# Patient Record
Sex: FEMALE | Race: OTHER | NOT HISPANIC OR LATINO
[De-identification: names, ages, dates, MRNs, and addresses within clinical notes are randomized per-mention and may not be internally consistent; named-entity substitution may affect disease eponyms.]

---

## 2017-01-31 ENCOUNTER — APPOINTMENT (OUTPATIENT)
Dept: THORACIC SURGERY | Facility: CLINIC | Age: 62
End: 2017-01-31

## 2017-01-31 VITALS
RESPIRATION RATE: 17 BRPM | OXYGEN SATURATION: 95 % | TEMPERATURE: 97.7 F | HEART RATE: 91 BPM | DIASTOLIC BLOOD PRESSURE: 77 MMHG | BODY MASS INDEX: 47.26 KG/M2 | WEIGHT: 234 LBS | SYSTOLIC BLOOD PRESSURE: 122 MMHG

## 2017-03-19 ENCOUNTER — EMERGENCY (EMERGENCY)
Facility: HOSPITAL | Age: 62
LOS: 1 days | Discharge: HOME | End: 2017-03-19
Attending: EMERGENCY MEDICINE | Admitting: EMERGENCY MEDICINE
Payer: MEDICAID

## 2017-03-19 VITALS
HEART RATE: 94 BPM | RESPIRATION RATE: 20 BRPM | OXYGEN SATURATION: 94 % | DIASTOLIC BLOOD PRESSURE: 90 MMHG | SYSTOLIC BLOOD PRESSURE: 154 MMHG | TEMPERATURE: 97 F

## 2017-03-19 VITALS
SYSTOLIC BLOOD PRESSURE: 155 MMHG | RESPIRATION RATE: 18 BRPM | DIASTOLIC BLOOD PRESSURE: 59 MMHG | TEMPERATURE: 98 F | OXYGEN SATURATION: 96 % | HEART RATE: 97 BPM

## 2017-03-19 DIAGNOSIS — Z90.89 ACQUIRED ABSENCE OF OTHER ORGANS: Chronic | ICD-10-CM

## 2017-03-19 LAB
ALBUMIN SERPL ELPH-MCNC: 3.9 G/DL — SIGNIFICANT CHANGE UP (ref 3.3–5)
ALP SERPL-CCNC: 131 U/L — HIGH (ref 40–120)
ALT FLD-CCNC: 67 U/L — HIGH (ref 4–33)
AST SERPL-CCNC: 58 U/L — HIGH (ref 4–32)
B PERT DNA SPEC QL NAA+PROBE: SIGNIFICANT CHANGE UP
BASE EXCESS BLDV CALC-SCNC: -1.1 MMOL/L — SIGNIFICANT CHANGE UP
BASOPHILS # BLD AUTO: 0.05 K/UL — SIGNIFICANT CHANGE UP (ref 0–0.2)
BASOPHILS NFR BLD AUTO: 1 % — SIGNIFICANT CHANGE UP (ref 0–2)
BILIRUB SERPL-MCNC: 0.7 MG/DL — SIGNIFICANT CHANGE UP (ref 0.2–1.2)
BLOOD GAS VENOUS - CREATININE: SIGNIFICANT CHANGE UP MG/DL (ref 0.5–1.3)
BUN SERPL-MCNC: 8 MG/DL — SIGNIFICANT CHANGE UP (ref 7–23)
C PNEUM DNA SPEC QL NAA+PROBE: NOT DETECTED — SIGNIFICANT CHANGE UP
CALCIUM SERPL-MCNC: 9.3 MG/DL — SIGNIFICANT CHANGE UP (ref 8.4–10.5)
CHLORIDE BLDV-SCNC: 100 MMOL/L — SIGNIFICANT CHANGE UP (ref 96–108)
CHLORIDE SERPL-SCNC: 95 MMOL/L — LOW (ref 98–107)
CO2 SERPL-SCNC: 22 MMOL/L — SIGNIFICANT CHANGE UP (ref 22–31)
CREAT SERPL-MCNC: 0.75 MG/DL — SIGNIFICANT CHANGE UP (ref 0.5–1.3)
EOSINOPHIL # BLD AUTO: 0.13 K/UL — SIGNIFICANT CHANGE UP (ref 0–0.5)
EOSINOPHIL NFR BLD AUTO: 2.5 % — SIGNIFICANT CHANGE UP (ref 0–6)
FLUAV H1 2009 PAND RNA SPEC QL NAA+PROBE: NOT DETECTED — SIGNIFICANT CHANGE UP
FLUAV H1 RNA SPEC QL NAA+PROBE: NOT DETECTED — SIGNIFICANT CHANGE UP
FLUAV H3 RNA SPEC QL NAA+PROBE: NOT DETECTED — SIGNIFICANT CHANGE UP
FLUAV SUBTYP SPEC NAA+PROBE: SIGNIFICANT CHANGE UP
FLUBV RNA SPEC QL NAA+PROBE: NOT DETECTED — SIGNIFICANT CHANGE UP
GAS PNL BLDV: 127 MMOL/L — LOW (ref 136–146)
GLUCOSE BLDV-MCNC: 354 — HIGH (ref 70–99)
GLUCOSE SERPL-MCNC: 353 MG/DL — HIGH (ref 70–99)
HADV DNA SPEC QL NAA+PROBE: NOT DETECTED — SIGNIFICANT CHANGE UP
HCO3 BLDV-SCNC: 24 MMOL/L — SIGNIFICANT CHANGE UP (ref 20–27)
HCOV 229E RNA SPEC QL NAA+PROBE: NOT DETECTED — SIGNIFICANT CHANGE UP
HCOV HKU1 RNA SPEC QL NAA+PROBE: NOT DETECTED — SIGNIFICANT CHANGE UP
HCOV NL63 RNA SPEC QL NAA+PROBE: NOT DETECTED — SIGNIFICANT CHANGE UP
HCOV OC43 RNA SPEC QL NAA+PROBE: POSITIVE — HIGH
HCT VFR BLD CALC: 41.6 % — SIGNIFICANT CHANGE UP (ref 34.5–45)
HCT VFR BLDV CALC: 44.8 % — SIGNIFICANT CHANGE UP (ref 34.5–45)
HGB BLD-MCNC: 14.1 G/DL — SIGNIFICANT CHANGE UP (ref 11.5–15.5)
HGB BLDV-MCNC: 14.6 G/DL — SIGNIFICANT CHANGE UP (ref 11.5–15.5)
HMPV RNA SPEC QL NAA+PROBE: NOT DETECTED — SIGNIFICANT CHANGE UP
HPIV1 RNA SPEC QL NAA+PROBE: NOT DETECTED — SIGNIFICANT CHANGE UP
HPIV2 RNA SPEC QL NAA+PROBE: NOT DETECTED — SIGNIFICANT CHANGE UP
HPIV3 RNA SPEC QL NAA+PROBE: NOT DETECTED — SIGNIFICANT CHANGE UP
HPIV4 RNA SPEC QL NAA+PROBE: NOT DETECTED — SIGNIFICANT CHANGE UP
IMM GRANULOCYTES NFR BLD AUTO: 0.4 % — SIGNIFICANT CHANGE UP (ref 0–1.5)
LACTATE BLDV-MCNC: 2.1 MMOL/L — HIGH (ref 0.5–2)
LYMPHOCYTES # BLD AUTO: 1.11 K/UL — SIGNIFICANT CHANGE UP (ref 1–3.3)
LYMPHOCYTES # BLD AUTO: 21.2 % — SIGNIFICANT CHANGE UP (ref 13–44)
M PNEUMO DNA SPEC QL NAA+PROBE: NOT DETECTED — SIGNIFICANT CHANGE UP
MCHC RBC-ENTMCNC: 28.3 PG — SIGNIFICANT CHANGE UP (ref 27–34)
MCHC RBC-ENTMCNC: 33.9 % — SIGNIFICANT CHANGE UP (ref 32–36)
MCV RBC AUTO: 83.4 FL — SIGNIFICANT CHANGE UP (ref 80–100)
MONOCYTES # BLD AUTO: 0.33 K/UL — SIGNIFICANT CHANGE UP (ref 0–0.9)
MONOCYTES NFR BLD AUTO: 6.3 % — SIGNIFICANT CHANGE UP (ref 2–14)
NEUTROPHILS # BLD AUTO: 3.59 K/UL — SIGNIFICANT CHANGE UP (ref 1.8–7.4)
NEUTROPHILS NFR BLD AUTO: 68.6 % — SIGNIFICANT CHANGE UP (ref 43–77)
PCO2 BLDV: 34 MMHG — LOW (ref 41–51)
PH BLDV: 7.44 PH — HIGH (ref 7.32–7.43)
PLATELET # BLD AUTO: 173 K/UL — SIGNIFICANT CHANGE UP (ref 150–400)
PMV BLD: 12.1 FL — SIGNIFICANT CHANGE UP (ref 7–13)
PO2 BLDV: 49 MMHG — HIGH (ref 35–40)
POTASSIUM BLDV-SCNC: 3.4 MMOL/L — SIGNIFICANT CHANGE UP (ref 3.4–4.5)
POTASSIUM SERPL-MCNC: 3.9 MMOL/L — SIGNIFICANT CHANGE UP (ref 3.5–5.3)
POTASSIUM SERPL-SCNC: 3.9 MMOL/L — SIGNIFICANT CHANGE UP (ref 3.5–5.3)
PROT SERPL-MCNC: 7 G/DL — SIGNIFICANT CHANGE UP (ref 6–8.3)
RBC # BLD: 4.99 M/UL — SIGNIFICANT CHANGE UP (ref 3.8–5.2)
RBC # FLD: 14 % — SIGNIFICANT CHANGE UP (ref 10.3–14.5)
RSV RNA SPEC QL NAA+PROBE: NOT DETECTED — SIGNIFICANT CHANGE UP
RV+EV RNA SPEC QL NAA+PROBE: NOT DETECTED — SIGNIFICANT CHANGE UP
SAO2 % BLDV: 88.3 % — HIGH (ref 60–85)
SODIUM SERPL-SCNC: 137 MMOL/L — SIGNIFICANT CHANGE UP (ref 135–145)
WBC # BLD: 5.23 K/UL — SIGNIFICANT CHANGE UP (ref 3.8–10.5)
WBC # FLD AUTO: 5.23 K/UL — SIGNIFICANT CHANGE UP (ref 3.8–10.5)

## 2017-03-19 PROCEDURE — 71020: CPT | Mod: 26

## 2017-03-19 PROCEDURE — 99284 EMERGENCY DEPT VISIT MOD MDM: CPT

## 2017-03-19 RX ORDER — IPRATROPIUM/ALBUTEROL SULFATE 18-103MCG
3 AEROSOL WITH ADAPTER (GRAM) INHALATION
Qty: 0 | Refills: 0 | Status: COMPLETED | OUTPATIENT
Start: 2017-03-19 | End: 2017-03-19

## 2017-03-19 RX ORDER — SODIUM CHLORIDE 9 MG/ML
500 INJECTION INTRAMUSCULAR; INTRAVENOUS; SUBCUTANEOUS ONCE
Qty: 0 | Refills: 0 | Status: COMPLETED | OUTPATIENT
Start: 2017-03-19 | End: 2017-03-19

## 2017-03-19 RX ORDER — ALBUTEROL 90 UG/1
3 AEROSOL, METERED ORAL
Qty: 30 | Refills: 0
Start: 2017-03-19

## 2017-03-19 RX ADMIN — Medication 3 MILLILITER(S): at 13:52

## 2017-03-19 RX ADMIN — SODIUM CHLORIDE 500 MILLILITER(S): 9 INJECTION INTRAMUSCULAR; INTRAVENOUS; SUBCUTANEOUS at 14:20

## 2017-03-19 RX ADMIN — Medication 3 MILLILITER(S): at 14:57

## 2017-03-19 RX ADMIN — Medication 3 MILLILITER(S): at 13:18

## 2017-03-19 RX ADMIN — Medication 60 MILLIGRAM(S): at 13:25

## 2017-03-19 NOTE — ED PROVIDER NOTE - FAMILY HISTORY
Mother  Still living? Unknown  Diabetes mellitus, Age at diagnosis: Age Unknown     Father  Still living? Unknown  Family history of lung cancer, Age at diagnosis: Age Unknown  Family history of stroke, Age at diagnosis: Age Unknown

## 2017-03-19 NOTE — ED PROVIDER NOTE - ATTENDING CONTRIBUTION TO CARE
I performed a face to face evaluation of this patient and performed a full history and physical examination on the patient. I agree and have discussed with the resident their history, physical examination, general assessment, and proposed medical plan. I have personally interviewed and examined the patient.

## 2017-03-19 NOTE — ED PROVIDER NOTE - PMH
Deviated septum    Diabetes mellitus    Emphysema/COPD    GERD (gastroesophageal reflux disease)    ITP (idiopathic thrombocytopenic purpura)    Morbid obesity    Prolapsed uterus

## 2017-03-19 NOTE — ED PROVIDER NOTE - PHYSICAL EXAMINATION
Attending Note: 60 y/o female referred from [facility] for evaluation of worsening cough x 2 months and reported hypoxia (SPO2 90). PMH COPD (80pk/yr smoking hx), stage 1 lung ca (s/p RLL wedge resection 10/2016 (Dr. Kurtz)). Mild SOB greater than normal, went to urgent care and noted to have sat 90% on RA, cxr reportedly without acute findings. Denies fever, C/P, recent travel, sick contacts,hemoptysis.

## 2017-03-19 NOTE — ED PROVIDER NOTE - OBJECTIVE STATEMENT
62yo F w/ COPD (80pk/yr smoking hx), stage 1 lung ca s/p RLL wedge resection 10/2016 (Dr. Kurtz), p/w worsening productive cough x2mo w/ yellow sputum, mild SOB, went to urgent care and noted to have sat 90% on RA, cxr reportedly ok, sent to ED.  Denies fever, CP, recent travel, sick contacts.

## 2017-03-19 NOTE — ED ADULT TRIAGE NOTE - CHIEF COMPLAINT QUOTE
Pt c/o increasing sob, intermittent cough  with yellow phlegm  x 2 mths ,  weakness , dizziness and intermittent chills x few days.  Denies N/V/D.  Pt took 1 alb./at home with no relief

## 2017-03-19 NOTE — ED ADULT NURSE NOTE - OBJECTIVE STATEMENT
Alert and oriented x 4. Pt received to spot 12 complaining of shortness of breath x 2 months with yellow sputum. Pt denies chest pain, dizziness, nausea or vomiting. Smoker x 40 years.  Pt ambulates. IV 20g to right hand. Labs drawn. VSS. EKG done. Will continue to monitor. Family at bedside.

## 2017-10-03 ENCOUNTER — APPOINTMENT (OUTPATIENT)
Dept: THORACIC SURGERY | Facility: CLINIC | Age: 62
End: 2017-10-03
Payer: MEDICAID

## 2017-10-03 VITALS
HEART RATE: 85 BPM | WEIGHT: 222 LBS | BODY MASS INDEX: 44.84 KG/M2 | TEMPERATURE: 97.7 F | OXYGEN SATURATION: 96 % | SYSTOLIC BLOOD PRESSURE: 154 MMHG | RESPIRATION RATE: 17 BRPM | DIASTOLIC BLOOD PRESSURE: 83 MMHG

## 2017-10-03 PROCEDURE — 99214 OFFICE O/P EST MOD 30 MIN: CPT

## 2017-11-01 ENCOUNTER — OUTPATIENT (OUTPATIENT)
Dept: OUTPATIENT SERVICES | Facility: HOSPITAL | Age: 62
LOS: 1 days | End: 2017-11-01
Payer: MEDICAID

## 2017-11-01 DIAGNOSIS — Z90.89 ACQUIRED ABSENCE OF OTHER ORGANS: Chronic | ICD-10-CM

## 2017-11-01 PROCEDURE — G9001: CPT

## 2017-11-08 DIAGNOSIS — R69 ILLNESS, UNSPECIFIED: ICD-10-CM

## 2017-12-28 ENCOUNTER — EMERGENCY (EMERGENCY)
Facility: HOSPITAL | Age: 62
LOS: 1 days | Discharge: HOME | End: 2017-12-28
Attending: EMERGENCY MEDICINE
Payer: MEDICAID

## 2017-12-28 VITALS
RESPIRATION RATE: 24 BRPM | HEART RATE: 95 BPM | SYSTOLIC BLOOD PRESSURE: 171 MMHG | OXYGEN SATURATION: 95 % | WEIGHT: 199.96 LBS | DIASTOLIC BLOOD PRESSURE: 89 MMHG | HEIGHT: 61 IN

## 2017-12-28 VITALS
RESPIRATION RATE: 22 BRPM | SYSTOLIC BLOOD PRESSURE: 142 MMHG | HEART RATE: 86 BPM | TEMPERATURE: 98 F | DIASTOLIC BLOOD PRESSURE: 74 MMHG | OXYGEN SATURATION: 95 %

## 2017-12-28 DIAGNOSIS — Z90.89 ACQUIRED ABSENCE OF OTHER ORGANS: Chronic | ICD-10-CM

## 2017-12-28 LAB
ALBUMIN SERPL ELPH-MCNC: 3.5 G/DL — SIGNIFICANT CHANGE UP (ref 3.5–5)
ALP SERPL-CCNC: 140 U/L — HIGH (ref 40–120)
ALT FLD-CCNC: 51 U/L DA — SIGNIFICANT CHANGE UP (ref 10–60)
ANION GAP SERPL CALC-SCNC: 11 MMOL/L — SIGNIFICANT CHANGE UP (ref 5–17)
APTT BLD: 35 SEC — SIGNIFICANT CHANGE UP (ref 27.5–37.4)
AST SERPL-CCNC: 31 U/L — SIGNIFICANT CHANGE UP (ref 10–40)
BASOPHILS # BLD AUTO: 0.1 K/UL — SIGNIFICANT CHANGE UP (ref 0–0.2)
BASOPHILS NFR BLD AUTO: 1.3 % — SIGNIFICANT CHANGE UP (ref 0–2)
BILIRUB SERPL-MCNC: 0.3 MG/DL — SIGNIFICANT CHANGE UP (ref 0.2–1.2)
BUN SERPL-MCNC: 9 MG/DL — SIGNIFICANT CHANGE UP (ref 7–18)
CALCIUM SERPL-MCNC: 8.6 MG/DL — SIGNIFICANT CHANGE UP (ref 8.4–10.5)
CHLORIDE SERPL-SCNC: 106 MMOL/L — SIGNIFICANT CHANGE UP (ref 96–108)
CK SERPL-CCNC: 92 U/L — SIGNIFICANT CHANGE UP (ref 21–215)
CO2 SERPL-SCNC: 23 MMOL/L — SIGNIFICANT CHANGE UP (ref 22–31)
CREAT SERPL-MCNC: 0.78 MG/DL — SIGNIFICANT CHANGE UP (ref 0.5–1.3)
EOSINOPHIL # BLD AUTO: 0.2 K/UL — SIGNIFICANT CHANGE UP (ref 0–0.5)
EOSINOPHIL NFR BLD AUTO: 2.9 % — SIGNIFICANT CHANGE UP (ref 0–6)
GLUCOSE SERPL-MCNC: 135 MG/DL — HIGH (ref 70–99)
HCT VFR BLD CALC: 42.6 % — SIGNIFICANT CHANGE UP (ref 34.5–45)
HGB BLD-MCNC: 13.9 G/DL — SIGNIFICANT CHANGE UP (ref 11.5–15.5)
INR BLD: 1 RATIO — SIGNIFICANT CHANGE UP (ref 0.88–1.16)
LYMPHOCYTES # BLD AUTO: 1.9 K/UL — SIGNIFICANT CHANGE UP (ref 1–3.3)
LYMPHOCYTES # BLD AUTO: 3 % — LOW (ref 13–44)
MCHC RBC-ENTMCNC: 28.9 PG — SIGNIFICANT CHANGE UP (ref 27–34)
MCHC RBC-ENTMCNC: 32.7 GM/DL — SIGNIFICANT CHANGE UP (ref 32–36)
MCV RBC AUTO: 88.4 FL — SIGNIFICANT CHANGE UP (ref 80–100)
MONOCYTES # BLD AUTO: 0.4 K/UL — SIGNIFICANT CHANGE UP (ref 0–0.9)
MONOCYTES NFR BLD AUTO: 4.8 % — SIGNIFICANT CHANGE UP (ref 2–14)
NEUTROPHILS # BLD AUTO: 5.2 K/UL — SIGNIFICANT CHANGE UP (ref 1.8–7.4)
NEUTROPHILS NFR BLD AUTO: 75 % — SIGNIFICANT CHANGE UP (ref 43–77)
NT-PROBNP SERPL-SCNC: 312 PG/ML — HIGH (ref 0–125)
PLATELET # BLD AUTO: 175 K/UL — SIGNIFICANT CHANGE UP (ref 150–400)
POTASSIUM SERPL-MCNC: 3.5 MMOL/L — SIGNIFICANT CHANGE UP (ref 3.5–5.3)
POTASSIUM SERPL-SCNC: 3.5 MMOL/L — SIGNIFICANT CHANGE UP (ref 3.5–5.3)
PROT SERPL-MCNC: 7.5 G/DL — SIGNIFICANT CHANGE UP (ref 6–8.3)
PROTHROM AB SERPL-ACNC: 12 SEC — SIGNIFICANT CHANGE UP (ref 9.8–12.7)
RBC # BLD: 5 M/UL — SIGNIFICANT CHANGE UP (ref 3.8–5.2)
RBC # FLD: 12.6 % — SIGNIFICANT CHANGE UP (ref 10.3–14.5)
SODIUM SERPL-SCNC: 140 MMOL/L — SIGNIFICANT CHANGE UP (ref 135–145)
TROPONIN I SERPL-MCNC: <0.015 NG/ML — SIGNIFICANT CHANGE UP (ref 0–0.04)
WBC # BLD: 8 K/UL — SIGNIFICANT CHANGE UP (ref 3.8–10.5)
WBC # FLD AUTO: 8 K/UL — SIGNIFICANT CHANGE UP (ref 3.8–10.5)

## 2017-12-28 PROCEDURE — 94640 AIRWAY INHALATION TREATMENT: CPT

## 2017-12-28 PROCEDURE — 84484 ASSAY OF TROPONIN QUANT: CPT

## 2017-12-28 PROCEDURE — 99285 EMERGENCY DEPT VISIT HI MDM: CPT | Mod: 25

## 2017-12-28 PROCEDURE — 71275 CT ANGIOGRAPHY CHEST: CPT | Mod: 26

## 2017-12-28 PROCEDURE — 82962 GLUCOSE BLOOD TEST: CPT

## 2017-12-28 PROCEDURE — 80053 COMPREHEN METABOLIC PANEL: CPT

## 2017-12-28 PROCEDURE — 71010: CPT | Mod: 26

## 2017-12-28 PROCEDURE — 82550 ASSAY OF CK (CPK): CPT

## 2017-12-28 PROCEDURE — 85027 COMPLETE CBC AUTOMATED: CPT

## 2017-12-28 PROCEDURE — 71275 CT ANGIOGRAPHY CHEST: CPT

## 2017-12-28 PROCEDURE — 71045 X-RAY EXAM CHEST 1 VIEW: CPT

## 2017-12-28 PROCEDURE — 85730 THROMBOPLASTIN TIME PARTIAL: CPT

## 2017-12-28 PROCEDURE — 85610 PROTHROMBIN TIME: CPT

## 2017-12-28 PROCEDURE — 96374 THER/PROPH/DIAG INJ IV PUSH: CPT

## 2017-12-28 PROCEDURE — 93005 ELECTROCARDIOGRAM TRACING: CPT

## 2017-12-28 PROCEDURE — 99284 EMERGENCY DEPT VISIT MOD MDM: CPT | Mod: 25

## 2017-12-28 PROCEDURE — 83880 ASSAY OF NATRIURETIC PEPTIDE: CPT

## 2017-12-28 PROCEDURE — 96375 TX/PRO/DX INJ NEW DRUG ADDON: CPT

## 2017-12-28 RX ORDER — IPRATROPIUM/ALBUTEROL SULFATE 18-103MCG
3 AEROSOL WITH ADAPTER (GRAM) INHALATION ONCE
Qty: 0 | Refills: 0 | Status: COMPLETED | OUTPATIENT
Start: 2017-12-28 | End: 2017-12-28

## 2017-12-28 RX ORDER — MORPHINE SULFATE 50 MG/1
4 CAPSULE, EXTENDED RELEASE ORAL ONCE
Qty: 0 | Refills: 0 | Status: DISCONTINUED | OUTPATIENT
Start: 2017-12-28 | End: 2017-12-28

## 2017-12-28 RX ORDER — ACETAMINOPHEN 500 MG
1000 TABLET ORAL ONCE
Qty: 0 | Refills: 0 | Status: COMPLETED | OUTPATIENT
Start: 2017-12-28 | End: 2017-12-28

## 2017-12-28 RX ORDER — OXYCODONE AND ACETAMINOPHEN 5; 325 MG/1; MG/1
1 TABLET ORAL ONCE
Qty: 0 | Refills: 0 | Status: DISCONTINUED | OUTPATIENT
Start: 2017-12-28 | End: 2017-12-28

## 2017-12-28 RX ADMIN — Medication 1000 MILLIGRAM(S): at 02:57

## 2017-12-28 RX ADMIN — Medication 3 MILLILITER(S): at 01:59

## 2017-12-28 RX ADMIN — Medication 3 MILLILITER(S): at 01:36

## 2017-12-28 RX ADMIN — MORPHINE SULFATE 4 MILLIGRAM(S): 50 CAPSULE, EXTENDED RELEASE ORAL at 04:46

## 2017-12-28 RX ADMIN — MORPHINE SULFATE 4 MILLIGRAM(S): 50 CAPSULE, EXTENDED RELEASE ORAL at 04:45

## 2017-12-28 RX ADMIN — Medication 400 MILLIGRAM(S): at 01:59

## 2017-12-28 RX ADMIN — OXYCODONE AND ACETAMINOPHEN 1 TABLET(S): 5; 325 TABLET ORAL at 08:17

## 2017-12-28 NOTE — ED ADULT NURSE REASSESSMENT NOTE - NS ED NURSE REASSESS COMMENT FT1
patient states pain w/ relief to medication, RA saturation 98%, slept while waiting for CT results. to be evaluated by ED attending

## 2017-12-28 NOTE — ED PROVIDER NOTE - PMH
Deviated septum    Diabetes mellitus    Emphysema/COPD    GERD (gastroesophageal reflux disease)    ITP (idiopathic thrombocytopenic purpura)    Lung cancer    Morbid obesity    Prolapsed uterus

## 2017-12-28 NOTE — ED PROVIDER NOTE - PROGRESS NOTE DETAILS
Janelle ZAPATA: Ptient has been reassesed and reports improving symptoms. Denies worse CP, palitation or lightheadedness.

## 2017-12-28 NOTE — ED PROVIDER NOTE - NS_ ATTENDINGSCRIBEDETAILS _ED_A_ED_FT
The scribe's documentation has been prepared under my direction and personally reviewed by me in its entirety. I confirm that the note above accurately reflects all work, treatment, procedures, and medical decision making performed by me (Dr. Chapo Borgse).

## 2017-12-28 NOTE — ED PROVIDER NOTE - OBJECTIVE STATEMENT
63 y/o female with significant PMHx of COPD, DM, ITP, Stage 1 Lung CA, presents to the ED c/o worsening SOB x 2 weeks. Pt reports associated productive cough (yellow) x 2 weeks and R sided CP: described as sharp and non-radiating in nature. Pt reports she came in today for worsening SOB, and did not come in earlier as she assumed SOB would resolve on own. Pt also reports chronic b/l LE swelling and diaphoresis. Pt denies recent sick contacts, recent travel, long periods of immobilization or Hx of DVT/PE/CHF. Pt had sonogram of abd recently: was told that she has a mass in liver. Pt does not use O2 at home. Pt denies fever, chills, nausea, vomiting, hemoptysis, rash, abd pain, palpitations, or any other complaints. Former smoker. PMD: Dr. Mendiola, Dr. Schaffer, Dr. Kurtz (thoracic surgeon).

## 2018-04-10 ENCOUNTER — APPOINTMENT (OUTPATIENT)
Dept: THORACIC SURGERY | Facility: CLINIC | Age: 63
End: 2018-04-10
Payer: MEDICAID

## 2018-04-24 ENCOUNTER — APPOINTMENT (OUTPATIENT)
Dept: THORACIC SURGERY | Facility: CLINIC | Age: 63
End: 2018-04-24
Payer: MEDICAID

## 2018-05-15 ENCOUNTER — APPOINTMENT (OUTPATIENT)
Dept: THORACIC SURGERY | Facility: CLINIC | Age: 63
End: 2018-05-15
Payer: MEDICAID

## 2018-05-29 ENCOUNTER — APPOINTMENT (OUTPATIENT)
Dept: THORACIC SURGERY | Facility: CLINIC | Age: 63
End: 2018-05-29
Payer: MEDICAID

## 2018-05-29 VITALS
RESPIRATION RATE: 18 BRPM | SYSTOLIC BLOOD PRESSURE: 138 MMHG | WEIGHT: 190 LBS | OXYGEN SATURATION: 94 % | HEART RATE: 97 BPM | DIASTOLIC BLOOD PRESSURE: 82 MMHG | BODY MASS INDEX: 38.3 KG/M2 | HEIGHT: 59 IN

## 2018-05-29 PROCEDURE — 99213 OFFICE O/P EST LOW 20 MIN: CPT

## 2018-05-29 RX ORDER — METFORMIN HYDROCHLORIDE 500 MG/1
500 TABLET, COATED ORAL
Refills: 0 | Status: ACTIVE | COMMUNITY

## 2018-05-29 RX ORDER — PREDNISONE 20 MG/1
20 TABLET ORAL
Refills: 0 | Status: ACTIVE | COMMUNITY

## 2018-11-27 ENCOUNTER — APPOINTMENT (OUTPATIENT)
Dept: THORACIC SURGERY | Facility: CLINIC | Age: 63
End: 2018-11-27

## 2018-11-30 ENCOUNTER — OTHER (OUTPATIENT)
Age: 63
End: 2018-11-30

## 2018-12-06 PROBLEM — C34.90 MALIGNANT NEOPLASM OF UNSPECIFIED PART OF UNSPECIFIED BRONCHUS OR LUNG: Chronic | Status: ACTIVE | Noted: 2018-01-02

## 2018-12-11 ENCOUNTER — APPOINTMENT (OUTPATIENT)
Dept: THORACIC SURGERY | Facility: CLINIC | Age: 63
End: 2018-12-11

## 2018-12-18 ENCOUNTER — APPOINTMENT (OUTPATIENT)
Dept: THORACIC SURGERY | Facility: CLINIC | Age: 63
End: 2018-12-18
Payer: MEDICAID

## 2018-12-18 VITALS
TEMPERATURE: 97.5 F | DIASTOLIC BLOOD PRESSURE: 81 MMHG | WEIGHT: 230 LBS | RESPIRATION RATE: 18 BRPM | OXYGEN SATURATION: 92 % | BODY MASS INDEX: 46.45 KG/M2 | HEART RATE: 102 BPM | SYSTOLIC BLOOD PRESSURE: 167 MMHG

## 2018-12-18 DIAGNOSIS — R06.02 SHORTNESS OF BREATH: ICD-10-CM

## 2018-12-18 PROCEDURE — 99213 OFFICE O/P EST LOW 20 MIN: CPT

## 2018-12-20 PROBLEM — R06.02 SOB (SHORTNESS OF BREATH): Status: ACTIVE | Noted: 2018-12-20

## 2018-12-20 RX ORDER — PREDNISONE 20 MG/1
20 TABLET ORAL DAILY
Refills: 0 | Status: ACTIVE | COMMUNITY

## 2019-04-09 ENCOUNTER — OUTPATIENT (OUTPATIENT)
Dept: OUTPATIENT SERVICES | Facility: HOSPITAL | Age: 64
LOS: 1 days | End: 2019-04-09
Payer: MEDICAID

## 2019-04-09 DIAGNOSIS — R07.9 CHEST PAIN, UNSPECIFIED: ICD-10-CM

## 2019-04-09 DIAGNOSIS — Z90.89 ACQUIRED ABSENCE OF OTHER ORGANS: Chronic | ICD-10-CM

## 2019-04-09 PROCEDURE — 93016 CV STRESS TEST SUPVJ ONLY: CPT

## 2019-04-09 PROCEDURE — 93018 CV STRESS TEST I&R ONLY: CPT

## 2019-04-09 PROCEDURE — 78452 HT MUSCLE IMAGE SPECT MULT: CPT

## 2019-04-09 PROCEDURE — A9502: CPT

## 2019-04-09 PROCEDURE — 93017 CV STRESS TEST TRACING ONLY: CPT

## 2019-04-09 PROCEDURE — 78452 HT MUSCLE IMAGE SPECT MULT: CPT | Mod: 26

## 2019-06-21 ENCOUNTER — OTHER (OUTPATIENT)
Age: 64
End: 2019-06-21

## 2019-06-25 ENCOUNTER — APPOINTMENT (OUTPATIENT)
Dept: THORACIC SURGERY | Facility: CLINIC | Age: 64
End: 2019-06-25

## 2019-12-01 ENCOUNTER — OUTPATIENT (OUTPATIENT)
Dept: OUTPATIENT SERVICES | Facility: HOSPITAL | Age: 64
LOS: 1 days | End: 2019-12-01
Payer: MEDICAID

## 2019-12-01 DIAGNOSIS — Z90.89 ACQUIRED ABSENCE OF OTHER ORGANS: Chronic | ICD-10-CM

## 2019-12-01 PROCEDURE — G9001: CPT

## 2019-12-22 ENCOUNTER — INPATIENT (INPATIENT)
Facility: HOSPITAL | Age: 64
LOS: 1 days | Discharge: HOME | DRG: 191 | End: 2019-12-24
Attending: STUDENT IN AN ORGANIZED HEALTH CARE EDUCATION/TRAINING PROGRAM | Admitting: STUDENT IN AN ORGANIZED HEALTH CARE EDUCATION/TRAINING PROGRAM
Payer: MEDICAID

## 2019-12-22 VITALS
RESPIRATION RATE: 18 BRPM | HEART RATE: 97 BPM | DIASTOLIC BLOOD PRESSURE: 81 MMHG | TEMPERATURE: 97 F | SYSTOLIC BLOOD PRESSURE: 142 MMHG | WEIGHT: 175.05 LBS | OXYGEN SATURATION: 99 %

## 2019-12-22 DIAGNOSIS — Z90.89 ACQUIRED ABSENCE OF OTHER ORGANS: Chronic | ICD-10-CM

## 2019-12-22 LAB
ALBUMIN SERPL ELPH-MCNC: 3.5 G/DL — SIGNIFICANT CHANGE UP (ref 3.5–5)
ALP SERPL-CCNC: 131 U/L — HIGH (ref 40–120)
ALT FLD-CCNC: 27 U/L DA — SIGNIFICANT CHANGE UP (ref 10–60)
ANION GAP SERPL CALC-SCNC: 8 MMOL/L — SIGNIFICANT CHANGE UP (ref 5–17)
AST SERPL-CCNC: 12 U/L — SIGNIFICANT CHANGE UP (ref 10–40)
BASE EXCESS BLDV CALC-SCNC: 4 MMOL/L — HIGH (ref -2–2)
BASOPHILS # BLD AUTO: 0.05 K/UL — SIGNIFICANT CHANGE UP (ref 0–0.2)
BASOPHILS NFR BLD AUTO: 0.3 % — SIGNIFICANT CHANGE UP (ref 0–2)
BILIRUB SERPL-MCNC: 0.4 MG/DL — SIGNIFICANT CHANGE UP (ref 0.2–1.2)
BLOOD GAS COMMENTS, VENOUS: SIGNIFICANT CHANGE UP
BUN SERPL-MCNC: 21 MG/DL — HIGH (ref 7–18)
CALCIUM SERPL-MCNC: 8.9 MG/DL — SIGNIFICANT CHANGE UP (ref 8.4–10.5)
CHLORIDE SERPL-SCNC: 101 MMOL/L — SIGNIFICANT CHANGE UP (ref 96–108)
CO2 SERPL-SCNC: 30 MMOL/L — SIGNIFICANT CHANGE UP (ref 22–31)
CREAT SERPL-MCNC: 0.88 MG/DL — SIGNIFICANT CHANGE UP (ref 0.5–1.3)
EOSINOPHIL # BLD AUTO: 0.08 K/UL — SIGNIFICANT CHANGE UP (ref 0–0.5)
EOSINOPHIL NFR BLD AUTO: 0.5 % — SIGNIFICANT CHANGE UP (ref 0–6)
GLUCOSE SERPL-MCNC: 329 MG/DL — HIGH (ref 70–99)
HCO3 BLDV-SCNC: 29 MMOL/L — SIGNIFICANT CHANGE UP (ref 21–29)
HCT VFR BLD CALC: 46.7 % — HIGH (ref 34.5–45)
HGB BLD-MCNC: 15.1 G/DL — SIGNIFICANT CHANGE UP (ref 11.5–15.5)
HOROWITZ INDEX BLDV+IHG-RTO: 21 — SIGNIFICANT CHANGE UP
IMM GRANULOCYTES NFR BLD AUTO: 0.6 % — SIGNIFICANT CHANGE UP (ref 0–1.5)
LYMPHOCYTES # BLD AUTO: 16.4 % — SIGNIFICANT CHANGE UP (ref 13–44)
LYMPHOCYTES # BLD AUTO: 2.6 K/UL — SIGNIFICANT CHANGE UP (ref 1–3.3)
MCHC RBC-ENTMCNC: 28.8 PG — SIGNIFICANT CHANGE UP (ref 27–34)
MCHC RBC-ENTMCNC: 32.3 GM/DL — SIGNIFICANT CHANGE UP (ref 32–36)
MCV RBC AUTO: 89 FL — SIGNIFICANT CHANGE UP (ref 80–100)
MONOCYTES # BLD AUTO: 1.3 K/UL — HIGH (ref 0–0.9)
MONOCYTES NFR BLD AUTO: 8.2 % — SIGNIFICANT CHANGE UP (ref 2–14)
NEUTROPHILS # BLD AUTO: 11.75 K/UL — HIGH (ref 1.8–7.4)
NEUTROPHILS NFR BLD AUTO: 74 % — SIGNIFICANT CHANGE UP (ref 43–77)
NRBC # BLD: 0 /100 WBCS — SIGNIFICANT CHANGE UP (ref 0–0)
PCO2 BLDV: 47 MMHG — SIGNIFICANT CHANGE UP (ref 35–50)
PH BLDV: 7.41 — SIGNIFICANT CHANGE UP (ref 7.35–7.45)
PLATELET # BLD AUTO: 292 K/UL — SIGNIFICANT CHANGE UP (ref 150–400)
PO2 BLDV: 56 MMHG — HIGH (ref 25–45)
POTASSIUM SERPL-MCNC: 3.4 MMOL/L — LOW (ref 3.5–5.3)
POTASSIUM SERPL-SCNC: 3.4 MMOL/L — LOW (ref 3.5–5.3)
PROT SERPL-MCNC: 7.4 G/DL — SIGNIFICANT CHANGE UP (ref 6–8.3)
RBC # BLD: 5.25 M/UL — HIGH (ref 3.8–5.2)
RBC # FLD: 13.5 % — SIGNIFICANT CHANGE UP (ref 10.3–14.5)
SAO2 % BLDV: 89 % — HIGH (ref 67–88)
SODIUM SERPL-SCNC: 139 MMOL/L — SIGNIFICANT CHANGE UP (ref 135–145)
TROPONIN I SERPL-MCNC: <0.015 NG/ML — SIGNIFICANT CHANGE UP (ref 0–0.04)
WBC # BLD: 15.88 K/UL — HIGH (ref 3.8–10.5)
WBC # FLD AUTO: 15.88 K/UL — HIGH (ref 3.8–10.5)

## 2019-12-22 PROCEDURE — 71046 X-RAY EXAM CHEST 2 VIEWS: CPT | Mod: 26

## 2019-12-22 RX ORDER — KETOROLAC TROMETHAMINE 30 MG/ML
15 SYRINGE (ML) INJECTION ONCE
Refills: 0 | Status: DISCONTINUED | OUTPATIENT
Start: 2019-12-22 | End: 2019-12-22

## 2019-12-22 RX ORDER — IPRATROPIUM/ALBUTEROL SULFATE 18-103MCG
3 AEROSOL WITH ADAPTER (GRAM) INHALATION ONCE
Refills: 0 | Status: COMPLETED | OUTPATIENT
Start: 2019-12-22 | End: 2019-12-22

## 2019-12-22 RX ADMIN — Medication 15 MILLIGRAM(S): at 23:37

## 2019-12-22 RX ADMIN — Medication 15 MILLIGRAM(S): at 22:41

## 2019-12-22 RX ADMIN — Medication 40 MILLIGRAM(S): at 22:41

## 2019-12-22 RX ADMIN — Medication 3 MILLILITER(S): at 22:41

## 2019-12-22 NOTE — ED PROVIDER NOTE - OBJECTIVE STATEMENT
Pt is a 64y F with significant PMHx of CHF, COPD and significant PSHx of Rt lung wedge resection presenting to the ED with shortness of breath on exertion and bilateral lung pain x3days. Pt describes pain as lungs being squeezed. Pt has chronic leg swelling and denies any chest pain, fever, nausea, vomiting, or any trauma. Pt has a Penicillin allergy and currently denies any additional complaints at this time.

## 2019-12-22 NOTE — ED ADULT NURSE NOTE - NSIMPLEMENTINTERV_GEN_ALL_ED
Implemented All Universal Safety Interventions:  [place] to call system. Call bell, personal items and telephone within reach. Instruct patient to call for assistance. Room bathroom lighting operational. Non-slip footwear when patient is off stretcher. Physically safe environment: no spills, clutter or unnecessary equipment. Stretcher in lowest position, wheels locked, appropriate side rails in place.

## 2019-12-22 NOTE — ED ADULT NURSE NOTE - OBJECTIVE STATEMENT
came to ED due to back pain upper side x 3 days pain when coughing. Pt on oxygen as needed at home .

## 2019-12-23 ENCOUNTER — TRANSCRIPTION ENCOUNTER (OUTPATIENT)
Age: 64
End: 2019-12-23

## 2019-12-23 DIAGNOSIS — Z90.2 ACQUIRED ABSENCE OF LUNG [PART OF]: Chronic | ICD-10-CM

## 2019-12-23 DIAGNOSIS — Z86.2 PERSONAL HISTORY OF DISEASES OF THE BLOOD AND BLOOD-FORMING ORGANS AND CERTAIN DISORDERS INVOLVING THE IMMUNE MECHANISM: ICD-10-CM

## 2019-12-23 DIAGNOSIS — J44.1 CHRONIC OBSTRUCTIVE PULMONARY DISEASE WITH (ACUTE) EXACERBATION: ICD-10-CM

## 2019-12-23 DIAGNOSIS — Z90.49 ACQUIRED ABSENCE OF OTHER SPECIFIED PARTS OF DIGESTIVE TRACT: Chronic | ICD-10-CM

## 2019-12-23 DIAGNOSIS — E11.9 TYPE 2 DIABETES MELLITUS WITHOUT COMPLICATIONS: ICD-10-CM

## 2019-12-23 DIAGNOSIS — J18.9 PNEUMONIA, UNSPECIFIED ORGANISM: ICD-10-CM

## 2019-12-23 DIAGNOSIS — R07.81 PLEURODYNIA: ICD-10-CM

## 2019-12-23 DIAGNOSIS — Z29.9 ENCOUNTER FOR PROPHYLACTIC MEASURES, UNSPECIFIED: ICD-10-CM

## 2019-12-23 DIAGNOSIS — I10 ESSENTIAL (PRIMARY) HYPERTENSION: ICD-10-CM

## 2019-12-23 LAB
24R-OH-CALCIDIOL SERPL-MCNC: 9.9 NG/ML — LOW (ref 30–80)
ANION GAP SERPL CALC-SCNC: 8 MMOL/L — SIGNIFICANT CHANGE UP (ref 5–17)
BASOPHILS # BLD AUTO: 0.02 K/UL — SIGNIFICANT CHANGE UP (ref 0–0.2)
BASOPHILS NFR BLD AUTO: 0.2 % — SIGNIFICANT CHANGE UP (ref 0–2)
BUN SERPL-MCNC: 21 MG/DL — HIGH (ref 7–18)
CALCIUM SERPL-MCNC: 8.9 MG/DL — SIGNIFICANT CHANGE UP (ref 8.4–10.5)
CHLORIDE SERPL-SCNC: 98 MMOL/L — SIGNIFICANT CHANGE UP (ref 96–108)
CHOLEST SERPL-MCNC: 145 MG/DL — SIGNIFICANT CHANGE UP (ref 10–199)
CO2 SERPL-SCNC: 29 MMOL/L — SIGNIFICANT CHANGE UP (ref 22–31)
CREAT SERPL-MCNC: 0.86 MG/DL — SIGNIFICANT CHANGE UP (ref 0.5–1.3)
EOSINOPHIL # BLD AUTO: 0 K/UL — SIGNIFICANT CHANGE UP (ref 0–0.5)
EOSINOPHIL NFR BLD AUTO: 0 % — SIGNIFICANT CHANGE UP (ref 0–6)
FLU A RESULT: SIGNIFICANT CHANGE UP
FLU A RESULT: SIGNIFICANT CHANGE UP
FLUAV AG NPH QL: SIGNIFICANT CHANGE UP
FLUBV AG NPH QL: SIGNIFICANT CHANGE UP
GLUCOSE BLDC GLUCOMTR-MCNC: 183 MG/DL — HIGH (ref 70–99)
GLUCOSE BLDC GLUCOMTR-MCNC: 243 MG/DL — HIGH (ref 70–99)
GLUCOSE BLDC GLUCOMTR-MCNC: 340 MG/DL — HIGH (ref 70–99)
GLUCOSE BLDC GLUCOMTR-MCNC: 353 MG/DL — HIGH (ref 70–99)
GLUCOSE SERPL-MCNC: 448 MG/DL — HIGH (ref 70–99)
HBA1C BLD-MCNC: 11.2 % — HIGH (ref 4–5.6)
HCT VFR BLD CALC: 42.8 % — SIGNIFICANT CHANGE UP (ref 34.5–45)
HDLC SERPL-MCNC: 76 MG/DL — SIGNIFICANT CHANGE UP
HGB BLD-MCNC: 13.9 G/DL — SIGNIFICANT CHANGE UP (ref 11.5–15.5)
IMM GRANULOCYTES NFR BLD AUTO: 0.7 % — SIGNIFICANT CHANGE UP (ref 0–1.5)
LIPID PNL WITH DIRECT LDL SERPL: 53 MG/DL — SIGNIFICANT CHANGE UP
LYMPHOCYTES # BLD AUTO: 0.78 K/UL — LOW (ref 1–3.3)
LYMPHOCYTES # BLD AUTO: 7.9 % — LOW (ref 13–44)
MAGNESIUM SERPL-MCNC: 2 MG/DL — SIGNIFICANT CHANGE UP (ref 1.6–2.6)
MCHC RBC-ENTMCNC: 28.5 PG — SIGNIFICANT CHANGE UP (ref 27–34)
MCHC RBC-ENTMCNC: 32.5 GM/DL — SIGNIFICANT CHANGE UP (ref 32–36)
MCV RBC AUTO: 87.7 FL — SIGNIFICANT CHANGE UP (ref 80–100)
MONOCYTES # BLD AUTO: 0.24 K/UL — SIGNIFICANT CHANGE UP (ref 0–0.9)
MONOCYTES NFR BLD AUTO: 2.4 % — SIGNIFICANT CHANGE UP (ref 2–14)
NEUTROPHILS # BLD AUTO: 8.82 K/UL — HIGH (ref 1.8–7.4)
NEUTROPHILS NFR BLD AUTO: 88.8 % — HIGH (ref 43–77)
NRBC # BLD: 0 /100 WBCS — SIGNIFICANT CHANGE UP (ref 0–0)
PHOSPHATE SERPL-MCNC: 3.9 MG/DL — SIGNIFICANT CHANGE UP (ref 2.5–4.5)
PLATELET # BLD AUTO: 235 K/UL — SIGNIFICANT CHANGE UP (ref 150–400)
POTASSIUM SERPL-MCNC: 4.2 MMOL/L — SIGNIFICANT CHANGE UP (ref 3.5–5.3)
POTASSIUM SERPL-SCNC: 4.2 MMOL/L — SIGNIFICANT CHANGE UP (ref 3.5–5.3)
PROCALCITONIN SERPL-MCNC: 0.04 NG/ML — SIGNIFICANT CHANGE UP (ref 0.02–0.1)
RBC # BLD: 4.88 M/UL — SIGNIFICANT CHANGE UP (ref 3.8–5.2)
RBC # FLD: 13.5 % — SIGNIFICANT CHANGE UP (ref 10.3–14.5)
RSV RESULT: SIGNIFICANT CHANGE UP
RSV RNA RESP QL NAA+PROBE: SIGNIFICANT CHANGE UP
SODIUM SERPL-SCNC: 135 MMOL/L — SIGNIFICANT CHANGE UP (ref 135–145)
TOTAL CHOLESTEROL/HDL RATIO MEASUREMENT: 1.9 RATIO — LOW (ref 3.3–7.1)
TRIGL SERPL-MCNC: 80 MG/DL — SIGNIFICANT CHANGE UP (ref 10–149)
TROPONIN I SERPL-MCNC: <0.015 NG/ML — SIGNIFICANT CHANGE UP (ref 0–0.04)
TSH SERPL-MCNC: 0.44 UU/ML — SIGNIFICANT CHANGE UP (ref 0.34–4.82)
VIT B12 SERPL-MCNC: 451 PG/ML — SIGNIFICANT CHANGE UP (ref 232–1245)
WBC # BLD: 9.93 K/UL — SIGNIFICANT CHANGE UP (ref 3.8–10.5)
WBC # FLD AUTO: 9.93 K/UL — SIGNIFICANT CHANGE UP (ref 3.8–10.5)

## 2019-12-23 PROCEDURE — 99223 1ST HOSP IP/OBS HIGH 75: CPT

## 2019-12-23 PROCEDURE — 99285 EMERGENCY DEPT VISIT HI MDM: CPT

## 2019-12-23 PROCEDURE — 71275 CT ANGIOGRAPHY CHEST: CPT | Mod: 26

## 2019-12-23 RX ORDER — AZITHROMYCIN 500 MG/1
500 TABLET, FILM COATED ORAL EVERY 24 HOURS
Refills: 0 | Status: DISCONTINUED | OUTPATIENT
Start: 2019-12-23 | End: 2019-12-23

## 2019-12-23 RX ORDER — ACETAMINOPHEN 500 MG
650 TABLET ORAL EVERY 6 HOURS
Refills: 0 | Status: DISCONTINUED | OUTPATIENT
Start: 2019-12-23 | End: 2019-12-24

## 2019-12-23 RX ORDER — INSULIN GLARGINE 100 [IU]/ML
10 INJECTION, SOLUTION SUBCUTANEOUS AT BEDTIME
Refills: 0 | Status: DISCONTINUED | OUTPATIENT
Start: 2019-12-23 | End: 2019-12-24

## 2019-12-23 RX ORDER — CYCLOBENZAPRINE HYDROCHLORIDE 10 MG/1
5 TABLET, FILM COATED ORAL THREE TIMES A DAY
Refills: 0 | Status: DISCONTINUED | OUTPATIENT
Start: 2019-12-23 | End: 2019-12-24

## 2019-12-23 RX ORDER — BUDESONIDE AND FORMOTEROL FUMARATE DIHYDRATE 160; 4.5 UG/1; UG/1
2 AEROSOL RESPIRATORY (INHALATION)
Refills: 0 | Status: DISCONTINUED | OUTPATIENT
Start: 2019-12-23 | End: 2019-12-24

## 2019-12-23 RX ORDER — CARVEDILOL PHOSPHATE 80 MG/1
6.25 CAPSULE, EXTENDED RELEASE ORAL EVERY 12 HOURS
Refills: 0 | Status: DISCONTINUED | OUTPATIENT
Start: 2019-12-23 | End: 2019-12-24

## 2019-12-23 RX ORDER — ALPRAZOLAM 0.25 MG
1 TABLET ORAL
Refills: 0 | Status: DISCONTINUED | OUTPATIENT
Start: 2019-12-23 | End: 2019-12-24

## 2019-12-23 RX ORDER — POTASSIUM CHLORIDE 20 MEQ
40 PACKET (EA) ORAL ONCE
Refills: 0 | Status: COMPLETED | OUTPATIENT
Start: 2019-12-23 | End: 2019-12-23

## 2019-12-23 RX ORDER — FAMOTIDINE 10 MG/ML
40 INJECTION INTRAVENOUS DAILY
Refills: 0 | Status: DISCONTINUED | OUTPATIENT
Start: 2019-12-23 | End: 2019-12-24

## 2019-12-23 RX ORDER — ATORVASTATIN CALCIUM 80 MG/1
40 TABLET, FILM COATED ORAL AT BEDTIME
Refills: 0 | Status: DISCONTINUED | OUTPATIENT
Start: 2019-12-23 | End: 2019-12-24

## 2019-12-23 RX ORDER — IPRATROPIUM/ALBUTEROL SULFATE 18-103MCG
3 AEROSOL WITH ADAPTER (GRAM) INHALATION EVERY 6 HOURS
Refills: 0 | Status: DISCONTINUED | OUTPATIENT
Start: 2019-12-23 | End: 2019-12-24

## 2019-12-23 RX ORDER — INSULIN GLARGINE 100 [IU]/ML
10 INJECTION, SOLUTION SUBCUTANEOUS ONCE
Refills: 0 | Status: COMPLETED | OUTPATIENT
Start: 2019-12-23 | End: 2019-12-23

## 2019-12-23 RX ORDER — ERGOCALCIFEROL 1.25 MG/1
50000 CAPSULE ORAL
Refills: 0 | Status: DISCONTINUED | OUTPATIENT
Start: 2019-12-23 | End: 2019-12-24

## 2019-12-23 RX ORDER — DEXTROSE 50 % IN WATER 50 %
25 SYRINGE (ML) INTRAVENOUS ONCE
Refills: 0 | Status: DISCONTINUED | OUTPATIENT
Start: 2019-12-23 | End: 2019-12-24

## 2019-12-23 RX ORDER — ENOXAPARIN SODIUM 100 MG/ML
40 INJECTION SUBCUTANEOUS DAILY
Refills: 0 | Status: DISCONTINUED | OUTPATIENT
Start: 2019-12-23 | End: 2019-12-24

## 2019-12-23 RX ORDER — INSULIN LISPRO 100/ML
VIAL (ML) SUBCUTANEOUS
Refills: 0 | Status: DISCONTINUED | OUTPATIENT
Start: 2019-12-23 | End: 2019-12-23

## 2019-12-23 RX ORDER — TRAMADOL HYDROCHLORIDE 50 MG/1
50 TABLET ORAL EVERY 6 HOURS
Refills: 0 | Status: DISCONTINUED | OUTPATIENT
Start: 2019-12-23 | End: 2019-12-24

## 2019-12-23 RX ORDER — INSULIN LISPRO 100/ML
VIAL (ML) SUBCUTANEOUS
Refills: 0 | Status: DISCONTINUED | OUTPATIENT
Start: 2019-12-23 | End: 2019-12-24

## 2019-12-23 RX ORDER — IPRATROPIUM/ALBUTEROL SULFATE 18-103MCG
3 AEROSOL WITH ADAPTER (GRAM) INHALATION EVERY 6 HOURS
Refills: 0 | Status: DISCONTINUED | OUTPATIENT
Start: 2019-12-23 | End: 2019-12-23

## 2019-12-23 RX ADMIN — CYCLOBENZAPRINE HYDROCHLORIDE 5 MILLIGRAM(S): 10 TABLET, FILM COATED ORAL at 20:29

## 2019-12-23 RX ADMIN — Medication 40 MILLIGRAM(S): at 05:15

## 2019-12-23 RX ADMIN — TRAMADOL HYDROCHLORIDE 50 MILLIGRAM(S): 50 TABLET ORAL at 05:30

## 2019-12-23 RX ADMIN — TRAMADOL HYDROCHLORIDE 50 MILLIGRAM(S): 50 TABLET ORAL at 04:47

## 2019-12-23 RX ADMIN — ERGOCALCIFEROL 50000 UNIT(S): 1.25 CAPSULE ORAL at 17:15

## 2019-12-23 RX ADMIN — ATORVASTATIN CALCIUM 40 MILLIGRAM(S): 80 TABLET, FILM COATED ORAL at 21:16

## 2019-12-23 RX ADMIN — Medication 4: at 17:14

## 2019-12-23 RX ADMIN — INSULIN GLARGINE 10 UNIT(S): 100 INJECTION, SOLUTION SUBCUTANEOUS at 12:02

## 2019-12-23 RX ADMIN — Medication 3 MILLILITER(S): at 20:06

## 2019-12-23 RX ADMIN — CARVEDILOL PHOSPHATE 6.25 MILLIGRAM(S): 80 CAPSULE, EXTENDED RELEASE ORAL at 05:15

## 2019-12-23 RX ADMIN — FAMOTIDINE 40 MILLIGRAM(S): 10 INJECTION INTRAVENOUS at 11:59

## 2019-12-23 RX ADMIN — CARVEDILOL PHOSPHATE 6.25 MILLIGRAM(S): 80 CAPSULE, EXTENDED RELEASE ORAL at 17:15

## 2019-12-23 RX ADMIN — Medication 100 MILLIGRAM(S): at 21:16

## 2019-12-23 RX ADMIN — ENOXAPARIN SODIUM 40 MILLIGRAM(S): 100 INJECTION SUBCUTANEOUS at 11:59

## 2019-12-23 RX ADMIN — CYCLOBENZAPRINE HYDROCHLORIDE 5 MILLIGRAM(S): 10 TABLET, FILM COATED ORAL at 17:15

## 2019-12-23 RX ADMIN — Medication 10: at 12:02

## 2019-12-23 RX ADMIN — Medication 40 MILLIEQUIVALENT(S): at 02:34

## 2019-12-23 RX ADMIN — INSULIN GLARGINE 10 UNIT(S): 100 INJECTION, SOLUTION SUBCUTANEOUS at 22:58

## 2019-12-23 RX ADMIN — Medication 4: at 08:06

## 2019-12-23 NOTE — MEDICAL STUDENT PROGRESS NOTE(EDUCATION) - SUBJECTIVE AND OBJECTIVE BOX
OMS3 Note discussed with supervising resident and primary attending.    Patient is a 64y old female with 80 pack year smoking hx and lung cancer sp R lower lobe wedge resection who presents with a chief complaint of COPD exacerbation n PNA (23 Dec 2019 09:25)    Pt reported SOB, wheezing, productive cough with green phlegm, CP described as squeezing sensation in the past week.   Currently wheezing, and cough has resolved. She complains of bilateral midthoracic pain that radiates down the spine, the pain is intermittent, sharp, and triggered by movement. Pain was not relieved by Tramadol. Pt believes the coughing may have caused the rib pain. Pt sees pulm  and was prescribed Prednisone 40mg, she has been taking it for the past 2 months and recently been taking 60mg for dyspnea relief. Pt is concerned about side effects of hyperglycemia and wt gain. Pt also complains of bilateral lower extremity pain described as needle poking sensation. Denies fever, chills, wt loss, palpitations, urinary sxs. She does check her blood sugar at home. She states she needs a walker and BP cuff for home monitoring.      INTERVAL HPI/OVERNIGHT EVENTS:    MEDICATIONS  (STANDING):  atorvastatin 40 milliGRAM(s) Oral at bedtime  budesonide 160 MICROgram(s)/formoterol 4.5 MICROgram(s) Inhaler 2 Puff(s) Inhalation two times a day  carvedilol 6.25 milliGRAM(s) Oral every 12 hours  dextrose 50% Injectable 25 Gram(s) IV Push once  enoxaparin Injectable 40 milliGRAM(s) SubCutaneous daily  famotidine    Tablet 40 milliGRAM(s) Oral daily  insulin glargine Injectable (LANTUS) 10 Unit(s) SubCutaneous at bedtime  insulin glargine Injectable (LANTUS) 10 Unit(s) SubCutaneous once  insulin lispro (HumaLOG) corrective regimen sliding scale   SubCutaneous three times a day before meals  levoFLOXacin IVPB 750 milliGRAM(s) IV Intermittent every 24 hours  methylPREDNISolone sodium succinate Injectable 40 milliGRAM(s) IV Push two times a day    MEDICATIONS  (PRN):  acetaminophen   Tablet .. 650 milliGRAM(s) Oral every 6 hours PRN Moderate Pain (4 - 6)  albuterol/ipratropium for Nebulization. 3 milliLiter(s) Nebulizer every 6 hours PRN Shortness of Breath and/or Wheezing  ALPRAZolam 1 milliGRAM(s) Oral two times a day PRN anxiety  traMADol 50 milliGRAM(s) Oral every 6 hours PRN Severe Pain (7 - 10)      Allergies    penicillin (Rash)    Intolerances   fish?      REVIEW OF SYSTEMS:  CONSTITUTIONAL: No fever, chills, weight loss  RESPIRATORY: SOB, No cough, wheezing, or hemoptysis;  CARDIOVASCULAR: chest pain; no palpitations, dizziness, or leg swelling  GASTROINTESTINAL: No abdominal or epigastric pain. No nausea, vomiting, or hematemesis; No diarrhea or constipation. No melena or hematochezia.  NEUROLOGICAL: No headaches, memory loss, loss of strength, numbness, or tremors  SKIN: No itching, burning, rashes, or lesions     Vital Signs Last 24 Hrs  T(C): 36.9 (23 Dec 2019 05:06), Max: 36.9 (23 Dec 2019 05:06)  T(F): 98.4 (23 Dec 2019 05:06), Max: 98.4 (23 Dec 2019 05:06)  HR: 93 (23 Dec 2019 05:06) (93 - 97)  BP: 134/81 (23 Dec 2019 05:06) (134/81 - 142/81)  BP(mean): --  RR: 17 (23 Dec 2019 05:06) (17 - 20)  SpO2: 94% (23 Dec 2019 05:06) (94% - 99%)    PHYSICAL EXAM:  GENERAL: NAD, well-groomed, well-developed  HEAD:  Atraumatic, Normocephalic  EYES: EOMI, PERRLA, conjunctiva and sclera clear  NECK: Supple, No JVD, Normal thyroid  CHEST/LUNG: Clear to percussion bilaterally; No rales, rhonchi, wheezing, or rubs  HEART: Regular rate and rhythm; No murmurs, rubs, or gallops  ABDOMEN: Soft, Nontender, Nondistended; Bowel sounds present  NERVOUS SYSTEM:  Alert & Oriented X3, Good concentration; Motor Strength 5/5 B/L   EXTREMITIES:  2+ Peripheral Pulses, No clubbing, cyanosis, or edema  SKIN;    LABS:                        13.9   9.93  )-----------( 235      ( 23 Dec 2019 06:24 )             42.8     12-23    135  |  98  |  21<H>  ----------------------------<  448<H>  4.2   |  29  |  0.86    Ca    8.9      23 Dec 2019 06:24  Phos  3.9     12-23  Mg     2.0     12-23    TPro  7.4  /  Alb  3.5  /  TBili  0.4  /  DBili  x   /  AST  12  /  ALT  27  /  AlkPhos  131<H>  12-22        CAPILLARY BLOOD GLUCOSE      POCT Blood Glucose.: 340 mg/dL (23 Dec 2019 07:57)      RADIOLOGY & ADDITIONAL TESTS:    Imaging Personally Reviewed:  [ ] YES  [ ] NO    Consultant(s) Notes Reviewed:  [ ] YES  [ ] NO OMS3 Note discussed with supervising resident and primary attending.    Patient is a 64y old female with 80 pack year smoking hx and lung cancer sp R lower lobe wedge resection who presents with a chief complaint of COPD exacerbation n PNA (23 Dec 2019 09:25)    Pt reported SOB, wheezing, productive cough with green phlegm, CP described as squeezing sensation in the past week.   Currently not wheezing, SOB and cough much improved, no longer productive cough. She complains of bilateral lower ribs and midthoracic pain that radiates down the spine to low back the pain is intermittent, sharp, and triggered by coughing and movement. Pain was not relieved by Tramadol. Pt would like non-sedative pain medication.   Pt sees pulm  and was prescribed Prednisone 40mg, she has been taking it for the past 2 months and recently been taking 60mg to relieve increasing dyspnea. Pt is concerned about side effects of hyperglycemia and wt gain. Pt also complains of bilateral lower extremity pain described as needle poking sensation. Denies fever, chills, wt loss, palpitations, urinary sxs. She does check her blood sugar at home.     Pt walks by leaning on shopping cart, declines walker. She lives at home where she has to climb 3 flights of stairs to her apartment.  She would like BP cuff for home monitoring.     INTERVAL HPI/OVERNIGHT EVENTS:    MEDICATIONS  (STANDING):  atorvastatin 40 milliGRAM(s) Oral at bedtime  budesonide 160 MICROgram(s)/formoterol 4.5 MICROgram(s) Inhaler 2 Puff(s) Inhalation two times a day  carvedilol 6.25 milliGRAM(s) Oral every 12 hours  dextrose 50% Injectable 25 Gram(s) IV Push once  enoxaparin Injectable 40 milliGRAM(s) SubCutaneous daily  famotidine    Tablet 40 milliGRAM(s) Oral daily  insulin glargine Injectable (LANTUS) 10 Unit(s) SubCutaneous at bedtime  insulin glargine Injectable (LANTUS) 10 Unit(s) SubCutaneous once  insulin lispro (HumaLOG) corrective regimen sliding scale   SubCutaneous three times a day before meals  levoFLOXacin IVPB 750 milliGRAM(s) IV Intermittent every 24 hours  methylPREDNISolone sodium succinate Injectable 40 milliGRAM(s) IV Push two times a day    MEDICATIONS  (PRN):  acetaminophen   Tablet .. 650 milliGRAM(s) Oral every 6 hours PRN Moderate Pain (4 - 6)  albuterol/ipratropium for Nebulization. 3 milliLiter(s) Nebulizer every 6 hours PRN Shortness of Breath and/or Wheezing  ALPRAZolam 1 milliGRAM(s) Oral two times a day PRN anxiety  traMADol 50 milliGRAM(s) Oral every 6 hours PRN Severe Pain (7 - 10)      Allergies    penicillin (Rash)    Intolerances   fish?      REVIEW OF SYSTEMS:  CONSTITUTIONAL: No fever, chills, weight loss  RESPIRATORY: SOB, No cough, wheezing, or hemoptysis;  CARDIOVASCULAR: chest pain; no palpitations, dizziness, or leg swelling  GASTROINTESTINAL: No abdominal or epigastric pain. No nausea, vomiting, or hematemesis; No diarrhea or constipation. No melena or hematochezia.  NEUROLOGICAL: No headaches, memory loss, loss of strength, numbness, or tremors  SKIN: No itching, burning, rashes, or lesions     Vital Signs Last 24 Hrs  T(C): 36.9 (23 Dec 2019 05:06), Max: 36.9 (23 Dec 2019 05:06)  T(F): 98.4 (23 Dec 2019 05:06), Max: 98.4 (23 Dec 2019 05:06)  HR: 93 (23 Dec 2019 05:06) (93 - 97)  BP: 134/81 (23 Dec 2019 05:06) (134/81 - 142/81)  BP(mean): --  RR: 17 (23 Dec 2019 05:06) (17 - 20)  SpO2: 94% (23 Dec 2019 05:06) (94% - 99%)    PHYSICAL EXAM:  GENERAL: NAD, well-groomed, well-developed  HEAD:  Atraumatic, Normocephalic  EYES: EOMI, PERRLA, conjunctiva and sclera clear  NECK: Supple, No JVD, Normal thyroid  CHEST/LUNG: Clear to percussion bilaterally; No rales, rhonchi, wheezing, or rubs  HEART: Regular rate and rhythm; No murmurs, rubs, or gallops  ABDOMEN: Soft, Nontender, Nondistended; Bowel sounds present  NERVOUS SYSTEM:  Alert & Oriented X3, Good concentration; Motor Strength 5/5 B/L   EXTREMITIES:  2+ Peripheral Pulses, No clubbing, cyanosis, or edema  SKIN;    LABS:                        13.9   9.93  )-----------( 235      ( 23 Dec 2019 06:24 )             42.8     12-23    135  |  98  |  21<H>  ----------------------------<  448<H>  4.2   |  29  |  0.86    Ca    8.9      23 Dec 2019 06:24  Phos  3.9     12-23  Mg     2.0     12-23    TPro  7.4  /  Alb  3.5  /  TBili  0.4  /  DBili  x   /  AST  12  /  ALT  27  /  AlkPhos  131<H>  12-22        CAPILLARY BLOOD GLUCOSE      POCT Blood Glucose.: 340 mg/dL (23 Dec 2019 07:57)      RADIOLOGY & ADDITIONAL TESTS:    Imaging Personally Reviewed:  [ ] YES  [ ] NO    Consultant(s) Notes Reviewed:  [ ] YES  [ ] NO OMS3 Note discussed with supervising resident and primary attending.    Patient is a 64y old female with 80 pack year smoking hx and lung cancer sp R lower lobe wedge resection who presents with a chief complaint of COPD exacerbation n PNA (23 Dec 2019 09:25)    Pt reported SOB, wheezing, productive cough with green phlegm, CP described as squeezing sensation in the past week. Currently not wheezing, SOB and cough has much improved, cough is longer productive. Pt complains of bilateral lower ribs and midthoracic pain that radiates down the spine to low back the pain is intermittent, sharp, and triggered by coughing and movement. Pain was not relieved by Tramadol. Pt would like non-sedative pain medication.     Pt sees pulmonologist Dr. Nugent, she uses the Breo and Spriva inhalers each one puff a day. Has a nebulizer but ran out of med.   Pt notes she has been on and off Prednisone for years, she has been taking Prednisone 40mg for the past 2 months and recently been taking 60mg to relieve increasing dyspnea. Pt is concerned about side effects of hyperglycemia and wt gain. She is taking Metformin and Glyburide 5-50mg TIB at home. Pt also complains of pain in the balls of her feet described as needle poking sensation. Denies fever, chills, wt loss, palpitations, leg edema, urinary sxs. She checks her blood sugar at home, states before taking Predisone her blood sugar was in 120's, but since taking Predisone blood sugar has been out of control. She does not check BP because she does not have a BP cuff.     Pt also c/o dizziness especially when going downstairs. Pt walks by leaning on shopping cart, declines walker. She lives in an apartment without elevator where she has to climb 3 flights of stairs to get home.     INTERVAL HPI/OVERNIGHT EVENTS: no overnight events.     MEDICATIONS  (STANDING):  atorvastatin 40 milliGRAM(s) Oral at bedtime  budesonide 160 MICROgram(s)/formoterol 4.5 MICROgram(s) Inhaler 2 Puff(s) Inhalation two times a day  carvedilol 6.25 milliGRAM(s) Oral every 12 hours  dextrose 50% Injectable 25 Gram(s) IV Push once  enoxaparin Injectable 40 milliGRAM(s) SubCutaneous daily  famotidine    Tablet 40 milliGRAM(s) Oral daily  insulin glargine Injectable (LANTUS) 10 Unit(s) SubCutaneous at bedtime  insulin glargine Injectable (LANTUS) 10 Unit(s) SubCutaneous once  insulin lispro (HumaLOG) corrective regimen sliding scale   SubCutaneous three times a day before meals  levoFLOXacin IVPB 750 milliGRAM(s) IV Intermittent every 24 hours  methylPREDNISolone sodium succinate Injectable 40 milliGRAM(s) IV Push two times a day    MEDICATIONS  (PRN):  acetaminophen   Tablet .. 650 milliGRAM(s) Oral every 6 hours PRN Moderate Pain (4 - 6)  albuterol/ipratropium for Nebulization. 3 milliLiter(s) Nebulizer every 6 hours PRN Shortness of Breath and/or Wheezing  ALPRAZolam 1 milliGRAM(s) Oral two times a day PRN anxiety  traMADol 50 milliGRAM(s) Oral every 6 hours PRN Severe Pain (7 - 10)      Allergies    penicillin (Rash)    Intolerances      REVIEW OF SYSTEMS:  CONSTITUTIONAL: No fever, chills, weight loss  RESPIRATORY: SOB, No cough, wheezing, or hemoptysis;  CARDIOVASCULAR: chest pain; no palpitations, dizziness, or leg swelling  GASTROINTESTINAL: No abdominal or epigastric pain. No nausea, vomiting, or hematemesis; No diarrhea or constipation. No melena or hematochezia.  NEUROLOGICAL: No headaches, memory loss, loss of strength, numbness, or tremors  SKIN: No itching, burning, rashes, or lesions     Vital Signs Last 24 Hrs  T(C): 36.9 (23 Dec 2019 05:06), Max: 36.9 (23 Dec 2019 05:06)  T(F): 98.4 (23 Dec 2019 05:06), Max: 98.4 (23 Dec 2019 05:06)  HR: 93 (23 Dec 2019 05:06) (93 - 97)  BP: 134/81 (23 Dec 2019 05:06) (134/81 - 142/81)  BP(mean): --  RR: 17 (23 Dec 2019 05:06) (17 - 20)  SpO2: 94% (23 Dec 2019 05:06) (94% - 99%)    PHYSICAL EXAM:  GENERAL: NAD, well-groomed, well-developed  HEAD:  Atraumatic, Normocephalic  EYES: EOMI, PERRLA, conjunctiva and sclera clear  NECK: Supple, No JVD, Normal thyroid  CHEST/LUNG: Clear to percussion bilaterally; No rales, rhonchi, wheezing, or rubs  HEART: Regular rate and rhythm; No murmurs, rubs, or gallops  ABDOMEN: Soft, Nontender, Nondistended; Bowel sounds present  NERVOUS SYSTEM:  Alert & Oriented X3, Good concentration; Motor Strength 5/5 B/L   EXTREMITIES:  2+ Peripheral Pulses, No clubbing, cyanosis, or edema  SKIN;    LABS:                        13.9   9.93  )-----------( 235      ( 23 Dec 2019 06:24 )             42.8     12-23    135  |  98  |  21<H>  ----------------------------<  448<H>  4.2   |  29  |  0.86    Ca    8.9      23 Dec 2019 06:24  Phos  3.9     12-23  Mg     2.0     12-23    TPro  7.4  /  Alb  3.5  /  TBili  0.4  /  DBili  x   /  AST  12  /  ALT  27  /  AlkPhos  131<H>  12-22        CAPILLARY BLOOD GLUCOSE      POCT Blood Glucose.: 340 mg/dL (23 Dec 2019 07:57)      RADIOLOGY & ADDITIONAL TESTS:    Imaging Personally Reviewed:  [ ] YES  [ ] NO    Consultant(s) Notes Reviewed:  [ ] YES  [ ] NO OMS3 Note discussed with supervising resident and primary attending.    Patient is a 64y old female with 80 pack year smoking hx and lung cancer sp R lower lobe wedge resection who presents with a chief complaint of COPD exacerbation n PNA (23 Dec 2019 09:25)    Pt reported SOB, wheezing, productive cough with green phlegm, CP described as squeezing sensation in the past week. Currently not wheezing, SOB and cough has much improved, cough is longer productive. Pt complains of bilateral lower ribs and midthoracic pain that radiates down the spine to low back the pain is intermittent, sharp, and triggered by coughing and movement. Pain was not relieved by Tramadol. Pt would like non-sedative pain medication.     Pt sees pulmonologist Dr. Nugent, she uses the Breo and Spriva inhalers each one puff a day, also has Albuterol rescue inhaler. Pt has a nebulizer but ran out of med. She also used portable concentrated O2 at 4L, but the battery lasts only 1.5 hr, she would like a rx for new battery for this machine.    Pt notes she has been on and off Prednisone for years, she has been taking Prednisone 40mg for the past 2 months and recently been taking 60mg to relieve increasing dyspnea. Pt is concerned about side effects of hyperglycemia and wt gain. She checks her blood sugar at home, states before taking Predisone her blood sugar was in 120's, but since taking Predisone her blood sugar has been out of control. She is taking Metformin and Glyburide 5-50mg TIB at home. She would prefer prepared insulin pens.   Pt also complains of pain in the balls of her feet described as needle poking sensation. Denies fever, chills, wt loss, palpitations, leg edema, urinary sxs.   She does not check BP because she does not have a BP cuff.     Pt also c/o dizziness especially when going downstairs. Pt walks by leaning on shopping cart, declines walker. She lives in an apartment without elevator where she has to climb 3 flights of stairs to get home.     CXR and CTAngiogram showed no PE, no pleural effusions, no pneumothorax. Showed calcified granulomas, bronchiectasis, emphysema, mediastinal adenopathy, compression fx of T4, vertebral hemangiomata.    INTERVAL HPI/OVERNIGHT EVENTS: no overnight events.     MEDICATIONS  (STANDING):  atorvastatin 40 milliGRAM(s) Oral at bedtime  budesonide 160 MICROgram(s)/formoterol 4.5 MICROgram(s) Inhaler 2 Puff(s) Inhalation two times a day  carvedilol 6.25 milliGRAM(s) Oral every 12 hours  dextrose 50% Injectable 25 Gram(s) IV Push once  enoxaparin Injectable 40 milliGRAM(s) SubCutaneous daily  famotidine    Tablet 40 milliGRAM(s) Oral daily  insulin glargine Injectable (LANTUS) 10 Unit(s) SubCutaneous at bedtime  insulin glargine Injectable (LANTUS) 10 Unit(s) SubCutaneous once  insulin lispro (HumaLOG) corrective regimen sliding scale   SubCutaneous three times a day before meals  levoFLOXacin IVPB 750 milliGRAM(s) IV Intermittent every 24 hours  methylPREDNISolone sodium succinate Injectable 40 milliGRAM(s) IV Push two times a day    MEDICATIONS  (PRN):  acetaminophen   Tablet .. 650 milliGRAM(s) Oral every 6 hours PRN Moderate Pain (4 - 6)  albuterol/ipratropium for Nebulization. 3 milliLiter(s) Nebulizer every 6 hours PRN Shortness of Breath and/or Wheezing  ALPRAZolam 1 milliGRAM(s) Oral two times a day PRN anxiety  traMADol 50 milliGRAM(s) Oral every 6 hours PRN Severe Pain (7 - 10)      Allergies  penicillin (Rash)      REVIEW OF SYSTEMS:  CONSTITUTIONAL: Denies fever, chills, weight loss  RESPIRATORY: + SOB, dry cough; denies wheezing, hemoptysis;  CARDIOVASCULAR: + chest/flank/rib pain, back pain; denies palpitations, dizziness, or leg swelling  GASTROINTESTINAL:  No nausea, vomiting, or hematemesis; denies diarrhea or constipation.   NEUROLOGICAL: Some headaches, memory loss, needle poking pain in balls of feet, mild numbness between toes; denies tremors  SKIN: Surgical scar on back; No itching, burning, rashes, or lesions     Vital Signs Last 24 Hrs  T(C): 36.9 (23 Dec 2019 05:06), Max: 36.9 (23 Dec 2019 05:06)  T(F): 98.4 (23 Dec 2019 05:06), Max: 98.4 (23 Dec 2019 05:06)  HR: 93 (23 Dec 2019 05:06) (93 - 97)  BP: 134/81 (23 Dec 2019 05:06) (134/81 - 142/81)  BP(mean): --  RR: 17 (23 Dec 2019 05:06) (17 - 20)  SpO2: 94% (23 Dec 2019 05:06) (94% - 99%)    PHYSICAL EXAM:  GENERAL: NAD, well-groomed, well-developed  HEAD:  Atraumatic, Normocephalic  EYES: EOMI, PERRLA, conjunctiva and sclera clear  NECK: Supple, No JVD, Normal thyroid  CHEST/LUNG: Clear to percussion bilaterally; No rales, rhonchi, wheezing, or rubs  HEART: Regular rate and rhythm; No murmurs, rubs, or gallops  ABDOMEN: Soft, Nontender, Nondistended; Bowel sounds present  NERVOUS SYSTEM:  Alert & Oriented X3, Good concentration; Motor Strength 5/5 B/L   EXTREMITIES:  2+ Peripheral Pulses, No clubbing, cyanosis, or edema  SKIN;    LABS:                        13.9   9.93  )-----------( 235      ( 23 Dec 2019 06:24 )             42.8     12-23    135  |  98  |  21<H>  ----------------------------<  448<H>  4.2   |  29  |  0.86    Ca    8.9      23 Dec 2019 06:24  Phos  3.9     12-23  Mg     2.0     12-23    TPro  7.4  /  Alb  3.5  /  TBili  0.4  /  DBili  x   /  AST  12  /  ALT  27  /  AlkPhos  131<H>  12-22        CAPILLARY BLOOD GLUCOSE      POCT Blood Glucose.: 340 mg/dL (23 Dec 2019 07:57)      RADIOLOGY & ADDITIONAL TESTS:    Imaging Personally Reviewed:  [ ] YES  [ ] NO    Consultant(s) Notes Reviewed:  [ ] YES  [ ] NO

## 2019-12-23 NOTE — H&P ADULT - PROBLEM SELECTOR PLAN 4
Pt takes coreg 6.25 mg bid  at home   - will continue with home medications with parameters   - Monitor BP and titrate meds accordingly

## 2019-12-23 NOTE — H&P ADULT - PROBLEM SELECTOR PLAN 2
Pt p/w SOB, productive cough   - clinical suspicion of PNA   - WBC count 15 k  ,   - CXR : no visible infiltrate   - will c/w azithro IV x 5days and rocephin IV  1g q24 hrs   -  duoneb and O2 prn   - tylenol for fever and pain   -f/u Strep ag , U legionella , procalcitonin Pt p/w SOB, productive cough   - clinical suspicion of PNA   - WBC count 15 k  ,   - CXR : no visible infiltrate   - will start on Levaquin 750 mg IV   -  duoneb and O2 prn   - tylenol for fever and pain   -f/u Strep ag , U legionella , procalcitonin

## 2019-12-23 NOTE — H&P ADULT - HISTORY OF PRESENT ILLNESS
64 y F from home,walks independently with PMHx of COPD( on 2-3L O2 at home), ITP( not on Tx), DM, HLD and HTN and PSHx of R lung wedge resection presented to ED with 1 wk of cough and 3 days of SOB and B/l chest pain. As per pt she started having productive cough with greyish sputum 1 wk ago which later become non productive( as she was not able to expectorate). But since last 3 days she is having SOB, using 4L of O2 associated with B/l chest pain and wheezing . She describes chest pain as squeezing,located on the sides and radiating to back ,worse with coughing. Denies any Fever, Abd pain , N/V/D or any urinary symptoms. Pt is on prednisone 20 mg BID for 2 months now for the SOB but its not helping.    ED course : Afebrile . HR 97 /81 RR 18 O2 sat 99 % . Labs : wbc 15K , K 3.4 . Flu -ve . CXR : looks clear ,awaiting official read . ECG : NSR with T1 -ve .     SH : smoked 2 PPD x40yrs ,quitted 8 yrs ago. Denies any alcohol or drug use

## 2019-12-23 NOTE — DISCHARGE NOTE PROVIDER - NSDCPNSUBOBJ_GEN_ALL_CORE
Patient is a 64y old  Female who presents was admitted for COPD exacerbation         Today    Patient was seen and examined at bedside today    Reports cough (improved), no SOB, still complains of pleuritic chest pain     Minimal wheezing on exam         REVIEW OF SYSTEMS: denies fever, chills, palpitations, abdominal pain        PHYSICAL EXAM:    GENERAL: NAD, obese     NERVOUS SYSTEM:  Alert & Oriented X3, Good concentration    CHEST/LUNG: minimal wheezing, no crepitations     HEART: Regular rate and rhythm; No murmurs, rubs, or gallops    ABDOMEN: Soft, Nontender, Nondistended; Bowel sounds present    EXTREMITIES: mild tenderness on palpation of paraspinal muscles,  2+ Peripheral Pulses, No clubbing, cyanosis, or edema        LABS:                            14.0     9.90  )-----------( 226                   43.9             139  |  103  |  22<H>    ----------------------------<  190<H>    3.6   |  31  |  0.74        Assessment and plan:     1. COPD exacerbation     Will dc on Prednisone tapering dose    Cont Breo and Spiriva with Nebs     Robitussin cough syrup     Inhaler teaching done    Has follow up appointment with Dr Nugent on Monday        2. Muscle sprain     Cont Tylenol and Flexeril        3. DM    Doubled the dose of Glyburide- Metformin    Discussed about Hypoglycemia symp    Endocrine consult appreciated         Patient is stable to be discharged home.

## 2019-12-23 NOTE — H&P ADULT - ATTENDING COMMENTS
Patient seen and examined ; case was discussed with the admitting resident    ROS: as in the HPI; all other ROS negative    SH and family history as above    Vital Signs Last 24 Hrs  T(C): 36.7 (23 Dec 2019 00:01), Max: 36.7 (23 Dec 2019 00:01)  T(F): 98 (23 Dec 2019 00:01), Max: 98 (23 Dec 2019 00:01)  HR: 93 (23 Dec 2019 00:01) (93 - 97)  BP: 137/83 (23 Dec 2019 00:01) (137/83 - 142/81)  BP(mean): --  RR: 20 (23 Dec 2019 00:01) (18 - 20)  SpO2: 98% (23 Dec 2019 00:01) (98% - 99%)    GEN: NAD  HEENT- normocephalic; mouth moist  CVS- S1S2+  LUNGS- clear to auscultation; +wheezing  ABD: Soft , nontender, nondistended, Bowel sounds are present  EXTREMITY: no calf tenderness, no cyanosis, +non pitting edema  NEURO: AAOx3; non focal neurologic exam; cranial nerves grossly intact  PSYCH: normal affect and behavior  BACK: no swelling or mass;   VASCULAR: ++ distal peripheral pulses  SKIN: warm and dry      Labs Reviewed:                         15.1   15.88 )-----------( 292      ( 22 Dec 2019 23:29 )             46.7     12-22    139  |  101  |  21<H>  ----------------------------<  329<H>  3.4<L>   |  30  |  0.88    Ca    8.9      22 Dec 2019 23:28    TPro  7.4  /  Alb  3.5  /  TBili  0.4  /  DBili  x   /  AST  12  /  ALT  27  /  AlkPhos  131<H>  12-22    CARDIAC MARKERS ( 22 Dec 2019 23:28 )  <0.015 ng/mL / x     / x     / x     / x              BNP:   MEDICATIONS  (STANDING):  atorvastatin 40 milliGRAM(s) Oral at bedtime  azithromycin  IVPB 500 milliGRAM(s) IV Intermittent every 24 hours  budesonide 160 MICROgram(s)/formoterol 4.5 MICROgram(s) Inhaler 2 Puff(s) Inhalation two times a day  carvedilol 6.25 milliGRAM(s) Oral every 12 hours  dextrose 50% Injectable 25 Gram(s) IV Push once  famotidine    Tablet 40 milliGRAM(s) Oral daily  insulin lispro (HumaLOG) corrective regimen sliding scale   SubCutaneous Before meals and at bedtime  levoFLOXacin IVPB 750 milliGRAM(s) IV Intermittent every 24 hours  methylPREDNISolone sodium succinate Injectable 40 milliGRAM(s) IV Push two times a day    MEDICATIONS  (PRN):  acetaminophen   Tablet .. 650 milliGRAM(s) Oral every 6 hours PRN Moderate Pain (4 - 6)  albuterol/ipratropium for Nebulization. 3 milliLiter(s) Nebulizer every 6 hours PRN Shortness of Breath and/or Wheezing  traMADol 50 milliGRAM(s) Oral every 6 hours PRN Severe Pain (7 - 10)    CXR reviewed  EKG Reviewed    65 y/o F with h/o lung adenocarcinoma stage I s/p VATS/wedge resection 2016, COPD, DMII, HTN. ITP, admitted with pleuritic chest pain and exertional dyspnea. Patient has limited IV access, asked nurse supervisor to assist in order to obtain CT angio to r/o PE. Patient has alternate profile under MRN [id number].    1. Acute hypoxic respiratory failure due to CAP, COPD exacerbation with PE workup in process- VBG with normal/ increased O2 and its unclear what support she was on when vbg drawn. But patient has been requiring 4L to walk 15 feet at home and normally uses 2L sporadically. Appreciate pulmonology consultation. If unable to obtain CT angio chest, obtain d dimer. LE doppler, and f/u w VQ if positive. Might need empiric AC if unable to obtain imaging tonight. Recent nst 4/2019 negative for reversible ischemia.   2. COPD exacerbation- abx, steroids, duobnebs   3. CAP  4. ITP- platelet counts stable, denies bleeding   5. HTN- on nonselective bb for some time, can consider cardioselective agent in setting of copd   6. DMII- steroid induced hyperglycemia   7. HLD  8. morbid obesity   9. Pain control- patient specifically not wanting oxycodone, states that it is too sedating, although this appears to be something that she is on chronically given recent fills for alprazolam and oxycodone Istop#: [id number]. She specifically is only asking for tramadol.   10. Anxiety     Plan of care discussed with patient ;  all questions and concerns were addressed.

## 2019-12-23 NOTE — H&P ADULT - NSHPPHYSICALEXAM_GEN_ALL_CORE
GENERAL: NAD , in mild resp distress   EYES: EOMI, PERRLA,   NECK: Supple, No JVD  CHEST/LUNG: dec BS b/l . Diffuse b/l mild wheezing , no rhonchi/rales   HEART: Regular rate and rhythm; No murmurs, +ve S1 S2   ABDOMEN: Soft, Nontender, Nondistended; Bowel sounds present  NERVOUS SYSTEM:  Alert & Oriented X3, normal sensations and normal strength     EXTREMITIES:   No clubbing, cyanosis . trace pedal edema   LYMPH NODES : non palpable   PSYCH: normal mood and affect

## 2019-12-23 NOTE — H&P ADULT - PROBLEM SELECTOR PLAN 6
IMPROVE VTE Individual Risk Assessment  RISK                                                                Points  [  ] Previous VTE                                                  3  [  ] Thrombophilia                                               2  [  ] Lower limb paralysis                                      2  [  ] Current Cancer                                              2         (within 6 months)  [  ] Immobilization > 24 hrs                                1  [  ] ICU/CCU stay > 24 hours                              1  [  ] Age > 60                                                      1  IMPROVE VTE Score ___2______  DVT ppx :  GI ppx : Pepcid IMPROVE VTE Individual Risk Assessment  RISK                                                                Points  [  ] Previous VTE                                                  3  [  ] Thrombophilia                                               2  [  ] Lower limb paralysis                                      2  [  ] Current Cancer                                              2         (within 6 months)  [  ] Immobilization > 24 hrs                                1  [  ] ICU/CCU stay > 24 hours                              1  [  ] Age > 60                                                      1  IMPROVE VTE Score ___2______  DVT ppx :lovenox  GI ppx : Pepcid

## 2019-12-23 NOTE — DISCHARGE NOTE PROVIDER - CARE PROVIDER_API CALL
Radha Lindquist)  EndocrinologyMetabDiabetes  [address] [place] [address]  Phone: [phone]  Fax: [phone]  Follow Up Time:

## 2019-12-23 NOTE — CONSULT NOTE ADULT - SUBJECTIVE AND OBJECTIVE BOX
Patient is a 64y old  Female who presents with a chief complaint of COPD exacerbation n PNA (23 Dec 2019 08:55)      HPI:  64 y F from home,walks independently with PMHx of COPD( on 2-3L O2 at home), ITP( not on Tx), DM, HLD and HTN and PSHx of R lung wedge resection for lung ca,  presented to ED with 1 wk of cough and 3 days of SOB and B/l chest pain. As per pt she started having productive cough with greyish sputum 1 wk ago which later become non productive( as she was not able to expectorate). But since last 3 days she is having SOB, using 4L of O2 associated with B/l chest pain and wheezing . She describes chest pain as squeezing,located on the sides and radiating to back ,worse with coughing. Denies any Fever, Abd pain , N/V/D or any urinary symptoms. Pt is on prednisone 20 mg BID for 2 months now for the SOB but its not helping.  SOB is not bad but the pain at back is more bothersome.    ED course : Afebrile . HR 97 /81 RR 18 O2 sat 99 % . Labs : wbc 15K , K 3.4 . Flu -ve . CXR : looks clear ,awaiting official read . ECG : NSR with T1 -ve .     SH : smoked 2 PPD x40yrs ,quitted 8 yrs ago. Denies any alcohol or drug use (23 Dec 2019 01:46)       ROS:  Negative except for:    PAST MEDICAL & SURGICAL HISTORY:  HTN (hypertension)  DM (diabetes mellitus)  History of ITP  COPD (chronic obstructive pulmonary disease)  History of cholecystectomy  S/P lobectomy of lung      SOCIAL HISTORY:    FAMILY HISTORY:  No pertinent family history in first degree relatives      MEDICATIONS  (STANDING):  atorvastatin 40 milliGRAM(s) Oral at bedtime  budesonide 160 MICROgram(s)/formoterol 4.5 MICROgram(s) Inhaler 2 Puff(s) Inhalation two times a day  carvedilol 6.25 milliGRAM(s) Oral every 12 hours  dextrose 50% Injectable 25 Gram(s) IV Push once  enoxaparin Injectable 40 milliGRAM(s) SubCutaneous daily  famotidine    Tablet 40 milliGRAM(s) Oral daily  insulin glargine Injectable (LANTUS) 10 Unit(s) SubCutaneous at bedtime  insulin glargine Injectable (LANTUS) 10 Unit(s) SubCutaneous once  insulin lispro (HumaLOG) corrective regimen sliding scale   SubCutaneous three times a day before meals  levoFLOXacin IVPB 750 milliGRAM(s) IV Intermittent every 24 hours  methylPREDNISolone sodium succinate Injectable 40 milliGRAM(s) IV Push two times a day    MEDICATIONS  (PRN):  acetaminophen   Tablet .. 650 milliGRAM(s) Oral every 6 hours PRN Moderate Pain (4 - 6)  albuterol/ipratropium for Nebulization. 3 milliLiter(s) Nebulizer every 6 hours PRN Shortness of Breath and/or Wheezing  ALPRAZolam 1 milliGRAM(s) Oral two times a day PRN anxiety  traMADol 50 milliGRAM(s) Oral every 6 hours PRN Severe Pain (7 - 10)      Allergies    penicillin (Rash)    Intolerances        Vital Signs Last 24 Hrs  T(C): 36.9 (23 Dec 2019 05:06), Max: 36.9 (23 Dec 2019 05:06)  T(F): 98.4 (23 Dec 2019 05:06), Max: 98.4 (23 Dec 2019 05:06)  HR: 93 (23 Dec 2019 05:06) (93 - 97)  BP: 134/81 (23 Dec 2019 05:06) (134/81 - 142/81)  BP(mean): --  RR: 17 (23 Dec 2019 05:06) (17 - 20)  SpO2: 94% (23 Dec 2019 05:06) (94% - 99%)    PHYSICAL EXAM  General: adult in NAD  HEENT: clear oropharynx, anicteric sclera, pink conjunctiva  Neck: supple  CV: normal S1/S2 with no murmur rubs or gallops  Lungs: positive air movement b/l ant lungs,clear to auscultation, no wheezes, no rales  Abdomen: soft non-tender non-distended, no hepatosplenomegaly  Ext: no clubbing cyanosis or edema  Skin: no rashes and no petechiae  Neuro: alert and oriented X 4, no focal deficits      LABS:                          13.9   9.93  )-----------( 235      ( 23 Dec 2019 06:24 )             42.8         Mean Cell Volume : 87.7 fl  Mean Cell Hemoglobin : 28.5 pg  Mean Cell Hemoglobin Concentration : 32.5 gm/dL  Auto Neutrophil # : 8.82 K/uL  Auto Lymphocyte # : 0.78 K/uL  Auto Monocyte # : 0.24 K/uL  Auto Eosinophil # : 0.00 K/uL  Auto Basophil # : 0.02 K/uL  Auto Neutrophil % : 88.8 %  Auto Lymphocyte % : 7.9 %  Auto Monocyte % : 2.4 %  Auto Eosinophil % : 0.0 %  Auto Basophil % : 0.2 %      Serial CBC's  12-23 @ 06:24  Hct-42.8 / Hgb-13.9 / Plat-235 / RBC-4.88 / WBC-9.93  Serial CBC's  12-22 @ 23:29  Hct-46.7 / Hgb-15.1 / Plat-292 / RBC-5.25 / WBC-15.88      12-23    135  |  98  |  21<H>  ----------------------------<  448<H>  4.2   |  29  |  0.86    Ca    8.9      23 Dec 2019 06:24  Phos  3.9     12-23  Mg     2.0     12-23    TPro  7.4  /  Alb  3.5  /  TBili  0.4  /  DBili  x   /  AST  12  /  ALT  27  /  AlkPhos  131<H>  12-22                      BLOOD SMEAR INTERPRETATION:       RADIOLOGY & ADDITIONAL STUDIES:  < from: CT Angio Chest w/ IV Cont (12.23.19 @ 03:31) >  PROCEDURE INFORMATION:   Exam: CT Angiography Chest With Contrast   Exam date and time: 12/23/2019 3:10 AM   Age: 64 years old   Clinical indication: Other: Chest pain, dyspnea, h/o lung CA eval for pe     TECHNIQUE:   Imaging protocol: Computed tomographic angiography of the chest with   intravenous contrast.   3D rendering: MIP and/or 3D reconstructed images were created by the   technologist.   Contrast material: [id number]; Contrast volume: 45 ml;Contrast route: IV;      COMPARISON:   CR XR CHEST PA AND LATERAL 12/22/2019 10:15 PM     FINDINGS:   Pulmonary arteries: Normal. No pulmonary emboli.   Aorta: Unremarkable. No aortic aneurysm. No aortic dissection.   Lungs: Scattered bilateral calcified granulomata. Scarring with traction   bronchiectasis in the lingula. Centrilobular and paraseptal emphysema. Patient   is status post wedge resection in the right lower lobe.   Pleural space: Unremarkable. No pneumothorax. No pleural effusion.   Heart: Atherosclerotic disease of the coronary arteries.   Mediastinum: Surgical clip in the subcarinal space.   Lymph nodes: Multiple borderline enlarged right upper and bilateral lower   paratracheal, subcarinal, and right hilar lymph nodes. The largest lymph node   is present in the subcarinal space measuring 1.4 cm in short axis.   Bones/joints: Age-indeterminate compression fracture of T4. Several vertebral   hemangiomata. Dextroscoliosis of the thoracic spine.     IMPRESSION:   No acute pulmonary embolic disease.    Mediastinal adenopathy.    < end of copied text >

## 2019-12-23 NOTE — DISCHARGE NOTE PROVIDER - NSDCCPCAREPLAN_GEN_ALL_CORE_FT
PRINCIPAL DISCHARGE DIAGNOSIS  Diagnosis: COPD exacerbation  Assessment and Plan of Treatment: Managed during the course of the admission with respiratory therapy and IV steroids. Significant improvement was seen during the course of the admission and steroids were slowly tappered down. Continue with your nebulizers as intrusted by your primary care physician. Avoid triggers. If you're allergic to dust mites, pollens or molds, they can make your copd symptoms get worse. Cold air, exercise, fumes from chemicals or perfume, tobacco or wood smoke, and weather changes can also make asthma symptoms worse. So can common colds and sinus infections. Vaccinate with influenza vaccine yearly. Follow up with primary care physician one week after discharge. PRINCIPAL DISCHARGE DIAGNOSIS  Diagnosis: COPD exacerbation  Assessment and Plan of Treatment: Managed during the course of the admission with respiratory therapy and IV steroids. Significant improvement was seen during the course of the admission and steroids were slowly tappered down. Continue with your nebulizers as intrusted by your primary care physician. Avoid triggers. If you're allergic to dust mites, pollens or molds, they can make your copd symptoms get worse. Cold air, exercise, fumes from chemicals or perfume, tobacco or wood smoke, and weather changes can also make asthma symptoms worse. So can common colds and sinus infections. Vaccinate with influenza vaccine yearly. Follow up with primary care physician one week after discharge.        SECONDARY DISCHARGE DIAGNOSES  Diagnosis: DM (diabetes mellitus)  Assessment and Plan of Treatment: Continue with your blood sugar medication. HbAIC was found to be 11.1on admission.  You must maintain a healthy diet that consist of low sugar, low fat, low sodium diet. Exercise frequently if possible. Consider repeating your Hemoglobin A1c within 3 months after discharge to monitor your average blood glucose control. Follow up with primary care physician in one week after discharge.      Diagnosis: HTN (hypertension)  Assessment and Plan of Treatment: Continue with blood pressure medication. Maintain a healthy diet that consist of low sugar, low fat, low sodium diet. Exercise frequently if possible.  Follow up with primary care physician in one week after discharge. PRINCIPAL DISCHARGE DIAGNOSIS  Diagnosis: COPD exacerbation  Assessment and Plan of Treatment: Managed during the course of the admission with respiratory therapy and IV steroids. Significant improvement was seen during the course of the admission. Please taper down Prednison  steroids and continue with your nebulizers as intrusted by your primary care physician. Avoid triggers. If you're allergic to dust mites, pollens or molds, they can make your copd symptoms get worse. Cold air, exercise, fumes from chemicals or perfume, tobacco or wood smoke, and weather changes can also make asthma symptoms worse. So can common colds and sinus infections. Vaccinate with influenza vaccine yearly. Follow up with primary care physician one week after discharge.        SECONDARY DISCHARGE DIAGNOSES  Diagnosis: DM (diabetes mellitus)  Assessment and Plan of Treatment: Continue with your blood sugar medication. HbAIC was found to be 11.1on admission.  Please increase dosage of Glyburide -metformin to twice 2 times a day and  follow up with endocrynologist  within 1 week.  You must maintain a healthy diet that consist of low sugar, low fat, low sodium diet. Exercise frequently if possible. Consider repeating your Hemoglobin A1c within 3 months after discharge to monitor your average blood glucose control. Follow up with primary care physician in one week after discharge.      Diagnosis: HTN (hypertension)  Assessment and Plan of Treatment: Continue with blood pressure medication. Maintain a healthy diet that consist of low sugar, low fat, low sodium diet. Exercise frequently if possible.  Follow up with primary care physician in one week after discharge. PRINCIPAL DISCHARGE DIAGNOSIS  Diagnosis: COPD exacerbation  Assessment and Plan of Treatment: Managed during the course of the admission with nebulizer therapy and IV steroids. Significant improvement was seen during the course of the admission. Please taper down Prednison  steroids and continue with your nebulizers as intructed by your primary care physician. Cont to use Breo and Spiriva. Avoid triggers. If you're allergic to dust mites, pollens or molds, they can make your copd symptoms get worse. Cold air, exercise, fumes from chemicals or perfume, tobacco or wood smoke, and weather changes can also make asthma symptoms worse. So can common colds and sinus infections. Vaccinate with influenza vaccine yearly. Follow up with primary care physician one week after discharge.  You have a follow up appointment with Dr Nugent on Monday.        SECONDARY DISCHARGE DIAGNOSES  Diagnosis: DM (diabetes mellitus)  Assessment and Plan of Treatment: Continue with your blood sugar medication. HbAIC was found to be 11.1on admission.  Please increase dosage of Glyburide -metformin to 2 tab 2 times a day and  follow up with endocrynologist  within 1 week.  Please make sure you check your blood sugar before each dose. You were explained about hypoglycemia symptoms. You must maintain a healthy diet that consist of low sugar, low fat, low sodium diet. Exercise frequently if possible. Consider repeating your Hemoglobin A1c within 3 months after discharge to monitor your average blood glucose control. Follow up with Dr Lindquist primary care physician in one week after discharge.      Diagnosis: HTN (hypertension)  Assessment and Plan of Treatment: Continue with blood pressure medication. Maintain a healthy diet that consist of low sugar, low fat, low sodium diet. Exercise frequently if possible.  Follow up with primary care physician in one week after discharge.

## 2019-12-23 NOTE — H&P ADULT - PROBLEM SELECTOR PLAN 3
Pt takes glyburide-metformin  at home.  - will hold off the oral medications while pt is in the hospital   -   - c/w HSS   - f/u HbA1c

## 2019-12-23 NOTE — DISCHARGE NOTE PROVIDER - HOSPITAL COURSE
64 y F from home,walks independently with PMHx of COPD( on 2-3L O2 at home), ITP( not on Tx), DM, HLD and HTN and PSHx of R lung wedge resection presented to ED with 1 wk of cough and 3 days of SOB and B/l chest pain. As per pt she started having productive cough with greyish sputum 1 wk ago which later become non productive( as she was not able to expectorate). But since last 3 days she is having SOB, using 4L of O2 associated with B/l chest pain and wheezing . She describes chest pain as squeezing,located on the sides and radiating to back ,worse with coughing. Denies any Fever, Abd pain , N/V/D or any urinary symptoms.  now for the SOB but its not helping.        COPD with exacerbation:    Pt has been on Prednisone 20 mg BID for 2 months due to increased frequency of COPD exacerbation. Pt uses portable O2 concentrator 4L at home.  Flu, RSV panel was negative. CXR and CTA ruled out PE, PTX, pleural effusions. CT showed calcified granulomata, bronchiectasis, mediastinal adenopathy. Given Oxygen 2L NC, Solumedrol 40mg IV BID, Duoneb q6hrs, Azithromycin 500mg IV, Levofloxacin 750mg IV. SOB, wheezing and rales resolved. Cough also improved, no longer productive. WBC was 15k on admission but normalized next day.     Pt's pulm Dr. Nugent consulted. Continue and wean off PO prednisone 40-30-20-10 mg q 3 days. Pt was instructed to continue Breo and Spiriva each one puff/day. Pt was prescribed high flow nebulizer QID.         Muscle Spasm/Backache:    Pt complained of bilateral chest and flank pain with cough and movement. Pain was assessed as muscle spasm due to frequent cough. Pain improved with decreased cough.     CT showed compression fx of T4. Pt denied point tenderness of spine. No CVAT, abdominal pain.    For pain control rxd Tramadol, but it didn't help. Rxd Flexeril. Advil/Tylenol prn.         Uncontrolled IDDM    HA1c 11.2  Fingerstick in 350's. Serum gluc 448.      Pt was put on Humalog sliding scale and Lantus 10 units. Levofloxacin was DCed due to possible interaction with Metformin pt takes at home.     Endo cosulted.         HTN:    Continued home BP med, Carvedilol. EKG normal. CT showed CAD.         Lung cancer:     sp VATS RLL Lobectomy in 2016, no hx of chemo or radiation. No new lung mass on CXR and CT. Smoking hx of 80 pack years was assessed, pt quit 8 yrs ago. For hypercoagulable state given Lovenox 40mg QD during hospitalization. Patient's oncologist Dr. Mendiola consulted.        Obesity:    Pt attributes wt gain to long term Prednisone use. Possibly Cushing syndrome. Prednisone to be weaned down.        ITP Hx:    CBC unremarkable. Pt not on tx.         Vit D deficiency: 64 y F from home,walks independently with PMHx of COPD( on 2-3L O2 at home), ITP( not on Tx), DM, HLD and HTN and PSHx of R lung wedge resection presented to ED with 1 wk of cough and 3 days of SOB and B/l chest pain. As per pt she started having productive cough with greyish sputum 1 wk ago which later become non productive( as she was not able to expectorate). But since last 3 days she is having SOB, using 4L of O2 associated with B/l chest pain and wheezing . She describes chest pain as squeezing,located on the sides and radiating to back ,worse with coughing. Denies any Fever, Abd pain , N/V/D or any urinary symptoms.  now for the SOB but its not helping.        COPD with exacerbation:    Pt has been on Prednisone 20 mg BID for 2 months due to increased frequency of COPD exacerbation. Pt uses portable O2 concentrator 4L at home.  Flu, RSV panel was negative. CXR and CTA ruled out PE, PTX, pleural effusions. CT showed calcified granulomata, bronchiectasis, mediastinal adenopathy. VBG showed __ Given Oxygen 2L NC, Solumedrol 40mg IV BID, Duoneb q6hrs, Azithromycin 500mg IV, Levofloxacin 750mg IV. SOB, wheezing and rales resolved. Cough also improved, no longer productive. WBC was 15k on admission but normalized next day. Ox sat also improved to 98%.    Pt's pulm Dr. Nugetn consulted. Continue and wean off PO prednisone 40-30-20-10 mg q 3 days. Pt was instructed to continue Breo and Spiriva each one puff/day. Pt was prescribed high flow nebulizer QID.         Muscle Spasm/Backache:    Pt complained of bilateral chest and flank pain with cough and movement. Pain was assessed as muscle spasm due to frequent cough. Pain improved with decreased cough.     CT showed compression fx of T4. Pt denied point tenderness of spine. No CVAT, abdominal pain.    For pain control rxd Tramadol, but it didn't help. Rxd Flexeril. Advil/Tylenol prn.         Uncontrolled IDDM    HA1c 11.2  Fingerstick in 350's. Serum gluc 448.      Pt was put on Humalog sliding scale and Lantus 10 units. Levofloxacin was DCed due to possible interaction with Metformin pt takes at home.     Endo consulted.         HTN:    Continued home BP med, Carvedilol. EKG normal. CT showed CAD. Trop WNL.        Lung cancer:     sp VATS RLL Lobectomy in 2016, no hx of chemo or radiation. No new lung mass on CXR and CT. Smoking hx of 80 pack years was assessed, pt quit 8 yrs ago. For hypercoagulable state given Lovenox 40mg QD during hospitalization. Patient's oncologist Dr. Mendiola consulted.        Obesity:    Lipid panel WNL. Pt attributes wt gain to long term Prednisone use. Possibly Cushing syndrome. Prednisone to be weaned down.        ITP Hx:    CBC unremarkable. Pt not on tx.         Vit D deficiency:     vit D result: 9 __ 64 y F from home,walks independently with PMHx of COPD( on 2-3L O2 at home), ITP( not on Tx), DM, HLD and HTN and PSHx of R lung wedge resection presented to ED with 1 wk of cough and 3 days of SOB and B/l chest pain. As per pt she started having productive cough with greyish sputum 1 wk ago which later become non productive( as she was not able to expectorate). But since last 3 days she is having SOB, using 4L of O2 associated with B/l chest pain and wheezing . She describes chest pain as squeezing,located on the sides and radiating to back ,worse with coughing. Denies any Fever, Abd pain , N/V/D or any urinary symptoms.  now for the SOB but its not helping.        COPD with exacerbation:    Pt has been on Prednisone 20 mg BID for 2 months due to increased frequency of COPD exacerbation. Pt uses portable O2 concentrator 4L at home.  Flu, RSV panel was negative. CXR and CTA ruled out PE, PTX, pleural effusions. CT showed calcified granulomata, bronchiectasis, mediastinal adenopathy. VBG showed base excess and elevated pO2 of 56. Given Oxygen 2L NC, Solumedrol 40mg IV BID, Duoneb q6hrs, Azithromycin 500mg IV, Levofloxacin 750mg IV. SOB, wheezing and rales resolved. Cough also improved, no longer productive. WBC was 15k on admission but normalized next day. O2sat also improved to 98%.    Pt's pulm Dr. Nugent consulted. Continue and wean off PO prednisone 40-30-20-10 mg q 3 days. Pt was instructed to continue Breo and Spiriva each one puff/day. Pt was prescribed high flow nebulizer QID.         Muscle Spasm/Backache:    Pt complained of bilateral chest and flank pain with cough and movement. Pain was assessed as muscle spasm due to frequent cough. Pain improved with decreased cough.     CT showed compression fx of T4. Pt denied point tenderness of spine. No CVAT, abdominal pain.    For pain control rxd Tramadol, but it didn't help. Rxd Flexeril. Advil/Tylenol prn.         Uncontrolled IDDM    HA1c 11.2  Fingerstick in 350's. Serum gluc 448.      Pt was put on Humalog sliding scale and Lantus 10 units. Levofloxacin was DCed due to possible interaction with Metformin pt takes at home.     Endo consulted.         HTN:    Continued home BP med, Carvedilol. EKG normal. CT showed CAD. Trop WNL.        Lung cancer:     sp VATS RLL Lobectomy in 2016, no hx of chemo or radiation. No new lung mass on CXR and CT. Smoking hx of 80 pack years was assessed, pt quit 8 yrs ago. For hypercoagulable state given Lovenox 40mg QD during hospitalization. Patient's oncologist Dr. Mendiola consulted.        Obesity:    Lipid panel WNL. Pt attributes wt gain to long term Prednisone use. Possibly Cushing syndrome. Prednisone to be weaned down.        ITP Hx:    CBC unremarkable. Pt not on tx.         Vit D deficiency:     vit D result: 9 __ 64 y F from home,walks independently with PMHx of COPD( on 2-3L O2 at home), ITP( not on Tx), DM, HLD and HTN and PSHx of R lung wedge resection presented to ED with 1 wk of cough and 3 days of SOB and B/l chest pain. As per pt she started having productive cough with greyish sputum 1 wk ago which later become non productive( as she was not able to expectorate). But since last 3 days she is having SOB, using 4L of O2 associated with B/l chest pain and wheezing . She describes chest pain as squeezing,located on the sides and radiating to back ,worse with coughing. Denies any Fever, Abd pain , N/V/D or any urinary symptoms.  now for the SOB but its not helping.        COPD with exacerbation:    Pt has been on Prednisone 20 mg BID for 2 months due to increased frequency of COPD exacerbation. Pt uses portable O2 concentrator 4L at home.  Flu, RSV panel was negative. CXR and CTA ruled out PE, PTX, pleural effusions. CT showed calcified granulomata, bronchiectasis, mediastinal adenopathy. VBG showed normal pH. Given Oxygen 2L NC, Solumedrol 40mg IV BID, Duoneb q6hrs, Azithromycin 500mg IV, Levofloxacin 750mg IV. SOB, wheezing and rales resolved. Cough also improved, no longer productive. WBC was 15k on admission but normalized next day. O2sat also improved to 98%.    Pt's pulm Dr. Nugent consulted. Continue and wean off PO prednisone 40-30-20-10 mg q 3 days. Pt was instructed to continue Breo and Spiriva each one puff/day. Pt was prescribed high flow nebulizer QID.         Muscle Spasm/Backache:    Pt complained of bilateral chest and flank pain with cough and movement. Pain was assessed as muscle spasm due to frequent cough. Pain improved with decreased cough.     CT showed compression fx of T4. Pt denied point tenderness of spine. No CVAT, abdominal pain.    For pain control rxd Tramadol, but it didn't help. Rxd Flexeril. Advil/Tylenol prn.         Uncontrolled IDDM    HA1c 11.2  Fingerstick in 350's. Serum gluc 448.      Pt was put on Humalog sliding scale and Lantus 10 units. Levofloxacin was DCed due to possible interaction with Metformin pt takes at home.     Endo consulted.         HTN:    Continued home BP med, Carvedilol. EKG normal. CT showed CAD. Trop WNL.        Lung cancer:     sp VATS RLL Lobectomy in 2016, no hx of chemo or radiation. No new lung mass on CXR and CT. Smoking hx of 80 pack years was assessed, pt quit 8 yrs ago. For hypercoagulable state given Lovenox 40mg QD during hospitalization. Patient's oncologist Dr. Mendiola consulted.        Obesity:    Lipid panel WNL. Pt attributes wt gain to long term Prednisone use. Possibly Cushing syndrome. Prednisone to be weaned down.        ITP Hx:    CBC unremarkable. Pt not on tx.         Vit D deficiency:     Rxd ergocalciferol supplement. 64 y F from home,walks independently with PMHx of COPD( on 2-3L O2 at home), ITP( not on Tx), DM, HLD and HTN and PSHx of R lung wedge resection presented to ED with 1 wk of cough and 3 days of SOB and B/l chest pain. As per pt she started having productive cough with greyish sputum 1 wk ago which later become non productive( as she was not able to expectorate). But since last 3 days she is having SOB, using 4L of O2 associated with B/l chest pain and wheezing . She describes chest pain as squeezing,located on the sides and radiating to back ,worse with coughing. Denies any Fever, Abd pain , N/V/D or any urinary symptoms.  now for the SOB but its not helping.        COPD with exacerbation:    Pt has been on Prednisone 20 mg BID for 2 months due to increased frequency of COPD exacerbation. Pt uses portable O2 concentrator 4L at home.  Flu, RSV panel was negative. CXR and CTA ruled out PE, PTX, pleural effusions. CT showed calcified granulomata, bronchiectasis, mediastinal adenopathy. VBG showed normal pH. Given Oxygen 2L NC, Solumedrol 40mg IV BID, Duoneb q6hrs, Azithromycin 500mg IV, Levofloxacin 750mg IV. SOB, wheezing and rales resolved. Cough also improved, no longer productive. WBC was 15k on admission but normalized next day. O2sat also improved to 98%.    Pt's pulm Dr. Nugent consulted. Continue and wean off PO prednisone 40-30-20-10 mg q 3 days. Pt was instructed to continue Breo and Spiriva each one puff/day. Pt was prescribed high flow nebulizer QID.         Muscle Spasm/Backache:    Pt complained of bilateral chest and flank pain with cough and movement. Pain was assessed as muscle spasm due to frequent cough. Pain improved with decreased cough.     CT showed compression fx of T4. Pt denied point tenderness of spine. No CVAT, abdominal pain.    For pain control rxd Tramadol, but it didn't help. Rxd Flexeril. Advil/Tylenol prn.         Uncontrolled IDDM    HA1c 11.2  Fingerstick in 350's. Serum gluc 448.      Pt was put on Humalog sliding scale and Lantus 10 units. Levofloxacin was DCed due to possible interaction with Metformin pt takes at home.     Endo consulted.         HTN:    Continued home BP med, Carvedilol. EKG normal. CT showed CAD. Trop WNL.        Lung cancer:     sp VATS RLL Lobectomy in 2016, no hx of chemo or radiation. No new lung mass on CXR and CT. Smoking hx of 80 pack years was assessed, pt quit 8 yrs ago. For hypercoagulable state given Lovenox 40mg QD during hospitalization. Patient's oncologist Dr. Mendiola consulted.        Obesity:    Lipid panel WNL. Pt attributes wt gain to long term Prednisone use. Possibly Cushing syndrome. Prednisone to be weaned down.        ITP Hx:    CBC unremarkable. Pt not on tx.         Vit D deficiency:     Rxd ergocalciferol supplement.        Given patient's improved clinical status and current hemodynamic stability, decision was made to discharge. Plan discussed with attendings. 64 y F from home,walks independently with PMHx of COPD( on 2-3L O2 at home), ITP( not on Tx), DM, HLD and HTN and PSHx of R lung wedge resection presented to ED with 1 wk of cough and 3 days of SOB and B/l chest pain. As per pt she started having productive cough with greyish sputum 1 wk ago which later become non productive( as she was not able to expectorate). But since last 3 days she is having SOB, using 4L of O2 associated with B/l chest pain and wheezing . She describes chest pain as squeezing,located on the sides and radiating to back ,worse with coughing. Denies any Fever, Abd pain , N/V/D or any urinary symptoms.  now for the SOB but its not helping.        COPD with exacerbation:    Pt has been on Prednisone 20 mg BID for 2 months due to increased frequency of COPD exacerbation. Pt uses portable O2 concentrator 4L at home.  Flu, RSV panel was negative. CXR and CTA ruled out PE, PTX, pleural effusions. CT showed calcified granulomata, bronchiectasis, mediastinal adenopathy. VBG showed normal pH. Given Oxygen 2L NC, Solumedrol 40mg IV BID, Duoneb q6hrs, Azithromycin 500mg IV, Levofloxacin 750mg IV. SOB, wheezing and rales resolved. Cough also improved, no longer productive. WBC was 15k on admission but normalized next day. O2sat also improved to 98%.    Pt's pulm Dr. Nugent consulted. Continue and wean off PO prednisone 40-30-20-10 mg q 3 days. Pt was instructed to continue Breo and Spiriva each one puff/day. Pt was prescribed high flow nebulizer QID.         Muscle Spasm/Backache:    Pt complained of bilateral chest and flank pain with cough and movement. Pain was assessed as muscle spasm due to frequent cough. Pain improved with decreased cough.     CT showed compression fx of T4. Pt denied point tenderness of spine. No CVAT, abdominal pain.    For pain control rxd Tramadol, but it didn't help. Rxd Flexeril. Advil/Tylenol prn.         Uncontrolled IDDM    HA1c 11.2  Fingerstick in 350's. Serum gluc 448.      Pt was put on Humalog sliding scale and Lantus 10 units. Levofloxacin was DCed due to possible interaction with Metformin pt takes at home.     Endo consulted. recommended insulin , patient refused .recommended Jardiance and Januvia to be added which needed prior authorization. recommended to increase glyburide metformin to  twice BID in the  meantime.         HTN:    Continued home BP med, Carvedilol. EKG normal. CT showed CAD. Trop WNL.        Lung cancer:     sp VATS RLL Lobectomy in 2016, no hx of chemo or radiation. No new lung mass on CXR and CT. Smoking hx of 80 pack years was assessed, pt quit 8 yrs ago. For hypercoagulable state given Lovenox 40mg QD during hospitalization. Patient's oncologist Dr. Mendiola consulted.        Obesity:    Lipid panel WNL. Pt attributes wt gain to long term Prednisone use. Possibly Cushing syndrome. Prednisone to be weaned down.        ITP Hx:    CBC unremarkable. Pt not on tx.         Vit D deficiency:     Rxd ergocalciferol supplement.        Given patient's improved clinical status and current hemodynamic stability, decision was made to discharge. Plan discussed with attendings. 64 y F from home,walks independently with PMHx of COPD( on 2-3L O2 at home), ITP( not on Tx), DM, HLD and HTN and PSHx of R lung wedge resection presented to ED with 1 wk of cough and 3 days of SOB and B/l chest pain. As per pt she started having productive cough with greyish sputum 1 wk ago which later become non productive( as she was not able to expectorate). But since last 3 days she is having SOB, using 4L of O2 associated with B/l chest pain and wheezing . She describes chest pain as squeezing,located on the sides and radiating to back ,worse with coughing. Denies any Fever, Abd pain , N/V/D or any urinary symptoms.  now for the SOB but its not helping.        COPD with exacerbation:    Pt has been on Prednisone 20 mg BID for 2 months due to increased frequency of COPD exacerbation. Pt uses portable O2 concentrator 4L at home.  Flu, RSV panel was negative. CXR and CTA ruled out PE, PTX, pleural effusions. CT showed calcified granulomata, bronchiectasis, mediastinal adenopathy. VBG showed normal pH. Given Oxygen 2L NC, Solumedrol 40mg IV BID, Duoneb q6hrs, Azithromycin 500mg IV, Levofloxacin 750mg IV. SOB, wheezing and rales resolved. Cough also improved, no longer productive. WBC was 15k on admission but normalized next day. O2sat also improved to 98%.    Pt's pulm Dr. Nugent consulted. Continue and wean off PO prednisone 40-30-20-10 mg q 3 days. Pt was instructed to continue Breo and Spiriva each one puff/day. Pt was also advised to continue nebulizer QID.         Muscle Spasm/Backache:    Pt complained of bilateral chest and flank pain with cough and movement. Pain was assessed as muscle spasm due to frequent cough. Pain improved with decreased cough.     CT showed chronic compression fx of T4. Pt denied point tenderness of spine. No CVAT, abdominal pain.    For pain control Flexeril. Advil/Tylenol prn.         Uncontrolled IDDM    HA1c 11.2  Fingerstick in 350's. Serum gluc 448.      Pt was put on Humalog sliding scale and Lantus 10 units.      Endo consulted. recommended insulin , patient refused .recommended Jardiance and Januvia to be added which needed prior authorization. recommended to increase glyburide metformin to double the dose BID in the  meantime. Patient was counselled about hypoglycemia symptoms, she recognizes her hypoglycemia symp and knows when steroid is titrated down with cut down her DM medications too. She will follow up with Dr Lindquist as outpatient.          HTN:    Continued home BP med, Carvedilol. EKG normal. CT showed CAD. Trop WNL.        Lung cancer:     sp VATS RLL Lobectomy in 2016, no hx of chemo or radiation. No new lung mass on CXR and CT. Smoking hx of 80 pack years was assessed, pt quit 8 yrs ago. For hypercoagulable state given Lovenox 40mg QD during hospitalization. Patient's oncologist Dr. Mendiola consulted.        Obesity:    Lipid panel WNL. Pt attributes wt gain to long term Prednisone use. Possibly Cushing syndrome. Prednisone to be weaned down.        ITP Hx:    CBC unremarkable. Pt not on tx.         Vit D deficiency:     Rxd ergocalciferol supplement.        Given patient's improved clinical status and current hemodynamic stability, decision was made to discharge. Plan discussed with attendings.

## 2019-12-23 NOTE — CONSULT NOTE ADULT - SUBJECTIVE AND OBJECTIVE BOX
PULMONARY CONSULT NOTE      BARB COLÓN  MRN-[id number]    Patient is a 64y old  Female who presents with a chief complaint of COPD exacerbation  fot 1 week  Pt called me and did not want to come to office, Prednisone increased to 40 mg  C/O pain back, breathing better  Hx chart lab and xrays reviewed. Pt examined by me      History of Present Illness: 	  64 y F from home,walks independently with PMHx of COPD( on 2-3L O2 at home), ITP( not on Tx), DM, HLD and HTN and PSHx of R lung wedge resection presented to ED with 1 wk of cough and 3 days of SOB and B/l chest pain. As per pt she started having productive cough with greyish sputum 1 wk ago which later become non productive( as she was not able to expectorate). But since last 3 days she is having SOB, using 4L of O2 associated with B/l chest pain and wheezing . She describes chest pain as squeezing,located on the sides and radiating to back ,worse with coughing. Denies any Fever, Abd pain , N/V/D or any urinary symptoms. Pt is on prednisone 20 mg BID for 2 months now for the SOB but its not helping.        Home Medications:  atorvastatin 40 mg oral tablet: 1 tab(s) orally once a day (23 Dec 2019 01:54)  Breo Ellipta 100 mcg-25 mcg/inh inhalation powder: 1 puff(s) inhaled once a day (23 Dec 2019 01:54)  Coreg 6.25 mg oral tablet: 1 tab(s) orally 2 times a day (23 Dec 2019 01:54)  famotidine 40 mg oral tablet: 1 tab(s) orally once a day (at bedtime) (23 Dec 2019 01:54)  glyburide-metformin 5 mg-500 mg oral tablet: 1 tab(s) orally 3 times a day (23 Dec 2019 01:54)  predniSONE 20 mg oral tablet: 1 tab(s) orally 2 times a day (23 Dec 2019 01:54)  Spiriva 18 mcg inhalation capsule: 1 cap(s) inhaled once a day (23 Dec 2019 01:54)      MEDICATIONS  (STANDING):  atorvastatin 40 milliGRAM(s) Oral at bedtime  budesonide 160 MICROgram(s)/formoterol 4.5 MICROgram(s) Inhaler 2 Puff(s) Inhalation two times a day  carvedilol 6.25 milliGRAM(s) Oral every 12 hours  dextrose 50% Injectable 25 Gram(s) IV Push once  enoxaparin Injectable 40 milliGRAM(s) SubCutaneous daily  famotidine    Tablet 40 milliGRAM(s) Oral daily  insulin lispro (HumaLOG) corrective regimen sliding scale   SubCutaneous Before meals and at bedtime  levoFLOXacin IVPB 750 milliGRAM(s) IV Intermittent every 24 hours  methylPREDNISolone sodium succinate Injectable 40 milliGRAM(s) IV Push two times a day      MEDICATIONS  (PRN):  acetaminophen   Tablet .. 650 milliGRAM(s) Oral every 6 hours PRN Moderate Pain (4 - 6)  albuterol/ipratropium for Nebulization. 3 milliLiter(s) Nebulizer every 6 hours PRN Shortness of Breath and/or Wheezing  ALPRAZolam 1 milliGRAM(s) Oral two times a day PRN anxiety  traMADol 50 milliGRAM(s) Oral every 6 hours PRN Severe Pain (7 - 10)      Allergies    penicillin (Rash)            PAST MEDICAL & SURGICAL HISTORY:  HTN (hypertension)  DM (diabetes mellitus)  History of ITP  COPD (chronic obstructive pulmonary disease)  History of cholecystectomy  S/P lobectomy of lung for cancer lung   steroid dependency, Intubated in past      FAMILY HISTORY:  No pertinent family history in first degree relatives  HTN --   DM--   IHD--   Asthma--  COPD --    SOCIAL HISTORY SMOKING ex smoker x 8 yrs, Earlier 80 pack years   ETOH--     DRUGS--    REVIEW OF SYSTEMS:  CONSTITUTIONAL: No fever, weight loss, or fatigue   EYES: No eye pain, visual disturbances, or discharge  ENT:  No difficulty hearing, tinnitus, vertigo; No sinus or throat pain  NECK: No pain or stiffness   RESPIRATORY:  cough ++  wheezing+   chills --  hemoptysis--    Shortness of Breath+  CARDIOVASCULAR: No chest pain, palpitations, passing out, dizziness, or leg swelling  GASTROINTESTINAL: No abdominal or epigastric pain. No nausea, vomiting, or hematemesis; No diarrhea or constipation. No melena or hematochezia.  GENITOURINARY: No dysuria, frequency, hematuria, or incontinence  NEUROLOGICAL: No headaches, memory loss, loss of strength, numbness, or tremors  SKIN: No itching, burning, rashes, or lesions   LYMPH Nodes: No enlarged glands  HEME/LYMPH: No easy bruising, or bleeding gums  ALLERGY AND IMMUNOLOGIC: No hives or eczema      Vital Signs Last 24 Hrs  T(C): 36.9 (23 Dec 2019 05:06), Max: 36.9 (23 Dec 2019 05:06)  T(F): 98.4 (23 Dec 2019 05:06), Max: 98.4 (23 Dec 2019 05:06)  HR: 93 (23 Dec 2019 05:06) (93 - 97)  BP: 134/81 (23 Dec 2019 05:06) (134/81 - 142/81)  BP(mean): --  RR: 17 (23 Dec 2019 05:06) (17 - 20)  SpO2: 94% (23 Dec 2019 05:06) (94% - 99%)  I&O's Detail      PHYSICAL EXAMINATION:    GENERAL: The patient is a well-developed, well-nourished in no apparent distress. obese w/f  SKIN: No rashes ecchymoses or cyanosis  HEENT: Head is normocephalic and atraumatic. Extraocular muscles are intact. Mucous membranes are moist.   Neck supple LN not felt, JVP not increased  Thyroid not enlarged  Lymphatic: No lymphadenopathy  Cardiovascular:  S1 S2  heard ,RSR , JVP not increased , syst murmur at apex, No  gallop or rub  Respiratory:  Symmetrical chest wall movements Breathing vesicular , Percussion note normal no dulness   with  no  rales, minimal   wheeze  ABDOMEN:  Soft, Non-tender, obese,   No  hepatosplenomegaly ,BS positive		  Extremities: Normal range of motion, No clubbing, cyanosis or edema , No calf tenderness  Vascular: Peripheral pulses palpable 2+ bilaterally  CNS: Alert and oriented x3,  Mood and affect appropriate  Cranial nerves intact  sensory intact  motor Power5/5, DTR 2+   Babinski neg    LABS:                        13.9   9.93  )-----------( 235      ( 23 Dec 2019 06:24 )             42.8     12-23    135  |  98  |  21<H>  ----------------------------<  448<H>  4.2   |  29  |  0.86    Ca    8.9      23 Dec 2019 06:24  Phos  3.9     12-23  Mg     2.0     12-23    TPro  7.4  /  Alb  3.5  /  TBili  0.4  /  DBili  x   /  AST  12  /  ALT  27  /  AlkPhos  131<H>  12-22          CARDIAC MARKERS ( 23 Dec 2019 06:24 )  <0.015 ng/mL / x     / x     / x     / x      CARDIAC MARKERS ( 22 Dec 2019 23:28 )  <0.015 ng/mL / x     / x     / x     / x            RADIOLOGY & ADDITIONAL STUDIES:    CXR: No active infiltrate    Ct scan chest: < from: CT Angio Chest w/ IV Cont (12.23.19 @ 03:31) >  FINDINGS:   Pulmonary arteries: Normal. No pulmonary emboli.   Aorta: Unremarkable. No aortic aneurysm. No aortic dissection.   Lungs: Scattered bilateral calcified granulomata. Scarring with traction   bronchiectasis in the lingula. Centrilobular and paraseptal emphysema. Patient   is status post wedge resection in the right lower lobe.   Pleural space: Unremarkable. No pneumothorax. No pleural effusion.   Heart: Atherosclerotic disease of the coronary arteries.   Mediastinum: Surgical clip in the subcarinal space.   Lymph nodes: Multiple borderline enlarged right upper and bilateral lower   paratracheal, subcarinal, and right hilar lymph nodes. The largest lymph node   is present in the subcarinal space measuring 1.4 cm in short axis.   Bones/joints: Age-indeterminate compression fracture of T4. Several vertebral   hemangiomata. Dextroscoliosis of the thoracic spine.     	    ekg; NSR, RAD

## 2019-12-23 NOTE — H&P ADULT - ASSESSMENT
64 y F from home,walks independently with PMHx of COPD( on 2-3L O2 at home), ITP( not on Tx), DM, HLD and HTN and PSHx of R lung wedge resection presented to ED with 1 wk of cough and 3 days of SOB and B/l chest pain    Pt will be admitted to med for COPD exacerbation and CAP

## 2019-12-23 NOTE — DISCHARGE NOTE PROVIDER - NSDCMRMEDTOKEN_GEN_ALL_CORE_FT
atorvastatin 40 mg oral tablet: 1 tab(s) orally once a day  Breo Ellipta 100 mcg-25 mcg/inh inhalation powder: 1 puff(s) inhaled once a day  Coreg 6.25 mg oral tablet: 1 tab(s) orally 2 times a day  famotidine 40 mg oral tablet: 1 tab(s) orally once a day (at bedtime)  glyburide-metformin 5 mg-500 mg oral tablet: 1 tab(s) orally 3 times a day  predniSONE 20 mg oral tablet: 1 tab(s) orally 2 times a day  Spiriva 18 mcg inhalation capsule: 1 cap(s) inhaled once a day atorvastatin 40 mg oral tablet: 1 tab(s) orally once a day  Breo Ellipta 100 mcg-25 mcg/inh inhalation powder: 1 puff(s) inhaled once a day  Coreg 6.25 mg oral tablet: 1 tab(s) orally 2 times a day  cyclobenzaprine 5 mg oral tablet: 1 tab(s) orally 3 times a day, As needed, Muscle Spasm  ergocalciferol 50,000 intl units (1.25 mg) oral capsule: 1 cap(s) orally once a week   famotidine 40 mg oral tablet: 1 tab(s) orally once a day (at bedtime)  glyburide-metformin 5 mg-500 mg oral tablet: 1 tab(s) orally 3 times a day  predniSONE 10 mg oral tablet: 4 tab(s) oral  once a day x 3 days  3 tab(s) oral  once a day x 3 days  2 tab(s) oral  once a day x 3 days  1 tab(s) oral once a day x 3 days  predniSONE 20 mg oral tablet: 1 tab(s) orally 2 times a day  Spiriva 18 mcg inhalation capsule: 1 cap(s) inhaled once a day albuterol 0.63 mg/3 mL (0.021%) inhalation solution: 1 milliliter(s) by nebulizer 2 times a day   atorvastatin 40 mg oral tablet: 1 tab(s) orally once a day  Breo Ellipta 100 mcg-25 mcg/inh inhalation powder: 1 puff(s) inhaled once a day  Coreg 6.25 mg oral tablet: 1 tab(s) orally 2 times a day  cyclobenzaprine 5 mg oral tablet: 1 tab(s) orally 3 times a day, As needed, Muscle Spasm  ergocalciferol 50,000 intl units (1.25 mg) oral capsule: 1 cap(s) orally once a week   glyburide-metformin 5 mg-500 mg oral tablet: 2 tab(s) orally 2 times a day   predniSONE 10 mg oral tablet: 4 tab(s)  once a day x 3 days  3 tab(s)  once a day x 3 days  2 tab(s)  once a day x 3 days  1 tab(s)  once a day x 3 days  Spiriva 18 mcg inhalation capsule: 1 cap(s) inhaled once a day

## 2019-12-23 NOTE — H&P ADULT - NSICDXPASTMEDICALHX_GEN_ALL_CORE_FT
PAST MEDICAL HISTORY:  COPD (chronic obstructive pulmonary disease)     DM (diabetes mellitus)     History of ITP     HTN (hypertension)

## 2019-12-23 NOTE — H&P ADULT - PROBLEM SELECTOR PLAN 1
Pt p/w SOB , cough , wheezing on exam  - VBG : PH 7.41   - CT chest:  - s/p prednisone 40 mg OD x1 in ED and1 dose of duoneb  - Will start on Solumedrol 40 mg IV q12 for now  - will start on rocephin and Zithromax IV   - c/w Dounebs q6 , Symbicort and suppple O2 as needed Pt p/w SOB , cough , wheezing on exam  - VBG : PH 7.41   - s/p prednisone 40 mg OD x1 in ED and1 dose of duoneb  - Will start on Solumedrol 40 mg IV q12 for now  - will start on Levaquin IV ( pt allergic to penecillin)   - c/w Dounebs q6 , Symbicort and suppple O2 as needed  - f/u CTA chest to r/o PE Pt p/w SOB , cough , wheezing on exam  - VBG : PH 7.41   - s/p prednisone 40 mg OD x1 in ED and1 dose of duoneb  - Will start on Solumedrol 40 mg IV q12 for now  - will start on Levaquin IV ( pt allergic to penecillin)   - c/w Dounebs q6 , Symbicort and suppple O2 as needed  - CTA chest : -ve for PE   Pulm consult Dr Nugent

## 2019-12-24 ENCOUNTER — TRANSCRIPTION ENCOUNTER (OUTPATIENT)
Age: 64
End: 2019-12-24

## 2019-12-24 VITALS
OXYGEN SATURATION: 99 % | RESPIRATION RATE: 18 BRPM | HEART RATE: 82 BPM | DIASTOLIC BLOOD PRESSURE: 90 MMHG | TEMPERATURE: 97 F | SYSTOLIC BLOOD PRESSURE: 148 MMHG

## 2019-12-24 LAB
ANION GAP SERPL CALC-SCNC: 5 MMOL/L — SIGNIFICANT CHANGE UP (ref 5–17)
BUN SERPL-MCNC: 22 MG/DL — HIGH (ref 7–18)
CALCIUM SERPL-MCNC: 8.8 MG/DL — SIGNIFICANT CHANGE UP (ref 8.4–10.5)
CHLORIDE SERPL-SCNC: 103 MMOL/L — SIGNIFICANT CHANGE UP (ref 96–108)
CO2 SERPL-SCNC: 31 MMOL/L — SIGNIFICANT CHANGE UP (ref 22–31)
CREAT SERPL-MCNC: 0.74 MG/DL — SIGNIFICANT CHANGE UP (ref 0.5–1.3)
GLUCOSE BLDC GLUCOMTR-MCNC: 286 MG/DL — HIGH (ref 70–99)
GLUCOSE BLDC GLUCOMTR-MCNC: 329 MG/DL — HIGH (ref 70–99)
GLUCOSE SERPL-MCNC: 190 MG/DL — HIGH (ref 70–99)
HBA1C BLD-MCNC: 11.1 % — HIGH (ref 4–5.6)
HCT VFR BLD CALC: 43.9 % — SIGNIFICANT CHANGE UP (ref 34.5–45)
HCV AB S/CO SERPL IA: 0.11 S/CO — SIGNIFICANT CHANGE UP (ref 0–0.99)
HCV AB SERPL-IMP: SIGNIFICANT CHANGE UP
HGB BLD-MCNC: 14 G/DL — SIGNIFICANT CHANGE UP (ref 11.5–15.5)
MCHC RBC-ENTMCNC: 28.3 PG — SIGNIFICANT CHANGE UP (ref 27–34)
MCHC RBC-ENTMCNC: 31.9 GM/DL — LOW (ref 32–36)
MCV RBC AUTO: 88.7 FL — SIGNIFICANT CHANGE UP (ref 80–100)
NRBC # BLD: 0 /100 WBCS — SIGNIFICANT CHANGE UP (ref 0–0)
PLATELET # BLD AUTO: 226 K/UL — SIGNIFICANT CHANGE UP (ref 150–400)
POTASSIUM SERPL-MCNC: 3.6 MMOL/L — SIGNIFICANT CHANGE UP (ref 3.5–5.3)
POTASSIUM SERPL-SCNC: 3.6 MMOL/L — SIGNIFICANT CHANGE UP (ref 3.5–5.3)
RBC # BLD: 4.95 M/UL — SIGNIFICANT CHANGE UP (ref 3.8–5.2)
RBC # FLD: 13.6 % — SIGNIFICANT CHANGE UP (ref 10.3–14.5)
S PNEUM AG SER QL: SIGNIFICANT CHANGE UP
SODIUM SERPL-SCNC: 139 MMOL/L — SIGNIFICANT CHANGE UP (ref 135–145)
WBC # BLD: 9.9 K/UL — SIGNIFICANT CHANGE UP (ref 3.8–10.5)
WBC # FLD AUTO: 9.9 K/UL — SIGNIFICANT CHANGE UP (ref 3.8–10.5)

## 2019-12-24 PROCEDURE — 99233 SBSQ HOSP IP/OBS HIGH 50: CPT | Mod: GC

## 2019-12-24 RX ORDER — CYCLOBENZAPRINE HYDROCHLORIDE 10 MG/1
1 TABLET, FILM COATED ORAL
Qty: 90 | Refills: 0
Start: 2019-12-24 | End: 2020-01-22

## 2019-12-24 RX ORDER — ALBUTEROL 90 UG/1
1 AEROSOL, METERED ORAL
Qty: 30 | Refills: 0
Start: 2019-12-24 | End: 2020-01-22

## 2019-12-24 RX ORDER — ERGOCALCIFEROL 1.25 MG/1
1 CAPSULE ORAL
Qty: 4 | Refills: 0
Start: 2019-12-24 | End: 2020-01-20

## 2019-12-24 RX ORDER — GLYBURIDE-METFORMIN HYDROCHLORIDE 1.25; 25 MG/1; MG/1
2 TABLET ORAL
Qty: 120 | Refills: 0
Start: 2019-12-24 | End: 2020-01-22

## 2019-12-24 RX ADMIN — Medication 100 MILLIGRAM(S): at 02:42

## 2019-12-24 RX ADMIN — Medication 40 MILLIGRAM(S): at 05:02

## 2019-12-24 RX ADMIN — Medication 8: at 11:57

## 2019-12-24 RX ADMIN — ENOXAPARIN SODIUM 40 MILLIGRAM(S): 100 INJECTION SUBCUTANEOUS at 11:22

## 2019-12-24 RX ADMIN — Medication 650 MILLIGRAM(S): at 11:55

## 2019-12-24 RX ADMIN — CARVEDILOL PHOSPHATE 6.25 MILLIGRAM(S): 80 CAPSULE, EXTENDED RELEASE ORAL at 05:02

## 2019-12-24 RX ADMIN — FAMOTIDINE 40 MILLIGRAM(S): 10 INJECTION INTRAVENOUS at 11:22

## 2019-12-24 RX ADMIN — Medication 3 MILLILITER(S): at 09:45

## 2019-12-24 RX ADMIN — CYCLOBENZAPRINE HYDROCHLORIDE 5 MILLIGRAM(S): 10 TABLET, FILM COATED ORAL at 07:52

## 2019-12-24 RX ADMIN — Medication 6: at 08:24

## 2019-12-24 RX ADMIN — Medication 100 MILLIGRAM(S): at 08:27

## 2019-12-24 RX ADMIN — Medication 650 MILLIGRAM(S): at 11:27

## 2019-12-24 NOTE — DISCHARGE NOTE NURSING/CASE MANAGEMENT/SOCIAL WORK - PATIENT PORTAL LINK FT
You can access the FollowMyHealth Patient Portal offered by [facility] by registering at the following website: [email or web address]. By joining [facility]’s FollowMyHealth portal, you will also be able to view your health information using other applications (apps) compatible with our system.

## 2019-12-24 NOTE — CONSULT NOTE ADULT - PROBLEM SELECTOR RECOMMENDATION 9
on steroid and bronchodilator, she is breathing better now.
un cont with hyperglycemia due to steroids  refusing insulin tx  change glyburide /metformin to 2 tabs bid  risk of hypoglycemia d/w and to lower the dose to one tab bid when off of steroids  pt will benefit with sglt2 inhibitors/ insulin as out pt  d/w pt and hs out pt fine tuning of dm  fsg ac and hs

## 2019-12-24 NOTE — PROGRESS NOTE ADULT - SUBJECTIVE AND OBJECTIVE BOX
still pain at left lower rib cage  worsen with cough  cough more now but no increasing sob  no fever or chills.    MEDICATIONS  (STANDING):  atorvastatin 40 milliGRAM(s) Oral at bedtime  budesonide 160 MICROgram(s)/formoterol 4.5 MICROgram(s) Inhaler 2 Puff(s) Inhalation two times a day  carvedilol 6.25 milliGRAM(s) Oral every 12 hours  dextrose 50% Injectable 25 Gram(s) IV Push once  enoxaparin Injectable 40 milliGRAM(s) SubCutaneous daily  ergocalciferol 93178 Unit(s) Oral <User Schedule>  famotidine    Tablet 40 milliGRAM(s) Oral daily  insulin glargine Injectable (LANTUS) 10 Unit(s) SubCutaneous at bedtime  insulin lispro (HumaLOG) corrective regimen sliding scale   SubCutaneous three times a day before meals  levoFLOXacin IVPB 750 milliGRAM(s) IV Intermittent every 24 hours  methylPREDNISolone sodium succinate Injectable 40 milliGRAM(s) IV Push daily    MEDICATIONS  (PRN):  acetaminophen   Tablet .. 650 milliGRAM(s) Oral every 6 hours PRN Moderate Pain (4 - 6)  albuterol/ipratropium for Nebulization. 3 milliLiter(s) Nebulizer every 6 hours PRN Shortness of Breath and/or Wheezing  ALPRAZolam 1 milliGRAM(s) Oral two times a day PRN anxiety  cyclobenzaprine 5 milliGRAM(s) Oral three times a day PRN Muscle Spasm  guaiFENesin   Syrup  (Sugar-Free) 100 milliGRAM(s) Oral every 6 hours PRN Cough  traMADol 50 milliGRAM(s) Oral every 6 hours PRN Severe Pain (7 - 10)      Allergies    penicillin (Rash)    Intolerances        Vital Signs Last 24 Hrs  T(C): 36.2 (24 Dec 2019 05:17), Max: 36.6 (23 Dec 2019 12:30)  T(F): 97.2 (24 Dec 2019 05:17), Max: 97.9 (23 Dec 2019 12:30)  HR: 82 (24 Dec 2019 05:17) (82 - 98)  BP: 148/90 (24 Dec 2019 05:17) (137/80 - 150/88)  BP(mean): --  RR: 18 (24 Dec 2019 05:17) (18 - 18)  SpO2: 99% (24 Dec 2019 05:17) (98% - 99%)    PHYSICAL EXAM  General: adult in NAD  HEENT: clear oropharynx, anicteric sclera, pink conjunctiva  Neck: supple  CV: normal S1/S2 with no murmur rubs or gallops  Lungs: positive air movement b/l ant lungs,clear to auscultation, no wheezes, no rales  Abdomen: soft non-tender non-distended, no hepatosplenomegaly  Ext: no clubbing cyanosis or edema  Skin: no rashes and no petechiae  Neuro: alert and oriented X 4, no focal deficits  LABS:                          14.0   9.90  )-----------( 226      ( 24 Dec 2019 06:12 )             43.9         Mean Cell Volume : 88.7 fl  Mean Cell Hemoglobin : 28.3 pg  Mean Cell Hemoglobin Concentration : 31.9 gm/dL  Auto Neutrophil # : x  Auto Lymphocyte # : x  Auto Monocyte # : x  Auto Eosinophil # : x  Auto Basophil # : x  Auto Neutrophil % : x  Auto Lymphocyte % : x  Auto Monocyte % : x  Auto Eosinophil % : x  Auto Basophil % : x    Serial CBC  Hematocrit 43.9  Hemoglobin 14.0  Plat 226  RBC 4.95  WBC 9.90  Serial CBC  Hematocrit 42.8  Hemoglobin 13.9  Plat 235  RBC 4.88  WBC 9.93  Serial CBC  Hematocrit 46.7  Hemoglobin 15.1  Plat 292  RBC 5.25  WBC 15.88    12-24    139  |  103  |  22<H>  ----------------------------<  190<H>  3.6   |  31  |  0.74    Ca    8.8      24 Dec 2019 06:12  Phos  3.9     12-23  Mg     2.0     12-23    TPro  7.4  /  Alb  3.5  /  TBili  0.4  /  DBili  x   /  AST  12  /  ALT  27  /  AlkPhos  131<H>  12-22          Vitamin B12, Serum: 451 pg/mL (12-23 @ 10:10)            BLOOD SMEAR INTERPRETATION:       RADIOLOGY & ADDITIONAL STUDIES:

## 2019-12-24 NOTE — PROGRESS NOTE ADULT - ASSESSMENT
Assessment and Recommendation:   · Assessment		  64 year old lady with h/ ITP, lung ca, s/p resection , COPD, presented with pleuritic pain at left back..  CTA is neg for PE or pneumonia.    Problem/Recommendation - 1:  Problem: COPD exacerbation. Recommendation: on steroid and bronchodilator, she is breathing better now.    Problem/Recommendation - 2:  ·  Problem: Pleuritic chest pain.  Recommendation: CTA is neg for PE and pneumonia  ?viral disease causing pleuisy or muscular strain  the picture is more of pleurisy in view of increasing coughing and local tenderness at left mid back  will monitor.

## 2019-12-24 NOTE — PROGRESS NOTE ADULT - ASSESSMENT
COPD with exacerbation/ Backache sec to muscle spasm  Uncontrolled IDDM   Htn   Rll Lobectomy for cancer lung  Obesity   ITP Hx      PLAN- Po prednisone 40-30-20-10 mg q 3 days    Breo and spiriva at home   HFN rx qid   s/c lovenox  OOB/BRp  tylenol prn   D/C home  F/U in office monday 1.00 pm

## 2019-12-24 NOTE — CONSULT NOTE ADULT - SUBJECTIVE AND OBJECTIVE BOX
Patient is a 64y old  Female who presents with a chief complaint of COPD exacerbation n PNA (24 Dec 2019 09:28)      HPI:  64 y F from home,walks independently with PMHx of COPD( on 2-3L O2 at home), ITP( not on Tx), DM, HLD and HTN and PSHx of R lung wedge resection presented to ED with 1 wk of cough and 3 days of SOB and B/l chest pain. As per pt she started having productive cough with greyish sputum 1 wk ago which later become non productive( as she was not able to expectorate). But since last 3 days she is having SOB, using 4L of O2 associated with B/l chest pain and wheezing . She describes chest pain as squeezing,located on the sides and radiating to back ,worse with coughing. Denies any Fever, Abd pain , N/V/D or any urinary symptoms. Pt is on prednisone 20 mg BID for 2 months now for the SOB but its not helping.    ED course : Afebrile . HR 97 /81 RR 18 O2 sat 99 % . Labs : wbc 15K , K 3.4 . Flu -ve . CXR : looks clear ,awaiting official read . ECG : NSR with T1 -ve .     SH : smoked 2 PPD x40yrs ,quitted 8 yrs ago. Denies any alcohol or drug use (23 Dec 2019 01:46)      PAST MEDICAL & SURGICAL HISTORY:  HTN (hypertension)  DM (diabetes mellitus)  History of ITP  COPD (chronic obstructive pulmonary disease)  History of cholecystectomy  S/P lobectomy of lung         MEDICATIONS  (STANDING):  atorvastatin 40 milliGRAM(s) Oral at bedtime  budesonide 160 MICROgram(s)/formoterol 4.5 MICROgram(s) Inhaler 2 Puff(s) Inhalation two times a day  carvedilol 6.25 milliGRAM(s) Oral every 12 hours  dextrose 50% Injectable 25 Gram(s) IV Push once  enoxaparin Injectable 40 milliGRAM(s) SubCutaneous daily  ergocalciferol 51507 Unit(s) Oral <User Schedule>  famotidine    Tablet 40 milliGRAM(s) Oral daily  insulin glargine Injectable (LANTUS) 10 Unit(s) SubCutaneous at bedtime  insulin lispro (HumaLOG) corrective regimen sliding scale   SubCutaneous three times a day before meals  levoFLOXacin IVPB 750 milliGRAM(s) IV Intermittent every 24 hours  methylPREDNISolone sodium succinate Injectable 40 milliGRAM(s) IV Push daily  naproxen 500 milliGRAM(s) Oral two times a day    MEDICATIONS  (PRN):  acetaminophen   Tablet .. 650 milliGRAM(s) Oral every 6 hours PRN Moderate Pain (4 - 6)  albuterol/ipratropium for Nebulization. 3 milliLiter(s) Nebulizer every 6 hours PRN Shortness of Breath and/or Wheezing  ALPRAZolam 1 milliGRAM(s) Oral two times a day PRN anxiety  cyclobenzaprine 5 milliGRAM(s) Oral three times a day PRN Muscle Spasm  guaiFENesin   Syrup  (Sugar-Free) 100 milliGRAM(s) Oral every 6 hours PRN Cough  traMADol 50 milliGRAM(s) Oral every 6 hours PRN Severe Pain (7 - 10)      FAMILY HISTORY:  No pertinent family history in first degree relatives      SOCIAL HISTORY:      REVIEW OF SYSTEMS:  CONSTITUTIONAL: No fever, weight loss, or fatigue  EYES: No eye pain, visual disturbances, or discharge  ENT:  No difficulty hearing, tinnitus, vertigo; No sinus or throat pain  NECK: No pain or stiffness  RESPIRATORY: No cough, wheezing, chills or hemoptysis; No Shortness of Breath  CARDIOVASCULAR: No chest pain, palpitations, passing out, dizziness, or leg swelling  GASTROINTESTINAL: No abdominal or epigastric pain. No nausea, vomiting, or hematemesis; No diarrhea or constipation. No melena or hematochezia.  GENITOURINARY: No dysuria, frequency, hematuria, or incontinence  NEUROLOGICAL: No headaches, memory loss, loss of strength, numbness, or tremors  SKIN: No itching, burning, rashes, or lesions   LYMPH Nodes: No enlarged glands  ENDOCRINE: No heat or cold intolerance; No hair loss  MUSCULOSKELETAL: No joint pain or swelling; No muscle, back, or extremity pain  PSYCHIATRIC: No depression, anxiety, mood swings, or difficulty sleeping  HEME/LYMPH: No easy bruising, or bleeding gums  ALLERGY AND IMMUNOLOGIC: No hives or eczema	        Vital Signs Last 24 Hrs  T(C): 36.2 (24 Dec 2019 05:17), Max: 36.6 (23 Dec 2019 12:30)  T(F): 97.2 (24 Dec 2019 05:17), Max: 97.9 (23 Dec 2019 12:30)  HR: 82 (24 Dec 2019 05:17) (82 - 98)  BP: 148/90 (24 Dec 2019 05:17) (137/80 - 150/88)  BP(mean): --  RR: 18 (24 Dec 2019 05:17) (18 - 18)  SpO2: 99% (24 Dec 2019 05:17) (98% - 99%)      Constitutional:    NC/AT:    HEENT:    Neck:  No JVD, bruits or thyromegaly    Respiratory:  Clear without rales or rhonchi    Cardiovascular:  RR without murmur, rub or gallop.    Gastrointestinal: Soft without hepatosplenomegaly.    Extremities: without cyanosis, clubbing or edema.    Neurological:  Oriented   x      . No gross sensory or motor defects.        LABS:                        14.0   9.90  )-----------( 226      ( 24 Dec 2019 06:12 )             43.9     12-24    139  |  103  |  22<H>  ----------------------------<  190<H>  3.6   |  31  |  0.74    Ca    8.8      24 Dec 2019 06:12  Phos  3.9     12-23  Mg     2.0     12-23    TPro  7.4  /  Alb  3.5  /  TBili  0.4  /  DBili  x   /  AST  12  /  ALT  27  /  AlkPhos  131<H>  12-22    CARDIAC MARKERS ( 23 Dec 2019 06:24 )  <0.015 ng/mL / x     / x     / x     / x      CARDIAC MARKERS ( 22 Dec 2019 23:28 )  <0.015 ng/mL / x     / x     / x     / x              CAPILLARY BLOOD GLUCOSE      POCT Blood Glucose.: 286 mg/dL (24 Dec 2019 08:09)  POCT Blood Glucose.: 183 mg/dL (23 Dec 2019 22:19)  POCT Blood Glucose.: 243 mg/dL (23 Dec 2019 16:43)  POCT Blood Glucose.: 353 mg/dL (23 Dec 2019 11:46)      RADIOLOGY & ADDITIONAL STUDIES: Patient is a 64y old  Female who presents with a chief complaint of COPD exacerbation n PNA (24 Dec 2019 09:28)      HPI:  64 y F from home,walks independently with PMHx of COPD( on 2-3L O2 at home), ITP( not on Tx), DM, HLD and HTN and PSHx of R lung wedge resection presented to ED with 1 wk of cough and 3 days of SOB and B/l chest pain. As per pt she started having productive cough with greyish sputum 1 wk ago which later become non productive( as she was not able to expectorate). But since last 3 days she is having SOB, using 4L of O2 associated with B/l chest pain and wheezing . She describes chest pain as squeezing,located on the sides and radiating to back ,worse with coughing. Denies any Fever, Abd pain , N/V/D or any urinary symptoms. Pt is on prednisone 20 mg BID for 2 months now for the SOB but its not helping. Found to have un cont DM. Pt is on steroids most of the last 3 months. Admits to having high fsg. Denies hypoglycemia on glyburide.     ED course : Afebrile . HR 97 /81 RR 18 O2 sat 99 % . Labs : wbc 15K , K 3.4 . Flu -ve . CXR : looks clear ,awaiting official read . ECG : NSR with T1 -ve .     SH : smoked 2 PPD x40yrs ,quitted 8 yrs ago. Denies any alcohol or drug use (23 Dec 2019 01:46)      PAST MEDICAL & SURGICAL HISTORY:  HTN (hypertension)  DM (diabetes mellitus)  History of ITP  COPD (chronic obstructive pulmonary disease)  History of cholecystectomy  S/P lobectomy of lung         MEDICATIONS  (STANDING):  atorvastatin 40 milliGRAM(s) Oral at bedtime  budesonide 160 MICROgram(s)/formoterol 4.5 MICROgram(s) Inhaler 2 Puff(s) Inhalation two times a day  carvedilol 6.25 milliGRAM(s) Oral every 12 hours  dextrose 50% Injectable 25 Gram(s) IV Push once  enoxaparin Injectable 40 milliGRAM(s) SubCutaneous daily  ergocalciferol 07099 Unit(s) Oral <User Schedule>  famotidine    Tablet 40 milliGRAM(s) Oral daily  insulin glargine Injectable (LANTUS) 10 Unit(s) SubCutaneous at bedtime  insulin lispro (HumaLOG) corrective regimen sliding scale   SubCutaneous three times a day before meals  levoFLOXacin IVPB 750 milliGRAM(s) IV Intermittent every 24 hours  methylPREDNISolone sodium succinate Injectable 40 milliGRAM(s) IV Push daily  naproxen 500 milliGRAM(s) Oral two times a day    MEDICATIONS  (PRN):  acetaminophen   Tablet .. 650 milliGRAM(s) Oral every 6 hours PRN Moderate Pain (4 - 6)  albuterol/ipratropium for Nebulization. 3 milliLiter(s) Nebulizer every 6 hours PRN Shortness of Breath and/or Wheezing  ALPRAZolam 1 milliGRAM(s) Oral two times a day PRN anxiety  cyclobenzaprine 5 milliGRAM(s) Oral three times a day PRN Muscle Spasm  guaiFENesin   Syrup  (Sugar-Free) 100 milliGRAM(s) Oral every 6 hours PRN Cough  traMADol 50 milliGRAM(s) Oral every 6 hours PRN Severe Pain (7 - 10)      FAMILY HISTORY:  No pertinent family history in first degree relatives      SOCIAL HISTORY:      REVIEW OF SYSTEMS:  CONSTITUTIONAL: No fever, weight loss, or fatigue  EYES: No eye pain, visual disturbances, or discharge  ENT:  No difficulty hearing, tinnitus, vertigo; No sinus or throat pain  NECK: No pain or stiffness  RESPIRATORY: No cough, wheezing, chills or hemoptysis; No Shortness of Breath  CARDIOVASCULAR: No chest pain, palpitations, passing out, dizziness, or leg swelling  GASTROINTESTINAL: No abdominal or epigastric pain. No nausea, vomiting, or hematemesis; No diarrhea or constipation. No melena or hematochezia.  GENITOURINARY: No dysuria, frequency, hematuria, or incontinence  NEUROLOGICAL: No headaches, memory loss, loss of strength, numbness, or tremors  SKIN: No itching, burning, rashes, or lesions   LYMPH Nodes: No enlarged glands  ENDOCRINE: No heat or cold intolerance; No hair loss  MUSCULOSKELETAL: No joint pain or swelling; No muscle, back, or extremity pain  PSYCHIATRIC: No depression, anxiety, mood swings, or difficulty sleeping  HEME/LYMPH: No easy bruising, or bleeding gums  ALLERGY AND IMMUNOLOGIC: No hives or eczema	        Vital Signs Last 24 Hrs  T(C): 36.2 (24 Dec 2019 05:17), Max: 36.6 (23 Dec 2019 12:30)  T(F): 97.2 (24 Dec 2019 05:17), Max: 97.9 (23 Dec 2019 12:30)  HR: 82 (24 Dec 2019 05:17) (82 - 98)  BP: 148/90 (24 Dec 2019 05:17) (137/80 - 150/88)  BP(mean): --  RR: 18 (24 Dec 2019 05:17) (18 - 18)  SpO2: 99% (24 Dec 2019 05:17) (98% - 99%)      Constitutional:    HEENT: nad    Neck:  No JVD, bruits or thyromegaly    Respiratory:  Clear without rales or rhonchi    Cardiovascular:  RR without murmur, rub or gallop.    Gastrointestinal: Soft without hepatosplenomegaly.    Extremities: without cyanosis, clubbing or edema.    Neurological:  Oriented   x 3     . No gross sensory or motor defects.        LABS:                        14.0   9.90  )-----------( 226      ( 24 Dec 2019 06:12 )             43.9     12-24    139  |  103  |  22<H>  ----------------------------<  190<H>  3.6   |  31  |  0.74    Ca    8.8      24 Dec 2019 06:12  Phos  3.9     12-23  Mg     2.0     12-23    TPro  7.4  /  Alb  3.5  /  TBili  0.4  /  DBili  x   /  AST  12  /  ALT  27  /  AlkPhos  131<H>  12-22    CARDIAC MARKERS ( 23 Dec 2019 06:24 )  <0.015 ng/mL / x     / x     / x     / x      CARDIAC MARKERS ( 22 Dec 2019 23:28 )  <0.015 ng/mL / x     / x     / x     / x          Hemoglobin A1C, Whole Blood (12.24.19 @ 09:22)    Hemoglobin A1C, Whole Blood: 11.1: Method: Immunoassay           CAPILLARY BLOOD GLUCOSE      POCT Blood Glucose.: 286 mg/dL (24 Dec 2019 08:09)  POCT Blood Glucose.: 183 mg/dL (23 Dec 2019 22:19)  POCT Blood Glucose.: 243 mg/dL (23 Dec 2019 16:43)  POCT Blood Glucose.: 353 mg/dL (23 Dec 2019 11:46)      RADIOLOGY & ADDITIONAL STUDIES:

## 2019-12-24 NOTE — PROGRESS NOTE ADULT - SUBJECTIVE AND OBJECTIVE BOX
PULMONARY  progress note    BARB COLÓN  MRN-[id number]    Patient is a 64y old  Female who presents with a chief complaint of COPD exacerbation n PNA (23 Dec 2019 13:51)  pt feels better , muscle spasm Anxious to go home back+ but less,       MEDICATIONS  (STANDING):  atorvastatin 40 milliGRAM(s) Oral at bedtime  budesonide 160 MICROgram(s)/formoterol 4.5 MICROgram(s) Inhaler 2 Puff(s) Inhalation two times a day  carvedilol 6.25 milliGRAM(s) Oral every 12 hours  dextrose 50% Injectable 25 Gram(s) IV Push once  enoxaparin Injectable 40 milliGRAM(s) SubCutaneous daily  ergocalciferol 88804 Unit(s) Oral <User Schedule>  famotidine    Tablet 40 milliGRAM(s) Oral daily  insulin glargine Injectable (LANTUS) 10 Unit(s) SubCutaneous at bedtime  insulin lispro (HumaLOG) corrective regimen sliding scale   SubCutaneous three times a day before meals  levoFLOXacin IVPB 750 milliGRAM(s) IV Intermittent every 24 hours  methylPREDNISolone sodium succinate Injectable 40 milliGRAM(s) IV Push daily      Allergies    penicillin (Rash)          PAST MEDICAL & SURGICAL HISTORY:  HTN (hypertension)  DM (diabetes mellitus)  History of ITP  COPD (chronic obstructive pulmonary disease)  History of cholecystectomy  S/P lobectomy of lung           REVIEW OF SYSTEMS:  CONSTITUTIONAL: No fever, weight loss, or fatigue   EYES: No eye pain, visual disturbances, or discharge  ENT:  No difficulty hearing, tinnitus, vertigo; No sinus or throat pain  NECK: No pain or stiffness or nodes  RESPIRATORY:  cough+ wheezing --  chills--   hemoptysis -- Shortness of Breath+  CARDIOVASCULAR: No chest pain, palpitations, passing out, dizziness, or leg swelling  GASTROINTESTINAL: No abdominal or epigastric pain. No nausea, vomiting, or hematemesis;   GENITOURINARY: No dysuria, frequency, hematuria, or incontinence  NEUROLOGICAL: No headaches, memory loss, loss of strength, numbness, or tremors  SKIN: No itching, burning, rashes, or lesions   LYMPH Nodes: No enlarged glands  ENDOCRINE: No heat or cold intolerance; No hair loss  MUSCULOSKELETAL: No joint pain or swelling; No muscle or extremity pain, back pain++  PSYCHIATRIC: No depression, anxiety, mood swings, or difficulty sleeping  HEME/LYMPH: No easy bruising, or bleeding gums  ALLERGY AND IMMUNOLOGIC: No hives or eczema    Vital Signs Last 24 Hrs  T(C): 36.2 (24 Dec 2019 05:17), Max: 36.6 (23 Dec 2019 12:30)  T(F): 97.2 (24 Dec 2019 05:17), Max: 97.9 (23 Dec 2019 12:30)  HR: 82 (24 Dec 2019 05:17) (82 - 98)  BP: 148/90 (24 Dec 2019 05:17) (137/80 - 150/88)  BP(mean): --  RR: 18 (24 Dec 2019 05:17) (18 - 18)  SpO2: 99% (24 Dec 2019 05:17) (98% - 99%)  I&O's Detail      PHYSICAL EXAMINATION:    GENERAL: The patient is a well-developed, well-nourished  obese female in no apparent distress.   SKIN no rash ecchymoses or bruises  HEENT: Head is normocephalic and atraumatic  SHAMA , Extraocular muscles are intact. Mucous membranes  moist.   Neck supple ,No LN felt JVP not increased  Thyroid not enlarged  Cardiovascular:  S1 S2 heard, RSR, No JVD , systolic  murmur at apex, No gallop or rub  Respiratory: Chest wall symmetrical with good air entry ,Percussion note normal,    Lungs vesicular breathing with  no  rales  or  wheeze, decreased BS at basis	  ABDOMEN:  Soft, Non-tender, obese, no hepatomegaly or splenomegaly BS positive	  Extremities: Normal range of motion, No clubbing, cyanosis or edema, chronic stasis changes  Vascular: Peripheral pulses palpable 2+ bilaterally  CNS:  Alert and oriented x3   Cranial nerves intact  sensory intact  motor power5/5  dtr 2+   Babinski neg    LABS:                        14.0   9.90  )-----------( 226      ( 24 Dec 2019 06:12 )             43.9     12-24    139  |  103  |  22<H>  ----------------------------<  190<H>  3.6   |  31  |  0.74    Ca    8.8      24 Dec 2019 06:12  Phos  3.9     12-23  Mg     2.0     12-23    TPro  7.4  /  Alb  3.5  /  TBili  0.4  /  DBili  x   /  AST  12  /  ALT  27  /  AlkPhos  131<H>  12-22          CARDIAC MARKERS ( 23 Dec 2019 06:24 )  <0.015 ng/mL / x     / x     / x     / x      CARDIAC MARKERS ( 22 Dec 2019 23:28 )  <0.015 ng/mL / x     / x     / x     / x                Procalcitonin, Serum: 0.04 ng/mL (12-23-19 @ 10:09)      Vitamin B12, Serum: 451 pg/mL (12-23-19 @ 10:10)

## 2019-12-24 NOTE — CONSULT NOTE ADULT - ASSESSMENT
64 year old lady with h/ ITP, lung ca, s/p resection , COPD, presented with pleuritic pain at left back..  CTA is neg for PE or pneumonia.
COPD with exacerbation/ Backache sec to muscle spasm  Uncontrolled IDDM   Htn   Rll Lobectomy for cancer lung  Obesity   ITP Hx      PLAN- Po prednisone 40-30-20-10 mg q 3 days    Breo and spiriva on d/harriet am   HFN rx qid   s/c lovenox  OOB/BRp  advil/tylenol prn   Thanks for consult   Discussed with PMJACEY and Dr Mendiola
64 y F from home,walks independently with PMHx of COPD( on 2-3L O2 at home), ITP( not on Tx), DM, HLD and HTN and PSHx of R lung wedge resection presented to ED with 1 wk of cough and 3 days of SOB and B/l chest pain. Found to have un cont DM. Pt is on steroids most of the last 3 months. Admits to having high fsg. Denies hypoglycemia on glyburide.

## 2019-12-24 NOTE — CONSULT NOTE ADULT - PROBLEM SELECTOR RECOMMENDATION 2
CTA is neg for PE and pneumonia  ?viral disease or muscular   will monitor.
on steroids improving  taper per pulm

## 2019-12-27 DIAGNOSIS — Z71.89 OTHER SPECIFIED COUNSELING: ICD-10-CM

## 2020-01-07 RX ORDER — FAMOTIDINE 10 MG/ML
1 INJECTION INTRAVENOUS
Qty: 0 | Refills: 0 | DISCHARGE

## 2020-01-07 RX ORDER — GLYBURIDE-METFORMIN HYDROCHLORIDE 1.25; 25 MG/1; MG/1
1 TABLET ORAL
Qty: 0 | Refills: 0 | DISCHARGE

## 2020-01-21 PROBLEM — I10 ESSENTIAL (PRIMARY) HYPERTENSION: Chronic | Status: ACTIVE | Noted: 2019-12-23

## 2020-01-21 PROBLEM — J44.9 CHRONIC OBSTRUCTIVE PULMONARY DISEASE, UNSPECIFIED: Chronic | Status: ACTIVE | Noted: 2019-12-22

## 2020-01-21 PROBLEM — E11.9 TYPE 2 DIABETES MELLITUS WITHOUT COMPLICATIONS: Chronic | Status: ACTIVE | Noted: 2019-12-23

## 2020-01-30 ENCOUNTER — APPOINTMENT (OUTPATIENT)
Dept: THORACIC SURGERY | Facility: CLINIC | Age: 65
End: 2020-01-30
Payer: MEDICAID

## 2020-01-30 VITALS
SYSTOLIC BLOOD PRESSURE: 124 MMHG | TEMPERATURE: 97.9 F | BODY MASS INDEX: 34.93 KG/M2 | HEART RATE: 92 BPM | HEIGHT: 61 IN | RESPIRATION RATE: 16 BRPM | OXYGEN SATURATION: 90 % | WEIGHT: 185 LBS | DIASTOLIC BLOOD PRESSURE: 80 MMHG

## 2020-01-30 PROCEDURE — 99214 OFFICE O/P EST MOD 30 MIN: CPT

## 2020-01-31 RX ORDER — ASPIRIN 81 MG
81 TABLET, DELAYED RELEASE (ENTERIC COATED) ORAL
Refills: 0 | Status: ACTIVE | COMMUNITY

## 2020-01-31 NOTE — PHYSICAL EXAM
[Auscultation Breath Sounds / Voice Sounds] : lungs were clear to auscultation bilaterally [Heart Rate And Rhythm] : heart rate was normal and rhythm regular [Heart Sounds] : normal S1 and S2 [Heart Sounds Gallop] : no gallops [Murmurs] : no murmurs [Heart Sounds Pericardial Friction Rub] : no pericardial rub [Examination Of The Chest] : the chest was normal in appearance [Chest Visual Inspection Thoracic Asymmetry] : no chest asymmetry [Diminished Respiratory Excursion] : normal chest expansion [No CVA Tenderness] : no ~M costovertebral angle tenderness [No Spinal Tenderness] : no spinal tenderness [Skin Color & Pigmentation] : normal skin color and pigmentation [Skin Turgor] : normal skin turgor [] : no rash [Deep Tendon Reflexes (DTR)] : deep tendon reflexes were 2+ and symmetric [Sensation] : the sensory exam was normal to light touch and pinprick [No Focal Deficits] : no focal deficits [Oriented To Time, Place, And Person] : oriented to person, place, and time [Impaired Insight] : insight and judgment were intact [Affect] : the affect was normal

## 2020-02-01 ENCOUNTER — EMERGENCY (EMERGENCY)
Facility: HOSPITAL | Age: 65
LOS: 1 days | Discharge: HOME | End: 2020-02-01
Attending: EMERGENCY MEDICINE
Payer: MEDICAID

## 2020-02-01 VITALS
DIASTOLIC BLOOD PRESSURE: 69 MMHG | RESPIRATION RATE: 22 BRPM | OXYGEN SATURATION: 94 % | HEIGHT: 61 IN | TEMPERATURE: 99 F | SYSTOLIC BLOOD PRESSURE: 121 MMHG | HEART RATE: 95 BPM | WEIGHT: 184.97 LBS

## 2020-02-01 DIAGNOSIS — Z90.89 ACQUIRED ABSENCE OF OTHER ORGANS: Chronic | ICD-10-CM

## 2020-02-01 DIAGNOSIS — Z90.49 ACQUIRED ABSENCE OF OTHER SPECIFIED PARTS OF DIGESTIVE TRACT: Chronic | ICD-10-CM

## 2020-02-01 DIAGNOSIS — Z90.2 ACQUIRED ABSENCE OF LUNG [PART OF]: Chronic | ICD-10-CM

## 2020-02-01 LAB
ACETONE SERPL-MCNC: NEGATIVE — SIGNIFICANT CHANGE UP
ALBUMIN SERPL ELPH-MCNC: 3.3 G/DL — LOW (ref 3.5–5)
ALP SERPL-CCNC: 134 U/L — HIGH (ref 40–120)
ALT FLD-CCNC: 58 U/L DA — SIGNIFICANT CHANGE UP (ref 10–60)
ANION GAP SERPL CALC-SCNC: 7 MMOL/L — SIGNIFICANT CHANGE UP (ref 5–17)
APPEARANCE UR: CLEAR — SIGNIFICANT CHANGE UP
APTT BLD: 28.1 SEC — SIGNIFICANT CHANGE UP (ref 27.5–36.3)
AST SERPL-CCNC: 32 U/L — SIGNIFICANT CHANGE UP (ref 10–40)
BACTERIA # UR AUTO: ABNORMAL /HPF
BASOPHILS # BLD AUTO: 0.01 K/UL — SIGNIFICANT CHANGE UP (ref 0–0.2)
BASOPHILS NFR BLD AUTO: 0.1 % — SIGNIFICANT CHANGE UP (ref 0–2)
BILIRUB SERPL-MCNC: 0.7 MG/DL — SIGNIFICANT CHANGE UP (ref 0.2–1.2)
BILIRUB UR-MCNC: NEGATIVE — SIGNIFICANT CHANGE UP
BUN SERPL-MCNC: 16 MG/DL — SIGNIFICANT CHANGE UP (ref 7–18)
CALCIUM SERPL-MCNC: 8 MG/DL — LOW (ref 8.4–10.5)
CHLORIDE SERPL-SCNC: 92 MMOL/L — LOW (ref 96–108)
CK SERPL-CCNC: 44 U/L — SIGNIFICANT CHANGE UP (ref 21–215)
CO2 SERPL-SCNC: 33 MMOL/L — HIGH (ref 22–31)
COLOR SPEC: YELLOW — SIGNIFICANT CHANGE UP
CREAT SERPL-MCNC: 0.92 MG/DL — SIGNIFICANT CHANGE UP (ref 0.5–1.3)
DIFF PNL FLD: ABNORMAL
EOSINOPHIL # BLD AUTO: 0.01 K/UL — SIGNIFICANT CHANGE UP (ref 0–0.5)
EOSINOPHIL NFR BLD AUTO: 0.1 % — SIGNIFICANT CHANGE UP (ref 0–6)
EPI CELLS # UR: SIGNIFICANT CHANGE UP /HPF
GLUCOSE SERPL-MCNC: 395 MG/DL — HIGH (ref 70–99)
GLUCOSE UR QL: 1000 MG/DL
HCT VFR BLD CALC: 39.2 % — SIGNIFICANT CHANGE UP (ref 34.5–45)
HGB BLD-MCNC: 13.1 G/DL — SIGNIFICANT CHANGE UP (ref 11.5–15.5)
IMM GRANULOCYTES NFR BLD AUTO: 1.8 % — HIGH (ref 0–1.5)
INR BLD: 1.06 RATIO — SIGNIFICANT CHANGE UP (ref 0.88–1.16)
KETONES UR-MCNC: NEGATIVE — SIGNIFICANT CHANGE UP
LEUKOCYTE ESTERASE UR-ACNC: ABNORMAL
LYMPHOCYTES # BLD AUTO: 0.76 K/UL — LOW (ref 1–3.3)
LYMPHOCYTES # BLD AUTO: 8 % — LOW (ref 13–44)
MAGNESIUM SERPL-MCNC: 1.5 MG/DL — LOW (ref 1.6–2.6)
MCHC RBC-ENTMCNC: 28.4 PG — SIGNIFICANT CHANGE UP (ref 27–34)
MCHC RBC-ENTMCNC: 33.4 GM/DL — SIGNIFICANT CHANGE UP (ref 32–36)
MCV RBC AUTO: 84.8 FL — SIGNIFICANT CHANGE UP (ref 80–100)
MONOCYTES # BLD AUTO: 0.34 K/UL — SIGNIFICANT CHANGE UP (ref 0–0.9)
MONOCYTES NFR BLD AUTO: 3.6 % — SIGNIFICANT CHANGE UP (ref 2–14)
NEUTROPHILS # BLD AUTO: 8.25 K/UL — HIGH (ref 1.8–7.4)
NEUTROPHILS NFR BLD AUTO: 86.4 % — HIGH (ref 43–77)
NITRITE UR-MCNC: NEGATIVE — SIGNIFICANT CHANGE UP
NRBC # BLD: 0 /100 WBCS — SIGNIFICANT CHANGE UP (ref 0–0)
NT-PROBNP SERPL-SCNC: 238 PG/ML — HIGH (ref 0–125)
PH UR: 5 — SIGNIFICANT CHANGE UP (ref 5–8)
PLATELET # BLD AUTO: 214 K/UL — SIGNIFICANT CHANGE UP (ref 150–400)
POTASSIUM SERPL-MCNC: 2.9 MMOL/L — CRITICAL LOW (ref 3.5–5.3)
POTASSIUM SERPL-SCNC: 2.9 MMOL/L — CRITICAL LOW (ref 3.5–5.3)
PROT SERPL-MCNC: 6.4 G/DL — SIGNIFICANT CHANGE UP (ref 6–8.3)
PROT UR-MCNC: NEGATIVE — SIGNIFICANT CHANGE UP
PROTHROM AB SERPL-ACNC: 11.8 SEC — SIGNIFICANT CHANGE UP (ref 10–12.9)
RBC # BLD: 4.62 M/UL — SIGNIFICANT CHANGE UP (ref 3.8–5.2)
RBC # FLD: 12.7 % — SIGNIFICANT CHANGE UP (ref 10.3–14.5)
RBC CASTS # UR COMP ASSIST: ABNORMAL /HPF (ref 0–2)
SODIUM SERPL-SCNC: 132 MMOL/L — LOW (ref 135–145)
SP GR SPEC: 1.01 — SIGNIFICANT CHANGE UP (ref 1.01–1.02)
TROPONIN I SERPL-MCNC: <0.015 NG/ML — SIGNIFICANT CHANGE UP (ref 0–0.04)
UROBILINOGEN FLD QL: NEGATIVE — SIGNIFICANT CHANGE UP
WBC # BLD: 9.54 K/UL — SIGNIFICANT CHANGE UP (ref 3.8–10.5)
WBC # FLD AUTO: 9.54 K/UL — SIGNIFICANT CHANGE UP (ref 3.8–10.5)
WBC UR QL: ABNORMAL /HPF (ref 0–5)

## 2020-02-01 PROCEDURE — 99284 EMERGENCY DEPT VISIT MOD MDM: CPT

## 2020-02-01 PROCEDURE — 71045 X-RAY EXAM CHEST 1 VIEW: CPT | Mod: 26

## 2020-02-01 RX ORDER — POTASSIUM CHLORIDE 20 MEQ
40 PACKET (EA) ORAL
Refills: 0 | Status: COMPLETED | OUTPATIENT
Start: 2020-02-01 | End: 2020-02-02

## 2020-02-01 RX ORDER — SODIUM CHLORIDE 9 MG/ML
1000 INJECTION INTRAMUSCULAR; INTRAVENOUS; SUBCUTANEOUS ONCE
Refills: 0 | Status: COMPLETED | OUTPATIENT
Start: 2020-02-01 | End: 2020-02-01

## 2020-02-01 RX ORDER — INSULIN HUMAN 100 [IU]/ML
12 INJECTION, SOLUTION SUBCUTANEOUS ONCE
Refills: 0 | Status: COMPLETED | OUTPATIENT
Start: 2020-02-01 | End: 2020-02-01

## 2020-02-01 RX ORDER — IPRATROPIUM/ALBUTEROL SULFATE 18-103MCG
3 AEROSOL WITH ADAPTER (GRAM) INHALATION ONCE
Refills: 0 | Status: COMPLETED | OUTPATIENT
Start: 2020-02-01 | End: 2020-02-01

## 2020-02-01 RX ORDER — MAGNESIUM SULFATE 500 MG/ML
1 VIAL (ML) INJECTION ONCE
Refills: 0 | Status: COMPLETED | OUTPATIENT
Start: 2020-02-01 | End: 2020-02-01

## 2020-02-01 RX ORDER — SODIUM CHLORIDE 9 MG/ML
1000 INJECTION INTRAMUSCULAR; INTRAVENOUS; SUBCUTANEOUS
Refills: 0 | Status: DISCONTINUED | OUTPATIENT
Start: 2020-02-01 | End: 2020-02-05

## 2020-02-01 RX ADMIN — Medication 80 MILLIGRAM(S): at 20:49

## 2020-02-01 RX ADMIN — Medication 3 MILLILITER(S): at 20:49

## 2020-02-01 RX ADMIN — Medication 40 MILLIEQUIVALENT(S): at 23:33

## 2020-02-01 RX ADMIN — Medication 3 MILLILITER(S): at 23:32

## 2020-02-01 RX ADMIN — Medication 40 MILLIEQUIVALENT(S): at 21:24

## 2020-02-01 RX ADMIN — INSULIN HUMAN 12 UNIT(S): 100 INJECTION, SOLUTION SUBCUTANEOUS at 21:23

## 2020-02-01 RX ADMIN — Medication 100 GRAM(S): at 20:49

## 2020-02-01 RX ADMIN — SODIUM CHLORIDE 1000 MILLILITER(S): 9 INJECTION INTRAMUSCULAR; INTRAVENOUS; SUBCUTANEOUS at 17:55

## 2020-02-01 NOTE — ED PROVIDER NOTE - PROGRESS NOTE DETAILS
spoke with Dr. Nugent, claims pt was just d/c from the hospital.  Pt with chronic dizziness & SOB.  Advised to give Solumedrol & d/c home, will see pt on Mon

## 2020-02-01 NOTE — ED ADULT NURSE NOTE - OBJECTIVE STATEMENT
Patient with PMHx DM, COPD, O2 dependent 4-5L@home, BIB EMS with c/o dizziness poor balance upon exertion for the past month, also  c/o OLIVER, denies chest pain fever/chills

## 2020-02-01 NOTE — ED PROVIDER NOTE - CARE PLAN
Principal Discharge DX:	COPD exacerbation  Secondary Diagnosis:	Hypomagnesemia  Secondary Diagnosis:	Hypokalemia Principal Discharge DX:	COPD exacerbation  Secondary Diagnosis:	Hypomagnesemia  Secondary Diagnosis:	Hypokalemia  Secondary Diagnosis:	Hyperglycemia

## 2020-02-01 NOTE — ED PROVIDER NOTE - MUSCULOSKELETAL, MLM
Spine appears normal, range of motion is not limited, no muscle or joint tenderness, no calves tenderness, no CVAT

## 2020-02-01 NOTE — ED ADULT TRIAGE NOTE - HEIGHT IN INCHES
Pt admitted to T8-2 at 1820. Pt oriented to room, unit, and plan of care. Pt attached to monitor. All questions answered at this time. Call light within reach   1

## 2020-02-01 NOTE — ED ADULT TRIAGE NOTE - NS ED NURSE AMBULANCES
"Orthopaedic Surgery Progress Note     Subjective: No acute events overnight. Pain somewhat controlled overnight; feeling better and more comfortable this AM. Tolerating diet. Voiding spontaneously. +Flatus, no BM. Denies fever or chills, CP, SOB, numbness or tingling, motor dysfunction or weakness.     Objective: /83  Pulse 110  Temp 98.5  F (36.9  C) (Oral)  Resp 16  Ht 1.905 m (6' 3\")  Wt 86.8 kg (191 lb 4.8 oz)  SpO2 100%  BMI 23.91 kg/m2    Drain(s): 60 / 30 last 2 shifts    General: NAD, alert and oriented, cooperative with exam.   Cardio: RRR, extremities wwp.   Respiratory: Non-labored breathing.  MSK: Dressing c/d/i. HV in place, serosanguinous output. Radial, DP and PT pulses 2+. SILT C5-C8 and L3-S1 bilaterally.   C3-5: Easy diaphragmatic respirations without utilization of accessory muscles    C6: Biceps R and L 5/5 strength    C7: Triceps R and L 5/5 strength    C8:  R and L 5/5 strength    T1: Dorsal interossei R and L 5/5 strength          --------------------------------   L2-3: Hip flexion L and R 5/5 strength    L4:  Knee extension L and R 5/5 strength   L5:  Foot / EHL dorsiflexion L and R 5/5 strength   S1:  Plantarflexion  L and R 5/5 strength    Labs:     Recent Labs  Lab 10/16/18  0625 10/15/18  1213 10/15/18  0853   WBC 8.5  --   --    HGB 13.8 13.2* 14.0    279  --        Recent Labs  Lab 10/16/18  0625 10/15/18  1213 10/15/18  0853    136 136   POTASSIUM 4.3 4.0 3.5   CHLORIDE 105  --   --    CO2 31  --   --    BUN 6*  --   --    CR 1.10  --   --    * 108* 113*       Recent Labs  Lab 10/15/18  1213   INR 1.15*   PTT 29       Imaging: pending    Assessment and Plan: Jonathon Conde is a 29 year old male with PMH including thoracic levoconvex scoliosis now s/p T2-T10 PISF and SPO on 10/15/18 with Dr. Xavier.      Ortho Spine Primary  Activity: Up with assist until independent. No excessive bending or twisting. No lifting >10 lbs x 6 weeks. No Scout lift for " transfers.   Weight bearing status: WBAT.  Pain management: Transition from IV to PO as tolerated. Changed on 10/16/18 to Oxycontin PO 10-20 mg BID PRN and Oxycodone PO 5 mg Q6H PRN with IV Dilaudid.    Antibiotics: Ancef x 24 hours.  Diet: Begin with clear fluids and progress diet as tolerated.   DVT prophylaxis: SCDs only. No chemical DVT ppx needed at discharge.  Imaging: XR Upright Full Spine PTDC - ordered.  Labs: Hgb POD#1 and 2.  Bracing/Splinting: Soft cervical collar for comfort.  Dressings: Keep Aquacel c/d/i x 7 days.  Drains: Document output per shift, will be discontinued at Orthopedic Surgery discretion.  Portillo catheter: Remove POD#1.   Physical Therapy/Occupational Therapy: Eval and treat.  Consults: Hospitalist.  Follow-up: Clinic with Dr. Xavier in 6 weeks. Full spine standing x-rays needed.   Disposition: Pending progress with therapies, pain control on orals, and medical stability, anticipate discharge to home on POD #3-4.  Future Appointments  Date Time Provider Department Center   10/16/2018 9:15 AM Katharina Jaime, PT [facility] [place]   10/16/2018 3:00 PM Katharina Jaime, SHAUNA [facility] [place]   10/17/2018 7:30 AM Chiquis Lockett, OLIVER [facility] [place]   11/29/2018 2:45 PM Patrick Xavier MD [facility]   1/9/2019 2:30 PM Patrick Xavier MD [facility]  Orthopaedic PGY4  Pager: [phone]    For questions about this patient, please attempt to contact me at my pager prior to contacting the Orthopaedic Surgery resident on call. Thank you!        [facility]

## 2020-02-01 NOTE — ED PROVIDER NOTE - CLINICAL SUMMARY MEDICAL DECISION MAKING FREE TEXT BOX
OLIVER, dizziness, elevated BS, will get labs, give IVF, pt claims had cardiac w/u during last admission-neg.

## 2020-02-01 NOTE — ED PROVIDER NOTE - PSH
History of cholecystectomy    History of tonsillectomy    S/P Cholecystectomy    S/P lobectomy of lung

## 2020-02-01 NOTE — ED PROVIDER NOTE - PATIENT PORTAL LINK FT
You can access the FollowMyHealth Patient Portal offered by [facility] by registering at the following website: [email or web address]. By joining [employer]’s FollowMyHealth portal, you will also be able to view your health information using other applications (apps) compatible with our system.

## 2020-02-01 NOTE — ED ADULT NURSE NOTE - NSIMPLEMENTINTERV_GEN_ALL_ED
Implemented All Fall Risk Interventions:  [place] to call system. Call bell, personal items and telephone within reach. Instruct patient to call for assistance. Room bathroom lighting operational. Non-slip footwear when patient is off stretcher. Physically safe environment: no spills, clutter or unnecessary equipment. Stretcher in lowest position, wheels locked, appropriate side rails in place. Provide visual cue, wrist band, yellow gown, etc. Monitor gait and stability. Monitor for mental status changes and reorient to person, place, and time. Review medications for side effects contributing to fall risk. Reinforce activity limits and safety measures with patient and family.

## 2020-02-01 NOTE — ED PROVIDER NOTE - PMH
COPD (chronic obstructive pulmonary disease)    Deviated septum    Diabetes mellitus    DM (diabetes mellitus)    Emphysema/COPD    GERD (gastroesophageal reflux disease)    History of ITP    HTN (hypertension)    ITP (idiopathic thrombocytopenic purpura)    Lung cancer    Morbid obesity    Prolapsed uterus

## 2020-02-01 NOTE — ED PROVIDER NOTE - OBJECTIVE STATEMENT
64 y.o. female with h/o NIDDM last dose this am, COPD O2 dependent 4-5L, pt had CT chest recently, told nodule in her lung has grown, saw CT surgeon, recommended PET scan.  Pt has been on prednisone for past 5 days, last day today.  BIBA pt c/o feeling dizzy, "off balance" upon exertion for >1mo., pt also c/o SOB upon standing up.  MSCP on & off for past few mos., @ times with wheezing, No fever, coughing, sick contact, recent travelling. 64 y.o. female with h/o NIDDM last dose this am, COPD O2 dependent 4-5L, pt had CT chest recently, told nodule in her lung has grown, saw CT surgeon, recommended PET scan.  Pt has been on prednisone for past 5 days, last day today.  BIBA pt c/o feeling dizzy, "off balance" upon exertion for >1mo., pt also c/o SOB upon standing up.  MSCP on & off for past few mos., @ times with wheezing, No fever, coughing, sick contact, recent travelling.  pt states had cardiac w/u recently-neg

## 2020-02-02 VITALS
SYSTOLIC BLOOD PRESSURE: 144 MMHG | TEMPERATURE: 98 F | RESPIRATION RATE: 22 BRPM | HEART RATE: 100 BPM | DIASTOLIC BLOOD PRESSURE: 72 MMHG | OXYGEN SATURATION: 100 %

## 2020-02-02 PROCEDURE — 85027 COMPLETE CBC AUTOMATED: CPT

## 2020-02-02 PROCEDURE — 82962 GLUCOSE BLOOD TEST: CPT

## 2020-02-02 PROCEDURE — 85730 THROMBOPLASTIN TIME PARTIAL: CPT

## 2020-02-02 PROCEDURE — 83735 ASSAY OF MAGNESIUM: CPT

## 2020-02-02 PROCEDURE — 84484 ASSAY OF TROPONIN QUANT: CPT

## 2020-02-02 PROCEDURE — 36415 COLL VENOUS BLD VENIPUNCTURE: CPT

## 2020-02-02 PROCEDURE — 87186 SC STD MICRODIL/AGAR DIL: CPT

## 2020-02-02 PROCEDURE — 99284 EMERGENCY DEPT VISIT MOD MDM: CPT | Mod: 25

## 2020-02-02 PROCEDURE — 87086 URINE CULTURE/COLONY COUNT: CPT

## 2020-02-02 PROCEDURE — 81001 URINALYSIS AUTO W/SCOPE: CPT

## 2020-02-02 PROCEDURE — 96375 TX/PRO/DX INJ NEW DRUG ADDON: CPT

## 2020-02-02 PROCEDURE — 82009 KETONE BODYS QUAL: CPT

## 2020-02-02 PROCEDURE — 85610 PROTHROMBIN TIME: CPT

## 2020-02-02 PROCEDURE — 80053 COMPREHEN METABOLIC PANEL: CPT

## 2020-02-02 PROCEDURE — 71045 X-RAY EXAM CHEST 1 VIEW: CPT

## 2020-02-02 PROCEDURE — 82550 ASSAY OF CK (CPK): CPT

## 2020-02-02 PROCEDURE — 94640 AIRWAY INHALATION TREATMENT: CPT

## 2020-02-02 PROCEDURE — 83880 ASSAY OF NATRIURETIC PEPTIDE: CPT

## 2020-02-02 PROCEDURE — 96374 THER/PROPH/DIAG INJ IV PUSH: CPT

## 2020-02-02 RX ADMIN — Medication 40 MILLIEQUIVALENT(S): at 01:48

## 2020-02-03 NOTE — CONSULT LETTER
[Dear  ___] : Dear  [unfilled], [Consult Letter:] : I had the pleasure of evaluating your patient, [unfilled]. [( Thank you for referring [unfilled] for consultation for _____ )] : Thank you for referring [unfilled] for consultation for [unfilled] [Please see my note below.] : Please see my note below. [Consult Closing:] : Thank you very much for allowing me to participate in the care of this patient.  If you have any questions, please do not hesitate to contact me. [Sincerely,] : Sincerely, [DrKevin  ___] : Dr. CLOUD [FreeTextEntry3] : Leif Kurtz MD, MPH \par System Director of Thoracic Surgery \par Director of Comprehensive Lung and Foregut [place] \par Professor Cardiovascular & Thoracic Surgery \par [facility] [employer] at [facility]\par \par  [FreeTextEntry2] : Joni Nugent MD (PCP)\par Britney Kaur MD (General Surgery)

## 2020-02-03 NOTE — ASSESSMENT
[FreeTextEntry1] : 63 y/o F, w/ hx of DM, COPD/Emphysema on home oxygen, GERD, ITP, HTN, HLD, and Lung CA.\par \par Now 3 yr 3 mo  s/p FB, Rt VATS wedge rxn of the RLL nodule, MLND on 10/27/16. Path revealed AdenoCA, acinar predominant, 1.5cm, Visceral pleura, margins and LNs are all negative, pT1aN0 Stg IA.\par \par CT Chest on 9/29/17:\par - ALFIE\par \par CT Chest on 5/18/18:\par - emphysema w/ calcified granulomas\par - ALFIE\par \par Patient presented to ED at [facility] on 11/27/18 for increased of SOB and cough, was admitted for 6 days. CT chest on 11/27/18:\par -persistent asymmetric diminished opacities in the RLL with indistinct filling defects suggested. Suspect for PE, possible chronic etiology, difficult to separate from flow in motion artifact. \par -increased wedge shaped masslike consolidation in the lingular segment left lobe, suggested inflammatory vs. neoplastic. \par \par Last seen patient on 12/18/2018, as per patient, she could not f/u because of multiple hospitalization due to SOB. \par \par Patient went to [facility] on 12/23/2019 for COPD exacerbation and PNA, D/c'd on 12/24/2019. \par \par CXR on 12/22/2019:\par - post-op changes\par \par CT Chest with contrast on 12/23/2019:\par - enlarged right upper paratracheal LN measuring up to 1.3 cm\par - enlarged precarinal LN measuring up to 1.2 cm\par - other subcentimeter mediastinal LNs also noted\par - emphysema\par - no evidence of PE\par \par As per patient, patient presented to ED at [facility] again due to SOB, was hospitalized for 4 days. D/c'd home on 01/30/2020. \par \par I have reviewed the patient's medical records and diagnostic images at time of this office consultation and have made the following recommendation:\par 1. CT Chest reviewed and explained to patient and her family member, I recommended a PET/CT to evaluate enlarged LNs. I will contact patient after PET/CT. \par \par \par I personally performed the services described in the documentation, reviewed the documentation recorded by the scribe in my presence and it accurately and completely records my words and actions.\par \par I, Kiana Alvarado NP, am scribing for and the presence of KUSHAL Segundo, the following sections HISTORY OF PRESENT ILLNESS, PAST MEDICAL/FAMILY/SOCIAL HISTORY; REVIEW OF SYSTEMS; VITAL SIGNS; PHYSICAL EXAM; DISPOSITION.

## 2020-02-03 NOTE — HISTORY OF PRESENT ILLNESS
[FreeTextEntry1] : 65 y/o F, w/ hx of DM, COPD/Emphysema on home oxygen, GERD, ITP, HTN, HLD, and Lung CA.\par \par Now 3 yr 3 mo  s/p FB, Rt VATS wedge rxn of the RLL nodule, MLND on 10/27/16. Path revealed AdenoCA, acinar predominant, 1.5cm, Visceral pleura, margins and LNs are all negative, pT1aN0 Stg IA.\par \par CT Chest on 9/29/17:\par - ALFIE\par \par CT Chest on 5/18/18:\par - emphysema w/ calcified granulomas\par - ALFIE\par \par Patient presented to ED at [facility] on 11/27/18 for increased of SOB and cough, was admitted for 6 days. CT chest on 11/27/18:\par -persistent asymmetric diminished opacities in the RLL with indistinct filling defects suggested. Suspect for PE, possible chronic etiology, difficult to separate from flow in motion artifact. \par -increased wedge shaped masslike consolidation in the lingular segment left lobe, suggested inflammatory vs. neoplastic. \par \par Last seen patient on 12/18/2018, as per patient, she could not f/u because of multiple hospitalization due to SOB. \par \par Patient went to [facility] on 12/23/2019 for COPD exacerbation and PNA, D/c'd on 12/24/2019. \par \par CXR on 12/22/2019:\par - post-op changes\par \par CT Chest with contrast on 12/23/2019:\par - enlarged right upper paratracheal LN measuring up to 1.3 cm\par - enlarged precarinal LN measuring up to 1.2 cm\par - other subcentimeter mediastinal LNs also noted\par - emphysema\par - no evidence of PE\par \par As per patient, patient presented to ED at [facility] again due to SOB, was hospitalized for 4 days. D/c'd home on 01/30/2020. \par \par Patient is here today for a follow up. Patient c/o chronic SOB, which is worse since last month. She is using 4-5 L O2 at home. Patient c/o b/l chest pain. Patient reported she tripped at home yesterday, fell on her knee, and she did not loss consciousness. Patient denies cough, fever, chills, loss of appetite, weight loss, or hemoptysis. \par

## 2020-02-03 NOTE — DATA REVIEWED
[FreeTextEntry1] : CXR on 12/22/2019:\par - post-op changes\par \par CT Chest with contrast on 12/23/2019:\par - enlarged right upper paratracheal LN measuring up to 1.3 cm\par - enlarged precarinal LN measuring up to 1.2 cm\par - other subcentimeter mediastinal LNs also noted\par - emphysema\par - no evidence of PE

## 2020-02-27 ENCOUNTER — APPOINTMENT (OUTPATIENT)
Dept: THORACIC SURGERY | Facility: CLINIC | Age: 65
End: 2020-02-27
Payer: MEDICAID

## 2020-02-27 VITALS
OXYGEN SATURATION: 91 % | HEART RATE: 94 BPM | RESPIRATION RATE: 16 BRPM | DIASTOLIC BLOOD PRESSURE: 81 MMHG | SYSTOLIC BLOOD PRESSURE: 129 MMHG

## 2020-02-27 PROCEDURE — 99214 OFFICE O/P EST MOD 30 MIN: CPT

## 2020-02-28 NOTE — CONSULT LETTER
[FreeTextEntry2] : Joni Nugent MD (PCP)\par Britney Kaur MD (General Surgery)  [FreeTextEntry3] : Leif Kurtz MD, MPH \par System Director of Thoracic Surgery \par Director of Comprehensive Lung and Foregut [place] \par Professor Cardiovascular & Thoracic Surgery  \par [facility] [employer] at [facility]\par

## 2020-02-28 NOTE — HISTORY OF PRESENT ILLNESS
[FreeTextEntry1] : 63 y/o F, w/ hx of DM, COPD/Emphysema on home oxygen, GERD, ITP, HTN, HLD, and Lung CA.\par \par Now 3 yr 4 mo s/p FB, Rt VATS wedge rxn of the RLL nodule, MLND on 10/27/16. Path revealed AdenoCA, acinar predominant, 1.5cm, Visceral pleura, margins and LNs are all negative, pT1aN0 Stg IA.\par \par Patient presented to ED at [facility] on 11/27/18 for increased of SOB and cough, was admitted for 6 days. \par CT chest on 11/27/18:\par -persistent asymmetric diminished opacities in the RLL with indistinct filling defects suggested. Suspect for PE, possible chronic etiology, difficult to separate from flow in motion artifact. \par -increased wedge shaped masslike consolidation in the lingular segment left lobe, suggested inflammatory vs. neoplastic. \par \par Last seen patient on 12/18/2018, as per patient, she could not f/u because of multiple hospitalization due to SOB. \par \par Patient went to [facility] on 12/23/2019 for COPD exacerbation and PNA, D/c'd on 12/24/2019. \par CT Chest with contrast on 12/23/2019:\par - enlarged right upper paratracheal LN measuring up to 1.3 cm\par - enlarged precarinal LN measuring up to 1.2 cm\par - other subcentimeter mediastinal LNs also noted\par - emphysema\par - no evidence of PE\par \par PET/CT on 2/24/2020:\par - increasing size 2.7 x 2cm SUV=6.2 subcarinal LN (previously 8mm)\par - a 1.7 x 1.2cm SUV= 6.8 LN interposed between the SVC and brachiocephalic artery (image 98, previously 5 x 3mm)\par - a cluster of LNs measuring up to 6mm w/ SUVmax 3.3 (images 84-90; individually measuring up to 2-3mm)\par - soft tissue density w/ SUVmax 6.2 w/in the bone marrow at the level of the surgical neck Rt humerus and focal uptake at the level of the Lt superior acetabulum in the pelvis, nonspecific, marrow infiltration vs early mets disease (images 67-73)\par - a bony callus formation likely related to a nondisplaced rib fx w/ SUVmax 3.2 (image 142)\par - Lt superior acetabulum/iliac bone w/ SUV=6.0 (image 222)\par \par Patient is here today for a follow up. Admits to on/off dry cough, congestion, and SOB. SpO2 88% on room air.

## 2020-02-28 NOTE — ASSESSMENT
[FreeTextEntry1] : 65 y/o F, w/ hx of DM, COPD/Emphysema on home oxygen, GERD, ITP, HTN, HLD, and Lung CA.\par \par Now 3 yr 4 mo s/p FB, Rt VATS wedge rxn of the RLL nodule, MLND on 10/27/16. Path revealed AdenoCA, acinar predominant, 1.5cm, Visceral pleura, margins and LNs are all negative, pT1aN0 Stg IA.\par \par Patient went to [facility] on 12/23/2019 for COPD exacerbation and PNA, D/c'd on 12/24/2019. \par CT Chest with contrast on 12/23/2019:\par - enlarged right upper paratracheal LN measuring up to 1.3 cm\par - enlarged precarinal LN measuring up to 1.2 cm\par - other subcentimeter mediastinal LNs also noted\par - emphysema\par - no evidence of PE\par \par PET/CT on 2/24/2020:\par - increasing size 2.7 x 2cm SUV=6.2 subcarinal LN (previously 8mm)\par - a 1.7 x 1.2cm SUV= 6.8 LN interposed between the SVC and brachiocephalic artery (image 98, previously 5 x 3mm)\par - a cluster of LNs measuring up to 6mm w/ SUVmax 3.3 (images 84-90; individually measuring up to 2-3mm)\par - soft tissue density w/ SUVmax 6.2 w/in the bone marrow at the level of the surgical neck Rt humerus and focal uptake at the level of the Lt superior acetabulum in the pelvis, nonspecific, marrow infiltration vs early mets disease (images 67-73)\par - a bony callus formation likely related to a nondisplaced rib fx w/ SUVmax 3.2 (image 142)\par - Lt superior acetabulum/iliac bone w/ SUV=6.0 (image 222)\par \par I have reviewed the patient's medical records and diagnostic images at time of this office consultation and have made the following recommendation:\par 1. PET scan reviewed and explained to patient and her daughters in details, I recommended a Flex Bronch EBUS bx on 3/5/2020. Risks and benefits and alternatives explained to patient, all questions answered, patient agreed to proceed with procedure.\par 2. Brain MRI w/w/o contrast\par 3. Medical clearance and PST\par \par \par I personally performed the services described in the documentation, reviewed the documentation recorded by the scribe in my presence and it accurately and completely records my words and actions.\par \par I, Sedrick Clark NP, am scribing for and the presence of KUSHAL Segundo, the following sections HISTORY OF PRESENT ILLNESS, PAST MEDICAL/FAMILY/SOCIAL HISTORY; REVIEW OF SYSTEMS; VITAL SIGNS; PHYSICAL EXAM; DISPOSITION.

## 2020-02-28 NOTE — PHYSICAL EXAM
[Auscultation Breath Sounds / Voice Sounds] : lungs were clear to auscultation bilaterally [Heart Sounds] : normal S1 and S2 [Heart Rate And Rhythm] : heart rate was normal and rhythm regular [Heart Sounds Gallop] : no gallops [Examination Of The Chest] : the chest was normal in appearance [Heart Sounds Pericardial Friction Rub] : no pericardial rub [Murmurs] : no murmurs [Chest Visual Inspection Thoracic Asymmetry] : no chest asymmetry [Diminished Respiratory Excursion] : normal chest expansion [Bowel Sounds] : normal bowel sounds [Abdomen Soft] : soft [Abdomen Tenderness] : non-tender [Abdomen Mass (___ Cm)] : no abdominal mass palpated [] : no rash [Skin Color & Pigmentation] : normal skin color and pigmentation [Skin Turgor] : normal skin turgor [Deep Tendon Reflexes (DTR)] : deep tendon reflexes were 2+ and symmetric [Sensation] : the sensory exam was normal to light touch and pinprick [No Focal Deficits] : no focal deficits [Oriented To Time, Place, And Person] : oriented to person, place, and time [Impaired Insight] : insight and judgment were intact [Affect] : the affect was normal

## 2020-03-02 ENCOUNTER — OUTPATIENT (OUTPATIENT)
Dept: OUTPATIENT SERVICES | Facility: HOSPITAL | Age: 65
LOS: 1 days | End: 2020-03-02

## 2020-03-02 VITALS
TEMPERATURE: 99 F | RESPIRATION RATE: 16 BRPM | OXYGEN SATURATION: 98 % | WEIGHT: 216.05 LBS | DIASTOLIC BLOOD PRESSURE: 70 MMHG | HEART RATE: 80 BPM | HEIGHT: 56.5 IN | SYSTOLIC BLOOD PRESSURE: 130 MMHG

## 2020-03-02 DIAGNOSIS — Z90.89 ACQUIRED ABSENCE OF OTHER ORGANS: Chronic | ICD-10-CM

## 2020-03-02 DIAGNOSIS — Z90.49 ACQUIRED ABSENCE OF OTHER SPECIFIED PARTS OF DIGESTIVE TRACT: Chronic | ICD-10-CM

## 2020-03-02 DIAGNOSIS — Z90.2 ACQUIRED ABSENCE OF LUNG [PART OF]: Chronic | ICD-10-CM

## 2020-03-02 DIAGNOSIS — C34.91 MALIGNANT NEOPLASM OF UNSPECIFIED PART OF RIGHT BRONCHUS OR LUNG: ICD-10-CM

## 2020-03-02 LAB
ALBUMIN SERPL ELPH-MCNC: 3.6 G/DL — SIGNIFICANT CHANGE UP (ref 3.3–5)
ALP SERPL-CCNC: 116 U/L — SIGNIFICANT CHANGE UP (ref 40–120)
ALT FLD-CCNC: 17 U/L — SIGNIFICANT CHANGE UP (ref 4–33)
ANION GAP SERPL CALC-SCNC: 14 MMO/L — SIGNIFICANT CHANGE UP (ref 7–14)
AST SERPL-CCNC: 11 U/L — SIGNIFICANT CHANGE UP (ref 4–32)
BILIRUB SERPL-MCNC: 0.5 MG/DL — SIGNIFICANT CHANGE UP (ref 0.2–1.2)
BUN SERPL-MCNC: 13 MG/DL — SIGNIFICANT CHANGE UP (ref 7–23)
CALCIUM SERPL-MCNC: 8.8 MG/DL — SIGNIFICANT CHANGE UP (ref 8.4–10.5)
CHLORIDE SERPL-SCNC: 97 MMOL/L — LOW (ref 98–107)
CO2 SERPL-SCNC: 27 MMOL/L — SIGNIFICANT CHANGE UP (ref 22–31)
CREAT SERPL-MCNC: 0.48 MG/DL — LOW (ref 0.5–1.3)
GLUCOSE SERPL-MCNC: 254 MG/DL — HIGH (ref 70–99)
HBA1C BLD-MCNC: 9.3 % — HIGH (ref 4–5.6)
HCT VFR BLD CALC: 38.4 % — SIGNIFICANT CHANGE UP (ref 34.5–45)
HGB BLD-MCNC: 12.6 G/DL — SIGNIFICANT CHANGE UP (ref 11.5–15.5)
MCHC RBC-ENTMCNC: 28.7 PG — SIGNIFICANT CHANGE UP (ref 27–34)
MCHC RBC-ENTMCNC: 32.8 % — SIGNIFICANT CHANGE UP (ref 32–36)
MCV RBC AUTO: 87.5 FL — SIGNIFICANT CHANGE UP (ref 80–100)
NRBC # FLD: 0 K/UL — SIGNIFICANT CHANGE UP (ref 0–0)
PLATELET # BLD AUTO: 286 K/UL — SIGNIFICANT CHANGE UP (ref 150–400)
PMV BLD: 11.5 FL — SIGNIFICANT CHANGE UP (ref 7–13)
POTASSIUM SERPL-MCNC: 3.7 MMOL/L — SIGNIFICANT CHANGE UP (ref 3.5–5.3)
POTASSIUM SERPL-SCNC: 3.7 MMOL/L — SIGNIFICANT CHANGE UP (ref 3.5–5.3)
PROT SERPL-MCNC: 6.4 G/DL — SIGNIFICANT CHANGE UP (ref 6–8.3)
RBC # BLD: 4.39 M/UL — SIGNIFICANT CHANGE UP (ref 3.8–5.2)
RBC # FLD: 13.2 % — SIGNIFICANT CHANGE UP (ref 10.3–14.5)
SODIUM SERPL-SCNC: 138 MMOL/L — SIGNIFICANT CHANGE UP (ref 135–145)
WBC # BLD: 9.9 K/UL — SIGNIFICANT CHANGE UP (ref 3.8–10.5)
WBC # FLD AUTO: 9.9 K/UL — SIGNIFICANT CHANGE UP (ref 3.8–10.5)

## 2020-03-02 RX ORDER — CARVEDILOL PHOSPHATE 80 MG/1
1 CAPSULE, EXTENDED RELEASE ORAL
Qty: 0 | Refills: 0 | DISCHARGE

## 2020-03-02 RX ORDER — FLUTICASONE FUROATE AND VILANTEROL TRIFENATATE 100; 25 UG/1; UG/1
1 POWDER RESPIRATORY (INHALATION)
Qty: 0 | Refills: 0 | DISCHARGE

## 2020-03-02 RX ORDER — SODIUM CHLORIDE 9 MG/ML
1000 INJECTION, SOLUTION INTRAVENOUS
Refills: 0 | Status: DISCONTINUED | OUTPATIENT
Start: 2020-03-05 | End: 2020-03-20

## 2020-03-02 RX ORDER — GLYBURIDE-METFORMIN HYDROCHLORIDE 1.25; 25 MG/1; MG/1
2 TABLET ORAL
Qty: 0 | Refills: 0 | DISCHARGE

## 2020-03-02 RX ORDER — SODIUM CHLORIDE 9 MG/ML
3 INJECTION INTRAMUSCULAR; INTRAVENOUS; SUBCUTANEOUS EVERY 8 HOURS
Refills: 0 | Status: DISCONTINUED | OUTPATIENT
Start: 2020-03-05 | End: 2020-03-20

## 2020-03-02 RX ORDER — ATORVASTATIN CALCIUM 80 MG/1
1 TABLET, FILM COATED ORAL
Qty: 0 | Refills: 0 | DISCHARGE

## 2020-03-02 RX ORDER — FUROSEMIDE 40 MG
1 TABLET ORAL
Qty: 0 | Refills: 0 | DISCHARGE

## 2020-03-02 RX ORDER — TIOTROPIUM BROMIDE 18 UG/1
1 CAPSULE ORAL; RESPIRATORY (INHALATION)
Qty: 0 | Refills: 0 | DISCHARGE

## 2020-03-02 NOTE — H&P PST ADULT - RESPIRATORY AND THORAX COMMENTS
Patient complain of new SOB started in 11/2019, workup noted new nodules. Patient on continuous oxygen via [place] Patient complain of new SOB started in 11/2019, workup noted new lung nodules. Patient on continuous oxygen via [place]

## 2020-03-02 NOTE — H&P PST ADULT - NSICDXPASTMEDICALHX_GEN_ALL_CORE_FT
PAST MEDICAL HISTORY:  COPD (chronic obstructive pulmonary disease)     Deviated septum     DM (diabetes mellitus)     Emphysema/COPD     GERD (gastroesophageal reflux disease)     HTN (hypertension)     ITP (idiopathic thrombocytopenic purpura)     Lung cancer     Morbid obesity     Prolapsed uterus PAST MEDICAL HISTORY:  COPD (chronic obstructive pulmonary disease)     Deviated septum     DM (diabetes mellitus)     Emphysema/COPD     GERD (gastroesophageal reflux disease)     HTN (hypertension)     ITP (idiopathic thrombocytopenic purpura)     Lung cancer     Malignant neoplasm of unspecified part of right bronchus or lung     Morbid obesity     Prolapsed uterus

## 2020-03-02 NOTE — H&P PST ADULT - NSICDXFAMILYHX_GEN_ALL_CORE_FT
FAMILY HISTORY:  Father  Still living? Unknown  Family history of lung cancer, Age at diagnosis: Age Unknown  Family history of stroke, Age at diagnosis: Age Unknown    Mother  Still living? Unknown  Diabetes mellitus, Age at diagnosis: Age Unknown

## 2020-03-02 NOTE — H&P PST ADULT - RS GEN PE MLT RESP DETAILS PC
breath sounds equal/good air movement/clear to auscultation bilaterally/no rales/no rhonchi/no wheezes rhonchi/no wheezes/good air movement/no rales

## 2020-03-02 NOTE — H&P PST ADULT - SYMPTOMS
claudication/chest pain/dyspnea dyspnea/climbs 36 steps to apartment with O2 via nasal cannula/claudication

## 2020-03-02 NOTE — H&P PST ADULT - ASSESSMENT
Problem: malignant neoplasm if unspecified part of right bronchus or lung   Assessment and Plan: Patient scheduled for flexible bronchoscopy, endobronchial US biopsy with cytology on 3/5/2020.     Patient provided with verbal and written presurgical instructions; verbalized understanding  with teach back.    Patient provided with famotidine for GI prophylaxis; verbalized understanding.    Patient provided with Chlorhexidine wash, verbal and written instructions reviewed. Patient demonstrated understanding with teach back.     STOP BANG score met, OR booking notified  OR booking notified of oxygen, DM, ITP    Recent EKG, Echo and Stress test requested    Medical evaluation requested by surgeon and PST for low METs, will obtain   Case discussed with Dr Gomez     Problem: Hypertension  Assessment and Plan: Patient instructed to take coreg  on day of procedure, verbalized understanding.  Patient instructed to hold lasix on DOS, verbalized understanding , instructed to stop aspirin today     Problem: Diabetes  Assessment and Plan: Patient instructed on medications adjustments: reduce baslagar to 32 units night before   OR booking notified of DM  POCT glucose testing upon admission Problem: malignant neoplasm if unspecified part of right bronchus or lung   Assessment and Plan: Patient scheduled for flexible bronchoscopy, endobronchial US biopsy with cytology on 3/5/2020.     Patient provided with verbal and written presurgical instructions; verbalized understanding  with teach back.    Patient provided with famotidine for GI prophylaxis; verbalized understanding.      STOP BANG score met, OR booking notified  OR booking notified of oxygen, DM, ITP    Recent EKG, Echo and Stress test requested    Medical evaluation requested by surgeon and PST for low METs, will obtain   Case discussed with Dr Gomez     Problem: Hypertension  Assessment and Plan: Patient instructed to take coreg  on day of procedure, verbalized understanding.  Patient instructed to hold lasix on DOS, verbalized understanding , instructed to stop aspirin today     Problem: Diabetes  Assessment and Plan: Patient instructed on medications adjustments: reduce baslagar to 32 units night before   OR booking notified of DM  POCT glucose testing upon admission Problem: malignant neoplasm if unspecified part of right bronchus or lung   Assessment and Plan: Patient scheduled for flexible bronchoscopy, endobronchial US biopsy with cytology on 3/5/2020.     Patient provided with verbal and written presurgical instructions; verbalized understanding  with teach back.    Patient provided with famotidine for GI prophylaxis; verbalized understanding.      STOP BANG score met, OR booking notified  OR booking notified of oxygen, DM, ITP    Recent EKG, Echo test requested    Medical evaluation requested by surgeon and PST for low METs, will obtain   Case discussed with Dr Gomez, no further workup required at this time     Problem: Hypertension  Assessment and Plan: Patient instructed to take coreg  on day of procedure, verbalized understanding.  Patient instructed to hold lasix on DOS, verbalized understanding , instructed to stop aspirin today     Problem: Diabetes  Assessment and Plan: Patient instructed on medications adjustments: reduce baslagar to 32 units night before   OR booking notified of DM  POCT glucose testing upon admission Problem: malignant neoplasm if unspecified part of right bronchus or lung   Assessment and Plan: Patient scheduled for flexible bronchoscopy, endobronchial US biopsy with cytology on 3/5/2020.     Patient provided with verbal and written presurgical instructions; verbalized understanding  with teach back.    Patient provided with famotidine for GI prophylaxis; verbalized understanding.      STOP BANG score met, OR booking notified  OR booking notified of oxygen, DM, ITP    Recent EKG, Echo test requested    Medical evaluation requested by surgeon and PST for low METs, will obtain   Case discussed with Dr Gomez, no further workup required at this time     Problem: Hypertension  Assessment and Plan: Patient instructed to take coreg and amlodipine on day of procedure, verbalized understanding.  Patient instructed to hold lasix on DOS, verbalized understanding , instructed to stop aspirin today     Problem: Diabetes  Assessment and Plan: Patient instructed on medications adjustments: reduce baslagar to 32 units night before , hold lispro on DOS   OR booking notified of DM  POCT glucose testing upon admission

## 2020-03-02 NOTE — H&P PST ADULT - NSICDXPASTSURGICALHX_GEN_ALL_CORE_FT
PAST SURGICAL HISTORY:  History of cholecystectomy     History of tonsillectomy     S/P lobectomy of lung right 2017

## 2020-03-02 NOTE — H&P PST ADULT - VENOUS THROMBOEMBOLISM CURRENT STATUS
(2) malignancy (present or previous)/(1) abnormal pulmonary function (COPD)/(1) serious lung disease, including pneumonia (within 1 month)/(1) swollen legs (current)/(1) other risk factor (includes escalating BMI, pack-years of smoking, diabetes requiring insulin, chemotherapy, female gender and length of surgery)

## 2020-03-02 NOTE — H&P PST ADULT - NEGATIVE GENERAL SYMPTOMS
no fever/no fatigue/no polyuria/no malaise/no chills/no sweating/no weight gain/no polyphagia/no polydipsia

## 2020-03-02 NOTE — H&P PST ADULT - GASTROINTESTINAL DETAILS
no distention/soft/no masses palpable/bowel sounds normal/nontender bowel sounds normal/soft/nontender/no distention

## 2020-03-02 NOTE — H&P PST ADULT - NEGATIVE NEUROLOGICAL SYMPTOMS
no focal seizures/no headache/no paresthesias/no generalized seizures/no syncope Banner Transposition Flap Text: The defect edges were debeveled with a #15 scalpel blade.  Given the location of the defect and the proximity to free margins a Banner transposition flap was deemed most appropriate.  Using a sterile surgical marker, an appropriate flap drawn around the defect. The area thus outlined was incised deep to adipose tissue with a #15 scalpel blade.  The skin margins were undermined to an appropriate distance in all directions utilizing iris scissors.

## 2020-03-02 NOTE — H&P PST ADULT - HISTORY OF PRESENT ILLNESS
61 year old female with hx of HTN, HLD, GERD, ITP, DM II, COPD, right lung CA s/p right VATs and right LL wedge resection in 10/2016. Patient presents to PST with preop dx of malignant neoplasm if unspecified part of right bronchus or lung scheduled for flexible bronchoscopy, endobronchial US biopsy with cytology on 3/5/2020. Patient reports new nodules noted on CT scan and Pet scan in 2/2020. Patient reports new cough began late November 2019- and gradually developed shortness of breath, and dyspnea.   Patient presents to PST in wheelchair on 3-5 L/ nasal cannula 61 year old female with hx of HTN, HLD, GERD, ITP, DM II, COPD, right lung CA s/p right VATs and right LL wedge resection in 10/2016. Patient presents to PST with preop dx of malignant neoplasm of unspecified part of right bronchus or lung scheduled for flexible bronchoscopy, endobronchial US biopsy with cytology on 3/5/2020. Patient reports new nodules noted on CT scan and Pet scan in 2/2020. Patient reports new cough began late November 2019- and gradually developed shortness of breath, and dyspnea, .   Patient presents to PST in wheelchair on 3-5 L/ nasal cannula , able to walk short distances

## 2020-03-02 NOTE — H&P PST ADULT - NEGATIVE ENMT SYMPTOMS
no ear pain/no vertigo/no dry mouth/no hearing difficulty/no throat pain/no dysphagia/no sinus symptoms/no nasal congestion/no tinnitus

## 2020-03-02 NOTE — H&P PST ADULT - RS GEN HX ROS MEA POS PC
cough/PMH COPD, lung cancer s/p R VATS, RLL wedge resection,/wheezing/sputum production/dyspnea PMH COPD, lung cancer s/p R VATS, RLL wedge resection,/dyspnea/cough

## 2020-03-04 ENCOUNTER — FORM ENCOUNTER (OUTPATIENT)
Age: 65
End: 2020-03-04

## 2020-03-05 ENCOUNTER — RESULT REVIEW (OUTPATIENT)
Age: 65
End: 2020-03-05

## 2020-03-05 ENCOUNTER — OUTPATIENT (OUTPATIENT)
Dept: OUTPATIENT SERVICES | Facility: HOSPITAL | Age: 65
LOS: 1 days | Discharge: HOME | End: 2020-03-05
Payer: MEDICAID

## 2020-03-05 ENCOUNTER — APPOINTMENT (OUTPATIENT)
Dept: THORACIC SURGERY | Facility: HOSPITAL | Age: 65
End: 2020-03-05

## 2020-03-05 VITALS
RESPIRATION RATE: 20 BRPM | HEIGHT: 56.5 IN | TEMPERATURE: 98 F | OXYGEN SATURATION: 98 % | HEART RATE: 80 BPM | SYSTOLIC BLOOD PRESSURE: 128 MMHG | WEIGHT: 216.05 LBS | DIASTOLIC BLOOD PRESSURE: 68 MMHG

## 2020-03-05 VITALS
RESPIRATION RATE: 19 BRPM | HEART RATE: 82 BPM | SYSTOLIC BLOOD PRESSURE: 120 MMHG | OXYGEN SATURATION: 97 % | DIASTOLIC BLOOD PRESSURE: 83 MMHG

## 2020-03-05 DIAGNOSIS — Z90.2 ACQUIRED ABSENCE OF LUNG [PART OF]: Chronic | ICD-10-CM

## 2020-03-05 DIAGNOSIS — Z90.49 ACQUIRED ABSENCE OF OTHER SPECIFIED PARTS OF DIGESTIVE TRACT: Chronic | ICD-10-CM

## 2020-03-05 DIAGNOSIS — C34.91 MALIGNANT NEOPLASM OF UNSPECIFIED PART OF RIGHT BRONCHUS OR LUNG: ICD-10-CM

## 2020-03-05 DIAGNOSIS — Z90.89 ACQUIRED ABSENCE OF OTHER ORGANS: Chronic | ICD-10-CM

## 2020-03-05 PROCEDURE — 31652 BRONCH EBUS SAMPLNG 1/2 NODE: CPT

## 2020-03-05 PROCEDURE — 88112 CYTOPATH CELL ENHANCE TECH: CPT | Mod: 26

## 2020-03-05 PROCEDURE — 31624 DX BRONCHOSCOPE/LAVAGE: CPT

## 2020-03-05 PROCEDURE — 88305 TISSUE EXAM BY PATHOLOGIST: CPT | Mod: 26

## 2020-03-05 PROCEDURE — 71045 X-RAY EXAM CHEST 1 VIEW: CPT | Mod: 26

## 2020-03-05 PROCEDURE — 31629 BRONCHOSCOPY/NEEDLE BX EACH: CPT

## 2020-03-05 PROCEDURE — 88305 TISSUE EXAM BY PATHOLOGIST: CPT | Mod: 26,59

## 2020-03-05 PROCEDURE — 31645 BRNCHSC W/THER ASPIR 1ST: CPT

## 2020-03-05 PROCEDURE — 88173 CYTOPATH EVAL FNA REPORT: CPT | Mod: 26

## 2020-03-05 RX ORDER — ALBUTEROL 90 UG/1
2.5 AEROSOL, METERED ORAL ONCE
Refills: 0 | Status: COMPLETED | OUTPATIENT
Start: 2020-03-05 | End: 2020-03-05

## 2020-03-05 RX ADMIN — SODIUM CHLORIDE 30 MILLILITER(S): 9 INJECTION, SOLUTION INTRAVENOUS at 10:28

## 2020-03-05 RX ADMIN — ALBUTEROL 2.5 MILLIGRAM(S): 90 AEROSOL, METERED ORAL at 10:27

## 2020-03-05 NOTE — ASU PATIENT PROFILE, ADULT - PMH
COPD (chronic obstructive pulmonary disease)    Deviated septum    DM (diabetes mellitus)    Emphysema/COPD    GERD (gastroesophageal reflux disease)    HTN (hypertension)    ITP (idiopathic thrombocytopenic purpura)    Lung cancer    Malignant neoplasm of unspecified part of right bronchus or lung    Morbid obesity    Prolapsed uterus

## 2020-03-05 NOTE — ASU DISCHARGE PLAN (ADULT/PEDIATRIC) - CARE PROVIDER_API CALL
Leif Kurtz (MD)  Surgery; Thoracic Surgery  [id number] [place] [address]  Phone: [phone]  Fax: [phone]  Follow Up Time:

## 2020-03-05 NOTE — ASU PREOP CHECKLIST - BP NONINVASIVE DIASTOLIC (MM HG)
Problem: Respiratory Impairment - Respiratory Therapy 253  Intervention: Respiratory support devices  Note: Intervention Status  Done  Intervention: Inhaled medication delivery  Note: Intervention Status  Done      68

## 2020-03-05 NOTE — ASU DISCHARGE PLAN (ADULT/PEDIATRIC) - CALL YOUR DOCTOR IF YOU HAVE ANY OF THE FOLLOWING:
Bleeding that does not stop/Fever greater than (need to indicate Fahrenheit or Celsius)/worsening shortness of breath, it is normal to cough up issa amounts of blood tinged sputum/Swelling that gets worse Nausea and vomiting that does not stop/Bleeding that does not stop/Fever greater than (need to indicate Fahrenheit or Celsius)/worsening shortness of breath, it is normal to cough up issa amounts of blood tinged sputum/Swelling that gets worse/Unable to urinate

## 2020-03-05 NOTE — BRIEF OPERATIVE NOTE - NSICDXBRIEFPROCEDURE_GEN_ALL_CORE_FT
PROCEDURES:  Bronchoscopy, with BAL 05-Mar-2020 10:14:52  Celi Willson  Bronchoscopy, with BAL 05-Mar-2020 10:14:48  Celi Willson  EBUS during diagnostic bronchoscopy 05-Mar-2020 10:14:28 TBNA of level 7, Celi Willson

## 2020-03-11 PROBLEM — R59.0 MEDIASTINAL LYMPHADENOPATHY: Status: ACTIVE | Noted: 2020-02-25

## 2020-03-11 PROBLEM — M89.9 BONE LESION: Status: ACTIVE | Noted: 2020-02-25

## 2020-03-12 ENCOUNTER — APPOINTMENT (OUTPATIENT)
Dept: THORACIC SURGERY | Facility: CLINIC | Age: 65
End: 2020-03-12
Payer: MEDICAID

## 2020-03-12 VITALS
OXYGEN SATURATION: 91 % | HEART RATE: 89 BPM | DIASTOLIC BLOOD PRESSURE: 64 MMHG | TEMPERATURE: 97.9 F | WEIGHT: 215 LBS | SYSTOLIC BLOOD PRESSURE: 117 MMHG | RESPIRATION RATE: 16 BRPM | BODY MASS INDEX: 40.62 KG/M2

## 2020-03-12 DIAGNOSIS — R59.0 LOCALIZED ENLARGED LYMPH NODES: ICD-10-CM

## 2020-03-12 DIAGNOSIS — M89.9 DISORDER OF BONE, UNSPECIFIED: ICD-10-CM

## 2020-03-12 DIAGNOSIS — C34.91 MALIGNANT NEOPLASM OF UNSPECIFIED PART OF RIGHT BRONCHUS OR LUNG: ICD-10-CM

## 2020-03-12 PROCEDURE — 99214 OFFICE O/P EST MOD 30 MIN: CPT

## 2020-03-18 NOTE — DATA REVIEWED
[FreeTextEntry1] : Now s/p Flex Bronch w/ BAL, EBUS bx of Level 7 subcarinal LNs on 3/5/2020. Path of Level 7 subcarinal LN revealed +Mets AdenoCA, morphologically c/w known pulmonary origin. Path of RML BAL revealed +NSCLC, favoring AdenoCA. Path of LENARD BAL revealed Optivite for malignant cells. NSCLC, favor adenocarcinoma.

## 2020-03-18 NOTE — ASSESSMENT
[FreeTextEntry1] : 65 y/o F, w/ hx of DM, COPD/Emphysema on home oxygen, GERD, ITP, HTN, HLD, and Lung CA.\par \par Now 3.5 yr s/p FB, Rt VATS wedge rxn of the RLL nodule, MLND on 10/27/16. Path revealed AdenoCA, acinar predominant, 1.5cm, Visceral pleura, margins and LNs are all negative, pT1aN0 Stg IA.\par \par Patient presented to ED at [facility] [place] on 11/27/18 for increased of SOB and cough, was admitted for 6 days. \par CT chest on 11/27/18:\par -persistent asymmetric diminished opacities in the RLL with indistinct filling defects suggested. Suspect for PE, possible chronic etiology, difficult to separate from flow in motion artifact. \par -increased wedge shaped masslike consolidation in the lingular segment left lobe, suggested inflammatory vs. neoplastic. \par \par Last seen patient on 12/18/2018, as per patient, she could not f/u because of multiple hospitalization due to SOB. \par \par Patient went to [facility] on 12/23/2019 for COPD exacerbation and PNA, D/c'd on 12/24/2019. \par CT Chest with contrast on 12/23/2019:\par - enlarged right upper paratracheal LN measuring up to 1.3 cm\par - enlarged precarinal LN measuring up to 1.2 cm\par - other subcentimeter mediastinal LNs also noted\par - emphysema\par - no evidence of PE\par \par PET/CT on 2/24/2020:\par - increasing size 2.7 x 2cm SUV=6.2 subcarinal LN (previously 8mm)\par - a 1.7 x 1.2cm SUV= 6.8 LN interposed between the SVC and brachiocephalic artery (image 98, previously 5 x 3mm)\par - a cluster of LNs measuring up to 6mm w/ SUVmax 3.3 (images 84-90; individually measuring up to 2-3mm)\par - soft tissue density w/ SUVmax 6.2 w/in the bone marrow at the level of the surgical neck Rt humerus and focal uptake at the level of the Lt superior acetabulum in the pelvis, nonspecific, marrow infiltration vs early mets disease (images 67-73)\par - a bony callus formation likely related to a nondisplaced rib fx w/ SUVmax 3.2 (image 142)\par - Lt superior acetabulum/iliac bone w/ SUV=6.0 (image 222)\par \par Now s/p Flex Bronch w/ BAL, EBUS bx of Level 7 subcarinal LNs on 3/5/2020. Path of Level 7 subcarinal LN revealed +Mets AdenoCA, morphologically c/w known pulmonary origin. Path of RML BAL revealed +NSCLC, favoring AdenoCA. Path of LENARD BAL revealed Optivite for malignant cells. NSCLC, favor adenocarcinoma. \par \par I have reviewed the patient's medical records and diagnostic images at time of this office consultation and have made the following recommendation:\par 1. Path reviewed and explained to patient and her daughter, stage VI lung CA, I recommended patient to f/u with Dr. Ludy Mendiola (Hem/Onc), [facility] results is pending, will sent the result to Dr. Mendiola once it is available. \par 2. RTC as needed. \par \par I personally performed the services described in the documentation, reviewed the documentation recorded by the scribe in my presence and it accurately and completely records my words and actions.\par \par I, Kiana Alvarado NP, am scribing for and the presence of KUSHAL Segundo, the following sections HISTORY OF PRESENT ILLNESS, PAST MEDICAL/FAMILY/SOCIAL HISTORY; REVIEW OF SYSTEMS; VITAL SIGNS; PHYSICAL EXAM; DISPOSITION.

## 2020-03-18 NOTE — CONSULT LETTER
[Dear  ___] : Dear  [unfilled], [Consult Letter:] : I had the pleasure of evaluating your patient, [unfilled]. [( Thank you for referring [unfilled] for consultation for _____ )] : Thank you for referring [unfilled] for consultation for [unfilled] [Please see my note below.] : Please see my note below. [Consult Closing:] : Thank you very much for allowing me to participate in the care of this patient.  If you have any questions, please do not hesitate to contact me. [Sincerely,] : Sincerely, [FreeTextEntry2] : Joni Nugent MD (PCP)\par Britney Mendiola MD (Hem/Onc)  [FreeTextEntry3] : Leif Kurtz MD, MPH \par System Director of Thoracic Surgery \par Director of Comprehensive Lung [facility] [place] \par Professor Cardiovascular & Thoracic Surgery  \par [employer] at [facility]\par

## 2020-03-18 NOTE — HISTORY OF PRESENT ILLNESS
[FreeTextEntry1] : 63 y/o F, w/ hx of DM, COPD/Emphysema on home oxygen, GERD, ITP, HTN, HLD, and Lung CA.\par \par Now 3.5 yr s/p FB, Rt VATS wedge rxn of the RLL nodule, MLND on 10/27/16. Path revealed AdenoCA, acinar predominant, 1.5cm, Visceral pleura, margins and LNs are all negative, pT1aN0 Stg IA.\par \par Patient presented to ED at [facility] on 11/27/18 for increased of SOB and cough, was admitted for 6 days. \par CT chest on 11/27/18:\par -persistent asymmetric diminished opacities in the RLL with indistinct filling defects suggested. Suspect for PE, possible chronic etiology, difficult to separate from flow in motion artifact. \par -increased wedge shaped masslike consolidation in the lingular segment left lobe, suggested inflammatory vs. neoplastic. \par \par Last seen patient on 12/18/2018, as per patient, she could not f/u because of multiple hospitalization due to SOB. \par \par Patient went to [facility] on 12/23/2019 for COPD exacerbation and PNA, D/c'd on 12/24/2019. \par CT Chest with contrast on 12/23/2019:\par - enlarged right upper paratracheal LN measuring up to 1.3 cm\par - enlarged precarinal LN measuring up to 1.2 cm\par - other subcentimeter mediastinal LNs also noted\par - emphysema\par - no evidence of PE\par \par PET/CT on 2/24/2020:\par - increasing size 2.7 x 2cm SUV=6.2 subcarinal LN (previously 8mm)\par - a 1.7 x 1.2cm SUV= 6.8 LN interposed between the SVC and brachiocephalic artery (image 98, previously 5 x 3mm)\par - a cluster of LNs measuring up to 6mm w/ SUVmax 3.3 (images 84-90; individually measuring up to 2-3mm)\par - soft tissue density w/ SUVmax 6.2 w/in the bone marrow at the level of the surgical neck Rt humerus and focal uptake at the level of the Lt superior acetabulum in the pelvis, nonspecific, marrow infiltration vs early mets disease (images 67-73)\par - a bony callus formation likely related to a nondisplaced rib fx w/ SUVmax 3.2 (image 142)\par - Lt superior acetabulum/iliac bone w/ SUV=6.0 (image 222)\par \par Now s/p Flex Bronch w/ BAL, EBUS bx of Level 7 subcarinal LNs on 3/5/2020. Path of Level 7 subcarinal LN revealed +Mets AdenoCA, morphologically c/w known pulmonary origin. Path of RML BAL revealed +NSCLC, favoring AdenoCA. Path of LENARD BAL revealed Optivite for malignant cells. NSCLC, favor adenocarcinoma. \par \par Patient is here today for a follow up. Patient c/o SOB on exertion, on 5L NC, c/o dry cough, denies chest pain, fever, chills.

## 2020-04-06 ENCOUNTER — APPOINTMENT (OUTPATIENT)
Dept: INTERVENTIONAL RADIOLOGY/VASCULAR | Facility: HOSPITAL | Age: 65
End: 2020-04-06
Payer: MEDICAID

## 2020-04-06 ENCOUNTER — OUTPATIENT (OUTPATIENT)
Dept: OUTPATIENT SERVICES | Facility: HOSPITAL | Age: 65
LOS: 1 days | End: 2020-04-06
Payer: MEDICAID

## 2020-04-06 DIAGNOSIS — Z90.49 ACQUIRED ABSENCE OF OTHER SPECIFIED PARTS OF DIGESTIVE TRACT: Chronic | ICD-10-CM

## 2020-04-06 DIAGNOSIS — C34.82 MALIGNANT NEOPLASM OF OVERLAPPING SITES OF LEFT BRONCHUS AND LUNG: ICD-10-CM

## 2020-04-06 DIAGNOSIS — Z90.2 ACQUIRED ABSENCE OF LUNG [PART OF]: Chronic | ICD-10-CM

## 2020-04-06 DIAGNOSIS — Z90.89 ACQUIRED ABSENCE OF OTHER ORGANS: Chronic | ICD-10-CM

## 2020-04-06 PROCEDURE — 36573 INSJ PICC RS&I 5 YR+: CPT

## 2020-04-06 PROCEDURE — C1751: CPT

## 2020-04-23 ENCOUNTER — EMERGENCY (EMERGENCY)
Facility: HOSPITAL | Age: 65
LOS: 1 days | Discharge: HOME | End: 2020-04-23
Attending: EMERGENCY MEDICINE
Payer: MEDICAID

## 2020-04-23 VITALS
HEIGHT: 61 IN | RESPIRATION RATE: 18 BRPM | DIASTOLIC BLOOD PRESSURE: 73 MMHG | TEMPERATURE: 99 F | WEIGHT: 209 LBS | OXYGEN SATURATION: 100 % | SYSTOLIC BLOOD PRESSURE: 149 MMHG | HEART RATE: 78 BPM

## 2020-04-23 DIAGNOSIS — Z90.2 ACQUIRED ABSENCE OF LUNG [PART OF]: Chronic | ICD-10-CM

## 2020-04-23 DIAGNOSIS — Z90.89 ACQUIRED ABSENCE OF OTHER ORGANS: Chronic | ICD-10-CM

## 2020-04-23 DIAGNOSIS — Z90.49 ACQUIRED ABSENCE OF OTHER SPECIFIED PARTS OF DIGESTIVE TRACT: Chronic | ICD-10-CM

## 2020-04-23 LAB
ALBUMIN SERPL ELPH-MCNC: 3.1 G/DL — LOW (ref 3.5–5)
ALP SERPL-CCNC: 140 U/L — HIGH (ref 40–120)
ALT FLD-CCNC: 18 U/L DA — SIGNIFICANT CHANGE UP (ref 10–60)
ANION GAP SERPL CALC-SCNC: 5 MMOL/L — SIGNIFICANT CHANGE UP (ref 5–17)
APTT BLD: 46.3 SEC — HIGH (ref 27.5–36.3)
AST SERPL-CCNC: 16 U/L — SIGNIFICANT CHANGE UP (ref 10–40)
BILIRUB SERPL-MCNC: 0.7 MG/DL — SIGNIFICANT CHANGE UP (ref 0.2–1.2)
BUN SERPL-MCNC: 5 MG/DL — LOW (ref 7–18)
CALCIUM SERPL-MCNC: 8.8 MG/DL — SIGNIFICANT CHANGE UP (ref 8.4–10.5)
CHLORIDE SERPL-SCNC: 104 MMOL/L — SIGNIFICANT CHANGE UP (ref 96–108)
CO2 SERPL-SCNC: 31 MMOL/L — SIGNIFICANT CHANGE UP (ref 22–31)
CREAT SERPL-MCNC: 0.58 MG/DL — SIGNIFICANT CHANGE UP (ref 0.5–1.3)
GLUCOSE SERPL-MCNC: 165 MG/DL — HIGH (ref 70–99)
HCT VFR BLD CALC: 39.6 % — SIGNIFICANT CHANGE UP (ref 34.5–45)
HGB BLD-MCNC: 12.8 G/DL — SIGNIFICANT CHANGE UP (ref 11.5–15.5)
INR BLD: 1.17 RATIO — HIGH (ref 0.88–1.16)
LACTATE SERPL-SCNC: 0.9 MMOL/L — SIGNIFICANT CHANGE UP (ref 0.7–2)
MAGNESIUM SERPL-MCNC: 1.6 MG/DL — SIGNIFICANT CHANGE UP (ref 1.6–2.6)
MCHC RBC-ENTMCNC: 28 PG — SIGNIFICANT CHANGE UP (ref 27–34)
MCHC RBC-ENTMCNC: 32.3 GM/DL — SIGNIFICANT CHANGE UP (ref 32–36)
MCV RBC AUTO: 86.7 FL — SIGNIFICANT CHANGE UP (ref 80–100)
NRBC # BLD: 0 /100 WBCS — SIGNIFICANT CHANGE UP (ref 0–0)
PHOSPHATE SERPL-MCNC: 3 MG/DL — SIGNIFICANT CHANGE UP (ref 2.5–4.5)
PLATELET # BLD AUTO: 143 K/UL — LOW (ref 150–400)
POTASSIUM SERPL-MCNC: 3.2 MMOL/L — LOW (ref 3.5–5.3)
POTASSIUM SERPL-SCNC: 3.2 MMOL/L — LOW (ref 3.5–5.3)
PROT SERPL-MCNC: 6.8 G/DL — SIGNIFICANT CHANGE UP (ref 6–8.3)
PROTHROM AB SERPL-ACNC: 13.3 SEC — HIGH (ref 10–12.9)
RBC # BLD: 4.57 M/UL — SIGNIFICANT CHANGE UP (ref 3.8–5.2)
RBC # FLD: 12.8 % — SIGNIFICANT CHANGE UP (ref 10.3–14.5)
SODIUM SERPL-SCNC: 140 MMOL/L — SIGNIFICANT CHANGE UP (ref 135–145)
WBC # BLD: 5.54 K/UL — SIGNIFICANT CHANGE UP (ref 3.8–10.5)
WBC # FLD AUTO: 5.54 K/UL — SIGNIFICANT CHANGE UP (ref 3.8–10.5)

## 2020-04-23 PROCEDURE — 99284 EMERGENCY DEPT VISIT MOD MDM: CPT

## 2020-04-23 PROCEDURE — 84100 ASSAY OF PHOSPHORUS: CPT

## 2020-04-23 PROCEDURE — 96374 THER/PROPH/DIAG INJ IV PUSH: CPT

## 2020-04-23 PROCEDURE — 83605 ASSAY OF LACTIC ACID: CPT

## 2020-04-23 PROCEDURE — 73564 X-RAY EXAM KNEE 4 OR MORE: CPT

## 2020-04-23 PROCEDURE — 85610 PROTHROMBIN TIME: CPT

## 2020-04-23 PROCEDURE — 86850 RBC ANTIBODY SCREEN: CPT

## 2020-04-23 PROCEDURE — 96375 TX/PRO/DX INJ NEW DRUG ADDON: CPT

## 2020-04-23 PROCEDURE — 86901 BLOOD TYPING SEROLOGIC RH(D): CPT

## 2020-04-23 PROCEDURE — 73564 X-RAY EXAM KNEE 4 OR MORE: CPT | Mod: 26,LT

## 2020-04-23 PROCEDURE — 86900 BLOOD TYPING SEROLOGIC ABO: CPT

## 2020-04-23 PROCEDURE — 71045 X-RAY EXAM CHEST 1 VIEW: CPT

## 2020-04-23 PROCEDURE — 36415 COLL VENOUS BLD VENIPUNCTURE: CPT

## 2020-04-23 PROCEDURE — 99284 EMERGENCY DEPT VISIT MOD MDM: CPT | Mod: 25

## 2020-04-23 PROCEDURE — 85027 COMPLETE CBC AUTOMATED: CPT

## 2020-04-23 PROCEDURE — 93010 ELECTROCARDIOGRAM REPORT: CPT

## 2020-04-23 PROCEDURE — 93005 ELECTROCARDIOGRAM TRACING: CPT

## 2020-04-23 PROCEDURE — 80053 COMPREHEN METABOLIC PANEL: CPT

## 2020-04-23 PROCEDURE — 85730 THROMBOPLASTIN TIME PARTIAL: CPT

## 2020-04-23 PROCEDURE — 87635 SARS-COV-2 COVID-19 AMP PRB: CPT

## 2020-04-23 PROCEDURE — 71045 X-RAY EXAM CHEST 1 VIEW: CPT | Mod: 26

## 2020-04-23 PROCEDURE — 83735 ASSAY OF MAGNESIUM: CPT

## 2020-04-23 RX ORDER — MORPHINE SULFATE 50 MG/1
4 CAPSULE, EXTENDED RELEASE ORAL ONCE
Refills: 0 | Status: DISCONTINUED | OUTPATIENT
Start: 2020-04-23 | End: 2020-04-23

## 2020-04-23 RX ORDER — METOCLOPRAMIDE HCL 10 MG
1 TABLET ORAL
Qty: 16 | Refills: 0
Start: 2020-04-23 | End: 2020-04-26

## 2020-04-23 RX ORDER — ONDANSETRON 8 MG/1
4 TABLET, FILM COATED ORAL ONCE
Refills: 0 | Status: COMPLETED | OUTPATIENT
Start: 2020-04-23 | End: 2020-04-23

## 2020-04-23 RX ORDER — ONDANSETRON 8 MG/1
1 TABLET, FILM COATED ORAL
Qty: 15 | Refills: 0
Start: 2020-04-23

## 2020-04-23 RX ADMIN — MORPHINE SULFATE 4 MILLIGRAM(S): 50 CAPSULE, EXTENDED RELEASE ORAL at 19:10

## 2020-04-23 RX ADMIN — ONDANSETRON 4 MILLIGRAM(S): 8 TABLET, FILM COATED ORAL at 19:10

## 2020-04-23 NOTE — ED ADULT TRIAGE NOTE - CHIEF COMPLAINT QUOTE
difficulty breathing, productive cough with throat pain for 1 month, nausea for 2 days and left leg pain.

## 2020-04-23 NOTE — ED PROVIDER NOTE - PATIENT PORTAL LINK FT
You can access the FollowMyHealth Patient Portal offered by [facility] by registering at the following website: [email or web address]. By joining [employer]’s FollowMyHealth portal, you will also be able to view your health information using other applications (apps) compatible with our system.

## 2020-04-23 NOTE — ED PROVIDER NOTE - OBJECTIVE STATEMENT
66 yo F pmh of DM, HTN, COPD on 4-5 L NC home oxygen, lung cancer in 2016 with recent recurrence (stared chem 1 week ago to LUE PICC line) presents with L knee pain x 2-3 days, worse with weight bearing, non radiating. Also c/o 1 month of productive cough. States that she did not want to come to ED b/c of COVID pandemic. 66 yo F pmh of DM, HTN, COPD on 4-5 L NC home oxygen, lung cancer in 2016 with recent recurrence (stared chem 1 week ago to LUE PICC line) presents with L knee pain x 2-3 days, worse with weight bearing, non radiating. Also c/o 1 month of productive cough. States that she did not want to come to ED b/c of COVID pandemic. Denies fever, weakness, CP, syncope, other acute complaints.

## 2020-04-23 NOTE — ED ADULT NURSE NOTE - OBJECTIVE STATEMENT
x2-3 days of increased SOB, nausea and generalized weakness.  Speaking in full clear sentences, breathing unlabored.

## 2020-04-23 NOTE — ED PROVIDER NOTE - PHYSICAL EXAMINATION
GENERAL: well appearing, no acute distress, NRB in place   HEAD: atraumatic   EYES: EOMI, pink conjunctiva   ENT: moist oral mucosa   CARDIAC: RRR, no edema, distal pulses present   RESPIRATORY: lungs CTAB, mild increased work of breathing   GASTROINTESTINAL: no abdominal tenderness, no rebound or guarding, bowel sounds presents  GENITOURINARY: no CVA tenderness   MUSCULOSKELETAL: no deformity; L knee with painful ROM; no swelling   NEUROLOGICAL: AAOx3, spontaneous movement of extremities   SKIN: intact   PSYCHIATRIC: cooperative  HEME LYMPH: no lymphadenopathy

## 2020-04-23 NOTE — ED PROVIDER NOTE - NSFOLLOWUPINSTRUCTIONS_ED_ALL_ED_FT
Nausea, Adult  Nausea is feeling sick to your stomach or feeling that you are about to throw up (vomit). Feeling sick to your stomach is usually not serious, but it may be an early sign of a more serious medical problem.  As you feel sicker to your stomach, you may throw up. If you throw up, or if you are not able to drink enough fluids, there is a risk that you may lose too much water in your body (get dehydrated). If you lose too much water in your body, you may:  Feel tired.Feel thirsty.Have a dry mouth.Have cracked lips.Go pee (urinate) less often.Older adults and people who have other diseases or a weak body defense system (immune system) have a higher risk of losing too much water in the body.  The main goals of treating this condition are:  To relieve your nausea.To ensure your nausea occurs less often.To prevent throwing up and losing too much fluid.Follow these instructions at home:  Watch your symptoms for any changes. Tell your doctor about them. Follow these instructions as told by your doctor.  Eating and drinking         Take an ORS (oral rehydration solution). This is a drink that is sold at pharmacies and stores.Drink clear fluids in small amounts as you are able. These include:  Water.Ice chips.Fruit juice that has water added (diluted fruit juice).Low-calorie sports drinks.Eat bland, easy-to-digest foods in small amounts as you are able, such as:  Bananas.Applesauce.Rice.Low-fat (lean) meats.Toast.Crackers.Avoid drinking fluids that have a lot of sugar or caffeine in them. This includes energy drinks, sports drinks, and soda.Avoid alcohol.Avoid spicy or fatty foods.General instructions     Take over-the-counter and prescription medicines only as told by your doctor.Rest at home while you get better.Drink enough fluid to keep your pee (urine) pale yellow.Take slow and deep breaths when you feel sick to your stomach.Avoid food or things that have strong smells.Wash your hands often with soap and water. If you cannot use soap and water, use hand .Make sure that all people in your home wash their hands well and often.Keep all follow-up visits as told by your doctor. This is important.Contact a doctor if:  You feel sicker to your stomach.You feel sick to your stomach for more than 2 days.You throw up.You are not able to drink fluids without throwing up.You have new symptoms.You have a fever.You have a headache.You have muscle cramps.You have a rash.You have pain while peeing.You feel light-headed or dizzy.Get help right away if:  You have pain in your chest, neck, arm, or jaw.You feel very weak or you pass out (faint).You have throw up that is bright red or looks like coffee grounds.You have bloody or black poop (stools) or poop that looks like tar.You have a very bad headache, a stiff neck, or both.You have very bad pain, cramping, or bloating in your belly (abdomen).You have trouble breathing or you are breathing very quickly.Your heart is beating very quickly.Your skin feels cold and clammy.You feel confused.You have signs of losing too much water in your body, such as:  Dark pee, very little pee, or no pee.Cracked lips.Dry mouth.Sunken eyes.Sleepiness.Weakness.These symptoms may be an emergency. Do not wait to see if the symptoms will go away. Get medical help right away. Call your local emergency services (911 in the [place] Do not drive yourself to the hospital.   Summary  Nausea is feeling sick to your stomach or feeling that you are about to throw up (vomit).If you throw up, or if you are not able to drink enough fluids, there is a risk that you may lose too much water in your body (get dehydrated).Eat and drink what your doctor tells you. Take over-the-counter and prescription medicines only as told by your doctor.Contact a doctor right away if your symptoms get worse or you have new symptoms.Keep all follow-up visits as told by your doctor. This is important.This information is not intended to replace advice given to you by your health care provider. Make sure you discuss any questions you have with your health care provider.    Document Released: 12/06/2012 Document Revised: 05/28/2019 Document Reviewed: 05/28/2019  [employer] Interactive Patient Education © 2020 [employer]

## 2020-04-23 NOTE — ED PROVIDER NOTE - CLINICAL SUMMARY MEDICAL DECISION MAKING FREE TEXT BOX
64 yo F with multiple complaints, possible COPD exacerbations vs COVID vs other. Knee pain more likely MSK; no swelling to suspect DVT. Nausea/vomiting non specific, although possibly related to recent chemo.

## 2020-04-23 NOTE — ED PROVIDER NOTE - NS ED ROS FT
CONSTITUTIONAL: no fever, no chills   EYES: no visual changes, no eye pain   ENMT: no nasal congestion, +throat pain  CARDIOVASCULAR: no chest pain, no edema, no palpitations   RESPIRATORY: +shortness of breath, +cough   GASTROINTESTINAL: no abdominal pain, +nausea, +vomiting, no diarrhea, no constipation   GENITOURINARY: no dysuria, no frequency  MUSCULOSKELETAL: +joint pain, no myalgias, no back pain   SKIN: no rashes  NEUROLOGICAL: no weakness, no headache, no dizziness, no slurred speech, no syncope   PSYCHIATRIC: no known mental health illness   HEME/LYMPH: no lymphadenopathy      All other ROS negative except as per HPI

## 2020-04-24 ENCOUNTER — INPATIENT (INPATIENT)
Facility: HOSPITAL | Age: 65
LOS: 3 days | Discharge: HOME | DRG: 190 | End: 2020-04-28
Attending: INTERNAL MEDICINE | Admitting: INTERNAL MEDICINE
Payer: MEDICAID

## 2020-04-24 VITALS
DIASTOLIC BLOOD PRESSURE: 82 MMHG | HEART RATE: 102 BPM | OXYGEN SATURATION: 98 % | WEIGHT: 209 LBS | HEIGHT: 61 IN | SYSTOLIC BLOOD PRESSURE: 124 MMHG | TEMPERATURE: 98 F | RESPIRATION RATE: 20 BRPM

## 2020-04-24 DIAGNOSIS — F32.9 MAJOR DEPRESSIVE DISORDER, SINGLE EPISODE, UNSPECIFIED: ICD-10-CM

## 2020-04-24 DIAGNOSIS — R06.01 ORTHOPNEA: ICD-10-CM

## 2020-04-24 DIAGNOSIS — M25.562 PAIN IN LEFT KNEE: ICD-10-CM

## 2020-04-24 DIAGNOSIS — J44.1 CHRONIC OBSTRUCTIVE PULMONARY DISEASE WITH (ACUTE) EXACERBATION: ICD-10-CM

## 2020-04-24 DIAGNOSIS — Z90.89 ACQUIRED ABSENCE OF OTHER ORGANS: Chronic | ICD-10-CM

## 2020-04-24 DIAGNOSIS — Z90.49 ACQUIRED ABSENCE OF OTHER SPECIFIED PARTS OF DIGESTIVE TRACT: Chronic | ICD-10-CM

## 2020-04-24 DIAGNOSIS — C34.91 MALIGNANT NEOPLASM OF UNSPECIFIED PART OF RIGHT BRONCHUS OR LUNG: ICD-10-CM

## 2020-04-24 DIAGNOSIS — Z90.2 ACQUIRED ABSENCE OF LUNG [PART OF]: Chronic | ICD-10-CM

## 2020-04-24 DIAGNOSIS — D69.6 THROMBOCYTOPENIA, UNSPECIFIED: ICD-10-CM

## 2020-04-24 DIAGNOSIS — E11.9 TYPE 2 DIABETES MELLITUS WITHOUT COMPLICATIONS: ICD-10-CM

## 2020-04-24 DIAGNOSIS — J96.21 ACUTE AND CHRONIC RESPIRATORY FAILURE WITH HYPOXIA: ICD-10-CM

## 2020-04-24 DIAGNOSIS — R06.02 SHORTNESS OF BREATH: ICD-10-CM

## 2020-04-24 DIAGNOSIS — I10 ESSENTIAL (PRIMARY) HYPERTENSION: ICD-10-CM

## 2020-04-24 DIAGNOSIS — Z29.9 ENCOUNTER FOR PROPHYLACTIC MEASURES, UNSPECIFIED: ICD-10-CM

## 2020-04-24 DIAGNOSIS — J44.9 CHRONIC OBSTRUCTIVE PULMONARY DISEASE, UNSPECIFIED: ICD-10-CM

## 2020-04-24 LAB
A1C WITH ESTIMATED AVERAGE GLUCOSE RESULT: 8.5 % — HIGH (ref 4–5.6)
ALBUMIN SERPL ELPH-MCNC: 3.2 G/DL — LOW (ref 3.5–5)
ALP SERPL-CCNC: 147 U/L — HIGH (ref 40–120)
ALT FLD-CCNC: 20 U/L DA — SIGNIFICANT CHANGE UP (ref 10–60)
ANION GAP SERPL CALC-SCNC: 11 MMOL/L — SIGNIFICANT CHANGE UP (ref 5–17)
AST SERPL-CCNC: 12 U/L — SIGNIFICANT CHANGE UP (ref 10–40)
BASOPHILS # BLD AUTO: 0.03 K/UL — SIGNIFICANT CHANGE UP (ref 0–0.2)
BASOPHILS NFR BLD AUTO: 0.6 % — SIGNIFICANT CHANGE UP (ref 0–2)
BILIRUB SERPL-MCNC: 0.8 MG/DL — SIGNIFICANT CHANGE UP (ref 0.2–1.2)
BUN SERPL-MCNC: 6 MG/DL — LOW (ref 7–18)
CALCIUM SERPL-MCNC: 8.8 MG/DL — SIGNIFICANT CHANGE UP (ref 8.4–10.5)
CHLORIDE SERPL-SCNC: 97 MMOL/L — SIGNIFICANT CHANGE UP (ref 96–108)
CHOLEST SERPL-MCNC: 145 MG/DL — SIGNIFICANT CHANGE UP (ref 10–199)
CO2 SERPL-SCNC: 28 MMOL/L — SIGNIFICANT CHANGE UP (ref 22–31)
CREAT SERPL-MCNC: 0.8 MG/DL — SIGNIFICANT CHANGE UP (ref 0.5–1.3)
EOSINOPHIL # BLD AUTO: 0.03 K/UL — SIGNIFICANT CHANGE UP (ref 0–0.5)
EOSINOPHIL NFR BLD AUTO: 0.6 % — SIGNIFICANT CHANGE UP (ref 0–6)
ESTIMATED AVERAGE GLUCOSE: 197 MG/DL — HIGH (ref 68–114)
FERRITIN SERPL-MCNC: 378 NG/ML — HIGH (ref 15–150)
FOLATE SERPL-MCNC: 17.2 NG/ML — SIGNIFICANT CHANGE UP
GLUCOSE BLDC GLUCOMTR-MCNC: 280 MG/DL — HIGH (ref 70–99)
GLUCOSE BLDC GLUCOMTR-MCNC: 302 MG/DL — HIGH (ref 70–99)
GLUCOSE BLDC GLUCOMTR-MCNC: 462 MG/DL — CRITICAL HIGH (ref 70–99)
GLUCOSE SERPL-MCNC: 408 MG/DL — HIGH (ref 70–99)
HCT VFR BLD CALC: 39.7 % — SIGNIFICANT CHANGE UP (ref 34.5–45)
HDLC SERPL-MCNC: 68 MG/DL — SIGNIFICANT CHANGE UP
HGB BLD-MCNC: 12.6 G/DL — SIGNIFICANT CHANGE UP (ref 11.5–15.5)
IMM GRANULOCYTES NFR BLD AUTO: 0.4 % — SIGNIFICANT CHANGE UP (ref 0–1.5)
LDH SERPL L TO P-CCNC: 274 U/L — HIGH (ref 120–225)
LIPID PNL WITH DIRECT LDL SERPL: 58 MG/DL — SIGNIFICANT CHANGE UP
LYMPHOCYTES # BLD AUTO: 0.36 K/UL — LOW (ref 1–3.3)
LYMPHOCYTES # BLD AUTO: 7.5 % — LOW (ref 13–44)
MAGNESIUM SERPL-MCNC: 2.1 MG/DL — SIGNIFICANT CHANGE UP (ref 1.6–2.6)
MCHC RBC-ENTMCNC: 27.9 PG — SIGNIFICANT CHANGE UP (ref 27–34)
MCHC RBC-ENTMCNC: 31.7 GM/DL — LOW (ref 32–36)
MCV RBC AUTO: 88 FL — SIGNIFICANT CHANGE UP (ref 80–100)
MONOCYTES # BLD AUTO: 0.06 K/UL — SIGNIFICANT CHANGE UP (ref 0–0.9)
MONOCYTES NFR BLD AUTO: 1.2 % — LOW (ref 2–14)
NEUTROPHILS # BLD AUTO: 4.31 K/UL — SIGNIFICANT CHANGE UP (ref 1.8–7.4)
NEUTROPHILS NFR BLD AUTO: 89.7 % — HIGH (ref 43–77)
NRBC # BLD: 0 /100 WBCS — SIGNIFICANT CHANGE UP (ref 0–0)
PHOSPHATE SERPL-MCNC: 3.7 MG/DL — SIGNIFICANT CHANGE UP (ref 2.5–4.5)
PLATELET # BLD AUTO: 108 K/UL — LOW (ref 150–400)
POTASSIUM SERPL-MCNC: 3.5 MMOL/L — SIGNIFICANT CHANGE UP (ref 3.5–5.3)
POTASSIUM SERPL-SCNC: 3.5 MMOL/L — SIGNIFICANT CHANGE UP (ref 3.5–5.3)
PROT SERPL-MCNC: 7 G/DL — SIGNIFICANT CHANGE UP (ref 6–8.3)
RBC # BLD: 4.51 M/UL — SIGNIFICANT CHANGE UP (ref 3.8–5.2)
RBC # FLD: 12.8 % — SIGNIFICANT CHANGE UP (ref 10.3–14.5)
SARS-COV-2 RNA SPEC QL NAA+PROBE: SIGNIFICANT CHANGE UP
SODIUM SERPL-SCNC: 136 MMOL/L — SIGNIFICANT CHANGE UP (ref 135–145)
TOTAL CHOLESTEROL/HDL RATIO MEASUREMENT: 2.1 RATIO — LOW (ref 3.3–7.1)
TRIGL SERPL-MCNC: 93 MG/DL — SIGNIFICANT CHANGE UP (ref 10–149)
TSH SERPL-MCNC: 1.11 UU/ML — SIGNIFICANT CHANGE UP (ref 0.34–4.82)
VIT B12 SERPL-MCNC: 891 PG/ML — SIGNIFICANT CHANGE UP (ref 232–1245)
WBC # BLD: 4.81 K/UL — SIGNIFICANT CHANGE UP (ref 3.8–10.5)
WBC # FLD AUTO: 4.81 K/UL — SIGNIFICANT CHANGE UP (ref 3.8–10.5)

## 2020-04-24 PROCEDURE — 99285 EMERGENCY DEPT VISIT HI MDM: CPT

## 2020-04-24 PROCEDURE — 93010 ELECTROCARDIOGRAM REPORT: CPT

## 2020-04-24 PROCEDURE — 93306 TTE W/DOPPLER COMPLETE: CPT | Mod: 26

## 2020-04-24 PROCEDURE — 99223 1ST HOSP IP/OBS HIGH 75: CPT

## 2020-04-24 RX ORDER — LANOLIN ALCOHOL/MO/W.PET/CERES
5 CREAM (GRAM) TOPICAL AT BEDTIME
Refills: 0 | Status: COMPLETED | OUTPATIENT
Start: 2020-04-24 | End: 2020-04-24

## 2020-04-24 RX ORDER — IPRATROPIUM/ALBUTEROL SULFATE 18-103MCG
3 AEROSOL WITH ADAPTER (GRAM) INHALATION DAILY
Refills: 0 | Status: DISCONTINUED | OUTPATIENT
Start: 2020-04-24 | End: 2020-04-24

## 2020-04-24 RX ORDER — AMLODIPINE BESYLATE 2.5 MG/1
5 TABLET ORAL DAILY
Refills: 0 | Status: DISCONTINUED | OUTPATIENT
Start: 2020-04-24 | End: 2020-04-28

## 2020-04-24 RX ORDER — ALBUTEROL 90 UG/1
2 AEROSOL, METERED ORAL EVERY 6 HOURS
Refills: 0 | Status: DISCONTINUED | OUTPATIENT
Start: 2020-04-24 | End: 2020-04-28

## 2020-04-24 RX ORDER — INSULIN LISPRO 100/ML
VIAL (ML) SUBCUTANEOUS
Refills: 0 | Status: DISCONTINUED | OUTPATIENT
Start: 2020-04-24 | End: 2020-04-28

## 2020-04-24 RX ORDER — INSULIN LISPRO 100/ML
15 VIAL (ML) SUBCUTANEOUS
Refills: 0 | Status: DISCONTINUED | OUTPATIENT
Start: 2020-04-24 | End: 2020-04-28

## 2020-04-24 RX ORDER — INSULIN GLARGINE 100 [IU]/ML
35 INJECTION, SOLUTION SUBCUTANEOUS AT BEDTIME
Refills: 0 | Status: DISCONTINUED | OUTPATIENT
Start: 2020-04-24 | End: 2020-04-28

## 2020-04-24 RX ORDER — SERTRALINE 25 MG/1
25 TABLET, FILM COATED ORAL AT BEDTIME
Refills: 0 | Status: DISCONTINUED | OUTPATIENT
Start: 2020-04-24 | End: 2020-04-28

## 2020-04-24 RX ORDER — ALPRAZOLAM 0.25 MG
2 TABLET ORAL EVERY 8 HOURS
Refills: 0 | Status: DISCONTINUED | OUTPATIENT
Start: 2020-04-24 | End: 2020-04-28

## 2020-04-24 RX ORDER — MAGNESIUM SULFATE 500 MG/ML
1 VIAL (ML) INJECTION ONCE
Refills: 0 | Status: COMPLETED | OUTPATIENT
Start: 2020-04-24 | End: 2020-04-24

## 2020-04-24 RX ORDER — CARVEDILOL PHOSPHATE 80 MG/1
12.5 CAPSULE, EXTENDED RELEASE ORAL EVERY 12 HOURS
Refills: 0 | Status: DISCONTINUED | OUTPATIENT
Start: 2020-04-24 | End: 2020-04-28

## 2020-04-24 RX ORDER — ACETAMINOPHEN 500 MG
650 TABLET ORAL ONCE
Refills: 0 | Status: COMPLETED | OUTPATIENT
Start: 2020-04-24 | End: 2020-04-24

## 2020-04-24 RX ORDER — ENOXAPARIN SODIUM 100 MG/ML
40 INJECTION SUBCUTANEOUS DAILY
Refills: 0 | Status: DISCONTINUED | OUTPATIENT
Start: 2020-04-24 | End: 2020-04-28

## 2020-04-24 RX ORDER — LIDOCAINE 4 G/100G
1 CREAM TOPICAL DAILY
Refills: 0 | Status: DISCONTINUED | OUTPATIENT
Start: 2020-04-24 | End: 2020-04-28

## 2020-04-24 RX ORDER — ONDANSETRON 8 MG/1
4 TABLET, FILM COATED ORAL ONCE
Refills: 0 | Status: COMPLETED | OUTPATIENT
Start: 2020-04-24 | End: 2020-04-24

## 2020-04-24 RX ADMIN — ALBUTEROL 2 PUFF(S): 90 AEROSOL, METERED ORAL at 04:23

## 2020-04-24 RX ADMIN — Medication 40 MILLIGRAM(S): at 05:24

## 2020-04-24 RX ADMIN — Medication 15 UNIT(S): at 12:02

## 2020-04-24 RX ADMIN — Medication 6: at 12:02

## 2020-04-24 RX ADMIN — LIDOCAINE 1 PATCH: 4 CREAM TOPICAL at 20:46

## 2020-04-24 RX ADMIN — AMLODIPINE BESYLATE 5 MILLIGRAM(S): 2.5 TABLET ORAL at 11:56

## 2020-04-24 RX ADMIN — Medication 40 MILLIGRAM(S): at 21:54

## 2020-04-24 RX ADMIN — Medication 100 GRAM(S): at 05:24

## 2020-04-24 RX ADMIN — ALBUTEROL 2 PUFF(S): 90 AEROSOL, METERED ORAL at 21:29

## 2020-04-24 RX ADMIN — CARVEDILOL PHOSPHATE 12.5 MILLIGRAM(S): 80 CAPSULE, EXTENDED RELEASE ORAL at 17:00

## 2020-04-24 RX ADMIN — ALBUTEROL 2 PUFF(S): 90 AEROSOL, METERED ORAL at 17:44

## 2020-04-24 RX ADMIN — SERTRALINE 25 MILLIGRAM(S): 25 TABLET, FILM COATED ORAL at 21:28

## 2020-04-24 RX ADMIN — ONDANSETRON 4 MILLIGRAM(S): 8 TABLET, FILM COATED ORAL at 04:22

## 2020-04-24 RX ADMIN — Medication 650 MILLIGRAM(S): at 06:43

## 2020-04-24 RX ADMIN — ENOXAPARIN SODIUM 40 MILLIGRAM(S): 100 INJECTION SUBCUTANEOUS at 11:56

## 2020-04-24 RX ADMIN — Medication 15 UNIT(S): at 16:41

## 2020-04-24 RX ADMIN — LIDOCAINE 1 PATCH: 4 CREAM TOPICAL at 11:59

## 2020-04-24 RX ADMIN — Medication 5: at 16:41

## 2020-04-24 RX ADMIN — Medication 0.5 MILLIGRAM(S): at 04:23

## 2020-04-24 RX ADMIN — INSULIN GLARGINE 35 UNIT(S): 100 INJECTION, SOLUTION SUBCUTANEOUS at 21:28

## 2020-04-24 NOTE — CONSULT NOTE ADULT - PROBLEM SELECTOR RECOMMENDATION 3
she had a very refractory ITP years ago.  it resolved  the drop of platelet is of a concern  will watch  it can be from immunotherapy   but she has a picc line, needs to r/o infection.

## 2020-04-24 NOTE — ED ADULT NURSE NOTE - OBJECTIVE STATEMENT
The patient presents with worsening sob, sore throat, nausea and headache.  Lungs are with diminished breath sounds.  The patient has a left upper arm PICC line for Immuno therapy for lung CA.

## 2020-04-24 NOTE — ED PROVIDER NOTE - OBJECTIVE STATEMENT
65 year old female with hx of HTN, HLD, GERD, ITP, DM II, COPD, right lung CA s/p right VATs p/w SOB this evening. Pt was seen earlier for nausea, SOB and knee pain. Was d/c home. EMS found pt SOB in middle of stairwell. Pt sat 100% on 8LNC. Pt usually uses 4-5LNC. Denies any fever, chills, chest pain, dizziness of vomiting. 65 year old female with hx of HTN, HLD, GERD, ITP, DM II, COPD, right lung CA s/p right VATs p/w SOB this evening. Pt was seen earlier for nausea, SOB and knee pain. Was d/c home. EMS found pt SOB in middle of stairwell. Pt was placed on 8LNC. Pt usually uses 4-5LNC. Denies any fever, chills, chest pain, dizziness of vomiting.

## 2020-04-24 NOTE — H&P ADULT - PROBLEM SELECTOR PLAN 4
c/w coreg  monitor vitals c/w coreg  c/w norvasc  monitor vitals not in exacerbation  albuterol prn  Consider steroids if symptoms worsens/recurs

## 2020-04-24 NOTE — CONSULT NOTE ADULT - SUBJECTIVE AND OBJECTIVE BOX
Patient is a 65y old  Female who presents with a chief complaint of dyspnea (24 Apr 2020 09:44)      HPI:  66 y/o female from home home with PMHx of HTN, HLD, GERD, ITP, DM, COPD (on 4-5L NC at home) and right lung ca (s/p VATS; now on immunotherapy,  keytruda) presents to the ED with chief complaint of shortness of breath. She also endorses nausea and left knee pain. Patient was discharged from the ED last night for similar symptoms, but reportedly felt suddenly short of breath in her building stairwell, prompting her to return to the ED. Patient reports that she has an occasional cough, productive of phlegm mixed with bright red blood. She reports one episode of fever of 101F last week. She denies any sick contacts. When asked how long she has been short of breath, she says "a long time". Patient is in no acute distress, respiratory or otherwise.    GOC: Full code. (24 Apr 2020 05:44)       ROS:  Negative except for:    PAST MEDICAL & SURGICAL HISTORY:  Malignant neoplasm of unspecified part of right bronchus or lung  HTN (hypertension)  DM (diabetes mellitus)  COPD (chronic obstructive pulmonary disease)  Lung cancer  Morbid obesity  GERD (gastroesophageal reflux disease)  Deviated septum  Prolapsed uterus  ITP (idiopathic thrombocytopenic purpura)  Emphysema/COPD  History of cholecystectomy  S/P lobectomy of lung: right 2017  History of tonsillectomy      SOCIAL HISTORY:    FAMILY HISTORY:  Family history of stroke  Family history of lung cancer  Diabetes mellitus      MEDICATIONS  (STANDING):  ALBUTerol    90 MICROgram(s) HFA Inhaler 2 Puff(s) Inhalation every 6 hours  amLODIPine   Tablet 5 milliGRAM(s) Oral daily  carvedilol 12.5 milliGRAM(s) Oral every 12 hours  enoxaparin Injectable 40 milliGRAM(s) SubCutaneous daily  insulin glargine Injectable (LANTUS) 35 Unit(s) SubCutaneous at bedtime  insulin lispro (HumaLOG) corrective regimen sliding scale   SubCutaneous three times a day before meals  insulin lispro Injectable (HumaLOG) 15 Unit(s) SubCutaneous three times a day before meals  lidocaine   Patch 1 Patch Transdermal daily    MEDICATIONS  (PRN):  ALPRAZolam 2 milliGRAM(s) Oral every 8 hours PRN anxiety      Allergies    fish (Angioedema)  penicillins (Rash)    Intolerances        Vital Signs Last 24 Hrs  T(C): 36.6 (24 Apr 2020 14:35), Max: 37.3 (24 Apr 2020 07:12)  T(F): 97.8 (24 Apr 2020 14:35), Max: 99.2 (24 Apr 2020 07:12)  HR: 123 (24 Apr 2020 14:35) (78 - 123)  BP: 143/90 (24 Apr 2020 14:35) (123/77 - 156/94)  BP(mean): --  RR: 20 (24 Apr 2020 14:35) (18 - 20)  SpO2: 99% (24 Apr 2020 14:35) (97% - 100%)    PHYSICAL EXAM  General: adult in NAD  HEENT: clear oropharynx, anicteric sclera, pink conjunctiva  Neck: supple  CV: normal S1/S2 with no murmur rubs or gallops  Lungs: positive air movement b/l ant lungs,clear to auscultation, no wheezes, no rales  Abdomen: soft non-tender non-distended, no hepatosplenomegaly  Ext: no clubbing cyanosis or edema  Skin: no rashes and no petechiae  Neuro: alert and oriented X 4, no focal deficits      LABS:                          12.6   4.81  )-----------( 108      ( 24 Apr 2020 10:54 )             39.7         Mean Cell Volume : 88.0 fl  Mean Cell Hemoglobin : 27.9 pg  Mean Cell Hemoglobin Concentration : 31.7 gm/dL  Auto Neutrophil # : 4.31 K/uL  Auto Lymphocyte # : 0.36 K/uL  Auto Monocyte # : 0.06 K/uL  Auto Eosinophil # : 0.03 K/uL  Auto Basophil # : 0.03 K/uL  Auto Neutrophil % : 89.7 %  Auto Lymphocyte % : 7.5 %  Auto Monocyte % : 1.2 %  Auto Eosinophil % : 0.6 %  Auto Basophil % : 0.6 %      Serial CBC's  04-24 @ 10:54  Hct-39.7 / Hgb-12.6 / Plat-108 / RBC-4.51 / WBC-4.81  Serial CBC's  04-23 @ 18:58  Hct-39.6 / Hgb-12.8 / Plat-143 / RBC-4.57 / WBC-5.54      04-24    136  |  97  |  6<L>  ----------------------------<  408<H>  3.5   |  28  |  0.80    Ca    8.8      24 Apr 2020 10:54  Phos  3.7     04-24  Mg     2.1     04-24    TPro  7.0  /  Alb  3.2<L>  /  TBili  0.8  /  DBili  x   /  AST  12  /  ALT  20  /  AlkPhos  147<H>  04-24      PT/INR - ( 23 Apr 2020 18:58 )   PT: 13.3 sec;   INR: 1.17 ratio         PTT - ( 23 Apr 2020 18:58 )  PTT:46.3 sec    Ferritin, Serum: 378 ng/mL (04-24 @ 14:09)  Folate, Serum: 17.2 ng/mL (04-24 @ 14:09)  Vitamin B12, Serum: 891 pg/mL (04-24 @ 14:09)              BLOOD SMEAR INTERPRETATION:       RADIOLOGY & ADDITIONAL STUDIES:  < from: Xray Chest 1 View- PORTABLE-Urgent (04.23.20 @ 20:36) >  INTERPRETATION:  XR CHEST URGENT    Single AP view    HISTORY:  cough; hx lung cancer    Comparison: Chest x-ray 3/5/2020      Left arm PICC line tipin the SVC.    The cardiac silhouette is within normal limits. The lungs are clear. No pleural abnormality.    IMPRESSION: Clear lungs.    < end of copied text >

## 2020-04-24 NOTE — CONSULT NOTE ADULT - ASSESSMENT
65 year old lady with DM, copd, itp, lung ca with mets to spine presented with pain at left knee that she can not walk plus sob.

## 2020-04-24 NOTE — CONSULT NOTE ADULT - PROBLEM SELECTOR RECOMMENDATION 2
will give steroid although it will reduce the efficacy of immunotherapy for lung cancer  use it as short duration as possible

## 2020-04-24 NOTE — CONSULT NOTE ADULT - SUBJECTIVE AND OBJECTIVE BOX
PULMONARY CONSULT NOTE      BARB COLÓN  MRN-[id number]    Patient is a 65y old  Female who presents with a chief complaint of dyspnea (24 Apr 2020 05:44) going for 2-3 weeks , Pt called me twice and increased her steroids, [place] bad , talked to Dr Mendiola and sent to ER , Hx chart lab and xrays reviewed, using 4-5 lpm for low o2 sat  Pt getting C/T with mets spine      HISTORY OF PRESENT ILLNESS:64 y/o female from home home with PMHx of HTN, HLD, GERD, ITP, DM, COPD (on 4-5L NC at home) and right lung ca (s/p VATS; now on immunotherapy) presents to the ED with chief complaint of shortness of breath. She also endorses nausea and left knee pain. Patient was discharged from the ED last night for similar symptoms, but reportedly felt suddenly short of breath in her building stairwell, prompting her to return to the ED. Patient reports that she has an occasional cough, productive of phlegm mixed with bright red blood. She reports one episode of fever of 101F last week. She denies any sick contacts. When asked how long she has been short of breath, she says "a long time".      Home Medications:  Admelog SoloStar 100 units/mL injectable solution: 22 unit(s) injectable 3 times a day (with meals) (05 Mar 2020 07:31)  ALPRAZolam 2 mg oral tablet: 1 tab(s) orally 3 times a day, As Needed (05 Mar 2020 07:31)  amLODIPine 5 mg oral tablet: 1 tab(s) orally once a day (05 Mar 2020 07:31)  aspirin 81 mg oral tablet: 1 tab(s) orally once a day (05 Mar 2020 07:31)  atorvastatin 40 mg oral tablet: 1 tab(s) orally once a day (05 Mar 2020 07:31)  Basaglar KwikPen 100 units/mL subcutaneous solution: 40 unit(s) subcutaneous once a day (at bedtime) (05 Mar 2020 07:31)  Breo Ellipta 100 mcg-25 mcg/inh inhalation powder: 1 puff(s) inhaled 2 times a day (05 Mar 2020 07:31)  carvedilol 6.25 mg oral tablet: 2 tab(s) orally 2 times a day (05 Mar 2020 07:31)  famotidine 40 mg oral tablet: 1 tab(s) orally once a day (at bedtime) (05 Mar 2020 07:31)  gabapentin 100 mg oral capsule: 2 cap(s) orally 3 times a day (05 Mar 2020 07:31)  Mapap 325 mg oral tablet: 2 tab(s) orally every 4 hours, As Needed (05 Mar 2020 07:31)  Melatonin 3 mg oral tablet: 2 tab(s) orally once a day (at bedtime) (05 Mar 2020 07:31)  Percocet 10/325 oral tablet: 2 tab(s) orally every 6 hours, As Needed (05 Mar 2020 07:31)  Spiriva 18 mcg inhalation capsule: 1 cap(s) inhaled once a day (05 Mar 2020 07:31)  Ventolin HFA 90 mcg/inh inhalation aerosol: 2 puff(s) inhaled every 6 hours, As Needed (05 Mar 2020 07:31)      MEDICATIONS  (STANDING):  ALBUTerol    90 MICROgram(s) HFA Inhaler 2 Puff(s) Inhalation every 6 hours  amLODIPine   Tablet 5 milliGRAM(s) Oral daily  carvedilol 12.5 milliGRAM(s) Oral every 12 hours  enoxaparin Injectable 40 milliGRAM(s) SubCutaneous daily  insulin glargine Injectable (LANTUS) 35 Unit(s) SubCutaneous at bedtime  insulin lispro (HumaLOG) corrective regimen sliding scale   SubCutaneous three times a day before meals  insulin lispro Injectable (HumaLOG) 15 Unit(s) SubCutaneous three times a day before meals  lidocaine   Patch 1 Patch Transdermal daily      MEDICATIONS  (PRN):  ALPRAZolam 2 milliGRAM(s) Oral every 8 hours PRN anxiety      Allergies    fish (Angioedema)  penicillins (Rash)        PAST MEDICAL & SURGICAL HISTORY:  Malignant neoplasm of unspecified part of right bronchus or lung  HTN (hypertension)  DM (diabetes mellitus)  COPD (chronic obstructive pulmonary disease)  Lung cancer  Morbid obesity  GERD (gastroesophageal reflux disease)  Deviated septum  Prolapsed uterus  ITP (idiopathic thrombocytopenic purpura)  Emphysema/COPD  History of cholecystectomy  S/P lobectomy of lung: right 2017  History of tonsillectomy      FAMILY HISTORY:  Family history of stroke  Family history of lung cancer  Diabetes mellitus  HTN --   DM ++  IHD--   Asthma -- COPD --    SOCIAL HISTORY SMOKING  - quit 4 yrs ago earlier heavy smoker   ETOH  --   DRUGS--    REVIEW OF SYSTEMS:  CONSTITUTIONAL: No fever, weight loss, or fatigue   EYES: No eye pain, visual disturbances, or discharge  ENT:  No difficulty hearing, tinnitus, vertigo; No sinus or throat pain  NECK: No pain or stiffness   RESPIRATORY:  cough +  wheezing +  chills--   hemoptysis --   Shortness of Breath++  CARDIOVASCULAR: No chest pain, palpitations, passing out, dizziness, or leg swelling  GASTROINTESTINAL: No abdominal or epigastric pain. No nausea, vomiting, or hematemesis; No diarrhea or constipation. No melena or hematochezia.  GENITOURINARY: No dysuria, frequency, hematuria, or incontinence  NEUROLOGICAL: No headaches, memory loss, loss of strength, numbness, or tremors  SKIN: No itching, burning, rashes, or lesions   LYMPH Nodes: No enlarged glands  ENDOCRINE: No heat or cold intolerance; No hair loss  MUSCULOSKELETAL:  lt knee pain or swelling; No muscle, back, or extremity pain  PSYCHIATRIC: No depression, anxiety, mood swings, or difficulty sleeping  HEME/LYMPH: No easy bruising, or bleeding gums  ALLERGY AND IMMUNOLOGIC: No hives or eczema      Vital Signs Last 24 Hrs  T(C): 37.3 (24 Apr 2020 07:12), Max: 37.3 (24 Apr 2020 07:12)  T(F): 99.2 (24 Apr 2020 07:12), Max: 99.2 (24 Apr 2020 07:12)  HR: 102 (24 Apr 2020 07:12) (78 - 102)  BP: 123/77 (24 Apr 2020 07:12) (123/77 - 156/94)  BP(mean): --  RR: 20 (24 Apr 2020 07:12) (18 - 20)  SpO2: 97% (24 Apr 2020 07:12) (97% - 100%)  I&O's Detail      PHYSICAL EXAMINATION:    GENERAL: The patient is a well-developed, well-nourished  sitting in bed.   SKIN: No rashes ecchymoses or cyanosis  HEENT: Head is normocephalic and atraumatic. Extraocular muscles are intact. Mucous membranes are moist.   Neck supple LN not felt, JVP not increased  Thyroid not enlarged  Lymphatic: No lymphadenopathy  Cardiovascular:  S1 S2  heard ,RSR , JVP not increased , syst murmur at apex, No  gallop or rub  Respiratory:  Symmetrical chest wall movements Breathing vesicular , Percussion note normal no dulness   with fine rt basilar  rales , minimal   wheeze  ABDOMEN:  Soft, Non-tender, obese,  No  hepatosplenomegaly ,BS positive		  Extremities: Normal range of motion, No clubbing, cyanosis or edema , No calf tenderness  Vascular: Peripheral pulses palpable 2+ bilaterally  CNS: Alert and oriented x3,  Mood and affect appropriate  Cranial nerves intact  sensory intact  motor Power5/5, DTR 2+   Babinski neg    LABS:                        12.8   5.54  )-----------( 143      ( 23 Apr 2020 18:58 )             39.6     04-23    140  |  104  |  5<L>  ----------------------------<  165<H>  3.2<L>   |  31  |  0.58    Ca    8.8      23 Apr 2020 18:58  Phos  3.0     04-23  Mg     1.6     04-23    TPro  6.8  /  Alb  3.1<L>  /  TBili  0.7  /  DBili  x   /  AST  16  /  ALT  18  /  AlkPhos  140<H>  04-23    PT/INR - ( 23 Apr 2020 18:58 )   PT: 13.3 sec;   INR: 1.17 ratio         PTT - ( 23 Apr 2020 18:58 )  PTT:46.3 sec    Lactate, Blood: 0.9 mmol/L (04-23-20 @ 18:58)        RADIOLOGY & ADDITIONAL STUDIES:    CXR:  from: Xray Chest 1 View- PORTABLE-Urgent (04.23.20 @ 20:36) >    Left arm PICC line tip in the SVC.    The cardiac silhouette is within normal limits. The lungs are clear. No pleural abnormality.          Ct scan chest:    ekg; NSR, RAD PULMONARY CONSULT NOTE      BARB COLÓN  [id number]    Patient is a 65y old  Female who presents with a chief complaint of dyspnea (24 Apr 2020 05:44) going for 2-3 weeks , Pt called me twice and increased her steroids, [place] bad , talked to Dr Mendiola and sent to ER , Hx chart lab and xrays reviewed, using 4-5 lpm for low o2 sat, c/o lt knee pain, blood tinged sputum for a few weeks  Pt getting C/T with mets spine      HISTORY OF PRESENT ILLNESS:64 y/o female from home home with PMHx of HTN, HLD, GERD, ITP, DM, COPD (on 4-5L NC at home) and right lung ca (s/p VATS; now on immunotherapy) presents to the ED with chief complaint of shortness of breath. She also endorses nausea and left knee pain. Patient was discharged from the ED last night for similar symptoms, but reportedly felt suddenly short of breath in her building stairwell, prompting her to return to the ED. Patient reports that she has an occasional cough, productive of phlegm mixed with bright red blood. She reports one episode of fever of 101F last week. She denies any sick contacts. When asked how long she has been short of breath, she says "a long time".      Home Medications:  Admelog SoloStar 100 units/mL injectable solution: 22 unit(s) injectable 3 times a day (with meals) (05 Mar 2020 07:31)  ALPRAZolam 2 mg oral tablet: 1 tab(s) orally 3 times a day, As Needed (05 Mar 2020 07:31)  amLODIPine 5 mg oral tablet: 1 tab(s) orally once a day (05 Mar 2020 07:31)  aspirin 81 mg oral tablet: 1 tab(s) orally once a day (05 Mar 2020 07:31)  atorvastatin 40 mg oral tablet: 1 tab(s) orally once a day (05 Mar 2020 07:31)  Basaglar KwikPen 100 units/mL subcutaneous solution: 40 unit(s) subcutaneous once a day (at bedtime) (05 Mar 2020 07:31)  Breo Ellipta 100 mcg-25 mcg/inh inhalation powder: 1 puff(s) inhaled 2 times a day (05 Mar 2020 07:31)  carvedilol 6.25 mg oral tablet: 2 tab(s) orally 2 times a day (05 Mar 2020 07:31)  famotidine 40 mg oral tablet: 1 tab(s) orally once a day (at bedtime) (05 Mar 2020 07:31)  gabapentin 100 mg oral capsule: 2 cap(s) orally 3 times a day (05 Mar 2020 07:31)  Mapap 325 mg oral tablet: 2 tab(s) orally every 4 hours, As Needed (05 Mar 2020 07:31)  Melatonin 3 mg oral tablet: 2 tab(s) orally once a day (at bedtime) (05 Mar 2020 07:31)  Percocet 10/325 oral tablet: 2 tab(s) orally every 6 hours, As Needed (05 Mar 2020 07:31)  Spiriva 18 mcg inhalation capsule: 1 cap(s) inhaled once a day (05 Mar 2020 07:31)  Ventolin HFA 90 mcg/inh inhalation aerosol: 2 puff(s) inhaled every 6 hours, As Needed (05 Mar 2020 07:31)      MEDICATIONS  (STANDING):  ALBUTerol    90 MICROgram(s) HFA Inhaler 2 Puff(s) Inhalation every 6 hours  amLODIPine   Tablet 5 milliGRAM(s) Oral daily  carvedilol 12.5 milliGRAM(s) Oral every 12 hours  enoxaparin Injectable 40 milliGRAM(s) SubCutaneous daily  insulin glargine Injectable (LANTUS) 35 Unit(s) SubCutaneous at bedtime  insulin lispro (HumaLOG) corrective regimen sliding scale   SubCutaneous three times a day before meals  insulin lispro Injectable (HumaLOG) 15 Unit(s) SubCutaneous three times a day before meals  lidocaine   Patch 1 Patch Transdermal daily      MEDICATIONS  (PRN):  ALPRAZolam 2 milliGRAM(s) Oral every 8 hours PRN anxiety      Allergies    fish (Angioedema)  penicillins (Rash)        PAST MEDICAL & SURGICAL HISTORY:  Malignant neoplasm of unspecified part of right bronchus or lung  HTN (hypertension)  DM (diabetes mellitus)  COPD (chronic obstructive pulmonary disease)  Lung cancer  Morbid obesity  GERD (gastroesophageal reflux disease)  Deviated septum  Prolapsed uterus  ITP (idiopathic thrombocytopenic purpura)  Emphysema/COPD  History of cholecystectomy  S/P lobectomy of lung: right 2017  History of tonsillectomy      FAMILY HISTORY:  Family history of stroke  Family history of lung cancer  Diabetes mellitus  HTN --   DM ++  IHD--   Asthma -- COPD --    SOCIAL HISTORY SMOKING  - quit 4 yrs ago,  earlier heavy smoker ,  ETOH  --   DRUGS--    REVIEW OF SYSTEMS:  CONSTITUTIONAL: No fever, weight loss, or fatigue   EYES: No eye pain, visual disturbances, or discharge  ENT:  No difficulty hearing, tinnitus, vertigo; No sinus or throat pain  NECK: No pain or stiffness   RESPIRATORY:  cough +  wheezing +  chills--   hemoptysis+   Shortness of Breath++  CARDIOVASCULAR: No chest pain, palpitations, passing out, dizziness, or leg swelling  GASTROINTESTINAL: No abdominal or epigastric pain. No nausea, vomiting, or hematemesis; No diarrhea or constipation. No melena or hematochezia.  GENITOURINARY: No dysuria, frequency, hematuria, or incontinence  NEUROLOGICAL: No headaches, memory loss, loss of strength, numbness, or tremors  SKIN: No itching, burning, rashes, or lesions   LYMPH Nodes: No enlarged glands  ENDOCRINE: No heat or cold intolerance; No hair loss  MUSCULOSKELETAL:  lt knee pain or swelling; No muscle, back, or extremity pain  PSYCHIATRIC: No depression, anxiety, mood swings, or difficulty sleeping  HEME/LYMPH: No easy bruising, or bleeding gums  ALLERGY AND IMMUNOLOGIC: No hives or eczema      Vital Signs Last 24 Hrs  T(C): 37.3 (24 Apr 2020 07:12), Max: 37.3 (24 Apr 2020 07:12)  T(F): 99.2 (24 Apr 2020 07:12), Max: 99.2 (24 Apr 2020 07:12)  HR: 102 (24 Apr 2020 07:12) (78 - 102)  BP: 123/77 (24 Apr 2020 07:12) (123/77 - 156/94)  BP(mean): --  RR: 20 (24 Apr 2020 07:12) (18 - 20)  SpO2: 97% (24 Apr 2020 07:12) (97% - 100%)  I&O's Detail      PHYSICAL EXAMINATION:    GENERAL: The patient is a well-developed, well-nourished  sitting in bed.   SKIN: No rashes ecchymoses or cyanosis  HEENT: Head is normocephalic and atraumatic. Extraocular muscles are intact. Mucous membranes are moist.   Neck supple LN not felt, JVP not increased  Thyroid not enlarged  Lymphatic: No lymphadenopathy  Cardiovascular:  S1 S2  heard ,RSR , JVP not increased , systolic  murmur at apex, No  gallop or rub  Respiratory:  Symmetrical chest wall movements Breathing vesicular , Percussion note normal no dulness   with fine rt basilar  rales , expiratory phase prolonged wih  wheeze  ABDOMEN:  Soft, Non-tender, obese,  No  hepatosplenomegaly ,BS positive		  Extremities: Normal range of motion, No clubbing, cyanosis or edema , No calf tenderness, lt knee cripitus  Vascular: Peripheral pulses palpable 2+ bilaterally  CNS: Alert and oriented x3,  Mood and affect appropriate  Cranial nerves intact  sensory intact  motor Power5/5, DTR 2+   Babinski neg    LABS:                        12.8   5.54  )-----------( 143      ( 23 Apr 2020 18:58 )             39.6     04-23    140  |  104  |  5<L>  ----------------------------<  165<H>  3.2<L>   |  31  |  0.58    Ca    8.8      23 Apr 2020 18:58  Phos  3.0     04-23  Mg     1.6     04-23    TPro  6.8  /  Alb  3.1<L>  /  TBili  0.7  /  DBili  x   /  AST  16  /  ALT  18  /  AlkPhos  140<H>  04-23    PT/INR - ( 23 Apr 2020 18:58 )   PT: 13.3 sec;   INR: 1.17 ratio         PTT - ( 23 Apr 2020 18:58 )  PTT:46.3 sec    Lactate, Blood: 0.9 mmol/L (04-23-20 @ 18:58)        RADIOLOGY & ADDITIONAL STUDIES:    CXR:  from: Xray Chest 1 View- PORTABLE-Urgent (04.23.20 @ 20:36) >    Left arm PICC line tip in the SVC.    The cardiac silhouette is within normal limits. The lungs are clear. No pleural abnormality.      Xray Knee Lt - < from: Xray Knee 4 Views, Left (04.23.20 @ 22:03) >  No acute fracture, dislocation or significant suprapatellar joint effusion.    < end of copied text >      Ct scan chest:    ekg; NSR, RAD

## 2020-04-24 NOTE — H&P ADULT - PROBLEM SELECTOR PLAN 3
starting hss  monitor fsg  check hba1c resume basal-bolus insulin regimen  starting hss  monitor fsg  check hba1c Would evaluate for CHF

## 2020-04-24 NOTE — ED ADULT NURSE REASSESSMENT NOTE - NS ED NURSE REASSESS COMMENT FT1
pt received from previous RN in stable condition , pt in strecher responsive ,denies pain, NAD noted, awaiting bed

## 2020-04-24 NOTE — ED ADULT NURSE NOTE - NSFALLRSKINDICATORS_ED_ALL_ED
This patient is being seen in consultation from Self.    CHIEF COMPLAINT(S):   Chief Complaint   Patient presents with   • Right Elbow - Pain       HISTORY OF PRESENT ILLNESS:  Eze Stringer is a 53 year old right-handed female here today for right elbow pain. She fell on the ice a couple of months ago. She was bruised and has swelling but that has gone away. After she let the elbow rest she tried to go back to lifting weights and it bothered her. She had pain on the lateral aspect of the elbow. She fractured her elbow when she was about 11-12 and had surgery. She thought she still had pins in but the x-rays did not show any. She denies any numbness/tingling.  Accompanied by no one  Location:right elbow  Date of Onset: a few months  Context: see above  Did this injury occur at work: No  Severity:6/10   Timing: intermittent  Quality: sharp  Associated signs and symptoms: none  Aggravating factors: lifting weights  Alleviating factors: Rest    Scribed By: Lulú Hobbs RN    Review of Systems   Constitutional: Negative for activity change, fatigue and fever.   HENT: Negative for hearing loss, sore throat, tinnitus and trouble swallowing.    Eyes: Negative for photophobia, redness and visual disturbance.   Respiratory: Negative for chest tightness, shortness of breath and wheezing.    Cardiovascular: Negative for chest pain and palpitations.   Gastrointestinal: Negative for abdominal pain, constipation, diarrhea and nausea.   Genitourinary: Negative for difficulty urinating, frequency and hematuria.   Musculoskeletal: Positive for arthralgias. Negative for gait problem.   Neurological: Negative for dizziness, seizures, syncope, numbness and headaches.   Psychiatric/Behavioral: Negative for sleep disturbance. The patient is not nervous/anxious.        @[facility]@    Past Medical History Updated: Yes  PAST MEDICAL HISTORY:  No past medical history on file.    Past Surgical History Updated: Yes  PAST  SURGICAL HISTORY:  No past surgical history on file.    Past Family History Updated: Yes  FAMILY HISTORY:  No family history on file.  Reviewed and non-contributory to patient's illness unless otherwise stated above    Past Social History Updated: Yes  SOCIAL HISTORY:  Social History     Socioeconomic History   • Marital status: Single     Spouse name: Not on file   • Number of children: Not on file   • Years of education: Not on file   • Highest education level: Not on file   Occupational History   • Not on file   Social Needs   • Financial resource strain: Not on file   • Food insecurity:     Worry: Not on file     Inability: Not on file   • Transportation needs:     Medical: Not on file     Non-medical: Not on file   Tobacco Use   • Smoking status: Never Smoker   • Smokeless tobacco: Never Used   Substance and Sexual Activity   • Alcohol use: Yes   • Drug use: Not on file   • Sexual activity: Not on file   Lifestyle   • Physical activity:     Days per week: Not on file     Minutes per session: Not on file   • Stress: Not on file   Relationships   • Social connections:     Talks on phone: Not on file     Gets together: Not on file     Attends Church service: Not on file     Active member of club or organization: Not on file     Attends meetings of clubs or organizations: Not on file     Relationship status: Not on file   • Intimate partner violence:     Fear of current or ex partner: Not on file     Emotionally abused: Not on file     Physically abused: Not on file     Forced sexual activity: Not on file   Other Topics Concern   • Not on file   Social History Narrative   • Not on file        MEDICATIONS:  Current Outpatient Medications   Medication Sig Dispense Refill   • citalopram (CELEXA) 40 MG tablet Take 1 tablet by mouth daily. 90 tablet 0   • gabapentin (NEURONTIN) 100 MG capsule Take 1-2 Caps by mouth nightly     • Turmeric Curcumin 500 MG Cap Take 2 Tabs by mouth daily.     • Ashwagandha 500 MG Cap  Take  by mouth daily     • Resveratrol 250 MG Cap Take  by mouth     • OMEGA-3 FATTY ACIDS PO Take  by mouth     • Pediatric Multivitamins-Fl (MULTI VIT/FL PO)      • Ascorbic Acid (VITAMIN C) 1000 MG tablet Take 1,000 mg by mouth.     • Multiple Vitamins-Minerals (MULTIVITAMIN ADULT PO) None Entered     • Cholecalciferol (VITAMIN D3) 5000 units Tab      • Collagen 500 MG Cap      • MAGNESIUM PO        No current facility-administered medications for this visit.      ALLERGIES:   ALLERGIES:  No Known Allergies    VITAL SIGNS:  Visit Vitals  /78   Pulse 60   Ht 5' 9\" (1.753 m)   Wt 79.4 kg (175 lb)   BMI 25.84 kg/m²     Body mass index is 25.84 kg/m².    EXAM:  This is a 53 year old female, awake, alert, and cooperative. She is well nourished, well developed, and in no apparent distress.  Pulmonary - No increased work of breathing.  Lymphatics - There is no evidence of generalized adenopathy.  Respiratory rate within normal limits.    General Neurologic: Oriented x 3 with normal mood and affect.     Skin:    Head, neck, and extremity skin is intact without rashes or lesions.    Right Elbow ROM  Elbow motion is Limited and painful.    Right Wrist ROM  Wrist motion is Full  and painfree.    Right Palpation Exam  Distinct tenderness palpation over lateral epicondyle and common extensor tendon.  Also with mild diffuse tenderness palpation over lateral head of triceps.  Otherwise no tenderness or swelling appreciated.    Right Elbow  Surgical scars are absent.  No effusion noted.  Cubital tunnel bend test is negative.  No UCL ligamentous instability appreciated.  No LUCL ligamentous instability noted.  Cubital Tinel's (Percussion) test is negative.  Tennis Elbow test is positive.  Golfer's Elbow test is negative.  Radial capitellar grind test is negative.  Valgus extenstion overload testing is negative.  Moderate resisted long finger extension pain on exam.    Neurologic Exam  Right Strength  Elbow flexion strength  is  5/5.  Elbow extension strength is 5/5.  Wrist flexion strength is 5/5.  Wrist extension strength is 5/5.   strength is 5/5.  Intrinsics strength is 5/5.  EPL strength is 5/5.  Pinch mechanism is normal .    Right Elbow Sensation  Medial ABC demonstates normal sensation.  Lateral ABC demonstates normal sensation.  Posterior ABC (radial) demonstrates normal sensation.  Medial brachial cutaneous demonstrates normal sensation.    Right Wrist Sensation  Median nerve demonstates normal sensation.  Ulnar nerve demonstates normal sensation.  Radial nerve demonstrates normal sensation.    Right Reflex Exam  Biceps reflex is 2.  Brachioradialis reflex is 2.  Triceps reflex is 2.    Right Vascular Exam  Radial pulse is 2+.  Edema is absent.  Capillary fill is normal.  Travis's Test is normal.      IMAGING &TEST:  Review of radiographs right elbow 3 view: No acute fracture my alignment.  Joint space appears well-maintained.  No evidence of enthesopathy.    ASSESSMENT & PLAN:  Eze Stringer is a 53 year old female who presents for evaluation of right lateral elbow pain without inciting event or trauma. Examination history most consistent with lateral epicondylitis.  Had a lengthy discussion with the patient regarding his diagnosis possible treatment options.  Plan as follows:  1.  Patient was advised to start formal physical therapy.  She would like to trial a home exercise program first  2.  Patient is to complete a daily home exercise program.  3.  Obtain a tennis elbow off  brace that is to be worn with all repetitive type activities  4.  He is to ice the area for 20 minutes, 2-3 times per day.  One of those times should be ice massage    Restrictions: Activity as tolerated    The patient will follow up with us in 6 weeks. We will consider cortisone injection vs formal therapy at that time    Electronically Signed by:    Leopoldo Woodson MD, 04/24/19    This note has been routed to the referring  provider.    This note was dictated by speech recognition software. Minor errors in transcription may be present.   no

## 2020-04-24 NOTE — H&P ADULT - NSICDXPASTMEDICALHX_GEN_ALL_CORE_FT
PAST MEDICAL HISTORY:  COPD (chronic obstructive pulmonary disease)     Deviated septum     DM (diabetes mellitus)     Emphysema/COPD     GERD (gastroesophageal reflux disease)     HTN (hypertension)     ITP (idiopathic thrombocytopenic purpura)     Lung cancer     Malignant neoplasm of unspecified part of right bronchus or lung     Morbid obesity     Prolapsed uterus

## 2020-04-24 NOTE — ED PROVIDER NOTE - CLINICAL SUMMARY MEDICAL DECISION MAKING FREE TEXT BOX
65 year old female with hx of HTN, HLD, GERD, ITP, DM II, COPD, right lung CA s/p right VATs on immunotherapy here for SOB. Clinically with diminished BS b/l, mild sob. Recent w/u including CXR negative. Will admit for COPD exacerbation.

## 2020-04-24 NOTE — H&P ADULT - PROBLEM SELECTOR PLAN 5
not in exacerbation  albuterol prn no evidence of fracture or joint swelling on imaging  lidocaine patch daily  PT eval

## 2020-04-24 NOTE — ED ADULT TRIAGE NOTE - CHIEF COMPLAINT QUOTE
biba c/o shortness of breath and nausea  reports was just seen and dc from ED last night for same . on home O2

## 2020-04-24 NOTE — H&P ADULT - PROBLEM SELECTOR PLAN 2
no evidence of fracture or joint swelling on imaging  lidocaine patch daily  PT eval reports feeling short of breath after being discharged home  O2 sat normal on less O2 supplementation than patient uses at home  CXR shows no infiltrates  hemoptysis patient is reporting is not acute and has been monitored by outpt provider  COVID-19 PCR testing  s/p solumedrol 40mg x1 in ED; will hold off on further steroids  supportive care  resume home anxiolytic  Would need outpatient CRIS evaluation with sleep study

## 2020-04-24 NOTE — H&P ADULT - HISTORY OF PRESENT ILLNESS
64 y/o female from home home with PMHx of HTN, HLD, GERD, ITP, DM, COPD (on 4-5L NC at home) and right lung ca (s/p VATS; now on immunotherapy) presents to the ED with chief complaint of shortness of breath. She also endorses nausea and left knee pain. Patient was discharged from the ED last night for similar symptoms, but reportedly felt suddenly short of breath in her building stairwell, prompting her to return to the ED. Patient reports that she has an occasional cough, productive of phlegm mixed with bright red blood. She reports one episode of fever of 101F last week. She denies any sick contacts. When asked how long she has been short of breath, she says "a long time". Patient is in no acute distress, respiratory or otherwise.    GOC: Full code.

## 2020-04-24 NOTE — H&P ADULT - ASSESSMENT
66 y/o female admitted to medicine for subjective shortness of breath and left knee pain. Patient reports feeling suddenly short of breath after being discharged home from the ED. Of note, she is using less oxygen supplementation in the ED at present (3L) than she reports using at home. She says her left knee pain is preventing her from being able to walk. She denies any trauma, but thinks it may be from straining to climb the stairs in her apartment building.    Primary team to confirm home medications with pharmacy [facility].

## 2020-04-24 NOTE — CONSULT NOTE ADULT - ASSESSMENT
COPD with exacerbation   Carc lung s/p RLL [id number]   S/p ITP  DM  Htn   Obesity      PLAN- HFN Rx with duoneb qid   FS coverage   Steroids prednisone x 10 days 40-30-20-10 mg x 3 days and d/c   [id number]/4.5  2 puffs bid on d/c   S/C lovenox   ABG, HbA1C, tsh  Dietary for ADA diet   Dr Mendiola for oncology- Discussed with Dr Mendiola and Dr Sorto   will f/u as needed COPD with exacerbation   Carc lung s/p RLL [id number] with recurrence with mets bones   S/p ITP  DM  Htn   Obesity      PLAN- HFN Rx with duoneb qid  CT Chest   FS coverage   IV solumedrol 60 mg q 6h x 24-48 hrs then    prednisone x 10 days 40-30-20-10 mg x 3 days and d/c   [id number]/4.5  2 puffs bid on d/c   S/C lovenox   ABG, HbA1C, tsh  Dietary for ADA diet   Dr Mendiola for oncology- Discussed with Dr Mendiola and Dr Sorto   will f/u as needed

## 2020-04-24 NOTE — H&P ADULT - ATTENDING COMMENTS
Pt seen and examined. Case discussed with MAR.  Agree with HPI/ROS/PMH as above  Pt re presenting to the ED within hours of discharge on account of worsening respiratory symptoms.- worsening SOB. Pt notes she cannot lie flat because of difficulty breathing and has been sleeping in a chair for a while now. She also has early morning fatigue and headaches that is concerning for CRIS.    Vital Signs Last 24 Hrs  T(C): 37.3 (24 Apr 2020 07:12), Max: 37.3 (24 Apr 2020 07:12)  T(F): 99.2 (24 Apr 2020 07:12), Max: 99.2 (24 Apr 2020 07:12)  HR: 102 (24 Apr 2020 07:12) (78 - 102)  BP: 123/77 (24 Apr 2020 07:12) (123/77 - 156/94)  RR: 20 (24 Apr 2020 07:12) (18 - 20)  SpO2: 97% (24 Apr 2020 07:12) (97% - 100%)    Middle aged obese woman, NAD but worried and sad about her health conditions.  SaO2 is 95% on 3LPM of O2 via NC.  CTA B/L RRR S1S2 only  Soft NT ND BS +  Trace to no pedal edema  No focal deficits apparent.                          12.8   5.54  )-----------( 143      ( 23 Apr 2020 18:58 )             39.6     04-23    140  |  104  |  5<L>  ----------------------------<  165<H>  3.2<L>   |  31  |  0.58    Ca    8.8      23 Apr 2020 18:58  Phos  3.0     04-23  Mg     1.6     04-23    TPro  6.8  /  Alb  3.1<L>  /  TBili  0.7  /  DBili  x   /  AST  16  /  ALT  18  /  AlkPhos  140<H>  04-23    CXR - clear    COVID 19 not detected    Impression  65 year old woman with PMH of COPD with chronic resp failure on home o2, ITP, right lung cancer  s/p VATS on immunotherapy started 3 weeks ago. She is here on account of worsening SOB with initial concern for a COPD exacerbation with acute on chronic respiratory failure. Presently her O2 requirement are better at rest, stable V/signs, exam and clear lung on CXR but continues to have discomfort and SOB.  With orthopnea described above and hx of intermittent b/l LE edema, would r/o CHF undiagnosed.  Will also r/o anxiety component which has been discussed before in the clinics for which she was placed on xanax prn ( she rarely uses this).  She also describes multiple crying spells indicative of depressive episodes or clinical depression. Would require psychiatric evaluation when possible  Finally she would need a sleep study to evaluate for CRIS as stated above.    A/P  - Acute on chronic respiratory failure  - Acute SOB with consideration      COPD exacerbation      R/O CHF       +/- Anxiety disorder  - Right lung cancer on active immunotherapy treatment  - Suspicion for CRIS   - Psych evaluation to r/o clinical depression /anxiety disorder and appropriate management.    Plan  - Admit to Medicine  - Continue RTC bronchodilators nebulizer vs MDI( NB pt is -ve)  - OK to use steroids per Dr Mendiola; Steroid taper -> oral prednisone  - Supplemental O2 as above  - Ambulatory home O2 evaluation before d/c ( pt states the 5LPM( via NC) no longer helping).  - ECHO  - PRN xanax for now  - Pulmonology consult - Dr Nugent ( or associates)

## 2020-04-24 NOTE — H&P ADULT - PROBLEM SELECTOR PLAN 1
reports feeling short of breath after being discharged home  O2 sat normal on less O2 supplementation than patient uses at home  CXR shows no infiltrates  hemoptysis patient is reporting is not acute and has been monitored by outpt provider  COVID-19 PCR testing  s/p solumedrol 40mg x1 in ED; will hold off on further steroids  supportive care  resume home anxiolytic

## 2020-04-24 NOTE — H&P ADULT - NSHPPHYSICALEXAM_GEN_ALL_CORE
Vital Signs Last 24 Hrs  T(C): 36.8 (24 Apr 2020 05:33), Max: 37 (23 Apr 2020 18:10)  T(F): 98.2 (24 Apr 2020 05:33), Max: 98.6 (23 Apr 2020 18:10)  HR: 99 (24 Apr 2020 05:33) (78 - 102)  BP: 156/94 (24 Apr 2020 05:33) (124/82 - 156/94)  BP(mean): --  RR: 20 (24 Apr 2020 05:33) (18 - 20)  SpO2: 99% (24 Apr 2020 05:33) (98% - 100%)

## 2020-04-24 NOTE — H&P ADULT - PROBLEM SELECTOR PLAN 6
IMPROVE VTE Individual Risk Assessment          RISK                                                          Points  [  ] Previous VTE                                                3  [  ] Thrombophilia                                             2  [  ] Lower limb paralysis                                   2        (unable to hold up >15 seconds)    [  ] Current Cancer                                             2         (within 6 months)  [ X ] Immobilization > 24 hrs                              1  [  ] ICU/CCU stay > 24 hours                             1  [ X ] Age > 60                                                         1    IMPROVE VTE Score: 2    lovenox subq for dvt ppx resume basal-bolus insulin regimen  starting hss  monitor fsg  check hba1c

## 2020-04-25 LAB
GLUCOSE BLDC GLUCOMTR-MCNC: 157 MG/DL — HIGH (ref 70–99)
GLUCOSE BLDC GLUCOMTR-MCNC: 235 MG/DL — HIGH (ref 70–99)
GLUCOSE BLDC GLUCOMTR-MCNC: 270 MG/DL — HIGH (ref 70–99)
GLUCOSE BLDC GLUCOMTR-MCNC: 321 MG/DL — HIGH (ref 70–99)
GLUCOSE BLDC GLUCOMTR-MCNC: 352 MG/DL — HIGH (ref 70–99)

## 2020-04-25 PROCEDURE — 99232 SBSQ HOSP IP/OBS MODERATE 35: CPT

## 2020-04-25 PROCEDURE — 93971 EXTREMITY STUDY: CPT | Mod: 26,LT

## 2020-04-25 RX ORDER — ACETAMINOPHEN 500 MG
650 TABLET ORAL ONCE
Refills: 0 | Status: COMPLETED | OUTPATIENT
Start: 2020-04-25 | End: 2020-04-25

## 2020-04-25 RX ADMIN — CARVEDILOL PHOSPHATE 12.5 MILLIGRAM(S): 80 CAPSULE, EXTENDED RELEASE ORAL at 17:45

## 2020-04-25 RX ADMIN — Medication 4: at 12:29

## 2020-04-25 RX ADMIN — Medication 100 MILLIGRAM(S): at 08:57

## 2020-04-25 RX ADMIN — INSULIN GLARGINE 35 UNIT(S): 100 INJECTION, SOLUTION SUBCUTANEOUS at 21:21

## 2020-04-25 RX ADMIN — LIDOCAINE 1 PATCH: 4 CREAM TOPICAL at 20:49

## 2020-04-25 RX ADMIN — Medication 40 MILLIGRAM(S): at 05:15

## 2020-04-25 RX ADMIN — LIDOCAINE 1 PATCH: 4 CREAM TOPICAL at 00:00

## 2020-04-25 RX ADMIN — Medication 5 MILLIGRAM(S): at 01:22

## 2020-04-25 RX ADMIN — Medication 15 UNIT(S): at 12:29

## 2020-04-25 RX ADMIN — Medication 650 MILLIGRAM(S): at 02:28

## 2020-04-25 RX ADMIN — SERTRALINE 25 MILLIGRAM(S): 25 TABLET, FILM COATED ORAL at 21:21

## 2020-04-25 RX ADMIN — ALBUTEROL 2 PUFF(S): 90 AEROSOL, METERED ORAL at 21:20

## 2020-04-25 RX ADMIN — Medication 2: at 17:08

## 2020-04-25 RX ADMIN — Medication 15 UNIT(S): at 08:37

## 2020-04-25 RX ADMIN — ALBUTEROL 2 PUFF(S): 90 AEROSOL, METERED ORAL at 17:07

## 2020-04-25 RX ADMIN — ALBUTEROL 2 PUFF(S): 90 AEROSOL, METERED ORAL at 08:38

## 2020-04-25 RX ADMIN — Medication 100 MILLIGRAM(S): at 02:39

## 2020-04-25 RX ADMIN — Medication 40 MILLIGRAM(S): at 17:48

## 2020-04-25 RX ADMIN — LIDOCAINE 1 PATCH: 4 CREAM TOPICAL at 23:50

## 2020-04-25 RX ADMIN — Medication 2 MILLIGRAM(S): at 17:45

## 2020-04-25 RX ADMIN — LIDOCAINE 1 PATCH: 4 CREAM TOPICAL at 12:29

## 2020-04-25 RX ADMIN — ALBUTEROL 2 PUFF(S): 90 AEROSOL, METERED ORAL at 02:28

## 2020-04-25 RX ADMIN — Medication 2 MILLIGRAM(S): at 08:58

## 2020-04-25 RX ADMIN — ENOXAPARIN SODIUM 40 MILLIGRAM(S): 100 INJECTION SUBCUTANEOUS at 12:29

## 2020-04-25 RX ADMIN — Medication 15 UNIT(S): at 17:08

## 2020-04-25 RX ADMIN — Medication 3: at 08:36

## 2020-04-25 NOTE — CONSULT NOTE ADULT - ATTENDING COMMENTS
I have examined pt personally Hx chart lab and xrays reviewed and pt discussed with residents
I reviewed history and exam with PA and reviewed x-rays of left knee which shows mild degenerative changes. I agree with plan for pain control and mobilization and rehab with PT for knee.

## 2020-04-25 NOTE — PHYSICAL THERAPY INITIAL EVALUATION ADULT - CRITERIA FOR SKILLED THERAPEUTIC INTERVENTIONS
risk reduction/prevention/anticipated discharge recommendation/home with home PT and assist/functional limitations in following categories/impairments found

## 2020-04-25 NOTE — PHYSICAL THERAPY INITIAL EVALUATION ADULT - ADDITIONAL COMMENTS
Pt claims she lives with her boyfriend and modified independent Pt claims she lives with her boyfriend and modified independent in basic ADLs. patient is homebound, negotiates stairs with assist, has to stop every 6 steps due to SOB. Pt has a RW however is not using the device due to narrow hallways. she requires wheelchair for outdoor mobility

## 2020-04-25 NOTE — CONSULT NOTE ADULT - SUBJECTIVE AND OBJECTIVE BOX
BARB COLÓN  [id number]    Orthopedic Consult: Left knee pain    BARB [id number]  HPI:  64 y/o female from home home with PMHx of HTN, HLD, GERD, ITP, DM, COPD (on 4-5L NC at home) and right lung ca (s/p VATS; now on immunotherapy) presents to the ED with chief complaint of shortness of breath. She also endorses nausea and left knee pain. Patient was discharged from the ED last night for similar symptoms, but reportedly felt suddenly short of breath in her building stairwell, prompting her to return to the ED. Patient reports that she has an occasional cough, productive of phlegm mixed with bright red blood. She reports one episode of fever of 101F last week. She denies any sick contacts. When asked how long she has been short of breath, she says "a long time". Patient is in no acute distress, respiratory or otherwise.    64 y/o F from home, with a PMHx of HTN, HLD, GERD, ITP, DM, COPD (on 4-5L NC at home), Right lung cancer (s/p VATS: on immunotherapy) presents c/o left knee pain x 2 weeks. Patient states at home she ambulates independently however needs to use the wall for support. She states that she has a walker that she would like to use, however her living space is to narrow. Left knee pain has been persistent for the past 2 weeks, with no improvement, described as a "pulling" sensation behind her knee, and rated a "6/10" in severity. She reports the knee pain is exacerbated with ambulation and partially alleviated at rest. She reports a similar episode of this pain in the past that only lasted for 1 day and was alleviated with the use of Tylenol She denies any recent trauma or falls. Pt denies Chest pain, paresthesias, N/V/D, abdominal pain, syncope, or pain anywhere else.       PAST MEDICAL & SURGICAL HISTORY:  Malignant neoplasm of unspecified part of right bronchus or lung  HTN (hypertension)  DM (diabetes mellitus)  COPD (chronic obstructive pulmonary disease)  Lung cancer  Morbid obesity  GERD (gastroesophageal reflux disease)  Deviated septum  Prolapsed uterus  ITP (idiopathic thrombocytopenic purpura)  Emphysema/COPD  History of cholecystectomy  S/P lobectomy of lung: right 2017  History of tonsillectomy      FAMILY HISTORY:  Family history of stroke  Family history of lung cancer  Diabetes mellitus      Social History:  Former smoker; more than 1 ppd for "several years", quit "long time ago". (24 Apr 2020 05:44)      Allergies    fish (Angioedema)  penicillins (Rash)    Intolerances        Home Medications:  Admelog SoloStar 100 units/mL injectable solution: 22 unit(s) injectable 3 times a day (with meals) (05 Mar 2020 07:31)  ALPRAZolam 2 mg oral tablet: 1 tab(s) orally 3 times a day, As Needed (05 Mar 2020 07:31)  amLODIPine 5 mg oral tablet: 1 tab(s) orally once a day (05 Mar 2020 07:31)  aspirin 81 mg oral tablet: 1 tab(s) orally once a day (05 Mar 2020 07:31)  atorvastatin 40 mg oral tablet: 1 tab(s) orally once a day (05 Mar 2020 07:31)  Basaglar KwikPen 100 units/mL subcutaneous solution: 40 unit(s) subcutaneous once a day (at bedtime) (05 Mar 2020 07:31)  Breo Ellipta 100 mcg-25 mcg/inh inhalation powder: 1 puff(s) inhaled 2 times a day (05 Mar 2020 07:31)  carvedilol 6.25 mg oral tablet: 2 tab(s) orally 2 times a day (05 Mar 2020 07:31)  famotidine 40 mg oral tablet: 1 tab(s) orally once a day (at bedtime) (05 Mar 2020 07:31)  gabapentin 100 mg oral capsule: 2 cap(s) orally 3 times a day (05 Mar 2020 07:31)  Mapap 325 mg oral tablet: 2 tab(s) orally every 4 hours, As Needed (05 Mar 2020 07:31)  Melatonin 3 mg oral tablet: 2 tab(s) orally once a day (at bedtime) (05 Mar 2020 07:31)  Percocet 10/325 oral tablet: 2 tab(s) orally every 6 hours, As Needed (05 Mar 2020 07:31)  Spiriva 18 mcg inhalation capsule: 1 cap(s) inhaled once a day (05 Mar 2020 07:31)  Ventolin HFA 90 mcg/inh inhalation aerosol: 2 puff(s) inhaled every 6 hours, As Needed (05 Mar 2020 07:31)      Vital Signs Last 24 Hrs  T(C): 36.3 (25 Apr 2020 05:10), Max: 36.7 (24 Apr 2020 20:19)  T(F): 97.4 (25 Apr 2020 05:10), Max: 98 (24 Apr 2020 20:19)  HR: 94 (25 Apr 2020 05:10) (94 - 123)  BP: 105/69 (25 Apr 2020 05:10) (105/69 - 143/90)  BP(mean): --  RR: 20 (25 Apr 2020 05:10) (19 - 20)  SpO2: 97% (25 Apr 2020 05:10) (97% - 100%)  I&O's Summary      Physical Exam:  General: Alert and oriented, NAD, resting comfortably  Musculoskeletal:  Left knee: No erythema. No wounds or abrasions. No effusion noted. Mild TTP noted over medial and lateral joint line. AROM 0-110 degrees flexion with mild pain.   Lower extremities: Calves soft b/l. Left Calf TTP. SILT right > left. NVI.        Left: EHL/FHL/ADF/APF 4+/5 strength. Hip flexion/adduction/abduction 4+/5 strength.       Right: EHL/FHL/ADF/APF 5/5 strength. Hip flexion/adduction/abduction 5/5 strength.     Labs:                        12.6   4.81  )-----------( 108      ( 24 Apr 2020 10:54 )             39.7     04-24    136  |  97  |  6<L>  ----------------------------<  408<H>  3.5   |  28  |  0.80    Ca    8.8      24 Apr 2020 10:54  Phos  3.7     04-24  Mg     2.1     04-24    TPro  7.0  /  Alb  3.2<L>  /  TBili  0.8  /  DBili  x   /  AST  12  /  ALT  20  /  AlkPhos  147<H>  04-24    PT/INR - ( 23 Apr 2020 18:58 )   PT: 13.3 sec;   INR: 1.17 ratio         PTT - ( 23 Apr 2020 18:58 )  PTT:46.3 sec    Radiology:  EXAM:  KNEE COMPLETE LEFT (4 VIEWS)                        PROCEDURE DATE:  04/23/2020      INTERPRETATION:    RADIOGRAPHS OF THE LEFT LEFT KNEE    CLINICAL INFORMATION: Pain    TECHNIQUE: 3 views of the left knee.    COMPARISON: None..    FINDINGS: There is no acute fracture deformity or acute dislocation. There is no significant suprapatellar joint effusion. There is mild degenerative change. No suspicious bony lesion.    IMPRESSION: No acute fracture, dislocation or significant suprapatellar joint effusion.    Impression: Patient is a 65yFemale with Left knee mild DJD   Plan:  - Recommendation: [ X ] Conservative management   - Pain management  - DVT prophylaxis  - Daily PT - WBAT to lower extremities with appropriate assistive device   - Patient with calf tenderness - Recommend venous doppler of LLE r/o DVT   - Patient is orthopedically stable for discharge  - Patient to follow up with orthopedic surgeon, Dr. Key in office at [phone]

## 2020-04-25 NOTE — PHYSICAL THERAPY INITIAL EVALUATION ADULT - PERTINENT HX OF CURRENT PROBLEM, REHAB EVAL
Pt admitted for evaluation of SOB. Pt has lung CA and in keytruda-immunotherapy. Pt with home O2 @ 5L/min

## 2020-04-25 NOTE — PHYSICAL THERAPY INITIAL EVALUATION ADULT - AMBULATION SKILLS, REHAB EVAL
independent/modified independent w/ household ambulation-has to hold on to walls due to narrow walk ways

## 2020-04-25 NOTE — PHYSICAL THERAPY INITIAL EVALUATION ADULT - GENERAL OBSERVATIONS, REHAB EVAL
Pt seen sitting @ EOB w/no lines attached, (+) supplementary O2 via NC @ 6L/min, noted w/OLIVER. Pt was cooperative during assessment

## 2020-04-25 NOTE — PHYSICAL THERAPY INITIAL EVALUATION ADULT - PLANNED THERAPY INTERVENTIONS, PT EVAL
neuromuscular re-education/bed mobility training/gait training/strengthening/balance training/transfer training

## 2020-04-25 NOTE — PROGRESS NOTE ADULT - ASSESSMENT
64 y/o female admitted to medicine for subjective shortness of breath and left knee pain. Patient reports feeling suddenly short of breath after being discharged home from the ED. Of note, she is using less oxygen supplementation in the ED at present (3L) than she reports using at home. She says her left knee pain is preventing her from being able to walk. She denies any trauma, but thinks it may be from straining to climb the stairs in her apartment building.    Primary team to confirm home medications with pharmacy [facility].

## 2020-04-25 NOTE — PROGRESS NOTE ADULT - SUBJECTIVE AND OBJECTIVE BOX
MEDICAL ATTENDING NOTE  Patient is a 65y old  Female who presents with a chief complaint of dyspnea (25 Apr 2020 09:57)      HPI:  64 y/o female from home home with PMHx of HTN, HLD, GERD, ITP, DM, COPD (on 4-5L NC at home) and right lung ca (s/p VATS; now on immunotherapy) presents to the ED with chief complaint of shortness of breath. She also endorses nausea and left knee pain. Patient was discharged from the ED last night for similar symptoms, but reportedly felt suddenly short of breath in her building stairwell, prompting her to return to the ED. Patient reports that she has an occasional cough, productive of phlegm mixed with bright red blood. She reports one episode of fever of 101F last week. She denies any sick contacts. When asked how long she has been short of breath, she says "a long time". Patient is in no acute distress, respiratory or otherwise.    GOC: Full code. (24 Apr 2020 05:44)      INTERVAL HPI/OVERNIGHT EVENTS: no new complaints    MEDICATIONS  (STANDING):  ALBUTerol    90 MICROgram(s) HFA Inhaler 2 Puff(s) Inhalation every 6 hours  amLODIPine   Tablet 5 milliGRAM(s) Oral daily  carvedilol 12.5 milliGRAM(s) Oral every 12 hours  enoxaparin Injectable 40 milliGRAM(s) SubCutaneous daily  insulin glargine Injectable (LANTUS) 35 Unit(s) SubCutaneous at bedtime  insulin lispro (HumaLOG) corrective regimen sliding scale   SubCutaneous three times a day before meals  insulin lispro Injectable (HumaLOG) 15 Unit(s) SubCutaneous three times a day before meals  lidocaine   Patch 1 Patch Transdermal daily  sertraline 25 milliGRAM(s) Oral at bedtime    MEDICATIONS  (PRN):  ALPRAZolam 2 milliGRAM(s) Oral every 8 hours PRN anxiety  guaiFENesin   Syrup  (Sugar-Free) 100 milliGRAM(s) Oral every 6 hours PRN Cough      __________________________________________________  REVIEW OF SYSTEMS:    CONSTITUTIONAL: No fever,   EYES: no acute visual disturbances  NECK: No pain or stiffness  RESPIRATORY: No cough; No shortness of breath  CARDIOVASCULAR: No chest pain, no palpitations  GASTROINTESTINAL: No pain. No nausea or vomiting; No diarrhea   NEUROLOGICAL: No headache or numbness, no tremors  MUSCULOSKELETAL: No joint pain, no muscle pain  GENITOURINARY: no dysuria, no frequency, no hesitancy  PSYCHIATRY: no depression , no anxiety  ALL OTHER  ROS negative        Vital Signs Last 24 Hrs  T(C): 36.4 (25 Apr 2020 14:35), Max: 36.4 (25 Apr 2020 14:35)  T(F): 97.6 (25 Apr 2020 14:35), Max: 97.6 (25 Apr 2020 14:35)  HR: 86 (25 Apr 2020 14:35) (86 - 103)  BP: 111/66 (25 Apr 2020 14:35) (105/69 - 119/68)  BP(mean): --  RR: 8 (25 Apr 2020 14:35) (8 - 20)  SpO2: 97% (25 Apr 2020 14:35) (97% - 100%)    ________________________________________________  PHYSICAL EXAM:  GENERAL: NAD, chronically ill  HEENT: Normocephalic;  conjunctivae and sclerae clear; moist mucous membranes;   NECK : supple  CHEST/LUNG: Clear to auscultation bilaterally with good air entry   HEART: S1 S2  regular; no murmurs, gallops or rubs  ABDOMEN: Soft, Nontender, Nondistended; Bowel sounds present  EXTREMITIES: no cyanosis; no edema; no calf tenderness  SKIN: warm and dry; no rash  NERVOUS SYSTEM:  Awake and alert; Oriented  to place, person and time ; no new deficits    _________________________________________________  LABS:                        12.6   4.81  )-----------( 108      ( 24 Apr 2020 10:54 )             39.7     04-24    136  |  97  |  6<L>  ----------------------------<  408<H>  3.5   |  28  |  0.80    Ca    8.8      24 Apr 2020 10:54  Phos  3.7     04-24  Mg     2.1     04-24    TPro  7.0  /  Alb  3.2<L>  /  TBili  0.8  /  DBili  x   /  AST  12  /  ALT  20  /  AlkPhos  147<H>  04-24      POCT Blood Glucose.: 235 mg/dL (25 Apr 2020 16:32)  POCT Blood Glucose.: 321 mg/dL (25 Apr 2020 12:17)  POCT Blood Glucose.: 270 mg/dL (25 Apr 2020 07:54)  POCT Blood Glucose.: 302 mg/dL (24 Apr 2020 20:52)

## 2020-04-26 ENCOUNTER — TRANSCRIPTION ENCOUNTER (OUTPATIENT)
Age: 65
End: 2020-04-26

## 2020-04-26 LAB
GLUCOSE BLDC GLUCOMTR-MCNC: 184 MG/DL — HIGH (ref 70–99)
GLUCOSE BLDC GLUCOMTR-MCNC: 188 MG/DL — HIGH (ref 70–99)
GLUCOSE BLDC GLUCOMTR-MCNC: 265 MG/DL — HIGH (ref 70–99)
GLUCOSE BLDC GLUCOMTR-MCNC: 287 MG/DL — HIGH (ref 70–99)
SARS-COV-2 RNA SPEC QL NAA+PROBE: SIGNIFICANT CHANGE UP

## 2020-04-26 PROCEDURE — 99233 SBSQ HOSP IP/OBS HIGH 50: CPT

## 2020-04-26 RX ORDER — LANOLIN ALCOHOL/MO/W.PET/CERES
5 CREAM (GRAM) TOPICAL AT BEDTIME
Refills: 0 | Status: COMPLETED | OUTPATIENT
Start: 2020-04-26 | End: 2020-04-26

## 2020-04-26 RX ADMIN — ENOXAPARIN SODIUM 40 MILLIGRAM(S): 100 INJECTION SUBCUTANEOUS at 12:03

## 2020-04-26 RX ADMIN — Medication 20 MILLIGRAM(S): at 17:31

## 2020-04-26 RX ADMIN — Medication 3: at 07:53

## 2020-04-26 RX ADMIN — ALBUTEROL 2 PUFF(S): 90 AEROSOL, METERED ORAL at 03:29

## 2020-04-26 RX ADMIN — Medication 15 UNIT(S): at 16:51

## 2020-04-26 RX ADMIN — Medication 15 UNIT(S): at 12:03

## 2020-04-26 RX ADMIN — Medication 15 UNIT(S): at 07:54

## 2020-04-26 RX ADMIN — ALBUTEROL 2 PUFF(S): 90 AEROSOL, METERED ORAL at 21:32

## 2020-04-26 RX ADMIN — SERTRALINE 25 MILLIGRAM(S): 25 TABLET, FILM COATED ORAL at 21:33

## 2020-04-26 RX ADMIN — Medication 1: at 16:51

## 2020-04-26 RX ADMIN — Medication 3: at 12:03

## 2020-04-26 RX ADMIN — ALBUTEROL 2 PUFF(S): 90 AEROSOL, METERED ORAL at 15:11

## 2020-04-26 RX ADMIN — INSULIN GLARGINE 35 UNIT(S): 100 INJECTION, SOLUTION SUBCUTANEOUS at 21:33

## 2020-04-26 RX ADMIN — ALBUTEROL 2 PUFF(S): 90 AEROSOL, METERED ORAL at 10:38

## 2020-04-26 NOTE — PROGRESS NOTE ADULT - SUBJECTIVE AND OBJECTIVE BOX
MEDICAL ATTENDING NOTE  Patient is a 65y old  Female who presents with a chief complaint of dyspnea (25 Apr 2020 09:57)      HPI:  64 y/o female from home home with PMHx of HTN, HLD, GERD, ITP, DM, COPD (on 4-5L NC at home) and right lung ca (s/p VATS; now on immunotherapy) presents to the ED with chief complaint of shortness of breath. She also endorses nausea and left knee pain. Patient was discharged from the ED last night for similar symptoms, but reportedly felt suddenly short of breath in her building stairwell, prompting her to return to the ED. Patient reports that she has an occasional cough, productive of phlegm mixed with bright red blood. She reports one episode of fever of 101F last week. She denies any sick contacts. When asked how long she has been short of breath, she says "a long time". Patient is in no acute distress, respiratory or otherwise.    GOC: Full code. (24 Apr 2020 05:44)      INTERVAL HPI/OVERNIGHT EVENTS: Patient c/o increased SOB, requests nebulizer rather than inhaler, called nursing office.   she is also requesting increased dose of steroids because of SOB.     MEDICATIONS  (STANDING):  ALBUTerol    90 MICROgram(s) HFA Inhaler 2 Puff(s) Inhalation every 6 hours  amLODIPine   Tablet 5 milliGRAM(s) Oral daily  carvedilol 12.5 milliGRAM(s) Oral every 12 hours  enoxaparin Injectable 40 milliGRAM(s) SubCutaneous daily  insulin glargine Injectable (LANTUS) 35 Unit(s) SubCutaneous at bedtime  insulin lispro (HumaLOG) corrective regimen sliding scale   SubCutaneous three times a day before meals  insulin lispro Injectable (HumaLOG) 15 Unit(s) SubCutaneous three times a day before meals  lidocaine   Patch 1 Patch Transdermal daily  sertraline 25 milliGRAM(s) Oral at bedtime    MEDICATIONS  (PRN):  ALPRAZolam 2 milliGRAM(s) Oral every 8 hours PRN anxiety  guaiFENesin   Syrup  (Sugar-Free) 100 milliGRAM(s) Oral every 6 hours PRN Cough      __________________________________________________  REVIEW OF SYSTEMS:    CONSTITUTIONAL: No fever,   EYES: no acute visual disturbances  NECK: No pain or stiffness  RESPIRATORY: No cough; No shortness of breath  CARDIOVASCULAR: No chest pain, no palpitations  GASTROINTESTINAL: No pain. No nausea or vomiting; No diarrhea   NEUROLOGICAL: No headache or numbness, no tremors  MUSCULOSKELETAL: No joint pain, no muscle pain  GENITOURINARY: no dysuria, no frequency, no hesitancy  PSYCHIATRY: no depression , no anxiety  ALL OTHER  ROS negative        Vital Signs Last 24 Hrs  T(C): 36.4 (26 Apr 2020 13:45), Max: 36.7 (25 Apr 2020 21:14)  T(F): 97.5 (26 Apr 2020 13:45), Max: 98.1 (25 Apr 2020 21:14)  HR: 83 (26 Apr 2020 13:45) (77 - 96)  BP: 105/50 (26 Apr 2020 13:45) (104/65 - 124/71)  BP(mean): --  RR: 19 (26 Apr 2020 13:45) (18 - 19)  SpO2: 100% (26 Apr 2020 13:45) (95% - 100%)    ________________________________________________  PHYSICAL EXAM:  GENERAL: NAD, chronically ill, ambulatory with nasal oxygen  HEENT: Normocephalic;  conjunctivae and sclerae clear; moist mucous membranes;   NECK : supple  CHEST/LUNG: bilateral air entry  HEART: S1 S2  regular; no murmurs, gallops or rubs  ABDOMEN: Soft, Nontender, Nondistended; Bowel sounds present  EXTREMITIES: no cyanosis; no edema; no calf tenderness  SKIN: warm and dry; no rash  NERVOUS SYSTEM:  Awake and alert; Oriented  to place, person and time ; irritable    _________________________________________________  LABS:

## 2020-04-26 NOTE — DISCHARGE NOTE PROVIDER - NSDCCPCAREPLAN_GEN_ALL_CORE_FT
PRINCIPAL DISCHARGE DIAGNOSIS  Diagnosis: COPD exacerbation  Assessment and Plan of Treatment: You were admitted for COPD excerbation. You were found to be negative for COVID. Follow up with your pulmonary doctor. PRINCIPAL DISCHARGE DIAGNOSIS  Diagnosis: COPD exacerbation  Assessment and Plan of Treatment: You were admitted for COPD excerbation and treated with steroids. Please continue steroid taper. Please continue home oxygen. Follow up with pulmonary doctor.      SECONDARY DISCHARGE DIAGNOSES  Diagnosis: Lung cancer  Assessment and Plan of Treatment: Follow up with oncologist. PRINCIPAL DISCHARGE DIAGNOSIS  Diagnosis: COPD exacerbation  Assessment and Plan of Treatment: You were admitted for COPD excerbation and treated with steroids. Please complete steroid taper. Please continue home oxygen. Follow up with pulmonary doctor.      SECONDARY DISCHARGE DIAGNOSES  Diagnosis: Knee pain, left  Assessment and Plan of Treatment: You were seen by orthopedics. You have mild degenerative joint disease. They recommended conservative management. You may be weight bearing to lower extremities with assist device. Follow up with Dr. Key in office at [phone]    Diagnosis: Lung cancer  Assessment and Plan of Treatment: Follow up with oncologist.

## 2020-04-26 NOTE — DISCHARGE NOTE PROVIDER - HOSPITAL COURSE
66 y/o female admitted to medicine for subjective shortness of breath and left knee pain. Patient reports feeling suddenly short of breath after being discharged home from the ED. Of note, she is using less oxygen supplementation in the ED at present (3L) than she reports using at home. She says her left knee pain is preventing her from being able to walk. She denies any trauma, but thinks it may be from straining to climb the stairs in her apartment building. Admitted for Acute on chronic respiratory failure with hypoxia secondary to COPD exacerbation. CXR shows no infiltrates. COVID-19 PCR negative.         NOT COMPLETE 64 y/o female admitted to medicine for subjective shortness of breath and left knee pain. Patient reports feeling suddenly short of breath after being discharged home from the ED. Of note, she is using less oxygen supplementation in the ED at present (3L) than she reports using at home. She says her left knee pain is preventing her from being able to walk. She denies any trauma, but thinks it may be from straining to climb the stairs in her apartment building. Admitted for Acute on chronic respiratory failure with hypoxia secondary to COPD exacerbation. CXR shows no infiltrates. COVID-19 PCR negative. Orthopedics consulted for left knee mild DJD, recommending conservative management. WBAT to lower extremities. Patient to follow up with orthopedic surgeon, Dr. Key in office at [phone]. Heme/onc consulted for lung cancer with mets to the spine.         NOT COMPLETE 66 y/o female admitted to medicine for subjective shortness of breath and left knee pain. Patient reports feeling suddenly short of breath after being discharged home from the ED. Of note, she is using less oxygen supplementation in the ED at present (3L) than she reports using at home. She says her left knee pain is preventing her from being able to walk. She denies any trauma, but thinks it may be from straining to climb the stairs in her apartment building.         Admitted for Acute on chronic respiratory failure with hypoxia secondary to COPD exacerbation. CXR shows no infiltrates. COVID-19 PCR negative. Treated with steroids, will be discharged home with home 02 and steroid taper. Orthopedics consulted for left knee mild DJD, recommending conservative management. WBAT to lower extremities. Patient to follow up with orthopedic surgeon, Dr. Key in office at [phone]. Heme/onc consulted for lung cancer with mets to the spine. Pt to follow up with  heme/onc as an outpatient.         Pt is stable for discharge home with 02.

## 2020-04-26 NOTE — DISCHARGE NOTE PROVIDER - NSDCMRMEDTOKEN_GEN_ALL_CORE_FT
Admelog SoloStar 100 units/mL injectable solution: 22 unit(s) injectable 3 times a day (with meals)  ALPRAZolam 2 mg oral tablet: 1 tab(s) orally 3 times a day, As Needed  amLODIPine 5 mg oral tablet: 1 tab(s) orally once a day  aspirin 81 mg oral tablet: 1 tab(s) orally once a day  atorvastatin 40 mg oral tablet: 1 tab(s) orally once a day  Basaglar KwikPen 100 units/mL subcutaneous solution: 40 unit(s) subcutaneous once a day (at bedtime)  Breo Ellipta 100 mcg-25 mcg/inh inhalation powder: 1 puff(s) inhaled 2 times a day  carvedilol 6.25 mg oral tablet: 2 tab(s) orally 2 times a day  famotidine 40 mg oral tablet: 1 tab(s) orally once a day (at bedtime)  gabapentin 100 mg oral capsule: 2 cap(s) orally 3 times a day  Mapap 325 mg oral tablet: 2 tab(s) orally every 4 hours, As Needed  Melatonin 3 mg oral tablet: 2 tab(s) orally once a day (at bedtime)  metoclopramide 10 mg oral tablet, disintegratin tab(s) orally 4 times a day (before meals and at bedtime), As Needed for nausea/vomiting   ondansetron 4 mg oral tablet, disintegratin tab(s) orally 3 times a day, As Needed for nausea/vomiting  oxycodone-acetaminophen 5 mg-325 mg oral tablet: 1 tab(s) orally every 6 hours, As Needed for severe pain MDD:4  Percocet 10/325 oral tablet: 2 tab(s) orally every 6 hours, As Needed  Spiriva 18 mcg inhalation capsule: 1 cap(s) inhaled once a day  Ventolin HFA 90 mcg/inh inhalation aerosol: 2 puff(s) inhaled every 6 hours, As Needed albuterol 90 mcg/inh inhalation aerosol with adapter: 1 puff(s) inhaled every 6 hours, As Needed wheezing or shortness of breath   ALPRAZolam 2 mg oral tablet: 1 tab(s) orally 3 times a day, As Needed  amLODIPine 5 mg oral tablet: 1 tab(s) orally once a day  atorvastatin 40 mg oral tablet: 1 tab(s) orally once a day  Basaglar KwikPen 100 units/mL subcutaneous solution: 40 unit(s) subcutaneous once a day (at bedtime)  Breo Ellipta 100 mcg-25 mcg/inh inhalation powder: 1 puff(s) inhaled 2 times a day  Coreg 6.25 mg oral tablet: 1 tab(s) orally 2 times a day  famotidine 40 mg oral tablet: 1 tab(s) orally once a day (at bedtime)  gabapentin 100 mg oral capsule: 1 cap(s) orally 3 times a day  glyburide-metformin 5 mg-500 mg oral tablet: 1 tab(s) orally 2 times a day  metoclopramide: 2 milligram(s) orally 4 times a day, As Needed for nausea   Percocet 10/325 oral tablet: 2 tab(s) orally every 6 hours, As Needed for pain  Spiriva 18 mcg inhalation capsule: 1 cap(s) inhaled once a day

## 2020-04-26 NOTE — PROGRESS NOTE ADULT - ASSESSMENT
64 y/o female admitted to medicine for subjective shortness of breath and left knee pain. Patient reports feeling suddenly short of breath after being discharged home from the ED. Of note, she is using less oxygen supplementation in the ED at present (3L) than she reports using at home. She says her left knee pain is preventing her from being able to walk. She denies any trauma, but thinks it may be from straining to climb the stairs in her apartment building.    Primary team to confirm home medications with pharmacy [facility].

## 2020-04-26 NOTE — DISCHARGE NOTE PROVIDER - CARE PROVIDER_API CALL
Noemi Perry)  Internal Medicine  [address] [place] [address]  Phone: [phone]  Fax: [phone]  Follow Up Time: Noemi Perry)  Internal Medicine  [address] [place] [address]  Phone: [phone]  Fax: [phone]  Follow Up Time:     Juan Jose Key)  Orthopaedic Surgery  [address] [place] [address]  Phone: [phone]  Fax: [phone]  Follow Up Time:     Jerome Mendiola)  Internal Medicine; Medical Oncology  [address] [place] [address]  Phone: [phone]  Fax: [phone]  Follow Up Time:

## 2020-04-26 NOTE — DISCHARGE NOTE PROVIDER - PROVIDER TOKENS
PROVIDER:[TOKEN:[[id number]]] PROVIDER:[TOKEN:[[id number]]],PROVIDER:[TOKEN:[[id number]]],PROVIDER:[TOKEN:[[id number]]]

## 2020-04-26 NOTE — PROGRESS NOTE ADULT - SUBJECTIVE AND OBJECTIVE BOX
DATE OF SERVICE:Patient was seen and examined :04/26/2020    PRESENTING CC:Dyspnea    SUBJ: 64 y/o female from home home with PMHx of HTN, HLD, GERD, ITP, DM, COPD (on 4-5L NC at home) and right lung ca (s/p VATS; now on immunotherapy) presents to the ED with chief complaint of shortness of breath.      PMH -reviewed admission note, no change since admission  Heart failure: acute [ ] chronic [ ] acute or chronic [ ] diastolic [ ] systolic [ ] combined systolic and diastolic[ ]  MARYAM: ATN[ ] renal medullary necrosis [ ] CKD I [ ]CKDII [ ]CKD III [ ]CKD IV [ ]CKD V [ ]Other pathological lesions [ ]    MEDICATIONS  (STANDING):  ALBUTerol    90 MICROgram(s) HFA Inhaler 2 Puff(s) Inhalation every 6 hours  amLODIPine   Tablet 5 milliGRAM(s) Oral daily  carvedilol 12.5 milliGRAM(s) Oral every 12 hours  enoxaparin Injectable 40 milliGRAM(s) SubCutaneous daily  insulin glargine Injectable (LANTUS) 35 Unit(s) SubCutaneous at bedtime  insulin lispro (HumaLOG) corrective regimen sliding scale   SubCutaneous three times a day before meals  insulin lispro Injectable (HumaLOG) 15 Unit(s) SubCutaneous three times a day before meals  lidocaine   Patch 1 Patch Transdermal daily  predniSONE   Tablet 60 milliGRAM(s) Oral daily  sertraline 25 milliGRAM(s) Oral at bedtime    MEDICATIONS  (PRN):  ALPRAZolam 2 milliGRAM(s) Oral every 8 hours PRN anxiety  guaiFENesin   Syrup  (Sugar-Free) 100 milliGRAM(s) Oral every 6 hours PRN Cough  guaiFENesin   Syrup  (Sugar-Free) 100 milliGRAM(s) Oral every 6 hours PRN Cough              REVIEW OF SYSTEMS:  Constitutional: [ ] fever, [ ]weight loss,  [x ]fatigue  Eyes: [ ] visual changes  Respiratory: [x ]shortness of breath;  [x ] cough, [x ]wheezing, [ ]chills, [ ]hemoptysis  Cardiovascular: [ ] chest pain, [ ]palpitations, [ ]dizziness,  [ ]leg swelling[ ]orthopnea[ ]PND  Gastrointestinal: [ ] abdominal pain, [ ]nausea, [ ]vomiting,  [ ]diarrhea   Genitourinary: [ ] dysuria, [ ] hematuria  Neurologic: [ ] headaches [ ] tremors[ ]weakness  Skin: [ ] itching, [ ]burning, [ ] rashes  Endocrine: [ ] heat or cold intolerance  Musculoskeletal: [ ] joint pain or swelling; [ ] muscle, back, or extremity pain  Psychiatric: [ ] depression, [ ]anxiety, [ ]mood swings, or [ ]difficulty sleeping  Hematologic: [ ] easy bruising, [ ] bleeding gums    [x] All remaining systems negative except as per above.   [ ]Unable to obtain.    Vital Signs Last 24 Hrs  T(C): 36.4 (26 Apr 2020 13:45), Max: 36.7 (25 Apr 2020 21:14)  T(F): 97.5 (26 Apr 2020 13:45), Max: 98.1 (25 Apr 2020 21:14)  HR: 83 (26 Apr 2020 13:45) (77 - 96)  BP: 105/50 (26 Apr 2020 13:45) (104/65 - 124/71)  RR: 19 (26 Apr 2020 13:45) (18 - 19)  SpO2: 100% (26 Apr 2020 13:45) (95% - 100%)    PHYSICAL EXAM:  General: No acute distress BMI-33  HEENT: EOMI, PERRL  Neck: Supple, [ ] JVD  Lungs: Equal air entry bilaterally; [ ] rales [x ] wheezing [ ] rhonchi  Heart: Regular rate and rhythm; [x ] murmur  2 /6 [x ] systolic [ ] diastolic [ ] radiation[ ] rubs [ ]  gallops  Abdomen: Nontender, bowel sounds present  Extremities: No clubbing, cyanosis, [x ] edema  Nervous system:  Alert & Oriented X3, no focal deficits  Psychiatric: Normal affect  Skin: No rashes or lesions        RADIOLOGY:    ECHO:Study Date: 4/24/2020  Grossly normal left ventricular systolic function  Normal left atrium.  Normal right ventricular size and systolic function     STREESS:  STUDY DATE: 04/09/2019  There are small, fixed, mild intensity defects in the mid anteroseptal and mid inferolateral walls that thicken, consistent with attenuation artifact.  Negative study for reversible ischemia   (LVEF = 66 %;LVEDV = 96 ml.)    IMPRESSION AND PLAN:    Problem/Plan - 1:  ·  Problem: Acute on chronic respiratory failure with hypoxia.  Plan:O2 sat normal on less O2 supplementation than patient uses at home  CXR shows no infiltrates  -COPD        Problem/Plan - 2:  ·  Problem: Orthopnea.  Plan: Normal LV Systolic function  Not fluid overload      Problem/Plan - 3:  ·  Problem: COPD (chronic obstructive pulmonary disease).  Plan: not in exacerbation  albuterol prn  Consider steroids if symptoms worsens/recurs.     Problem/Plan - 4:  ·  Problem: Knee pain, left.  Plan: no evidence of fracture or joint swelling on imaging  lidocaine patch daily  PT eval.     Problem/Plan - 5:  Problem: DM (diabetes mellitus) with hyperglycemia. Plan: resume basal-bolus insulin regimen  starting hss  POCT  Blood Glucose (04.26.20 @ 11:19)    POCT Blood Glucose.: 287  <----265 mg/dL  A1C with Estimated Average Glucose (04.24.20 @ 14:06)    A1C with Estimated Average Glucose Result: 8.5 %          Problem/Plan - 6:  ·  Problem: HTN (hypertension).  Plan: c/w coreg  c/w norvasc

## 2020-04-27 DIAGNOSIS — Z02.9 ENCOUNTER FOR ADMINISTRATIVE EXAMINATIONS, UNSPECIFIED: ICD-10-CM

## 2020-04-27 DIAGNOSIS — C34.90 MALIGNANT NEOPLASM OF UNSPECIFIED PART OF UNSPECIFIED BRONCHUS OR LUNG: ICD-10-CM

## 2020-04-27 LAB
ANION GAP SERPL CALC-SCNC: 4 MMOL/L — LOW (ref 5–17)
BUN SERPL-MCNC: 15 MG/DL — SIGNIFICANT CHANGE UP (ref 7–18)
CALCIUM SERPL-MCNC: 8.6 MG/DL — SIGNIFICANT CHANGE UP (ref 8.4–10.5)
CHLORIDE SERPL-SCNC: 105 MMOL/L — SIGNIFICANT CHANGE UP (ref 96–108)
CO2 SERPL-SCNC: 32 MMOL/L — HIGH (ref 22–31)
CREAT SERPL-MCNC: 0.64 MG/DL — SIGNIFICANT CHANGE UP (ref 0.5–1.3)
GLUCOSE BLDC GLUCOMTR-MCNC: 192 MG/DL — HIGH (ref 70–99)
GLUCOSE BLDC GLUCOMTR-MCNC: 193 MG/DL — HIGH (ref 70–99)
GLUCOSE BLDC GLUCOMTR-MCNC: 217 MG/DL — HIGH (ref 70–99)
GLUCOSE BLDC GLUCOMTR-MCNC: 225 MG/DL — HIGH (ref 70–99)
GLUCOSE SERPL-MCNC: 228 MG/DL — HIGH (ref 70–99)
HCT VFR BLD CALC: 40 % — SIGNIFICANT CHANGE UP (ref 34.5–45)
HGB BLD-MCNC: 12.6 G/DL — SIGNIFICANT CHANGE UP (ref 11.5–15.5)
MCHC RBC-ENTMCNC: 27.8 PG — SIGNIFICANT CHANGE UP (ref 27–34)
MCHC RBC-ENTMCNC: 31.5 GM/DL — LOW (ref 32–36)
MCV RBC AUTO: 88.1 FL — SIGNIFICANT CHANGE UP (ref 80–100)
NRBC # BLD: 0 /100 WBCS — SIGNIFICANT CHANGE UP (ref 0–0)
PLATELET # BLD AUTO: 162 K/UL — SIGNIFICANT CHANGE UP (ref 150–400)
POTASSIUM SERPL-MCNC: 4.1 MMOL/L — SIGNIFICANT CHANGE UP (ref 3.5–5.3)
POTASSIUM SERPL-SCNC: 4.1 MMOL/L — SIGNIFICANT CHANGE UP (ref 3.5–5.3)
RBC # BLD: 4.54 M/UL — SIGNIFICANT CHANGE UP (ref 3.8–5.2)
RBC # FLD: 13.1 % — SIGNIFICANT CHANGE UP (ref 10.3–14.5)
SODIUM SERPL-SCNC: 141 MMOL/L — SIGNIFICANT CHANGE UP (ref 135–145)
WBC # BLD: 6.88 K/UL — SIGNIFICANT CHANGE UP (ref 3.8–10.5)
WBC # FLD AUTO: 6.88 K/UL — SIGNIFICANT CHANGE UP (ref 3.8–10.5)

## 2020-04-27 PROCEDURE — 70460 CT HEAD/BRAIN W/DYE: CPT | Mod: 26

## 2020-04-27 PROCEDURE — 99233 SBSQ HOSP IP/OBS HIGH 50: CPT | Mod: GC

## 2020-04-27 RX ORDER — ALBUTEROL 90 UG/1
1 AEROSOL, METERED ORAL ONCE
Refills: 0 | Status: DISCONTINUED | OUTPATIENT
Start: 2020-04-27 | End: 2020-04-27

## 2020-04-27 RX ADMIN — Medication 1: at 08:16

## 2020-04-27 RX ADMIN — ALBUTEROL 2 PUFF(S): 90 AEROSOL, METERED ORAL at 04:40

## 2020-04-27 RX ADMIN — Medication 15 UNIT(S): at 08:17

## 2020-04-27 RX ADMIN — SERTRALINE 25 MILLIGRAM(S): 25 TABLET, FILM COATED ORAL at 22:09

## 2020-04-27 RX ADMIN — LIDOCAINE 1 PATCH: 4 CREAM TOPICAL at 20:24

## 2020-04-27 RX ADMIN — Medication 15 UNIT(S): at 11:49

## 2020-04-27 RX ADMIN — LIDOCAINE 1 PATCH: 4 CREAM TOPICAL at 11:50

## 2020-04-27 RX ADMIN — Medication 2 MILLIGRAM(S): at 22:21

## 2020-04-27 RX ADMIN — Medication 15 UNIT(S): at 17:20

## 2020-04-27 RX ADMIN — INSULIN GLARGINE 35 UNIT(S): 100 INJECTION, SOLUTION SUBCUTANEOUS at 22:08

## 2020-04-27 RX ADMIN — ALBUTEROL 2 PUFF(S): 90 AEROSOL, METERED ORAL at 22:09

## 2020-04-27 RX ADMIN — ALBUTEROL 2 PUFF(S): 90 AEROSOL, METERED ORAL at 08:20

## 2020-04-27 RX ADMIN — ENOXAPARIN SODIUM 40 MILLIGRAM(S): 100 INJECTION SUBCUTANEOUS at 11:53

## 2020-04-27 RX ADMIN — CARVEDILOL PHOSPHATE 12.5 MILLIGRAM(S): 80 CAPSULE, EXTENDED RELEASE ORAL at 17:24

## 2020-04-27 RX ADMIN — Medication 2: at 11:48

## 2020-04-27 RX ADMIN — CARVEDILOL PHOSPHATE 12.5 MILLIGRAM(S): 80 CAPSULE, EXTENDED RELEASE ORAL at 05:30

## 2020-04-27 RX ADMIN — Medication 60 MILLIGRAM(S): at 05:30

## 2020-04-27 RX ADMIN — ALBUTEROL 2 PUFF(S): 90 AEROSOL, METERED ORAL at 15:25

## 2020-04-27 RX ADMIN — AMLODIPINE BESYLATE 5 MILLIGRAM(S): 2.5 TABLET ORAL at 05:30

## 2020-04-27 RX ADMIN — Medication 2: at 17:19

## 2020-04-27 NOTE — PROGRESS NOTE ADULT - ASSESSMENT
COPD with exacerbation   Carc lung s/p RLL [id number] with recurrence with mets bones   S/p ITP  DM  Htn   Obesity      PLAN- HFN Rx with duoneb qid  CT Chest   FS coverage    prednisone 40-30-20-10 mg x 3 days and d/c   [id number]  2 puffs bid on d/c   S/C lovenox   ABG, HbA1C,   Dietary for ADA diet   OOB / BRP   P/t for ambulation   D/C plans

## 2020-04-27 NOTE — PROGRESS NOTE ADULT - SUBJECTIVE AND OBJECTIVE BOX
PULMONARY  progress note    BARB COLÓN  MRN-[id number]    Patient is a 65y old  Female who presents with a chief complaint of dyspnea (26 Apr 2020 18:52)  Pt upset for not getting HFN Rx and prednisone  c/o pain lt knee No fever or sob      MEDICATIONS  (STANDING):  ALBUTerol    90 MICROgram(s) HFA Inhaler 2 Puff(s) Inhalation every 6 hours  amLODIPine   Tablet 5 milliGRAM(s) Oral daily  carvedilol 12.5 milliGRAM(s) Oral every 12 hours  enoxaparin Injectable 40 milliGRAM(s) SubCutaneous daily  insulin glargine Injectable (LANTUS) 35 Unit(s) SubCutaneous at bedtime  insulin lispro (HumaLOG) corrective regimen sliding scale   SubCutaneous three times a day before meals  insulin lispro Injectable (HumaLOG) 15 Unit(s) SubCutaneous three times a day before meals  lidocaine   Patch 1 Patch Transdermal daily  predniSONE   Tablet 60 milliGRAM(s) Oral daily  sertraline 25 milliGRAM(s) Oral at bedtime      MEDICATIONS  (PRN):  ALPRAZolam 2 milliGRAM(s) Oral every 8 hours PRN anxiety  guaiFENesin   Syrup  (Sugar-Free) 100 milliGRAM(s) Oral every 6 hours PRN Cough  guaiFENesin   Syrup  (Sugar-Free) 100 milliGRAM(s) Oral every 6 hours PRN Cough      Allergies    fish (Angioedema)  penicillins (Rash)            PAST MEDICAL & SURGICAL HISTORY:  Malignant neoplasm of unspecified part of right bronchus or lung  HTN (hypertension)  DM (diabetes mellitus)  COPD (chronic obstructive pulmonary disease)  Lung cancer  Morbid obesity  GERD (gastroesophageal reflux disease)  Deviated septum  Prolapsed uterus  ITP (idiopathic thrombocytopenic purpura)  Emphysema/COPD  History of cholecystectomy  S/P lobectomy of lung: right 2017  History of tonsillectomy           REVIEW OF SYSTEMS:  CONSTITUTIONAL: No fever, weight loss, or fatigue   EYES: No eye pain, visual disturbances, or discharge  ENT:  No difficulty hearing, tinnitus, vertigo; No sinus or throat pain  NECK: No pain or stiffness or nodes  RESPIRATORY:  cough +  wheezing +  chills--   hemoptysis -- Shortness of Breath+  CARDIOVASCULAR: No chest pain, palpitations, passing out, dizziness, or leg swelling  GASTROINTESTINAL: No abdominal or epigastric pain. No nausea, vomiting, or hematemesis; No diarrhea or constipation. No melena or hematochezia.  GENITOURINARY: No dysuria, frequency, hematuria, or incontinence  NEUROLOGICAL: No headaches, memory loss, loss of strength, numbness, or tremors  SKIN: No itching, burning, rashes, or lesions   LYMPH Nodes: No enlarged glands  ENDOCRINE: No heat or cold intolerance; No hair loss  MUSCULOSKELETAL:  R Knee pain with swelling; No muscle, back, or extremity pain  PSYCHIATRIC: No depression, anxiety, mood swings, or difficulty sleeping  HEME/LYMPH: No easy bruising, or bleeding gums  ALLERGY AND IMMUNOLOGIC: No hives or eczema    Vital Signs Last 24 Hrs  T(C): 36.6 (27 Apr 2020 05:25), Max: 36.6 (27 Apr 2020 05:25)  T(F): 97.8 (27 Apr 2020 05:25), Max: 97.8 (27 Apr 2020 05:25)  HR: 82 (27 Apr 2020 05:25) (82 - 90)  BP: 121/63 (27 Apr 2020 05:25) (105/50 - 126/66)  BP(mean): --  RR: 20 (27 Apr 2020 05:25) (19 - 20)  SpO2: 99% (27 Apr 2020 05:25) (99% - 100%)  I&O's Detail      PHYSICAL EXAMINATION:    GENERAL: The patient is a well-developed, well-nourished in no apparent distress.   SKIN no rash ecchymoses or bruises  HEENT: Head is normocephalic and atraumatic  SHAMA , Extraocular muscles are intact. Mucous membranes  moist.   Neck supple ,No LN felt JVP not increased  Thyroid not enlarged  Cardiovascular:  S1 S2 heard, RSR, No JVD , systolic  murmur at apex, No gallop or rub  Respiratory: Chest wall symmetrical with good air entry ,Percussion note normal,    Lungs vesicular breathing with fine basilar inspiratory   rales , minimal   wheeze	  ABDOMEN:  Soft, Non-tender, obse,  no hepatomegaly or splenomegaly BS positive	  Extremities: Normal range of motion, No clubbing, cyanosis or edema, crepitus rt knee  Vascular: Peripheral pulses palpable 2+ bilaterally  CNS:  Alert and oriented x3   Cranial nerves intact  sensory intact  motor power5/5  dtr 2+   Babinski neg    LABS:                        12.6   6.88  )-----------( 162      ( 27 Apr 2020 07:00 )             40.0     04-27    141  |  105  |  15  ----------------------------<  228<H>  4.1   |  32<H>  |  0.64    Ca    8.6      27 Apr 2020 07:00      Ferritin, Serum: 378 ng/mL (04-24-20 @ 14:09)  Folate, Serum: 17.2 ng/mL (04-24-20 @ 14:09)  Vitamin B12, Serum: 891 pg/mL (04-24-20 @ 14:09)            RADIOLOGY & ADDITIONAL STUDIES:    CXR:< from: Xray Chest 1 View- PORTABLE-Urgent (04.23.20 @ 20:36) >    Left arm PICC line tipin the SVC.    The cardiac silhouette is within normal limits. The lungs are clear. No pleural abnormality.    Venous Doppler legs-- negative    ECHO:< from: Transthoracic Echocardiogram (04.24.20 @ 10:35) >  1. Mitral valve not well visualized, probably normal.  2. Aortic valve not well visualized. No aortic stenosis. No  aortic valve regurgitation seen.  3. Normal aortic root.  4. Normal left atrium.  5. Normal left ventricular internal dimensions and wall  thicknesses.  6. Endocardium not well visualized; grossly normal left  ventricular systolic function.  7. Normal right atrium.  8. Normal right ventricular size and systolic function  (TAPSE 1.9 cm).  9. No pericardial effusion.

## 2020-04-27 NOTE — PROGRESS NOTE ADULT - ASSESSMENT
· Assessment		  65 year old lady with DM, copd, itp, lung ca with mets to spine presented with pain at left knee that she can not walk plus sob.    Problem/Recommendation - 1:  Problem: Malignant neoplasm of unspecified part of right bronchus or lung. Recommendation: with mets to spine that caused pain at rib  s/p first keytruda.    Problem/Recommendation - 2:  ·  Problem: COPD exacerbation.  Recommendation: will give steroid although it will reduce the efficacy of immunotherapy for lung cancer  use it as short duration as possible. suggests 40 mg solumedrol 2-3 times a day    Problem/Recommendation - 3:  ·  Problem: Thrombocytopenia.  Recommendation: she had a very refractory ITP years ago.  it resolved  the drop of platelet is of a concern  will watch  it can be from immunotherapy   but she has a picc line, needs to r/o infection.   platelet returned to 162.  4, headache and dizziness, will do CT head.  she can not do MRI because she can not lay flat long enough.

## 2020-04-27 NOTE — PROGRESS NOTE ADULT - SUBJECTIVE AND OBJECTIVE BOX
HPI:  64 y/o female from home home with PMHx of HTN, HLD, GERD, ITP, DM, COPD (on 4-5L NC at home) and right lung ca (s/p VATS; now on immunotherapy) presents to the ED with chief complaint of shortness of breath. She also endorses nausea and left knee pain. Patient was discharged from the ED last night for similar symptoms, but reportedly felt suddenly short of breath in her building stairwell, prompting her to return to the ED. Patient reports that she has an occasional cough, productive of phlegm mixed with bright red blood. She reports one episode of fever of 101F last week. She denies any sick contacts. When asked how long she has been short of breath, she says "a long time". Patient is in no acute distress, respiratory or otherwise.COVID-.  But she still has headache, dizzy and sob.    GOC: Full code. (24 Apr 2020 05:44)     Pt is seen and examined  pt is awake and lying in bed/out of bed to chair  pt seems comfortable and denies any complaints at this time    ROS:  Negative except for:    MEDICATIONS  (STANDING):  ALBUTerol    90 MICROgram(s) HFA Inhaler 2 Puff(s) Inhalation every 6 hours  amLODIPine   Tablet 5 milliGRAM(s) Oral daily  carvedilol 12.5 milliGRAM(s) Oral every 12 hours  enoxaparin Injectable 40 milliGRAM(s) SubCutaneous daily  insulin glargine Injectable (LANTUS) 35 Unit(s) SubCutaneous at bedtime  insulin lispro (HumaLOG) corrective regimen sliding scale   SubCutaneous three times a day before meals  insulin lispro Injectable (HumaLOG) 15 Unit(s) SubCutaneous three times a day before meals  lidocaine   Patch 1 Patch Transdermal daily  predniSONE   Tablet 60 milliGRAM(s) Oral daily  sertraline 25 milliGRAM(s) Oral at bedtime    MEDICATIONS  (PRN):  ALPRAZolam 2 milliGRAM(s) Oral every 8 hours PRN anxiety  guaiFENesin   Syrup  (Sugar-Free) 100 milliGRAM(s) Oral every 6 hours PRN Cough  guaiFENesin   Syrup  (Sugar-Free) 100 milliGRAM(s) Oral every 6 hours PRN Cough      Allergies    fish (Angioedema)  penicillins (Rash)    Intolerances        Vital Signs Last 24 Hrs  T(C): 36.6 (27 Apr 2020 05:25), Max: 36.6 (27 Apr 2020 05:25)  T(F): 97.8 (27 Apr 2020 05:25), Max: 97.8 (27 Apr 2020 05:25)  HR: 82 (27 Apr 2020 05:25) (82 - 90)  BP: 121/63 (27 Apr 2020 05:25) (105/50 - 126/66)  BP(mean): --  RR: 20 (27 Apr 2020 05:25) (19 - 20)  SpO2: 99% (27 Apr 2020 05:25) (99% - 100%)    PHYSICAL EXAM  General: adult in NAD  HEENT: clear oropharynx, anicteric sclera, pink conjunctiva  Neck: supple  CV: normal S1/S2 with no murmur rubs or gallops  Lungs: positive air movement b/l ant lungs,clear to auscultation, no wheezes, no rales  Abdomen: soft non-tender non-distended, no hepatosplenomegaly  Ext: no clubbing cyanosis or edema  Skin: no rashes and no petechiae  Neuro: alert and oriented X 4, no focal deficits  LABS:                          12.6   6.88  )-----------( 162      ( 27 Apr 2020 07:00 )             40.0         Mean Cell Volume : 88.1 fl  Mean Cell Hemoglobin : 27.8 pg  Mean Cell Hemoglobin Concentration : 31.5 gm/dL  Auto Neutrophil # : x  Auto Lymphocyte # : x  Auto Monocyte # : x  Auto Eosinophil # : x  Auto Basophil # : x  Auto Neutrophil % : x  Auto Lymphocyte % : x  Auto Monocyte % : x  Auto Eosinophil % : x  Auto Basophil % : x    Serial CBC  Hematocrit 40.0  Hemoglobin 12.6  Plat 162  RBC 4.54  WBC 6.88  Serial CBC  Hematocrit 39.7  Hemoglobin 12.6  Plat 108  RBC 4.51  WBC 4.81  Serial CBC  Hematocrit 39.6  Hemoglobin 12.8  Plat 143  RBC 4.57  WBC 5.54    04-27    141  |  105  |  15  ----------------------------<  228<H>  4.1   |  32<H>  |  0.64    Ca    8.6      27 Apr 2020 07:00            Ferritin, Serum: 378 ng/mL (04-24 @ 14:09)  Folate, Serum: 17.2 ng/mL (04-24 @ 14:09)  Vitamin B12, Serum: 891 pg/mL (04-24 @ 14:09)            BLOOD SMEAR INTERPRETATION:       RADIOLOGY & ADDITIONAL STUDIES:

## 2020-04-27 NOTE — PROGRESS NOTE ADULT - SUBJECTIVE AND OBJECTIVE BOX
NP Note discussed with  Primary Attending    64 y/o female admitted to medicine for subjective shortness of breath and left knee pain. Patient reports feeling suddenly short of breath after being discharged home from the ED. Of note, she is using less oxygen supplementation in the ED at present (3L) than she reports using at home. She says her left knee pain is preventing her from being able to walk. She denies any trauma, but thinks it may be from straining to climb the stairs in her apartment building. Admitted for Acute on chronic respiratory failure with hypoxia secondary to COPD exacerbation. CXR shows no infiltrates. COVID-19 PCR negative x2. Orthopedics consulted for left knee mild DJD, recommending conservative management. WBAT to lower extremities. Patient to follow up with orthopedic surgeon, Dr. Key in office at [phone]. Heme/onc consulted for lung cancer with mets to the spine, recommended CT scan which was negative.     INTERVAL HPI/OVERNIGHT EVENTS: no new complaints    MEDICATIONS  (STANDING):  ALBUTerol    90 MICROgram(s) HFA Inhaler 2 Puff(s) Inhalation every 6 hours  amLODIPine   Tablet 5 milliGRAM(s) Oral daily  carvedilol 12.5 milliGRAM(s) Oral every 12 hours  enoxaparin Injectable 40 milliGRAM(s) SubCutaneous daily  insulin glargine Injectable (LANTUS) 35 Unit(s) SubCutaneous at bedtime  insulin lispro (HumaLOG) corrective regimen sliding scale   SubCutaneous three times a day before meals  insulin lispro Injectable (HumaLOG) 15 Unit(s) SubCutaneous three times a day before meals  lidocaine   Patch 1 Patch Transdermal daily  predniSONE   Tablet 60 milliGRAM(s) Oral daily  sertraline 25 milliGRAM(s) Oral at bedtime    MEDICATIONS  (PRN):  ALPRAZolam 2 milliGRAM(s) Oral every 8 hours PRN anxiety  guaiFENesin   Syrup  (Sugar-Free) 100 milliGRAM(s) Oral every 6 hours PRN Cough  guaiFENesin   Syrup  (Sugar-Free) 100 milliGRAM(s) Oral every 6 hours PRN Cough      __________________________________________________  REVIEW OF SYSTEMS:    CONSTITUTIONAL: No fever,   EYES: no acute visual disturbances  NECK: No pain or stiffness  RESPIRATORY: No cough; No shortness of breath  CARDIOVASCULAR: No chest pain, no palpitations  GASTROINTESTINAL: No pain. No nausea or vomiting; No diarrhea   NEUROLOGICAL: No headache or numbness, no tremors  MUSCULOSKELETAL: No joint pain, no muscle pain  GENITOURINARY: no dysuria, no frequency, no hesitancy  PSYCHIATRY: no depression , no anxiety  ALL OTHER  ROS negative        Vital Signs Last 24 Hrs  T(C): 36.4 (27 Apr 2020 15:34), Max: 36.6 (27 Apr 2020 05:25)  T(F): 97.5 (27 Apr 2020 15:34), Max: 97.8 (27 Apr 2020 05:25)  HR: 88 (27 Apr 2020 15:34) (82 - 90)  BP: 117/64 (27 Apr 2020 15:34) (117/64 - 126/66)  BP(mean): --  RR: 18 (27 Apr 2020 15:34) (18 - 20)  SpO2: 98% (27 Apr 2020 15:34) (98% - 100%)    ________________________________________________  PHYSICAL EXAM:  GENERAL: NAD  HEENT: Normocephalic;  conjunctivae and sclerae clear; moist mucous membranes;   NECK : supple  CHEST/LUNG: Clear to auscultation bilaterally with good air entry   HEART: S1 S2  regular; no murmurs, gallops or rubs  ABDOMEN: Soft, Nontender, Nondistended; Bowel sounds present  EXTREMITIES: no cyanosis; no edema; no calf tenderness  SKIN: warm and dry; no rash  NERVOUS SYSTEM:  Awake and alert; Oriented  to place, person and time ; no new deficits    _________________________________________________  LABS:                        12.6   6.88  )-----------( 162      ( 27 Apr 2020 07:00 )             40.0     04-27    141  |  105  |  15  ----------------------------<  228<H>  4.1   |  32<H>  |  0.64    Ca    8.6      27 Apr 2020 07:00      CAPILLARY BLOOD GLUCOSE      POCT Blood Glucose.: 225 mg/dL (27 Apr 2020 16:26)  POCT Blood Glucose.: 217 mg/dL (27 Apr 2020 11:44)  POCT Blood Glucose.: 193 mg/dL (27 Apr 2020 08:09)  POCT Blood Glucose.: 188 mg/dL (26 Apr 2020 21:07)        RADIOLOGY & ADDITIONAL TESTS:    < from: CT Head w/ IV Cont (04.27.20 @ 13:02) >  IMPRESSION:    1)  no enhancing brain lesion identified. No CT evidence of metastatic disease.  2)  underlying involutional changes with volume loss.    < end of copied text >    < from: CT Angio Chest w/ IV Cont (12.23.19 @ 03:31) >  IMPRESSION:     No evidence of pulmonary embolism.    Emphysema.    Mediastinal adenopathy, indeterminate for malignancy.    Age-indeterminate T4 compression fracture.     Small bilateral thyroid nodules.    Other findings as above.    < end of copied text >      Imaging  Reviewed:  YES    Consultant(s) Notes Reviewed:   YES      Plan of care was discussed with patient and /or primary care giver; all questions and concerns were addressed

## 2020-04-28 ENCOUNTER — TRANSCRIPTION ENCOUNTER (OUTPATIENT)
Age: 65
End: 2020-04-28

## 2020-04-28 VITALS — DIASTOLIC BLOOD PRESSURE: 73 MMHG | HEART RATE: 88 BPM | RESPIRATION RATE: 18 BRPM | SYSTOLIC BLOOD PRESSURE: 144 MMHG

## 2020-04-28 LAB
ANION GAP SERPL CALC-SCNC: 8 MMOL/L — SIGNIFICANT CHANGE UP (ref 5–17)
BUN SERPL-MCNC: 17 MG/DL — SIGNIFICANT CHANGE UP (ref 7–18)
CALCIUM SERPL-MCNC: 8.9 MG/DL — SIGNIFICANT CHANGE UP (ref 8.4–10.5)
CHLORIDE SERPL-SCNC: 102 MMOL/L — SIGNIFICANT CHANGE UP (ref 96–108)
CO2 SERPL-SCNC: 32 MMOL/L — HIGH (ref 22–31)
CREAT SERPL-MCNC: 0.59 MG/DL — SIGNIFICANT CHANGE UP (ref 0.5–1.3)
GLUCOSE BLDC GLUCOMTR-MCNC: 107 MG/DL — HIGH (ref 70–99)
GLUCOSE BLDC GLUCOMTR-MCNC: 177 MG/DL — HIGH (ref 70–99)
GLUCOSE BLDC GLUCOMTR-MCNC: 259 MG/DL — HIGH (ref 70–99)
GLUCOSE SERPL-MCNC: 201 MG/DL — HIGH (ref 70–99)
HCT VFR BLD CALC: 40.5 % — SIGNIFICANT CHANGE UP (ref 34.5–45)
HGB BLD-MCNC: 12.8 G/DL — SIGNIFICANT CHANGE UP (ref 11.5–15.5)
MCHC RBC-ENTMCNC: 28.1 PG — SIGNIFICANT CHANGE UP (ref 27–34)
MCHC RBC-ENTMCNC: 31.6 GM/DL — LOW (ref 32–36)
MCV RBC AUTO: 88.8 FL — SIGNIFICANT CHANGE UP (ref 80–100)
NRBC # BLD: 0 /100 WBCS — SIGNIFICANT CHANGE UP (ref 0–0)
PLATELET # BLD AUTO: 148 K/UL — LOW (ref 150–400)
POTASSIUM SERPL-MCNC: 3.6 MMOL/L — SIGNIFICANT CHANGE UP (ref 3.5–5.3)
POTASSIUM SERPL-SCNC: 3.6 MMOL/L — SIGNIFICANT CHANGE UP (ref 3.5–5.3)
RBC # BLD: 4.56 M/UL — SIGNIFICANT CHANGE UP (ref 3.8–5.2)
RBC # FLD: 12.9 % — SIGNIFICANT CHANGE UP (ref 10.3–14.5)
SODIUM SERPL-SCNC: 142 MMOL/L — SIGNIFICANT CHANGE UP (ref 135–145)
WBC # BLD: 7.34 K/UL — SIGNIFICANT CHANGE UP (ref 3.8–10.5)
WBC # FLD AUTO: 7.34 K/UL — SIGNIFICANT CHANGE UP (ref 3.8–10.5)

## 2020-04-28 PROCEDURE — 99239 HOSP IP/OBS DSCHRG MGMT >30: CPT | Mod: GC

## 2020-04-28 PROCEDURE — 93971 EXTREMITY STUDY: CPT

## 2020-04-28 PROCEDURE — 97530 THERAPEUTIC ACTIVITIES: CPT

## 2020-04-28 PROCEDURE — 80061 LIPID PANEL: CPT

## 2020-04-28 PROCEDURE — 83735 ASSAY OF MAGNESIUM: CPT

## 2020-04-28 PROCEDURE — 82728 ASSAY OF FERRITIN: CPT

## 2020-04-28 PROCEDURE — 84100 ASSAY OF PHOSPHORUS: CPT

## 2020-04-28 PROCEDURE — 82746 ASSAY OF FOLIC ACID SERUM: CPT

## 2020-04-28 PROCEDURE — 70460 CT HEAD/BRAIN W/DYE: CPT

## 2020-04-28 PROCEDURE — 85027 COMPLETE CBC AUTOMATED: CPT

## 2020-04-28 PROCEDURE — 82607 VITAMIN B-12: CPT

## 2020-04-28 PROCEDURE — 82962 GLUCOSE BLOOD TEST: CPT

## 2020-04-28 PROCEDURE — 93005 ELECTROCARDIOGRAM TRACING: CPT

## 2020-04-28 PROCEDURE — 99285 EMERGENCY DEPT VISIT HI MDM: CPT

## 2020-04-28 PROCEDURE — 93306 TTE W/DOPPLER COMPLETE: CPT

## 2020-04-28 PROCEDURE — 36415 COLL VENOUS BLD VENIPUNCTURE: CPT

## 2020-04-28 PROCEDURE — 96375 TX/PRO/DX INJ NEW DRUG ADDON: CPT

## 2020-04-28 PROCEDURE — 97162 PT EVAL MOD COMPLEX 30 MIN: CPT

## 2020-04-28 PROCEDURE — 96374 THER/PROPH/DIAG INJ IV PUSH: CPT

## 2020-04-28 PROCEDURE — 83036 HEMOGLOBIN GLYCOSYLATED A1C: CPT

## 2020-04-28 PROCEDURE — 97110 THERAPEUTIC EXERCISES: CPT

## 2020-04-28 PROCEDURE — 97116 GAIT TRAINING THERAPY: CPT

## 2020-04-28 PROCEDURE — 80048 BASIC METABOLIC PNL TOTAL CA: CPT

## 2020-04-28 PROCEDURE — 84443 ASSAY THYROID STIM HORMONE: CPT

## 2020-04-28 PROCEDURE — 94640 AIRWAY INHALATION TREATMENT: CPT

## 2020-04-28 PROCEDURE — 83615 LACTATE (LD) (LDH) ENZYME: CPT

## 2020-04-28 PROCEDURE — 87635 SARS-COV-2 COVID-19 AMP PRB: CPT

## 2020-04-28 PROCEDURE — 80053 COMPREHEN METABOLIC PANEL: CPT

## 2020-04-28 RX ORDER — ALBUTEROL 90 UG/1
2 AEROSOL, METERED ORAL
Qty: 0 | Refills: 0 | DISCHARGE

## 2020-04-28 RX ORDER — ASPIRIN/CALCIUM CARB/MAGNESIUM 324 MG
1 TABLET ORAL
Qty: 0 | Refills: 0 | DISCHARGE

## 2020-04-28 RX ORDER — TIOTROPIUM BROMIDE 18 UG/1
1 CAPSULE ORAL; RESPIRATORY (INHALATION)
Qty: 0 | Refills: 0 | DISCHARGE

## 2020-04-28 RX ORDER — FAMOTIDINE 10 MG/ML
1 INJECTION INTRAVENOUS
Qty: 0 | Refills: 0 | DISCHARGE

## 2020-04-28 RX ORDER — INSULIN GLARGINE 100 [IU]/ML
40 INJECTION, SOLUTION SUBCUTANEOUS
Qty: 0 | Refills: 0 | DISCHARGE

## 2020-04-28 RX ORDER — LANOLIN ALCOHOL/MO/W.PET/CERES
2 CREAM (GRAM) TOPICAL
Qty: 0 | Refills: 0 | DISCHARGE

## 2020-04-28 RX ORDER — ATORVASTATIN CALCIUM 80 MG/1
1 TABLET, FILM COATED ORAL
Qty: 0 | Refills: 0 | DISCHARGE

## 2020-04-28 RX ORDER — FUROSEMIDE 40 MG
1 TABLET ORAL
Qty: 0 | Refills: 0 | DISCHARGE

## 2020-04-28 RX ORDER — INSULIN LISPRO 100/ML
22 VIAL (ML) SUBCUTANEOUS
Qty: 0 | Refills: 0 | DISCHARGE

## 2020-04-28 RX ORDER — AMLODIPINE BESYLATE 2.5 MG/1
1 TABLET ORAL
Qty: 0 | Refills: 0 | DISCHARGE

## 2020-04-28 RX ORDER — ALPRAZOLAM 0.25 MG
1 TABLET ORAL
Qty: 0 | Refills: 0 | DISCHARGE

## 2020-04-28 RX ORDER — ACETAMINOPHEN 500 MG
2 TABLET ORAL
Qty: 0 | Refills: 0 | DISCHARGE

## 2020-04-28 RX ORDER — CARVEDILOL PHOSPHATE 80 MG/1
2 CAPSULE, EXTENDED RELEASE ORAL
Qty: 0 | Refills: 0 | DISCHARGE

## 2020-04-28 RX ORDER — GABAPENTIN 400 MG/1
2 CAPSULE ORAL
Qty: 0 | Refills: 0 | DISCHARGE

## 2020-04-28 RX ORDER — FLUTICASONE FUROATE AND VILANTEROL TRIFENATATE 100; 25 UG/1; UG/1
1 POWDER RESPIRATORY (INHALATION)
Qty: 0 | Refills: 0 | DISCHARGE

## 2020-04-28 RX ADMIN — ENOXAPARIN SODIUM 40 MILLIGRAM(S): 100 INJECTION SUBCUTANEOUS at 11:29

## 2020-04-28 RX ADMIN — LIDOCAINE 1 PATCH: 4 CREAM TOPICAL at 11:28

## 2020-04-28 RX ADMIN — Medication 15 UNIT(S): at 11:44

## 2020-04-28 RX ADMIN — ALBUTEROL 2 PUFF(S): 90 AEROSOL, METERED ORAL at 09:00

## 2020-04-28 RX ADMIN — Medication 3: at 08:18

## 2020-04-28 RX ADMIN — ALBUTEROL 2 PUFF(S): 90 AEROSOL, METERED ORAL at 16:17

## 2020-04-28 RX ADMIN — Medication 60 MILLIGRAM(S): at 05:40

## 2020-04-28 RX ADMIN — Medication 15 UNIT(S): at 08:20

## 2020-04-28 RX ADMIN — Medication 1: at 11:44

## 2020-04-28 RX ADMIN — AMLODIPINE BESYLATE 5 MILLIGRAM(S): 2.5 TABLET ORAL at 05:40

## 2020-04-28 RX ADMIN — CARVEDILOL PHOSPHATE 12.5 MILLIGRAM(S): 80 CAPSULE, EXTENDED RELEASE ORAL at 05:40

## 2020-04-28 RX ADMIN — CARVEDILOL PHOSPHATE 12.5 MILLIGRAM(S): 80 CAPSULE, EXTENDED RELEASE ORAL at 17:06

## 2020-04-28 RX ADMIN — Medication 0.5 MILLIGRAM(S): at 06:43

## 2020-04-28 NOTE — PROGRESS NOTE ADULT - ATTENDING COMMENTS
Patient was seen and examined by me on 04/26/2020,interim events noted,labs and radiology studies reviewed.  Bo Sorto MD,FACC.  [address] [place] [address].  [id number].  [phone]
I have examined pt personally Hx chart lab and xrays reviewed and pt discussed with residents
I have examined pt personally Hx chart lab and xrays reviewed and pt discussed with residents
Patient was examined at the bedside.   Patient is seated in a chair, feels slightly better, concerned about managing at home.   Steroids, indicated for COPD exacerbation are relatively contraindicated with her immunomodulatory therapy.   Will taper.  Discharge home today.  F/u PMD, Dr. Nugent, Dr. Mendiola.
Patient was examined at the bedside.   I agree with the assessment of NP Brett, above.   Steroids, indicated for COPD exacerbation are relatively contraindicated with her immunomodulatory therapy.   Possible discharge home tomorrow.
Patient c/o increased SOB, requests nebulizer rather than inhaler, called nursing office.   she is also requesting increased dose of steroids because of SOB.     With RT at the bedside, we explained that hospital policy does not allow the use of nebulizers during this emergency.     Pt re presenting to the ED within hours of discharge on account of worsening respiratory symptoms.- worsening SOB. Pt notes she cannot lie flat because of difficulty breathing and has been sleeping in a chair for a while now. She also has early morning fatigue and headaches that is concerning for CRIS.      Middle aged obese woman, NAD   SaO2 is 95% on 3LPM of O2 via NC.  CTA B/L RRR S1S2 only  Soft NT ND BS +  Trace to no pedal edema  No focal deficits apparent.    CXR - clear    COVID 19 not detected    Impression  COPD exacerbation, stable  Lung cancer, on immune modulator, relative contraindication to steroids.   Possible CRIS  DM, stable  HTN, stable    A/P  - Acute on chronic respiratory failure  - Acute SOB with consideration      COPD exacerbation      R/O CHF       +/- Anxiety disorder, improved  - Right lung cancer on active immunotherapy treatment  - Suspicion for CRIS    Anxiety    Plan    - Continue  inhaler with spacer, as nebulizer is not allowed in hospital  - Resume prednisone 60 mg, at patient request     Repeat COVID PCR, at patient request  - Supplemental O2 as above  - Ambulatory home O2 evaluation before d/c ( pt states the 5LPM( via NC) no longer helping).  - ECHO  - trial of SSRI  - Pulmonology consult - Dr Nugent, appreciated.
Pt seen and examined.     Pt re presenting to the ED within hours of discharge on account of worsening respiratory symptoms.- worsening SOB. Pt notes she cannot lie flat because of difficulty breathing and has been sleeping in a chair for a while now. She also has early morning fatigue and headaches that is concerning for CRIS.      Middle aged obese woman, NAD but worried and sad about her health conditions.  SaO2 is 95% on 3LPM of O2 via NC.  CTA B/L RRR S1S2 only  Soft NT ND BS +  Trace to no pedal edema  No focal deficits apparent.    CXR - clear    COVID 19 not detected    Impression  65 year old woman with PMH of COPD with chronic resp failure on home o2, ITP, right lung cancer  s/p VATS on immunotherapy started 3 weeks ago. She is here on account of worsening SOB with initial concern for a COPD exacerbation with acute on chronic respiratory failure. Presently her O2 requirement are better at rest, stable V/signs, exam and clear lung on CXR but continues to have discomfort and SOB.  With orthopnea described above and hx of intermittent b/l LE edema, would r/o CHF undiagnosed.  Will also r/o anxiety component which has been discussed before in the clinics for which she was placed on xanax prn ( she rarely uses this).  She also describes multiple crying spells indicative of depressive episodes or clinical depression. Would require psychiatric evaluation when possible  Finally she would need a sleep study to evaluate for CRIS as stated above.    A/P  - Acute on chronic respiratory failure  - Acute SOB with consideration      COPD exacerbation      R/O CHF       +/- Anxiety disorder, improved  - Right lung cancer on active immunotherapy treatment  - Suspicion for CRIS   - Psych evaluation to r/o clinical depression /anxiety disorder and appropriate management.    Plan  - Admit to Medicine  - Continue RTC bronchodilators nebulizer vs MDI( NB pt is -ve)  - OK to use steroids per Dr Mendiola; Steroid taper -> oral prednisone  - Supplemental O2 as above  - Ambulatory home O2 evaluation before d/c ( pt states the 5LPM( via NC) no longer helping).  - ECHO  - trial of SSRI  - Pulmonology consult - Dr Nugent, appreciated.

## 2020-04-28 NOTE — PROGRESS NOTE ADULT - PROBLEM SELECTOR PLAN 3
Mild DJD  Seen by ortho, conservative management   Daily PT - WBAT to lower extremities with appropriate assistive device   Patient is orthopedically stable for discharge  Patient to follow up with orthopedic surgeon, Dr. Key in office at [phone]
Mild DJD  Seen by ortho, conservative management   Daily PT - WBAT to lower extremities with appropriate assistive device   Patient is orthopedically stable for discharge  Patient to follow up with orthopedic surgeon, Dr. Key in office at [phone]
Would evaluate for CHF
Would evaluate for CHF

## 2020-04-28 NOTE — PROGRESS NOTE ADULT - NSHPATTENDINGPLANDISCUSS_GEN_ALL_CORE
Floor staff and PMD
Floor staff and PMD
Patient, NP, RN
Patient, NP, RN, PT
Patient
Patient, RN, RN manager, RN supervisor, NP, RT

## 2020-04-28 NOTE — PROGRESS NOTE ADULT - PROBLEM/PLAN-1
DISPLAY PLAN FREE TEXT
DISPLAY PLAN FREE TEXT
no tingling/no stiffness
DISPLAY PLAN FREE TEXT
DISPLAY PLAN FREE TEXT

## 2020-04-28 NOTE — PROGRESS NOTE ADULT - SUBJECTIVE AND OBJECTIVE BOX
PULMONARY  progress note    BARB COLÓN  MRN-[id number]    Patient is a 65y old  Female who presents with a chief complaint of dyspnea (27 Apr 2020 17:14)  OLIVER with BRP, pain lt knee  CT Head normal      MEDICATIONS  (STANDING):  ALBUTerol    90 MICROgram(s) HFA Inhaler 2 Puff(s) Inhalation every 6 hours  amLODIPine   Tablet 5 milliGRAM(s) Oral daily  carvedilol 12.5 milliGRAM(s) Oral every 12 hours  enoxaparin Injectable 40 milliGRAM(s) SubCutaneous daily  insulin glargine Injectable (LANTUS) 35 Unit(s) SubCutaneous at bedtime  insulin lispro (HumaLOG) corrective regimen sliding scale   SubCutaneous three times a day before meals  insulin lispro Injectable (HumaLOG) 15 Unit(s) SubCutaneous three times a day before meals  lidocaine   Patch 1 Patch Transdermal daily  predniSONE   Tablet 60 milliGRAM(s) Oral daily  sertraline 25 milliGRAM(s) Oral at bedtime      MEDICATIONS  (PRN):  ALPRAZolam 2 milliGRAM(s) Oral every 8 hours PRN anxiety  guaiFENesin   Syrup  (Sugar-Free) 100 milliGRAM(s) Oral every 6 hours PRN Cough  guaiFENesin   Syrup  (Sugar-Free) 100 milliGRAM(s) Oral every 6 hours PRN Cough      Allergies    fish (Angioedema)  penicillins (Rash)        PAST MEDICAL & SURGICAL HISTORY:  Malignant neoplasm of unspecified part of right bronchus or lung  HTN (hypertension)  DM (diabetes mellitus)  COPD (chronic obstructive pulmonary disease)  Lung cancer  Morbid obesity  GERD (gastroesophageal reflux disease)  Deviated septum  Prolapsed uterus  ITP (idiopathic thrombocytopenic purpura)  Emphysema/COPD  History of cholecystectomy  S/P lobectomy of lung: right 2017  History of tonsillectomy           REVIEW OF SYSTEMS:  CONSTITUTIONAL: No fever, weight loss, or fatigue   EYES: No eye pain, visual disturbances, or discharge  ENT:  No difficulty hearing, tinnitus, vertigo; No sinus or throat pain  NECK: No pain or stiffness or nodes  RESPIRATORY:  cough--   wheezing--   chills--   hemoptysis--  Shortness of Breath+  CARDIOVASCULAR: No chest pain, palpitations, passing out, dizziness, or leg swelling  GASTROINTESTINAL: No abdominal or epigastric pain. No nausea, vomiting, or hematemesis; No diarrhea or constipation. No melena or hematochezia.  GENITOURINARY: No dysuria, frequency, hematuria, or incontinence  NEUROLOGICAL: No headaches, memory loss, loss of strength, numbness, or tremors  SKIN: No itching, burning, rashes, or lesions   LYMPH Nodes: No enlarged glands  ENDOCRINE: No heat or cold intolerance; No hair loss  MUSCULOSKELETAL: Pain lt knee,  No muscle, back, or extremity pain  PSYCHIATRIC: No depression, anxiety, mood swings, or difficulty sleeping  HEME/LYMPH: No easy bruising, or bleeding gums  ALLERGY AND IMMUNOLOGIC: No hives or eczema    Vital Signs Last 24 Hrs  T(C): 36.5 (28 Apr 2020 05:00), Max: 36.6 (27 Apr 2020 20:20)  T(F): 97.7 (28 Apr 2020 05:00), Max: 97.8 (27 Apr 2020 20:20)  HR: 71 (28 Apr 2020 05:00) (71 - 96)  BP: 115/56 (28 Apr 2020 05:00) (115/56 - 139/67)  BP(mean): --  RR: 18 (28 Apr 2020 05:00) (18 - 18)  SpO2: 100% (28 Apr 2020 05:00) (98% - 100%)  I&O's Detail      PHYSICAL EXAMINATION:    GENERAL: The patient is a well-developed, well-nourished in no apparent distress.   SKIN no rash ecchymoses or bruises  HEENT: Head is normocephalic and atraumatic  SHAMA , Extraocular muscles are intact. Mucous membranes  moist.   Neck supple ,No LN felt JVP not increased  Thyroid not enlarged  Cardiovascular:  S1 S2 heard, RSR, No JVD , systolic  murmur at apex, No gallop or rub  Respiratory: Chest wall symmetrical with good air entry ,Percussion note normal,    Lungs vesicular breathing with  fine inspiratory basilar  rales , no   wheeze	  ABDOMEN:  Soft, Non-tender, obese, no hepatomegaly or splenomegaly BS positive	  Extremities: Normal range of motion, No clubbing, cyanosis or edema  Vascular: Peripheral pulses palpable 2+ bilaterally  CNS:  Alert and oriented x3   Cranial nerves intact  sensory intact  motor power5/5  dtr 2+   Babinski neg    LABS:                        12.8   7.34  )-----------( 148      ( 28 Apr 2020 06:39 )             40.5     04-28    142  |  102  |  17  ----------------------------<  201<H>  3.6   |  32<H>  |  0.59    Ca    8.9      28 Apr 2020 06:39      Ferritin, Serum: 378 ng/mL (04-24-20 @ 14:09)  Folate, Serum: 17.2 ng/mL (04-24-20 @ 14:09)  Vitamin B12, Serum: 891 pg/mL (04-24-20 @ 14:09)        RADIOLOGY & ADDITIONAL STUDIES:      CT HEAD; < from: CT Head w/ IV Cont (04.27.20 @ 13:02) >  1)  no enhancing brain lesion identified. No CT evidence of metastatic disease.  2)  underlying involutional changes with volume loss.

## 2020-04-28 NOTE — PROGRESS NOTE ADULT - PROBLEM SELECTOR PLAN 2
Mets to the spine   S/P first keytruda   CT head negative for mets  Heme/onc Dr. Mendiola
Mets to the spine   S/P first keytruda   CT head negative for mets  Heme/onc Dr. Mendiola
reports feeling short of breath after being discharged home  O2 sat normal on less O2 supplementation than patient uses at home  CXR shows no infiltrates  hemoptysis patient is reporting is not acute and has been monitored by outpt provider  COVID-19 PCR testing  s/p solumedrol 40mg x1 in ED; will hold off on further steroids  supportive care  resume home anxiolytic  Would need outpatient CRIS evaluation with sleep study
reports feeling short of breath after being discharged home  O2 sat normal on less O2 supplementation than patient uses at home  CXR shows no infiltrates  hemoptysis patient is reporting is not acute and has been monitored by outpt provider  COVID-19 PCR testing  s/p solumedrol 40mg x1 in ED; will hold off on further steroids  supportive care  resume home anxiolytic  Would need outpatient CRIS evaluation with sleep study

## 2020-04-28 NOTE — PROGRESS NOTE ADULT - PROBLEM SELECTOR PLAN 5
DVT ppx - lovenox
DVT ppx - lovenox
no evidence of fracture or joint swelling on imaging  lidocaine patch daily  PT eval
no evidence of fracture or joint swelling on imaging  lidocaine patch daily  PT eval

## 2020-04-28 NOTE — PROGRESS NOTE ADULT - PROBLEM SELECTOR PROBLEM 1
COPD exacerbation
COPD exacerbation
Acute on chronic respiratory failure with hypoxia
Acute on chronic respiratory failure with hypoxia

## 2020-04-28 NOTE — PROGRESS NOTE ADULT - PROBLEM SELECTOR PLAN 6
PT recommending home PT  Needs cane per attending   Needs teaching on respiratory medications   Plan for DC in AM
PT recommending home PT  Needs cane per attending   Needs teaching on respiratory medications   Plan for DC in AM
resume basal-bolus insulin regimen  starting hss  monitor fsg  check hba1c
resume basal-bolus insulin regimen  starting hss  monitor fsg  check hba1c

## 2020-04-28 NOTE — PROGRESS NOTE ADULT - PROBLEM SELECTOR PROBLEM 2
Lung cancer metastatic to bone
Lung cancer metastatic to bone
Shortness of breath
Shortness of breath

## 2020-04-28 NOTE — PROGRESS NOTE ADULT - PROBLEM SELECTOR PROBLEM 4
DM (diabetes mellitus)
DM (diabetes mellitus)
COPD (chronic obstructive pulmonary disease)
COPD (chronic obstructive pulmonary disease)

## 2020-04-28 NOTE — PROGRESS NOTE ADULT - SUBJECTIVE AND OBJECTIVE BOX
Medical attending note:     64 y/o female admitted to medicine for subjective shortness of breath and left knee pain. Patient reports feeling suddenly short of breath after being discharged home from the ED. Of note, she is using less oxygen supplementation in the ED at present (3L) than she reports using at home. She says her left knee pain is preventing her from being able to walk. She denies any trauma, but thinks it may be from straining to climb the stairs in her apartment building. Admitted for Acute on chronic respiratory failure with hypoxia secondary to COPD exacerbation. CXR shows no infiltrates. COVID-19 PCR negative x2. Orthopedics consulted for left knee mild DJD, recommending conservative management. WBAT to lower extremities. Patient to follow up with orthopedic surgeon, Dr. Key in office at [phone]. Heme/onc consulted for lung cancer with mets to the spine, recommended CT scan which was negative.     INTERVAL HPI/OVERNIGHT EVENTS: no new complaints    MEDICATIONS  (STANDING):  ALBUTerol    90 MICROgram(s) HFA Inhaler 2 Puff(s) Inhalation every 6 hours  amLODIPine   Tablet 5 milliGRAM(s) Oral daily  carvedilol 12.5 milliGRAM(s) Oral every 12 hours  enoxaparin Injectable 40 milliGRAM(s) SubCutaneous daily  insulin glargine Injectable (LANTUS) 35 Unit(s) SubCutaneous at bedtime  insulin lispro (HumaLOG) corrective regimen sliding scale   SubCutaneous three times a day before meals  insulin lispro Injectable (HumaLOG) 15 Unit(s) SubCutaneous three times a day before meals  lidocaine   Patch 1 Patch Transdermal daily  predniSONE   Tablet 60 milliGRAM(s) Oral daily  sertraline 25 milliGRAM(s) Oral at bedtime    MEDICATIONS  (PRN):  ALPRAZolam 2 milliGRAM(s) Oral every 8 hours PRN anxiety  guaiFENesin   Syrup  (Sugar-Free) 100 milliGRAM(s) Oral every 6 hours PRN Cough  guaiFENesin   Syrup  (Sugar-Free) 100 milliGRAM(s) Oral every 6 hours PRN Cough      __________________________________________________  REVIEW OF SYSTEMS:    CONSTITUTIONAL: No fever,   EYES: no acute visual disturbances  NECK: No pain or stiffness  RESPIRATORY: still c/o SOB  CARDIOVASCULAR: No chest pain, no palpitations  GASTROINTESTINAL: No pain. No nausea or vomiting; No diarrhea   NEUROLOGICAL: No headache or numbness, no tremors  MUSCULOSKELETAL: improved pain, left knee  GENITOURINARY: no dysuria, no frequency, no hesitancy  PSYCHIATRY: no depression , no anxiety  ALL OTHER  ROS negative      Vital Signs Last 24 Hrs  T(C): 36.8 (28 Apr 2020 16:06), Max: 36.9 (28 Apr 2020 14:36)  T(F): 98.3 (28 Apr 2020 16:06), Max: 98.5 (28 Apr 2020 14:36)  HR: 88 (28 Apr 2020 17:09) (71 - 88)  BP: 144/73 (28 Apr 2020 17:09) (115/56 - 144/73)  BP(mean): --  RR: 18 (28 Apr 2020 17:09) (18 - 18)  SpO2: 98% (28 Apr 2020 16:06) (94% - 100%)    ________________________________________________  PHYSICAL EXAM:  GENERAL: NAD  HEENT: Normocephalic;  conjunctivae and sclerae clear; moist mucous membranes;   NECK : supple  CHEST/LUNG: Clear to auscultation bilaterally with good air entry   HEART: S1 S2  regular; no murmurs, gallops or rubs  ABDOMEN: Soft, Nontender, Nondistended; Bowel sounds present  EXTREMITIES: no cyanosis; no edema; no calf tenderness  SKIN: warm and dry; no rash  NERVOUS SYSTEM:  Awake and alert; Oriented  to place, person and time ; no new deficits    _________________________________________________                          12.8   7.34  )-----------( 148      ( 28 Apr 2020 06:39 )             40.5   04-28    142  |  102  |  17  ----------------------------<  201<H>  3.6   |  32<H>  |  0.59    Ca    8.9      28 Apr 2020 06:39          RADIOLOGY & ADDITIONAL TESTS:    < from: CT Head w/ IV Cont (04.27.20 @ 13:02) >  IMPRESSION:    1)  no enhancing brain lesion identified. No CT evidence of metastatic disease.  2)  underlying involutional changes with volume loss.    < end of copied text >    < from: CT Angio Chest w/ IV Cont (12.23.19 @ 03:31) >  IMPRESSION:     No evidence of pulmonary embolism.    Emphysema.    Mediastinal adenopathy, indeterminate for malignancy.    Age-indeterminate T4 compression fracture.     Small bilateral thyroid nodules.    Other findings as above.    < end of copied text >      Imaging  Reviewed:  YES    Consultant(s) Notes Reviewed:   YES      Plan of care was discussed with patient and /or primary care giver; all questions and concerns were addressed

## 2020-04-28 NOTE — DISCHARGE NOTE NURSING/CASE MANAGEMENT/SOCIAL WORK - PATIENT PORTAL LINK FT
You can access the FollowMyHealth Patient Portal offered by [facility] by registering at the following website: [email or web address]. By joining [facility]’s FollowMyHealth portal, you will also be able to view your health information using other applications (apps) compatible with our system.

## 2020-04-28 NOTE — PROGRESS NOTE ADULT - PROBLEM SELECTOR PLAN 1
Pt on 4-5L 02 at home   Continue steroids   Plan for prednisone taper upon discharge   Continue symbicort, albuterol HFA   Pulmonary Dr. Nugent
Pt on 4-5L 02 at home   Continue steroids   Plan for prednisone taper upon discharge   Continue symbicort, albuterol HFA   Pulmonary Dr. Nugent
reports feeling short of breath after being discharged home  O2 sat normal on less O2 supplementation than patient uses at home  CXR shows no infiltrates  hemoptysis patient is reporting is not acute and has been monitored by outpt provider  COVID-19 PCR testing  s/p solumedrol 40mg x1 in ED; will hold off on further steroids  supportive care  resume home anxiolytic
reports feeling short of breath after being discharged home  O2 sat normal on less O2 supplementation than patient uses at home  CXR shows no infiltrates  hemoptysis patient is reporting is not acute and has been monitored by outpt provider  COVID-19 PCR testing  s/p solumedrol 40mg x1 in ED; will hold off on further steroids  supportive care  resume home anxiolytic

## 2020-04-28 NOTE — DIETITIAN INITIAL EVALUATION ADULT. - PERTINENT LABORATORY DATA
04-28 Na142 mmol/L Glu 201 mg/dL<H> K+ 3.6 mmol/L Cr  0.59 mg/dL BUN 17 mg/dL 04-24 Phos 3.7 mg/dL 04-24 Alb 3.2 g/dL<L> 04-24 Chol 145 mg/dL LDL 58 mg/dL HDL 68 mg/dL Trig 93 mg/dL

## 2020-04-28 NOTE — PROGRESS NOTE ADULT - ASSESSMENT
COPD with exacerbation   Carc lung s/p RLL [id number] with recurrence with mets bones   S/p ITP  DM  Htn   Obesity      PLAN- HFN Rx with duoneb qid  CT Chest   FS coverage    prednisone 40-30-20-10 mg x 3 days and d/c   symbicort 160/4.5  2 puffs bid on d/c   S/C lovenox   ABG, HbA1C,    OOB / BRP   P/t for ambulation  Sleep studies as out pt but abg first to r/o OHS   D/C plans

## 2020-04-28 NOTE — DIETITIAN INITIAL EVALUATION ADULT. - PROBLEM SELECTOR PLAN 2
reports feeling short of breath after being discharged home  O2 sat normal on less O2 supplementation than patient uses at home  CXR shows no infiltrates  hemoptysis patient is reporting is not acute and has been monitored by outpt provider  COVID-19 PCR testing  s/p solumedrol 40mg x1 in ED; will hold off on further steroids  supportive care  resume home anxiolytic  Would need outpatient CRIS evaluation with sleep study

## 2020-04-28 NOTE — PROGRESS NOTE ADULT - PROBLEM SELECTOR PLAN 4
A1C 8.5   Continue accuchecks and sliding scale
A1C 8.5   Continue accuchecks and sliding scale
not in exacerbation  albuterol prn  Consider steroids if symptoms worsens/recurs
not in exacerbation  albuterol prn  Consider steroids if symptoms worsens/recurs

## 2020-04-28 NOTE — DIETITIAN INITIAL EVALUATION ADULT. - OTHER INFO
unable to interview patient face to face as rule out covid-19 . attempted phone extension in room but No response. no information on wt loss available .

## 2020-06-25 ENCOUNTER — INPATIENT (INPATIENT)
Facility: HOSPITAL | Age: 65
LOS: 9 days | Discharge: HOME HEALTH | End: 2020-07-05
Attending: HOSPITALIST | Admitting: HOSPITALIST
Payer: MEDICAID

## 2020-06-25 VITALS
OXYGEN SATURATION: 100 % | TEMPERATURE: 98 F | SYSTOLIC BLOOD PRESSURE: 138 MMHG | HEART RATE: 92 BPM | RESPIRATION RATE: 18 BRPM | DIASTOLIC BLOOD PRESSURE: 98 MMHG

## 2020-06-25 DIAGNOSIS — M54.2 CERVICALGIA: ICD-10-CM

## 2020-06-25 DIAGNOSIS — Z90.89 ACQUIRED ABSENCE OF OTHER ORGANS: Chronic | ICD-10-CM

## 2020-06-25 DIAGNOSIS — C34.90 MALIGNANT NEOPLASM OF UNSPECIFIED PART OF UNSPECIFIED BRONCHUS OR LUNG: ICD-10-CM

## 2020-06-25 DIAGNOSIS — R09.02 HYPOXEMIA: ICD-10-CM

## 2020-06-25 DIAGNOSIS — Z02.9 ENCOUNTER FOR ADMINISTRATIVE EXAMINATIONS, UNSPECIFIED: ICD-10-CM

## 2020-06-25 DIAGNOSIS — J44.1 CHRONIC OBSTRUCTIVE PULMONARY DISEASE WITH (ACUTE) EXACERBATION: ICD-10-CM

## 2020-06-25 DIAGNOSIS — J96.01 ACUTE RESPIRATORY FAILURE WITH HYPOXIA: ICD-10-CM

## 2020-06-25 DIAGNOSIS — R52 PAIN, UNSPECIFIED: ICD-10-CM

## 2020-06-25 DIAGNOSIS — Z90.2 ACQUIRED ABSENCE OF LUNG [PART OF]: Chronic | ICD-10-CM

## 2020-06-25 DIAGNOSIS — Z29.9 ENCOUNTER FOR PROPHYLACTIC MEASURES, UNSPECIFIED: ICD-10-CM

## 2020-06-25 DIAGNOSIS — Z90.49 ACQUIRED ABSENCE OF OTHER SPECIFIED PARTS OF DIGESTIVE TRACT: Chronic | ICD-10-CM

## 2020-06-25 DIAGNOSIS — E11.9 TYPE 2 DIABETES MELLITUS WITHOUT COMPLICATIONS: ICD-10-CM

## 2020-06-25 DIAGNOSIS — T14.8XXA OTHER INJURY OF UNSPECIFIED BODY REGION, INITIAL ENCOUNTER: ICD-10-CM

## 2020-06-25 LAB
ALBUMIN SERPL ELPH-MCNC: 4 G/DL — SIGNIFICANT CHANGE UP (ref 3.3–5)
ALP SERPL-CCNC: 177 U/L — HIGH (ref 40–120)
ALT FLD-CCNC: 10 U/L — SIGNIFICANT CHANGE UP (ref 4–33)
ANION GAP SERPL CALC-SCNC: 13 MMO/L — SIGNIFICANT CHANGE UP (ref 7–14)
APTT BLD: 32.6 SEC — SIGNIFICANT CHANGE UP (ref 27.5–36.3)
AST SERPL-CCNC: 13 U/L — SIGNIFICANT CHANGE UP (ref 4–32)
BASE EXCESS BLDV CALC-SCNC: 7.9 MMOL/L — SIGNIFICANT CHANGE UP
BASOPHILS # BLD AUTO: 0.04 K/UL — SIGNIFICANT CHANGE UP (ref 0–0.2)
BASOPHILS NFR BLD AUTO: 0.6 % — SIGNIFICANT CHANGE UP (ref 0–2)
BILIRUB SERPL-MCNC: 1.2 MG/DL — SIGNIFICANT CHANGE UP (ref 0.2–1.2)
BLOOD GAS VENOUS - CREATININE: 0.69 MG/DL — SIGNIFICANT CHANGE UP (ref 0.5–1.3)
BUN SERPL-MCNC: 9 MG/DL — SIGNIFICANT CHANGE UP (ref 7–23)
CALCIUM SERPL-MCNC: 9.4 MG/DL — SIGNIFICANT CHANGE UP (ref 8.4–10.5)
CHLORIDE BLDV-SCNC: 97 MMOL/L — SIGNIFICANT CHANGE UP (ref 96–108)
CHLORIDE SERPL-SCNC: 94 MMOL/L — LOW (ref 98–107)
CO2 SERPL-SCNC: 31 MMOL/L — SIGNIFICANT CHANGE UP (ref 22–31)
CREAT SERPL-MCNC: 0.7 MG/DL — SIGNIFICANT CHANGE UP (ref 0.5–1.3)
EOSINOPHIL # BLD AUTO: 0.24 K/UL — SIGNIFICANT CHANGE UP (ref 0–0.5)
EOSINOPHIL NFR BLD AUTO: 3.4 % — SIGNIFICANT CHANGE UP (ref 0–6)
GAS PNL BLDV: 135 MMOL/L — LOW (ref 136–146)
GLUCOSE BLDV-MCNC: 216 MG/DL — HIGH (ref 70–99)
GLUCOSE SERPL-MCNC: 230 MG/DL — HIGH (ref 70–99)
HCO3 BLDV-SCNC: 29 MMOL/L — HIGH (ref 20–27)
HCT VFR BLD CALC: 39.4 % — SIGNIFICANT CHANGE UP (ref 34.5–45)
HCT VFR BLDV CALC: 40.6 % — SIGNIFICANT CHANGE UP (ref 34.5–45)
HGB BLD-MCNC: 12.8 G/DL — SIGNIFICANT CHANGE UP (ref 11.5–15.5)
HGB BLDV-MCNC: 13.2 G/DL — SIGNIFICANT CHANGE UP (ref 11.5–15.5)
IMM GRANULOCYTES NFR BLD AUTO: 1.3 % — SIGNIFICANT CHANGE UP (ref 0–1.5)
INR BLD: 1.17 — SIGNIFICANT CHANGE UP (ref 0.88–1.17)
LACTATE BLDV-MCNC: 1.6 MMOL/L — SIGNIFICANT CHANGE UP (ref 0.5–2)
LYMPHOCYTES # BLD AUTO: 1.06 K/UL — SIGNIFICANT CHANGE UP (ref 1–3.3)
LYMPHOCYTES # BLD AUTO: 14.9 % — SIGNIFICANT CHANGE UP (ref 13–44)
MCHC RBC-ENTMCNC: 26.8 PG — LOW (ref 27–34)
MCHC RBC-ENTMCNC: 32.5 % — SIGNIFICANT CHANGE UP (ref 32–36)
MCV RBC AUTO: 82.6 FL — SIGNIFICANT CHANGE UP (ref 80–100)
MONOCYTES # BLD AUTO: 0.78 K/UL — SIGNIFICANT CHANGE UP (ref 0–0.9)
MONOCYTES NFR BLD AUTO: 10.9 % — SIGNIFICANT CHANGE UP (ref 2–14)
NEUTROPHILS # BLD AUTO: 4.92 K/UL — SIGNIFICANT CHANGE UP (ref 1.8–7.4)
NEUTROPHILS NFR BLD AUTO: 68.9 % — SIGNIFICANT CHANGE UP (ref 43–77)
NRBC # FLD: 0 K/UL — SIGNIFICANT CHANGE UP (ref 0–0)
NT-PROBNP SERPL-SCNC: 52.85 PG/ML — SIGNIFICANT CHANGE UP
PCO2 BLDV: 60 MMHG — HIGH (ref 41–51)
PH BLDV: 7.36 PH — SIGNIFICANT CHANGE UP (ref 7.32–7.43)
PLATELET # BLD AUTO: 112 K/UL — LOW (ref 150–400)
PMV BLD: 12 FL — SIGNIFICANT CHANGE UP (ref 7–13)
PO2 BLDV: < 24 MMHG — LOW (ref 35–40)
POTASSIUM BLDV-SCNC: 3.1 MMOL/L — LOW (ref 3.4–4.5)
POTASSIUM SERPL-MCNC: 3.2 MMOL/L — LOW (ref 3.5–5.3)
POTASSIUM SERPL-SCNC: 3.2 MMOL/L — LOW (ref 3.5–5.3)
PROT SERPL-MCNC: 6.5 G/DL — SIGNIFICANT CHANGE UP (ref 6–8.3)
PROTHROM AB SERPL-ACNC: 13.4 SEC — HIGH (ref 9.8–13.1)
RBC # BLD: 4.77 M/UL — SIGNIFICANT CHANGE UP (ref 3.8–5.2)
RBC # FLD: 12.9 % — SIGNIFICANT CHANGE UP (ref 10.3–14.5)
SAO2 % BLDV: 32.4 % — LOW (ref 60–85)
SARS-COV-2 RNA SPEC QL NAA+PROBE: SIGNIFICANT CHANGE UP
SODIUM SERPL-SCNC: 138 MMOL/L — SIGNIFICANT CHANGE UP (ref 135–145)
TROPONIN T, HIGH SENSITIVITY: 10 NG/L — SIGNIFICANT CHANGE UP (ref ?–14)
TROPONIN T, HIGH SENSITIVITY: 6 NG/L — SIGNIFICANT CHANGE UP (ref ?–14)
WBC # BLD: 7.13 K/UL — SIGNIFICANT CHANGE UP (ref 3.8–10.5)
WBC # FLD AUTO: 7.13 K/UL — SIGNIFICANT CHANGE UP (ref 3.8–10.5)

## 2020-06-25 PROCEDURE — 71045 X-RAY EXAM CHEST 1 VIEW: CPT | Mod: 26

## 2020-06-25 PROCEDURE — 70491 CT SOFT TISSUE NECK W/DYE: CPT | Mod: 26

## 2020-06-25 PROCEDURE — 71275 CT ANGIOGRAPHY CHEST: CPT | Mod: 26

## 2020-06-25 PROCEDURE — 99223 1ST HOSP IP/OBS HIGH 75: CPT | Mod: GC

## 2020-06-25 RX ORDER — INSULIN GLARGINE 100 [IU]/ML
40 INJECTION, SOLUTION SUBCUTANEOUS
Qty: 0 | Refills: 0 | DISCHARGE

## 2020-06-25 RX ORDER — AZITHROMYCIN 500 MG/1
500 TABLET, FILM COATED ORAL ONCE
Refills: 0 | Status: DISCONTINUED | OUTPATIENT
Start: 2020-06-25 | End: 2020-06-25

## 2020-06-25 RX ORDER — GABAPENTIN 400 MG/1
1 CAPSULE ORAL
Qty: 0 | Refills: 0 | DISCHARGE

## 2020-06-25 RX ORDER — OXYCODONE AND ACETAMINOPHEN 5; 325 MG/1; MG/1
1 TABLET ORAL ONCE
Refills: 0 | Status: DISCONTINUED | OUTPATIENT
Start: 2020-06-25 | End: 2020-06-25

## 2020-06-25 RX ORDER — ATORVASTATIN CALCIUM 80 MG/1
40 TABLET, FILM COATED ORAL AT BEDTIME
Refills: 0 | Status: DISCONTINUED | OUTPATIENT
Start: 2020-06-25 | End: 2020-07-05

## 2020-06-25 RX ORDER — GLYBURIDE-METFORMIN HYDROCHLORIDE 1.25; 25 MG/1; MG/1
1 TABLET ORAL
Qty: 0 | Refills: 0 | DISCHARGE

## 2020-06-25 RX ORDER — ALBUTEROL 90 UG/1
1 AEROSOL, METERED ORAL ONCE
Refills: 0 | Status: COMPLETED | OUTPATIENT
Start: 2020-06-25 | End: 2020-06-25

## 2020-06-25 RX ORDER — OXYCODONE AND ACETAMINOPHEN 5; 325 MG/1; MG/1
1 TABLET ORAL EVERY 6 HOURS
Refills: 0 | Status: DISCONTINUED | OUTPATIENT
Start: 2020-06-25 | End: 2020-07-02

## 2020-06-25 RX ORDER — LIDOCAINE 4 G/100G
1 CREAM TOPICAL DAILY
Refills: 0 | Status: DISCONTINUED | OUTPATIENT
Start: 2020-06-25 | End: 2020-07-05

## 2020-06-25 RX ORDER — DEXTROSE 50 % IN WATER 50 %
25 SYRINGE (ML) INTRAVENOUS ONCE
Refills: 0 | Status: DISCONTINUED | OUTPATIENT
Start: 2020-06-25 | End: 2020-07-05

## 2020-06-25 RX ORDER — POTASSIUM CHLORIDE 20 MEQ
20 PACKET (EA) ORAL ONCE
Refills: 0 | Status: COMPLETED | OUTPATIENT
Start: 2020-06-25 | End: 2020-06-25

## 2020-06-25 RX ORDER — INSULIN LISPRO 100/ML
VIAL (ML) SUBCUTANEOUS AT BEDTIME
Refills: 0 | Status: DISCONTINUED | OUTPATIENT
Start: 2020-06-25 | End: 2020-06-26

## 2020-06-25 RX ORDER — ENOXAPARIN SODIUM 100 MG/ML
40 INJECTION SUBCUTANEOUS DAILY
Refills: 0 | Status: DISCONTINUED | OUTPATIENT
Start: 2020-06-25 | End: 2020-07-05

## 2020-06-25 RX ORDER — GLUCAGON INJECTION, SOLUTION 0.5 MG/.1ML
1 INJECTION, SOLUTION SUBCUTANEOUS ONCE
Refills: 0 | Status: DISCONTINUED | OUTPATIENT
Start: 2020-06-25 | End: 2020-07-05

## 2020-06-25 RX ORDER — CARVEDILOL PHOSPHATE 80 MG/1
6.25 CAPSULE, EXTENDED RELEASE ORAL EVERY 12 HOURS
Refills: 0 | Status: DISCONTINUED | OUTPATIENT
Start: 2020-06-25 | End: 2020-07-05

## 2020-06-25 RX ORDER — AZITHROMYCIN 500 MG/1
TABLET, FILM COATED ORAL
Refills: 0 | Status: DISCONTINUED | OUTPATIENT
Start: 2020-06-25 | End: 2020-06-25

## 2020-06-25 RX ORDER — SODIUM CHLORIDE 9 MG/ML
1000 INJECTION, SOLUTION INTRAVENOUS
Refills: 0 | Status: DISCONTINUED | OUTPATIENT
Start: 2020-06-25 | End: 2020-07-05

## 2020-06-25 RX ORDER — DEXTROSE 50 % IN WATER 50 %
12.5 SYRINGE (ML) INTRAVENOUS ONCE
Refills: 0 | Status: DISCONTINUED | OUTPATIENT
Start: 2020-06-25 | End: 2020-07-05

## 2020-06-25 RX ORDER — IPRATROPIUM/ALBUTEROL SULFATE 18-103MCG
3 AEROSOL WITH ADAPTER (GRAM) INHALATION ONCE
Refills: 0 | Status: DISCONTINUED | OUTPATIENT
Start: 2020-06-25 | End: 2020-06-25

## 2020-06-25 RX ORDER — ALBUTEROL 90 UG/1
1 AEROSOL, METERED ORAL
Qty: 0 | Refills: 0 | DISCHARGE

## 2020-06-25 RX ORDER — DEXTROSE 50 % IN WATER 50 %
15 SYRINGE (ML) INTRAVENOUS ONCE
Refills: 0 | Status: DISCONTINUED | OUTPATIENT
Start: 2020-06-25 | End: 2020-07-05

## 2020-06-25 RX ORDER — FAMOTIDINE 10 MG/ML
20 INJECTION INTRAVENOUS DAILY
Refills: 0 | Status: DISCONTINUED | OUTPATIENT
Start: 2020-06-25 | End: 2020-07-05

## 2020-06-25 RX ORDER — IPRATROPIUM/ALBUTEROL SULFATE 18-103MCG
3 AEROSOL WITH ADAPTER (GRAM) INHALATION EVERY 6 HOURS
Refills: 0 | Status: DISCONTINUED | OUTPATIENT
Start: 2020-06-25 | End: 2020-07-03

## 2020-06-25 RX ORDER — AMLODIPINE BESYLATE 2.5 MG/1
5 TABLET ORAL DAILY
Refills: 0 | Status: DISCONTINUED | OUTPATIENT
Start: 2020-06-25 | End: 2020-07-05

## 2020-06-25 RX ORDER — GABAPENTIN 400 MG/1
200 CAPSULE ORAL THREE TIMES A DAY
Refills: 0 | Status: DISCONTINUED | OUTPATIENT
Start: 2020-06-25 | End: 2020-07-05

## 2020-06-25 RX ORDER — INSULIN LISPRO 100/ML
VIAL (ML) SUBCUTANEOUS
Refills: 0 | Status: DISCONTINUED | OUTPATIENT
Start: 2020-06-25 | End: 2020-06-26

## 2020-06-25 RX ADMIN — OXYCODONE AND ACETAMINOPHEN 1 TABLET(S): 5; 325 TABLET ORAL at 11:25

## 2020-06-25 RX ADMIN — Medication 20 MILLIEQUIVALENT(S): at 22:35

## 2020-06-25 RX ADMIN — ATORVASTATIN CALCIUM 40 MILLIGRAM(S): 80 TABLET, FILM COATED ORAL at 22:35

## 2020-06-25 RX ADMIN — Medication 20 MILLIEQUIVALENT(S): at 14:17

## 2020-06-25 RX ADMIN — ALBUTEROL 1 PUFF(S): 90 AEROSOL, METERED ORAL at 14:17

## 2020-06-25 RX ADMIN — GABAPENTIN 200 MILLIGRAM(S): 400 CAPSULE ORAL at 22:35

## 2020-06-25 RX ADMIN — Medication 125 MILLIGRAM(S): at 11:25

## 2020-06-25 RX ADMIN — Medication 3: at 22:35

## 2020-06-25 NOTE — H&P ADULT - NSHPLABSRESULTS_GEN_ALL_CORE
LABS:                        12.8   7.13  )-----------( 112      ( 25 Jun 2020 11:00 )             39.4     25 Jun 2020 11:25    138    |  94     |  9      ----------------------------<  230    3.2     |  31     |  0.70     Ca    9.4        25 Jun 2020 11:25    TPro  6.5    /  Alb  4.0    /  TBili  1.2    /  DBili  x      /  AST  13     /  ALT  10     /  AlkPhos  177    25 Jun 2020 11:25    PT/INR - ( 25 Jun 2020 11:25 )   PT: 13.4 SEC;   INR: 1.17          PTT - ( 25 Jun 2020 11:25 )  PTT:32.6 SEC  CAPILLARY BLOOD GLUCOSE        BLOOD CULTURE    RADIOLOGY & ADDITIONAL TESTS:    Imaging Personally Reviewed:  [ ] YES

## 2020-06-25 NOTE — H&P ADULT - PROBLEM SELECTOR PLAN 2
She has bilateral wheezing and dry cough and feels SOB. CXR wnl.   - duoneb q6 hrs  - solumedrol 60 mg q 6 hrs  - azithromycin She has bilateral wheezing and dry cough and feels SOB. CXR wnl.   - duoneb q6 hrs  - azithromycin  - continue 4L oxygen via NC and monitor oxygen sat  - aim SaO2 >88%  - solumedrol 60 mg q 6 hrs  - COVID screen pending

## 2020-06-25 NOTE — H&P ADULT - PROBLEM SELECTOR PLAN 3
CT angio 6/25: No pulmonary embolism. Complete atelectasis of the right middle lobe secondary to mucous plugging. Increasing mediastinal lymphadenopathy, with a left paraesophageal node measuring up to 4.4 cm in greatest dimension, concerning for metastatic disease. Acute left lateral 7th rib fracture. Subacute right posterolateral sixth rib fracture. No pneumothorax.    Pathologic fracture of T7 which may correspond to focus of FDG avidity on outside PET scan. Acute left lateral 7th rib fracture. Subacute right posterolateral sixth rib fracture. No pneumothorax. Pathologic fracture of T7 which may correspond to focus of FDG avidity on outside PET scan. Acute left lateral 7th rib fracture. Subacute right posterolateral sixth rib fracture. No pneumothorax. Pathologic fracture of T7 which may correspond to focus of FDG avidity on outside PET scan.  - pain meds as below;

## 2020-06-25 NOTE — ED PROVIDER NOTE - PHYSICAL EXAMINATION
Gen: AAOx3, non-toxic, on 5L NC  Head: NCAT  HEENT: EOMI, oral mucosa moist, normal conjunctiva, right sided anterior cervical TTP  Lung: blw expiratory wheezing, , no respiratory distress, no rhonchi/rales B/L, speaking in full sentences  CV: RRR, no murmurs, rubs or gallops, mild pitting edema blt  Abd: soft, NTND, no guarding, no CVA tenderness  MSK: no visible deformities  Neuro: No focal sensory or motor deficits, normal CN exam   Skin: Warm, well perfused, no rash  Psych: normal affect.

## 2020-06-25 NOTE — H&P ADULT - NSICDXFAMILYHX_GEN_ALL_CORE_FT
FAMILY HISTORY:  Diabetes mellitus  Family history of lung cancer  Family history of stroke FAMILY HISTORY:  Diabetes mellitus  Family history of lung cancer  Family history of stroke  FH: lung cancer

## 2020-06-25 NOTE — H&P ADULT - ATTENDING COMMENTS
Pt seen and examined by me on 6/25/20 around 7pm.  Case d/w Dr. Wagner and agree with findings and plan as detailed above with few additions.    Pt p/w progressively worsening dyspnea and cough with hypoxia and diffuse wheezing on exam.  Admitting for Acute on chronic hypoxic respiratory failure 2/2 acute COPD exacerbation.  Titrate supplemental O2 to keep SpO2 88-92%.  Duonebs, prednisone, and levaquin.  Team to call pulmonologist and oncologist in AM.

## 2020-06-25 NOTE — H&P ADULT - PROBLEM SELECTOR PLAN 7
- A1C pending   - low sliding scale insulin - takes insulin as needed at home  - A1C pending   - low sliding scale insulin

## 2020-06-25 NOTE — H&P ADULT - HISTORY OF PRESENT ILLNESS
Ms. Olson is a 65 year old woman with PMH 80 pack year smoking hx, R lung cancer s/p RLL resection 2017 on chemo presenting for acute shortness of breath and pain from L sided wrist and thigh fractures. She is on 5L oxygen at home for around 3 years but feels SOB and has difficulty walking around the house. She is unable to quantify duration of present SOB .She uses a wheelchair due to SOB from walking and from pain from her fractures. She states she was admitted for COPD exacerbation several times in the past, but never needed intubation. She got her last chemo dose a week ago. She states she was on a lot of steroids previously. She also states she has rib pain on L side, worsened by coughing. She is on high doses of pain meds for fractures and rib pain She has had a dry cough for couple days. She also has a painful area within her neck, below throat. She denies fevers, congestion. She denies abdominal pain, changes in BM, changes in urination, blood in BM or blood in urine. No rashes or swelling noted. She has some dizziness but no chest pain, palpitations. She sees outpatient cardiologist. Ms. Olson is a 65 year old woman with PMH 80 pack year smoking hx, R lung cancer s/p RLL resection 2017 on chemo presenting for acute shortness of breath and pain from L sided rib fractures. She is on 5L oxygen at home for around 3 years but feels SOB and has difficulty walking around the house. She is unable to quantify duration of present SOB .She uses a wheelchair due to SOB from walking and from pain from her fractures. She states she was admitted for COPD exacerbation several times in the past, but never needed intubation. She got her last chemo dose a week ago. She states she was on a lot of steroids previously. Rib pain is worsened by coughing. She is on high doses of pain meds for fractures and rib pain She has had a dry cough for couple days. She also has a painful area within her neck, below throat. She denies fevers, congestion. She denies abdominal pain, changes in BM, changes in urination, blood in BM or blood in urine. No rashes or swelling noted. She has some dizziness but no chest pain, palpitations. She sees outpatient cardiologist.

## 2020-06-25 NOTE — ED ADULT TRIAGE NOTE - CHIEF COMPLAINT QUOTE
Patient brought to ER by EMS for low grade temperature, shortness of breath, chills ,left rib pain (hx: compact fractures), throat pain and other complaints as per patient. Pt states she has COPD and Cancer and wants to be seen at [facility].

## 2020-06-25 NOTE — H&P ADULT - NSHPPHYSICALEXAM_GEN_ALL_CORE
PHYSICAL EXAM:  GENERAL: sitting comfortably, speaks without distress  HEAD:  Atraumatic, Normocephalic  EYES: EOMI, PERRLA, conjunctiva and sclera clear  ENT: Moist mucous membranes  NECK: Supple, No JVD  CHEST/LUNG: loud wheezing inspiratory/expiratory bilaterally; no increased work of breathing  HEART: Regular rate and rhythm; No murmurs, rubs, or gallops  ABDOMEN: Bowel sounds present; Soft, Nontender, Nondistended. No hepatomegally  EXTREMITIES:  2+ Peripheral Pulses, brisk capillary refill. No clubbing, cyanosis, or edema  NERVOUS SYSTEM:  Alert & Oriented X3, speech clear. No deficits   MSK: pain on palpation in L wrist and ankle  SKIN: No rashes or lesions PHYSICAL EXAM:  GENERAL: sitting comfortably, speaks without distress  HEAD:  Atraumatic, Normocephalic  EYES: EOMI, PERRLA, conjunctiva and sclera clear  ENT: Moist mucous membranes  NECK: Supple, No JVD  CHEST/LUNG: loud wheezing inspiratory/expiratory bilaterally; no increased work of breathing  HEART: Regular rate and rhythm; No murmurs, rubs, or gallops  ABDOMEN: Bowel sounds present; Soft, Nontender, Nondistended. No hepatomegally  EXTREMITIES:  2+ Peripheral Pulses, brisk capillary refill. No clubbing, cyanosis, or edema  NERVOUS SYSTEM:  Alert & Oriented X3, speech clear. No deficits   SKIN: No rashes or lesions

## 2020-06-25 NOTE — H&P ADULT - PROBLEM SELECTOR PLAN 4
CT neck soft tissue 6/25 showed: No cervical mass or adenopathy identified. Enlarged mediastinal nodes. Correlate with contemporaneously performed chest CT. Unclear etiology - possible infection causing sore throat; CT neck soft tissue 6/25 showed: No cervical mass or adenopathy identified. Enlarged mediastinal nodes. Correlate with contemporaneously performed chest CT.'  - pain meds as below Less likely fracture from imaging results. CT neck soft tissue 6/25 showed: No cervical mass or adenopathy identified. Enlarged mediastinal nodes.   - pain meds as below

## 2020-06-25 NOTE — ED PROVIDER NOTE - NS ED ROS FT
GENERAL: fever at home  EYES: no change in vision  HEENT: no trouble swallowing or speaking, but reports right sided sore throat.   CARDIAC: no chest pain or palpitations   PULMONARY: see hpi  GI: no abdominal pain, nausea, vomiting, diarrhea, or constipation   : No changes in urination  SKIN: no rashes  NEURO: no headache, numbness, or weakness.  MSK: No joint pain

## 2020-06-25 NOTE — H&P ADULT - PROBLEM SELECTOR PLAN 5
She is on high dose pain meds at home.   - continue pain meds during admission She is on high dose pain meds at home.   - oxycodone during admission

## 2020-06-25 NOTE — CHART NOTE - NSCHARTNOTEFT_GEN_A_CORE
Confidential Drug Utilization Report  Search Terms: Kim Olson, 1955Search Date: 06/25/2020 19:38:22 PM  Searching on behalf of: 05 - [facility]  The Drug Utilization Report below displays all of the controlled substance prescriptions, if any, that your patient has filled in the last twelve months. The information displayed on this report is compiled from pharmacy submissions to the Department, and accurately reflects the information as submitted by the pharmacies.    This report was requested by: David Zayas | Reference #: [id number]    Others' Prescriptions  Patient Name: Kim Maria Date: 1955  Address: [address] [place] [id number]: Female  Rx Written	Rx Dispensed	Drug	Quantity	Days Supply	Prescriber Name	Payment Method	Dispenser  06/19/2020	06/19/2020	endocet  mg tablet	240	30	Mendiola, Paul ZAPATA	[facility]  06/19/2020	06/19/2020	alprazolam 2 mg tablet	90	30	Mendiola, Paul ZAPATA	[facility]  05/18/2020	05/18/2020	endocet  mg tablet	240	30	Mendiola, Paul ZAPATA	[facility]  05/18/2020	05/18/2020	alprazolam 2 mg tablet	90	30	Mendiola, Paul ZAPATA	[facility]  04/17/2020	04/17/2020	oxycodone-acetaminophen  mg tab	180	30	Mendiola, Paul ZAPATA	[facility]  04/17/2020	04/17/2020	alprazolam 2 mg tablet	90	30	Mendiola, Paul ZAPATA	[facility]  03/12/2020	03/16/2020	alprazolam 2 mg tablet	90	30	Mendiola, Paul ZAPATA	[facility]  03/12/2020	03/16/2020	oxycodone-acetaminophen  mg tab	180	30	Mendiola, Paul ZAPATA	[facility]  02/14/2020	02/17/2020	oxycodone-acetaminophen  mg tab	180	30	Mendiola, Paul ZAPATA	[facility]  02/14/2020	02/17/2020	alprazolam 2 mg tablet	90	30	Mendiola, Paul ZAPATA	[facility]  01/17/2020	01/17/2020	alprazolam 2 mg tablet	90	30	Mendiola, Paul ZAPATA	[facility]  01/17/2020	01/17/2020	oxycodone-acetaminophen  mg tab	240	30	MendiolaPaul MD	[facility]  12/16/2019	12/16/2019	oxycodone-acetaminophen  mg tab	240	30	Mendiola, Paul ZAPATA	[facility]  12/16/2019	12/16/2019	alprazolam 2 mg tablet	90	30	MendiolaPaul MD	[facility]  11/15/2019	11/18/2019	oxycodone-acetaminophen  mg tab	240	30	[facility]olaPaul MD	[facility]  11/15/2019	11/18/2019	alprazolam 2 mg tablet	90	30	MendiolaPaul MD	[facility]  10/18/2019	10/19/2019	alprazolam 2 mg tablet	90	30	Mendiola, Paul ZAPATA	[facility]  10/18/2019	10/19/2019	oxycodone-acetaminophen  mg tab	240	30	MendiolaPaul MD	[facility]  09/20/2019	09/20/2019	oxycodone-acetaminophen  mg tab	240	30	MendiolaPaul MD	[facility]  09/20/2019	09/20/2019	alprazolam 2 mg tablet	90	30	Mendiola, Paul ZAPATA	[facility]  08/16/2019	08/19/2019	oxycodone-acetaminophen  mg tab	240	30	MendiolaPaul MD	[facility]  08/16/2019	08/19/2019	alprazolam 2 mg tablet	90	30	Mendiola, Paul ZAPATA	[facility]  07/19/2019	07/20/2019	alprazolam 2 mg tablet	90	30	Mendiola, Paul ZAPATA	[facility]  07/19/2019	07/20/2019	oxycodone-acetaminophen  mg tab	240	30	Mendiola, Paul ZAPATA	[facility]  * - Drugs marked with an asterisk are compound drugs. If the compound drug is made up of more than one controlled substance, then each controlled substance will be a separate row in the table.

## 2020-06-25 NOTE — H&P ADULT - PROBLEM SELECTOR PLAN 1
Likely 2/2 COPD exacerbation in setting of RLL resection due to R sided lung cancer. She uses 5L oxygen at home and still feels SOB and requests more oxygen for home.   - continue 4L oxygen via NC and monitor oxygen sat  - aim SaO2 >88% Likely 2/2 COPD exacerbation in setting of RLL resection due to R sided lung cancer. She uses 5L oxygen at home and still feels SOB and requests more oxygen for home. CXR wnl. CT angio 6/25: No pulmonary embolism. Complete atelectasis of the right middle lobe secondary to mucous plugging. Increasing mediastinal lymphadenopathy, with a left paraesophageal node measuring up to 4.4 cm in greatest dimension, concerning for metastatic disease.   - continue 4L NC O2 and monitor oxygen  - follow up with pulm outpatient for imaging findings

## 2020-06-25 NOTE — ED ADULT NURSE NOTE - CHIEF COMPLAINT QUOTE
Patient brought to ER by EMS for low grade temperature, shortness of breath, chills ,left rib pain (hx: compact fractures), throat pain and other complaints as per patient. Pt states she has COPD and Cancer and wants to be seen at [facility].

## 2020-06-25 NOTE — ED PROVIDER NOTE - OBJECTIVE STATEMENT
65 year old female, former smoker, with hx of HTN, HLD, DM II, COPD on home ox at 5L, right lung CA s/p RLL resection 2017 and 1 cycle of chemo last week presents with SOB, fever, sore throat, and left sided rib pain for 1 week. Reports worsening exertional, non-positional SOB for 1 week with associated dry cough, fever at home and sore throat. Denies chest pain but reports left sided rib pain; reports chronic pain from prior rib fractures. Denies fx of DVTs, currently on heparin for PICC line. Denies leg swelling or redness. Tested for COVIDx2 neg  in April. No sick contacts. Denies rashes on her chest. 65 year old female, former smoker, with hx of HTN, HLD, DM II, COPD on home ox at 5L, right lung CA s/p RLL resection 2017 and 1 cycle of chemo last week presents with SOB, fever, sore throat, and left sided rib pain for 1 week. Reports worsening exertional, non-positional SOB for 1 week with associated dry cough, fever at home and sore throat. Denies chest pain but reports left sided rib pain; reports chronic pain from prior rib fractures. Denies fx of DVTs, currently on heparin for PICC line. Denies leg swelling or redness. Tested for COVIDx2 neg  in April. No sick contacts. Denies rashes on her chest. No ha, neck pain, abd pain, vomiting, diarrhea, dysuria.

## 2020-06-25 NOTE — ED ADULT NURSE NOTE - OBJECTIVE STATEMENT
Pt received in room 6--pt has multiple complaints--Pt c/o COPD, SOB, pain in ribs ,a swollen lymph node in her neck Pt received in room 6--pt has multiple complaints--Pt c/o COPD, SOB, pain in ribs ,a swollen lymph node in her neck--pt reports worsening exertional, non- positional SOB x1 week accompanied by dry cough, low grade fever to 99 and sore throat. pt denies chest pain but reports rib pain from prior rib fracture--pt has lt arm PIC line for immunotherapy for lung ca.Pt in no acute distress--talking with no respirtory distress--pt noted to get SOB when she walks. Pts color pink,skin warm and dry. Pt on 4 l nasal canula with O2 sat 99. Pt had sonogram iv placed in lt antecubital--pic line NOT accessed. Pt to CAT scan

## 2020-06-25 NOTE — ED PROVIDER NOTE - CLINICAL SUMMARY MEDICAL DECISION MAKING FREE TEXT BOX
65 year old female, former smoker, with hx of HTN, HLD, DM II, COPD on home ox at 5L, right lung CA s/p RLL resection 2017 and 1 cycle of chemo last week presents with exertional SOB, fever, sore throat, and left sided rib pain for 1 week. Ddx likely COPD exacerbation vs bacterial pneumonia vs COVID-19 given SOB, fever, and cough vs PE given SOB in pt with active cancer vs ACS. Will check basic labs, cardiac enzymes, ekg, CXR, COVID-19 PCR, UA,UC, VBG, procalcitonin + bronchodilators, steroids and likely admit given increasing oxygen requirements. 65 year old female, former smoker, with hx of HTN, HLD, DM II, COPD on home ox at 5L, right lung CA s/p RLL resection 2017 and 1 cycle of chemo last week presents with exertional SOB, fever, sore throat, and left sided rib pain for 1 week. Ddx likely COPD exacerbation vs bacterial pneumonia vs COVID-19 given SOB, fever, and cough vs PE given SOB in pt with active cancer vs ACS though uses heparin so less likely v. worsening of her chronic disease. Will check basic labs, cardiac enzymes, ekg, CXR, COVID-19 PCR, UA,UC, VBG, procalcitonin + bronchodilators, steroids and likely admit given increasing oxygen requirements.

## 2020-06-25 NOTE — H&P ADULT - NSHPREVIEWOFSYSTEMS_GEN_ALL_CORE
CONSTITUTIONAL: No fever, weight loss, or fatigue  EYES: No eye pain, visual disturbances, or discharge  ENMT:  No difficulty hearing, tinnitus, vertigo; No sinus or throat pain  RESPIRATORY: per HPI  CARDIOVASCULAR: No chest pain, palpitations  GASTROINTESTINAL: No abdominal or epigastric pain. No nausea, vomiting, or hematemesis; No diarrhea or constipation. No melena or hematochezia.  GENITOURINARY: No dysuria, frequency, hematuria, or incontinence  NEUROLOGICAL: No headaches, loss of strength, numbness, or tremors  SKIN: mild itching  LYMPH NODES: No enlarged glands  ENDOCRINE: No heat or cold intolerance; No polydipsia or polyuria  MUSCULOSKELETAL: No joint pain or swelling;   PSYCHIATRIC: Denies depression, anxiety  ALLERGY AND IMMUNOLOGIC: No hives or eczema

## 2020-06-25 NOTE — ED ADULT NURSE REASSESSMENT NOTE - NS ED NURSE REASSESS COMMENT FT1
Pt resting comfortably in bed, denies any pain/ symptoms at this time, VSS, pt stable, in NAD, report given to ESS[facility] 1 ROSA Goldstein, will continue to monitor.

## 2020-06-25 NOTE — ED PROVIDER NOTE - ATTENDING CONTRIBUTION TO CARE
agree with above hpi  on my exam  GEN - Nontoxic ; A+O x3   HEAD - NC/AT   EYES- PERRL, EOMI  ENT: Airway patent, mmm, Oral cavity and pharynx normal. No inflammation, swelling, exudate, or lesions. no thrush.    NECK: Neck supple, no tenderness in r. neck where patient points to region of pain, no obvious mass or swelling, no skin changes  PULMONARY - wheezing b/l, o2 sat stable on 5LNC, unlabored at rest, becomes labored with minimal exertion (ie going to bathroom)  CARDIAC -s1s2, RRR, no M,G,R  ABDOMEN - +BS, ND, NT, soft, no guarding, no rebound, no masses   BACK - no CVA tenderness, Normal  spine   EXTREMITIES - FROM, symmetric pulses, capillary refill < 2 seconds, tracel b/l le edema   SKIN - no rash  NEUROLOGIC - alert, speech clear, no focal deficits  PSYCH -calm, cooperative  Patient with history as above with worsening butler, dry cough, subjective fevers, throat pain-in ED stable at rest on 5L 02, wheezing on exam, afebrile-given hx-plan for labs, cta, nebs/steroid, eval for elec disturbance, organ dysfunction, worsening cancer, copd exacerbation, pna, effusion, pe, monitor, admit. Patient on arrival had initially requested to go to [facility] as her mds are there-d/w her md dr. fabian at [facility]-they deem that patient should stay at [facility] for further management and they will f/u with her on dc. patient informed and agreeable to plan. agree with above hpi  on my exam  GEN - Nontoxic ; A+O x3   HEAD - NC/AT   EYES- PERRL, EOMI  ENT: Airway patent, mmm, Oral cavity and pharynx normal. No inflammation, swelling, exudate, or lesions. no thrush.    NECK: Neck supple, no tenderness in r. neck where patient points to region of pain, no obvious mass or swelling, no skin changes  PULMONARY - wheezing b/l, o2 sat stable on 5LNC, unlabored at rest, becomes labored with minimal exertion (ie going to bathroom)  CARDIAC -s1s2, RRR, no M,G,R  ABDOMEN - +BS, ND, NT, soft, no guarding, no rebound, no masses   BACK - no CVA tenderness, Normal  spine   EXTREMITIES - FROM, symmetric pulses, capillary refill < 2 seconds, tracel b/l le edema   SKIN - no rash  NEUROLOGIC - alert, speech clear, no focal deficits  PSYCH -calm, cooperative  Patient with history as above with worsening butler, dry cough, subjective fevers, throat pain-in ED stable at rest on 5L 02, wheezing on exam, afebrile-given hx-plan for labs, cta, nebs/steroid, eval for elec disturbance, organ dysfunction, worsening cancer, copd exacerbation, pna, mass, effusion, pe, monitor, admit. [facility] deem [facility] patient should stay at [facility] for further management and they will f/u with her on dc. patient informed and agreeable to plan.

## 2020-06-25 NOTE — ED PROVIDER NOTE - PROGRESS NOTE DETAILS
Patient requesting to be transferred to [facility] as her doctors work there. Spoke to her pulmonologists, Dr. Nugent, who explains this is not feasible and not clinically indicated. Patient has been informed of this. CT with no signs of PE, COVID neg. Fractures seen on CT. Will admit for COPD exacerbation. CT with findings as noted in results-no signs of PE, results d/w patient, COVID neg. Fractures seen on CT. Will admit for COPD exacerbation/further w/u and management.

## 2020-06-25 NOTE — H&P ADULT - ASSESSMENT
Ms. Olson is a 66 y/o with PMH R lung cancer s/p resection 2017 on chemo, COPD with multiple hospital admissions, 80 pack year smoking hx, fractures in L wrist and ankle presenting for SOB (on 5L O2 at home) and pain from fractures. SOB likely 2/2 COPD exacerbation. Ms. Olson is a 64 y/o with PMH R lung cancer s/p resection 2017 on chemo, COPD with multiple hospital admissions, 80 pack year smoking hx, fractures in L ribs presenting for SOB (on 5L O2 at home) and pain from fractures. SOB likely 2/2 COPD exacerbation.

## 2020-06-25 NOTE — H&P ADULT - PROBLEM SELECTOR PLAN 6
- s/p RLL resection 2017; 1 cycle chemo last week  - follow up with outpatient pulm/ onc (Dr. Quintero) - s/p RLL resection 2017; 1 cycle chemo last week  - follow up with outpatient pulm/ onc (Dr. Quintero) for imaging findings as above

## 2020-06-25 NOTE — ED ADULT NURSE NOTE - NSIMPLEMENTINTERV_GEN_ALL_ED
Implemented All Fall with Harm Risk Interventions:  [place] to call system. Call bell, personal items and telephone within reach. Instruct patient to call for assistance. Room bathroom lighting operational. Non-slip footwear when patient is off stretcher. Physically safe environment: no spills, clutter or unnecessary equipment. Stretcher in lowest position, wheels locked, appropriate side rails in place. Provide visual cue, wrist band, yellow gown, etc. Monitor gait and stability. Monitor for mental status changes and reorient to person, place, and time. Review medications for side effects contributing to fall risk. Reinforce activity limits and safety measures with patient and family. Provide visual clues: red socks.

## 2020-06-26 ENCOUNTER — TRANSCRIPTION ENCOUNTER (OUTPATIENT)
Age: 65
End: 2020-06-26

## 2020-06-26 LAB
ALBUMIN SERPL ELPH-MCNC: 3.7 G/DL — SIGNIFICANT CHANGE UP (ref 3.3–5)
ALP SERPL-CCNC: 161 U/L — HIGH (ref 40–120)
ALT FLD-CCNC: 12 U/L — SIGNIFICANT CHANGE UP (ref 4–33)
ANION GAP SERPL CALC-SCNC: 16 MMO/L — HIGH (ref 7–14)
AST SERPL-CCNC: 9 U/L — SIGNIFICANT CHANGE UP (ref 4–32)
BASOPHILS # BLD AUTO: 0.02 K/UL — SIGNIFICANT CHANGE UP (ref 0–0.2)
BASOPHILS NFR BLD AUTO: 0.4 % — SIGNIFICANT CHANGE UP (ref 0–2)
BILIRUB SERPL-MCNC: 0.7 MG/DL — SIGNIFICANT CHANGE UP (ref 0.2–1.2)
BUN SERPL-MCNC: 12 MG/DL — SIGNIFICANT CHANGE UP (ref 7–23)
CALCIUM SERPL-MCNC: 9.2 MG/DL — SIGNIFICANT CHANGE UP (ref 8.4–10.5)
CHLORIDE SERPL-SCNC: 94 MMOL/L — LOW (ref 98–107)
CO2 SERPL-SCNC: 26 MMOL/L — SIGNIFICANT CHANGE UP (ref 22–31)
CREAT SERPL-MCNC: 0.55 MG/DL — SIGNIFICANT CHANGE UP (ref 0.5–1.3)
EOSINOPHIL # BLD AUTO: 0.01 K/UL — SIGNIFICANT CHANGE UP (ref 0–0.5)
EOSINOPHIL NFR BLD AUTO: 0.2 % — SIGNIFICANT CHANGE UP (ref 0–6)
GLUCOSE BLDC GLUCOMTR-MCNC: 240 MG/DL — HIGH (ref 70–99)
GLUCOSE BLDC GLUCOMTR-MCNC: 279 MG/DL — HIGH (ref 70–99)
GLUCOSE BLDC GLUCOMTR-MCNC: 351 MG/DL — HIGH (ref 70–99)
GLUCOSE SERPL-MCNC: 315 MG/DL — HIGH (ref 70–99)
GRAM STN FLD: SIGNIFICANT CHANGE UP
HBA1C BLD-MCNC: 9.6 % — HIGH (ref 4–5.6)
HCT VFR BLD CALC: 35.8 % — SIGNIFICANT CHANGE UP (ref 34.5–45)
HGB BLD-MCNC: 12.1 G/DL — SIGNIFICANT CHANGE UP (ref 11.5–15.5)
IMM GRANULOCYTES NFR BLD AUTO: 1.3 % — SIGNIFICANT CHANGE UP (ref 0–1.5)
LYMPHOCYTES # BLD AUTO: 0.53 K/UL — LOW (ref 1–3.3)
LYMPHOCYTES # BLD AUTO: 9.5 % — LOW (ref 13–44)
MAGNESIUM SERPL-MCNC: 1.8 MG/DL — SIGNIFICANT CHANGE UP (ref 1.6–2.6)
MCHC RBC-ENTMCNC: 27.4 PG — SIGNIFICANT CHANGE UP (ref 27–34)
MCHC RBC-ENTMCNC: 33.8 % — SIGNIFICANT CHANGE UP (ref 32–36)
MCV RBC AUTO: 81 FL — SIGNIFICANT CHANGE UP (ref 80–100)
MONOCYTES # BLD AUTO: 0.45 K/UL — SIGNIFICANT CHANGE UP (ref 0–0.9)
MONOCYTES NFR BLD AUTO: 8.1 % — SIGNIFICANT CHANGE UP (ref 2–14)
NEUTROPHILS # BLD AUTO: 4.48 K/UL — SIGNIFICANT CHANGE UP (ref 1.8–7.4)
NEUTROPHILS NFR BLD AUTO: 80.5 % — HIGH (ref 43–77)
NRBC # FLD: 0 K/UL — SIGNIFICANT CHANGE UP (ref 0–0)
PHOSPHATE SERPL-MCNC: 3.2 MG/DL — SIGNIFICANT CHANGE UP (ref 2.5–4.5)
PLATELET # BLD AUTO: 103 K/UL — LOW (ref 150–400)
PMV BLD: 12.6 FL — SIGNIFICANT CHANGE UP (ref 7–13)
POTASSIUM SERPL-MCNC: 3.6 MMOL/L — SIGNIFICANT CHANGE UP (ref 3.5–5.3)
POTASSIUM SERPL-SCNC: 3.6 MMOL/L — SIGNIFICANT CHANGE UP (ref 3.5–5.3)
PROT SERPL-MCNC: 6.6 G/DL — SIGNIFICANT CHANGE UP (ref 6–8.3)
RBC # BLD: 4.42 M/UL — SIGNIFICANT CHANGE UP (ref 3.8–5.2)
RBC # FLD: 12.6 % — SIGNIFICANT CHANGE UP (ref 10.3–14.5)
SODIUM SERPL-SCNC: 136 MMOL/L — SIGNIFICANT CHANGE UP (ref 135–145)
SPECIMEN SOURCE: SIGNIFICANT CHANGE UP
WBC # BLD: 5.56 K/UL — SIGNIFICANT CHANGE UP (ref 3.8–10.5)
WBC # FLD AUTO: 5.56 K/UL — SIGNIFICANT CHANGE UP (ref 3.8–10.5)

## 2020-06-26 PROCEDURE — 99223 1ST HOSP IP/OBS HIGH 75: CPT | Mod: GC

## 2020-06-26 PROCEDURE — 99233 SBSQ HOSP IP/OBS HIGH 50: CPT | Mod: GC

## 2020-06-26 RX ORDER — SODIUM CHLORIDE 9 MG/ML
3 INJECTION INTRAMUSCULAR; INTRAVENOUS; SUBCUTANEOUS EVERY 6 HOURS
Refills: 0 | Status: DISCONTINUED | OUTPATIENT
Start: 2020-06-26 | End: 2020-07-03

## 2020-06-26 RX ORDER — INSULIN LISPRO 100/ML
VIAL (ML) SUBCUTANEOUS
Refills: 0 | Status: DISCONTINUED | OUTPATIENT
Start: 2020-06-26 | End: 2020-07-05

## 2020-06-26 RX ORDER — INSULIN GLARGINE 100 [IU]/ML
35 INJECTION, SOLUTION SUBCUTANEOUS AT BEDTIME
Refills: 0 | Status: DISCONTINUED | OUTPATIENT
Start: 2020-06-26 | End: 2020-06-29

## 2020-06-26 RX ORDER — INSULIN LISPRO 100/ML
15 VIAL (ML) SUBCUTANEOUS
Refills: 0 | Status: DISCONTINUED | OUTPATIENT
Start: 2020-06-26 | End: 2020-06-27

## 2020-06-26 RX ORDER — INSULIN LISPRO 100/ML
VIAL (ML) SUBCUTANEOUS AT BEDTIME
Refills: 0 | Status: DISCONTINUED | OUTPATIENT
Start: 2020-06-26 | End: 2020-07-05

## 2020-06-26 RX ORDER — BUDESONIDE AND FORMOTEROL FUMARATE DIHYDRATE 160; 4.5 UG/1; UG/1
2 AEROSOL RESPIRATORY (INHALATION)
Refills: 0 | Status: DISCONTINUED | OUTPATIENT
Start: 2020-06-26 | End: 2020-07-05

## 2020-06-26 RX ADMIN — CARVEDILOL PHOSPHATE 6.25 MILLIGRAM(S): 80 CAPSULE, EXTENDED RELEASE ORAL at 06:08

## 2020-06-26 RX ADMIN — ENOXAPARIN SODIUM 40 MILLIGRAM(S): 100 INJECTION SUBCUTANEOUS at 13:45

## 2020-06-26 RX ADMIN — AMLODIPINE BESYLATE 5 MILLIGRAM(S): 2.5 TABLET ORAL at 06:08

## 2020-06-26 RX ADMIN — Medication 3 MILLILITER(S): at 10:31

## 2020-06-26 RX ADMIN — Medication 10: at 13:53

## 2020-06-26 RX ADMIN — CARVEDILOL PHOSPHATE 6.25 MILLIGRAM(S): 80 CAPSULE, EXTENDED RELEASE ORAL at 18:33

## 2020-06-26 RX ADMIN — INSULIN GLARGINE 35 UNIT(S): 100 INJECTION, SOLUTION SUBCUTANEOUS at 21:35

## 2020-06-26 RX ADMIN — GABAPENTIN 200 MILLIGRAM(S): 400 CAPSULE ORAL at 13:45

## 2020-06-26 RX ADMIN — Medication 15 UNIT(S): at 09:06

## 2020-06-26 RX ADMIN — Medication 6: at 18:24

## 2020-06-26 RX ADMIN — Medication 3 MILLILITER(S): at 04:15

## 2020-06-26 RX ADMIN — SODIUM CHLORIDE 3 MILLILITER(S): 9 INJECTION INTRAMUSCULAR; INTRAVENOUS; SUBCUTANEOUS at 21:39

## 2020-06-26 RX ADMIN — GABAPENTIN 200 MILLIGRAM(S): 400 CAPSULE ORAL at 21:34

## 2020-06-26 RX ADMIN — Medication 15 UNIT(S): at 18:24

## 2020-06-26 RX ADMIN — BUDESONIDE AND FORMOTEROL FUMARATE DIHYDRATE 2 PUFF(S): 160; 4.5 AEROSOL RESPIRATORY (INHALATION) at 21:36

## 2020-06-26 RX ADMIN — ATORVASTATIN CALCIUM 40 MILLIGRAM(S): 80 TABLET, FILM COATED ORAL at 21:34

## 2020-06-26 RX ADMIN — Medication 15 UNIT(S): at 13:52

## 2020-06-26 RX ADMIN — Medication 4: at 09:05

## 2020-06-26 RX ADMIN — Medication 3 MILLILITER(S): at 21:39

## 2020-06-26 RX ADMIN — Medication 3 MILLILITER(S): at 15:55

## 2020-06-26 RX ADMIN — Medication 60 MILLIGRAM(S): at 06:08

## 2020-06-26 RX ADMIN — Medication 100 MILLIGRAM(S): at 13:44

## 2020-06-26 RX ADMIN — FAMOTIDINE 20 MILLIGRAM(S): 10 INJECTION INTRAVENOUS at 13:44

## 2020-06-26 RX ADMIN — GABAPENTIN 200 MILLIGRAM(S): 400 CAPSULE ORAL at 06:08

## 2020-06-26 NOTE — DISCHARGE NOTE PROVIDER - EXTENDED VTE YES NO FOR MLM ENOXAPARIN
"Subjective:       Patient ID: Salvador Pierce is a 45 y.o. male.    Chief Complaint: Annual Exam (US/lab results)    Patient is a 45-year-old black male with hypertension, systolic murmur, mild anemia noted on wellness exam June 2018, an an abnormal TSH/subclinical hypothyroidism noted on wellness exam in June 2018 that is here today for annual physical exam with fasting lab results.     Patient has hypertension and currently taking amlodipine 10 mg daily and lisinopril HCT 10/12.5 mg daily.  Blood pressure is controlled on present medications.  /82   Pulse 67   Temp 98.2 °F (36.8 °C) (Oral)   Resp 20   Ht 5' 11" (1.803 m)   Wt 81.4 kg (179 lb 7.3 oz)   SpO2 99%   BMI 25.03 kg/m²      Patient has a very mild grade 1/6 systolic murmur noted as a child and had evaluated by cardiologist in the past without any acute abnormalities.     Patient has a mild anemia noted on wellness exam in June 2018 that resolved with blood work in October 2018.  The mild normocytic anemia is again present on current labs.  Reports no blood in stool and no dark stools.  Will recheck in 6 months.     Patient had an abnormal TSH on wellness labs in June 2018.  His TSH was mildly low at 0.145 but his free T4 was within normal limits and patient denied any hyperthyroid symptoms at the time.  His thyroid exam was unremarkable during physical exam at wellness. Patient then had thyroid labs done in October 2018 and TSH remained low but  improved from labs in June 2018 so plan was to continue to monitor.  Patient had labs scheduled for 3/29/19 but was a NO SHOW for appointment. Patient is now here today and had full thyroid panel and thyroid ultrasound for evaluation.  TSH remains low at 0.116 but rest of T3 and T4 labs are okay.  His thyroid antibodies are also negative and his thyroid ultrasound showed no significant abnormality so no further workup is needed unless he becomes symptomatic.  Will monitor thyroid level yearly " now.    Wellness labs:  -  CBC shows a normocytic anemia  -  CMP WNL  -  Cholesterol great  -  TSH discussed above.    Health maintenance:  -  Up to date.    Component      Latest Ref Rng & Units 6/17/2019 10/11/2018 6/20/2018   WBC      3.90 - 12.70 K/uL 10.50 9.63 7.03   RBC      4.60 - 6.20 M/uL 4.22 (L) 4.61 4.49 (L)   Hemoglobin      14.0 - 18.0 g/dL 13.1 (L) 14.4 13.9 (L)   Hematocrit      40.0 - 54.0 % 38.1 (L) 42.6 41.3   MCV      82 - 98 fL 90 92 92   MCH      27.0 - 31.0 pg 31.0 31.2 (H) 31.0   MCHC      32.0 - 36.0 g/dL 34.4 33.8 33.7   RDW      11.5 - 14.5 % 13.8 14.0 14.4   Platelets      150 - 350 K/uL 178 199 169   MPV      9.2 - 12.9 fL 11.9 12.4 11.6   Gran # (ANC)      1.8 - 7.7 K/uL 6.0 5.5 3.4   Lymph #      1.0 - 4.8 K/uL 3.3 2.9 2.8   Mono #      0.3 - 1.0 K/uL 1.1 (H) 1.0 0.6   Eos #      0.0 - 0.5 K/uL 0.1 0.2 0.1   Baso #      0.00 - 0.20 K/uL 0.03 0.05 0.04   Gran%      38.0 - 73.0 % 57.0 57.2 48.5   Lymph%      18.0 - 48.0 % 31.0 29.9 40.0   Mono%      4.0 - 15.0 % 10.8 10.8 9.1   Eosinophil%      0.0 - 8.0 % 0.9 1.6 1.8   Basophil%      0.0 - 1.9 % 0.3 0.5 0.6   Differential Method       Automated Automated Automated   Sodium      136 - 145 mmol/L 141  144   Potassium      3.5 - 5.1 mmol/L 4.1  4.1   Chloride      95 - 110 mmol/L 108  106   CO2      23 - 29 mmol/L 22 (L)  29   Glucose      70 - 110 mg/dL 90  101   BUN, Bld      2 - 20 mg/dL 18  17   Creatinine      0.50 - 1.40 mg/dL 1.04  1.12   Calcium      8.7 - 10.5 mg/dL 9.3  9.3   PROTEIN TOTAL      6.0 - 8.4 g/dL 7.9  7.9   Albumin      3.5 - 5.2 g/dL 4.5  4.4   BILIRUBIN TOTAL      0.1 - 1.0 mg/dL 0.3  0.2   Alkaline Phosphatase      38 - 126 U/L 65  60   AST      15 - 46 U/L 24  29   ALT      10 - 44 U/L 21  23   Anion Gap      8 - 16 mmol/L 11  9   eGFR if African American      >60 mL/min/1.73 m:2 >60.0  >60.0   eGFR if non African American      >60 mL/min/1.73 m:2 >60.0  >60.0   Cholesterol      120 - 199 mg/dL 139  167    Triglycerides      30 - 150 mg/dL 76  89   HDL      40 - 75 mg/dL 47  44   LDL Cholesterol External      63.0 - 159.0 mg/dL 76.8  105.2   Hdl/Cholesterol Ratio      20.0 - 50.0 % 33.8  26.3   Total Cholesterol/HDL Ratio      2.0 - 5.0 3.0  3.8   Non-HDL Cholesterol      mg/dL 92  123   TSH      0.400 - 4.000 uIU/mL 0.116 (L) 0.213 (L) 0.145 (L)   Free T4      0.71 - 1.51 ng/dL 1.07 1.14 1.03   T3, Free      2.3 - 4.2 pg/mL 2.2 (L)     T3, Total      60 - 180 ng/dL 79     T4 Total      4.5 - 11.5 ug/dL 7.2     Thyrotropin Receptor Ab      0.00 - 1.75 IU/L <1.00     Thyroperoxidase Antibodies      <6.0 IU/mL <6.0       Procedure:  Exam Date: Reason for Exam:   US Soft Tissue Head Neck Thyroid 06/17/2019 None Specified             RESULTS:     EXAMINATION:  US SOFT TISSUE HEAD NECK THYROID     CLINICAL HISTORY:  Thyrotoxicosis, unspecified without thyrotoxic crisis or storm     TECHNIQUE:  Ultrasound of the thyroid and cervical lymph nodes was performed.     FINDINGS:  The right and left lobes of the thyroid measures 6.1 x 1.5 x 2 cm and 5.1   x 1.4 x 2 cm respectively.  The right and left lobes have a homogeneous   echotexture.  There are no enlarged cervical lymph nodes.     Impression:     No significant abnormality.        Electronically signed by:     Santos Nagy MD  Date:                                    06/17/2019  Time:                                   10:56           Current Outpatient Medications   Medication Sig Dispense Refill    amLODIPine (NORVASC) 10 MG tablet Take 1 tablet (10 mg total) by mouth once daily. 90 tablet 0    lisinopril-hydrochlorothiazide (PRINZIDE,ZESTORETIC) 10-12.5 mg per tablet Take 1 tablet by mouth once daily. 90 tablet 0    multivitamin capsule Take 1 capsule by mouth once daily.       No current facility-administered medications for this visit.        Past Medical History:   Diagnosis Date    Abnormal TSH 6/20/2018    Patient had a mildly low TSH in June 2018 but normal  free T4 and asymtomatic.  Will continue to monitor levels    Hypertension     Mild anemia 2018    noted on wellness - started on multivitamin    Systolic murmur     reports noted as a child - had evaluated by cardiologist in the past       Past Surgical History:   Procedure Laterality Date    HERNIA REPAIR      right inguinal hernia at age 13       Family History   Problem Relation Age of Onset    Hypertension Mother     Multiple sclerosis Mother     Diabetes Father     Hypertension Father     No Known Problems Brother        Social History     Socioeconomic History    Marital status:      Spouse name: Not on file    Number of children: Not on file    Years of education: Not on file    Highest education level: Not on file   Occupational History    Occupation: supervisory position   Social Needs    Financial resource strain: Somewhat hard    Food insecurity:     Worry: Never true     Inability: Never true    Transportation needs:     Medical: No     Non-medical: No   Tobacco Use    Smoking status: Former Smoker     Packs/day: 1.00     Years: 21.00     Pack years: 21.00     Last attempt to quit: 2018     Years since quittin.4    Smokeless tobacco: Never Used   Substance and Sexual Activity    Alcohol use: Yes     Frequency: 2-4 times a month     Drinks per session: 3 or 4     Binge frequency: Less than monthly     Comment: every other weekend - 3 mixed drinks per occasion    Drug use: No    Sexual activity: Yes     Partners: Female   Lifestyle    Physical activity:     Days per week: 1 day     Minutes per session: 30 min    Stress: Not at all   Relationships    Social connections:     Talks on phone: More than three times a week     Gets together: Twice a week     Attends Voodoo service: Not on file     Active member of club or organization: Yes     Attends meetings of clubs or organizations: More than 4 times per year     Relationship status:    Other Topics  "Concern    Not on file   Social History Narrative    Not on file       Review of Systems   Constitutional: Negative for activity change and unexpected weight change.   HENT: Negative for hearing loss, rhinorrhea and trouble swallowing.    Eyes: Negative for discharge and visual disturbance.   Respiratory: Negative for chest tightness and wheezing.    Cardiovascular: Negative for chest pain and palpitations.   Gastrointestinal: Negative for blood in stool, constipation, diarrhea and vomiting.   Endocrine: Negative for polydipsia and polyuria.   Genitourinary: Negative for difficulty urinating, hematuria and urgency.   Musculoskeletal: Negative for arthralgias, joint swelling and neck pain.   Neurological: Negative for weakness and headaches.   Psychiatric/Behavioral: Negative for confusion and dysphoric mood.         Objective:     Vitals:    07/31/19 0704 07/31/19 0722   BP: 118/76 124/82   Pulse: 67    Resp: 20    Temp: 98.2 °F (36.8 °C)    TempSrc: Oral    SpO2: 99%    Weight: 81.4 kg (179 lb 7.3 oz)    Height: 5' 11" (1.803 m)           Physical Exam   Constitutional: He is oriented to person, place, and time. He appears well-developed and well-nourished.   Body mass index is 25.03 kg/m².     HENT:   Head: Normocephalic.   Right Ear: External ear normal.   Left Ear: External ear normal.   Nose: Nose normal.   Mouth/Throat: Oropharynx is clear and moist. No oropharyngeal exudate.   Eyes: Pupils are equal, round, and reactive to light. EOM are normal. Right eye exhibits no discharge. Left eye exhibits no discharge. No scleral icterus.   Neck: Normal range of motion. Neck supple. No JVD present. No tracheal deviation present. No thyroid mass and no thyromegaly present.   Cardiovascular: Normal rate and regular rhythm.   Murmur heard.   Systolic murmur is present with a grade of 1/6.  Grade 1/6 very mild Murmur to right sternal border   Pulmonary/Chest: Effort normal and breath sounds normal. No respiratory " distress.   Abdominal: Soft. He exhibits no distension.   Musculoskeletal: Normal range of motion. He exhibits no edema.   Lymphadenopathy:     He has no cervical adenopathy.   Neurological: He is alert and oriented to person, place, and time. Coordination normal.   Skin: Skin is warm and dry. No rash noted.   Psychiatric: He has a normal mood and affect. His behavior is normal.         Assessment:         ICD-10-CM ICD-9-CM   1. Annual physical exam Z00.00 V70.0   2. Essential hypertension I10 401.9   3. Systolic murmur R01.1 785.2   4. Subclinical hyperthyroidism E05.90 242.90   5. Normochromic normocytic anemia D64.9 285.9       Plan:       Annual physical exam  Health Maintenance Summary     Influenza Vaccine Next Due 8/1/2019     Done 10/3/2018 Imm Admin: Influenza - Quadrivalent - PF   Lipid Panel Next Due 6/17/2024     Done 6/17/2019 LIPID PANEL    Done 6/20/2018 LIPID PANEL   TETANUS VACCINE Next Due 10/3/2028     Done 10/3/2018 Imm Admin: Tdap         Essential hypertension  -  Controlled on present medications.  Recheck in 6 months.  Enrolled in the digital HTN program.  -     lisinopril-hydrochlorothiazide (PRINZIDE,ZESTORETIC) 10-12.5 mg per tablet; Take 1 tablet by mouth once daily.  Dispense: 90 tablet; Refill: 1  -     amLODIPine (NORVASC) 10 MG tablet; Take 1 tablet (10 mg total) by mouth once daily.  Dispense: 90 tablet; Refill: 1    Systolic murmur  -  Very mild and requires no intervention    Subclinical hyperthyroidism  -  Asymptomatic - will monitor level yearly now.    Normochromic normocytic anemia  -  Take MVI and iron supplement and will recheck in 6 months.  -     CBC auto differential; Future; Expected date: 01/20/2020  -     Iron and TIBC; Future; Expected date: 01/20/2020  -     Ferritin; Future; Expected date: 01/20/2020  -     Vitamin B12; Future; Expected date: 01/20/2020  -     Folate; Future; Expected date: 01/20/2020      Follow up in about 6 months (around 1/31/2020) for labs and  follow up/med check.     Patient's Medications   New Prescriptions    No medications on file   Previous Medications    MULTIVITAMIN CAPSULE    Take 1 capsule by mouth once daily.   Modified Medications    Modified Medication Previous Medication    AMLODIPINE (NORVASC) 10 MG TABLET amLODIPine (NORVASC) 10 MG tablet       Take 1 tablet (10 mg total) by mouth once daily.    Take 1 tablet (10 mg total) by mouth once daily.    LISINOPRIL-HYDROCHLOROTHIAZIDE (PRINZIDE,ZESTORETIC) 10-12.5 MG PER TABLET lisinopril-hydrochlorothiazide (PRINZIDE,ZESTORETIC) 10-12.5 mg per tablet       Take 1 tablet by mouth once daily.    Take 1 tablet by mouth once daily.   Discontinued Medications    No medications on file      ,

## 2020-06-26 NOTE — PROGRESS NOTE ADULT - ASSESSMENT
Ms. Olson is a 66 y/o with PMH R lung cancer s/p resection 2017 on chemo, COPD with multiple hospital admissions, 80 pack year smoking hx, fractures in L ribs presenting for SOB (on 5L O2 at home) and pain from fractures. SOB likely 2/2 COPD exacerbation.

## 2020-06-26 NOTE — PROGRESS NOTE ADULT - PROBLEM SELECTOR PLAN 6
- s/p RLL resection 2017; 1 cycle chemo last week  - follow up with outpatient pulm/ onc (Dr. Quintero) for imaging findings as above; unable to get response thus far (6/26)

## 2020-06-26 NOTE — PROGRESS NOTE ADULT - PROBLEM SELECTOR PLAN 7
- takes insulin as needed at home  - A1C 9.6  - moderate sliding scale insulin as glucose levels higher after steroid dose

## 2020-06-26 NOTE — DISCHARGE NOTE PROVIDER - NSDCMRMEDTOKEN_GEN_ALL_CORE_FT
ALPRAZolam 2 mg oral tablet: 1 tab(s) orally 3 times a day, As Needed  amLODIPine 5 mg oral tablet: 1 tab(s) orally once a day  atorvastatin 40 mg oral tablet: 1 tab(s) orally once a day  Breo Ellipta 100 mcg-25 mcg/inh inhalation powder: 1 puff(s) inhaled 2 times a day  Coreg 6.25 mg oral tablet: 1 tab(s) orally 2 times a day  famotidine 40 mg oral tablet: 1 tab(s) orally once a day (at bedtime)  gabapentin 100 mg oral capsule: 2 cap(s) orally 3 times a day  metoclopramide: 2 milligram(s) orally 4 times a day, As Needed for nausea   Percocet 10/325 oral tablet: 2 tab(s) orally every 6 hours, As Needed for pain  Spiriva 18 mcg inhalation capsule: 1 cap(s) inhaled once a day amLODIPine 5 mg oral tablet: 1 tab(s) orally once a day  atorvastatin 40 mg oral tablet: 1 tab(s) orally once a day  Breo Ellipta 100 mcg-25 mcg/inh inhalation powder: 1 puff(s) inhaled 2 times a day  Coreg 6.25 mg oral tablet: 1 tab(s) orally 2 times a day  famotidine 40 mg oral tablet: 1 tab(s) orally once a day (at bedtime)  gabapentin 100 mg oral capsule: 2 cap(s) orally 3 times a day  metoclopramide: 2 milligram(s) orally 4 times a day, As Needed for nausea   Spiriva 18 mcg inhalation capsule: 1 cap(s) inhaled once a day albuterol 0.63 mg/3 mL (0.021%) inhalation solution: 3 milliliter(s) by nebulizer every 6 hours, As Needed   ALPRAZolam 1 mg oral tablet: 1 tab(s) orally 3 times a day, As needed, Anxiety MDD:3 tabs a day  amLODIPine 5 mg oral tablet: 1 tab(s) orally once a day  atorvastatin 40 mg oral tablet: 1 tab(s) orally once a day  budesonide-formoterol 160 mcg-4.5 mcg/inh inhalation aerosol: 2 puff(s) inhaled 2 times a day  Coreg 6.25 mg oral tablet: 1 tab(s) orally 2 times a day  famotidine 40 mg oral tablet: 1 tab(s) orally once a day (at bedtime)  gabapentin 100 mg oral capsule: 2 cap(s) orally 3 times a day  metoclopramide: 2 milligram(s) orally 4 times a day, As Needed for nausea   oxyCODONE 5 mg oral tablet: 1 tab(s) orally every 4 hours, As needed, Dyspnea or severe pain MDD:4 tabs a day  polyethylene glycol 3350 oral powder for reconstitution: 17 gram(s) orally once a day, As Needed   predniSONE 10 mg oral tablet: Starting 7/6, take 3 tabs a day  Starting 7/7, take 2 tabs a day  Starting 7/10, take 1 tab a day  End of treatment 7/12  Sodium Chloride, Inhalation 3% inhalation solution: 3 milliliter(s) inhaled every 6 hours, As Needed -for congestion   Spiriva 18 mcg inhalation capsule: 1 cap(s) inhaled once a day

## 2020-06-26 NOTE — DISCHARGE NOTE PROVIDER - NSDCFUADDAPPT_GEN_ALL_CORE_FT
You can set up care with a behavioral health specialist at [facility] [phone] or the [facility] [phone]. -Please follow up with Dr. Nugent or Dr. Jean-Baptiste for pulmonology/COPD care within 1 week of discharge.  -Please follow up with Dr. Kurtz, your thoracic surgeon, within 1-2 weeks of discharge to discuss your bronchoscopic biopsy results.  -Please follow up with your oncologist, Dr. Mendiola, within 1-2 weeks of discharge for further treatment of your lung cancer.  -You can set up care with a behavioral health specialist at [facility] [phone] or the [facility] [phone]. -Please follow up with Dr. Nugent or Dr. Jean-Baptiste for pulmonology/COPD care within 1 week of discharge.  -Please follow up with Dr. Kurtz, your thoracic surgeon, within 1-2 weeks of discharge to discuss your bronchoscopic biopsy results.  -Please follow up with your oncologist, Dr. Mendiola, within 1-2 weeks of discharge for further treatment of your lung cancer.  -Please set up care with an endocrinologist at the [facility] at [place] to optimize your diabetes management and insulin regimen.  -Please set up care with a behavioral health specialist at [facility] [phone] or the [facility] [phone].

## 2020-06-26 NOTE — PROGRESS NOTE ADULT - PROBLEM SELECTOR PLAN 2
She has bilateral wheezing and dry cough and feels SOB. CXR wnl.   - duoneb q6 hrs  - levaquin day 2 for COPD exacerbation   - can decrease oxygen to 2L as sating 100% on 4L  - aim SaO2 >88%  - prednisone 60 mg daily   - COVID negative   - pulm consulted for COPD exacerbation: steroid and abx management. appreciate recs.

## 2020-06-26 NOTE — DISCHARGE NOTE PROVIDER - CARE PROVIDER_API CALL
Joni Nugent)  Internal Medicine; Pulmonary Disease  [address] [place] [address]  Phone: [phone]  Fax: [phone]  Follow Up Time: Joni Nugent)  Internal Medicine; Pulmonary Disease  [address] [place] [address]  Phone: [phone]  Fax: [phone]  Follow Up Time:     Jerome Mendiola  INTERNAL MEDICINE  [address] [place] [address]  Phone: [phone]  Fax: [phone]  Follow Up Time: Joni Nugent)  Internal Medicine; Pulmonary Disease  [address] [place] [address]  Phone: [phone]  Fax: [phone]  Follow Up Time:     Jerome Mendiola  INTERNAL MEDICINE  [address] [place] [address]  Phone: [phone]  Fax: [phone]  Follow Up Time:     Leif Kurtz  SURGERY  [address] [place] [address]  Phone: [phone]  Fax: [phone]  Follow Up Time:     RENATO SHARMA  Internal Medicine  [address]  [place] [address]  Phone: [phone]  Fax: [phone]  Follow Up Time: Joni Nugent)  Internal Medicine; Pulmonary Disease  [address] [place] [address]  Phone: [phone]  Fax: [phone]  Follow Up Time:     Jerome Mendiola  INTERNAL MEDICINE  [address] [place] [address]  Phone: [phone]  Fax: [phone]  Follow Up Time:     Leif Kurtz  SURGERY  [address] [place] [address]  Phone: [phone]  Fax: [phone]  Follow Up Time:     RENATO SHARMA  Internal Medicine  [address] [facility] [address]  [place] [address]  Phone: [phone]  Fax: [phone]  Follow Up Time:     N/A,   Endocrinology at [place]  [address] [facility] [facility]  [place] [address]  Phone: [phone]  Phone: (   )    -  Fax: (   )    -  Follow Up Time:

## 2020-06-26 NOTE — DISCHARGE NOTE PROVIDER - HOSPITAL COURSE
HPI:     Ms. Olson is a 65 year old woman with PMH 80 pack year smoking hx, R lung cancer s/p RLL resection 2017 on chemo presenting for acute shortness of breath and pain from L sided rib fractures. She is on 5L oxygen at home for around 3 years but feels SOB and has difficulty walking around the house. She is unable to quantify duration of present SOB. She uses a wheelchair due to SOB from walking and from pain from her fractures. She states she was admitted for COPD exacerbation several times in the past, but never needed intubation; did receive tapered steroid courses.     Rib pain is worsened by coughing. She is on high doses of pain meds at home. She has had a dry cough for couple days. She also has a painful area within her neck, below throat. She denies fevers, congestion. She denies abdominal pain, changes in BM, changes in urination, blood in BM or blood in urine. No rashes or swelling noted. She has some dizziness but no chest pain, palpitations. She sees outpatient cardiologist, pulm, onc. She got her last chemo dose a week ago.             Hospital Course:         For her COPD exacerbation, she received duoneb, oxygen (home level 4-5L O2 continued), methylprednisolone IV once and then started on 60 mg prednisone daily, one dose levaquin 6/25. Pulm was consulted for recurrent COPD management 6/26.     For her rib fracture pain, she received 5 mg oxy q 6 hrs prn and pain was well controlled per patient. She was encouraged to do incentive spirometry. Her glucose was elevated to ~315 on 6/26 likely 2/2 steroid. We increased sliding scale insulin to moderate to better control glucose level. Her A1C is 9.6; she needs to follow up outpatient PCP/endo to manage her uncontrolled diabetes. HPI:     Ms. Olson is a 65 year old woman with PMH 80 pack year smoking hx, R lung cancer s/p RLL resection 2017 on chemo presenting for acute shortness of breath and pain from L sided rib fractures. She is on 5L oxygen at home for around 3 years but feels SOB and has difficulty walking around the house. She is unable to quantify duration of present SOB. She uses a wheelchair due to SOB from walking and from pain from her fractures. She states she was admitted for COPD exacerbation several times in the past, but never needed intubation; did receive tapered steroid courses.     Rib pain is worsened by coughing. She is on high doses of pain meds at home. She has had a dry cough for couple days. She also has a painful area within her neck, below throat. She denies fevers, congestion. She denies abdominal pain, changes in BM, changes in urination, blood in BM or blood in urine. No rashes or swelling noted. She has some dizziness but no chest pain, palpitations. She sees outpatient cardiologist, pulm, onc. She got her last chemo dose a week ago.             Hospital Course:     On admission, a CTA Chest showed complete atelectasis of the RML 2/2 mucus plugging, increasing mediastinal LAD, with a left paraesophageal node measuring up to 4.4 cm in greatest dimension, concerning for metastatic disease. CTA chest also showed acute left lateral 7th rib fracture, subacute right posterolateral sixth rib fracture. No pneumothorax. Pathologic fracture of T7 may correspond to focus of FDG activity on outside PET scan.    For her COPD exacerbation, she received duoneb, oxygen (home level 5L O2 continued), methylprednisolone IV once and then started on 60 mg prednisone daily, one dose levaquin 6/25. Pulm was consulted for recurrent COPD management 6/26. She was also started on spiriva, a hypertonic saline solution, tessalon janna, and robitussin.    For her rib fracture pain, she received 5 mg oxy q 6 hrs prn and toradol and pain was well controlled per patient. A bowel regimen was started as well. She was encouraged to do incentive spirometry and use the acapella. Her glucose was elevated to ~315 on 6/26 likely 2/2 steroid. We increased sliding scale insulin to moderate to better control glucose level. Her A1C is 9.6; she needs to follow up outpatient PCP/endo to manage her uncontrolled diabetes.     On 6/30, cardiothoracic surgery performed a bronchoscopy and biopsied a right middle lobe mass and suctioned b/l mucus. On 7/1, we began tapering her prednisone to 40 mg x3 d. (decrease by 10 mg every 3 days thereafter) per pulmonology's recommendations. The BAL showed normal respiratory sundar, no organisms on gram stain. Pt needs to follow up with the CT surgeon for results of the RML biopsy.     During her admission, pt experienced anxiety and panic attacks, which behavioral health was consulted for and made overlapping recommendations with palliative care for air hunger/anxiety and sleep, including low dose benzo, opioid, and melatonin. At one point, pt developed back stiffness and was ordered flexeril. HPI:     Ms. Olson is a 65 year old woman with PMH 80 pack year smoking hx, R lung cancer s/p RLL resection 2017 on chemo presenting for acute shortness of breath and pain from L sided rib fractures. She is on 5L oxygen at home for around 3 years but feels SOB and has difficulty walking around the house. She is unable to quantify duration of present SOB. She uses a wheelchair due to SOB from walking and from pain from her fractures. She states she was admitted for COPD exacerbation several times in the past, but never needed intubation; did receive tapered steroid courses.     Rib pain is worsened by coughing. She is on high doses of pain meds at home. She has had a dry cough for couple days. She also has a painful area within her neck, below throat. She denies fevers, congestion. She denies abdominal pain, changes in BM, changes in urination, blood in BM or blood in urine. No rashes or swelling noted. She has some dizziness but no chest pain, palpitations. She sees outpatient cardiologist, pulm, onc. She got her last chemo dose a week ago.             Hospital Course:     On admission, a CTA Chest showed complete atelectasis of the RML 2/2 mucus plugging, increasing mediastinal LAD, with a left paraesophageal node measuring up to 4.4 cm in greatest dimension, concerning for metastatic disease. CTA chest also showed acute left lateral 7th rib fracture, subacute right posterolateral sixth rib fracture. No pneumothorax. Pathologic fracture of T7 may correspond to focus of FDG activity on outside PET scan.    For her COPD exacerbation, she received duoneb, oxygen (home level 5L O2 continued), methylprednisolone IV once and then started on 60 mg prednisone daily, one dose levaquin 6/25. Pulm was consulted for recurrent COPD management 6/26. She was also started on spiriva, a hypertonic saline solution, tessalon janna, and robitussin.    For her rib fracture pain, she received 5 mg oxy q 6 hrs prn and toradol and pain was well controlled per patient. A bowel regimen was started as well. She was encouraged to do incentive spirometry and use the acapella. Her glucose was elevated to ~315 on 6/26 likely 2/2 steroid. We increased sliding scale insulin to moderate to better control glucose level. Her A1C is 9.6; she needs to follow up outpatient PCP/endo to manage her uncontrolled diabetes.     On 6/30, cardiothoracic surgery performed a bronchoscopy and biopsied a right middle lobe mass and suctioned b/l mucus. On 7/1, we began tapering her prednisone to 40 mg x3 d. (decrease by 10 mg every 3 days thereafter) per pulmonology's recommendations. The BAL showed normal respiratory sundar, no organisms on gram stain. Pt needs to follow up with the CT surgeon for results of the RML biopsy.     During her admission, pt experienced anxiety and panic attacks, which behavioral health was consulted for and made overlapping recommendations with palliative care for air hunger/anxiety and sleep, including low dose benzo, opioid, and melatonin. At one point, pt developed back stiffness and was ordered flexeril. Patient is currently hemodynamically stable and readyfor discharge.

## 2020-06-26 NOTE — PROGRESS NOTE ADULT - SUBJECTIVE AND OBJECTIVE BOX
Adilene Joshiathy PGY1    Patient is a 65y old  Female who presents with a chief complaint of SOB, fractures (25 Jun 2020 19:34)      SUBJECTIVE / OVERNIGHT EVENTS: She still feels SOB though sating SaO2 ~99 on 4L O2. States she needs more than 60 mg prednisone (received 1x yesterday). Patient states she did not receive levofloxacin or potassium. She says pain is well controlled but coughing/movement exacerbates to 7/10 pain level. She received one dose of 5 mg oxy yesterday, requesting another dose this am. She is requesting cough suppressant as it worsens rib pain. She denies chest pain/palpitations (HR has been tachy to ~100 overnight and am).     MEDICATIONS  (STANDING):  albuterol/ipratropium for Nebulization 3 milliLiter(s) Nebulizer every 6 hours  amLODIPine   Tablet 5 milliGRAM(s) Oral daily  atorvastatin 40 milliGRAM(s) Oral at bedtime  benzonatate 100 milliGRAM(s) Oral once  carvedilol 6.25 milliGRAM(s) Oral every 12 hours  dextrose 5%. 1000 milliLiter(s) (50 mL/Hr) IV Continuous <Continuous>  dextrose 50% Injectable 12.5 Gram(s) IV Push once  dextrose 50% Injectable 25 Gram(s) IV Push once  dextrose 50% Injectable 25 Gram(s) IV Push once  enoxaparin Injectable 40 milliGRAM(s) SubCutaneous daily  famotidine    Tablet 20 milliGRAM(s) Oral daily  gabapentin 200 milliGRAM(s) Oral three times a day  insulin lispro (HumaLOG) corrective regimen sliding scale   SubCutaneous three times a day before meals  insulin lispro (HumaLOG) corrective regimen sliding scale   SubCutaneous at bedtime  levoFLOXacin  Tablet 500 milliGRAM(s) Oral every 24 hours  lidocaine   Patch 1 Patch Transdermal daily  predniSONE   Tablet 60 milliGRAM(s) Oral daily    MEDICATIONS  (PRN):  dextrose 40% Gel 15 Gram(s) Oral once PRN Blood Glucose LESS THAN 70 milliGRAM(s)/deciliter  glucagon  Injectable 1 milliGRAM(s) IntraMuscular once PRN Glucose LESS THAN 70 milligrams/deciliter  oxycodone    5 mG/acetaminophen 325 mG 1 Tablet(s) Oral every 6 hours PRN Severe Pain (7 - 10)      CAPILLARY BLOOD GLUCOSE      POCT Blood Glucose.: 395 mg/dL (25 Jun 2020 22:09)    I&O's Summary      Vital Signs Last 24 Hrs  T(C): 36.7 (26 Jun 2020 06:06), Max: 36.9 (25 Jun 2020 17:08)  T(F): 98.1 (26 Jun 2020 06:06), Max: 98.5 (25 Jun 2020 17:08)  HR: 98 (26 Jun 2020 06:06) (72 - 102)  BP: 124/83 (26 Jun 2020 06:06) (124/78 - 156/63)  BP(mean): --  RR: 19 (26 Jun 2020 06:06) (16 - 97)  SpO2: 100% (26 Jun 2020 06:06) (97% - 100%)    PHYSICAL EXAM:  GENERAL: NAD, well-developed, well-nourished  HEAD: Atraumatic, Normocephalic  EYES: EOMI, PERRLA, conjunctiva and sclera clear  NECK: Supple, No JVD  CHEST/LUNG: Clear to auscultation bilaterally; No wheezes or crackles  HEART: Normal S1/S2; Regular rate and rhythm; No murmurs, rubs, or gallops  ABDOMEN: Soft, Nontender, Nondistended; Bowel sounds present  EXTREMITIES: 2+ Peripheral Pulses; No clubbing, cyanosis, or edema  PSYCH: A&Ox3  NEUROLOGY: no focal neurologic deficit  SKIN: No rashes or lesions    LABS:                        12.8   7.13  )-----------( 112      ( 25 Jun 2020 11:00 )             39.4      06-25    138  |  94<L>  |  9   ----------------------------<  230<H>  3.2<L>   |  31  |  0.70    Ca    9.4      25 Jun 2020 11:25    TPro  6.5  /  Alb  4.0  /  TBili  1.2  /  DBili  x   /  AST  13  /  ALT  10  /  AlkPhos  177<H>  06-25    PT/INR - ( 25 Jun 2020 11:25 )   PT: 13.4 SEC;   INR: 1.17          PTT - ( 25 Jun 2020 11:25 )  PTT:32.6 SEC          RADIOLOGY & ADDITIONAL TESTS:    Imaging Personally Reviewed:    Consultant(s) Notes Reviewed:      Care Discussed with Consultants/Other Providers:

## 2020-06-26 NOTE — DISCHARGE NOTE PROVIDER - CARE PROVIDERS DIRECT ADDRESSES
,DirectAddress_Unknown ,DirectAddress_Unknown,DirectAddress_Unknown ,DirectAddress_Unknown,DirectAddress_Unknown,[email or web address],DirectAddress_Unknown ,DirectAddress_Unknown,DirectAddress_Unknown,[email or web address],DirectAddress_Unknown,DirectAddress_Unknown

## 2020-06-26 NOTE — PROGRESS NOTE ADULT - PROBLEM SELECTOR PLAN 1
Likely 2/2 COPD exacerbation in setting of RLL resection due to R sided lung cancer. She uses 5L oxygen at home and still feels SOB and requests more oxygen for home. CXR wnl. CT angio 6/25: No pulmonary embolism. Complete atelectasis of the right middle lobe secondary to mucous plugging. Increasing mediastinal lymphadenopathy, with a left paraesophageal node measuring up to 4.4 cm in greatest dimension, concerning for metastatic disease.   - decrease to 2L oxygen as she is sating 100% on 4L oxygen  - follow up with pulm outpatient for imaging findings Likely 2/2 COPD exacerbation in setting of RLL resection due to R sided lung cancer. She uses 5L oxygen at home and still feels SOB and requests more oxygen for home. CXR wnl. CT angio 6/25: No pulmonary embolism. Complete atelectasis of the right middle lobe secondary to mucous plugging. Increasing mediastinal lymphadenopathy, with a left paraesophageal node measuring up to 4.4 cm in greatest dimension, concerning for metastatic disease.   - decrease to 2L oxygen as she is sating 100% on 4L oxygen  - follow up with pulm outpatient for imaging findings  - inpatient pulm consulted for COPD sx

## 2020-06-26 NOTE — PROGRESS NOTE ADULT - PROBLEM SELECTOR PLAN 3
Acute left lateral 7th rib fracture. Subacute right posterolateral sixth rib fracture. No pneumothorax. Pathologic fracture of T7 which may correspond to focus of FDG avidity on outside PET scan.  - pain meds as below Acute left lateral 7th rib fracture. Subacute right posterolateral sixth rib fracture. No pneumothorax. Pathologic fracture of T7 which may correspond to focus of FDG avidity on outside PET scan.  - pain meds as below  - incentive spirometry

## 2020-06-26 NOTE — PROGRESS NOTE ADULT - ATTENDING COMMENTS
65F COPD (on home O2) DM2 lung adeno Ca s/p resection 2016 with recureence mets to LN s/p first cycle chemo 1 week PTA p/w COPD exacerbation  -prednisone, nebs, received levaquin.  Consult pt's pulmonology group

## 2020-06-26 NOTE — CONSULT NOTE ADULT - ASSESSMENT
65F PMH HTN, DMT2, ITP, GERD, MO, COPD (5L NC at home), 80 pack year smoking hx, R lung cancer (s/p RLL resection 2017; s/p immunotherapy now on chemo-->first dose 1 week ago) presenting for acute shortness of breath and pain from L sided rib fractures which is a sequelae of long term prednisone use along with T7 vertebral fracture.     # COPD  - Unclear that this truly represents COPD exacerbation as patient has no fever, sputum production, or leukocytosis. She is symptomatic with SOB which could be from other causes  - Ok to continue with steroids as patient has wheezing.  - Recommend d/c abx  - Start LAMA/LABA/ICS therapy (symbicort/spiriva)  - duonebs and hypersal standing q6h with acapella/aerobika for mucociliary mobilization    # Mucous plugghing vs endobronchial lesion on RML bronchus  - Will discuss imaging with radiology  - If atelectasis still present after weekend of above therapy, will plan for diagnostic and therapeutic bronchoscopy        Cristopher Jean-Baptiste MD  PGY-6  Pulmonary and Critical Care Fellow  Pager: [phone]

## 2020-06-26 NOTE — PROGRESS NOTE ADULT - PROBLEM SELECTOR PLAN 4
Less likely fracture from imaging results. CT neck soft tissue 6/25 showed: No cervical mass or adenopathy identified. Enlarged mediastinal nodes.   - pain meds as below

## 2020-06-26 NOTE — DISCHARGE NOTE PROVIDER - NSDCCPTREATMENT_GEN_ALL_CORE_FT
PRINCIPAL PROCEDURE  Procedure: Bronchoscopy, with bronchial biopsy  Findings and Treatment: Right middle lobe  A right middle lobe biopsy was taken during your bronchoscopy. Please follow up with you thoracic surgeon for results. PRINCIPAL PROCEDURE  Procedure: Bronchoscopy, with bronchial biopsy  Findings and Treatment: Right middle lobe  A right middle lobe biopsy was taken during your bronchoscopy. Please follow up with you thoracic surgeon, Dr. Kurtz, for results.

## 2020-06-26 NOTE — DISCHARGE NOTE PROVIDER - NSDCCPCAREPLAN_GEN_ALL_CORE_FT
PRINCIPAL DISCHARGE DIAGNOSIS  Diagnosis: Chronic obstructive pulmonary disease with acute exacerbation  Assessment and Plan of Treatment: You were admitted for worsening of your COPD which was causing shortness of breath. We treated you with nebulizer (breathing treatment), antibiotic (levaquin), oxygen, steroids. PRINCIPAL DISCHARGE DIAGNOSIS  Diagnosis: Chronic obstructive pulmonary disease with acute exacerbation  Assessment and Plan of Treatment: You were admitted for worsening of your COPD which was causing shortness of breath. We treated you with nebulizer (breathing treatment), antibiotic (levaquin), oxygen, steroids, and an opioid and a benzodiazipine to help with air hunger/anxiety associated with the shortness of breath. You were also encouraged to use the acapella four times a day and the incentive spirometry. We began to taper your steroids and will continue to taper you upon discharge. You also underwent a procedure called a bronchoscopy, which helped remove some mucus from your airway and biopsy a right middle lobe lesion, for which you should follow up with your thoracic surgeon. If you begin to experience worsening shortness of breath, please contact your pulmonologist or return to your nearest ER. Please follow up with your outpatient pulmonolgist upon discharge to continue managing your COPD.      SECONDARY DISCHARGE DIAGNOSES  Diagnosis: Malignant neoplasm of lung, unspecified laterality, unspecified part of lung  Assessment and Plan of Treatment: During your admission, a CAT scan of the chest showed complete closure of the middle lobe of the right lung due to mucus plugging, increasing lymph node size, with a left node measuring up to 4.4 cm in greatest dimension, concerning for metastatic disease. A bronchoscopy was performed to remove the mucus plugging and biopsy concerning lesion(s). Please follow up with your thoracic surgeon and oncologist for the results. The CAT scan also showed a left 7th rib fracture, a right sixth rib fracture, and a pathologic fracture of your seventh thoracic vertebra in your spine. You were treated with opioid pain medication for the pain caused by the fractures.  Please follow up with your oncologist upon discharge for further treatment of your lung cancer. PRINCIPAL DISCHARGE DIAGNOSIS  Diagnosis: Chronic obstructive pulmonary disease with acute exacerbation  Assessment and Plan of Treatment: You were admitted for worsening of your COPD which was causing shortness of breath. We treated you with nebulizer (breathing treatment), antibiotic (levaquin), oxygen, steroids, and an opioid and a benzodiazipine to help with air hunger/anxiety associated with the shortness of breath. You were also encouraged to use the acapella four times a day and the incentive spirometry. We began to taper your steroids and will continue to taper you upon discharge. You also underwent a procedure called a bronchoscopy, which helped remove some mucus from your airway and biopsy a right middle lobe lesion, for which you should follow up with your thoracic surgeon. If you begin to experience worsening shortness of breath, please contact your pulmonologist, Dr. Jean-Baptiste, or return to your nearest ER. Please follow up with your outpatient pulmonolgist, Dr. Jean-Baptiste, upon discharge to continue managing your COPD.      SECONDARY DISCHARGE DIAGNOSES  Diagnosis: Malignant neoplasm of lung, unspecified laterality, unspecified part of lung  Assessment and Plan of Treatment: During your admission, a CAT scan of the chest showed complete closure of the middle lobe of the right lung due to mucus plugging, increasing lymph node size, with a left node measuring up to 4.4 cm in greatest dimension, concerning for metastatic disease. A bronchoscopy was performed to remove the mucus plugging and biopsy concerning lesion(s). Please follow up with your thoracic surgeon, Dr. Kurtz, and oncologist, Dr. Mendiola, for the results. The CAT scan also showed a left 7th rib fracture, a right sixth rib fracture, and a pathologic fracture of your seventh thoracic vertebra in your spine. You were treated with opioid pain medication for the pain caused by the fractures.  Please follow up with your oncologist, Dr. Mendiola, upon discharge for further treatment of your lung cancer. PRINCIPAL DISCHARGE DIAGNOSIS  Diagnosis: Chronic obstructive pulmonary disease with acute exacerbation  Assessment and Plan of Treatment: You were admitted for worsening of your COPD which was causing shortness of breath. We treated you with nebulizer (breathing treatment), antibiotic (levaquin), oxygen, steroids, and an opioid and a benzodiazipine to help with air hunger/anxiety associated with the shortness of breath. You were also encouraged to use the acapella four times a day and the incentive spirometry. We began to taper your steroids and will continue to taper you upon discharge. You also underwent a procedure called a bronchoscopy, which helped remove some mucus from your airway and biopsy a right middle lobe lesion, for which you should follow up with your thoracic surgeon. If you begin to experience worsening shortness of breath, please contact your pulmonologist, Dr. Jean-Baptiste, or return to your nearest ER. Please follow up with your outpatient pulmonolgist, Dr. Jean-Baptiste, upon discharge to continue managing your COPD.      SECONDARY DISCHARGE DIAGNOSES  Diagnosis: Malignant neoplasm of lung, unspecified laterality, unspecified part of lung  Assessment and Plan of Treatment: During your admission, a CAT scan of the chest showed complete closure of the middle lobe of the right lung due to mucus plugging, increasing lymph node size, with a left node measuring up to 4.4 cm in greatest dimension, concerning for metastatic disease. A bronchoscopy was performed to remove the mucus plugging and biopsy concerning lesion(s). Please follow up with your thoracic surgeon, Dr. Kurtz, and oncologist, Dr. Mendiola, for the results. The CAT scan also showed a left 7th rib fracture, a right sixth rib fracture, and a pathologic fracture of your seventh thoracic vertebra in your spine. You were treated with opioid pain medication for the pain caused by the fractures.  Please follow up with your oncologist, Dr. Mendiola, upon discharge for further treatment of your lung cancer.    Diagnosis: Type 2 diabetes mellitus, uncontrolled  Assessment and Plan of Treatment: During your hospitalization, your type II diabetes was moderately controlled with Lantus 35 units at bedtime, Lispro (humalog) 15 units three times a day with meals, and lispro corrective regimen sliding scale three times a day before meals and at bedtime in the setting of prednisone use.  Please follow up with    Diagnosis: Acute adjustment disorder with mixed anxiety and depressed mood  Assessment and Plan of Treatment: During your hospitalization, you were experienced anxiety and panic attacks, for which you were evaluated by behavioral health.  If these symptoms persist, please set up care at the [facility] outpatient clinic or geriatric clinic. PRINCIPAL DISCHARGE DIAGNOSIS  Diagnosis: Chronic obstructive pulmonary disease with acute exacerbation  Assessment and Plan of Treatment: You were admitted for worsening of your COPD which was causing shortness of breath. We treated you with nebulizer (breathing treatment), antibiotic (levaquin), oxygen, steroids, and an opioid and a benzodiazipine to help with air hunger/anxiety associated with the shortness of breath. You were also encouraged to use the acapella four times a day and the incentive spirometry. We began to taper your steroids and will continue to taper you upon discharge. You also underwent a procedure called a bronchoscopy, which helped remove some mucus from your airway and biopsy a right middle lobe lesion, for which you should follow up with your thoracic surgeon. If you begin to experience worsening shortness of breath, please contact your pulmonologist, Dr. Jean-Baptiste, or return to your nearest ER. Please follow up with your outpatient pulmonolgist, Dr. Jean-Baptiste, upon discharge to continue managing your COPD.      SECONDARY DISCHARGE DIAGNOSES  Diagnosis: Malignant neoplasm of lung, unspecified laterality, unspecified part of lung  Assessment and Plan of Treatment: During your admission, a CAT scan of the chest showed complete closure of the middle lobe of the right lung due to mucus plugging, increasing lymph node size, with a left node measuring up to 4.4 cm in greatest dimension, concerning for metastatic disease. A bronchoscopy was performed to remove the mucus plugging and biopsy concerning lesion(s). Please follow up with your thoracic surgeon, Dr. Kurtz, and oncologist, Dr. Mendiola, for the results. The CAT scan also showed a left 7th rib fracture, a right sixth rib fracture, and a pathologic fracture of your seventh thoracic vertebra in your spine. You were treated with opioid pain medication for the pain caused by the fractures.  Please follow up with your oncologist, Dr. Mendiola, upon discharge for further treatment of your lung cancer.    Diagnosis: Type 2 diabetes mellitus, uncontrolled  Assessment and Plan of Treatment: During your hospitalization, your type II diabetes was moderately controlled with Lantus 35 units at bedtime, Lispro (humalog) 15 units three times a day with meals, and lispro corrective regimen sliding scale three times a day before meals and at bedtime in the setting of prednisone use.  Please set up care with an endocrinologist at the [facility] at [place] to optimize your diabetes regimen within 1 week of discharge.    Diagnosis: Acute adjustment disorder with mixed anxiety and depressed mood  Assessment and Plan of Treatment: During your hospitalization, you were experienced anxiety and panic attacks, for which you were evaluated by behavioral health.  If these symptoms persist, please set up care at the [facility] outpatient clinic or geriatric clinic.

## 2020-06-27 DIAGNOSIS — E11.65 TYPE 2 DIABETES MELLITUS WITH HYPERGLYCEMIA: ICD-10-CM

## 2020-06-27 DIAGNOSIS — J44.9 CHRONIC OBSTRUCTIVE PULMONARY DISEASE, UNSPECIFIED: ICD-10-CM

## 2020-06-27 DIAGNOSIS — S22.39XA FRACTURE OF ONE RIB, UNSPECIFIED SIDE, INITIAL ENCOUNTER FOR CLOSED FRACTURE: ICD-10-CM

## 2020-06-27 LAB
CULTURE RESULTS: SIGNIFICANT CHANGE UP
GLUCOSE BLDC GLUCOMTR-MCNC: 189 MG/DL — HIGH (ref 70–99)
GLUCOSE BLDC GLUCOMTR-MCNC: 213 MG/DL — HIGH (ref 70–99)
GLUCOSE BLDC GLUCOMTR-MCNC: 215 MG/DL — HIGH (ref 70–99)
GLUCOSE BLDC GLUCOMTR-MCNC: 219 MG/DL — HIGH (ref 70–99)
SPECIMEN SOURCE: SIGNIFICANT CHANGE UP

## 2020-06-27 PROCEDURE — 99233 SBSQ HOSP IP/OBS HIGH 50: CPT | Mod: GC

## 2020-06-27 RX ORDER — INSULIN LISPRO 100/ML
15 VIAL (ML) SUBCUTANEOUS
Refills: 0 | Status: DISCONTINUED | OUTPATIENT
Start: 2020-06-27 | End: 2020-07-05

## 2020-06-27 RX ADMIN — ATORVASTATIN CALCIUM 40 MILLIGRAM(S): 80 TABLET, FILM COATED ORAL at 21:53

## 2020-06-27 RX ADMIN — Medication 4: at 18:14

## 2020-06-27 RX ADMIN — Medication 60 MILLIGRAM(S): at 07:04

## 2020-06-27 RX ADMIN — BUDESONIDE AND FORMOTEROL FUMARATE DIHYDRATE 2 PUFF(S): 160; 4.5 AEROSOL RESPIRATORY (INHALATION) at 21:53

## 2020-06-27 RX ADMIN — Medication 100 MILLIGRAM(S): at 04:18

## 2020-06-27 RX ADMIN — Medication 200 MILLIGRAM(S): at 21:54

## 2020-06-27 RX ADMIN — Medication 3 MILLILITER(S): at 20:37

## 2020-06-27 RX ADMIN — Medication 2: at 09:06

## 2020-06-27 RX ADMIN — GABAPENTIN 200 MILLIGRAM(S): 400 CAPSULE ORAL at 21:54

## 2020-06-27 RX ADMIN — SODIUM CHLORIDE 3 MILLILITER(S): 9 INJECTION INTRAMUSCULAR; INTRAVENOUS; SUBCUTANEOUS at 20:37

## 2020-06-27 RX ADMIN — CARVEDILOL PHOSPHATE 6.25 MILLIGRAM(S): 80 CAPSULE, EXTENDED RELEASE ORAL at 07:04

## 2020-06-27 RX ADMIN — Medication 4: at 13:15

## 2020-06-27 RX ADMIN — SODIUM CHLORIDE 3 MILLILITER(S): 9 INJECTION INTRAMUSCULAR; INTRAVENOUS; SUBCUTANEOUS at 03:36

## 2020-06-27 RX ADMIN — CARVEDILOL PHOSPHATE 6.25 MILLIGRAM(S): 80 CAPSULE, EXTENDED RELEASE ORAL at 18:13

## 2020-06-27 RX ADMIN — Medication 3 MILLILITER(S): at 03:35

## 2020-06-27 RX ADMIN — Medication 15 UNIT(S): at 18:14

## 2020-06-27 RX ADMIN — Medication 3 MILLILITER(S): at 16:12

## 2020-06-27 RX ADMIN — Medication 15 UNIT(S): at 13:15

## 2020-06-27 RX ADMIN — ENOXAPARIN SODIUM 40 MILLIGRAM(S): 100 INJECTION SUBCUTANEOUS at 13:15

## 2020-06-27 RX ADMIN — Medication 3 MILLILITER(S): at 09:53

## 2020-06-27 RX ADMIN — SODIUM CHLORIDE 3 MILLILITER(S): 9 INJECTION INTRAMUSCULAR; INTRAVENOUS; SUBCUTANEOUS at 16:15

## 2020-06-27 RX ADMIN — AMLODIPINE BESYLATE 5 MILLIGRAM(S): 2.5 TABLET ORAL at 07:04

## 2020-06-27 RX ADMIN — SODIUM CHLORIDE 3 MILLILITER(S): 9 INJECTION INTRAMUSCULAR; INTRAVENOUS; SUBCUTANEOUS at 10:01

## 2020-06-27 RX ADMIN — Medication 15 UNIT(S): at 09:06

## 2020-06-27 RX ADMIN — GABAPENTIN 200 MILLIGRAM(S): 400 CAPSULE ORAL at 13:14

## 2020-06-27 RX ADMIN — FAMOTIDINE 20 MILLIGRAM(S): 10 INJECTION INTRAVENOUS at 13:14

## 2020-06-27 RX ADMIN — OXYCODONE AND ACETAMINOPHEN 1 TABLET(S): 5; 325 TABLET ORAL at 21:54

## 2020-06-27 RX ADMIN — INSULIN GLARGINE 35 UNIT(S): 100 INJECTION, SOLUTION SUBCUTANEOUS at 22:21

## 2020-06-27 RX ADMIN — GABAPENTIN 200 MILLIGRAM(S): 400 CAPSULE ORAL at 07:04

## 2020-06-27 NOTE — PROGRESS NOTE ADULT - PROBLEM SELECTOR PLAN 7
- diet - DASH/TLC  - DVT: lovenox  - Full Code - Diet: Regular (per patient's wish; accepts risk)  - DVT: lovenox  - Full Code

## 2020-06-27 NOTE — PROGRESS NOTE ADULT - ASSESSMENT
Ms. Olson is a 64 y/o with PMH R lung cancer s/p resection 2017 on chemo, COPD with multiple hospital admissions, 80 pack year smoking hx, fractures in L ribs presenting for SOB (on 5L O2 at home) and pain from fractures. SOB likely 2/2 COPD exacerbation.

## 2020-06-27 NOTE — PROGRESS NOTE ADULT - SUBJECTIVE AND OBJECTIVE BOX
*************************************  David Zayas M.D., Ph.D. | PGY-3  Department of Internal Medicine  [phone] ([facility]) | [id number] ([facility])  *************************************      Patient is a 65y old  Female who presents with a chief complaint of SOB, fractures (26 Jun 2020 17:30)      SUBJECTIVE / OVERNIGHT EVENTS:  Coughs overnight. Provided Tessalon Pearle by night team, however did not provide much relief. Patient was seen and examined at bedside. Complains of wet coughs without expectoration. Stated patient has difficulty clearing phlegm. Denies fever, chills, nausea, vomiting, headache, or dizziness.    MEDICATIONS  (STANDING):  albuterol/ipratropium for Nebulization 3 milliLiter(s) Nebulizer every 6 hours  amLODIPine   Tablet 5 milliGRAM(s) Oral daily  atorvastatin 40 milliGRAM(s) Oral at bedtime  budesonide 160 MICROgram(s)/formoterol 4.5 MICROgram(s) Inhaler 2 Puff(s) Inhalation two times a day  carvedilol 6.25 milliGRAM(s) Oral every 12 hours  dextrose 5%. 1000 milliLiter(s) (50 mL/Hr) IV Continuous <Continuous>  dextrose 50% Injectable 12.5 Gram(s) IV Push once  dextrose 50% Injectable 25 Gram(s) IV Push once  dextrose 50% Injectable 25 Gram(s) IV Push once  enoxaparin Injectable 40 milliGRAM(s) SubCutaneous daily  famotidine    Tablet 20 milliGRAM(s) Oral daily  gabapentin 200 milliGRAM(s) Oral three times a day  insulin glargine Injectable (LANTUS) 35 Unit(s) SubCutaneous at bedtime  insulin lispro (HumaLOG) corrective regimen sliding scale   SubCutaneous three times a day before meals  insulin lispro (HumaLOG) corrective regimen sliding scale   SubCutaneous at bedtime  insulin lispro Injectable (HumaLOG) 15 Unit(s) SubCutaneous three times a day with meals  lidocaine   Patch 1 Patch Transdermal daily  predniSONE   Tablet 60 milliGRAM(s) Oral daily  sodium chloride 3%  Inhalation 3 milliLiter(s) Inhalation every 6 hours    MEDICATIONS  (PRN):  dextrose 40% Gel 15 Gram(s) Oral once PRN Blood Glucose LESS THAN 70 milliGRAM(s)/deciliter  glucagon  Injectable 1 milliGRAM(s) IntraMuscular once PRN Glucose LESS THAN 70 milligrams/deciliter  guaiFENesin   Syrup  (Sugar-Free) 200 milliGRAM(s) Oral every 6 hours PRN Cough  oxycodone    5 mG/acetaminophen 325 mG 1 Tablet(s) Oral every 6 hours PRN Severe Pain (7 - 10)      CAPILLARY BLOOD GLUCOSE      POCT Blood Glucose.: 240 mg/dL (26 Jun 2020 21:31)  POCT Blood Glucose.: 279 mg/dL (26 Jun 2020 18:04)  POCT Blood Glucose.: 351 mg/dL (26 Jun 2020 13:12)  POCT Blood Glucose.: 302 mg/dL (26 Jun 2020 08:38)    I&O's Summary      Vital Signs Last 24 Hrs  T(C): 36.6 (27 Jun 2020 07:01), Max: 36.7 (26 Jun 2020 13:56)  T(F): 97.8 (27 Jun 2020 07:01), Max: 98.1 (26 Jun 2020 21:25)  HR: 92 (27 Jun 2020 07:01) (88 - 102)  BP: 130/65 (27 Jun 2020 07:01) (126/67 - 145/82)  BP(mean): --  RR: 19 (27 Jun 2020 07:01) (18 - 21)  SpO2: 100% (27 Jun 2020 07:01) (97% - 100%)    PHYSICAL EXAM:  GENERAL: NAD  HEAD: Atraumatic, Normocephalic  EYES: EOMI, PERRL, conjunctiva and sclera clear  NECK: Supple, No JVD  CHEST/LUNG: R lung area poor air entry; L lung field with wheezing.  HEART: Normal S1/S2; Regular rate and rhythm; No murmurs, rubs, or gallops  ABDOMEN: Soft, Nontender, Nondistended; Bowel sounds present  EXTREMITIES: 2+ Peripheral Pulses; No clubbing, cyanosis, or edema  PSYCH: A&Ox3  NEUROLOGY: no focal neurologic deficit  SKIN: No rashes or lesions    LABS:                        12.1   5.56  )-----------( 103      ( 26 Jun 2020 06:08 )             35.8      06-26    136  |  94<L>  |  12  ----------------------------<  315<H>  3.6   |  26  |  0.55    Ca    9.2      26 Jun 2020 06:08  Phos  3.2     06-26  Mg     1.8     06-26    TPro  6.6  /  Alb  3.7  /  TBili  0.7  /  DBili  x   /  AST  9   /  ALT  12  /  AlkPhos  161<H>  06-26    PT/INR - ( 25 Jun 2020 11:25 )   PT: 13.4 SEC;   INR: 1.17          PTT - ( 25 Jun 2020 11:25 )  PTT:32.6 SEC          RADIOLOGY & ADDITIONAL TESTS:    Imaging Personally Reviewed:    Consultant(s) Notes Reviewed:      Care Discussed with Consultants/Other Providers: *************************************  David Zayas M.D., Ph.D. | PGY-3  Department of Internal Medicine  [phone] ([facility]) | [id number] ([facility])  *************************************      Patient is a 65y old  Female who presents with a chief complaint of SOB, fractures (26 Jun 2020 17:30)      SUBJECTIVE / OVERNIGHT EVENTS:  Coughs overnight. Provided Tessalon Pearle by night team, however did not provide much relief. Patient was seen and examined at bedside. Complains of wet coughs without expectoration. Stated patient has difficulty clearing phlegm. Significantly frustrated with CC diet, threw dish to the floor and refused to take PO. Denies fever, chills, nausea, vomiting, headache, or dizziness.    MEDICATIONS  (STANDING):  albuterol/ipratropium for Nebulization 3 milliLiter(s) Nebulizer every 6 hours  amLODIPine   Tablet 5 milliGRAM(s) Oral daily  atorvastatin 40 milliGRAM(s) Oral at bedtime  budesonide 160 MICROgram(s)/formoterol 4.5 MICROgram(s) Inhaler 2 Puff(s) Inhalation two times a day  carvedilol 6.25 milliGRAM(s) Oral every 12 hours  dextrose 5%. 1000 milliLiter(s) (50 mL/Hr) IV Continuous <Continuous>  dextrose 50% Injectable 12.5 Gram(s) IV Push once  dextrose 50% Injectable 25 Gram(s) IV Push once  dextrose 50% Injectable 25 Gram(s) IV Push once  enoxaparin Injectable 40 milliGRAM(s) SubCutaneous daily  famotidine    Tablet 20 milliGRAM(s) Oral daily  gabapentin 200 milliGRAM(s) Oral three times a day  insulin glargine Injectable (LANTUS) 35 Unit(s) SubCutaneous at bedtime  insulin lispro (HumaLOG) corrective regimen sliding scale   SubCutaneous three times a day before meals  insulin lispro (HumaLOG) corrective regimen sliding scale   SubCutaneous at bedtime  insulin lispro Injectable (HumaLOG) 15 Unit(s) SubCutaneous three times a day with meals  lidocaine   Patch 1 Patch Transdermal daily  predniSONE   Tablet 60 milliGRAM(s) Oral daily  sodium chloride 3%  Inhalation 3 milliLiter(s) Inhalation every 6 hours    MEDICATIONS  (PRN):  dextrose 40% Gel 15 Gram(s) Oral once PRN Blood Glucose LESS THAN 70 milliGRAM(s)/deciliter  glucagon  Injectable 1 milliGRAM(s) IntraMuscular once PRN Glucose LESS THAN 70 milligrams/deciliter  guaiFENesin   Syrup  (Sugar-Free) 200 milliGRAM(s) Oral every 6 hours PRN Cough  oxycodone    5 mG/acetaminophen 325 mG 1 Tablet(s) Oral every 6 hours PRN Severe Pain (7 - 10)      CAPILLARY BLOOD GLUCOSE      POCT Blood Glucose.: 240 mg/dL (26 Jun 2020 21:31)  POCT Blood Glucose.: 279 mg/dL (26 Jun 2020 18:04)  POCT Blood Glucose.: 351 mg/dL (26 Jun 2020 13:12)  POCT Blood Glucose.: 302 mg/dL (26 Jun 2020 08:38)    I&O's Summary      Vital Signs Last 24 Hrs  T(C): 36.6 (27 Jun 2020 07:01), Max: 36.7 (26 Jun 2020 13:56)  T(F): 97.8 (27 Jun 2020 07:01), Max: 98.1 (26 Jun 2020 21:25)  HR: 92 (27 Jun 2020 07:01) (88 - 102)  BP: 130/65 (27 Jun 2020 07:01) (126/67 - 145/82)  BP(mean): --  RR: 19 (27 Jun 2020 07:01) (18 - 21)  SpO2: 100% (27 Jun 2020 07:01) (97% - 100%)    PHYSICAL EXAM:  GENERAL: NAD  HEAD: Atraumatic, Normocephalic  EYES: EOMI, PERRL, conjunctiva and sclera clear  NECK: Supple, No JVD  CHEST/LUNG: R lung area poor air entry; L lung field with wheezing.  HEART: Normal S1/S2; Regular rate and rhythm; No murmurs, rubs, or gallops  ABDOMEN: Soft, Nontender, Nondistended; Bowel sounds present  EXTREMITIES: 2+ Peripheral Pulses; No clubbing, cyanosis, or edema  PSYCH: A&Ox3  NEUROLOGY: no focal neurologic deficit  SKIN: No rashes or lesions    LABS:                        12.1   5.56  )-----------( 103      ( 26 Jun 2020 06:08 )             35.8      06-26    136  |  94<L>  |  12  ----------------------------<  315<H>  3.6   |  26  |  0.55    Ca    9.2      26 Jun 2020 06:08  Phos  3.2     06-26  Mg     1.8     06-26    TPro  6.6  /  Alb  3.7  /  TBili  0.7  /  DBili  x   /  AST  9   /  ALT  12  /  AlkPhos  161<H>  06-26    PT/INR - ( 25 Jun 2020 11:25 )   PT: 13.4 SEC;   INR: 1.17          PTT - ( 25 Jun 2020 11:25 )  PTT:32.6 SEC          RADIOLOGY & ADDITIONAL TESTS:    Imaging Personally Reviewed:    Consultant(s) Notes Reviewed:      Care Discussed with Consultants/Other Providers:

## 2020-06-27 NOTE — CHART NOTE - NSCHARTNOTEFT_GEN_A_CORE
0910: Called by nursing for patient's concern regarding diet. Food and dish splattered on the floor. Patient was extremely frustrated about the diet served in the hospital, demanding to be put on regular diet. Patient is aware that her A1C is high and for this reason she is served CC diet. Explained that her FSG level was high to the level where concerns of developing DKA/HHS arise. Explained that developing such conditions will likely bring about severely adverse outcomes, including death, given her comorbidities. Patient understands and accepts. Patient is A&O x4, and displayed good logical progression during conversation. Will place on regular diet despite A1C 9.4. Lantus 35u QHS started yesterday. On Humalog 15u QAC and mISS. Plan to uptitrate.    David Zayas, PGY-2  Team 7 Resident 0910: Called by nursing for patient's concern regarding diet. Food and dish splattered on the floor. Patient was extremely frustrated about the diet served in the hospital, demanding to be put on regular diet. Patient is aware that her A1C is high and for this reason she is served CC diet. Explained that her FSG level was high to the level where concerns of developing DKA/HHS arise. Explained that developing such conditions will likely bring about severely adverse outcomes, including death, given her comorbidities. Patient understands and accepts. Patient is A&O x4, and displayed good logical progression during conversation. Will place on regular diet despite A1C 9.4. Lantus 35u QHS started yesterday. On Humalog 15u QAC and mISS. Plan to uptitrate insulin regimen accordingly pending FSG.    David Zayas, PGY-3  Team 7 Resident

## 2020-06-27 NOTE — PROGRESS NOTE ADULT - PROBLEM SELECTOR PLAN 6
- takes insulin as needed at home. FSG significantly elevated during hospitalization, and also patient is on prednisone. Will initiate basal-bolus insulin. Started on Lantus 35u QHS and Humalog 15u QAC with mISS. Plan to uptitrate PRN.  - A1C 9.6 - takes insulin as needed at home. FSG significantly elevated during hospitalization, and also patient is on prednisone. Will initiate basal-bolus insulin. Started on Lantus 35u QHS and Humalog 15u QAC with mISS. Plan to uptitrate PRN.  - A1C 9.6  - 6/27: Patient refused take PO if patient is served DASH/TLC/CC diet. Significantly agitated and verbally/physically abusive. Explained the concern re: high blood sugar at high 300s yesterday, and possible development of DKA/HHS if uncontrolled. Informed that given her comorbidities, development of DKA/HHS may result in death. Patient accepts and still wishes to be on regular diet. Patient is A&O x4. No acute sign of psychosis. Will provide regular diet per patient's wish.  - Will need to uptitrate bolus coverage. Will track FSG and adjust accordingly.

## 2020-06-27 NOTE — PROGRESS NOTE ADULT - PROBLEM SELECTOR PLAN 4
- s/p RLL resection 2017; 1 cycle chemo the week prior to presentation.  - Now with findings of 4.4-cm perihilar LN on CTA chest.  - F/u onc and pulm for possible bx

## 2020-06-27 NOTE — PROGRESS NOTE ADULT - PROBLEM SELECTOR PLAN 1
Likely 2/2 COPD exacerbation in setting of RLL resection due to R sided lung cancer. She uses 5L oxygen at home and still feels SOB and requests more oxygen for home. CXR wnl. CT angio 6/25: No pulmonary embolism. Complete atelectasis of the right middle lobe secondary to mucous plugging. Increasing mediastinal lymphadenopathy, with a left paraesophageal node measuring up to 4.4 cm in greatest dimension, concerning for metastatic disease.   - Significant discomfort on 4L O2 with coughs. Will uptitrate to 5L at this time (home level)  - Pulm following, recs appreciated.

## 2020-06-27 NOTE — PROGRESS NOTE ADULT - ATTENDING COMMENTS
65F COPD (on home O2) DM2 lung adeno Ca s/p resection 2016 with recureence mets to LN s/p first cycle chemo 1 week PTA p/w COPD exacerbation  -prednisone, nebs, appreciate pulm recs  -plan for bronch next week

## 2020-06-27 NOTE — PROGRESS NOTE ADULT - PROBLEM SELECTOR PLAN 3
Acute left lateral 7th rib fracture. Subacute right posterolateral sixth rib fracture. No pneumothorax. Pathologic fracture of T7 which may correspond to focus of FDG avidity on outside PET scan.  - pain meds as below  - incentive spirometry

## 2020-06-27 NOTE — PROGRESS NOTE ADULT - PROBLEM SELECTOR PLAN 5
- Significant for R neck pain, less likely fracture from imaging results. CT neck soft tissue 6/25 showed: No cervical mass or adenopathy identified. Enlarged mediastinal nodes.  - Also with acute L lateral 7th rib fracture and subacute R posterolateral 6th rib fracture.  - ISTOP performed (#[id number]). Confirmed that patient is on Endocet (10 oxy/325 acetaminophen)  - On Percocet (5 oxy/325 acetaminophen) Q6H PRN for severe pain.  - Robitussin and tessalon pearls for cough as it exacerbates rib pain

## 2020-06-27 NOTE — PROGRESS NOTE ADULT - PROBLEM SELECTOR PLAN 2
- Significant wheezing noted, requiring home O2 at 5L.  - Pulm consulted, recs appreciated.  - Duoneb Q6H. Symbicort BID.  - Acapella and incentive spirometry  - Levaquin given initially c/f COPD exacerbation, however clinical presentation without an identifiable of aggravating incident, unclear if COPD exacerbation. Discontinued Levaquin per Pulm recs.  - Aim SaO2 88% - 95%.  - prednisone 60 mg daily   - COVID negative

## 2020-06-28 LAB
CULTURE RESULTS: SIGNIFICANT CHANGE UP
GLUCOSE BLDC GLUCOMTR-MCNC: 162 MG/DL — HIGH (ref 70–99)
GLUCOSE BLDC GLUCOMTR-MCNC: 243 MG/DL — HIGH (ref 70–99)
GLUCOSE BLDC GLUCOMTR-MCNC: 280 MG/DL — HIGH (ref 70–99)
GLUCOSE BLDC GLUCOMTR-MCNC: 326 MG/DL — HIGH (ref 70–99)
SPECIMEN SOURCE: SIGNIFICANT CHANGE UP

## 2020-06-28 PROCEDURE — 71046 X-RAY EXAM CHEST 2 VIEWS: CPT | Mod: 26

## 2020-06-28 PROCEDURE — 99233 SBSQ HOSP IP/OBS HIGH 50: CPT | Mod: GC

## 2020-06-28 RX ORDER — LANOLIN ALCOHOL/MO/W.PET/CERES
3 CREAM (GRAM) TOPICAL AT BEDTIME
Refills: 0 | Status: DISCONTINUED | OUTPATIENT
Start: 2020-06-28 | End: 2020-07-03

## 2020-06-28 RX ADMIN — BUDESONIDE AND FORMOTEROL FUMARATE DIHYDRATE 2 PUFF(S): 160; 4.5 AEROSOL RESPIRATORY (INHALATION) at 22:16

## 2020-06-28 RX ADMIN — SODIUM CHLORIDE 3 MILLILITER(S): 9 INJECTION INTRAMUSCULAR; INTRAVENOUS; SUBCUTANEOUS at 03:27

## 2020-06-28 RX ADMIN — FAMOTIDINE 20 MILLIGRAM(S): 10 INJECTION INTRAVENOUS at 13:01

## 2020-06-28 RX ADMIN — Medication 15 UNIT(S): at 13:01

## 2020-06-28 RX ADMIN — SODIUM CHLORIDE 3 MILLILITER(S): 9 INJECTION INTRAMUSCULAR; INTRAVENOUS; SUBCUTANEOUS at 15:59

## 2020-06-28 RX ADMIN — Medication 3 MILLILITER(S): at 15:52

## 2020-06-28 RX ADMIN — Medication 3 MILLILITER(S): at 22:05

## 2020-06-28 RX ADMIN — Medication 15 UNIT(S): at 18:01

## 2020-06-28 RX ADMIN — GABAPENTIN 200 MILLIGRAM(S): 400 CAPSULE ORAL at 13:03

## 2020-06-28 RX ADMIN — OXYCODONE AND ACETAMINOPHEN 1 TABLET(S): 5; 325 TABLET ORAL at 21:48

## 2020-06-28 RX ADMIN — INSULIN GLARGINE 35 UNIT(S): 100 INJECTION, SOLUTION SUBCUTANEOUS at 21:48

## 2020-06-28 RX ADMIN — Medication 15 UNIT(S): at 08:54

## 2020-06-28 RX ADMIN — Medication 3 MILLILITER(S): at 03:27

## 2020-06-28 RX ADMIN — SODIUM CHLORIDE 3 MILLILITER(S): 9 INJECTION INTRAMUSCULAR; INTRAVENOUS; SUBCUTANEOUS at 10:05

## 2020-06-28 RX ADMIN — SODIUM CHLORIDE 3 MILLILITER(S): 9 INJECTION INTRAMUSCULAR; INTRAVENOUS; SUBCUTANEOUS at 22:07

## 2020-06-28 RX ADMIN — Medication 2: at 08:54

## 2020-06-28 RX ADMIN — ATORVASTATIN CALCIUM 40 MILLIGRAM(S): 80 TABLET, FILM COATED ORAL at 21:48

## 2020-06-28 RX ADMIN — Medication 3 MILLIGRAM(S): at 21:48

## 2020-06-28 RX ADMIN — AMLODIPINE BESYLATE 5 MILLIGRAM(S): 2.5 TABLET ORAL at 06:06

## 2020-06-28 RX ADMIN — Medication 200 MILLIGRAM(S): at 13:08

## 2020-06-28 RX ADMIN — Medication 8: at 18:01

## 2020-06-28 RX ADMIN — Medication 60 MILLIGRAM(S): at 06:06

## 2020-06-28 RX ADMIN — BUDESONIDE AND FORMOTEROL FUMARATE DIHYDRATE 2 PUFF(S): 160; 4.5 AEROSOL RESPIRATORY (INHALATION) at 08:54

## 2020-06-28 RX ADMIN — GABAPENTIN 200 MILLIGRAM(S): 400 CAPSULE ORAL at 21:48

## 2020-06-28 RX ADMIN — Medication 3 MILLILITER(S): at 10:00

## 2020-06-28 RX ADMIN — Medication 4: at 13:02

## 2020-06-28 RX ADMIN — ENOXAPARIN SODIUM 40 MILLIGRAM(S): 100 INJECTION SUBCUTANEOUS at 13:01

## 2020-06-28 RX ADMIN — Medication 2: at 21:48

## 2020-06-28 RX ADMIN — GABAPENTIN 200 MILLIGRAM(S): 400 CAPSULE ORAL at 06:06

## 2020-06-28 RX ADMIN — CARVEDILOL PHOSPHATE 6.25 MILLIGRAM(S): 80 CAPSULE, EXTENDED RELEASE ORAL at 18:03

## 2020-06-28 RX ADMIN — CARVEDILOL PHOSPHATE 6.25 MILLIGRAM(S): 80 CAPSULE, EXTENDED RELEASE ORAL at 06:06

## 2020-06-28 NOTE — PROGRESS NOTE ADULT - PROBLEM SELECTOR PLAN 2
- Significant wheezing noted, requiring home O2 at 5L.  - Pulm consulted, recs appreciated.  - Duoneb Q6H. Symbicort BID.  - Acapella and incentive spirometry  - Levaquin given initially c/f COPD exacerbation, however clinical presentation without an identifiable of aggravating incident, unclear if COPD exacerbation. Discontinued Levaquin per Pulm recs.  - Aim SaO2 88% - 95%.  - prednisone 60 mg daily   - COVID negative - Significant wheezing noted, requiring home O2 at 5L.  - Pulm consulted, note unclear if SOB is truly from COPD as no sputum production, fever, leukocytosis. Rec treatment per above.     - Levaquin given initially c/f COPD exacerbation, however clinical presentation without an identifiable of aggravating incident, unclear if COPD exacerbation.   - Aim SaO2 88% - 95%.  - continue prednisone 60 mg daily   - COVID negative; retest necessary prior to possible bronch? - wheezing improved today to faint bilateral, decreased air movement. Currently on home O2 at 5L.  - Pulm consulted, note unclear if SOB is truly from COPD as no sputum production, fever, leukocytosis. Rec treatment per above.     - Levaquin given initially c/f COPD exacerbation, however clinical presentation without an identifiable of aggravating incident, unclear if COPD exacerbation; levaquin stopped.   - Aim SaO2 88% - 95%.  - continue prednisone 60 mg daily   - COVID negative; retest today prior to possible bronch tomorrow

## 2020-06-28 NOTE — PROGRESS NOTE ADULT - SUBJECTIVE AND OBJECTIVE BOX
Adilene Joshiathy PGY1    Patient is a 65y old  Female who presents with a chief complaint of SOB, fractures (28 Jun 2020 07:07)      SUBJECTIVE / OVERNIGHT EVENTS: No acute events overnight. She still feels shortness of breath and painful coughs. The tessalon janna solution is helping with cough. Her cough continues to be dry without sputum She denies chest pain and palpitations     MEDICATIONS  (STANDING):  albuterol/ipratropium for Nebulization 3 milliLiter(s) Nebulizer every 6 hours  amLODIPine   Tablet 5 milliGRAM(s) Oral daily  atorvastatin 40 milliGRAM(s) Oral at bedtime  budesonide 160 MICROgram(s)/formoterol 4.5 MICROgram(s) Inhaler 2 Puff(s) Inhalation two times a day  carvedilol 6.25 milliGRAM(s) Oral every 12 hours  dextrose 5%. 1000 milliLiter(s) (50 mL/Hr) IV Continuous <Continuous>  dextrose 50% Injectable 12.5 Gram(s) IV Push once  dextrose 50% Injectable 25 Gram(s) IV Push once  dextrose 50% Injectable 25 Gram(s) IV Push once  enoxaparin Injectable 40 milliGRAM(s) SubCutaneous daily  famotidine    Tablet 20 milliGRAM(s) Oral daily  gabapentin 200 milliGRAM(s) Oral three times a day  insulin glargine Injectable (LANTUS) 35 Unit(s) SubCutaneous at bedtime  insulin lispro (HumaLOG) corrective regimen sliding scale   SubCutaneous three times a day before meals  insulin lispro (HumaLOG) corrective regimen sliding scale   SubCutaneous at bedtime  insulin lispro Injectable (HumaLOG) 15 Unit(s) SubCutaneous three times a day with meals  lidocaine   Patch 1 Patch Transdermal daily  predniSONE   Tablet 60 milliGRAM(s) Oral daily  sodium chloride 3%  Inhalation 3 milliLiter(s) Inhalation every 6 hours    MEDICATIONS  (PRN):  dextrose 40% Gel 15 Gram(s) Oral once PRN Blood Glucose LESS THAN 70 milliGRAM(s)/deciliter  glucagon  Injectable 1 milliGRAM(s) IntraMuscular once PRN Glucose LESS THAN 70 milligrams/deciliter  guaiFENesin   Syrup  (Sugar-Free) 200 milliGRAM(s) Oral every 6 hours PRN Cough  oxycodone    5 mG/acetaminophen 325 mG 1 Tablet(s) Oral every 6 hours PRN Severe Pain (7 - 10)      CAPILLARY BLOOD GLUCOSE      POCT Blood Glucose.: 219 mg/dL (27 Jun 2020 22:14)  POCT Blood Glucose.: 213 mg/dL (27 Jun 2020 17:42)  POCT Blood Glucose.: 215 mg/dL (27 Jun 2020 12:22)  POCT Blood Glucose.: 189 mg/dL (27 Jun 2020 08:56)    I&O's Summary      Vital Signs Last 24 Hrs  T(C): 36.3 (28 Jun 2020 06:04), Max: 36.8 (27 Jun 2020 15:11)  T(F): 97.4 (28 Jun 2020 06:04), Max: 98.2 (27 Jun 2020 15:11)  HR: 85 (28 Jun 2020 06:04) (85 - 103)  BP: 134/77 (28 Jun 2020 06:04) (126/71 - 141/92)  BP(mean): --  RR: 18 (28 Jun 2020 06:04) (18 - 20)  SpO2: 100% (28 Jun 2020 06:04) (94% - 100%)    PHYSICAL EXAM:  GENERAL: NAD, well-developed, well-nourished  HEAD: Atraumatic, Normocephalic  EYES: EOMI, PERRLA, conjunctiva and sclera clear  NECK: Supple, No JVD  CHEST/LUNG: Clear to auscultation bilaterally; No wheezes or crackles  HEART: Normal S1/S2; Regular rate and rhythm; No murmurs, rubs, or gallops  ABDOMEN: Soft, Nontender, Nondistended; Bowel sounds present  EXTREMITIES: 2+ Peripheral Pulses; No clubbing, cyanosis, or edema  PSYCH: A&Ox3  NEUROLOGY: no focal neurologic deficit  SKIN: No rashes or lesions    LABS:                     RADIOLOGY & ADDITIONAL TESTS:    Imaging Personally Reviewed:    Consultant(s) Notes Reviewed:      Care Discussed with Consultants/Other Providers: Adilene Kristy PGY1    Patient is a 65y old  Female who presents with a chief complaint of SOB, fractures (28 Jun 2020 07:07)      SUBJECTIVE / OVERNIGHT EVENTS: No acute events overnight. She still feels short of breath (on her home lvl 5L O2 NC). Cough improved with tessalon janna solution but still painful w rib fractures. Received pain meds last night, states does not need this am. Denies chest pain. She is happier with regular diet despite further discussion today regarding high glucose levels and uncontrolled diabetes. She does not see anyone outpatient; she wants referral for outpatient endocrinologist for diabetes management. Continues to be anxious regarding lung mass seen on CT.     MEDICATIONS  (STANDING):  albuterol/ipratropium for Nebulization 3 milliLiter(s) Nebulizer every 6 hours  amLODIPine   Tablet 5 milliGRAM(s) Oral daily  atorvastatin 40 milliGRAM(s) Oral at bedtime  budesonide 160 MICROgram(s)/formoterol 4.5 MICROgram(s) Inhaler 2 Puff(s) Inhalation two times a day  carvedilol 6.25 milliGRAM(s) Oral every 12 hours  dextrose 5%. 1000 milliLiter(s) (50 mL/Hr) IV Continuous <Continuous>  dextrose 50% Injectable 12.5 Gram(s) IV Push once  dextrose 50% Injectable 25 Gram(s) IV Push once  dextrose 50% Injectable 25 Gram(s) IV Push once  enoxaparin Injectable 40 milliGRAM(s) SubCutaneous daily  famotidine    Tablet 20 milliGRAM(s) Oral daily  gabapentin 200 milliGRAM(s) Oral three times a day  insulin glargine Injectable (LANTUS) 35 Unit(s) SubCutaneous at bedtime  insulin lispro (HumaLOG) corrective regimen sliding scale   SubCutaneous three times a day before meals  insulin lispro (HumaLOG) corrective regimen sliding scale   SubCutaneous at bedtime  insulin lispro Injectable (HumaLOG) 15 Unit(s) SubCutaneous three times a day with meals  lidocaine   Patch 1 Patch Transdermal daily  predniSONE   Tablet 60 milliGRAM(s) Oral daily  sodium chloride 3%  Inhalation 3 milliLiter(s) Inhalation every 6 hours    MEDICATIONS  (PRN):  dextrose 40% Gel 15 Gram(s) Oral once PRN Blood Glucose LESS THAN 70 milliGRAM(s)/deciliter  glucagon  Injectable 1 milliGRAM(s) IntraMuscular once PRN Glucose LESS THAN 70 milligrams/deciliter  guaiFENesin   Syrup  (Sugar-Free) 200 milliGRAM(s) Oral every 6 hours PRN Cough  oxycodone    5 mG/acetaminophen 325 mG 1 Tablet(s) Oral every 6 hours PRN Severe Pain (7 - 10)      CAPILLARY BLOOD GLUCOSE      POCT Blood Glucose.: 162 mg/dL (28 Jun 2020 08:50)  POCT Blood Glucose.: 219 mg/dL (27 Jun 2020 22:14)  POCT Blood Glucose.: 213 mg/dL (27 Jun 2020 17:42)  POCT Blood Glucose.: 215 mg/dL (27 Jun 2020 12:22)    I&O's Summary      Vital Signs Last 24 Hrs  T(C): 36.3 (28 Jun 2020 06:04), Max: 36.8 (27 Jun 2020 15:11)  T(F): 97.4 (28 Jun 2020 06:04), Max: 98.2 (27 Jun 2020 15:11)  HR: 86 (28 Jun 2020 10:06) (85 - 103)  BP: 134/77 (28 Jun 2020 06:04) (126/71 - 141/92)  BP(mean): --  RR: 18 (28 Jun 2020 06:04) (18 - 20)  SpO2: 96% (28 Jun 2020 10:06) (94% - 100%)    PHYSICAL EXAM:  GENERAL: NAD, well-developed, well-nourished  HEAD: Atraumatic, Normocephalic  EYES: EOMI, PERRLA, conjunctiva and sclera clear  NECK: Supple, No JVD  CHEST/LUNG: Clear to auscultation bilaterally; No wheezes or crackles  HEART: Normal S1/S2; Regular rate and rhythm; No murmurs, rubs, or gallops  ABDOMEN: Soft, Nontender, Nondistended; Bowel sounds present  EXTREMITIES: 2+ Peripheral Pulses; No clubbing, cyanosis, or edema  PSYCH: A&Ox3  NEUROLOGY: no focal neurologic deficit  SKIN: No rashes or lesions    LABS:                     RADIOLOGY & ADDITIONAL TESTS:    Imaging Personally Reviewed:    Consultant(s) Notes Reviewed:      Care Discussed with Consultants/Other Providers:

## 2020-06-28 NOTE — PROGRESS NOTE ADULT - PROBLEM SELECTOR PLAN 1
Likely 2/2 COPD exacerbation in setting of RLL resection due to R sided lung cancer. She uses 5L oxygen at home and still feels SOB and requests more oxygen for home. CXR wnl. CT angio 6/25: No pulmonary embolism. Complete atelectasis of the right middle lobe secondary to mucous plugging. Increasing mediastinal lymphadenopathy, with a left paraesophageal node measuring up to 4.4 cm in greatest dimension, concerning for metastatic disease.   - Significant discomfort on 4L O2 with coughs. Will uptitrate to 5L at this time (home level)  - Pulm following, recs appreciated. Likely 2/2 atelectasis, possible contribution from paraesophageal mass seen on CTA in setting of R sided lung cancer s/p RLL resection 2017. COPD less likely as does not have sputum production, fever, leukocytosis. She uses 5L oxygen at home and still feels SOB and requests more oxygen for home. CXR wnl. CT angio 6/25: No pulmonary embolism. Complete atelectasis of the right middle lobe secondary to mucous plugging. Increasing mediastinal lymphadenopathy, with a left paraesophageal node measuring up to 4.4 cm in greatest dimension, concerning for metastatic disease.   - Significant discomfort on 4L O2 with coughs. Will uptitrate to 5L at this time (home level)  - Pulm following, rec: Ok to continue with steroids as patient has wheezing, d/c abx, start LAMA/LABA/ICS therapy (symbicort/spiriva), duonebs and hypersal standing q6h with acapella/aerobika for mucociliary mobilization. For mucous plugghing vs endobronchial lesion on RML bronchus, pulm will discuss imaging with radiology. If atelectasis still present after weekend of above therapy, will plan for diagnostic and therapeutic bronchoscopy. Likely 2/2 atelectasis, possible contribution from paraesophageal mass seen on CTA in setting of R sided lung cancer s/p RLL resection 2017. COPD less likely as does not have sputum production, fever, leukocytosis. She uses 5L oxygen at home and still feels SOB and requests more oxygen for home. CXR wnl. CT angio 6/25: No pulmonary embolism. Complete atelectasis of the right middle lobe secondary to mucous plugging. Increasing mediastinal lymphadenopathy, with a left paraesophageal node measuring up to 4.4 cm in greatest dimension, concerning for metastatic disease.   - Significant discomfort on 4L O2 with coughs. Will uptitrate to 5L at this time (home level)  - Pulm following, rec: Ok to continue with steroids as patient has wheezing, d/c abx, start LAMA/LABA/ICS therapy (symbicort/spiriva), duonebs and hypersal standing q6h with acapella/aerobika for mucociliary mobilization. For mucous plugghing vs endobronchial lesion on RML bronchus, pulm will discuss imaging with radiology. If atelectasis still present after weekend of above therapy, will plan for diagnostic and therapeutic bronchoscopy.  - CXR and repeat COVID swab today for possible bronch; pulm will review images and update on NPO status for tonight and whether to do bronch tomorrow.

## 2020-06-28 NOTE — PROGRESS NOTE ADULT - PROBLEM SELECTOR PLAN 6
- takes insulin as needed at home. FSG significantly elevated during hospitalization, and also patient is on prednisone. Will initiate basal-bolus insulin. Started on Lantus 35u QHS and Humalog 15u QAC with mISS. Plan to uptitrate PRN.  - A1C 9.6  - 6/27: Patient refused take PO if patient is served DASH/TLC/CC diet. Significantly agitated and verbally/physically abusive. Explained the concern re: high blood sugar at high 300s yesterday, and possible development of DKA/HHS if uncontrolled. Informed that given her comorbidities, development of DKA/HHS may result in death. Patient accepts and still wishes to be on regular diet. Patient is A&O x4. No acute sign of psychosis. Will provide regular diet per patient's wish.  - Will need to uptitrate bolus coverage. Will track FSG and adjust accordingly. - takes insulin as needed at home. FSG significantly elevated during hospitalization, and also patient is on prednisone. Will initiate basal-bolus insulin. Started on Lantus 35u QHS and Humalog 15u QAC with mISS. Plan to uptitrate PRN.  - A1C 9.6  - 6/27: Patient refused take PO if patient is served DASH/TLC/CC diet. Significantly agitated and verbally/physically abusive. Explained the concern re: high blood sugar at high 300s yesterday, and possible development of DKA/HHS if uncontrolled. Informed that given her comorbidities, development of DKA/HHS may result in death. Patient accepts and still wishes to be on regular diet. Patient is A&O x4. No acute sign of psychosis. Will provide regular diet per patient's wish.  - Will need to uptitrate bolus coverage. Will track FSG and adjust accordingly.  - glc today 162 on 15U humalog and 35U lantus. Continue to monitor.

## 2020-06-28 NOTE — PROGRESS NOTE ADULT - ATTENDING COMMENTS
65F COPD (on home O2) DM2 lung adeno Ca s/p resection 2016 with recurrence mets to LN s/p first cycle chemo 1 week PTA p/w COPD exacerbation  -prednisone, nebs, appreciate pulm recs  -plan for diagnostic and therapeutic bronch 6/29

## 2020-06-28 NOTE — PROGRESS NOTE ADULT - PROBLEM SELECTOR PLAN 5
- Significant for R neck pain, less likely fracture from imaging results. CT neck soft tissue 6/25 showed: No cervical mass or adenopathy identified. Enlarged mediastinal nodes.  - Also with acute L lateral 7th rib fracture and subacute R posterolateral 6th rib fracture.  - ISTOP performed (#[id number]). Confirmed that patient is on Endocet (10 oxy/325 acetaminophen)  - On Percocet (5 oxy/325 acetaminophen) Q6H PRN for severe pain.  - Robitussin and tessalon pearls for cough as it exacerbates rib pain

## 2020-06-28 NOTE — PROGRESS NOTE ADULT - PROBLEM SELECTOR PLAN 3
Acute left lateral 7th rib fracture. Subacute right posterolateral sixth rib fracture. No pneumothorax. Pathologic fracture of T7 which may correspond to focus of FDG avidity on outside PET scan.  - pain meds as below  - incentive spirometry Acute left lateral 7th rib fracture. Subacute right posterolateral sixth rib fracture. No pneumothorax. Pathologic fracture of T7 which may correspond to focus of FDG avidity on outside PET scan.  - pain meds as below  - pt given incentive spirometry, encouraged to use every hour

## 2020-06-28 NOTE — PROGRESS NOTE ADULT - ASSESSMENT
Ms. Olosn is a 66 y/o with PMH R lung cancer s/p resection 2017 on chemo, COPD with multiple hospital admissions, 80 pack year smoking hx, fractures in L ribs presenting for SOB (on 5L O2 at home) and pain from fractures. SOB likely 2/2 COPD exacerbation.

## 2020-06-29 ENCOUNTER — TRANSCRIPTION ENCOUNTER (OUTPATIENT)
Age: 65
End: 2020-06-29

## 2020-06-29 LAB
ANION GAP SERPL CALC-SCNC: 10 MMO/L — SIGNIFICANT CHANGE UP (ref 7–14)
APTT BLD: 27.6 SEC — SIGNIFICANT CHANGE UP (ref 27.5–36.3)
BUN SERPL-MCNC: 11 MG/DL — SIGNIFICANT CHANGE UP (ref 7–23)
CALCIUM SERPL-MCNC: 8.9 MG/DL — SIGNIFICANT CHANGE UP (ref 8.4–10.5)
CHLORIDE SERPL-SCNC: 100 MMOL/L — SIGNIFICANT CHANGE UP (ref 98–107)
CO2 SERPL-SCNC: 31 MMOL/L — SIGNIFICANT CHANGE UP (ref 22–31)
CREAT SERPL-MCNC: 0.49 MG/DL — LOW (ref 0.5–1.3)
GLUCOSE BLDC GLUCOMTR-MCNC: 150 MG/DL — HIGH (ref 70–99)
GLUCOSE BLDC GLUCOMTR-MCNC: 272 MG/DL — HIGH (ref 70–99)
GLUCOSE BLDC GLUCOMTR-MCNC: 325 MG/DL — HIGH (ref 70–99)
GLUCOSE BLDC GLUCOMTR-MCNC: 350 MG/DL — HIGH (ref 70–99)
GLUCOSE SERPL-MCNC: 180 MG/DL — HIGH (ref 70–99)
HCT VFR BLD CALC: 35 % — SIGNIFICANT CHANGE UP (ref 34.5–45)
HGB BLD-MCNC: 11.6 G/DL — SIGNIFICANT CHANGE UP (ref 11.5–15.5)
INR BLD: 1.06 — SIGNIFICANT CHANGE UP (ref 0.88–1.17)
MCHC RBC-ENTMCNC: 27.1 PG — SIGNIFICANT CHANGE UP (ref 27–34)
MCHC RBC-ENTMCNC: 33.1 % — SIGNIFICANT CHANGE UP (ref 32–36)
MCV RBC AUTO: 81.8 FL — SIGNIFICANT CHANGE UP (ref 80–100)
NRBC # FLD: 0 K/UL — SIGNIFICANT CHANGE UP (ref 0–0)
PLATELET # BLD AUTO: 120 K/UL — LOW (ref 150–400)
PMV BLD: 11.5 FL — SIGNIFICANT CHANGE UP (ref 7–13)
POTASSIUM SERPL-MCNC: 4.2 MMOL/L — SIGNIFICANT CHANGE UP (ref 3.5–5.3)
POTASSIUM SERPL-SCNC: 4.2 MMOL/L — SIGNIFICANT CHANGE UP (ref 3.5–5.3)
PROTHROM AB SERPL-ACNC: 12.2 SEC — SIGNIFICANT CHANGE UP (ref 9.8–13.1)
RBC # BLD: 4.28 M/UL — SIGNIFICANT CHANGE UP (ref 3.8–5.2)
RBC # FLD: 12.8 % — SIGNIFICANT CHANGE UP (ref 10.3–14.5)
SARS-COV-2 RNA SPEC QL NAA+PROBE: SIGNIFICANT CHANGE UP
SODIUM SERPL-SCNC: 141 MMOL/L — SIGNIFICANT CHANGE UP (ref 135–145)
WBC # BLD: 5.95 K/UL — SIGNIFICANT CHANGE UP (ref 3.8–10.5)
WBC # FLD AUTO: 5.95 K/UL — SIGNIFICANT CHANGE UP (ref 3.8–10.5)

## 2020-06-29 PROCEDURE — 99233 SBSQ HOSP IP/OBS HIGH 50: CPT | Mod: GC

## 2020-06-29 RX ORDER — INSULIN GLARGINE 100 [IU]/ML
17 INJECTION, SOLUTION SUBCUTANEOUS ONCE
Refills: 0 | Status: COMPLETED | OUTPATIENT
Start: 2020-06-29 | End: 2020-06-29

## 2020-06-29 RX ORDER — INSULIN GLARGINE 100 [IU]/ML
15 INJECTION, SOLUTION SUBCUTANEOUS AT BEDTIME
Refills: 0 | Status: DISCONTINUED | OUTPATIENT
Start: 2020-06-29 | End: 2020-06-30

## 2020-06-29 RX ORDER — POLYETHYLENE GLYCOL 3350 17 G/17G
17 POWDER, FOR SOLUTION ORAL DAILY
Refills: 0 | Status: DISCONTINUED | OUTPATIENT
Start: 2020-06-29 | End: 2020-07-01

## 2020-06-29 RX ORDER — KETOROLAC TROMETHAMINE 30 MG/ML
15 SYRINGE (ML) INJECTION EVERY 8 HOURS
Refills: 0 | Status: DISCONTINUED | OUTPATIENT
Start: 2020-06-29 | End: 2020-07-04

## 2020-06-29 RX ORDER — PANTOPRAZOLE SODIUM 20 MG/1
40 TABLET, DELAYED RELEASE ORAL
Refills: 0 | Status: DISCONTINUED | OUTPATIENT
Start: 2020-06-29 | End: 2020-07-05

## 2020-06-29 RX ORDER — PREGABALIN 225 MG/1
1000 CAPSULE ORAL DAILY
Refills: 0 | Status: DISCONTINUED | OUTPATIENT
Start: 2020-06-29 | End: 2020-07-05

## 2020-06-29 RX ADMIN — Medication 2: at 22:12

## 2020-06-29 RX ADMIN — Medication 3 MILLILITER(S): at 15:38

## 2020-06-29 RX ADMIN — SODIUM CHLORIDE 3 MILLILITER(S): 9 INJECTION INTRAMUSCULAR; INTRAVENOUS; SUBCUTANEOUS at 03:45

## 2020-06-29 RX ADMIN — Medication 15 UNIT(S): at 12:54

## 2020-06-29 RX ADMIN — SODIUM CHLORIDE 3 MILLILITER(S): 9 INJECTION INTRAMUSCULAR; INTRAVENOUS; SUBCUTANEOUS at 15:40

## 2020-06-29 RX ADMIN — INSULIN GLARGINE 17 UNIT(S): 100 INJECTION, SOLUTION SUBCUTANEOUS at 21:51

## 2020-06-29 RX ADMIN — Medication 8: at 18:10

## 2020-06-29 RX ADMIN — Medication 15 MILLIGRAM(S): at 22:12

## 2020-06-29 RX ADMIN — Medication 3 MILLIGRAM(S): at 21:45

## 2020-06-29 RX ADMIN — Medication 8: at 12:55

## 2020-06-29 RX ADMIN — Medication 3 MILLILITER(S): at 10:03

## 2020-06-29 RX ADMIN — ENOXAPARIN SODIUM 40 MILLIGRAM(S): 100 INJECTION SUBCUTANEOUS at 12:54

## 2020-06-29 RX ADMIN — AMLODIPINE BESYLATE 5 MILLIGRAM(S): 2.5 TABLET ORAL at 05:44

## 2020-06-29 RX ADMIN — Medication 15 MILLIGRAM(S): at 12:54

## 2020-06-29 RX ADMIN — GABAPENTIN 200 MILLIGRAM(S): 400 CAPSULE ORAL at 14:59

## 2020-06-29 RX ADMIN — BUDESONIDE AND FORMOTEROL FUMARATE DIHYDRATE 2 PUFF(S): 160; 4.5 AEROSOL RESPIRATORY (INHALATION) at 21:44

## 2020-06-29 RX ADMIN — PANTOPRAZOLE SODIUM 40 MILLIGRAM(S): 20 TABLET, DELAYED RELEASE ORAL at 12:54

## 2020-06-29 RX ADMIN — SODIUM CHLORIDE 3 MILLILITER(S): 9 INJECTION INTRAMUSCULAR; INTRAVENOUS; SUBCUTANEOUS at 10:03

## 2020-06-29 RX ADMIN — Medication 15 UNIT(S): at 18:10

## 2020-06-29 RX ADMIN — CARVEDILOL PHOSPHATE 6.25 MILLIGRAM(S): 80 CAPSULE, EXTENDED RELEASE ORAL at 05:44

## 2020-06-29 RX ADMIN — POLYETHYLENE GLYCOL 3350 17 GRAM(S): 17 POWDER, FOR SOLUTION ORAL at 12:56

## 2020-06-29 RX ADMIN — ATORVASTATIN CALCIUM 40 MILLIGRAM(S): 80 TABLET, FILM COATED ORAL at 21:45

## 2020-06-29 RX ADMIN — SODIUM CHLORIDE 3 MILLILITER(S): 9 INJECTION INTRAMUSCULAR; INTRAVENOUS; SUBCUTANEOUS at 22:22

## 2020-06-29 RX ADMIN — PREGABALIN 1000 MICROGRAM(S): 225 CAPSULE ORAL at 18:09

## 2020-06-29 RX ADMIN — GABAPENTIN 200 MILLIGRAM(S): 400 CAPSULE ORAL at 21:44

## 2020-06-29 RX ADMIN — FAMOTIDINE 20 MILLIGRAM(S): 10 INJECTION INTRAVENOUS at 12:54

## 2020-06-29 RX ADMIN — Medication 60 MILLIGRAM(S): at 05:44

## 2020-06-29 RX ADMIN — CARVEDILOL PHOSPHATE 6.25 MILLIGRAM(S): 80 CAPSULE, EXTENDED RELEASE ORAL at 18:09

## 2020-06-29 RX ADMIN — GABAPENTIN 200 MILLIGRAM(S): 400 CAPSULE ORAL at 05:44

## 2020-06-29 RX ADMIN — Medication 3 MILLILITER(S): at 22:19

## 2020-06-29 RX ADMIN — OXYCODONE AND ACETAMINOPHEN 1 TABLET(S): 5; 325 TABLET ORAL at 21:46

## 2020-06-29 RX ADMIN — Medication 3 MILLILITER(S): at 03:45

## 2020-06-29 NOTE — PROGRESS NOTE ADULT - PROBLEM SELECTOR PLAN 3
Acute left lateral 7th rib fracture. Subacute right posterolateral sixth rib fracture. No pneumothorax. Pathologic fracture of T7 which may correspond to focus of FDG avidity on outside PET scan.  - pain meds as below  - pt given incentive spirometry, encouraged to use every hour

## 2020-06-29 NOTE — PROGRESS NOTE ADULT - SUBJECTIVE AND OBJECTIVE BOX
Micheline Thompson MD PGY-1  Pager: [phone]; [facility] #[id number] Team 7 Spectra [phone]  Please page [phone] after 7 pm or after 12 pm on weekends    Patient is a 65y old  Female who presents with a chief complaint of SOB, fractures     SUBJECTIVE / OVERNIGHT EVENTS: No acute events overnight. She still feels short of breath (on her home lvl 5L O2 NC). Received pain meds last night, states does not need this am. Denies chest pain. She is happier with regular diet despite further discussion today regarding high glucose levels and uncontrolled diabetes. Continues to be anxious regarding lung mass and mucus plugging on CT. Would like outpt oncologist to be notified. Pt endorsing no BMs for several days now.     MEDICATIONS  (STANDING):  albuterol/ipratropium for Nebulization 3 milliLiter(s) Nebulizer every 6 hours  amLODIPine   Tablet 5 milliGRAM(s) Oral daily  atorvastatin 40 milliGRAM(s) Oral at bedtime  budesonide 160 MICROgram(s)/formoterol 4.5 MICROgram(s) Inhaler 2 Puff(s) Inhalation two times a day  carvedilol 6.25 milliGRAM(s) Oral every 12 hours  dextrose 5%. 1000 milliLiter(s) (50 mL/Hr) IV Continuous <Continuous>  dextrose 50% Injectable 12.5 Gram(s) IV Push once  dextrose 50% Injectable 25 Gram(s) IV Push once  dextrose 50% Injectable 25 Gram(s) IV Push once  enoxaparin Injectable 40 milliGRAM(s) SubCutaneous daily  famotidine    Tablet 20 milliGRAM(s) Oral daily  gabapentin 200 milliGRAM(s) Oral three times a day  insulin glargine Injectable (LANTUS) 35 Unit(s) SubCutaneous at bedtime  insulin lispro (HumaLOG) corrective regimen sliding scale   SubCutaneous three times a day before meals  insulin lispro (HumaLOG) corrective regimen sliding scale   SubCutaneous at bedtime  insulin lispro Injectable (HumaLOG) 15 Unit(s) SubCutaneous three times a day with meals  ketorolac   Injectable 15 milliGRAM(s) IV Push every 8 hours  lidocaine   Patch 1 Patch Transdermal daily  melatonin 3 milliGRAM(s) Oral at bedtime  pantoprazole    Tablet 40 milliGRAM(s) Oral before breakfast  polyethylene glycol 3350 17 Gram(s) Oral daily  sodium chloride 3%  Inhalation 3 milliLiter(s) Inhalation every 6 hours    MEDICATIONS  (PRN):  dextrose 40% Gel 15 Gram(s) Oral once PRN Blood Glucose LESS THAN 70 milliGRAM(s)/deciliter  glucagon  Injectable 1 milliGRAM(s) IntraMuscular once PRN Glucose LESS THAN 70 milligrams/deciliter  guaiFENesin   Syrup  (Sugar-Free) 200 milliGRAM(s) Oral every 6 hours PRN Cough  oxycodone    5 mG/acetaminophen 325 mG 1 Tablet(s) Oral every 6 hours PRN Severe Pain (7 - 10)      CAPILLARY BLOOD GLUCOSE      POCT Blood Glucose.: 325 mg/dL (29 Jun 2020 12:14)  POCT Blood Glucose.: 150 mg/dL (29 Jun 2020 08:34)  POCT Blood Glucose.: 280 mg/dL (28 Jun 2020 21:46)  POCT Blood Glucose.: 326 mg/dL (28 Jun 2020 17:55)      I&O's Summary      Vital Signs Last 24 Hrs  T(C): 36.6 (29 Jun 2020 05:40), Max: 36.8 (28 Jun 2020 21:27)  T(F): 97.8 (29 Jun 2020 05:40), Max: 98.2 (28 Jun 2020 21:27)  HR: 85 (29 Jun 2020 10:04) (77 - 89)  BP: 123/73 (29 Jun 2020 05:40) (106/64 - 141/75)  BP(mean): --  RR: 18 (29 Jun 2020 05:40) (18 - 19)  SpO2: 98% (29 Jun 2020 10:04) (95% - 100%)    PHYSICAL EXAM:  GENERAL: NAD, well-developed, well-nourished  HEAD: Atraumatic, Normocephalic  EYES: EOMI, conjunctiva and sclera clear  NECK: Supple, No JVD  CHEST/LUNG: No wheezes; coarse breath sounds in lower lobes b/l  HEART: Normal S1/S2; Regular rate and rhythm; No murmurs, rubs, or gallops  ABDOMEN: Soft, Nontender, Nondistended; Bowel sounds present  EXTREMITIES: 2+ Peripheral Pulses; No clubbing, cyanosis, or edema  PSYCH: A&Ox3  NEUROLOGY: no focal neurologic deficit  SKIN: No rashes or lesions    LABS:                          11.6   5.95  )-----------( 120      ( 29 Jun 2020 09:10 )             35.0       06-29    141  |  100  |  11  ----------------------------<  180<H>  4.2   |  31  |  0.49<L>    Ca    8.9      29 Jun 2020 09:10          RADIOLOGY & ADDITIONAL TESTS:    < from: Xray Chest 2 Views PA/Lat (06.28.20 @ 21:19) >  EXAM:  XR CHEST PA LAT 2V        *** ADDENDUM 06/29/2020  ***    The technique should be: PA and lateral chest x-ray.      *** END OF ADDENDUM 06/29/2020  ***      PROCEDURE DATE:  Jun 28 2020         INTERPRETATION:  TIME OF EXAM: June 28, 2020 at10:14 PM.    CLINICAL INFORMATION: Status post right lower lobe resection for lung cancer in 2017. Shortness of breath.    COMPARISON:  AP chest x-ray and CT scan of the chest from June 25, 2020.    TECHNIQUE:   AP Portable chest x-ray.    INTERPRETATION:     The heart is not enlarged. There is a calcified tortuous thoracic aorta.  A left upper extremity PICC line with tip at the SVC right atrial junction is again seen.  There is right lung postsurgical change with chain sutures and surgical clips seen. There is linear scar versus atelectasis in the right mid and lower lung.  There is linear atelectasis in the left lower lung.  No pleural effusion or pneumothorax is seen.  There is scoliosis of the spine. There are bilateral rib deformities. Please see report of CT scan of the chest for further detail.              IMPRESSION:  Right lung postsurgical change.    Linear scar versus atelectasis in the right mid and lower lung. Left lower lung linear atelectasis.    Please see report of the CT scan of the chest for further detail.        ***Please see the addendum at the top of this report. It may contain additional important information or changes.****          SILVIA VEE M.D., ATTENDING RADIOLOGIST  This document has been electronically signed. Jun 29 2020  8:51AM  Addend:  SILVIA VEE M.D., ATTENDING RADIOLOGIST  This addendum was electronically signed on: Jun 29 2020  9:06AM.        < end of copied text >      Imaging Personally Reviewed:    Consultant(s) Notes Reviewed:      Care Discussed with Consultants/Other Providers:  Spoke to Thompson from Pulm; will not bronch pt; will consult CT surgery for possible bronch as they have followed pt in past. Micheline Thompson MD PGY-1  Pager: [phone]; [facility] #[id number] Team 7 Spectra [phone]  Please page [phone] after 7 pm or after 12 pm on weekends    Patient is a 65y old  Female who presents with a chief complaint of SOB, fractures     SUBJECTIVE / OVERNIGHT EVENTS: No acute events overnight. She still feels short of breath (on her home lvl 5L O2 NC). Received pain meds last night, states does not need this am. Denies chest pain. She is happier with regular diet despite further discussion today regarding high glucose levels and uncontrolled diabetes. Continues to be anxious regarding lung mass and mucus plugging on CT. Would like outpt oncologist to be notified. Pt endorsing no BMs for several days now.     MEDICATIONS  (STANDING):  albuterol/ipratropium for Nebulization 3 milliLiter(s) Nebulizer every 6 hours  amLODIPine   Tablet 5 milliGRAM(s) Oral daily  atorvastatin 40 milliGRAM(s) Oral at bedtime  budesonide 160 MICROgram(s)/formoterol 4.5 MICROgram(s) Inhaler 2 Puff(s) Inhalation two times a day  carvedilol 6.25 milliGRAM(s) Oral every 12 hours  dextrose 5%. 1000 milliLiter(s) (50 mL/Hr) IV Continuous <Continuous>  dextrose 50% Injectable 12.5 Gram(s) IV Push once  dextrose 50% Injectable 25 Gram(s) IV Push once  dextrose 50% Injectable 25 Gram(s) IV Push once  enoxaparin Injectable 40 milliGRAM(s) SubCutaneous daily  famotidine    Tablet 20 milliGRAM(s) Oral daily  gabapentin 200 milliGRAM(s) Oral three times a day  insulin glargine Injectable (LANTUS) 35 Unit(s) SubCutaneous at bedtime  insulin lispro (HumaLOG) corrective regimen sliding scale   SubCutaneous three times a day before meals  insulin lispro (HumaLOG) corrective regimen sliding scale   SubCutaneous at bedtime  insulin lispro Injectable (HumaLOG) 15 Unit(s) SubCutaneous three times a day with meals  ketorolac   Injectable 15 milliGRAM(s) IV Push every 8 hours  lidocaine   Patch 1 Patch Transdermal daily  melatonin 3 milliGRAM(s) Oral at bedtime  pantoprazole    Tablet 40 milliGRAM(s) Oral before breakfast  polyethylene glycol 3350 17 Gram(s) Oral daily  sodium chloride 3%  Inhalation 3 milliLiter(s) Inhalation every 6 hours    MEDICATIONS  (PRN):  dextrose 40% Gel 15 Gram(s) Oral once PRN Blood Glucose LESS THAN 70 milliGRAM(s)/deciliter  glucagon  Injectable 1 milliGRAM(s) IntraMuscular once PRN Glucose LESS THAN 70 milligrams/deciliter  guaiFENesin   Syrup  (Sugar-Free) 200 milliGRAM(s) Oral every 6 hours PRN Cough  oxycodone    5 mG/acetaminophen 325 mG 1 Tablet(s) Oral every 6 hours PRN Severe Pain (7 - 10)      CAPILLARY BLOOD GLUCOSE      POCT Blood Glucose.: 325 mg/dL (29 Jun 2020 12:14)  POCT Blood Glucose.: 150 mg/dL (29 Jun 2020 08:34)  POCT Blood Glucose.: 280 mg/dL (28 Jun 2020 21:46)  POCT Blood Glucose.: 326 mg/dL (28 Jun 2020 17:55)      I&O's Summary      Vital Signs Last 24 Hrs  T(C): 36.7 (29 Jun 2020 15:38), Max: 36.8 (28 Jun 2020 21:27)  T(F): 98.1 (29 Jun 2020 15:38), Max: 98.2 (28 Jun 2020 21:27)  HR: 82 (29 Jun 2020 15:38) (77 - 89)  BP: 110/70 (29 Jun 2020 15:38) (110/70 - 141/75)  BP(mean): --  RR: 18 (29 Jun 2020 15:38) (18 - 19)  SpO2: 100% (29 Jun 2020 15:38) (95% - 100%)    PHYSICAL EXAM:  GENERAL: NAD, well-developed, well-nourished  HEAD: Atraumatic, Normocephalic  EYES: EOMI, conjunctiva and sclera clear  NECK: Supple, No JVD  CHEST/LUNG: No wheezes; coarse breath sounds in lower lobes b/l  HEART: Normal S1/S2; Regular rate and rhythm; No murmurs, rubs, or gallops  ABDOMEN: Soft, Nontender, Nondistended; Bowel sounds present  EXTREMITIES: 2+ Peripheral Pulses; No clubbing or cyanosis, some slight pitting edema  PSYCH: A&Ox3  NEUROLOGY: no focal neurologic deficit  SKIN: No rashes or lesions    LABS:                          11.6   5.95  )-----------( 120      ( 29 Jun 2020 09:10 )             35.0       06-29    141  |  100  |  11  ----------------------------<  180<H>  4.2   |  31  |  0.49<L>    Ca    8.9      29 Jun 2020 09:10          RADIOLOGY & ADDITIONAL TESTS:    < from: Xray Chest 2 Views PA/Lat (06.28.20 @ 21:19) >  EXAM:  XR CHEST PA LAT 2V        *** ADDENDUM 06/29/2020  ***    The technique should be: PA and lateral chest x-ray.      *** END OF ADDENDUM 06/29/2020  ***      PROCEDURE DATE:  Jun 28 2020         INTERPRETATION:  TIME OF EXAM: June 28, 2020 at10:14 PM.    CLINICAL INFORMATION: Status post right lower lobe resection for lung cancer in 2017. Shortness of breath.    COMPARISON:  AP chest x-ray and CT scan of the chest from June 25, 2020.    TECHNIQUE:   AP Portable chest x-ray.    INTERPRETATION:     The heart is not enlarged. There is a calcified tortuous thoracic aorta.  A left upper extremity PICC line with tip at the SVC right atrial junction is again seen.  There is right lung postsurgical change with chain sutures and surgical clips seen. There is linear scar versus atelectasis in the right mid and lower lung.  There is linear atelectasis in the left lower lung.  No pleural effusion or pneumothorax is seen.  There is scoliosis of the spine. There are bilateral rib deformities. Please see report of CT scan of the chest for further detail.              IMPRESSION:  Right lung postsurgical change.    Linear scar versus atelectasis in the right mid and lower lung. Left lower lung linear atelectasis.    Please see report of the CT scan of the chest for further detail.        ***Please see the addendum at the top of this report. It may contain additional important information or changes.****          SILVIA VEE M.D., ATTENDING RADIOLOGIST  This document has been electronically signed. Jun 29 2020  8:51AM  Addend:  SILVIA VEE M.D., ATTENDING RADIOLOGIST  This addendum was electronically signed on: Jun 29 2020  9:06AM.        < end of copied text >      Imaging Personally Reviewed:    Consultant(s) Notes Reviewed:      Care Discussed with Consultants/Other Providers:  Spoke to Thompson from Pulm; will not bronch pt; will consult CT surgery for possible bronch as they have followed pt in past.

## 2020-06-29 NOTE — PROGRESS NOTE ADULT - PROBLEM SELECTOR PLAN 4
- s/p RLL resection 2017; 1 cycle chemo the week prior to presentation.  - Now with findings of 4.4-cm perihilar LN on CTA chest.  - F/u onc and pulm for possible bx - s/p RLL resection 2017; 1 cycle chemo the week prior to presentation.  - Now with findings of 4.4-cm perihilar LN on CTA chest. Need to contact oupt oncologist Dr. Mendiola [phone]  - F/u CT sx for possible bronch  -B12 started 6/29

## 2020-06-29 NOTE — CONSULT NOTE ADULT - ASSESSMENT
65 year old woman with PMH 80 pack year smoking hx, R lung cancer s/p RLL resection 2016, EBUS on 3/2/20, on chemo (1st dose 10 days ago) presenting for acute shortness of breath and pain from L sided ribs 6-7 fractures. Admitted for COPD exacerbation, CT showing atelecatasis RML. Thoracic surgery consulted for possible bronchoscopic intervention    Plan:  Will discuss imaging with CT surgeon   Will continue to follow   Continue care per primary team 65 year old woman with PMH 80 pack year smoking hx, R lung cancer s/p RLL resection 2016, EBUS on 3/2/20, on chemo (1st dose 10 days ago) presenting for acute shortness of breath and pain from L sided ribs 6-7 fractures. Admitted for COPD exacerbation, CT showing atelecatasis RML. Thoracic surgery consulted for possible bronchoscopic intervention    Plan:  NPO after midnight  Add on for OR 6/30- Flex Bronch, possible biopsy  Continue care per primary team

## 2020-06-29 NOTE — PROGRESS NOTE ADULT - ATTENDING COMMENTS
65 y.o. Female w/ Hx Chronic respiratory failure/ COPD on 5LNC at home, Lung Ca s/p wedge resection 2016 w/ recurrent mets to mediastinal LN on chemo a/w COPD exacerbation found to have RML mucus plugging and subacute/acute atraumatic rib fractures. Patient was to have Bronchoscopy today but cancelled by Pulmonary; c/w chest PT/ incentive spirometry. taper steroids as may be causing rib fractures. control pain with Lidoderm patch/Toradol IV q8hr. F/U Pulmonary recs.

## 2020-06-29 NOTE — PROGRESS NOTE ADULT - SUBJECTIVE AND OBJECTIVE BOX
Half the usual Lantus dose was ordered instead of the full usual dose tonight as pt will be NPO for the OR tomorrow.  A blood type confirmation was ordered for the AM as well.

## 2020-06-29 NOTE — PROGRESS NOTE ADULT - PROBLEM SELECTOR PLAN 6
- takes insulin as needed at home. FSG significantly elevated during hospitalization, and also patient is on prednisone. Will initiate basal-bolus insulin. Started on Lantus 35u QHS and Humalog 15u QAC with mISS. Plan to uptitrate PRN.  - A1C 9.6  - 6/27: Patient refused take PO if patient is served DASH/TLC/CC diet. Significantly agitated and verbally/physically abusive. Explained the concern re: high blood sugar at high 300s yesterday, and possible development of DKA/HHS if uncontrolled. Informed that given her comorbidities, development of DKA/HHS may result in death. Patient accepts and still wishes to be on regular diet. Patient is A&O x4. No acute sign of psychosis. Will provide regular diet per patient's wish.  - Will need to uptitrate bolus coverage. Will track FSG and adjust accordingly.  - glc today 162 on 15U humalog and 35U lantus. Continue to monitor.

## 2020-06-29 NOTE — PROGRESS NOTE ADULT - PROBLEM SELECTOR PLAN 1
Likely 2/2 atelectasis, possible contribution from paraesophageal mass seen on CTA in setting of R sided lung cancer s/p RLL resection 2017. COPD less likely as does not have sputum production, fever, leukocytosis. She uses 5L oxygen at home and still feels SOB and requests more oxygen for home. CT angio 6/25: No pulmonary embolism. Complete atelectasis of the right middle lobe secondary to mucous plugging. Increasing mediastinal lymphadenopathy, with a left paraesophageal node measuring up to 4.4 cm in greatest dimension, concerning for metastatic disease. CXR (6/28) with linear scar versus atelectasis in the right mid and lower lung and left lower lung linear atelectasis.  - Moderate discomfort on 5L O2.  - Pulm following, rec: Ok to continue with steroids as patient has wheezing, d/c abx, start LAMA/LABA/ICS therapy (symbicort/spiriva), duonebs and hypersal standing q6h with acapella/aerobika for mucociliary mobilization. For mucous plugghing vs endobronchial lesion on RML bronchus, pulm will discuss imaging with radiology. If atelectasis still present after weekend of above therapy, will plan for diagnostic and therapeutic bronchoscopy. Likely 2/2 atelectasis, possible contribution from paraesophageal mass seen on CTA in setting of R sided lung cancer s/p RLL resection 2017. COPD less likely as does not have sputum production, fever, leukocytosis. She uses 5L oxygen at home and still feels SOB and requests more oxygen for home. CT angio 6/25: No pulmonary embolism. Complete atelectasis of the right middle lobe secondary to mucous plugging. Increasing mediastinal lymphadenopathy, with a left paraesophageal node measuring up to 4.4 cm in greatest dimension, concerning for metastatic disease. CXR (6/28) with linear scar versus atelectasis in the right mid and lower lung and left lower lung linear atelectasis.  -Spoke to Pulm; will not bronch pt 6/29; will consult CT surgery for possible bronch as they have followed pt in past.  - Moderate discomfort on 5L O2.  - Pulm following, rec: Ok to continue with prednisone 60 mg x5 days (until 6/30), d/c abx, start LAMA/LABA/ICS therapy (symbicort/spiriva), duonebs and hypersal standing q6h with acapella/aerobika for mucociliary mobilization. For mucous plugging vs endobronchial lesion on RML bronchus.  -Pulm did not bronch pt 6/29; consulted CT surgery for possible bronch as they have followed pt in past.

## 2020-06-29 NOTE — PROGRESS NOTE ADULT - PROBLEM SELECTOR PLAN 2
- wheezing improved today to faint bilateral, decreased air movement. Currently on home O2 at 5L.  - Pulm consulted, note unclear if SOB is truly from COPD as no sputum production, fever, leukocytosis. Rec treatment per above.     - Levaquin given initially c/f COPD exacerbation, however clinical presentation without an identifiable of aggravating incident, unclear if COPD exacerbation; levaquin stopped.   - Aim SaO2 88% - 95%.  - continue prednisone 60 mg daily   - COVID negative; retest today prior to possible bronch tomorrow - No wheezing; Currently on home O2 at 5L.  - Pulm consulted, note unclear if SOB is truly from COPD as no sputum production, fever, leukocytosis. Rec treatment per above.   - Levaquin given initially c/f COPD exacerbation, however clinical presentation without an identifiable or aggravating incident, unclear if COPD exacerbation; levaquin stopped.   - Aim SaO2 88% - 95%.  - continue prednisone 60 mg daily until 6/30 (5 d)

## 2020-06-29 NOTE — PROGRESS NOTE ADULT - PROBLEM SELECTOR PLAN 5
- Significant for R neck pain, less likely fracture from imaging results. CT neck soft tissue 6/25 showed: No cervical mass or adenopathy identified. Enlarged mediastinal nodes.  - Also with acute L lateral 7th rib fracture and subacute R posterolateral 6th rib fracture.  - ISTOP performed (#[id number]). Confirmed that patient is on Endocet (10 oxy/325 acetaminophen)  - On Percocet (5 oxy/325 acetaminophen) Q6H PRN for severe pain.  - Robitussin and tessalon pearls for cough as it exacerbates rib pain - Significant for R neck pain, less likely fracture from imaging results. CT neck soft tissue 6/25 showed: No cervical mass or adenopathy identified. Enlarged mediastinal nodes.  - Also with acute L lateral 7th rib fracture and subacute R posterolateral 6th rib fracture.  - ISTOP performed (#[id number]). Confirmed that patient is on Endocet (10 oxy/325 acetaminophen)  - On Percocet (5 oxy/325 acetaminophen) Q6H PRN for severe pain. Miralax for BM regimen started 6/29  - Robitussin and tessalon pearls for cough as it exacerbates rib pain  -Toradol for rib fx pain started on 6/29.

## 2020-06-29 NOTE — PROGRESS NOTE ADULT - ASSESSMENT
66 yo woman with hypertension, diabetes mellitus, ITP, COPD on 5 L of home oxygen and lung cancer s/p RLL resection in 2017, immunotherapy and recently started on chemotherapy 1 week ago, presenting with shortness of breath and pain from left sided rib fractures. Admitted for COPD exacerbation. 64 yo woman with hypertension, diabetes mellitus, ITP, COPD on 5 L of home oxygen and lung cancer s/p RLL resection in 2017, immunotherapy and recently started on chemotherapy 1 week ago, presenting with shortness of breath and pain from left sided rib fractures. Admitted for COPD exacerbation. 64 yo woman with hypertension, diabetes mellitus, ITP, COPD on 5 L of home oxygen and lung cancer s/p RLL resection in 2017, immunotherapy and recently started on chemotherapy 1 week ago, presenting with shortness of breath and pain from left sided rib fractures. CT chest revealed atelectasis of RML as well as mediastinal adenopathy. No evidence of pneumonia or pulmonary embolism. RML collapse likely secondary to endobronchial lesion vs extrinsic compression from malignant nodes vs mucus plugging. Unsure if a bronchoscopy will add any symptomatic improvement as she has advanced COPD on 5 L of home oxygen as well as progression of lung cancer. Given that patient is being followed by CT surgery, would defer decision for bronchoscopy or not to them.     Recommendations:  - Continue Symbicort BID, and add Spiriva upon discharge  - Continue prednisone 60 mg daily for 5 days and then taper every 2 days pending clinical improvement  - Continue duonebs and hypertonic saline nebulizations Q6h as well as acapella/aerobika and chest PT to facilitate mobilization of secretions  - Given patient is being followed by CT surgery, would consult them to assess role for bronchoscopy     --------------------------------------------------------  Thompson Bob, PGY-4  Pulmonary/Critical Care Fellow  Pager: [phone] (ROSENDO), [phone] (NS)  Pulmonary spectra: [phone]

## 2020-06-29 NOTE — PROGRESS NOTE ADULT - PROBLEM SELECTOR PLAN 8
Transitions of Care Status:  1.  Name of PCP:  2.  PCP Contacted on Admission: [ ] Y    [ ] N    3.  PCP contacted at Discharge: [ ] Y    [ ] N    [ ] N/A  4.  Post-Discharge Appointment Date and Location:  5.  Summary of Handoff given to PCP: Transitions of Care Status:  1.  Name of PCP:  2.  PCP Contacted on Admission: [ ] Y    [ ] N    3.  PCP contacted at Discharge: [ ] Y    [ ] N    [ ] N/A  4.  Post-Discharge Appointment Date and Location:  5.  Summary of Handoff given to PCP:    -Pt requesting outpt endocrine referral  -PT consulted 6/29; will appreciate recs, pt lives in four story walk up (remains there b/c rent controlled)

## 2020-06-29 NOTE — PROGRESS NOTE ADULT - SUBJECTIVE AND OBJECTIVE BOX
INTERVAL EVENTS  No acute events overnight  Reports dyspnea with exertion      REVIEW OF SYSTEMS  Constitutional: no fever or chills  Respiratory: as above  Cardiovascular: no chest pain  Gastrointestinal: no nausea, vomiting or abdominal pain  Genitourinary: no dysuria  Skin: no rash	    OBJECTIVE    Vital Signs Last 24 Hrs  T(C): 36.6 (29 Jun 2020 05:40), Max: 36.8 (28 Jun 2020 21:27)  T(F): 97.8 (29 Jun 2020 05:40), Max: 98.2 (28 Jun 2020 21:27)  HR: 85 (29 Jun 2020 10:04) (77 - 89)  BP: 123/73 (29 Jun 2020 05:40) (106/64 - 141/75)  BP(mean): --  RR: 18 (29 Jun 2020 05:40) (18 - 19)  SpO2: 98% (29 Jun 2020 10:04) (95% - 100%)    PHYSICAL EXAM:  General: no acute distress  HEENT: normocephalic  Eyes: extraocular movements intact, pupils equal and reactive to light  Neck: supple  Respiratory: non labored breathing, wheezing bilateral  Cardiovascular: normal rate, regular rhythm  Gastrointestinal: abdomen soft, non tender, non distended  Extremities: no lower extremity edema  Neurological: alert, oriented, no focal deficits  Psychiatric: cooperative    MEDICATIONS  (STANDING):  albuterol/ipratropium for Nebulization 3 milliLiter(s) Nebulizer every 6 hours  amLODIPine   Tablet 5 milliGRAM(s) Oral daily  atorvastatin 40 milliGRAM(s) Oral at bedtime  budesonide 160 MICROgram(s)/formoterol 4.5 MICROgram(s) Inhaler 2 Puff(s) Inhalation two times a day  carvedilol 6.25 milliGRAM(s) Oral every 12 hours  dextrose 5%. 1000 milliLiter(s) (50 mL/Hr) IV Continuous <Continuous>  dextrose 50% Injectable 12.5 Gram(s) IV Push once  dextrose 50% Injectable 25 Gram(s) IV Push once  dextrose 50% Injectable 25 Gram(s) IV Push once  enoxaparin Injectable 40 milliGRAM(s) SubCutaneous daily  famotidine    Tablet 20 milliGRAM(s) Oral daily  gabapentin 200 milliGRAM(s) Oral three times a day  insulin glargine Injectable (LANTUS) 35 Unit(s) SubCutaneous at bedtime  insulin lispro (HumaLOG) corrective regimen sliding scale   SubCutaneous three times a day before meals  insulin lispro (HumaLOG) corrective regimen sliding scale   SubCutaneous at bedtime  insulin lispro Injectable (HumaLOG) 15 Unit(s) SubCutaneous three times a day with meals  lidocaine   Patch 1 Patch Transdermal daily  melatonin 3 milliGRAM(s) Oral at bedtime  predniSONE   Tablet 60 milliGRAM(s) Oral daily  sodium chloride 3%  Inhalation 3 milliLiter(s) Inhalation every 6 hours    MEDICATIONS  (PRN):  dextrose 40% Gel 15 Gram(s) Oral once PRN Blood Glucose LESS THAN 70 milliGRAM(s)/deciliter  glucagon  Injectable 1 milliGRAM(s) IntraMuscular once PRN Glucose LESS THAN 70 milligrams/deciliter  guaiFENesin   Syrup  (Sugar-Free) 200 milliGRAM(s) Oral every 6 hours PRN Cough  oxycodone    5 mG/acetaminophen 325 mG 1 Tablet(s) Oral every 6 hours PRN Severe Pain (7 - 10)    LABORATORY                          11.6   5.95  )-----------( 120      ( 29 Jun 2020 09:10 )             35.0     06-29    141  |  100  |  11  ----------------------------<  180<H>  4.2   |  31  |  0.49<L>    Ca    8.9      29 Jun 2020 09:10    PT/INR - ( 29 Jun 2020 09:10 )   PT: 12.2 SEC;   INR: 1.06     PTT - ( 29 Jun 2020 09:10 )  PTT:27.6 SEC    RADIOLOGY    CT Angio Chest w/ IV Cont (06.25.20 @ 14:07) >  No pulmonary embolism.  Complete atelectasis of the right middle lobe secondary to mucous plugging.  Increasing mediastinal lymphadenopathy, with a left paraesophageal node measuring up to 4.4 cm in greatest dimension, concerning for metastatic disease.  Acute left lateral 7th rib fracture. Subacute right posterolateral sixth rib fracture. No pneumothorax.  Pathologic fracture of T7 which may correspond to focus of FDG avidity on outside PET scan.    < end of copied text >

## 2020-06-30 ENCOUNTER — RESULT REVIEW (OUTPATIENT)
Age: 65
End: 2020-06-30

## 2020-06-30 LAB
ANION GAP SERPL CALC-SCNC: 14 MMO/L — SIGNIFICANT CHANGE UP (ref 7–14)
BLD GP AB SCN SERPL QL: NEGATIVE — SIGNIFICANT CHANGE UP
BUN SERPL-MCNC: 22 MG/DL — SIGNIFICANT CHANGE UP (ref 7–23)
CALCIUM SERPL-MCNC: 8.7 MG/DL — SIGNIFICANT CHANGE UP (ref 8.4–10.5)
CHLORIDE SERPL-SCNC: 96 MMOL/L — LOW (ref 98–107)
CO2 SERPL-SCNC: 27 MMOL/L — SIGNIFICANT CHANGE UP (ref 22–31)
CREAT SERPL-MCNC: 0.66 MG/DL — SIGNIFICANT CHANGE UP (ref 0.5–1.3)
GLUCOSE BLDC GLUCOMTR-MCNC: 209 MG/DL — HIGH (ref 70–99)
GLUCOSE BLDC GLUCOMTR-MCNC: 217 MG/DL — HIGH (ref 70–99)
GLUCOSE BLDC GLUCOMTR-MCNC: 254 MG/DL — HIGH (ref 70–99)
GLUCOSE BLDC GLUCOMTR-MCNC: 435 MG/DL — HIGH (ref 70–99)
GLUCOSE BLDC GLUCOMTR-MCNC: 507 MG/DL — CRITICAL HIGH (ref 70–99)
GLUCOSE SERPL-MCNC: 174 MG/DL — HIGH (ref 70–99)
GRAM STN FLD: SIGNIFICANT CHANGE UP
MAGNESIUM SERPL-MCNC: 1.9 MG/DL — SIGNIFICANT CHANGE UP (ref 1.6–2.6)
NIGHT BLUE STAIN TISS: SIGNIFICANT CHANGE UP
PHOSPHATE SERPL-MCNC: 4 MG/DL — SIGNIFICANT CHANGE UP (ref 2.5–4.5)
POTASSIUM SERPL-MCNC: 4.7 MMOL/L — SIGNIFICANT CHANGE UP (ref 3.5–5.3)
POTASSIUM SERPL-SCNC: 4.7 MMOL/L — SIGNIFICANT CHANGE UP (ref 3.5–5.3)
RH IG SCN BLD-IMP: POSITIVE — SIGNIFICANT CHANGE UP
SODIUM SERPL-SCNC: 137 MMOL/L — SIGNIFICANT CHANGE UP (ref 135–145)
SPECIMEN SOURCE: SIGNIFICANT CHANGE UP
SPECIMEN SOURCE: SIGNIFICANT CHANGE UP

## 2020-06-30 PROCEDURE — 99233 SBSQ HOSP IP/OBS HIGH 50: CPT | Mod: GC

## 2020-06-30 PROCEDURE — 31645 BRNCHSC W/THER ASPIR 1ST: CPT

## 2020-06-30 PROCEDURE — 31625 BRONCHOSCOPY W/BIOPSY(S): CPT | Mod: 59

## 2020-06-30 PROCEDURE — 88305 TISSUE EXAM BY PATHOLOGIST: CPT | Mod: 26

## 2020-06-30 PROCEDURE — 31641 BRONCHOSCOPY TREAT BLOCKAGE: CPT

## 2020-06-30 PROCEDURE — 88341 IMHCHEM/IMCYTCHM EA ADD ANTB: CPT | Mod: 26

## 2020-06-30 PROCEDURE — 71045 X-RAY EXAM CHEST 1 VIEW: CPT | Mod: 26

## 2020-06-30 PROCEDURE — 88342 IMHCHEM/IMCYTCHM 1ST ANTB: CPT | Mod: 26

## 2020-06-30 PROCEDURE — 31624 DX BRONCHOSCOPE/LAVAGE: CPT

## 2020-06-30 RX ORDER — IPRATROPIUM/ALBUTEROL SULFATE 18-103MCG
3 AEROSOL WITH ADAPTER (GRAM) INHALATION ONCE
Refills: 0 | Status: COMPLETED | OUTPATIENT
Start: 2020-06-30 | End: 2020-06-30

## 2020-06-30 RX ORDER — CHLORHEXIDINE GLUCONATE 213 G/1000ML
1 SOLUTION TOPICAL
Refills: 0 | Status: DISCONTINUED | OUTPATIENT
Start: 2020-06-30 | End: 2020-07-05

## 2020-06-30 RX ORDER — BENZOCAINE AND MENTHOL 5; 1 G/100ML; G/100ML
1 LIQUID ORAL ONCE
Refills: 0 | Status: COMPLETED | OUTPATIENT
Start: 2020-06-30 | End: 2020-06-30

## 2020-06-30 RX ORDER — INSULIN GLARGINE 100 [IU]/ML
35 INJECTION, SOLUTION SUBCUTANEOUS AT BEDTIME
Refills: 0 | Status: DISCONTINUED | OUTPATIENT
Start: 2020-06-30 | End: 2020-07-05

## 2020-06-30 RX ADMIN — ATORVASTATIN CALCIUM 40 MILLIGRAM(S): 80 TABLET, FILM COATED ORAL at 22:03

## 2020-06-30 RX ADMIN — POLYETHYLENE GLYCOL 3350 17 GRAM(S): 17 POWDER, FOR SOLUTION ORAL at 18:32

## 2020-06-30 RX ADMIN — Medication 2: at 22:24

## 2020-06-30 RX ADMIN — AMLODIPINE BESYLATE 5 MILLIGRAM(S): 2.5 TABLET ORAL at 05:39

## 2020-06-30 RX ADMIN — Medication 15 MILLIGRAM(S): at 22:02

## 2020-06-30 RX ADMIN — Medication 3 MILLILITER(S): at 04:19

## 2020-06-30 RX ADMIN — CARVEDILOL PHOSPHATE 6.25 MILLIGRAM(S): 80 CAPSULE, EXTENDED RELEASE ORAL at 18:32

## 2020-06-30 RX ADMIN — ENOXAPARIN SODIUM 40 MILLIGRAM(S): 100 INJECTION SUBCUTANEOUS at 12:24

## 2020-06-30 RX ADMIN — Medication 60 MILLIGRAM(S): at 05:36

## 2020-06-30 RX ADMIN — BUDESONIDE AND FORMOTEROL FUMARATE DIHYDRATE 2 PUFF(S): 160; 4.5 AEROSOL RESPIRATORY (INHALATION) at 08:55

## 2020-06-30 RX ADMIN — INSULIN GLARGINE 35 UNIT(S): 100 INJECTION, SOLUTION SUBCUTANEOUS at 22:26

## 2020-06-30 RX ADMIN — PREGABALIN 1000 MICROGRAM(S): 225 CAPSULE ORAL at 12:25

## 2020-06-30 RX ADMIN — Medication 4: at 12:06

## 2020-06-30 RX ADMIN — OXYCODONE AND ACETAMINOPHEN 1 TABLET(S): 5; 325 TABLET ORAL at 22:03

## 2020-06-30 RX ADMIN — Medication 15 UNIT(S): at 18:31

## 2020-06-30 RX ADMIN — Medication 3 MILLILITER(S): at 11:37

## 2020-06-30 RX ADMIN — BENZOCAINE AND MENTHOL 1 LOZENGE: 5; 1 LIQUID ORAL at 23:29

## 2020-06-30 RX ADMIN — Medication 15 MILLIGRAM(S): at 05:36

## 2020-06-30 RX ADMIN — Medication 3 MILLILITER(S): at 16:15

## 2020-06-30 RX ADMIN — SODIUM CHLORIDE 3 MILLILITER(S): 9 INJECTION INTRAMUSCULAR; INTRAVENOUS; SUBCUTANEOUS at 16:15

## 2020-06-30 RX ADMIN — SODIUM CHLORIDE 3 MILLILITER(S): 9 INJECTION INTRAMUSCULAR; INTRAVENOUS; SUBCUTANEOUS at 04:24

## 2020-06-30 RX ADMIN — Medication 3 MILLIGRAM(S): at 22:03

## 2020-06-30 RX ADMIN — SODIUM CHLORIDE 3 MILLILITER(S): 9 INJECTION INTRAMUSCULAR; INTRAVENOUS; SUBCUTANEOUS at 21:19

## 2020-06-30 RX ADMIN — Medication 12: at 18:31

## 2020-06-30 RX ADMIN — BENZOCAINE AND MENTHOL 1 LOZENGE: 5; 1 LIQUID ORAL at 18:38

## 2020-06-30 RX ADMIN — PANTOPRAZOLE SODIUM 40 MILLIGRAM(S): 20 TABLET, DELAYED RELEASE ORAL at 05:39

## 2020-06-30 RX ADMIN — Medication 3 MILLILITER(S): at 21:17

## 2020-06-30 RX ADMIN — GABAPENTIN 200 MILLIGRAM(S): 400 CAPSULE ORAL at 05:36

## 2020-06-30 RX ADMIN — GABAPENTIN 200 MILLIGRAM(S): 400 CAPSULE ORAL at 14:42

## 2020-06-30 RX ADMIN — GABAPENTIN 200 MILLIGRAM(S): 400 CAPSULE ORAL at 22:03

## 2020-06-30 RX ADMIN — CARVEDILOL PHOSPHATE 6.25 MILLIGRAM(S): 80 CAPSULE, EXTENDED RELEASE ORAL at 05:39

## 2020-06-30 RX ADMIN — Medication 4: at 08:41

## 2020-06-30 RX ADMIN — FAMOTIDINE 20 MILLIGRAM(S): 10 INJECTION INTRAVENOUS at 12:24

## 2020-06-30 RX ADMIN — BENZOCAINE AND MENTHOL 1 LOZENGE: 5; 1 LIQUID ORAL at 12:01

## 2020-06-30 NOTE — PROGRESS NOTE ADULT - PROBLEM SELECTOR PLAN 8
Transitions of Care Status:  1.  Name of PCP:  2.  PCP Contacted on Admission: [ ] Y    [ ] N    3.  PCP contacted at Discharge: [ ] Y    [ ] N    [ ] N/A  4.  Post-Discharge Appointment Date and Location:  5.  Summary of Handoff given to PCP:    -Pt requesting outpt endocrine referral  -PT consulted 6/29; will appreciate recs, pt lives in four story walk up (remains there b/c rent controlled)

## 2020-06-30 NOTE — PROGRESS NOTE ADULT - PROBLEM SELECTOR PLAN 5
- Significant for R neck pain, less likely fracture from imaging results. CT neck soft tissue 6/25 showed: No cervical mass or adenopathy identified. Enlarged mediastinal nodes.  - Also with acute L lateral 7th rib fracture and subacute R posterolateral 6th rib fracture.  - ISTOP performed (#[id number]). Confirmed that patient is on Endocet (10 oxy/325 acetaminophen)  - On Percocet (5 oxy/325 acetaminophen) Q6H PRN for severe pain. Miralax for BM regimen started 6/29  - Robitussin and tessalon pearls for cough as it exacerbates rib pain  -Toradol for rib fx pain started on 6/29.

## 2020-06-30 NOTE — PROGRESS NOTE ADULT - PROBLEM SELECTOR PLAN 1
Likely 2/2 atelectasis, possible contribution from paraesophageal mass seen on CTA in setting of R sided lung cancer s/p RLL resection 2017. COPD less likely as does not have sputum production, fever, leukocytosis. She uses 5L oxygen at home and still feels SOB and requests more oxygen for home. CT angio 6/25: No pulmonary embolism. Complete atelectasis of the right middle lobe secondary to mucous plugging. Increasing mediastinal lymphadenopathy, with a left paraesophageal node measuring up to 4.4 cm in greatest dimension, concerning for metastatic disease. CXR (6/28) with linear scar versus atelectasis in the right mid and lower lung and left lower lung linear atelectasis.  -s/p bronch 6/30  - Moderate discomfort on 5L O2.  - Pulm following, rec: Ok to continue with prednisone 60 mg x5 days (until 6/30), d/c abx, start LAMA/LABA/ICS therapy (symbicort/spiriva), duonebs and hypersal standing q6h with acapella/aerobika for mucociliary mobilization. For mucous plugging vs endobronchial lesion on RML bronchus.

## 2020-06-30 NOTE — BRIEF OPERATIVE NOTE - NSICDXBRIEFPROCEDURE_GEN_ALL_CORE_FT
PROCEDURES:  Bronchoscopy, with bronchial biopsy 30-Jun-2020 11:08:52 Right middle lobe Sergio Welch

## 2020-06-30 NOTE — PROGRESS NOTE ADULT - PROBLEM SELECTOR PLAN 2
- No wheezing; Currently on home O2 at 5L.  - Pulm consulted, note unclear if SOB is truly from COPD as no sputum production, fever, leukocytosis. Rec treatment per above.   - Levaquin given initially c/f COPD exacerbation, however clinical presentation without an identifiable or aggravating incident, unclear if COPD exacerbation; levaquin stopped.   - Aim SaO2 88% - 95%.  - continue prednisone 60 mg daily until 6/30 (5 d)

## 2020-06-30 NOTE — PROGRESS NOTE ADULT - PROBLEM SELECTOR PLAN 4
- s/p RLL resection 2017; 1 cycle chemo the week prior to presentation.  - Now with findings of 4.4-cm perihilar LN on CTA chest. Need to contact oupt oncologist Dr. Mendiola [phone]  - F/u bronch results  -B12 started 6/29

## 2020-06-30 NOTE — PROGRESS NOTE ADULT - SUBJECTIVE AND OBJECTIVE BOX
Micheline Thompson MD PGY-1  Pager: [phone]; [facility] #[id number] Team 7 Spectra [phone]  Please page [phone] after 7 pm or after 12 pm on weekends    Patient is a 65y old  Female who presents with a chief complaint of SOB, fractures     SUBJECTIVE / OVERNIGHT EVENTS: No acute events overnight. Pt reports no improvement in breathing after bronch today. Pt currently on RA as NC irritating right nostril, causing bleeding.     MEDICATIONS  (STANDING):  albuterol/ipratropium for Nebulization 3 milliLiter(s) Nebulizer every 6 hours  amLODIPine   Tablet 5 milliGRAM(s) Oral daily  atorvastatin 40 milliGRAM(s) Oral at bedtime  budesonide 160 MICROgram(s)/formoterol 4.5 MICROgram(s) Inhaler 2 Puff(s) Inhalation two times a day  carvedilol 6.25 milliGRAM(s) Oral every 12 hours  cyanocobalamin 1000 MICROGram(s) Oral daily  dextrose 5%. 1000 milliLiter(s) (50 mL/Hr) IV Continuous <Continuous>  dextrose 50% Injectable 12.5 Gram(s) IV Push once  dextrose 50% Injectable 25 Gram(s) IV Push once  dextrose 50% Injectable 25 Gram(s) IV Push once  enoxaparin Injectable 40 milliGRAM(s) SubCutaneous daily  famotidine    Tablet 20 milliGRAM(s) Oral daily  gabapentin 200 milliGRAM(s) Oral three times a day  insulin glargine Injectable (LANTUS) 15 Unit(s) SubCutaneous at bedtime  insulin lispro (HumaLOG) corrective regimen sliding scale   SubCutaneous three times a day before meals  insulin lispro (HumaLOG) corrective regimen sliding scale   SubCutaneous at bedtime  insulin lispro Injectable (HumaLOG) 15 Unit(s) SubCutaneous three times a day with meals  ketorolac   Injectable 15 milliGRAM(s) IV Push every 8 hours  lidocaine   Patch 1 Patch Transdermal daily  melatonin 3 milliGRAM(s) Oral at bedtime  pantoprazole    Tablet 40 milliGRAM(s) Oral before breakfast  polyethylene glycol 3350 17 Gram(s) Oral daily  sodium chloride 3%  Inhalation 3 milliLiter(s) Inhalation every 6 hours    MEDICATIONS  (PRN):  dextrose 40% Gel 15 Gram(s) Oral once PRN Blood Glucose LESS THAN 70 milliGRAM(s)/deciliter  glucagon  Injectable 1 milliGRAM(s) IntraMuscular once PRN Glucose LESS THAN 70 milligrams/deciliter  guaiFENesin   Syrup  (Sugar-Free) 200 milliGRAM(s) Oral every 6 hours PRN Cough  oxycodone    5 mG/acetaminophen 325 mG 1 Tablet(s) Oral every 6 hours PRN Severe Pain (7 - 10)    CAPILLARY BLOOD GLUCOSE      POCT Blood Glucose.: 217 mg/dL (30 Jun 2020 11:35)  POCT Blood Glucose.: 209 mg/dL (30 Jun 2020 08:39)  POCT Blood Glucose.: 272 mg/dL (29 Jun 2020 21:50)  POCT Blood Glucose.: 350 mg/dL (29 Jun 2020 18:09)    I&O's Summary      Vital Signs Last 24 Hrs  T(C): 36.9 (30 Jun 2020 13:30), Max: 36.9 (30 Jun 2020 09:18)  T(F): 98.4 (30 Jun 2020 13:30), Max: 98.4 (30 Jun 2020 09:18)  HR: 88 (30 Jun 2020 13:30) (75 - 95)  BP: 132/73 (30 Jun 2020 13:30) (90/39 - 142/65)  BP(mean): 72 (30 Jun 2020 11:50) (50 - 120)  RR: 16 (30 Jun 2020 13:30) (15 - 24)  SpO2: 100% (30 Jun 2020 13:30) (92% - 100%)    PHYSICAL EXAM:  GENERAL: NAD, well-developed, well-nourished  HEAD: Atraumatic, Normocephalic  EYES: EOMI, conjunctiva and sclera clear  NECK: Supple, No JVD  CHEST/LUNG: No wheezes; coarse breath sounds in lower lobes b/l  HEART: Normal S1/S2; Regular rate and rhythm; No murmurs, rubs, or gallops  ABDOMEN: obese, soft, nontender; Bowel sounds present  EXTREMITIES: 2+ Peripheral Pulses; No clubbing or cyanosis, or edema  PSYCH: A&Ox3  NEUROLOGY: no focal neurologic deficit  SKIN: No rashes or lesions    LABS:                          11.6   5.95  )-----------( 120      ( 29 Jun 2020 09:10 )             35.0   06-30    137  |  96<L>  |  22  ----------------------------<  174<H>  4.7   |  27  |  0.66    Ca    8.7      30 Jun 2020 05:00  Phos  4.0     06-30  Mg     1.9     06-30      RADIOLOGY & ADDITIONAL TESTS:    < from: Xray Chest 2 Views PA/Lat (06.28.20 @ 21:19) >  EXAM:  XR CHEST PA LAT 2V        *** ADDENDUM 06/29/2020  ***    The technique should be: PA and lateral chest x-ray.      *** END OF ADDENDUM 06/29/2020  ***      PROCEDURE DATE:  Jun 28 2020         INTERPRETATION:  TIME OF EXAM: June 28, 2020 at10:14 PM.    CLINICAL INFORMATION: Status post right lower lobe resection for lung cancer in 2017. Shortness of breath.    COMPARISON:  AP chest x-ray and CT scan of the chest from June 25, 2020.    TECHNIQUE:   AP Portable chest x-ray.    INTERPRETATION:     The heart is not enlarged. There is a calcified tortuous thoracic aorta.  A left upper extremity PICC line with tip at the SVC right atrial junction is again seen.  There is right lung postsurgical change with chain sutures and surgical clips seen. There is linear scar versus atelectasis in the right mid and lower lung.  There is linear atelectasis in the left lower lung.  No pleural effusion or pneumothorax is seen.  There is scoliosis of the spine. There are bilateral rib deformities. Please see report of CT scan of the chest for further detail.              IMPRESSION:  Right lung postsurgical change.    Linear scar versus atelectasis in the right mid and lower lung. Left lower lung linear atelectasis.    Please see report of the CT scan of the chest for further detail.

## 2020-06-30 NOTE — PROGRESS NOTE ADULT - ATTENDING COMMENTS
65 y.o. Female w/ Hx Chronic respiratory failure/ COPD on 5LNC at home, Lung Ca s/p wedge resection 2016 w/ recurrent mets to mediastinal LN on chemo a/w COPD exacerbation found to have RML mucus plugging and subacute/acute atraumatic rib fractures. Patient s/p Bronchoscopy 6/29 by CT surgery; c/w chest PT/ incentive spirometry. taper steroids as may be causing rib fractures. control pain with Lidoderm patch/Toradol IV q8hr. F/U Pulmonary recs.

## 2020-06-30 NOTE — PROGRESS NOTE ADULT - PROBLEM SELECTOR PLAN 6
- takes insulin as needed at home. FSG significantly elevated during hospitalization, and also patient is on prednisone. Will initiate basal-bolus insulin. Started on Lantus 35u QHS and Humalog 15u QAC with mISS. Plan to uptitrate PRN.  - A1C 9.6  - 6/27: Patient refused take PO if patient is served DASH/TLC/CC diet. Significantly agitated and verbally/physically abusive. Explained the concern re: high blood sugar at high 300s yesterday, and possible development of DKA/HHS if uncontrolled. Informed that given her comorbidities, development of DKA/HHS may result in death. Patient accepts and still wishes to be on regular diet. Patient is A&O x4. No acute sign of psychosis. Will provide regular diet per patient's wish.  - Will need to uptitrate bolus coverage. Will track FSG and adjust accordingly.

## 2020-06-30 NOTE — CHART NOTE - NSCHARTNOTEFT_GEN_A_CORE
Patient s/p flex bronch, BAL and right middle lobe airway biopsy.  Patient resting comfortably.  Tolerating po, voiding  Vital Signs Last 24 Hrs  T(C): 36.4 (30 Jun 2020 21:53), Max: 36.9 (30 Jun 2020 09:18)  T(F): 97.5 (30 Jun 2020 21:53), Max: 98.4 (30 Jun 2020 09:18)  HR: 82 (30 Jun 2020 21:53) (75 - 92)  BP: 108/87 (30 Jun 2020 21:53) (90/39 - 142/65)  BP(mean): 72 (30 Jun 2020 11:50) (50 - 120)  RR: 18 (30 Jun 2020 21:53) (15 - 24)  SpO2: 100% (30 Jun 2020 21:53) (92% - 100%)    I&O's Summary    MEDICATIONS  (STANDING):  albuterol/ipratropium for Nebulization 3 milliLiter(s) Nebulizer every 6 hours  amLODIPine   Tablet 5 milliGRAM(s) Oral daily  atorvastatin 40 milliGRAM(s) Oral at bedtime  budesonide 160 MICROgram(s)/formoterol 4.5 MICROgram(s) Inhaler 2 Puff(s) Inhalation two times a day  carvedilol 6.25 milliGRAM(s) Oral every 12 hours  chlorhexidine 4% Liquid 1 Application(s) Topical two times a day  cyanocobalamin 1000 MICROGram(s) Oral daily  dextrose 5%. 1000 milliLiter(s) (50 mL/Hr) IV Continuous <Continuous>  dextrose 50% Injectable 12.5 Gram(s) IV Push once  dextrose 50% Injectable 25 Gram(s) IV Push once  dextrose 50% Injectable 25 Gram(s) IV Push once  enoxaparin Injectable 40 milliGRAM(s) SubCutaneous daily  famotidine    Tablet 20 milliGRAM(s) Oral daily  gabapentin 200 milliGRAM(s) Oral three times a day  insulin glargine Injectable (LANTUS) 35 Unit(s) SubCutaneous at bedtime  insulin lispro (HumaLOG) corrective regimen sliding scale   SubCutaneous three times a day before meals  insulin lispro (HumaLOG) corrective regimen sliding scale   SubCutaneous at bedtime  insulin lispro Injectable (HumaLOG) 15 Unit(s) SubCutaneous three times a day with meals  ketorolac   Injectable 15 milliGRAM(s) IV Push every 8 hours  lidocaine   Patch 1 Patch Transdermal daily  melatonin 3 milliGRAM(s) Oral at bedtime  pantoprazole    Tablet 40 milliGRAM(s) Oral before breakfast  polyethylene glycol 3350 17 Gram(s) Oral daily  sodium chloride 3%  Inhalation 3 milliLiter(s) Inhalation every 6 hours    A/P: S/P Flex Bronch, BAL, RML airway biopsy  Follow up CXR in am   Pain management   Continue care as per primary team

## 2020-06-30 NOTE — PROGRESS NOTE ADULT - ASSESSMENT
Ms. Olson is a 66 y/o with PMH R lung cancer s/p resection 2017 on chemo, COPD with multiple hospital admissions, 80 pack year smoking hx, fractures in L ribs presenting for SOB (on 5L O2 at home) and pain from fractures. SOB likely 2/2 COPD exacerbation, s/p bronch 6/30.

## 2020-07-01 LAB
ANION GAP SERPL CALC-SCNC: 11 MMO/L — SIGNIFICANT CHANGE UP (ref 7–14)
BUN SERPL-MCNC: 18 MG/DL — SIGNIFICANT CHANGE UP (ref 7–23)
CALCIUM SERPL-MCNC: 8.9 MG/DL — SIGNIFICANT CHANGE UP (ref 8.4–10.5)
CHLORIDE SERPL-SCNC: 98 MMOL/L — SIGNIFICANT CHANGE UP (ref 98–107)
CO2 SERPL-SCNC: 30 MMOL/L — SIGNIFICANT CHANGE UP (ref 22–31)
CREAT SERPL-MCNC: 0.57 MG/DL — SIGNIFICANT CHANGE UP (ref 0.5–1.3)
GLUCOSE BLDC GLUCOMTR-MCNC: 179 MG/DL — HIGH (ref 70–99)
GLUCOSE BLDC GLUCOMTR-MCNC: 196 MG/DL — HIGH (ref 70–99)
GLUCOSE BLDC GLUCOMTR-MCNC: 229 MG/DL — HIGH (ref 70–99)
GLUCOSE BLDC GLUCOMTR-MCNC: 254 MG/DL — HIGH (ref 70–99)
GLUCOSE SERPL-MCNC: 227 MG/DL — HIGH (ref 70–99)
HCT VFR BLD CALC: 36.2 % — SIGNIFICANT CHANGE UP (ref 34.5–45)
HGB BLD-MCNC: 11.8 G/DL — SIGNIFICANT CHANGE UP (ref 11.5–15.5)
MAGNESIUM SERPL-MCNC: 2.1 MG/DL — SIGNIFICANT CHANGE UP (ref 1.6–2.6)
MCHC RBC-ENTMCNC: 27.4 PG — SIGNIFICANT CHANGE UP (ref 27–34)
MCHC RBC-ENTMCNC: 32.6 % — SIGNIFICANT CHANGE UP (ref 32–36)
MCV RBC AUTO: 84.2 FL — SIGNIFICANT CHANGE UP (ref 80–100)
NRBC # FLD: 0 K/UL — SIGNIFICANT CHANGE UP (ref 0–0)
PHOSPHATE SERPL-MCNC: 3.2 MG/DL — SIGNIFICANT CHANGE UP (ref 2.5–4.5)
PLATELET # BLD AUTO: 135 K/UL — LOW (ref 150–400)
PMV BLD: 12.1 FL — SIGNIFICANT CHANGE UP (ref 7–13)
POTASSIUM SERPL-MCNC: 4.4 MMOL/L — SIGNIFICANT CHANGE UP (ref 3.5–5.3)
POTASSIUM SERPL-SCNC: 4.4 MMOL/L — SIGNIFICANT CHANGE UP (ref 3.5–5.3)
RBC # BLD: 4.3 M/UL — SIGNIFICANT CHANGE UP (ref 3.8–5.2)
RBC # FLD: 13.7 % — SIGNIFICANT CHANGE UP (ref 10.3–14.5)
SODIUM SERPL-SCNC: 139 MMOL/L — SIGNIFICANT CHANGE UP (ref 135–145)
WBC # BLD: 6.35 K/UL — SIGNIFICANT CHANGE UP (ref 3.8–10.5)
WBC # FLD AUTO: 6.35 K/UL — SIGNIFICANT CHANGE UP (ref 3.8–10.5)

## 2020-07-01 PROCEDURE — 99233 SBSQ HOSP IP/OBS HIGH 50: CPT | Mod: GC

## 2020-07-01 PROCEDURE — 99232 SBSQ HOSP IP/OBS MODERATE 35: CPT | Mod: 57

## 2020-07-01 RX ORDER — POLYETHYLENE GLYCOL 3350 17 G/17G
17 POWDER, FOR SOLUTION ORAL
Refills: 0 | Status: DISCONTINUED | OUTPATIENT
Start: 2020-07-01 | End: 2020-07-05

## 2020-07-01 RX ADMIN — Medication 15 UNIT(S): at 18:27

## 2020-07-01 RX ADMIN — PREGABALIN 1000 MICROGRAM(S): 225 CAPSULE ORAL at 12:23

## 2020-07-01 RX ADMIN — Medication 3 MILLILITER(S): at 10:08

## 2020-07-01 RX ADMIN — ATORVASTATIN CALCIUM 40 MILLIGRAM(S): 80 TABLET, FILM COATED ORAL at 21:45

## 2020-07-01 RX ADMIN — SODIUM CHLORIDE 3 MILLILITER(S): 9 INJECTION INTRAMUSCULAR; INTRAVENOUS; SUBCUTANEOUS at 10:08

## 2020-07-01 RX ADMIN — POLYETHYLENE GLYCOL 3350 17 GRAM(S): 17 POWDER, FOR SOLUTION ORAL at 18:30

## 2020-07-01 RX ADMIN — SODIUM CHLORIDE 3 MILLILITER(S): 9 INJECTION INTRAMUSCULAR; INTRAVENOUS; SUBCUTANEOUS at 20:50

## 2020-07-01 RX ADMIN — Medication 3 MILLIGRAM(S): at 21:46

## 2020-07-01 RX ADMIN — PANTOPRAZOLE SODIUM 40 MILLIGRAM(S): 20 TABLET, DELAYED RELEASE ORAL at 06:14

## 2020-07-01 RX ADMIN — INSULIN GLARGINE 35 UNIT(S): 100 INJECTION, SOLUTION SUBCUTANEOUS at 21:46

## 2020-07-01 RX ADMIN — FAMOTIDINE 20 MILLIGRAM(S): 10 INJECTION INTRAVENOUS at 12:23

## 2020-07-01 RX ADMIN — CHLORHEXIDINE GLUCONATE 1 APPLICATION(S): 213 SOLUTION TOPICAL at 06:13

## 2020-07-01 RX ADMIN — ENOXAPARIN SODIUM 40 MILLIGRAM(S): 100 INJECTION SUBCUTANEOUS at 12:24

## 2020-07-01 RX ADMIN — Medication 2: at 09:27

## 2020-07-01 RX ADMIN — Medication 3 MILLILITER(S): at 03:44

## 2020-07-01 RX ADMIN — GABAPENTIN 200 MILLIGRAM(S): 400 CAPSULE ORAL at 06:13

## 2020-07-01 RX ADMIN — GABAPENTIN 200 MILLIGRAM(S): 400 CAPSULE ORAL at 12:24

## 2020-07-01 RX ADMIN — SODIUM CHLORIDE 3 MILLILITER(S): 9 INJECTION INTRAMUSCULAR; INTRAVENOUS; SUBCUTANEOUS at 03:46

## 2020-07-01 RX ADMIN — Medication 15 MILLIGRAM(S): at 06:13

## 2020-07-01 RX ADMIN — GABAPENTIN 200 MILLIGRAM(S): 400 CAPSULE ORAL at 21:45

## 2020-07-01 RX ADMIN — BUDESONIDE AND FORMOTEROL FUMARATE DIHYDRATE 2 PUFF(S): 160; 4.5 AEROSOL RESPIRATORY (INHALATION) at 09:28

## 2020-07-01 RX ADMIN — CARVEDILOL PHOSPHATE 6.25 MILLIGRAM(S): 80 CAPSULE, EXTENDED RELEASE ORAL at 18:30

## 2020-07-01 RX ADMIN — CHLORHEXIDINE GLUCONATE 1 APPLICATION(S): 213 SOLUTION TOPICAL at 18:30

## 2020-07-01 RX ADMIN — Medication 15 MILLIGRAM(S): at 21:46

## 2020-07-01 RX ADMIN — Medication 15 UNIT(S): at 09:27

## 2020-07-01 RX ADMIN — AMLODIPINE BESYLATE 5 MILLIGRAM(S): 2.5 TABLET ORAL at 06:13

## 2020-07-01 RX ADMIN — Medication 3 MILLILITER(S): at 20:46

## 2020-07-01 RX ADMIN — Medication 15 UNIT(S): at 12:26

## 2020-07-01 RX ADMIN — CARVEDILOL PHOSPHATE 6.25 MILLIGRAM(S): 80 CAPSULE, EXTENDED RELEASE ORAL at 06:13

## 2020-07-01 RX ADMIN — Medication 40 MILLIGRAM(S): at 06:14

## 2020-07-01 RX ADMIN — Medication 4: at 18:27

## 2020-07-01 RX ADMIN — Medication 6: at 12:27

## 2020-07-01 NOTE — PROGRESS NOTE ADULT - PROBLEM SELECTOR PLAN 1
Likely 2/2 atelectasis, possible contribution from paraesophageal mass seen on CTA in setting of R sided lung cancer s/p RLL resection 2017. COPD less likely as does not have sputum production, fever, leukocytosis. She uses 5L oxygen at home and still feels SOB and requests more oxygen for home. CT angio 6/25: No pulmonary embolism. Complete atelectasis of the right middle lobe secondary to mucous plugging. Increasing mediastinal lymphadenopathy, with a left paraesophageal node measuring up to 4.4 cm in greatest dimension, concerning for metastatic disease. CXR (6/28) with linear scar versus atelectasis in the right mid and lower lung and left lower lung linear atelectasis.  -s/p bronch 6/30  - Moderate discomfort on 5L O2.  - Pulm following, rec: Ok to continue with prednisone 60 mg x5 days (until 6/30), d/c abx, start LAMA/LABA/ICS therapy (symbicort/spiriva), duonebs and hypersal standing q6h with acapella/aerobika for mucociliary mobilization. For mucous plugging vs endobronchial lesion on RML bronchus. Likely 2/2 atelectasis, possible contribution from paraesophageal mass seen on CTA in setting of R sided lung cancer s/p RLL resection 2017. COPD less likely as does not have sputum production, fever, leukocytosis. She uses 5L oxygen at home and still feels SOB and requests more oxygen for home. CT angio 6/25: No pulmonary embolism. Complete atelectasis of the right middle lobe secondary to mucous plugging. Increasing mediastinal lymphadenopathy, with a left paraesophageal node measuring up to 4.4 cm in greatest dimension, concerning for metastatic disease. CXR (6/28) with linear scar versus atelectasis in the right mid and lower lung and left lower lung linear atelectasis.  -s/p bronch 6/30, f/u BAL and RML bx  - Moderate discomfort on 5L O2.  - Pulm following, rec: taper prednisone to 40 today, cont LAMA/LABA/ICS therapy (symbicort/spiriva), duonebs and hypersal standing q6h with acapella/aerobika for mucociliary mobilization. For mucous plugging vs endobronchial lesion on RML bronchus. Likely 2/2 atelectasis, possible contribution from paraesophageal mass seen on CTA in setting of R sided lung cancer s/p RLL resection 2017. COPD less likely as does not have sputum production, fever, leukocytosis. She uses 5L oxygen at home and still feels SOB and requests more oxygen for home. CT angio 6/25: No pulmonary embolism. Complete atelectasis of the right middle lobe secondary to mucous plugging. Increasing mediastinal lymphadenopathy, with a left paraesophageal node measuring up to 4.4 cm in greatest dimension, concerning for metastatic disease. CXR (6/28) with linear scar versus atelectasis in the right mid and lower lung and left lower lung linear atelectasis.  -s/p bronch 6/30, f/u BAL and RML bx, f/u CXR  - Moderate discomfort on 5L O2.  - Pulm following, rec: Continue prednisone 40 mg daily (day 1), suggest slow taper (decrease by 10mg every 3 days), Continue duonebs and hypertonic saline nebulizations Q6h as well as acapella/aerobika and chest PT to facilitate mobilization of secretions, Rec low dose benzo/opioid for air hunger/anxiety and palliative consult  -f/u pall consult

## 2020-07-01 NOTE — PHYSICAL THERAPY INITIAL EVALUATION ADULT - PERTINENT HX OF CURRENT PROBLEM, REHAB EVAL
Patient is a 65 year old female admitted to [facility] on 6/25 with acute shortness of breath and pain from Left sided rib fractures. She is on 5L oxygen at home for around 3 years but feels SOB and has difficulty walking around the house. PMH 80 pack year smoking history, Right lung cancer s/p Right Lower Lobe resection 2017 on chemo.

## 2020-07-01 NOTE — PROGRESS NOTE ADULT - PROBLEM SELECTOR PLAN 5
- Significant for R neck pain, less likely fracture from imaging results. CT neck soft tissue 6/25 showed: No cervical mass or adenopathy identified. Enlarged mediastinal nodes.  - Also with acute L lateral 7th rib fracture and subacute R posterolateral 6th rib fracture.  - ISTOP performed (#[id number]). Confirmed that patient is on Endocet (10 oxy/325 acetaminophen)  - On Percocet (5 oxy/325 acetaminophen) Q6H PRN for severe pain. Miralax for BM regimen started 6/29  - Robitussin and tessalon pearls for cough as it exacerbates rib pain  -Toradol for rib fx pain started on 6/29. - Significant for R neck pain, less likely fracture from imaging results. CT neck soft tissue 6/25 showed: No cervical mass or adenopathy identified. Enlarged mediastinal nodes.  - Also with acute L lateral 7th rib fracture and subacute R posterolateral 6th rib fracture.  - ISTOP performed (#[id number]). Confirmed that patient is on Endocet (10 oxy/325 acetaminophen)  - On Percocet (5 oxy/325 acetaminophen) Q6H PRN for severe pain. Miralax for BM regimen started 6/29, tap water enema 7/1  - Robitussin and tessalon pearls for cough as it exacerbates rib pain  -Toradol for rib fx pain started on 6/29.

## 2020-07-01 NOTE — PROGRESS NOTE ADULT - SUBJECTIVE AND OBJECTIVE BOX
ANESTHESIA POSTOP CHECK    65y Female POSTOP DAY 1     Vital Signs Last 24 Hrs  T(C): 36.6 (01 Jul 2020 05:58), Max: 36.9 (30 Jun 2020 13:30)  T(F): 97.8 (01 Jul 2020 05:58), Max: 98.4 (30 Jun 2020 13:30)  HR: 70 (01 Jul 2020 10:09) (70 - 89)  BP: 136/82 (01 Jul 2020 05:58) (108/87 - 136/82)  BP(mean): --  RR: 18 (01 Jul 2020 05:58) (16 - 18)  SpO2: 96% (01 Jul 2020 10:09) (96% - 100%)  I&O's Summary      [X ] NO APPARENT ANESTHESIA COMPLICATIONS      Comments:

## 2020-07-01 NOTE — PROGRESS NOTE ADULT - SUBJECTIVE AND OBJECTIVE BOX
Internal Medicine Progress Note    Micheline Thompson MD PGY-1  Pager: [phone]; [facility] # [phone]; Team 7 Spectra [phone]  Please page [phone] after 7 pm or after 12 pm on weekends    Patient is a 65y old  Female who presents with a chief complaint of SOB, fractures (01 Jul 2020 11:16)      SUBJECTIVE / OVERNIGHT EVENTS: Pt seen in AM. No acute events o/n. SOB not improved after bronch yest. Pt endorses several days of no BM. Denies CP and lightheadedness.    MEDICATIONS  (STANDING):  albuterol/ipratropium for Nebulization 3 milliLiter(s) Nebulizer every 6 hours  amLODIPine   Tablet 5 milliGRAM(s) Oral daily  atorvastatin 40 milliGRAM(s) Oral at bedtime  budesonide 160 MICROgram(s)/formoterol 4.5 MICROgram(s) Inhaler 2 Puff(s) Inhalation two times a day  carvedilol 6.25 milliGRAM(s) Oral every 12 hours  chlorhexidine 4% Liquid 1 Application(s) Topical two times a day  cyanocobalamin 1000 MICROGram(s) Oral daily  dextrose 5%. 1000 milliLiter(s) (50 mL/Hr) IV Continuous <Continuous>  dextrose 50% Injectable 12.5 Gram(s) IV Push once  dextrose 50% Injectable 25 Gram(s) IV Push once  dextrose 50% Injectable 25 Gram(s) IV Push once  enoxaparin Injectable 40 milliGRAM(s) SubCutaneous daily  famotidine    Tablet 20 milliGRAM(s) Oral daily  gabapentin 200 milliGRAM(s) Oral three times a day  insulin glargine Injectable (LANTUS) 35 Unit(s) SubCutaneous at bedtime  insulin lispro (HumaLOG) corrective regimen sliding scale   SubCutaneous three times a day before meals  insulin lispro (HumaLOG) corrective regimen sliding scale   SubCutaneous at bedtime  insulin lispro Injectable (HumaLOG) 15 Unit(s) SubCutaneous three times a day with meals  ketorolac   Injectable 15 milliGRAM(s) IV Push every 8 hours  lidocaine   Patch 1 Patch Transdermal daily  melatonin 3 milliGRAM(s) Oral at bedtime  pantoprazole    Tablet 40 milliGRAM(s) Oral before breakfast  polyethylene glycol 3350 17 Gram(s) Oral two times a day  predniSONE   Tablet 40 milliGRAM(s) Oral daily  sodium chloride 3%  Inhalation 3 milliLiter(s) Inhalation every 6 hours    MEDICATIONS  (PRN):  dextrose 40% Gel 15 Gram(s) Oral once PRN Blood Glucose LESS THAN 70 milliGRAM(s)/deciliter  glucagon  Injectable 1 milliGRAM(s) IntraMuscular once PRN Glucose LESS THAN 70 milligrams/deciliter  guaiFENesin   Syrup  (Sugar-Free) 200 milliGRAM(s) Oral every 6 hours PRN Cough  oxycodone    5 mG/acetaminophen 325 mG 1 Tablet(s) Oral every 6 hours PRN Severe Pain (7 - 10)    Vital Signs Last 24 Hrs  T(C): 36.7 (01 Jul 2020 14:44), Max: 36.8 (30 Jun 2020 18:30)  T(F): 98 (01 Jul 2020 14:44), Max: 98.3 (30 Jun 2020 18:30)  HR: 90 (01 Jul 2020 14:44) (70 - 90)  BP: 128/69 (01 Jul 2020 14:44) (108/87 - 136/82)  BP(mean): --  RR: 20 (01 Jul 2020 14:44) (18 - 20)  SpO2: 98% (01 Jul 2020 14:44) (96% - 100%)    CAPILLARY BLOOD GLUCOSE      POCT Blood Glucose.: 229 mg/dL (01 Jul 2020 17:39)  POCT Blood Glucose.: 254 mg/dL (01 Jul 2020 12:18)  POCT Blood Glucose.: 179 mg/dL (01 Jul 2020 08:28)  POCT Blood Glucose.: 254 mg/dL (30 Jun 2020 22:13)  POCT Blood Glucose.: 435 mg/dL (30 Jun 2020 18:03)  POCT Blood Glucose.: 507 mg/dL (30 Jun 2020 17:59)    I&O's Summary      PHYSICAL EXAM  GENERAL: NAD  HEAD:  Atraumatic  EYES: EOMI, conjunctiva and sclera clear  NECK: Supple, No JVD  CHEST/LUNG: wheezing on exp b/l  HEART: Regular rate and rhythm; No murmurs, rubs, or gallops  ABDOMEN: Soft, Nontender, Nondistended; Bowel sounds present  EXTREMITIES:  2+ Peripheral Pulses, No clubbing, cyanosis, or edema  NEURO/PSYCH: AAOx3, nonfocal  SKIN: No rashes or lesions      LABS:                        11.8   6.35  )-----------( 135      ( 01 Jul 2020 06:15 )             36.2     Hemoglobin: 11.8 g/dL (07-01 @ 06:15)  Hemoglobin: 11.6 g/dL (06-29 @ 09:10)    CBC Full  -  ( 01 Jul 2020 06:15 )  WBC Count : 6.35 K/uL  RBC Count : 4.30 M/uL  Hemoglobin : 11.8 g/dL  Hematocrit : 36.2 %  Platelet Count - Automated : 135 K/uL  Mean Cell Volume : 84.2 fL  Mean Cell Hemoglobin : 27.4 pg  Mean Cell Hemoglobin Concentration : 32.6 %  Auto Neutrophil # : x  Auto Lymphocyte # : x  Auto Monocyte # : x  Auto Eosinophil # : x  Auto Basophil # : x  Auto Neutrophil % : x  Auto Lymphocyte % : x  Auto Monocyte % : x  Auto Eosinophil % : x  Auto Basophil % : x    07-01    139  |  98  |  18  ----------------------------<  227<H>  4.4   |  30  |  0.57    Ca    8.9      01 Jul 2020 05:20  Phos  3.2     07-01  Mg     2.1     07-01      Creatinine Trend: 0.57<--, 0.66<--, 0.49<--, 0.55<--, 0.70<--          EKG:   MICROBIOLOGY:    Culture - Acid Fast - Bronchial w/Smear (collected 30 Jun 2020 18:33)  Source: .Bronchial RIGHT SIDE BRONCHIAL LAVAGE    Culture - Fungal, Bronchial (collected 30 Jun 2020 18:33)  Source: .Bronchial RIGHT SIDE BRONCHIAL LAVAGE  Preliminary Report (01 Jul 2020 07:44):    Testing in progress    Culture - Bronchial (collected 30 Jun 2020 18:33)  Source: .Bronchial RIGHT SIDE BRONCHIAL LAVAGE  Gram Stain (30 Jun 2020 23:04):    Rare polymorphonuclear leukocytes seen per low power field    Rare Squamous epithelial cells seen per low power field    No organisms seen  Preliminary Report (01 Jul 2020 16:21):    Normal Respiratory Shannan present      IMAGING:      Labs, imaging, EKG personally reviewed

## 2020-07-01 NOTE — PROGRESS NOTE ADULT - PROBLEM SELECTOR PLAN 2
- No wheezing; Currently on home O2 at 5L.  - Pulm consulted, note unclear if SOB is truly from COPD as no sputum production, fever, leukocytosis. Rec treatment per above.   - Levaquin given initially c/f COPD exacerbation, however clinical presentation without an identifiable or aggravating incident, unclear if COPD exacerbation; levaquin stopped.   - Aim SaO2 88% - 95%.  - continue prednisone 60 mg daily until 6/30 (5 d) - wheezing on O2 at 6L.  - Pulm recs appreciated   - Aim SaO2 88% - 95%.  - continue prednisone 60 mg daily until 6/30 (5 d). Now on 40 mg

## 2020-07-01 NOTE — PROGRESS NOTE ADULT - ASSESSMENT
64 yo woman with hypertension, diabetes mellitus, ITP, COPD on 5 L of home oxygen and lung cancer s/p RLL resection in 2017, immunotherapy and recently started on chemotherapy 1 week ago, presenting with shortness of breath and pain from left sided rib fractures. CT chest revealed atelectasis of RML as well as mediastinal adenopathy. Path from 3/5/20 bronch/EBUS consistent with metastatic adenoCa of lung origin (station 7 LN, BAL). Now s/p bronchoscopy with BAL and RML bx with thoracic. Current symptoms most likely represent combination of her chronic COPD but also her metastatic lung ca.     Recommendations:  - Continue Symbicort BID, and add Spiriva upon discharge  - Continue prednisone 40 mg daily, suggest slow taper (decrease by 10mg every 3 days)   - Continue duonebs and hypertonic saline nebulizations Q6h as well as acapella/aerobika and chest PT to facilitate mobilization of secretions  - s/p bronch w/ BAL and RML Bx--follow up cultures and path  - Currently SpO2 100% on 5L though remains dyspneic   - Patient may benefit from low dose benzo/opiod to help with air hunger/anxiety  - Pulmonary will sign off; please call back with questions. On discharge, patient should follow up with her pulmonologist, thoracic Sx, and oncologist. Consider nonurgent palliative evaluation for symptom management in the setting of metastatic cancer     Griffin Armenta MD, PGY6  Pulmonary and Critical Care Fellow  [phone]

## 2020-07-01 NOTE — PHYSICAL THERAPY INITIAL EVALUATION ADULT - PATIENT PROFILE REVIEW, REHAB EVAL
PT orders received: ambulate as tolerated. Consult with ROSA GAO, pt may participate in PT evaluation./yes

## 2020-07-01 NOTE — PROGRESS NOTE ADULT - SUBJECTIVE AND OBJECTIVE BOX
CHIEF COMPLAINT: SOB    Interval Events:  -s/p bronch w/ BAL, RML Bx  -Reports remains SOB with minimal exertion  -Feels as if she requires more O2  -Currently 5L %  -Ambulated in the room with me    REVIEW OF SYSTEMS:  [x] All other systems negative  [ ] Unable to assess ROS because ________    OBJECTIVE:  ICU Vital Signs Last 24 Hrs  T(C): 36.6 (01 Jul 2020 05:58), Max: 36.9 (30 Jun 2020 13:30)  T(F): 97.8 (01 Jul 2020 05:58), Max: 98.4 (30 Jun 2020 13:30)  HR: 80 (01 Jul 2020 05:58) (78 - 92)  BP: 136/82 (01 Jul 2020 05:58) (90/39 - 136/82)  BP(mean): 72 (30 Jun 2020 11:50) (50 - 120)  ABP: --  ABP(mean): --  RR: 18 (01 Jul 2020 05:58) (15 - 24)  SpO2: 100% (01 Jul 2020 05:58) (92% - 100%)        CAPILLARY BLOOD GLUCOSE      POCT Blood Glucose.: 179 mg/dL (01 Jul 2020 08:28)      PHYSICAL EXAM:  General: no acute distress, speaking in full sentences  Neck: supple  Respiratory: non labored breathing, coarse breath sounds  Cardiovascular: normal rate, regular rhythm  Gastrointestinal: abdomen soft, non tender, non distended  Extremities: no lower extremity edema  Neurological: alert, oriented, no focal deficits  Psych: cooperative, appears anxious    HOSPITAL MEDICATIONS:  MEDICATIONS  (STANDING):  albuterol/ipratropium for Nebulization 3 milliLiter(s) Nebulizer every 6 hours  amLODIPine   Tablet 5 milliGRAM(s) Oral daily  atorvastatin 40 milliGRAM(s) Oral at bedtime  budesonide 160 MICROgram(s)/formoterol 4.5 MICROgram(s) Inhaler 2 Puff(s) Inhalation two times a day  carvedilol 6.25 milliGRAM(s) Oral every 12 hours  chlorhexidine 4% Liquid 1 Application(s) Topical two times a day  cyanocobalamin 1000 MICROGram(s) Oral daily  dextrose 5%. 1000 milliLiter(s) (50 mL/Hr) IV Continuous <Continuous>  dextrose 50% Injectable 12.5 Gram(s) IV Push once  dextrose 50% Injectable 25 Gram(s) IV Push once  dextrose 50% Injectable 25 Gram(s) IV Push once  enoxaparin Injectable 40 milliGRAM(s) SubCutaneous daily  famotidine    Tablet 20 milliGRAM(s) Oral daily  gabapentin 200 milliGRAM(s) Oral three times a day  insulin glargine Injectable (LANTUS) 35 Unit(s) SubCutaneous at bedtime  insulin lispro (HumaLOG) corrective regimen sliding scale   SubCutaneous three times a day before meals  insulin lispro (HumaLOG) corrective regimen sliding scale   SubCutaneous at bedtime  insulin lispro Injectable (HumaLOG) 15 Unit(s) SubCutaneous three times a day with meals  ketorolac   Injectable 15 milliGRAM(s) IV Push every 8 hours  lidocaine   Patch 1 Patch Transdermal daily  melatonin 3 milliGRAM(s) Oral at bedtime  pantoprazole    Tablet 40 milliGRAM(s) Oral before breakfast  polyethylene glycol 3350 17 Gram(s) Oral daily  predniSONE   Tablet 40 milliGRAM(s) Oral daily  sodium chloride 3%  Inhalation 3 milliLiter(s) Inhalation every 6 hours    MEDICATIONS  (PRN):  dextrose 40% Gel 15 Gram(s) Oral once PRN Blood Glucose LESS THAN 70 milliGRAM(s)/deciliter  glucagon  Injectable 1 milliGRAM(s) IntraMuscular once PRN Glucose LESS THAN 70 milligrams/deciliter  guaiFENesin   Syrup  (Sugar-Free) 200 milliGRAM(s) Oral every 6 hours PRN Cough  oxycodone    5 mG/acetaminophen 325 mG 1 Tablet(s) Oral every 6 hours PRN Severe Pain (7 - 10)      LABS:                        11.8   6.35  )-----------( 135      ( 01 Jul 2020 06:15 )             36.2     07-01    139  |  98  |  18  ----------------------------<  227<H>  4.4   |  30  |  0.57    Ca    8.9      01 Jul 2020 05:20  Phos  3.2     07-01  Mg     2.1     07-01                MICROBIOLOGY:     Culture - Acid Fast - Bronchial w/Smear (collected 30 Jun 2020 18:33)  Source: .Bronchial RIGHT SIDE BRONCHIAL LAVAGE    Culture - Fungal, Bronchial (collected 30 Jun 2020 18:33)  Source: .Bronchial RIGHT SIDE BRONCHIAL LAVAGE  Preliminary Report (01 Jul 2020 07:44):    Testing in progress    Culture - Bronchial (collected 30 Jun 2020 18:33)  Source: .Bronchial RIGHT SIDE BRONCHIAL LAVAGE  Gram Stain (30 Jun 2020 23:04):    Rare polymorphonuclear leukocytes seen per low power field    Rare Squamous epithelial cells seen per low power field    No organisms seen      =============================================================================================  RADIOLOGY:  [ x] Reviewed and interpreted by me  Post-procedure CXR with volume loss, known prior post-surgical change. Radiology read pending

## 2020-07-01 NOTE — PHYSICAL THERAPY INITIAL EVALUATION ADULT - ADDITIONAL COMMENTS
Patient reports she lives with her boyfriend in an apartment, 4 flights to negotiate. Pt reports she is able to negotiate the stairs however it "takes many breaks". Patient reports she was previously independent in all ADLs and did not use an assistive device for ambulation, however has a wheelchair, rollator and cane.    Patient was left semi-supine in bed as found, all lines/tubes intact and call hernandez within reach, ROSA philip

## 2020-07-01 NOTE — PROGRESS NOTE ADULT - PROBLEM SELECTOR PLAN 3
Acute left lateral 7th rib fracture. Subacute right posterolateral sixth rib fracture. No pneumothorax. Pathologic fracture of T7 which may correspond to focus of FDG avidity on outside PET scan.  - pain meds as below  - pt given incentive spirometry, encouraged to use every hour Acute left lateral 7th rib fracture. Subacute right posterolateral sixth rib fracture. No pneumothorax. Pathologic fracture of T7 which may correspond to focus of FDG avidity on outside PET scan.  - pain meds as below  - pt given incentive spirometry, encouraged to use every hour  -tap water enema 7/1

## 2020-07-01 NOTE — PROGRESS NOTE ADULT - ATTENDING COMMENTS
65 y.o. Female w/ Hx Chronic respiratory failure/ COPD on 5LNC at home, Lung Ca s/p wedge resection 2016 w/ recurrent mets to mediastinal LN on chemo a/w COPD exacerbation found to have RML mucus plugging and subacute/acute atraumatic rib fractures. Patient s/p Bronchoscopy 6/29 by CT surgery with removal of mucus plugging; c/w incentive spirometry. taper steroids as may be causing rib fractures. Pain controlled with Lidoderm patch/Toradol IV q8hr. F/U Pulmonary recs since still wheezing. repeat CXR today.  Patient requesting a concentrator at home that goes higher then 5L; Check with SW.

## 2020-07-01 NOTE — PROGRESS NOTE ADULT - PROBLEM SELECTOR PLAN 4
- s/p RLL resection 2017; 1 cycle chemo the week prior to presentation.  - Now with findings of 4.4-cm perihilar LN on CTA chest. Need to contact oupt oncologist Dr. Mendiola [phone]  - F/u bronch results  -B12 started 6/29

## 2020-07-01 NOTE — PROGRESS NOTE ADULT - SUBJECTIVE AND OBJECTIVE BOX
Pt seen with Dr. Kurtz and Dr. Gallegos  Pt c/o some SOB still, pain. Having cough with blood   tinged sputum. Expressing concerns about recovery and prognosis    Vital Signs Last 24 Hrs  T(C): 36.6 (01 Jul 2020 05:58), Max: 36.9 (30 Jun 2020 13:30)  T(F): 97.8 (01 Jul 2020 05:58), Max: 98.4 (30 Jun 2020 13:30)  HR: 70 (01 Jul 2020 10:09) (70 - 92)  BP: 136/82 (01 Jul 2020 05:58) (90/39 - 136/82)  BP(mean): 72 (30 Jun 2020 11:50) (50 - 120)  RR: 18 (01 Jul 2020 05:58) (15 - 24)  SpO2: 96% (01 Jul 2020 10:09) (92% - 100%)    A+O x 3  Prod cough  CXR w improved aeration post Bronch  Path pending    A/p: 66yo F s/p BAL, RML airway biopsy for endobronchial lesion.     -FU pathology.  -Recc Pain Management consult  -Pulm toilet  -AMbulation  -No other Thoracic surgical intervention. Care per primary team  No objection to discharge   Follow up as outpt.

## 2020-07-02 DIAGNOSIS — Z51.5 ENCOUNTER FOR PALLIATIVE CARE: ICD-10-CM

## 2020-07-02 DIAGNOSIS — F43.23 ADJUSTMENT DISORDER WITH MIXED ANXIETY AND DEPRESSED MOOD: ICD-10-CM

## 2020-07-02 DIAGNOSIS — C34.90 MALIGNANT NEOPLASM OF UNSPECIFIED PART OF UNSPECIFIED BRONCHUS OR LUNG: ICD-10-CM

## 2020-07-02 DIAGNOSIS — R06.02 SHORTNESS OF BREATH: ICD-10-CM

## 2020-07-02 DIAGNOSIS — J44.1 CHRONIC OBSTRUCTIVE PULMONARY DISEASE WITH (ACUTE) EXACERBATION: ICD-10-CM

## 2020-07-02 DIAGNOSIS — F41.9 ANXIETY DISORDER, UNSPECIFIED: ICD-10-CM

## 2020-07-02 LAB
ANION GAP SERPL CALC-SCNC: 11 MMO/L — SIGNIFICANT CHANGE UP (ref 7–14)
BUN SERPL-MCNC: 28 MG/DL — HIGH (ref 7–23)
CALCIUM SERPL-MCNC: 9 MG/DL — SIGNIFICANT CHANGE UP (ref 8.4–10.5)
CHLORIDE SERPL-SCNC: 96 MMOL/L — LOW (ref 98–107)
CO2 SERPL-SCNC: 31 MMOL/L — SIGNIFICANT CHANGE UP (ref 22–31)
CREAT SERPL-MCNC: 0.7 MG/DL — SIGNIFICANT CHANGE UP (ref 0.5–1.3)
CULTURE RESULTS: SIGNIFICANT CHANGE UP
GLUCOSE BLDC GLUCOMTR-MCNC: 199 MG/DL — HIGH (ref 70–99)
GLUCOSE BLDC GLUCOMTR-MCNC: 203 MG/DL — HIGH (ref 70–99)
GLUCOSE BLDC GLUCOMTR-MCNC: 214 MG/DL — HIGH (ref 70–99)
GLUCOSE BLDC GLUCOMTR-MCNC: 250 MG/DL — HIGH (ref 70–99)
GLUCOSE SERPL-MCNC: 199 MG/DL — HIGH (ref 70–99)
MAGNESIUM SERPL-MCNC: 2.1 MG/DL — SIGNIFICANT CHANGE UP (ref 1.6–2.6)
PHOSPHATE SERPL-MCNC: 4 MG/DL — SIGNIFICANT CHANGE UP (ref 2.5–4.5)
POTASSIUM SERPL-MCNC: 4 MMOL/L — SIGNIFICANT CHANGE UP (ref 3.5–5.3)
POTASSIUM SERPL-SCNC: 4 MMOL/L — SIGNIFICANT CHANGE UP (ref 3.5–5.3)
SODIUM SERPL-SCNC: 138 MMOL/L — SIGNIFICANT CHANGE UP (ref 135–145)
SPECIMEN SOURCE: SIGNIFICANT CHANGE UP

## 2020-07-02 PROCEDURE — 71045 X-RAY EXAM CHEST 1 VIEW: CPT | Mod: 26

## 2020-07-02 PROCEDURE — 90792 PSYCH DIAG EVAL W/MED SRVCS: CPT

## 2020-07-02 PROCEDURE — 99233 SBSQ HOSP IP/OBS HIGH 50: CPT | Mod: GC

## 2020-07-02 PROCEDURE — 99223 1ST HOSP IP/OBS HIGH 75: CPT | Mod: GC

## 2020-07-02 RX ORDER — MULTIVIT WITH MIN/MFOLATE/K2 340-15/3 G
1 POWDER (GRAM) ORAL ONCE
Refills: 0 | Status: DISCONTINUED | OUTPATIENT
Start: 2020-07-02 | End: 2020-07-02

## 2020-07-02 RX ORDER — ALPRAZOLAM 0.25 MG
1 TABLET ORAL THREE TIMES A DAY
Refills: 0 | Status: DISCONTINUED | OUTPATIENT
Start: 2020-07-02 | End: 2020-07-05

## 2020-07-02 RX ORDER — MULTIVIT WITH MIN/MFOLATE/K2 340-15/3 G
1 POWDER (GRAM) ORAL ONCE
Refills: 0 | Status: COMPLETED | OUTPATIENT
Start: 2020-07-02 | End: 2020-07-02

## 2020-07-02 RX ORDER — CYCLOBENZAPRINE HYDROCHLORIDE 10 MG/1
5 TABLET, FILM COATED ORAL
Refills: 0 | Status: DISCONTINUED | OUTPATIENT
Start: 2020-07-02 | End: 2020-07-05

## 2020-07-02 RX ORDER — IPRATROPIUM/ALBUTEROL SULFATE 18-103MCG
3 AEROSOL WITH ADAPTER (GRAM) INHALATION ONCE
Refills: 0 | Status: COMPLETED | OUTPATIENT
Start: 2020-07-02 | End: 2020-07-02

## 2020-07-02 RX ORDER — OXYCODONE HYDROCHLORIDE 5 MG/1
5 TABLET ORAL EVERY 4 HOURS
Refills: 0 | Status: DISCONTINUED | OUTPATIENT
Start: 2020-07-02 | End: 2020-07-05

## 2020-07-02 RX ADMIN — Medication 3 MILLILITER(S): at 03:30

## 2020-07-02 RX ADMIN — OXYCODONE AND ACETAMINOPHEN 1 TABLET(S): 5; 325 TABLET ORAL at 05:55

## 2020-07-02 RX ADMIN — BUDESONIDE AND FORMOTEROL FUMARATE DIHYDRATE 2 PUFF(S): 160; 4.5 AEROSOL RESPIRATORY (INHALATION) at 20:39

## 2020-07-02 RX ADMIN — Medication 4: at 18:08

## 2020-07-02 RX ADMIN — Medication 15 UNIT(S): at 18:08

## 2020-07-02 RX ADMIN — CYCLOBENZAPRINE HYDROCHLORIDE 5 MILLIGRAM(S): 10 TABLET, FILM COATED ORAL at 18:14

## 2020-07-02 RX ADMIN — PANTOPRAZOLE SODIUM 40 MILLIGRAM(S): 20 TABLET, DELAYED RELEASE ORAL at 06:01

## 2020-07-02 RX ADMIN — SODIUM CHLORIDE 3 MILLILITER(S): 9 INJECTION INTRAMUSCULAR; INTRAVENOUS; SUBCUTANEOUS at 15:58

## 2020-07-02 RX ADMIN — Medication 3 MILLIGRAM(S): at 22:47

## 2020-07-02 RX ADMIN — INSULIN GLARGINE 35 UNIT(S): 100 INJECTION, SOLUTION SUBCUTANEOUS at 22:48

## 2020-07-02 RX ADMIN — CHLORHEXIDINE GLUCONATE 1 APPLICATION(S): 213 SOLUTION TOPICAL at 18:11

## 2020-07-02 RX ADMIN — Medication 15 MILLIGRAM(S): at 05:55

## 2020-07-02 RX ADMIN — AMLODIPINE BESYLATE 5 MILLIGRAM(S): 2.5 TABLET ORAL at 05:55

## 2020-07-02 RX ADMIN — GABAPENTIN 200 MILLIGRAM(S): 400 CAPSULE ORAL at 05:55

## 2020-07-02 RX ADMIN — Medication 15 UNIT(S): at 09:06

## 2020-07-02 RX ADMIN — SODIUM CHLORIDE 3 MILLILITER(S): 9 INJECTION INTRAMUSCULAR; INTRAVENOUS; SUBCUTANEOUS at 03:30

## 2020-07-02 RX ADMIN — POLYETHYLENE GLYCOL 3350 17 GRAM(S): 17 POWDER, FOR SOLUTION ORAL at 05:55

## 2020-07-02 RX ADMIN — CARVEDILOL PHOSPHATE 6.25 MILLIGRAM(S): 80 CAPSULE, EXTENDED RELEASE ORAL at 05:55

## 2020-07-02 RX ADMIN — Medication 2: at 12:43

## 2020-07-02 RX ADMIN — ENOXAPARIN SODIUM 40 MILLIGRAM(S): 100 INJECTION SUBCUTANEOUS at 12:38

## 2020-07-02 RX ADMIN — ATORVASTATIN CALCIUM 40 MILLIGRAM(S): 80 TABLET, FILM COATED ORAL at 22:47

## 2020-07-02 RX ADMIN — Medication 1 MILLIGRAM(S): at 18:38

## 2020-07-02 RX ADMIN — Medication 40 MILLIGRAM(S): at 05:55

## 2020-07-02 RX ADMIN — Medication 3 MILLILITER(S): at 09:52

## 2020-07-02 RX ADMIN — Medication 1 BOTTLE: at 13:02

## 2020-07-02 RX ADMIN — FAMOTIDINE 20 MILLIGRAM(S): 10 INJECTION INTRAVENOUS at 12:37

## 2020-07-02 RX ADMIN — Medication 15 UNIT(S): at 12:43

## 2020-07-02 RX ADMIN — Medication 3 MILLILITER(S): at 15:58

## 2020-07-02 RX ADMIN — BUDESONIDE AND FORMOTEROL FUMARATE DIHYDRATE 2 PUFF(S): 160; 4.5 AEROSOL RESPIRATORY (INHALATION) at 09:06

## 2020-07-02 RX ADMIN — Medication 15 MILLIGRAM(S): at 22:48

## 2020-07-02 RX ADMIN — POLYETHYLENE GLYCOL 3350 17 GRAM(S): 17 POWDER, FOR SOLUTION ORAL at 18:11

## 2020-07-02 RX ADMIN — GABAPENTIN 200 MILLIGRAM(S): 400 CAPSULE ORAL at 22:47

## 2020-07-02 RX ADMIN — PREGABALIN 1000 MICROGRAM(S): 225 CAPSULE ORAL at 12:37

## 2020-07-02 RX ADMIN — CARVEDILOL PHOSPHATE 6.25 MILLIGRAM(S): 80 CAPSULE, EXTENDED RELEASE ORAL at 18:11

## 2020-07-02 RX ADMIN — SODIUM CHLORIDE 3 MILLILITER(S): 9 INJECTION INTRAMUSCULAR; INTRAVENOUS; SUBCUTANEOUS at 09:54

## 2020-07-02 RX ADMIN — CHLORHEXIDINE GLUCONATE 1 APPLICATION(S): 213 SOLUTION TOPICAL at 06:01

## 2020-07-02 RX ADMIN — Medication 4: at 09:06

## 2020-07-02 NOTE — DIETITIAN INITIAL EVALUATION ADULT. - OTHER INFO
RD unable to have face to face encounter with patient to perform nutrition interview and/or nutrition-focused physical exam due to contact/isolation precautions related to COVID-19 - patient noted to be on droplet precautions related to COVID-19 exposure.    66 y/o female with PMH R lung cancer s/p resection 2017 on chemo, COPD with multiple hospital admissions, 80 pack year smoking hx, fractures in L ribs presenting for SOB (on 5L O2 at home) and pain from fractures. Continuous SOB no longer likely 2/2 COPD, but met lung CA, s/p bronch 6/30.    Collateral obtained from chart review. Patient noted to refuse consistent carbohydrate / DASH/TLC (cholesterol and sodium restricted) diet restriction; noted to accept risk of consuming regular diet vs. accepting recommended diet recommendation.  Per physician note - will need to uptitrate bolus coverage/track FSG and adjust accordingly. Last BM 6/30/20 per nursing flowsheets.   No chewing or swallowing difficulties at this time per chart, noted to be tolerating diet.    RD attempted to contact patient directly, however, unable to reach.

## 2020-07-02 NOTE — PROGRESS NOTE ADULT - PROBLEM SELECTOR PLAN 7
- Diet: Regular (per patient's wish; accepts risk)  - DVT: lovenox  - Full Code -psych consult 7/2, f/u recs

## 2020-07-02 NOTE — CONSULT NOTE ADULT - SUBJECTIVE AND OBJECTIVE BOX
65 year old woman with PMH 80 pack year smoking hx, R lung cancer s/p RLL resection 2016, EBUS on 3/2/20, on chemo (1st dose 10 days ago) presenting for acute shortness of breath and pain from L sided rib fractures. She is on 5L oxygen at home for around 3 years but feels SOB and has difficulty walking around the house. She is unable to quantify duration of present SOB .She uses a wheelchair due to SOB from walking and from pain from her fractures. She states she was admitted for COPD exacerbation several times in the past, but never needed intubation. She got her last chemo dose a week ago. She states she was on a lot of steroids previously. Rib pain is worsened by coughing and depends on position. She is on high doses of pain meds for fractures and rib pain She has had a dry cough for couple days. She also has a painful area within her neck, below throat. She denies fevers, congestion. She denies abdominal pain, changes in BM, changes in urination, blood in BM or blood in urine. No rashes or swelling noted. She has some dizziness but no chest pain, palpitations. She sees outpatient cardiologist. (25 Jun 2020 19:34)      PAST MEDICAL & SURGICAL HISTORY:  Malignant neoplasm of unspecified part of right bronchus or lung  HTN (hypertension)  DM (diabetes mellitus)  COPD (chronic obstructive pulmonary disease)  Lung cancer  Morbid obesity  GERD (gastroesophageal reflux disease)  Deviated septum  Prolapsed uterus  ITP (idiopathic thrombocytopenic purpura)  Emphysema/COPD  History of cholecystectomy  S/P lobectomy of lung: right 2017  History of tonsillectomy      REVIEW OF SYSTEMS      General:No Weight change/ Fatigue/ HA/Dizzy	    Skin/Breast: No Rashes/ Lesions/ Masses  	  Ophthalmologic: No Blurry vision/ Glaucoma/ Blindness  	  ENMT: No Hearing loss/ Drainage/ Lesions	    Respiratory and Thorax: +Cough/ -Wheezing/ +SOB/ -Hemoptysis/ -Sputum production  	  Cardiovascular:  No Chest pain/ Palpitations/ Diaphoresis	    Gastrointestinal: No Nausea/ Vomiting/ Constipation/ Appetite Change	    Genitourinary: No Heamturia/ Dysuria/ Frequency change/ Impotence	    Musculoskeletal: Rib pain/ -Weakness/ -Claudication	    Neurological: No Seizures/ TIA/CVA/ Parastesias	    Psychiatric: No Dementia/ Depression/ SI/HI	    Hematology/Lymphatics: No hx of bleeding/ Edema	    Endocrine:	No Hyperglycemia/ Hypoglycemia    Allergic/Immunologic:	 No Anaphylaxis/ Intolerance/ Recent illnesses    MEDICATIONS  (STANDING):  albuterol/ipratropium for Nebulization 3 milliLiter(s) Nebulizer every 6 hours  amLODIPine   Tablet 5 milliGRAM(s) Oral daily  atorvastatin 40 milliGRAM(s) Oral at bedtime  budesonide 160 MICROgram(s)/formoterol 4.5 MICROgram(s) Inhaler 2 Puff(s) Inhalation two times a day  carvedilol 6.25 milliGRAM(s) Oral every 12 hours  dextrose 5%. 1000 milliLiter(s) (50 mL/Hr) IV Continuous <Continuous>  dextrose 50% Injectable 12.5 Gram(s) IV Push once  dextrose 50% Injectable 25 Gram(s) IV Push once  dextrose 50% Injectable 25 Gram(s) IV Push once  enoxaparin Injectable 40 milliGRAM(s) SubCutaneous daily  famotidine    Tablet 20 milliGRAM(s) Oral daily  gabapentin 200 milliGRAM(s) Oral three times a day  insulin glargine Injectable (LANTUS) 35 Unit(s) SubCutaneous at bedtime  insulin lispro (HumaLOG) corrective regimen sliding scale   SubCutaneous three times a day before meals  insulin lispro (HumaLOG) corrective regimen sliding scale   SubCutaneous at bedtime  insulin lispro Injectable (HumaLOG) 15 Unit(s) SubCutaneous three times a day with meals  ketorolac   Injectable 15 milliGRAM(s) IV Push every 8 hours  lidocaine   Patch 1 Patch Transdermal daily  melatonin 3 milliGRAM(s) Oral at bedtime  pantoprazole    Tablet 40 milliGRAM(s) Oral before breakfast  polyethylene glycol 3350 17 Gram(s) Oral daily  sodium chloride 3%  Inhalation 3 milliLiter(s) Inhalation every 6 hours    MEDICATIONS  (PRN):  dextrose 40% Gel 15 Gram(s) Oral once PRN Blood Glucose LESS THAN 70 milliGRAM(s)/deciliter  glucagon  Injectable 1 milliGRAM(s) IntraMuscular once PRN Glucose LESS THAN 70 milligrams/deciliter  guaiFENesin   Syrup  (Sugar-Free) 200 milliGRAM(s) Oral every 6 hours PRN Cough  oxycodone    5 mG/acetaminophen 325 mG 1 Tablet(s) Oral every 6 hours PRN Severe Pain (7 - 10)      Allergies    fish (Angioedema)  penicillins (Rash)    Intolerances        SOCIAL HISTORY:  Occupation: disability  Smoking Hx: former smoker, 80 pack year  Etoh Hx: denies  IVDA Hx: denies    FAMILY HISTORY:  FH: lung cancer  Family history of stroke  Family history of lung cancer  Diabetes mellitus    unless noted, no significant family hx with Mother, Father, Siblings    Vital Signs Last 24 Hrs  T(C): 36.6 (29 Jun 2020 05:40), Max: 36.8 (28 Jun 2020 21:27)  T(F): 97.8 (29 Jun 2020 05:40), Max: 98.2 (28 Jun 2020 21:27)  HR: 85 (29 Jun 2020 10:04) (77 - 89)  BP: 123/73 (29 Jun 2020 05:40) (123/73 - 141/75)  BP(mean): --  RR: 18 (29 Jun 2020 05:40) (18 - 19)  SpO2: 98% (29 Jun 2020 10:04) (95% - 100%)    General: WN/WD NAD  Neurology: Awake, nonfocal, FREY x 4  ENT: no stridor  Neck: Neck supple, trachea midline, No JVD,   Respiratory: B/L expiratory wheezing  CV: RRR, S1S2, no murmurs, rubs or gallops  Abdominal: Soft, NT, ND +BS,   Extremities: +1 nonpitting edema, + peripheral pulses  Skin: No Rashes, Hematoma, Ecchymosis  Lymphatic: No Neck, axilla, groin LAD  Psych: Oriented x 3, normal affect    LABS:                        11.6   5.95  )-----------( 120      ( 29 Jun 2020 09:10 )             35.0     06-29    141  |  100  |  11  ----------------------------<  180<H>  4.2   |  31  |  0.49<L>    Ca    8.9      29 Jun 2020 09:10      PT/INR - ( 29 Jun 2020 09:10 )   PT: 12.2 SEC;   INR: 1.06          PTT - ( 29 Jun 2020 09:10 )  PTT:27.6 SEC      RADIOLOGY & ADDITIONAL STUDIES:  < from: CT Angio Chest w/ IV Cont (06.25.20 @ 14:07) >  MPRESSION:     No pulmonary embolism.    Complete atelectasis of the right middle lobe secondary to mucous plugging.    Increasing mediastinal lymphadenopathy, with a left paraesophageal node measuring up to 4.4 cm in greatest dimension, concerning for metastatic disease.    Acute left lateral 7th rib fracture. Subacute right posterolateral sixth rib fracture. No pneumothorax.    Pathologic fracture of T7 which may correspond to focus of FDG avidity on outside PET scan.
HPI:  65F PMH HTN, DMT2, ITP, GERD, MO, COPD (5L NC at home), 80 pack year smoking hx, R lung cancer (s/p RLL resection 2017; s/p immunotherapy now on chemo-->first dose 1 week ago) presenting for acute shortness of breath and pain from L sided rib fractures which is a sequelae of long term prednisone use along with T7 vertebral fracture. She is on 5L oxygen at home for around 3 years but feels SOB and has difficulty walking around the house. She is unable to quantify duration of present SOB .She uses a wheelchair due to SOB from walking and from pain from her fractures. She states she was admitted for COPD exacerbation several times in the past, but never needed intubation. She got her last chemo dose a week ago. She states she was on a lot of steroids previously. Rib pain is worsened by coughing. She is on high doses of pain meds for fractures and rib pain She has had a dry cough for couple days. She also has a painful area within her neck, below throat. She denies fevers, congestion. She denies abdominal pain, changes in BM, changes in urination, blood in BM or blood in urine. No rashes or swelling noted. She has some dizziness but no chest pain, palpitations. She sees outpatient cardiologist.     Chest CT demonstrates RML atelectassis associated with blockage of the RML bronchus radiographically labeled a mucous plug. Patient was started on prednisone 60mg, levaquin, and duonebs. Pulmonary was consulted for further recommendations.     PAST MEDICAL & SURGICAL HISTORY:  Malignant neoplasm of unspecified part of right bronchus or lung  HTN (hypertension)  DM (diabetes mellitus)  COPD (chronic obstructive pulmonary disease)  Lung cancer  Morbid obesity  GERD (gastroesophageal reflux disease)  Deviated septum  Prolapsed uterus  ITP (idiopathic thrombocytopenic purpura)  Emphysema/COPD  History of cholecystectomy  S/P lobectomy of lung: right 2017  History of tonsillectomy      FAMILY HISTORY:  FH: lung cancer  Family history of stroke  Family history of lung cancer  Diabetes mellitus      SOCIAL HISTORY:  Smokin pack years  EtOH Use: none  Marital Status: none  Occupation: none  Exposures: none  Recent Travel: none    Allergies    fish (Angioedema)  penicillins (Rash)    Intolerances        HOME MEDICATIONS:    REVIEW OF SYSTEMS:  CONSTITUTIONAL: +weakness. No fevers or chills  EYES/ENT: No visual changes;  No vertigo or throat pain   NECK: No pain or stiffness  RESPIRATORY: No cough. +wheezing. No hemoptysis; +shortness of breath  CARDIOVASCULAR: No chest pain or palpitations  GASTROINTESTINAL: No abdominal or epigastric pain. No nausea, vomiting, or hematemesis; No diarrhea or constipation. No melena or hematochezia.  GENITOURINARY: No dysuria, frequency or hematuria  NEUROLOGICAL: No numbness or weakness  SKIN: No itching, burning, rashes, or lesions   All other review of systems is negative unless indicated above.    OBJECTIVE:  ICU Vital Signs Last 24 Hrs  T(C): 36.7 (2020 13:56), Max: 36.8 (2020 21:32)  T(F): 98 (2020 13:56), Max: 98.2 (2020 21:32)  HR: 88 (2020 15:55) (88 - 102)  BP: 126/67 (2020 13:56) (124/83 - 148/88)  BP(mean): --  ABP: --  ABP(mean): --  RR: 18 (2020 13:56) (18 - 97)  SpO2: 97% (2020 13:56) (97% - 100%)        CAPILLARY BLOOD GLUCOSE      POCT Blood Glucose.: 351 mg/dL (2020 13:12)      PHYSICAL EXAM:  General: WN/WD NAD  Neurology: A&Ox3, nonfocal, FREY x 4  Eyes: PERRLA/ EOMI, Gross vision intact  ENT/Neck: Neck supple, trachea midline, No JVD, Gross hearing intact  Respiratory: +wheezing and rhonchi. No rales  CV: RRR, +S1/S2, -S3/S4, no murmurs, rubs or gallops  Abdominal: Soft, NT, ND +BS, No HSM  MSK: 4+/5 strength UE/LE bilaterally  Extremities: No edema, 2+ peripheral pulses  Skin: No Rashes, Hematoma, Ecchymosis      HOSPITAL MEDICATIONS:  MEDICATIONS  (STANDING):  albuterol/ipratropium for Nebulization 3 milliLiter(s) Nebulizer every 6 hours  amLODIPine   Tablet 5 milliGRAM(s) Oral daily  atorvastatin 40 milliGRAM(s) Oral at bedtime  carvedilol 6.25 milliGRAM(s) Oral every 12 hours  dextrose 5%. 1000 milliLiter(s) (50 mL/Hr) IV Continuous <Continuous>  dextrose 50% Injectable 12.5 Gram(s) IV Push once  dextrose 50% Injectable 25 Gram(s) IV Push once  dextrose 50% Injectable 25 Gram(s) IV Push once  enoxaparin Injectable 40 milliGRAM(s) SubCutaneous daily  famotidine    Tablet 20 milliGRAM(s) Oral daily  gabapentin 200 milliGRAM(s) Oral three times a day  insulin glargine Injectable (LANTUS) 35 Unit(s) SubCutaneous at bedtime  insulin lispro (HumaLOG) corrective regimen sliding scale   SubCutaneous three times a day before meals  insulin lispro (HumaLOG) corrective regimen sliding scale   SubCutaneous at bedtime  insulin lispro Injectable (HumaLOG) 15 Unit(s) SubCutaneous three times a day with meals  levoFLOXacin  Tablet 500 milliGRAM(s) Oral every 24 hours  lidocaine   Patch 1 Patch Transdermal daily  predniSONE   Tablet 60 milliGRAM(s) Oral daily    MEDICATIONS  (PRN):  dextrose 40% Gel 15 Gram(s) Oral once PRN Blood Glucose LESS THAN 70 milliGRAM(s)/deciliter  glucagon  Injectable 1 milliGRAM(s) IntraMuscular once PRN Glucose LESS THAN 70 milligrams/deciliter  oxycodone    5 mG/acetaminophen 325 mG 1 Tablet(s) Oral every 6 hours PRN Severe Pain (7 - 10)      LABS:                        12.1   5.56  )-----------( 103      ( 2020 06:08 )             35.8     06-    136  |  94<L>  |  12  ----------------------------<  315<H>  3.6   |  26  |  0.55    Ca    9.2      2020 06:08  Phos  3.2     06-26  Mg     1.8         TPro  6.6  /  Alb  3.7  /  TBili  0.7  /  DBili  x   /  AST  9   /  ALT  12  /  AlkPhos  161<H>      PT/INR - ( 2020 11:25 )   PT: 13.4 SEC;   INR: 1.17          PTT - ( 2020 11:25 )  PTT:32.6 SEC      Venous Blood Gas:   @ 11:25  7.36/60/< 24/29/32.4  VBG Lactate: 1.6      MICROBIOLOGY:     RADIOLOGY:  [x] Reviewed by me    CT chest:   IMPRESSION:     No pulmonary embolism.    Complete atelectasis of the right middle lobe secondary to mucous plugging.    Increasing mediastinal lymphadenopathy, with a left paraesophageal node measuring up to 4.4 cm in greatest dimension, concerning for metastatic disease.    Acute left lateral 7th rib fracture. Subacute right posterolateral sixth rib fracture. No pneumothorax.    Pathologic fracture of T7 which may correspond to focus of FDG avidity on outside PET scan.
HPI:  Ms. Olson is a 65 year old woman with PMH 80 pack year smoking hx, R lung cancer s/p RLL resection 2017 on chemo presenting for acute shortness of breath and pain from L sided rib fractures. She is on 5L oxygen at home for around 3 years but feels SOB and has difficulty walking around the house. She is unable to quantify duration of present SOB .She uses a wheelchair due to SOB from walking and from pain from her fractures. She states she was admitted for COPD exacerbation several times in the past, but never needed intubation. She got her last chemo dose a week ago. She states she was on a lot of steroids previously. Rib pain is worsened by coughing. She is on high doses of pain meds for fractures and rib pain She has had a dry cough for couple days. She also has a painful area within her neck, below throat. She denies fevers, congestion. She denies abdominal pain, changes in BM, changes in urination, blood in BM or blood in urine. No rashes or swelling noted. She has some dizziness but no chest pain, palpitations. She sees outpatient cardiologist. (25 Jun 2020 19:34)    Pt awake, alert.  Complains of back pain from time to time and then becomes sob with anxiety.  Oxycodone prn helps pain and sob/ anxiety.     PERTINENT PM/SXH:   Malignant neoplasm of unspecified part of right bronchus or lung  HTN (hypertension)  DM (diabetes mellitus)  History of ITP  History of ITP  CHF (congestive heart failure)  COPD (chronic obstructive pulmonary disease)  Lung cancer  Morbid obesity  GERD (gastroesophageal reflux disease)  Deviated septum  Prolapsed uterus  Obesity  Diabetes mellitus  ITP (idiopathic thrombocytopenic purpura)  Emphysema/COPD  Acute Exacerbation of Chronic Obstructive Pulmonary Disease (COPD)  ITP (Idiopathic Thrombocytopenic Purpura)    History of cholecystectomy  S/P lobectomy of lung  History of tonsillectomy  No significant past surgical history  S/P Cholecystectomy    FAMILY HISTORY:  FH: lung cancer  Family history of stroke  Family history of lung cancer  Diabetes mellitus    ITEMS NOT CHECKED ARE NOT PRESENT    SOCIAL HISTORY:   Significant other/partner:  [ ]  Children:  [x ]  Mosque/Spirituality:  Substance hx:  [ ]   Tobacco hx:  [ ]   Alcohol hx: [ ]   Home Opioid hx:  [ ] I-Stop Reference No:  Living Situation: [x ]Home  [ ]Long term care  [ ]Rehab [ ]Other    ADVANCE DIRECTIVES:    DNR  MOLST  [ ]  Living Will  [ ]   DECISION MAKER(s):  [x ] Health Care Proxy(s)  [ ] Surrogate(s)  [ ] Guardian           Name(s): Phone Number(s): Deb     BASELINE (I)ADL(s) (prior to admission):  Lares: [x ]Total  [ ] Moderate [ ]Dependent    Allergies    fish (Angioedema)  penicillins (Rash)    Intolerances    MEDICATIONS  (STANDING):  albuterol/ipratropium for Nebulization 3 milliLiter(s) Nebulizer every 6 hours  amLODIPine   Tablet 5 milliGRAM(s) Oral daily  atorvastatin 40 milliGRAM(s) Oral at bedtime  budesonide 160 MICROgram(s)/formoterol 4.5 MICROgram(s) Inhaler 2 Puff(s) Inhalation two times a day  carvedilol 6.25 milliGRAM(s) Oral every 12 hours  chlorhexidine 4% Liquid 1 Application(s) Topical two times a day  cyanocobalamin 1000 MICROGram(s) Oral daily  dextrose 5%. 1000 milliLiter(s) (50 mL/Hr) IV Continuous <Continuous>  dextrose 50% Injectable 12.5 Gram(s) IV Push once  dextrose 50% Injectable 25 Gram(s) IV Push once  dextrose 50% Injectable 25 Gram(s) IV Push once  enoxaparin Injectable 40 milliGRAM(s) SubCutaneous daily  famotidine    Tablet 20 milliGRAM(s) Oral daily  gabapentin 200 milliGRAM(s) Oral three times a day  insulin glargine Injectable (LANTUS) 35 Unit(s) SubCutaneous at bedtime  insulin lispro (HumaLOG) corrective regimen sliding scale   SubCutaneous three times a day before meals  insulin lispro (HumaLOG) corrective regimen sliding scale   SubCutaneous at bedtime  insulin lispro Injectable (HumaLOG) 15 Unit(s) SubCutaneous three times a day with meals  ketorolac   Injectable 15 milliGRAM(s) IV Push every 8 hours  lidocaine   Patch 1 Patch Transdermal daily  melatonin 3 milliGRAM(s) Oral at bedtime  pantoprazole    Tablet 40 milliGRAM(s) Oral before breakfast  polyethylene glycol 3350 17 Gram(s) Oral two times a day  predniSONE   Tablet 40 milliGRAM(s) Oral daily  sodium chloride 3%  Inhalation 3 milliLiter(s) Inhalation every 6 hours    MEDICATIONS  (PRN):  dextrose 40% Gel 15 Gram(s) Oral once PRN Blood Glucose LESS THAN 70 milliGRAM(s)/deciliter  glucagon  Injectable 1 milliGRAM(s) IntraMuscular once PRN Glucose LESS THAN 70 milligrams/deciliter  guaiFENesin   Syrup  (Sugar-Free) 200 milliGRAM(s) Oral every 6 hours PRN Cough  oxycodone    5 mG/acetaminophen 325 mG 1 Tablet(s) Oral every 6 hours PRN Severe Pain (7 - 10)    PRESENT SYMPTOMS: [ ]Unable to obtain due to poor mentation   Source if other than patient:  [ ]Family   [ ]Team     Pain (Impact on QOL):  yes  Location -       back  Minimal acceptable level (0-10 scale): 7/10                   Aggrevating factors -unknown  Quality - sharp  Radiation - to rib  Severity (0-10 scale) -  7/10  Timing - comes and goes    PAIN AD Score:     [email or web address] (press ctrl +  left click to view)    Dyspnea:                           [ ]Mild [ ]Moderate [ x]Severe  Anxiety:                             [ ]Mild [ ]Moderate [x ]Severe  Fatigue:                             [ ]Mild [ ]Moderate [x ]Severe  Nausea:                             [ ]Mild [ ]Moderate [ ]Severe  Loss of appetite:              [ ]Mild [ ]Moderate [ ]Severe  Constipation:                    [ ]Mild [ ]Moderate [ x]Severe    Other Symptoms:  [ x]All other review of systems negative     Karnofsky Performance Score/Palliative Performance Status Version 2:  80       %  PHYSICAL EXAM:  Vital Signs Last 24 Hrs  T(C): 36.5 (02 Jul 2020 13:58), Max: 36.7 (02 Jul 2020 13:26)  T(F): 97.7 (02 Jul 2020 13:58), Max: 98.1 (02 Jul 2020 13:26)  HR: 85 (02 Jul 2020 13:58) (73 - 90)  BP: 145/84 (02 Jul 2020 13:58) (119/64 - 145/84)  BP(mean): --  RR: 20 (02 Jul 2020 13:58) (19 - 20)  SpO2: 97% (02 Jul 2020 13:58) (94% - 99%) I&O's Summary    01 Jul 2020 07:01  -  02 Jul 2020 07:00  --------------------------------------------------------  IN: 1297 mL / OUT: 600 mL / NET: 697 mL    GENERAL:  [ x]Alert  [x ]Oriented x 3  [ ]Lethargic  [ ]Cachexia  [ ]Unarousable  [ ]Verbal  [ ]Non-Verbal  Behavioral:   [ ] Anxiety  [ ] Delirium [ ] Agitation [ ] Other  HEENT:  [ x]Normal   [ ]Dry mouth   [ ]ET Tube/Trach  [ ]Oral lesions  PULMONARY:   [x ]Clear [ ]Tachypnea  [ ]Audible excessive secretions   [ ]Rhonchi        [ ]Right [ ]Left [ ]Bilateral  [ ]Crackles        [ ]Right [ ]Left [ ]Bilateral  [ ]Wheezing     [ ]Right [ ]Left [ ]Bilateral  CARDIOVASCULAR:    [x ]Regular [ ]Irregular [ ]Tachy  [ ]Deuce [ ]Murmur [ ]Other  GASTROINTESTINAL:  [x ]Soft  [ ]Distended   [x]+BS  [ ]Non tender [ ]Tender  [ ]PEG [ ]OGT/ NGT  Last BM:   GENITOURINARY:  [ x]Normal [ ] Incontinent   [ ]Oliguria/Anuria   [ ]Juan  MUSCULOSKELETAL:   [ ]Normal   [ x]Weakness  [ ]Bed/Wheelchair bound [ ]Edema  NEUROLOGIC:   [x ]No focal deficits  [ ] Cognitive impairment  [ ] Dysphagia [ ]Dysarthria [ ] Paresis [ ]Other   SKIN:   [ x]Normal   [ ]Pressure ulcer(s)  [ ]Rash    CRITICAL CARE:  [ ] Shock Present  [ ]Septic [ ]Cardiogenic [ ]Neurologic [ ]Hypovolemic  [ ]  Vasopressors [ ]  Inotropes   [ ] Respiratory failure present  [ ] Acute  [ ] Chronic [ ] Hypoxic  [ ] Hypercarbic [ ] Other  [ ] Other organ failure     LABS:                        11.8   6.35  )-----------( 135      ( 01 Jul 2020 06:15 )             36.2   07-02    138  |  96<L>  |  28<H>  ----------------------------<  199<H>  4.0   |  31  |  0.70    Ca    9.0      02 Jul 2020 05:28  Phos  4.0     07-02  Mg     2.1     07-02          RADIOLOGY & ADDITIONAL STUDIES:    PROTEIN CALORIE MALNUTRITION:   [ ] PPSV2 < or = to 30% [ ] significant weight loss  [ ] poor nutritional intake [ ] catabolic state [ ] anasarca     Albumin, Serum: 3.7 g/dL (06-26-20 @ 06:08)  Artificial Nutrition [ ]     REFERRALS:   [ ]Chaplaincy  [ ] Hospice  [ ]Child Life  [ ]Social Work  [ ]Case management [ ]Holistic Therapy   Goals of Care Discussion Document:

## 2020-07-02 NOTE — BEHAVIORAL HEALTH ASSESSMENT NOTE - CASE SUMMARY
Patient seen and evaluated with Heather Liz NP, I agree with above assessment and plan, pt. AAOX3, cooperative, thought process is linear and coherent, reported feeling anxious and depressed in the context of current ongoing acute medical issues. Denies acute psychotic sxs, adamantly denies SI and HI. Stated that " I want to live for my grandchildren". Recommend Ativan PRN as written above for anxiety, Holistic Nurse Consult. will follow

## 2020-07-02 NOTE — DIETITIAN INITIAL EVALUATION ADULT. - PERTINENT MEDS FT
MEDICATIONS  (STANDING):  albuterol/ipratropium for Nebulization 3 milliLiter(s) Nebulizer every 6 hours  amLODIPine   Tablet 5 milliGRAM(s) Oral daily  atorvastatin 40 milliGRAM(s) Oral at bedtime  budesonide 160 MICROgram(s)/formoterol 4.5 MICROgram(s) Inhaler 2 Puff(s) Inhalation two times a day  carvedilol 6.25 milliGRAM(s) Oral every 12 hours  chlorhexidine 4% Liquid 1 Application(s) Topical two times a day  cyanocobalamin 1000 MICROGram(s) Oral daily  dextrose 5%. 1000 milliLiter(s) (50 mL/Hr) IV Continuous <Continuous>  dextrose 50% Injectable 12.5 Gram(s) IV Push once  dextrose 50% Injectable 25 Gram(s) IV Push once  dextrose 50% Injectable 25 Gram(s) IV Push once  enoxaparin Injectable 40 milliGRAM(s) SubCutaneous daily  famotidine    Tablet 20 milliGRAM(s) Oral daily  gabapentin 200 milliGRAM(s) Oral three times a day  insulin glargine Injectable (LANTUS) 35 Unit(s) SubCutaneous at bedtime  insulin lispro (HumaLOG) corrective regimen sliding scale   SubCutaneous three times a day before meals  insulin lispro (HumaLOG) corrective regimen sliding scale   SubCutaneous at bedtime  insulin lispro Injectable (HumaLOG) 15 Unit(s) SubCutaneous three times a day with meals  ketorolac   Injectable 15 milliGRAM(s) IV Push every 8 hours  lidocaine   Patch 1 Patch Transdermal daily  melatonin 3 milliGRAM(s) Oral at bedtime  pantoprazole    Tablet 40 milliGRAM(s) Oral before breakfast  polyethylene glycol 3350 17 Gram(s) Oral two times a day  predniSONE   Tablet 40 milliGRAM(s) Oral daily  sodium chloride 3%  Inhalation 3 milliLiter(s) Inhalation every 6 hours    MEDICATIONS  (PRN):  dextrose 40% Gel 15 Gram(s) Oral once PRN Blood Glucose LESS THAN 70 milliGRAM(s)/deciliter  glucagon  Injectable 1 milliGRAM(s) IntraMuscular once PRN Glucose LESS THAN 70 milligrams/deciliter  guaiFENesin   Syrup  (Sugar-Free) 200 milliGRAM(s) Oral every 6 hours PRN Cough  oxycodone    5 mG/acetaminophen 325 mG 1 Tablet(s) Oral every 6 hours PRN Severe Pain (7 - 10)

## 2020-07-02 NOTE — DIETITIAN INITIAL EVALUATION ADULT. - ENERGY NEEDS
No pressure injuries, 1+ edema to rt and lt foot per flowsheets  Nutrition needs assessed to reflect increased nutritional demands related to COPD.

## 2020-07-02 NOTE — BEHAVIORAL HEALTH ASSESSMENT NOTE - HPI (INCLUDE ILLNESS QUALITY, SEVERITY, DURATION, TIMING, CONTEXT, MODIFYING FACTORS, ASSOCIATED SIGNS AND SYMPTOMS)
66 yo female with PMHx HTN, HLD, DM II, COPD, lung CA, PSHx cholecystectomy, tonsillectomy presents to [facility] for c/o SOB, fever, sore throat, left-sided rib pain.    Patient seen and evaluated at bedside, a&oX3, cooperative, tearful at times during interview, reports mood as "depressed," r/t chronic medical condition, reports one episode of "panic attack," in past, denies si/sa/hi, denies a/v/h/d, reports during last hospitalization at [facility] had psychiatric consult for anxiety-no outpatient f/u, denies any past outpatient/inpatient psychiatric treatment, reports enjoys grandchildren and doing crossword puzzles. 66 yo female with PMHx HTN, HLD, DM II, COPD, lung CA, PSHx cholecystectomy, tonsillectomy presents to [facility] for c/o SOB, fever, sore throat, left-sided rib pain. Consulted fro anxiety    Patient seen and evaluated at bedside, a&oX3, cooperative, tearful at times during interview, reports mood as "depressed," r/t chronic medical condition, reports one episode of "panic attack," in past, denies si/sa/hi, denies a/v/h/d, reports during last hospitalization at [facility] had psychiatric consult for anxiety-no outpatient f/u, denies any past outpatient/inpatient psychiatric treatment, reports enjoys grandchildren and doing crossword puzzles.    Patient reported using Xanax PRN at home.

## 2020-07-02 NOTE — PROGRESS NOTE ADULT - ASSESSMENT
Ms. Olson is a 66 y/o with PMH R lung cancer s/p resection 2017 on chemo, COPD with multiple hospital admissions, 80 pack year smoking hx, fractures in L ribs presenting for SOB (on 5L O2 at home) and pain from fractures. SOB likely 2/2 COPD exacerbation, s/p bronch 6/30. Ms. Olson is a 66 y/o with PMH R lung cancer s/p resection 2017 on chemo, COPD with multiple hospital admissions, 80 pack year smoking hx, fractures in L ribs presenting for SOB (on 5L O2 at home) and pain from fractures. Continuous SOB no longer likely 2/2 COPD, but met lung CA, s/p bronch 6/30.

## 2020-07-02 NOTE — PROGRESS NOTE ADULT - PROBLEM SELECTOR PLAN 3
Acute left lateral 7th rib fracture. Subacute right posterolateral sixth rib fracture. No pneumothorax. Pathologic fracture of T7 which may correspond to focus of FDG avidity on outside PET scan.  - pain meds as below  - pt given incentive spirometry, encouraged to use every hour  -tap water enema 7/1 Acute left lateral 7th rib fracture. Subacute right posterolateral sixth rib fracture. No pneumothorax. Pathologic fracture of T7 which may correspond to focus of FDG avidity on outside PET scan.  - pain meds as below; added mg citrate to bowel regimen 7/2  - pt given incentive spirometry, encouraged to use every hour

## 2020-07-02 NOTE — BEHAVIORAL HEALTH ASSESSMENT NOTE - SUMMARY
66 yo female with PMHx HTN, HLD, DM II, COPD, lung CA, PSHx cholecystectomy, tonsillectomy presents to [facility] for c/o SOB, fever, sore throat, left-sided rib pain.    Patient seen, a&oX3, tearful at times during interview, reports mood as "depressed," r/t chronic medical condition, reports one episode of "panic attack," in past, denies si/sa/hi, denies a/v/h/d, reports during last hospitalization at [facility] had psychiatric consult for anxiety-no outpatient f/u, denies any past outpatient/inpatient psychiatric treatment, reports enjoys grandchildren and doing crossword puzzles.    Discussed with primary team following recommendations:    PLAN:  -Holistic Nurse Consultation  -Ativan 0.25mg PO Q8H PRN, anxiety-if severe anxiety may give IV/IM, monitor for sedation, hold if RR <12  -Melatonin 6mg PO HS 64 yo female with PMHx HTN, HLD, DM II, COPD, lung CA, PSHx cholecystectomy, tonsillectomy presents to [facility] for c/o SOB, fever, sore throat, left-sided rib pain.    Patient seen, a&oX3, tearful at times during interview, reports mood as "depressed," r/t chronic medical condition, reports one episode of "panic attack," in past, denies si/sa/hi, denies a/v/h/d, reports during last hospitalization at [facility] had psychiatric consult for anxiety-no outpatient f/u, denies any past outpatient/inpatient psychiatric treatment, reports enjoys grandchildren and doing crossword puzzles.    Discussed with primary team following recommendations:    Given acute respiratory issues, please use Benzo. judiciously    PLAN:  -Holistic Nurse Consultation  -Ativan 0.25mg PO Q8H PRN, anxiety-if severe anxiety may give IV/IM, monitor for sedation, hold if RR <12  -Melatonin 6mg PO HS

## 2020-07-02 NOTE — DIETITIAN INITIAL EVALUATION ADULT. - PROBLEM SELECTOR PLAN 3
Acute left lateral 7th rib fracture. Subacute right posterolateral sixth rib fracture. No pneumothorax. Pathologic fracture of T7 which may correspond to focus of FDG avidity on outside PET scan.  - pain meds as below;

## 2020-07-02 NOTE — BEHAVIORAL HEALTH ASSESSMENT NOTE - NSBHCONSULTRECOMMENDOTHER_PSY_A_CORE FT
[facility] [phone]  [facility] [phone]

## 2020-07-02 NOTE — PROGRESS NOTE ADULT - SUBJECTIVE AND OBJECTIVE BOX
Internal Medicine Progress Note    Micheline Thompson MD PGY-1  Pager: [phone]; [facility] # [phone]; Team 7 Spectra [phone]  Please page [phone] after 7 pm or after 12 pm on weekends    Patient is a 65y old  Female who presents with a chief complaint of SOB, fractures (02 Jul 2020 06:04)      SUBJECTIVE / OVERNIGHT EVENTS: No acute events o/n. Pt complaining of lower back stiffness, which was not alleviated by warm compresses or pain meds o/n. Pt states her SOB is still bothersome. Pt endorsing having second panic attack yesterday since her lung CA dx. Reports not wanting to take enema; no BMs for 4 days. Pt tolerating PO well.     MEDICATIONS  (STANDING):  albuterol/ipratropium for Nebulization 3 milliLiter(s) Nebulizer every 6 hours  amLODIPine   Tablet 5 milliGRAM(s) Oral daily  atorvastatin 40 milliGRAM(s) Oral at bedtime  budesonide 160 MICROgram(s)/formoterol 4.5 MICROgram(s) Inhaler 2 Puff(s) Inhalation two times a day  carvedilol 6.25 milliGRAM(s) Oral every 12 hours  chlorhexidine 4% Liquid 1 Application(s) Topical two times a day  cyanocobalamin 1000 MICROGram(s) Oral daily  dextrose 5%. 1000 milliLiter(s) (50 mL/Hr) IV Continuous <Continuous>  dextrose 50% Injectable 12.5 Gram(s) IV Push once  dextrose 50% Injectable 25 Gram(s) IV Push once  dextrose 50% Injectable 25 Gram(s) IV Push once  enoxaparin Injectable 40 milliGRAM(s) SubCutaneous daily  famotidine    Tablet 20 milliGRAM(s) Oral daily  gabapentin 200 milliGRAM(s) Oral three times a day  insulin glargine Injectable (LANTUS) 35 Unit(s) SubCutaneous at bedtime  insulin lispro (HumaLOG) corrective regimen sliding scale   SubCutaneous three times a day before meals  insulin lispro (HumaLOG) corrective regimen sliding scale   SubCutaneous at bedtime  insulin lispro Injectable (HumaLOG) 15 Unit(s) SubCutaneous three times a day with meals  ketorolac   Injectable 15 milliGRAM(s) IV Push every 8 hours  lidocaine   Patch 1 Patch Transdermal daily  magnesium citrate Oral Solution 1 Bottle Oral once  melatonin 3 milliGRAM(s) Oral at bedtime  pantoprazole    Tablet 40 milliGRAM(s) Oral before breakfast  polyethylene glycol 3350 17 Gram(s) Oral two times a day  predniSONE   Tablet 40 milliGRAM(s) Oral daily  sodium chloride 3%  Inhalation 3 milliLiter(s) Inhalation every 6 hours    MEDICATIONS  (PRN):  dextrose 40% Gel 15 Gram(s) Oral once PRN Blood Glucose LESS THAN 70 milliGRAM(s)/deciliter  glucagon  Injectable 1 milliGRAM(s) IntraMuscular once PRN Glucose LESS THAN 70 milligrams/deciliter  guaiFENesin   Syrup  (Sugar-Free) 200 milliGRAM(s) Oral every 6 hours PRN Cough  oxycodone    5 mG/acetaminophen 325 mG 1 Tablet(s) Oral every 6 hours PRN Severe Pain (7 - 10)    Vital Signs Last 24 Hrs  T(C): 36.4 (02 Jul 2020 05:52), Max: 36.7 (01 Jul 2020 14:44)  T(F): 97.5 (02 Jul 2020 05:52), Max: 98 (01 Jul 2020 14:44)  HR: 73 (02 Jul 2020 09:52) (73 - 90)  BP: 125/76 (02 Jul 2020 05:52) (119/64 - 128/69)  BP(mean): --  RR: 20 (02 Jul 2020 05:52) (19 - 20)  SpO2: 96% (02 Jul 2020 09:52) (96% - 99%)    CAPILLARY BLOOD GLUCOSE      POCT Blood Glucose.: 199 mg/dL (02 Jul 2020 12:22)  POCT Blood Glucose.: 203 mg/dL (02 Jul 2020 08:38)  POCT Blood Glucose.: 196 mg/dL (01 Jul 2020 21:44)  POCT Blood Glucose.: 229 mg/dL (01 Jul 2020 17:39)    I&O's Summary    01 Jul 2020 07:01  -  02 Jul 2020 07:00  --------------------------------------------------------  IN: 1297 mL / OUT: 600 mL / NET: 697 mL        PHYSICAL EXAM  GENERAL: NAD  HEAD:  Atraumatic  EYES: EOMI, conjunctiva and sclera clear  NECK: Supple, No JVD  CHEST/LUNG: wheezing heard anteriorly; absence of breath sounds posteriorly  HEART: difficulty appreciating heart sounds d/t wheezing  ABDOMEN: Soft, Nontender, Nondistended; Bowel sounds present  EXTREMITIES:  2+ Peripheral Pulses, No clubbing, cyanosis, or edema  NEURO/PSYCH: AAOx3, nonfocal  SKIN: No rashes or lesions      LABS:                        11.8   6.35  )-----------( 135      ( 01 Jul 2020 06:15 )             36.2     Hemoglobin: 11.8 g/dL (07-01 @ 06:15)  Hemoglobin: 11.6 g/dL (06-29 @ 09:10)    CBC Full  -  ( 01 Jul 2020 06:15 )  WBC Count : 6.35 K/uL  RBC Count : 4.30 M/uL  Hemoglobin : 11.8 g/dL  Hematocrit : 36.2 %  Platelet Count - Automated : 135 K/uL  Mean Cell Volume : 84.2 fL  Mean Cell Hemoglobin : 27.4 pg  Mean Cell Hemoglobin Concentration : 32.6 %  Auto Neutrophil # : x  Auto Lymphocyte # : x  Auto Monocyte # : x  Auto Eosinophil # : x  Auto Basophil # : x  Auto Neutrophil % : x  Auto Lymphocyte % : x  Auto Monocyte % : x  Auto Eosinophil % : x  Auto Basophil % : x    07-02    138  |  96<L>  |  28<H>  ----------------------------<  199<H>  4.0   |  31  |  0.70    Ca    9.0      02 Jul 2020 05:28  Phos  4.0     07-02  Mg     2.1     07-02      Creatinine Trend: 0.70<--, 0.57<--, 0.66<--, 0.49<--, 0.55<--, 0.70<--      MICROBIOLOGY:    Culture - Acid Fast - Bronchial w/Smear (collected 30 Jun 2020 18:33)  Source: .Bronchial RIGHT SIDE BRONCHIAL LAVAGE    Culture - Fungal, Bronchial (collected 30 Jun 2020 18:33)  Source: .Bronchial RIGHT SIDE BRONCHIAL LAVAGE  Preliminary Report (01 Jul 2020 07:44):    Testing in progress    Culture - Bronchial (collected 30 Jun 2020 18:33)  Source: .Bronchial RIGHT SIDE BRONCHIAL LAVAGE  Gram Stain (30 Jun 2020 23:04):    Rare polymorphonuclear leukocytes seen per low power field    Rare Squamous epithelial cells seen per low power field    No organisms seen  Preliminary Report (01 Jul 2020 16:21):    Normal Respiratory Shannan present

## 2020-07-02 NOTE — BEHAVIORAL HEALTH ASSESSMENT NOTE - NSBHCHARTREVIEWLAB_PSY_A_CORE FT
CBC Full  -  ( 01 Jul 2020 06:15 )  WBC Count : 6.35 K/uL  RBC Count : 4.30 M/uL  Hemoglobin : 11.8 g/dL  Hematocrit : 36.2 %  Platelet Count - Automated : 135 K/uL  Mean Cell Volume : 84.2 fL  Mean Cell Hemoglobin : 27.4 pg  Mean Cell Hemoglobin Concentration : 32.6 %  Auto Neutrophil # : x  Auto Lymphocyte # : x  Auto Monocyte # : x  Auto Eosinophil # : x  Auto Basophil # : x  Auto Neutrophil % : x  Auto Lymphocyte % : x  Auto Monocyte % : x  Auto Eosinophil % : x  Auto Basophil % : x  07-02    138  |  96<L>  |  28<H>  ----------------------------<  199<H>  4.0   |  31  |  0.70    Ca    9.0      02 Jul 2020 05:28  Phos  4.0     07-02  Mg     2.1     07-02

## 2020-07-02 NOTE — DIETITIAN INITIAL EVALUATION ADULT. - PROBLEM SELECTOR PLAN 1
Likely 2/2 COPD exacerbation in setting of RLL resection due to R sided lung cancer. She uses 5L oxygen at home and still feels SOB and requests more oxygen for home. CXR wnl. CT angio 6/25: No pulmonary embolism. Complete atelectasis of the right middle lobe secondary to mucous plugging. Increasing mediastinal lymphadenopathy, with a left paraesophageal node measuring up to 4.4 cm in greatest dimension, concerning for metastatic disease.   - continue 4L NC O2 and monitor oxygen  - follow up with pulm outpatient for imaging findings

## 2020-07-02 NOTE — PROGRESS NOTE ADULT - PROBLEM SELECTOR PLAN 8
Transitions of Care Status:  1.  Name of PCP:  2.  PCP Contacted on Admission: [ ] Y    [ ] N    3.  PCP contacted at Discharge: [ ] Y    [ ] N    [ ] N/A  4.  Post-Discharge Appointment Date and Location:  5.  Summary of Handoff given to PCP:    -Pt requesting outpt endocrine referral  -PT consulted 6/29; will appreciate recs, pt lives in four story walk up (remains there b/c rent controlled) - Diet: Regular (per patient's wish; accepts risk)  - DVT: lovenox  - Full Code

## 2020-07-02 NOTE — BEHAVIORAL HEALTH ASSESSMENT NOTE - NSBHMEDSOTHERFT_PSY_A_CORE
Home Medications:  ALPRAZolam 2 mg oral tablet: 1 tab(s) orally 3 times a day, As Needed (25 Jun 2020 20:19)  amLODIPine 5 mg oral tablet: 1 tab(s) orally once a day (25 Jun 2020 20:19)  atorvastatin 40 mg oral tablet: 1 tab(s) orally once a day (25 Jun 2020 20:19)  Breo Ellipta 100 mcg-25 mcg/inh inhalation powder: 1 puff(s) inhaled 2 times a day (25 Jun 2020 20:19)  Coreg 6.25 mg oral tablet: 1 tab(s) orally 2 times a day (25 Jun 2020 20:19)  famotidine 40 mg oral tablet: 1 tab(s) orally once a day (at bedtime) (25 Jun 2020 20:19)  gabapentin 100 mg oral capsule: 2 cap(s) orally 3 times a day (25 Jun 2020 20:19)  metoclopramide: 2 milligram(s) orally 4 times a day, As Needed for nausea  (25 Jun 2020 20:19)  Percocet 10/325 oral tablet: 2 tab(s) orally every 6 hours, As Needed for pain (25 Jun 2020 20:19)  Spiriva 18 mcg inhalation capsule: 1 cap(s) inhaled once a day (25 Jun 2020 20:19)

## 2020-07-02 NOTE — DIETITIAN INITIAL EVALUATION ADULT. - PROBLEM SELECTOR PLAN 2
She has bilateral wheezing and dry cough and feels SOB. CXR wnl.   - duoneb q6 hrs  - azithromycin  - continue 4L oxygen via NC and monitor oxygen sat  - aim SaO2 >88%  - solumedrol 60 mg q 6 hrs  - COVID screen pending

## 2020-07-02 NOTE — DIETITIAN INITIAL EVALUATION ADULT. - PERTINENT LABORATORY DATA
07-02 Na138 mmol/L Glu 199 mg/dL<H> K+ 4.0 mmol/L Cr  0.70 mg/dL BUN 28 mg/dL<H> 07-02 Phos 4.0 mg/dL 06-26 Alb 3.7 g/dL    CAPILLARY BLOOD GLUCOSE  POCT Blood Glucose.: 199 mg/dL (02 Jul 2020 12:22)  POCT Blood Glucose.: 203 mg/dL (02 Jul 2020 08:38)  POCT Blood Glucose.: 196 mg/dL (01 Jul 2020 21:44)  POCT Blood Glucose.: 229 mg/dL (01 Jul 2020 17:39)    A1C with Estimated Average Glucose: 9.6: High Risk (prediabetic) 5.7 - 6.4 %

## 2020-07-02 NOTE — CONSULT NOTE ADULT - PROBLEM SELECTOR RECOMMENDATION 5
overall prognosis is poor  spoke about advanced directives  pt wants to be full code at this time but understands that her prognosis is poor  her hcp is her daughter Deb- she filled out form and is in alpha tab from previous surgical interventions  emotional support provided

## 2020-07-02 NOTE — PROGRESS NOTE ADULT - PROBLEM SELECTOR PLAN 1
Likely 2/2 atelectasis, possible contribution from paraesophageal mass seen on CTA in setting of R sided lung cancer s/p RLL resection 2017. COPD less likely as does not have sputum production, fever, leukocytosis. She uses 5L oxygen at home and still feels SOB and requests more oxygen for home. CT angio 6/25: No pulmonary embolism. Complete atelectasis of the right middle lobe secondary to mucous plugging. Increasing mediastinal lymphadenopathy, with a left paraesophageal node measuring up to 4.4 cm in greatest dimension, concerning for metastatic disease. CXR (6/28) with linear scar versus atelectasis in the right mid and lower lung and left lower lung linear atelectasis.  -s/p bronch 6/30, f/u BAL and RML bx, f/u CXR  - Moderate discomfort on 5L O2.  - Pulm following, rec: Continue prednisone 40 mg daily (day 1), suggest slow taper (decrease by 10mg every 3 days), Continue duonebs and hypertonic saline nebulizations Q6h as well as acapella/aerobika and chest PT to facilitate mobilization of secretions, Rec low dose benzo/opioid for air hunger/anxiety and palliative consult  -f/u pall consult Likely 2/2 atelectasis, possible contribution from paraesophageal mass seen on CTA in setting of R sided lung cancer s/p RLL resection 2017. COPD less likely as does not have sputum production, fever, leukocytosis. She uses 5L oxygen at home and still feels SOB and requests more oxygen for home.   -CT angio 6/25: No pulmonary embolism. Complete atelectasis of the right middle lobe secondary to mucous plugging. Increasing mediastinal lymphadenopathy, with a left paraesophageal node measuring up to 4.4 cm in greatest dimension, concerning for metastatic disease.   -CXR (6/28) with linear scar versus atelectasis in the right mid and lower lung and left lower lung linear atelectasis.  -s/p bronch 6/30, f/u RML bx, BAL no org on gram stain and cx showing normal resp sundar; f/u CXR report s/p bronch  - Moderate discomfort on 5L O2.  - Appreciate pulm recs: Continue prednisone 40 mg daily (day 2), suggest slow taper (decrease by 10mg every 3 days), Continue duonebs and hypertonic saline nebulizations Q6h as well as acapella/aerobika and chest PT to facilitate mobilization of secretions, rec low dose benzo/opioid for air hunger/anxiety and palliative consult. SOB no longer believed to be d/t COPD, but met lung CA.  -f/u pall consult Likely 2/2 atelectasis, possible contribution from paraesophageal mass seen on CTA in setting of R sided lung cancer s/p RLL resection 2017. COPD less likely as does not have sputum production, fever, leukocytosis. She uses 5L oxygen at home and still feels SOB and requests more oxygen for home.   -CT angio 6/25: No pulmonary embolism. Complete atelectasis of the right middle lobe secondary to mucous plugging. Increasing mediastinal lymphadenopathy, with a left paraesophageal node measuring up to 4.4 cm in greatest dimension, concerning for metastatic disease.   -CXR (6/28) with linear scar versus atelectasis in the right mid and lower lung and left lower lung linear atelectasis.  -s/p bronch 6/30, f/u RML bx, BAL no org on gram stain and cx showing normal resp sundar; f/u CXR report s/p bronch  - Moderate discomfort on 5L O2.  - Appreciate pulm recs: Continue prednisone 40 mg daily (day 2), suggest slow taper (decrease by 10mg every 3 days), Continue duonebs and hypertonic saline nebulizations Q6h as well as acapella/aerobika and chest PT to facilitate mobilization of secretions, rec low dose benzo/opioid for air hunger/anxiety and palliative consult. SOB no longer believed to be d/t COPD, but met lung CA.  -appreciate palliative recs

## 2020-07-02 NOTE — PROGRESS NOTE ADULT - ATTENDING COMMENTS
65 y.o. Female w/ Hx Chronic respiratory failure/ COPD on 5LNC at home, Lung Ca s/p wedge resection 2016 w/ recurrent mets to mediastinal LN on chemo a/w COPD exacerbation found to have RML mucus plugging and subacute/acute atraumatic rib fractures. Patient s/p Bronchoscopy 6/29 by CT surgery with removal of mucus plugging; F/U RML biopsy results. c/w incentive spirometry. tapering steroids as may be causing rib fractures. Pain controlled with Lidoderm patch/Toradol IV q8hr. CXR 7/1 shows no new findings. Pulmonary signed off. Patient with panic attacks about her condition and doesn't want to take addicting meds like xanax and percocet. Will consider SSRI for anxiety. Consult psychiatry. Will start Flexeril 5mg bid for back spasms. Patient requesting a concentrator at home that goes higher then 5L; Will check with SW. Consult palliative care for endstage Lung disease.

## 2020-07-02 NOTE — BEHAVIORAL HEALTH ASSESSMENT NOTE - SUICIDE PROTECTIVE FACTORS
Responsibility to family and others/Supportive social network of family or friends/Identifies reasons for living Responsibility to family and others/Identifies reasons for living/Has future plans/Supportive social network of family or friends

## 2020-07-02 NOTE — BEHAVIORAL HEALTH ASSESSMENT NOTE - NSBHCONSULTFOLLOWAFTERCARE_PSY_A_CORE FT
[facility] [phone]  [facility] [phone]

## 2020-07-02 NOTE — BEHAVIORAL HEALTH ASSESSMENT NOTE - NSBHCHARTREVIEWIMAGING_PSY_A_CORE FT
EXAM:  XR CHEST PORTABLE URGENT 1V        PROCEDURE DATE:  Jul 2 2020         INTERPRETATION:  CLINICAL INFORMATION: Shortness of breath.    EXAM: AP chest radiograph.    COMPARISON: Chest radiograph from 6/30/2020.    FINDINGS:  Left-sided PICC with tip in the SVC.  The heart size is difficult to assess this projection.  Low lung volumes. Postsurgical changes of the right lung. No focal consolidations. No pleural effusion or pneumothorax.  The visualized osseous structures demonstrate no acute pathology.    IMPRESSION:  Postsurgical changes in the right lung. No focal consolidations.

## 2020-07-02 NOTE — CONSULT NOTE ADULT - ASSESSMENT
66 yo female with h/o Advanced COPD, lung cancer admitted to [facility] with sob.  Asked to see for goc

## 2020-07-02 NOTE — PROGRESS NOTE ADULT - PROBLEM SELECTOR PLAN 2
- wheezing on O2 at 6L.  - Pulm recs appreciated   - Aim SaO2 88% - 95%.  - continue prednisone 60 mg daily until 6/30 (5 d). Now on 40 mg - wheezing on O2 at 6L  - Pulm recs appreciated   - Aim SaO2 88% - 95%.  - continue prednisone taper

## 2020-07-02 NOTE — PROGRESS NOTE ADULT - PROBLEM SELECTOR PLAN 4
- s/p RLL resection 2017; 1 cycle chemo the week prior to presentation.  - Now with findings of 4.4-cm perihilar LN on CTA chest. Need to contact oupt oncologist Dr. Mendiola [phone]  - F/u bronch results  -B12 started 6/29 - s/p RLL resection 2017; 1 cycle chemo the week prior to presentation.  - Now with findings of 4.4-cm perihilar LN on CTA chest. Need to contact oupt oncologist Dr. Mendiola [phone]  - F/u bronch biopsy results  -B12 started 6/29

## 2020-07-02 NOTE — BEHAVIORAL HEALTH ASSESSMENT NOTE - NSBHCHARTREVIEWVS_PSY_A_CORE FT
Vital Signs Last 24 Hrs  T(C): 36.5 (02 Jul 2020 13:58), Max: 36.7 (02 Jul 2020 13:26)  T(F): 97.7 (02 Jul 2020 13:58), Max: 98.1 (02 Jul 2020 13:26)  HR: 76 (02 Jul 2020 15:58) (73 - 90)  BP: 145/84 (02 Jul 2020 13:58) (119/64 - 145/84)  BP(mean): --  RR: 20 (02 Jul 2020 13:58) (19 - 20)  SpO2: 98% (02 Jul 2020 15:58) (94% - 99%)

## 2020-07-02 NOTE — DIETITIAN INITIAL EVALUATION ADULT. - PROBLEM SELECTOR PLAN 6
- s/p RLL resection 2017; 1 cycle chemo last week  - follow up with outpatient pulm/ onc (Dr. Quintero) for imaging findings as above

## 2020-07-02 NOTE — BEHAVIORAL HEALTH ASSESSMENT NOTE - RISK ASSESSMENT
Low Acute Suicide Risk Risk Factors: chronic medical condition, lung cancer, c/o anxiety  Protective Factors: residential stability, supportive family, denies si/sa, no s/s psychosis, enjoys spending time with grandchildren, doing crossword puzzles, help seeking Risk Factors: chronic medical condition, lung cancer, c/o anxiety  Protective Factors: residential stability, supportive family, denies si/sa, no s/s psychosis, enjoys spending time with grandchildren, doing crossword puzzles, help seeking    Not at acute risk of harm to self or others at this time

## 2020-07-02 NOTE — PROGRESS NOTE ADULT - PROBLEM SELECTOR PLAN 5
- Significant for R neck pain, less likely fracture from imaging results. CT neck soft tissue 6/25 showed: No cervical mass or adenopathy identified. Enlarged mediastinal nodes.  - Also with acute L lateral 7th rib fracture and subacute R posterolateral 6th rib fracture.  - ISTOP performed (#[id number]). Confirmed that patient is on Endocet (10 oxy/325 acetaminophen)  - On Percocet (5 oxy/325 acetaminophen) Q6H PRN for severe pain. Miralax for BM regimen started 6/29, tap water enema 7/1  - Robitussin and tessalon pearls for cough as it exacerbates rib pain  -Toradol for rib fx pain started on 6/29. - Significant for R neck pain, less likely fracture from imaging results. CT neck soft tissue 6/25 showed: No cervical mass or adenopathy identified. Enlarged mediastinal nodes.  - Also with acute L lateral 7th rib fracture and subacute R posterolateral 6th rib fracture.  - ISTOP performed (#[id number]). Confirmed that patient is on Endocet (10 oxy/325 acetaminophen)  - On Percocet (5 oxy/325 acetaminophen) Q6H PRN for severe pain. Miralax for BM regimen started 6/29, mg citrate 7/2  - Robitussin and tessalon pearls for cough as it exacerbates rib pain  -Toradol for rib fx pain started on 6/29.

## 2020-07-03 LAB
ANION GAP SERPL CALC-SCNC: 13 MMO/L — SIGNIFICANT CHANGE UP (ref 7–14)
BASE EXCESS BLDV CALC-SCNC: 10.8 MMOL/L — SIGNIFICANT CHANGE UP
BUN SERPL-MCNC: 32 MG/DL — HIGH (ref 7–23)
CALCIUM SERPL-MCNC: 8.8 MG/DL — SIGNIFICANT CHANGE UP (ref 8.4–10.5)
CHLORIDE SERPL-SCNC: 99 MMOL/L — SIGNIFICANT CHANGE UP (ref 98–107)
CO2 SERPL-SCNC: 29 MMOL/L — SIGNIFICANT CHANGE UP (ref 22–31)
CREAT SERPL-MCNC: 0.7 MG/DL — SIGNIFICANT CHANGE UP (ref 0.5–1.3)
GAS PNL BLDV: 138 MMOL/L — SIGNIFICANT CHANGE UP (ref 136–146)
GLUCOSE BLDC GLUCOMTR-MCNC: 129 MG/DL — HIGH (ref 70–99)
GLUCOSE BLDC GLUCOMTR-MCNC: 179 MG/DL — HIGH (ref 70–99)
GLUCOSE BLDC GLUCOMTR-MCNC: 288 MG/DL — HIGH (ref 70–99)
GLUCOSE BLDC GLUCOMTR-MCNC: 361 MG/DL — HIGH (ref 70–99)
GLUCOSE BLDV-MCNC: 176 MG/DL — HIGH (ref 70–99)
GLUCOSE SERPL-MCNC: 186 MG/DL — HIGH (ref 70–99)
HCO3 BLDV-SCNC: 32 MMOL/L — HIGH (ref 20–27)
HCT VFR BLD CALC: 33.5 % — LOW (ref 34.5–45)
HCT VFR BLD CALC: 36 % — SIGNIFICANT CHANGE UP (ref 34.5–45)
HCT VFR BLDV CALC: 35.2 % — SIGNIFICANT CHANGE UP (ref 34.5–45)
HGB BLD-MCNC: 11.1 G/DL — LOW (ref 11.5–15.5)
HGB BLD-MCNC: 11.4 G/DL — LOW (ref 11.5–15.5)
HGB BLDV-MCNC: 11.4 G/DL — LOW (ref 11.5–15.5)
MAGNESIUM SERPL-MCNC: 2.4 MG/DL — SIGNIFICANT CHANGE UP (ref 1.6–2.6)
MCHC RBC-ENTMCNC: 26.9 PG — LOW (ref 27–34)
MCHC RBC-ENTMCNC: 27.1 PG — SIGNIFICANT CHANGE UP (ref 27–34)
MCHC RBC-ENTMCNC: 31.7 % — LOW (ref 32–36)
MCHC RBC-ENTMCNC: 33.1 % — SIGNIFICANT CHANGE UP (ref 32–36)
MCV RBC AUTO: 81.9 FL — SIGNIFICANT CHANGE UP (ref 80–100)
MCV RBC AUTO: 84.9 FL — SIGNIFICANT CHANGE UP (ref 80–100)
NRBC # FLD: 0 K/UL — SIGNIFICANT CHANGE UP (ref 0–0)
NRBC # FLD: 0 K/UL — SIGNIFICANT CHANGE UP (ref 0–0)
PCO2 BLDV: 65 MMHG — HIGH (ref 41–51)
PH BLDV: 7.37 PH — SIGNIFICANT CHANGE UP (ref 7.32–7.43)
PHOSPHATE SERPL-MCNC: 3.5 MG/DL — SIGNIFICANT CHANGE UP (ref 2.5–4.5)
PLATELET # BLD AUTO: 149 K/UL — LOW (ref 150–400)
PLATELET # BLD AUTO: 164 K/UL — SIGNIFICANT CHANGE UP (ref 150–400)
PMV BLD: 11.3 FL — SIGNIFICANT CHANGE UP (ref 7–13)
PMV BLD: 11.3 FL — SIGNIFICANT CHANGE UP (ref 7–13)
PO2 BLDV: 34 MMHG — LOW (ref 35–40)
POTASSIUM BLDV-SCNC: 4 MMOL/L — SIGNIFICANT CHANGE UP (ref 3.4–4.5)
POTASSIUM SERPL-MCNC: 4.1 MMOL/L — SIGNIFICANT CHANGE UP (ref 3.5–5.3)
POTASSIUM SERPL-SCNC: 4.1 MMOL/L — SIGNIFICANT CHANGE UP (ref 3.5–5.3)
RBC # BLD: 4.09 M/UL — SIGNIFICANT CHANGE UP (ref 3.8–5.2)
RBC # BLD: 4.24 M/UL — SIGNIFICANT CHANGE UP (ref 3.8–5.2)
RBC # FLD: 14.3 % — SIGNIFICANT CHANGE UP (ref 10.3–14.5)
RBC # FLD: 14.5 % — SIGNIFICANT CHANGE UP (ref 10.3–14.5)
SAO2 % BLDV: 60.4 % — SIGNIFICANT CHANGE UP (ref 60–85)
SARS-COV-2 RNA SPEC QL NAA+PROBE: SIGNIFICANT CHANGE UP
SODIUM SERPL-SCNC: 141 MMOL/L — SIGNIFICANT CHANGE UP (ref 135–145)
WBC # BLD: 7.73 K/UL — SIGNIFICANT CHANGE UP (ref 3.8–10.5)
WBC # BLD: 9.99 K/UL — SIGNIFICANT CHANGE UP (ref 3.8–10.5)
WBC # FLD AUTO: 7.73 K/UL — SIGNIFICANT CHANGE UP (ref 3.8–10.5)
WBC # FLD AUTO: 9.99 K/UL — SIGNIFICANT CHANGE UP (ref 3.8–10.5)

## 2020-07-03 PROCEDURE — 99233 SBSQ HOSP IP/OBS HIGH 50: CPT | Mod: GC

## 2020-07-03 RX ORDER — ALBUTEROL 90 UG/1
1 AEROSOL, METERED ORAL EVERY 6 HOURS
Refills: 0 | Status: DISCONTINUED | OUTPATIENT
Start: 2020-07-03 | End: 2020-07-03

## 2020-07-03 RX ORDER — LANOLIN ALCOHOL/MO/W.PET/CERES
6 CREAM (GRAM) TOPICAL AT BEDTIME
Refills: 0 | Status: DISCONTINUED | OUTPATIENT
Start: 2020-07-03 | End: 2020-07-05

## 2020-07-03 RX ORDER — IPRATROPIUM BROMIDE 0.2 MG/ML
1 SOLUTION, NON-ORAL INHALATION EVERY 6 HOURS
Refills: 0 | Status: DISCONTINUED | OUTPATIENT
Start: 2020-07-03 | End: 2020-07-04

## 2020-07-03 RX ORDER — ALBUTEROL 90 UG/1
2 AEROSOL, METERED ORAL EVERY 6 HOURS
Refills: 0 | Status: DISCONTINUED | OUTPATIENT
Start: 2020-07-03 | End: 2020-07-04

## 2020-07-03 RX ADMIN — PREGABALIN 1000 MICROGRAM(S): 225 CAPSULE ORAL at 12:55

## 2020-07-03 RX ADMIN — BUDESONIDE AND FORMOTEROL FUMARATE DIHYDRATE 2 PUFF(S): 160; 4.5 AEROSOL RESPIRATORY (INHALATION) at 22:46

## 2020-07-03 RX ADMIN — GABAPENTIN 200 MILLIGRAM(S): 400 CAPSULE ORAL at 22:44

## 2020-07-03 RX ADMIN — CHLORHEXIDINE GLUCONATE 1 APPLICATION(S): 213 SOLUTION TOPICAL at 18:27

## 2020-07-03 RX ADMIN — OXYCODONE HYDROCHLORIDE 5 MILLIGRAM(S): 5 TABLET ORAL at 22:45

## 2020-07-03 RX ADMIN — POLYETHYLENE GLYCOL 3350 17 GRAM(S): 17 POWDER, FOR SOLUTION ORAL at 06:32

## 2020-07-03 RX ADMIN — ENOXAPARIN SODIUM 40 MILLIGRAM(S): 100 INJECTION SUBCUTANEOUS at 12:55

## 2020-07-03 RX ADMIN — Medication 15 UNIT(S): at 12:56

## 2020-07-03 RX ADMIN — GABAPENTIN 200 MILLIGRAM(S): 400 CAPSULE ORAL at 06:32

## 2020-07-03 RX ADMIN — Medication 1 PUFF(S): at 11:04

## 2020-07-03 RX ADMIN — Medication 1 PUFF(S): at 18:29

## 2020-07-03 RX ADMIN — Medication 40 MILLIGRAM(S): at 06:32

## 2020-07-03 RX ADMIN — Medication 1 MILLIGRAM(S): at 22:43

## 2020-07-03 RX ADMIN — BUDESONIDE AND FORMOTEROL FUMARATE DIHYDRATE 2 PUFF(S): 160; 4.5 AEROSOL RESPIRATORY (INHALATION) at 09:20

## 2020-07-03 RX ADMIN — Medication 2: at 22:43

## 2020-07-03 RX ADMIN — FAMOTIDINE 20 MILLIGRAM(S): 10 INJECTION INTRAVENOUS at 12:55

## 2020-07-03 RX ADMIN — Medication 15 MILLIGRAM(S): at 06:32

## 2020-07-03 RX ADMIN — ALBUTEROL 2 PUFF(S): 90 AEROSOL, METERED ORAL at 18:26

## 2020-07-03 RX ADMIN — Medication 15 UNIT(S): at 18:25

## 2020-07-03 RX ADMIN — Medication 2: at 09:19

## 2020-07-03 RX ADMIN — Medication 15 MILLIGRAM(S): at 22:45

## 2020-07-03 RX ADMIN — Medication 10: at 18:25

## 2020-07-03 RX ADMIN — PANTOPRAZOLE SODIUM 40 MILLIGRAM(S): 20 TABLET, DELAYED RELEASE ORAL at 06:32

## 2020-07-03 RX ADMIN — Medication 15 UNIT(S): at 09:19

## 2020-07-03 RX ADMIN — ATORVASTATIN CALCIUM 40 MILLIGRAM(S): 80 TABLET, FILM COATED ORAL at 22:44

## 2020-07-03 RX ADMIN — POLYETHYLENE GLYCOL 3350 17 GRAM(S): 17 POWDER, FOR SOLUTION ORAL at 18:28

## 2020-07-03 RX ADMIN — CYCLOBENZAPRINE HYDROCHLORIDE 5 MILLIGRAM(S): 10 TABLET, FILM COATED ORAL at 06:32

## 2020-07-03 RX ADMIN — CHLORHEXIDINE GLUCONATE 1 APPLICATION(S): 213 SOLUTION TOPICAL at 06:33

## 2020-07-03 RX ADMIN — INSULIN GLARGINE 35 UNIT(S): 100 INJECTION, SOLUTION SUBCUTANEOUS at 22:45

## 2020-07-03 RX ADMIN — Medication 15 MILLIGRAM(S): at 14:06

## 2020-07-03 RX ADMIN — CYCLOBENZAPRINE HYDROCHLORIDE 5 MILLIGRAM(S): 10 TABLET, FILM COATED ORAL at 18:28

## 2020-07-03 RX ADMIN — CARVEDILOL PHOSPHATE 6.25 MILLIGRAM(S): 80 CAPSULE, EXTENDED RELEASE ORAL at 06:32

## 2020-07-03 RX ADMIN — CARVEDILOL PHOSPHATE 6.25 MILLIGRAM(S): 80 CAPSULE, EXTENDED RELEASE ORAL at 18:28

## 2020-07-03 RX ADMIN — ALBUTEROL 2 PUFF(S): 90 AEROSOL, METERED ORAL at 12:57

## 2020-07-03 RX ADMIN — AMLODIPINE BESYLATE 5 MILLIGRAM(S): 2.5 TABLET ORAL at 06:32

## 2020-07-03 RX ADMIN — Medication 6 MILLIGRAM(S): at 22:44

## 2020-07-03 RX ADMIN — GABAPENTIN 200 MILLIGRAM(S): 400 CAPSULE ORAL at 14:06

## 2020-07-03 NOTE — PROGRESS NOTE ADULT - PROBLEM SELECTOR PLAN 4
- s/p RLL resection 2017; 1 cycle chemo the week prior to presentation.  - Now with findings of 4.4-cm perihilar LN on CTA chest. Need to contact oupt oncologist Dr. Mendiola [phone]  - F/u bronch biopsy results  -B12 started 6/29

## 2020-07-03 NOTE — PROGRESS NOTE ADULT - PROBLEM SELECTOR PLAN 2
- wheezing on O2 at 6L  - Pulm recs appreciated   - Aim SaO2 88% - 95%.  - continue prednisone taper

## 2020-07-03 NOTE — PROGRESS NOTE ADULT - ASSESSMENT
Ms. Olson is a 66 y/o with PMH R lung cancer s/p resection 2017 on chemo, COPD with multiple hospital admissions, 80 pack year smoking hx, fractures in L ribs presenting for SOB (on 5L O2 at home) and pain from fractures. Continuous SOB no longer likely 2/2 COPD, but met lung CA, s/p bronch 6/30.

## 2020-07-03 NOTE — PROGRESS NOTE ADULT - PROBLEM SELECTOR PLAN 5
- Significant for R neck pain, less likely fracture from imaging results. CT neck soft tissue 6/25 showed: No cervical mass or adenopathy identified. Enlarged mediastinal nodes.  - Also with acute L lateral 7th rib fracture and subacute R posterolateral 6th rib fracture.  - ISTOP performed (#[id number]). Confirmed that patient is on Endocet (10 oxy/325 acetaminophen)  - On Percocet (5 oxy/325 acetaminophen) Q6H PRN for severe pain. Miralax for BM regimen started 6/29, mg citrate 7/2  - Robitussin and tessalon pearls for cough as it exacerbates rib pain  -Toradol for rib fx pain started on 6/29.

## 2020-07-03 NOTE — PROGRESS NOTE ADULT - SUBJECTIVE AND OBJECTIVE BOX
Internal Medicine Progress Note    Micheline Thompson MD PGY-1  Pager: [phone]; [facility] # [id number]; Team 7 Spectra [phone]  Please page [phone] after 7 pm or after 12 pm on weekends    Patient is a 65y old  Female who presents with a chief complaint of SOB, fractures (03 Jul 2020 06:39)      SUBJECTIVE / OVERNIGHT EVENTS: Per NF, notified pt is not permitted to be on nebulizer tx since pt was exposed to COVID + room-mate/pt (per RT). Pt reports "small BM" yesterday. Pt's SOB still bothersome, but "same as past couple of days." Pt reports worse back stiffness after flexeril yesterday. Pt's sleep was better, but still disrupted by ongoing staff activity. Pt does not report any outstanding anxiety since yesterday.    MEDICATIONS  (STANDING):  ALBUTerol    90 MICROgram(s) HFA Inhaler 2 Puff(s) Inhalation every 6 hours  amLODIPine   Tablet 5 milliGRAM(s) Oral daily  atorvastatin 40 milliGRAM(s) Oral at bedtime  budesonide 160 MICROgram(s)/formoterol 4.5 MICROgram(s) Inhaler 2 Puff(s) Inhalation two times a day  carvedilol 6.25 milliGRAM(s) Oral every 12 hours  chlorhexidine 4% Liquid 1 Application(s) Topical two times a day  cyanocobalamin 1000 MICROGram(s) Oral daily  cyclobenzaprine 5 milliGRAM(s) Oral two times a day  dextrose 5%. 1000 milliLiter(s) (50 mL/Hr) IV Continuous <Continuous>  dextrose 50% Injectable 12.5 Gram(s) IV Push once  dextrose 50% Injectable 25 Gram(s) IV Push once  dextrose 50% Injectable 25 Gram(s) IV Push once  enoxaparin Injectable 40 milliGRAM(s) SubCutaneous daily  famotidine    Tablet 20 milliGRAM(s) Oral daily  gabapentin 200 milliGRAM(s) Oral three times a day  insulin glargine Injectable (LANTUS) 35 Unit(s) SubCutaneous at bedtime  insulin lispro (HumaLOG) corrective regimen sliding scale   SubCutaneous three times a day before meals  insulin lispro (HumaLOG) corrective regimen sliding scale   SubCutaneous at bedtime  insulin lispro Injectable (HumaLOG) 15 Unit(s) SubCutaneous three times a day with meals  ipratropium 17 MICROgram(s) HFA Inhaler 1 Puff(s) Inhalation every 6 hours  ketorolac   Injectable 15 milliGRAM(s) IV Push every 8 hours  lidocaine   Patch 1 Patch Transdermal daily  melatonin 6 milliGRAM(s) Oral at bedtime  pantoprazole    Tablet 40 milliGRAM(s) Oral before breakfast  polyethylene glycol 3350 17 Gram(s) Oral two times a day  predniSONE   Tablet 40 milliGRAM(s) Oral daily    MEDICATIONS  (PRN):  ALPRAZolam 1 milliGRAM(s) Oral three times a day PRN Anxiety  bisacodyl Suppository 10 milliGRAM(s) Rectal daily PRN Constipation  dextrose 40% Gel 15 Gram(s) Oral once PRN Blood Glucose LESS THAN 70 milliGRAM(s)/deciliter  glucagon  Injectable 1 milliGRAM(s) IntraMuscular once PRN Glucose LESS THAN 70 milligrams/deciliter  guaiFENesin   Syrup  (Sugar-Free) 200 milliGRAM(s) Oral every 6 hours PRN Cough  oxyCODONE    IR 5 milliGRAM(s) Oral every 4 hours PRN Dyspnea or severe pain    Vital Signs Last 24 Hrs  T(C): 36.4 (03 Jul 2020 06:27), Max: 36.7 (02 Jul 2020 13:26)  T(F): 97.6 (03 Jul 2020 06:27), Max: 98.1 (02 Jul 2020 13:26)  HR: 71 (03 Jul 2020 06:27) (71 - 96)  BP: 123/63 (03 Jul 2020 06:27) (123/61 - 145/84)  BP(mean): --  RR: 16 (03 Jul 2020 06:27) (16 - 20)  SpO2: 98% (03 Jul 2020 06:27) (94% - 98%)    CAPILLARY BLOOD GLUCOSE      POCT Blood Glucose.: 179 mg/dL (03 Jul 2020 08:59)  POCT Blood Glucose.: 214 mg/dL (02 Jul 2020 22:17)  POCT Blood Glucose.: 250 mg/dL (02 Jul 2020 17:57)  POCT Blood Glucose.: 199 mg/dL (02 Jul 2020 12:22)    I&O's Summary      PHYSICAL EXAM  GENERAL: NAD  HEAD:  Atraumatic  EYES: EOMI, conjunctiva and sclera clear  NECK: Supple, No JVD  CHEST/LUNG: wheezing anteriorly and posteriorly b/l  HEART: Regular rate and rhythm; No murmurs, rubs, or gallops  ABDOMEN: Soft, Nontender, Nondistended; Bowel sounds present  EXTREMITIES:  2+ Peripheral Pulses, No clubbing, cyanosis, or edema  NEURO/PSYCH: AAOx3, nonfocal  SKIN: No rashes or lesions      LABS:                        11.1   7.73  )-----------( 149      ( 03 Jul 2020 08:00 )             33.5     Hemoglobin: 11.1 g/dL (07-03 @ 08:00)  Hemoglobin: 11.8 g/dL (07-01 @ 06:15)  Hemoglobin: 11.6 g/dL (06-29 @ 09:10)    CBC Full  -  ( 03 Jul 2020 08:00 )  WBC Count : 7.73 K/uL  RBC Count : 4.09 M/uL  Hemoglobin : 11.1 g/dL  Hematocrit : 33.5 %  Platelet Count - Automated : 149 K/uL  Mean Cell Volume : 81.9 fL  Mean Cell Hemoglobin : 27.1 pg  Mean Cell Hemoglobin Concentration : 33.1 %  Auto Neutrophil # : x  Auto Lymphocyte # : x  Auto Monocyte # : x  Auto Eosinophil # : x  Auto Basophil # : x  Auto Neutrophil % : x  Auto Lymphocyte % : x  Auto Monocyte % : x  Auto Eosinophil % : x  Auto Basophil % : x    07-02    138  |  96<L>  |  28<H>  ----------------------------<  199<H>  4.0   |  31  |  0.70    Ca    9.0      02 Jul 2020 05:28  Phos  4.0     07-02  Mg     2.1     07-02      Creatinine Trend: 0.70<--, 0.57<--, 0.66<--, 0.49<--, 0.55<--, 0.70<--          08:25 - VBG - pH: 7.37  | pCO2: 65    | pO2: 34    | Lactate:            MICROBIOLOGY:    Culture - Acid Fast - Bronchial w/Smear (collected 30 Jun 2020 18:33)  Source: .Bronchial RIGHT SIDE BRONCHIAL LAVAGE    Culture - Fungal, Bronchial (collected 30 Jun 2020 18:33)  Source: .Bronchial RIGHT SIDE BRONCHIAL LAVAGE  Preliminary Report (01 Jul 2020 07:44):    Testing in progress    Culture - Bronchial (collected 30 Jun 2020 10:30)  Source: .Bronchial RIGHT SIDE BRONCHIAL LAVAGE  Gram Stain (30 Jun 2020 23:04):    Rare polymorphonuclear leukocytes seen per low power field    Rare Squamous epithelial cells seen per low power field    No organisms seen  Final Report (02 Jul 2020 17:32):    Normal Respiratory Shannan present

## 2020-07-03 NOTE — PROGRESS NOTE ADULT - ATTENDING COMMENTS
65 F with chronic resp failure d/t COPD on 5 L O2 at home, Lung Ca s/p wedge resection 2016 w/ recurrent mets to mediastinal LN on chemo a/w COPD exacerbation, found to have RML mucus plug with atelectasis, and atraumatic rib fractures, s/p bronchoscopy 6/29 with removal of mucus plug and RML biopsy. Pt is still c/o dyspnea, no chest pain. Lung exam mainly clear, scattered wheezing, decreased air entry.  Assessment/plan:  # dyspnea d/t end stage lung disease from advanced COPD, lung CA, wedge resection and atelectasis  -repeat covid swab (last covid pcr neg on 6/28)  -weaned O2 to 5 L  (home requirement), slow taper prednisone 10 mg q3 days per pulm, pt is requesting concentrator at home, f/u SW  - c/w incentive spirometry, chest PT  -Pain control for rib fx, on lidoderm patch and toradol prn  -xanax prn for anxiety, palliative care consult appreciated, suggest ativan  -D/c planning tomorrow 65 F with chronic resp failure d/t COPD on 5 L O2 at home, Lung Ca s/p wedge resection 2016 w/ recurrent mets to mediastinal LN on chemo a/w COPD exacerbation, found to have RML mucus plug with atelectasis, and atraumatic rib fractures, s/p bronchoscopy 6/29 with removal of mucus plug and RML biopsy. Pt is still c/o dyspnea, no chest pain. Lung exam mainly clear, scattered wheezing, decreased air entry.  Assessment/plan:  # dyspnea d/t end stage lung disease from advanced COPD, lung CA, wedge resection and atelectasis  -repeat covid swab (last covid pcr neg on 6/28)  -weaned O2 to 5 L  (home requirement), slow taper prednisone 10 mg q3 days per pulm, pt is requesting concentrator at home, f/u SW  - c/w incentive spirometry, chest PT  -Pain control for rib fx, on lidoderm patch and toradol prn  -xanax prn for anxiety, palliative care consult appreciated, suggest ativan  -outpt f/u oncology Dr. Mendiola, was treated with immunotherapy in past  -D/c planning tomorrow Patient seen and examined, case d/w house staff.  65 F with chronic resp failure d/t COPD on 5 L O2 at home, Lung Ca s/p wedge resection 2016 w/ recurrent mets to mediastinal LN on chemo a/w COPD exacerbation, found to have RML mucus plug with atelectasis, and atraumatic rib fractures, s/p bronchoscopy 6/29 with removal of mucus plug and RML biopsy. Pt is still c/o dyspnea, no chest pain. Lung exam mainly clear, scattered wheezing, decreased air entry.  Assessment/plan:  # dyspnea d/t end stage lung disease from advanced COPD, lung CA, wedge resection and atelectasis  -repeat covid swab (last covid pcr neg on 6/28)  -weaned O2 to 5 L  (home requirement), slow taper prednisone 10 mg q3 days per pulm, pt is requesting concentrator at home, f/u SW  - c/w incentive spirometry, chest PT  -Pain control for rib fx, on lidoderm patch and toradol prn  -xanax prn for anxiety, palliative care consult appreciated, suggest ativan  -outpt f/u oncology Dr. Mendiola, was treated with immunotherapy in past  -D/c planning tomorrow

## 2020-07-03 NOTE — PROGRESS NOTE ADULT - PROBLEM SELECTOR PLAN 1
Likely 2/2 atelectasis, possible contribution from paraesophageal mass seen on CTA in setting of R sided lung cancer s/p RLL resection 2017. COPD less likely as does not have sputum production, fever, leukocytosis. She uses 5L oxygen at home and still feels SOB and requests more oxygen for home.   -CT angio 6/25: No pulmonary embolism. Complete atelectasis of the right middle lobe secondary to mucous plugging. Increasing mediastinal lymphadenopathy, with a left paraesophageal node measuring up to 4.4 cm in greatest dimension, concerning for metastatic disease.   -CXR (6/28) with linear scar versus atelectasis in the right mid and lower lung and left lower lung linear atelectasis.  -s/p bronch 6/30, f/u RML bx, BAL no org on gram stain and cx showing normal resp sundar; f/u CXR report s/p bronch  - Moderate discomfort on 5L O2.  - Appreciate pulm recs: Continue prednisone 40 mg daily (day 2), suggest slow taper (decrease by 10mg every 3 days), Continue duonebs and hypertonic saline nebulizations Q6h as well as acapella/aerobika and chest PT to facilitate mobilization of secretions, rec low dose benzo/opioid for air hunger/anxiety and palliative consult. SOB no longer believed to be d/t COPD, but met lung CA.  -appreciate palliative recs Likely 2/2 atelectasis, possible contribution from paraesophageal mass seen on CTA in setting of R sided lung cancer s/p RLL resection 2017. COPD less likely as does not have sputum production, fever, leukocytosis. She uses 5L oxygen at home and still feels SOB and requests more oxygen for home.   -CT angio 6/25: No pulmonary embolism. Complete atelectasis of the right middle lobe secondary to mucous plugging. Increasing mediastinal lymphadenopathy, with a left paraesophageal node measuring up to 4.4 cm in greatest dimension, concerning for metastatic disease.   -CXR (6/28) with linear scar versus atelectasis in the right mid and lower lung and left lower lung linear atelectasis.  -s/p bronch 6/30, f/u RML bx, BAL no org on gram stain and cx showing normal resp sundar; f/u CXR report s/p bronch  - Moderate discomfort on 5L O2.  - Appreciate pulm recs: Continue prednisone 40 mg daily (day 3), suggest slow taper (decrease by 10mg every 3 days), Continue duonebs and hypertonic saline nebulizations Q6h as well as acapella/aerobika and chest PT to facilitate mobilization of secretions, rec low dose benzo/opioid for air hunger/anxiety and palliative consult. SOB no longer believed to be d/t COPD, but met lung CA.  -appreciate palliative recs  -discontinued neb tx; albuterol and ipratropium inhaler given  -appreciate pall recs: oxy 5 mg po q4 PRN for air hunger and xanax

## 2020-07-03 NOTE — PROGRESS NOTE ADULT - PROBLEM SELECTOR PLAN 3
Acute left lateral 7th rib fracture. Subacute right posterolateral sixth rib fracture. No pneumothorax. Pathologic fracture of T7 which may correspond to focus of FDG avidity on outside PET scan.  - pain meds as below; added mg citrate to bowel regimen 7/2  - pt given incentive spirometry, encouraged to use every hour

## 2020-07-04 LAB
GLUCOSE BLDC GLUCOMTR-MCNC: 200 MG/DL — HIGH (ref 70–99)
GLUCOSE BLDC GLUCOMTR-MCNC: 233 MG/DL — HIGH (ref 70–99)
GLUCOSE BLDC GLUCOMTR-MCNC: 266 MG/DL — HIGH (ref 70–99)
GLUCOSE BLDC GLUCOMTR-MCNC: 289 MG/DL — HIGH (ref 70–99)

## 2020-07-04 PROCEDURE — 99233 SBSQ HOSP IP/OBS HIGH 50: CPT | Mod: GC

## 2020-07-04 RX ORDER — ALBUTEROL 90 UG/1
3 AEROSOL, METERED ORAL
Qty: 3 | Refills: 0
Start: 2020-07-04

## 2020-07-04 RX ORDER — BUDESONIDE AND FORMOTEROL FUMARATE DIHYDRATE 160; 4.5 UG/1; UG/1
2 AEROSOL RESPIRATORY (INHALATION)
Qty: 1 | Refills: 0
Start: 2020-07-04

## 2020-07-04 RX ORDER — ALPRAZOLAM 0.25 MG
1 TABLET ORAL
Qty: 5 | Refills: 0
Start: 2020-07-04

## 2020-07-04 RX ORDER — ALPRAZOLAM 0.25 MG
1 TABLET ORAL
Qty: 0 | Refills: 0 | DISCHARGE

## 2020-07-04 RX ORDER — POLYETHYLENE GLYCOL 3350 17 G/17G
17 POWDER, FOR SOLUTION ORAL
Qty: 119 | Refills: 0
Start: 2020-07-04

## 2020-07-04 RX ORDER — IPRATROPIUM/ALBUTEROL SULFATE 18-103MCG
3 AEROSOL WITH ADAPTER (GRAM) INHALATION EVERY 6 HOURS
Refills: 0 | Status: DISCONTINUED | OUTPATIENT
Start: 2020-07-04 | End: 2020-07-05

## 2020-07-04 RX ORDER — SODIUM CHLORIDE 9 MG/ML
3 INJECTION INTRAMUSCULAR; INTRAVENOUS; SUBCUTANEOUS EVERY 6 HOURS
Refills: 0 | Status: DISCONTINUED | OUTPATIENT
Start: 2020-07-04 | End: 2020-07-05

## 2020-07-04 RX ORDER — ALBUTEROL 90 UG/1
2 AEROSOL, METERED ORAL EVERY 4 HOURS
Refills: 0 | Status: DISCONTINUED | OUTPATIENT
Start: 2020-07-04 | End: 2020-07-05

## 2020-07-04 RX ORDER — OXYCODONE HYDROCHLORIDE 5 MG/1
1 TABLET ORAL
Qty: 10 | Refills: 0
Start: 2020-07-04

## 2020-07-04 RX ADMIN — AMLODIPINE BESYLATE 5 MILLIGRAM(S): 2.5 TABLET ORAL at 06:39

## 2020-07-04 RX ADMIN — CYCLOBENZAPRINE HYDROCHLORIDE 5 MILLIGRAM(S): 10 TABLET, FILM COATED ORAL at 06:39

## 2020-07-04 RX ADMIN — POLYETHYLENE GLYCOL 3350 17 GRAM(S): 17 POWDER, FOR SOLUTION ORAL at 06:40

## 2020-07-04 RX ADMIN — Medication 15 UNIT(S): at 18:07

## 2020-07-04 RX ADMIN — Medication 1 PUFF(S): at 11:16

## 2020-07-04 RX ADMIN — CHLORHEXIDINE GLUCONATE 1 APPLICATION(S): 213 SOLUTION TOPICAL at 18:09

## 2020-07-04 RX ADMIN — CHLORHEXIDINE GLUCONATE 1 APPLICATION(S): 213 SOLUTION TOPICAL at 06:43

## 2020-07-04 RX ADMIN — Medication 40 MILLIGRAM(S): at 06:39

## 2020-07-04 RX ADMIN — Medication 15 MILLIGRAM(S): at 06:44

## 2020-07-04 RX ADMIN — CYCLOBENZAPRINE HYDROCHLORIDE 5 MILLIGRAM(S): 10 TABLET, FILM COATED ORAL at 18:07

## 2020-07-04 RX ADMIN — ENOXAPARIN SODIUM 40 MILLIGRAM(S): 100 INJECTION SUBCUTANEOUS at 11:16

## 2020-07-04 RX ADMIN — BUDESONIDE AND FORMOTEROL FUMARATE DIHYDRATE 2 PUFF(S): 160; 4.5 AEROSOL RESPIRATORY (INHALATION) at 21:51

## 2020-07-04 RX ADMIN — Medication 2: at 22:34

## 2020-07-04 RX ADMIN — OXYCODONE HYDROCHLORIDE 5 MILLIGRAM(S): 5 TABLET ORAL at 06:38

## 2020-07-04 RX ADMIN — PREGABALIN 1000 MICROGRAM(S): 225 CAPSULE ORAL at 11:16

## 2020-07-04 RX ADMIN — SODIUM CHLORIDE 3 MILLILITER(S): 9 INJECTION INTRAMUSCULAR; INTRAVENOUS; SUBCUTANEOUS at 23:10

## 2020-07-04 RX ADMIN — ALBUTEROL 2 PUFF(S): 90 AEROSOL, METERED ORAL at 06:40

## 2020-07-04 RX ADMIN — Medication 4: at 09:08

## 2020-07-04 RX ADMIN — INSULIN GLARGINE 35 UNIT(S): 100 INJECTION, SOLUTION SUBCUTANEOUS at 22:34

## 2020-07-04 RX ADMIN — BUDESONIDE AND FORMOTEROL FUMARATE DIHYDRATE 2 PUFF(S): 160; 4.5 AEROSOL RESPIRATORY (INHALATION) at 09:08

## 2020-07-04 RX ADMIN — Medication 6 MILLIGRAM(S): at 22:33

## 2020-07-04 RX ADMIN — SODIUM CHLORIDE 3 MILLILITER(S): 9 INJECTION INTRAMUSCULAR; INTRAVENOUS; SUBCUTANEOUS at 15:13

## 2020-07-04 RX ADMIN — Medication 2: at 13:18

## 2020-07-04 RX ADMIN — Medication 200 MILLIGRAM(S): at 06:40

## 2020-07-04 RX ADMIN — GABAPENTIN 200 MILLIGRAM(S): 400 CAPSULE ORAL at 22:33

## 2020-07-04 RX ADMIN — CARVEDILOL PHOSPHATE 6.25 MILLIGRAM(S): 80 CAPSULE, EXTENDED RELEASE ORAL at 18:07

## 2020-07-04 RX ADMIN — OXYCODONE HYDROCHLORIDE 5 MILLIGRAM(S): 5 TABLET ORAL at 11:14

## 2020-07-04 RX ADMIN — Medication 15 UNIT(S): at 13:18

## 2020-07-04 RX ADMIN — Medication 15 UNIT(S): at 09:08

## 2020-07-04 RX ADMIN — PANTOPRAZOLE SODIUM 40 MILLIGRAM(S): 20 TABLET, DELAYED RELEASE ORAL at 06:39

## 2020-07-04 RX ADMIN — ATORVASTATIN CALCIUM 40 MILLIGRAM(S): 80 TABLET, FILM COATED ORAL at 22:33

## 2020-07-04 RX ADMIN — Medication 6: at 18:07

## 2020-07-04 RX ADMIN — GABAPENTIN 200 MILLIGRAM(S): 400 CAPSULE ORAL at 06:39

## 2020-07-04 RX ADMIN — CARVEDILOL PHOSPHATE 6.25 MILLIGRAM(S): 80 CAPSULE, EXTENDED RELEASE ORAL at 06:39

## 2020-07-04 RX ADMIN — ALBUTEROL 2 PUFF(S): 90 AEROSOL, METERED ORAL at 09:08

## 2020-07-04 RX ADMIN — GABAPENTIN 200 MILLIGRAM(S): 400 CAPSULE ORAL at 13:19

## 2020-07-04 RX ADMIN — ALBUTEROL 2 PUFF(S): 90 AEROSOL, METERED ORAL at 01:15

## 2020-07-04 RX ADMIN — Medication 3 MILLILITER(S): at 23:10

## 2020-07-04 RX ADMIN — FAMOTIDINE 20 MILLIGRAM(S): 10 INJECTION INTRAVENOUS at 11:14

## 2020-07-04 RX ADMIN — Medication 3 MILLILITER(S): at 15:07

## 2020-07-04 RX ADMIN — Medication 1 PUFF(S): at 06:39

## 2020-07-04 RX ADMIN — Medication 1 PUFF(S): at 01:21

## 2020-07-04 NOTE — PROGRESS NOTE ADULT - ATTENDING COMMENTS
Patient seen and examined, case d/w house staff.  65 F with chronic resp failure d/t COPD on 5 L O2 at home, Lung Ca s/p wedge resection 2016 w/ recurrent mets to mediastinal LN on chemo a/w COPD exacerbation, found to have RML mucus plug with atelectasis, and atraumatic rib fractures, s/p bronchoscopy 6/29 with removal of mucus plug and RML biopsy. Pt is still c/o dyspnea, no chest pain. Lung exam mainly clear, scattered wheezing, decreased air entry.  Assessment/plan:  # dyspnea d/t end stage lung disease from advanced COPD, lung CA, wedge resection and atelectasis  -repeat covid swab neg on 7/3, switch to duoneb if able  -weaned O2 to 5 L  (home requirement), slow taper prednisone 10 mg q3 days per pulm, pt is requesting concentrator at home, f/u SW  - c/w incentive spirometry, chest PT  -oxycodone for air hunger, xanax prn anxiety  -Pain control for rib fx, on lidoderm patch   -f/u palliative care  -outpt f/u oncology Dr. Mendiola, was treated with immunotherapy in past  -D/c planning Patient seen and examined, case d/w house staff.  65 F with chronic resp failure d/t COPD on 5 L O2 at home, Lung Ca s/p wedge resection 2016 w/ recurrent mets to mediastinal LN on chemo a/w COPD exacerbation, found to have RML mucus plug with atelectasis, and atraumatic rib fractures, s/p bronchoscopy 6/29 with removal of mucus plug and RML biopsy. Pt is still c/o dyspnea, no chest pain. Lung exam  expiratory wheezing, decreased air entry    Assessment/plan:  # dyspnea d/t end stage lung disease from advanced COPD, lung CA, wedge resection and atelectasis  -repeat covid swab neg on 7/3  -cleared by infection control to restart duoneb q6h and saline 3% 3 ml q6h, pt should be d/c'd on nebs as she has nebulizer machine at home   -weaned O2 to 5 L  (home requirement), slow taper prednisone 10 mg q3 days per pulm, pt is requesting concentrator at home, f/u SW  - c/w incentive spirometry, chest PT  -oxycodone for air hunger, xanax prn anxiety  -Pain control for rib fx, on lidoderm patch   -f/u palliative care  -outpt f/u oncology Dr. Mendiola, was treated with immunotherapy in past  -D/c plan tomorrow if clinically improves

## 2020-07-04 NOTE — PROGRESS NOTE ADULT - PROBLEM SELECTOR PLAN 4
- s/p RLL resection 2017; 1 cycle chemo the week prior to presentation.  - Now with findings of 4.4-cm perihilar LN on CTA chest. Need to contact oupt oncologist Dr. Mendiola [phone]  - F/u bronch biopsy results  -B12 started 6/29

## 2020-07-04 NOTE — PROGRESS NOTE ADULT - PROBLEM SELECTOR PLAN 5
- Significant for R neck pain, less likely fracture from imaging results. CT neck soft tissue 6/25 showed: No cervical mass or adenopathy identified. Enlarged mediastinal nodes.  - Also with acute L lateral 7th rib fracture and subacute R posterolateral 6th rib fracture.  - ISTOP performed (#[id number]). Confirmed that patient is on Endocet (10 oxy/325 acetaminophen)  - On Percocet (5 oxy/325 acetaminophen) Q6H PRN for severe pain. Miralax for BM regimen started 6/29, mg citrate 7/2  - Robitussin and tessalon pearls for cough as it exacerbates rib pain  -Toradol for rib fx pain started on 6/29. - Significant for R neck pain, less likely fracture from imaging results. CT neck soft tissue 6/25 showed: No cervical mass or adenopathy identified. Enlarged mediastinal nodes.  - Also with acute L lateral 7th rib fracture and subacute R posterolateral 6th rib fracture.  - ISTOP performed (#[id number]). Confirmed that patient is on Endocet (10 oxy/325 acetaminophen)  - On Percocet (5 oxy/325 acetaminophen) Q6H PRN for severe pain. Miralax for BM regimen started 6/29, mg citrate 7/2  - Robitussin and tessalon pearls for cough as it exacerbates rib pain

## 2020-07-04 NOTE — PROGRESS NOTE ADULT - PROBLEM SELECTOR PLAN 1
Likely 2/2 atelectasis, possible contribution from paraesophageal mass seen on CTA in setting of R sided lung cancer s/p RLL resection 2017. COPD less likely as does not have sputum production, fever, leukocytosis. She uses 5L oxygen at home and still feels SOB and requests more oxygen for home.   -CT angio 6/25: No pulmonary embolism. Complete atelectasis of the right middle lobe secondary to mucous plugging. Increasing mediastinal lymphadenopathy, with a left paraesophageal node measuring up to 4.4 cm in greatest dimension, concerning for metastatic disease.   -CXR (6/28) with linear scar versus atelectasis in the right mid and lower lung and left lower lung linear atelectasis.  -s/p bronch 6/30, f/u RML bx, BAL no org on gram stain and cx showing normal resp sundar; f/u CXR report s/p bronch  - Moderate discomfort on 5L O2.  - Appreciate pulm recs: Continue prednisone 40 mg daily (day 3), suggest slow taper (decrease by 10mg every 3 days), Continue duonebs and hypertonic saline nebulizations Q6h as well as acapella/aerobika and chest PT to facilitate mobilization of secretions, rec low dose benzo/opioid for air hunger/anxiety and palliative consult. SOB no longer believed to be d/t COPD, but met lung CA.  -appreciate palliative recs  -discontinued neb tx; albuterol and ipratropium inhaler given  -appreciate pall recs: oxy 5 mg po q4 PRN for air hunger and xanax Likely 2/2 atelectasis, possible contribution from paraesophageal mass seen on CTA in setting of R sided lung cancer s/p RLL resection 2017. COPD less likely as does not have sputum production, fever, leukocytosis. She uses 5L oxygen at home and still feels SOB and requests more oxygen for home.   -CT angio 6/25: No pulmonary embolism. Complete atelectasis of the right middle lobe secondary to mucous plugging. Increasing mediastinal lymphadenopathy, with a left paraesophageal node measuring up to 4.4 cm in greatest dimension, concerning for metastatic disease.   -CXR (6/28) with linear scar versus atelectasis in the right mid and lower lung and left lower lung linear atelectasis.  -s/p bronch 6/30, f/u RML bx, BAL no org on gram stain and cx showing normal resp sundar  - Moderate discomfort on 5L O2.  - Appreciate pulm recs: Continue prednisone 30 mg daily (day 3), suggest slow taper (decrease by 10mg every 3 days), Continue duonebs and hypertonic saline nebulizations Q6h as well as acapella/aerobika and chest PT to facilitate mobilization of secretions, rec low dose benzo/opioid for air hunger/anxiety and palliative consult. SOB no longer believed to be d/t COPD, but met lung CA.  -appreciate palliative recs  -discontinued neb tx; albuterol and ipratropium inhaler given  -appreciate pall recs: oxy 5 mg po q4 PRN for air hunger and xanax

## 2020-07-04 NOTE — PROGRESS NOTE ADULT - SUBJECTIVE AND OBJECTIVE BOX
Internal Medicine Progress Note    Micheline Thompson MD PGY-1  Pager: [phone]; [facility] # [id number]; Team 7 Spectra [phone]  Please page [phone] after 7 pm or after 12 pm on weekends    Patient is a 65y old  Female who presents with a chief complaint of SOB, fractures (04 Jul 2020 06:23)      SUBJECTIVE / OVERNIGHT EVENTS: No acute events o/n. Pt's COVID swab neg from o/n. At bedside, pt is tearful because she continues to feel SOB despite all pulm recs and interventions. Pt states her SOB has been consistent and constant for days now. Endorsing "small BM" yesterday.     MEDICATIONS  (STANDING):  albuterol/ipratropium for Nebulization 3 milliLiter(s) Nebulizer every 6 hours  amLODIPine   Tablet 5 milliGRAM(s) Oral daily  atorvastatin 40 milliGRAM(s) Oral at bedtime  budesonide 160 MICROgram(s)/formoterol 4.5 MICROgram(s) Inhaler 2 Puff(s) Inhalation two times a day  carvedilol 6.25 milliGRAM(s) Oral every 12 hours  chlorhexidine 4% Liquid 1 Application(s) Topical two times a day  cyanocobalamin 1000 MICROGram(s) Oral daily  cyclobenzaprine 5 milliGRAM(s) Oral two times a day  dextrose 5%. 1000 milliLiter(s) (50 mL/Hr) IV Continuous <Continuous>  dextrose 50% Injectable 12.5 Gram(s) IV Push once  dextrose 50% Injectable 25 Gram(s) IV Push once  dextrose 50% Injectable 25 Gram(s) IV Push once  enoxaparin Injectable 40 milliGRAM(s) SubCutaneous daily  famotidine    Tablet 20 milliGRAM(s) Oral daily  gabapentin 200 milliGRAM(s) Oral three times a day  insulin glargine Injectable (LANTUS) 35 Unit(s) SubCutaneous at bedtime  insulin lispro (HumaLOG) corrective regimen sliding scale   SubCutaneous three times a day before meals  insulin lispro (HumaLOG) corrective regimen sliding scale   SubCutaneous at bedtime  insulin lispro Injectable (HumaLOG) 15 Unit(s) SubCutaneous three times a day with meals  lidocaine   Patch 1 Patch Transdermal daily  melatonin 6 milliGRAM(s) Oral at bedtime  pantoprazole    Tablet 40 milliGRAM(s) Oral before breakfast  polyethylene glycol 3350 17 Gram(s) Oral two times a day  sodium chloride 3%  Inhalation 3 milliLiter(s) Inhalation every 6 hours    MEDICATIONS  (PRN):  ALBUTerol    90 MICROgram(s) HFA Inhaler 2 Puff(s) Inhalation every 4 hours PRN Shortness of Breath and/or Wheezing  ALPRAZolam 1 milliGRAM(s) Oral three times a day PRN Anxiety  bisacodyl Suppository 10 milliGRAM(s) Rectal daily PRN Constipation  dextrose 40% Gel 15 Gram(s) Oral once PRN Blood Glucose LESS THAN 70 milliGRAM(s)/deciliter  glucagon  Injectable 1 milliGRAM(s) IntraMuscular once PRN Glucose LESS THAN 70 milligrams/deciliter  guaiFENesin   Syrup  (Sugar-Free) 200 milliGRAM(s) Oral every 6 hours PRN Cough  oxyCODONE    IR 5 milliGRAM(s) Oral every 4 hours PRN Dyspnea or severe pain    Vital Signs Last 24 Hrs  T(C): 36.8 (04 Jul 2020 13:31), Max: 36.8 (04 Jul 2020 13:31)  T(F): 98.2 (04 Jul 2020 13:31), Max: 98.2 (04 Jul 2020 13:31)  HR: 78 (04 Jul 2020 15:21) (78 - 94)  BP: 120/75 (04 Jul 2020 13:31) (115/76 - 151/96)  BP(mean): --  RR: 16 (04 Jul 2020 13:31) (16 - 19)  SpO2: 96% (04 Jul 2020 15:21) (96% - 100%)    CAPILLARY BLOOD GLUCOSE      POCT Blood Glucose.: 266 mg/dL (04 Jul 2020 17:44)  POCT Blood Glucose.: 200 mg/dL (04 Jul 2020 12:39)  POCT Blood Glucose.: 233 mg/dL (04 Jul 2020 08:39)  POCT Blood Glucose.: 288 mg/dL (03 Jul 2020 22:20)    I&O's Summary      PHYSICAL EXAM  GENERAL: NAD  HEAD:  Atraumatic  EYES: EOMI, conjunctiva and sclera clear  NECK: Supple, No JVD  CHEST/LUNG: coarse breath sounds b/l  HEART: Regular rate and rhythm; No murmurs, rubs, or gallops  ABDOMEN: Soft, Nontender, Nondistended; Bowel sounds present  EXTREMITIES:  2+ Peripheral Pulses, No clubbing, cyanosis, or edema  NEURO/PSYCH: AAOx3, nonfocal  SKIN: No rashes or lesions      LABS:                        11.4   9.99  )-----------( 164      ( 03 Jul 2020 11:38 )             36.0     Hemoglobin: 11.4 g/dL (07-03 @ 11:38)  Hemoglobin: 11.1 g/dL (07-03 @ 08:00)  Hemoglobin: 11.8 g/dL (07-01 @ 06:15)    CBC Full  -  ( 03 Jul 2020 11:38 )  WBC Count : 9.99 K/uL  RBC Count : 4.24 M/uL  Hemoglobin : 11.4 g/dL  Hematocrit : 36.0 %  Platelet Count - Automated : 164 K/uL  Mean Cell Volume : 84.9 fL  Mean Cell Hemoglobin : 26.9 pg  Mean Cell Hemoglobin Concentration : 31.7 %  Auto Neutrophil # : x  Auto Lymphocyte # : x  Auto Monocyte # : x  Auto Eosinophil # : x  Auto Basophil # : x  Auto Neutrophil % : x  Auto Lymphocyte % : x  Auto Monocyte % : x  Auto Eosinophil % : x  Auto Basophil % : x    07-03    141  |  99  |  32<H>  ----------------------------<  186<H>  4.1   |  29  |  0.70    Ca    8.8      03 Jul 2020 11:38  Phos  3.5     07-03  Mg     2.4     07-03      Creatinine Trend: 0.70<--, 0.70<--, 0.57<--, 0.66<--, 0.49<--, 0.55<--

## 2020-07-05 ENCOUNTER — TRANSCRIPTION ENCOUNTER (OUTPATIENT)
Age: 65
End: 2020-07-05

## 2020-07-05 VITALS — OXYGEN SATURATION: 98 %

## 2020-07-05 LAB
GLUCOSE BLDC GLUCOMTR-MCNC: 144 MG/DL — HIGH (ref 70–99)
GLUCOSE BLDC GLUCOMTR-MCNC: 181 MG/DL — HIGH (ref 70–99)
GLUCOSE BLDC GLUCOMTR-MCNC: 283 MG/DL — HIGH (ref 70–99)

## 2020-07-05 PROCEDURE — 99239 HOSP IP/OBS DSCHRG MGMT >30: CPT | Mod: GC

## 2020-07-05 RX ORDER — BUDESONIDE AND FORMOTEROL FUMARATE DIHYDRATE 160; 4.5 UG/1; UG/1
2 AEROSOL RESPIRATORY (INHALATION)
Qty: 1 | Refills: 0
Start: 2020-07-05

## 2020-07-05 RX ORDER — SODIUM CHLORIDE 9 MG/ML
3 INJECTION INTRAMUSCULAR; INTRAVENOUS; SUBCUTANEOUS
Qty: 120 | Refills: 0
Start: 2020-07-05

## 2020-07-05 RX ORDER — ALBUTEROL 90 UG/1
3 AEROSOL, METERED ORAL
Qty: 3 | Refills: 0
Start: 2020-07-05

## 2020-07-05 RX ORDER — ALPRAZOLAM 0.25 MG
1 TABLET ORAL
Qty: 5 | Refills: 0
Start: 2020-07-05

## 2020-07-05 RX ORDER — POLYETHYLENE GLYCOL 3350 17 G/17G
17 POWDER, FOR SOLUTION ORAL
Qty: 119 | Refills: 0
Start: 2020-07-05

## 2020-07-05 RX ORDER — FLUTICASONE FUROATE AND VILANTEROL TRIFENATATE 100; 25 UG/1; UG/1
1 POWDER RESPIRATORY (INHALATION)
Qty: 0 | Refills: 0 | DISCHARGE

## 2020-07-05 RX ORDER — OXYCODONE HYDROCHLORIDE 5 MG/1
1 TABLET ORAL
Qty: 10 | Refills: 0
Start: 2020-07-05

## 2020-07-05 RX ADMIN — SODIUM CHLORIDE 3 MILLILITER(S): 9 INJECTION INTRAMUSCULAR; INTRAVENOUS; SUBCUTANEOUS at 09:22

## 2020-07-05 RX ADMIN — Medication 3 MILLILITER(S): at 15:18

## 2020-07-05 RX ADMIN — PREGABALIN 1000 MICROGRAM(S): 225 CAPSULE ORAL at 13:19

## 2020-07-05 RX ADMIN — POLYETHYLENE GLYCOL 3350 17 GRAM(S): 17 POWDER, FOR SOLUTION ORAL at 06:58

## 2020-07-05 RX ADMIN — CYCLOBENZAPRINE HYDROCHLORIDE 5 MILLIGRAM(S): 10 TABLET, FILM COATED ORAL at 18:07

## 2020-07-05 RX ADMIN — CARVEDILOL PHOSPHATE 6.25 MILLIGRAM(S): 80 CAPSULE, EXTENDED RELEASE ORAL at 06:59

## 2020-07-05 RX ADMIN — PANTOPRAZOLE SODIUM 40 MILLIGRAM(S): 20 TABLET, DELAYED RELEASE ORAL at 06:59

## 2020-07-05 RX ADMIN — Medication 3 MILLILITER(S): at 05:17

## 2020-07-05 RX ADMIN — SODIUM CHLORIDE 3 MILLILITER(S): 9 INJECTION INTRAMUSCULAR; INTRAVENOUS; SUBCUTANEOUS at 15:18

## 2020-07-05 RX ADMIN — AMLODIPINE BESYLATE 5 MILLIGRAM(S): 2.5 TABLET ORAL at 06:59

## 2020-07-05 RX ADMIN — FAMOTIDINE 20 MILLIGRAM(S): 10 INJECTION INTRAVENOUS at 13:19

## 2020-07-05 RX ADMIN — CARVEDILOL PHOSPHATE 6.25 MILLIGRAM(S): 80 CAPSULE, EXTENDED RELEASE ORAL at 17:55

## 2020-07-05 RX ADMIN — Medication 15 UNIT(S): at 09:10

## 2020-07-05 RX ADMIN — Medication 15 UNIT(S): at 13:19

## 2020-07-05 RX ADMIN — Medication 6: at 13:00

## 2020-07-05 RX ADMIN — SODIUM CHLORIDE 3 MILLILITER(S): 9 INJECTION INTRAMUSCULAR; INTRAVENOUS; SUBCUTANEOUS at 05:17

## 2020-07-05 RX ADMIN — GABAPENTIN 200 MILLIGRAM(S): 400 CAPSULE ORAL at 13:19

## 2020-07-05 RX ADMIN — Medication 3 MILLILITER(S): at 09:18

## 2020-07-05 RX ADMIN — CHLORHEXIDINE GLUCONATE 1 APPLICATION(S): 213 SOLUTION TOPICAL at 18:07

## 2020-07-05 RX ADMIN — CHLORHEXIDINE GLUCONATE 1 APPLICATION(S): 213 SOLUTION TOPICAL at 06:59

## 2020-07-05 RX ADMIN — OXYCODONE HYDROCHLORIDE 5 MILLIGRAM(S): 5 TABLET ORAL at 18:06

## 2020-07-05 RX ADMIN — CYCLOBENZAPRINE HYDROCHLORIDE 5 MILLIGRAM(S): 10 TABLET, FILM COATED ORAL at 06:59

## 2020-07-05 RX ADMIN — Medication 30 MILLIGRAM(S): at 06:59

## 2020-07-05 RX ADMIN — GABAPENTIN 200 MILLIGRAM(S): 400 CAPSULE ORAL at 06:59

## 2020-07-05 RX ADMIN — BUDESONIDE AND FORMOTEROL FUMARATE DIHYDRATE 2 PUFF(S): 160; 4.5 AEROSOL RESPIRATORY (INHALATION) at 09:11

## 2020-07-05 RX ADMIN — OXYCODONE HYDROCHLORIDE 5 MILLIGRAM(S): 5 TABLET ORAL at 09:10

## 2020-07-05 NOTE — PROGRESS NOTE ADULT - PROVIDER SPECIALTY LIST ADULT
Anesthesia
CT Surgery
Internal Medicine
Pulmonology
Pulmonology
Thoracic Surgery
Internal Medicine

## 2020-07-05 NOTE — PROGRESS NOTE ADULT - PROBLEM SELECTOR PLAN 5
- Significant for R neck pain, less likely fracture from imaging results. CT neck soft tissue 6/25 showed: No cervical mass or adenopathy identified. Enlarged mediastinal nodes.  - Also with acute L lateral 7th rib fracture and subacute R posterolateral 6th rib fracture.  - ISTOP performed (#[id number]). Confirmed that patient is on Endocet (10 oxy/325 acetaminophen)  - On Percocet (5 oxy/325 acetaminophen) Q6H PRN for severe pain. Miralax for BM regimen started 6/29, mg citrate 7/2  - Robitussin and tessalon pearls for cough as it exacerbates rib pain

## 2020-07-05 NOTE — PROGRESS NOTE ADULT - PROBLEM SELECTOR PLAN 9
Transitions of Care Status:  1.  Name of PCP:  2.  PCP Contacted on Admission: [ ] Y    [ ] N    3.  PCP contacted at Discharge: [ ] Y    [ ] N    [ ] N/A  4.  Post-Discharge Appointment Date and Location:  5.  Summary of Handoff given to PCP:    -Pt requesting outpt endocrine referral  -PT consulted 6/29; will appreciate recs, pt lives in four story walk up (remains there b/c rent controlled)
Transitions of Care Status:  1.  Name of PCP:  2.  PCP Contacted on Admission: [ ] Y    [ ] N    3.  PCP contacted at Discharge: [ ] Y    [ ] N    [ ] N/A  4.  Post-Discharge Appointment Date and Location:  5.  Summary of Handoff given to PCP:
Transitions of Care Status:  1.  Name of PCP:  2.  PCP Contacted on Admission: [ ] Y    [ ] N    3.  PCP contacted at Discharge: [ ] Y    [ ] N    [ ] N/A  4.  Post-Discharge Appointment Date and Location:  5.  Summary of Handoff given to PCP:    -Pt requesting outpt endocrine referral  -PT consulted 6/29; will appreciate recs, pt lives in four story walk up (remains there b/c rent controlled)

## 2020-07-05 NOTE — PROGRESS NOTE ADULT - ASSESSMENT
Ms. Olson is a 64 y/o with PMH R lung cancer s/p resection 2017 on chemo, COPD with multiple hospital admissions, 80 pack year smoking hx, fractures in L ribs presenting for SOB (on 5L O2 at home) and pain from fractures. Continuous SOB no longer likely 2/2 COPD, but met lung CA, s/p bronch 6/30.

## 2020-07-05 NOTE — DISCHARGE NOTE NURSING/CASE MANAGEMENT/SOCIAL WORK - NSSCNAMETXT_GEN_ALL_CORE
[facility] . Initial visit will be day after discharge home. A nurse will call prior to home visit

## 2020-07-05 NOTE — PROGRESS NOTE ADULT - SUBJECTIVE AND OBJECTIVE BOX
Chloé Andrea  Internal Medicine PGY3  Pager [phone]    Patient is a 65y old  Female who presents with a chief complaint of SOB, fractures (04 Jul 2020 06:23)      SUBJECTIVE / OVERNIGHT EVENTS: Patient seen and examined at bedside.     ADDITIONAL REVIEW OF SYSTEMS:    MEDICATIONS  (STANDING):  albuterol/ipratropium for Nebulization 3 milliLiter(s) Nebulizer every 6 hours  amLODIPine   Tablet 5 milliGRAM(s) Oral daily  atorvastatin 40 milliGRAM(s) Oral at bedtime  budesonide 160 MICROgram(s)/formoterol 4.5 MICROgram(s) Inhaler 2 Puff(s) Inhalation two times a day  carvedilol 6.25 milliGRAM(s) Oral every 12 hours  chlorhexidine 4% Liquid 1 Application(s) Topical two times a day  cyanocobalamin 1000 MICROGram(s) Oral daily  cyclobenzaprine 5 milliGRAM(s) Oral two times a day  dextrose 5%. 1000 milliLiter(s) (50 mL/Hr) IV Continuous <Continuous>  dextrose 50% Injectable 12.5 Gram(s) IV Push once  dextrose 50% Injectable 25 Gram(s) IV Push once  dextrose 50% Injectable 25 Gram(s) IV Push once  enoxaparin Injectable 40 milliGRAM(s) SubCutaneous daily  famotidine    Tablet 20 milliGRAM(s) Oral daily  gabapentin 200 milliGRAM(s) Oral three times a day  insulin glargine Injectable (LANTUS) 35 Unit(s) SubCutaneous at bedtime  insulin lispro (HumaLOG) corrective regimen sliding scale   SubCutaneous three times a day before meals  insulin lispro (HumaLOG) corrective regimen sliding scale   SubCutaneous at bedtime  insulin lispro Injectable (HumaLOG) 15 Unit(s) SubCutaneous three times a day with meals  lidocaine   Patch 1 Patch Transdermal daily  melatonin 6 milliGRAM(s) Oral at bedtime  pantoprazole    Tablet 40 milliGRAM(s) Oral before breakfast  polyethylene glycol 3350 17 Gram(s) Oral two times a day  predniSONE   Tablet 30 milliGRAM(s) Oral daily  sodium chloride 3%  Inhalation 3 milliLiter(s) Inhalation every 6 hours    MEDICATIONS  (PRN):  ALBUTerol    90 MICROgram(s) HFA Inhaler 2 Puff(s) Inhalation every 4 hours PRN Shortness of Breath and/or Wheezing  ALPRAZolam 1 milliGRAM(s) Oral three times a day PRN Anxiety  bisacodyl Suppository 10 milliGRAM(s) Rectal daily PRN Constipation  dextrose 40% Gel 15 Gram(s) Oral once PRN Blood Glucose LESS THAN 70 milliGRAM(s)/deciliter  glucagon  Injectable 1 milliGRAM(s) IntraMuscular once PRN Glucose LESS THAN 70 milligrams/deciliter  guaiFENesin   Syrup  (Sugar-Free) 200 milliGRAM(s) Oral every 6 hours PRN Cough  oxyCODONE    IR 5 milliGRAM(s) Oral every 4 hours PRN Dyspnea or severe pain      CAPILLARY BLOOD GLUCOSE      POCT Blood Glucose.: 289 mg/dL (04 Jul 2020 22:00)  POCT Blood Glucose.: 266 mg/dL (04 Jul 2020 17:44)  POCT Blood Glucose.: 200 mg/dL (04 Jul 2020 12:39)  POCT Blood Glucose.: 233 mg/dL (04 Jul 2020 08:39)    I&O's Summary      PHYSICAL EXAM:    Vital Signs Last 24 Hrs  T(C): 36.4 (05 Jul 2020 06:55), Max: 36.8 (04 Jul 2020 13:31)  T(F): 97.6 (05 Jul 2020 06:55), Max: 98.2 (04 Jul 2020 13:31)  HR: 81 (05 Jul 2020 06:55) (78 - 88)  BP: 132/63 (05 Jul 2020 06:55) (120/75 - 132/63)  BP(mean): --  RR: 18 (05 Jul 2020 06:55) (16 - 18)  SpO2: 98% (05 Jul 2020 06:55) (96% - 100%)    CONSTITUTIONAL: NAD, well-developed, well-groomed  EYES: Conjunctiva and sclera clear  ENMT: Moist oral mucosa, no pharyngeal injection or exudates; normal dentition  NECK: Supple, no palpable masses; no thyromegaly  RESPIRATORY: Normal respiratory effort; lungs are clear to auscultation bilaterally  CARDIOVASCULAR: Regular rate and rhythm, normal S1 and S2, no murmur/rub/gallop; No lower extremity edema; Peripheral pulses are 2+ bilaterally  ABDOMEN: Nontender to palpation, normoactive bowel sounds, no rebound/guarding  MUSCULOSKELETAL: No clubbing or cyanosis of digits; no joint swelling or tenderness to palpation  PSYCH: A+O to person, place, and time; affect appropriate  NEUROLOGY: CN 2-12 are intact and symmetric; no gross sensory deficits   SKIN: No rashes; no palpable lesions    LABS:                        11.4   9.99  )-----------( 164      ( 03 Jul 2020 11:38 )             36.0     07-03    141  |  99  |  32<H>  ----------------------------<  186<H>  4.1   |  29  |  0.70    Ca    8.8      03 Jul 2020 11:38  Phos  3.5     07-03  Mg     2.4     07-03                  RADIOLOGY & ADDITIONAL TESTS: No new imaging  Results Reviewed: Yes  Imaging Personally Reviewed:  Electrocardiogram Personally Reviewed:    COORDINATION OF CARE:  Care Discussed with Consultants/Other Providers [Y/N]:  Prior or Outpatient Records Reviewed [Y/N]:

## 2020-07-05 NOTE — PROGRESS NOTE ADULT - PROBLEM SELECTOR PROBLEM 1
No answer upon arrival. Will contact to reschedule patient.  
Acute respiratory failure with hypoxia

## 2020-07-05 NOTE — PROGRESS NOTE ADULT - PROBLEM SELECTOR PROBLEM 6
Type 2 diabetes mellitus, uncontrolled
Lung cancer

## 2020-07-05 NOTE — DISCHARGE NOTE NURSING/CASE MANAGEMENT/SOCIAL WORK - NSDCPNINST_GEN_ALL_CORE
patient is alert and orientedx4, able to make needs known. patient is being dc home. patient admitted for COPD exacerbation, patient is stable at the time of dc. no acute distress noted.

## 2020-07-05 NOTE — PROGRESS NOTE ADULT - PROBLEM SELECTOR PROBLEM 3
Rib fractures
Fracture
Rib fractures

## 2020-07-05 NOTE — PROGRESS NOTE ADULT - PROBLEM SELECTOR PLAN 1
Likely 2/2 atelectasis, possible contribution from paraesophageal mass seen on CTA in setting of R sided lung cancer s/p RLL resection 2017. COPD less likely as does not have sputum production, fever, leukocytosis. She uses 5L oxygen at home and still feels SOB and requests more oxygen for home.   -CT angio 6/25: No pulmonary embolism. Complete atelectasis of the right middle lobe secondary to mucous plugging. Increasing mediastinal lymphadenopathy, with a left paraesophageal node measuring up to 4.4 cm in greatest dimension, concerning for metastatic disease.   -CXR (6/28) with linear scar versus atelectasis in the right mid and lower lung and left lower lung linear atelectasis.  -s/p bronch 6/30, f/u RML bx, BAL no org on gram stain and cx showing normal resp sundar  - Moderate discomfort on 5L O2.  - Appreciate pulm recs: Continue prednisone 30 mg daily (day 3), suggest slow taper (decrease by 10mg every 3 days), Continue duonebs and hypertonic saline nebulizations Q6h as well as acapella/aerobika and chest PT to facilitate mobilization of secretions, rec low dose benzo/opioid for air hunger/anxiety and palliative consult. SOB no longer believed to be d/t COPD, but met lung CA.  -appreciate palliative recs  -discontinued neb tx; albuterol and ipratropium inhaler given  -appreciate pall recs: oxy 5 mg po q4 PRN for air hunger and xanax

## 2020-07-05 NOTE — DISCHARGE NOTE NURSING/CASE MANAGEMENT/SOCIAL WORK - NSDCFUADDAPPT_GEN_ALL_CORE_FT
-Please follow up with Dr. Nugent or Dr. Jean-Baptiste for pulmonology/COPD care within 1 week of discharge.  -Please follow up with Dr. Kurtz, your thoracic surgeon, within 1-2 weeks of discharge to discuss your bronchoscopic biopsy results.  -Please follow up with your oncologist, Dr. Menidola, within 1-2 weeks of discharge for further treatment of your lung cancer.  -Please set up care with an endocrinologist at the [facility] at [place] to optimize your diabetes management and insulin regimen.  -Please set up care with a behavioral health specialist at [facility] [phone] or the [facility] [phone].

## 2020-07-05 NOTE — DISCHARGE NOTE NURSING/CASE MANAGEMENT/SOCIAL WORK - PATIENT PORTAL LINK FT
You can access the FollowMyHealth Patient Portal offered by [facility] by registering at the following website: [email or web address]. By joining [employer]’s FollowMyHealth portal, you will also be able to view your health information using other applications (apps) compatible with our system.

## 2020-07-05 NOTE — PROGRESS NOTE ADULT - ATTENDING COMMENTS
Patient seen and examined, case d/w house staff.  65 F with chronic resp failure d/t COPD on 5 L O2 at home, Lung Ca s/p wedge resection 2016 w/ recurrent mets to mediastinal LN on chemo a/w COPD exacerbation, found to have RML mucus plug with atelectasis, and atraumatic rib fractures, s/p bronchoscopy 6/29 with removal of mucus plug and RML biopsy. Pt reports breathing better, but still trouble coughing up mucus.    Assessment/plan:  # dyspnea d/t end stage lung disease from advanced COPD, lung CA, wedge resection and atelectasis  -repeat covid swab neg on 7/3  -cleared by infection control to restart duoneb q6h and saline 3% 3 ml q6h, pt should be d/c'd on nebs  as she has nebulizer machine at home   -weaned O2 to 5 L  (home requirement), slow taper prednisone 10 mg q3 days per pulm  -pt will need transportation home with oxygen, f/u SW  - c/w incentive spirometry, chest PT  -oxycodone for air hunger, xanax prn anxiety  -Pain control for rib fx, on lidoderm patch   -f/u palliative care  -outpt f/u oncology Dr. Mendiola, was treated with immunotherapy in past  -D/c plan today

## 2020-07-05 NOTE — PROGRESS NOTE ADULT - PROBLEM SELECTOR PLAN 4
- s/p RLL resection 2017; 1 cycle chemo the week prior to presentation.  - Now with findings of 4.4-cm perihilar LN on CTA chest. Need to contact oupt oncologist Dr. Mendiola [phone]  - F/u bronch biopsy results  -B12 started 6/29

## 2020-07-05 NOTE — PROGRESS NOTE ADULT - REASON FOR ADMISSION
SOB, fractures

## 2020-07-05 NOTE — PROGRESS NOTE ADULT - PROBLEM SELECTOR PROBLEM 2
COPD (chronic obstructive pulmonary disease)
COPD exacerbation
COPD (chronic obstructive pulmonary disease)
COPD (chronic obstructive pulmonary disease)

## 2020-07-05 NOTE — PROGRESS NOTE ADULT - PROBLEM SELECTOR PROBLEM 4
Lung cancer
Neck pain
Lung cancer

## 2020-07-29 LAB
CULTURE RESULTS: SIGNIFICANT CHANGE UP
SPECIMEN SOURCE: SIGNIFICANT CHANGE UP

## 2020-07-31 NOTE — ED ADULT TRIAGE NOTE - LOCATION:
Left arm;
stable, continue Metoprolol Succinate 50mg PO daily, Enalapril 10mg PO daily and HCTZ 25mg PO daily.

## 2020-08-02 ENCOUNTER — INPATIENT (INPATIENT)
Facility: HOSPITAL | Age: 65
LOS: 3 days | Discharge: HOME | DRG: 190 | End: 2020-08-06
Attending: STUDENT IN AN ORGANIZED HEALTH CARE EDUCATION/TRAINING PROGRAM | Admitting: STUDENT IN AN ORGANIZED HEALTH CARE EDUCATION/TRAINING PROGRAM
Payer: MEDICAID

## 2020-08-02 VITALS
HEART RATE: 83 BPM | WEIGHT: 209.66 LBS | TEMPERATURE: 98 F | RESPIRATION RATE: 16 BRPM | OXYGEN SATURATION: 97 % | DIASTOLIC BLOOD PRESSURE: 73 MMHG | SYSTOLIC BLOOD PRESSURE: 125 MMHG

## 2020-08-02 DIAGNOSIS — C34.90 MALIGNANT NEOPLASM OF UNSPECIFIED PART OF UNSPECIFIED BRONCHUS OR LUNG: ICD-10-CM

## 2020-08-02 DIAGNOSIS — N39.0 URINARY TRACT INFECTION, SITE NOT SPECIFIED: ICD-10-CM

## 2020-08-02 DIAGNOSIS — Z29.9 ENCOUNTER FOR PROPHYLACTIC MEASURES, UNSPECIFIED: ICD-10-CM

## 2020-08-02 DIAGNOSIS — R06.02 SHORTNESS OF BREATH: ICD-10-CM

## 2020-08-02 DIAGNOSIS — G89.29 OTHER CHRONIC PAIN: ICD-10-CM

## 2020-08-02 DIAGNOSIS — K59.00 CONSTIPATION, UNSPECIFIED: ICD-10-CM

## 2020-08-02 DIAGNOSIS — Z71.89 OTHER SPECIFIED COUNSELING: ICD-10-CM

## 2020-08-02 DIAGNOSIS — J44.1 CHRONIC OBSTRUCTIVE PULMONARY DISEASE WITH (ACUTE) EXACERBATION: ICD-10-CM

## 2020-08-02 DIAGNOSIS — Z90.2 ACQUIRED ABSENCE OF LUNG [PART OF]: Chronic | ICD-10-CM

## 2020-08-02 DIAGNOSIS — Z90.89 ACQUIRED ABSENCE OF OTHER ORGANS: Chronic | ICD-10-CM

## 2020-08-02 DIAGNOSIS — I10 ESSENTIAL (PRIMARY) HYPERTENSION: ICD-10-CM

## 2020-08-02 DIAGNOSIS — Z90.49 ACQUIRED ABSENCE OF OTHER SPECIFIED PARTS OF DIGESTIVE TRACT: Chronic | ICD-10-CM

## 2020-08-02 DIAGNOSIS — E11.9 TYPE 2 DIABETES MELLITUS WITHOUT COMPLICATIONS: ICD-10-CM

## 2020-08-02 DIAGNOSIS — J96.01 ACUTE RESPIRATORY FAILURE WITH HYPOXIA: ICD-10-CM

## 2020-08-02 LAB
ALBUMIN SERPL ELPH-MCNC: 2.8 G/DL — LOW (ref 3.5–5)
ALP SERPL-CCNC: 157 U/L — HIGH (ref 40–120)
ALT FLD-CCNC: 26 U/L DA — SIGNIFICANT CHANGE UP (ref 10–60)
ANION GAP SERPL CALC-SCNC: 7 MMOL/L — SIGNIFICANT CHANGE UP (ref 5–17)
ANION GAP SERPL CALC-SCNC: 8 MMOL/L — SIGNIFICANT CHANGE UP (ref 5–17)
APPEARANCE UR: CLEAR — SIGNIFICANT CHANGE UP
APTT BLD: 25.4 SEC — LOW (ref 27.5–35.5)
AST SERPL-CCNC: 13 U/L — SIGNIFICANT CHANGE UP (ref 10–40)
BACTERIA # UR AUTO: ABNORMAL /HPF
BASE EXCESS BLDV CALC-SCNC: 8.1 MMOL/L — HIGH (ref -2–2)
BASOPHILS # BLD AUTO: 0 K/UL — SIGNIFICANT CHANGE UP (ref 0–0.2)
BASOPHILS NFR BLD AUTO: 0 % — SIGNIFICANT CHANGE UP (ref 0–2)
BILIRUB SERPL-MCNC: 1 MG/DL — SIGNIFICANT CHANGE UP (ref 0.2–1.2)
BILIRUB UR-MCNC: NEGATIVE — SIGNIFICANT CHANGE UP
BLOOD GAS COMMENTS, VENOUS: SIGNIFICANT CHANGE UP
BUN SERPL-MCNC: 15 MG/DL — SIGNIFICANT CHANGE UP (ref 7–18)
BUN SERPL-MCNC: 18 MG/DL — SIGNIFICANT CHANGE UP (ref 7–18)
CALCIUM SERPL-MCNC: 8.3 MG/DL — LOW (ref 8.4–10.5)
CALCIUM SERPL-MCNC: 8.4 MG/DL — SIGNIFICANT CHANGE UP (ref 8.4–10.5)
CHLORIDE SERPL-SCNC: 100 MMOL/L — SIGNIFICANT CHANGE UP (ref 96–108)
CHLORIDE SERPL-SCNC: 103 MMOL/L — SIGNIFICANT CHANGE UP (ref 96–108)
CHOLEST SERPL-MCNC: 148 MG/DL — SIGNIFICANT CHANGE UP (ref 10–199)
CK SERPL-CCNC: 34 U/L — SIGNIFICANT CHANGE UP (ref 21–215)
CO2 SERPL-SCNC: 29 MMOL/L — SIGNIFICANT CHANGE UP (ref 22–31)
CO2 SERPL-SCNC: 32 MMOL/L — HIGH (ref 22–31)
COLOR SPEC: YELLOW — SIGNIFICANT CHANGE UP
CREAT SERPL-MCNC: 0.77 MG/DL — SIGNIFICANT CHANGE UP (ref 0.5–1.3)
CREAT SERPL-MCNC: 0.8 MG/DL — SIGNIFICANT CHANGE UP (ref 0.5–1.3)
DIFF PNL FLD: NEGATIVE — SIGNIFICANT CHANGE UP
EOSINOPHIL # BLD AUTO: 0 K/UL — SIGNIFICANT CHANGE UP (ref 0–0.5)
EOSINOPHIL NFR BLD AUTO: 0 % — SIGNIFICANT CHANGE UP (ref 0–6)
EPI CELLS # UR: SIGNIFICANT CHANGE UP /HPF
GLUCOSE BLDC GLUCOMTR-MCNC: 254 MG/DL — HIGH (ref 70–99)
GLUCOSE BLDC GLUCOMTR-MCNC: 351 MG/DL — HIGH (ref 70–99)
GLUCOSE SERPL-MCNC: 213 MG/DL — HIGH (ref 70–99)
GLUCOSE SERPL-MCNC: 268 MG/DL — HIGH (ref 70–99)
GLUCOSE UR QL: 1000 MG/DL
HCO3 BLDV-SCNC: 34 MMOL/L — HIGH (ref 21–29)
HCT VFR BLD CALC: 40.2 % — SIGNIFICANT CHANGE UP (ref 34.5–45)
HDLC SERPL-MCNC: 84 MG/DL — SIGNIFICANT CHANGE UP
HGB BLD-MCNC: 12.9 G/DL — SIGNIFICANT CHANGE UP (ref 11.5–15.5)
HIV 1 & 2 AB SERPL IA.RAPID: SIGNIFICANT CHANGE UP
HOROWITZ INDEX BLDV+IHG-RTO: 21 — SIGNIFICANT CHANGE UP
INR BLD: 1.13 RATIO — SIGNIFICANT CHANGE UP (ref 0.88–1.16)
KETONES UR-MCNC: NEGATIVE — SIGNIFICANT CHANGE UP
LACTATE SERPL-SCNC: 1.9 MMOL/L — SIGNIFICANT CHANGE UP (ref 0.7–2)
LACTATE SERPL-SCNC: 2.2 MMOL/L — HIGH (ref 0.7–2)
LEUKOCYTE ESTERASE UR-ACNC: ABNORMAL
LIPID PNL WITH DIRECT LDL SERPL: 45 MG/DL — SIGNIFICANT CHANGE UP
LYMPHOCYTES # BLD AUTO: 0.76 K/UL — LOW (ref 1–3.3)
LYMPHOCYTES # BLD AUTO: 2 % — LOW (ref 13–44)
MCHC RBC-ENTMCNC: 27.9 PG — SIGNIFICANT CHANGE UP (ref 27–34)
MCHC RBC-ENTMCNC: 32.1 GM/DL — SIGNIFICANT CHANGE UP (ref 32–36)
MCV RBC AUTO: 86.8 FL — SIGNIFICANT CHANGE UP (ref 80–100)
MONOCYTES # BLD AUTO: 1.52 K/UL — HIGH (ref 0–0.9)
MONOCYTES NFR BLD AUTO: 4 % — SIGNIFICANT CHANGE UP (ref 2–14)
NEUTROPHILS # BLD AUTO: 35.44 K/UL — HIGH (ref 1.8–7.4)
NEUTROPHILS NFR BLD AUTO: 93 % — HIGH (ref 43–77)
NITRITE UR-MCNC: NEGATIVE — SIGNIFICANT CHANGE UP
PCO2 BLDV: 52 MMHG — HIGH (ref 35–50)
PH BLDV: 7.42 — SIGNIFICANT CHANGE UP (ref 7.35–7.45)
PH UR: 5 — SIGNIFICANT CHANGE UP (ref 5–8)
PLATELET # BLD AUTO: 138 K/UL — LOW (ref 150–400)
PO2 BLDV: 30 MMHG — SIGNIFICANT CHANGE UP (ref 25–45)
POTASSIUM SERPL-MCNC: 3.2 MMOL/L — LOW (ref 3.5–5.3)
POTASSIUM SERPL-MCNC: 4 MMOL/L — SIGNIFICANT CHANGE UP (ref 3.5–5.3)
POTASSIUM SERPL-SCNC: 3.2 MMOL/L — LOW (ref 3.5–5.3)
POTASSIUM SERPL-SCNC: 4 MMOL/L — SIGNIFICANT CHANGE UP (ref 3.5–5.3)
PROT SERPL-MCNC: 6.3 G/DL — SIGNIFICANT CHANGE UP (ref 6–8.3)
PROT UR-MCNC: 30 MG/DL
PROTHROM AB SERPL-ACNC: 13.1 SEC — SIGNIFICANT CHANGE UP (ref 10.6–13.6)
RBC # BLD: 4.63 M/UL — SIGNIFICANT CHANGE UP (ref 3.8–5.2)
RBC # FLD: 17.9 % — HIGH (ref 10.3–14.5)
RBC CASTS # UR COMP ASSIST: ABNORMAL /HPF (ref 0–2)
SAO2 % BLDV: 50 % — LOW (ref 67–88)
SARS-COV-2 IGG SERPL QL IA: NEGATIVE — SIGNIFICANT CHANGE UP
SARS-COV-2 IGM SERPL IA-ACNC: 0.08 INDEX — SIGNIFICANT CHANGE UP
SARS-COV-2 RNA SPEC QL NAA+PROBE: SIGNIFICANT CHANGE UP
SODIUM SERPL-SCNC: 139 MMOL/L — SIGNIFICANT CHANGE UP (ref 135–145)
SODIUM SERPL-SCNC: 140 MMOL/L — SIGNIFICANT CHANGE UP (ref 135–145)
SP GR SPEC: 1.02 — SIGNIFICANT CHANGE UP (ref 1.01–1.02)
TOTAL CHOLESTEROL/HDL RATIO MEASUREMENT: 1.8 RATIO — LOW (ref 3.3–7.1)
TRIGL SERPL-MCNC: 97 MG/DL — SIGNIFICANT CHANGE UP (ref 10–149)
TROPONIN I SERPL-MCNC: <0.015 NG/ML — SIGNIFICANT CHANGE UP (ref 0–0.04)
TSH SERPL-MCNC: 0.5 UU/ML — SIGNIFICANT CHANGE UP (ref 0.34–4.82)
UROBILINOGEN FLD QL: 1
WBC # BLD: 38.11 K/UL — HIGH (ref 3.8–10.5)
WBC # FLD AUTO: 38.11 K/UL — HIGH (ref 3.8–10.5)
WBC UR QL: ABNORMAL /HPF (ref 0–5)

## 2020-08-02 PROCEDURE — 99285 EMERGENCY DEPT VISIT HI MDM: CPT

## 2020-08-02 PROCEDURE — 99223 1ST HOSP IP/OBS HIGH 75: CPT | Mod: GC

## 2020-08-02 PROCEDURE — 71045 X-RAY EXAM CHEST 1 VIEW: CPT | Mod: 26

## 2020-08-02 PROCEDURE — 71260 CT THORAX DX C+: CPT | Mod: 26

## 2020-08-02 RX ORDER — CEFTRIAXONE 500 MG/1
1000 INJECTION, POWDER, FOR SOLUTION INTRAMUSCULAR; INTRAVENOUS ONCE
Refills: 0 | Status: COMPLETED | OUTPATIENT
Start: 2020-08-02 | End: 2020-08-02

## 2020-08-02 RX ORDER — INSULIN LISPRO 100/ML
VIAL (ML) SUBCUTANEOUS
Refills: 0 | Status: DISCONTINUED | OUTPATIENT
Start: 2020-08-02 | End: 2020-08-03

## 2020-08-02 RX ORDER — INSULIN LISPRO 100/ML
5 VIAL (ML) SUBCUTANEOUS
Refills: 0 | Status: DISCONTINUED | OUTPATIENT
Start: 2020-08-02 | End: 2020-08-06

## 2020-08-02 RX ORDER — POTASSIUM CHLORIDE 20 MEQ
40 PACKET (EA) ORAL ONCE
Refills: 0 | Status: COMPLETED | OUTPATIENT
Start: 2020-08-02 | End: 2020-08-02

## 2020-08-02 RX ORDER — MORPHINE SULFATE 50 MG/1
4 CAPSULE, EXTENDED RELEASE ORAL ONCE
Refills: 0 | Status: DISCONTINUED | OUTPATIENT
Start: 2020-08-02 | End: 2020-08-02

## 2020-08-02 RX ORDER — BUDESONIDE AND FORMOTEROL FUMARATE DIHYDRATE 160; 4.5 UG/1; UG/1
2 AEROSOL RESPIRATORY (INHALATION)
Refills: 0 | Status: DISCONTINUED | OUTPATIENT
Start: 2020-08-02 | End: 2020-08-06

## 2020-08-02 RX ORDER — FOLIC ACID 0.8 MG
1 TABLET ORAL DAILY
Refills: 0 | Status: DISCONTINUED | OUTPATIENT
Start: 2020-08-02 | End: 2020-08-06

## 2020-08-02 RX ORDER — CEFTRIAXONE 500 MG/1
INJECTION, POWDER, FOR SOLUTION INTRAMUSCULAR; INTRAVENOUS
Refills: 0 | Status: DISCONTINUED | OUTPATIENT
Start: 2020-08-02 | End: 2020-08-02

## 2020-08-02 RX ORDER — POLYETHYLENE GLYCOL 3350 17 G/17G
17 POWDER, FOR SOLUTION ORAL DAILY
Refills: 0 | Status: DISCONTINUED | OUTPATIENT
Start: 2020-08-02 | End: 2020-08-06

## 2020-08-02 RX ORDER — CARVEDILOL PHOSPHATE 80 MG/1
6.25 CAPSULE, EXTENDED RELEASE ORAL EVERY 12 HOURS
Refills: 0 | Status: DISCONTINUED | OUTPATIENT
Start: 2020-08-02 | End: 2020-08-06

## 2020-08-02 RX ORDER — ENOXAPARIN SODIUM 100 MG/ML
40 INJECTION SUBCUTANEOUS DAILY
Refills: 0 | Status: DISCONTINUED | OUTPATIENT
Start: 2020-08-02 | End: 2020-08-06

## 2020-08-02 RX ORDER — SODIUM CHLORIDE 9 MG/ML
1000 INJECTION INTRAMUSCULAR; INTRAVENOUS; SUBCUTANEOUS
Refills: 0 | Status: DISCONTINUED | OUTPATIENT
Start: 2020-08-02 | End: 2020-08-06

## 2020-08-02 RX ORDER — OXYCODONE HYDROCHLORIDE 5 MG/1
5 TABLET ORAL EVERY 4 HOURS
Refills: 0 | Status: DISCONTINUED | OUTPATIENT
Start: 2020-08-02 | End: 2020-08-03

## 2020-08-02 RX ORDER — MULTIVIT WITH MIN/MFOLATE/K2 340-15/3 G
1 POWDER (GRAM) ORAL ONCE
Refills: 0 | Status: COMPLETED | OUTPATIENT
Start: 2020-08-02 | End: 2020-08-02

## 2020-08-02 RX ORDER — TIOTROPIUM BROMIDE 18 UG/1
1 CAPSULE ORAL; RESPIRATORY (INHALATION) DAILY
Refills: 0 | Status: DISCONTINUED | OUTPATIENT
Start: 2020-08-02 | End: 2020-08-06

## 2020-08-02 RX ORDER — ALBUTEROL 90 UG/1
2 AEROSOL, METERED ORAL EVERY 6 HOURS
Refills: 0 | Status: DISCONTINUED | OUTPATIENT
Start: 2020-08-02 | End: 2020-08-06

## 2020-08-02 RX ORDER — PANTOPRAZOLE SODIUM 20 MG/1
40 TABLET, DELAYED RELEASE ORAL
Refills: 0 | Status: DISCONTINUED | OUTPATIENT
Start: 2020-08-02 | End: 2020-08-06

## 2020-08-02 RX ORDER — GLUCAGON INJECTION, SOLUTION 0.5 MG/.1ML
1 INJECTION, SOLUTION SUBCUTANEOUS ONCE
Refills: 0 | Status: DISCONTINUED | OUTPATIENT
Start: 2020-08-02 | End: 2020-08-06

## 2020-08-02 RX ORDER — PREGABALIN 225 MG/1
1000 CAPSULE ORAL DAILY
Refills: 0 | Status: DISCONTINUED | OUTPATIENT
Start: 2020-08-02 | End: 2020-08-06

## 2020-08-02 RX ORDER — AMLODIPINE BESYLATE 2.5 MG/1
1 TABLET ORAL
Qty: 0 | Refills: 0 | DISCHARGE

## 2020-08-02 RX ORDER — MINERAL OIL
133 OIL (ML) MISCELLANEOUS ONCE
Refills: 0 | Status: COMPLETED | OUTPATIENT
Start: 2020-08-02 | End: 2020-08-02

## 2020-08-02 RX ORDER — ALPRAZOLAM 0.25 MG
1 TABLET ORAL THREE TIMES A DAY
Refills: 0 | Status: DISCONTINUED | OUTPATIENT
Start: 2020-08-02 | End: 2020-08-03

## 2020-08-02 RX ORDER — SODIUM CHLORIDE 9 MG/ML
1000 INJECTION, SOLUTION INTRAVENOUS
Refills: 0 | Status: DISCONTINUED | OUTPATIENT
Start: 2020-08-02 | End: 2020-08-06

## 2020-08-02 RX ORDER — ATORVASTATIN CALCIUM 80 MG/1
40 TABLET, FILM COATED ORAL AT BEDTIME
Refills: 0 | Status: DISCONTINUED | OUTPATIENT
Start: 2020-08-02 | End: 2020-08-06

## 2020-08-02 RX ORDER — GABAPENTIN 400 MG/1
2 CAPSULE ORAL
Qty: 0 | Refills: 0 | DISCHARGE

## 2020-08-02 RX ADMIN — PANTOPRAZOLE SODIUM 40 MILLIGRAM(S): 20 TABLET, DELAYED RELEASE ORAL at 14:41

## 2020-08-02 RX ADMIN — PREGABALIN 1000 MICROGRAM(S): 225 CAPSULE ORAL at 22:06

## 2020-08-02 RX ADMIN — Medication 3: at 22:09

## 2020-08-02 RX ADMIN — CEFTRIAXONE 100 MILLIGRAM(S): 500 INJECTION, POWDER, FOR SOLUTION INTRAMUSCULAR; INTRAVENOUS at 14:40

## 2020-08-02 RX ADMIN — OXYCODONE HYDROCHLORIDE 5 MILLIGRAM(S): 5 TABLET ORAL at 22:06

## 2020-08-02 RX ADMIN — Medication 40 MILLIGRAM(S): at 22:07

## 2020-08-02 RX ADMIN — SODIUM CHLORIDE 70 MILLILITER(S): 9 INJECTION INTRAMUSCULAR; INTRAVENOUS; SUBCUTANEOUS at 13:40

## 2020-08-02 RX ADMIN — ENOXAPARIN SODIUM 40 MILLIGRAM(S): 100 INJECTION SUBCUTANEOUS at 14:42

## 2020-08-02 RX ADMIN — Medication 1 BOTTLE: at 13:43

## 2020-08-02 RX ADMIN — BUDESONIDE AND FORMOTEROL FUMARATE DIHYDRATE 2 PUFF(S): 160; 4.5 AEROSOL RESPIRATORY (INHALATION) at 22:09

## 2020-08-02 RX ADMIN — SODIUM CHLORIDE 70 MILLILITER(S): 9 INJECTION INTRAMUSCULAR; INTRAVENOUS; SUBCUTANEOUS at 22:18

## 2020-08-02 RX ADMIN — ATORVASTATIN CALCIUM 40 MILLIGRAM(S): 80 TABLET, FILM COATED ORAL at 22:07

## 2020-08-02 RX ADMIN — Medication 5: at 18:09

## 2020-08-02 RX ADMIN — TIOTROPIUM BROMIDE 1 CAPSULE(S): 18 CAPSULE ORAL; RESPIRATORY (INHALATION) at 14:57

## 2020-08-02 RX ADMIN — Medication 40 MILLIEQUIVALENT(S): at 13:04

## 2020-08-02 RX ADMIN — Medication 1 MILLIGRAM(S): at 14:41

## 2020-08-02 RX ADMIN — MORPHINE SULFATE 4 MILLIGRAM(S): 50 CAPSULE, EXTENDED RELEASE ORAL at 11:33

## 2020-08-02 RX ADMIN — Medication 125 MILLIGRAM(S): at 06:35

## 2020-08-02 RX ADMIN — POLYETHYLENE GLYCOL 3350 17 GRAM(S): 17 POWDER, FOR SOLUTION ORAL at 14:42

## 2020-08-02 RX ADMIN — OXYCODONE HYDROCHLORIDE 5 MILLIGRAM(S): 5 TABLET ORAL at 22:55

## 2020-08-02 RX ADMIN — Medication 1 ENEMA: at 13:43

## 2020-08-02 RX ADMIN — MORPHINE SULFATE 4 MILLIGRAM(S): 50 CAPSULE, EXTENDED RELEASE ORAL at 13:17

## 2020-08-02 RX ADMIN — CARVEDILOL PHOSPHATE 6.25 MILLIGRAM(S): 80 CAPSULE, EXTENDED RELEASE ORAL at 17:37

## 2020-08-02 NOTE — H&P ADULT - NSICDXFAMILYHX_GEN_ALL_CORE_FT
FAMILY HISTORY:  Diabetes mellitus  Family history of lung cancer  Family history of stroke  FH: lung cancer

## 2020-08-02 NOTE — H&P ADULT - NSHPPHYSICALEXAM_GEN_ALL_CORE
Vital Signs Last 24 Hrs  T(C): 36.9 (02 Aug 2020 11:17), Max: 36.9 (02 Aug 2020 11:17)  T(F): 98.5 (02 Aug 2020 11:17), Max: 98.5 (02 Aug 2020 11:17)  HR: 83 (02 Aug 2020 11:17) (77 - 83)  BP: 146/79 (02 Aug 2020 11:17) (125/73 - 151/72)  BP(mean): --  RR: 18 (02 Aug 2020 11:17) (16 - 18)  SpO2: 97% (02 Aug 2020 11:17) (97% - 100%)

## 2020-08-02 NOTE — H&P ADULT - PROBLEM SELECTOR PLAN 7
Pt has hx of DM   HBA1C 8.5 IN APRIL  F/U HBA1C  STARTING ON sliding scale   -pt on no medications at home , controls with diet

## 2020-08-02 NOTE — H&P ADULT - PROBLEM SELECTOR PLAN 9
RISK                                                          Points  [] Previous VTE                                           3  [] Thrombophilia                                        2  [] Lower limb paralysis                              2   [] Current Cancer                                       2   [x] Immobilization > 24 hrs                        1  [] ICU/CCU stay > 24 hours                       1  [x] Age > 60                                                   1    score 2 s  sc lovenox

## 2020-08-02 NOTE — H&P ADULT - PROBLEM SELECTOR PLAN 6
Pt has constipation from all pain meds she takes   -bowel sounds present , pt passing gas  will give enema,   stool softners  -bowel regimen needed on regular basis on discharge

## 2020-08-02 NOTE — H&P ADULT - PROBLEM SELECTOR PLAN 2
Pt p/w SOB, DIFFICULTY breathing, long smoking hx, home o2 5l  -on NC 3L  -EKG shows normal sinus , @82 ,qtc 521  -chest xray shows  Left PICC tip at the SVC. Right lung postoperative changes. The heart is unremarkable. The lungs are clear. Bones are unremarkable for age.  -ct scan shows No acute airspace disease or consolidation. Improving mediastinal lymphadenopathy. Multiple new sclerotic lesions in the thoracic spine concerning for metastatic disease.  -ED COURSE solumedrol 125mg iv , morphine 4mg  -will start on iv solumedrol 40mg q 8 , evin as wheezing improves  -will continue spirivia, symbicort, duoneb inhalers  - levofloxacine 750 mg  iv qd for 5 days , coverage for any infection with copd  -monitor vitals, oxygen status   -f/u LE doppler to r/o dvt  -pt will need oxygen saturation on ambulation as pt need for oxygen for home  -pt outpt Pulmonologist Dr. Nugent consult as needed

## 2020-08-02 NOTE — ED PROVIDER NOTE - OBJECTIVE STATEMENT
Patient here with days of shortness of breath. Patient on 5 liters oxygen at home 24/7 here with 1 week of shortness of breath. Started prednisone 60mg yesterday. States O2 sat did not go up past 91. No fever no cough. Also notes constipation. Patient is undergoing chemo for recurrence of  lung cancer. last session last friday.

## 2020-08-02 NOTE — H&P ADULT - HISTORY OF PRESENT ILLNESS
Patient is Olson  65 year old woman with PMH 80 pack year smoking hx, R lung cancer s/p RLL resection 2017 on chemo presenting for acute shortness of breath and pain from L sided rib fractures. She is on 5L oxygen at home for around 3 years but feels SOB and has difficulty walking around the house. She is unable to quantify duration of present SOB .She uses a wheelchair due to SOB from walking and from pain from her fractures. She states she was admitted for COPD exacerbation several times in the past, but never needed intubation. She got her last chemo dose a week ago. She states she was on a lot of steroids previously Patient is Olson 65 year old woman from home lives with boyfriend who help her woth chores and grocery, with PMH 80 pack year smoking hx quit since 8.5 years , R lung cancer s/p RLL resection 2017 on chemo presenting for acute shortness of breath and pain in back and ribs. She is on 5L oxygen at home for around 3 years but feels SOB and has difficulty walking around the house and feel like she need more oxygen at home. She uses a wheelchair due to SOB from walking and from pain from her fractures. She states she was admitted for COPD exacerbation several times in the past, but never needed intubation. She got her last chemo dose a week ago. She states she was on a lot of steroids previously Patient is Olson 65 year old woman from home lives with boyfriend who help her woth chores and grocery, with PMH 80 pack year smoking hx quit since 8.5 years , R lung cancer s/p RLL resection 2017 on chemo presenting for acute shortness of breath and pain in back and ribs. She is on 5L oxygen at home for around 3 years but feels SOB and has difficulty walking around the house and feel like she need more oxygen at home. She uses a wheelchair due to SOB from walking and from pain from her fractures. She states she was admitted for COPD exacerbation several times in the past, but never needed intubation. She got her last chemo dose 7/31/2020 . She states she was on a lot of steroids previously and knows how to evin them off as she was not feeling good for last 3 days she started steroids 60mg oral yesterday. Patient is Olson 65 year old woman from home lives with boyfriend who help her woth chores and grocery, with PMH 80 pack year smoking hx quit since 8.5 years , R lung cancer s/p RLL resection 2017 on chemo presenting for acute shortness of breath and pain in back and ribs. She is on 5L oxygen at home for around 3 years but feels SOB and has difficulty walking around the house and feel like she need more oxygen at home. She uses a wheelchair due to SOB from walking and from pain from her fractures. She states she was admitted for COPD exacerbation several times in the past, but never needed intubation. She got her last chemo dose 7/31/2020 . She states she was on a lot of steroids previously and knows how to evin them off as she was not feeling good for last 3 days so she started steroids 60mg oral yesterday by herself. Pt reports increased urinary frequency, urgency, burning. Pt went to Dr. Mendiola  due to her urinary symptoms and was started on levofloxacin. Pt also has constipation from chronic use of pain medications.

## 2020-08-02 NOTE — H&P ADULT - PROBLEM SELECTOR PLAN 8
Pt has hx of htn  on carvidilol 6,25mg  - will resume home dose with parameters  -can add amlodipine or losartan if needed

## 2020-08-02 NOTE — H&P ADULT - PROBLEM SELECTOR PLAN 5
pT has a chronic pain from pathalogical fractures in vertebrae and ribs  ct scan shows No acute airspace disease or consolidation. Improving mediastinal lymphadenopathy. Multiple new sclerotic lesions in the thoracic spine concerning for metastatic disease.  -pt on percocet 5mg q4 PRN  -will continue home dose  -consult pain management as needed

## 2020-08-02 NOTE — ED ADULT NURSE NOTE - INTERVENTIONS DEFINITIONS
[place] to call system/Stretcher in lowest position, wheels locked, appropriate side rails in place

## 2020-08-02 NOTE — H&P ADULT - PROBLEM SELECTOR PLAN 4
Pt has ha hx of LUNG CA with lobectomy on chemo  last chemo this friday  ct scan shows No acute airspace disease or consolidation. Improving mediastinal lymphadenopathy. Multiple new sclerotic lesions in the thoracic spine concerning for metastatic disease.  f/u LE doppler to r/o dvt  Dr. Medniola oncologist ,CONSULT AS NEEDED

## 2020-08-02 NOTE — ED ADULT NURSE NOTE - OBJECTIVE STATEMENT
Pt BIBA c/o of worsening SOB unrelieved with 5L of home O2 at home for the last 3-4 days. Pt not in acute distress and able to speak in full sentences. SaO2 96% on RA. Pt denies fever, NV, and diarrhea. C/o of constipation and last BM 8 days ago. Pt has a single lumen PICC line on left upper arm for chemo therapy for her lung CA. Pt c/o of back pain 9/10 d/t to a compound fx in the spine.

## 2020-08-02 NOTE — H&P ADULT - ASSESSMENT
Patient is Olson 65 year old woman from home lives with boyfriend who help her woth chores and grocery, with PMH 80 pack year smoking hx quit since 8.5 years , R lung cancer s/p RLL resection 2017 on chemo presenting for acute shortness of breath and pain in back and ribs. She is on 5L oxygen at home for around 3 years but feels SOB and has difficulty walking around the house and feel like she need more oxygen at home. She uses a wheelchair due to SOB from walking and from pain from her fractures. She states she was admitted for COPD exacerbation several times in the past, but never needed intubation. She got her last chemo dose 7/31/2020 . She states she was on a lot of steroids previously and knows how to evin them off as she was not feeling good for last 3 days so she started steroids 60mg oral yesterday by herself. Pt reports increased urinary frequency, urgency, burning. Pt went to Dr. Mendiola  due to her urinary symptoms and was started on levofloxacin. Pt also has constipation from chronic use of pain medications.   ADMITTED FOR SOB, CHRONIC PAIN AND CONSTIPATION.

## 2020-08-02 NOTE — ED PROVIDER NOTE - PHYSICAL EXAMINATION
sat 99% on 1 liter oxygen. Patient states she still feels subjectively shortness of breath. upper airway "wheezing", clear lungs

## 2020-08-02 NOTE — ED ADULT NURSE NOTE - DRUG PRE-SCREENING (DAST -1)
I concur with the Admission Order and I certify that services are provided in accordance with Section 42 CFR § 412.3 Statement Selected

## 2020-08-02 NOTE — H&P ADULT - PROBLEM SELECTOR PLAN 3
pt p/w increased frequency, burning, urgency  -U/A postive  -wbc 30s from prednisone , no fever , no tachy  -f/u ucx, bcx  -will start levofloxacin 750mg iv qd for 5 days will also cover for uti  -if pt spikes fever can escalate later.   - cbc daily

## 2020-08-03 ENCOUNTER — TRANSCRIPTION ENCOUNTER (OUTPATIENT)
Age: 65
End: 2020-08-03

## 2020-08-03 DIAGNOSIS — I10 ESSENTIAL (PRIMARY) HYPERTENSION: ICD-10-CM

## 2020-08-03 DIAGNOSIS — M79.604 PAIN IN RIGHT LEG: ICD-10-CM

## 2020-08-03 DIAGNOSIS — E55.9 VITAMIN D DEFICIENCY, UNSPECIFIED: ICD-10-CM

## 2020-08-03 LAB
24R-OH-CALCIDIOL SERPL-MCNC: 10.1 NG/ML — LOW (ref 30–80)
A1C WITH ESTIMATED AVERAGE GLUCOSE RESULT: 8.6 % — HIGH (ref 4–5.6)
ALBUMIN SERPL ELPH-MCNC: 2.8 G/DL — LOW (ref 3.5–5)
ALP SERPL-CCNC: 143 U/L — HIGH (ref 40–120)
ALT FLD-CCNC: 21 U/L DA — SIGNIFICANT CHANGE UP (ref 10–60)
ANION GAP SERPL CALC-SCNC: 3 MMOL/L — LOW (ref 5–17)
ANISOCYTOSIS BLD QL: SLIGHT — SIGNIFICANT CHANGE UP
AST SERPL-CCNC: 8 U/L — LOW (ref 10–40)
BASOPHILS # BLD AUTO: 0 K/UL — SIGNIFICANT CHANGE UP (ref 0–0.2)
BASOPHILS NFR BLD AUTO: 0 % — SIGNIFICANT CHANGE UP (ref 0–2)
BILIRUB SERPL-MCNC: 1 MG/DL — SIGNIFICANT CHANGE UP (ref 0.2–1.2)
BUN SERPL-MCNC: 16 MG/DL — SIGNIFICANT CHANGE UP (ref 7–18)
CALCIUM SERPL-MCNC: 8.2 MG/DL — LOW (ref 8.4–10.5)
CHLORIDE SERPL-SCNC: 101 MMOL/L — SIGNIFICANT CHANGE UP (ref 96–108)
CO2 SERPL-SCNC: 33 MMOL/L — HIGH (ref 22–31)
CREAT SERPL-MCNC: 0.58 MG/DL — SIGNIFICANT CHANGE UP (ref 0.5–1.3)
EOSINOPHIL # BLD AUTO: 0 K/UL — SIGNIFICANT CHANGE UP (ref 0–0.5)
EOSINOPHIL NFR BLD AUTO: 0 % — SIGNIFICANT CHANGE UP (ref 0–6)
ESTIMATED AVERAGE GLUCOSE: 200 MG/DL — HIGH (ref 68–114)
FOLATE SERPL-MCNC: 17 NG/ML — SIGNIFICANT CHANGE UP
GLUCOSE BLDC GLUCOMTR-MCNC: 291 MG/DL — HIGH (ref 70–99)
GLUCOSE BLDC GLUCOMTR-MCNC: 294 MG/DL — HIGH (ref 70–99)
GLUCOSE BLDC GLUCOMTR-MCNC: 331 MG/DL — HIGH (ref 70–99)
GLUCOSE BLDC GLUCOMTR-MCNC: 416 MG/DL — HIGH (ref 70–99)
GLUCOSE SERPL-MCNC: 289 MG/DL — HIGH (ref 70–99)
HCT VFR BLD CALC: 37.7 % — SIGNIFICANT CHANGE UP (ref 34.5–45)
HGB BLD-MCNC: 12.4 G/DL — SIGNIFICANT CHANGE UP (ref 11.5–15.5)
LYMPHOCYTES # BLD AUTO: 0.32 K/UL — LOW (ref 1–3.3)
LYMPHOCYTES # BLD AUTO: 1 % — LOW (ref 13–44)
MAGNESIUM SERPL-MCNC: 2.5 MG/DL — SIGNIFICANT CHANGE UP (ref 1.6–2.6)
MANUAL SMEAR VERIFICATION: SIGNIFICANT CHANGE UP
MCHC RBC-ENTMCNC: 28.6 PG — SIGNIFICANT CHANGE UP (ref 27–34)
MCHC RBC-ENTMCNC: 32.9 GM/DL — SIGNIFICANT CHANGE UP (ref 32–36)
MCV RBC AUTO: 86.9 FL — SIGNIFICANT CHANGE UP (ref 80–100)
METAMYELOCYTES # FLD: 3 % — HIGH (ref 0–0)
MICROCYTES BLD QL: SLIGHT — SIGNIFICANT CHANGE UP
MONOCYTES # BLD AUTO: 1.28 K/UL — HIGH (ref 0–0.9)
MONOCYTES NFR BLD AUTO: 4 % — SIGNIFICANT CHANGE UP (ref 2–14)
MYELOCYTES NFR BLD: 1 % — HIGH (ref 0–0)
NEUTROPHILS # BLD AUTO: 29.06 K/UL — HIGH (ref 1.8–7.4)
NEUTROPHILS NFR BLD AUTO: 85 % — HIGH (ref 43–77)
NEUTS BAND # BLD: 6 % — SIGNIFICANT CHANGE UP (ref 0–8)
NRBC # BLD: 0 /100 — SIGNIFICANT CHANGE UP (ref 0–0)
OVALOCYTES BLD QL SMEAR: SLIGHT — SIGNIFICANT CHANGE UP
PHOSPHATE SERPL-MCNC: 2.6 MG/DL — SIGNIFICANT CHANGE UP (ref 2.5–4.5)
PLAT MORPH BLD: NORMAL — SIGNIFICANT CHANGE UP
PLATELET # BLD AUTO: 110 K/UL — LOW (ref 150–400)
POIKILOCYTOSIS BLD QL AUTO: SLIGHT — SIGNIFICANT CHANGE UP
POTASSIUM SERPL-MCNC: 4.2 MMOL/L — SIGNIFICANT CHANGE UP (ref 3.5–5.3)
POTASSIUM SERPL-SCNC: 4.2 MMOL/L — SIGNIFICANT CHANGE UP (ref 3.5–5.3)
PROT SERPL-MCNC: 5.7 G/DL — LOW (ref 6–8.3)
RBC # BLD: 4.34 M/UL — SIGNIFICANT CHANGE UP (ref 3.8–5.2)
RBC # FLD: 17.6 % — HIGH (ref 10.3–14.5)
RBC BLD AUTO: ABNORMAL
SODIUM SERPL-SCNC: 137 MMOL/L — SIGNIFICANT CHANGE UP (ref 135–145)
TOXIC GRANULES BLD QL SMEAR: PRESENT — SIGNIFICANT CHANGE UP
VIT B12 SERPL-MCNC: >2000 PG/ML — HIGH (ref 232–1245)
WBC # BLD: 31.93 K/UL — HIGH (ref 3.8–10.5)
WBC # FLD AUTO: 31.93 K/UL — HIGH (ref 3.8–10.5)

## 2020-08-03 PROCEDURE — 99233 SBSQ HOSP IP/OBS HIGH 50: CPT | Mod: GC

## 2020-08-03 RX ORDER — OXYCODONE HYDROCHLORIDE 5 MG/1
20 TABLET ORAL
Refills: 0 | Status: DISCONTINUED | OUTPATIENT
Start: 2020-08-03 | End: 2020-08-06

## 2020-08-03 RX ORDER — ONDANSETRON 8 MG/1
4 TABLET, FILM COATED ORAL EVERY 6 HOURS
Refills: 0 | Status: DISCONTINUED | OUTPATIENT
Start: 2020-08-03 | End: 2020-08-04

## 2020-08-03 RX ORDER — INSULIN LISPRO 100/ML
VIAL (ML) SUBCUTANEOUS AT BEDTIME
Refills: 0 | Status: DISCONTINUED | OUTPATIENT
Start: 2020-08-03 | End: 2020-08-06

## 2020-08-03 RX ORDER — OXYCODONE AND ACETAMINOPHEN 5; 325 MG/1; MG/1
2 TABLET ORAL EVERY 6 HOURS
Refills: 0 | Status: DISCONTINUED | OUTPATIENT
Start: 2020-08-03 | End: 2020-08-03

## 2020-08-03 RX ORDER — OXYCODONE HYDROCHLORIDE 5 MG/1
5 TABLET ORAL ONCE
Refills: 0 | Status: DISCONTINUED | OUTPATIENT
Start: 2020-08-03 | End: 2020-08-03

## 2020-08-03 RX ORDER — GABAPENTIN 400 MG/1
200 CAPSULE ORAL THREE TIMES A DAY
Refills: 0 | Status: DISCONTINUED | OUTPATIENT
Start: 2020-08-03 | End: 2020-08-05

## 2020-08-03 RX ORDER — INSULIN LISPRO 100/ML
VIAL (ML) SUBCUTANEOUS
Refills: 0 | Status: DISCONTINUED | OUTPATIENT
Start: 2020-08-03 | End: 2020-08-06

## 2020-08-03 RX ORDER — LORATADINE 10 MG/1
5 TABLET ORAL DAILY
Refills: 0 | Status: DISCONTINUED | OUTPATIENT
Start: 2020-08-03 | End: 2020-08-06

## 2020-08-03 RX ORDER — ALPRAZOLAM 0.25 MG
0.25 TABLET ORAL THREE TIMES A DAY
Refills: 0 | Status: DISCONTINUED | OUTPATIENT
Start: 2020-08-03 | End: 2020-08-04

## 2020-08-03 RX ORDER — ERGOCALCIFEROL 1.25 MG/1
50000 CAPSULE ORAL
Refills: 0 | Status: DISCONTINUED | OUTPATIENT
Start: 2020-08-03 | End: 2020-08-06

## 2020-08-03 RX ORDER — OXYCODONE AND ACETAMINOPHEN 5; 325 MG/1; MG/1
4 TABLET ORAL EVERY 6 HOURS
Refills: 0 | Status: DISCONTINUED | OUTPATIENT
Start: 2020-08-03 | End: 2020-08-03

## 2020-08-03 RX ORDER — ACETAMINOPHEN 500 MG
650 TABLET ORAL EVERY 6 HOURS
Refills: 0 | Status: DISCONTINUED | OUTPATIENT
Start: 2020-08-03 | End: 2020-08-06

## 2020-08-03 RX ADMIN — Medication 6: at 11:41

## 2020-08-03 RX ADMIN — POLYETHYLENE GLYCOL 3350 17 GRAM(S): 17 POWDER, FOR SOLUTION ORAL at 11:07

## 2020-08-03 RX ADMIN — Medication 5 UNIT(S): at 21:48

## 2020-08-03 RX ADMIN — ONDANSETRON 4 MILLIGRAM(S): 8 TABLET, FILM COATED ORAL at 16:29

## 2020-08-03 RX ADMIN — Medication 8: at 17:19

## 2020-08-03 RX ADMIN — Medication 40 MILLIGRAM(S): at 05:24

## 2020-08-03 RX ADMIN — Medication 5 UNIT(S): at 17:19

## 2020-08-03 RX ADMIN — Medication 5 UNIT(S): at 11:42

## 2020-08-03 RX ADMIN — GABAPENTIN 200 MILLIGRAM(S): 400 CAPSULE ORAL at 15:26

## 2020-08-03 RX ADMIN — Medication 1 MILLIGRAM(S): at 11:07

## 2020-08-03 RX ADMIN — Medication 10 MILLIGRAM(S): at 16:29

## 2020-08-03 RX ADMIN — BUDESONIDE AND FORMOTEROL FUMARATE DIHYDRATE 2 PUFF(S): 160; 4.5 AEROSOL RESPIRATORY (INHALATION) at 22:11

## 2020-08-03 RX ADMIN — CARVEDILOL PHOSPHATE 6.25 MILLIGRAM(S): 80 CAPSULE, EXTENDED RELEASE ORAL at 17:19

## 2020-08-03 RX ADMIN — LORATADINE 5 MILLIGRAM(S): 10 TABLET ORAL at 15:26

## 2020-08-03 RX ADMIN — PREGABALIN 1000 MICROGRAM(S): 225 CAPSULE ORAL at 11:07

## 2020-08-03 RX ADMIN — TIOTROPIUM BROMIDE 1 CAPSULE(S): 18 CAPSULE ORAL; RESPIRATORY (INHALATION) at 11:41

## 2020-08-03 RX ADMIN — GABAPENTIN 200 MILLIGRAM(S): 400 CAPSULE ORAL at 22:11

## 2020-08-03 RX ADMIN — CARVEDILOL PHOSPHATE 6.25 MILLIGRAM(S): 80 CAPSULE, EXTENDED RELEASE ORAL at 05:24

## 2020-08-03 RX ADMIN — Medication 1: at 21:47

## 2020-08-03 RX ADMIN — ENOXAPARIN SODIUM 40 MILLIGRAM(S): 100 INJECTION SUBCUTANEOUS at 11:09

## 2020-08-03 RX ADMIN — ATORVASTATIN CALCIUM 40 MILLIGRAM(S): 80 TABLET, FILM COATED ORAL at 22:11

## 2020-08-03 RX ADMIN — BUDESONIDE AND FORMOTEROL FUMARATE DIHYDRATE 2 PUFF(S): 160; 4.5 AEROSOL RESPIRATORY (INHALATION) at 11:08

## 2020-08-03 RX ADMIN — Medication 60 MILLIGRAM(S): at 15:27

## 2020-08-03 RX ADMIN — Medication 650 MILLIGRAM(S): at 17:48

## 2020-08-03 RX ADMIN — Medication 650 MILLIGRAM(S): at 17:18

## 2020-08-03 RX ADMIN — Medication 3: at 08:18

## 2020-08-03 RX ADMIN — Medication 60 MILLIGRAM(S): at 22:11

## 2020-08-03 RX ADMIN — Medication 5 UNIT(S): at 08:18

## 2020-08-03 RX ADMIN — OXYCODONE AND ACETAMINOPHEN 2 TABLET(S): 5; 325 TABLET ORAL at 11:08

## 2020-08-03 RX ADMIN — OXYCODONE AND ACETAMINOPHEN 2 TABLET(S): 5; 325 TABLET ORAL at 13:25

## 2020-08-03 RX ADMIN — Medication 650 MILLIGRAM(S): at 23:55

## 2020-08-03 RX ADMIN — OXYCODONE HYDROCHLORIDE 20 MILLIGRAM(S): 5 TABLET ORAL at 23:55

## 2020-08-03 RX ADMIN — PANTOPRAZOLE SODIUM 40 MILLIGRAM(S): 20 TABLET, DELAYED RELEASE ORAL at 05:24

## 2020-08-03 RX ADMIN — ERGOCALCIFEROL 50000 UNIT(S): 1.25 CAPSULE ORAL at 11:08

## 2020-08-03 RX ADMIN — OXYCODONE HYDROCHLORIDE 20 MILLIGRAM(S): 5 TABLET ORAL at 17:48

## 2020-08-03 RX ADMIN — OXYCODONE HYDROCHLORIDE 20 MILLIGRAM(S): 5 TABLET ORAL at 17:18

## 2020-08-03 NOTE — CONSULT NOTE ADULT - SUBJECTIVE AND OBJECTIVE BOX
PULMONARY CONSULT NOTE      BARB COLÓN  MRN-[id number]    Patient is a 65y old  Female who presents with a chief complaint of Shortness of breath and back pain x 4 days not relieved with HFN and prednisone  Hx chart lab and xrays reviewed  Pt Examined      HISTORY OF PRESENT ILLNESS:  Patient is Wesley 65 year old woman from home lives with boyfriend who help her woth chores and grocery, with PMH 80 pack year smoking hx quit since 8.5 years , R lung cancer s/p RLL resection 2017 on chemo presenting for acute shortness of breath and pain in back and ribs. She is on 5L oxygen at home for around 3 years but feels SOB and has difficulty walking around the house and feel like she need more oxygen at home. She uses a wheelchair due to SOB from walking and from pain from her fractures. She states she was admitted for COPD exacerbation several times in the past, but never needed intubation. She got her last chemo dose 2020 . She states she was on a lot of steroids previously and knows how to evin them off as she was not feeling good for last 3 days so she started steroids 60mg oral yesterday by herself. Pt reports increased urinary frequency, urgency, burning. Pt went to Dr. Mendiola  due to her urinary symptoms and was started on levofloxacin. Pt also has constipation from chronic use of pain medications.       Home Medications:  amLODIPine 5 mg oral tablet: 1 tab(s) orally once a day (03 Aug 2020 10:53)  atorvastatin 40 mg oral tablet: 1 tab(s) orally once a day (03 Aug 2020 10:53)  Coreg 6.25 mg oral tablet: 1 tab(s) orally 2 times a day (03 Aug 2020 10:53)  Endocet 10/325 oral tablet: 2 tab(s) orally every 6 hours (03 Aug 2020 10:53)  famotidine 40 mg oral tablet: 1 tab(s) orally once a day (at bedtime) (03 Aug 2020 10:53)  folic acid 1 mg oral tablet: 1 tab(s) orally once a day (03 Aug 2020 10:53)  gabapentin 100 mg oral tablet: 2 tab(s) orally 3 times a day (03 Aug 2020 10:53)  levoFLOXacin 500 mg oral tablet: 1 tab(s) orally every 24 hours (03 Aug 2020 10:53)  loratadine 5 mg oral tablet, chewable: 1 tab(s) orally once a day (03 Aug 2020 10:53)  metoclopramide: 2 milligram(s) orally 4 times a day, As Needed for nausea  (03 Aug 2020 10:53)  omeprazole 40 mg oral delayed release capsule: 1 cap(s) orally once a day (03 Aug 2020 10:53)  Spiriva 18 mcg inhalation capsule: 1 cap(s) inhaled once a day (03 Aug 2020 10:53)  Vitamin B12 1000 mcg oral tablet: 1 tab(s) orally once a day (03 Aug 2020 10:53)  Zofran 8 mg oral tablet: 8 milligram(s) orally every 8 hours, As Needed (03 Aug 2020 10:53)      MEDICATIONS  (STANDING):  atorvastatin 40 milliGRAM(s) Oral at bedtime  bisacodyl Suppository 10 milliGRAM(s) Rectal every 24 hours  budesonide 160 MICROgram(s)/formoterol 4.5 MICROgram(s) Inhaler 2 Puff(s) Inhalation two times a day  carvedilol 6.25 milliGRAM(s) Oral every 12 hours  cyanocobalamin 1000 MICROGram(s) Oral daily  dextrose 5%. 1000 milliLiter(s) (50 mL/Hr) IV Continuous <Continuous>  enoxaparin Injectable 40 milliGRAM(s) SubCutaneous daily  ergocalciferol 59412 Unit(s) Oral <User Schedule>  folic acid 1 milliGRAM(s) Oral daily  gabapentin 200 milliGRAM(s) Oral three times a day  insulin lispro (HumaLOG) corrective regimen sliding scale   SubCutaneous Before meals and at bedtime  insulin lispro Injectable (HumaLOG) 5 Unit(s) SubCutaneous Before meals and at bedtime  levoFLOXacin IVPB      levoFLOXacin IVPB 750 milliGRAM(s) IV Intermittent every 24 hours  methylPREDNISolone sodium succinate Injectable 40 milliGRAM(s) IV Push every 8 hours  pantoprazole    Tablet 40 milliGRAM(s) Oral before breakfast  polyethylene glycol 3350 17 Gram(s) Oral daily  sodium chloride 0.9%. 1000 milliLiter(s) (70 mL/Hr) IV Continuous <Continuous>  tiotropium 18 MICROgram(s) Capsule 1 Capsule(s) Inhalation daily      MEDICATIONS  (PRN):  ALBUTerol    90 MICROgram(s) HFA Inhaler 2 Puff(s) Inhalation every 6 hours PRN Shortness of Breath  glucagon  Injectable 1 milliGRAM(s) IntraMuscular once PRN Glucose LESS THAN 70 milligrams/deciliter  oxycodone    5 mG/acetaminophen 325 mG 2 Tablet(s) Oral every 6 hours PRN Severe Pain (7 - 10)  oxyCODONE    IR 5 milliGRAM(s) Oral every 4 hours PRN Dyspnea or severe pain      Allergies    fish (Angioedema)  penicillins (Rash)        PAST MEDICAL & SURGICAL HISTORY:  Malignant neoplasm of unspecified part of right bronchus or lung  HTN (hypertension)  DM (diabetes mellitus)  COPD (chronic obstructive pulmonary disease)  Lung cancer  Morbid obesity  GERD (gastroesophageal reflux disease)  Deviated septum  Prolapsed uterus  ITP (idiopathic thrombocytopenic purpura)  Emphysema/COPD  History of cholecystectomy  S/P lobectomy of lung: right 2017  History of tonsillectomy      FAMILY HISTORY:  FH: lung cancer  Family history of stroke  Family history of lung cancer  Diabetes mellitus  HTN+    DM +  IHD--   Asthma--  COPD +    SOCIAL HISTORY SMOKING Ex smoker x 8-9 years, Earlier 80 pack years   ETOH--     DRUGS--    REVIEW OF SYSTEMS:  CONSTITUTIONAL: No fever, weight loss ++  EYES: No eye pain, visual disturbances, or discharge  ENT:  No difficulty hearing, tinnitus, vertigo; No sinus or throat pain  NECK: No pain or stiffness   RESPIRATORY:  cough++  wheezing ++  chills--  hemoptysis --   Shortness of Breath++  CARDIOVASCULAR: No chest pain, palpitations, passing out, dizziness, or leg swelling  GASTROINTESTINAL: No abdominal or epigastric pain. No nausea, vomiting, or hematemesis; No diarrhea but constipation+ . No melena or hematochezia.  NEUROLOGICAL: No headaches, memory loss, loss of strength, numbness, or tremors  SKIN: No itching, burning, rashes, or lesions   LYMPH Nodes: No enlarged glands  ENDOCRINE: No heat or cold intolerance; No hair loss  MUSCULOSKELETAL: No joint pain or swelling; No muscle, back, or extremity pain  PSYCHIATRIC: No depression, anxiety, mood swings, or difficulty sleeping  HEME/LYMPH: No easy bruising, or bleeding gums  ALLERGY AND IMMUNOLOGIC: No hives or eczema      Vital Signs Last 24 Hrs  T(C): 36.3 (03 Aug 2020 05:25), Max: 37 (02 Aug 2020 17:34)  T(F): 97.4 (03 Aug 2020 05:25), Max: 98.6 (02 Aug 2020 17:34)  HR: 85 (03 Aug 2020 05:25) (77 - 86)  BP: 136/71 (03 Aug 2020 05:25) (123/- - 146/79)  BP(mean): --  RR: 18 (03 Aug 2020 05:25) (18 - 18)  SpO2: 99% (03 Aug 2020 05:25) (96% - 99%)  I&O's Detail      PHYSICAL EXAMINATION:    GENERAL: The patient is a well-developed, well-nourished  obese w/f in no apparent distress.   SKIN: No rashes ecchymoses or cyanosis  HEENT: Head is normocephalic and atraumatic. Extraocular muscles are intact. Mucous membranes are moist.   Neck supple LN not felt, JVP not increased  Thyroid not enlarged  Lymphatic: No lymphadenopathy  Cardiovascular:  S1 S2  heard ,RSR , JVP not increased , syst murmur at apex, No  gallop or rub  Respiratory:  Symmetrical chest wall movements Breathing vesicular , Percussion note normal no dulness   with    prolonged Expiratory Phase with  wheeze, no rales  ABDOMEN:  Soft, Non-tender, obese, No  hepatosplenomegaly ,BS positive		  Extremities: Normal range of motion, No clubbing, cyanosis or edema , No calf tenderness  Vascular: Peripheral pulses palpable 2+ bilaterally  CNS: Alert and oriented x3,  Mood and affect appropriate  Cranial nerves intact  sensory intact  motor Power5/5, DTR 2+   Babinski neg    LABS:                        12.4   31.93 )-----------( 110      ( 03 Aug 2020 07:17 )             37.7     08-03    137  |  101  |  16  ----------------------------<  289<H>  4.2   |  33<H>  |  0.58    Ca    8.2<L>      03 Aug 2020 07:17  Phos  2.6     08-03  Mg     2.5     08-03    TPro  5.7<L>  /  Alb  2.8<L>  /  TBili  1.0  /  DBili  x   /  AST  8<L>  /  ALT  21  /  AlkPhos  143<H>  08-03    PT/INR - ( 02 Aug 2020 06:42 )   PT: 13.1 sec;   INR: 1.13 ratio         PTT - ( 02 Aug 2020 06:42 )  PTT:25.4 sec  Urinalysis Basic - ( 02 Aug 2020 11:39 )    Color: Yellow / Appearance: Clear / S.020 / pH: x  Gluc: x / Ketone: Negative  / Bili: Negative / Urobili: 1   Blood: x / Protein: 30 mg/dL / Nitrite: Negative   Leuk Esterase: Small / RBC: 2-5 /HPF / WBC 6-10 /HPF   Sq Epi: x / Non Sq Epi: Few /HPF / Bacteria: Few /HPF        CARDIAC MARKERS ( 02 Aug 2020 06:29 )  <0.015 ng/mL / x     / 34 U/L / x     / x              Lactate, Blood: 1.9 mmol/L (20 @ 15:04)      [id number]  Vit D 10.1  RADIOLOGY & ADDITIONAL STUDIES:    CXR-   < from: Xray Chest 1 View-PORTABLE IMMEDIATE (20 @ 07:37) >  Left PICC tip at the SVC. Right lung postoperative changes. The heart is unremarkable. The lungs are clear. Bones are unremarkable for age.        Ct scan chest:< from: CT Chest w/ IV Cont (20 @ 09:31) >    No acute airspace disease or consolidation.    Improving mediastinal lymphadenopathy.    Multiple new sclerotic lesions in the thoracic spine concerning for metastatic disease.    < end of copied text >      ekg;< from: 12 Lead ECG (20 @ 07:49) >  Sinus tachycardia        echo:< from: Transthoracic Echocardiogram (20 @ 10:35) >  1. Mitral valve not well visualized, probably normal.  2. Aortic valve not well visualized. No aortic stenosis. No  aortic valve regurgitation seen.  3. Normalaortic root.  4. Normal left atrium.  5. Normal left ventricular internal dimensions and wall  thicknesses.  6. Endocardium not well visualized; grossly normal left  ventricular systolic function.  7. Normal right atrium.  8. Normal right ventricularsize and systolic function  (TAPSE 1.9 cm).  9. No pericardial effusion.

## 2020-08-03 NOTE — PROGRESS NOTE ADULT - ASSESSMENT
65F with PMH COPD (5L NC at home, 80 pack year hx, quit smoking 8 ya) and r lung cancer s/p RLL resection 2017 on chemo (last dose 7/31) admitted for COPD exacerbation and severe neck/back pain 2/2 known pathologic spine and rib fx. Continuing tx for UTI dx outpatient. 65F with PMH COPD (type B with acute asthmatic [id number] NC at home, 80 pack year hx, quit smoking 8 ya) and r lung cancer s/p RLL resection 2017 on chemo (last dose 7/31) admitted for COPD exacerbation and severe neck/back pain 2/2 known pathologic spine and rib fx. Continuing tx for UTI dx outpatient.

## 2020-08-03 NOTE — DISCHARGE NOTE PROVIDER - NSDCMRMEDTOKEN_GEN_ALL_CORE_FT
albuterol 0.63 mg/3 mL (0.021%) inhalation solution: 3 milliliter(s) by nebulizer every 6 hours, As Needed   ALPRAZolam 1 mg oral tablet: 1 tab(s) orally 3 times a day, As needed, Anxiety MDD:3 tabs a day  atorvastatin 40 mg oral tablet: 1 tab(s) orally once a day  budesonide-formoterol 160 mcg-4.5 mcg/inh inhalation aerosol: 2 puff(s) inhaled 2 times a day  Coreg 6.25 mg oral tablet: 1 tab(s) orally 2 times a day  famotidine 40 mg oral tablet: 1 tab(s) orally once a day (at bedtime)  folic acid 1 mg oral tablet: 1 tab(s) orally once a day  levoFLOXacin 500 mg oral tablet: 1 tab(s) orally every 24 hours  loratadine 5 mg oral tablet, chewable: 1 tab(s) orally once a day  metoclopramide: 2 milligram(s) orally 4 times a day, As Needed for nausea   omeprazole 40 mg oral delayed release capsule: 1 cap(s) orally once a day  oxyCODONE 5 mg oral tablet: 1 tab(s) orally every 4 hours, As needed, Dyspnea or severe pain MDD:4 tabs a day  polyethylene glycol 3350 oral powder for reconstitution: 17 gram(s) orally once a day, As Needed   Spiriva 18 mcg inhalation capsule: 1 cap(s) inhaled once a day  Vitamin B12 1000 mcg oral tablet: 1 tab(s) orally once a day albuterol 0.63 mg/3 mL (0.021%) inhalation solution: 3 milliliter(s) by nebulizer every 6 hours, As Needed   ALPRAZolam 1 mg oral tablet: 1 tab(s) orally 3 times a day, As needed, Anxiety MDD:3 tabs a day  amLODIPine 5 mg oral tablet: 1 tab(s) orally once a day  atorvastatin 40 mg oral tablet: 1 tab(s) orally once a day  budesonide-formoterol 160 mcg-4.5 mcg/inh inhalation aerosol: 2 puff(s) inhaled 2 times a day  Coreg 6.25 mg oral tablet: 1 tab(s) orally 2 times a day  Endocet 10/325 oral tablet: 2 tab(s) orally every 6 hours  famotidine 40 mg oral tablet: 1 tab(s) orally once a day (at bedtime)  folic acid 1 mg oral tablet: 1 tab(s) orally once a day  gabapentin 100 mg oral tablet: 2 tab(s) orally 3 times a day  levoFLOXacin 500 mg oral tablet: 1 tab(s) orally every 24 hours  loratadine 5 mg oral tablet, chewable: 1 tab(s) orally once a day  metoclopramide: 2 milligram(s) orally 4 times a day, As Needed for nausea   omeprazole 40 mg oral delayed release capsule: 1 cap(s) orally once a day  oxyCODONE 5 mg oral tablet: 1 tab(s) orally every 4 hours, As needed, Dyspnea or severe pain MDD:4 tabs a day  polyethylene glycol 3350 oral powder for reconstitution: 17 gram(s) orally once a day, As Needed   Spiriva 18 mcg inhalation capsule: 1 cap(s) inhaled once a day  Vitamin B12 1000 mcg oral tablet: 1 tab(s) orally once a day  Zofran 8 mg oral tablet: 8 milligram(s) orally every 8 hours, As Needed albuterol 0.63 mg/3 mL (0.021%) inhalation solution: 3 milliliter(s) by nebulizer every 6 hours, As Needed   ALPRAZolam 1 mg oral tablet: 1 tab(s) orally 3 times a day, As needed, Anxiety MDD:3 tabs a day  amLODIPine 5 mg oral tablet: 1 tab(s) orally once a day  atorvastatin 40 mg oral tablet: 1 tab(s) orally once a day  budesonide-formoterol 160 mcg-4.5 mcg/inh inhalation aerosol: 2 puff(s) inhaled 2 times a day  Coreg 6.25 mg oral tablet: 1 tab(s) orally 2 times a day  Endocet 10/325 oral tablet: 2 tab(s) orally every 6 hours  famotidine 40 mg oral tablet: 1 tab(s) orally once a day (at bedtime)  folic acid 1 mg oral tablet: 1 tab(s) orally once a day  gabapentin 100 mg oral tablet: 2 tab(s) orally 3 times a day  loratadine 5 mg oral tablet, chewable: 1 tab(s) orally once a day  metoclopramide: 2 milligram(s) orally 4 times a day, As Needed for nausea   omeprazole 40 mg oral delayed release capsule: 1 cap(s) orally once a day  oxyCODONE 5 mg oral tablet: 1 tab(s) orally every 4 hours, As needed, Dyspnea or severe pain MDD:4 tabs a day  polyethylene glycol 3350 oral powder for reconstitution: 17 gram(s) orally once a day, As Needed   Spiriva 18 mcg inhalation capsule: 1 cap(s) inhaled once a day  Vitamin B12 1000 mcg oral tablet: 1 tab(s) orally once a day  Zofran 8 mg oral tablet: 8 milligram(s) orally every 8 hours, As Needed albuterol 0.63 mg/3 mL (0.021%) inhalation solution: 3 milliliter(s) by nebulizer every 6 hours, As Needed   ALPRAZolam 1 mg oral tablet: 1 tab(s) orally 3 times a day, As needed, Anxiety MDD:3 tabs a day  amLODIPine 5 mg oral tablet: 1 tab(s) orally once a day  atorvastatin 40 mg oral tablet: 1 tab(s) orally once a day  budesonide-formoterol 160 mcg-4.5 mcg/inh inhalation aerosol: 2 puff(s) inhaled 2 times a day  Coreg 6.25 mg oral tablet: 1 tab(s) orally 2 times a day  cyclobenzaprine 5 mg oral tablet: 1 tab(s) orally 2 times a day, As Needed -Muscle Spasm   Drisdol 50,000 intl units (1.25 mg) oral capsule: 1 cap(s) orally once a week  Endocet 10/325 oral tablet: 2 tab(s) orally every 6 hours  famotidine 40 mg oral tablet: 1 tab(s) orally once a day (at bedtime)  folic acid 1 mg oral tablet: 1 tab(s) orally once a day  gabapentin 400 mg oral capsule: 1 cap(s) orally every 8 hours   loratadine 5 mg oral tablet, chewable: 1 tab(s) orally once a day  metoclopramide: 2 milligram(s) orally 4 times a day, As Needed for nausea   omeprazole 40 mg oral delayed release capsule: 1 cap(s) orally once a day  polyethylene glycol 3350 oral powder for reconstitution: 17 gram(s) orally once a day, As Needed   senna oral tablet: 2 tab(s) orally once a day (at bedtime)  Spiriva 18 mcg inhalation capsule: 1 cap(s) inhaled once a day  Vitamin B12 1000 mcg oral tablet: 1 tab(s) orally once a day  Zofran 8 mg oral tablet: 8 milligram(s) orally every 8 hours, As Needed albuterol 0.63 mg/3 mL (0.021%) inhalation solution: 3 milliliter(s) by nebulizer every 6 hours, As Needed   ALPRAZolam 1 mg oral tablet: 1 tab(s) orally 3 times a day, As needed, Anxiety MDD:3 tabs a day  amLODIPine 5 mg oral tablet: 1 tab(s) orally once a day  atorvastatin 40 mg oral tablet: 1 tab(s) orally once a day  budesonide-formoterol 160 mcg-4.5 mcg/inh inhalation aerosol: 2 puff(s) inhaled 2 times a day  Coreg 6.25 mg oral tablet: 1 tab(s) orally 2 times a day  cyclobenzaprine 5 mg oral tablet: 1 tab(s) orally 2 times a day, As Needed -Muscle Spasm   Drisdol 50,000 intl units (1.25 mg) oral capsule: 1 cap(s) orally once a week  Endocet 10/325 oral tablet: 2 tab(s) orally every 6 hours  famotidine 40 mg oral tablet: 1 tab(s) orally once a day (at bedtime)  folic acid 1 mg oral tablet: 1 tab(s) orally once a day  gabapentin 400 mg oral capsule: 1 cap(s) orally every 8 hours   loratadine 5 mg oral tablet, chewable: 1 tab(s) orally once a day  metoclopramide: 2 milligram(s) orally 4 times a day, As Needed for nausea   omeprazole 40 mg oral delayed release capsule: 1 cap(s) orally once a day  polyethylene glycol 3350 oral powder for reconstitution: 17 gram(s) orally once a day, As Needed   predniSONE 20 mg oral tablet: 2 tab(s) orally once a day for three days from 8/7/2020-8/9/2020  predniSONE 5 mg oral tablet: take 35mg starting 8/10/2020 and go down by 5mg each day till course is completed  senna oral tablet: 2 tab(s) orally once a day (at bedtime)  Spiriva 18 mcg inhalation capsule: 1 cap(s) inhaled once a day  Vitamin B12 1000 mcg oral tablet: 1 tab(s) orally once a day  Zofran 8 mg oral tablet: 8 milligram(s) orally every 8 hours, As Needed albuterol 0.63 mg/3 mL (0.021%) inhalation solution: 3 milliliter(s) by nebulizer every 6 hours, As Needed   ALPRAZolam 1 mg oral tablet: 1 tab(s) orally 3 times a day, As needed, Anxiety MDD:3 tabs a day  amLODIPine 5 mg oral tablet: 1 tab(s) orally once a day  atorvastatin 40 mg oral tablet: 1 tab(s) orally once a day  budesonide-formoterol 160 mcg-4.5 mcg/inh inhalation aerosol: 2 puff(s) inhaled 2 times a day  Coreg 6.25 mg oral tablet: 1 tab(s) orally 2 times a day  cyclobenzaprine 5 mg oral tablet: 1 tab(s) orally 2 times a day, As Needed -Muscle Spasm   Drisdol 50,000 intl units (1.25 mg) oral capsule: 1 cap(s) orally once a week  Endocet 10/325 oral tablet: 2 tab(s) orally every 6 hours  famotidine 40 mg oral tablet: 1 tab(s) orally once a day (at bedtime)  folic acid 1 mg oral tablet: 1 tab(s) orally once a day  gabapentin 400 mg oral capsule: 1 cap(s) orally every 8 hours   loratadine 5 mg oral tablet, chewable: 1 tab(s) orally once a day  metoclopramide: 2 milligram(s) orally 4 times a day, As Needed for nausea   omeprazole 40 mg oral delayed release capsule: 1 cap(s) orally once a day  polyethylene glycol 3350 oral powder for reconstitution: 17 gram(s) orally once a day, As Needed   predniSONE 20 mg oral tablet: 2 tab(s) orally once a day for three days from 8/7/2020-8/9/2020  predniSONE 5 mg oral tablet: take 35mg starting 8/10/2020 and go down by 5mg each day till course is completed  senna oral tablet: 2 tab(s) orally once a day (at bedtime)  tiotropium 18 mcg inhalation capsule: 1 cap(s) inhaled once a day  Vitamin B12 1000 mcg oral tablet: 1 tab(s) orally once a day  Zofran 8 mg oral tablet: 8 milligram(s) orally every 8 hours, As Needed albuterol 0.63 mg/3 mL (0.021%) inhalation solution: 3 milliliter(s) by nebulizer every 6 hours, As Needed   ALPRAZolam 1 mg oral tablet: 1 tab(s) orally 3 times a day, As needed, Anxiety MDD:3 tabs a day  amLODIPine 5 mg oral tablet: 1 tab(s) orally once a day  atorvastatin 40 mg oral tablet: 1 tab(s) orally once a day  Basaglar KwikPen 100 units/mL subcutaneous solution: 40 unit(s) subcutaneous once a day (at bedtime)  budesonide-formoterol 160 mcg-4.5 mcg/inh inhalation aerosol: 2 puff(s) inhaled 2 times a day  Coreg 6.25 mg oral tablet: 1 tab(s) orally 2 times a day  cyclobenzaprine 5 mg oral tablet: 1 tab(s) orally 2 times a day, As Needed -Muscle Spasm   Drisdol 50,000 intl units (1.25 mg) oral capsule: 1 cap(s) orally once a week  Endocet 10/325 oral tablet: 2 tab(s) orally every 6 hours  famotidine 40 mg oral tablet: 1 tab(s) orally once a day (at bedtime)  folic acid 1 mg oral tablet: 1 tab(s) orally once a day  gabapentin 400 mg oral capsule: 1 cap(s) orally every 8 hours   HumaLOG 100 units/mL subcutaneous solution: 2 unit(s) subcutaneous 3 times a day (before meals)  loratadine 5 mg oral tablet, chewable: 1 tab(s) orally once a day  metoclopramide: 2 milligram(s) orally 4 times a day, As Needed for nausea   omeprazole 40 mg oral delayed release capsule: 1 cap(s) orally once a day  polyethylene glycol 3350 oral powder for reconstitution: 17 gram(s) orally once a day, As Needed   predniSONE 20 mg oral tablet: 2 tab(s) orally once a day for three days from 8/7/2020-8/9/2020  predniSONE 5 mg oral tablet: take 35mg starting 8/10/2020 and go down by 5mg each day till course is completed  senna oral tablet: 2 tab(s) orally once a day (at bedtime)  tiotropium 18 mcg inhalation capsule: 1 cap(s) inhaled once a day  Vitamin B12 1000 mcg oral tablet: 1 tab(s) orally once a day  Zofran 8 mg oral tablet: 8 milligram(s) orally every 8 hours, As Needed

## 2020-08-03 NOTE — PROGRESS NOTE ADULT - PROBLEM SELECTOR PLAN 4
-last chemo dose 7/31  -started 7 days 5mg Loratadine post-chemo per patient Patient reports ongoing pain and tenderness to palpation of bilateral shins, swelling noted, unable to assess further 2/2 patient discomfort  -LE Doppler today

## 2020-08-03 NOTE — CONSULT NOTE ADULT - ASSESSMENT
COPD type B with acute asthmatic Bronchitis   CARC RLL lobectomy with Bone and LN mets on C/T   Obesity with OHS with Chr respiratory failure   IDDM   HCVD      PLAN-- IV steroids x 48-72 hrs   Symbicort and spiriva   HFN rx q 4-6 h prn with duoneb   ABG r/a   O2 n/c 2-3 lpm to keep o2 sat 92   Vit D supplement   FS coverage   S/C Lovenox   Discussed with Residents and DR Mendiola

## 2020-08-03 NOTE — CONSULT NOTE ADULT - ASSESSMENT
65 year old lad=y nwith recurrent lung cancer in mediastinum and bones has been on chemo developed sob and increasing pain at back

## 2020-08-03 NOTE — PROGRESS NOTE ADULT - SUBJECTIVE AND OBJECTIVE BOX
PGY-1 Progress Note discussed with attending    PAGER #: [[phone]] TILL 5:00 PM  PLEASE CONTACT ON CALL TEAM:  - On Call Team (Please refer to Rachel) FROM 5:00 PM - 8:30PM  - Nightfloat Team FROM 8:30 -7:30 AM    CHIEF COMPLAINT & BRIEF HOSPITAL COURSE: 65F with PMH COPD (5L NC at home, 80 pack year hx, quit smoking 8 ya) and r lung cancer s/p RLL resection 2017 on chemo (last dose 7/31) admitted for COPD exacerbation and severe neck/back pain 2/2 known pathologic spine and rib fx. Patient takes Percocet chronically for this pain and has chronic constipation. Of note, patient has history of multiple COPD exacerbations, per patient without needing intubation, and started herself on PO steroids 60mg day prior to admission. Patient was also found to have UTI outpatient by oncologist Dr. Mendiola and was started on levofloxacin, continuing inpatient.     INTERVAL HPI/OVERNIGHT EVENTS: NAEON. VS wnl this am. WBC and BG remains elevated 2/2 steroids. Patient reports anxiety and difficulty breathing this am on 5L NC. Requesting 6L NC, seen by Dr. Nugent, will increased Solumedrol to 60 q8 and remain on 5L for now. Has been refusing Xanax 2/2 feeling "out of it" on the 1mg dose. Willing to take 0.25mg dose, changed order. Reports poor pain control with 5mg oxycodone, added home Percocet and Gabapentin. LE Doppler today. Reports last BM yesterday, denies diarrhea.     MEDICATIONS  (STANDING):  atorvastatin 40 milliGRAM(s) Oral at bedtime  bisacodyl Suppository 10 milliGRAM(s) Rectal every 24 hours  budesonide 160 MICROgram(s)/formoterol 4.5 MICROgram(s) Inhaler 2 Puff(s) Inhalation two times a day  carvedilol 6.25 milliGRAM(s) Oral every 12 hours  cyanocobalamin 1000 MICROGram(s) Oral daily  dextrose 5%. 1000 milliLiter(s) (50 mL/Hr) IV Continuous <Continuous>  enoxaparin Injectable 40 milliGRAM(s) SubCutaneous daily  ergocalciferol 39041 Unit(s) Oral <User Schedule>  folic acid 1 milliGRAM(s) Oral daily  gabapentin 200 milliGRAM(s) Oral three times a day  insulin lispro (HumaLOG) corrective regimen sliding scale   SubCutaneous Before meals and at bedtime  insulin lispro Injectable (HumaLOG) 5 Unit(s) SubCutaneous Before meals and at bedtime  levoFLOXacin IVPB      levoFLOXacin IVPB 750 milliGRAM(s) IV Intermittent every 24 hours  methylPREDNISolone sodium succinate Injectable 40 milliGRAM(s) IV Push every 8 hours  pantoprazole    Tablet 40 milliGRAM(s) Oral before breakfast  polyethylene glycol 3350 17 Gram(s) Oral daily  sodium chloride 0.9%. 1000 milliLiter(s) (70 mL/Hr) IV Continuous <Continuous>  tiotropium 18 MICROgram(s) Capsule 1 Capsule(s) Inhalation daily    MEDICATIONS  (PRN):  ALBUTerol    90 MICROgram(s) HFA Inhaler 2 Puff(s) Inhalation every 6 hours PRN Shortness of Breath  ALPRAZolam 0.25 milliGRAM(s) Oral three times a day PRN anxiety  glucagon  Injectable 1 milliGRAM(s) IntraMuscular once PRN Glucose LESS THAN 70 milligrams/deciliter  oxycodone    5 mG/acetaminophen 325 mG 2 Tablet(s) Oral every 6 hours PRN Severe Pain (7 - 10)  oxyCODONE    IR 5 milliGRAM(s) Oral every 4 hours PRN Dyspnea or severe pain    REVIEW OF SYSTEMS:  CONSTITUTIONAL: no fatigue, generally anxious and feeling poorly   RESPIRATORY: wheezing, shortness of breath  CARDIOVASCULAR: No chest pain. painful b/l leg swelling  GASTROINTESTINAL: No abdominal pain. No nausea, vomiting, No diarrhea or constipation.  NEUROLOGICAL: mild headaches    Vital Signs Last 24 Hrs  T(C): 36.3 (03 Aug 2020 05:25), Max: 37 (02 Aug 2020 17:34)  T(F): 97.4 (03 Aug 2020 05:25), Max: 98.6 (02 Aug 2020 17:34)  HR: 85 (03 Aug 2020 05:25) (77 - 86)  BP: 136/71 (03 Aug 2020 05:25) (123/- - 146/79)  BP(mean): --  RR: 18 (03 Aug 2020 05:25) (18 - 18)  SpO2: 99% (03 Aug 2020 05:25) (96% - 99%)    PHYSICAL EXAMINATION:  GENERAL: obese, anxious-appearing   HEAD:  Atraumatic, Normocephalic  EYES:  conjunctiva and sclera clear, eomi  NECK: Supple  CHEST/LUNG: Clear to auscultation. No rales, rhonchi, wheezing, or rubs. unable to assess back 2/2 patient condition   HEART: Regular rate and rhythm; No murmurs, rubs, or gallops.  ABDOMEN: Soft, Nontender, obese; Bowel sounds present  NERVOUS SYSTEM: alert and oriented x3  EXTREMITIES:  2+ Peripheral Pulses, b/l edema, unable to assess pitting 2/2 tenderness to palpation  SKIN: warm dry                          12.4   31.93 )-----------( 110      ( 03 Aug 2020 07:17 )             37.7     08-03    137  |  101  |  16  ----------------------------<  289<H>  4.2   |  33<H>  |  0.58    Ca    8.2<L>      03 Aug 2020 07:17  Phos  2.6     08-03  Mg     2.5     08-03    TPro  5.7<L>  /  Alb  2.8<L>  /  TBili  1.0  /  DBili  x   /  AST  8<L>  /  ALT  21  /  AlkPhos  143<H>  08-03    LIVER FUNCTIONS - ( 03 Aug 2020 07:17 )  Alb: 2.8 g/dL / Pro: 5.7 g/dL / ALK PHOS: 143 U/L / ALT: 21 U/L DA / AST: 8 U/L / GGT: x           CARDIAC MARKERS ( 02 Aug 2020 06:29 )  <0.015 ng/mL / x     / 34 U/L / x     / x          PT/INR - ( 02 Aug 2020 06:42 )   PT: 13.1 sec;   INR: 1.13 ratio         PTT - ( 02 Aug 2020 06:42 )  PTT:25.4 sec    CAPILLARY BLOOD GLUCOSE      RADIOLOGY & ADDITIONAL TESTS: PGY-1 Progress Note discussed with attending    PAGER #: [[phone]] TILL 5:00 PM  PLEASE CONTACT ON CALL TEAM:  - On Call Team (Please refer to Rachel) FROM 5:00 PM - 8:30PM  - Nightfloat Team FROM 8:30 -7:30 AM    CHIEF COMPLAINT & BRIEF HOSPITAL COURSE: 65F with PMH COPD (type B with acute asthmatic bronchitis, 5L NC at home, 80 pack year hx, quit smoking 8 ya) and r lung cancer s/p RLL resection 2017 on chemo (last dose 7/31) admitted for COPD exacerbation and severe neck/back pain 2/2 known pathologic spine and rib fx. Patient takes Percocet chronically for this pain and has chronic constipation. Of note, patient has history of multiple COPD exacerbations, per patient without needing intubation, and started herself on PO steroids 60mg day prior to admission. Patient was also found to have UTI outpatient by oncologist Dr. Mendiola and was started on levofloxacin, continuing inpatient.     INTERVAL HPI/OVERNIGHT EVENTS: NAEON. VS wnl this am. WBC and BG remains elevated 2/2 steroids. Patient reports anxiety and difficulty breathing this am on 5L NC. Requesting 6L NC, seen by Dr. Nugent, will increased Solumedrol to 60 q8 and remain on 5L for now. Has been refusing Xanax 2/2 feeling "out of it" on the 1mg dose. Willing to take 0.25mg dose, changed order. Reports poor pain control with 5mg oxycodone, added home Percocet and Gabapentin. LE Doppler today. Reports last BM yesterday, denies diarrhea.     MEDICATIONS  (STANDING):  atorvastatin 40 milliGRAM(s) Oral at bedtime  bisacodyl Suppository 10 milliGRAM(s) Rectal every 24 hours  budesonide 160 MICROgram(s)/formoterol 4.5 MICROgram(s) Inhaler 2 Puff(s) Inhalation two times a day  carvedilol 6.25 milliGRAM(s) Oral every 12 hours  cyanocobalamin 1000 MICROGram(s) Oral daily  dextrose 5%. 1000 milliLiter(s) (50 mL/Hr) IV Continuous <Continuous>  enoxaparin Injectable 40 milliGRAM(s) SubCutaneous daily  ergocalciferol 29573 Unit(s) Oral <User Schedule>  folic acid 1 milliGRAM(s) Oral daily  gabapentin 200 milliGRAM(s) Oral three times a day  insulin lispro (HumaLOG) corrective regimen sliding scale   SubCutaneous Before meals and at bedtime  insulin lispro Injectable (HumaLOG) 5 Unit(s) SubCutaneous Before meals and at bedtime  levoFLOXacin IVPB      levoFLOXacin IVPB 750 milliGRAM(s) IV Intermittent every 24 hours  methylPREDNISolone sodium succinate Injectable 40 milliGRAM(s) IV Push every 8 hours  pantoprazole    Tablet 40 milliGRAM(s) Oral before breakfast  polyethylene glycol 3350 17 Gram(s) Oral daily  sodium chloride 0.9%. 1000 milliLiter(s) (70 mL/Hr) IV Continuous <Continuous>  tiotropium 18 MICROgram(s) Capsule 1 Capsule(s) Inhalation daily    MEDICATIONS  (PRN):  ALBUTerol    90 MICROgram(s) HFA Inhaler 2 Puff(s) Inhalation every 6 hours PRN Shortness of Breath  ALPRAZolam 0.25 milliGRAM(s) Oral three times a day PRN anxiety  glucagon  Injectable 1 milliGRAM(s) IntraMuscular once PRN Glucose LESS THAN 70 milligrams/deciliter  oxycodone    5 mG/acetaminophen 325 mG 2 Tablet(s) Oral every 6 hours PRN Severe Pain (7 - 10)  oxyCODONE    IR 5 milliGRAM(s) Oral every 4 hours PRN Dyspnea or severe pain    REVIEW OF SYSTEMS:  CONSTITUTIONAL: no fatigue, generally anxious and feeling poorly   RESPIRATORY: wheezing, shortness of breath  CARDIOVASCULAR: No chest pain. painful b/l leg swelling  GASTROINTESTINAL: No abdominal pain. No nausea, vomiting, No diarrhea or constipation.  NEUROLOGICAL: mild headaches    Vital Signs Last 24 Hrs  T(C): 36.3 (03 Aug 2020 05:25), Max: 37 (02 Aug 2020 17:34)  T(F): 97.4 (03 Aug 2020 05:25), Max: 98.6 (02 Aug 2020 17:34)  HR: 85 (03 Aug 2020 05:25) (77 - 86)  BP: 136/71 (03 Aug 2020 05:25) (123/- - 146/79)  BP(mean): --  RR: 18 (03 Aug 2020 05:25) (18 - 18)  SpO2: 99% (03 Aug 2020 05:25) (96% - 99%)    PHYSICAL EXAMINATION:  GENERAL: obese, anxious-appearing   HEAD:  Atraumatic, Normocephalic  EYES:  conjunctiva and sclera clear, eomi  NECK: Supple  CHEST/LUNG: Clear to auscultation. No rales, rhonchi, wheezing, or rubs. unable to assess back 2/2 patient condition   HEART: Regular rate and rhythm; No murmurs, rubs, or gallops.  ABDOMEN: Soft, Nontender, obese; Bowel sounds present  NERVOUS SYSTEM: alert and oriented x3  EXTREMITIES:  2+ Peripheral Pulses, b/l edema, unable to assess pitting 2/2 tenderness to palpation  SKIN: warm dry                          12.4   31.93 )-----------( 110      ( 03 Aug 2020 07:17 )             37.7     08-03    137  |  101  |  16  ----------------------------<  289<H>  4.2   |  33<H>  |  0.58    Ca    8.2<L>      03 Aug 2020 07:17  Phos  2.6     08-03  Mg     2.5     08-03    TPro  5.7<L>  /  Alb  2.8<L>  /  TBili  1.0  /  DBili  x   /  AST  8<L>  /  ALT  21  /  AlkPhos  143<H>  08-03    LIVER FUNCTIONS - ( 03 Aug 2020 07:17 )  Alb: 2.8 g/dL / Pro: 5.7 g/dL / ALK PHOS: 143 U/L / ALT: 21 U/L DA / AST: 8 U/L / GGT: x           CARDIAC MARKERS ( 02 Aug 2020 06:29 )  <0.015 ng/mL / x     / 34 U/L / x     / x          PT/INR - ( 02 Aug 2020 06:42 )   PT: 13.1 sec;   INR: 1.13 ratio         PTT - ( 02 Aug 2020 06:42 )  PTT:25.4 sec    CAPILLARY BLOOD GLUCOSE      RADIOLOGY & ADDITIONAL TESTS:

## 2020-08-03 NOTE — PROGRESS NOTE ADULT - PROBLEM SELECTOR PLAN 1
patient reports several days of increased SOB, took PO 60mg steroids at home yesterday (self-rx) before presenting to hospital, of note has hx of multiple COPD exacerbations without intubation   -VBG shows compensation   -started on 40mg Solumedrol q8, increase to 60mg q8 today  -cw bronchodilators (spiriva, symbicort, albuterol)  -cw GI PPx protonix 40gm type B with acute asthmatic bronchitis  patient reports several days of increased SOB, took PO 60mg steroids at home yesterday (self-rx) before presenting to hospital, of note has hx of multiple COPD exacerbations without intubation   CXR unremarkable for acute pathology, CT chest negative for acute airspace disease or consolidation with improving mediastinal LAD  -VBG shows compensation   -on 5L NC, titrate to 92-93% SpO2  -started on 40mg Solumedrol q8, increase to 60mg q8 today  -cw bronchodilators (spiriva, symbicort, albuterol)  -cw levofloxacin 750mg for 5 days (resp and UTI coverage)  -cw GI PPx protonix 40gm  -WBC elevated 2/2 steroids  -BCx NGTD  -monitor vitals and O2, CM to look into increasing patient's home O2 allowance  -pulmonology Dr. Nugent following  -pulm rehab on dc

## 2020-08-03 NOTE — PROGRESS NOTE ADULT - ATTENDING COMMENTS
Patient is a 65y old  Female who presents with a chief complaint of Shortness of breath and back pain    INTERVAL HPI/OVERNIGHT EVENTS: reports some shortness of breath, better then yesterday     MEDICATIONS  (STANDING):  acetaminophen   Tablet .. 650 milliGRAM(s) Oral every 6 hours  atorvastatin 40 milliGRAM(s) Oral at bedtime  bisacodyl Suppository 10 milliGRAM(s) Rectal every 24 hours  budesonide 160 MICROgram(s)/formoterol 4.5 MICROgram(s) Inhaler 2 Puff(s) Inhalation two times a day  carvedilol 6.25 milliGRAM(s) Oral every 12 hours  cyanocobalamin 1000 MICROGram(s) Oral daily  dextrose 5%. 1000 milliLiter(s) (50 mL/Hr) IV Continuous <Continuous>  enoxaparin Injectable 40 milliGRAM(s) SubCutaneous daily  ergocalciferol 72576 Unit(s) Oral <User Schedule>  folic acid 1 milliGRAM(s) Oral daily  gabapentin 200 milliGRAM(s) Oral three times a day  insulin lispro (HumaLOG) corrective regimen sliding scale   SubCutaneous Before meals and at bedtime  insulin lispro Injectable (HumaLOG) 5 Unit(s) SubCutaneous Before meals and at bedtime  levoFLOXacin IVPB      levoFLOXacin IVPB 750 milliGRAM(s) IV Intermittent every 24 hours  loratadine 5 milliGRAM(s) Oral daily  methylPREDNISolone sodium succinate Injectable 60 milliGRAM(s) IV Push every 8 hours  oxyCODONE    IR 20 milliGRAM(s) Oral four times a day  pantoprazole    Tablet 40 milliGRAM(s) Oral before breakfast  polyethylene glycol 3350 17 Gram(s) Oral daily  sodium chloride 0.9%. 1000 milliLiter(s) (70 mL/Hr) IV Continuous <Continuous>  tiotropium 18 MICROgram(s) Capsule 1 Capsule(s) Inhalation daily    MEDICATIONS  (PRN):  ALBUTerol    90 MICROgram(s) HFA Inhaler 2 Puff(s) Inhalation every 6 hours PRN Shortness of Breath  ALPRAZolam 0.25 milliGRAM(s) Oral three times a day PRN anxiety  glucagon  Injectable 1 milliGRAM(s) IntraMuscular once PRN Glucose LESS THAN 70 milligrams/deciliter    REVIEW OF SYSTEMS: negative  Vital Signs Last 24 Hrs  T(C): 36.9 (03 Aug 2020 14:16), Max: 37 (02 Aug 2020 17:34)  T(F): 98.5 (03 Aug 2020 14:16), Max: 98.6 (02 Aug 2020 17:34)  HR: 76 (03 Aug 2020 14:16) (76 - 86)  BP: 129/56 (03 Aug 2020 14:16) (123/- - 142/56)  BP(mean): --  RR: 18 (03 Aug 2020 14:16) (18 - 18)  SpO2: 98% (03 Aug 2020 14:16) (96% - 99%)    PHYSICAL EXAM:   NAD; Normocephalic;   LUNGS - scattered wheezing   HEART: S1 S2+   ABDOMEN: Soft, Nontender, non distended  EXTREMITIES: no cyanosis; no edema  NERVOUS SYSTEM:  Awake and alert; no focal neuro deficits appreciated    LABS:                        12.4   31.93 )-----------( 110                   37.7     137  |  101  |  16  ----------------------------<  289<H>  4.2   |  33<H>  |  0.58    Ca    8.2<L>      Phos  2.6      Mg     2.5         TPro  5.7<L>  /  Alb  2.8<L>  /  TBili  1.0  /  DBili  x   /  AST  8<L>  /  ALT  21  /  AlkPhos  143<H>  08-03    PT/INR - ( 02 Aug 2020 06:42 )   PT: 13.1 sec;   INR: 1.13 ratio         PTT - ( 02 Aug 2020 06:42 )  PTT:25.4 se    A/P:   Acute hypoxic respiratory failure 2/2 COPD exacerbation  - with history of heavy smoking and lung cancer s/p resection/chemo   - wheezing +   - failed trial of oral steroids at home   - increased iv steroid dose as per pulm recs, bronchodilators   - supplemental O2 to maintain SO2 above 90-92  - levoflox for exacerbation   - Pulmonary Dr. Nugent    Pathological fracture of spine and ribs:  - sclerotic lesion on CT chest   - continue pain control with bowel regimen     Metastatic lung cancer s/p resection and current chemo:  - last chemo last week     Constipation: resolved     DM - HbA1c level 8.6,  5 premeal added and moderate sliding scale   HTN- continue home meds with parameters   DVT and GI prophylaxis .

## 2020-08-03 NOTE — DISCHARGE NOTE PROVIDER - NSDCCPCAREPLAN_GEN_ALL_CORE_FT
PRINCIPAL DISCHARGE DIAGNOSIS  Diagnosis: COPD exacerbation  Assessment and Plan of Treatment: COPD exacerbation      SECONDARY DISCHARGE DIAGNOSES  Diagnosis: HTN (hypertension)  Assessment and Plan of Treatment: HTN (hypertension)    Diagnosis: UTI (urinary tract infection)  Assessment and Plan of Treatment: UTI (urinary tract infection)    Diagnosis: Diabetes  Assessment and Plan of Treatment: Diabetes    Diagnosis: Lung cancer  Assessment and Plan of Treatment: Lung cancer    Diagnosis: Vitamin D deficiency  Assessment and Plan of Treatment: Vitamin D deficiency PRINCIPAL DISCHARGE DIAGNOSIS  Diagnosis: COPD exacerbation  Assessment and Plan of Treatment: Patient presented for acute shortness of breath.    On admission, chest x-ray showed no acute processes. Pulmonology, Dr. Nugent consulted. Started on solumedrol IV steroids. Solumedrol changed to prednisone taper.   Complete prednisone taper course. Follow up with your PCP for further evaluation and management. Follow up with pulmonology, Dr. Nugent as outpatient for further management. Continue to use oxygen supplementation as needed for shortness of breath. Try not to use more than 2-3L of oxygen via nasal cannula.      SECONDARY DISCHARGE DIAGNOSES  Diagnosis: HTN (hypertension)  Assessment and Plan of Treatment: HTN (hypertension)    Diagnosis: UTI (urinary tract infection)  Assessment and Plan of Treatment: Urinalysis was noted to be positive. Treated with IV antibiotics.    Diagnosis: Diabetes  Assessment and Plan of Treatment: Diabetes    Diagnosis: Lung cancer  Assessment and Plan of Treatment: CT thoracic spine was done and showed multiple sclerotic and lytic lesions in the thoracic spine and ribs suggesting metastatic disease, overall progressed since 6/25/2020. Epidural soft tissue at the T7 level suggesting tumor. Multilevel thoracic compression fractures. New acute compression fracture of the L1 vertebral body with bony retropulsion. Follow up with heme/onc Dr. Mendiola as outpatient for further management and evaluation.    Diagnosis: Vitamin D deficiency  Assessment and Plan of Treatment: Noted to have low Vitamin D level of 10 on admission. Normal levels are usually above 30. Started on weekly ergocalciferol supplementation of 50,000 units weekly. You are recommended to continue taking Vitamin D supplementation and follow up with your PCP in 8 weeks to recheck your Vitamin D level. PRINCIPAL DISCHARGE DIAGNOSIS  Diagnosis: COPD exacerbation  Assessment and Plan of Treatment: Patient presented for acute shortness of breath.    On admission, chest x-ray showed no acute processes. Pulmonology, Dr. Nugent consulted. Started on solumedrol IV steroids. Solumedrol changed to prednisone taper.   Complete prednisone taper course. Follow up with your PCP for further evaluation and management. Follow up with pulmonology, Dr. Nugent as outpatient for further management. Continue to use oxygen supplementation as needed for shortness of breath. Continue to use 2-3L of oxygen via nasal cannula.      SECONDARY DISCHARGE DIAGNOSES  Diagnosis: HTN (hypertension)  Assessment and Plan of Treatment: Recommended to have a low salt/DASH diet. Continue with blood pressure medications of coreg. Follow up with PCP for management of hypertension. Monitor blood pressure daily.    Diagnosis: UTI (urinary tract infection)  Assessment and Plan of Treatment: Urinalysis was noted to be positive. Treated with IV antibiotics with ceftriaxone and completed course. Follow up with your PCP within 1 week for further evaluation and management.    Diagnosis: Lung cancer  Assessment and Plan of Treatment: CT thoracic spine was done and showed multiple sclerotic and lytic lesions in the thoracic spine and ribs suggesting metastatic disease, overall progressed since 6/25/2020. Epidural soft tissue at the T7 level suggesting tumor. Multilevel thoracic compression fractures. New acute compression fracture of the L1 vertebral body with bony retropulsion. Follow up with heme/onc Dr. Mendiola as outpatient for further management and evaluation.    Diagnosis: Constipation  Assessment and Plan of Treatment: Continue to take bowel regimen to prevent constipation. Follow up with PCP for further management.    Diagnosis: Vitamin D deficiency  Assessment and Plan of Treatment: Noted to have low Vitamin D level of 10 on admission. Normal levels are usually above 30. Started on weekly ergocalciferol supplementation of 50,000 units weekly. You are recommended to continue taking Vitamin D supplementation and follow up with your PCP in 8 weeks to recheck your Vitamin D level. PRINCIPAL DISCHARGE DIAGNOSIS  Diagnosis: COPD exacerbation  Assessment and Plan of Treatment: Patient presented for acute shortness of breath.    On admission, chest x-ray showed no acute processes. Pulmonology, Dr. Nugent consulted. Started on solumedrol IV steroids. Solumedrol changed to prednisone taper.   Complete prednisone taper course. Follow up with your PCP for further evaluation and management. Follow up with pulmonology, Dr. Nugent as outpatient for further management. Continue to use oxygen supplementation as needed for shortness of breath. Continue to use 2-3L of oxygen via nasal cannula.      SECONDARY DISCHARGE DIAGNOSES  Diagnosis: HTN (hypertension)  Assessment and Plan of Treatment: Recommended to have a low salt/DASH diet. Continue with blood pressure medications. Follow up with PCP for management of hypertension. Monitor blood pressure daily.    Diagnosis: UTI (urinary tract infection)  Assessment and Plan of Treatment: Urinalysis was noted to be positive. Treated with IV antibiotics with ceftriaxone and completed course. Follow up with your PCP within 1 week for further evaluation and management.    Diagnosis: Lung cancer  Assessment and Plan of Treatment: CT thoracic spine was done and showed multiple sclerotic and lytic lesions in the thoracic spine and ribs suggesting metastatic disease, overall progressed since 6/25/2020. Epidural soft tissue at the T7 level suggesting tumor. Multilevel thoracic compression fractures. New acute compression fracture of the L1 vertebral body with bony retropulsion. Follow up with heme/onc Dr. Mendiola as outpatient for further management and evaluation.    Diagnosis: Constipation  Assessment and Plan of Treatment: Continue to take bowel regimen to prevent constipation. Follow up with PCP for further management.    Diagnosis: Vitamin D deficiency  Assessment and Plan of Treatment: Noted to have low Vitamin D level of 10 on admission. Normal levels are usually above 30. Started on weekly ergocalciferol supplementation of 50,000 units weekly. You are recommended to continue taking Vitamin D supplementation and take next dose on 8/10/2020 and continue for 7 weeks and follow up with your PCP in 8 weeks to recheck your Vitamin D level. PRINCIPAL DISCHARGE DIAGNOSIS  Diagnosis: COPD exacerbation  Assessment and Plan of Treatment: Patient presented for acute shortness of breath.    On admission, chest x-ray showed no acute processes. Pulmonology, Dr. Nugent consulted. Started on solumedrol IV steroids. Solumedrol changed to prednisone taper.   Complete prednisone taper course as follows: 40mg daily for three more days starting 8/7/2020 to 8/9/2020. Then take 35mg on 8/10, 30mg on 8/11, 25mg on 8/12 and so on by decreasing by 5mg each day till course is completed. Follow up with your PCP for further evaluation and management. Follow up with pulmonology, Dr. Nugent as outpatient for further management. Continue to use oxygen supplementation as needed for shortness of breath. Continue to use 2-3L of oxygen via nasal cannula. Continue using spiriva and symbicort.      SECONDARY DISCHARGE DIAGNOSES  Diagnosis: HTN (hypertension)  Assessment and Plan of Treatment: Recommended to have a low salt/DASH diet. Continue with blood pressure medications. Follow up with PCP for management of hypertension. Monitor blood pressure daily.    Diagnosis: UTI (urinary tract infection)  Assessment and Plan of Treatment: Urinalysis was noted to be positive. Treated with IV antibiotics with ceftriaxone and completed course. Follow up with your PCP within 1 week for further evaluation and management.    Diagnosis: Lung cancer  Assessment and Plan of Treatment: CT thoracic spine was done and showed multiple sclerotic and lytic lesions in the thoracic spine and ribs suggesting metastatic disease, overall progressed since 6/25/2020. Epidural soft tissue at the T7 level suggesting tumor. Multilevel thoracic compression fractures. New acute compression fracture of the L1 vertebral body with bony retropulsion. Follow up with heme/onc Dr. Mendiola as outpatient for further management and evaluation.    Diagnosis: Constipation  Assessment and Plan of Treatment: Continue to take bowel regimen to prevent constipation. Follow up with PCP for further management.    Diagnosis: Vitamin D deficiency  Assessment and Plan of Treatment: Noted to have low Vitamin D level of 10 on admission. Normal levels are usually above 30. Started on weekly ergocalciferol supplementation of 50,000 units weekly. You are recommended to continue taking Vitamin D supplementation and take next dose on 8/10/2020 and continue for 7 weeks and follow up with your PCP in 8 weeks to recheck your Vitamin D level. PRINCIPAL DISCHARGE DIAGNOSIS  Diagnosis: COPD exacerbation  Assessment and Plan of Treatment: Patient presented for acute shortness of breath.    On admission, chest x-ray showed no acute processes. Pulmonology, Dr. Nugent consulted. Started on solumedrol IV steroids. Solumedrol changed to prednisone taper.   Complete prednisone taper course as follows: 40mg daily for three more days starting 8/7/2020 to 8/9/2020. Then take 35mg on 8/10, 30mg on 8/11, 25mg on 8/12 and so on by decreasing by 5mg each day till course is completed. Follow up with your PCP for further evaluation and management. Follow up with pulmonology, Dr. Nugent as outpatient for further management. Continue to use oxygen supplementation as needed for shortness of breath. Continue to use 2-3L of oxygen via nasal cannula. Continue using spiriva and symbicort.      SECONDARY DISCHARGE DIAGNOSES  Diagnosis: Diabetes  Assessment and Plan of Treatment: Blood sugars may be elevated while on oral steroids. Blood sugar levels should downtrend as you taper off the steroids. Recommended to have a low carbohydrate diet. Monitor blood sugar. Follow up with PCP for management of diabetes.    Diagnosis: HTN (hypertension)  Assessment and Plan of Treatment: Recommended to have a low salt/DASH diet. Continue with blood pressure medications. Follow up with PCP for management of hypertension. Monitor blood pressure daily.    Diagnosis: UTI (urinary tract infection)  Assessment and Plan of Treatment: Urinalysis was noted to be positive. Treated with IV antibiotics with ceftriaxone and completed course. Follow up with your PCP within 1 week for further evaluation and management.    Diagnosis: Lung cancer  Assessment and Plan of Treatment: CT thoracic spine was done and showed multiple sclerotic and lytic lesions in the thoracic spine and ribs suggesting metastatic disease, overall progressed since 6/25/2020. Epidural soft tissue at the T7 level suggesting tumor. Multilevel thoracic compression fractures. New acute compression fracture of the L1 vertebral body with bony retropulsion. Follow up with heme/onc Dr. Mendiola as outpatient for further management and evaluation.    Diagnosis: Constipation  Assessment and Plan of Treatment: Continue to take bowel regimen to prevent constipation. Follow up with PCP for further management.    Diagnosis: Vitamin D deficiency  Assessment and Plan of Treatment: Noted to have low Vitamin D level of 10 on admission. Normal levels are usually above 30. Started on weekly ergocalciferol supplementation of 50,000 units weekly. You are recommended to continue taking Vitamin D supplementation and take next dose on 8/10/2020 and continue for 7 weeks and follow up with your PCP in 8 weeks to recheck your Vitamin D level. PRINCIPAL DISCHARGE DIAGNOSIS  Diagnosis: COPD exacerbation  Assessment and Plan of Treatment: Patient presented for acute shortness of breath.    On admission, chest x-ray showed no acute processes. Pulmonology, Dr. Nugent consulted. Started on solumedrol IV. Solumedrol changed to prednisone taper on discharge with improvement of symptoms.  Complete prednisone taper course as follows: 40mg daily for three more days starting 8/7/2020 to 8/9/2020. Then take 35mg on 8/10, 30mg on 8/11, 25mg on 8/12 and so on by decreasing by 5mg each day till course is completed. Follow up with your PCP for further evaluation and management. Follow up with pulmonology, Dr. Nugent as outpatient for further management. Continue to use oxygen supplementation as needed for shortness of breath. Continue to use 2-3L of oxygen via nasal cannula. Continue using spiriva and symbicort. Please follow instructions as shown during your inhaler teaching.      SECONDARY DISCHARGE DIAGNOSES  Diagnosis: Diabetes  Assessment and Plan of Treatment: Blood sugars may be elevated while on oral steroids. Blood sugar levels should downtrend as you taper off the steroids. Recommended to have a low carbohydrate diet. Monitor blood sugar and titrate your insulin dose as instructed in discharge provider note. Stop taking any insulin if your Blood sugar is <200. Follow up with PCP for management of diabetes.    Diagnosis: Constipation  Assessment and Plan of Treatment: Continue to take bowel regimen to prevent constipation. Follow up with PCP for further management.    Diagnosis: UTI (urinary tract infection)  Assessment and Plan of Treatment: Urinalysis was noted to be positive. Treated with IV antibiotics with ceftriaxone and completed course. Follow up with your PCP within 1 week for further evaluation and management.    Diagnosis: Lung cancer  Assessment and Plan of Treatment: CT thoracic spine was done and showed multiple sclerotic and lytic lesions in the thoracic spine and ribs suggesting metastatic disease, overall progressed since 6/25/2020. Epidural soft tissue at the T7 level suggesting tumor. Multilevel thoracic compression fractures. New acute compression fracture of the L1 vertebral body with bony retropulsion. Follow up with heme/onc Dr. Mendiola as outpatient for further management and evaluation.    Diagnosis: HTN (hypertension)  Assessment and Plan of Treatment: Recommended to have a low salt/DASH diet. Continue with blood pressure medications. Follow up with PCP for management of hypertension. Monitor blood pressure daily.    Diagnosis: Vitamin D deficiency  Assessment and Plan of Treatment: Noted to have low Vitamin D level of 10 on admission. Normal levels are usually above 30. Started on weekly ergocalciferol supplementation of 50,000 units weekly. You are recommended to continue taking Vitamin D supplementation and take next dose on 8/10/2020 and continue for 7 weeks and follow up with your PCP in 8 weeks to recheck your Vitamin D level. PRINCIPAL DISCHARGE DIAGNOSIS  Diagnosis: COPD exacerbation  Assessment and Plan of Treatment: Patient presented for acute shortness of breath.    On admission, chest x-ray showed no acute processes. Pulmonology, Dr. Nugent consulted. Started on solumedrol IV. Solumedrol changed to prednisone taper on discharge with improvement of symptoms.  Complete prednisone taper course as follows: 40mg daily for three more days starting 8/7/2020 to 8/9/2020. Then take 35mg on 8/10, 30mg on 8/11, 25mg on 8/12 and so on by decreasing by 5mg each day till course is completed. Follow up with your PCP for further evaluation and management. Follow up with pulmonology, Dr. Nugent as outpatient for further management. Continue to use oxygen supplementation as needed for shortness of breath. Continue to use 2-3L of oxygen via nasal cannula. Continue using spiriva and symbicort. Please follow instructions as shown during your inhaler teaching.      SECONDARY DISCHARGE DIAGNOSES  Diagnosis: Diabetes  Assessment and Plan of Treatment: Blood sugars may be elevated while on oral steroids. Blood sugar levels should downtrend as you taper off the steroids. Recommended to have a low carbohydrate diet. Monitor blood sugar and titrate your insulin dose as instructed in discharge provider note. Stop taking any insulin if your Blood sugar is <200. Follow up with PCP for management of diabetes.    Diagnosis: HTN (hypertension)  Assessment and Plan of Treatment: Recommended to have a low salt/DASH diet. Continue with blood pressure medications. Follow up with PCP for management of hypertension. Monitor blood pressure daily.    Diagnosis: UTI (urinary tract infection)  Assessment and Plan of Treatment: Urinalysis was noted to be positive. Treated with IV antibiotics with ceftriaxone and completed course. Follow up with your PCP within 1 week for further evaluation and management.    Diagnosis: Lung cancer  Assessment and Plan of Treatment: CT thoracic spine was done and showed multiple sclerotic and lytic lesions in the thoracic spine and ribs suggesting metastatic disease, overall progressed since 6/25/2020. Epidural soft tissue at the T7 level suggesting tumor. Multilevel thoracic compression fractures. New acute compression fracture of the L1 vertebral body with bony retropulsion. Follow up with heme/onc Dr. Mendiola as outpatient for further management and evaluation.    Diagnosis: Constipation  Assessment and Plan of Treatment: Continue to take bowel regimen to prevent constipation. Follow up with PCP for further management.    Diagnosis: Vitamin D deficiency  Assessment and Plan of Treatment: Noted to have low Vitamin D level of 10 on admission. Normal levels are usually above 30. Started on weekly ergocalciferol supplementation of 50,000 units weekly. You are recommended to continue taking Vitamin D supplementation and take next dose on 8/10/2020 and continue for 7 weeks and follow up with your PCP in 8 weeks to recheck your Vitamin D level.

## 2020-08-03 NOTE — PROGRESS NOTE ADULT - PROBLEM SELECTOR PLAN 3
Dx by Dr. Mendiola outpatient 2/2 increased urinary frequency, dysuria, started on levofloxacin  -cw levofloxacin as has good pulm/UTI coverage, 5 day course 750mg  -UA with mild WBCs, fu UCx

## 2020-08-03 NOTE — MEDICAL STUDENT PROGRESS NOTE(EDUCATION) - SUBJECTIVE AND OBJECTIVE BOX
PAGER #: [[phone]] TILL 5:00 PM  PLEASE CONTACT ON CALL TEAM:  - On Call Team (Please refer to Rachel) FROM 5:00 PM - 8:30PM  - Nightfloat Team FROM 8:30 -7:30 AM    CHIEF COMPLAINT & BRIEF HOSPITAL COURSE: 65 y.o. F with PMH of COPD (5L NC at home, 80 pack year hx, quit smoking 8 years ago), lung cancer s/p RLL resection in 2017 on chemotherapy, DM, GERD, HTN, ITP, obesity, constipation and uterine prolapse presented to ED on 8/2 for SOB and severe back pain for 3 days prior. Endorsed increasing oxygen requirement and difficulty breathing worse than baseline. Of note, patient has history of multiple COPD exacerbations, per patient without needing intubation. Took 60mg oral steroids on 7/31 at home. Also endorsed increased urinary frequency, urgency, and dysuria on admission. WBC increased to 38.11 on admission. CXR showed left picc tip at SVC and R lung post-op changes with no acute consolidation. Chest CT showed no acute airspace/consolidation, improving mediastinal lymphadenopathy, and multiple new sclerotic lesions in the thoracic spine concerning for metastasis. Urinalysis on admission positive for WBCs. Urine and blood cultures pending. Patient was also found to have UTI outpatient by oncologist Dr. Mendiola 7/31 and was started on levofloxacin, continuing inpatient.        INTERVAL HPI/OVERNIGHT EVENTS: No acute events overnight, vital signs wnl. Patient anxious this morning, endorses not breathing well on 5L NC. Reports continuing SOB and back pain/stiffness radiating to the sides and buttocks. Urinating better this am, dysuria resolved. Requesting nebulizer, higher O2 via NC, and higher dose of oxycodone due to pain. WBC 31.9 this am, likely due to steroids. Seen by Dr. Nugent, will increase Solumedrol to 60 q8 and remain on 5L O2 for now. Added home Percocet dose and Gabapentin. LE Doppler today. Seen by Heme-onc (Dr. Mendiola), recommends MRI of thoracic spine. Last bowel movement yesterday. Urine and blood cultures pending.       MEDICATIONS  (STANDING):  acetaminophen   Tablet .. 650 milliGRAM(s) Oral every 6 hours  atorvastatin 40 milliGRAM(s) Oral at bedtime  bisacodyl Suppository 10 milliGRAM(s) Rectal every 24 hours  budesonide 160 MICROgram(s)/formoterol 4.5 MICROgram(s) Inhaler 2 Puff(s) Inhalation two times a day  carvedilol 6.25 milliGRAM(s) Oral every 12 hours  cyanocobalamin 1000 MICROGram(s) Oral daily  dextrose 5%. 1000 milliLiter(s) (50 mL/Hr) IV Continuous <Continuous>  enoxaparin Injectable 40 milliGRAM(s) SubCutaneous daily  ergocalciferol 07577 Unit(s) Oral <User Schedule>  folic acid 1 milliGRAM(s) Oral daily  gabapentin 200 milliGRAM(s) Oral three times a day  insulin lispro (HumaLOG) corrective regimen sliding scale   SubCutaneous Before meals and at bedtime  insulin lispro Injectable (HumaLOG) 5 Unit(s) SubCutaneous Before meals and at bedtime  levoFLOXacin IVPB      levoFLOXacin IVPB 750 milliGRAM(s) IV Intermittent every 24 hours  loratadine 5 milliGRAM(s) Oral daily  methylPREDNISolone sodium succinate Injectable 60 milliGRAM(s) IV Push every 8 hours  oxyCODONE    IR 20 milliGRAM(s) Oral four times a day  pantoprazole    Tablet 40 milliGRAM(s) Oral before breakfast  polyethylene glycol 3350 17 Gram(s) Oral daily  sodium chloride 0.9%. 1000 milliLiter(s) (70 mL/Hr) IV Continuous <Continuous>  tiotropium 18 MICROgram(s) Capsule 1 Capsule(s) Inhalation daily    MEDICATIONS  (PRN):  ALBUTerol    90 MICROgram(s) HFA Inhaler 2 Puff(s) Inhalation every 6 hours PRN Shortness of Breath  ALPRAZolam 0.25 milliGRAM(s) Oral three times a day PRN anxiety  glucagon  Injectable 1 milliGRAM(s) IntraMuscular once PRN Glucose LESS THAN 70 milligrams/deciliter        REVIEW OF SYSTEMS:  CONSTITUTIONAL: No fever or weight loss, endorses fatigue  RESPIRATORY: + SOB and wheezing; No cough, chills or hemoptysis  CARDIOVASCULAR: No chest pain, palpitations, dizziness  GASTROINTESTINAL: + constipation and bloating; No abdominal pain. No nausea, vomiting, or hematemesis; No melena or hematochezia.  GENITOURINARY: No dysuria or hematuria, urinary frequency  NEUROLOGICAL: No headaches, numbness, or tremors  SKIN: No itching, burning, rashes, or lesions   MSK: + thoracic and lumbar back pain and stiffness radiating to the buttocks and sides b/l     Vital Signs Last 24 Hrs  T(C): 36.3 (03 Aug 2020 05:25), Max: 37 (02 Aug 2020 17:34)  T(F): 97.4 (03 Aug 2020 05:25), Max: 98.6 (02 Aug 2020 17:34)  HR: 85 (03 Aug 2020 05:25) (77 - 86)  BP: 136/71 (03 Aug 2020 05:25) (123/- - 142/56)  BP(mean): --  RR: 18 (03 Aug 2020 05:25) (18 - 18)  SpO2: 99% (03 Aug 2020 05:25) (96% - 99%)    PHYSICAL EXAMINATION:  GENERAL: anxious overweight woman, appears stated age  HEAD:  Atraumatic, Normocephalic  EYES:  conjunctiva and sclera clear  NECK: Supple  CHEST/LUNG: Decreased breath sounds on right; wheezing heard in upper lung fields however difficult to auscultate lower lungs due to inability of patient to sit up. No rales, rhonchi, or rubs. Difficult to assess back secondary to patient discomfort.  HEART: Regular rate and rhythm; No murmurs, rubs, or gallops  ABDOMEN: Soft, Nontender, Obese, Bowel sounds present  NERVOUS SYSTEM: alert and oriented x3  EXTREMITIES:  2+ Peripheral Pulses, b/l LE 2+ pitting edema and LE tenderness to palpation   SKIN: warm dry                          12.4   31.93 )-----------( 110      ( 03 Aug 2020 07:17 )             37.7     08-03    137  |  101  |  16  ----------------------------<  289<H>  4.2   |  33<H>  |  0.58    Ca    8.2<L>      03 Aug 2020 07:17  Phos  2.6     08-03  Mg     2.5     08-03    TPro  5.7<L>  /  Alb  2.8<L>  /  TBili  1.0  /  DBili  x   /  AST  8<L>  /  ALT  21  /  AlkPhos  143<H>  08-03    LIVER FUNCTIONS - ( 03 Aug 2020 07:17 )  Alb: 2.8 g/dL / Pro: 5.7 g/dL / ALK PHOS: 143 U/L / ALT: 21 U/L DA / AST: 8 U/L / GGT: x           CARDIAC MARKERS ( 02 Aug 2020 06:29 )  <0.015 ng/mL / x     / 34 U/L / x     / x          PT/INR - ( 02 Aug 2020 06:42 )   PT: 13.1 sec;   INR: 1.13 ratio         PTT - ( 02 Aug 2020 06:42 )  PTT:25.4 sec    CAPILLARY BLOOD GLUCOSE      RADIOLOGY & ADDITIONAL TESTS:

## 2020-08-03 NOTE — CONSULT NOTE ADULT - PROBLEM SELECTOR RECOMMENDATION 9
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  needs MRII of T spine  but she can not lay falt

## 2020-08-03 NOTE — DISCHARGE NOTE PROVIDER - NSDCPNSUBOBJ_GEN_ALL_CORE
65y old  Female who presents with a chief complaint of Shortness of breath and back pain (06 Aug 2020 13:38)        Patient was seen and examined at bedside     Reports SOB and back pain has improved     Had a long discussion about medication compliance and follow up         INTERVAL HPI/OVERNIGHT EVENTS:    T(C): 36.7 (08-06-20 @ 14:03), Max: 36.7 (08-05-20 @ 20:23)    HR: 88 (08-06-20 @ 14:03) (79 - 88)    BP: 139/86 (08-06-20 @ 14:03) (131/80 - 161/92)    RR: 18 (08-06-20 @ 14:03) (18 - 18)    SpO2: 95% (08-06-20 @ 14:03) (95% - 98%)    Wt(kg): --    I&O's Summary            REVIEW OF SYSTEMS: denies fever, chills, chest pain, abdominal pain, nausea, vomitting, diarrhea, constipation, dizziness        PHYSICAL EXAM:    GENERAL: NAD, morbidly obese     NERVOUS SYSTEM:  Alert & Oriented X3, Good concentration; Motor Strength not assessed due to pain     CHEST/LUNG: mild wheezing BL but improved from before     HEART: Regular rate and rhythm; No murmurs, rubs, or gallops    ABDOMEN: Soft, Nontender, Nondistended; Bowel sounds present    EXTREMITIES:  2+ Peripheral Pulses, No clubbing, cyanosis, or edema    SKIN: No rashes or lesions        LABS:                            11.5     6.46  )-----------( 70       ( 06 Aug 2020 07:55 )               35.4         138  |  98  |  19<H>    ----------------------------<  269<H>    4.1   |  33<H>  |  0.50        Assessment and plan:    1. Acute on chronic hypoxic and hypercarbic respiratory failure due to COPD exacerbation    2. Lung adenocarcinoma with bone mets     3. Lumbar vertebral compression fracture     4. Chronic pain     5. HTN     6. Vitamin D deficiency    7. DM     8. UTI        Plan:    Will discharge on PO prednisone, ecplained in detail about inhalers and compliance     NC O2 as needed     Cont Symbicort and Spiriva     S/p RLL lobectomy, On Immunotherapy and chemotherapy    Dr PHILLIPS on board- follow up as outpatient    Appreciate pain management consult     Cont home dose insulin for DM     Patient can not lay flat due to pain and SOB to undergo MRI     Completed Ceftriaxone for UTI    Bowel regimen     Rest of the management as above     Full code

## 2020-08-03 NOTE — DISCHARGE NOTE PROVIDER - HOSPITAL COURSE
Patient is a 65 year old woman, with PMH of 80 pack year smoking hx quit since 8.5 years, R lung cancer s/p RLL resection 2017 on chemo presenting for acute shortness of breath and pain in back and ribs. She is on 5L oxygen at home for around 3 years but feels SOB and has difficulty walking around the house and feel like she need more oxygen at home. She uses a wheelchair due to SOB from walking and from pain from her fractures. She states she was admitted for COPD exacerbation several times in the past, but never needed intubation. She got her last chemo dose 7/31/2020 . She states she was on a lot of steroids previously and knows how to evin them off as she was not feeling good for last 3 days so she started steroids 60mg oral yesterday by herself. Pt reports increased urinary frequency, urgency, burning. Pt went to Dr. Mendiola  due to her urinary symptoms and was started on levofloxacin. Pt also has constipation from chronic use of pain medications. Patient is a 65 year old woman, with PMH of 80 pack year smoking hx quit since 8.5 years, R lung cancer s/p RLL resection 2017 on chemo presenting for acute shortness of breath and pain in back and ribs. She is on 5L oxygen at home for around 3 years but feels SOB and has difficulty walking around the house and feel like she need more oxygen at home.         Patient also reported increased urinary frequency, urgency, and burning. Patient went to Dr. Mendiola due to her urinary symptoms and was started on levofloxacin.     On admission, CXR showed no acute processes. UA was noted to be positive. Continued on levaquin antibiotics. Pulmonology, Dr. Nugent and Heme/onc, Dr. Mendiola was consulted. Started on solumedrol.     Blood cultures resulted negative. CT thoracic spine showed multiple sclerotic and lytic lesions in the thoracic spine and ribs suggesting metastatic disease, overall progressed since 6/25/2020. Epidural soft tissue at the T7 level suggesting tumor. Multilevel thoracic compression fractures. New acute compression fracture of the L1 vertebral body with bony retropulsion.        Urine culture resulted.....        NOTE NOT COMPLETE Patient is a 65 year old woman, with PMH of 80 pack year smoking hx quit since 8.5 years, R lung cancer s/p RLL resection 2017 on chemo presenting for acute shortness of breath and pain in back and ribs. She is on 5L oxygen at home for around 3 years but feels SOB and has difficulty walking around the house and feel like she need more oxygen at home.         Patient also reported increased urinary frequency, urgency, and burning. Patient went to Dr. Mendiola due to her urinary symptoms and was started on levofloxacin.     On admission, CXR showed no acute processes. UA was noted to be positive. Continued on levaquin antibiotics. Pulmonology, Dr. Nugent and Heme/onc, Dr. Mendiola was consulted. Started on solumedrol.     Blood cultures resulted negative. CT thoracic spine showed multiple sclerotic and lytic lesions in the thoracic spine and ribs suggesting metastatic disease, overall progressed since 6/25/2020. Epidural soft tissue at the T7 level suggesting tumor. Multilevel thoracic compression fractures. New acute compression fracture of the L1 vertebral body with bony retropulsion. Due to prolonged Qtc on EKG, levaquin    was changed to ceftriaxone. Solumedrol changed to prednisone taper.         NOTE NOT COMPLETE Patient is a 65 year old woman, with PMH of 80 pack year smoking hx quit since 8.5 years, R lung cancer s/p RLL resection 2017 on chemo presenting for acute shortness of breath and pain in back and ribs. She is on 2-3L oxygen at home for around 3 years but feels SOB and has difficulty walking around the house and feel like she need more oxygen at home.         Patient also reported increased urinary frequency, urgency, and burning. Patient went to Dr. Mendiola due to her urinary symptoms and was started on levofloxacin.     On admission, CXR showed no acute processes. UA was noted to be positive. Continued on levaquin antibiotics. Pulmonology, Dr. Nugent and Heme/onc, Dr. Mendiola was consulted. Started on solumedrol.     Blood cultures resulted negative. CT thoracic spine showed multiple sclerotic and lytic lesions in the thoracic spine and ribs suggesting metastatic disease, overall progressed since 6/25/2020. Epidural soft tissue at the T7 level suggesting tumor. Multilevel thoracic compression fractures. New acute compression fracture of the L1 vertebral body with bony retropulsion. Due to prolonged Qtc on EKG, levaquin    was changed to ceftriaxone and patient completed course. Solumedrol changed to prednisone taper.        Patient is medically stable for discharge. Case was discussed with attending. Patient is a 65 year old woman, with PMH of 80 pack year smoking hx quit since 8.5 years, R lung cancer s/p RLL resection 2017 on chemo presenting for acute shortness of breath and pain in back and ribs. She is on 2-3L oxygen at home for around 3 years but feels SOB and has difficulty walking around the house and feel like she need more oxygen at home.         Patient also reported increased urinary frequency, urgency, and burning. Patient went to Dr. Mendiola due to her urinary symptoms and was started on levofloxacin.     On admission, CXR showed no acute processes. UA was noted to be positive. Continued on levaquin antibiotics. Pulmonology, Dr. Nugent and Heme/onc, Dr. Mendiola was consulted. Started on solumedrol.     Blood cultures resulted negative. CT thoracic spine showed multiple sclerotic and lytic lesions in the thoracic spine and ribs suggesting metastatic disease, overall progressed since 6/25/2020. Epidural soft tissue at the T7 level suggesting tumor. Multilevel thoracic compression fractures. New acute compression fracture of the L1 vertebral body with bony retropulsion. Due to prolonged Qtc on EKG, levaquin    was changed to ceftriaxone and patient completed course. Solumedrol changed to prednisone taper as symptoms improved.         Patient is medically stable for discharge. Case was discussed with attending.

## 2020-08-03 NOTE — MEDICAL STUDENT PROGRESS NOTE(EDUCATION) - NS MD HP STUD ASPLAN PLAN FT
Problem 1: COPD exacerbation   -patient presented to ED 8/2 with SOB and increasing O2 requirements for 3 days prior; took 60mg steroids at home on 7/31. Has history of multiple COPD exacerbations requiring hospitalization but never intubation   -VBG shows compensation   - CT chest and CXR 8/2 show no acute process/consolidation   -started on 40mg Solumedrol q8, increase to 60mg q8 today  -continue with bronchodilators (spiriva, symbicort, albuterol)  - Dr Nugent saw patient, recommends ABG on RA and O2 NC set to keep O2 saturation at 92%  -continue with levofloxacin 750mg x5days (coverage for both resp and UTI)  -WBCs elevated secondary to steroids  -blood cultures no growth to date  -continue to monitor vitals and O2   -pulmonology Dr. Nugent following  -pulm rehab on discharge        Problem 2: Chronic pain  patient has history of chronic back pain secondary to known pathologic fractures in spine and ribs  -takes 10/325 Endocet and Gabapentin at home, restarted today  -reports pain poorly controlled on PO oxycodone 5mg, per heme onc can switch to oxycodone 20mg + tylenol   -CT chest showed multiple new sclerotic lesions in thoracic spine concerning for metastasis; per heme onc can be a repair process or progression of disease while on immunotherapy.  recommends MRI of thoracic spine  -bisacodyl, miralax for constipation  -reports last bowel movement yesterday, no diarrhea  -Dr. Mendiola from heme onc following    Problem 3: UTI  Patient diagnosed with UTI outpatient 7/31 by oncologist Dr. Mendiola, started levofloxacin at that time  -UA on admission positive for WBCs  -continue with levofloxacin 750mg x5days (both pulm and UTI coverage)  -f/u urine cultures   -patient reports urinary symptoms better today     Problem 4: Lung cancer  R lung s/p RLL resection 2017, now metastatic with known bony lesions  -recently started chemo, last dose 7/31  -started 7 days 5mg Loratadine post-chemo per patient  -of note, patient has PICC line in left brachial vein placed in April 2020 by IR. IR confirmed that PICC line is ok to leave in place as long as there is no concern for infection, which there is not at this time.-oncologist Dr. Mendiola following    Problem 5: Vitamin D deficiency   Patient found to have level of 10   -started ergocalciferol 23809Z q week.    Problem 6: Hypertension   Patient has history of hypertension   Continue with home carvedilol 6.25mg q12h.     Problem 7: Diabetes   Patient has history of Diabetes mellitus  -HgbA1c 8.6 this admission, unchanged from previously reported value 4/2020  -elevated blood glucose readings likely exacerbated by steroids; fingerstick glucose 291 this am   -diet-controlled at home  -on Humalog and sliding scale, will continue to monitor blood glucose   -DASH diet, low sodium and low cholesterol      Problem: Prophylactic measure.  Plan: RISK                                                                Points  [  ] Previous VTE                                                  3  [  ] Thrombophilia                                               2  [  ] Lower limb paralysis                                      2        (unable to hold up >15 seconds)    [ 2 ] Current Cancer                                             2         (within 6 months)  [  ] Immobilization > 24 hrs                                1  [  ] ICU/CCU stay > 24 hours                              1  [ 1 ] Age > 60                                                      1  IMPROVE VTE Score ___3_____    Lovenox 40mg for DVT prophylaxis

## 2020-08-03 NOTE — CONSULT NOTE ADULT - PROBLEM SELECTOR RECOMMENDATION 3
at back  with new lesion in spine  give her oxycodone 20 plus tylanol  needs MRI  ?RT if she can lay flat.

## 2020-08-03 NOTE — CONSULT NOTE ADULT - SUBJECTIVE AND OBJECTIVE BOX
Patient is a 65y old  Female who presents with a chief complaint of Shortness of breath and back pain (03 Aug 2020 10:56)      HPI:  Patient is Olson 65 year old woman from home lives with boyfriend who help her woth chores and grocery, with PMH 80 pack year smoking hx quit since 8.5 years , R lung cancer s/p RLL resection 2017 on chemo recently for recurrence in the lung and bones.  presenting for acute shortness of breath and pain in back and ribs. She is on 5L oxygen at home for around 3 years but feels SOB and has difficulty walking around the house and feel like she need more oxygen at home. She uses a wheelchair due to SOB from walking and from pain from her fractures. She states she was admitted for COPD exacerbation several times in the past, but never needed intubation. She got her last chemo dose 7/31/2020 . She states she was on a lot of steroids previously and knows how to evin them off as she was not feeling good for last 3 days so she started steroids 60mg oral yesterday by herself. Pt reports increased urinary frequency, urgency, burning. Pt went to Dr. Mendiola  due to her urinary symptoms and was started on levofloxacin. Pt also has constipation from chronic use of pain medications. (02 Aug 2020 14:10)       ROS:  Negative except for:    PAST MEDICAL & SURGICAL HISTORY:  Malignant neoplasm of unspecified part of right bronchus or lung  HTN (hypertension)  DM (diabetes mellitus)  COPD (chronic obstructive pulmonary disease)  Lung cancer  Morbid obesity  GERD (gastroesophageal reflux disease)  Deviated septum  Prolapsed uterus  ITP (idiopathic thrombocytopenic purpura)  Emphysema/COPD  History of cholecystectomy  S/P lobectomy of lung: right 2017  History of tonsillectomy      SOCIAL HISTORY:    FAMILY HISTORY:  FH: lung cancer  Family history of stroke  Family history of lung cancer  Diabetes mellitus      MEDICATIONS  (STANDING):  acetaminophen   Tablet .. 650 milliGRAM(s) Oral every 6 hours  atorvastatin 40 milliGRAM(s) Oral at bedtime  bisacodyl Suppository 10 milliGRAM(s) Rectal every 24 hours  budesonide 160 MICROgram(s)/formoterol 4.5 MICROgram(s) Inhaler 2 Puff(s) Inhalation two times a day  carvedilol 6.25 milliGRAM(s) Oral every 12 hours  cyanocobalamin 1000 MICROGram(s) Oral daily  dextrose 5%. 1000 milliLiter(s) (50 mL/Hr) IV Continuous <Continuous>  enoxaparin Injectable 40 milliGRAM(s) SubCutaneous daily  ergocalciferol 69497 Unit(s) Oral <User Schedule>  folic acid 1 milliGRAM(s) Oral daily  gabapentin 200 milliGRAM(s) Oral three times a day  insulin lispro (HumaLOG) corrective regimen sliding scale   SubCutaneous Before meals and at bedtime  insulin lispro Injectable (HumaLOG) 5 Unit(s) SubCutaneous Before meals and at bedtime  levoFLOXacin IVPB      levoFLOXacin IVPB 750 milliGRAM(s) IV Intermittent every 24 hours  loratadine 5 milliGRAM(s) Oral daily  methylPREDNISolone sodium succinate Injectable 60 milliGRAM(s) IV Push every 8 hours  oxyCODONE    IR 20 milliGRAM(s) Oral four times a day  pantoprazole    Tablet 40 milliGRAM(s) Oral before breakfast  polyethylene glycol 3350 17 Gram(s) Oral daily  sodium chloride 0.9%. 1000 milliLiter(s) (70 mL/Hr) IV Continuous <Continuous>  tiotropium 18 MICROgram(s) Capsule 1 Capsule(s) Inhalation daily    MEDICATIONS  (PRN):  ALBUTerol    90 MICROgram(s) HFA Inhaler 2 Puff(s) Inhalation every 6 hours PRN Shortness of Breath  ALPRAZolam 0.25 milliGRAM(s) Oral three times a day PRN anxiety  glucagon  Injectable 1 milliGRAM(s) IntraMuscular once PRN Glucose LESS THAN 70 milligrams/deciliter      Allergies    fish (Angioedema)  penicillins (Rash)    Intolerances        Vital Signs Last 24 Hrs  T(C): 36.3 (03 Aug 2020 05:25), Max: 37 (02 Aug 2020 17:34)  T(F): 97.4 (03 Aug 2020 05:25), Max: 98.6 (02 Aug 2020 17:34)  HR: 85 (03 Aug 2020 05:25) (77 - 86)  BP: 136/71 (03 Aug 2020 05:25) (123/- - 142/56)  BP(mean): --  RR: 18 (03 Aug 2020 05:25) (18 - 18)  SpO2: 99% (03 Aug 2020 05:25) (96% - 99%)    PHYSICAL EXAM  General: adult in NAD  HEENT: clear oropharynx, anicteric sclera, pink conjunctiva  Neck: supple  CV: normal S1/S2 with no murmur rubs or gallops  Lungs: positive air movement b/l ant lungs,clear to auscultation, no wheezes, no rales  Abdomen: soft non-tender non-distended, no hepatosplenomegaly  Ext: no clubbing cyanosis or edema  Skin: no rashes and no petechiae  Neuro: alert and oriented X 4, no focal deficits      LABS:                          12.4   31.93 )-----------( 110      ( 03 Aug 2020 07:17 )             37.7         Mean Cell Volume : 86.9 fl  Mean Cell Hemoglobin : 28.6 pg  Mean Cell Hemoglobin Concentration : 32.9 gm/dL  Auto Neutrophil # : 29.06 K/uL  Auto Lymphocyte # : 0.32 K/uL  Auto Monocyte # : 1.28 K/uL  Auto Eosinophil # : 0.00 K/uL  Auto Basophil # : 0.00 K/uL  Auto Neutrophil % : 85.0 %  Auto Lymphocyte % : 1.0 %  Auto Monocyte % : 4.0 %  Auto Eosinophil % : 0.0 %  Auto Basophil % : 0.0 %      Serial CBC's  08-03 @ 07:17  Hct-37.7 / Hgb-12.4 / Plat-110 / RBC-4.34 / WBC-31.93  Serial CBC's  08-02 @ 06:29  Hct-40.2 / Hgb-12.9 / Plat-138 / RBC-4.63 / WBC-38.11      08-03    137  |  101  |  16  ----------------------------<  289<H>  4.2   |  33<H>  |  0.58    Ca    8.2<L>      03 Aug 2020 07:17  Phos  2.6     08-03  Mg     2.5     08-03    TPro  5.7<L>  /  Alb  2.8<L>  /  TBili  1.0  /  DBili  x   /  AST  8<L>  /  ALT  21  /  AlkPhos  143<H>  08-03      PT/INR - ( 02 Aug 2020 06:42 )   PT: 13.1 sec;   INR: 1.13 ratio         PTT - ( 02 Aug 2020 06:42 )  PTT:25.4 sec    Folate, Serum: 17.0 ng/mL (08-02 @ 18:11)  Vitamin B12, Serum: >2000 pg/mL (08-02 @ 18:11)              BLOOD SMEAR INTERPRETATION:       RADIOLOGY & ADDITIONAL STUDIES:  < from: CT Chest w/ IV Cont (08.02.20 @ 09:31) >    INTERPRETATION:  CT CHEST WITH IV CONTRAST    CLINICAL INFORMATION:  Shortness of breath    TECHNIQUE: Contrast enhanced CT of the chest was performed with 55 cc Omnipaque 350 IV contrast. Coronal and sagittal images were reconstructed. This study was performed using automatic exposure control (radiation dose reduction software) to obtain a diagnostic image quality scan with patient dose as low as reasonably achievable.    COMPARISON: Same day chest x-ray, chest CT 6/25/2020    FINDINGS:    LUNGS, AIRWAYS: The central airways are patent. Chronic lingular atelectasis. Right lower lobe wedge resection with associated scarring. Background centrilobular emphysema. No acute airspace disease, consolidation, or suspicious lesion.    PLEURA: No pleural abnormality.    VESSELS: Normal caliber aorta. Main pulmonary arteries are patent.    HEART: Normal heart size. No pericardial effusion. Coronary artery calcifications are present.    MEDIASTINUM AND GUILLE: Improving mediastinal lymphadenopathy with subcarinal lymph node measuring 1.2 cm short axis, previously 1.9 cm and left paraesophageal lymph node measuring 1.4 cm short axis, previously 1.8 cm.    UPPER ABDOMEN: Limited visualization is unremarkable.    BONES AND SOFT TISSUES: Multiple new sclerotic thoracic spine concerning for metastatic disease.    IMPRESSION:    No acute airspace disease or consolidation.    Improving mediastinal lymphadenopathy.    Multiple new sclerotic lesions in the thoracic spine concerning for metastatic disease.      < end of copied text >

## 2020-08-03 NOTE — PROGRESS NOTE ADULT - PROBLEM SELECTOR PLAN 5
R lung s/p RLL resection 2017, now metastatic with known bony lesions  -last chemo dose 7/31  -started 7 days 5mg Loratadine post-chemo per patient R lung s/p RLL resection 2017, now metastatic with known bony lesions  -last chemo dose 7/31  -started 7 days 5mg Loratadine post-chemo per patient  -of note, patient has PICC line in left brachial vein placed in April 2020 by IR. Spoke to IR today, confirmed that PICC line is ok to leave in place as long as there is no concern for infx, which there is not at this time

## 2020-08-04 ENCOUNTER — TRANSCRIPTION ENCOUNTER (OUTPATIENT)
Age: 65
End: 2020-08-04

## 2020-08-04 LAB
ALBUMIN SERPL ELPH-MCNC: 3 G/DL — LOW (ref 3.5–5)
ALP SERPL-CCNC: 171 U/L — HIGH (ref 40–120)
ALT FLD-CCNC: 23 U/L DA — SIGNIFICANT CHANGE UP (ref 10–60)
ANION GAP SERPL CALC-SCNC: 8 MMOL/L — SIGNIFICANT CHANGE UP (ref 5–17)
AST SERPL-CCNC: 11 U/L — SIGNIFICANT CHANGE UP (ref 10–40)
BILIRUB SERPL-MCNC: 0.8 MG/DL — SIGNIFICANT CHANGE UP (ref 0.2–1.2)
BUN SERPL-MCNC: 20 MG/DL — HIGH (ref 7–18)
CALCIUM SERPL-MCNC: 8.4 MG/DL — SIGNIFICANT CHANGE UP (ref 8.4–10.5)
CHLORIDE SERPL-SCNC: 102 MMOL/L — SIGNIFICANT CHANGE UP (ref 96–108)
CO2 SERPL-SCNC: 28 MMOL/L — SIGNIFICANT CHANGE UP (ref 22–31)
CREAT SERPL-MCNC: 0.7 MG/DL — SIGNIFICANT CHANGE UP (ref 0.5–1.3)
CULTURE RESULTS: SIGNIFICANT CHANGE UP
GLUCOSE BLDC GLUCOMTR-MCNC: 219 MG/DL — HIGH (ref 70–99)
GLUCOSE BLDC GLUCOMTR-MCNC: 258 MG/DL — HIGH (ref 70–99)
GLUCOSE BLDC GLUCOMTR-MCNC: 308 MG/DL — HIGH (ref 70–99)
GLUCOSE BLDC GLUCOMTR-MCNC: 322 MG/DL — HIGH (ref 70–99)
GLUCOSE SERPL-MCNC: 270 MG/DL — HIGH (ref 70–99)
HCT VFR BLD CALC: 39.4 % — SIGNIFICANT CHANGE UP (ref 34.5–45)
HGB BLD-MCNC: 12.8 G/DL — SIGNIFICANT CHANGE UP (ref 11.5–15.5)
MAGNESIUM SERPL-MCNC: 2.2 MG/DL — SIGNIFICANT CHANGE UP (ref 1.6–2.6)
MCHC RBC-ENTMCNC: 28.4 PG — SIGNIFICANT CHANGE UP (ref 27–34)
MCHC RBC-ENTMCNC: 32.5 GM/DL — SIGNIFICANT CHANGE UP (ref 32–36)
MCV RBC AUTO: 87.4 FL — SIGNIFICANT CHANGE UP (ref 80–100)
NRBC # BLD: 0 /100 WBCS — SIGNIFICANT CHANGE UP (ref 0–0)
PHOSPHATE SERPL-MCNC: 3.6 MG/DL — SIGNIFICANT CHANGE UP (ref 2.5–4.5)
PLATELET # BLD AUTO: 105 K/UL — LOW (ref 150–400)
POTASSIUM SERPL-MCNC: 3.9 MMOL/L — SIGNIFICANT CHANGE UP (ref 3.5–5.3)
POTASSIUM SERPL-SCNC: 3.9 MMOL/L — SIGNIFICANT CHANGE UP (ref 3.5–5.3)
PROT SERPL-MCNC: 6.2 G/DL — SIGNIFICANT CHANGE UP (ref 6–8.3)
RBC # BLD: 4.51 M/UL — SIGNIFICANT CHANGE UP (ref 3.8–5.2)
RBC # FLD: 17.3 % — HIGH (ref 10.3–14.5)
SODIUM SERPL-SCNC: 138 MMOL/L — SIGNIFICANT CHANGE UP (ref 135–145)
SPECIMEN SOURCE: SIGNIFICANT CHANGE UP
WBC # BLD: 25.01 K/UL — HIGH (ref 3.8–10.5)
WBC # FLD AUTO: 25.01 K/UL — HIGH (ref 3.8–10.5)

## 2020-08-04 PROCEDURE — 99233 SBSQ HOSP IP/OBS HIGH 50: CPT | Mod: GC

## 2020-08-04 PROCEDURE — 72128 CT CHEST SPINE W/O DYE: CPT | Mod: 26

## 2020-08-04 RX ORDER — LANOLIN ALCOHOL/MO/W.PET/CERES
3 CREAM (GRAM) TOPICAL AT BEDTIME
Refills: 0 | Status: DISCONTINUED | OUTPATIENT
Start: 2020-08-04 | End: 2020-08-06

## 2020-08-04 RX ORDER — LANOLIN ALCOHOL/MO/W.PET/CERES
3 CREAM (GRAM) TOPICAL ONCE
Refills: 0 | Status: COMPLETED | OUTPATIENT
Start: 2020-08-04 | End: 2020-08-04

## 2020-08-04 RX ORDER — CEFTRIAXONE 500 MG/1
1000 INJECTION, POWDER, FOR SOLUTION INTRAMUSCULAR; INTRAVENOUS EVERY 24 HOURS
Refills: 0 | Status: DISCONTINUED | OUTPATIENT
Start: 2020-08-04 | End: 2020-08-06

## 2020-08-04 RX ORDER — ALPRAZOLAM 0.25 MG
0.25 TABLET ORAL
Refills: 0 | Status: DISCONTINUED | OUTPATIENT
Start: 2020-08-04 | End: 2020-08-06

## 2020-08-04 RX ORDER — ONDANSETRON 8 MG/1
4 TABLET, FILM COATED ORAL EVERY 4 HOURS
Refills: 0 | Status: DISCONTINUED | OUTPATIENT
Start: 2020-08-04 | End: 2020-08-06

## 2020-08-04 RX ADMIN — Medication 2: at 21:33

## 2020-08-04 RX ADMIN — Medication 650 MILLIGRAM(S): at 18:03

## 2020-08-04 RX ADMIN — LORATADINE 5 MILLIGRAM(S): 10 TABLET ORAL at 11:39

## 2020-08-04 RX ADMIN — OXYCODONE HYDROCHLORIDE 20 MILLIGRAM(S): 5 TABLET ORAL at 11:39

## 2020-08-04 RX ADMIN — OXYCODONE HYDROCHLORIDE 20 MILLIGRAM(S): 5 TABLET ORAL at 06:09

## 2020-08-04 RX ADMIN — ONDANSETRON 4 MILLIGRAM(S): 8 TABLET, FILM COATED ORAL at 01:02

## 2020-08-04 RX ADMIN — BUDESONIDE AND FORMOTEROL FUMARATE DIHYDRATE 2 PUFF(S): 160; 4.5 AEROSOL RESPIRATORY (INHALATION) at 22:16

## 2020-08-04 RX ADMIN — CARVEDILOL PHOSPHATE 6.25 MILLIGRAM(S): 80 CAPSULE, EXTENDED RELEASE ORAL at 06:55

## 2020-08-04 RX ADMIN — OXYCODONE HYDROCHLORIDE 20 MILLIGRAM(S): 5 TABLET ORAL at 06:39

## 2020-08-04 RX ADMIN — Medication 650 MILLIGRAM(S): at 11:39

## 2020-08-04 RX ADMIN — ATORVASTATIN CALCIUM 40 MILLIGRAM(S): 80 TABLET, FILM COATED ORAL at 21:33

## 2020-08-04 RX ADMIN — Medication 650 MILLIGRAM(S): at 00:25

## 2020-08-04 RX ADMIN — POLYETHYLENE GLYCOL 3350 17 GRAM(S): 17 POWDER, FOR SOLUTION ORAL at 11:41

## 2020-08-04 RX ADMIN — Medication 5 UNIT(S): at 08:04

## 2020-08-04 RX ADMIN — OXYCODONE HYDROCHLORIDE 20 MILLIGRAM(S): 5 TABLET ORAL at 17:33

## 2020-08-04 RX ADMIN — Medication 5 UNIT(S): at 21:33

## 2020-08-04 RX ADMIN — Medication 8: at 11:40

## 2020-08-04 RX ADMIN — Medication 60 MILLIGRAM(S): at 13:20

## 2020-08-04 RX ADMIN — CEFTRIAXONE 100 MILLIGRAM(S): 500 INJECTION, POWDER, FOR SOLUTION INTRAMUSCULAR; INTRAVENOUS at 22:15

## 2020-08-04 RX ADMIN — TIOTROPIUM BROMIDE 1 CAPSULE(S): 18 CAPSULE ORAL; RESPIRATORY (INHALATION) at 13:21

## 2020-08-04 RX ADMIN — Medication 5 UNIT(S): at 16:55

## 2020-08-04 RX ADMIN — Medication 0.25 MILLIGRAM(S): at 17:33

## 2020-08-04 RX ADMIN — Medication 650 MILLIGRAM(S): at 12:09

## 2020-08-04 RX ADMIN — OXYCODONE HYDROCHLORIDE 20 MILLIGRAM(S): 5 TABLET ORAL at 18:03

## 2020-08-04 RX ADMIN — Medication 4: at 16:55

## 2020-08-04 RX ADMIN — OXYCODONE HYDROCHLORIDE 20 MILLIGRAM(S): 5 TABLET ORAL at 12:09

## 2020-08-04 RX ADMIN — Medication 5 UNIT(S): at 11:40

## 2020-08-04 RX ADMIN — Medication 60 MILLIGRAM(S): at 06:09

## 2020-08-04 RX ADMIN — Medication 650 MILLIGRAM(S): at 06:09

## 2020-08-04 RX ADMIN — Medication 650 MILLIGRAM(S): at 06:39

## 2020-08-04 RX ADMIN — Medication 6: at 08:04

## 2020-08-04 RX ADMIN — GABAPENTIN 200 MILLIGRAM(S): 400 CAPSULE ORAL at 13:20

## 2020-08-04 RX ADMIN — ONDANSETRON 4 MILLIGRAM(S): 8 TABLET, FILM COATED ORAL at 06:33

## 2020-08-04 RX ADMIN — PANTOPRAZOLE SODIUM 40 MILLIGRAM(S): 20 TABLET, DELAYED RELEASE ORAL at 06:09

## 2020-08-04 RX ADMIN — Medication 1 MILLIGRAM(S): at 11:39

## 2020-08-04 RX ADMIN — Medication 3 MILLIGRAM(S): at 01:57

## 2020-08-04 RX ADMIN — Medication 60 MILLIGRAM(S): at 22:16

## 2020-08-04 RX ADMIN — GABAPENTIN 200 MILLIGRAM(S): 400 CAPSULE ORAL at 22:16

## 2020-08-04 RX ADMIN — CARVEDILOL PHOSPHATE 6.25 MILLIGRAM(S): 80 CAPSULE, EXTENDED RELEASE ORAL at 17:33

## 2020-08-04 RX ADMIN — ENOXAPARIN SODIUM 40 MILLIGRAM(S): 100 INJECTION SUBCUTANEOUS at 11:40

## 2020-08-04 RX ADMIN — Medication 3 MILLIGRAM(S): at 22:15

## 2020-08-04 RX ADMIN — PREGABALIN 1000 MICROGRAM(S): 225 CAPSULE ORAL at 11:39

## 2020-08-04 RX ADMIN — BUDESONIDE AND FORMOTEROL FUMARATE DIHYDRATE 2 PUFF(S): 160; 4.5 AEROSOL RESPIRATORY (INHALATION) at 11:03

## 2020-08-04 RX ADMIN — OXYCODONE HYDROCHLORIDE 20 MILLIGRAM(S): 5 TABLET ORAL at 00:25

## 2020-08-04 RX ADMIN — Medication 650 MILLIGRAM(S): at 17:33

## 2020-08-04 NOTE — PROGRESS NOTE ADULT - ASSESSMENT
· Assessment		  65 year old lad=y nwith recurrent lung cancer in mediastinum and bones has been on chemo developed sob and increasing pain at back    Problem/Recommendation - 1:  Problem: Lung cancer. Recommendation: recurrent adenoca  in mediastinum and bones  she takes steroid everyday that made immunotherapy not effective  she just began chemo, CEA and mediastinal nodes are better  the new bone lesion can be a repair process or progression of disease while on immunotherapy.  needs MRII of T spine  but she can not lay flat  will do CT of T spine    Problem/Recommendation - 2:  ·  Problem: COPD exacerbation.  Recommendation: steroid dependent  Dr Nugent is pulmonary.     Problem/Recommendation - 3:  ·  Problem: Chronic pain.  Recommendation: at back  with new lesion in spine  give her oxycodone 20 plus tylanol  will do CT  ?RT if she can lay flat.   4. leukocytosis  from staroid and neulasta

## 2020-08-04 NOTE — PROGRESS NOTE ADULT - PROBLEM SELECTOR PLAN 3
Dx by Dr. Mendiola outpatient 2/2 increased urinary frequency, dysuria, started on levofloxacin  -cw levofloxacin as has good pulm/UTI coverage, 5 day course 750mg  -UA with mild WBCs, fu UCx  -WBC elevated to 25, but significance c/b steroids and neulasta

## 2020-08-04 NOTE — PROGRESS NOTE ADULT - SUBJECTIVE AND OBJECTIVE BOX
still feel sob  no fever  still has pain at back    MEDICATIONS  (STANDING):  acetaminophen   Tablet .. 650 milliGRAM(s) Oral every 6 hours  ALPRAZolam 0.25 milliGRAM(s) Oral two times a day  atorvastatin 40 milliGRAM(s) Oral at bedtime  bisacodyl Suppository 10 milliGRAM(s) Rectal every 24 hours  budesonide 160 MICROgram(s)/formoterol 4.5 MICROgram(s) Inhaler 2 Puff(s) Inhalation two times a day  carvedilol 6.25 milliGRAM(s) Oral every 12 hours  cyanocobalamin 1000 MICROGram(s) Oral daily  dextrose 5%. 1000 milliLiter(s) (50 mL/Hr) IV Continuous <Continuous>  enoxaparin Injectable 40 milliGRAM(s) SubCutaneous daily  ergocalciferol 25400 Unit(s) Oral <User Schedule>  folic acid 1 milliGRAM(s) Oral daily  gabapentin 200 milliGRAM(s) Oral three times a day  insulin lispro (HumaLOG) corrective regimen sliding scale   SubCutaneous three times a day before meals  insulin lispro (HumaLOG) corrective regimen sliding scale   SubCutaneous at bedtime  insulin lispro Injectable (HumaLOG) 5 Unit(s) SubCutaneous Before meals and at bedtime  levoFLOXacin IVPB      levoFLOXacin IVPB 750 milliGRAM(s) IV Intermittent every 24 hours  loratadine 5 milliGRAM(s) Oral daily  methylPREDNISolone sodium succinate Injectable 60 milliGRAM(s) IV Push every 8 hours  oxyCODONE    IR 20 milliGRAM(s) Oral four times a day  pantoprazole    Tablet 40 milliGRAM(s) Oral before breakfast  polyethylene glycol 3350 17 Gram(s) Oral daily  sodium chloride 0.9%. 1000 milliLiter(s) (70 mL/Hr) IV Continuous <Continuous>  tiotropium 18 MICROgram(s) Capsule 1 Capsule(s) Inhalation daily    MEDICATIONS  (PRN):  ALBUTerol    90 MICROgram(s) HFA Inhaler 2 Puff(s) Inhalation every 6 hours PRN Shortness of Breath  glucagon  Injectable 1 milliGRAM(s) IntraMuscular once PRN Glucose LESS THAN 70 milligrams/deciliter  ondansetron Injectable 4 milliGRAM(s) IV Push every 4 hours PRN Nausea and/or Vomiting      Allergies    fish (Angioedema)  penicillins (Rash)    Intolerances        Vital Signs Last 24 Hrs  T(C): 36.4 (04 Aug 2020 04:55), Max: 37.1 (03 Aug 2020 15:45)  T(F): 97.5 (04 Aug 2020 04:55), Max: 98.7 (03 Aug 2020 15:45)  HR: 83 (04 Aug 2020 04:55) (76 - 92)  BP: 127/73 (04 Aug 2020 04:55) (127/73 - 140/57)  BP(mean): --  RR: 18 (04 Aug 2020 04:55) (18 - 18)  SpO2: 99% (04 Aug 2020 04:55) (97% - 99%)    PHYSICAL EXAM  General: adult in NAD  HEENT: clear oropharynx, anicteric sclera, pink conjunctiva  Neck: supple  CV: normal S1/S2 with no murmur rubs or gallops  Lungs: positive air movement b/l ant lungs,clear to auscultation, no wheezes, no rales  Abdomen: soft non-tender non-distended, no hepatosplenomegaly  Ext: no clubbing cyanosis or edema  Skin: no rashes and no petechiae  Neuro: alert and oriented X 4, no focal deficits  LABS:                          12.8   25.01 )-----------( 105      ( 04 Aug 2020 07:59 )             39.4         Mean Cell Volume : 87.4 fl  Mean Cell Hemoglobin : 28.4 pg  Mean Cell Hemoglobin Concentration : 32.5 gm/dL  Auto Neutrophil # : x  Auto Lymphocyte # : x  Auto Monocyte # : x  Auto Eosinophil # : x  Auto Basophil # : x  Auto Neutrophil % : x  Auto Lymphocyte % : x  Auto Monocyte % : x  Auto Eosinophil % : x  Auto Basophil % : x    Serial CBC  Hematocrit 39.4  Hemoglobin 12.8  Plat 105  RBC 4.51  WBC 25.01  Serial CBC  Hematocrit 37.7  Hemoglobin 12.4  Plat 110  RBC 4.34  WBC 31.93  Serial CBC  Hematocrit 40.2  Hemoglobin 12.9  Plat 138  RBC 4.63  WBC 38.11    08-04    138  |  102  |  20<H>  ----------------------------<  270<H>  3.9   |  28  |  0.70    Ca    8.4      04 Aug 2020 07:59  Phos  3.6     08-04  Mg     2.2     08-04    TPro  6.2  /  Alb  3.0<L>  /  TBili  0.8  /  DBili  x   /  AST  11  /  ALT  23  /  AlkPhos  171<H>  08-04          Folate, Serum: 17.0 ng/mL (08-02 @ 18:11)  Vitamin B12, Serum: >2000 pg/mL (08-02 @ 18:11)            BLOOD SMEAR INTERPRETATION:       RADIOLOGY & ADDITIONAL STUDIES:

## 2020-08-04 NOTE — PROGRESS NOTE ADULT - PROBLEM SELECTOR PLAN 5
R lung s/p RLL resection 2017, now metastatic with known bony lesions  -last chemo dose 7/31, on Neulasta, Ketruda, alimta, carboplatin  -started 7 days 5mg Loratadine post-chemo per patient  -of note, patient has PICC line in left brachial vein placed in April 2020 by IR. Spoke to IR yesterday, confirmed that PICC line is ok to leave in place as long as there is no concern for infx, which there is not at this time

## 2020-08-04 NOTE — PROGRESS NOTE ADULT - SUBJECTIVE AND OBJECTIVE BOX
PULMONARY  progress note    BARB COLÓN  MRN-[id number]    Patient is a 65y old  Female who presents with a chief complaint of Shortness of breath and back pain (03 Aug 2020 12:56)  Still sob with BRP, less cough      MEDICATIONS  (STANDING):  acetaminophen   Tablet .. 650 milliGRAM(s) Oral every 6 hours  ALPRAZolam 0.25 milliGRAM(s) Oral two times a day  atorvastatin 40 milliGRAM(s) Oral at bedtime  bisacodyl Suppository 10 milliGRAM(s) Rectal every 24 hours  budesonide 160 MICROgram(s)/formoterol 4.5 MICROgram(s) Inhaler 2 Puff(s) Inhalation two times a day  carvedilol 6.25 milliGRAM(s) Oral every 12 hours  cyanocobalamin 1000 MICROGram(s) Oral daily  dextrose 5%. 1000 milliLiter(s) (50 mL/Hr) IV Continuous <Continuous>  enoxaparin Injectable 40 milliGRAM(s) SubCutaneous daily  ergocalciferol 49257 Unit(s) Oral <User Schedule>  folic acid 1 milliGRAM(s) Oral daily  gabapentin 200 milliGRAM(s) Oral three times a day  insulin lispro (HumaLOG) corrective regimen sliding scale   SubCutaneous three times a day before meals  insulin lispro (HumaLOG) corrective regimen sliding scale   SubCutaneous at bedtime  insulin lispro Injectable (HumaLOG) 5 Unit(s) SubCutaneous Before meals and at bedtime  levoFLOXacin IVPB      levoFLOXacin IVPB 750 milliGRAM(s) IV Intermittent every 24 hours  loratadine 5 milliGRAM(s) Oral daily  methylPREDNISolone sodium succinate Injectable 60 milliGRAM(s) IV Push every 8 hours  oxyCODONE    IR 20 milliGRAM(s) Oral four times a day  pantoprazole    Tablet 40 milliGRAM(s) Oral before breakfast  polyethylene glycol 3350 17 Gram(s) Oral daily  sodium chloride 0.9%. 1000 milliLiter(s) (70 mL/Hr) IV Continuous <Continuous>  tiotropium 18 MICROgram(s) Capsule 1 Capsule(s) Inhalation daily      MEDICATIONS  (PRN):  ALBUTerol    90 MICROgram(s) HFA Inhaler 2 Puff(s) Inhalation every 6 hours PRN Shortness of Breath  glucagon  Injectable 1 milliGRAM(s) IntraMuscular once PRN Glucose LESS THAN 70 milligrams/deciliter  ondansetron    Tablet 4 milliGRAM(s) Oral every 6 hours PRN Nausea and/or Vomiting  ondansetron Injectable 4 milliGRAM(s) IV Push every 4 hours PRN Nausea and/or Vomiting      Allergies    fish (Angioedema)  penicillins (Rash)          PAST MEDICAL & SURGICAL HISTORY:  Malignant neoplasm of unspecified part of right bronchus or lung  HTN (hypertension)  DM (diabetes mellitus)  COPD (chronic obstructive pulmonary disease)  Lung cancer  Morbid obesity  GERD (gastroesophageal reflux disease)  Deviated septum  Prolapsed uterus  ITP (idiopathic thrombocytopenic purpura)  Emphysema/COPD  History of cholecystectomy  S/P lobectomy of lung: right 2017  History of tonsillectomy           REVIEW OF SYSTEMS:  CONSTITUTIONAL: No fever, weight loss, or fatigue   EYES: No eye pain, visual disturbances, or discharge  ENT:  No difficulty hearing, tinnitus, vertigo; No sinus or throat pain  NECK: No pain or stiffness or nodes  RESPIRATORY:  cough+   wheezing++   chills--   hemoptysis -- Shortness of Breath++  CARDIOVASCULAR: No chest pain, palpitations, passing out, dizziness, or leg swelling  GASTROINTESTINAL: No abdominal or epigastric pain. No nausea, vomiting, or hematemesis; No diarrhea or constipation. No melena or hematochezia.  GENITOURINARY: No dysuria, frequency, hematuria, or incontinence  NEUROLOGICAL: No headaches, memory loss, loss of strength, numbness, or tremors  HEME/LYMPH: No easy bruising, or bleeding gums  ALLERGY AND IMMUNOLOGIC: No hives or eczema    Vital Signs Last 24 Hrs  T(C): 36.4 (04 Aug 2020 04:55), Max: 37.1 (03 Aug 2020 15:45)  T(F): 97.5 (04 Aug 2020 04:55), Max: 98.7 (03 Aug 2020 15:45)  HR: 83 (04 Aug 2020 04:55) (76 - 92)  BP: 127/73 (04 Aug 2020 04:55) (127/73 - 140/57)  BP(mean): --  RR: 18 (04 Aug 2020 04:55) (18 - 18)  SpO2: 99% (04 Aug 2020 04:55) (97% - 99%)  I&O's Detail      PHYSICAL EXAMINATION:    GENERAL: The patient is a well-developed, obese w/f  in no apparent distress.   SKIN no rash ecchymoses or bruises  HEENT: Head is normocephalic and atraumatic  SHAMA , Extraocular muscles are intact. Mucous membranes  moist.   Neck supple ,No LN felt JVP not increased  Thyroid not enlarged  Cardiovascular:  S1 S2 heard, RSR, No JVD , systolic  murmur at apex, No gallop or rub  Respiratory: Chest wall symmetrical with good air entry ,Percussion note normal,    Lungs vesicular breathing with decreased BS at basis, no   rales , end expiratory   wheeze	  ABDOMEN:  Soft, Non-tender, obese, no hepatomegaly or splenomegaly BS positive	  Extremities: Normal range of motion, No clubbing, cyanosis or edema  Vascular: Peripheral pulses palpable 2+ bilaterally  CNS:  Alert and oriented x3   Cranial nerves intact  sensory intact  motor power 5/5  dtr 2+   Babinski neg    LABS:                        12.8   25.01 )-----------( 105      ( 04 Aug 2020 07:59 )             39.4     08-    138  |  102  |  20<H>  ----------------------------<  270<H>  3.9   |  28  |  0.70    Ca    8.4      04 Aug 2020 07:59  Phos  3.6     08-  Mg     2.2     -    TPro  6.2  /  Alb  3.0<L>  /  TBili  0.8  /  DBili  x   /  AST  11  /  ALT  23  /  AlkPhos  171<H>  08-04      Urinalysis Basic - ( 02 Aug 2020 11:39 )    Color: Yellow / Appearance: Clear / S.020 / pH: x  Gluc: x / Ketone: Negative  / Bili: Negative / Urobili: 1   Blood: x / Protein: 30 mg/dL / Nitrite: Negative   Leuk Esterase: Small / RBC: 2-5 /HPF / WBC 6-10 /HPF   Sq Epi: x / Non Sq Epi: Few /HPF / Bacteria: Few /HPF    Folate, Serum: 17.0 ng/mL (20 @ 18:11)  Vitamin B12, Serum: >2000 pg/mL (20 @ 18:11)      MICROBIOLOGY:    Culture - Blood (collected 20 @ 11:14)  Source: .Blood   Preliminary Report (20 @ 12:02):    No growth to date.    Culture - Blood (collected 20 @ 11:14)  Source: .Blood   Preliminary Report (20 @ 12:02):    No growth to date.

## 2020-08-04 NOTE — PROGRESS NOTE ADULT - PROBLEM SELECTOR PLAN 4
Patient reports ongoing pain and tenderness to palpation of bilateral shins, swelling noted, unable to assess further 2/2 patient discomfort  -LE Doppler ordered

## 2020-08-04 NOTE — MEDICAL STUDENT PROGRESS NOTE(EDUCATION) - NS MD HP STUD ASPLAN PLAN FT
Problem 1: COPD exacerbation   -patient presented to ED 8/2 with SOB and increasing O2 requirements for 3 days prior; took 60mg steroids at home on 7/31. Has history of multiple COPD exacerbations requiring hospitalization but never intubation   -VBG shows compensation   - CT chest and CXR 8/2 show no acute process/consolidation   -continue with 60mg Solumedrol q8 and 5.5L O2 on NC   -continue with bronchodilators (spiriva, symbicort, albuterol)  - Dr Nugent saw patient, recommends ABG on RA and O2 NC set to keep O2 saturation at 92%  -continue with levofloxacin 750mg x5days (coverage for both resp and UTI)  -WBCs elevated, 25.01 this am   -blood cultures no growth to date  -continue to monitor vitals and O2   -pulmonology Dr. Nugent following  -pulm rehab on discharge        Problem 2: Chronic pain  patient has history of chronic back pain secondary to known pathologic fractures in spine and ribs  -continue with home 10/325 Endocet and Gabapentin   -reports pain better controlled on PO oxycodone 20mg + tylenol 650mg  -CT chest 8/2 showed multiple new sclerotic lesions in thoracic spine concerning for metastasis; per heme onc can be a repair process or progression of disease while on immunotherapy.  -CT thoracic spine today showed multiple sclerotic and lytic lesions in thoracic spine and ribs suggesting metastasis; also multilevel compression fractures and a new acute compression fracture of L1.  -bisacodyl, miralax for constipation  -reports last bowel movement this am, no diarrhea  -Dr. Mendiola from heme onc following    Problem 3: UTI  Patient diagnosed with UTI outpatient 7/31 by oncologist Dr. Mendiola, started levofloxacin at that time  -UA on admission positive for WBCs  -continue with levofloxacin 750mg x5days (both pulm and UTI coverage)  -f/u urine cultures   -patient reports urinary symptoms better today    Problem 4: Lung cancer  R lung s/p RLL resection 2017, now metastatic with known bony lesions  -recently started chemo, last dose 7/31  -started 7 days 5mg Loratadine post-chemo per patient  -CT thoracic spine today showed multiple sclerotic and lytic lesions in thoracic spine and ribs suggesting metastasis  -of note, patient has PICC line in left brachial vein placed in April 2020 by IR. IR confirmed that PICC line is ok to leave in place as long as there is no concern for infection, which there is not at this time.  -oncologist Dr. Mendiola following    Problem 5: Vitamin D deficiency   Patient found to have level of 10   -started ergocalciferol 25970G q week.    Problem 6: Hypertension   Patient has history of hypertension   Continue with home carvedilol 6.25mg q12h.  -/73 this am      Problem 7: Diabetes   Patient has history of Diabetes mellitus  -HgbA1c 8.6 this admission, unchanged from previously reported value 4/2020  -elevated blood glucose readings likely exacerbated by steroids; fingerstick glucose 291 this am   -diet-controlled at home  -on Humalog and sliding scale, will continue to monitor blood glucose   -DASH diet, low sodium and low cholesterol     Problem: Prophylactic measure.  Plan: RISK                                                                Points  [  ] Previous VTE                                                  3  [  ] Thrombophilia                                               2  [  ] Lower limb paralysis                                      2        (unable to hold up >15 seconds)    [ 2 ] Current Cancer                                             2         (within 6 months)  [  ] Immobilization > 24 hrs                                1  [  ] ICU/CCU stay > 24 hours                              1  [ 1 ] Age > 60                                                      1  IMPROVE VTE Score ___3_____    Lovenox 40mg for DVT prophylaxis Problem 1: COPD exacerbation   -patient presented to ED 8/2 with SOB and increasing O2 requirements for 3 days prior; took 60mg steroids at home on 7/31. Has history of multiple COPD exacerbations requiring hospitalization but never intubation   -VBG shows compensation   - CT chest and CXR 8/2 show no acute process/consolidation   -continue with 60mg Solumedrol q8 and 5.5L O2 on NC   -continue with bronchodilators (spiriva, symbicort, albuterol)  - Dr Nugent saw patient, recommends ABG on RA and O2 NC set to keep O2 saturation at 92%  -continue with levofloxacin 750mg x5days (coverage for both resp and UTI)  -WBCs elevated, 25.01 this am   -blood cultures no growth to date  -continue to monitor vitals and O2   -pulmonology Dr. Nugent following  -pulm rehab on discharge        Problem 2: Chronic pain  patient has history of chronic back pain secondary to known pathologic fractures in spine and ribs  -continue with oxycodone, Tylenol, and Gabapentin   -reports pain better controlled on PO oxycodone 20mg + tylenol 650mg  -CT chest 8/2 showed multiple new sclerotic lesions in thoracic spine concerning for metastasis; per heme onc can be a repair process or progression of disease while on immunotherapy.  -CT thoracic spine today showed multiple sclerotic and lytic lesions in thoracic spine and ribs suggesting metastasis; also multilevel compression fractures and a new acute compression fracture of L1.  -bisacodyl, miralax for constipation  -reports last bowel movement this am, no diarrhea  -Dr. Mendiola from heme onc following    Problem 3: UTI  Patient diagnosed with UTI outpatient 7/31 by oncologist Dr. Mendiola, started levofloxacin at that time  -UA on admission positive for WBCs  -continue with levofloxacin 750mg x5days (both pulm and UTI coverage)  -f/u urine cultures   -patient reports urinary symptoms better today    Problem 4: Lung cancer  R lung s/p RLL resection 2017, now metastatic with known bony lesions  -recently started chemo, last dose 7/31  -started 7 days 5mg Loratadine post-chemo per patient  -CT thoracic spine today showed multiple sclerotic and lytic lesions in thoracic spine and ribs suggesting metastasis  -of note, patient has PICC line in left brachial vein placed in April 2020 by IR. IR confirmed that PICC line is ok to leave in place as long as there is no concern for infection, which there is not at this time.  -oncologist Dr. Mendiola following    Problem 5: Vitamin D deficiency   Patient found to have level of 10   -started ergocalciferol 23701Q q week.    Problem 6: Hypertension   Patient has history of hypertension   Continue with home carvedilol 6.25mg q12h.  -/73 this am      Problem 7: Diabetes   Patient has history of Diabetes mellitus  -HgbA1c 8.6 this admission, unchanged from previously reported value 4/2020  -elevated blood glucose readings likely exacerbated by steroids; fingerstick glucose 291 this am   -diet-controlled at home  -on Humalog and sliding scale, will continue to monitor blood glucose   -DASH diet, low sodium and low cholesterol     Problem: Prophylactic measure.  Plan: RISK                                                                Points  [  ] Previous VTE                                                  3  [  ] Thrombophilia                                               2  [  ] Lower limb paralysis                                      2        (unable to hold up >15 seconds)    [ 2 ] Current Cancer                                             2         (within 6 months)  [  ] Immobilization > 24 hrs                                1  [  ] ICU/CCU stay > 24 hours                              1  [ 1 ] Age > 60                                                      1  IMPROVE VTE Score ___3_____    Lovenox 40mg for DVT prophylaxis

## 2020-08-04 NOTE — PROGRESS NOTE ADULT - PROBLEM SELECTOR PLAN 1
type B with acute asthmatic bronchitis  patient reports several days of increased SOB, took PO 60mg steroids at home yesterday (self-rx) before presenting to hospital, of note has hx of multiple COPD exacerbations without intubation   CXR unremarkable for acute pathology, CT chest negative for acute airspace disease or consolidation with improving mediastinal LAD  -VBG shows compensation, ABG ordered for today  -on 5.5L NC, SpO2s high 90s today, titrate to 92-93% SpO2, patient counselled by team today that low 90s may be acceptable level for pt with COPD  -cw 60mg Solumedrol q8 today  -cw bronchodilators (spiriva, symbicort, albuterol)  -cw levofloxacin 750mg for 5 days (resp and UTI coverage) (day 4 today)  -cw GI PPx protonix 40gm  -WBC elevated 2/2 steroids and Neulasta  -BCx NGTD  -monitor vitals and O2, CM to look into increasing patient's home O2 allowance  -pulmonology Dr. Nugent following  -pulm rehab on dc

## 2020-08-04 NOTE — MEDICAL STUDENT PROGRESS NOTE(EDUCATION) - SUBJECTIVE AND OBJECTIVE BOX
PAGER #: [[phone]] TILL 5:00 PM  PLEASE CONTACT ON CALL TEAM:  - On Call Team (Please refer to Rachel) FROM 5:00 PM - 8:30PM  - Nightfloat Team FROM 8:30 -7:30 AM    CHIEF COMPLAINT & BRIEF HOSPITAL COURSE: 65 y.o. F with PMH of COPD (5L NC at home, 80 pack year hx, quit smoking 8 years ago), lung cancer s/p RLL resection in 2017 on chemotherapy, DM, GERD, HTN, ITP, obesity, constipation and uterine prolapse presented to ED on 8/2 for SOB and severe back pain for 3 days prior. Endorsed increasing oxygen requirement and difficulty breathing worse than baseline. Of note, patient has history of multiple COPD exacerbations, per patient without needing intubation. Took 60mg oral steroids on 7/31 at home. Also endorsed increased urinary frequency, urgency, and dysuria on admission. WBC increased to 38.11 on admission. CXR showed no acute consolidation. Chest CT showed no acute airspace/consolidation, improving mediastinal lymphadenopathy, and multiple new sclerotic lesions in the thoracic spine concerning for metastasis. Urinalysis on admission positive for WBCs. Urine and blood cultures pending. Patient was also found to have UTI outpatient by oncologist Dr. Mendiola 7/31 and was started on levofloxacin, continuing inpatient.       INTERVAL HPI/OVERNIGHT EVENTS:     MEDICATIONS  (STANDING):  acetaminophen   Tablet .. 650 milliGRAM(s) Oral every 6 hours  ALPRAZolam 0.25 milliGRAM(s) Oral two times a day  atorvastatin 40 milliGRAM(s) Oral at bedtime  bisacodyl Suppository 10 milliGRAM(s) Rectal every 24 hours  budesonide 160 MICROgram(s)/formoterol 4.5 MICROgram(s) Inhaler 2 Puff(s) Inhalation two times a day  carvedilol 6.25 milliGRAM(s) Oral every 12 hours  cyanocobalamin 1000 MICROGram(s) Oral daily  dextrose 5%. 1000 milliLiter(s) (50 mL/Hr) IV Continuous <Continuous>  enoxaparin Injectable 40 milliGRAM(s) SubCutaneous daily  ergocalciferol 13299 Unit(s) Oral <User Schedule>  folic acid 1 milliGRAM(s) Oral daily  gabapentin 200 milliGRAM(s) Oral three times a day  insulin lispro (HumaLOG) corrective regimen sliding scale   SubCutaneous three times a day before meals  insulin lispro (HumaLOG) corrective regimen sliding scale   SubCutaneous at bedtime  insulin lispro Injectable (HumaLOG) 5 Unit(s) SubCutaneous Before meals and at bedtime  levoFLOXacin IVPB      levoFLOXacin IVPB 750 milliGRAM(s) IV Intermittent every 24 hours  loratadine 5 milliGRAM(s) Oral daily  methylPREDNISolone sodium succinate Injectable 60 milliGRAM(s) IV Push every 8 hours  oxyCODONE    IR 20 milliGRAM(s) Oral four times a day  pantoprazole    Tablet 40 milliGRAM(s) Oral before breakfast  polyethylene glycol 3350 17 Gram(s) Oral daily  sodium chloride 0.9%. 1000 milliLiter(s) (70 mL/Hr) IV Continuous <Continuous>  tiotropium 18 MICROgram(s) Capsule 1 Capsule(s) Inhalation daily    MEDICATIONS  (PRN):  ALBUTerol    90 MICROgram(s) HFA Inhaler 2 Puff(s) Inhalation every 6 hours PRN Shortness of Breath  glucagon  Injectable 1 milliGRAM(s) IntraMuscular once PRN Glucose LESS THAN 70 milligrams/deciliter  ondansetron    Tablet 4 milliGRAM(s) Oral every 6 hours PRN Nausea and/or Vomiting  ondansetron Injectable 4 milliGRAM(s) IV Push every 4 hours PRN Nausea and/or Vomiting        REVIEW OF SYSTEMS:  CONSTITUTIONAL: No fever, weight loss, + fatigue  RESPIRATORY: Shortness of breath and wheezing. No cough, chills or hemoptysis  CARDIOVASCULAR: B/L LE swelling and dizziness. No chest pain or palpitations  GASTROINTESTINAL: + Nausea and constipation with lower abdominal pressure. No abdominal pain. No vomiting or hematemesis; No diarrhea  GENITOURINARY: Mild dysuria. No hematuria or urinary frequency/urgency   NEUROLOGICAL: No headaches, memory loss, loss of strength, numbness, or tremors  SKIN: No rashes or lesions     Vital Signs Last 24 Hrs  T(C): 36.4 (04 Aug 2020 04:55), Max: 37.1 (03 Aug 2020 15:45)  T(F): 97.5 (04 Aug 2020 04:55), Max: 98.7 (03 Aug 2020 15:45)  HR: 83 (04 Aug 2020 04:55) (76 - 92)  BP: 127/73 (04 Aug 2020 04:55) (127/73 - 140/57)  BP(mean): --  RR: 18 (04 Aug 2020 04:55) (18 - 18)  SpO2: 99% (04 Aug 2020 04:55) (97% - 99%)    PHYSICAL EXAMINATION: limited by patient discomfort   GENERAL: Anxious obese woman in some distress, appears stated age   HEAD:  Atraumatic, Normocephalic  EYES:  conjunctiva and sclera clear  NECK: Supple, No JVD  CHEST/LUNG: Wheezes heard in b/l upper lung fields. No rales or rhonchi  HEART: Regular rate and rhythm; No murmurs, rubs, or gallops  ABDOMEN: Obese, Soft, tender to palpation in lower quadrants. Bowel sounds present  NERVOUS SYSTEM: alert and oriented x3, anxious affect.   EXTREMITIES:  2+ Peripheral Pulses, No cyanosis, b/l LE pitting edema  SKIN: warm and dry                          12.8   25.01 )-----------( 105      ( 04 Aug 2020 07:59 )             39.4     08-04    138  |  102  |  20<H>  ----------------------------<  270<H>  3.9   |  28  |  0.70    Ca    8.4      04 Aug 2020 07:59  Phos  3.6     08-04  Mg     2.2     08-04    TPro  6.2  /  Alb  3.0<L>  /  TBili  0.8  /  DBili  x   /  AST  11  /  ALT  23  /  AlkPhos  171<H>  08-04    LIVER FUNCTIONS - ( 04 Aug 2020 07:59 )  Alb: 3.0 g/dL / Pro: 6.2 g/dL / ALK PHOS: 171 U/L / ALT: 23 U/L DA / AST: 11 U/L / GGT: x                   CAPILLARY BLOOD GLUCOSE      RADIOLOGY & ADDITIONAL TESTS: PAGER #: [[phone]] TILL 5:00 PM  PLEASE CONTACT ON CALL TEAM:  - On Call Team (Please refer to Rachel) FROM 5:00 PM - 8:30PM  - Nightfloat Team FROM 8:30 -7:30 AM    CHIEF COMPLAINT & BRIEF HOSPITAL COURSE: 65 y.o. F with PMH of COPD (5L NC at home, 80 pack year hx, quit smoking 8 years ago), lung cancer s/p RLL resection in 2017 on chemotherapy, DM, GERD, HTN, ITP, obesity, constipation and uterine prolapse presented to ED on 8/2 for SOB and severe back pain for 3 days prior. Endorsed increasing oxygen requirement and difficulty breathing worse than baseline. Of note, patient has history of multiple COPD exacerbations, per patient without needing intubation. Took 60mg oral steroids on 7/31 at home. Also endorsed increased urinary frequency, urgency, and dysuria on admission. WBC increased to 38.11 on admission. CXR showed no acute consolidation. Chest CT showed no acute airspace/consolidation, improving mediastinal lymphadenopathy, and multiple new sclerotic lesions in the thoracic spine concerning for metastasis. Urinalysis on admission positive for WBCs. Urine and blood cultures pending. Patient was also found to have UTI outpatient by oncologist Dr. Mendiola 7/31 and was started on levofloxacin, continuing inpatient.       INTERVAL HPI/OVERNIGHT EVENTS: No acute events overnight, vital signs wnl. Patient very anxious this am, complaining of dizziness and nausea. Gave Zofran with resolution of nausea. Patient still endorses shortness of breath on 5.5L O2 NC, wanting to increase to 6L. WBCs 25.01 this am, trending down. Seen by Dr. Nugent, will continue Solumedrol 60 q8 and remain on 5.5L O2 for now. Back pain better with oxycodone/tylenol 20mg/650mg, but still stiff and unable to fully move. CT scan of thoracic spine today showed   Also still reports mild burning on urination with b/l lower quadrant abdominal pressure.   Last bowel movement yesterday afternoon. Urine and blood cultures pending.     MEDICATIONS  (STANDING):  acetaminophen   Tablet .. 650 milliGRAM(s) Oral every 6 hours  ALPRAZolam 0.25 milliGRAM(s) Oral two times a day  atorvastatin 40 milliGRAM(s) Oral at bedtime  bisacodyl Suppository 10 milliGRAM(s) Rectal every 24 hours  budesonide 160 MICROgram(s)/formoterol 4.5 MICROgram(s) Inhaler 2 Puff(s) Inhalation two times a day  carvedilol 6.25 milliGRAM(s) Oral every 12 hours  cyanocobalamin 1000 MICROGram(s) Oral daily  dextrose 5%. 1000 milliLiter(s) (50 mL/Hr) IV Continuous <Continuous>  enoxaparin Injectable 40 milliGRAM(s) SubCutaneous daily  ergocalciferol 93276 Unit(s) Oral <User Schedule>  folic acid 1 milliGRAM(s) Oral daily  gabapentin 200 milliGRAM(s) Oral three times a day  insulin lispro (HumaLOG) corrective regimen sliding scale   SubCutaneous three times a day before meals  insulin lispro (HumaLOG) corrective regimen sliding scale   SubCutaneous at bedtime  insulin lispro Injectable (HumaLOG) 5 Unit(s) SubCutaneous Before meals and at bedtime  levoFLOXacin IVPB      levoFLOXacin IVPB 750 milliGRAM(s) IV Intermittent every 24 hours  loratadine 5 milliGRAM(s) Oral daily  methylPREDNISolone sodium succinate Injectable 60 milliGRAM(s) IV Push every 8 hours  oxyCODONE    IR 20 milliGRAM(s) Oral four times a day  pantoprazole    Tablet 40 milliGRAM(s) Oral before breakfast  polyethylene glycol 3350 17 Gram(s) Oral daily  sodium chloride 0.9%. 1000 milliLiter(s) (70 mL/Hr) IV Continuous <Continuous>  tiotropium 18 MICROgram(s) Capsule 1 Capsule(s) Inhalation daily    MEDICATIONS  (PRN):  ALBUTerol    90 MICROgram(s) HFA Inhaler 2 Puff(s) Inhalation every 6 hours PRN Shortness of Breath  glucagon  Injectable 1 milliGRAM(s) IntraMuscular once PRN Glucose LESS THAN 70 milligrams/deciliter  ondansetron    Tablet 4 milliGRAM(s) Oral every 6 hours PRN Nausea and/or Vomiting  ondansetron Injectable 4 milliGRAM(s) IV Push every 4 hours PRN Nausea and/or Vomiting        REVIEW OF SYSTEMS:  CONSTITUTIONAL: No fever, weight loss, + fatigue  RESPIRATORY: Shortness of breath and wheezing. No cough, chills or hemoptysis  CARDIOVASCULAR: B/L LE swelling and dizziness. No chest pain or palpitations  GASTROINTESTINAL: + Nausea and constipation with lower abdominal pressure. No abdominal pain. No vomiting or hematemesis; No diarrhea  GENITOURINARY: Mild dysuria. No hematuria or urinary frequency/urgency   NEUROLOGICAL: No headaches, memory loss, loss of strength, numbness, or tremors  SKIN: No rashes or lesions     Vital Signs Last 24 Hrs  T(C): 36.4 (04 Aug 2020 04:55), Max: 37.1 (03 Aug 2020 15:45)  T(F): 97.5 (04 Aug 2020 04:55), Max: 98.7 (03 Aug 2020 15:45)  HR: 83 (04 Aug 2020 04:55) (76 - 92)  BP: 127/73 (04 Aug 2020 04:55) (127/73 - 140/57)  BP(mean): --  RR: 18 (04 Aug 2020 04:55) (18 - 18)  SpO2: 99% (04 Aug 2020 04:55) (97% - 99%)    PHYSICAL EXAMINATION: limited by patient discomfort   GENERAL: Anxious obese woman in some distress, appears stated age   HEAD:  Atraumatic, Normocephalic  EYES:  conjunctiva and sclera clear  NECK: Supple, No JVD  CHEST/LUNG: Wheezes heard in b/l upper lung fields. No rales or rhonchi  HEART: Regular rate and rhythm; No murmurs, rubs, or gallops  ABDOMEN: Obese, Soft, tender to palpation in lower quadrants. Bowel sounds present  NERVOUS SYSTEM: alert and oriented x3, anxious affect.   EXTREMITIES:  2+ Peripheral Pulses, No cyanosis, b/l LE pitting edema  SKIN: warm and dry                          12.8   25.01 )-----------( 105      ( 04 Aug 2020 07:59 )             39.4     08-04    138  |  102  |  20<H>  ----------------------------<  270<H>  3.9   |  28  |  0.70    Ca    8.4      04 Aug 2020 07:59  Phos  3.6     08-04  Mg     2.2     08-04    TPro  6.2  /  Alb  3.0<L>  /  TBili  0.8  /  DBili  x   /  AST  11  /  ALT  23  /  AlkPhos  171<H>  08-04    LIVER FUNCTIONS - ( 04 Aug 2020 07:59 )  Alb: 3.0 g/dL / Pro: 6.2 g/dL / ALK PHOS: 171 U/L / ALT: 23 U/L DA / AST: 11 U/L / GGT: x                   CAPILLARY BLOOD GLUCOSE      RADIOLOGY & ADDITIONAL TESTS: PAGER #: [[phone]] TILL 5:00 PM  PLEASE CONTACT ON CALL TEAM:  - On Call Team (Please refer to Rachel) FROM 5:00 PM - 8:30PM  - Nightfloat Team FROM 8:30 -7:30 AM    CHIEF COMPLAINT & BRIEF HOSPITAL COURSE: 65 y.o. F with PMH of COPD (5L NC at home, 80 pack year hx, quit smoking 8 years ago), lung cancer s/p RLL resection in 2017 on chemotherapy, DM, GERD, HTN, ITP, obesity, constipation and uterine prolapse presented to ED on 8/2 for SOB and severe back pain for 3 days prior. Endorsed increasing oxygen requirement and difficulty breathing worse than baseline. Of note, patient has history of multiple COPD exacerbations, per patient without needing intubation. Took 60mg oral steroids on 7/31 at home. Also endorsed increased urinary frequency, urgency, and dysuria on admission. WBC increased to 38.11 on admission. CXR showed no acute consolidation. Chest CT showed no acute airspace/consolidation, improving mediastinal lymphadenopathy, and multiple new sclerotic lesions in the thoracic spine concerning for metastasis. Urinalysis on admission positive for WBCs. Urine and blood cultures pending. Patient was also found to have UTI outpatient by oncologist Dr. Mendiola 7/31 and was started on levofloxacin, continuing inpatient.       INTERVAL HPI/OVERNIGHT EVENTS: No acute events overnight, vital signs wnl. Patient very anxious this am, complaining of dizziness and nausea. Gave Zofran with resolution of nausea. Patient still endorses shortness of breath on 5.5L O2 NC, wanting to increase to 6L. WBCs 25.01 this am, trending down. Seen by Dr. Nugent, will continue Solumedrol 60 q8 and remain on 5.5L O2 for now. Back pain better with oxycodone/tylenol 20mg/650mg, but still stiff and unable to fully move. CT scan of thoracic spine today showed multiple sclerotic and lytic lesions in thoracic spine and ribs suggesting metastasis; also multilevel compression fractures and and a new acute compression fracture of L1. Patient also still reports mild burning on urination with b/l lower quadrant abdominal pressure. Continuing on day 4 of levofloxacin.    Last bowel movement this morning. Urine and blood cultures pending.     MEDICATIONS  (STANDING):  acetaminophen   Tablet .. 650 milliGRAM(s) Oral every 6 hours  ALPRAZolam 0.25 milliGRAM(s) Oral two times a day  atorvastatin 40 milliGRAM(s) Oral at bedtime  bisacodyl Suppository 10 milliGRAM(s) Rectal every 24 hours  budesonide 160 MICROgram(s)/formoterol 4.5 MICROgram(s) Inhaler 2 Puff(s) Inhalation two times a day  carvedilol 6.25 milliGRAM(s) Oral every 12 hours  cyanocobalamin 1000 MICROGram(s) Oral daily  dextrose 5%. 1000 milliLiter(s) (50 mL/Hr) IV Continuous <Continuous>  enoxaparin Injectable 40 milliGRAM(s) SubCutaneous daily  ergocalciferol 08289 Unit(s) Oral <User Schedule>  folic acid 1 milliGRAM(s) Oral daily  gabapentin 200 milliGRAM(s) Oral three times a day  insulin lispro (HumaLOG) corrective regimen sliding scale   SubCutaneous three times a day before meals  insulin lispro (HumaLOG) corrective regimen sliding scale   SubCutaneous at bedtime  insulin lispro Injectable (HumaLOG) 5 Unit(s) SubCutaneous Before meals and at bedtime  levoFLOXacin IVPB      levoFLOXacin IVPB 750 milliGRAM(s) IV Intermittent every 24 hours  loratadine 5 milliGRAM(s) Oral daily  methylPREDNISolone sodium succinate Injectable 60 milliGRAM(s) IV Push every 8 hours  oxyCODONE    IR 20 milliGRAM(s) Oral four times a day  pantoprazole    Tablet 40 milliGRAM(s) Oral before breakfast  polyethylene glycol 3350 17 Gram(s) Oral daily  sodium chloride 0.9%. 1000 milliLiter(s) (70 mL/Hr) IV Continuous <Continuous>  tiotropium 18 MICROgram(s) Capsule 1 Capsule(s) Inhalation daily    MEDICATIONS  (PRN):  ALBUTerol    90 MICROgram(s) HFA Inhaler 2 Puff(s) Inhalation every 6 hours PRN Shortness of Breath  glucagon  Injectable 1 milliGRAM(s) IntraMuscular once PRN Glucose LESS THAN 70 milligrams/deciliter  ondansetron    Tablet 4 milliGRAM(s) Oral every 6 hours PRN Nausea and/or Vomiting  ondansetron Injectable 4 milliGRAM(s) IV Push every 4 hours PRN Nausea and/or Vomiting        REVIEW OF SYSTEMS:  CONSTITUTIONAL: No fever, weight loss, + fatigue  RESPIRATORY: Shortness of breath and wheezing. No cough, chills or hemoptysis  CARDIOVASCULAR: B/L LE swelling and dizziness. No chest pain or palpitations  GASTROINTESTINAL: + Nausea and constipation with lower abdominal pressure. No abdominal pain. No vomiting or hematemesis; No diarrhea  GENITOURINARY: Mild dysuria. No hematuria or urinary frequency/urgency   NEUROLOGICAL: No headaches, memory loss, loss of strength, numbness, or tremors  SKIN: No rashes or lesions     Vital Signs Last 24 Hrs  T(C): 36.4 (04 Aug 2020 04:55), Max: 37.1 (03 Aug 2020 15:45)  T(F): 97.5 (04 Aug 2020 04:55), Max: 98.7 (03 Aug 2020 15:45)  HR: 83 (04 Aug 2020 04:55) (76 - 92)  BP: 127/73 (04 Aug 2020 04:55) (127/73 - 140/57)  BP(mean): --  RR: 18 (04 Aug 2020 04:55) (18 - 18)  SpO2: 99% (04 Aug 2020 04:55) (97% - 99%)    PHYSICAL EXAMINATION: limited by patient discomfort   GENERAL: Anxious obese woman in some distress, appears stated age   HEAD:  Atraumatic, Normocephalic  EYES:  conjunctiva and sclera clear  NECK: Supple, No JVD  CHEST/LUNG: Wheezes heard in b/l upper lung fields. No rales or rhonchi  HEART: Regular rate and rhythm; No murmurs, rubs, or gallops  ABDOMEN: Obese, Soft, tender to palpation in lower quadrants. Bowel sounds present  NERVOUS SYSTEM: alert and oriented x3, anxious affect.   EXTREMITIES:  2+ Peripheral Pulses, No cyanosis, b/l LE pitting edema  SKIN: warm and dry                          12.8   25.01 )-----------( 105      ( 04 Aug 2020 07:59 )             39.4     08-04    138  |  102  |  20<H>  ----------------------------<  270<H>  3.9   |  28  |  0.70    Ca    8.4      04 Aug 2020 07:59  Phos  3.6     08-04  Mg     2.2     08-04    TPro  6.2  /  Alb  3.0<L>  /  TBili  0.8  /  DBili  x   /  AST  11  /  ALT  23  /  AlkPhos  171<H>  08-04    LIVER FUNCTIONS - ( 04 Aug 2020 07:59 )  Alb: 3.0 g/dL / Pro: 6.2 g/dL / ALK PHOS: 171 U/L / ALT: 23 U/L DA / AST: 11 U/L / GGT: x                   CAPILLARY BLOOD GLUCOSE      RADIOLOGY & ADDITIONAL TESTS:

## 2020-08-04 NOTE — PROGRESS NOTE ADULT - ATTENDING COMMENTS
Patient is a 65y old  Female who presents with a chief complaint of Shortness of breath and back pain (04 Aug 2020 12:12)    Patient was seen and examined at bedside   Was on 5L NC, saturating well, speaking in full sentence with minimal wheezing  patient reports SOB has not improved from yesterday  Complains of back pain and lower abd pain     REVIEW OF SYSTEMS: denies fever, chills, chest pain, nausea, vomiting, diarrhea, constipation, dizziness    PHYSICAL EXAM:  GENERAL: NAD, obese  NERVOUS SYSTEM:  Alert & Oriented X3, Good concentration; no focal deficit   CHEST/LUNG: BL mild wheezing   HEART: Regular rate and rhythm; No murmurs, rubs, or gallops  ABDOMEN: Soft, Nontender, Nondistended; Bowel sounds present  EXTREMITIES:  1+ edema, left arm picc line   SKIN: No rashes or lesions    LABS:                        12.8   25.01 )-----------( 105      ( 04 Aug 2020 07:59 )             39.4     138  |  102  |  20<H>  ----------------------------<  270<H>  3.9   |  28  |  0.70    Assessment and plan:  1. Acute on chronic hypoxic and hypercarbic respiratory failure due to COPD exacerbation  2. Lung adenocarcinoma with bone mets   3. Leukocytosis  4. Chronic pain   5. HTN   6. Vitamin D deficiency  7. DM   8. UTI    Plan:  On SOlumedrol 60mg q8, will titrate down to BID tomorrow   Titrate down NC as tolerated   Cont Symbicort and Spiriva   S/p RLL lobectomy, On Immunotherapy and chemotherapy  Dr MENDIOLA on board  Leukocytosis possibly due to Neulasta and Steroid  Cont current pain management   Cont Levofloxacin to complete 7days   Bowel regimen   Palliative care consult if Dr Mendiola agrees   Rest of the management as above   Full code Patient is a 65y old  Female who presents with a chief complaint of Shortness of breath and back pain (04 Aug 2020 12:12)    Patient was seen and examined at bedside   Was on 5L NC, saturating well, speaking in full sentence with minimal wheezing  patient reports SOB has not improved from yesterday  Complains of back pain and lower abd pain     REVIEW OF SYSTEMS: denies fever, chills, chest pain, nausea, vomiting, diarrhea, constipation, dizziness    PHYSICAL EXAM:  GENERAL: NAD, obese  NERVOUS SYSTEM:  Alert & Oriented X3, Good concentration; no focal deficit   CHEST/LUNG: BL mild wheezing   HEART: Regular rate and rhythm; No murmurs, rubs, or gallops  ABDOMEN: Soft, Nontender, Nondistended; Bowel sounds present  EXTREMITIES:  1+ edema, left arm picc line   SKIN: No rashes or lesions    LABS:                        12.8   25.01 )-----------( 105      ( 04 Aug 2020 07:59 )             39.4     138  |  102  |  20<H>  ----------------------------<  270<H>  3.9   |  28  |  0.70    Assessment and plan:  1. Acute on chronic hypoxic and hypercarbic respiratory failure due to COPD exacerbation  2. Lung adenocarcinoma with bone mets   3. Leukocytosis  4. Chronic pain   5. HTN   6. Vitamin D deficiency  7. DM   8. UTI    Plan:  On SOlumedrol 60mg q8, will titrate down to BID tomorrow   Titrate down NC as tolerated   Cont Symbicort and Spiriva   S/p RLL lobectomy, On Immunotherapy and chemotherapy  Dr MENDIOLA on board  Leukocytosis possibly due to Neulasta and Steroid  Cont current pain management   QT prolonged, will change abx to Ceftriaxone, received one dose on Aug 2 without any rash or allergy, infuse slowly  Bowel regimen   Palliative care consult if Dr Mendiola agrees   Rest of the management as above   Full code

## 2020-08-04 NOTE — PROGRESS NOTE ADULT - PROBLEM SELECTOR PLAN 2
neck and back pain 2/2 known pathologic fx in spine and ribs, on CT chest this admission multiple new sclerotic lesions in the thoracic spine concerning for metastatic disease noted  -CT thoracic spine today showed:     1. Multiple sclerotic and lytic lesions in the thoracic spine and ribs suggesting    metastatic disease, overall progressed since 6/25/2020. Epidural soft tissue at    the T7 level suggesting tumor     2.  Multilevel thoracic compression fx     3.  New acute compression fx of the L1 vertebral body with bony retropulsion  -cw 20mg oxycodone q4 and 650 Tylenol q6  -bisacodyl, miralax for constipation  -reports BM today, scant  -cw 20mg oxycodone q4 and 650mg acetaminophen q6

## 2020-08-04 NOTE — PROGRESS NOTE ADULT - ASSESSMENT
COPD type B with acute asthmatic Bronchitis   CARC RLL lobectomy with Bone and LN mets on C/T   Obesity with OHS with Chr respiratory failure   IDDM   HCVD      PLAN-- IV steroids x 48 hrs   Symbicort and spiriva   HFN rx q 4-6 h prn with duoneb   ABG r/a today   O2 n/c 2-3 lpm to keep o2 sat 92   Vit D supplement   FS coverage   S/C Lovenox   Discussed with Residents

## 2020-08-04 NOTE — PROGRESS NOTE ADULT - SUBJECTIVE AND OBJECTIVE BOX
PGY-1 Progress Note discussed with attending    PAGER #: [[phone]] TILL 5:00 PM  PLEASE CONTACT ON CALL TEAM:  - On Call Team (Please refer to Rachel) FROM 5:00 PM - 8:30PM  - Nightfloat Team FROM 8:30 -7:30 AM    CHIEF COMPLAINT & BRIEF HOSPITAL COURSE:    INTERVAL HPI/OVERNIGHT EVENTS:     MEDICATIONS  (STANDING):  acetaminophen   Tablet .. 650 milliGRAM(s) Oral every 6 hours  ALPRAZolam 0.25 milliGRAM(s) Oral two times a day  atorvastatin 40 milliGRAM(s) Oral at bedtime  bisacodyl Suppository 10 milliGRAM(s) Rectal every 24 hours  budesonide 160 MICROgram(s)/formoterol 4.5 MICROgram(s) Inhaler 2 Puff(s) Inhalation two times a day  carvedilol 6.25 milliGRAM(s) Oral every 12 hours  cyanocobalamin 1000 MICROGram(s) Oral daily  dextrose 5%. 1000 milliLiter(s) (50 mL/Hr) IV Continuous <Continuous>  enoxaparin Injectable 40 milliGRAM(s) SubCutaneous daily  ergocalciferol 87169 Unit(s) Oral <User Schedule>  folic acid 1 milliGRAM(s) Oral daily  gabapentin 200 milliGRAM(s) Oral three times a day  insulin lispro (HumaLOG) corrective regimen sliding scale   SubCutaneous three times a day before meals  insulin lispro (HumaLOG) corrective regimen sliding scale   SubCutaneous at bedtime  insulin lispro Injectable (HumaLOG) 5 Unit(s) SubCutaneous Before meals and at bedtime  levoFLOXacin IVPB      levoFLOXacin IVPB 750 milliGRAM(s) IV Intermittent every 24 hours  loratadine 5 milliGRAM(s) Oral daily  methylPREDNISolone sodium succinate Injectable 60 milliGRAM(s) IV Push every 8 hours  oxyCODONE    IR 20 milliGRAM(s) Oral four times a day  pantoprazole    Tablet 40 milliGRAM(s) Oral before breakfast  polyethylene glycol 3350 17 Gram(s) Oral daily  sodium chloride 0.9%. 1000 milliLiter(s) (70 mL/Hr) IV Continuous <Continuous>  tiotropium 18 MICROgram(s) Capsule 1 Capsule(s) Inhalation daily    MEDICATIONS  (PRN):  ALBUTerol    90 MICROgram(s) HFA Inhaler 2 Puff(s) Inhalation every 6 hours PRN Shortness of Breath  glucagon  Injectable 1 milliGRAM(s) IntraMuscular once PRN Glucose LESS THAN 70 milligrams/deciliter  ondansetron    Tablet 4 milliGRAM(s) Oral every 6 hours PRN Nausea and/or Vomiting  ondansetron Injectable 4 milliGRAM(s) IV Push every 4 hours PRN Nausea and/or Vomiting        REVIEW OF SYSTEMS:  CONSTITUTIONAL: No fever, weight loss, or fatigue  RESPIRATORY: No cough, wheezing, chills or hemoptysis; No shortness of breath  CARDIOVASCULAR: No chest pain, palpitations, dizziness, or leg swelling  GASTROINTESTINAL: No abdominal pain. No nausea, vomiting, or hematemesis; No diarrhea or constipation. No melena or hematochezia.  GENITOURINARY: No dysuria or hematuria, urinary frequency  NEUROLOGICAL: No headaches, memory loss, loss of strength, numbness, or tremors  SKIN: No itching, burning, rashes, or lesions     Vital Signs Last 24 Hrs  T(C): 36.4 (04 Aug 2020 04:55), Max: 37.1 (03 Aug 2020 15:45)  T(F): 97.5 (04 Aug 2020 04:55), Max: 98.7 (03 Aug 2020 15:45)  HR: 83 (04 Aug 2020 04:55) (76 - 92)  BP: 127/73 (04 Aug 2020 04:55) (127/73 - 140/57)  BP(mean): --  RR: 18 (04 Aug 2020 04:55) (18 - 18)  SpO2: 99% (04 Aug 2020 04:55) (97% - 99%)    PHYSICAL EXAMINATION:  GENERAL: NAD, well built  HEAD:  Atraumatic, Normocephalic  EYES:  conjunctiva and sclera clear  NECK: Supple, No JVD, Normal thyroid  CHEST/LUNG: Clear to auscultation. Clear to percussion bilaterally; No rales, rhonchi, wheezing, or rubs  HEART: Regular rate and rhythm; No murmurs, rubs, or gallops  ABDOMEN: Soft, Nontender, Nondistended; Bowel sounds present  NERVOUS SYSTEM: alert and oriented x3  EXTREMITIES:  2+ Peripheral Pulses, No clubbing, cyanosis, or edema  SKIN: warm dry                          12.8   25.01 )-----------( 105      ( 04 Aug 2020 07:59 )             39.4     08-04    138  |  102  |  20<H>  ----------------------------<  270<H>  3.9   |  28  |  0.70    Ca    8.4      04 Aug 2020 07:59  Phos  3.6     08-04  Mg     2.2     08-04    TPro  6.2  /  Alb  3.0<L>  /  TBili  0.8  /  DBili  x   /  AST  11  /  ALT  23  /  AlkPhos  171<H>  08-04    LIVER FUNCTIONS - ( 04 Aug 2020 07:59 )  Alb: 3.0 g/dL / Pro: 6.2 g/dL / ALK PHOS: 171 U/L / ALT: 23 U/L DA / AST: 11 U/L / GGT: x                   CAPILLARY BLOOD GLUCOSE      RADIOLOGY & ADDITIONAL TESTS: PGY-1 Progress Note discussed with attending    PAGER #: [[phone]] TILL 5:00 PM  PLEASE CONTACT ON CALL TEAM:  - On Call Team (Please refer to Rachel) FROM 5:00 PM - 8:30PM  - Nightfloat Team FROM 8:30 -7:30 AM    CHIEF COMPLAINT & BRIEF HOSPITAL COURSE: 65F with PMH COPD (type B with acute asthmatic bronchitis, 5L NC at home, 80 pack year hx, quit smoking 8 ya) and r lung cancer s/p RLL resection 2017 on chemo (last dose 7/31) admitted for COPD exacerbation and severe neck/back pain 2/2 known pathologic spine and rib fx. Patient takes Percocet for this pain and has chronic constipation. Of note, patient has history of multiple COPD exacerbations, per patient without needing intubation, and started herself on PO steroids 60mg day prior to admission. Patient was also found to have UTI outpatient by oncologist Dr. Mendiola and was started on levofloxacin, continuing inpatient.     INTERVAL HPI/OVERNIGHT EVENTS: NAEON. VS wnl this am. Patient reports feeling very shaky and SOB this am, despite wnl SpO2 and BG. States that she is not sure if she is feeling anxious. Endorses nausea without vomiting, poor appetite, scant BM this am. Continuing on 5.5L NC, 60mg Solumedrol IV q8, and day 4 of Levaquin. NC CT thoracic spine today suggestive of progressing metastatic disease, with new acute compression fx at L1. WBC decreased to 25 this am, AP remains elevated, 171 this am. Continuing on oxycodone 20mg q4 and Tylenol 650 q6 for pain control, Xanax changed to standing.     MEDICATIONS  (STANDING):  acetaminophen   Tablet .. 650 milliGRAM(s) Oral every 6 hours  ALPRAZolam 0.25 milliGRAM(s) Oral two times a day  atorvastatin 40 milliGRAM(s) Oral at bedtime  bisacodyl Suppository 10 milliGRAM(s) Rectal every 24 hours  budesonide 160 MICROgram(s)/formoterol 4.5 MICROgram(s) Inhaler 2 Puff(s) Inhalation two times a day  carvedilol 6.25 milliGRAM(s) Oral every 12 hours  cyanocobalamin 1000 MICROGram(s) Oral daily  dextrose 5%. 1000 milliLiter(s) (50 mL/Hr) IV Continuous <Continuous>  enoxaparin Injectable 40 milliGRAM(s) SubCutaneous daily  ergocalciferol 82579 Unit(s) Oral <User Schedule>  folic acid 1 milliGRAM(s) Oral daily  gabapentin 200 milliGRAM(s) Oral three times a day  insulin lispro (HumaLOG) corrective regimen sliding scale   SubCutaneous three times a day before meals  insulin lispro (HumaLOG) corrective regimen sliding scale   SubCutaneous at bedtime  insulin lispro Injectable (HumaLOG) 5 Unit(s) SubCutaneous Before meals and at bedtime  levoFLOXacin IVPB      levoFLOXacin IVPB 750 milliGRAM(s) IV Intermittent every 24 hours  loratadine 5 milliGRAM(s) Oral daily  methylPREDNISolone sodium succinate Injectable 60 milliGRAM(s) IV Push every 8 hours  oxyCODONE    IR 20 milliGRAM(s) Oral four times a day  pantoprazole    Tablet 40 milliGRAM(s) Oral before breakfast  polyethylene glycol 3350 17 Gram(s) Oral daily  sodium chloride 0.9%. 1000 milliLiter(s) (70 mL/Hr) IV Continuous <Continuous>  tiotropium 18 MICROgram(s) Capsule 1 Capsule(s) Inhalation daily    MEDICATIONS  (PRN):  ALBUTerol    90 MICROgram(s) HFA Inhaler 2 Puff(s) Inhalation every 6 hours PRN Shortness of Breath  glucagon  Injectable 1 milliGRAM(s) IntraMuscular once PRN Glucose LESS THAN 70 milligrams/deciliter  ondansetron    Tablet 4 milliGRAM(s) Oral every 6 hours PRN Nausea and/or Vomiting  ondansetron Injectable 4 milliGRAM(s) IV Push every 4 hours PRN Nausea and/or Vomiting    REVIEW OF SYSTEMS:  CONSTITUTIONAL: no fever or fatigue  RESPIRATORY: No cough. Ongoing shortness of breath  CARDIOVASCULAR: No chest pain  GASTROINTESTINAL: moderate suprapubic pressure, nausea, no vomiting, No diarrhea. Mild constipation with scant BM this am.  GENITOURINARY: ongoing mild dysuria  NEUROLOGICAL: No headaches. Tremors in hands b/l.     Vital Signs Last 24 Hrs  T(C): 36.4 (04 Aug 2020 04:55), Max: 37.1 (03 Aug 2020 15:45)  T(F): 97.5 (04 Aug 2020 04:55), Max: 98.7 (03 Aug 2020 15:45)  HR: 83 (04 Aug 2020 04:55) (76 - 92)  BP: 127/73 (04 Aug 2020 04:55) (127/73 - 140/57)  BP(mean): --  RR: 18 (04 Aug 2020 04:55) (18 - 18)  SpO2: 99% (04 Aug 2020 04:55) (97% - 99%)    PHYSICAL EXAMINATION:  GENERAL: NAD, well built  HEAD:  Atraumatic, Normocephalic  EYES:  conjunctiva and sclera clear  NECK: Supple, No JVD, Normal thyroid  CHEST/LUNG: Clear to auscultation. Clear to percussion bilaterally; No rales, rhonchi, wheezing, or rubs  HEART: Regular rate and rhythm; No murmurs, rubs, or gallops  ABDOMEN: Soft, Nontender, Nondistended; Bowel sounds present  NERVOUS SYSTEM: alert and oriented x3  EXTREMITIES:  2+ Peripheral Pulses, No clubbing, cyanosis, or edema  SKIN: warm dry                          12.8   25.01 )-----------( 105      ( 04 Aug 2020 07:59 )             39.4     08-04    138  |  102  |  20<H>  ----------------------------<  270<H>  3.9   |  28  |  0.70    Ca    8.4      04 Aug 2020 07:59  Phos  3.6     08-04  Mg     2.2     08-04    TPro  6.2  /  Alb  3.0<L>  /  TBili  0.8  /  DBili  x   /  AST  11  /  ALT  23  /  AlkPhos  171<H>  08-04    LIVER FUNCTIONS - ( 04 Aug 2020 07:59 )  Alb: 3.0 g/dL / Pro: 6.2 g/dL / ALK PHOS: 171 U/L / ALT: 23 U/L DA / AST: 11 U/L / GGT: x                   CAPILLARY BLOOD GLUCOSE      RADIOLOGY & ADDITIONAL TESTS: PGY-1 Progress Note discussed with attending    PAGER #: [[phone]] TILL 5:00 PM  PLEASE CONTACT ON CALL TEAM:  - On Call Team (Please refer to Rachel) FROM 5:00 PM - 8:30PM  - Nightfloat Team FROM 8:30 -7:30 AM    CHIEF COMPLAINT & BRIEF HOSPITAL COURSE: 65F with PMH COPD (type B with acute asthmatic bronchitis, 5L NC at home, 80 pack year hx, quit smoking 8 ya) and r lung cancer s/p RLL resection 2017 on chemo (regimen of neulasta, ketruda, alimta, and carboplatin, last dose 7/31) admitted for COPD exacerbation and severe neck/back pain 2/2 known pathologic spine and rib fx. Patient takes Percocet for this pain and has chronic constipation. Of note, patient has history of multiple COPD exacerbations, per patient without needing intubation, and started herself on PO steroids 60mg day prior to admission. Patient was also found to have UTI outpatient by oncologist Dr. Mendiola and was started on5 day course of levofloxacin, continuing inpatient.     INTERVAL HPI/OVERNIGHT EVENTS: NAEON. VS wnl this am. Patient reports feeling very shaky and SOB this am, despite wnl SpO2 and BG. States that she is not sure if she is feeling anxious. Endorses nausea without vomiting, poor appetite, scant BM this am. Continuing on 5.5L NC, 60mg Solumedrol IV q8, and day 4 of Levaquin. NC CT thoracic spine today suggestive of progressing metastatic disease, with new acute compression fx at L1. WBC decreased to 25 this am (likely 2/2 Neulasta and steroids), AP remains elevated, 171 this am. Continuing on oxycodone 20mg q4 and Tylenol 650 q6 for pain control, Xanax changed to standing.     MEDICATIONS  (STANDING):  acetaminophen   Tablet .. 650 milliGRAM(s) Oral every 6 hours  ALPRAZolam 0.25 milliGRAM(s) Oral two times a day  atorvastatin 40 milliGRAM(s) Oral at bedtime  bisacodyl Suppository 10 milliGRAM(s) Rectal every 24 hours  budesonide 160 MICROgram(s)/formoterol 4.5 MICROgram(s) Inhaler 2 Puff(s) Inhalation two times a day  carvedilol 6.25 milliGRAM(s) Oral every 12 hours  cyanocobalamin 1000 MICROGram(s) Oral daily  dextrose 5%. 1000 milliLiter(s) (50 mL/Hr) IV Continuous <Continuous>  enoxaparin Injectable 40 milliGRAM(s) SubCutaneous daily  ergocalciferol 41238 Unit(s) Oral <User Schedule>  folic acid 1 milliGRAM(s) Oral daily  gabapentin 200 milliGRAM(s) Oral three times a day  insulin lispro (HumaLOG) corrective regimen sliding scale   SubCutaneous three times a day before meals  insulin lispro (HumaLOG) corrective regimen sliding scale   SubCutaneous at bedtime  insulin lispro Injectable (HumaLOG) 5 Unit(s) SubCutaneous Before meals and at bedtime  levoFLOXacin IVPB      levoFLOXacin IVPB 750 milliGRAM(s) IV Intermittent every 24 hours  loratadine 5 milliGRAM(s) Oral daily  methylPREDNISolone sodium succinate Injectable 60 milliGRAM(s) IV Push every 8 hours  oxyCODONE    IR 20 milliGRAM(s) Oral four times a day  pantoprazole    Tablet 40 milliGRAM(s) Oral before breakfast  polyethylene glycol 3350 17 Gram(s) Oral daily  sodium chloride 0.9%. 1000 milliLiter(s) (70 mL/Hr) IV Continuous <Continuous>  tiotropium 18 MICROgram(s) Capsule 1 Capsule(s) Inhalation daily    MEDICATIONS  (PRN):  ALBUTerol    90 MICROgram(s) HFA Inhaler 2 Puff(s) Inhalation every 6 hours PRN Shortness of Breath  glucagon  Injectable 1 milliGRAM(s) IntraMuscular once PRN Glucose LESS THAN 70 milligrams/deciliter  ondansetron    Tablet 4 milliGRAM(s) Oral every 6 hours PRN Nausea and/or Vomiting  ondansetron Injectable 4 milliGRAM(s) IV Push every 4 hours PRN Nausea and/or Vomiting    REVIEW OF SYSTEMS:  CONSTITUTIONAL: no fever or fatigue  RESPIRATORY: No cough. Ongoing shortness of breath  CARDIOVASCULAR: No chest pain  GASTROINTESTINAL: moderate suprapubic pressure, nausea, no vomiting, No diarrhea. Mild constipation with scant BM this am.  GENITOURINARY: ongoing mild dysuria  NEUROLOGICAL: No headaches. Tremors in hands b/l.     Vital Signs Last 24 Hrs  T(C): 36.4 (04 Aug 2020 04:55), Max: 37.1 (03 Aug 2020 15:45)  T(F): 97.5 (04 Aug 2020 04:55), Max: 98.7 (03 Aug 2020 15:45)  HR: 83 (04 Aug 2020 04:55) (76 - 92)  BP: 127/73 (04 Aug 2020 04:55) (127/73 - 140/57)  BP(mean): --  RR: 18 (04 Aug 2020 04:55) (18 - 18)  SpO2: 99% (04 Aug 2020 04:55) (97% - 99%)    PHYSICAL EXAMINATION:  GENERAL: obese, anxious-appearing  HEAD:  Atraumatic, Normocephalic  EYES:  conjunctiva and sclera clear, eomi  NECK: Supple  CHEST/LUNG: Clear to auscultation. no wheezing  HEART: Regular rate and rhythm; No murmurs, rubs, or gallops  ABDOMEN: Soft, mild suprapubic discomfort on palpation, obese; Bowel sounds present  NERVOUS SYSTEM: alert and oriented x3  EXTREMITIES:  2+ Peripheral Pulses, b/l pedal edema, unchanged from baseline per patient  SKIN: warm dry                          12.8   25.01 )-----------( 105      ( 04 Aug 2020 07:59 )             39.4     08-04    138  |  102  |  20<H>  ----------------------------<  270<H>  3.9   |  28  |  0.70    Ca    8.4      04 Aug 2020 07:59  Phos  3.6     08-04  Mg     2.2     08-04    TPro  6.2  /  Alb  3.0<L>  /  TBili  0.8  /  DBili  x   /  AST  11  /  ALT  23  /  AlkPhos  171<H>  08-04    LIVER FUNCTIONS - ( 04 Aug 2020 07:59 )  Alb: 3.0 g/dL / Pro: 6.2 g/dL / ALK PHOS: 171 U/L / ALT: 23 U/L DA / AST: 11 U/L / GGT: x                   CAPILLARY BLOOD GLUCOSE      RADIOLOGY & ADDITIONAL TESTS:

## 2020-08-04 NOTE — DISCHARGE NOTE NURSING/CASE MANAGEMENT/SOCIAL WORK - PATIENT PORTAL LINK FT
You can access the FollowMyHealth Patient Portal offered by [facility] by registering at the following website: [email or web address]. By joining [facility]’s FollowMyHealth portal, you will also be able to view your health information using other applications (apps) compatible with our system.

## 2020-08-04 NOTE — PROGRESS NOTE ADULT - ASSESSMENT
65F with PMH COPD (type B with acute asthmatic [id number] NC at home, 80 pack year hx, quit smoking 8 ya) and r lung cancer s/p RLL resection 2017 on chemo (last dose 7/31) admitted for COPD exacerbation and severe neck/back pain 2/2 known pathologic spine and rib fx. Continuing tx for UTI dx outpatient.

## 2020-08-05 DIAGNOSIS — S22.000A WEDGE COMPRESSION FRACTURE OF UNSPECIFIED THORACIC VERTEBRA, INITIAL ENCOUNTER FOR CLOSED FRACTURE: ICD-10-CM

## 2020-08-05 DIAGNOSIS — M54.9 DORSALGIA, UNSPECIFIED: ICD-10-CM

## 2020-08-05 DIAGNOSIS — G89.3 NEOPLASM RELATED PAIN (ACUTE) (CHRONIC): ICD-10-CM

## 2020-08-05 DIAGNOSIS — C79.51 SECONDARY MALIGNANT NEOPLASM OF BONE: ICD-10-CM

## 2020-08-05 LAB
ALBUMIN SERPL ELPH-MCNC: 2.8 G/DL — LOW (ref 3.5–5)
ALP SERPL-CCNC: 155 U/L — HIGH (ref 40–120)
ALT FLD-CCNC: 21 U/L DA — SIGNIFICANT CHANGE UP (ref 10–60)
ANION GAP SERPL CALC-SCNC: 6 MMOL/L — SIGNIFICANT CHANGE UP (ref 5–17)
AST SERPL-CCNC: 12 U/L — SIGNIFICANT CHANGE UP (ref 10–40)
BILIRUB SERPL-MCNC: 0.7 MG/DL — SIGNIFICANT CHANGE UP (ref 0.2–1.2)
BUN SERPL-MCNC: 17 MG/DL — SIGNIFICANT CHANGE UP (ref 7–18)
CALCIUM SERPL-MCNC: 8.2 MG/DL — LOW (ref 8.4–10.5)
CHLORIDE SERPL-SCNC: 99 MMOL/L — SIGNIFICANT CHANGE UP (ref 96–108)
CO2 SERPL-SCNC: 31 MMOL/L — SIGNIFICANT CHANGE UP (ref 22–31)
CREAT SERPL-MCNC: 0.6 MG/DL — SIGNIFICANT CHANGE UP (ref 0.5–1.3)
GLUCOSE BLDC GLUCOMTR-MCNC: 270 MG/DL — HIGH (ref 70–99)
GLUCOSE BLDC GLUCOMTR-MCNC: 294 MG/DL — HIGH (ref 70–99)
GLUCOSE BLDC GLUCOMTR-MCNC: 358 MG/DL — HIGH (ref 70–99)
GLUCOSE BLDC GLUCOMTR-MCNC: 387 MG/DL — HIGH (ref 70–99)
GLUCOSE SERPL-MCNC: 311 MG/DL — HIGH (ref 70–99)
HCT VFR BLD CALC: 35.7 % — SIGNIFICANT CHANGE UP (ref 34.5–45)
HGB BLD-MCNC: 11.4 G/DL — LOW (ref 11.5–15.5)
MAGNESIUM SERPL-MCNC: 2.1 MG/DL — SIGNIFICANT CHANGE UP (ref 1.6–2.6)
MCHC RBC-ENTMCNC: 27.6 PG — SIGNIFICANT CHANGE UP (ref 27–34)
MCHC RBC-ENTMCNC: 31.9 GM/DL — LOW (ref 32–36)
MCV RBC AUTO: 86.4 FL — SIGNIFICANT CHANGE UP (ref 80–100)
NRBC # BLD: 0 /100 WBCS — SIGNIFICANT CHANGE UP (ref 0–0)
PHOSPHATE SERPL-MCNC: 3.4 MG/DL — SIGNIFICANT CHANGE UP (ref 2.5–4.5)
PLATELET # BLD AUTO: 70 K/UL — LOW (ref 150–400)
POTASSIUM SERPL-MCNC: 4.1 MMOL/L — SIGNIFICANT CHANGE UP (ref 3.5–5.3)
POTASSIUM SERPL-SCNC: 4.1 MMOL/L — SIGNIFICANT CHANGE UP (ref 3.5–5.3)
PROT SERPL-MCNC: 5.6 G/DL — LOW (ref 6–8.3)
RBC # BLD: 4.13 M/UL — SIGNIFICANT CHANGE UP (ref 3.8–5.2)
RBC # FLD: 17 % — HIGH (ref 10.3–14.5)
SODIUM SERPL-SCNC: 136 MMOL/L — SIGNIFICANT CHANGE UP (ref 135–145)
WBC # BLD: 12.06 K/UL — HIGH (ref 3.8–10.5)
WBC # FLD AUTO: 12.06 K/UL — HIGH (ref 3.8–10.5)

## 2020-08-05 PROCEDURE — 99233 SBSQ HOSP IP/OBS HIGH 50: CPT | Mod: GC

## 2020-08-05 PROCEDURE — 99222 1ST HOSP IP/OBS MODERATE 55: CPT

## 2020-08-05 RX ORDER — SENNA PLUS 8.6 MG/1
2 TABLET ORAL AT BEDTIME
Refills: 0 | Status: DISCONTINUED | OUTPATIENT
Start: 2020-08-05 | End: 2020-08-06

## 2020-08-05 RX ORDER — GABAPENTIN 400 MG/1
400 CAPSULE ORAL EVERY 6 HOURS
Refills: 0 | Status: DISCONTINUED | OUTPATIENT
Start: 2020-08-05 | End: 2020-08-06

## 2020-08-05 RX ORDER — GABAPENTIN 400 MG/1
300 CAPSULE ORAL THREE TIMES A DAY
Refills: 0 | Status: DISCONTINUED | OUTPATIENT
Start: 2020-08-05 | End: 2020-08-05

## 2020-08-05 RX ORDER — CYCLOBENZAPRINE HYDROCHLORIDE 10 MG/1
5 TABLET, FILM COATED ORAL THREE TIMES A DAY
Refills: 0 | Status: DISCONTINUED | OUTPATIENT
Start: 2020-08-05 | End: 2020-08-06

## 2020-08-05 RX ADMIN — CEFTRIAXONE 100 MILLIGRAM(S): 500 INJECTION, POWDER, FOR SOLUTION INTRAMUSCULAR; INTRAVENOUS at 21:45

## 2020-08-05 RX ADMIN — Medication 5 UNIT(S): at 11:58

## 2020-08-05 RX ADMIN — Medication 3 MILLIGRAM(S): at 21:47

## 2020-08-05 RX ADMIN — Medication 0.25 MILLIGRAM(S): at 06:10

## 2020-08-05 RX ADMIN — Medication 0.25 MILLIGRAM(S): at 17:58

## 2020-08-05 RX ADMIN — Medication 650 MILLIGRAM(S): at 12:38

## 2020-08-05 RX ADMIN — Medication 60 MILLIGRAM(S): at 06:10

## 2020-08-05 RX ADMIN — Medication 6: at 08:29

## 2020-08-05 RX ADMIN — OXYCODONE HYDROCHLORIDE 20 MILLIGRAM(S): 5 TABLET ORAL at 13:45

## 2020-08-05 RX ADMIN — LORATADINE 5 MILLIGRAM(S): 10 TABLET ORAL at 12:38

## 2020-08-05 RX ADMIN — BUDESONIDE AND FORMOTEROL FUMARATE DIHYDRATE 2 PUFF(S): 160; 4.5 AEROSOL RESPIRATORY (INHALATION) at 11:59

## 2020-08-05 RX ADMIN — OXYCODONE HYDROCHLORIDE 20 MILLIGRAM(S): 5 TABLET ORAL at 18:35

## 2020-08-05 RX ADMIN — Medication 5 UNIT(S): at 08:29

## 2020-08-05 RX ADMIN — GABAPENTIN 400 MILLIGRAM(S): 400 CAPSULE ORAL at 17:58

## 2020-08-05 RX ADMIN — Medication 650 MILLIGRAM(S): at 06:40

## 2020-08-05 RX ADMIN — Medication 60 MILLIGRAM(S): at 21:50

## 2020-08-05 RX ADMIN — Medication 10: at 11:59

## 2020-08-05 RX ADMIN — Medication 650 MILLIGRAM(S): at 01:09

## 2020-08-05 RX ADMIN — OXYCODONE HYDROCHLORIDE 20 MILLIGRAM(S): 5 TABLET ORAL at 06:07

## 2020-08-05 RX ADMIN — ONDANSETRON 4 MILLIGRAM(S): 8 TABLET, FILM COATED ORAL at 04:47

## 2020-08-05 RX ADMIN — OXYCODONE HYDROCHLORIDE 20 MILLIGRAM(S): 5 TABLET ORAL at 12:38

## 2020-08-05 RX ADMIN — GABAPENTIN 200 MILLIGRAM(S): 400 CAPSULE ORAL at 06:08

## 2020-08-05 RX ADMIN — Medication 5 UNIT(S): at 21:51

## 2020-08-05 RX ADMIN — CARVEDILOL PHOSPHATE 6.25 MILLIGRAM(S): 80 CAPSULE, EXTENDED RELEASE ORAL at 17:58

## 2020-08-05 RX ADMIN — CARVEDILOL PHOSPHATE 6.25 MILLIGRAM(S): 80 CAPSULE, EXTENDED RELEASE ORAL at 06:08

## 2020-08-05 RX ADMIN — Medication 1: at 21:51

## 2020-08-05 RX ADMIN — PREGABALIN 1000 MICROGRAM(S): 225 CAPSULE ORAL at 12:38

## 2020-08-05 RX ADMIN — Medication 650 MILLIGRAM(S): at 06:10

## 2020-08-05 RX ADMIN — OXYCODONE HYDROCHLORIDE 20 MILLIGRAM(S): 5 TABLET ORAL at 18:58

## 2020-08-05 RX ADMIN — Medication 5 UNIT(S): at 17:26

## 2020-08-05 RX ADMIN — Medication 60 MILLIGRAM(S): at 15:43

## 2020-08-05 RX ADMIN — Medication 10 MILLIGRAM(S): at 15:44

## 2020-08-05 RX ADMIN — TIOTROPIUM BROMIDE 1 CAPSULE(S): 18 CAPSULE ORAL; RESPIRATORY (INHALATION) at 12:39

## 2020-08-05 RX ADMIN — Medication 650 MILLIGRAM(S): at 13:30

## 2020-08-05 RX ADMIN — OXYCODONE HYDROCHLORIDE 20 MILLIGRAM(S): 5 TABLET ORAL at 01:09

## 2020-08-05 RX ADMIN — Medication 10: at 17:26

## 2020-08-05 RX ADMIN — Medication 1 MILLIGRAM(S): at 12:38

## 2020-08-05 RX ADMIN — BUDESONIDE AND FORMOTEROL FUMARATE DIHYDRATE 2 PUFF(S): 160; 4.5 AEROSOL RESPIRATORY (INHALATION) at 21:50

## 2020-08-05 RX ADMIN — POLYETHYLENE GLYCOL 3350 17 GRAM(S): 17 POWDER, FOR SOLUTION ORAL at 12:39

## 2020-08-05 RX ADMIN — OXYCODONE HYDROCHLORIDE 20 MILLIGRAM(S): 5 TABLET ORAL at 06:40

## 2020-08-05 RX ADMIN — OXYCODONE HYDROCHLORIDE 20 MILLIGRAM(S): 5 TABLET ORAL at 00:39

## 2020-08-05 RX ADMIN — ATORVASTATIN CALCIUM 40 MILLIGRAM(S): 80 TABLET, FILM COATED ORAL at 21:46

## 2020-08-05 RX ADMIN — Medication 650 MILLIGRAM(S): at 00:39

## 2020-08-05 RX ADMIN — PANTOPRAZOLE SODIUM 40 MILLIGRAM(S): 20 TABLET, DELAYED RELEASE ORAL at 06:08

## 2020-08-05 RX ADMIN — Medication 650 MILLIGRAM(S): at 17:58

## 2020-08-05 RX ADMIN — Medication 650 MILLIGRAM(S): at 19:39

## 2020-08-05 RX ADMIN — ENOXAPARIN SODIUM 40 MILLIGRAM(S): 100 INJECTION SUBCUTANEOUS at 12:38

## 2020-08-05 RX ADMIN — SENNA PLUS 2 TABLET(S): 8.6 TABLET ORAL at 21:46

## 2020-08-05 NOTE — PROGRESS NOTE ADULT - ASSESSMENT
COPD type B with acute asthmatic Bronchitis   CARC RLL lobectomy with Bone and LN mets on C/T   Obesity with OHS with Chr respiratory failure   IDDM   HCVD      PLAN-- D/C iv steroids -- po prednisone 40 mg qd x 4 days then taper off  [id number] mg tid  P/T for ambulation  If better, D/C am or friday   Symbicort and spiriva   HFN rx q 4-6 h prn with duoneb   O2 n/c 2-3 lpm to keep o2 sat 92   Vit D supplement   FS coverage   S/C Lovenox   Discussed with Residents

## 2020-08-05 NOTE — MEDICAL STUDENT PROGRESS NOTE(EDUCATION) - NS MD HP STUD SUPERVISOR COMMENTS FT
Have read and agree with the above statements.
Case was discussed between medical student, resident, and attending on rounds this am. I have read and agree with the above statements.

## 2020-08-05 NOTE — PROGRESS NOTE ADULT - PROBLEM SELECTOR PLAN 4
Patient reports ongoing pain and tenderness to palpation of bilateral shins, swelling noted, unable to assess further 2/2 patient discomfort  -LE Doppler refused, states she "has had them many times before for the same problem" without significant findings, reports that edema and pain are chronic and haven't changed

## 2020-08-05 NOTE — PROGRESS NOTE ADULT - PROBLEM SELECTOR PLAN 1
type B with acute asthmatic bronchitis  patient reports several days of increased SOB, took PO 60mg steroids at home yesterday (self-rx) before presenting to hospital, of note has hx of multiple COPD exacerbations without intubation   CXR unremarkable for acute pathology, CT chest negative for acute airspace disease or consolidation with improving mediastinal LAD  -VBG shows compensation, refused ABG yesterday  -on 5L NC, SpO2s 100 today, titrate to 92-93% SpO2, patient counselled by team yesterday that low 90s may be acceptable level for pt with COPD  -cw 60mg Solumedrol q8 today, dc after last dose tonight, start 40mg PO Qd prednisone tomorrow morning  -cw bronchodilators (spiriva, symbicort, albuterol)  -dc levofloxacin 2/2 prolonged QTc 525 and start cftx  -cw GI PPx protonix 40gm  -WBC elevated 2/2 steroids and Neulasta, decreased to 12 today  -BCx NGTD  -monitor vitals and O2, CM to look into increasing patient's home O2 allowance  -pulmonology Dr. Nugent following  -pulm rehab on dc

## 2020-08-05 NOTE — PROGRESS NOTE ADULT - PROBLEM SELECTOR PLAN 2
neck and back pain 2/2 known pathologic fx in spine and ribs, on CT chest this admission multiple new sclerotic lesions in the thoracic spine concerning for metastatic disease noted  -CT thoracic spine 8/4 showed:     1. Multiple sclerotic and lytic lesions in the thoracic spine and ribs suggesting    metastatic disease, overall progressed since 6/25/2020. Epidural soft tissue at    the T7 level suggesting tumor     2.  Multilevel thoracic compression fx     3.  New acute compression fx of the L1 vertebral body with bony retropulsion  -cw 20mg oxycodone q4 and 650 Tylenol q6, increase gabapentin to 300mg TID  -bisacodyl, miralax for constipation

## 2020-08-05 NOTE — PROGRESS NOTE ADULT - SUBJECTIVE AND OBJECTIVE BOX
still has pain at back, can not move  less sob    MEDICATIONS  (STANDING):  acetaminophen   Tablet .. 650 milliGRAM(s) Oral every 6 hours  ALPRAZolam 0.25 milliGRAM(s) Oral two times a day  atorvastatin 40 milliGRAM(s) Oral at bedtime  bisacodyl Suppository 10 milliGRAM(s) Rectal every 24 hours  budesonide 160 MICROgram(s)/formoterol 4.5 MICROgram(s) Inhaler 2 Puff(s) Inhalation two times a day  carvedilol 6.25 milliGRAM(s) Oral every 12 hours  cefTRIAXone   IVPB 1000 milliGRAM(s) IV Intermittent every 24 hours  cyanocobalamin 1000 MICROGram(s) Oral daily  dextrose 5%. 1000 milliLiter(s) (50 mL/Hr) IV Continuous <Continuous>  enoxaparin Injectable 40 milliGRAM(s) SubCutaneous daily  ergocalciferol 68152 Unit(s) Oral <User Schedule>  folic acid 1 milliGRAM(s) Oral daily  gabapentin 300 milliGRAM(s) Oral three times a day  insulin lispro (HumaLOG) corrective regimen sliding scale   SubCutaneous three times a day before meals  insulin lispro (HumaLOG) corrective regimen sliding scale   SubCutaneous at bedtime  insulin lispro Injectable (HumaLOG) 5 Unit(s) SubCutaneous Before meals and at bedtime  loratadine 5 milliGRAM(s) Oral daily  melatonin 3 milliGRAM(s) Oral at bedtime  methylPREDNISolone sodium succinate Injectable 60 milliGRAM(s) IV Push every 8 hours  oxyCODONE    IR 20 milliGRAM(s) Oral four times a day  pantoprazole    Tablet 40 milliGRAM(s) Oral before breakfast  polyethylene glycol 3350 17 Gram(s) Oral daily  sodium chloride 0.9%. 1000 milliLiter(s) (70 mL/Hr) IV Continuous <Continuous>  tiotropium 18 MICROgram(s) Capsule 1 Capsule(s) Inhalation daily    MEDICATIONS  (PRN):  ALBUTerol    90 MICROgram(s) HFA Inhaler 2 Puff(s) Inhalation every 6 hours PRN Shortness of Breath  glucagon  Injectable 1 milliGRAM(s) IntraMuscular once PRN Glucose LESS THAN 70 milligrams/deciliter  ondansetron Injectable 4 milliGRAM(s) IV Push every 4 hours PRN Nausea and/or Vomiting      Allergies    fish (Angioedema)  penicillins (Rash)    Intolerances        Vital Signs Last 24 Hrs  T(C): 36.8 (05 Aug 2020 05:32), Max: 36.8 (05 Aug 2020 05:32)  T(F): 98.2 (05 Aug 2020 05:32), Max: 98.2 (05 Aug 2020 05:32)  HR: 80 (05 Aug 2020 05:32) (76 - 92)  BP: 138/73 (05 Aug 2020 05:32) (132/78 - 177/90)  BP(mean): --  RR: 18 (05 Aug 2020 05:32) (17 - 18)  SpO2: 100% (05 Aug 2020 05:32) (94% - 100%)    PHYSICAL EXAM  General: adult in NAD  HEENT: clear oropharynx, anicteric sclera, pink conjunctiva  Neck: supple  CV: normal S1/S2 with no murmur rubs or gallops  Lungs: positive air movement b/l ant lungs,clear to auscultation, no wheezes, no rales  Abdomen: soft non-tender non-distended, no hepatosplenomegaly  Ext: no clubbing cyanosis or edema  Skin: no rashes and no petechiae  Neuro: alert and oriented X 4, no focal deficits  LABS:                          11.4   12.06 )-----------( 70       ( 05 Aug 2020 07:15 )             35.7         Mean Cell Volume : 86.4 fl  Mean Cell Hemoglobin : 27.6 pg  Mean Cell Hemoglobin Concentration : 31.9 gm/dL  Auto Neutrophil # : x  Auto Lymphocyte # : x  Auto Monocyte # : x  Auto Eosinophil # : x  Auto Basophil # : x  Auto Neutrophil % : x  Auto Lymphocyte % : x  Auto Monocyte % : x  Auto Eosinophil % : x  Auto Basophil % : x    Serial CBC  Hematocrit 35.7  Hemoglobin 11.4  Plat 70  RBC 4.13  WBC 12.06  Serial CBC  Hematocrit 39.4  Hemoglobin 12.8  Plat 105  RBC 4.51  WBC 25.01  Serial CBC  Hematocrit 37.7  Hemoglobin 12.4  Plat 110  RBC 4.34  WBC 31.93  Serial CBC  Hematocrit 40.2  Hemoglobin 12.9  Plat 138  RBC 4.63  WBC 38.11    08-05    136  |  99  |  17  ----------------------------<  311<H>  4.1   |  31  |  0.60    Ca    8.2<L>      05 Aug 2020 07:15  Phos  3.4     08-05  Mg     2.1     08-05    TPro  5.6<L>  /  Alb  2.8<L>  /  TBili  0.7  /  DBili  x   /  AST  12  /  ALT  21  /  AlkPhos  155<H>  08-05          Folate, Serum: 17.0 ng/mL (08-02 @ 18:11)  Vitamin B12, Serum: >2000 pg/mL (08-02 @ 18:11)            BLOOD SMEAR INTERPRETATION:       RADIOLOGY & ADDITIONAL STUDIES:  < from: CT Thoracic Spine No Cont (08.04.20 @ 09:38) >  INTERPRETATION:  CLINICAL INFORMATION: pathological fx. RCC. ADMDIAG1: R06.02 SHORTNESS OF BREATH/.    TECHNIQUE: Noncontrast CT scan of the thoracic spine wasperformed. Thin section axial images were obtained with sagittal and coronal reformations.    COMPARISON: CT chest 8/2/2020 and 6/25/2020    FINDINGS:    Exaggerated thoracic kyphosis.    Multiple sclerotic and lytic lesions in the thoracic spine andribs suggesting metastatic disease, overall progressed since 6/25/2020. Lesions are noted in the T2 and T4-T10 vertebrae, worst at T7 where there is extensive involvement of the vertebral body and posterior elements and epidural soft tissue suggesting tumor ventrally and to the right. Redemonstration of compression deformities of the T2, T4, T7, T8, T10, T11 and T12 vertebral bodies. New acute compression fracture of the L1 vertebral body with bony retropulsion. Sclerotic lesions in the bilateraleighth and right 11th ribs.    Emphysema. Right lung wedge resection. Subcarinal and left paraesophageal lymphadenopathy again noted.      IMPRESSION:  1.  Multiple sclerotic and lytic lesions in the thoracic spine and ribs suggesting metastatic disease, overall progressed since 6/25/2020. Epidural soft tissue at the T7 level suggesting tumor.  2.  Multilevel thoracic compression fractures.  3.  New acute compression fracture of the L1 vertebral body with bony retropulsion.      < end of copied text >

## 2020-08-05 NOTE — PROGRESS NOTE ADULT - PROBLEM SELECTOR PLAN 3
Dx by Dr. Mendiola outpatient 2/2 increased urinary frequency, dysuria, started on levofloxacin  -dc levofloxacin 2/2 QTc 525, start cftx  -UA with mild WBCs, UCx coag negative Staph, awaiting SS  -WBC elevated to 12 today, 25 yesterday, significance c/b steroids and neulasta

## 2020-08-05 NOTE — PROGRESS NOTE ADULT - SUBJECTIVE AND OBJECTIVE BOX
PULMONARY  progress note    BARB COLÓN  MRN-[id number]    Patient is a 65y old  Female who presents with a chief complaint of Shortness of breath and back pain (05 Aug 2020 09:15)  Pain better, wants increased dose gabapantin, no sob  wants p/T for walking      MEDICATIONS  (STANDING):  acetaminophen   Tablet .. 650 milliGRAM(s) Oral every 6 hours  ALPRAZolam 0.25 milliGRAM(s) Oral two times a day  atorvastatin 40 milliGRAM(s) Oral at bedtime  bisacodyl Suppository 10 milliGRAM(s) Rectal every 24 hours  budesonide 160 MICROgram(s)/formoterol 4.5 MICROgram(s) Inhaler 2 Puff(s) Inhalation two times a day  carvedilol 6.25 milliGRAM(s) Oral every 12 hours  cefTRIAXone   IVPB 1000 milliGRAM(s) IV Intermittent every 24 hours  cyanocobalamin 1000 MICROGram(s) Oral daily  dextrose 5%. 1000 milliLiter(s) (50 mL/Hr) IV Continuous <Continuous>  enoxaparin Injectable 40 milliGRAM(s) SubCutaneous daily  ergocalciferol 37683 Unit(s) Oral <User Schedule>  folic acid 1 milliGRAM(s) Oral daily  gabapentin 200 milliGRAM(s) Oral three times a day  insulin lispro (HumaLOG) corrective regimen sliding scale   SubCutaneous three times a day before meals  insulin lispro (HumaLOG) corrective regimen sliding scale   SubCutaneous at bedtime  insulin lispro Injectable (HumaLOG) 5 Unit(s) SubCutaneous Before meals and at bedtime  loratadine 5 milliGRAM(s) Oral daily  melatonin 3 milliGRAM(s) Oral at bedtime  methylPREDNISolone sodium succinate Injectable 60 milliGRAM(s) IV Push every 8 hours  oxyCODONE    IR 20 milliGRAM(s) Oral four times a day  pantoprazole    Tablet 40 milliGRAM(s) Oral before breakfast  polyethylene glycol 3350 17 Gram(s) Oral daily  sodium chloride 0.9%. 1000 milliLiter(s) (70 mL/Hr) IV Continuous <Continuous>  tiotropium 18 MICROgram(s) Capsule 1 Capsule(s) Inhalation daily      MEDICATIONS  (PRN):  ALBUTerol    90 MICROgram(s) HFA Inhaler 2 Puff(s) Inhalation every 6 hours PRN Shortness of Breath  glucagon  Injectable 1 milliGRAM(s) IntraMuscular once PRN Glucose LESS THAN 70 milligrams/deciliter  ondansetron Injectable 4 milliGRAM(s) IV Push every 4 hours PRN Nausea and/or Vomiting      Allergies    fish (Angioedema)  penicillins (Rash)      PAST MEDICAL & SURGICAL HISTORY:  Malignant neoplasm of  right  lung  HTN (hypertension)  DM (diabetes mellitus)  COPD (chronic obstructive pulmonary disease)  Lung cancer  Morbid obesity  GERD (gastroesophageal reflux disease)  Deviated septum  Prolapsed uterus  ITP (idiopathic thrombocytopenic purpura)  Emphysema/COPD  History of cholecystectomy  S/P lobectomy of lung: right 2017  History of tonsillectomy           REVIEW OF SYSTEMS:  CONSTITUTIONAL: No fever, weight loss, or fatigue   EYES: No eye pain, visual disturbances, or discharge  ENT:  No difficulty hearing, tinnitus, vertigo; No sinus or throat pain  NECK: No pain or stiffness or nodes  RESPIRATORY:  cough--   wheezing--   chills --  hemoptysis -- Shortness of Breath++  CARDIOVASCULAR: No chest pain, palpitations, passing out, dizziness, or leg swelling  GASTROINTESTINAL: No abdominal or epigastric pain. No nausea, vomiting, or hematemesis; No diarrhea or constipation. No melena or hematochezia.  GENITOURINARY: No dysuria, frequency, hematuria, or incontinence  NEUROLOGICAL: No headaches, memory loss, loss of strength, numbness, or tremors  MUSCULOSKELETAL: No joint pain or swelling;  back pain++   HEME/LYMPH: No easy bruising, or bleeding gums  ALLERGY AND IMMUNOLOGIC: No hives or eczema    Vital Signs Last 24 Hrs  T(C): 36.8 (05 Aug 2020 05:32), Max: 36.8 (05 Aug 2020 05:32)  T(F): 98.2 (05 Aug 2020 05:32), Max: 98.2 (05 Aug 2020 05:32)  HR: 80 (05 Aug 2020 05:32) (76 - 92)  BP: 138/73 (05 Aug 2020 05:32) (132/78 - 177/90)  BP(mean): --  RR: 18 (05 Aug 2020 05:32) (17 - 18)  SpO2: 100% (05 Aug 2020 05:32) (94% - 100%)  I&O's Detail      PHYSICAL EXAMINATION:    GENERAL: The patient is a well-developed,  obese w/f n no apparent distress.   SKIN no rash ecchymoses or bruises  HEENT: Head is normocephalic and atraumatic  SHAMA , Extraocular muscles are intact. Mucous membranes  moist.   Neck supple ,No LN felt JVP not increased  Thyroid not enlarged  Cardiovascular:  S1 S2 heard, RSR, No JVD , systolic  murmur at apex, No gallop or rub  Respiratory: Chest wall symmetrical with good air entry ,Percussion note normal,    Lungs vesicular breathing with  decreased BS at basis, no  rales , minimal   wheeze	  ABDOMEN:  Soft, Non-tender, obese, no hepatomegaly or splenomegaly BS positive	  Extremities: Normal range of motion, No clubbing, cyanosis or edema  Vascular: Peripheral pulses palpable 2+ bilaterally  CNS:  Alert and oriented x 3   Cranial nerves intact  sensory intact  motor power 5/5  dtr 2+   Babinski neg    LABS:                        11.4   12.06 )-----------( 70       ( 05 Aug 2020 07:15 )             35.7     08-05    136  |  99  |  17  ----------------------------<  311<H>  4.1   |  31  |  0.60    Ca    8.2<L>      05 Aug 2020 07:15  Phos  3.4     08-05  Mg     2.1     08-05    TPro  5.6<L>  /  Alb  2.8<L>  /  TBili  0.7  /  DBili  x   /  AST  12  /  ALT  21  /  AlkPhos  155<H>  08-05    Folate, Serum: 17.0 ng/mL (08-02-20 @ 18:11)  Vitamin B12, Serum: >2000 pg/mL (08-02-20 @ 18:11)      MICROBIOLOGY:    Culture - Urine (collected 08-02-20 @ 14:58)  Source: .Urine Clean Catch (Midstream)  Final Report (08-04-20 @ 14:05):    50,000 - 99,000 CFU/mL Coag Negative Staphylococcus    "Susceptibilities not performed"    Culture - Blood (collected 08-02-20 @ 11:14)  Source: .Blood Blood  Preliminary Report (08-03-20 @ 12:02):    No growth to date.    Culture - Blood (collected 08-02-20 @ 11:14)  Source: .Blood Blood  Preliminary Report (08-03-20 @ 12:02):    No growth to date.          RADIOLOGY & ADDITIONAL STUDIES:      CT Thoracic spine-- < from: CT Thoracic Spine No Cont (08.04.20 @ 09:38) >  1.  Multiple sclerotic and lytic lesions in the thoracic spine and ribs suggesting metastatic disease, overall progressed since 6/25/2020. Epidural soft tissue at the T7 level suggesting tumor.  2.  Multilevel thoracic compression fractures.  3.  New acute compression fracture of the L1 vertebral body with bony retropulsion.  4.  Emphysema. Right lung wedge resection. Subcarinal and left paraesophageal lymphadenopathy again noted.

## 2020-08-05 NOTE — CONSULT NOTE ADULT - REASON FOR ADMISSION
Shortness of breath and back pain

## 2020-08-05 NOTE — PROGRESS NOTE ADULT - ASSESSMENT
· Assessment		  65 year old lad=y nwith recurrent lung cancer in mediastinum and bones has been on chemo developed sob and increasing pain at back    Problem/Recommendation - 1:  Problem: Lung cancer. Recommendation: recurrent adenoca  in mediastinum and bones  she takes steroid everyday that made immunotherapy not effective  she just began chemo, CEA and mediastinal nodes are better  the new bone lesion can be a repair process or progression of disease while on immunotherapy.  needs MRII of T spine  but she can not lay flat  will do CT of T spine, showed mets and many compression fracture  ? kyphoplasy    Problem/Recommendation - 2:  ·  Problem: COPD exacerbation.  Recommendation: steroid dependent  Dr Nugent is pulmonary.     Problem/Recommendation - 3:  ·  Problem: Chronic pain.  Recommendation: at back  with new lesion in spine plus compression Fx  give her oxycodone 20 plus tylanol  will do CT  ?RT if she can lay flat.   4. leukocytosis  from staroid and neulasta

## 2020-08-05 NOTE — PROGRESS NOTE ADULT - PROBLEM SELECTOR PLAN 5
R lung s/p RLL resection 2017, now metastatic with known bony lesions  -last chemo dose 7/31, on Neulasta, Ketruda, alimta, carboplatin  -started 7 days 5mg Loratadine post-chemo per patient  -of note, patient has PICC line in left brachial vein placed in April 2020 by IR. Spoke to IR 8/3, confirmed that PICC line is ok to leave in place as long as there is no concern for infx, which there is not at this time R lung s/p RLL resection 2017, now metastatic with known bony lesions  -last chemo dose 7/31, on Neulasta, Ketruda, alimta, carboplatin  -started 7 days 5mg Loratadine post-chemo per patient  -of note, patient has PICC line in left brachial vein placed in April 2020 by IR. Spoke to IR 8/3, confirmed that PICC line is ok to leave in place as long as there is no concern for infx, which there is not at this time  -platelets continuing to decrease since admission, 70 today, likely 2/2 chemo, will continue to monitor

## 2020-08-05 NOTE — CONSULT NOTE ADULT - ASSESSMENT
Search Terms: mandi olson, 1955   Search Date: 08/05/2020 15:39:46 PM   The Drug Utilization Report below displays all of the controlled substance prescriptions, if any, that your patient has filled in the last twelve months. The information displayed on this report is compiled from pharmacy submissions to the Department, and accurately reflects the information as submitted by the pharmacies.  This report was requested by: Ghanshyam Salinas | Reference #: [id number]   Others' Prescriptions  Patient Name: Mandi Olson   YOB: 1955   Address: [address] [place] [address]   Sex: Female   Rx Written	Rx Dispensed	Drug	Quantity	Days Supply	Prescriber Name	Payment Method	Dispenser	  07/13/2020	07/17/2020	endocet  mg tablet 	240	30	Mendiola, Paul ZAPATA	[facility]	  07/13/2020	07/17/2020	alprazolam 2 mg tablet 	120	30	Mendiola, Paul ZAPATA	[facility]	  06/19/2020	06/19/2020	endocet  mg tablet 	240	30	Mendiola, Paul ZAPATA	[facility]	  06/19/2020	06/19/2020	alprazolam 2 mg tablet 	90	30	Mendiola, Paul ZAPATA	[email or web address]	[facility] [facility]	  05/18/2020	05/18/2020	endocet  mg tablet 	240	30	Mendiola, Paul ZAPATA	[facility]	  05/18/2020	05/18/2020	alprazolam 2 mg tablet 	90	30	Mendiola, Paul ZAPATA	[email or web address]	[facility]	  04/17/2020	04/17/2020	oxycodone-acetaminophen  mg tab 	180	30	Mendiola, Paul ZAPATA	[facility]	  04/17/2020	04/17/2020	alprazolam 2 mg tablet 	90	30	Mendiola, Paul ZAPATA	[facility] [facility]	  03/12/2020	03/16/2020	alprazolam 2 mg tablet 	90	30	Mendiola, Paul ZAPATA	[facility]	  03/12/2020	03/16/2020	oxycodone-acetaminophen  mg tab 	180	30	Mendiola, Paul ZAPATA	[facility]	  02/14/2020	02/17/2020	oxycodone-acetaminophen  mg tab 	180	30	Mendiola, Paul ZAPATA	[facility]	  02/14/2020	02/17/2020	alprazolam 2 mg tablet 	90	30	Mendiola, Paul ZAPATA	[facility]	  01/17/2020	01/17/2020	alprazolam 2 mg tablet 	90	30	Mendiola, Paul ZAPATA	[facility]	  01/17/2020	01/17/2020	oxycodone-acetaminophen  mg tab 	240	30	Mendiola, Paul ZAPATA	[facility]	  12/16/2019	12/16/2019	oxycodone-acetaminophen  mg tab 	240	30	Mendiola, Paul ZAPATA	[facility]	  12/16/2019	12/16/2019	alprazolam 2 mg tablet 	90	30	Mendiola, Paul ZAPATA	[facility] [facility]	  11/15/2019	11/18/2019	oxycodone-acetaminophen  mg tab 	240	30	Menidola, Paul ZAPATA	[facility] [facility]	  11/15/2019	11/18/2019	alprazolam 2 mg tablet 	90	30	Mendiola, Paul ZAPATA	[facility] [facility] [facility]	  10/18/2019	10/19/2019	alprazolam 2 mg tablet 	90	30	Mendiola, Paul ZAPATA	[facility] [facility] [facility]	  10/18/2019	10/19/2019	oxycodone-acetaminophen  mg tab 	240	30	Mendiola, Paul ZAPATA	[facility]	  09/20/2019	09/20/2019	oxycodone-acetaminophen  mg tab 	240	30	Mendiola, Paul ZAPATA	[facility]	  09/20/2019	09/20/2019	alprazolam 2 mg tablet 	90	30	Mendiola, Paul ZAPATA	[facility]	  08/16/2019	08/19/2019	oxycodone-acetaminophen  mg tab 	240	30	Mendiola, Paul ZAPATA	[facility]	  08/16/2019	08/19/2019	alprazolam 2 mg tablet 	90	30	Mendiola, Paul ZAPATA	[facility] [facility]	  * - Drugs marked with an asterisk are compound drugs. If the compound drug is made up of more than one controlled substance, then each controlled substance will be a separate row in the table.

## 2020-08-05 NOTE — PROGRESS NOTE ADULT - ASSESSMENT
65F with PMH COPD (type B with acute asthmatic [id number] NC at home, 80 pack year hx, quit smoking 8 ya) and r lung cancer s/p RLL resection 2017 on chemo (last dose 7/31) admitted for COPD exacerbation and severe neck/back pain 2/2 known pathologic spine and rib fx. Continuing tx for UTI dx outpatient.

## 2020-08-05 NOTE — CONSULT NOTE ADULT - SUBJECTIVE AND OBJECTIVE BOX
Source of information: , Chart review, patient  Patient language: English  : n/a    CC: Patient is a 65y old  Female who presents with a chief complaint of Shortness of breath and back pain (05 Aug 2020 11:42)      HPI:  Patient is Olson 65 year old woman from home lives with boyfriend who help her woth chores and grocery, with PMH 80 pack year smoking hx quit since 8.5 years , R lung cancer s/p RLL resection 2017 on chemo presenting for acute shortness of breath and pain in back and ribs. She is on 5L oxygen at home for around 3 years but feels SOB and has difficulty walking around the house and feel like she need more oxygen at home. She uses a wheelchair due to SOB from walking and from pain from her fractures. She states she was admitted for COPD exacerbation several times in the past, but never needed intubation. She got her last chemo dose 7/31/2020 . She states she was on a lot of steroids previously and knows how to taper them off as she was not feeling good for last 3 days so she started steroids 60mg oral yesterday by herself. Pt reports increased urinary frequency, urgency, burning. Pt went to Dr. Mendiola  due to her urinary symptoms and was started on levofloxacin. Pt also has constipation from chronic use of pain medications. (02 Aug 2020 14:10)    Pt admitted with sob and back pain.  + dysuria and increased frequency.  Pt treated for uti with IV abx. + history of lung ca.  Pt undergoing chemo with dr. mendiola.  Pt is on percocet 10/325mg and takes 2 tabs every 6 hours at home.  CT thoracic spine shows mets to spine and compression fractures.  + back pain. Pain does not radiate down legs.  No weakness of le. No numbness or tingling. No saddle anesthesia.    PAIN SCORE:   5/10    SCALE USED: (1-10 VNRS)    PAST MEDICAL & SURGICAL HISTORY:  Malignant neoplasm of unspecified part of right bronchus or lung  HTN (hypertension)  DM (diabetes mellitus)  COPD (chronic obstructive pulmonary disease)  Lung cancer  Morbid obesity  GERD (gastroesophageal reflux disease)  Deviated septum  Prolapsed uterus  ITP (idiopathic thrombocytopenic purpura)  Emphysema/COPD  History of cholecystectomy  S/P lobectomy of lung: right 2017  History of tonsillectomy      FAMILY HISTORY:  FH: lung cancer  Family history of stroke  Family history of lung cancer  Diabetes mellitus        SOCIAL HISTORY:  [ x] Denies Smoking, Alcohol, or Drug Use    Allergies    fish (Angioedema)  penicillins (Rash)    Intolerances        MEDICATIONS:    MEDICATIONS  (STANDING):  acetaminophen   Tablet .. 650 milliGRAM(s) Oral every 6 hours  ALPRAZolam 0.25 milliGRAM(s) Oral two times a day  atorvastatin 40 milliGRAM(s) Oral at bedtime  bisacodyl Suppository 10 milliGRAM(s) Rectal every 24 hours  budesonide 160 MICROgram(s)/formoterol 4.5 MICROgram(s) Inhaler 2 Puff(s) Inhalation two times a day  carvedilol 6.25 milliGRAM(s) Oral every 12 hours  cefTRIAXone   IVPB 1000 milliGRAM(s) IV Intermittent every 24 hours  cyanocobalamin 1000 MICROGram(s) Oral daily  dextrose 5%. 1000 milliLiter(s) (50 mL/Hr) IV Continuous <Continuous>  enoxaparin Injectable 40 milliGRAM(s) SubCutaneous daily  ergocalciferol 63350 Unit(s) Oral <User Schedule>  folic acid 1 milliGRAM(s) Oral daily  gabapentin 300 milliGRAM(s) Oral three times a day  insulin lispro (HumaLOG) corrective regimen sliding scale   SubCutaneous three times a day before meals  insulin lispro (HumaLOG) corrective regimen sliding scale   SubCutaneous at bedtime  insulin lispro Injectable (HumaLOG) 5 Unit(s) SubCutaneous Before meals and at bedtime  loratadine 5 milliGRAM(s) Oral daily  melatonin 3 milliGRAM(s) Oral at bedtime  methylPREDNISolone sodium succinate Injectable 60 milliGRAM(s) IV Push every 8 hours  oxyCODONE    IR 20 milliGRAM(s) Oral four times a day  pantoprazole    Tablet 40 milliGRAM(s) Oral before breakfast  polyethylene glycol 3350 17 Gram(s) Oral daily  sodium chloride 0.9%. 1000 milliLiter(s) (70 mL/Hr) IV Continuous <Continuous>  tiotropium 18 MICROgram(s) Capsule 1 Capsule(s) Inhalation daily    MEDICATIONS  (PRN):  ALBUTerol    90 MICROgram(s) HFA Inhaler 2 Puff(s) Inhalation every 6 hours PRN Shortness of Breath  glucagon  Injectable 1 milliGRAM(s) IntraMuscular once PRN Glucose LESS THAN 70 milligrams/deciliter  ondansetron Injectable 4 milliGRAM(s) IV Push every 4 hours PRN Nausea and/or Vomiting      Vital Signs Last 24 Hrs  T(C): 36.6 (05 Aug 2020 13:50), Max: 36.8 (05 Aug 2020 05:32)  T(F): 97.9 (05 Aug 2020 13:50), Max: 98.2 (05 Aug 2020 05:32)  HR: 89 (05 Aug 2020 13:50) (76 - 92)  BP: 180/75 (05 Aug 2020 13:50) (132/78 - 180/75)  BP(mean): --  RR: 18 (05 Aug 2020 13:50) (17 - 18)  SpO2: 93% (05 Aug 2020 13:50) (93% - 100%)    LABS:                          11.4   12.06 )-----------( 70       ( 05 Aug 2020 07:15 )             35.7     08-05    136  |  99  |  17  ----------------------------<  311<H>  4.1   |  31  |  0.60    Ca    8.2<L>      05 Aug 2020 07:15  Phos  3.4     08-05  Mg     2.1     08-05    TPro  5.6<L>  /  Alb  2.8<L>  /  TBili  0.7  /  DBili  x   /  AST  12  /  ALT  21  /  AlkPhos  155<H>  08-05      LIVER FUNCTIONS - ( 05 Aug 2020 07:15 )  Alb: 2.8 g/dL / Pro: 5.6 g/dL / ALK PHOS: 155 U/L / ALT: 21 U/L DA / AST: 12 U/L / GGT: x             CAPILLARY BLOOD GLUCOSE      POCT Blood Glucose.: 387 mg/dL (05 Aug 2020 11:38)  POCT Blood Glucose.: 294 mg/dL (05 Aug 2020 07:40)  POCT Blood Glucose.: 322 mg/dL (04 Aug 2020 21:12)  POCT Blood Glucose.: 219 mg/dL (04 Aug 2020 16:26)      REVIEW OF SYSTEMS:  CONSTITUTIONAL: No fever or fatigue  RESPIRATORY: + sob, + lung cancer  CARDIOVASCULAR: No chest pain, palpitations, dizziness, or leg swelling  GASTROINTESTINAL: No abdominal or epigastric pain. No nausea, vomiting; No diarrhea or constipation.   GENITOURINARY: No dysuria, frequency, hematuria, retention or incontinence  MUSCULOSKELETAL: +lower back pain  NEURO: No weakness, no numbness   PSYCHIATRIC: No depression, anxiety, mood swings, or difficulty sleeping    PHYSICAL EXAM:  GENERAL:  Alert & Oriented X3, NAD, Good concentration  CHEST/LUNG: + decreased breath sounds - right side, + dyspnea  HEART: Regular rate and rhythm; No murmurs, rubs, or gallops  ABDOMEN: Soft, Nontender, Nondistended; Bowel sounds present  : no incontinence, no flank pain  EXTREMITIES:  2+ Peripheral Pulses, No cyanosis, + mild le edema  MUSCULOSKELETAL: + upper thoracic tenderness PPT, + decreased rom   NEUROLOGICAL: awake and alert and oriented   SKIN: No rashes or lesions    Radiology:  < from: CT Thoracic Spine No Cont (08.04.20 @ 09:38) >    EXAM:  CT THORACIC SPINE                            PROCEDURE DATE:  08/04/2020          INTERPRETATION:  CLINICAL INFORMATION: pathological fx. RCC. ADMDIAG1: R06.02 SHORTNESS OF BREATH/.    TECHNIQUE: Noncontrast CT scan of the thoracic spine wasperformed. Thin section axial images were obtained with sagittal and coronal reformations.    COMPARISON: CT chest 8/2/2020 and 6/25/2020    FINDINGS:    Exaggerated thoracic kyphosis.    Multiple sclerotic and lytic lesions in the thoracic spine andribs suggesting metastatic disease, overall progressed since 6/25/2020. Lesions are noted in the T2 and T4-T10 vertebrae, worst at T7 where there is extensive involvement of the vertebral body and posterior elements and epidural soft tissue suggesting tumor ventrally and to the right. Redemonstration of compression deformities of the T2, T4, T7, T8, T10, T11 and T12 vertebral bodies. New acute compression fracture of the L1 vertebral body with bony retropulsion. Sclerotic lesions in the bilateraleighth and right 11th ribs.    Emphysema. Right lung wedge resection. Subcarinal and left paraesophageal lymphadenopathy again noted.      IMPRESSION:  1.  Multiple sclerotic and lytic lesions in the thoracic spine and ribs suggesting metastatic disease, overall progressed since 6/25/2020. Epidural soft tissue at the T7 level suggesting tumor.  2.  Multilevel thoracic compression fractures.  3.  New acute compression fracture of the L1 vertebral body with bony retropulsion    < end of copied text >      Drug Screen:  enoxaparin Injectable 40 milliGRAM(s) SubCutaneous daily    cefTRIAXone   IVPB 1000 milliGRAM(s) IV Intermittent every 24 hours        ORT Score   Family Hx of substance abuse	Female	Male  Alcohol 	                                              1              3  Illegal drugs	                                      2              3  Rx drugs                                               4	      4    Personal Hx of substance abuse		  Alcohol 	                                               3	      3  Illegal drugs                                  	       4	      4  Rx drugs                                                5	      5  Age between 16- 45 years	               1             1  hx preadolescent sexual abuse	       3	      0  Psychological disease		  ADD, OCD, bipolar, schizophrenia	2	      2  Depression                                    	1	      1  Score totals 		  		  a score of 3 or lower indicates low risk for opioid abuse		  a score of 4-7 indicates moderate risk for opioid abuse		  a score of 8 or higher indicates high risk for opioid abuse	    Risk factors associated with adverse outcomes related to opioid treatment  [ ]  Concurrent benzodiazepine use  [ ]  History/ Active substance use or alcohol use disorder  [ ] Psychiatric co-morbidity  [ ] Sleep apnea  [ x] COPD  [ ] BMI> 35  [ ] Liver dysfunction  [ ] Renal dysfunction  [ ] CHF  [ ] Smoker  [ x]  Age > 60 years      [x]  [place]  Reviewed and Copied to Chart. See below.          < from: CT Chest w/ IV Cont (08.02.20 @ 09:31) >  EXAM:  CT CHEST IC                            PROCEDURE DATE:  08/02/2020          INTERPRETATION:  CT CHEST WITH IV CONTRAST    CLINICAL INFORMATION:  Shortness of breath    TECHNIQUE: Contrast enhanced CT of the chest was performed with 55 cc Omnipaque 350 IV contrast. Coronal and sagittal images were reconstructed. This study was performed using automatic exposure control (radiation dose reduction software) to obtain a diagnostic image quality scan with patient dose as low as reasonably achievable.    COMPARISON: Same day chest x-ray, chest CT 6/25/2020    FINDINGS:    LUNGS, AIRWAYS: The central airways are patent. Chronic lingular atelectasis. Right lower lobe wedge resection with associated scarring. Background centrilobular emphysema. No acute airspace disease, consolidation, or suspicious lesion.    PLEURA: No pleural abnormality.    VESSELS: Normal caliber aorta. Main pulmonary arteries are patent.    HEART: Normal heart size. No pericardial effusion. Coronary artery calcifications are present.    MEDIASTINUM AND GUILLE: Improving mediastinal lymphadenopathy with subcarinal lymph node measuring 1.2 cm short axis, previously 1.9 cm and left paraesophageal lymph node measuring 1.4 cm short axis, previously 1.8 cm.    UPPER ABDOMEN: Limited visualization is unremarkable.    BONES AND SOFT TISSUES: Multiple new sclerotic thoracic spine concerning for metastatic disease.    IMPRESSION:    No acute airspace disease or consolidation.    Improving mediastinal lymphadenopathy.    Multiple new sclerotic lesions in the thoracic spine concerning for metastatic disease.    < end of copied text >

## 2020-08-05 NOTE — CONSULT NOTE ADULT - PROBLEM SELECTOR RECOMMENDATION 9
- pt with metastatic lung can to spine.  - ct shows Multiple sclerotic and lytic lesions in the thoracic spine and ribs suggesting metastatic disease, overall progressed since 6/25/2020. Epidural soft tissue at the T7 level suggesting tumor.  Multilevel thoracic compression fractures. New acute compression fracture of the L1 vertebral body with bony retropulsion.  - pt is on takes percocets at home - 2 tabs of 10/325mg.  Pt states pain is controlled on oxycodone 20mg po q 6hours .  - iv opioids is not necessary at this time  - increase gabapentin to 400mg po q 8 hours   - pt started on steroids as per primary and oncology - pt is on prednisone po and to receive iv solumedrol x2.  - will avoid nsaids for now as pt is on steroids and platelets below 100  - + muscle spasms - order flexeril 5mg po q 8 hours prn  - bowel regimen - supp daily, miralax and senna  - pt will benefit from outpt radiation treatment  - follow up oncology recc

## 2020-08-05 NOTE — MEDICAL STUDENT PROGRESS NOTE(EDUCATION) - SUBJECTIVE AND OBJECTIVE BOX
PAGER #: [phone]] TILL 5:00 PM  PLEASE CONTACT ON CALL TEAM:  - On Call Team (Please refer to Rachel) FROM 5:00 PM - 8:30PM  - Nightfloat Team FROM 8:30 -7:30 AM    CHIEF COMPLAINT & BRIEF HOSPITAL COURSE: 65 y.o. F with PMH of COPD (5L NC at home, 80 pack year hx, quit smoking 8 years ago), lung cancer s/p RLL resection in 2017 on chemotherapy (regimen of neulasta, ketruda, alimta, and carboplatin, last dose 7/31), DM, GERD, HTN, ITP, obesity, constipation and uterine prolapse presented to ED on 8/2 for SOB and severe back pain for 3 days prior, secondary to known pathologic spine fractures. Endorsed increasing oxygen requirement and difficulty breathing worse than baseline. Of note, patient has history of multiple COPD exacerbations, per patient without needing intubation. Took 60mg oral steroids at home prior to admission. Also endorsed increased urinary frequency, urgency, and dysuria on admission. WBC increased to 38.11 on admission. CXR showed no acute consolidation. Chest CT showed no acute airspace/consolidation, improving mediastinal lymphadenopathy, and multiple new sclerotic lesions in the thoracic spine concerning for metastasis. Urinalysis on admission positive for WBCs. Urine cultures positive for coag negative Staph (8/2) and blood cultures NGTD. Patient was also found to have UTI outpatient by oncologist Dr. Mendiola 7/31 and was started on levofloxacin; antibiotics continuing inpatient. CT scan of thoracic spine 8/4 showed multiple sclerotic and lytic lesions in thoracic spine and ribs suggesting metastasis; also multilevel compression fractures and a new acute compression fracture.     INTERVAL HPI/OVERNIGHT EVENTS: No acute events overnight, vital signs wnl this am. Patient feeling less anxious this morning, still slightly short of breath on 5L O2 NC however talking and eating without any dyspnea. Reports continued back pain and stiffness making it difficult to move, requesting either increased dose of gabapentin or addition of a muscle relaxant. Urinating ok, endorses mild b/l lower abdominal pressure with slight dysuria. Denies nausea and vomiting. Last bowel movement yesterday. Levofloxacin switched to Ceftriaxone IV 1g q24hrs yesterday secondary to prolonged QTc to 525 on ECG. Per pulm (Dr. Nugent), will d/c IV Solumedrol tomorrow morning and add PO prednisone 40mg qd x4days, then taper off. Continuing on 60mg IV Solumedrol q8 through today. Gabapentin increased to 300mg TID. WBC decreased to 12.06 this am, platelets continue to decrease, 70 this am. PT to see today.    MEDICATIONS  (STANDING):  acetaminophen   Tablet .. 650 milliGRAM(s) Oral every 6 hours  ALPRAZolam 0.25 milliGRAM(s) Oral two times a day  atorvastatin 40 milliGRAM(s) Oral at bedtime  bisacodyl Suppository 10 milliGRAM(s) Rectal every 24 hours  budesonide 160 MICROgram(s)/formoterol 4.5 MICROgram(s) Inhaler 2 Puff(s) Inhalation two times a day  carvedilol 6.25 milliGRAM(s) Oral every 12 hours  cefTRIAXone   IVPB 1000 milliGRAM(s) IV Intermittent every 24 hours  cyanocobalamin 1000 MICROGram(s) Oral daily  dextrose 5%. 1000 milliLiter(s) (50 mL/Hr) IV Continuous <Continuous>  enoxaparin Injectable 40 milliGRAM(s) SubCutaneous daily  ergocalciferol 37232 Unit(s) Oral <User Schedule>  folic acid 1 milliGRAM(s) Oral daily  gabapentin 300 milliGRAM(s) Oral three times a day  insulin lispro (HumaLOG) corrective regimen sliding scale   SubCutaneous three times a day before meals  insulin lispro (HumaLOG) corrective regimen sliding scale   SubCutaneous at bedtime  insulin lispro Injectable (HumaLOG) 5 Unit(s) SubCutaneous Before meals and at bedtime  loratadine 5 milliGRAM(s) Oral daily  melatonin 3 milliGRAM(s) Oral at bedtime  methylPREDNISolone sodium succinate Injectable 60 milliGRAM(s) IV Push every 8 hours  oxyCODONE    IR 20 milliGRAM(s) Oral four times a day  pantoprazole    Tablet 40 milliGRAM(s) Oral before breakfast  polyethylene glycol 3350 17 Gram(s) Oral daily  sodium chloride 0.9%. 1000 milliLiter(s) (70 mL/Hr) IV Continuous <Continuous>  tiotropium 18 MICROgram(s) Capsule 1 Capsule(s) Inhalation daily    MEDICATIONS  (PRN):  ALBUTerol    90 MICROgram(s) HFA Inhaler 2 Puff(s) Inhalation every 6 hours PRN Shortness of Breath  glucagon  Injectable 1 milliGRAM(s) IntraMuscular once PRN Glucose LESS THAN 70 milligrams/deciliter  ondansetron Injectable 4 milliGRAM(s) IV Push every 4 hours PRN Nausea and/or Vomiting        REVIEW OF SYSTEMS:  CONSTITUTIONAL: No fever or weight loss, +fatigue  RESPIRATORY: Shortness of breath and mild wheezing. No cough, chills or hemoptysis  CARDIOVASCULAR: No chest pain, palpitations, or dizziness.  GASTROINTESTINAL: +Constipation. No abdominal pain but mild lower abdominal pressure. No nausea, vomiting, or hematemesis; No diarrhea   GENITOURINARY: Mild dysuria. No hematuria or urinary urgency/frequency  NEUROLOGICAL: No headaches, loss of strength, numbness, or tremors  MSK: Thoracic and lumbar back pain and stiffness/rigidity; decreased motion in all planes   EXTREMITIES: bilateral lower leg swelling and tenderness  SKIN: No rashes or lesions     Vital Signs Last 24 Hrs  T(C): 36.8 (05 Aug 2020 05:32), Max: 36.8 (05 Aug 2020 05:32)  T(F): 98.2 (05 Aug 2020 05:32), Max: 98.2 (05 Aug 2020 05:32)  HR: 80 (05 Aug 2020 05:32) (76 - 92)  BP: 138/73 (05 Aug 2020 05:32) (132/78 - 177/90)  BP(mean): --  RR: 18 (05 Aug 2020 05:32) (17 - 18)  SpO2: 100% (05 Aug 2020 05:32) (94% - 100%)    PHYSICAL EXAMINATION: limited by patient's condition   GENERAL: NAD, obese female, appears stated age   HEAD:  Atraumatic, Normocephalic  EYES:  conjunctiva and sclera clear  NECK: Supple, No JVD  CHEST/LUNG: Wheezes heard in b/l upper lung fields (may be coming from throat); unable to auscultate lower lungs due to patient condition. No rales or rhonchi.  HEART: Regular rate and rhythm; No murmurs, rubs, or gallops  ABDOMEN: Obese, Soft, Nontender; Bowel sounds present  NERVOUS SYSTEM: alert and oriented x3, no focal deficits   EXTREMITIES: Bilateral LE edema and tenderness. No cyanosis.  SKIN: warm and dry                          11.4   12.06 )-----------( 70       ( 05 Aug 2020 07:15 )             35.7     08-05    136  |  99  |  17  ----------------------------<  311<H>  4.1   |  31  |  0.60    Ca    8.2<L>      05 Aug 2020 07:15  Phos  3.4     08-05  Mg     2.1     08-05    TPro  5.6<L>  /  Alb  2.8<L>  /  TBili  0.7  /  DBili  x   /  AST  12  /  ALT  21  /  AlkPhos  155<H>  08-05    LIVER FUNCTIONS - ( 05 Aug 2020 07:15 )  Alb: 2.8 g/dL / Pro: 5.6 g/dL / ALK PHOS: 155 U/L / ALT: 21 U/L DA / AST: 12 U/L / GGT: x                   CAPILLARY BLOOD GLUCOSE      RADIOLOGY & ADDITIONAL TESTS:

## 2020-08-05 NOTE — PROGRESS NOTE ADULT - ATTENDING COMMENTS
Patient is a 65y old  Female who presents with a chief complaint of Shortness of breath and back pain (04 Aug 2020 12:12)    Patient was seen and examined at bedside   SOB improved  Complains of worsening back pain and lower abd pain     REVIEW OF SYSTEMS: denies fever, chills, chest pain, nausea, vomiting, diarrhea, constipation, dizziness    PHYSICAL EXAM:  GENERAL: NAD, obese  NERVOUS SYSTEM:  Alert & Oriented X3, Good concentration; no focal deficit   CHEST/LUNG: BL mild wheezing- improved  HEART: Regular rate and rhythm; No murmurs, rubs, or gallops  ABDOMEN: Soft, Nontender, Nondistended; Bowel sounds present  EXTREMITIES:  1+ edema, left arm picc line   SKIN: No rashes or lesions    Assessment and plan:  1. Acute on chronic hypoxic and hypercarbic respiratory failure due to COPD exacerbation  2. Lung adenocarcinoma with bone mets   3. Leukocytosis  4. Chronic pain   5. HTN   6. Vitamin D deficiency  7. DM   8. UTI    Plan:  Agree with switching to PO prednisone   Titrate down NC as tolerated   Cont Symbicort and Spiriva   S/p RLL lobectomy, On Immunotherapy and chemotherapy  Dr PHILLIPS on board  Consulted pain management   Patient can not lay flat due to pain and SOB to undergo MRI   Cont Ceftriaxone for UTI  Bowel regimen   Rest of the management as above   Full code

## 2020-08-05 NOTE — MEDICAL STUDENT PROGRESS NOTE(EDUCATION) - NS MD HP STUD ASPLAN PLAN FT
Problem 1: COPD exacerbation   -patient presented to ED 8/2 with SOB and increasing O2 requirements for 3 days prior; took 60mg steroids at home on 7/31. Has history of multiple COPD exacerbations requiring hospitalization but never intubation   -VBG shows compensation   - CT chest and CXR 8/2 show no acute process/consolidation   -continue with 60mg Solumedrol q8 and 5.5L O2 on NC   -continue with bronchodilators (spiriva, symbicort, albuterol)  - Dr Nugent saw patient, recommends ABG on RA and O2 NC set to keep O2 saturation at 92%  -continue with levofloxacin 750mg x5days (coverage for both resp and UTI)  -WBCs elevated, 25.01 this am   -blood cultures no growth to date  -continue to monitor vitals and O2   -pulmonology Dr. Nugent following  -pulm rehab on discharge        Problem 2: Chronic pain  patient has history of chronic back pain secondary to known pathologic fractures in spine and ribs  -continue with oxycodone, Tylenol, and Gabapentin   -reports pain better controlled on PO oxycodone 20mg + tylenol 650mg  -CT chest 8/2 showed multiple new sclerotic lesions in thoracic spine concerning for metastasis; per heme onc can be a repair process or progression of disease while on immunotherapy.  -CT thoracic spine today showed multiple sclerotic and lytic lesions in thoracic spine and ribs suggesting metastasis; also multilevel compression fractures and a new acute compression fracture of L1.  -bisacodyl, miralax for constipation  -reports last bowel movement this am, no diarrhea  -Dr. Mendiola from heme onc following    Problem 3: UTI  Patient diagnosed with UTI outpatient 7/31 by oncologist Dr. Mendiola, started levofloxacin at that time  -UA on admission positive for WBCs  -continue with levofloxacin 750mg x5days (both pulm and UTI coverage)  -f/u urine cultures   -patient reports urinary symptoms better today    Problem 4: Lung cancer  R lung s/p RLL resection 2017, now metastatic with known bony lesions  -recently started chemo, last dose 7/31  -started 7 days 5mg Loratadine post-chemo per patient  -CT thoracic spine today showed multiple sclerotic and lytic lesions in thoracic spine and ribs suggesting metastasis  -of note, patient has PICC line in left brachial vein placed in April 2020 by IR. IR confirmed that PICC line is ok to leave in place as long as there is no concern for infection, which there is not at this time.  -oncologist Dr. Mendiola following    Problem 5: Vitamin D deficiency   Patient found to have level of 10   -started ergocalciferol 43477K q week.    Problem 6: Hypertension   Patient has history of hypertension   Continue with home carvedilol 6.25mg q12h.  -/73 this am      Problem 7: Diabetes   Patient has history of Diabetes mellitus  -HgbA1c 8.6 this admission, unchanged from previously reported value 4/2020  -elevated blood glucose readings likely exacerbated by steroids; fingerstick glucose 291 this am   -diet-controlled at home  -on Humalog and sliding scale, will continue to monitor blood glucose   -DASH diet, low sodium and low cholesterol     Problem: Prophylactic measure.  Plan: RISK                                                                Points  [  ] Previous VTE                                                  3  [  ] Thrombophilia                                               2  [  ] Lower limb paralysis                                      2        (unable to hold up >15 seconds)    [ 2 ] Current Cancer                                             2         (within 6 months)  [  ] Immobilization > 24 hrs                                1  [  ] ICU/CCU stay > 24 hours                              1  [ 1 ] Age > 60                                                      1  IMPROVE VTE Score ___3_____    Lovenox 40mg for DVT prophylaxis Problem 1: COPD exacerbation   -patient presented to ED 8/2 with SOB and increasing O2 requirements for 3 days prior; took 60mg steroids at home on 7/31. Has history of multiple COPD exacerbations requiring hospitalization but never intubation   -VBG shows compensation; patient refused ABG yesterday    - CT chest and CXR 8/2 show no acute process/consolidation   -continue with 60mg Solumedrol q8 and 5L O2 on NC for today   -per pulm (Dr. Nugent) d/c IV Solumedrol tomorrow morning, switch to PO prednisone 40mg qd x4days, then taper  -continue with bronchodilators (spiriva, symbicort, albuterol)  -O2 saturation today 100% on 5L NC  - Dr Nugent recommends decrease O2 NC to 2-3L, keep O2 saturation around 92%  -Levofloxacin d/c yesterday secondary to patient's QTc of 525 on ECG; switched to Ceftriaxone IV 1g q24hrs  -WBCs elevated past few days secondary to steroids and neulasta, decreased to 12.06 this am   -blood cultures no growth to date  -continue to monitor vitals and O2   -pulmonology Dr. Nugent following  -pulm rehab on discharge        Problem 2: Chronic pain  patient has history of chronic back pain secondary to known pathologic fractures in spine and ribs  -continue with oxycodone, Tylenol, and Gabapentin   -reports pain better controlled on PO oxycodone 20mg + tylenol 650mg  -CT chest 8/2 showed multiple new sclerotic lesions in thoracic spine concerning for metastasis; per heme onc can be a repair process or progression of disease while on immunotherapy.  -CT thoracic spine 8/4 showed multiple sclerotic and lytic lesions in thoracic spine and ribs suggesting metastasis, epidural soft tissue at the T7 level suggesting tumor;  also multilevel thoracic compression fractures and a new acute compression fracture of L1 with bony retropulsion.  -continue with 20mg oxycodone q4 and 650 Tylenol q6, per Dr. Nugent increase gabapentin to 300mg TID  -bisacodyl, miralax for constipation  -reports last bowel movement yesterday, no diarrhea  -Dr. Mendiola from heme onc following    Problem 3: UTI  Patient diagnosed with UTI outpatient 7/31 by oncologist Dr. Mendiola, started levofloxacin 750mg at that time and continued inpatient   -UA on admission positive for WBCs  -Levofloxacin d/c yesterday secondary to patient's QTc of 525 on ECG; switched to Ceftriaxone IV 1g q24hrs    -urine cultures 8/2 positive for coagulase negative Staph   -patient reports urinary symptoms better today, still with mild dysuria and lower abdominal pressure  -WBCs elevated past few days likely secondary to steroids and neulasta (25 yesterday), decreased to 12.06 this am   - will continue to monitor      Problem 4: Lung cancer  R lung s/p RLL resection 2017, now metastatic with known bony lesions  -recently started chemo (Neulasta, Ketruda, alimta, carboplatin), last dose 7/31  -started 7 days 5mg Loratadine post-chemo per patient  -CT thoracic spine today showed multiple sclerotic and lytic lesions in thoracic spine and ribs suggesting metastasis  -of note, patient has PICC line in left brachial vein placed in April 2020 by IR. IR confirmed 8/3 that PICC line is ok to leave in place as long as there is no concern for infection, which there is not at this time.  -WBCs elevated past few days likely secondary to steroids and neulasta (25 yesterday), decreased to 12.06 this am   -platelets continuing to decrease since admission, 70 today, likely due to chemo; will continue to monitor  -oncologist Dr. Mendiola following      Problem 5: Vitamin D deficiency   Patient found to have level of 10   -started ergocalciferol 12409C q week.    Problem 6: Hypertension   Patient has history of hypertension   Continue with home carvedilol 6.25mg q12h.  -/73 this am      Problem 7: Diabetes   Patient has history of Diabetes mellitus  -HgbA1c 8.6 this admission, unchanged from previously reported value 4/2020  -elevated blood glucose readings likely exacerbated by steroids; fingerstick glucose 294 this am   -diet-controlled at home  -on Humalog and sliding scale, will continue to monitor blood glucose   -DASH diet, low sodium and low cholesterol     Problem 8: Prophylactic measure.  Plan: RISK                                                                Points  [  ] Previous VTE                                                  3  [  ] Thrombophilia                                               2  [  ] Lower limb paralysis                                      2        (unable to hold up >15 seconds)    [ 2 ] Current Cancer                                             2         (within 6 months)  [  ] Immobilization > 24 hrs                                1  [  ] ICU/CCU stay > 24 hours                              1  [ 1 ] Age > 60                                                      1  IMPROVE VTE Score ___3_____    Lovenox 40mg for DVT prophylaxis

## 2020-08-05 NOTE — PROGRESS NOTE ADULT - SUBJECTIVE AND OBJECTIVE BOX
PGY-1 Progress Note discussed with attending    PAGER #: [[phone]] TILL 5:00 PM  PLEASE CONTACT ON CALL TEAM:  - On Call Team (Please refer to Rachel) FROM 5:00 PM - 8:30PM  - Nightfloat Team FROM 8:30 -7:30 AM    CHIEF COMPLAINT & BRIEF HOSPITAL COURSE:    INTERVAL HPI/OVERNIGHT EVENTS: NAEON. VS wnl this am. Patient reports slight improvement in breathing this am. Endorses ongoing "bicycle-lock" back pain and is requesting muscle relaxers.     MEDICATIONS  (STANDING):  acetaminophen   Tablet .. 650 milliGRAM(s) Oral every 6 hours  ALPRAZolam 0.25 milliGRAM(s) Oral two times a day  atorvastatin 40 milliGRAM(s) Oral at bedtime  bisacodyl Suppository 10 milliGRAM(s) Rectal every 24 hours  budesonide 160 MICROgram(s)/formoterol 4.5 MICROgram(s) Inhaler 2 Puff(s) Inhalation two times a day  carvedilol 6.25 milliGRAM(s) Oral every 12 hours  cefTRIAXone   IVPB 1000 milliGRAM(s) IV Intermittent every 24 hours  cyanocobalamin 1000 MICROGram(s) Oral daily  dextrose 5%. 1000 milliLiter(s) (50 mL/Hr) IV Continuous <Continuous>  enoxaparin Injectable 40 milliGRAM(s) SubCutaneous daily  ergocalciferol 08119 Unit(s) Oral <User Schedule>  folic acid 1 milliGRAM(s) Oral daily  gabapentin 200 milliGRAM(s) Oral three times a day  insulin lispro (HumaLOG) corrective regimen sliding scale   SubCutaneous three times a day before meals  insulin lispro (HumaLOG) corrective regimen sliding scale   SubCutaneous at bedtime  insulin lispro Injectable (HumaLOG) 5 Unit(s) SubCutaneous Before meals and at bedtime  loratadine 5 milliGRAM(s) Oral daily  melatonin 3 milliGRAM(s) Oral at bedtime  methylPREDNISolone sodium succinate Injectable 60 milliGRAM(s) IV Push every 8 hours  oxyCODONE    IR 20 milliGRAM(s) Oral four times a day  pantoprazole    Tablet 40 milliGRAM(s) Oral before breakfast  polyethylene glycol 3350 17 Gram(s) Oral daily  sodium chloride 0.9%. 1000 milliLiter(s) (70 mL/Hr) IV Continuous <Continuous>  tiotropium 18 MICROgram(s) Capsule 1 Capsule(s) Inhalation daily    MEDICATIONS  (PRN):  ALBUTerol    90 MICROgram(s) HFA Inhaler 2 Puff(s) Inhalation every 6 hours PRN Shortness of Breath  glucagon  Injectable 1 milliGRAM(s) IntraMuscular once PRN Glucose LESS THAN 70 milligrams/deciliter  ondansetron Injectable 4 milliGRAM(s) IV Push every 4 hours PRN Nausea and/or Vomiting        REVIEW OF SYSTEMS:  CONSTITUTIONAL: No fever, weight loss, or fatigue  RESPIRATORY: No cough, wheezing, chills or hemoptysis; No shortness of breath  CARDIOVASCULAR: No chest pain, palpitations, dizziness, or leg swelling  GASTROINTESTINAL: No abdominal pain. No nausea, vomiting, or hematemesis; No diarrhea or constipation. No melena or hematochezia.  GENITOURINARY: No dysuria or hematuria, urinary frequency  NEUROLOGICAL: No headaches, memory loss, loss of strength, numbness, or tremors  SKIN: No itching, burning, rashes, or lesions     Vital Signs Last 24 Hrs  T(C): 36.8 (05 Aug 2020 05:32), Max: 36.8 (05 Aug 2020 05:32)  T(F): 98.2 (05 Aug 2020 05:32), Max: 98.2 (05 Aug 2020 05:32)  HR: 80 (05 Aug 2020 05:32) (76 - 92)  BP: 138/73 (05 Aug 2020 05:32) (132/78 - 177/90)  BP(mean): --  RR: 18 (05 Aug 2020 05:32) (17 - 18)  SpO2: 100% (05 Aug 2020 05:32) (94% - 100%)    PHYSICAL EXAMINATION:  GENERAL: NAD, well built  HEAD:  Atraumatic, Normocephalic  EYES:  conjunctiva and sclera clear  NECK: Supple, No JVD, Normal thyroid  CHEST/LUNG: Clear to auscultation. Clear to percussion bilaterally; No rales, rhonchi, wheezing, or rubs  HEART: Regular rate and rhythm; No murmurs, rubs, or gallops  ABDOMEN: Soft, Nontender, Nondistended; Bowel sounds present  NERVOUS SYSTEM: alert and oriented x3  EXTREMITIES:  2+ Peripheral Pulses, No clubbing, cyanosis, or edema  SKIN: warm dry                          11.4   12.06 )-----------( x        ( 05 Aug 2020 07:15 )             35.7     08-05    136  |  99  |  17  ----------------------------<  311<H>  4.1   |  31  |  0.60    Ca    8.2<L>      05 Aug 2020 07:15  Phos  3.4     08-05  Mg     2.1     08-05    TPro  5.6<L>  /  Alb  2.8<L>  /  TBili  0.7  /  DBili  x   /  AST  12  /  ALT  21  /  AlkPhos  155<H>  08-05    LIVER FUNCTIONS - ( 05 Aug 2020 07:15 )  Alb: 2.8 g/dL / Pro: 5.6 g/dL / ALK PHOS: 155 U/L / ALT: 21 U/L DA / AST: 12 U/L / GGT: x                   CAPILLARY BLOOD GLUCOSE      RADIOLOGY & ADDITIONAL TESTS: PGY-1 Progress Note discussed with attending    PAGER #: [[phone]] TILL 5:00 PM  PLEASE CONTACT ON CALL TEAM:  - On Call Team (Please refer to Rachel) FROM 5:00 PM - 8:30PM  - Nightfloat Team FROM 8:30 -7:30 AM    CHIEF COMPLAINT & BRIEF HOSPITAL COURSE: 65F with PMH COPD (type B with acute asthmatic bronchitis, 5L NC at home, 80 pack year hx, quit smoking 8 ya) and r lung cancer s/p RLL resection 2017 on chemo (regimen of neulasta, ketruda, alimta, and carboplatin, last dose 7/31) admitted for COPD exacerbation and severe neck/back pain 2/2 known pathologic spine and rib fx. Patient takes Percocet for this pain and has chronic constipation. Of note, patient has history of multiple COPD exacerbations, per patient without needing intubation, and started herself on PO steroids 60mg day prior to admission. Patient was also found to have UTI outpatient by oncologist Dr. Mendiola and was started on5 day course of levofloxacin, will continue abx as inpatient.      INTERVAL HPI/OVERNIGHT EVENTS: NAEON. VS wnl this am. Patient reports slight improvement in breathing this am. Endorses ongoing "bicycle-lock" back pain and is requesting more pain control, will increase gabapentin to 300mg TID today. States that she still has dysuria and urinary frequency, unchanged from admission. Denies abdominal pain or nausea. WBC decreased to 12 this am. UCx growing coag negative Staph. Levaquin dc 2/2 prolonged QTc to 525, started cftx. Continuing on 60mg IV Solumedrol q8 through today and will change to 40mg PO qd tomorrow am. PT to see today.    MEDICATIONS  (STANDING):  acetaminophen   Tablet .. 650 milliGRAM(s) Oral every 6 hours  ALPRAZolam 0.25 milliGRAM(s) Oral two times a day  atorvastatin 40 milliGRAM(s) Oral at bedtime  bisacodyl Suppository 10 milliGRAM(s) Rectal every 24 hours  budesonide 160 MICROgram(s)/formoterol 4.5 MICROgram(s) Inhaler 2 Puff(s) Inhalation two times a day  carvedilol 6.25 milliGRAM(s) Oral every 12 hours  cefTRIAXone   IVPB 1000 milliGRAM(s) IV Intermittent every 24 hours  cyanocobalamin 1000 MICROGram(s) Oral daily  dextrose 5%. 1000 milliLiter(s) (50 mL/Hr) IV Continuous <Continuous>  enoxaparin Injectable 40 milliGRAM(s) SubCutaneous daily  ergocalciferol 38971 Unit(s) Oral <User Schedule>  folic acid 1 milliGRAM(s) Oral daily  gabapentin 200 milliGRAM(s) Oral three times a day  insulin lispro (HumaLOG) corrective regimen sliding scale   SubCutaneous three times a day before meals  insulin lispro (HumaLOG) corrective regimen sliding scale   SubCutaneous at bedtime  insulin lispro Injectable (HumaLOG) 5 Unit(s) SubCutaneous Before meals and at bedtime  loratadine 5 milliGRAM(s) Oral daily  melatonin 3 milliGRAM(s) Oral at bedtime  methylPREDNISolone sodium succinate Injectable 60 milliGRAM(s) IV Push every 8 hours  oxyCODONE    IR 20 milliGRAM(s) Oral four times a day  pantoprazole    Tablet 40 milliGRAM(s) Oral before breakfast  polyethylene glycol 3350 17 Gram(s) Oral daily  sodium chloride 0.9%. 1000 milliLiter(s) (70 mL/Hr) IV Continuous <Continuous>  tiotropium 18 MICROgram(s) Capsule 1 Capsule(s) Inhalation daily    MEDICATIONS  (PRN):  ALBUTerol    90 MICROgram(s) HFA Inhaler 2 Puff(s) Inhalation every 6 hours PRN Shortness of Breath  glucagon  Injectable 1 milliGRAM(s) IntraMuscular once PRN Glucose LESS THAN 70 milligrams/deciliter  ondansetron Injectable 4 milliGRAM(s) IV Push every 4 hours PRN Nausea and/or Vomiting        REVIEW OF SYSTEMS:  CONSTITUTIONAL: anxiety, No fatigue  RESPIRATORY: mild cough, wheezing, improved but ongoing shortness of breath  CARDIOVASCULAR: No chest pain, chronic leg swelling/pain unchanged from baseline  GASTROINTESTINAL: No abdominal pain. No nausea, vomiting, No diarrhea or constipation.   GENITOURINARY: dysuria and urinary frequency, unchanged from admission   NEUROLOGICAL: No headaches    Vital Signs Last 24 Hrs  T(C): 36.8 (05 Aug 2020 05:32), Max: 36.8 (05 Aug 2020 05:32)  T(F): 98.2 (05 Aug 2020 05:32), Max: 98.2 (05 Aug 2020 05:32)  HR: 80 (05 Aug 2020 05:32) (76 - 92)  BP: 138/73 (05 Aug 2020 05:32) (132/78 - 177/90)  BP(mean): --  RR: 18 (05 Aug 2020 05:32) (17 - 18)  SpO2: 100% (05 Aug 2020 05:32) (94% - 100%)    PHYSICAL EXAMINATION:  GENERAL: NAD, awoke from sleep, obese  HEAD:  Atraumatic, Normocephalic  EYES:  conjunctiva and sclera clear, eomi  NECK: Supple  CHEST/LUNG: wheezes heard throughout, but likely from throat  HEART: Regular rate and rhythm; No murmurs, rubs, or gallops  ABDOMEN: Soft, Nontender, obese, Bowel sounds present  NERVOUS SYSTEM: alert and oriented x3  EXTREMITIES:  2+ Peripheral Pulses, b/l edema, tender to palpation, per patient chronic and unchanged from baseline  SKIN: warm dry                          11.4   12.06 )-----------( x        ( 05 Aug 2020 07:15 )             35.7     08-05    136  |  99  |  17  ----------------------------<  311<H>  4.1   |  31  |  0.60    Ca    8.2<L>      05 Aug 2020 07:15  Phos  3.4     08-05  Mg     2.1     08-05    TPro  5.6<L>  /  Alb  2.8<L>  /  TBili  0.7  /  DBili  x   /  AST  12  /  ALT  21  /  AlkPhos  155<H>  08-05    LIVER FUNCTIONS - ( 05 Aug 2020 07:15 )  Alb: 2.8 g/dL / Pro: 5.6 g/dL / ALK PHOS: 155 U/L / ALT: 21 U/L DA / AST: 12 U/L / GGT: x                   CAPILLARY BLOOD GLUCOSE      RADIOLOGY & ADDITIONAL TESTS: PGY-1 Progress Note discussed with attending    PAGER #: [[phone]] TILL 5:00 PM  PLEASE CONTACT ON CALL TEAM:  - On Call Team (Please refer to Rachel) FROM 5:00 PM - 8:30PM  - Nightfloat Team FROM 8:30 -7:30 AM    CHIEF COMPLAINT & BRIEF HOSPITAL COURSE: 65F with PMH COPD (type B with acute asthmatic bronchitis, 5L NC at home, 80 pack year hx, quit smoking 8 ya) and r lung cancer s/p RLL resection 2017 on chemo (regimen of neulasta, ketruda, alimta, and carboplatin, last dose 7/31) admitted for COPD exacerbation and severe neck/back pain 2/2 known pathologic spine and rib fx. Patient takes Percocet for this pain and has chronic constipation. Of note, patient has history of multiple COPD exacerbations, per patient without needing intubation, and started herself on PO steroids 60mg day prior to admission. Patient was also found to have UTI outpatient by oncologist Dr. Mendiola and was started on5 day course of levofloxacin, will continue abx as inpatient.      INTERVAL HPI/OVERNIGHT EVENTS: NAEON. VS wnl this am. Patient reports slight improvement in breathing this am. Endorses ongoing "bicycle-lock" back pain and is requesting more pain control, will increase gabapentin to 300mg TID today. States that she still has dysuria and urinary frequency, unchanged from admission. Denies abdominal pain or nausea. WBC decreased to 12 this am, platelets continue to decrease, 70 this am. UCx growing coag negative Staph. Levaquin dc 2/2 prolonged QTc to 525, started cftx. Continuing on 60mg IV Solumedrol q8 through today and will change to 40mg PO qd tomorrow am. PT to see today.    MEDICATIONS  (STANDING):  acetaminophen   Tablet .. 650 milliGRAM(s) Oral every 6 hours  ALPRAZolam 0.25 milliGRAM(s) Oral two times a day  atorvastatin 40 milliGRAM(s) Oral at bedtime  bisacodyl Suppository 10 milliGRAM(s) Rectal every 24 hours  budesonide 160 MICROgram(s)/formoterol 4.5 MICROgram(s) Inhaler 2 Puff(s) Inhalation two times a day  carvedilol 6.25 milliGRAM(s) Oral every 12 hours  cefTRIAXone   IVPB 1000 milliGRAM(s) IV Intermittent every 24 hours  cyanocobalamin 1000 MICROGram(s) Oral daily  dextrose 5%. 1000 milliLiter(s) (50 mL/Hr) IV Continuous <Continuous>  enoxaparin Injectable 40 milliGRAM(s) SubCutaneous daily  ergocalciferol 50637 Unit(s) Oral <User Schedule>  folic acid 1 milliGRAM(s) Oral daily  gabapentin 200 milliGRAM(s) Oral three times a day  insulin lispro (HumaLOG) corrective regimen sliding scale   SubCutaneous three times a day before meals  insulin lispro (HumaLOG) corrective regimen sliding scale   SubCutaneous at bedtime  insulin lispro Injectable (HumaLOG) 5 Unit(s) SubCutaneous Before meals and at bedtime  loratadine 5 milliGRAM(s) Oral daily  melatonin 3 milliGRAM(s) Oral at bedtime  methylPREDNISolone sodium succinate Injectable 60 milliGRAM(s) IV Push every 8 hours  oxyCODONE    IR 20 milliGRAM(s) Oral four times a day  pantoprazole    Tablet 40 milliGRAM(s) Oral before breakfast  polyethylene glycol 3350 17 Gram(s) Oral daily  sodium chloride 0.9%. 1000 milliLiter(s) (70 mL/Hr) IV Continuous <Continuous>  tiotropium 18 MICROgram(s) Capsule 1 Capsule(s) Inhalation daily    MEDICATIONS  (PRN):  ALBUTerol    90 MICROgram(s) HFA Inhaler 2 Puff(s) Inhalation every 6 hours PRN Shortness of Breath  glucagon  Injectable 1 milliGRAM(s) IntraMuscular once PRN Glucose LESS THAN 70 milligrams/deciliter  ondansetron Injectable 4 milliGRAM(s) IV Push every 4 hours PRN Nausea and/or Vomiting        REVIEW OF SYSTEMS:  CONSTITUTIONAL: anxiety, No fatigue  RESPIRATORY: mild cough, wheezing, improved but ongoing shortness of breath  CARDIOVASCULAR: No chest pain, chronic leg swelling/pain unchanged from baseline  GASTROINTESTINAL: No abdominal pain. No nausea, vomiting, No diarrhea or constipation.   GENITOURINARY: dysuria and urinary frequency, unchanged from admission   NEUROLOGICAL: No headaches    Vital Signs Last 24 Hrs  T(C): 36.8 (05 Aug 2020 05:32), Max: 36.8 (05 Aug 2020 05:32)  T(F): 98.2 (05 Aug 2020 05:32), Max: 98.2 (05 Aug 2020 05:32)  HR: 80 (05 Aug 2020 05:32) (76 - 92)  BP: 138/73 (05 Aug 2020 05:32) (132/78 - 177/90)  BP(mean): --  RR: 18 (05 Aug 2020 05:32) (17 - 18)  SpO2: 100% (05 Aug 2020 05:32) (94% - 100%)    PHYSICAL EXAMINATION:  GENERAL: NAD, awoke from sleep, obese  HEAD:  Atraumatic, Normocephalic  EYES:  conjunctiva and sclera clear, eomi  NECK: Supple  CHEST/LUNG: wheezes heard throughout, but likely from throat  HEART: Regular rate and rhythm; No murmurs, rubs, or gallops  ABDOMEN: Soft, Nontender, obese, Bowel sounds present  NERVOUS SYSTEM: alert and oriented x3  EXTREMITIES:  2+ Peripheral Pulses, b/l edema, tender to palpation, per patient chronic and unchanged from baseline  SKIN: warm dry                          11.4   12.06 )-----------( x        ( 05 Aug 2020 07:15 )             35.7     08-05    136  |  99  |  17  ----------------------------<  311<H>  4.1   |  31  |  0.60    Ca    8.2<L>      05 Aug 2020 07:15  Phos  3.4     08-05  Mg     2.1     08-05    TPro  5.6<L>  /  Alb  2.8<L>  /  TBili  0.7  /  DBili  x   /  AST  12  /  ALT  21  /  AlkPhos  155<H>  08-05    LIVER FUNCTIONS - ( 05 Aug 2020 07:15 )  Alb: 2.8 g/dL / Pro: 5.6 g/dL / ALK PHOS: 155 U/L / ALT: 21 U/L DA / AST: 12 U/L / GGT: x                   CAPILLARY BLOOD GLUCOSE      RADIOLOGY & ADDITIONAL TESTS:

## 2020-08-06 VITALS
HEART RATE: 107 BPM | RESPIRATION RATE: 18 BRPM | SYSTOLIC BLOOD PRESSURE: 124 MMHG | TEMPERATURE: 98 F | DIASTOLIC BLOOD PRESSURE: 84 MMHG | OXYGEN SATURATION: 97 %

## 2020-08-06 LAB
ALBUMIN SERPL ELPH-MCNC: 2.9 G/DL — LOW (ref 3.5–5)
ALP SERPL-CCNC: 161 U/L — HIGH (ref 40–120)
ALT FLD-CCNC: 21 U/L DA — SIGNIFICANT CHANGE UP (ref 10–60)
ANION GAP SERPL CALC-SCNC: 7 MMOL/L — SIGNIFICANT CHANGE UP (ref 5–17)
AST SERPL-CCNC: 9 U/L — LOW (ref 10–40)
BILIRUB SERPL-MCNC: 0.5 MG/DL — SIGNIFICANT CHANGE UP (ref 0.2–1.2)
BUN SERPL-MCNC: 19 MG/DL — HIGH (ref 7–18)
CALCIUM SERPL-MCNC: 8.5 MG/DL — SIGNIFICANT CHANGE UP (ref 8.4–10.5)
CHLORIDE SERPL-SCNC: 98 MMOL/L — SIGNIFICANT CHANGE UP (ref 96–108)
CO2 SERPL-SCNC: 33 MMOL/L — HIGH (ref 22–31)
CREAT SERPL-MCNC: 0.5 MG/DL — SIGNIFICANT CHANGE UP (ref 0.5–1.3)
GLUCOSE BLDC GLUCOMTR-MCNC: 285 MG/DL — HIGH (ref 70–99)
GLUCOSE BLDC GLUCOMTR-MCNC: 331 MG/DL — HIGH (ref 70–99)
GLUCOSE BLDC GLUCOMTR-MCNC: 391 MG/DL — HIGH (ref 70–99)
GLUCOSE BLDC GLUCOMTR-MCNC: 401 MG/DL — HIGH (ref 70–99)
GLUCOSE SERPL-MCNC: 269 MG/DL — HIGH (ref 70–99)
HCT VFR BLD CALC: 35.4 % — SIGNIFICANT CHANGE UP (ref 34.5–45)
HGB BLD-MCNC: 11.5 G/DL — SIGNIFICANT CHANGE UP (ref 11.5–15.5)
MAGNESIUM SERPL-MCNC: 2.1 MG/DL — SIGNIFICANT CHANGE UP (ref 1.6–2.6)
MCHC RBC-ENTMCNC: 28.3 PG — SIGNIFICANT CHANGE UP (ref 27–34)
MCHC RBC-ENTMCNC: 32.5 GM/DL — SIGNIFICANT CHANGE UP (ref 32–36)
MCV RBC AUTO: 87 FL — SIGNIFICANT CHANGE UP (ref 80–100)
NRBC # BLD: 0 /100 WBCS — SIGNIFICANT CHANGE UP (ref 0–0)
PHOSPHATE SERPL-MCNC: 3.3 MG/DL — SIGNIFICANT CHANGE UP (ref 2.5–4.5)
PLATELET # BLD AUTO: 70 K/UL — LOW (ref 150–400)
POTASSIUM SERPL-MCNC: 4.1 MMOL/L — SIGNIFICANT CHANGE UP (ref 3.5–5.3)
POTASSIUM SERPL-SCNC: 4.1 MMOL/L — SIGNIFICANT CHANGE UP (ref 3.5–5.3)
PROT SERPL-MCNC: 5.7 G/DL — LOW (ref 6–8.3)
RBC # BLD: 4.07 M/UL — SIGNIFICANT CHANGE UP (ref 3.8–5.2)
RBC # FLD: 17.2 % — HIGH (ref 10.3–14.5)
SODIUM SERPL-SCNC: 138 MMOL/L — SIGNIFICANT CHANGE UP (ref 135–145)
WBC # BLD: 6.46 K/UL — SIGNIFICANT CHANGE UP (ref 3.8–10.5)
WBC # FLD AUTO: 6.46 K/UL — SIGNIFICANT CHANGE UP (ref 3.8–10.5)

## 2020-08-06 PROCEDURE — 83036 HEMOGLOBIN GLYCOSYLATED A1C: CPT

## 2020-08-06 PROCEDURE — 82803 BLOOD GASES ANY COMBINATION: CPT

## 2020-08-06 PROCEDURE — 84100 ASSAY OF PHOSPHORUS: CPT

## 2020-08-06 PROCEDURE — 82550 ASSAY OF CK (CPK): CPT

## 2020-08-06 PROCEDURE — 71045 X-RAY EXAM CHEST 1 VIEW: CPT

## 2020-08-06 PROCEDURE — 85730 THROMBOPLASTIN TIME PARTIAL: CPT

## 2020-08-06 PROCEDURE — 36415 COLL VENOUS BLD VENIPUNCTURE: CPT

## 2020-08-06 PROCEDURE — 87040 BLOOD CULTURE FOR BACTERIA: CPT

## 2020-08-06 PROCEDURE — 96374 THER/PROPH/DIAG INJ IV PUSH: CPT

## 2020-08-06 PROCEDURE — 82306 VITAMIN D 25 HYDROXY: CPT

## 2020-08-06 PROCEDURE — 85610 PROTHROMBIN TIME: CPT

## 2020-08-06 PROCEDURE — 72128 CT CHEST SPINE W/O DYE: CPT

## 2020-08-06 PROCEDURE — 80048 BASIC METABOLIC PNL TOTAL CA: CPT

## 2020-08-06 PROCEDURE — 94640 AIRWAY INHALATION TREATMENT: CPT

## 2020-08-06 PROCEDURE — 80053 COMPREHEN METABOLIC PANEL: CPT

## 2020-08-06 PROCEDURE — 71260 CT THORAX DX C+: CPT

## 2020-08-06 PROCEDURE — 80061 LIPID PANEL: CPT

## 2020-08-06 PROCEDURE — 99239 HOSP IP/OBS DSCHRG MGMT >30: CPT | Mod: GC

## 2020-08-06 PROCEDURE — U0003: CPT

## 2020-08-06 PROCEDURE — 85027 COMPLETE CBC AUTOMATED: CPT

## 2020-08-06 PROCEDURE — 99053 MED SERV 10PM-8AM 24 HR FAC: CPT

## 2020-08-06 PROCEDURE — 87086 URINE CULTURE/COLONY COUNT: CPT

## 2020-08-06 PROCEDURE — 82962 GLUCOSE BLOOD TEST: CPT

## 2020-08-06 PROCEDURE — 99285 EMERGENCY DEPT VISIT HI MDM: CPT | Mod: 25

## 2020-08-06 PROCEDURE — 86769 SARS-COV-2 COVID-19 ANTIBODY: CPT

## 2020-08-06 PROCEDURE — 83735 ASSAY OF MAGNESIUM: CPT

## 2020-08-06 PROCEDURE — 84443 ASSAY THYROID STIM HORMONE: CPT

## 2020-08-06 PROCEDURE — 82607 VITAMIN B-12: CPT

## 2020-08-06 PROCEDURE — 82746 ASSAY OF FOLIC ACID SERUM: CPT

## 2020-08-06 PROCEDURE — 86703 HIV-1/HIV-2 1 RESULT ANTBDY: CPT

## 2020-08-06 PROCEDURE — 81001 URINALYSIS AUTO W/SCOPE: CPT

## 2020-08-06 PROCEDURE — 93005 ELECTROCARDIOGRAM TRACING: CPT

## 2020-08-06 PROCEDURE — 84484 ASSAY OF TROPONIN QUANT: CPT

## 2020-08-06 PROCEDURE — 99232 SBSQ HOSP IP/OBS MODERATE 35: CPT

## 2020-08-06 PROCEDURE — 83605 ASSAY OF LACTIC ACID: CPT

## 2020-08-06 RX ORDER — CYCLOBENZAPRINE HYDROCHLORIDE 10 MG/1
5 TABLET, FILM COATED ORAL THREE TIMES A DAY
Refills: 0 | Status: DISCONTINUED | OUTPATIENT
Start: 2020-08-06 | End: 2020-08-06

## 2020-08-06 RX ORDER — CYCLOBENZAPRINE HYDROCHLORIDE 10 MG/1
1 TABLET, FILM COATED ORAL
Qty: 10 | Refills: 0
Start: 2020-08-06 | End: 2020-08-10

## 2020-08-06 RX ORDER — TIOTROPIUM BROMIDE 18 UG/1
1 CAPSULE ORAL; RESPIRATORY (INHALATION)
Qty: 30 | Refills: 0
Start: 2020-08-06 | End: 2020-09-04

## 2020-08-06 RX ORDER — TIOTROPIUM BROMIDE 18 UG/1
1 CAPSULE ORAL; RESPIRATORY (INHALATION)
Qty: 0 | Refills: 0 | DISCHARGE

## 2020-08-06 RX ORDER — SENNA PLUS 8.6 MG/1
2 TABLET ORAL
Qty: 20 | Refills: 0
Start: 2020-08-06 | End: 2020-08-15

## 2020-08-06 RX ORDER — GABAPENTIN 400 MG/1
1 CAPSULE ORAL
Qty: 90 | Refills: 0
Start: 2020-08-06 | End: 2020-09-04

## 2020-08-06 RX ORDER — ERGOCALCIFEROL 1.25 MG/1
1 CAPSULE ORAL
Qty: 7 | Refills: 0
Start: 2020-08-06 | End: 2020-09-04

## 2020-08-06 RX ORDER — GABAPENTIN 400 MG/1
2 CAPSULE ORAL
Qty: 0 | Refills: 0 | DISCHARGE

## 2020-08-06 RX ORDER — CYCLOBENZAPRINE HYDROCHLORIDE 10 MG/1
1 TABLET, FILM COATED ORAL
Qty: 0 | Refills: 0 | DISCHARGE
Start: 2020-08-06

## 2020-08-06 RX ADMIN — TIOTROPIUM BROMIDE 1 CAPSULE(S): 18 CAPSULE ORAL; RESPIRATORY (INHALATION) at 14:17

## 2020-08-06 RX ADMIN — BUDESONIDE AND FORMOTEROL FUMARATE DIHYDRATE 2 PUFF(S): 160; 4.5 AEROSOL RESPIRATORY (INHALATION) at 12:40

## 2020-08-06 RX ADMIN — CARVEDILOL PHOSPHATE 6.25 MILLIGRAM(S): 80 CAPSULE, EXTENDED RELEASE ORAL at 17:25

## 2020-08-06 RX ADMIN — Medication 650 MILLIGRAM(S): at 01:00

## 2020-08-06 RX ADMIN — GABAPENTIN 400 MILLIGRAM(S): 400 CAPSULE ORAL at 17:24

## 2020-08-06 RX ADMIN — Medication 10: at 12:09

## 2020-08-06 RX ADMIN — Medication 12: at 08:20

## 2020-08-06 RX ADMIN — OXYCODONE HYDROCHLORIDE 20 MILLIGRAM(S): 5 TABLET ORAL at 06:11

## 2020-08-06 RX ADMIN — Medication 5 UNIT(S): at 08:20

## 2020-08-06 RX ADMIN — LORATADINE 5 MILLIGRAM(S): 10 TABLET ORAL at 12:37

## 2020-08-06 RX ADMIN — Medication 650 MILLIGRAM(S): at 17:24

## 2020-08-06 RX ADMIN — OXYCODONE HYDROCHLORIDE 20 MILLIGRAM(S): 5 TABLET ORAL at 06:46

## 2020-08-06 RX ADMIN — Medication 650 MILLIGRAM(S): at 00:13

## 2020-08-06 RX ADMIN — Medication 650 MILLIGRAM(S): at 14:17

## 2020-08-06 RX ADMIN — Medication 650 MILLIGRAM(S): at 06:11

## 2020-08-06 RX ADMIN — Medication 5 UNIT(S): at 17:14

## 2020-08-06 RX ADMIN — Medication 8: at 17:15

## 2020-08-06 RX ADMIN — GABAPENTIN 400 MILLIGRAM(S): 400 CAPSULE ORAL at 12:38

## 2020-08-06 RX ADMIN — OXYCODONE HYDROCHLORIDE 20 MILLIGRAM(S): 5 TABLET ORAL at 13:00

## 2020-08-06 RX ADMIN — Medication 40 MILLIGRAM(S): at 06:10

## 2020-08-06 RX ADMIN — OXYCODONE HYDROCHLORIDE 20 MILLIGRAM(S): 5 TABLET ORAL at 01:00

## 2020-08-06 RX ADMIN — OXYCODONE HYDROCHLORIDE 20 MILLIGRAM(S): 5 TABLET ORAL at 12:38

## 2020-08-06 RX ADMIN — Medication 650 MILLIGRAM(S): at 12:37

## 2020-08-06 RX ADMIN — Medication 1 MILLIGRAM(S): at 12:37

## 2020-08-06 RX ADMIN — Medication 5 UNIT(S): at 12:09

## 2020-08-06 RX ADMIN — PREGABALIN 1000 MICROGRAM(S): 225 CAPSULE ORAL at 12:37

## 2020-08-06 RX ADMIN — CYCLOBENZAPRINE HYDROCHLORIDE 5 MILLIGRAM(S): 10 TABLET, FILM COATED ORAL at 12:37

## 2020-08-06 RX ADMIN — CARVEDILOL PHOSPHATE 6.25 MILLIGRAM(S): 80 CAPSULE, EXTENDED RELEASE ORAL at 06:10

## 2020-08-06 RX ADMIN — Medication 650 MILLIGRAM(S): at 06:46

## 2020-08-06 RX ADMIN — PANTOPRAZOLE SODIUM 40 MILLIGRAM(S): 20 TABLET, DELAYED RELEASE ORAL at 06:10

## 2020-08-06 RX ADMIN — GABAPENTIN 400 MILLIGRAM(S): 400 CAPSULE ORAL at 00:13

## 2020-08-06 RX ADMIN — Medication 0.25 MILLIGRAM(S): at 06:11

## 2020-08-06 RX ADMIN — OXYCODONE HYDROCHLORIDE 20 MILLIGRAM(S): 5 TABLET ORAL at 00:13

## 2020-08-06 RX ADMIN — GABAPENTIN 400 MILLIGRAM(S): 400 CAPSULE ORAL at 06:11

## 2020-08-06 RX ADMIN — OXYCODONE HYDROCHLORIDE 20 MILLIGRAM(S): 5 TABLET ORAL at 17:24

## 2020-08-06 RX ADMIN — POLYETHYLENE GLYCOL 3350 17 GRAM(S): 17 POWDER, FOR SOLUTION ORAL at 12:38

## 2020-08-06 RX ADMIN — ENOXAPARIN SODIUM 40 MILLIGRAM(S): 100 INJECTION SUBCUTANEOUS at 12:38

## 2020-08-06 NOTE — PHYSICAL THERAPY INITIAL EVALUATION ADULT - CRITERIA FOR SKILLED THERAPEUTIC INTERVENTIONS
impairments found/therapy frequency/rehab potential/predicted duration of therapy intervention/anticipated discharge recommendation

## 2020-08-06 NOTE — MEDICAL STUDENT PROGRESS NOTE(EDUCATION) - NS MD HP STUD ASPLAN PLAN FT
Problem 1: COPD exacerbation   -patient presented to ED 8/2 with SOB and increasing O2 requirements for 3 days prior; took 60mg steroids at home on 7/31. Has history of multiple COPD exacerbations requiring hospitalization but never intubation   -VBG shows compensation; patient refused ABG yesterday    - CT chest and CXR 8/2 show no acute process/consolidation   -continue with 60mg Solumedrol q8 and 5L O2 on NC for today   -per pulm (Dr. Nugent) d/c IV Solumedrol tomorrow morning, switch to PO prednisone 40mg qd x4days, then taper  -continue with bronchodilators (spiriva, symbicort, albuterol)  -O2 saturation today 100% on 5L NC  - Dr Nugent recommends decrease O2 NC to 2-3L, keep O2 saturation around 92%  -Levofloxacin d/c yesterday secondary to patient's QTc of 525 on ECG; switched to Ceftriaxone IV 1g q24hrs  -WBCs elevated past few days secondary to steroids and neulasta, decreased to 12.06 this am   -blood cultures no growth to date  -continue to monitor vitals and O2   -pulmonology Dr. Nugent following  -pulm rehab on discharge        Problem 2: Chronic pain  patient has history of chronic back pain secondary to known pathologic fractures in spine and ribs  -continue with oxycodone, Tylenol, and Gabapentin   -reports pain better controlled on PO oxycodone 20mg + tylenol 650mg  -CT chest 8/2 showed multiple new sclerotic lesions in thoracic spine concerning for metastasis; per heme onc can be a repair process or progression of disease while on immunotherapy.  -CT thoracic spine 8/4 showed multiple sclerotic and lytic lesions in thoracic spine and ribs suggesting metastasis, epidural soft tissue at the T7 level suggesting tumor;  also multilevel thoracic compression fractures and a new acute compression fracture of L1 with bony retropulsion.  -continue with 20mg oxycodone q4 and 650 Tylenol q6, per Dr. Nugent increase gabapentin to 300mg TID  -bisacodyl, miralax for constipation  -reports last bowel movement yesterday, no diarrhea  -Dr. Mendiola from heme onc following    Problem 3: UTI  Patient diagnosed with UTI outpatient 7/31 by oncologist Dr. Mendiola, started levofloxacin 750mg at that time and continued inpatient   -UA on admission positive for WBCs  -Levofloxacin d/c yesterday secondary to patient's QTc of 525 on ECG; switched to Ceftriaxone IV 1g q24hrs    -urine cultures 8/2 positive for coagulase negative Staph   -patient reports urinary symptoms better today, still with mild dysuria and lower abdominal pressure  -WBCs elevated past few days likely secondary to steroids and neulasta (25 yesterday), decreased to 12.06 this am   - will continue to monitor    Problem 4: Lung cancer  R lung s/p RLL resection 2017, now metastatic with known bony lesions  -recently started chemo (Neulasta, Ketruda, alimta, carboplatin), last dose 7/31  -started 7 days 5mg Loratadine post-chemo per patient  -CT thoracic spine today showed multiple sclerotic and lytic lesions in thoracic spine and ribs suggesting metastasis  -of note, patient has PICC line in left brachial vein placed in April 2020 by IR. IR confirmed 8/3 that PICC line is ok to leave in place as long as there is no concern for infection, which there is not at this time.  -WBCs elevated past few days likely secondary to steroids and neulasta (25 yesterday), decreased to 12.06 this am   -platelets continuing to decrease since admission, 70 today, likely due to chemo; will continue to monitor  -oncologist Dr. Mendiola following    Problem 5: Vitamin D deficiency   Patient found to have level of 10   -started ergocalciferol 20853O q week.    Problem 6: Hypertension   Patient has history of hypertension   Continue with home carvedilol 6.25mg q12h.  -/73 this am      Problem 7: Diabetes   Patient has history of Diabetes mellitus  -HgbA1c 8.6 this admission, unchanged from previously reported value 4/2020  -elevated blood glucose readings likely exacerbated by steroids; fingerstick glucose 294 this am   -diet-controlled at home  -on Humalog and sliding scale, will continue to monitor blood glucose   -DASH diet, low sodium and low cholesterol     Problem 8: Prophylactic measure.  Plan: RISK                                                                Points  [  ] Previous VTE                                                  3  [  ] Thrombophilia                                               2  [  ] Lower limb paralysis                                      2        (unable to hold up >15 seconds)    [ 2 ] Current Cancer                                             2         (within 6 months)  [  ] Immobilization > 24 hrs                                1  [  ] ICU/CCU stay > 24 hours                              1  [ 1 ] Age > 60                                                      1  IMPROVE VTE Score ___3_____    Lovenox 40mg for DVT prophylaxis Problem 1: COPD exacerbation   -patient presented to ED 8/2 with SOB and increasing O2 requirements for 3 days prior; took 60mg steroids at home on 7/31. Has history of multiple COPD exacerbations requiring hospitalization but never intubation   -VBG shows compensation; patient refused ABG 8/5  - CT chest and CXR 8/2 show no acute process/consolidation   -per pulm (Dr. Nugent) d/c IV Solumedrol this am, switch to PO prednisone 40mg qd x4days, then taper  -continue with bronchodilators (spiriva, symbicort, albuterol)  -Patient breathing better today; O2 saturation today 96% on 5L NC  - Dr Nugent recommends decrease O2 NC to 2-3L, keep O2 saturation around 92%  -Levofloxacin d/c 8/4 secondary to patient's QTc of 525 on ECG; switched to Ceftriaxone IV 1g q24hrs. D/c ceftriaxone today - total 7 days completed   -WBCs elevated past few days secondary to steroids and neulasta, decreased to 6.46 this am   -blood cultures no growth to date  -continue to monitor vitals and O2   -pulmonology Dr. Nugent following  -pulm rehab on discharge        Problem 2: Chronic pain  patient has history of chronic back pain secondary to known pathologic fractures in spine and ribs  -CT chest 8/2 showed multiple new sclerotic lesions in thoracic spine concerning for metastasis; per heme onc can be a repair process or progression of disease while on immunotherapy.  -CT thoracic spine 8/4 showed multiple sclerotic and lytic lesions in thoracic spine and ribs suggesting metastasis, epidural soft tissue at the T7 level suggesting tumor;  also multilevel thoracic compression fractures and a new acute compression fracture of L1 with bony retropulsion.  -reports pain better controlled on PO oxycodone 20mg + tylenol 650mg  -continue with 20mg oxycodone q4 and 650mg Tylenol q6   -Pain management saw yesterday, will increase gabapentin to 400mg PO q8hrs and add flexeril 5mg PO q8hrs prn for muscle spasm  -PT to see today for ambulation, patient willing to do outpatient PT   -bisacodyl, miralax for constipation; reports last bowel movement this morning, no diarrhea  -Dr. Mendiola from heme onc following    Problem 3: UTI  Patient diagnosed with UTI outpatient 7/31 by oncologist Dr. Mendiola, started levofloxacin 750mg at that time and continued inpatient   -UA on admission positive for WBCs  -urine cultures 8/2 positive for coagulase negative Staph   -Levofloxacin d/c 8/4 secondary to patient's QTc of 525 on ECG; switched to Ceftriaxone IV 1g q24hrs    -patient reports urinary symptoms better today, dysuria resolved   -D/c ceftriaxone today, total 7 days completed   -WBCs elevated past few days likely secondary to steroids and neulasta, decreased to 6.46 this am   - will continue to monitor    Problem 4: Lung cancer  R lung s/p RLL resection 2017, now metastatic with known bony lesions  -recently started chemo (Neulasta, Ketruda, alimta, carboplatin), last dose 7/31  -started 7 days 5mg Loratadine post-chemo per patient  -CT thoracic spine 8/4 showed multiple sclerotic and lytic lesions in thoracic spine and ribs suggesting metastasis  -of note, patient has PICC line in left brachial vein placed in April 2020 by IR. IR confirmed 8/3 that PICC line is ok to leave in place as long as there is no concern for infection, which there is not at this time.  -WBCs elevated past few days likely secondary to steroids and neulasta, decreased to 6.46 this am   -platelets continuing to decrease since admission, 70 today, likely due to chemo; will continue to monitor  -oncologist Dr. Mendiola following    Problem 5: Vitamin D deficiency   Patient found to have level of 10   -started ergocalciferol 84519R q week.    Problem 6: Hypertension   Patient has history of hypertension   Continue with home carvedilol 6.25mg q12h.  -SBP of 180 yesterday likely secondary to pain, monitor BPs on new pain regimen.  -/92 this am      Problem 7: Diabetes   Patient has history of Diabetes mellitus  -HgbA1c 8.6 this admission, unchanged from previously reported value 4/2020  -elevated blood glucose readings likely exacerbated by steroids; fingerstick glucose 285 this am   -diet-controlled at home  -on Humalog and sliding scale, will continue to monitor blood glucose   -DASH diet, low sodium and low cholesterol     Problem 8: Prophylactic measure.  Plan: RISK                                                                Points  [  ] Previous VTE                                                  3  [  ] Thrombophilia                                               2  [  ] Lower limb paralysis                                      2        (unable to hold up >15 seconds)    [ 2 ] Current Cancer                                             2         (within 6 months)  [  ] Immobilization > 24 hrs                                1  [  ] ICU/CCU stay > 24 hours                              1  [ 1 ] Age > 60                                                      1  IMPROVE VTE Score ___3_____    Lovenox 40mg for DVT prophylaxis

## 2020-08-06 NOTE — PROGRESS NOTE ADULT - PROBLEM SELECTOR PLAN 5
R lung s/p RLL resection 2017, now metastatic with known bony lesions  -last chemo dose 7/31, on Neulasta, Ketruda, alimta, carboplatin  -started 7 days 5mg Loratadine post-chemo per patient  -of note, patient has PICC line in left brachial vein placed in April 2020 by IR. Spoke to IR 8/3, confirmed that PICC line is ok to leave in place as long as there is no concern for infx, which there is not at this time  -platelets continuing to decrease since admission, 70 today, likely 2/2 chemo, will continue to monitor R lung s/p RLL resection 2017, now metastatic with known bony lesions  -last chemo dose 7/31, on Neulasta, Ketruda, alimta, carboplatin  -cw 7 days 5mg Loratadine post-chemo per patient  -of note, patient has PICC line in left brachial vein placed in April 2020 by IR. Spoke to IR 8/3, confirmed that PICC line is ok to leave in place as long as there is no concern for infx, which there is not at this time  -platelets continuing to decrease since admission, today, likely 2/2 chemo, will continue to monitor R lung s/p RLL resection 2017, now metastatic with known bony lesions  -last chemo dose 7/31, on Neulasta, Ketruda, alimta, carboplatin  -cw 7 days 5mg Loratadine post-chemo per patient  -of note, patient has PICC line in left brachial vein placed in April 2020 by IR. Spoke to IR 8/3, confirmed that PICC line is ok to leave in place as long as there is no concern for infx, which there is not at this time  -platelets downtrending since admission stable at 70 today, likely 2/2 chemo, will continue to monitor

## 2020-08-06 NOTE — MEDICAL STUDENT PROGRESS NOTE(EDUCATION) - NS MD HP STUD ASPLAN ASSES FT
65F with PMH COPD (5L NC at home, 80 pack year hx, quit smoking 8 ya) and R lung cancer s/p RLL resection 2017 on chemo (last dose 7/31), DM, HTN, obesity admitted for COPD exacerbation and severe neck/back pain secondary to known pathologic spine and rib fractures with spinal metastasis. Also continuing treatment for UTI diagnosed outpatient.
65F with PMH COPD (5L NC at home, 80 pack year hx, quit smoking 8 ya) and R lung cancer s/p RLL resection 2017 on chemo (last dose 7/31), DM, HTN, obesity admitted for COPD exacerbation and severe neck/back pain secondary to known pathologic spine and rib fractures with spinal metastasis. Also continuing treatment for UTI diagnosed outpatient.
65F with PMH COPD (5L NC at home, 80 pack year hx, quit smoking 8 ya) and R lung cancer s/p RLL resection 2017 on chemo (last dose 7/31), DM, HTN, obesity admitted for COPD exacerbation and severe neck/back pain secondary to known pathologic spine and rib fractures. Also continuing treatment for UTI diagnosed outpatient.
65F with PMH COPD (5L NC at home, 80 pack year hx, quit smoking 8 ya) and R lung cancer s/p RLL resection 2017 on chemo (last dose 7/31), DM, HTN, obesity admitted for COPD exacerbation and severe neck/back pain secondary to known pathologic spine and rib fractures with spinal metastasis. Also continuing treatment for UTI diagnosed outpatient.

## 2020-08-06 NOTE — PROGRESS NOTE ADULT - PROBLEM SELECTOR PLAN 1
- Pt with metastatic lung ca.  + mets to spine  -  + radiation treatment to spine is recommended  - continue home regimen of 2tbs of 10/325mg q 4-6 hours a day  - dc home on gabapentin 400mg po q 8 hours   - flexeril 5mg po q 12 hours prn - hold for sedation, caution use with opioids  - avoid nsaids as platelets are low  - continue steroids as per primary team  - bowel regimen - senna, miralax

## 2020-08-06 NOTE — PROGRESS NOTE ADULT - PROBLEM SELECTOR PLAN 10
patient wishes to remain full code for now, error in previous note listed as DNR/DNI patient wishes to remain full code for now

## 2020-08-06 NOTE — PROGRESS NOTE ADULT - ASSESSMENT
COPD type B with acute asthmatic Bronchitis   CARC RLL lobectomy with Bone and LN mets on C/T   Obesity with OHS with Chr respiratory failure   IDDM   HCVD      PLAN--  po prednisone 40 mg qd x 4 days then taper off  [id number] mg tid  P/T for ambulation   Symbicort and spiriva   HFN rx q 4-6 h prn with duoneb   O2 n/c 2-3 lpm to keep o2 sat 92   Vit D supplement   FS coverage   S/C Lovenox   Discussed with Residents   D/C home with home P/T if Ok with PMD

## 2020-08-06 NOTE — PROGRESS NOTE ADULT - PROBLEM SELECTOR PLAN 6
Found to have level of 10  -started ergocalciferol 99327E qweek
Found to have level of 10  -started ergocalciferol 60618W qweek
Found to have level of 10  -started ergocalciferol 80725N qweek
Found to have level of 10  -started ergocalciferol 62200Y qweek

## 2020-08-06 NOTE — PROGRESS NOTE ADULT - ASSESSMENT
· Assessment		  65 year old lad=y nwith recurrent lung cancer in mediastinum and bones has been on chemo developed sob and increasing pain at back    Problem/Recommendation - 1:  Problem: Lung cancer. Recommendation: recurrent adenoca  in mediastinum and bones  she takes steroid everyday that made immunotherapy not effective  she just began chemo, CEA and mediastinal nodes are better  the new bone lesion can be a repair process or progression of disease while on immunotherapy.  needs MRII of T spine  but she can not lay flat   CT of T spine, showed mets and many compression fracture, L1 is acute, which is causing the current back pain  ? kyphoplasy    Problem/Recommendation - 2:  ·  Problem: COPD exacerbation.  Recommendation: steroid dependent  Dr Nugent is pulmonary.     Problem/Recommendation - 3:  ·  Problem: Chronic pain.  Recommendation: at back  with new lesion in spine plus compression Fx  give her oxycodone 20 plus tylanol  will do CT  ?RT if she can lay flat.   4. leukocytosis  from staroid and neulasta

## 2020-08-06 NOTE — PROGRESS NOTE ADULT - SUBJECTIVE AND OBJECTIVE BOX
PGY-1 Progress Note discussed with attending    PAGER #: [[phone]] TILL 5:00 PM  PLEASE CONTACT ON CALL TEAM:  - On Call Team (Please refer to Rachel) FROM 5:00 PM - 8:30PM  - Nightfloat Team FROM 8:30 -7:30 AM    CHIEF COMPLAINT & BRIEF HOSPITAL COURSE: 65F with PMH COPD (type B with acute asthmatic bronchitis, 5L NC at home, 80 pack year hx, quit smoking 8 ya) and r lung cancer s/p RLL resection 2017 on chemo (regimen of neulasta, ketruda, alimta, and carboplatin, last dose 7/31) admitted for COPD exacerbation and severe neck/back pain 2/2 known pathologic spine and rib fx. Patient takes Percocet for this pain and has chronic constipation. Of note, patient has history of multiple COPD exacerbations, per patient without needing intubation, and started herself on PO steroids 60mg day prior to admission. Patient was also found to have UTI outpatient by oncologist Dr. Mendiola and was started on5 day course of levofloxacin, will continue abx as inpatient.      INTERVAL HPI/OVERNIGHT EVENTS: NAEON. SBP elevated to 161 this am, spike to 180 yesterday pm, likely 2/2 pain/discomfort. Reports improved SOB this am, denies dysuria. Reports good appetite and normal stooling. Endorses ongoing "locking" back pain. Seen by pain mgmt yesterday, increased gabapentin to 400 TID and added 5mg cyclobenzaprine. PT to see today to assess walking. Cw cftx, IV steroids dc after last dose yesterday, starting 40mg Qd today.     MEDICATIONS  (STANDING):  acetaminophen   Tablet .. 650 milliGRAM(s) Oral every 6 hours  ALPRAZolam 0.25 milliGRAM(s) Oral two times a day  atorvastatin 40 milliGRAM(s) Oral at bedtime  bisacodyl Suppository 10 milliGRAM(s) Rectal every 24 hours  budesonide 160 MICROgram(s)/formoterol 4.5 MICROgram(s) Inhaler 2 Puff(s) Inhalation two times a day  carvedilol 6.25 milliGRAM(s) Oral every 12 hours  cefTRIAXone   IVPB 1000 milliGRAM(s) IV Intermittent every 24 hours  cyanocobalamin 1000 MICROGram(s) Oral daily  dextrose 5%. 1000 milliLiter(s) (50 mL/Hr) IV Continuous <Continuous>  enoxaparin Injectable 40 milliGRAM(s) SubCutaneous daily  ergocalciferol 01333 Unit(s) Oral <User Schedule>  folic acid 1 milliGRAM(s) Oral daily  gabapentin 400 milliGRAM(s) Oral every 6 hours  insulin lispro (HumaLOG) corrective regimen sliding scale   SubCutaneous three times a day before meals  insulin lispro (HumaLOG) corrective regimen sliding scale   SubCutaneous at bedtime  insulin lispro Injectable (HumaLOG) 5 Unit(s) SubCutaneous Before meals and at bedtime  loratadine 5 milliGRAM(s) Oral daily  melatonin 3 milliGRAM(s) Oral at bedtime  oxyCODONE    IR 20 milliGRAM(s) Oral four times a day  pantoprazole    Tablet 40 milliGRAM(s) Oral before breakfast  polyethylene glycol 3350 17 Gram(s) Oral daily  predniSONE   Tablet 40 milliGRAM(s) Oral daily  senna 2 Tablet(s) Oral at bedtime  sodium chloride 0.9%. 1000 milliLiter(s) (70 mL/Hr) IV Continuous <Continuous>  tiotropium 18 MICROgram(s) Capsule 1 Capsule(s) Inhalation daily    MEDICATIONS  (PRN):  ALBUTerol    90 MICROgram(s) HFA Inhaler 2 Puff(s) Inhalation every 6 hours PRN Shortness of Breath  cyclobenzaprine 5 milliGRAM(s) Oral three times a day PRN Muscle Spasm  glucagon  Injectable 1 milliGRAM(s) IntraMuscular once PRN Glucose LESS THAN 70 milligrams/deciliter  ondansetron Injectable 4 milliGRAM(s) IV Push every 4 hours PRN Nausea and/or Vomiting        REVIEW OF SYSTEMS:  CONSTITUTIONAL: No fatigue  RESPIRATORY: No cough, intermittent but improved shortness of breath  CARDIOVASCULAR: No chest pain, chronic leg swelling and tenderness b/l, no change from baseline  GASTROINTESTINAL: No abdominal pain. No nausea, vomiting, No diarrhea or constipation.  GENITOURINARY: denies dysuria  NEUROLOGICAL: No headaches, no tremors     Vital Signs Last 24 Hrs  T(C): 36.6 (06 Aug 2020 05:30), Max: 36.7 (05 Aug 2020 20:23)  T(F): 97.9 (06 Aug 2020 05:30), Max: 98.1 (05 Aug 2020 20:23)  HR: 83 (06 Aug 2020 05:30) (79 - 89)  BP: 161/92 (06 Aug 2020 05:30) (131/80 - 180/75)  BP(mean): --  RR: 18 (06 Aug 2020 05:30) (18 - 18)  SpO2: 96% (06 Aug 2020 05:30) (93% - 98%)    PHYSICAL EXAMINATION:  GENERAL: NAD, obese  HEAD:  Atraumatic, Normocephalic  EYES:  conjunctiva and sclera clear, eomi  NECK: Supple  CHEST/LUNG: CTA bl, wheezing heard likely from throat  HEART: Regular rate and rhythm; No murmurs, rubs, or gallops  ABDOMEN: Soft, Nontender, obese; Bowel sounds present  NERVOUS SYSTEM: alert and oriented x3  EXTREMITIES:  2+ Peripheral Pulses, b/l tender LE edema, chronic, unchanged from baseline  SKIN: warm dry                          11.4   12.06 )-----------( 70       ( 05 Aug 2020 07:15 )             35.7     08-05    136  |  99  |  17  ----------------------------<  311<H>  4.1   |  31  |  0.60    Ca    8.2<L>      05 Aug 2020 07:15  Phos  3.4     08-05  Mg     2.1     08-05    TPro  5.6<L>  /  Alb  2.8<L>  /  TBili  0.7  /  DBili  x   /  AST  12  /  ALT  21  /  AlkPhos  155<H>  08-05    LIVER FUNCTIONS - ( 05 Aug 2020 07:15 )  Alb: 2.8 g/dL / Pro: 5.6 g/dL / ALK PHOS: 155 U/L / ALT: 21 U/L DA / AST: 12 U/L / GGT: x                   CAPILLARY BLOOD GLUCOSE      RADIOLOGY & ADDITIONAL TESTS: PGY-1 Progress Note discussed with attending    PAGER #: [[phone]] TILL 5:00 PM  PLEASE CONTACT ON CALL TEAM:  - On Call Team (Please refer to Rachel) FROM 5:00 PM - 8:30PM  - Nightfloat Team FROM 8:30 -7:30 AM    CHIEF COMPLAINT & BRIEF HOSPITAL COURSE: 65F with PMH COPD (type B with acute asthmatic bronchitis, 5L NC at home, 80 pack year hx, quit smoking 8 ya) and r lung cancer s/p RLL resection 2017 on chemo (regimen of neulasta, ketruda, alimta, and carboplatin, last dose 7/31) admitted for COPD exacerbation and severe neck/back pain 2/2 known pathologic spine and rib fx. Patient takes Percocet for this pain and has chronic constipation. Of note, patient has history of multiple COPD exacerbations, per patient without needing intubation, and started herself on PO steroids 60mg day prior to admission. Patient was also found to have UTI outpatient by oncologist Dr. Mendiola and was started on5 day course of levofloxacin, will continue abx as inpatient.      INTERVAL HPI/OVERNIGHT EVENTS: NAEON. SBP elevated to 161 this am, spike to 180 yesterday pm, likely 2/2 pain/discomfort. Reports improved SOB this am, denies dysuria. Reports good appetite and normal stooling. Endorses ongoing "locking" back pain. Seen by pain mgmt yesterday, increased gabapentin to 400 TID and added 5mg cyclobenzaprine. PT to see today to assess walking. Cw cftx, IV steroids dc after last dose yesterday, starting 40mg Qd today. WBC wnl this am.     MEDICATIONS  (STANDING):  acetaminophen   Tablet .. 650 milliGRAM(s) Oral every 6 hours  ALPRAZolam 0.25 milliGRAM(s) Oral two times a day  atorvastatin 40 milliGRAM(s) Oral at bedtime  bisacodyl Suppository 10 milliGRAM(s) Rectal every 24 hours  budesonide 160 MICROgram(s)/formoterol 4.5 MICROgram(s) Inhaler 2 Puff(s) Inhalation two times a day  carvedilol 6.25 milliGRAM(s) Oral every 12 hours  cefTRIAXone   IVPB 1000 milliGRAM(s) IV Intermittent every 24 hours  cyanocobalamin 1000 MICROGram(s) Oral daily  dextrose 5%. 1000 milliLiter(s) (50 mL/Hr) IV Continuous <Continuous>  enoxaparin Injectable 40 milliGRAM(s) SubCutaneous daily  ergocalciferol 32810 Unit(s) Oral <User Schedule>  folic acid 1 milliGRAM(s) Oral daily  gabapentin 400 milliGRAM(s) Oral every 6 hours  insulin lispro (HumaLOG) corrective regimen sliding scale   SubCutaneous three times a day before meals  insulin lispro (HumaLOG) corrective regimen sliding scale   SubCutaneous at bedtime  insulin lispro Injectable (HumaLOG) 5 Unit(s) SubCutaneous Before meals and at bedtime  loratadine 5 milliGRAM(s) Oral daily  melatonin 3 milliGRAM(s) Oral at bedtime  oxyCODONE    IR 20 milliGRAM(s) Oral four times a day  pantoprazole    Tablet 40 milliGRAM(s) Oral before breakfast  polyethylene glycol 3350 17 Gram(s) Oral daily  predniSONE   Tablet 40 milliGRAM(s) Oral daily  senna 2 Tablet(s) Oral at bedtime  sodium chloride 0.9%. 1000 milliLiter(s) (70 mL/Hr) IV Continuous <Continuous>  tiotropium 18 MICROgram(s) Capsule 1 Capsule(s) Inhalation daily    MEDICATIONS  (PRN):  ALBUTerol    90 MICROgram(s) HFA Inhaler 2 Puff(s) Inhalation every 6 hours PRN Shortness of Breath  cyclobenzaprine 5 milliGRAM(s) Oral three times a day PRN Muscle Spasm  glucagon  Injectable 1 milliGRAM(s) IntraMuscular once PRN Glucose LESS THAN 70 milligrams/deciliter  ondansetron Injectable 4 milliGRAM(s) IV Push every 4 hours PRN Nausea and/or Vomiting        REVIEW OF SYSTEMS:  CONSTITUTIONAL: No fatigue  RESPIRATORY: No cough, intermittent but improved shortness of breath  CARDIOVASCULAR: No chest pain, chronic leg swelling and tenderness b/l, no change from baseline  GASTROINTESTINAL: No abdominal pain. No nausea, vomiting, No diarrhea or constipation.  GENITOURINARY: denies dysuria  NEUROLOGICAL: No headaches, no tremors     Vital Signs Last 24 Hrs  T(C): 36.6 (06 Aug 2020 05:30), Max: 36.7 (05 Aug 2020 20:23)  T(F): 97.9 (06 Aug 2020 05:30), Max: 98.1 (05 Aug 2020 20:23)  HR: 83 (06 Aug 2020 05:30) (79 - 89)  BP: 161/92 (06 Aug 2020 05:30) (131/80 - 180/75)  BP(mean): --  RR: 18 (06 Aug 2020 05:30) (18 - 18)  SpO2: 96% (06 Aug 2020 05:30) (93% - 98%)    PHYSICAL EXAMINATION:  GENERAL: NAD, obese  HEAD:  Atraumatic, Normocephalic  EYES:  conjunctiva and sclera clear, eomi  NECK: Supple  CHEST/LUNG: CTA bl, wheezing heard likely from throat  HEART: Regular rate and rhythm; No murmurs, rubs, or gallops  ABDOMEN: Soft, Nontender, obese; Bowel sounds present  NERVOUS SYSTEM: alert and oriented x3  EXTREMITIES:  2+ Peripheral Pulses, b/l tender LE edema, chronic, unchanged from baseline  SKIN: warm dry                          11.4   12.06 )-----------( 70       ( 05 Aug 2020 07:15 )             35.7     08-05    136  |  99  |  17  ----------------------------<  311<H>  4.1   |  31  |  0.60    Ca    8.2<L>      05 Aug 2020 07:15  Phos  3.4     08-05  Mg     2.1     08-05    TPro  5.6<L>  /  Alb  2.8<L>  /  TBili  0.7  /  DBili  x   /  AST  12  /  ALT  21  /  AlkPhos  155<H>  08-05    LIVER FUNCTIONS - ( 05 Aug 2020 07:15 )  Alb: 2.8 g/dL / Pro: 5.6 g/dL / ALK PHOS: 155 U/L / ALT: 21 U/L DA / AST: 12 U/L / GGT: x                   CAPILLARY BLOOD GLUCOSE      RADIOLOGY & ADDITIONAL TESTS: PGY-1 Progress Note discussed with attending    PAGER #: [[phone]] TILL 5:00 PM  PLEASE CONTACT ON CALL TEAM:  - On Call Team (Please refer to Rachel) FROM 5:00 PM - 8:30PM  - Nightfloat Team FROM 8:30 -7:30 AM    CHIEF COMPLAINT & BRIEF HOSPITAL COURSE: 65F with PMH COPD (type B with acute asthmatic bronchitis, 5L NC at home, 80 pack year hx, quit smoking 8 ya) and r lung cancer s/p RLL resection 2017 on chemo (regimen of neulasta, ketruda, alimta, and carboplatin, last dose 7/31) admitted for COPD exacerbation and severe neck/back pain 2/2 known pathologic spine and rib fx. Patient takes Percocet for this pain and has chronic constipation. Of note, patient has history of multiple COPD exacerbations, per patient without needing intubation, and started herself on PO steroids 60mg day prior to admission. Patient was also found to have UTI outpatient by oncologist Dr. Mendiola and was started on5 day course of levofloxacin, will continue abx as inpatient.      INTERVAL HPI/OVERNIGHT EVENTS: NAEON. SBP elevated to 161 this am, spike to 180 yesterday pm, likely 2/2 pain/discomfort. Reports improved SOB this am, denies dysuria. Reports good appetite and normal stooling. Endorses ongoing "locking" back pain. Seen by pain mgmt yesterday, increased gabapentin to 400 TID and added 5mg cyclobenzaprine prn with 2 doses standing. Dc cftx, IV steroids dc after last dose yesterday, starting 40mg Qd today. WBC wnl this am. Medically stable for discharge pending PT eval today.     MEDICATIONS  (STANDING):  acetaminophen   Tablet .. 650 milliGRAM(s) Oral every 6 hours  ALPRAZolam 0.25 milliGRAM(s) Oral two times a day  atorvastatin 40 milliGRAM(s) Oral at bedtime  bisacodyl Suppository 10 milliGRAM(s) Rectal every 24 hours  budesonide 160 MICROgram(s)/formoterol 4.5 MICROgram(s) Inhaler 2 Puff(s) Inhalation two times a day  carvedilol 6.25 milliGRAM(s) Oral every 12 hours  cefTRIAXone   IVPB 1000 milliGRAM(s) IV Intermittent every 24 hours  cyanocobalamin 1000 MICROGram(s) Oral daily  dextrose 5%. 1000 milliLiter(s) (50 mL/Hr) IV Continuous <Continuous>  enoxaparin Injectable 40 milliGRAM(s) SubCutaneous daily  ergocalciferol 46300 Unit(s) Oral <User Schedule>  folic acid 1 milliGRAM(s) Oral daily  gabapentin 400 milliGRAM(s) Oral every 6 hours  insulin lispro (HumaLOG) corrective regimen sliding scale   SubCutaneous three times a day before meals  insulin lispro (HumaLOG) corrective regimen sliding scale   SubCutaneous at bedtime  insulin lispro Injectable (HumaLOG) 5 Unit(s) SubCutaneous Before meals and at bedtime  loratadine 5 milliGRAM(s) Oral daily  melatonin 3 milliGRAM(s) Oral at bedtime  oxyCODONE    IR 20 milliGRAM(s) Oral four times a day  pantoprazole    Tablet 40 milliGRAM(s) Oral before breakfast  polyethylene glycol 3350 17 Gram(s) Oral daily  predniSONE   Tablet 40 milliGRAM(s) Oral daily  senna 2 Tablet(s) Oral at bedtime  sodium chloride 0.9%. 1000 milliLiter(s) (70 mL/Hr) IV Continuous <Continuous>  tiotropium 18 MICROgram(s) Capsule 1 Capsule(s) Inhalation daily    MEDICATIONS  (PRN):  ALBUTerol    90 MICROgram(s) HFA Inhaler 2 Puff(s) Inhalation every 6 hours PRN Shortness of Breath  cyclobenzaprine 5 milliGRAM(s) Oral three times a day PRN Muscle Spasm  glucagon  Injectable 1 milliGRAM(s) IntraMuscular once PRN Glucose LESS THAN 70 milligrams/deciliter  ondansetron Injectable 4 milliGRAM(s) IV Push every 4 hours PRN Nausea and/or Vomiting    REVIEW OF SYSTEMS:  CONSTITUTIONAL: No fatigue  RESPIRATORY: No cough, intermittent but improved shortness of breath  CARDIOVASCULAR: No chest pain, chronic leg swelling and tenderness b/l, no change from baseline  GASTROINTESTINAL: No abdominal pain. No nausea, vomiting, No diarrhea or constipation.  GENITOURINARY: denies dysuria  NEUROLOGICAL: No headaches, no tremors     Vital Signs Last 24 Hrs  T(C): 36.6 (06 Aug 2020 05:30), Max: 36.7 (05 Aug 2020 20:23)  T(F): 97.9 (06 Aug 2020 05:30), Max: 98.1 (05 Aug 2020 20:23)  HR: 83 (06 Aug 2020 05:30) (79 - 89)  BP: 161/92 (06 Aug 2020 05:30) (131/80 - 180/75)  BP(mean): --  RR: 18 (06 Aug 2020 05:30) (18 - 18)  SpO2: 96% (06 Aug 2020 05:30) (93% - 98%)    PHYSICAL EXAMINATION:  GENERAL: NAD, obese  HEAD:  Atraumatic, Normocephalic  EYES:  conjunctiva and sclera clear, eomi  NECK: Supple  CHEST/LUNG: CTA bl, wheezing heard likely from throat  HEART: Regular rate and rhythm; No murmurs, rubs, or gallops  ABDOMEN: Soft, Nontender, obese; Bowel sounds present  NERVOUS SYSTEM: alert and oriented x3  EXTREMITIES:  2+ Peripheral Pulses, b/l tender trace LE edema, chronic, unchanged from baseline  SKIN: warm dry                          11.4   12.06 )-----------( 70       ( 05 Aug 2020 07:15 )             35.7     08-05    136  |  99  |  17  ----------------------------<  311<H>  4.1   |  31  |  0.60    Ca    8.2<L>      05 Aug 2020 07:15  Phos  3.4     08-05  Mg     2.1     08-05    TPro  5.6<L>  /  Alb  2.8<L>  /  TBili  0.7  /  DBili  x   /  AST  12  /  ALT  21  /  AlkPhos  155<H>  08-05    LIVER FUNCTIONS - ( 05 Aug 2020 07:15 )  Alb: 2.8 g/dL / Pro: 5.6 g/dL / ALK PHOS: 155 U/L / ALT: 21 U/L DA / AST: 12 U/L / GGT: x                   CAPILLARY BLOOD GLUCOSE      RADIOLOGY & ADDITIONAL TESTS:

## 2020-08-06 NOTE — PROGRESS NOTE ADULT - PROBLEM SELECTOR PLAN 7
cw home carvedilol 6.25mg q12h

## 2020-08-06 NOTE — PROGRESS NOTE ADULT - REASON FOR ADMISSION
Shortness of breath and back pain

## 2020-08-06 NOTE — PROGRESS NOTE ADULT - PROBLEM SELECTOR PLAN 9
RISK                                                                Points  [  ] Previous VTE                                                  3  [  ] Thrombophilia                                               2  [  ] Lower limb paralysis                                      2        (unable to hold up >15 seconds)    [ 2 ] Current Cancer                                             2         (within 6 months)  [  ] Immobilization > 24 hrs                                1  [  ] ICU/CCU stay > 24 hours                              1  [ 1 ] Age > 60                                                      1  IMPROVE VTE Score ___3_____  40mg lovenox sq Qd

## 2020-08-06 NOTE — PROGRESS NOTE ADULT - PROBLEM SELECTOR PLAN 1
type B with acute asthmatic bronchitis  patient reports several days of increased SOB, took PO 60mg steroids at home yesterday (self-rx) before presenting to hospital, of note has hx of multiple COPD exacerbations without intubation   CXR unremarkable for acute pathology, CT chest negative for acute airspace disease or consolidation with improving mediastinal LAD  -VBG shows compensation, refused ABG yesterday  -on 5L NC, SpO2s 100 today, titrate to 92-93% SpO2, patient counselled by team yesterday that low 90s may be acceptable level for pt with COPD  -cw 60mg Solumedrol q8 today, dc after last dose tonight, start 40mg PO Qd prednisone tomorrow morning  -cw bronchodilators (spiriva, symbicort, albuterol)  -dc levofloxacin 2/2 prolonged QTc 525 and start cftx  -cw GI PPx protonix 40gm  -WBC elevated 2/2 steroids and Neulasta, decreased to 12 today  -BCx NGTD  -monitor vitals and O2, CM to look into increasing patient's home O2 allowance  -pulmonology Dr. Nugent following  -pulm rehab on dc type B with acute asthmatic bronchitis  patient reports several days of increased SOB, took PO 60mg steroids at home for one day (self-rx) before presenting to hospital, of note has hx of multiple COPD exacerbations without intubation   CXR unremarkable for acute pathology, CT chest negative for acute airspace disease or consolidation with improving mediastinal LAD  -VBG shows compensation, refused ABG 8/5  -on 5L NC, SpO2s 100 today, titrate to 92-93% SpO2, patient counselled by team  that low 90s may be acceptable level for pt with COPD  -dc 60mg Solumedrol q8 after last dose 8/5, start 40mg PO Qd prednisone today  -cw bronchodilators (spiriva, symbicort, albuterol)  -cw cftx  -cw GI PPx protonix 40gm  -WBC elevated 2/2 steroids and Neulasta, today  -BCx NGTD  -monitor vitals and O2, CM to look into increasing patient's home O2 allowance  -pulmonology Dr. Nugent following  -pulm rehab on dc type B with acute asthmatic bronchitis  patient reports several days of increased SOB, took PO 60mg steroids at home for one day (self-rx) before presenting to hospital, of note has hx of multiple COPD exacerbations without intubation   CXR unremarkable for acute pathology, CT chest negative for acute airspace disease or consolidation with improving mediastinal LAD  -VBG shows compensation, refused ABG 8/5  -on 5L NC, SpO2s 100 today, titrate to 92-93% SpO2, patient counselled by team  that low 90s may be acceptable level for pt with COPD  -dc 60mg Solumedrol q8 after last dose 8/5, start 40mg PO Qd prednisone today  -cw bronchodilators (spiriva, symbicort, albuterol)  -cw cftx  -cw GI PPx protonix 40gm  -WBC wnl today  -BCx NGTD  -monitor vitals and O2, CM to look into increasing patient's home O2 allowance  -pulmonology Dr. Nugent following  -pulm rehab on dc type B with acute asthmatic bronchitis  patient reports several days of increased SOB, took PO 60mg steroids at home for one day (self-rx) before presenting to hospital, of note has hx of multiple COPD exacerbations without intubation   CXR unremarkable for acute pathology, CT chest negative for acute airspace disease or consolidation with improving mediastinal LAD  -VBG shows compensation, refused ABG 8/5  -on 5L NC, SpO2s 100 today, titrate to 92-93% SpO2, patient counselled by team  that low 90s may be acceptable level for pt with COPD, will be discharged with 2-3L  -dc 60mg Solumedrol q8 after last dose 8/5, start 40mg PO Qd prednisone today  -cw bronchodilators (spiriva, symbicort, albuterol)  -dc cftx, total 7 days of abx completed  -cw GI PPx protonix 40gm  -WBC wnl today  -BCx NGTD  -pulmonology Dr. Nugent following

## 2020-08-06 NOTE — PROGRESS NOTE ADULT - PROVIDER SPECIALTY LIST ADULT
Heme/Onc
Internal Medicine
Pain Medicine
Pulmonology

## 2020-08-06 NOTE — PHYSICAL THERAPY INITIAL EVALUATION ADULT - LEVEL OF INDEPENDENCE: STAIR NEGOTIATION, REHAB EVAL
Patient deffered stating she is fatigued. She has not walked in few days. she will attempt at later day.

## 2020-08-06 NOTE — PROGRESS NOTE ADULT - ASSESSMENT
65F with PMH COPD (type B with acute asthmatic [id number] NC at home, 80 pack year hx, quit smoking 8 ya) and r lung cancer s/p RLL resection 2017 on chemo (last dose 7/31) admitted for COPD exacerbation and severe neck/back pain 2/2 known pathologic spine and rib fx. Continuing tx for UTI dx outpatient. 65F with PMH COPD (type B with acute asthmatic bronchitis, 5L NC at home, 80 pack year hx, quit smoking 8 ya) and r lung cancer s/p RLL resection 2017 on chemo (last dose 7/31) admitted for COPD exacerbation and severe neck/back pain 2/2 known pathologic spine and rib fx. Continuing tx for UTI dx outpatient. 65F with PMH COPD (type B with acute asthmatic bronchitis, 5L NC at home, 80 pack year hx, quit smoking 8 ya) and r lung cancer s/p RLL resection 2017 on chemo (last dose 7/31) admitted for COPD exacerbation and severe neck/back pain 2/2 known pathologic spine and rib fx and completion of UTI tx (started outpatient 7/31).

## 2020-08-06 NOTE — PROGRESS NOTE ADULT - PROBLEM SELECTOR PLAN 8
A1c 8.6 this admission, unchanged from previously reported value 4/2020  elevated BGs likely exacerbated by steroids  diet-controlled at home  -on Humalog and SS, will continue to monitor BGs  -DASH diet, Na and chol restricted

## 2020-08-06 NOTE — PROGRESS NOTE ADULT - PROBLEM SELECTOR PROBLEM 3
Thoracic compression fracture
UTI (urinary tract infection)

## 2020-08-06 NOTE — PROGRESS NOTE ADULT - SUBJECTIVE AND OBJECTIVE BOX
Source of information: , Chart review  Patient language: English  : n/a    CC:  back pain    This is a 65yFemale patient who presents to the hospital for Patient is a 65y old  Female who presents with a chief complaint of Shortness of breath and back pain (06 Aug 2020 09:54).  Pt with metastatic colon cancer.  + back pain. Pain increases on exertion.  + tightness in upper back.  Pt uses 5L of o2 at home. For discharge home today.       PAIN SCORE:    5/10     SCALE USED: (1-10 NRS)      PAST MEDICAL & SURGICAL HISTORY:  Malignant neoplasm of unspecified part of right bronchus or lung  HTN (hypertension)  DM (diabetes mellitus)  COPD (chronic obstructive pulmonary disease)  Lung cancer  Morbid obesity  GERD (gastroesophageal reflux disease)  Deviated septum  Prolapsed uterus  ITP (idiopathic thrombocytopenic purpura)  Emphysema/COPD  History of cholecystectomy  S/P lobectomy of lung: right 2017  History of tonsillectomy      FAMILY HISTORY:  FH: lung cancer  Family history of stroke  Family history of lung cancer  Diabetes mellitus        SOCIAL HISTORY:  [x ] Denies Smoking, Alcohol, or Drug Use    Allergies    fish (Angioedema)  penicillins (Rash)    Intolerances        MEDICATIONS:    MEDICATIONS  (STANDING):  acetaminophen   Tablet .. 650 milliGRAM(s) Oral every 6 hours  ALPRAZolam 0.25 milliGRAM(s) Oral two times a day  atorvastatin 40 milliGRAM(s) Oral at bedtime  bisacodyl Suppository 10 milliGRAM(s) Rectal every 24 hours  budesonide 160 MICROgram(s)/formoterol 4.5 MICROgram(s) Inhaler 2 Puff(s) Inhalation two times a day  carvedilol 6.25 milliGRAM(s) Oral every 12 hours  cyanocobalamin 1000 MICROGram(s) Oral daily  dextrose 5%. 1000 milliLiter(s) (50 mL/Hr) IV Continuous <Continuous>  enoxaparin Injectable 40 milliGRAM(s) SubCutaneous daily  ergocalciferol 83313 Unit(s) Oral <User Schedule>  folic acid 1 milliGRAM(s) Oral daily  gabapentin 400 milliGRAM(s) Oral every 6 hours  insulin lispro (HumaLOG) corrective regimen sliding scale   SubCutaneous three times a day before meals  insulin lispro (HumaLOG) corrective regimen sliding scale   SubCutaneous at bedtime  insulin lispro Injectable (HumaLOG) 5 Unit(s) SubCutaneous Before meals and at bedtime  loratadine 5 milliGRAM(s) Oral daily  melatonin 3 milliGRAM(s) Oral at bedtime  oxyCODONE    IR 20 milliGRAM(s) Oral four times a day  pantoprazole    Tablet 40 milliGRAM(s) Oral before breakfast  polyethylene glycol 3350 17 Gram(s) Oral daily  predniSONE   Tablet 40 milliGRAM(s) Oral daily  senna 2 Tablet(s) Oral at bedtime  sodium chloride 0.9%. 1000 milliLiter(s) (70 mL/Hr) IV Continuous <Continuous>  tiotropium 18 MICROgram(s) Capsule 1 Capsule(s) Inhalation daily    MEDICATIONS  (PRN):  ALBUTerol    90 MICROgram(s) HFA Inhaler 2 Puff(s) Inhalation every 6 hours PRN Shortness of Breath  glucagon  Injectable 1 milliGRAM(s) IntraMuscular once PRN Glucose LESS THAN 70 milligrams/deciliter  ondansetron Injectable 4 milliGRAM(s) IV Push every 4 hours PRN Nausea and/or Vomiting      Vital Signs Last 24 Hrs  T(C): 36.6 (06 Aug 2020 05:30), Max: 36.7 (05 Aug 2020 20:23)  T(F): 97.9 (06 Aug 2020 05:30), Max: 98.1 (05 Aug 2020 20:23)  HR: 88 (06 Aug 2020 13:27) (79 - 89)  BP: 150/88 (06 Aug 2020 13:27) (131/80 - 180/75)  BP(mean): --  RR: 18 (06 Aug 2020 05:30) (18 - 18)  SpO2: 98% (06 Aug 2020 13:27) (93% - 98%)    LABS:                          11.5   6.46  )-----------( 70       ( 06 Aug 2020 07:55 )             35.4     08-06    138  |  98  |  19<H>  ----------------------------<  269<H>  4.1   |  33<H>  |  0.50    Ca    8.5      06 Aug 2020 07:55  Phos  3.3     08-06  Mg     2.1     08-06    TPro  5.7<L>  /  Alb  2.9<L>  /  TBili  0.5  /  DBili  x   /  AST  9<L>  /  ALT  21  /  AlkPhos  161<H>  08-06      LIVER FUNCTIONS - ( 06 Aug 2020 07:55 )  Alb: 2.9 g/dL / Pro: 5.7 g/dL / ALK PHOS: 161 U/L / ALT: 21 U/L DA / AST: 9 U/L / GGT: x             CAPILLARY BLOOD GLUCOSE      POCT Blood Glucose.: 391 mg/dL (06 Aug 2020 11:48)  POCT Blood Glucose.: 401 mg/dL (06 Aug 2020 07:46)  POCT Blood Glucose.: 285 mg/dL (06 Aug 2020 06:09)  POCT Blood Glucose.: 270 mg/dL (05 Aug 2020 21:24)  POCT Blood Glucose.: 358 mg/dL (05 Aug 2020 17:00)      Radiology:  < from: CT Thoracic Spine No Cont (08.04.20 @ 09:38) >  EXAM:  CT THORACIC SPINE                            PROCEDURE DATE:  08/04/2020          INTERPRETATION:  CLINICAL INFORMATION: pathological fx. RCC. ADMDIAG1: R06.02 SHORTNESS OF BREATH/.    TECHNIQUE: Noncontrast CT scan of the thoracic spine wasperformed. Thin section axial images were obtained with sagittal and coronal reformations.    COMPARISON: CT chest 8/2/2020 and 6/25/2020    FINDINGS:    Exaggerated thoracic kyphosis.    Multiple sclerotic and lytic lesions in the thoracic spine andribs suggesting metastatic disease, overall progressed since 6/25/2020. Lesions are noted in the T2 and T4-T10 vertebrae, worst at T7 where there is extensive involvement of the vertebral body and posterior elements and epidural soft tissue suggesting tumor ventrally and to the right. Redemonstration of compression deformities of the T2, T4, T7, T8, T10, T11 and T12 vertebral bodies. New acute compression fracture of the L1 vertebral body with bony retropulsion. Sclerotic lesions in the bilateraleighth and right 11th ribs.    Emphysema. Right lung wedge resection. Subcarinal and left paraesophageal lymphadenopathy again noted.      IMPRESSION:  1.  Multiple sclerotic and lytic lesions in the thoracic spine and ribs suggesting metastatic disease, overall progressed since 6/25/2020. Epidural soft tissue at the T7 level suggesting tumor.  2.  Multilevel thoracic compression fractures.  3.  New acute compression fracture of the L1 vertebral body with bony retropulsion.    < end of copied text >      Drug Screen:        REVIEW OF SYSTEMS:  CONSTITUTIONAL: No fever or fatigue  RESPIRATORY: No cough, wheezing, chills or hemoptysis; No shortness of breath  CARDIOVASCULAR: No chest pain, palpitations, dizziness, or leg swelling  GASTROINTESTINAL: No abdominal or epigastric pain. No nausea, vomiting; No diarrhea or constipation.   GENITOURINARY: No dysuria, frequency, hematuria, retention or incontinence  MUSCULOSKELETAL: No joint pain or swelling; No muscle, back, or extremity pain, no upper or lower motor strength weakness, no saddle anesthesia, bowel/bladder incontinence, no falls   NEURO: No headaches, No numbness/tingling b/l LE, No weakness  PSYCHIATRIC: No depression, anxiety, mood swings, or difficulty sleeping    PHYSICAL EXAM:  GENERAL:  Alert & Oriented X3, NAD, Good concentration  CHEST/LUNG: Clear to auscultation bilaterally; No rales, rhonchi, wheezing, or rubs  HEART: Regular rate and rhythm; No murmurs, rubs, or gallops  ABDOMEN: Soft, Nontender, Nondistended; Bowel sounds present  EXTREMITIES:  2+ Peripheral Pulses, No cyanosis, or edema  MUSCULOSKELETAL: + decreased rom Motor Strength 5/5 B/L upper and lower extremities; moves all extremities equally against gravity; ROM intact; negative SLR  SKIN: No rashes or lesions    Risk factors associated with adverse outcomes related to opioid treatment  [ ]  Concurrent benzodiazepine use  [ ]  History/ Active substance use or alcohol use disorder  [ ] Psychiatric co-morbidity  [ ] Sleep apnea  [ ] COPD  [ ] BMI> 35  [ ] Liver dysfunction  [ ] Renal dysfunction  [ ] CHF  [ ] Smoker  [ ]  Age > 60 years    [ ]  [place]  Reviewed and Copied to Chart. See below.    Plan of care and goal oriented pain management treatment options were discussed with patient and /or primary care giver; all questions and concerns were addressed and care was aligned with patient's wishes.    Educated patient on goal oriented pain management treatment options     08-06-20 @ 13:38

## 2020-08-06 NOTE — PROGRESS NOTE ADULT - SUBJECTIVE AND OBJECTIVE BOX
PULMONARY  progress note    BARB COLÓN  MRN-[id number]    Patient is a 65y old  Female who presents with a chief complaint of Shortness of breath and back pain (06 Aug 2020 07:42)  Feels better except pain back though l;ess, agrees to go home today with help and home P/T      MEDICATIONS  (STANDING):  acetaminophen   Tablet .. 650 milliGRAM(s) Oral every 6 hours  ALPRAZolam 0.25 milliGRAM(s) Oral two times a day  atorvastatin 40 milliGRAM(s) Oral at bedtime  bisacodyl Suppository 10 milliGRAM(s) Rectal every 24 hours  budesonide 160 MICROgram(s)/formoterol 4.5 MICROgram(s) Inhaler 2 Puff(s) Inhalation two times a day  carvedilol 6.25 milliGRAM(s) Oral every 12 hours  cyanocobalamin 1000 MICROGram(s) Oral daily  dextrose 5%. 1000 milliLiter(s) (50 mL/Hr) IV Continuous <Continuous>  enoxaparin Injectable 40 milliGRAM(s) SubCutaneous daily  ergocalciferol 29411 Unit(s) Oral <User Schedule>  folic acid 1 milliGRAM(s) Oral daily  gabapentin 400 milliGRAM(s) Oral every 6 hours  insulin lispro (HumaLOG) corrective regimen sliding scale   SubCutaneous three times a day before meals  insulin lispro (HumaLOG) corrective regimen sliding scale   SubCutaneous at bedtime  insulin lispro Injectable (HumaLOG) 5 Unit(s) SubCutaneous Before meals and at bedtime  loratadine 5 milliGRAM(s) Oral daily  melatonin 3 milliGRAM(s) Oral at bedtime  oxyCODONE    IR 20 milliGRAM(s) Oral four times a day  pantoprazole    Tablet 40 milliGRAM(s) Oral before breakfast  polyethylene glycol 3350 17 Gram(s) Oral daily  predniSONE   Tablet 40 milliGRAM(s) Oral daily  senna 2 Tablet(s) Oral at bedtime  sodium chloride 0.9%. 1000 milliLiter(s) (70 mL/Hr) IV Continuous <Continuous>  tiotropium 18 MICROgram(s) Capsule 1 Capsule(s) Inhalation daily      MEDICATIONS  (PRN):  ALBUTerol    90 MICROgram(s) HFA Inhaler 2 Puff(s) Inhalation every 6 hours PRN Shortness of Breath  cyclobenzaprine 5 milliGRAM(s) Oral three times a day PRN Muscle Spasm  glucagon  Injectable 1 milliGRAM(s) IntraMuscular once PRN Glucose LESS THAN 70 milligrams/deciliter  ondansetron Injectable 4 milliGRAM(s) IV Push every 4 hours PRN Nausea and/or Vomiting      Allergies    fish (Angioedema)  penicillins (Rash)      PAST MEDICAL & SURGICAL HISTORY:  Malignant neoplasm of unspecified part of right bronchus or lung  HTN (hypertension)  DM (diabetes mellitus)  COPD (chronic obstructive pulmonary disease)  Lung cancer  Morbid obesity  GERD (gastroesophageal reflux disease)  Deviated septum  Prolapsed uterus  ITP (idiopathic thrombocytopenic purpura)  Emphysema/COPD  History of cholecystectomy  S/P lobectomy of lung: right 2017  History of tonsillectomy           REVIEW OF SYSTEMS:  CONSTITUTIONAL: No fever, weight loss+  EYES: No eye pain, visual disturbances, or discharge  ENT:  No difficulty hearing, tinnitus, vertigo; No sinus or throat pain  NECK: No pain or stiffness or nodes  RESPIRATORY:  cough--   wheezing --  chills --  hemoptysis--  Shortness of Breath+  CARDIOVASCULAR: No chest pain, palpitations, passing out, dizziness, or leg swelling  GASTROINTESTINAL: No abdominal or epigastric pain. No nausea, vomiting, or hematemesis; No diarrhea or constipation. No melena or hematochezia.  GENITOURINARY: No dysuria, frequency, hematuria, or incontinence  NEUROLOGICAL: No headaches, memory loss, loss of strength, numbness, or tremors  SKIN: No itching, burning, rashes, or lesions   LYMPH Nodes: No enlarged glands  ENDOCRINE: No heat or cold intolerance; No hair loss  MUSCULOSKELETAL: No joint pain or swelling;  back pain++  PSYCHIATRIC: No depression, anxiety, mood swings, or difficulty sleeping  HEME/LYMPH: No easy bruising, or bleeding gums  ALLERGY AND IMMUNOLOGIC: No hives or eczema    Vital Signs Last 24 Hrs  T(C): 36.6 (06 Aug 2020 05:30), Max: 36.7 (05 Aug 2020 20:23)  T(F): 97.9 (06 Aug 2020 05:30), Max: 98.1 (05 Aug 2020 20:23)  HR: 83 (06 Aug 2020 05:30) (79 - 89)  BP: 161/92 (06 Aug 2020 05:30) (131/80 - 180/75)  BP(mean): --  RR: 18 (06 Aug 2020 05:30) (18 - 18)  SpO2: 96% (06 Aug 2020 05:30) (93% - 98%)  I&O's Detail      PHYSICAL EXAMINATION:    GENERAL: The patient is a well-developed, well-nourished in no apparent distress.   SKIN no rash ecchymoses or bruises  HEENT: Head is normocephalic and atraumatic  SHAMA , Extraocular muscles are intact. Mucous membranes  moist.   Neck supple ,No LN felt JVP not increased  Thyroid not enlarged  Cardiovascular:  S1 S2 heard, RSR, No JVD , systolic  murmur at apex, No gallop or rub  Respiratory: Chest wall symmetrical with good air entry ,Percussion note normal, decreased BS at basis   Lungs vesicular breathing with no   rales or   wheeze	  ABDOMEN:  Soft, Non-tender, obese, no hepatomegaly or splenomegaly BS positive	  Extremities: Normal range of motion, No clubbing, cyanosis or edema  Vascular: Peripheral pulses palpable 2+ bilaterally  CNS:  Alert and oriented x3   Cranial nerves intact  sensory intact  motor power5/5  dtr 2+   Babinski neg    LABS:                        11.5   6.46  )-----------( 70       ( 06 Aug 2020 07:55 )             35.4     08-06    138  |  98  |  19<H>  ----------------------------<  269<H>  4.1   |  33<H>  |  0.50    Ca    8.5      06 Aug 2020 07:55  Phos  3.3     08-06  Mg     2.1     08-06    TPro  5.7<L>  /  Alb  2.9<L>  /  TBili  0.5  /  DBili  x   /  AST  9<L>  /  ALT  21  /  AlkPhos  161<H>  08-06                Folate, Serum: 17.0 ng/mL (08-02-20 @ 18:11)  Vitamin B12, Serum: >2000 pg/mL (08-02-20 @ 18:11)      MICROBIOLOGY:    Culture - Urine (collected 08-02-20 @ 14:58)  Source: .Urine Clean Catch (Midstream)  Final Report (08-04-20 @ 14:05):    50,000 - 99,000 CFU/mL Coag Negative Staphylococcus    "Susceptibilities not performed"    Culture - Blood (collected 08-02-20 @ 11:14)  Source: .Blood Blood  Preliminary Report (08-03-20 @ 12:02):    No growth to date.    Culture - Blood (collected 08-02-20 @ 11:14)  Source: .Blood Blood  Preliminary Report (08-03-20 @ 12:02):    No growth to date.

## 2020-08-06 NOTE — PROGRESS NOTE ADULT - ASSESSMENT
Search Terms: mandi olson, 1955   Search Date: 08/05/2020 15:39:46 PM   The Drug Utilization Report below displays all of the controlled substance prescriptions, if any, that your patient has filled in the last twelve months. The information displayed on this report is compiled from pharmacy submissions to the Department, and accurately reflects the information as submitted by the pharmacies.  This report was requested by: Ghanshyam Salinas | Reference #: [id number]   Others' Prescriptions  Patient Name: Mandi Olson   YOB: 1955   Address: [address] [place] [address]   Sex: Female   Rx Written	Rx Dispensed	Drug	Quantity	Days Supply	Prescriber Name	Payment Method	Dispenser	  07/13/2020	07/17/2020	endocet  mg tablet 	240	30	Mendiola, Paul ZAPATA	[facility]	  07/13/2020	07/17/2020	alprazolam 2 mg tablet 	120	30	Mendiola, Paul ZAPATA	[facility] [employer] [facility]	  06/19/2020	06/19/2020	endocet  mg tablet 	240	30	Mendiola, Paul ZAPATA	[facility]	  06/19/2020	06/19/2020	alprazolam 2 mg tablet 	90	30	Mendiola, Paul ZAPATA	[employer]	[facility] [facility]	  05/18/2020	05/18/2020	endocet  mg tablet 	240	30	Mendiola, Paul ZAPATA	[facility]	  05/18/2020	05/18/2020	alprazolam 2 mg tablet 	90	30	Mendiola, Paul ZAPATA	[employer]	[facility] [facility]	  04/17/2020	04/17/2020	oxycodone-acetaminophen  mg tab 	180	30	Mendiola, Paul ZAPATA	[facility]	  04/17/2020	04/17/2020	alprazolam 2 mg tablet 	90	30	Mendiola, Paul ZAPATA	[employer]	  03/12/2020	03/16/2020	alprazolam 2 mg tablet 	90	30	Mendiola, Paul ZAPATA	[facility]	  03/12/2020	03/16/2020	oxycodone-acetaminophen  mg tab 	180	30	Mendiola, Paul ZAPATA	[facility] [facility]	  02/14/2020	02/17/2020	oxycodone-acetaminophen  mg tab 	180	30	Mendiola, Paul ZAPATA	[facility]	  02/14/2020	02/17/2020	alprazolam 2 mg tablet 	90	30	Mendiola, Paul ZAPATA	[facility]	  01/17/2020	01/17/2020	alprazolam 2 mg tablet 	90	30	Mendiola, Paul ZAPATA	[facility]	  01/17/2020	01/17/2020	oxycodone-acetaminophen  mg tab 	240	30	Mendiola, Paul ZAPATA	[facility]	  12/16/2019	12/16/2019	oxycodone-acetaminophen  mg tab 	240	30	Mendiola, Paul ZAPATA	[facility]	  12/16/2019	12/16/2019	alprazolam 2 mg tablet 	90	30	Mendiola, Paul ZAPATA	[facility] [employer] [facility]	  11/15/2019	11/18/2019	oxycodone-acetaminophen  mg tab 	240	30	Mendiola, Paul ZAPATA	[facility] [employer] [facility]	  11/15/2019	11/18/2019	alprazolam 2 mg tablet 	90	30	Mendiola, Paul ZAPATA	[facility] [facility]	  10/18/2019	10/19/2019	alprazolam 2 mg tablet 	90	30	Mendiola, Paul ZAPATA	[facility] [facility]	  10/18/2019	10/19/2019	oxycodone-acetaminophen  mg tab 	240	30	Mendiola, Paul ZAPATA	[facility]	  09/20/2019	09/20/2019	oxycodone-acetaminophen  mg tab 	240	30	Mendiola, Paul ZAPATA	[facility]	  09/20/2019	09/20/2019	alprazolam 2 mg tablet 	90	30	[facility]ola, Paul [facility]APATA	[facility]	  08/16/2019	08/19/2019	oxycodone-acetaminophen  mg tab 	240	30	Mendiola, Paul ZAPATA	[facility]	  08/16/2019	08/19/2019	alprazolam 2 mg tablet 	90	30	Mendiola, Paul ZAPATA	[facility] [employer] [facility]	  * - Drugs marked with an asterisk are compound drugs. If the compound drug is made up of more than one controlled substance, then each controlled substance will be a separate row in the table.

## 2020-08-06 NOTE — MEDICAL STUDENT PROGRESS NOTE(EDUCATION) - SUBJECTIVE AND OBJECTIVE BOX
PAGER #: [[phone]] TILL 5:00 PM  PLEASE CONTACT ON CALL TEAM:  - On Call Team (Please refer to Rachel) FROM 5:00 PM - 8:30PM  - Nightfloat Team FROM 8:30 -7:30 AM    CHIEF COMPLAINT & BRIEF HOSPITAL COURSE: 65 y.o. F with PMH of COPD (5L NC at home, 80 pack year hx, quit smoking 8 years ago), lung cancer s/p RLL resection in 2017 on chemotherapy (regimen of neulasta, ketruda, alimta, and carboplatin, last dose 7/31), DM, GERD, HTN, ITP, obesity, constipation and uterine prolapse presented to ED on 8/2 for SOB and severe back pain for 3 days prior, secondary to known pathologic spine fractures. Endorsed increasing oxygen requirement and difficulty breathing worse than baseline. Of note, patient has history of multiple COPD exacerbations, per patient without needing intubation. Took 60mg oral steroids at home prior to admission. Also endorsed increased urinary frequency, urgency, and dysuria on admission. WBC increased to 38.11 on admission. CXR showed no acute consolidation. Chest CT showed no acute airspace/consolidation, improving mediastinal lymphadenopathy, and multiple new sclerotic lesions in the thoracic spine concerning for metastasis. Urinalysis on admission positive for WBCs. Urine cultures positive for coag negative Staph (8/2) and blood cultures NGTD. Patient was also found to have UTI outpatient by oncologist Dr. Mendiola 7/31 and was started on levofloxacin; antibiotics continuing inpatient. CT scan of thoracic spine 8/4 showed multiple sclerotic and lytic lesions in thoracic spine and ribs suggesting metastasis; also multilevel compression fractures and a new acute compression fracture. Levofloxacin switched to Ceftriaxone 8/5 secondary to patient prolonged QTc on ECG.       INTERVAL HPI/OVERNIGHT EVENTS:   IV Solumedrol d/c this am, add PO prednisone 40mg qd x4days, then taper off.    MEDICATIONS  (STANDING):  acetaminophen   Tablet .. 650 milliGRAM(s) Oral every 6 hours  ALPRAZolam 0.25 milliGRAM(s) Oral two times a day  atorvastatin 40 milliGRAM(s) Oral at bedtime  bisacodyl Suppository 10 milliGRAM(s) Rectal every 24 hours  budesonide 160 MICROgram(s)/formoterol 4.5 MICROgram(s) Inhaler 2 Puff(s) Inhalation two times a day  carvedilol 6.25 milliGRAM(s) Oral every 12 hours  cyanocobalamin 1000 MICROGram(s) Oral daily  dextrose 5%. 1000 milliLiter(s) (50 mL/Hr) IV Continuous <Continuous>  enoxaparin Injectable 40 milliGRAM(s) SubCutaneous daily  ergocalciferol 53341 Unit(s) Oral <User Schedule>  folic acid 1 milliGRAM(s) Oral daily  gabapentin 400 milliGRAM(s) Oral every 6 hours  insulin lispro (HumaLOG) corrective regimen sliding scale   SubCutaneous three times a day before meals  insulin lispro (HumaLOG) corrective regimen sliding scale   SubCutaneous at bedtime  insulin lispro Injectable (HumaLOG) 5 Unit(s) SubCutaneous Before meals and at bedtime  loratadine 5 milliGRAM(s) Oral daily  melatonin 3 milliGRAM(s) Oral at bedtime  oxyCODONE    IR 20 milliGRAM(s) Oral four times a day  pantoprazole    Tablet 40 milliGRAM(s) Oral before breakfast  polyethylene glycol 3350 17 Gram(s) Oral daily  predniSONE   Tablet 40 milliGRAM(s) Oral daily  senna 2 Tablet(s) Oral at bedtime  sodium chloride 0.9%. 1000 milliLiter(s) (70 mL/Hr) IV Continuous <Continuous>  tiotropium 18 MICROgram(s) Capsule 1 Capsule(s) Inhalation daily    MEDICATIONS  (PRN):  ALBUTerol    90 MICROgram(s) HFA Inhaler 2 Puff(s) Inhalation every 6 hours PRN Shortness of Breath  cyclobenzaprine 5 milliGRAM(s) Oral three times a day PRN Muscle Spasm  glucagon  Injectable 1 milliGRAM(s) IntraMuscular once PRN Glucose LESS THAN 70 milligrams/deciliter  ondansetron Injectable 4 milliGRAM(s) IV Push every 4 hours PRN Nausea and/or Vomiting        REVIEW OF SYSTEMS:  CONSTITUTIONAL: No fever, weight loss, or fatigue  RESPIRATORY: No cough, wheezing, chills or hemoptysis; No shortness of breath  CARDIOVASCULAR: No chest pain, palpitations, dizziness, or leg swelling  GASTROINTESTINAL: No abdominal pain. No nausea, vomiting, or hematemesis; No diarrhea or constipation. No melena or hematochezia.  GENITOURINARY: No dysuria or hematuria, urinary frequency  NEUROLOGICAL: No headaches, memory loss, loss of strength, numbness, or tremors  SKIN: No itching, burning, rashes, or lesions     Vital Signs Last 24 Hrs  T(C): 36.6 (06 Aug 2020 05:30), Max: 36.7 (05 Aug 2020 20:23)  T(F): 97.9 (06 Aug 2020 05:30), Max: 98.1 (05 Aug 2020 20:23)  HR: 83 (06 Aug 2020 05:30) (79 - 89)  BP: 161/92 (06 Aug 2020 05:30) (131/80 - 180/75)  BP(mean): --  RR: 18 (06 Aug 2020 05:30) (18 - 18)  SpO2: 96% (06 Aug 2020 05:30) (93% - 98%)    PHYSICAL EXAMINATION:  GENERAL: NAD, well built  HEAD:  Atraumatic, Normocephalic  EYES:  conjunctiva and sclera clear  NECK: Supple, No JVD, Normal thyroid  CHEST/LUNG: Clear to auscultation. Clear to percussion bilaterally; No rales, rhonchi, wheezing, or rubs  HEART: Regular rate and rhythm; No murmurs, rubs, or gallops  ABDOMEN: Soft, Nontender, Nondistended; Bowel sounds present  NERVOUS SYSTEM: alert and oriented x3  EXTREMITIES:  2+ Peripheral Pulses, No clubbing, cyanosis, or edema  SKIN: warm dry                          11.5   6.46  )-----------( 70       ( 06 Aug 2020 07:55 )             35.4     08-06    138  |  98  |  19<H>  ----------------------------<  269<H>  4.1   |  33<H>  |  0.50    Ca    8.5      06 Aug 2020 07:55  Phos  3.3     08-06  Mg     2.1     08-06    TPro  5.7<L>  /  Alb  2.9<L>  /  TBili  0.5  /  DBili  x   /  AST  9<L>  /  ALT  21  /  AlkPhos  161<H>  08-06    LIVER FUNCTIONS - ( 06 Aug 2020 07:55 )  Alb: 2.9 g/dL / Pro: 5.7 g/dL / ALK PHOS: 161 U/L / ALT: 21 U/L DA / AST: 9 U/L / GGT: x                   CAPILLARY BLOOD GLUCOSE      RADIOLOGY & ADDITIONAL TESTS: PAGER #: [[phone]] TILL 5:00 PM  PLEASE CONTACT ON CALL TEAM:  - On Call Team (Please refer to Rachel) FROM 5:00 PM - 8:30PM  - Nightfloat Team FROM 8:30 -7:30 AM    CHIEF COMPLAINT & BRIEF HOSPITAL COURSE: 65 y.o. F with PMH of COPD (5L NC at home, 80 pack year hx, quit smoking 8 years ago), lung cancer s/p RLL resection in 2017 on chemotherapy (regimen of neulasta, ketruda, alimta, and carboplatin, last dose 7/31), DM, GERD, HTN, ITP, obesity, constipation and uterine prolapse presented to ED on 8/2 for SOB and severe back pain for 3 days prior, secondary to known pathologic spine fractures. Endorsed increasing oxygen requirement and difficulty breathing worse than baseline. Of note, patient has history of multiple COPD exacerbations, per patient without needing intubation. Took 60mg oral steroids at home prior to admission. Also endorsed increased urinary frequency, urgency, and dysuria on admission. WBC increased to 38.11 on admission. CXR showed no acute consolidation. Chest CT showed no acute airspace/consolidation, improving mediastinal lymphadenopathy, and multiple new sclerotic lesions in the thoracic spine concerning for metastasis. Urinalysis on admission positive for WBCs. Urine cultures positive for coag negative Staph (8/2) and blood cultures NGTD. Patient was also found to have UTI outpatient by oncologist Dr. Mendiola 7/31 and was started on levofloxacin; antibiotics continuing inpatient. CT scan of thoracic spine 8/4 showed multiple sclerotic and lytic lesions in thoracic spine and ribs suggesting metastasis; also multilevel compression fractures and a new acute compression fracture. Levofloxacin switched to Ceftriaxone 8/5 secondary to patient prolonged QTc on ECG.       INTERVAL HPI/OVERNIGHT EVENTS: SBP elevated to 161 this am, spike to 180 yesterday pm, likely secondary to patient pain/discomfort. Patient feeling better this morning, anxious about reducing the steroid dose today. Reports still being short of breath on 5L O2 NC, but willing to try to decrease to 4L. IV Solumedrol d/c this am, add PO prednisone 40mg qd x4days, then taper off. Back pain controlled with oxycodone 20mg PO q6hrs but still has stiffness and difficulty moving around. Per pain management will increase gabapentin dose to 400mg PO q8hrs and add flexeril 5mg PO q8hrs prn for spasms. Reports urinary symptoms better, dysuria resolved. Last bowel movement this morning. D/c ceftriaxone today. PT to see patient today for ambulation, possible discharge on home PT later this evening. WBC wnl this am.     MEDICATIONS  (STANDING):  acetaminophen   Tablet .. 650 milliGRAM(s) Oral every 6 hours  ALPRAZolam 0.25 milliGRAM(s) Oral two times a day  atorvastatin 40 milliGRAM(s) Oral at bedtime  bisacodyl Suppository 10 milliGRAM(s) Rectal every 24 hours  budesonide 160 MICROgram(s)/formoterol 4.5 MICROgram(s) Inhaler 2 Puff(s) Inhalation two times a day  carvedilol 6.25 milliGRAM(s) Oral every 12 hours  cyanocobalamin 1000 MICROGram(s) Oral daily  dextrose 5%. 1000 milliLiter(s) (50 mL/Hr) IV Continuous <Continuous>  enoxaparin Injectable 40 milliGRAM(s) SubCutaneous daily  ergocalciferol 60301 Unit(s) Oral <User Schedule>  folic acid 1 milliGRAM(s) Oral daily  gabapentin 400 milliGRAM(s) Oral every 6 hours  insulin lispro (HumaLOG) corrective regimen sliding scale   SubCutaneous three times a day before meals  insulin lispro (HumaLOG) corrective regimen sliding scale   SubCutaneous at bedtime  insulin lispro Injectable (HumaLOG) 5 Unit(s) SubCutaneous Before meals and at bedtime  loratadine 5 milliGRAM(s) Oral daily  melatonin 3 milliGRAM(s) Oral at bedtime  oxyCODONE    IR 20 milliGRAM(s) Oral four times a day  pantoprazole    Tablet 40 milliGRAM(s) Oral before breakfast  polyethylene glycol 3350 17 Gram(s) Oral daily  predniSONE   Tablet 40 milliGRAM(s) Oral daily  senna 2 Tablet(s) Oral at bedtime  sodium chloride 0.9%. 1000 milliLiter(s) (70 mL/Hr) IV Continuous <Continuous>  tiotropium 18 MICROgram(s) Capsule 1 Capsule(s) Inhalation daily    MEDICATIONS  (PRN):  ALBUTerol    90 MICROgram(s) HFA Inhaler 2 Puff(s) Inhalation every 6 hours PRN Shortness of Breath  cyclobenzaprine 5 milliGRAM(s) Oral three times a day PRN Muscle Spasm  glucagon  Injectable 1 milliGRAM(s) IntraMuscular once PRN Glucose LESS THAN 70 milligrams/deciliter  ondansetron Injectable 4 milliGRAM(s) IV Push every 4 hours PRN Nausea and/or Vomiting        REVIEW OF SYSTEMS:  CONSTITUTIONAL: No fever, weight loss, + fatigue  RESPIRATORY: + Shortness of breath. No cough, wheezing, chills or hemoptysis  CARDIOVASCULAR: No chest pain or palpitations, chronic leg swelling unchanged from baseline   GASTROINTESTINAL: No abdominal pain. No nausea, vomiting, diarrhea or constipation.   GENITOURINARY: No dysuria or hematuria  EXTREMITIES: chronic b/l LE tenderness unchanged from baseline. Back stiffness and rigidity.      Vital Signs Last 24 Hrs  T(C): 36.6 (06 Aug 2020 05:30), Max: 36.7 (05 Aug 2020 20:23)  T(F): 97.9 (06 Aug 2020 05:30), Max: 98.1 (05 Aug 2020 20:23)  HR: 83 (06 Aug 2020 05:30) (79 - 89)  BP: 161/92 (06 Aug 2020 05:30) (131/80 - 180/75)  BP(mean): --  RR: 18 (06 Aug 2020 05:30) (18 - 18)  SpO2: 96% (06 Aug 2020 05:30) (93% - 98%)    PHYSICAL EXAMINATION:   GENERAL: Obese woman in NAD, appears stated age   HEAD:  Atraumatic, Normocephalic  EYES:  conjunctiva and sclera clear  NECK: Supple  CHEST/LUNG: Expiratory wheezes in b/l upper lung fields with decreased breath sounds. No rales or rhonchi  HEART: Regular rate and rhythm; No murmurs, rubs, or gallops  ABDOMEN: Soft, obese, nontender; Bowel sounds present  NERVOUS SYSTEM: alert and oriented x3  EXTREMITIES: B/l LE edema and tenderness, chronic and unchanged from baseline. No cyanosis. Unable to examine back secondary to patient discomfort.   SKIN: warm and dry                          11.5   6.46  )-----------( 70       ( 06 Aug 2020 07:55 )             35.4     08-06    138  |  98  |  19<H>  ----------------------------<  269<H>  4.1   |  33<H>  |  0.50    Ca    8.5      06 Aug 2020 07:55  Phos  3.3     08-06  Mg     2.1     08-06    TPro  5.7<L>  /  Alb  2.9<L>  /  TBili  0.5  /  DBili  x   /  AST  9<L>  /  ALT  21  /  AlkPhos  161<H>  08-06    LIVER FUNCTIONS - ( 06 Aug 2020 07:55 )  Alb: 2.9 g/dL / Pro: 5.7 g/dL / ALK PHOS: 161 U/L / ALT: 21 U/L DA / AST: 9 U/L / GGT: x                   CAPILLARY BLOOD GLUCOSE      RADIOLOGY & ADDITIONAL TESTS:

## 2020-08-06 NOTE — PROGRESS NOTE ADULT - PROBLEM SELECTOR PLAN 2
neck and back pain 2/2 known pathologic fx in spine and ribs, on CT chest this admission multiple new sclerotic lesions in the thoracic spine concerning for metastatic disease noted  -CT thoracic spine 8/4 showed:     1. Multiple sclerotic and lytic lesions in the thoracic spine and ribs suggesting    metastatic disease, overall progressed since 6/25/2020. Epidural soft tissue at    the T7 level suggesting tumor     2.  Multilevel thoracic compression fx     3.  New acute compression fx of the L1 vertebral body with bony retropulsion  -cw 20mg oxycodone q4 and 650 Tylenol q6, increase gabapentin to 300mg TID  -bisacodyl, miralax for constipation neck and back pain 2/2 known pathologic fx in spine and ribs, on CT chest this admission multiple new sclerotic lesions in the thoracic spine concerning for metastatic disease noted  -CT thoracic spine 8/4 showed:     1. Multiple sclerotic and lytic lesions in the thoracic spine and ribs suggesting    metastatic disease, overall progressed since 6/25/2020. Epidural soft tissue at    the T7 level suggesting tumor     2.  Multilevel thoracic compression fx     3.  New acute compression fx of the L1 vertebral body with bony retropulsion  -cw 20mg oxycodone q4 and 650 Tylenol q6, per pain mgmt 8/5 increase gabapentin to 400mg TID, add 5mg cyclobenzaprine  -bisacodyl, miralax for constipation, reporting good stooling  -SBP of 180 yesterday likely 2/2 pain, monitor BPs on new pain regimen neck and back pain 2/2 known pathologic fx in spine and ribs, on CT chest this admission multiple new sclerotic lesions in the thoracic spine concerning for metastatic disease noted  -CT thoracic spine 8/4 showed:     1. Multiple sclerotic and lytic lesions in the thoracic spine and ribs suggesting    metastatic disease, overall progressed since 6/25/2020. Epidural soft tissue at    the T7 level suggesting tumor     2.  Multilevel thoracic compression fx     3.  New acute compression fx of the L1 vertebral body with bony retropulsion  -cw 20mg oxycodone q4 and 650 Tylenol q6, per pain mgmt 8/5 increase gabapentin to 400mg TID, add 5mg cyclobenzaprine prn with 1-2 doses standing today  -bisacodyl, miralax for constipation, reporting good stooling  -SBP of 180 yesterday likely 2/2 pain, monitor BPs on new pain regimen

## 2020-08-06 NOTE — PROGRESS NOTE ADULT - PROBLEM SELECTOR PLAN 3
Dx by Dr. Mendiola outpatient 2/2 increased urinary frequency, dysuria, started on levofloxacin  -dc levofloxacin 2/2 QTc 525, start cftx  -UA with mild WBCs, UCx coag negative Staph, awaiting SS  -WBC elevated to 12 today, 25 yesterday, significance c/b steroids and neulasta Dx by Dr. Mendiola outpatient 2/2 increased urinary frequency, dysuria, started on levofloxacin, dc 8/5 2/2 QTc 525  -cw cftx  -UA with mild WBCs, UCx coag negative Staph  -WBC elevated to , significance c/b steroids and neulasta Dx by Dr. Mendiola outpatient 2/2 increased urinary frequency, dysuria, started on levofloxacin, dc 8/5 2/2 QTc 525  -cw cftx  -UA with mild WBCs, UCx coag negative Staph  -WBC wnl today Dx by Dr. Mendiola outpatient 2/2 increased urinary frequency, dysuria, started on levofloxacin, dc 8/5 2/2 QTc 525  -started cftx 8/5, dc today, completed total 7 days of abx  -denying dysuria today  -UA with mild WBCs, UCx coag negative Staph  -WBC wnl today

## 2020-08-06 NOTE — PROGRESS NOTE ADULT - SUBJECTIVE AND OBJECTIVE BOX
back pain is better controled  breathing is ok  no fever    MEDICATIONS  (STANDING):  acetaminophen   Tablet .. 650 milliGRAM(s) Oral every 6 hours  ALPRAZolam 0.25 milliGRAM(s) Oral two times a day  atorvastatin 40 milliGRAM(s) Oral at bedtime  bisacodyl Suppository 10 milliGRAM(s) Rectal every 24 hours  budesonide 160 MICROgram(s)/formoterol 4.5 MICROgram(s) Inhaler 2 Puff(s) Inhalation two times a day  carvedilol 6.25 milliGRAM(s) Oral every 12 hours  cyanocobalamin 1000 MICROGram(s) Oral daily  dextrose 5%. 1000 milliLiter(s) (50 mL/Hr) IV Continuous <Continuous>  enoxaparin Injectable 40 milliGRAM(s) SubCutaneous daily  ergocalciferol 19684 Unit(s) Oral <User Schedule>  folic acid 1 milliGRAM(s) Oral daily  gabapentin 400 milliGRAM(s) Oral every 6 hours  insulin lispro (HumaLOG) corrective regimen sliding scale   SubCutaneous three times a day before meals  insulin lispro (HumaLOG) corrective regimen sliding scale   SubCutaneous at bedtime  insulin lispro Injectable (HumaLOG) 5 Unit(s) SubCutaneous Before meals and at bedtime  loratadine 5 milliGRAM(s) Oral daily  melatonin 3 milliGRAM(s) Oral at bedtime  oxyCODONE    IR 20 milliGRAM(s) Oral four times a day  pantoprazole    Tablet 40 milliGRAM(s) Oral before breakfast  polyethylene glycol 3350 17 Gram(s) Oral daily  predniSONE   Tablet 40 milliGRAM(s) Oral daily  senna 2 Tablet(s) Oral at bedtime  sodium chloride 0.9%. 1000 milliLiter(s) (70 mL/Hr) IV Continuous <Continuous>  tiotropium 18 MICROgram(s) Capsule 1 Capsule(s) Inhalation daily    MEDICATIONS  (PRN):  ALBUTerol    90 MICROgram(s) HFA Inhaler 2 Puff(s) Inhalation every 6 hours PRN Shortness of Breath  glucagon  Injectable 1 milliGRAM(s) IntraMuscular once PRN Glucose LESS THAN 70 milligrams/deciliter  ondansetron Injectable 4 milliGRAM(s) IV Push every 4 hours PRN Nausea and/or Vomiting      Allergies    fish (Angioedema)  penicillins (Rash)    Intolerances        Vital Signs Last 24 Hrs  T(C): 36.7 (06 Aug 2020 17:32), Max: 36.7 (05 Aug 2020 20:23)  T(F): 98.1 (06 Aug 2020 17:32), Max: 98.1 (05 Aug 2020 20:23)  HR: 107 (06 Aug 2020 17:32) (79 - 107)  BP: 124/84 (06 Aug 2020 17:32) (124/84 - 161/92)  BP(mean): --  RR: 18 (06 Aug 2020 17:32) (18 - 18)  SpO2: 97% (06 Aug 2020 17:32) (95% - 98%)    PHYSICAL EXAM  General: adult in NAD  HEENT: clear oropharynx, anicteric sclera, pink conjunctiva  Neck: supple  CV: normal S1/S2 with no murmur rubs or gallops  Lungs: positive air movement b/l ant lungs,clear to auscultation, no wheezes, no rales  Abdomen: soft non-tender non-distended, no hepatosplenomegaly  Ext: no clubbing cyanosis or edema  Skin: no rashes and no petechiae  Neuro: alert and oriented X 4, no focal deficits  LABS:                          11.5   6.46  )-----------( 70       ( 06 Aug 2020 07:55 )             35.4         Mean Cell Volume : 87.0 fl  Mean Cell Hemoglobin : 28.3 pg  Mean Cell Hemoglobin Concentration : 32.5 gm/dL  Auto Neutrophil # : x  Auto Lymphocyte # : x  Auto Monocyte # : x  Auto Eosinophil # : x  Auto Basophil # : x  Auto Neutrophil % : x  Auto Lymphocyte % : x  Auto Monocyte % : x  Auto Eosinophil % : x  Auto Basophil % : x    Serial CBC  Hematocrit 35.4  Hemoglobin 11.5  Plat 70  RBC 4.07  WBC 6.46  Serial CBC  Hematocrit 35.7  Hemoglobin 11.4  Plat 70  RBC 4.13  WBC 12.06  Serial CBC  Hematocrit 39.4  Hemoglobin 12.8  Plat 105  RBC 4.51  WBC 25.01  Serial CBC  Hematocrit 37.7  Hemoglobin 12.4  Plat 110  RBC 4.34  WBC 31.93    08-06    138  |  98  |  19<H>  ----------------------------<  269<H>  4.1   |  33<H>  |  0.50    Ca    8.5      06 Aug 2020 07:55  Phos  3.3     08-06  Mg     2.1     08-06    TPro  5.7<L>  /  Alb  2.9<L>  /  TBili  0.5  /  DBili  x   /  AST  9<L>  /  ALT  21  /  AlkPhos  161<H>  08-06          Folate, Serum: 17.0 ng/mL (08-02 @ 18:11)  Vitamin B12, Serum: >2000 pg/mL (08-02 @ 18:11)            BLOOD SMEAR INTERPRETATION:       RADIOLOGY & ADDITIONAL STUDIES:  < from: CT Thoracic Spine No Cont (08.04.20 @ 09:38) >  INTERPRETATION:  CLINICAL INFORMATION: pathological fx. RCC. ADMDIAG1: R06.02 SHORTNESS OF BREATH/.    TECHNIQUE: Noncontrast CT scan of the thoracic spine wasperformed. Thin section axial images were obtained with sagittal and coronal reformations.    COMPARISON: CT chest 8/2/2020 and 6/25/2020    FINDINGS:    Exaggerated thoracic kyphosis.    Multiple sclerotic and lytic lesions in the thoracic spine andribs suggesting metastatic disease, overall progressed since 6/25/2020. Lesions are noted in the T2 and T4-T10 vertebrae, worst at T7 where there is extensive involvement of the vertebral body and posterior elements and epidural soft tissue suggesting tumor ventrally and to the right. Redemonstration of compression deformities of the T2, T4, T7, T8, T10, T11 and T12 vertebral bodies. New acute compression fracture of the L1 vertebral body with bony retropulsion. Sclerotic lesions in the bilateraleighth and right 11th ribs.    Emphysema. Right lung wedge resection. Subcarinal and left paraesophageal lymphadenopathy again noted.      IMPRESSION:  1.  Multiple sclerotic and lytic lesions in the thoracic spine and ribs suggesting metastatic disease, overall progressed since 6/25/2020. Epidural soft tissue at the T7 level suggesting tumor.  2.  Multilevel thoracic compression fractures.  3.  New acute compression fracture of the L1 vertebral body with bony retropulsion.    < end of copied text >

## 2020-08-07 LAB
CULTURE RESULTS: SIGNIFICANT CHANGE UP
CULTURE RESULTS: SIGNIFICANT CHANGE UP
SPECIMEN SOURCE: SIGNIFICANT CHANGE UP
SPECIMEN SOURCE: SIGNIFICANT CHANGE UP

## 2020-08-19 LAB
CULTURE RESULTS: SIGNIFICANT CHANGE UP
SPECIMEN SOURCE: SIGNIFICANT CHANGE UP

## 2020-08-27 ENCOUNTER — INPATIENT (INPATIENT)
Facility: HOSPITAL | Age: 65
LOS: 7 days | Discharge: HOME HEALTH | End: 2020-09-04
Attending: INTERNAL MEDICINE | Admitting: INTERNAL MEDICINE
Payer: MEDICAID

## 2020-08-27 VITALS
HEART RATE: 106 BPM | RESPIRATION RATE: 24 BRPM | SYSTOLIC BLOOD PRESSURE: 105 MMHG | TEMPERATURE: 98 F | OXYGEN SATURATION: 100 % | DIASTOLIC BLOOD PRESSURE: 82 MMHG

## 2020-08-27 DIAGNOSIS — G89.29 OTHER CHRONIC PAIN: ICD-10-CM

## 2020-08-27 DIAGNOSIS — K59.00 CONSTIPATION, UNSPECIFIED: ICD-10-CM

## 2020-08-27 DIAGNOSIS — E11.9 TYPE 2 DIABETES MELLITUS WITHOUT COMPLICATIONS: ICD-10-CM

## 2020-08-27 DIAGNOSIS — R63.8 OTHER SYMPTOMS AND SIGNS CONCERNING FOOD AND FLUID INTAKE: ICD-10-CM

## 2020-08-27 DIAGNOSIS — I10 ESSENTIAL (PRIMARY) HYPERTENSION: ICD-10-CM

## 2020-08-27 DIAGNOSIS — Z90.49 ACQUIRED ABSENCE OF OTHER SPECIFIED PARTS OF DIGESTIVE TRACT: Chronic | ICD-10-CM

## 2020-08-27 DIAGNOSIS — J96.91 RESPIRATORY FAILURE, UNSPECIFIED WITH HYPOXIA: ICD-10-CM

## 2020-08-27 DIAGNOSIS — Z29.9 ENCOUNTER FOR PROPHYLACTIC MEASURES, UNSPECIFIED: ICD-10-CM

## 2020-08-27 DIAGNOSIS — Z90.89 ACQUIRED ABSENCE OF OTHER ORGANS: Chronic | ICD-10-CM

## 2020-08-27 DIAGNOSIS — C34.90 MALIGNANT NEOPLASM OF UNSPECIFIED PART OF UNSPECIFIED BRONCHUS OR LUNG: ICD-10-CM

## 2020-08-27 DIAGNOSIS — J44.1 CHRONIC OBSTRUCTIVE PULMONARY DISEASE WITH (ACUTE) EXACERBATION: ICD-10-CM

## 2020-08-27 DIAGNOSIS — Z90.2 ACQUIRED ABSENCE OF LUNG [PART OF]: Chronic | ICD-10-CM

## 2020-08-27 DIAGNOSIS — J44.9 CHRONIC OBSTRUCTIVE PULMONARY DISEASE, UNSPECIFIED: ICD-10-CM

## 2020-08-27 LAB
ALBUMIN SERPL ELPH-MCNC: 3.6 G/DL — SIGNIFICANT CHANGE UP (ref 3.3–5)
ALP SERPL-CCNC: 198 U/L — HIGH (ref 40–120)
ALT FLD-CCNC: 19 U/L — SIGNIFICANT CHANGE UP (ref 4–33)
ANION GAP SERPL CALC-SCNC: 16 MMO/L — HIGH (ref 7–14)
ANISOCYTOSIS BLD QL: SLIGHT — SIGNIFICANT CHANGE UP
AST SERPL-CCNC: 23 U/L — SIGNIFICANT CHANGE UP (ref 4–32)
BASE EXCESS BLDV CALC-SCNC: 7.6 MMOL/L — SIGNIFICANT CHANGE UP
BASOPHILS # BLD AUTO: 0.07 K/UL — SIGNIFICANT CHANGE UP (ref 0–0.2)
BASOPHILS NFR BLD AUTO: 0.8 % — SIGNIFICANT CHANGE UP (ref 0–2)
BASOPHILS NFR SPEC: 0 % — SIGNIFICANT CHANGE UP (ref 0–2)
BILIRUB SERPL-MCNC: 1 MG/DL — SIGNIFICANT CHANGE UP (ref 0.2–1.2)
BLASTS # FLD: 0 % — SIGNIFICANT CHANGE UP (ref 0–0)
BLOOD GAS VENOUS - CREATININE: SIGNIFICANT CHANGE UP MG/DL (ref 0.5–1.3)
BUN SERPL-MCNC: 11 MG/DL — SIGNIFICANT CHANGE UP (ref 7–23)
CALCIUM SERPL-MCNC: 8.9 MG/DL — SIGNIFICANT CHANGE UP (ref 8.4–10.5)
CHLORIDE BLDV-SCNC: 98 MMOL/L — SIGNIFICANT CHANGE UP (ref 96–108)
CHLORIDE SERPL-SCNC: 91 MMOL/L — LOW (ref 98–107)
CO2 SERPL-SCNC: 27 MMOL/L — SIGNIFICANT CHANGE UP (ref 22–31)
CREAT SERPL-MCNC: 0.4 MG/DL — LOW (ref 0.5–1.3)
EOSINOPHIL # BLD AUTO: 0 K/UL — SIGNIFICANT CHANGE UP (ref 0–0.5)
EOSINOPHIL NFR BLD AUTO: 0 % — SIGNIFICANT CHANGE UP (ref 0–6)
EOSINOPHIL NFR FLD: 0 % — SIGNIFICANT CHANGE UP (ref 0–6)
GAS PNL BLDV: 135 MMOL/L — LOW (ref 136–146)
GLUCOSE BLDC GLUCOMTR-MCNC: 308 MG/DL — HIGH (ref 70–99)
GLUCOSE BLDC GLUCOMTR-MCNC: 318 MG/DL — HIGH (ref 70–99)
GLUCOSE BLDC GLUCOMTR-MCNC: 325 MG/DL — HIGH (ref 70–99)
GLUCOSE BLDV-MCNC: 307 MG/DL — HIGH (ref 70–99)
GLUCOSE SERPL-MCNC: 324 MG/DL — HIGH (ref 70–99)
HBA1C BLD-MCNC: 9 % — HIGH (ref 4–5.6)
HCO3 BLDV-SCNC: 31 MMOL/L — HIGH (ref 20–27)
HCT VFR BLD CALC: 38.5 % — SIGNIFICANT CHANGE UP (ref 34.5–45)
HCT VFR BLDV CALC: 34.8 % — SIGNIFICANT CHANGE UP (ref 34.5–45)
HGB BLD-MCNC: 12.6 G/DL — SIGNIFICANT CHANGE UP (ref 11.5–15.5)
HGB BLDV-MCNC: 11.3 G/DL — LOW (ref 11.5–15.5)
HYPOCHROMIA BLD QL: SLIGHT — SIGNIFICANT CHANGE UP
IMM GRANULOCYTES NFR BLD AUTO: 2 % — HIGH (ref 0–1.5)
LACTATE BLDV-MCNC: 1.3 MMOL/L — SIGNIFICANT CHANGE UP (ref 0.5–2)
LYMPHOCYTES # BLD AUTO: 0.29 K/UL — LOW (ref 1–3.3)
LYMPHOCYTES # BLD AUTO: 3.3 % — LOW (ref 13–44)
LYMPHOCYTES NFR SPEC AUTO: 2.6 % — LOW (ref 13–44)
MCHC RBC-ENTMCNC: 28.8 PG — SIGNIFICANT CHANGE UP (ref 27–34)
MCHC RBC-ENTMCNC: 32.7 % — SIGNIFICANT CHANGE UP (ref 32–36)
MCV RBC AUTO: 87.9 FL — SIGNIFICANT CHANGE UP (ref 80–100)
METAMYELOCYTES # FLD: 0.9 % — SIGNIFICANT CHANGE UP (ref 0–1)
MICROCYTES BLD QL: SLIGHT — SIGNIFICANT CHANGE UP
MONOCYTES # BLD AUTO: 0.25 K/UL — SIGNIFICANT CHANGE UP (ref 0–0.9)
MONOCYTES NFR BLD AUTO: 2.8 % — SIGNIFICANT CHANGE UP (ref 2–14)
MONOCYTES NFR BLD: 0.9 % — LOW (ref 2–9)
MYELOCYTES NFR BLD: 0 % — SIGNIFICANT CHANGE UP (ref 0–0)
NEUTROPHIL AB SER-ACNC: 90.5 % — HIGH (ref 43–77)
NEUTROPHILS # BLD AUTO: 8.11 K/UL — HIGH (ref 1.8–7.4)
NEUTROPHILS NFR BLD AUTO: 91.1 % — HIGH (ref 43–77)
NEUTS BAND # BLD: 3.4 % — SIGNIFICANT CHANGE UP (ref 0–6)
NRBC # FLD: 0 K/UL — SIGNIFICANT CHANGE UP (ref 0–0)
NT-PROBNP SERPL-SCNC: 119.3 PG/ML — SIGNIFICANT CHANGE UP
OTHER - HEMATOLOGY %: 0 — SIGNIFICANT CHANGE UP
PCO2 BLDV: 42 MMHG — SIGNIFICANT CHANGE UP (ref 41–51)
PH BLDV: 7.49 PH — HIGH (ref 7.32–7.43)
PLATELET # BLD AUTO: 143 K/UL — LOW (ref 150–400)
PLATELET COUNT - ESTIMATE: NORMAL — SIGNIFICANT CHANGE UP
PMV BLD: 10.5 FL — SIGNIFICANT CHANGE UP (ref 7–13)
PO2 BLDV: 88 MMHG — HIGH (ref 35–40)
POIKILOCYTOSIS BLD QL AUTO: SLIGHT — SIGNIFICANT CHANGE UP
POLYCHROMASIA BLD QL SMEAR: SLIGHT — SIGNIFICANT CHANGE UP
POTASSIUM BLDV-SCNC: 3.3 MMOL/L — LOW (ref 3.4–4.5)
POTASSIUM SERPL-MCNC: 3.5 MMOL/L — SIGNIFICANT CHANGE UP (ref 3.5–5.3)
POTASSIUM SERPL-SCNC: 3.5 MMOL/L — SIGNIFICANT CHANGE UP (ref 3.5–5.3)
PROMYELOCYTES # FLD: 0 % — SIGNIFICANT CHANGE UP (ref 0–0)
PROT SERPL-MCNC: 6.3 G/DL — SIGNIFICANT CHANGE UP (ref 6–8.3)
RBC # BLD: 4.38 M/UL — SIGNIFICANT CHANGE UP (ref 3.8–5.2)
RBC # FLD: 17.1 % — HIGH (ref 10.3–14.5)
SAO2 % BLDV: 96.2 % — HIGH (ref 60–85)
SARS-COV-2 RNA SPEC QL NAA+PROBE: SIGNIFICANT CHANGE UP
SODIUM SERPL-SCNC: 134 MMOL/L — LOW (ref 135–145)
TROPONIN T, HIGH SENSITIVITY: 11 NG/L — SIGNIFICANT CHANGE UP (ref ?–14)
TROPONIN T, HIGH SENSITIVITY: 12 NG/L — SIGNIFICANT CHANGE UP (ref ?–14)
VARIANT LYMPHS # BLD: 1.7 % — SIGNIFICANT CHANGE UP
WBC # BLD: 8.9 K/UL — SIGNIFICANT CHANGE UP (ref 3.8–10.5)
WBC # FLD AUTO: 8.9 K/UL — SIGNIFICANT CHANGE UP (ref 3.8–10.5)

## 2020-08-27 PROCEDURE — 99233 SBSQ HOSP IP/OBS HIGH 50: CPT

## 2020-08-27 PROCEDURE — 71045 X-RAY EXAM CHEST 1 VIEW: CPT | Mod: 26

## 2020-08-27 PROCEDURE — 99291 CRITICAL CARE FIRST HOUR: CPT

## 2020-08-27 PROCEDURE — 71275 CT ANGIOGRAPHY CHEST: CPT | Mod: 26

## 2020-08-27 RX ORDER — INSULIN GLARGINE 100 [IU]/ML
40 INJECTION, SOLUTION SUBCUTANEOUS AT BEDTIME
Refills: 0 | Status: DISCONTINUED | OUTPATIENT
Start: 2020-08-27 | End: 2020-08-29

## 2020-08-27 RX ORDER — INSULIN LISPRO 100/ML
2 VIAL (ML) SUBCUTANEOUS
Qty: 0 | Refills: 0 | DISCHARGE

## 2020-08-27 RX ORDER — OXYCODONE HYDROCHLORIDE 5 MG/1
5 TABLET ORAL ONCE
Refills: 0 | Status: DISCONTINUED | OUTPATIENT
Start: 2020-08-27 | End: 2020-08-27

## 2020-08-27 RX ORDER — ALPRAZOLAM 0.25 MG
1 TABLET ORAL THREE TIMES A DAY
Refills: 0 | Status: DISCONTINUED | OUTPATIENT
Start: 2020-08-27 | End: 2020-08-27

## 2020-08-27 RX ORDER — DEXTROSE 50 % IN WATER 50 %
25 SYRINGE (ML) INTRAVENOUS ONCE
Refills: 0 | Status: DISCONTINUED | OUTPATIENT
Start: 2020-08-27 | End: 2020-09-04

## 2020-08-27 RX ORDER — SODIUM CHLORIDE 9 MG/ML
1000 INJECTION, SOLUTION INTRAVENOUS
Refills: 0 | Status: DISCONTINUED | OUTPATIENT
Start: 2020-08-27 | End: 2020-08-28

## 2020-08-27 RX ORDER — POLYETHYLENE GLYCOL 3350 17 G/17G
17 POWDER, FOR SOLUTION ORAL EVERY 24 HOURS
Refills: 0 | Status: DISCONTINUED | OUTPATIENT
Start: 2020-08-27 | End: 2020-08-29

## 2020-08-27 RX ORDER — OXYCODONE AND ACETAMINOPHEN 5; 325 MG/1; MG/1
2 TABLET ORAL EVERY 6 HOURS
Refills: 0 | Status: DISCONTINUED | OUTPATIENT
Start: 2020-08-27 | End: 2020-08-27

## 2020-08-27 RX ORDER — TIOTROPIUM BROMIDE 18 UG/1
1 CAPSULE ORAL; RESPIRATORY (INHALATION) DAILY
Refills: 0 | Status: DISCONTINUED | OUTPATIENT
Start: 2020-08-27 | End: 2020-09-04

## 2020-08-27 RX ORDER — AMLODIPINE BESYLATE 2.5 MG/1
1 TABLET ORAL
Qty: 0 | Refills: 0 | DISCHARGE

## 2020-08-27 RX ORDER — INSULIN LISPRO 100/ML
5 VIAL (ML) SUBCUTANEOUS ONCE
Refills: 0 | Status: COMPLETED | OUTPATIENT
Start: 2020-08-27 | End: 2020-08-27

## 2020-08-27 RX ORDER — ALBUTEROL 90 UG/1
2.5 AEROSOL, METERED ORAL EVERY 4 HOURS
Refills: 0 | Status: DISCONTINUED | OUTPATIENT
Start: 2020-08-27 | End: 2020-09-04

## 2020-08-27 RX ORDER — AMLODIPINE BESYLATE 2.5 MG/1
5 TABLET ORAL DAILY
Refills: 0 | Status: DISCONTINUED | OUTPATIENT
Start: 2020-08-27 | End: 2020-09-04

## 2020-08-27 RX ORDER — GABAPENTIN 400 MG/1
400 CAPSULE ORAL EVERY 8 HOURS
Refills: 0 | Status: DISCONTINUED | OUTPATIENT
Start: 2020-08-27 | End: 2020-08-29

## 2020-08-27 RX ORDER — ONDANSETRON 8 MG/1
8 TABLET, FILM COATED ORAL
Qty: 0 | Refills: 0 | DISCHARGE

## 2020-08-27 RX ORDER — INSULIN LISPRO 100/ML
VIAL (ML) SUBCUTANEOUS
Refills: 0 | Status: DISCONTINUED | OUTPATIENT
Start: 2020-08-27 | End: 2020-08-28

## 2020-08-27 RX ORDER — ENOXAPARIN SODIUM 100 MG/ML
40 INJECTION SUBCUTANEOUS EVERY 24 HOURS
Refills: 0 | Status: DISCONTINUED | OUTPATIENT
Start: 2020-08-27 | End: 2020-09-04

## 2020-08-27 RX ORDER — METOCLOPRAMIDE HCL 10 MG
2 TABLET ORAL
Qty: 0 | Refills: 0 | DISCHARGE

## 2020-08-27 RX ORDER — CYCLOBENZAPRINE HYDROCHLORIDE 10 MG/1
5 TABLET, FILM COATED ORAL THREE TIMES A DAY
Refills: 0 | Status: DISCONTINUED | OUTPATIENT
Start: 2020-08-27 | End: 2020-08-27

## 2020-08-27 RX ORDER — INSULIN GLARGINE 100 [IU]/ML
40 INJECTION, SOLUTION SUBCUTANEOUS
Qty: 0 | Refills: 0 | DISCHARGE

## 2020-08-27 RX ORDER — CYCLOBENZAPRINE HYDROCHLORIDE 10 MG/1
5 TABLET, FILM COATED ORAL THREE TIMES A DAY
Refills: 0 | Status: DISCONTINUED | OUTPATIENT
Start: 2020-08-27 | End: 2020-08-30

## 2020-08-27 RX ORDER — PREGABALIN 225 MG/1
1 CAPSULE ORAL
Qty: 0 | Refills: 0 | DISCHARGE

## 2020-08-27 RX ORDER — ATORVASTATIN CALCIUM 80 MG/1
1 TABLET, FILM COATED ORAL
Qty: 0 | Refills: 0 | DISCHARGE

## 2020-08-27 RX ORDER — CARVEDILOL PHOSPHATE 80 MG/1
1 CAPSULE, EXTENDED RELEASE ORAL
Qty: 0 | Refills: 0 | DISCHARGE

## 2020-08-27 RX ORDER — GLUCAGON INJECTION, SOLUTION 0.5 MG/.1ML
1 INJECTION, SOLUTION SUBCUTANEOUS ONCE
Refills: 0 | Status: DISCONTINUED | OUTPATIENT
Start: 2020-08-27 | End: 2020-08-28

## 2020-08-27 RX ORDER — ERGOCALCIFEROL 1.25 MG/1
50000 CAPSULE ORAL
Refills: 0 | Status: DISCONTINUED | OUTPATIENT
Start: 2020-08-27 | End: 2020-08-28

## 2020-08-27 RX ORDER — CARVEDILOL PHOSPHATE 80 MG/1
6.25 CAPSULE, EXTENDED RELEASE ORAL EVERY 12 HOURS
Refills: 0 | Status: DISCONTINUED | OUTPATIENT
Start: 2020-08-27 | End: 2020-09-04

## 2020-08-27 RX ORDER — FAMOTIDINE 10 MG/ML
1 INJECTION INTRAVENOUS
Qty: 0 | Refills: 0 | DISCHARGE

## 2020-08-27 RX ORDER — PANTOPRAZOLE SODIUM 20 MG/1
40 TABLET, DELAYED RELEASE ORAL
Refills: 0 | Status: DISCONTINUED | OUTPATIENT
Start: 2020-08-27 | End: 2020-09-04

## 2020-08-27 RX ORDER — INSULIN LISPRO 100/ML
2 VIAL (ML) SUBCUTANEOUS
Refills: 0 | Status: DISCONTINUED | OUTPATIENT
Start: 2020-08-27 | End: 2020-08-28

## 2020-08-27 RX ORDER — OXYCODONE HYDROCHLORIDE 5 MG/1
20 TABLET ORAL EVERY 6 HOURS
Refills: 0 | Status: DISCONTINUED | OUTPATIENT
Start: 2020-08-27 | End: 2020-09-03

## 2020-08-27 RX ORDER — OMEPRAZOLE 10 MG/1
1 CAPSULE, DELAYED RELEASE ORAL
Qty: 0 | Refills: 0 | DISCHARGE

## 2020-08-27 RX ORDER — BUDESONIDE AND FORMOTEROL FUMARATE DIHYDRATE 160; 4.5 UG/1; UG/1
2 AEROSOL RESPIRATORY (INHALATION)
Refills: 0 | Status: DISCONTINUED | OUTPATIENT
Start: 2020-08-27 | End: 2020-09-04

## 2020-08-27 RX ORDER — DEXTROSE 50 % IN WATER 50 %
15 SYRINGE (ML) INTRAVENOUS ONCE
Refills: 0 | Status: DISCONTINUED | OUTPATIENT
Start: 2020-08-27 | End: 2020-09-04

## 2020-08-27 RX ORDER — LORATADINE 10 MG/1
1 TABLET ORAL
Qty: 0 | Refills: 0 | DISCHARGE

## 2020-08-27 RX ORDER — DEXTROSE 50 % IN WATER 50 %
12.5 SYRINGE (ML) INTRAVENOUS ONCE
Refills: 0 | Status: DISCONTINUED | OUTPATIENT
Start: 2020-08-27 | End: 2020-09-04

## 2020-08-27 RX ORDER — SENNA PLUS 8.6 MG/1
2 TABLET ORAL AT BEDTIME
Refills: 0 | Status: DISCONTINUED | OUTPATIENT
Start: 2020-08-27 | End: 2020-09-04

## 2020-08-27 RX ORDER — FOLIC ACID 0.8 MG
1 TABLET ORAL DAILY
Refills: 0 | Status: DISCONTINUED | OUTPATIENT
Start: 2020-08-27 | End: 2020-09-04

## 2020-08-27 RX ORDER — ATORVASTATIN CALCIUM 80 MG/1
40 TABLET, FILM COATED ORAL AT BEDTIME
Refills: 0 | Status: DISCONTINUED | OUTPATIENT
Start: 2020-08-27 | End: 2020-09-04

## 2020-08-27 RX ORDER — FOLIC ACID 0.8 MG
1 TABLET ORAL
Qty: 0 | Refills: 0 | DISCHARGE

## 2020-08-27 RX ORDER — AZITHROMYCIN 500 MG/1
500 TABLET, FILM COATED ORAL ONCE
Refills: 0 | Status: COMPLETED | OUTPATIENT
Start: 2020-08-27 | End: 2020-08-27

## 2020-08-27 RX ADMIN — BUDESONIDE AND FORMOTEROL FUMARATE DIHYDRATE 2 PUFF(S): 160; 4.5 AEROSOL RESPIRATORY (INHALATION) at 21:44

## 2020-08-27 RX ADMIN — ENOXAPARIN SODIUM 40 MILLIGRAM(S): 100 INJECTION SUBCUTANEOUS at 14:15

## 2020-08-27 RX ADMIN — OXYCODONE HYDROCHLORIDE 5 MILLIGRAM(S): 5 TABLET ORAL at 12:29

## 2020-08-27 RX ADMIN — INSULIN GLARGINE 40 UNIT(S): 100 INJECTION, SOLUTION SUBCUTANEOUS at 22:09

## 2020-08-27 RX ADMIN — Medication 5 UNIT(S): at 09:13

## 2020-08-27 RX ADMIN — OXYCODONE HYDROCHLORIDE 5 MILLIGRAM(S): 5 TABLET ORAL at 13:20

## 2020-08-27 RX ADMIN — Medication 40 MILLIGRAM(S): at 14:49

## 2020-08-27 RX ADMIN — Medication 8: at 17:51

## 2020-08-27 RX ADMIN — Medication 2 UNIT(S): at 17:51

## 2020-08-27 RX ADMIN — ALBUTEROL 2.5 MILLIGRAM(S): 90 AEROSOL, METERED ORAL at 17:50

## 2020-08-27 RX ADMIN — OXYCODONE HYDROCHLORIDE 5 MILLIGRAM(S): 5 TABLET ORAL at 10:00

## 2020-08-27 RX ADMIN — OXYCODONE HYDROCHLORIDE 5 MILLIGRAM(S): 5 TABLET ORAL at 09:13

## 2020-08-27 RX ADMIN — AZITHROMYCIN 500 MILLIGRAM(S): 500 TABLET, FILM COATED ORAL at 09:13

## 2020-08-27 RX ADMIN — CARVEDILOL PHOSPHATE 6.25 MILLIGRAM(S): 80 CAPSULE, EXTENDED RELEASE ORAL at 17:51

## 2020-08-27 RX ADMIN — AZITHROMYCIN 255 MILLIGRAM(S): 500 TABLET, FILM COATED ORAL at 07:12

## 2020-08-27 RX ADMIN — GABAPENTIN 400 MILLIGRAM(S): 400 CAPSULE ORAL at 14:15

## 2020-08-27 RX ADMIN — POLYETHYLENE GLYCOL 3350 17 GRAM(S): 17 POWDER, FOR SOLUTION ORAL at 14:49

## 2020-08-27 RX ADMIN — Medication 1 MILLIGRAM(S): at 17:50

## 2020-08-27 RX ADMIN — SENNA PLUS 2 TABLET(S): 8.6 TABLET ORAL at 21:35

## 2020-08-27 RX ADMIN — AMLODIPINE BESYLATE 5 MILLIGRAM(S): 2.5 TABLET ORAL at 14:15

## 2020-08-27 RX ADMIN — ATORVASTATIN CALCIUM 40 MILLIGRAM(S): 80 TABLET, FILM COATED ORAL at 21:35

## 2020-08-27 RX ADMIN — GABAPENTIN 400 MILLIGRAM(S): 400 CAPSULE ORAL at 21:35

## 2020-08-27 NOTE — ED PROVIDER NOTE - OBJECTIVE STATEMENT
64 yo F with PMH lung cancer on chemo q 3 weeks with PICC line in left upper extremity, COPD on 5 L home 02, ITP presents with worsenign of baseline SOB, particularly with exertion. No chest pain. states her sputum has been brown in color yesterday and today, which is a change from baseline. Chronic b/l LE skin changes. Per triage note, patient was 89% in field and placed on NRB. Received 3 combi treatments and solumedrol by EMS. At time of my eval, patient was normal 02 sat on 5 L NC    Oncologist: Dr. Mendiola

## 2020-08-27 NOTE — H&P ADULT - PROBLEM SELECTOR PLAN 1
Found by EMS to have O2 sat of 89% on 5L - placed on NRB and given duonebs. Now sating well on 5L (home O2). Exam with bilateral rhonchi and expiratory wheezes, no accessory muscle use. CXR, at baseline. Pt has been off steroids for ~1 week based off of taper in prior documentation. Hypoxia likely 2/2 COPD exacerbation. S/p duonebs and azithromycin in ED with improvement.   - On prior admission, was on IV steroids for wheezing. Will start solumedrol 40q8 hours and taper as wheezing improves.   - Albuterol nebulizer q4 hours   - Will hold off on antibiotics for now - CXR is without infiltrate or consolidations and pt is afebrile making infection less likely. If febrile - consider starting antibiotics.   - C/w home regimen for COPD - spiriva and symbicort.   - Dr. Nugent pulmonology - will make aware patient is here due to re-admission for similar symptoms from last admission. Found by EMS to have O2 sat of 89% on 5L - placed on NRB and given duonebs. Now sating well on 5L (home O2). Exam with bilateral rhonchi and expiratory wheezes, no accessory muscle use. CXR, at baseline. Pt has been off steroids for ~1 week based off of taper in prior documentation. Hypoxia likely 2/2 COPD exacerbation. S/p duonebs and azithromycin in ED with improvement.   - VBG - pH 7.49, pCO2 40 (usually 50-60), and bicarb 27) - likely acute respiratory alkalosis due to tachypnea. Normally, has respiratory acidosis with appropriate compensation.   - Will start prednisone 40mg qd  - Albuterol nebulizer q4 hours   - Will hold off on antibiotics for now - CXR is without infiltrate or consolidations and pt is afebrile making infection less likely. If febrile - consider starting antibiotics.   - C/w home regimen for COPD - spiriva and symbicort.   - Dr. Nugent pulmonology - will make aware patient is here due to re-admission for similar symptoms from last admission. Found by EMS to have O2 sat of 89% on 5L - placed on NRB and given duonebs. Now sating well on 5L (home O2). Exam with bilateral rhonchi and expiratory wheezes, no accessory muscle use. CXR, at baseline. Pt has been off steroids for ~1 week based off of taper in prior documentation. Hypoxia likely 2/2 COPD exacerbation. S/p duonebs and azithromycin in ED with improvement.   - VBG - pH 7.49, pCO2 40 (usually 50-60), and bicarb 27) - likely acute respiratory alkalosis due to tachypnea. Normally, has respiratory acidosis with appropriate compensation.   - Will start prednisone 40mg qd  - Albuterol nebulizer q4 hours   - Will hold off on antibiotics for now - CXR is without infiltrate or consolidations and pt is afebrile making infection less likely. If febrile - consider starting antibiotics.   - C/w home regimen for COPD - spiriva and symbicort.   - Dr. Nugent pulmonology at [place], made aware she was here - but does not see the patient in office. Only saw her at [facility].   - Pulm consult if no improvement in symptoms

## 2020-08-27 NOTE — PHARMACOTHERAPY INTERVENTION NOTE - COMMENTS
Medication history is complete. Medication list updated in Outpatient Medication Record (OMR). Medication history obtained from patient's outpatient pharmacy as patient unable to list. Please call spectra [phone] if you have any questions.

## 2020-08-27 NOTE — ED PROVIDER NOTE - ATTENDING CONTRIBUTION TO CARE
I performed a history and physical exam of the patient and discussed their management with the resident. I reviewed the resident's note and agree with the documented findings and plan of care. I have edited as appropriate. My medical decision making and observations are found above.  66 yo F with PMH lung cancer on chemo q 3 weeks with PICC line in left upper extremity, COPD on 5 L home 02, ITP presents with worsenign of baseline SOB, particularly with exertion. Abnormal lung sounds on exam. Received solumedrol and combi treatments by EMS. Now with normal 02 sat on baseline 02. Likely COPD exacerbation given presentation and response to ems treatment. also consider CHF exacerbation, less likely PE given diffusely abnormal lung sounds and response to steroids and combi treatments. Labs, chest xray, azithro for COPD exacerbation. TBA.

## 2020-08-27 NOTE — H&P ADULT - PROBLEM SELECTOR PLAN 2
Hx of COPD - on 5L of O2 at home.   - Plan as above. Tachypneic, hypoxic, and tachycardia - sinus tach on EKG. In the setting of malignancy, will rule out PE.   - F/u CTA chest Tachypneic, hypoxic, and tachycardia - sinus tach on EKG. HR 80-90 on prior admission. In the setting of malignancy, will rule out PE.   - F/u CTA chest

## 2020-08-27 NOTE — H&P ADULT - PROBLEM SELECTOR PLAN 3
Hx of lung cancer with metastasis. On chemo q3 weeks. Dr. Mendiola is oncologist.   - Dr. Mendiola oncologist - consult as needed   - Pain control Hx of COPD - on 5L of O2 at home.   - Plan as above.

## 2020-08-27 NOTE — H&P ADULT - PROBLEM SELECTOR PLAN 6
Hx of DM. A1C in June = 9.6. Elevated sugars in the setting of steroid use.   - Lantus 40U qhs   - Humalog 2U TID (?) as per prior documentation - will likely need adjustment based off of insulin requirements.   - F/u A1C Hx of constipation due to opioid use. Last BM yesterday - small hard stool.   - Bowel regimen

## 2020-08-27 NOTE — H&P ADULT - PROBLEM SELECTOR PLAN 4
Chronic pain 2/2 mets. CT thoracic spine showed multiple sclerotic and lytic lesions in the thoracic spine and ribs suggesting metastatic disease, overall progressed since 6/25/2020. Epidural soft tissue at the T7 level suggesting tumor. Multilevel thoracic compression fractures. New acute compression fracture of the L1 vertebral body with bony retropulsion. Pain management assessed the patient on last admission.   - C/w gapapentin 400mg TID   - C/w oxycodone 20mg q6 hours (pt. takes percocet at home).  - Bowel regimen with miralax and senna Hx of lung cancer with metastasis. On chemo q3 weeks. Dr. Mendiola is oncologist.   - Dr. Mendiola oncologist - consult as needed   - Pain control

## 2020-08-27 NOTE — H&P ADULT - HISTORY OF PRESENT ILLNESS
64 y/o F with PMH COPD (5L NC at home), 80 pack year smoking history, R lung cancer s/p resection (2017) on chemo q 3 weeks, recent admission for COPD exacerbation earlier in August who presents for SOB and increased sputum production. 66 y/o F with PMH COPD (5L NC at home), 80 pack year smoking history, R lung cancer s/p resection (2017) on chemo q 3 weeks, recent admission for COPD exacerbation earlier in August who presents for SOB and increased sputum production. She has had SOB for awhile. She was recently admitted in the beginning of August for COPD exacerbation. She was treated with levaquin and a long steroid taper. The taper ended on August 17th. She reports that for the last few days the SOB was worsening and she experienced it at rest. She always had sputum production, but noticed it was brown/white and increased in amount. Denies blood in the sputum. Denies fevers/chills, chest pain, SOB, abdominal pain. She does report constipation and is producing small and hard stool. She continues to have pain in her back and in her right jaw that radiates up to her head. In the past, she has been followed by pain management. She takes 2 percocets q6 hours at home. Currently, she feels better from admission.     As per EMS - she was sating 89% and was given duonebs and started on NRB. In the ED, she received oxycodone for pain, azithromycin and insulin 5U. She was admitted for further management.

## 2020-08-27 NOTE — H&P ADULT - ASSESSMENT
64 y/o F with PMH COPD (5L NC at home), 80 pack year smoking history, R lung cancer s/p resection (2017) on chemo q 3 weeks, recent admission for COPD exacerbation earlier in August who presents for SOB and increased sputum production with likely COPD exacerbation.

## 2020-08-27 NOTE — H&P ADULT - NSHPPHYSICALEXAM_GEN_ALL_CORE
.  VITAL SIGNS:  T(C): 36.8 (08-27-20 @ 12:30), Max: 37.3 (08-27-20 @ 06:40)  T(F): 98.2 (08-27-20 @ 12:30), Max: 99.2 (08-27-20 @ 06:40)  HR: 110 (08-27-20 @ 12:30) (104 - 110)  BP: 132/73 (08-27-20 @ 12:30) (105/82 - 132/82)  BP(mean): --  RR: 21 (08-27-20 @ 12:30) (20 - 27)  SpO2: 99% (08-27-20 @ 12:30) (99% - 100%)  Wt(kg): --    PHYSICAL EXAM:    Constitutional: WDWN resting comfortably in bed; NAD  Head: NC/AT  Eyes: PERRL, EOMI, anicteric sclera  ENT: no nasal discharge; uvula midline, no oropharyngeal erythema or exudates; MMM  Neck: supple; no JVD or thyromegaly  Respiratory: No respiratory distress, no accessory muscle use. Expiratory wheezes noted bilaterally  Cardiac: +S1/S2; tachycardic - regular rhythm; no M/R/G; PMI non-displaced  Gastrointestinal: abdomen soft, NT/ND; no rebound or guarding; +BSx4  Back: spine midline, no bony tenderness or step-offs; no CVAT B/L  Extremities: WWP, no clubbing or cyanosis; no peripheral edema. Skin pigmentation changes from chronic venous stasis.   Musculoskeletal: NROM x4; no joint swelling, tenderness or erythema  Vascular: 2+ radial, femoral, DP/PT pulses B/L  Dermatologic: skin warm, dry and intact; no rashes, wounds, or scars  Lymphatic: no submandibular or cervical LAD  Neurologic: AAOx3; CNII-XII grossly intact; no focal deficits  Psychiatric: affect and characteristics of appearance, verbalizations, behaviors are appropriate

## 2020-08-27 NOTE — ED ADULT NURSE NOTE - CHIEF COMPLAINT QUOTE
pt c/o worsening SOB since 3pm, PMH COPD on 6lpm home O2 and lung CA has PICC line left upper arm last chemo Friday. O2 89% on scene pt placed on NRB mask, given 3 combi treatments and 125mg solumedrol IV 22G placed right hand. pt has chronic pain and takes prescribed pain medicine around the clock.

## 2020-08-27 NOTE — H&P ADULT - PROBLEM SELECTOR PLAN 7
Hx of HTN. Currently normotensive. On carvedilol 6.25mg q12 and norvasc 5qd.   - C/w home meds Hx of DM. A1C in June = 9.6. Elevated sugars in the setting of steroid use.   - Lantus 40U qhs   - Humalog 2U TID (?) as per prior documentation - will likely need adjustment based off of insulin requirements.   - F/u A1C

## 2020-08-27 NOTE — ED PROVIDER NOTE - CLINICAL SUMMARY MEDICAL DECISION MAKING FREE TEXT BOX
64 yo F with PMH lung cancer on chemo q 3 weeks with PICC line in left upper extremity, COPD on 5 L home 02, ITP presents with worsenign of baseline SOB, particularly with exertion. Abnormal lung sounds on exam. Received solumedrol and combi treatments by EMS. Now with normal 02 sat on baseline 02. Likely COPD exacerbation given presentation and response to ems treatment. also consider CHF exacerbation, less likely PE given diffusely abnormal lung sounds and response to steroids and combi treatments. Labs, chest xray, azithro for COPD exacerbation. TBA.

## 2020-08-27 NOTE — ED PROVIDER NOTE - NS ED ROS FT
REVIEW OF SYSTEMS:  Cardiac: no chest pain  Respiratory: + shortness of breath  Extremities: chronic LE skin changes  Heme: +ITP  Skin: chronic LE skin changes  -Anette Bragg PGY-2

## 2020-08-27 NOTE — ED ADULT NURSE NOTE - OBJECTIVE STATEMENT
Sunitha RN:  Pt received in trauma C, A&Ox4, NAD.  c/o SOB, more than usual.  Hx of lung CA on chemo, last chemo Friday through L PICC line.  Uses 5L O2 via NC at home.  Pt arrives with 22g to R hand from EMS, received solumedrol and 3 combivent treatments.  Pt speaking in full clear sentences.  Respirations even and unlabored on 5L O2 NC.  No cough or fevers noted.  Denies any chest pain/palpitations.  Labs sent.  Report given to primary RN.

## 2020-08-27 NOTE — H&P ADULT - NSHPLABSRESULTS_GEN_ALL_CORE
.  LABS:                         12.6   8.90  )-----------( 143      ( 27 Aug 2020 06:45 )             38.5     08-27    134<L>  |  91<L>  |  11  ----------------------------<  324<H>  3.5   |  27  |  0.40<L>    Ca    8.9      27 Aug 2020 06:45    TPro  6.3  /  Alb  3.6  /  TBili  1.0  /  DBili  x   /  AST  23  /  ALT  19  /  AlkPhos  198<H>  08-27            Serum Pro-Brain Natriuretic Peptide: 119.3 pg/mL (08-27 @ 06:45)        RADIOLOGY, EKG & ADDITIONAL TESTS: Reviewed.

## 2020-08-27 NOTE — ED PROVIDER NOTE - CRITICAL CARE PROVIDED
consultation with other physicians/interpretation of diagnostic studies/conducted a detailed discussion of DNR status/documentation/additional history taking/direct patient care (not related to procedure)

## 2020-08-27 NOTE — ED PROVIDER NOTE - PHYSICAL EXAMINATION
PHYSICAL EXAM:   General: in no acute distress  HEENT: NC/AT, airway patent  Cardiovascular: tachycardic rate, + S1/S2, no murmurs, rubs, gallops appreciated  Respiratory: on 5L NC, fine inspiratory crackles at the bass b/l with diffuse coarse expiratory lung sounds  Abdominal: soft, nontender, nondistended, no rebound, guarding or rigidity  Extremities: +chronic b/l (L>R) hyperpigmentation of LEs, unchanged from baseline per patient. no calf TTP b/l. PICC line in left upper arm  Neuro: Awake, alert, interactive, conversant  Psychiatric: appropriate mood and affect.   Skin: see above  -Anette Bragg PGY-2

## 2020-08-27 NOTE — ED PROVIDER NOTE - NS ED MD EM SELECTION
Done        ----- Message from Donita Zarate sent at 11/14/2017 12:54 PM EST -----  Patient called to schedule follow up appointment for 12/5/17, leaving the state today for work, asking to have refill on Lisinopril HCTZ 20/25 called to [facility] [phone]     [id number] Critical Care - 30 to 74 minutes

## 2020-08-27 NOTE — H&P ADULT - PROBLEM SELECTOR PLAN 5
Hx of constipation due to opioid use. Last BM yesterday - small hard stool.   - Bowel regimen Chronic pain 2/2 mets. CT thoracic spine showed multiple sclerotic and lytic lesions in the thoracic spine and ribs suggesting metastatic disease, overall progressed since 6/25/2020. Epidural soft tissue at the T7 level suggesting tumor. Multilevel thoracic compression fractures. New acute compression fracture of the L1 vertebral body with bony retropulsion. Pain management assessed the patient on last admission.   - C/w gapapentin 400mg TID   - C/w oxycodone 20mg q6 hours (pt. takes percocet at home).  - Bowel regimen with miralax and senna

## 2020-08-27 NOTE — H&P ADULT - PROBLEM SELECTOR PLAN 8
F: No IVF  E: Replete prn   N: DASH/TLC consistent carb Hx of HTN. Currently normotensive. On carvedilol 6.25mg q12 and norvasc 5qd.   - C/w home meds

## 2020-08-27 NOTE — ED ADULT NURSE REASSESSMENT NOTE - NS ED NURSE REASSESS COMMENT FT1
received report from morning shift ROSA Millan. Received Pt in trauma C. Pt A&OX3. Pt c/o 6/10 spinal pain due to CA. Pt medicated as per orders. Pt on 5L nasal cannula, tolerating well at this time. sating 99%. respirations are equal and nonlabored, no respiratory distress noted. Pt on cardiac monitor. Sinus tachycardic. Pt with PICC line noted to the left arm, site clean dry and intact. Vitals stable as noted. Pt denies any other medical complaints at this time. denies sob, cough, fever, headache, nausea, vomiting. Pt stable, awaiting further plan. Will reassess and monitor received report from morning shift ROSA Millan. Received Pt in trauma C. Pt A&OX3. Pt c/o 6/10 spinal pain due to CA. Pt medicated as per orders. Pt on 5L nasal cannula, tolerating well at this time. sating 99%. respirations are equal and nonlabored, no respiratory distress noted. Pt on cardiac monitor. Sinus tachycardic. Pt with PICC line noted to the left arm, site clean dry and intact. Pt reports PICC line was placed 4 months ago. Vitals stable as noted. Pt denies any other medical complaints at this time. denies sob, cough, fever, headache, nausea, vomiting. Pt stable, awaiting further plan. Will reassess and monitor

## 2020-08-27 NOTE — H&P ADULT - NSCORESITESY/N_GEN_A_CORE_RD
Just not sleeping well at night.  Has not done her glucose tolerance test    Encourage compliance  Back in 2 weeks.  
OB here for routine exam. Pt still has not been to the lab for her glucose test. adelitatn  
No

## 2020-08-28 DIAGNOSIS — Z71.89 OTHER SPECIFIED COUNSELING: ICD-10-CM

## 2020-08-28 DIAGNOSIS — E87.6 HYPOKALEMIA: ICD-10-CM

## 2020-08-28 DIAGNOSIS — J44.1 CHRONIC OBSTRUCTIVE PULMONARY DISEASE WITH (ACUTE) EXACERBATION: ICD-10-CM

## 2020-08-28 DIAGNOSIS — S32.010D WEDGE COMPRESSION FRACTURE OF FIRST LUMBAR VERTEBRA, SUBSEQUENT ENCOUNTER FOR FRACTURE WITH ROUTINE HEALING: ICD-10-CM

## 2020-08-28 LAB
ANION GAP SERPL CALC-SCNC: 15 MMO/L — HIGH (ref 7–14)
BUN SERPL-MCNC: 11 MG/DL — SIGNIFICANT CHANGE UP (ref 7–23)
CALCIUM SERPL-MCNC: 9 MG/DL — SIGNIFICANT CHANGE UP (ref 8.4–10.5)
CHLORIDE SERPL-SCNC: 93 MMOL/L — LOW (ref 98–107)
CO2 SERPL-SCNC: 34 MMOL/L — HIGH (ref 22–31)
CREAT SERPL-MCNC: 0.43 MG/DL — LOW (ref 0.5–1.3)
GLUCOSE BLDC GLUCOMTR-MCNC: 106 MG/DL — HIGH (ref 70–99)
GLUCOSE BLDC GLUCOMTR-MCNC: 226 MG/DL — HIGH (ref 70–99)
GLUCOSE BLDC GLUCOMTR-MCNC: 279 MG/DL — HIGH (ref 70–99)
GLUCOSE BLDC GLUCOMTR-MCNC: 296 MG/DL — HIGH (ref 70–99)
GLUCOSE SERPL-MCNC: 276 MG/DL — HIGH (ref 70–99)
HCT VFR BLD CALC: 36.3 % — SIGNIFICANT CHANGE UP (ref 34.5–45)
HGB BLD-MCNC: 12.1 G/DL — SIGNIFICANT CHANGE UP (ref 11.5–15.5)
MAGNESIUM SERPL-MCNC: 1.7 MG/DL — SIGNIFICANT CHANGE UP (ref 1.6–2.6)
MCHC RBC-ENTMCNC: 29.2 PG — SIGNIFICANT CHANGE UP (ref 27–34)
MCHC RBC-ENTMCNC: 33.3 % — SIGNIFICANT CHANGE UP (ref 32–36)
MCV RBC AUTO: 87.7 FL — SIGNIFICANT CHANGE UP (ref 80–100)
NRBC # FLD: 0 K/UL — SIGNIFICANT CHANGE UP (ref 0–0)
PHOSPHATE SERPL-MCNC: 3.1 MG/DL — SIGNIFICANT CHANGE UP (ref 2.5–4.5)
PLATELET # BLD AUTO: 169 K/UL — SIGNIFICANT CHANGE UP (ref 150–400)
PMV BLD: 12.1 FL — SIGNIFICANT CHANGE UP (ref 7–13)
POTASSIUM SERPL-MCNC: 3.1 MMOL/L — LOW (ref 3.5–5.3)
POTASSIUM SERPL-SCNC: 3.1 MMOL/L — LOW (ref 3.5–5.3)
RBC # BLD: 4.14 M/UL — SIGNIFICANT CHANGE UP (ref 3.8–5.2)
RBC # FLD: 16.6 % — HIGH (ref 10.3–14.5)
SODIUM SERPL-SCNC: 142 MMOL/L — SIGNIFICANT CHANGE UP (ref 135–145)
WBC # BLD: 13.43 K/UL — HIGH (ref 3.8–10.5)
WBC # FLD AUTO: 13.43 K/UL — HIGH (ref 3.8–10.5)

## 2020-08-28 PROCEDURE — 99233 SBSQ HOSP IP/OBS HIGH 50: CPT

## 2020-08-28 PROCEDURE — 99231 SBSQ HOSP IP/OBS SF/LOW 25: CPT

## 2020-08-28 RX ORDER — INSULIN LISPRO 100/ML
8 VIAL (ML) SUBCUTANEOUS
Refills: 0 | Status: DISCONTINUED | OUTPATIENT
Start: 2020-08-28 | End: 2020-08-29

## 2020-08-28 RX ORDER — AZITHROMYCIN 500 MG/1
250 TABLET, FILM COATED ORAL DAILY
Refills: 0 | Status: COMPLETED | OUTPATIENT
Start: 2020-08-28 | End: 2020-09-01

## 2020-08-28 RX ORDER — NYSTATIN CREAM 100000 [USP'U]/G
1 CREAM TOPICAL
Refills: 0 | Status: DISCONTINUED | OUTPATIENT
Start: 2020-08-28 | End: 2020-09-04

## 2020-08-28 RX ORDER — CHLORHEXIDINE GLUCONATE 213 G/1000ML
1 SOLUTION TOPICAL DAILY
Refills: 0 | Status: DISCONTINUED | OUTPATIENT
Start: 2020-08-28 | End: 2020-09-04

## 2020-08-28 RX ORDER — POTASSIUM CHLORIDE 20 MEQ
40 PACKET (EA) ORAL EVERY 4 HOURS
Refills: 0 | Status: COMPLETED | OUTPATIENT
Start: 2020-08-28 | End: 2020-08-28

## 2020-08-28 RX ORDER — ERGOCALCIFEROL 1.25 MG/1
50000 CAPSULE ORAL
Refills: 0 | Status: DISCONTINUED | OUTPATIENT
Start: 2020-08-31 | End: 2020-09-04

## 2020-08-28 RX ORDER — INSULIN LISPRO 100/ML
VIAL (ML) SUBCUTANEOUS
Refills: 0 | Status: DISCONTINUED | OUTPATIENT
Start: 2020-08-28 | End: 2020-09-04

## 2020-08-28 RX ORDER — INSULIN LISPRO 100/ML
VIAL (ML) SUBCUTANEOUS AT BEDTIME
Refills: 0 | Status: DISCONTINUED | OUTPATIENT
Start: 2020-08-28 | End: 2020-09-04

## 2020-08-28 RX ADMIN — Medication 8 UNIT(S): at 17:48

## 2020-08-28 RX ADMIN — GABAPENTIN 400 MILLIGRAM(S): 400 CAPSULE ORAL at 13:02

## 2020-08-28 RX ADMIN — GABAPENTIN 400 MILLIGRAM(S): 400 CAPSULE ORAL at 21:45

## 2020-08-28 RX ADMIN — ATORVASTATIN CALCIUM 40 MILLIGRAM(S): 80 TABLET, FILM COATED ORAL at 21:45

## 2020-08-28 RX ADMIN — Medication 1: at 22:41

## 2020-08-28 RX ADMIN — ALBUTEROL 2.5 MILLIGRAM(S): 90 AEROSOL, METERED ORAL at 02:08

## 2020-08-28 RX ADMIN — Medication 2 UNIT(S): at 09:14

## 2020-08-28 RX ADMIN — Medication 2 UNIT(S): at 12:59

## 2020-08-28 RX ADMIN — AZITHROMYCIN 250 MILLIGRAM(S): 500 TABLET, FILM COATED ORAL at 20:31

## 2020-08-28 RX ADMIN — GABAPENTIN 400 MILLIGRAM(S): 400 CAPSULE ORAL at 06:29

## 2020-08-28 RX ADMIN — CYCLOBENZAPRINE HYDROCHLORIDE 5 MILLIGRAM(S): 10 TABLET, FILM COATED ORAL at 10:37

## 2020-08-28 RX ADMIN — AMLODIPINE BESYLATE 5 MILLIGRAM(S): 2.5 TABLET ORAL at 06:30

## 2020-08-28 RX ADMIN — ALBUTEROL 2.5 MILLIGRAM(S): 90 AEROSOL, METERED ORAL at 21:51

## 2020-08-28 RX ADMIN — OXYCODONE HYDROCHLORIDE 20 MILLIGRAM(S): 5 TABLET ORAL at 21:44

## 2020-08-28 RX ADMIN — Medication 40 MILLIEQUIVALENT(S): at 10:12

## 2020-08-28 RX ADMIN — ALBUTEROL 2.5 MILLIGRAM(S): 90 AEROSOL, METERED ORAL at 08:59

## 2020-08-28 RX ADMIN — NYSTATIN CREAM 1 APPLICATION(S): 100000 CREAM TOPICAL at 06:30

## 2020-08-28 RX ADMIN — SENNA PLUS 2 TABLET(S): 8.6 TABLET ORAL at 21:44

## 2020-08-28 RX ADMIN — Medication 40 MILLIEQUIVALENT(S): at 13:02

## 2020-08-28 RX ADMIN — ALBUTEROL 2.5 MILLIGRAM(S): 90 AEROSOL, METERED ORAL at 12:30

## 2020-08-28 RX ADMIN — ENOXAPARIN SODIUM 40 MILLIGRAM(S): 100 INJECTION SUBCUTANEOUS at 13:00

## 2020-08-28 RX ADMIN — OXYCODONE HYDROCHLORIDE 20 MILLIGRAM(S): 5 TABLET ORAL at 01:59

## 2020-08-28 RX ADMIN — Medication 4: at 09:13

## 2020-08-28 RX ADMIN — POLYETHYLENE GLYCOL 3350 17 GRAM(S): 17 POWDER, FOR SOLUTION ORAL at 14:16

## 2020-08-28 RX ADMIN — BUDESONIDE AND FORMOTEROL FUMARATE DIHYDRATE 2 PUFF(S): 160; 4.5 AEROSOL RESPIRATORY (INHALATION) at 21:44

## 2020-08-28 RX ADMIN — NYSTATIN CREAM 1 APPLICATION(S): 100000 CREAM TOPICAL at 17:49

## 2020-08-28 RX ADMIN — OXYCODONE HYDROCHLORIDE 20 MILLIGRAM(S): 5 TABLET ORAL at 22:40

## 2020-08-28 RX ADMIN — Medication 1 MILLIGRAM(S): at 13:00

## 2020-08-28 RX ADMIN — BUDESONIDE AND FORMOTEROL FUMARATE DIHYDRATE 2 PUFF(S): 160; 4.5 AEROSOL RESPIRATORY (INHALATION) at 09:15

## 2020-08-28 RX ADMIN — CARVEDILOL PHOSPHATE 6.25 MILLIGRAM(S): 80 CAPSULE, EXTENDED RELEASE ORAL at 17:49

## 2020-08-28 RX ADMIN — PANTOPRAZOLE SODIUM 40 MILLIGRAM(S): 20 TABLET, DELAYED RELEASE ORAL at 06:30

## 2020-08-28 RX ADMIN — Medication 6: at 12:59

## 2020-08-28 RX ADMIN — OXYCODONE HYDROCHLORIDE 20 MILLIGRAM(S): 5 TABLET ORAL at 00:57

## 2020-08-28 RX ADMIN — TIOTROPIUM BROMIDE 1 CAPSULE(S): 18 CAPSULE ORAL; RESPIRATORY (INHALATION) at 09:15

## 2020-08-28 RX ADMIN — ALBUTEROL 2.5 MILLIGRAM(S): 90 AEROSOL, METERED ORAL at 17:02

## 2020-08-28 RX ADMIN — INSULIN GLARGINE 40 UNIT(S): 100 INJECTION, SOLUTION SUBCUTANEOUS at 22:42

## 2020-08-28 RX ADMIN — CARVEDILOL PHOSPHATE 6.25 MILLIGRAM(S): 80 CAPSULE, EXTENDED RELEASE ORAL at 06:29

## 2020-08-28 RX ADMIN — OXYCODONE HYDROCHLORIDE 20 MILLIGRAM(S): 5 TABLET ORAL at 14:14

## 2020-08-28 RX ADMIN — ALBUTEROL 2.5 MILLIGRAM(S): 90 AEROSOL, METERED ORAL at 05:22

## 2020-08-28 RX ADMIN — CYCLOBENZAPRINE HYDROCHLORIDE 5 MILLIGRAM(S): 10 TABLET, FILM COATED ORAL at 22:41

## 2020-08-28 RX ADMIN — CHLORHEXIDINE GLUCONATE 1 APPLICATION(S): 213 SOLUTION TOPICAL at 13:04

## 2020-08-28 RX ADMIN — OXYCODONE HYDROCHLORIDE 20 MILLIGRAM(S): 5 TABLET ORAL at 13:14

## 2020-08-28 RX ADMIN — Medication 40 MILLIGRAM(S): at 14:16

## 2020-08-28 NOTE — PROGRESS NOTE ADULT - PROBLEM SELECTOR PLAN 4
Hx of lung cancer with metastasis. On chemo q3 weeks. Dr. Mendiola is oncologist.   - OP f/u with Dr. Mendiola  - per CTPA interval decrease in LAD from 6/2020 K 3.1  - replete K 3.1  - repleted with 40 meq x 2

## 2020-08-28 NOTE — PHYSICAL THERAPY INITIAL EVALUATION ADULT - PERTINENT HX OF CURRENT PROBLEM, REHAB EVAL
64 y/o Female admitted due to COPD exacerbation; also with multiple lytic lesions thoracic spine, subacute L1 compression fracture; was seen by ortho service 66 y/o Female admitted due to SOB and increased sputum producation, found with COPD exacerbation, PE ruled out; also with multiple lytic lesions thoracic spine, subacute L1 compression fracture, possible lytic focus on the right humerus; was seen by ortho service who recommends TLSO and WBAT with assistive device as needed 64 y/o Female with h/o COPD, lung Ca admitted due to SOB and increased sputum producation, found with COPD exacerbation, PE ruled out; also with multiple lytic lesions thoracic spine, subacute L1 compression fracture, possible lytic focus on the right humerus; was seen by ortho service who recommends TLSO and WBAT with assistive device as needed

## 2020-08-28 NOTE — CONSULT NOTE ADULT - SUBJECTIVE AND OBJECTIVE BOX
66 y/o F with PMH COPD (5L NC at home), 80 pack year smoking history, R lung cancer s/p resection (2017) on chemo q 3 weeks, recent admission for COPD exacerbation earlier in August who presents for SOB and increased sputum production. She has had SOB for awhile. She was recently admitted in the beginning of August for COPD exacerbation. Pt reports LBP for last few weeks, is able to ambulate without assistance but is becoming painful. Denies HS/LOC. Denies pain/injury elsewhere. Denies numbness/tingling/paresthesias/weakness. Denies bowel/bladder incontinence. Denies fevers/chills. No other complaints at this time.  Ortho spine consulted for multiple lytic lesions in thoracic spine and L1 compression fracture .     HEALTH ISSUES - PROBLEM Dx:  Compression fracture of L1 vertebra with routine healing, subsequent encounter: Compression fracture of L1 vertebra with routine healing, subsequent encounter  Hypokalemia: Hypokalemia  COPD exacerbation: COPD exacerbation  Prophylactic measure: Prophylactic measure  Nutrition, metabolism, and development symptoms: Nutrition, metabolism, and development symptoms  Hypertension: Hypertension  Diabetes: Diabetes  Constipation: Constipation  Chronic pain: Chronic pain  Lung cancer: Lung cancer  COPD (chronic obstructive pulmonary disease): COPD (chronic obstructive pulmonary disease)  Respiratory failure with hypoxia: Respiratory failure with hypoxia          MEDICATIONS  (STANDING):  ALBUTerol    0.083% 2.5 milliGRAM(s) Nebulizer every 4 hours  amLODIPine   Tablet 5 milliGRAM(s) Oral daily  atorvastatin 40 milliGRAM(s) Oral at bedtime  budesonide 160 MICROgram(s)/formoterol 4.5 MICROgram(s) Inhaler 2 Puff(s) Inhalation two times a day  carvedilol 6.25 milliGRAM(s) Oral every 12 hours  chlorhexidine 4% Liquid 1 Application(s) Topical daily  dextrose 50% Injectable 12.5 Gram(s) IV Push once  dextrose 50% Injectable 25 Gram(s) IV Push once  dextrose 50% Injectable 25 Gram(s) IV Push once  enoxaparin Injectable 40 milliGRAM(s) SubCutaneous every 24 hours  folic acid 1 milliGRAM(s) Oral daily  gabapentin 400 milliGRAM(s) Oral every 8 hours  insulin glargine Injectable (LANTUS) 40 Unit(s) SubCutaneous at bedtime  insulin lispro (HumaLOG) corrective regimen sliding scale   SubCutaneous three times a day before meals  insulin lispro Injectable (HumaLOG) 8 Unit(s) SubCutaneous three times a day before meals  nystatin Powder 1 Application(s) Topical two times a day  pantoprazole    Tablet 40 milliGRAM(s) Oral before breakfast  polyethylene glycol 3350 17 Gram(s) Oral every 24 hours  predniSONE   Tablet 40 milliGRAM(s) Oral every 24 hours  senna 2 Tablet(s) Oral at bedtime  tiotropium 18 MICROgram(s) Capsule 1 Capsule(s) Inhalation daily      Allergies    fish (Angioedema)  penicillins (Rash)    Intolerances        PAST MEDICAL & SURGICAL HISTORY:  Malignant neoplasm of unspecified part of right bronchus or lung  HTN (hypertension)  DM (diabetes mellitus)  History of ITP  History of ITP  CHF (congestive heart failure)  COPD (chronic obstructive pulmonary disease)  Lung cancer  Morbid obesity  GERD (gastroesophageal reflux disease)  Deviated septum  Prolapsed uterus  Obesity  Diabetes mellitus  ITP (idiopathic thrombocytopenic purpura)  Emphysema/COPD  Acute Exacerbation of Chronic Obstructive Pulmonary Disease (COPD)  ITP (Idiopathic Thrombocytopenic Purpura)  History of cholecystectomy  S/P lobectomy of lung: right 2017  History of tonsillectomy  No significant past surgical history  S/P Cholecystectomy                            12.1   13.43 )-----------( 169      ( 28 Aug 2020 06:00 )             36.3       28 Aug 2020 06:00    142    |  93     |  11     ----------------------------<  276    3.1     |  34     |  0.43     Ca    9.0        28 Aug 2020 06:00  Phos  3.1       28 Aug 2020 06:00  Mg     1.7       28 Aug 2020 06:00    TPro  6.3    /  Alb  3.6    /  TBili  1.0    /  DBili  x      /  AST  23     /  ALT  19     /  AlkPhos  198    27 Aug 2020 06:45              Vital Signs Last 24 Hrs  T(C): 36.5 (08-28-20 @ 13:20), Max: 36.7 (08-27-20 @ 21:33)  T(F): 97.7 (08-28-20 @ 13:20), Max: 98 (08-27-20 @ 21:33)  HR: 99 (08-28-20 @ 13:20) (93 - 108)  BP: 124/72 (08-28-20 @ 13:20) (124/72 - 150/95)  BP(mean): --  RR: 18 (08-28-20 @ 13:20) (18 - 18)  SpO2: 100% (08-28-20 @ 13:20) (96% - 100%)    Gen: NAD    Spine PE:  Skin intact  No gross deformity  TTP mid thoracic spine, upper lumbar spine  No bony step offs  No paraspinal muscle ttp/hypertonicity   Negative Straight leg raise  Negative clonus  Negative babinski  Negative rosario  + rectal tone  No saddle anesthesia    Motor:                   C5                C6              C7               C8           T1   R            5/5                5/5            5/5             5/5          5/5  L             5/5               5/5             5/5             5/5          5/5                L2             L3             L4               L5            S1  R         5/5           5/5          5/5             5/5           5/5  L          5/5          5/5           5/5             5/5           5/5    Sensory:            C5         C6         C7      C8       T1        (0=absent, 1=impaired, 2=normal, NT=not testable)  R         2            2           2        2         2  L          2            2           2        2         2               L2          L3         L4      L5       S1         (0=absent, 1=impaired, 2=normal, NT=not testable)  R         2            2            2        2        2  L          2            2           2        2         2        < from: CT Thoracic Spine No Cont (08.04.20 @ 09:38) >  EXAM:  CT THORACIC SPINE                            PROCEDURE DATE:  08/04/2020          INTERPRETATION:  CLINICAL INFORMATION: pathological fx. RCC. ADMDIAG1: R06.02 SHORTNESS OF BREATH/.    TECHNIQUE: Noncontrast CT scan of the thoracic spine wasperformed. Thin section axial images were obtained with sagittal and coronal reformations.    COMPARISON: CT chest 8/2/2020 and 6/25/2020    FINDINGS:    Exaggerated thoracic kyphosis.    Multiple sclerotic and lytic lesions in the thoracic spine andribs suggesting metastatic disease, overall progressed since 6/25/2020. Lesions are noted in the T2 and T4-T10 vertebrae, worst at T7 where there is extensive involvement of the vertebral body and posterior elements and epidural soft tissue suggesting tumor ventrally and to the right. Redemonstration of compression deformities of the T2, T4, T7, T8, T10, T11 and T12 vertebral bodies. New acute compression fracture of the L1 vertebral body with bony retropulsion. Sclerotic lesions in the bilateraleighth and right 11th ribs.    Emphysema. Right lung wedge resection. Subcarinal and left paraesophageal lymphadenopathy again noted.      IMPRESSION:  1.  Multiple sclerotic and lytic lesions in the thoracic spine and ribs suggesting metastatic disease, overall progressed since 6/25/2020. Epidural soft tissue at the T7 level suggesting tumor.  2.  Multilevel thoracic compression fractures.  3.  New acute compression fracture of the L1 vertebral body with bony retropulsion.      < end of copied text >        A/P: 65y Female with multiple lytic lesions thoracic spine, subacute L1 compression fracture   Pain control  TLSO  WBAT with assistive devices as needed  SCDs  Above discussed with Dr Lorenz ortho spine attending on call 64 y/o F with PMH COPD (5L NC at home), 80 pack year smoking history, R lung cancer s/p resection (2017) on chemo q 3 weeks, recent admission for COPD exacerbation earlier in August who presents for SOB and increased sputum production. She has had SOB for awhile. She was recently admitted in the beginning of August for COPD exacerbation. Pt reports LBP for last few weeks, is able to ambulate without assistance but is becoming painful. Denies HS/LOC. Denies pain/injury elsewhere. Denies numbness/tingling/paresthesias/weakness. Denies bowel/bladder incontinence. Denies fevers/chills. No other complaints at this time.  Ortho spine consulted for multiple lytic lesions in thoracic spine and L1 compression fracture .     HEALTH ISSUES - PROBLEM Dx:  Compression fracture of L1 vertebra with routine healing, subsequent encounter: Compression fracture of L1 vertebra with routine healing, subsequent encounter  Hypokalemia: Hypokalemia  COPD exacerbation: COPD exacerbation  Prophylactic measure: Prophylactic measure  Nutrition, metabolism, and development symptoms: Nutrition, metabolism, and development symptoms  Hypertension: Hypertension  Diabetes: Diabetes  Constipation: Constipation  Chronic pain: Chronic pain  Lung cancer: Lung cancer  COPD (chronic obstructive pulmonary disease): COPD (chronic obstructive pulmonary disease)  Respiratory failure with hypoxia: Respiratory failure with hypoxia          MEDICATIONS  (STANDING):  ALBUTerol    0.083% 2.5 milliGRAM(s) Nebulizer every 4 hours  amLODIPine   Tablet 5 milliGRAM(s) Oral daily  atorvastatin 40 milliGRAM(s) Oral at bedtime  budesonide 160 MICROgram(s)/formoterol 4.5 MICROgram(s) Inhaler 2 Puff(s) Inhalation two times a day  carvedilol 6.25 milliGRAM(s) Oral every 12 hours  chlorhexidine 4% Liquid 1 Application(s) Topical daily  dextrose 50% Injectable 12.5 Gram(s) IV Push once  dextrose 50% Injectable 25 Gram(s) IV Push once  dextrose 50% Injectable 25 Gram(s) IV Push once  enoxaparin Injectable 40 milliGRAM(s) SubCutaneous every 24 hours  folic acid 1 milliGRAM(s) Oral daily  gabapentin 400 milliGRAM(s) Oral every 8 hours  insulin glargine Injectable (LANTUS) 40 Unit(s) SubCutaneous at bedtime  insulin lispro (HumaLOG) corrective regimen sliding scale   SubCutaneous three times a day before meals  insulin lispro Injectable (HumaLOG) 8 Unit(s) SubCutaneous three times a day before meals  nystatin Powder 1 Application(s) Topical two times a day  pantoprazole    Tablet 40 milliGRAM(s) Oral before breakfast  polyethylene glycol 3350 17 Gram(s) Oral every 24 hours  predniSONE   Tablet 40 milliGRAM(s) Oral every 24 hours  senna 2 Tablet(s) Oral at bedtime  tiotropium 18 MICROgram(s) Capsule 1 Capsule(s) Inhalation daily      Allergies    fish (Angioedema)  penicillins (Rash)    Intolerances        PAST MEDICAL & SURGICAL HISTORY:  Malignant neoplasm of unspecified part of right bronchus or lung  HTN (hypertension)  DM (diabetes mellitus)  History of ITP  History of ITP  CHF (congestive heart failure)  COPD (chronic obstructive pulmonary disease)  Lung cancer  Morbid obesity  GERD (gastroesophageal reflux disease)  Deviated septum  Prolapsed uterus  Obesity  Diabetes mellitus  ITP (idiopathic thrombocytopenic purpura)  Emphysema/COPD  Acute Exacerbation of Chronic Obstructive Pulmonary Disease (COPD)  ITP (Idiopathic Thrombocytopenic Purpura)  History of cholecystectomy  S/P lobectomy of lung: right 2017  History of tonsillectomy  No significant past surgical history  S/P Cholecystectomy                            12.1   13.43 )-----------( 169      ( 28 Aug 2020 06:00 )             36.3       28 Aug 2020 06:00    142    |  93     |  11     ----------------------------<  276    3.1     |  34     |  0.43     Ca    9.0        28 Aug 2020 06:00  Phos  3.1       28 Aug 2020 06:00  Mg     1.7       28 Aug 2020 06:00    TPro  6.3    /  Alb  3.6    /  TBili  1.0    /  DBili  x      /  AST  23     /  ALT  19     /  AlkPhos  198    27 Aug 2020 06:45              Vital Signs Last 24 Hrs  T(C): 36.5 (08-28-20 @ 13:20), Max: 36.7 (08-27-20 @ 21:33)  T(F): 97.7 (08-28-20 @ 13:20), Max: 98 (08-27-20 @ 21:33)  HR: 99 (08-28-20 @ 13:20) (93 - 108)  BP: 124/72 (08-28-20 @ 13:20) (124/72 - 150/95)  BP(mean): --  RR: 18 (08-28-20 @ 13:20) (18 - 18)  SpO2: 100% (08-28-20 @ 13:20) (96% - 100%)    Gen: NAD    Spine PE:  Skin intact  No gross deformity  TTP mid thoracic spine, upper lumbar spine  No bony step offs  No paraspinal muscle ttp/hypertonicity   Negative Straight leg raise  Negative clonus  Negative babinski  Negative rosario  + rectal tone  No saddle anesthesia    Motor:                   C5                C6              C7               C8           T1   R            5/5                5/5            5/5             5/5          5/5  L             5/5               5/5             5/5             5/5          5/5                L2             L3             L4               L5            S1  R         5/5           5/5          5/5             5/5           5/5  L          5/5          5/5           5/5             5/5           5/5    Sensory:            C5         C6         C7      C8       T1        (0=absent, 1=impaired, 2=normal, NT=not testable)  R         2            2           2        2         2  L          2            2           2        2         2               L2          L3         L4      L5       S1         (0=absent, 1=impaired, 2=normal, NT=not testable)  R         2            2            2        2        2  L          2            2           2        2         2        < from: CT Thoracic Spine No Cont (08.04.20 @ 09:38) >  EXAM:  CT THORACIC SPINE                            PROCEDURE DATE:  08/04/2020          INTERPRETATION:  CLINICAL INFORMATION: pathological fx. RCC. ADMDIAG1: R06.02 SHORTNESS OF BREATH/.    TECHNIQUE: Noncontrast CT scan of the thoracic spine wasperformed. Thin section axial images were obtained with sagittal and coronal reformations.    COMPARISON: CT chest 8/2/2020 and 6/25/2020    FINDINGS:    Exaggerated thoracic kyphosis.    Multiple sclerotic and lytic lesions in the thoracic spine andribs suggesting metastatic disease, overall progressed since 6/25/2020. Lesions are noted in the T2 and T4-T10 vertebrae, worst at T7 where there is extensive involvement of the vertebral body and posterior elements and epidural soft tissue suggesting tumor ventrally and to the right. Redemonstration of compression deformities of the T2, T4, T7, T8, T10, T11 and T12 vertebral bodies. New acute compression fracture of the L1 vertebral body with bony retropulsion. Sclerotic lesions in the bilateraleighth and right 11th ribs.    Emphysema. Right lung wedge resection. Subcarinal and left paraesophageal lymphadenopathy again noted.      IMPRESSION:  1.  Multiple sclerotic and lytic lesions in the thoracic spine and ribs suggesting metastatic disease, overall progressed since 6/25/2020. Epidural soft tissue at the T7 level suggesting tumor.  2.  Multilevel thoracic compression fractures.  3.  New acute compression fracture of the L1 vertebral body with bony retropulsion.      < end of copied text >        A/P: 65y Female with multiple lytic lesions thoracic spine, subacute L1 compression fracture, possible lytic focus on the right humerus. Discussed operative and nonoperative management with patient, patient currently electing for nonoperative management   Need right humerus xrays   Pain control  TLSO  WBAT with assistive devices as needed  SCDs  Radiation therapy consult   Recommend IR biopsy of lesions as patient has not had any biopsy of bone lesions yet  Above discussed with Dr Lorenz ortho spine attending on call 64 y/o F with PMH COPD (5L NC at home), 80 pack year smoking history, R lung cancer s/p resection (2017) on chemo q 3 weeks, recent admission for COPD exacerbation earlier in August who presents for SOB and increased sputum production. She has had SOB for awhile. She was recently admitted in the beginning of August for COPD exacerbation. Pt reports LBP for last few weeks, is able to ambulate without assistance but is becoming painful. Denies HS/LOC. Denies pain/injury elsewhere. Denies numbness/tingling/paresthesias/weakness. Denies bowel/bladder incontinence. Denies fevers/chills. No other complaints at this time.  Ortho spine consulted for multiple lytic lesions in thoracic spine and L1 compression fracture .     HEALTH ISSUES - PROBLEM Dx:  Compression fracture of L1 vertebra with routine healing, subsequent encounter: Compression fracture of L1 vertebra with routine healing, subsequent encounter  Hypokalemia: Hypokalemia  COPD exacerbation: COPD exacerbation  Prophylactic measure: Prophylactic measure  Nutrition, metabolism, and development symptoms: Nutrition, metabolism, and development symptoms  Hypertension: Hypertension  Diabetes: Diabetes  Constipation: Constipation  Chronic pain: Chronic pain  Lung cancer: Lung cancer  COPD (chronic obstructive pulmonary disease): COPD (chronic obstructive pulmonary disease)  Respiratory failure with hypoxia: Respiratory failure with hypoxia          MEDICATIONS  (STANDING):  ALBUTerol    0.083% 2.5 milliGRAM(s) Nebulizer every 4 hours  amLODIPine   Tablet 5 milliGRAM(s) Oral daily  atorvastatin 40 milliGRAM(s) Oral at bedtime  budesonide 160 MICROgram(s)/formoterol 4.5 MICROgram(s) Inhaler 2 Puff(s) Inhalation two times a day  carvedilol 6.25 milliGRAM(s) Oral every 12 hours  chlorhexidine 4% Liquid 1 Application(s) Topical daily  dextrose 50% Injectable 12.5 Gram(s) IV Push once  dextrose 50% Injectable 25 Gram(s) IV Push once  dextrose 50% Injectable 25 Gram(s) IV Push once  enoxaparin Injectable 40 milliGRAM(s) SubCutaneous every 24 hours  folic acid 1 milliGRAM(s) Oral daily  gabapentin 400 milliGRAM(s) Oral every 8 hours  insulin glargine Injectable (LANTUS) 40 Unit(s) SubCutaneous at bedtime  insulin lispro (HumaLOG) corrective regimen sliding scale   SubCutaneous three times a day before meals  insulin lispro Injectable (HumaLOG) 8 Unit(s) SubCutaneous three times a day before meals  nystatin Powder 1 Application(s) Topical two times a day  pantoprazole    Tablet 40 milliGRAM(s) Oral before breakfast  polyethylene glycol 3350 17 Gram(s) Oral every 24 hours  predniSONE   Tablet 40 milliGRAM(s) Oral every 24 hours  senna 2 Tablet(s) Oral at bedtime  tiotropium 18 MICROgram(s) Capsule 1 Capsule(s) Inhalation daily      Allergies    fish (Angioedema)  penicillins (Rash)    Intolerances        PAST MEDICAL & SURGICAL HISTORY:  Malignant neoplasm of unspecified part of right bronchus or lung  HTN (hypertension)  DM (diabetes mellitus)  History of ITP  History of ITP  CHF (congestive heart failure)  COPD (chronic obstructive pulmonary disease)  Lung cancer  Morbid obesity  GERD (gastroesophageal reflux disease)  Deviated septum  Prolapsed uterus  Obesity  Diabetes mellitus  ITP (idiopathic thrombocytopenic purpura)  Emphysema/COPD  Acute Exacerbation of Chronic Obstructive Pulmonary Disease (COPD)  ITP (Idiopathic Thrombocytopenic Purpura)  History of cholecystectomy  S/P lobectomy of lung: right 2017  History of tonsillectomy  No significant past surgical history  S/P Cholecystectomy                            12.1   13.43 )-----------( 169      ( 28 Aug 2020 06:00 )             36.3       28 Aug 2020 06:00    142    |  93     |  11     ----------------------------<  276    3.1     |  34     |  0.43     Ca    9.0        28 Aug 2020 06:00  Phos  3.1       28 Aug 2020 06:00  Mg     1.7       28 Aug 2020 06:00    TPro  6.3    /  Alb  3.6    /  TBili  1.0    /  DBili  x      /  AST  23     /  ALT  19     /  AlkPhos  198    27 Aug 2020 06:45              Vital Signs Last 24 Hrs  T(C): 36.5 (08-28-20 @ 13:20), Max: 36.7 (08-27-20 @ 21:33)  T(F): 97.7 (08-28-20 @ 13:20), Max: 98 (08-27-20 @ 21:33)  HR: 99 (08-28-20 @ 13:20) (93 - 108)  BP: 124/72 (08-28-20 @ 13:20) (124/72 - 150/95)  BP(mean): --  RR: 18 (08-28-20 @ 13:20) (18 - 18)  SpO2: 100% (08-28-20 @ 13:20) (96% - 100%)    Gen: NAD    Spine PE:  Skin intact  No gross deformity  TTP mid thoracic spine, upper lumbar spine  No bony step offs  No paraspinal muscle ttp/hypertonicity   Negative Straight leg raise  Negative clonus  Negative babinski  Negative rosario  + rectal tone  No saddle anesthesia    Motor:                   C5                C6              C7               C8           T1   R            5/5                5/5            5/5             5/5          5/5  L             5/5               5/5             5/5             5/5          5/5                L2             L3             L4               L5            S1  R         5/5           5/5          5/5             5/5           5/5  L          5/5          5/5           5/5             5/5           5/5    Sensory:            C5         C6         C7      C8       T1        (0=absent, 1=impaired, 2=normal, NT=not testable)  R         2            2           2        2         2  L          2            2           2        2         2               L2          L3         L4      L5       S1         (0=absent, 1=impaired, 2=normal, NT=not testable)  R         2            2            2        2        2  L          2            2           2        2         2        < from: CT Thoracic Spine No Cont (08.04.20 @ 09:38) >  EXAM:  CT THORACIC SPINE                            PROCEDURE DATE:  08/04/2020          INTERPRETATION:  CLINICAL INFORMATION: pathological fx. RCC. ADMDIAG1: R06.02 SHORTNESS OF BREATH/.    TECHNIQUE: Noncontrast CT scan of the thoracic spine wasperformed. Thin section axial images were obtained with sagittal and coronal reformations.    COMPARISON: CT chest 8/2/2020 and 6/25/2020    FINDINGS:    Exaggerated thoracic kyphosis.    Multiple sclerotic and lytic lesions in the thoracic spine andribs suggesting metastatic disease, overall progressed since 6/25/2020. Lesions are noted in the T2 and T4-T10 vertebrae, worst at T7 where there is extensive involvement of the vertebral body and posterior elements and epidural soft tissue suggesting tumor ventrally and to the right. Redemonstration of compression deformities of the T2, T4, T7, T8, T10, T11 and T12 vertebral bodies. New acute compression fracture of the L1 vertebral body with bony retropulsion. Sclerotic lesions in the bilateraleighth and right 11th ribs.    Emphysema. Right lung wedge resection. Subcarinal and left paraesophageal lymphadenopathy again noted.      IMPRESSION:  1.  Multiple sclerotic and lytic lesions in the thoracic spine and ribs suggesting metastatic disease, overall progressed since 6/25/2020. Epidural soft tissue at the T7 level suggesting tumor.  2.  Multilevel thoracic compression fractures.  3.  New acute compression fracture of the L1 vertebral body with bony retropulsion.      < end of copied text >        A/P: 65y Female with multiple lytic lesions thoracic spine, subacute L1 compression fracture, possible lytic focus on the right humerus. Discussed operative and nonoperative management with patient, patient currently electing for nonoperative management   FU right humerus and shoulder xrays   MRI of thoracolumbar spine   Pain control  TLSO  WBAT with assistive devices as needed  SCDs  Radiation therapy consult   Recommend IR biopsy of a bone lesion as patient has not had any biopsy of bone lesions yet  Above discussed with Dr Lorenz ortho spine attending on call

## 2020-08-28 NOTE — PROGRESS NOTE ADULT - PROBLEM SELECTOR PLAN 1
Increase WOB and hypoxia.  CTPA negative PNA or PE.  HsT negative x 2, BNP wnl.  - c/w prednsione 40 mg daily, suspect may need chronic steroids  - c/w MDIs and standing nebs  - c/w O2, titrate sat to 89-92% Increase WOB and hypoxia.  CTPA negative PNA or PE.  HsT negative x 2, BNP wnl.  - c/w prednisone 40 mg daily, suspect may need chronic steroids; D2  - azithro 500 mg in ED, c/w 250 mg x 4 days  - c/w MDIs (Symbicort, Spiriva) and standing nebs  - c/w continuos pulse ox, c/w O2, titrate sat to 89-92%

## 2020-08-28 NOTE — PHYSICAL THERAPY INITIAL EVALUATION ADULT - LEVEL OF INDEPENDENCE: SUPINE/SIT, REHAB EVAL
not recommendations by ortho reviewed, pt states she is waiting xray shortly and did not want to try to get up at this time; states she was OOB earlier and ambulated to bathroom

## 2020-08-28 NOTE — PROGRESS NOTE ADULT - PROBLEM SELECTOR PLAN 9
DVT ppx: Lovenox 40qd Discussed wishes despite poor lung function and cancer wishes to remain full code; reports has bf whom she lives with and daughter

## 2020-08-28 NOTE — PROGRESS NOTE ADULT - PROBLEM SELECTOR PLAN 5
Chronic pain 2/2 mets. CT thoracic spine showed multiple sclerotic and lytic lesions in the thoracic spine and ribs suggesting metastatic disease, overall progressed since 6/25/2020. Epidural soft tissue at the T7 level suggesting tumor. Multilevel thoracic compression fractures. New acute compression fracture of the L1 vertebral body with bony retropulsion. Pain management assessed the patient on last admission.   - c/w gabapentin 400mg TID and oxycodone 20mg q6 hours (pt. takes percocet at home); c/w bowel regimen miralax and senna Hx of lung cancer with metastasis. On chemo q3 weeks. Dr. Mendiola is oncologist.   - OP f/u with Dr. Mendiola  - per CTPA interval decrease in LAD from 6/2020

## 2020-08-28 NOTE — PROGRESS NOTE ADULT - PROBLEM SELECTOR PLAN 6
Hx of constipation due to opioid use. Last BM yesterday - small hard stool.   - c/w bowel regimen Hx of DM. A1C in June 9.6% now 9% Elevated sugars in the setting of steroid use.   - Lantus 40U qhs   - increase Humalog Hx of DM. A1C in June 9.6% now 9% Elevated sugars in the setting of steroid use.   - Lantus 40U qhs   - increase Humalog from 2 units to 8 units Hx of DM. A1C in June 9.6% now 9% Elevated sugars in the setting of steroid use.   - c/w Lantus 40U qhs, increase Humalog from 2 units to 8 units  - DM diet, BG checks and ZENON

## 2020-08-28 NOTE — PHYSICAL THERAPY INITIAL EVALUATION ADULT - DIAGNOSIS, PT EVAL
multiple lytic lesions thoracic spine, subacute L1 compression fx COPD exacerbation, multiple lytic lesions thoracic spine, subacute L1 compression fx

## 2020-08-28 NOTE — PROGRESS NOTE ADULT - SUBJECTIVE AND OBJECTIVE BOX
Patient is a 65y old  Female who presents with a chief complaint of SOB    SUBJECTIVE / OVERNIGHT EVENTS:    Reports SOB is improved somewhat since admission, reports dependent on steroids and feels they are no longer super effective, stopped 1 wk prior and symptoms recurred, no fevers reports her pulmonologist is Joni Nugent (thought he was [place] but on internet review seems [place]).  Reports lives with her bf, she doesn't smoker x 9 years, he smokes but not in house.  She is getting chemo for her cancer  Reports chronic back pain 2/2 compression fractures  Can walk without assistive device   Forgot her cellphone while she was packing diet coke, bras and extra underwear as states she knew she'd get admitted     MEDICATIONS  (STANDING):  ALBUTerol    0.083% 2.5 milliGRAM(s) Nebulizer every 4 hours  amLODIPine   Tablet 5 milliGRAM(s) Oral daily  atorvastatin 40 milliGRAM(s) Oral at bedtime  budesonide 160 MICROgram(s)/formoterol 4.5 MICROgram(s) Inhaler 2 Puff(s) Inhalation two times a day  carvedilol 6.25 milliGRAM(s) Oral every 12 hours  chlorhexidine 4% Liquid 1 Application(s) Topical daily  enoxaparin Injectable 40 milliGRAM(s) SubCutaneous every 24 hours  folic acid 1 milliGRAM(s) Oral daily  gabapentin 400 milliGRAM(s) Oral every 8 hours  insulin glargine Injectable (LANTUS) 40 Unit(s) SubCutaneous at bedtime  insulin lispro (HumaLOG) corrective regimen sliding scale   SubCutaneous three times a day before meals  insulin lispro Injectable (HumaLOG) 2 Unit(s) SubCutaneous three times a day before meals  nystatin Powder 1 Application(s) Topical two times a day  pantoprazole    Tablet 40 milliGRAM(s) Oral before breakfast  polyethylene glycol 3350 17 Gram(s) Oral every 24 hours  predniSONE   Tablet 40 milliGRAM(s) Oral every 24 hours  senna 2 Tablet(s) Oral at bedtime  tiotropium 18 MICROgram(s) Capsule 1 Capsule(s) Inhalation daily    MEDICATIONS  (PRN):  ALPRAZolam 1 milliGRAM(s) Oral three times a day PRN Anxiety  cyclobenzaprine 5 milliGRAM(s) Oral three times a day PRN Muscle Spasm  dextrose 40% Gel 15 Gram(s) Oral once PRN Blood Glucose LESS THAN 70 milliGRAM(s)/deciliter  glucagon  Injectable 1 milliGRAM(s) IntraMuscular once PRN Glucose LESS THAN 70 milligrams/deciliter  oxyCODONE    IR 20 milliGRAM(s) Oral every 6 hours PRN Severe Pain (7 - 10)    T(C): 36.4 (08-28-20 @ 05:41), Max: 36.7 (08-27-20 @ 21:33)  HR: 100 (08-28-20 @ 12:31) (93 - 112)  BP: 125/84 (08-28-20 @ 05:41) (125/84 - 150/95)  RR: 18 (08-28-20 @ 05:41) (18 - 18)  SpO2: 97% (08-28-20 @ 12:31) (96% - 100%)    CAPILLARY BLOOD GLUCOSE  POCT Blood Glucose.: 296 mg/dL (28 Aug 2020 12:32)  POCT Blood Glucose.: 226 mg/dL (28 Aug 2020 08:40)  POCT Blood Glucose.: 325 mg/dL (27 Aug 2020 22:00)  POCT Blood Glucose.: 318 mg/dL (27 Aug 2020 17:47)    PHYSICAL EXAM:  GENERAL: NAD, appears old than stated age   EYES:  conjunctiva and sclera clear  NECK: Supple, No JVD  CHEST/LUNG: poor air entry, no wheezes appreciated, on 2L  HEART: Regular rate and rhythm; No murmurs, rubs, or gallops  ABDOMEN: Soft, Nontender, Nondistended; Bowel sounds present  EXTREMITIES:  wwp, no edema  PSYCH: AAOx3  NEUROLOGY: non-focal  SKIN:  hyperpigmented shins     LABS:                        12.1   13.43 )-----------( 169      ( 28 Aug 2020 06:00 )             36.3     08-28    142  |  93<L>  |  11  ----------------------------<  276<H>  3.1<L>   |  34<H>  |  0.43<L>    Ca    9.0      28 Aug 2020 06:00  Phos  3.1     08-28  Mg     1.7     08-28    TPro  6.3  /  Alb  3.6  /  TBili  1.0  /  DBili  x   /  AST  23  /  ALT  19  /  AlkPhos  198<H>  08-27    Microbiology:   VBG 08-27 @ 06:45  pH: 7.49/pCO2: 42/pO2: 88/HCO3: 31/lactate: 1.3    CTPA  PULMONARY ARTERY: No pulmonary embolism.  LUNGS AND AIRWAYS: Patent central airways. Extensive centrilobular emphysema. Redemonstrated right lower lobe wedge resection with associated postsurgical changes. Slight interval increase in secretions within a distal branch of the lingula with slight interval increase in associated atelectasis. Small calcified granuloma in the right lower lobe. Additional trace bibasilar linear atelectasis.  PLEURA: No pleural effusion.  MEDIASTINUM AND GUILLE: A surgical clip in the subcarinal region. Redemonstrated few small mediastinal lymph nodes, the largest subcarinal lymph node again measuring 1.2 cm in short axis, which are intervally decreased in size since prior exam from 6/25/2020.  VESSELS: Atherosclerotic changes.  HEART: Heart size is normal. No pericardial effusion.  CHEST WALL AND LOWER NECK: Within normal limits.  VISUALIZED UPPER ABDOMEN: Within normal limits.  BONES: Interval increase in compression fracture/deformity of the L1 vertebral body with mild retropulsion of fracture fragments into the spinal canal. Redemonstrated mixed sclerotic/lytic lesions involving the C6, T2, T4, T6 and T9 vertebral bodies. Redemonstrated pathologic compression deformity of T7 vertebral body. Redemonstrated small sclerotic focus in the right humeral head. Subacute/chronic fracture of the left seventh rib and chronic fracture of the right sixth rib.    IMPRESSION:  No pulmonary embolism.  Interval increase in compression fracture/deformity of the L1 vertebral body with mild retropulsion of fracture fragments into the spinal canal. Limited evaluation of the spinal canal on this modality. Additional areas of osseous metastatic disease and chronic pathologic compression fractures.  Redemonstrated few small mediastinal lymph nodes, which are overall decrease in size since 6/25/2020.  Slight interval increase in secretions within a distal branch of the lingula with slight interval increase in associated atelectasis.    Care Discussed with Consultants/Other Providers: Patient is a 65y old  Female who presents with a chief complaint of SOB    SUBJECTIVE / OVERNIGHT EVENTS:    Reports SOB is improved somewhat since admission, reports dependent on steroids and feels they are no longer super effective, stopped 1 wk prior and symptoms recurred, no fevers reports her pulmonologist is Joni Nugent (thought he was [place] but on internet review seems [place]).  Reports lives with her bf, she doesn't smoker x 9 years, he smokes but not in house.  She is getting chemo for her cancer  Reports chronic back pain 2/2 compression fractures  Can walk without assistive device   Forgot her cellphone while she was packing diet coke, bras and extra underwear as states she knew she'd get admitted     MEDICATIONS  (STANDING):  ALBUTerol  0.083% 2.5 milliGRAM(s) Nebulizer every 4 hours  amLODIPine  Tablet 5 milliGRAM(s) Oral daily  atorvastatin 40 milliGRAM(s) Oral at bedtime  budesonide 160 MICROgram(s)/formoterol 4.5 MICROgram(s) Inhaler 2 Puff(s) Inhalation two times a day  carvedilol 6.25 milliGRAM(s) Oral every 12 hours  chlorhexidine 4% Liquid 1 Application(s) Topical daily  enoxaparin Injectable 40 milliGRAM(s) SubCutaneous every 24 hours  folic acid 1 milliGRAM(s) Oral daily  gabapentin 400 milliGRAM(s) Oral every 8 hours  insulin glargine Injectable (LANTUS) 40 Unit(s) SubCutaneous at bedtime  insulin lispro (HumaLOG) corrective regimen sliding scale   SubCutaneous three times a day before meals  insulin lispro Injectable (HumaLOG) 2 Unit(s) SubCutaneous three times a day before meals  nystatin Powder 1 Application(s) Topical two times a day  pantoprazole    Tablet 40 milliGRAM(s) Oral before breakfast  polyethylene glycol 3350 17 Gram(s) Oral every 24 hours  predniSONE   Tablet 40 milliGRAM(s) Oral every 24 hours  senna 2 Tablet(s) Oral at bedtime  tiotropium 18 MICROgram(s) Capsule 1 Capsule(s) Inhalation daily    MEDICATIONS  (PRN):  ALPRAZolam 1 milliGRAM(s) Oral three times a day PRN Anxiety  cyclobenzaprine 5 milliGRAM(s) Oral three times a day PRN Muscle Spasm  dextrose 40% Gel 15 Gram(s) Oral once PRN Blood Glucose LESS THAN 70 milliGRAM(s)/deciliter  glucagon  Injectable 1 milliGRAM(s) IntraMuscular once PRN Glucose LESS THAN 70 milligrams/deciliter  oxyCODONE    IR 20 milliGRAM(s) Oral every 6 hours PRN Severe Pain (7 - 10)    T(C): 36.4 (08-28-20 @ 05:41), Max: 36.7 (08-27-20 @ 21:33)  HR: 100 (08-28-20 @ 12:31) (93 - 112)  BP: 125/84 (08-28-20 @ 05:41) (125/84 - 150/95)  RR: 18 (08-28-20 @ 05:41) (18 - 18)  SpO2: 97% (08-28-20 @ 12:31) (96% - 100%)    CAPILLARY BLOOD GLUCOSE  POCT Blood Glucose.: 296 mg/dL (28 Aug 2020 12:32)  POCT Blood Glucose.: 226 mg/dL (28 Aug 2020 08:40)  POCT Blood Glucose.: 325 mg/dL (27 Aug 2020 22:00)  POCT Blood Glucose.: 318 mg/dL (27 Aug 2020 17:47)    PHYSICAL EXAM:  GENERAL: NAD, appears old than stated age   EYES:  conjunctiva and sclera clear  NECK: Supple, No JVD  CHEST/LUNG: poor air entry, no wheezes appreciated, on 2L  HEART: Regular rate and rhythm; No murmurs, rubs, or gallops  ABDOMEN: Soft, Nontender, Nondistended; Bowel sounds present  EXTREMITIES:  wwp, no edema  PSYCH: AAOx3  NEUROLOGY: non-focal  SKIN:  hyperpigmented shins     LABS:                        12.1   13.43 )-----------( 169      ( 28 Aug 2020 06:00 )             36.3     08-28    142  |  93<L>  |  11  ----------------------------<  276<H>  3.1<L>   |  34<H>  |  0.43<L>    Ca    9.0      28 Aug 2020 06:00  Phos  3.1     08-28  Mg     1.7     08-28    TPro  6.3  /  Alb  3.6  /  TBili  1.0  /  DBili  x   /  AST  23  /  ALT  19  /  AlkPhos  198<H>  08-27    Microbiology:   VBG 08-27 @ 06:45  pH: 7.49/pCO2: 42/pO2: 88/HCO3: 31/lactate: 1.3    CTPA  PULMONARY ARTERY: No pulmonary embolism.  LUNGS AND AIRWAYS: Patent central airways. Extensive centrilobular emphysema. Redemonstrated right lower lobe wedge resection with associated postsurgical changes. Slight interval increase in secretions within a distal branch of the lingula with slight interval increase in associated atelectasis. Small calcified granuloma in the right lower lobe. Additional trace bibasilar linear atelectasis.  PLEURA: No pleural effusion.  MEDIASTINUM AND GUILLE: A surgical clip in the subcarinal region. Redemonstrated few small mediastinal lymph nodes, the largest subcarinal lymph node again measuring 1.2 cm in short axis, which are intervally decreased in size since prior exam from 6/25/2020.  VESSELS: Atherosclerotic changes.  HEART: Heart size is normal. No pericardial effusion.  CHEST WALL AND LOWER NECK: Within normal limits.  VISUALIZED UPPER ABDOMEN: Within normal limits.  BONES: Interval increase in compression fracture/deformity of the L1 vertebral body with mild retropulsion of fracture fragments into the spinal canal. Redemonstrated mixed sclerotic/lytic lesions involving the C6, T2, T4, T6 and T9 vertebral bodies. Redemonstrated pathologic compression deformity of T7 vertebral body. Redemonstrated small sclerotic focus in the right humeral head. Subacute/chronic fracture of the left seventh rib and chronic fracture of the right sixth rib.    IMPRESSION:  No pulmonary embolism.  Interval increase in compression fracture/deformity of the L1 vertebral body with mild retropulsion of fracture fragments into the spinal canal. Limited evaluation of the spinal canal on this modality. Additional areas of osseous metastatic disease and chronic pathologic compression fractures.  Redemonstrated few small mediastinal lymph nodes, which are overall decrease in size since 6/25/2020.  Slight interval increase in secretions within a distal branch of the lingula with slight interval increase in associated atelectasis.    Care Discussed with Consultants/Other Providers:

## 2020-08-28 NOTE — PROGRESS NOTE ADULT - PROBLEM SELECTOR PLAN 3
K 3.1  - replete Chronic pain 2/2 mets and compression fx. Multilevel thoracic compression fractures. Also compression fracture of the L1 vertebral body with bony retropulsion.  - c/w gabapentin 400mg TID and oxycodone 20mg q6 hours (pt. takes percocet at home); c/w bowel regimen miralax and senna  - ortho spine eval re: need for brace, request x-ray T and L spine Chronic pain 2/2 mets and compression fx. Multilevel thoracic lytic/sclerotic lesions and fractures T7 and L1with bony retropulsion.  - c/w gabapentin 400mg TID and oxycodone 20mg q6 hours (pt. takes percocet at home); c/w bowel regimen miralax and senna  - ortho spine eval re: need for brace, request x-ray T and L spine (ordered) Chronic pain 2/2 mets to spine and compression fx. CT with multilevel thoracic lytic/sclerotic lesions and fractures T7 and L1with bony retropulsion.  - c/w gabapentin 400mg TID and oxycodone 20mg q6 hours (pt. takes percocet at home); c/w bowel regimen Miralax and senna  - ortho spine eval re: need for brace, request x-ray T and L spine (ordered)

## 2020-08-28 NOTE — PROGRESS NOTE ADULT - PROBLEM SELECTOR PLAN 7
Hx of DM. A1C in June 9.6% now 9% Elevated sugars in the setting of steroid use.   - Lantus 40U qhs   - increase Humalog Hx of HTN. Currently normotensive.   - c/w Coreg 6.25mg q12 and Norvasc 5qd. BP at goal   - c/w Coreg 6.25mg q12 and Norvasc 5qd.

## 2020-08-29 LAB
ANION GAP SERPL CALC-SCNC: 12 MMO/L — SIGNIFICANT CHANGE UP (ref 7–14)
BUN SERPL-MCNC: 13 MG/DL — SIGNIFICANT CHANGE UP (ref 7–23)
CALCIUM SERPL-MCNC: 8.5 MG/DL — SIGNIFICANT CHANGE UP (ref 8.4–10.5)
CHLORIDE SERPL-SCNC: 98 MMOL/L — SIGNIFICANT CHANGE UP (ref 98–107)
CO2 SERPL-SCNC: 27 MMOL/L — SIGNIFICANT CHANGE UP (ref 22–31)
CREAT SERPL-MCNC: 0.51 MG/DL — SIGNIFICANT CHANGE UP (ref 0.5–1.3)
GLUCOSE BLDC GLUCOMTR-MCNC: 177 MG/DL — HIGH (ref 70–99)
GLUCOSE BLDC GLUCOMTR-MCNC: 197 MG/DL — HIGH (ref 70–99)
GLUCOSE BLDC GLUCOMTR-MCNC: 232 MG/DL — HIGH (ref 70–99)
GLUCOSE BLDC GLUCOMTR-MCNC: 299 MG/DL — HIGH (ref 70–99)
GLUCOSE SERPL-MCNC: 337 MG/DL — HIGH (ref 70–99)
HCT VFR BLD CALC: 36.3 % — SIGNIFICANT CHANGE UP (ref 34.5–45)
HGB BLD-MCNC: 11.3 G/DL — LOW (ref 11.5–15.5)
MAGNESIUM SERPL-MCNC: 1.7 MG/DL — SIGNIFICANT CHANGE UP (ref 1.6–2.6)
MCHC RBC-ENTMCNC: 28.3 PG — SIGNIFICANT CHANGE UP (ref 27–34)
MCHC RBC-ENTMCNC: 31.1 % — LOW (ref 32–36)
MCV RBC AUTO: 90.8 FL — SIGNIFICANT CHANGE UP (ref 80–100)
NRBC # FLD: 0 K/UL — SIGNIFICANT CHANGE UP (ref 0–0)
PHOSPHATE SERPL-MCNC: 3.1 MG/DL — SIGNIFICANT CHANGE UP (ref 2.5–4.5)
PLATELET # BLD AUTO: 156 K/UL — SIGNIFICANT CHANGE UP (ref 150–400)
PMV BLD: 11.8 FL — SIGNIFICANT CHANGE UP (ref 7–13)
POTASSIUM SERPL-MCNC: 4.6 MMOL/L — SIGNIFICANT CHANGE UP (ref 3.5–5.3)
POTASSIUM SERPL-SCNC: 4.6 MMOL/L — SIGNIFICANT CHANGE UP (ref 3.5–5.3)
RBC # BLD: 4 M/UL — SIGNIFICANT CHANGE UP (ref 3.8–5.2)
RBC # FLD: 17 % — HIGH (ref 10.3–14.5)
SARS-COV-2 IGG SERPL QL IA: NEGATIVE — SIGNIFICANT CHANGE UP
SARS-COV-2 IGM SERPL IA-ACNC: <3.8 AU/ML — SIGNIFICANT CHANGE UP
SODIUM SERPL-SCNC: 137 MMOL/L — SIGNIFICANT CHANGE UP (ref 135–145)
WBC # BLD: 10.54 K/UL — HIGH (ref 3.8–10.5)
WBC # FLD AUTO: 10.54 K/UL — HIGH (ref 3.8–10.5)

## 2020-08-29 PROCEDURE — 99233 SBSQ HOSP IP/OBS HIGH 50: CPT

## 2020-08-29 PROCEDURE — 73030 X-RAY EXAM OF SHOULDER: CPT | Mod: 26,RT

## 2020-08-29 PROCEDURE — 73060 X-RAY EXAM OF HUMERUS: CPT | Mod: 26,RT

## 2020-08-29 PROCEDURE — 72082 X-RAY EXAM ENTIRE SPI 2/3 VW: CPT | Mod: 26

## 2020-08-29 RX ORDER — POLYETHYLENE GLYCOL 3350 17 G/17G
17 POWDER, FOR SOLUTION ORAL
Refills: 0 | Status: DISCONTINUED | OUTPATIENT
Start: 2020-08-29 | End: 2020-09-04

## 2020-08-29 RX ORDER — INSULIN LISPRO 100/ML
10 VIAL (ML) SUBCUTANEOUS
Refills: 0 | Status: DISCONTINUED | OUTPATIENT
Start: 2020-08-29 | End: 2020-08-30

## 2020-08-29 RX ORDER — GABAPENTIN 400 MG/1
600 CAPSULE ORAL EVERY 8 HOURS
Refills: 0 | Status: DISCONTINUED | OUTPATIENT
Start: 2020-08-29 | End: 2020-09-04

## 2020-08-29 RX ORDER — INSULIN GLARGINE 100 [IU]/ML
45 INJECTION, SOLUTION SUBCUTANEOUS AT BEDTIME
Refills: 0 | Status: DISCONTINUED | OUTPATIENT
Start: 2020-08-29 | End: 2020-08-30

## 2020-08-29 RX ADMIN — GABAPENTIN 600 MILLIGRAM(S): 400 CAPSULE ORAL at 21:03

## 2020-08-29 RX ADMIN — INSULIN GLARGINE 45 UNIT(S): 100 INJECTION, SOLUTION SUBCUTANEOUS at 21:02

## 2020-08-29 RX ADMIN — ATORVASTATIN CALCIUM 40 MILLIGRAM(S): 80 TABLET, FILM COATED ORAL at 21:03

## 2020-08-29 RX ADMIN — CARVEDILOL PHOSPHATE 6.25 MILLIGRAM(S): 80 CAPSULE, EXTENDED RELEASE ORAL at 17:58

## 2020-08-29 RX ADMIN — OXYCODONE HYDROCHLORIDE 20 MILLIGRAM(S): 5 TABLET ORAL at 09:22

## 2020-08-29 RX ADMIN — NYSTATIN CREAM 1 APPLICATION(S): 100000 CREAM TOPICAL at 17:58

## 2020-08-29 RX ADMIN — AMLODIPINE BESYLATE 5 MILLIGRAM(S): 2.5 TABLET ORAL at 06:29

## 2020-08-29 RX ADMIN — ALBUTEROL 2.5 MILLIGRAM(S): 90 AEROSOL, METERED ORAL at 04:26

## 2020-08-29 RX ADMIN — ENOXAPARIN SODIUM 40 MILLIGRAM(S): 100 INJECTION SUBCUTANEOUS at 14:02

## 2020-08-29 RX ADMIN — PANTOPRAZOLE SODIUM 40 MILLIGRAM(S): 20 TABLET, DELAYED RELEASE ORAL at 08:52

## 2020-08-29 RX ADMIN — OXYCODONE HYDROCHLORIDE 20 MILLIGRAM(S): 5 TABLET ORAL at 21:03

## 2020-08-29 RX ADMIN — Medication 40 MILLIGRAM(S): at 14:03

## 2020-08-29 RX ADMIN — ALBUTEROL 2.5 MILLIGRAM(S): 90 AEROSOL, METERED ORAL at 16:16

## 2020-08-29 RX ADMIN — Medication 3: at 08:53

## 2020-08-29 RX ADMIN — ALBUTEROL 2.5 MILLIGRAM(S): 90 AEROSOL, METERED ORAL at 08:29

## 2020-08-29 RX ADMIN — NYSTATIN CREAM 1 APPLICATION(S): 100000 CREAM TOPICAL at 06:30

## 2020-08-29 RX ADMIN — ALBUTEROL 2.5 MILLIGRAM(S): 90 AEROSOL, METERED ORAL at 12:23

## 2020-08-29 RX ADMIN — GABAPENTIN 600 MILLIGRAM(S): 400 CAPSULE ORAL at 14:25

## 2020-08-29 RX ADMIN — Medication 8 UNIT(S): at 08:53

## 2020-08-29 RX ADMIN — OXYCODONE HYDROCHLORIDE 20 MILLIGRAM(S): 5 TABLET ORAL at 21:33

## 2020-08-29 RX ADMIN — OXYCODONE HYDROCHLORIDE 20 MILLIGRAM(S): 5 TABLET ORAL at 08:52

## 2020-08-29 RX ADMIN — BUDESONIDE AND FORMOTEROL FUMARATE DIHYDRATE 2 PUFF(S): 160; 4.5 AEROSOL RESPIRATORY (INHALATION) at 08:12

## 2020-08-29 RX ADMIN — CARVEDILOL PHOSPHATE 6.25 MILLIGRAM(S): 80 CAPSULE, EXTENDED RELEASE ORAL at 06:29

## 2020-08-29 RX ADMIN — AZITHROMYCIN 250 MILLIGRAM(S): 500 TABLET, FILM COATED ORAL at 11:46

## 2020-08-29 RX ADMIN — Medication 10 UNIT(S): at 17:59

## 2020-08-29 RX ADMIN — Medication 1: at 17:59

## 2020-08-29 RX ADMIN — CHLORHEXIDINE GLUCONATE 1 APPLICATION(S): 213 SOLUTION TOPICAL at 11:47

## 2020-08-29 RX ADMIN — CYCLOBENZAPRINE HYDROCHLORIDE 5 MILLIGRAM(S): 10 TABLET, FILM COATED ORAL at 08:53

## 2020-08-29 RX ADMIN — Medication 2: at 14:01

## 2020-08-29 RX ADMIN — BUDESONIDE AND FORMOTEROL FUMARATE DIHYDRATE 2 PUFF(S): 160; 4.5 AEROSOL RESPIRATORY (INHALATION) at 21:03

## 2020-08-29 RX ADMIN — Medication 10 UNIT(S): at 14:03

## 2020-08-29 RX ADMIN — ALBUTEROL 2.5 MILLIGRAM(S): 90 AEROSOL, METERED ORAL at 00:57

## 2020-08-29 RX ADMIN — Medication 1 MILLIGRAM(S): at 11:46

## 2020-08-29 RX ADMIN — TIOTROPIUM BROMIDE 1 CAPSULE(S): 18 CAPSULE ORAL; RESPIRATORY (INHALATION) at 08:12

## 2020-08-29 RX ADMIN — GABAPENTIN 400 MILLIGRAM(S): 400 CAPSULE ORAL at 06:29

## 2020-08-29 RX ADMIN — SENNA PLUS 2 TABLET(S): 8.6 TABLET ORAL at 21:03

## 2020-08-29 RX ADMIN — ALBUTEROL 2.5 MILLIGRAM(S): 90 AEROSOL, METERED ORAL at 20:58

## 2020-08-29 NOTE — PROGRESS NOTE ADULT - PROBLEM SELECTOR PLAN 5
Hx of lung cancer with metastasis. On chemo q3 weeks. Dr. Mendiola is oncologist.   - OP f/u with Dr. Mendiola  - per CTPA interval decrease in LAD from 6/2020  - will need to clarify with Dr. Mendiola most recent tx plan and if spinal lesions are likely mets if recent PET etc

## 2020-08-29 NOTE — PROGRESS NOTE ADULT - SUBJECTIVE AND OBJECTIVE BOX
Orthopaedic Surgery Progress Note    Subjective:   Patient seen and examined. No acute events overnight.     Objective:  T(C): 36.2 (08-29-20 @ 11:45), Max: 36.7 (08-28-20 @ 17:30)  HR: 92 (08-29-20 @ 11:45) (86 - 101)  BP: 132/70 (08-29-20 @ 11:45) (124/72 - 142/83)  RR: 18 (08-29-20 @ 11:45) (17 - 20)  SpO2: 98% (08-29-20 @ 11:45) (97% - 100%)  Wt(kg): --      PE  Gen: NAD  Back:   Neuro:  RLE IP 5/5 HS 5/5 Q 5/5 GS 5/5 TA 5/5 EHL 5/5   SILT L2-S1  LLE IP 5/5 HS 5/5 Q 5/5 GS 5/5 TA 5/5 EHL 5/5  SILT L2-S1  WWP BLE                          11.3   10.54 )-----------( 156      ( 29 Aug 2020 06:25 )             36.3     08-29    137  |  98  |  13  ----------------------------<  337<H>  4.6   |  27  |  0.51    Ca    8.5      29 Aug 2020 06:25  Phos  3.1     08-29  Mg     1.7     08-29            65y Female with thoracic and lumbar spine metastases from known lung cancer  - TLSO brace  - IR biopsy currently not needed  - FU MRI planning  - Pain control  - PT/OT/OOB  - I/S  - SCDs

## 2020-08-29 NOTE — PROGRESS NOTE ADULT - PROBLEM SELECTOR PLAN 9
Discussed wishes despite poor lung function and cancer wishes to remain full code; reports has bf whom she lives with and daughter

## 2020-08-29 NOTE — PROGRESS NOTE ADULT - PROBLEM SELECTOR PLAN 6
Hx of DM. A1C in June 9.6% now 9% Elevated sugars in the setting of steroid use.   - c/w Lantus, increase to 45 units qhs and 10 units with meals  - refuses DM diet, c/w BG checks and ZENON

## 2020-08-29 NOTE — PROGRESS NOTE ADULT - PROBLEM SELECTOR PLAN 3
Chronic pain 2/2 mets to spine and compression fx. CT with multilevel thoracic lytic/sclerotic lesions and fractures T7 and L1with bony retropulsion.  - c/w gabapentin 400mg TID, increase to 600 mg TID  - c/w oxycodone 20mg q6 hours (pt. takes percocet at home); c/w bowel regimen Miralax and senna  - ortho spine eval appreciated, rec TLSO brace and MRI, pt cannot tolerate MRI laying down 2/2 breathing, will need to d/w anesthesia Monday if feasible

## 2020-08-29 NOTE — PROGRESS NOTE ADULT - SUBJECTIVE AND OBJECTIVE BOX
Patient is a 65y old  Female who presents with a chief complaint of SOB    SUBJECTIVE / OVERNIGHT EVENTS:    Feels breathing was better than suddenly can get worse, thinks she needs more steroids  No CP, no nausea/vomiting  States she cannot eat the diabetic dash diet, refuses, wants a regular diet  Reports she cannot tolerate an MRI as she can not lay flat for that long 2/2 her COPD/lungs, reports in past 2 years ago did an MRI she was allowed to sit up in    MEDICATIONS  (STANDING):  ALBUTerol    0.083% 2.5 milliGRAM(s) Nebulizer every 4 hours  amLODIPine   Tablet 5 milliGRAM(s) Oral daily  atorvastatin 40 milliGRAM(s) Oral at bedtime  azithromycin   Tablet 250 milliGRAM(s) Oral daily  budesonide 160 MICROgram(s)/formoterol 4.5 MICROgram(s) Inhaler 2 Puff(s) Inhalation two times a day  carvedilol 6.25 milliGRAM(s) Oral every 12 hours  chlorhexidine 4% Liquid 1 Application(s) Topical daily  enoxaparin Injectable 40 milliGRAM(s) SubCutaneous every 24 hours  folic acid 1 milliGRAM(s) Oral daily  gabapentin 400 milliGRAM(s) Oral every 8 hours  insulin glargine Injectable (LANTUS) 40 Unit(s) SubCutaneous at bedtime  insulin lispro (HumaLOG) corrective regimen sliding scale   SubCutaneous three times a day before meals  insulin lispro (HumaLOG) corrective regimen sliding scale   SubCutaneous at bedtime  insulin lispro Injectable (HumaLOG) 8 Unit(s) SubCutaneous three times a day before meals  nystatin Powder 1 Application(s) Topical two times a day  pantoprazole    Tablet 40 milliGRAM(s) Oral before breakfast  polyethylene glycol 3350 17 Gram(s) Oral every 24 hours  predniSONE   Tablet 40 milliGRAM(s) Oral every 24 hours  senna 2 Tablet(s) Oral at bedtime  tiotropium 18 MICROgram(s) Capsule 1 Capsule(s) Inhalation daily    MEDICATIONS  (PRN):  ALPRAZolam 1 milliGRAM(s) Oral three times a day PRN Anxiety  cyclobenzaprine 5 milliGRAM(s) Oral three times a day PRN Muscle Spasm  dextrose 40% Gel 15 Gram(s) Oral once PRN Blood Glucose LESS THAN 70 milliGRAM(s)/deciliter  oxyCODONE    IR 20 milliGRAM(s) Oral every 6 hours PRN Severe Pain (7 - 10)    T(C): 36.2 (08-29-20 @ 11:45), Max: 36.7 (08-28-20 @ 17:30)  HR: 86 (08-29-20 @ 12:25) (86 - 95)  BP: 132/70 (08-29-20 @ 11:45) (129/84 - 142/83)  RR: 18 (08-29-20 @ 11:45) (17 - 20)  SpO2: 100% (08-29-20 @ 12:25) (97% - 100%)    CAPILLARY BLOOD GLUCOSE  POCT Blood Glucose.: 232 mg/dL (29 Aug 2020 12:23)  POCT Blood Glucose.: 299 mg/dL (29 Aug 2020 08:31)  POCT Blood Glucose.: 279 mg/dL (28 Aug 2020 22:39)  POCT Blood Glucose.: 106 mg/dL (28 Aug 2020 17:14)    PHYSICAL EXAM:  GENERAL: NAD, appears old than stated age, on NC  EYES:  conjunctiva and sclera clear  NECK: Supple, No JVD  CHEST/LUNG: poor air entry, no wheezes appreciated  HEART: Regular rate and rhythm; No murmurs, rubs, or gallops  ABDOMEN: Soft, Nontender, Nondistended; Bowel sounds present  EXTREMITIES:  wwp, no edema  PSYCH: AAOx3  NEUROLOGY: non-focal  SKIN:  hyperpigmented shins     LABS:                        11.3   10.54 )-----------( 156      ( 29 Aug 2020 06:25 )             36.3     08-29    137  |  98  |  13  ----------------------------<  337<H>  4.6   |  27  |  0.51    Ca    8.5      29 Aug 2020 06:25  Phos  3.1     08-29  Mg     1.7     08-29    Consultant(s) Notes Reviewed:  ortho  Care Discussed with Consultants/Other Providers:

## 2020-08-29 NOTE — PROGRESS NOTE ADULT - PROBLEM SELECTOR PLAN 1
Increase WOB and hypoxia.  CTPA negative PNA or PE.  HsT negative x 2, BNP wnl.  - c/w prednisone 40 mg daily, suspect may need chronic steroids; D3  - azithro 500 mg in ED now on 250, D3  - c/w MDIs (Symbicort, Spiriva) and standing nebs  - c/w continuos pulse ox, c/w O2, titrate sat to 89-92%

## 2020-08-30 LAB
ANION GAP SERPL CALC-SCNC: 12 MMO/L — SIGNIFICANT CHANGE UP (ref 7–14)
BUN SERPL-MCNC: 11 MG/DL — SIGNIFICANT CHANGE UP (ref 7–23)
CALCIUM SERPL-MCNC: 8.7 MG/DL — SIGNIFICANT CHANGE UP (ref 8.4–10.5)
CHLORIDE SERPL-SCNC: 101 MMOL/L — SIGNIFICANT CHANGE UP (ref 98–107)
CO2 SERPL-SCNC: 32 MMOL/L — HIGH (ref 22–31)
CREAT SERPL-MCNC: 0.5 MG/DL — SIGNIFICANT CHANGE UP (ref 0.5–1.3)
GLUCOSE BLDC GLUCOMTR-MCNC: 106 MG/DL — HIGH (ref 70–99)
GLUCOSE BLDC GLUCOMTR-MCNC: 185 MG/DL — HIGH (ref 70–99)
GLUCOSE BLDC GLUCOMTR-MCNC: 250 MG/DL — HIGH (ref 70–99)
GLUCOSE BLDC GLUCOMTR-MCNC: 281 MG/DL — HIGH (ref 70–99)
GLUCOSE SERPL-MCNC: 234 MG/DL — HIGH (ref 70–99)
HCT VFR BLD CALC: 35.3 % — SIGNIFICANT CHANGE UP (ref 34.5–45)
HGB BLD-MCNC: 11.5 G/DL — SIGNIFICANT CHANGE UP (ref 11.5–15.5)
MAGNESIUM SERPL-MCNC: 1.7 MG/DL — SIGNIFICANT CHANGE UP (ref 1.6–2.6)
MCHC RBC-ENTMCNC: 28.8 PG — SIGNIFICANT CHANGE UP (ref 27–34)
MCHC RBC-ENTMCNC: 32.6 % — SIGNIFICANT CHANGE UP (ref 32–36)
MCV RBC AUTO: 88.3 FL — SIGNIFICANT CHANGE UP (ref 80–100)
NRBC # FLD: 0.02 K/UL — SIGNIFICANT CHANGE UP (ref 0–0)
PHOSPHATE SERPL-MCNC: 3 MG/DL — SIGNIFICANT CHANGE UP (ref 2.5–4.5)
PLATELET # BLD AUTO: 132 K/UL — LOW (ref 150–400)
PMV BLD: 11.3 FL — SIGNIFICANT CHANGE UP (ref 7–13)
POTASSIUM SERPL-MCNC: 4.1 MMOL/L — SIGNIFICANT CHANGE UP (ref 3.5–5.3)
POTASSIUM SERPL-SCNC: 4.1 MMOL/L — SIGNIFICANT CHANGE UP (ref 3.5–5.3)
RBC # BLD: 4 M/UL — SIGNIFICANT CHANGE UP (ref 3.8–5.2)
RBC # FLD: 17.1 % — HIGH (ref 10.3–14.5)
SODIUM SERPL-SCNC: 145 MMOL/L — SIGNIFICANT CHANGE UP (ref 135–145)
WBC # BLD: 10.33 K/UL — SIGNIFICANT CHANGE UP (ref 3.8–10.5)
WBC # FLD AUTO: 10.33 K/UL — SIGNIFICANT CHANGE UP (ref 3.8–10.5)

## 2020-08-30 PROCEDURE — 99233 SBSQ HOSP IP/OBS HIGH 50: CPT

## 2020-08-30 RX ORDER — MAGNESIUM HYDROXIDE 400 MG/1
30 TABLET, CHEWABLE ORAL ONCE
Refills: 0 | Status: COMPLETED | OUTPATIENT
Start: 2020-08-30 | End: 2020-08-30

## 2020-08-30 RX ORDER — INSULIN GLARGINE 100 [IU]/ML
50 INJECTION, SOLUTION SUBCUTANEOUS AT BEDTIME
Refills: 0 | Status: DISCONTINUED | OUTPATIENT
Start: 2020-08-30 | End: 2020-09-01

## 2020-08-30 RX ORDER — CYCLOBENZAPRINE HYDROCHLORIDE 10 MG/1
10 TABLET, FILM COATED ORAL THREE TIMES A DAY
Refills: 0 | Status: DISCONTINUED | OUTPATIENT
Start: 2020-08-30 | End: 2020-09-04

## 2020-08-30 RX ORDER — INSULIN LISPRO 100/ML
15 VIAL (ML) SUBCUTANEOUS
Refills: 0 | Status: DISCONTINUED | OUTPATIENT
Start: 2020-08-30 | End: 2020-09-04

## 2020-08-30 RX ORDER — OXYCODONE HYDROCHLORIDE 5 MG/1
10 TABLET ORAL ONCE
Refills: 0 | Status: DISCONTINUED | OUTPATIENT
Start: 2020-08-30 | End: 2020-08-30

## 2020-08-30 RX ADMIN — NYSTATIN CREAM 1 APPLICATION(S): 100000 CREAM TOPICAL at 17:21

## 2020-08-30 RX ADMIN — CARVEDILOL PHOSPHATE 6.25 MILLIGRAM(S): 80 CAPSULE, EXTENDED RELEASE ORAL at 17:21

## 2020-08-30 RX ADMIN — Medication 40 MILLIGRAM(S): at 13:41

## 2020-08-30 RX ADMIN — ALBUTEROL 2.5 MILLIGRAM(S): 90 AEROSOL, METERED ORAL at 21:02

## 2020-08-30 RX ADMIN — OXYCODONE HYDROCHLORIDE 10 MILLIGRAM(S): 5 TABLET ORAL at 12:37

## 2020-08-30 RX ADMIN — ALBUTEROL 2.5 MILLIGRAM(S): 90 AEROSOL, METERED ORAL at 16:03

## 2020-08-30 RX ADMIN — GABAPENTIN 600 MILLIGRAM(S): 400 CAPSULE ORAL at 05:53

## 2020-08-30 RX ADMIN — MAGNESIUM HYDROXIDE 30 MILLILITER(S): 400 TABLET, CHEWABLE ORAL at 13:41

## 2020-08-30 RX ADMIN — BUDESONIDE AND FORMOTEROL FUMARATE DIHYDRATE 2 PUFF(S): 160; 4.5 AEROSOL RESPIRATORY (INHALATION) at 21:35

## 2020-08-30 RX ADMIN — Medication 1 MILLIGRAM(S): at 12:25

## 2020-08-30 RX ADMIN — Medication 2: at 08:43

## 2020-08-30 RX ADMIN — CARVEDILOL PHOSPHATE 6.25 MILLIGRAM(S): 80 CAPSULE, EXTENDED RELEASE ORAL at 05:53

## 2020-08-30 RX ADMIN — Medication 15 UNIT(S): at 12:24

## 2020-08-30 RX ADMIN — ALBUTEROL 2.5 MILLIGRAM(S): 90 AEROSOL, METERED ORAL at 00:58

## 2020-08-30 RX ADMIN — CYCLOBENZAPRINE HYDROCHLORIDE 5 MILLIGRAM(S): 10 TABLET, FILM COATED ORAL at 09:46

## 2020-08-30 RX ADMIN — ALBUTEROL 2.5 MILLIGRAM(S): 90 AEROSOL, METERED ORAL at 04:34

## 2020-08-30 RX ADMIN — OXYCODONE HYDROCHLORIDE 20 MILLIGRAM(S): 5 TABLET ORAL at 21:31

## 2020-08-30 RX ADMIN — AMLODIPINE BESYLATE 5 MILLIGRAM(S): 2.5 TABLET ORAL at 05:53

## 2020-08-30 RX ADMIN — AZITHROMYCIN 250 MILLIGRAM(S): 500 TABLET, FILM COATED ORAL at 12:25

## 2020-08-30 RX ADMIN — Medication 3: at 12:24

## 2020-08-30 RX ADMIN — Medication 10 UNIT(S): at 08:43

## 2020-08-30 RX ADMIN — POLYETHYLENE GLYCOL 3350 17 GRAM(S): 17 POWDER, FOR SOLUTION ORAL at 17:21

## 2020-08-30 RX ADMIN — NYSTATIN CREAM 1 APPLICATION(S): 100000 CREAM TOPICAL at 06:32

## 2020-08-30 RX ADMIN — ALBUTEROL 2.5 MILLIGRAM(S): 90 AEROSOL, METERED ORAL at 09:00

## 2020-08-30 RX ADMIN — OXYCODONE HYDROCHLORIDE 10 MILLIGRAM(S): 5 TABLET ORAL at 13:37

## 2020-08-30 RX ADMIN — CHLORHEXIDINE GLUCONATE 1 APPLICATION(S): 213 SOLUTION TOPICAL at 12:24

## 2020-08-30 RX ADMIN — OXYCODONE HYDROCHLORIDE 20 MILLIGRAM(S): 5 TABLET ORAL at 22:01

## 2020-08-30 RX ADMIN — ENOXAPARIN SODIUM 40 MILLIGRAM(S): 100 INJECTION SUBCUTANEOUS at 13:34

## 2020-08-30 RX ADMIN — GABAPENTIN 600 MILLIGRAM(S): 400 CAPSULE ORAL at 13:34

## 2020-08-30 RX ADMIN — SENNA PLUS 2 TABLET(S): 8.6 TABLET ORAL at 21:31

## 2020-08-30 RX ADMIN — TIOTROPIUM BROMIDE 1 CAPSULE(S): 18 CAPSULE ORAL; RESPIRATORY (INHALATION) at 09:46

## 2020-08-30 RX ADMIN — Medication 15 UNIT(S): at 18:10

## 2020-08-30 RX ADMIN — GABAPENTIN 600 MILLIGRAM(S): 400 CAPSULE ORAL at 21:31

## 2020-08-30 RX ADMIN — INSULIN GLARGINE 50 UNIT(S): 100 INJECTION, SOLUTION SUBCUTANEOUS at 21:30

## 2020-08-30 RX ADMIN — ATORVASTATIN CALCIUM 40 MILLIGRAM(S): 80 TABLET, FILM COATED ORAL at 21:31

## 2020-08-30 RX ADMIN — BUDESONIDE AND FORMOTEROL FUMARATE DIHYDRATE 2 PUFF(S): 160; 4.5 AEROSOL RESPIRATORY (INHALATION) at 08:44

## 2020-08-30 RX ADMIN — POLYETHYLENE GLYCOL 3350 17 GRAM(S): 17 POWDER, FOR SOLUTION ORAL at 05:53

## 2020-08-30 RX ADMIN — ALBUTEROL 2.5 MILLIGRAM(S): 90 AEROSOL, METERED ORAL at 12:07

## 2020-08-30 RX ADMIN — PANTOPRAZOLE SODIUM 40 MILLIGRAM(S): 20 TABLET, DELAYED RELEASE ORAL at 05:53

## 2020-08-30 NOTE — PROGRESS NOTE ADULT - PROBLEM SELECTOR PLAN 1
Increase WOB and hypoxia.  CTPA negative PNA or PE.  HsT negative x 2, BNP wnl.  - c/w prednisone 40 mg daily, suspect may need chronic steroids; D4  - azithro 500 mg in ED now on 250, D4  - c/w MDIs (Symbicort, Spiriva) and standing nebs  - c/w continuos pulse ox, c/w O2, titrate sat to 89-92% Increase WOB and hypoxia.  CTPA negative PNA or PE.  HsT negative x 2, BNP wnl.  - c/w prednisone 40 mg daily, suspect may need chronic steroids; D4/5, will taper to 30 mg thereafter  - azithro 500 mg in ED now on 250, D4  - c/w MDIs (Symbicort, Spiriva) and standing nebs  - c/w continuos pulse ox, c/w O2, titrate sat to 89-92%  - will need close OP f/u with her pulm given frequent admits

## 2020-08-30 NOTE — PROGRESS NOTE ADULT - PROBLEM SELECTOR PLAN 3
Chronic pain 2/2 mets to spine and compression fx. CT with multilevel thoracic lytic/sclerotic lesions and fractures T7 and L1with bony retropulsion.  - c/w gabapentin 400mg TID, increase to 600 mg TID  - c/w oxycodone 20mg q6 hours (pt. takes percocet at home); c/w bowel regimen Miralax and senna  - ortho spine eval appreciated, rec TLSO brace and MRI, pt cannot tolerate MRI laying down 2/2 breathing, will need to d/w anesthesia Monday if feasible; xray of R arm w/o pathlogic met as described on CT Chronic pain 2/2 mets to spine and compression fx. CT with multilevel thoracic lytic/sclerotic lesions and fractures T7 and L1with bony retropulsion.  - c/w gabapentin 400mg TID, increase to 600 mg TID  - c/w oxycodone 20mg q6 hours (pt. takes percocet at home); c/w bowel regimen Miralax and senna  - ortho spine eval appreciated, rec TLSO brace and MRI, pt cannot tolerate MRI laying down 2/2 breathing, will need to d/w anesthesia Monday if feasible; RT c/s for pain  - xray of R arm w/o pathologic met as described on CT Chronic pain 2/2 mets to spine and compression fx. CT with multilevel thoracic lytic/sclerotic lesions and fractures T7 and L1with bony retropulsion.  - c/w gabapentin 400mg TID, increase to 600 mg TID  - c/w oxycodone 20mg q6 hours (pt. takes percocet at home); c/w bowel regimen Miralax and senna  - ortho spine eval appreciated, rec TLSO brace and MRI, pt cannot tolerate MRI laying down 2/2 breathing, will need to d/w anesthesia Monday if feasible; RT c/s for pain  - per d/w nuclear med MD bone scan would not help distinguish met from fx etc  - xray of R arm w/o pathologic met as described on CT

## 2020-08-30 NOTE — PROGRESS NOTE ADULT - PROBLEM SELECTOR PLAN 5
Hx of DM. A1C in June 9.6% now 9% Elevated sugars in the setting of steroid use.   - c/w Lantus, increase to 50 units qhs and 15 units with meals  - refuses DM diet, c/w BG checks and ZENON

## 2020-08-30 NOTE — PROGRESS NOTE ADULT - PROBLEM SELECTOR PLAN 4
Hx of lung cancer with metastasis. On chemo q3 weeks. Dr. Mendiola is oncologist.   - OP f/u with Dr. Mendiola  - per CTPA interval decrease in LAD from 6/2020  - will need to clarify with Dr. Mendiola most recent treatment plan and if spinal lesions are likely mets if recent PET etc Hx of lung cancer with metastasis. On chemo q3 weeks. Dr. Mendiola is oncologist.   - OP f/u with Dr. Mendiola  - per CTPA interval decrease in LAD from 6/2020  - will need to clarify with Dr. Mendiola most recent treatment plan and if spinal lesions are likely mets if recent PET etc; reports next chemo in 2 wks

## 2020-08-30 NOTE — PROGRESS NOTE ADULT - SUBJECTIVE AND OBJECTIVE BOX
Patient is a 65y old  Female who presents with a chief complaint of COPD exacerbation, compression fractures of spine    SUBJECTIVE / OVERNIGHT EVENTS:    MEDICATIONS  (STANDING):  ALBUTerol    0.083% 2.5 milliGRAM(s) Nebulizer every 4 hours  amLODIPine   Tablet 5 milliGRAM(s) Oral daily  atorvastatin 40 milliGRAM(s) Oral at bedtime  azithromycin   Tablet 250 milliGRAM(s) Oral daily  budesonide 160 MICROgram(s)/formoterol 4.5 MICROgram(s) Inhaler 2 Puff(s) Inhalation two times a day  carvedilol 6.25 milliGRAM(s) Oral every 12 hours  chlorhexidine 4% Liquid 1 Application(s) Topical daily  enoxaparin Injectable 40 milliGRAM(s) SubCutaneous every 24 hours  folic acid 1 milliGRAM(s) Oral daily  gabapentin 600 milliGRAM(s) Oral every 8 hours  insulin glargine Injectable (LANTUS) 45 Unit(s) SubCutaneous at bedtime  insulin lispro (HumaLOG) corrective regimen sliding scale   SubCutaneous three times a day before meals  insulin lispro (HumaLOG) corrective regimen sliding scale   SubCutaneous at bedtime  insulin lispro Injectable (HumaLOG) 10 Unit(s) SubCutaneous three times a day before meals  nystatin Powder 1 Application(s) Topical two times a day  pantoprazole    Tablet 40 milliGRAM(s) Oral before breakfast  polyethylene glycol 3350 17 Gram(s) Oral two times a day  predniSONE   Tablet 40 milliGRAM(s) Oral every 24 hours  senna 2 Tablet(s) Oral at bedtime  tiotropium 18 MICROgram(s) Capsule 1 Capsule(s) Inhalation daily    MEDICATIONS  (PRN):  ALPRAZolam 1 milliGRAM(s) Oral three times a day PRN Anxiety  cyclobenzaprine 5 milliGRAM(s) Oral three times a day PRN Muscle Spasm  dextrose 40% Gel 15 Gram(s) Oral once PRN Blood Glucose LESS THAN 70 milliGRAM(s)/deciliter  oxyCODONE    IR 20 milliGRAM(s) Oral every 6 hours PRN Severe Pain (7 - 10)    T(C): 36.8 (08-30-20 @ 05:51), Max: 36.8 (08-30-20 @ 05:51)  HR: 94 (08-30-20 @ 05:51) (82 - 102)  BP: 148/79 (08-30-20 @ 05:51) (132/70 - 148/79)  RR: 18 (08-30-20 @ 05:51) (18 - 18)  SpO2: 100% (08-30-20 @ 05:51) (98% - 100%)    CAPILLARY BLOOD GLUCOSE  POCT Blood Glucose.: 250 mg/dL (30 Aug 2020 08:24)  POCT Blood Glucose.: 197 mg/dL (29 Aug 2020 21:01)  POCT Blood Glucose.: 177 mg/dL (29 Aug 2020 17:28)  POCT Blood Glucose.: 232 mg/dL (29 Aug 2020 12:23)      PHYSICAL EXAM:  GENERAL: NAD, appears old than stated age, on NC  EYES:  conjunctiva and sclera clear  NECK: Supple, No JVD  CHEST/LUNG: poor air entry, no wheezes appreciated  HEART: Regular rate and rhythm; No murmurs, rubs, or gallops  ABDOMEN: Soft, Nontender, Nondistended; Bowel sounds present  EXTREMITIES:  wwp, no edema  PSYCH: AAOx3  NEUROLOGY: non-focal  SKIN:  hyperpigmented shins     LABS:                        11.5   10.33 )-----------( 132      ( 30 Aug 2020 06:41 )             35.3     08-30    145  |  101  |  11  ----------------------------<  234<H>  4.1   |  32<H>  |  0.50    Ca    8.7      30 Aug 2020 06:41  Phos  3.0     08-30  Mg     1.7     08-30      X-ray R shoulder   There are no acute fractures or dislocations. No obvious osteolytic or blastic lesions. Chain sutures seen in the mid right lung field. Fractures may be pathologic of fifth and sixth right ribs.    COMPARISON:  Chest CT August 27    IMPRESSION:  No obvious skeletal metastasis of right shoulder and humerus.      Consultant(s) Notes Reviewed:  ortho  Care Discussed with Consultants/Other Providers: Patient is a 65y old  Female who presents with a chief complaint of COPD exacerbation, compression fractures of spine    SUBJECTIVE / OVERNIGHT EVENTS:    Breathing is a bit better however is only at rest, at home was not moving much and hasn't tried here  no CP, wants to take tele off as states her heart is fine  No nausea/vomiting, tolerating diet, reports no BM still   States brace delivered but has not yet ambulated     Tele: no events    MEDICATIONS  (STANDING):  ALBUTerol    0.083% 2.5 milliGRAM(s) Nebulizer every 4 hours  amLODIPine   Tablet 5 milliGRAM(s) Oral daily  atorvastatin 40 milliGRAM(s) Oral at bedtime  azithromycin   Tablet 250 milliGRAM(s) Oral daily  budesonide 160 MICROgram(s)/formoterol 4.5 MICROgram(s) Inhaler 2 Puff(s) Inhalation two times a day  carvedilol 6.25 milliGRAM(s) Oral every 12 hours  chlorhexidine 4% Liquid 1 Application(s) Topical daily  enoxaparin Injectable 40 milliGRAM(s) SubCutaneous every 24 hours  folic acid 1 milliGRAM(s) Oral daily  gabapentin 600 milliGRAM(s) Oral every 8 hours  insulin glargine Injectable (LANTUS) 45 Unit(s) SubCutaneous at bedtime  insulin lispro (HumaLOG) corrective regimen sliding scale   SubCutaneous three times a day before meals  insulin lispro (HumaLOG) corrective regimen sliding scale   SubCutaneous at bedtime  insulin lispro Injectable (HumaLOG) 10 Unit(s) SubCutaneous three times a day before meals  nystatin Powder 1 Application(s) Topical two times a day  pantoprazole    Tablet 40 milliGRAM(s) Oral before breakfast  polyethylene glycol 3350 17 Gram(s) Oral two times a day  predniSONE   Tablet 40 milliGRAM(s) Oral every 24 hours  senna 2 Tablet(s) Oral at bedtime  tiotropium 18 MICROgram(s) Capsule 1 Capsule(s) Inhalation daily    MEDICATIONS  (PRN):  ALPRAZolam 1 milliGRAM(s) Oral three times a day PRN Anxiety  cyclobenzaprine 5 milliGRAM(s) Oral three times a day PRN Muscle Spasm  dextrose 40% Gel 15 Gram(s) Oral once PRN Blood Glucose LESS THAN 70 milliGRAM(s)/deciliter  oxyCODONE    IR 20 milliGRAM(s) Oral every 6 hours PRN Severe Pain (7 - 10)    T(C): 36.8 (08-30-20 @ 05:51), Max: 36.8 (08-30-20 @ 05:51)  HR: 94 (08-30-20 @ 05:51) (82 - 102)  BP: 148/79 (08-30-20 @ 05:51) (132/70 - 148/79)  RR: 18 (08-30-20 @ 05:51) (18 - 18)  SpO2: 100% (08-30-20 @ 05:51) (98% - 100%)    CAPILLARY BLOOD GLUCOSE  POCT Blood Glucose.: 250 mg/dL (30 Aug 2020 08:24)  POCT Blood Glucose.: 197 mg/dL (29 Aug 2020 21:01)  POCT Blood Glucose.: 177 mg/dL (29 Aug 2020 17:28)  POCT Blood Glucose.: 232 mg/dL (29 Aug 2020 12:23)      PHYSICAL EXAM:  GENERAL: NAD, appears old than stated age, on NC  EYES:  conjunctiva and sclera clear  NECK: Supple, No JVD  CHEST/LUNG: poor air entry, no wheezes appreciated  HEART: Regular rate and rhythm; No murmurs, rubs, or gallops  ABDOMEN: Soft, Nontender, Nondistended; Bowel sounds present  EXTREMITIES:  wwp, no edema  PSYCH: AAOx3  NEUROLOGY: non-focal  SKIN:  hyperpigmented shins   LUE PICC    LABS:                        11.5   10.33 )-----------( 132      ( 30 Aug 2020 06:41 )             35.3     08-30    145  |  101  |  11  ----------------------------<  234<H>  4.1   |  32<H>  |  0.50    Ca    8.7      30 Aug 2020 06:41  Phos  3.0     08-30  Mg     1.7     08-30    X-ray R shoulder   There are no acute fractures or dislocations. No obvious osteolytic or blastic lesions. Chain sutures seen in the mid right lung field. Fractures may be pathologic of fifth and sixth right ribs.  COMPARISON:  Chest CT August 27  IMPRESSION:  No obvious skeletal metastasis of right shoulder and humerus.    Consultant(s) Notes Reviewed:  ortho  Care Discussed with Consultants/Other Providers:

## 2020-08-31 LAB
ANION GAP SERPL CALC-SCNC: 15 MMO/L — HIGH (ref 7–14)
BUN SERPL-MCNC: 12 MG/DL — SIGNIFICANT CHANGE UP (ref 7–23)
CALCIUM SERPL-MCNC: 8.9 MG/DL — SIGNIFICANT CHANGE UP (ref 8.4–10.5)
CHLORIDE SERPL-SCNC: 94 MMOL/L — LOW (ref 98–107)
CO2 SERPL-SCNC: 29 MMOL/L — SIGNIFICANT CHANGE UP (ref 22–31)
CREAT SERPL-MCNC: 0.53 MG/DL — SIGNIFICANT CHANGE UP (ref 0.5–1.3)
GLUCOSE BLDC GLUCOMTR-MCNC: 245 MG/DL — HIGH (ref 70–99)
GLUCOSE BLDC GLUCOMTR-MCNC: 272 MG/DL — HIGH (ref 70–99)
GLUCOSE SERPL-MCNC: 252 MG/DL — HIGH (ref 70–99)
HCT VFR BLD CALC: 37 % — SIGNIFICANT CHANGE UP (ref 34.5–45)
HGB BLD-MCNC: 12.3 G/DL — SIGNIFICANT CHANGE UP (ref 11.5–15.5)
MAGNESIUM SERPL-MCNC: 1.9 MG/DL — SIGNIFICANT CHANGE UP (ref 1.6–2.6)
MCHC RBC-ENTMCNC: 29.7 PG — SIGNIFICANT CHANGE UP (ref 27–34)
MCHC RBC-ENTMCNC: 33.2 % — SIGNIFICANT CHANGE UP (ref 32–36)
MCV RBC AUTO: 89.4 FL — SIGNIFICANT CHANGE UP (ref 80–100)
NRBC # FLD: 0 K/UL — SIGNIFICANT CHANGE UP (ref 0–0)
PHOSPHATE SERPL-MCNC: 2.5 MG/DL — SIGNIFICANT CHANGE UP (ref 2.5–4.5)
PLATELET # BLD AUTO: 123 K/UL — LOW (ref 150–400)
PMV BLD: 11.7 FL — SIGNIFICANT CHANGE UP (ref 7–13)
POTASSIUM SERPL-MCNC: 3.8 MMOL/L — SIGNIFICANT CHANGE UP (ref 3.5–5.3)
POTASSIUM SERPL-SCNC: 3.8 MMOL/L — SIGNIFICANT CHANGE UP (ref 3.5–5.3)
RBC # BLD: 4.14 M/UL — SIGNIFICANT CHANGE UP (ref 3.8–5.2)
RBC # FLD: 17 % — HIGH (ref 10.3–14.5)
SODIUM SERPL-SCNC: 138 MMOL/L — SIGNIFICANT CHANGE UP (ref 135–145)
WBC # BLD: 10.89 K/UL — HIGH (ref 3.8–10.5)
WBC # FLD AUTO: 10.89 K/UL — HIGH (ref 3.8–10.5)

## 2020-08-31 PROCEDURE — 77262 THER RADIOLOGY TX PLNG INTRM: CPT

## 2020-08-31 PROCEDURE — 99233 SBSQ HOSP IP/OBS HIGH 50: CPT

## 2020-08-31 PROCEDURE — 99222 1ST HOSP IP/OBS MODERATE 55: CPT | Mod: 25

## 2020-08-31 RX ADMIN — ALBUTEROL 2.5 MILLIGRAM(S): 90 AEROSOL, METERED ORAL at 16:08

## 2020-08-31 RX ADMIN — ALBUTEROL 2.5 MILLIGRAM(S): 90 AEROSOL, METERED ORAL at 05:23

## 2020-08-31 RX ADMIN — GABAPENTIN 600 MILLIGRAM(S): 400 CAPSULE ORAL at 23:16

## 2020-08-31 RX ADMIN — GABAPENTIN 600 MILLIGRAM(S): 400 CAPSULE ORAL at 12:21

## 2020-08-31 RX ADMIN — ERGOCALCIFEROL 50000 UNIT(S): 1.25 CAPSULE ORAL at 05:55

## 2020-08-31 RX ADMIN — BUDESONIDE AND FORMOTEROL FUMARATE DIHYDRATE 2 PUFF(S): 160; 4.5 AEROSOL RESPIRATORY (INHALATION) at 23:15

## 2020-08-31 RX ADMIN — ALBUTEROL 2.5 MILLIGRAM(S): 90 AEROSOL, METERED ORAL at 07:17

## 2020-08-31 RX ADMIN — ALBUTEROL 2.5 MILLIGRAM(S): 90 AEROSOL, METERED ORAL at 23:14

## 2020-08-31 RX ADMIN — POLYETHYLENE GLYCOL 3350 17 GRAM(S): 17 POWDER, FOR SOLUTION ORAL at 05:55

## 2020-08-31 RX ADMIN — CARVEDILOL PHOSPHATE 6.25 MILLIGRAM(S): 80 CAPSULE, EXTENDED RELEASE ORAL at 17:39

## 2020-08-31 RX ADMIN — CYCLOBENZAPRINE HYDROCHLORIDE 10 MILLIGRAM(S): 10 TABLET, FILM COATED ORAL at 18:34

## 2020-08-31 RX ADMIN — ENOXAPARIN SODIUM 40 MILLIGRAM(S): 100 INJECTION SUBCUTANEOUS at 12:21

## 2020-08-31 RX ADMIN — ALBUTEROL 2.5 MILLIGRAM(S): 90 AEROSOL, METERED ORAL at 01:31

## 2020-08-31 RX ADMIN — CYCLOBENZAPRINE HYDROCHLORIDE 10 MILLIGRAM(S): 10 TABLET, FILM COATED ORAL at 10:14

## 2020-08-31 RX ADMIN — Medication 15 UNIT(S): at 12:19

## 2020-08-31 RX ADMIN — GABAPENTIN 600 MILLIGRAM(S): 400 CAPSULE ORAL at 05:55

## 2020-08-31 RX ADMIN — OXYCODONE HYDROCHLORIDE 20 MILLIGRAM(S): 5 TABLET ORAL at 18:33

## 2020-08-31 RX ADMIN — TIOTROPIUM BROMIDE 1 CAPSULE(S): 18 CAPSULE ORAL; RESPIRATORY (INHALATION) at 10:15

## 2020-08-31 RX ADMIN — OXYCODONE HYDROCHLORIDE 20 MILLIGRAM(S): 5 TABLET ORAL at 10:15

## 2020-08-31 RX ADMIN — ATORVASTATIN CALCIUM 40 MILLIGRAM(S): 80 TABLET, FILM COATED ORAL at 23:16

## 2020-08-31 RX ADMIN — Medication 15 UNIT(S): at 08:07

## 2020-08-31 RX ADMIN — NYSTATIN CREAM 1 APPLICATION(S): 100000 CREAM TOPICAL at 17:40

## 2020-08-31 RX ADMIN — CARVEDILOL PHOSPHATE 6.25 MILLIGRAM(S): 80 CAPSULE, EXTENDED RELEASE ORAL at 05:55

## 2020-08-31 RX ADMIN — Medication 1 MILLIGRAM(S): at 12:20

## 2020-08-31 RX ADMIN — OXYCODONE HYDROCHLORIDE 20 MILLIGRAM(S): 5 TABLET ORAL at 10:55

## 2020-08-31 RX ADMIN — CHLORHEXIDINE GLUCONATE 1 APPLICATION(S): 213 SOLUTION TOPICAL at 12:20

## 2020-08-31 RX ADMIN — Medication 15 UNIT(S): at 17:37

## 2020-08-31 RX ADMIN — Medication 2: at 08:07

## 2020-08-31 RX ADMIN — SENNA PLUS 2 TABLET(S): 8.6 TABLET ORAL at 23:16

## 2020-08-31 RX ADMIN — OXYCODONE HYDROCHLORIDE 20 MILLIGRAM(S): 5 TABLET ORAL at 19:12

## 2020-08-31 RX ADMIN — AZITHROMYCIN 250 MILLIGRAM(S): 500 TABLET, FILM COATED ORAL at 12:20

## 2020-08-31 RX ADMIN — Medication 3: at 12:19

## 2020-08-31 RX ADMIN — NYSTATIN CREAM 1 APPLICATION(S): 100000 CREAM TOPICAL at 05:55

## 2020-08-31 RX ADMIN — INSULIN GLARGINE 50 UNIT(S): 100 INJECTION, SOLUTION SUBCUTANEOUS at 23:16

## 2020-08-31 RX ADMIN — POLYETHYLENE GLYCOL 3350 17 GRAM(S): 17 POWDER, FOR SOLUTION ORAL at 17:39

## 2020-08-31 RX ADMIN — AMLODIPINE BESYLATE 5 MILLIGRAM(S): 2.5 TABLET ORAL at 05:55

## 2020-08-31 RX ADMIN — Medication 2: at 17:38

## 2020-08-31 RX ADMIN — ALBUTEROL 2.5 MILLIGRAM(S): 90 AEROSOL, METERED ORAL at 19:24

## 2020-08-31 RX ADMIN — Medication 40 MILLIGRAM(S): at 17:37

## 2020-08-31 RX ADMIN — BUDESONIDE AND FORMOTEROL FUMARATE DIHYDRATE 2 PUFF(S): 160; 4.5 AEROSOL RESPIRATORY (INHALATION) at 08:08

## 2020-08-31 RX ADMIN — ALBUTEROL 2.5 MILLIGRAM(S): 90 AEROSOL, METERED ORAL at 10:33

## 2020-08-31 RX ADMIN — PANTOPRAZOLE SODIUM 40 MILLIGRAM(S): 20 TABLET, DELAYED RELEASE ORAL at 05:55

## 2020-08-31 NOTE — PROGRESS NOTE ADULT - PROBLEM SELECTOR PLAN 3
Chronic pain 2/2 mets to spine and compression fx. CT with multilevel thoracic lytic/sclerotic lesions and fractures T7 and L1with bony retropulsion.  - c/w gabapentin 400mg TID, increase to 600 mg TID  - c/w oxycodone 20mg q6 hours (pt. takes percocet at home); c/w bowel regimen Miralax and senna  - per d/w nuclear med MD bone scan would not help distinguish met from fx etc  - ortho spine eval appreciated, rec TLSO brace and MRI to plan for possible biopsy.  pt cannot tolerate MRI laying down 2/2 breathing, will need anesthesia. Pt very concerned about anesthesia risk, wants to hold off until RT is completed. ortho notified   -rad-onc consult appreciated- plan for inpt  RT to T7 abd L1 lesion   - xray of R arm w/o pathologic met as described on CT

## 2020-08-31 NOTE — PROGRESS NOTE ADULT - PROBLEM SELECTOR PLAN 1
Increase WOB and hypoxia.  CTPA negative PNA or PE.  HsT negative x 2, BNP wnl.  - c/w prednisone  taper to 30 mg today x 5 days then 20  - azithro 250 mg x 5 days  - c/w MDIs (Symbicort, Spiriva) and standing nebs  - c/w continuos pulse ox, c/w O2, titrate sat to 89-92%  - will need close OP f/u with her pulm given frequent admits

## 2020-08-31 NOTE — PROGRESS NOTE ADULT - PROBLEM SELECTOR PLAN 4
Hx of lung cancer with metastasis. On chemo q3 weeks. Dr. Mendiola is oncologist.   - OP f/u with Dr. eMndiola  - per CTPA interval decrease in LAD from 6/2020  - will need to clarify with Dr. Mendiola most recent treatment plan and if spinal lesions are likely mets if recent PET etc; reports next chemo in 2 wks

## 2020-08-31 NOTE — PROGRESS NOTE ADULT - SUBJECTIVE AND OBJECTIVE BOX
[facility] Division of Hospital Medicine  Alexander Mosher MD  Pager [phone]      Patient is a 65y old  Female who presents with a chief complaint of SOB (31 Aug 2020 10:11)      SUBJECTIVE / OVERNIGHT EVENTS:    ADDITIONAL REVIEW OF SYSTEMS:    CONSTITUTIONAL: No fever, weight loss, or fatigue  EYES: No eye pain, visual disturbances, or discharge  ENMT:  No difficulty hearing, tinnitus, vertigo; No sinus or throat pain  NECK: No pain or stiffness  RESPIRATORY: No cough, wheezing, chills or hemoptysis; No shortness of breath  CARDIOVASCULAR: No chest pain, palpitations, dizziness, or leg swelling  GASTROINTESTINAL: No abdominal or epigastric pain. No nausea, vomiting, or hematemesis; No diarrhea or constipation. No melena or hematochezia.  GENITOURINARY: No dysuria, frequency, hematuria, or incontinence  NEUROLOGICAL: No headaches, memory loss, loss of strength, numbness, or tremors  SKIN: No itching, burning, rashes, or lesions   MUSCULOSKELETAL: No joint pain or swelling; No muscle, back, or extremity pain  PSYCHIATRIC: No depression, anxiety, mood swings, or difficulty sleeping    MEDICATIONS  (STANDING):  ALBUTerol    0.083% 2.5 milliGRAM(s) Nebulizer every 4 hours  amLODIPine   Tablet 5 milliGRAM(s) Oral daily  atorvastatin 40 milliGRAM(s) Oral at bedtime  azithromycin   Tablet 250 milliGRAM(s) Oral daily  budesonide 160 MICROgram(s)/formoterol 4.5 MICROgram(s) Inhaler 2 Puff(s) Inhalation two times a day  carvedilol 6.25 milliGRAM(s) Oral every 12 hours  chlorhexidine 4% Liquid 1 Application(s) Topical daily  dextrose 50% Injectable 12.5 Gram(s) IV Push once  dextrose 50% Injectable 25 Gram(s) IV Push once  dextrose 50% Injectable 25 Gram(s) IV Push once  enoxaparin Injectable 40 milliGRAM(s) SubCutaneous every 24 hours  ergocalciferol 13912 Unit(s) Oral every week  folic acid 1 milliGRAM(s) Oral daily  gabapentin 600 milliGRAM(s) Oral every 8 hours  insulin glargine Injectable (LANTUS) 50 Unit(s) SubCutaneous at bedtime  insulin lispro (HumaLOG) corrective regimen sliding scale   SubCutaneous three times a day before meals  insulin lispro (HumaLOG) corrective regimen sliding scale   SubCutaneous at bedtime  insulin lispro Injectable (HumaLOG) 15 Unit(s) SubCutaneous three times a day before meals  nystatin Powder 1 Application(s) Topical two times a day  pantoprazole    Tablet 40 milliGRAM(s) Oral before breakfast  polyethylene glycol 3350 17 Gram(s) Oral two times a day  predniSONE   Tablet 40 milliGRAM(s) Oral every 24 hours  senna 2 Tablet(s) Oral at bedtime  tiotropium 18 MICROgram(s) Capsule 1 Capsule(s) Inhalation daily    MEDICATIONS  (PRN):  ALPRAZolam 1 milliGRAM(s) Oral three times a day PRN Anxiety  cyclobenzaprine 10 milliGRAM(s) Oral three times a day PRN Muscle Spasm  dextrose 40% Gel 15 Gram(s) Oral once PRN Blood Glucose LESS THAN 70 milliGRAM(s)/deciliter  oxyCODONE    IR 20 milliGRAM(s) Oral every 6 hours PRN Severe Pain (7 - 10)      CAPILLARY BLOOD GLUCOSE      POCT Blood Glucose.: 272 mg/dL (31 Aug 2020 12:06)  POCT Blood Glucose.: 245 mg/dL (31 Aug 2020 08:03)  POCT Blood Glucose.: 185 mg/dL (30 Aug 2020 21:29)  POCT Blood Glucose.: 106 mg/dL (30 Aug 2020 17:30)    I&O's Summary      PHYSICAL EXAM:  Vital Signs Last 24 Hrs  T(C): 36.8 (31 Aug 2020 10:27), Max: 37 (31 Aug 2020 05:50)  T(F): 98.3 (31 Aug 2020 10:27), Max: 98.6 (31 Aug 2020 05:50)  HR: 90 (31 Aug 2020 10:34) (82 - 95)  BP: 150/85 (31 Aug 2020 10:27) (128/77 - 155/86)  BP(mean): --  RR: 18 (31 Aug 2020 10:27) (17 - 18)  SpO2: 98% (31 Aug 2020 10:34) (96% - 100%)    CONSTITUTIONAL: NAD, well-developed, well-groomed  EYES: PERRLA; conjunctiva and sclera clear  ENMT: Moist oral mucosa, no pharyngeal injection or exudates; normal dentition  NECK: Supple, no palpable masses; no thyromegaly  RESPIRATORY: Normal respiratory effort; lungs are clear to auscultation bilaterally  CARDIOVASCULAR: Regular rate and rhythm, normal S1 and S2, no murmur/rub/gallop; No lower extremity edema; Peripheral pulses are 2+ bilaterally  ABDOMEN: Nontender to palpation, normoactive bowel sounds, no rebound/guarding; No hepatosplenomegaly  MUSCLOSKELETAL:  Normal gait; no clubbing or cyanosis of digits; no joint swelling or tenderness to palpation  PSYCH: A+O to person, place, and time; affect appropriate  NEUROLOGY: CN 2-12 are intact and symmetric; no gross sensory deficits;   SKIN: No rashes; no palpable lesions    LABS:                        12.3   10.89 )-----------( 123      ( 31 Aug 2020 05:50 )             37.0     08-31    138  |  94<L>  |  12  ----------------------------<  252<H>  3.8   |  29  |  0.53    Ca    8.9      31 Aug 2020 05:50  Phos  2.5     08-31  Mg     1.9     08-31                  RADIOLOGY & ADDITIONAL TESTS:  Results Reviewed:   Imaging Personally Reviewed:  Electrocardiogram Personally Reviewed:    COORDINATION OF CARE:  Care Discussed with Consultants/Other Providers [Y/N]:  Prior or Outpatient Records Reviewed [Y/N]: [facility] Division of Hospital Medicine  Alexander Mosher MD  Pager [phone]      Patient is a 65y old  Female who presents with a chief complaint of SOB (31 Aug 2020 10:11)      SUBJECTIVE / OVERNIGHT EVENTS:    No acute event o/n. Pt reports her back pain is controlled. Sob has improved. Very hesitant to go for MRI with anesthesia, wants to hold off until radiation is complete.      ADDITIONAL REVIEW OF SYSTEMS:    RESPIRATORY: No cough, wheezing, chills or hemoptysis; _ shortness of breath  CARDIOVASCULAR: No chest pain, palpitations, dizziness, or leg swelling  GASTROINTESTINAL: No abdominal or epigastric pain. No nausea, vomiting, or hematemesis; No diarrhea or constipation. No melena or hematochezia.      MEDICATIONS  (STANDING):  ALBUTerol    0.083% 2.5 milliGRAM(s) Nebulizer every 4 hours  amLODIPine   Tablet 5 milliGRAM(s) Oral daily  atorvastatin 40 milliGRAM(s) Oral at bedtime  azithromycin   Tablet 250 milliGRAM(s) Oral daily  budesonide 160 MICROgram(s)/formoterol 4.5 MICROgram(s) Inhaler 2 Puff(s) Inhalation two times a day  carvedilol 6.25 milliGRAM(s) Oral every 12 hours  chlorhexidine 4% Liquid 1 Application(s) Topical daily  dextrose 50% Injectable 12.5 Gram(s) IV Push once  dextrose 50% Injectable 25 Gram(s) IV Push once  dextrose 50% Injectable 25 Gram(s) IV Push once  enoxaparin Injectable 40 milliGRAM(s) SubCutaneous every 24 hours  ergocalciferol 96454 Unit(s) Oral every week  folic acid 1 milliGRAM(s) Oral daily  gabapentin 600 milliGRAM(s) Oral every 8 hours  insulin glargine Injectable (LANTUS) 50 Unit(s) SubCutaneous at bedtime  insulin lispro (HumaLOG) corrective regimen sliding scale   SubCutaneous three times a day before meals  insulin lispro (HumaLOG) corrective regimen sliding scale   SubCutaneous at bedtime  insulin lispro Injectable (HumaLOG) 15 Unit(s) SubCutaneous three times a day before meals  nystatin Powder 1 Application(s) Topical two times a day  pantoprazole    Tablet 40 milliGRAM(s) Oral before breakfast  polyethylene glycol 3350 17 Gram(s) Oral two times a day  predniSONE   Tablet 40 milliGRAM(s) Oral every 24 hours  senna 2 Tablet(s) Oral at bedtime  tiotropium 18 MICROgram(s) Capsule 1 Capsule(s) Inhalation daily    MEDICATIONS  (PRN):  ALPRAZolam 1 milliGRAM(s) Oral three times a day PRN Anxiety  cyclobenzaprine 10 milliGRAM(s) Oral three times a day PRN Muscle Spasm  dextrose 40% Gel 15 Gram(s) Oral once PRN Blood Glucose LESS THAN 70 milliGRAM(s)/deciliter  oxyCODONE    IR 20 milliGRAM(s) Oral every 6 hours PRN Severe Pain (7 - 10)      CAPILLARY BLOOD GLUCOSE      POCT Blood Glucose.: 272 mg/dL (31 Aug 2020 12:06)  POCT Blood Glucose.: 245 mg/dL (31 Aug 2020 08:03)  POCT Blood Glucose.: 185 mg/dL (30 Aug 2020 21:29)  POCT Blood Glucose.: 106 mg/dL (30 Aug 2020 17:30)    I&O's Summary      PHYSICAL EXAM:  Vital Signs Last 24 Hrs  T(C): 36.8 (31 Aug 2020 10:27), Max: 37 (31 Aug 2020 05:50)  T(F): 98.3 (31 Aug 2020 10:27), Max: 98.6 (31 Aug 2020 05:50)  HR: 90 (31 Aug 2020 10:34) (82 - 95)  BP: 150/85 (31 Aug 2020 10:27) (128/77 - 155/86)  BP(mean): --  RR: 18 (31 Aug 2020 10:27) (17 - 18)  SpO2: 98% (31 Aug 2020 10:34) (96% - 100%)    CONSTITUTIONAL: NAD  EYES: PERRLA; conjunctiva and sclera clear  ENMT: Moist oral mucosa, no pharyngeal injection or exudates;  NECK: Supple, no palpable masses;  RESPIRATORY: Normal respiratory effort; scattered rhonchi to auscultation bilaterally  CARDIOVASCULAR: Regular rate and rhythm, normal S1 and S2, no murmur/rub/gallop; No lower extremity edema; Peripheral pulses are 2+ bilaterally  ABDOMEN: Nontender to palpation, normoactive bowel sounds, no rebound/guarding; No hepatosplenomegaly  MUSCLOSKELETAL:   no clubbing or cyanosis of digits; no joint swelling or tenderness to palpation T10-L1 TTP  PSYCH: A+O to person, place, and time; affect appropriate  NEUROLOGY: CN 2-12 are intact and symmetric; no gross sensory deficits;   SKIN: No rashes; no palpable lesions    LABS:                        12.3   10.89 )-----------( 123      ( 31 Aug 2020 05:50 )             37.0     08-31    138  |  94<L>  |  12  ----------------------------<  252<H>  3.8   |  29  |  0.53    Ca    8.9      31 Aug 2020 05:50  Phos  2.5     08-31  Mg     1.9     08-31                  RADIOLOGY & ADDITIONAL TESTS:  Results Reviewed:   Imaging Personally Reviewed:  Electrocardiogram Personally Reviewed:    COORDINATION OF CARE:  Care Discussed with Consultants/Other Providers [Y/N]:  Prior or Outpatient Records Reviewed [Y/N]:

## 2020-08-31 NOTE — CONSULT NOTE ADULT - SUBJECTIVE AND OBJECTIVE BOX
HPI:  66 y/o F pleasant, a.o. x 3, seen laying semi upright on nasal cannula, with PMH COPD (5L NC at home), 80 pack year smoking history, R lung cancer s/p resection (2017) on chemo q 3 weeks, recent admission for COPD exacerbation earlier in August who presents for SOB and increased sputum production.     No cough seen during examination. Denies blood in the sputum. Denies fevers/chills, chest pain, SOB, abdominal pain. She does report constipation and is producing small and hard stool. She continues to have pain in her back and in her right jaw that radiates up to her head. In the past, she has been followed by pain management. She takes 2 percocets q6 hours at home. Currently, she feels better from admission.     We were consulted regarding her back pain and imaging which documents bone metastasis from her primary lung cancer.     < from: CT Angio Chest w/ IV Cont (08.27.20 @ 17:38) >  IMPRESSION:  No pulmonary embolism.    Interval increase in compression fracture/deformity of the L1 vertebral body with mild retropulsion of fracture fragments into the spinal canal. Limited evaluation of the spinal canal on this modality. Additional areas of osseous metastatic disease and chronic pathologic compression fractures.    Redemonstrated few small mediastinal lymph nodes, which are overall decrease in size since 6/25/2020.    Slight interval increase in secretions within a distal branch of the lingula with slight interval increase in associated atelectasis.      < from: CT Thoracic Spine No Cont (08.04.20 @ 09:38) >    IMPRESSION:  1.  Multiple sclerotic and lytic lesions in the thoracic spine and ribs suggesting metastatic disease, overall progressed since 6/25/2020. Epidural soft tissue at the T7 level suggesting tumor.  2.  Multilevel thoracic compression fractures.  3.  New acute compression fracture of the L1 vertebral body with bony retropulsion.        Allergies    fish (Angioedema)  penicillins (Rash)    Intolerances        ROS: [  ] Fever  [  ] Chills  [  ]Chest Pain [  X] SOB  [  ]Cough [  ] N/V  [  ] Diarrhea [  ]Constipation [  ]Other ROS:  [  ] ROS otherwise negative    PAST MEDICAL & SURGICAL HISTORY:  Malignant neoplasm of unspecified part of right bronchus or lung  HTN (hypertension)  DM (diabetes mellitus)  History of ITP  History of ITP  CHF (congestive heart failure)  COPD (chronic obstructive pulmonary disease)  Lung cancer  Morbid obesity  GERD (gastroesophageal reflux disease)  Deviated septum  Prolapsed uterus  Obesity  Diabetes mellitus  ITP (idiopathic thrombocytopenic purpura)  Emphysema/COPD  Acute Exacerbation of Chronic Obstructive Pulmonary Disease (COPD)  ITP (Idiopathic Thrombocytopenic Purpura)  History of cholecystectomy  S/P lobectomy of lung: right 2017  History of tonsillectomy  No significant past surgical history  S/P Cholecystectomy      FAMILY HISTORY:  FH: lung cancer  Family history of stroke  Family history of lung cancer  Diabetes mellitus      MEDICATIONS  (STANDING):  ALBUTerol    0.083% 2.5 milliGRAM(s) Nebulizer every 4 hours  amLODIPine   Tablet 5 milliGRAM(s) Oral daily  atorvastatin 40 milliGRAM(s) Oral at bedtime  azithromycin   Tablet 250 milliGRAM(s) Oral daily  budesonide 160 MICROgram(s)/formoterol 4.5 MICROgram(s) Inhaler 2 Puff(s) Inhalation two times a day  carvedilol 6.25 milliGRAM(s) Oral every 12 hours  chlorhexidine 4% Liquid 1 Application(s) Topical daily  dextrose 50% Injectable 12.5 Gram(s) IV Push once  dextrose 50% Injectable 25 Gram(s) IV Push once  dextrose 50% Injectable 25 Gram(s) IV Push once  enoxaparin Injectable 40 milliGRAM(s) SubCutaneous every 24 hours  ergocalciferol 27274 Unit(s) Oral every week  folic acid 1 milliGRAM(s) Oral daily  gabapentin 600 milliGRAM(s) Oral every 8 hours  insulin glargine Injectable (LANTUS) 50 Unit(s) SubCutaneous at bedtime  insulin lispro (HumaLOG) corrective regimen sliding scale   SubCutaneous three times a day before meals  insulin lispro (HumaLOG) corrective regimen sliding scale   SubCutaneous at bedtime  insulin lispro Injectable (HumaLOG) 15 Unit(s) SubCutaneous three times a day before meals  nystatin Powder 1 Application(s) Topical two times a day  pantoprazole    Tablet 40 milliGRAM(s) Oral before breakfast  polyethylene glycol 3350 17 Gram(s) Oral two times a day  predniSONE   Tablet 40 milliGRAM(s) Oral every 24 hours  senna 2 Tablet(s) Oral at bedtime  tiotropium 18 MICROgram(s) Capsule 1 Capsule(s) Inhalation daily    MEDICATIONS  (PRN):  ALPRAZolam 1 milliGRAM(s) Oral three times a day PRN Anxiety  cyclobenzaprine 10 milliGRAM(s) Oral three times a day PRN Muscle Spasm  dextrose 40% Gel 15 Gram(s) Oral once PRN Blood Glucose LESS THAN 70 milliGRAM(s)/deciliter  oxyCODONE    IR 20 milliGRAM(s) Oral every 6 hours PRN Severe Pain (7 - 10)      PHYSICAL EXAM  KPS 50  Vital Signs Last 24 Hrs  T(C): 37 (31 Aug 2020 05:50), Max: 37 (31 Aug 2020 05:50)  T(F): 98.6 (31 Aug 2020 05:50), Max: 98.6 (31 Aug 2020 05:50)  HR: 85 (31 Aug 2020 07:18) (82 - 95)  BP: 155/86 (31 Aug 2020 05:50) (128/77 - 155/86)  BP(mean): --  RR: 18 (31 Aug 2020 05:50) (17 - 18)  SpO2: 97% (31 Aug 2020 07:18) (96% - 100%)    General: obese, no acute distress, on nasal cannula 4LPM  HEENT: NC/AT; EOMI, PERRL, sclera nonicteric; external ears normal; no rhinorrhea or epistaxis; mucous membranes moist; oropharynx clear and without erythema  CV: NR, RR; no appreciable r/m/g  Lungs: CTAB, mildly increased work of breathing noted.   Abodmen: Bowel sounds present; soft, NTND  MSK: Vertebral spine mild tenderness to the mid and low back regions.   Neuro: AAOx3; cranial nerves II-XII intact; strength 5/5 in upper and lower extremities; sensation to light touch in tact bilaterally.  Psych: Full affect; mood congruent  Skin: no visible rashes on limited examination          ASSESSMENT/PLAN    BARB COLÓN is a 65y woman with h/o metastatic lung cancer s/p resection 2017, oncologist is Dr. Mendiola, she has been on chemotherapy, comes to the ED with dyspnea and a COPD exacerbation while also c/o mid and low back pains.  Imaging confirms compression  deformities and fractures seen (see imaging report.)  She is a candidate for palliative RT to reduce pain symptoms.      We talked about the risks, benefits, acute and long term side effects, as well as expected treatment outcomes.  He/She was given the opportunity to ask questions, which were answered to his/her apparent satisfaction.  He/She provided written consent to proceed with radiation therapy. We will arrange for inpatient/outpatient treatment.    Thank you.   [phone] HPI:  66 y/o F pleasant, a.o. x 3, seen laying semi upright on nasal cannula, with PMH COPD (5L NC at home), 80 pack year smoking history, R lung cancer s/p resection (2017) on chemo q 3 weeks, recent admission for COPD exacerbation earlier in August who presents for SOB and increased sputum production.     No cough seen during examination. Denies blood in the sputum. Denies fevers/chills, chest pain, SOB, abdominal pain. She does report constipation and is producing small and hard stool. She continues to have pain in her back and in her right jaw that radiates up to her head. In the past, she has been followed by pain management. She takes 2 percocets q6 hours at home. Currently, she feels better from admission.     We were consulted regarding her back pain and imaging which documents bone metastasis from her primary lung cancer.     path report 6/30/20: squamous cell and adenocarcinoma seen.     < from: CT Angio Chest w/ IV Cont (08.27.20 @ 17:38) >  IMPRESSION:  No pulmonary embolism.    Interval increase in compression fracture/deformity of the L1 vertebral body with mild retropulsion of fracture fragments into the spinal canal. Limited evaluation of the spinal canal on this modality. Additional areas of osseous metastatic disease and chronic pathologic compression fractures.    Redemonstrated few small mediastinal lymph nodes, which are overall decrease in size since 6/25/2020.    Slight interval increase in secretions within a distal branch of the lingula with slight interval increase in associated atelectasis.      < from: CT Thoracic Spine No Cont (08.04.20 @ 09:38) >    IMPRESSION:  1.  Multiple sclerotic and lytic lesions in the thoracic spine and ribs suggesting metastatic disease, overall progressed since 6/25/2020. Epidural soft tissue at the T7 level suggesting tumor.  2.  Multilevel thoracic compression fractures.  3.  New acute compression fracture of the L1 vertebral body with bony retropulsion.        Allergies    fish (Angioedema)  penicillins (Rash)    Intolerances        ROS: [  ] Fever  [  ] Chills  [  ]Chest Pain [  X] SOB  [  ]Cough [  ] N/V  [  ] Diarrhea [  ]Constipation [  ]Other ROS:  [  ] ROS otherwise negative    PAST MEDICAL & SURGICAL HISTORY:  Malignant neoplasm of unspecified part of right bronchus or lung  HTN (hypertension)  DM (diabetes mellitus)  History of ITP  History of ITP  CHF (congestive heart failure)  COPD (chronic obstructive pulmonary disease)  Lung cancer  Morbid obesity  GERD (gastroesophageal reflux disease)  Deviated septum  Prolapsed uterus  Obesity  Diabetes mellitus  ITP (idiopathic thrombocytopenic purpura)  Emphysema/COPD  Acute Exacerbation of Chronic Obstructive Pulmonary Disease (COPD)  ITP (Idiopathic Thrombocytopenic Purpura)  History of cholecystectomy  S/P lobectomy of lung: right 2017  History of tonsillectomy  No significant past surgical history  S/P Cholecystectomy      FAMILY HISTORY:  FH: lung cancer  Family history of stroke  Family history of lung cancer  Diabetes mellitus      MEDICATIONS  (STANDING):  ALBUTerol    0.083% 2.5 milliGRAM(s) Nebulizer every 4 hours  amLODIPine   Tablet 5 milliGRAM(s) Oral daily  atorvastatin 40 milliGRAM(s) Oral at bedtime  azithromycin   Tablet 250 milliGRAM(s) Oral daily  budesonide 160 MICROgram(s)/formoterol 4.5 MICROgram(s) Inhaler 2 Puff(s) Inhalation two times a day  carvedilol 6.25 milliGRAM(s) Oral every 12 hours  chlorhexidine 4% Liquid 1 Application(s) Topical daily  dextrose 50% Injectable 12.5 Gram(s) IV Push once  dextrose 50% Injectable 25 Gram(s) IV Push once  dextrose 50% Injectable 25 Gram(s) IV Push once  enoxaparin Injectable 40 milliGRAM(s) SubCutaneous every 24 hours  ergocalciferol 85525 Unit(s) Oral every week  folic acid 1 milliGRAM(s) Oral daily  gabapentin 600 milliGRAM(s) Oral every 8 hours  insulin glargine Injectable (LANTUS) 50 Unit(s) SubCutaneous at bedtime  insulin lispro (HumaLOG) corrective regimen sliding scale   SubCutaneous three times a day before meals  insulin lispro (HumaLOG) corrective regimen sliding scale   SubCutaneous at bedtime  insulin lispro Injectable (HumaLOG) 15 Unit(s) SubCutaneous three times a day before meals  nystatin Powder 1 Application(s) Topical two times a day  pantoprazole    Tablet 40 milliGRAM(s) Oral before breakfast  polyethylene glycol 3350 17 Gram(s) Oral two times a day  predniSONE   Tablet 40 milliGRAM(s) Oral every 24 hours  senna 2 Tablet(s) Oral at bedtime  tiotropium 18 MICROgram(s) Capsule 1 Capsule(s) Inhalation daily    MEDICATIONS  (PRN):  ALPRAZolam 1 milliGRAM(s) Oral three times a day PRN Anxiety  cyclobenzaprine 10 milliGRAM(s) Oral three times a day PRN Muscle Spasm  dextrose 40% Gel 15 Gram(s) Oral once PRN Blood Glucose LESS THAN 70 milliGRAM(s)/deciliter  oxyCODONE    IR 20 milliGRAM(s) Oral every 6 hours PRN Severe Pain (7 - 10)      PHYSICAL EXAM  KPS 50  Vital Signs Last 24 Hrs  T(C): 37 (31 Aug 2020 05:50), Max: 37 (31 Aug 2020 05:50)  T(F): 98.6 (31 Aug 2020 05:50), Max: 98.6 (31 Aug 2020 05:50)  HR: 85 (31 Aug 2020 07:18) (82 - 95)  BP: 155/86 (31 Aug 2020 05:50) (128/77 - 155/86)  BP(mean): --  RR: 18 (31 Aug 2020 05:50) (17 - 18)  SpO2: 97% (31 Aug 2020 07:18) (96% - 100%)    General: obese, no acute distress, on nasal cannula 4LPM  HEENT: NC/AT; EOMI, PERRL, sclera nonicteric; external ears normal; no rhinorrhea or epistaxis; mucous membranes moist; oropharynx clear and without erythema  CV: NR, RR; no appreciable r/m/g  Lungs: CTAB, mildly increased work of breathing noted.   Abodmen: Bowel sounds present; soft, NTND  MSK: Vertebral spine mild tenderness to the mid and low back regions.   Neuro: AAOx3; cranial nerves II-XII intact; strength 5/5 in upper and lower extremities; sensation to light touch in tact bilaterally.  Psych: Full affect; mood congruent  Skin: no visible rashes on limited examination          ASSESSMENT/PLAN    BARB COLÓN is a 65y woman with h/o metastatic lung cancer s/p resection 2017, oncologist is Dr. Mendiola, she has been on chemotherapy, comes to the ED with dyspnea and a COPD exacerbation while also c/o mid and low back pains.  Imaging confirms compression  deformities and fractures seen (see imaging report.)  She is a candidate for palliative RT to reduce pain symptoms.      We talked about the risks, benefits, acute and long term side effects, as well as expected treatment outcomes.  He/She was given the opportunity to ask questions, which were answered to his/her apparent satisfaction.  He/She provided written consent to proceed with radiation therapy. We will arrange for inpatient/outpatient treatment.    Thank you.   [phone] HPI:  64 y/o F pleasant, a.o. x 3, seen laying semi upright on nasal cannula, with PMH COPD (5L NC at home), 80 pack year smoking history, R lung cancer s/p resection (2017) on chemo q 3 weeks, recent admission for COPD exacerbation earlier in August who presents for SOB and increased sputum production.     No cough seen during examination. Denies blood in the sputum. Denies fevers/chills, chest pain, SOB, abdominal pain. She does report constipation and is producing small and hard stool. She continues to have pain in her mid-back and in her right jaw that radiates up to her head. In the past, she has been followed by pain management of mid-back pain ,present for several months, 8/10. She takes 2 percocet tablets q6 hours at home. Currently, she feels better from admission. denies saddle anesthesia, leg numbness or weakness, incontinence.    path report 6/30/20: squamous cell and adenocarcinoma seen.     < from: CT Angio Chest w/ IV Cont (08.27.20 @ 17:38) >  IMPRESSION:  No pulmonary embolism.  Interval increase in compression fracture/deformity of the L1 vertebral body with mild retropulsion of fracture fragments into the spinal canal. Limited evaluation of the spinal canal on this modality. Additional areas of osseous metastatic disease and chronic pathologic compression fractures.  Redemonstrated few small mediastinal lymph nodes, which are overall decrease in size since 6/25/2020.  Slight interval increase in secretions within a distal branch of the lingula with slight interval increase in associated atelectasis.      < from: CT Thoracic Spine No Cont (08.04.20 @ 09:38) >    IMPRESSION:  1.  Multiple sclerotic and lytic lesions in the thoracic spine and ribs suggesting metastatic disease, overall progressed since 6/25/2020. Epidural soft tissue at the T7 level suggesting tumor.  2.  Multilevel thoracic compression fractures.  3.  New acute compression fracture of the L1 vertebral body with bony retropulsion.        Allergies    fish (Angioedema)  penicillins (Rash)    Intolerances        ROS: [  ] Fever  [  ] Chills  [  ]Chest Pain [  X] SOB  [  ]Cough [  ] N/V  [  ] Diarrhea [  ]Constipation [  ]Other ROS:  [  ] ROS otherwise negative    PAST MEDICAL & SURGICAL HISTORY:  Malignant neoplasm of unspecified part of right bronchus or lung  HTN (hypertension)  DM (diabetes mellitus)  History of ITP  History of ITP  CHF (congestive heart failure)  COPD (chronic obstructive pulmonary disease)  Lung cancer  Morbid obesity  GERD (gastroesophageal reflux disease)  Deviated septum  Prolapsed uterus  Obesity  Diabetes mellitus  ITP (idiopathic thrombocytopenic purpura)  Emphysema/COPD  Acute Exacerbation of Chronic Obstructive Pulmonary Disease (COPD)  ITP (Idiopathic Thrombocytopenic Purpura)  History of cholecystectomy  S/P lobectomy of lung: right 2017  History of tonsillectomy  No significant past surgical history  S/P Cholecystectomy      FAMILY HISTORY:  FH: lung cancer  Family history of stroke  Family history of lung cancer  Diabetes mellitus      MEDICATIONS  (STANDING):  ALBUTerol    0.083% 2.5 milliGRAM(s) Nebulizer every 4 hours  amLODIPine   Tablet 5 milliGRAM(s) Oral daily  atorvastatin 40 milliGRAM(s) Oral at bedtime  azithromycin   Tablet 250 milliGRAM(s) Oral daily  budesonide 160 MICROgram(s)/formoterol 4.5 MICROgram(s) Inhaler 2 Puff(s) Inhalation two times a day  carvedilol 6.25 milliGRAM(s) Oral every 12 hours  chlorhexidine 4% Liquid 1 Application(s) Topical daily  dextrose 50% Injectable 12.5 Gram(s) IV Push once  dextrose 50% Injectable 25 Gram(s) IV Push once  dextrose 50% Injectable 25 Gram(s) IV Push once  enoxaparin Injectable 40 milliGRAM(s) SubCutaneous every 24 hours  ergocalciferol 64686 Unit(s) Oral every week  folic acid 1 milliGRAM(s) Oral daily  gabapentin 600 milliGRAM(s) Oral every 8 hours  insulin glargine Injectable (LANTUS) 50 Unit(s) SubCutaneous at bedtime  insulin lispro (HumaLOG) corrective regimen sliding scale   SubCutaneous three times a day before meals  insulin lispro (HumaLOG) corrective regimen sliding scale   SubCutaneous at bedtime  insulin lispro Injectable (HumaLOG) 15 Unit(s) SubCutaneous three times a day before meals  nystatin Powder 1 Application(s) Topical two times a day  pantoprazole    Tablet 40 milliGRAM(s) Oral before breakfast  polyethylene glycol 3350 17 Gram(s) Oral two times a day  predniSONE   Tablet 40 milliGRAM(s) Oral every 24 hours  senna 2 Tablet(s) Oral at bedtime  tiotropium 18 MICROgram(s) Capsule 1 Capsule(s) Inhalation daily    MEDICATIONS  (PRN):  ALPRAZolam 1 milliGRAM(s) Oral three times a day PRN Anxiety  cyclobenzaprine 10 milliGRAM(s) Oral three times a day PRN Muscle Spasm  dextrose 40% Gel 15 Gram(s) Oral once PRN Blood Glucose LESS THAN 70 milliGRAM(s)/deciliter  oxyCODONE    IR 20 milliGRAM(s) Oral every 6 hours PRN Severe Pain (7 - 10)      PHYSICAL EXAM  KPS 50  Vital Signs Last 24 Hrs  T(C): 37 (31 Aug 2020 05:50), Max: 37 (31 Aug 2020 05:50)  T(F): 98.6 (31 Aug 2020 05:50), Max: 98.6 (31 Aug 2020 05:50)  HR: 85 (31 Aug 2020 07:18) (82 - 95)  BP: 155/86 (31 Aug 2020 05:50) (128/77 - 155/86)  BP(mean): --  RR: 18 (31 Aug 2020 05:50) (17 - 18)  SpO2: 97% (31 Aug 2020 07:18) (96% - 100%)    General: obese, no acute distress, on nasal cannula 4LPM-alert,oriented,interactive-  HEENT: NC/AT; EOMI, PERRL, sclera nonicteric; external ears normal; no rhinorrhea or epistaxis; mucous membranes moist; oropharynx clear and without erythema  CV: NR, RR; no appreciable r/m/g  Lungs: bilateral wheezing mildly increased work of breathing noted. nasal oxygen cannula in place- no stridor or cyanosis.  Abdomen: Bowel sounds present; soft, NTND  MSK: Vertebral spine mild tenderness to the mid and low back regions. marked bilateral leg/thigh edema (not new, as per patient)  Neuro: AAOx3; cranial nerves II-XII intact; strength 5/5 in upper and lower extremities; sensation to light touch intact bilaterally.  Psych: Full affect; mood congruent  Skin: no visible rashes on limited examination          ASSESSMENT/PLAN    BARB COLÓN is a 65y woman with h/o metastatic lung cancer s/p resection 2017, oncologist is Dr. Mendiola, she has been on chemotherapy, comes to the ED with dyspnea and a COPD exacerbation while also c/o mid and low back pains.  Imaging confirms compression  deformities and multiple fractures seen (see imaging report.)  She is a candidate for palliative RT to the T-7 lesion with suspected epidural extension, and the L-1 lesion,  to reduce pain symptoms.      We talked about the risks, benefits, acute and long term side effects, as well as expected treatment outcomes.  ms Colón was given the opportunity to ask questions, which were answered to her apparent satisfaction.  She provided written consent to proceed with radiation therapy. We will arrange for inpatient treatment.    Santy Freire MD  cell     Radiation Medicine at [place] [phone]

## 2020-09-01 LAB
ANION GAP SERPL CALC-SCNC: 15 MMO/L — HIGH (ref 7–14)
BUN SERPL-MCNC: 19 MG/DL — SIGNIFICANT CHANGE UP (ref 7–23)
CALCIUM SERPL-MCNC: 9.2 MG/DL — SIGNIFICANT CHANGE UP (ref 8.4–10.5)
CHLORIDE SERPL-SCNC: 94 MMOL/L — LOW (ref 98–107)
CO2 SERPL-SCNC: 27 MMOL/L — SIGNIFICANT CHANGE UP (ref 22–31)
CREAT SERPL-MCNC: 0.47 MG/DL — LOW (ref 0.5–1.3)
GLUCOSE SERPL-MCNC: 276 MG/DL — HIGH (ref 70–99)
HCT VFR BLD CALC: 37.5 % — SIGNIFICANT CHANGE UP (ref 34.5–45)
HGB BLD-MCNC: 12.3 G/DL — SIGNIFICANT CHANGE UP (ref 11.5–15.5)
MAGNESIUM SERPL-MCNC: 1.7 MG/DL — SIGNIFICANT CHANGE UP (ref 1.6–2.6)
MCHC RBC-ENTMCNC: 28.7 PG — SIGNIFICANT CHANGE UP (ref 27–34)
MCHC RBC-ENTMCNC: 32.8 % — SIGNIFICANT CHANGE UP (ref 32–36)
MCV RBC AUTO: 87.4 FL — SIGNIFICANT CHANGE UP (ref 80–100)
NRBC # FLD: 0.03 K/UL — SIGNIFICANT CHANGE UP (ref 0–0)
PHOSPHATE SERPL-MCNC: 3.1 MG/DL — SIGNIFICANT CHANGE UP (ref 2.5–4.5)
PLATELET # BLD AUTO: 110 K/UL — LOW (ref 150–400)
PMV BLD: 11.6 FL — SIGNIFICANT CHANGE UP (ref 7–13)
POTASSIUM SERPL-MCNC: 4.1 MMOL/L — SIGNIFICANT CHANGE UP (ref 3.5–5.3)
POTASSIUM SERPL-SCNC: 4.1 MMOL/L — SIGNIFICANT CHANGE UP (ref 3.5–5.3)
RBC # BLD: 4.29 M/UL — SIGNIFICANT CHANGE UP (ref 3.8–5.2)
RBC # FLD: 17.6 % — HIGH (ref 10.3–14.5)
SODIUM SERPL-SCNC: 136 MMOL/L — SIGNIFICANT CHANGE UP (ref 135–145)
WBC # BLD: 12.11 K/UL — HIGH (ref 3.8–10.5)
WBC # FLD AUTO: 12.11 K/UL — HIGH (ref 3.8–10.5)

## 2020-09-01 PROCEDURE — 77307 TELETHX ISODOSE PLAN CPLX: CPT | Mod: 26

## 2020-09-01 PROCEDURE — 77290 THER RAD SIMULAJ FIELD CPLX: CPT | Mod: 26

## 2020-09-01 PROCEDURE — 77334 RADIATION TREATMENT AID(S): CPT | Mod: 26

## 2020-09-01 PROCEDURE — 99233 SBSQ HOSP IP/OBS HIGH 50: CPT

## 2020-09-01 RX ORDER — INSULIN GLARGINE 100 [IU]/ML
60 INJECTION, SOLUTION SUBCUTANEOUS AT BEDTIME
Refills: 0 | Status: DISCONTINUED | OUTPATIENT
Start: 2020-09-01 | End: 2020-09-04

## 2020-09-01 RX ADMIN — OXYCODONE HYDROCHLORIDE 20 MILLIGRAM(S): 5 TABLET ORAL at 21:52

## 2020-09-01 RX ADMIN — CARVEDILOL PHOSPHATE 6.25 MILLIGRAM(S): 80 CAPSULE, EXTENDED RELEASE ORAL at 05:27

## 2020-09-01 RX ADMIN — INSULIN GLARGINE 60 UNIT(S): 100 INJECTION, SOLUTION SUBCUTANEOUS at 21:51

## 2020-09-01 RX ADMIN — Medication 4: at 09:18

## 2020-09-01 RX ADMIN — OXYCODONE HYDROCHLORIDE 20 MILLIGRAM(S): 5 TABLET ORAL at 15:15

## 2020-09-01 RX ADMIN — BUDESONIDE AND FORMOTEROL FUMARATE DIHYDRATE 2 PUFF(S): 160; 4.5 AEROSOL RESPIRATORY (INHALATION) at 09:20

## 2020-09-01 RX ADMIN — POLYETHYLENE GLYCOL 3350 17 GRAM(S): 17 POWDER, FOR SOLUTION ORAL at 09:19

## 2020-09-01 RX ADMIN — OXYCODONE HYDROCHLORIDE 20 MILLIGRAM(S): 5 TABLET ORAL at 05:27

## 2020-09-01 RX ADMIN — TIOTROPIUM BROMIDE 1 CAPSULE(S): 18 CAPSULE ORAL; RESPIRATORY (INHALATION) at 09:21

## 2020-09-01 RX ADMIN — OXYCODONE HYDROCHLORIDE 20 MILLIGRAM(S): 5 TABLET ORAL at 22:30

## 2020-09-01 RX ADMIN — Medication 15 UNIT(S): at 12:32

## 2020-09-01 RX ADMIN — Medication 30 MILLIGRAM(S): at 05:27

## 2020-09-01 RX ADMIN — ALBUTEROL 2.5 MILLIGRAM(S): 90 AEROSOL, METERED ORAL at 15:21

## 2020-09-01 RX ADMIN — CARVEDILOL PHOSPHATE 6.25 MILLIGRAM(S): 80 CAPSULE, EXTENDED RELEASE ORAL at 17:53

## 2020-09-01 RX ADMIN — OXYCODONE HYDROCHLORIDE 20 MILLIGRAM(S): 5 TABLET ORAL at 14:27

## 2020-09-01 RX ADMIN — ALBUTEROL 2.5 MILLIGRAM(S): 90 AEROSOL, METERED ORAL at 03:19

## 2020-09-01 RX ADMIN — CYCLOBENZAPRINE HYDROCHLORIDE 10 MILLIGRAM(S): 10 TABLET, FILM COATED ORAL at 14:26

## 2020-09-01 RX ADMIN — ATORVASTATIN CALCIUM 40 MILLIGRAM(S): 80 TABLET, FILM COATED ORAL at 21:50

## 2020-09-01 RX ADMIN — Medication 1 MILLIGRAM(S): at 11:54

## 2020-09-01 RX ADMIN — Medication 2: at 17:52

## 2020-09-01 RX ADMIN — ENOXAPARIN SODIUM 40 MILLIGRAM(S): 100 INJECTION SUBCUTANEOUS at 12:33

## 2020-09-01 RX ADMIN — Medication 15 UNIT(S): at 17:52

## 2020-09-01 RX ADMIN — Medication 15 UNIT(S): at 09:19

## 2020-09-01 RX ADMIN — GABAPENTIN 600 MILLIGRAM(S): 400 CAPSULE ORAL at 21:50

## 2020-09-01 RX ADMIN — OXYCODONE HYDROCHLORIDE 20 MILLIGRAM(S): 5 TABLET ORAL at 06:30

## 2020-09-01 RX ADMIN — ALBUTEROL 2.5 MILLIGRAM(S): 90 AEROSOL, METERED ORAL at 07:40

## 2020-09-01 RX ADMIN — BUDESONIDE AND FORMOTEROL FUMARATE DIHYDRATE 2 PUFF(S): 160; 4.5 AEROSOL RESPIRATORY (INHALATION) at 21:50

## 2020-09-01 RX ADMIN — AMLODIPINE BESYLATE 5 MILLIGRAM(S): 2.5 TABLET ORAL at 05:30

## 2020-09-01 RX ADMIN — CYCLOBENZAPRINE HYDROCHLORIDE 10 MILLIGRAM(S): 10 TABLET, FILM COATED ORAL at 05:27

## 2020-09-01 RX ADMIN — ALBUTEROL 2.5 MILLIGRAM(S): 90 AEROSOL, METERED ORAL at 19:21

## 2020-09-01 RX ADMIN — GABAPENTIN 600 MILLIGRAM(S): 400 CAPSULE ORAL at 12:33

## 2020-09-01 RX ADMIN — GABAPENTIN 600 MILLIGRAM(S): 400 CAPSULE ORAL at 05:27

## 2020-09-01 RX ADMIN — AZITHROMYCIN 250 MILLIGRAM(S): 500 TABLET, FILM COATED ORAL at 11:53

## 2020-09-01 RX ADMIN — CHLORHEXIDINE GLUCONATE 1 APPLICATION(S): 213 SOLUTION TOPICAL at 11:54

## 2020-09-01 RX ADMIN — SENNA PLUS 2 TABLET(S): 8.6 TABLET ORAL at 21:50

## 2020-09-01 RX ADMIN — ALBUTEROL 2.5 MILLIGRAM(S): 90 AEROSOL, METERED ORAL at 10:49

## 2020-09-01 RX ADMIN — NYSTATIN CREAM 1 APPLICATION(S): 100000 CREAM TOPICAL at 17:52

## 2020-09-01 RX ADMIN — PANTOPRAZOLE SODIUM 40 MILLIGRAM(S): 20 TABLET, DELAYED RELEASE ORAL at 05:27

## 2020-09-01 RX ADMIN — POLYETHYLENE GLYCOL 3350 17 GRAM(S): 17 POWDER, FOR SOLUTION ORAL at 17:53

## 2020-09-01 RX ADMIN — Medication 4: at 12:32

## 2020-09-01 RX ADMIN — NYSTATIN CREAM 1 APPLICATION(S): 100000 CREAM TOPICAL at 05:27

## 2020-09-01 NOTE — PROGRESS NOTE ADULT - PROBLEM SELECTOR PLAN 4
Hx of lung cancer with metastasis. On chemo q3 weeks. Dr. Mendiola is oncologist.   - OP f/u with Dr. Mendiola  - per CTPA interval decrease in LAD from 6/2020

## 2020-09-01 NOTE — PROGRESS NOTE ADULT - PROBLEM SELECTOR PLAN 3
Chronic pain 2/2 mets to spine and compression fx. CT with multilevel thoracic lytic/sclerotic lesions and fractures T7 and L1with bony retropulsion.  - c/w gabapentin 400mg TID, increase to 600 mg TID  - c/w oxycodone 20mg q6 hours (pt. takes percocet at home); c/w bowel regimen Miralax and senna  - per d/w nuclear med MD bone scan would not help distinguish met from fx etc  - ortho spine eval appreciated, rec TLSO brace and MRI to plan for possible biopsy.  pt cannot tolerate MRI laying down 2/2 breathing, and does not want to get anesthesia. will hold off MRI until RT is completed, and obtain open MRI as oupt. ortho notified   -rad-onc consult appreciated- plan for inpt  RT to T7 abd L1 lesion   - xray of R arm w/o pathologic met as described on CT

## 2020-09-01 NOTE — PROGRESS NOTE ADULT - PROBLEM SELECTOR PLAN 5
Hx of DM. A1C in June 9.6% now 9% Elevated sugars in the setting of steroid use.   - c/w Lantus, increase to 50 units qhs and 15 units with meals  - refuses DM diet, c/w BG checks and ZENON Hx of DM. A1C in June 9.6% now 9% Elevated sugars in the setting of steroid use.  FS >300 today  - c/w Lantus, increase to 60 units qhs and 15 units with meals  - refuses DM diet, c/w BG checks and ZENON

## 2020-09-01 NOTE — PROGRESS NOTE ADULT - SUBJECTIVE AND OBJECTIVE BOX
[facility] Division of Hospital Medicine  Alexander Mosher MD  Pager [phone]      Patient is a 65y old  Female who presents with a chief complaint of SOB (31 Aug 2020 13:09)      SUBJECTIVE / OVERNIGHT EVENTS:    pt cont to report sob worse with exertion. back pain controlled. scheduled for RT today    ADDITIONAL REVIEW OF SYSTEMS:    RESPIRATORY: No cough, wheezing, chills or hemoptysis; +shortness of breath  CARDIOVASCULAR: No chest pain, palpitations, dizziness, or leg swelling  GASTROINTESTINAL: No abdominal or epigastric pain. No nausea, vomiting, or hematemesis; No diarrhea or constipation. No melena or hematochezia.      MEDICATIONS  (STANDING):  ALBUTerol    0.083% 2.5 milliGRAM(s) Nebulizer every 4 hours  amLODIPine   Tablet 5 milliGRAM(s) Oral daily  atorvastatin 40 milliGRAM(s) Oral at bedtime  azithromycin   Tablet 250 milliGRAM(s) Oral daily  budesonide 160 MICROgram(s)/formoterol 4.5 MICROgram(s) Inhaler 2 Puff(s) Inhalation two times a day  carvedilol 6.25 milliGRAM(s) Oral every 12 hours  chlorhexidine 4% Liquid 1 Application(s) Topical daily  dextrose 50% Injectable 12.5 Gram(s) IV Push once  dextrose 50% Injectable 25 Gram(s) IV Push once  dextrose 50% Injectable 25 Gram(s) IV Push once  enoxaparin Injectable 40 milliGRAM(s) SubCutaneous every 24 hours  ergocalciferol 42250 Unit(s) Oral every week  folic acid 1 milliGRAM(s) Oral daily  gabapentin 600 milliGRAM(s) Oral every 8 hours  insulin glargine Injectable (LANTUS) 50 Unit(s) SubCutaneous at bedtime  insulin lispro (HumaLOG) corrective regimen sliding scale   SubCutaneous three times a day before meals  insulin lispro (HumaLOG) corrective regimen sliding scale   SubCutaneous at bedtime  insulin lispro Injectable (HumaLOG) 15 Unit(s) SubCutaneous three times a day before meals  nystatin Powder 1 Application(s) Topical two times a day  pantoprazole    Tablet 40 milliGRAM(s) Oral before breakfast  polyethylene glycol 3350 17 Gram(s) Oral two times a day  predniSONE   Tablet 30 milliGRAM(s) Oral daily  senna 2 Tablet(s) Oral at bedtime  tiotropium 18 MICROgram(s) Capsule 1 Capsule(s) Inhalation daily    MEDICATIONS  (PRN):  ALPRAZolam 1 milliGRAM(s) Oral three times a day PRN Anxiety  cyclobenzaprine 10 milliGRAM(s) Oral three times a day PRN Muscle Spasm  dextrose 40% Gel 15 Gram(s) Oral once PRN Blood Glucose LESS THAN 70 milliGRAM(s)/deciliter  oxyCODONE    IR 20 milliGRAM(s) Oral every 6 hours PRN Severe Pain (7 - 10)      CAPILLARY BLOOD GLUCOSE      POCT Blood Glucose.: 302 mg/dL (01 Sep 2020 08:31)  POCT Blood Glucose.: 183 mg/dL (31 Aug 2020 22:47)  POCT Blood Glucose.: 212 mg/dL (31 Aug 2020 17:26)  POCT Blood Glucose.: 272 mg/dL (31 Aug 2020 12:06)    I&O's Summary      PHYSICAL EXAM:  Vital Signs Last 24 Hrs  T(C): 36.7 (01 Sep 2020 05:22), Max: 36.7 (31 Aug 2020 15:56)  T(F): 98.1 (01 Sep 2020 05:22), Max: 98.1 (01 Sep 2020 05:22)  HR: 88 (01 Sep 2020 10:49) (85 - 102)  BP: 144/70 (01 Sep 2020 05:22) (144/70 - 152/86)  BP(mean): --  RR: 18 (01 Sep 2020 05:22) (18 - 20)  SpO2: 97% (01 Sep 2020 10:49) (96% - 98%)    CONSTITUTIONAL: NAD  EYES: PERRLA; conjunctiva and sclera clear  ENMT: Moist oral mucosa, no pharyngeal injection or exudates;  NECK: Supple, no palpable masses;  RESPIRATORY: Normal respiratory effort; scattered rhonchi to auscultation bilaterally  CARDIOVASCULAR: Regular rate and rhythm, normal S1 and S2, no murmur/rub/gallop; No lower extremity edema; Peripheral pulses are 2+ bilaterally  ABDOMEN: Nontender to palpation, normoactive bowel sounds, no rebound/guarding; No hepatosplenomegaly  MUSCLOSKELETAL:   no clubbing or cyanosis of digits; no joint swelling or tenderness to palpation T10-L1 TTP  PSYCH: A+O to person, place, and time; affect appropriate  NEUROLOGY: CN 2-12 are intact and symmetric; no gross sensory deficits;   SKIN: No rashes; no palpable lesions    LABS:                        12.3   12.11 )-----------( 110      ( 01 Sep 2020 05:08 )             37.5     09-01    136  |  94<L>  |  19  ----------------------------<  276<H>  4.1   |  27  |  0.47<L>    Ca    9.2      01 Sep 2020 07:32  Phos  3.1     09-01  Mg     1.7     09-01                  RADIOLOGY & ADDITIONAL TESTS:  Results Reviewed:   Imaging Personally Reviewed:  Electrocardiogram Personally Reviewed:    COORDINATION OF CARE:  Care Discussed with Consultants/Other Providers [Y/N]:  Prior or Outpatient Records Reviewed [Y/N]: No

## 2020-09-01 NOTE — PROGRESS NOTE ADULT - PROBLEM SELECTOR PLAN 1
Increase WOB and hypoxia.  CTPA negative PNA or PE.  HsT negative x 2, BNP wnl.  - c/w prednisone  taper to 30 mg   - azithro 250 mg x 5 days  - c/w MDIs (Symbicort, Spiriva) and standing nebs  - c/w continuos pulse ox, c/w O2, titrate sat to 89-92%  - will need close OP f/u with her pulm given frequent admits

## 2020-09-02 LAB
ANION GAP SERPL CALC-SCNC: 13 MMO/L — SIGNIFICANT CHANGE UP (ref 7–14)
BUN SERPL-MCNC: 20 MG/DL — SIGNIFICANT CHANGE UP (ref 7–23)
CALCIUM SERPL-MCNC: 8.9 MG/DL — SIGNIFICANT CHANGE UP (ref 8.4–10.5)
CHLORIDE SERPL-SCNC: 95 MMOL/L — LOW (ref 98–107)
CO2 SERPL-SCNC: 32 MMOL/L — HIGH (ref 22–31)
CREAT SERPL-MCNC: 0.59 MG/DL — SIGNIFICANT CHANGE UP (ref 0.5–1.3)
GLUCOSE SERPL-MCNC: 113 MG/DL — HIGH (ref 70–99)
HCT VFR BLD CALC: 36.9 % — SIGNIFICANT CHANGE UP (ref 34.5–45)
HGB BLD-MCNC: 11.8 G/DL — SIGNIFICANT CHANGE UP (ref 11.5–15.5)
MAGNESIUM SERPL-MCNC: 1.7 MG/DL — SIGNIFICANT CHANGE UP (ref 1.6–2.6)
MCHC RBC-ENTMCNC: 28.5 PG — SIGNIFICANT CHANGE UP (ref 27–34)
MCHC RBC-ENTMCNC: 32 % — SIGNIFICANT CHANGE UP (ref 32–36)
MCV RBC AUTO: 89.1 FL — SIGNIFICANT CHANGE UP (ref 80–100)
NRBC # FLD: 0.03 K/UL — SIGNIFICANT CHANGE UP (ref 0–0)
PHOSPHATE SERPL-MCNC: 3.4 MG/DL — SIGNIFICANT CHANGE UP (ref 2.5–4.5)
PLATELET # BLD AUTO: 111 K/UL — LOW (ref 150–400)
PMV BLD: 11 FL — SIGNIFICANT CHANGE UP (ref 7–13)
POTASSIUM SERPL-MCNC: 4 MMOL/L — SIGNIFICANT CHANGE UP (ref 3.5–5.3)
POTASSIUM SERPL-SCNC: 4 MMOL/L — SIGNIFICANT CHANGE UP (ref 3.5–5.3)
RBC # BLD: 4.14 M/UL — SIGNIFICANT CHANGE UP (ref 3.8–5.2)
RBC # FLD: 18.1 % — HIGH (ref 10.3–14.5)
SODIUM SERPL-SCNC: 140 MMOL/L — SIGNIFICANT CHANGE UP (ref 135–145)
WBC # BLD: 12.44 K/UL — HIGH (ref 3.8–10.5)
WBC # FLD AUTO: 12.44 K/UL — HIGH (ref 3.8–10.5)

## 2020-09-02 PROCEDURE — 99233 SBSQ HOSP IP/OBS HIGH 50: CPT

## 2020-09-02 RX ADMIN — NYSTATIN CREAM 1 APPLICATION(S): 100000 CREAM TOPICAL at 05:35

## 2020-09-02 RX ADMIN — CYCLOBENZAPRINE HYDROCHLORIDE 10 MILLIGRAM(S): 10 TABLET, FILM COATED ORAL at 22:06

## 2020-09-02 RX ADMIN — OXYCODONE HYDROCHLORIDE 20 MILLIGRAM(S): 5 TABLET ORAL at 10:11

## 2020-09-02 RX ADMIN — INSULIN GLARGINE 60 UNIT(S): 100 INJECTION, SOLUTION SUBCUTANEOUS at 21:57

## 2020-09-02 RX ADMIN — ALBUTEROL 2.5 MILLIGRAM(S): 90 AEROSOL, METERED ORAL at 13:22

## 2020-09-02 RX ADMIN — ALBUTEROL 2.5 MILLIGRAM(S): 90 AEROSOL, METERED ORAL at 09:24

## 2020-09-02 RX ADMIN — Medication 15 UNIT(S): at 13:10

## 2020-09-02 RX ADMIN — CYCLOBENZAPRINE HYDROCHLORIDE 10 MILLIGRAM(S): 10 TABLET, FILM COATED ORAL at 10:10

## 2020-09-02 RX ADMIN — GABAPENTIN 600 MILLIGRAM(S): 400 CAPSULE ORAL at 13:09

## 2020-09-02 RX ADMIN — CHLORHEXIDINE GLUCONATE 1 APPLICATION(S): 213 SOLUTION TOPICAL at 10:11

## 2020-09-02 RX ADMIN — Medication 1: at 21:57

## 2020-09-02 RX ADMIN — POLYETHYLENE GLYCOL 3350 17 GRAM(S): 17 POWDER, FOR SOLUTION ORAL at 05:35

## 2020-09-02 RX ADMIN — BUDESONIDE AND FORMOTEROL FUMARATE DIHYDRATE 2 PUFF(S): 160; 4.5 AEROSOL RESPIRATORY (INHALATION) at 09:19

## 2020-09-02 RX ADMIN — Medication 10 MILLIGRAM(S): at 11:36

## 2020-09-02 RX ADMIN — SENNA PLUS 2 TABLET(S): 8.6 TABLET ORAL at 21:57

## 2020-09-02 RX ADMIN — Medication 3: at 13:10

## 2020-09-02 RX ADMIN — OXYCODONE HYDROCHLORIDE 20 MILLIGRAM(S): 5 TABLET ORAL at 10:41

## 2020-09-02 RX ADMIN — ALBUTEROL 2.5 MILLIGRAM(S): 90 AEROSOL, METERED ORAL at 03:37

## 2020-09-02 RX ADMIN — ALBUTEROL 2.5 MILLIGRAM(S): 90 AEROSOL, METERED ORAL at 17:04

## 2020-09-02 RX ADMIN — ATORVASTATIN CALCIUM 40 MILLIGRAM(S): 80 TABLET, FILM COATED ORAL at 21:57

## 2020-09-02 RX ADMIN — Medication 1 MILLIGRAM(S): at 11:36

## 2020-09-02 RX ADMIN — Medication 2: at 17:57

## 2020-09-02 RX ADMIN — GABAPENTIN 600 MILLIGRAM(S): 400 CAPSULE ORAL at 21:57

## 2020-09-02 RX ADMIN — BUDESONIDE AND FORMOTEROL FUMARATE DIHYDRATE 2 PUFF(S): 160; 4.5 AEROSOL RESPIRATORY (INHALATION) at 21:57

## 2020-09-02 RX ADMIN — GABAPENTIN 600 MILLIGRAM(S): 400 CAPSULE ORAL at 05:35

## 2020-09-02 RX ADMIN — ALBUTEROL 2.5 MILLIGRAM(S): 90 AEROSOL, METERED ORAL at 21:45

## 2020-09-02 RX ADMIN — ALBUTEROL 2.5 MILLIGRAM(S): 90 AEROSOL, METERED ORAL at 00:26

## 2020-09-02 RX ADMIN — AMLODIPINE BESYLATE 5 MILLIGRAM(S): 2.5 TABLET ORAL at 05:34

## 2020-09-02 RX ADMIN — Medication 15 UNIT(S): at 17:57

## 2020-09-02 RX ADMIN — CARVEDILOL PHOSPHATE 6.25 MILLIGRAM(S): 80 CAPSULE, EXTENDED RELEASE ORAL at 05:35

## 2020-09-02 RX ADMIN — ENOXAPARIN SODIUM 40 MILLIGRAM(S): 100 INJECTION SUBCUTANEOUS at 13:11

## 2020-09-02 RX ADMIN — Medication 15 UNIT(S): at 09:18

## 2020-09-02 RX ADMIN — TIOTROPIUM BROMIDE 1 CAPSULE(S): 18 CAPSULE ORAL; RESPIRATORY (INHALATION) at 09:19

## 2020-09-02 RX ADMIN — CARVEDILOL PHOSPHATE 6.25 MILLIGRAM(S): 80 CAPSULE, EXTENDED RELEASE ORAL at 17:56

## 2020-09-02 RX ADMIN — Medication 30 MILLIGRAM(S): at 05:35

## 2020-09-02 RX ADMIN — PANTOPRAZOLE SODIUM 40 MILLIGRAM(S): 20 TABLET, DELAYED RELEASE ORAL at 08:40

## 2020-09-02 NOTE — DIETITIAN INITIAL EVALUATION ADULT. - PROBLEM SELECTOR PLAN 4
Hx of lung cancer with metastasis. On chemo q3 weeks. Dr. Mendiola is oncologist.   - Dr. Mendiola oncologist - consult as needed   - Pain control

## 2020-09-02 NOTE — PROGRESS NOTE ADULT - PROBLEM SELECTOR PLAN 3
Chronic pain 2/2 mets to spine and compression fx. CT with multilevel thoracic lytic/sclerotic lesions and fractures T7 and L1with bony retropulsion.  - c/w gabapentin 400mg TID, increase to 600 mg TID  - c/w oxycodone 20mg q6 hours (pt. takes percocet at home); c/w bowel regimen Miralax and senna  - per d/w nuclear med MD bone scan would not help distinguish met from fx etc  - ortho spine eval appreciated, rec TLSO brace and MRI to plan for possible biopsy.  pt cannot tolerate MRI laying down 2/2 breathing, and does not want to get anesthesia. will hold off MRI until RT is completed, and obtain open MRI as oupt. ortho notified   -rad-onc consult appreciated- plan for inpt  RT to T7 abd L1 lesion. on hold today d/t sob  - xray of R arm w/o pathologic met as described on CT

## 2020-09-02 NOTE — PROGRESS NOTE ADULT - PROBLEM SELECTOR PLAN 5
Hx of DM. A1C in June 9.6% now 9% Elevated sugars in the setting of steroid use.  FS >300 today  - c/w Lantus, increase to 60 units qhs and 15 units with meals  - refuses DM diet, c/w BG checks and ZENON

## 2020-09-02 NOTE — DIETITIAN INITIAL EVALUATION ADULT. - PROBLEM SELECTOR PLAN 2
Tachypneic, hypoxic, and tachycardia - sinus tach on EKG. HR 80-90 on prior admission. In the setting of malignancy, will rule out PE.   - F/u CTA chest

## 2020-09-02 NOTE — DIETITIAN INITIAL EVALUATION ADULT. - PROBLEM SELECTOR PLAN 1
Found by EMS to have O2 sat of 89% on 5L - placed on NRB and given duonebs. Now sating well on 5L (home O2). Exam with bilateral rhonchi and expiratory wheezes, no accessory muscle use. CXR, at baseline. Pt has been off steroids for ~1 week based off of taper in prior documentation. Hypoxia likely 2/2 COPD exacerbation. S/p duonebs and azithromycin in ED with improvement.   - VBG - pH 7.49, pCO2 40 (usually 50-60), and bicarb 27) - likely acute respiratory alkalosis due to tachypnea. Normally, has respiratory acidosis with appropriate compensation.   - Will start prednisone 40mg qd  - Albuterol nebulizer q4 hours   - Will hold off on antibiotics for now - CXR is without infiltrate or consolidations and pt is afebrile making infection less likely. If febrile - consider starting antibiotics.   - C/w home regimen for COPD - spiriva and symbicort.   - Dr. Nugent pulmonology at [place], made aware she was here - but does not see the patient in office. Only saw her at [place].   - Pulm consult if no improvement in symptoms

## 2020-09-02 NOTE — PROGRESS NOTE ADULT - PROBLEM SELECTOR PLAN 1
Increase WOB and hypoxia.  CTPA negative PNA or PE.  HsT negative x 2, BNP wnl.  -increase prednisone from 30 to 40 today given worsening sob   - azithro 250 mg x 5 days  - c/w MDIs (Symbicort, Spiriva) and standing nebs  - c/w continuos pulse ox, c/w O2, titrate sat to 89-92%  - will need close OP f/u with her pulm given frequent admits

## 2020-09-02 NOTE — DIETITIAN INITIAL EVALUATION ADULT. - PROBLEM SELECTOR PLAN 7
Hx of DM. A1C in June = 9.6. Elevated sugars in the setting of steroid use.   - Lantus 40U qhs   - Humalog 2U TID (?) as per prior documentation - will likely need adjustment based off of insulin requirements.   - F/u A1C

## 2020-09-02 NOTE — DIETITIAN INITIAL EVALUATION ADULT. - ADD RECOMMEND
1- Continue current diet order, per MD discretion. 2- Monitor weights, labs, BM's, skin integrity, p.o. intake. 3- Suggest outpatient follow up with an endocrinologist to ensure long-term DM diet comprehension and compliance. 4- RD remains available, re-consult as needed. Sheryl Crump MS, RDN Pager #[phone]

## 2020-09-02 NOTE — CHART NOTE - NSCHARTNOTEFT_GEN_A_CORE
Patient with multiple thoracic/lumbar thoracic mets and L1 VCF.  - recommended MRI c/t/l spine prior to IR bony biopsy, however, patient refusing because they want to complete radiation therapy  - recommend TLSO brace (orthotist to bring tomorrow)  - no objection to proceed with radiation treatment  - pain control  - PT/OT  - WBAT  - FU with Dr. Lorenz upon discharge. Call [phone] to make appointment  - page [phone] with questions/concerns
Search Terms: Kim Olson, 1955Search Date: 08/28/2020 13:46:24 PM    This report was requested by: Maura Douglas | Reference #: [id number]    Others' Prescriptions  Patient Name: Kim Maria Date: 1955  Address: [address] [place] [id number]: Female  Rx Written	Rx Dispensed	Drug	Quantity	Days Supply	Prescriber Name	Payment Method	Dispenser  08/17/2020	08/17/2020	endocet  mg tablet	240	30	Mendiola, Paul ZAPATA	[facility] [employer] [facility]  08/17/2020	08/17/2020	alprazolam 2 mg tablet	90	30	Mendiola, Paul ZAPATA	[facility] [employer] [facility]  07/13/2020	07/17/2020	endocet  mg tablet	240	30	Mendiola, Paul ZAPATA	[facility] [employer] [facility]  07/13/2020	07/17/2020	alprazolam 2 mg tablet	120	30	Mendiola, Paul ZAPATA	[facility] [employer]  06/19/2020	06/19/2020	endocet  mg tablet	240	30	Mendiola, Paul ZAPATA	[facility] [employer]  06/19/2020	06/19/2020	alprazolam 2 mg tablet	90	30	Mendiola, Paul ZAPATA	[facility]  05/18/2020	05/18/2020	endocet  mg tablet	240	30	Mendiola, Paul ZAPATA	[facility] [employer]  05/18/2020	05/18/2020	alprazolam 2 mg tablet	90	30	Mendiola, Paul ZAPATA	[facility] [facility]  04/17/2020	04/17/2020	oxycodone-acetaminophen  mg tab	180	30	Mendiola, Paul ZAPATA	[facility]  04/17/2020	04/17/2020	alprazolam 2 mg tablet	90	30	Mendiola, Paul ZAPATA	[facility]  03/12/2020	03/16/2020	alprazolam 2 mg tablet	90	30	Mendiola, Paul ZAPATA	[facility] [employer]  [employer]  03/12/2020	03/16/2020	oxycodone-acetaminophen  mg tab	180	30	Mendiola, Paul ZAPATA	[facility] [employer] [facility]  02/14/2020	02/17/2020	oxycodone-acetaminophen  mg tab	180	30	Mendiola, Paul ZAPATA	[facility]  02/14/2020	02/17/2020	alprazolam 2 mg tablet	90	30	Mendiola, Paul ZAPATA	[facility]  01/17/2020	01/17/2020	alprazolam 2 mg tablet	90	30	Mendiola, Paul ZAPATA	[facility]  01/17/2020	01/17/2020	oxycodone-acetaminophen  mg tab	240	30	Mendiola, Paul ZAPATA	[facility]  12/16/2019	12/16/2019	oxycodone-acetaminophen  mg tab	240	30	Mendiola, Paul ZAPATA	[facility]  12/16/2019	12/16/2019	alprazolam 2 mg tablet	90	30	Mendiola, Paul ZAPATA	[facility]  11/15/2019	11/18/2019	oxycodone-acetaminophen  mg tab	240	30	Mendiola, Paul ZAPATA	[facility]  11/15/2019	11/18/2019	alprazolam 2 mg tablet	90	30	Mendiola, Paul ZAPATA	[facility]  10/18/2019	10/19/2019	alprazolam 2 mg tablet	90	30	Mendiola, Paul ZAPATA	[facility]  10/18/2019	10/19/2019	oxycodone-acetaminophen  mg tab	240	30	MendiolaPaul MD	[facility]  09/20/2019	09/20/2019	oxycodone-acetaminophen  mg tab	240	30	MendiolaPaul MD	[facility]  09/20/2019	09/20/2019	alprazolam 2 mg tablet	90	30	Mendiola, Paul ZAPATA	[facility]
Spoke to Radiation Oncology ( Dr Freire). Pt is scheduled for One single radiation treatment in am ( 9/3).
radiation treatment on hold for now, secondary to worsening respiratory status, pt on ventilator...  please feel free to call with questions, or updates... if stable today, will try to treat tomorrow    Santy Freire MD  cell

## 2020-09-02 NOTE — DIETITIAN INITIAL EVALUATION ADULT. - PERTINENT MEDS FT
MEDICATIONS  (STANDING):  ALBUTerol    0.083% 2.5 milliGRAM(s) Nebulizer every 4 hours  amLODIPine   Tablet 5 milliGRAM(s) Oral daily  atorvastatin 40 milliGRAM(s) Oral at bedtime  budesonide 160 MICROgram(s)/formoterol 4.5 MICROgram(s) Inhaler 2 Puff(s) Inhalation two times a day  carvedilol 6.25 milliGRAM(s) Oral every 12 hours  chlorhexidine 4% Liquid 1 Application(s) Topical daily  dextrose 50% Injectable 12.5 Gram(s) IV Push once  dextrose 50% Injectable 25 Gram(s) IV Push once  dextrose 50% Injectable 25 Gram(s) IV Push once  enoxaparin Injectable 40 milliGRAM(s) SubCutaneous every 24 hours  ergocalciferol 97960 Unit(s) Oral every week  folic acid 1 milliGRAM(s) Oral daily  gabapentin 600 milliGRAM(s) Oral every 8 hours  insulin glargine Injectable (LANTUS) 60 Unit(s) SubCutaneous at bedtime  insulin lispro (HumaLOG) corrective regimen sliding scale   SubCutaneous three times a day before meals  insulin lispro (HumaLOG) corrective regimen sliding scale   SubCutaneous at bedtime  insulin lispro Injectable (HumaLOG) 15 Unit(s) SubCutaneous three times a day before meals  nystatin Powder 1 Application(s) Topical two times a day  pantoprazole    Tablet 40 milliGRAM(s) Oral before breakfast  polyethylene glycol 3350 17 Gram(s) Oral two times a day  senna 2 Tablet(s) Oral at bedtime  tiotropium 18 MICROgram(s) Capsule 1 Capsule(s) Inhalation daily

## 2020-09-02 NOTE — DIETITIAN INITIAL EVALUATION ADULT. - OTHER INFO
Initial Dietitian Evaluation 2/2 to extended length of stay. Patient is a 65F with a past medical Hx of obesity, former heavy smoker (80 pack years) with DM2 on insulin, COPD on 5L (frequent admissions, ?steroid dependence), R lung cancer s/p resection (2017) on chemo q 3 weeks with concern for mets to spine and compression fractures, recent admission for COPD exacerbation earlier in August who presents for SOB and increased sputum production with COPD exacerbation.  Patient denies any nausea/vomiting/diarrhea/constipation or difficulty chewing and swallowing. Pt not interested in recommended therapeutic diets, with hx of refusing recommended diet modifications and prefers regular diet, noted HbA1c 8.6%. Weight obtained on prior admission 90.3 kg 7/2, currently 91kgs - reflects 2+ edema of the left and right leg, ankle and foot. RD remains available, re-consult as needed.

## 2020-09-02 NOTE — DIETITIAN INITIAL EVALUATION ADULT. - PROBLEM SELECTOR PROBLEM 2
R/O Pulmonary embolism Bilobed Flap Text: The defect edges were debeveled with a #15 scalpel blade.  Given the location of the defect and the proximity to free margins a bilobe flap was deemed most appropriate.  Using a sterile surgical marker, an appropriate bilobe flap drawn around the defect.    The area thus outlined was incised deep to adipose tissue with a #15 scalpel blade.  The skin margins were undermined to an appropriate distance in all directions utilizing iris scissors.

## 2020-09-02 NOTE — DIETITIAN INITIAL EVALUATION ADULT. - PROBLEM SELECTOR PLAN 5
Chronic pain 2/2 mets. CT thoracic spine showed multiple sclerotic and lytic lesions in the thoracic spine and ribs suggesting metastatic disease, overall progressed since 6/25/2020. Epidural soft tissue at the T7 level suggesting tumor. Multilevel thoracic compression fractures. New acute compression fracture of the L1 vertebral body with bony retropulsion. Pain management assessed the patient on last admission.   - C/w gapapentin 400mg TID   - C/w oxycodone 20mg q6 hours (pt. takes percocet at home).  - Bowel regimen with miralax and senna

## 2020-09-02 NOTE — DIETITIAN INITIAL EVALUATION ADULT. - PERTINENT LABORATORY DATA
09-02 Na140 mmol/L Glu 113 mg/dL<H> K+ 4.0 mmol/L Cr  0.59 mg/dL BUN 20 mg/dL 09-02 Phos 3.4 mg/dL 08-27 Alb 3.6 g/dL    A1C with Estimated Average Glucose: 9.0 %

## 2020-09-02 NOTE — DIETITIAN INITIAL EVALUATION ADULT. - FEEDING SKILL
oriented to person, place, time and situation total assistance/independent/age appropriate assistance

## 2020-09-02 NOTE — PROGRESS NOTE ADULT - SUBJECTIVE AND OBJECTIVE BOX
[facility] Division of Hospital Medicine  Alexander Mosher MD  Pager [phone]      Patient is a 65y old  Female who presents with a chief complaint of SOB (01 Sep 2020 11:43)      SUBJECTIVE / OVERNIGHT EVENTS:    pt reports worsening sob since yesterday. s/p stimulation for RT, treatment on hold d/t sob.  back pain is controlled.     ADDITIONAL REVIEW OF SYSTEMS:    RESPIRATORY: No cough, wheezing, chills or hemoptysis; + shortness of breath  CARDIOVASCULAR: No chest pain, palpitations, dizziness, or leg swelling  GASTROINTESTINAL: No abdominal or epigastric pain. No nausea, vomiting, or hematemesis; No diarrhea or constipation. No melena or hematochezia.      MEDICATIONS  (STANDING):  ALBUTerol    0.083% 2.5 milliGRAM(s) Nebulizer every 4 hours  amLODIPine   Tablet 5 milliGRAM(s) Oral daily  atorvastatin 40 milliGRAM(s) Oral at bedtime  budesonide 160 MICROgram(s)/formoterol 4.5 MICROgram(s) Inhaler 2 Puff(s) Inhalation two times a day  carvedilol 6.25 milliGRAM(s) Oral every 12 hours  chlorhexidine 4% Liquid 1 Application(s) Topical daily  dextrose 50% Injectable 12.5 Gram(s) IV Push once  dextrose 50% Injectable 25 Gram(s) IV Push once  dextrose 50% Injectable 25 Gram(s) IV Push once  enoxaparin Injectable 40 milliGRAM(s) SubCutaneous every 24 hours  ergocalciferol 91629 Unit(s) Oral every week  folic acid 1 milliGRAM(s) Oral daily  gabapentin 600 milliGRAM(s) Oral every 8 hours  insulin glargine Injectable (LANTUS) 60 Unit(s) SubCutaneous at bedtime  insulin lispro (HumaLOG) corrective regimen sliding scale   SubCutaneous three times a day before meals  insulin lispro (HumaLOG) corrective regimen sliding scale   SubCutaneous at bedtime  insulin lispro Injectable (HumaLOG) 15 Unit(s) SubCutaneous three times a day before meals  nystatin Powder 1 Application(s) Topical two times a day  pantoprazole    Tablet 40 milliGRAM(s) Oral before breakfast  polyethylene glycol 3350 17 Gram(s) Oral two times a day  predniSONE   Tablet 10 milliGRAM(s) Oral once  senna 2 Tablet(s) Oral at bedtime  tiotropium 18 MICROgram(s) Capsule 1 Capsule(s) Inhalation daily    MEDICATIONS  (PRN):  ALPRAZolam 1 milliGRAM(s) Oral three times a day PRN Anxiety  cyclobenzaprine 10 milliGRAM(s) Oral three times a day PRN Muscle Spasm  dextrose 40% Gel 15 Gram(s) Oral once PRN Blood Glucose LESS THAN 70 milliGRAM(s)/deciliter  oxyCODONE    IR 20 milliGRAM(s) Oral every 6 hours PRN Severe Pain (7 - 10)      CAPILLARY BLOOD GLUCOSE      POCT Blood Glucose.: 128 mg/dL (02 Sep 2020 08:34)  POCT Blood Glucose.: 237 mg/dL (01 Sep 2020 21:27)  POCT Blood Glucose.: 237 mg/dL (01 Sep 2020 17:25)  POCT Blood Glucose.: 330 mg/dL (01 Sep 2020 12:25)    I&O's Summary      PHYSICAL EXAM:  Vital Signs Last 24 Hrs  T(C): 36.7 (02 Sep 2020 04:43), Max: 36.7 (01 Sep 2020 20:43)  T(F): 98 (02 Sep 2020 04:43), Max: 98 (01 Sep 2020 20:43)  HR: 96 (02 Sep 2020 09:24) (75 - 105)  BP: 140/77 (02 Sep 2020 04:43) (133/58 - 140/77)  BP(mean): --  RR: 18 (02 Sep 2020 04:43) (18 - 18)  SpO2: 97% (02 Sep 2020 09:24) (97% - 100%)    CONSTITUTIONAL: NAD  EYES: PERRLA; conjunctiva and sclera clear  ENMT: Moist oral mucosa, no pharyngeal injection or exudates;  NECK: Supple, no palpable masses;  RESPIRATORY: Normal respiratory effort; scattered rhonchi to auscultation bilaterally  CARDIOVASCULAR: Regular rate and rhythm, normal S1 and S2, no murmur/rub/gallop; No lower extremity edema; Peripheral pulses are 2+ bilaterally  ABDOMEN: Nontender to palpation, normoactive bowel sounds, no rebound/guarding; No hepatosplenomegaly  MUSCLOSKELETAL:   no clubbing or cyanosis of digits; no joint swelling or tenderness to palpation T10-L1 TTP  PSYCH: A+O to person, place, and time; affect appropriate  NEUROLOGY: CN 2-12 are intact and symmetric; no gross sensory deficits;   SKIN: No rashes; no palpable lesions      LABS:                        11.8   12.44 )-----------( 111      ( 02 Sep 2020 07:45 )             36.9     09-02    140  |  95<L>  |  20  ----------------------------<  113<H>  4.0   |  32<H>  |  0.59    Ca    8.9      02 Sep 2020 07:45  Phos  3.4     09-02  Mg     1.7     09-02                  RADIOLOGY & ADDITIONAL TESTS:  Results Reviewed:   Imaging Personally Reviewed:  Electrocardiogram Personally Reviewed:    COORDINATION OF CARE:  Care Discussed with Consultants/Other Providers [Y/N]:  Prior or Outpatient Records Reviewed [Y/N]:

## 2020-09-03 ENCOUNTER — TRANSCRIPTION ENCOUNTER (OUTPATIENT)
Age: 65
End: 2020-09-03

## 2020-09-03 LAB
ANION GAP SERPL CALC-SCNC: 17 MMO/L — HIGH (ref 7–14)
BASOPHILS # BLD AUTO: 0.04 K/UL — SIGNIFICANT CHANGE UP (ref 0–0.2)
BASOPHILS NFR BLD AUTO: 0.3 % — SIGNIFICANT CHANGE UP (ref 0–2)
BASOPHILS NFR SPEC: 0 % — SIGNIFICANT CHANGE UP (ref 0–2)
BUN SERPL-MCNC: 16 MG/DL — SIGNIFICANT CHANGE UP (ref 7–23)
CALCIUM SERPL-MCNC: 9.1 MG/DL — SIGNIFICANT CHANGE UP (ref 8.4–10.5)
CHLORIDE SERPL-SCNC: 97 MMOL/L — LOW (ref 98–107)
CO2 SERPL-SCNC: 24 MMOL/L — SIGNIFICANT CHANGE UP (ref 22–31)
CREAT SERPL-MCNC: 0.47 MG/DL — LOW (ref 0.5–1.3)
EOSINOPHIL # BLD AUTO: 0.02 K/UL — SIGNIFICANT CHANGE UP (ref 0–0.5)
EOSINOPHIL NFR BLD AUTO: 0.1 % — SIGNIFICANT CHANGE UP (ref 0–6)
EOSINOPHIL NFR FLD: 0 % — SIGNIFICANT CHANGE UP (ref 0–6)
GLUCOSE SERPL-MCNC: 77 MG/DL — SIGNIFICANT CHANGE UP (ref 70–99)
HCT VFR BLD CALC: 38.3 % — SIGNIFICANT CHANGE UP (ref 34.5–45)
HGB BLD-MCNC: 12.1 G/DL — SIGNIFICANT CHANGE UP (ref 11.5–15.5)
IMM GRANULOCYTES NFR BLD AUTO: 11.7 % — HIGH (ref 0–1.5)
LYMPHOCYTES # BLD AUTO: 1.89 K/UL — SIGNIFICANT CHANGE UP (ref 1–3.3)
LYMPHOCYTES # BLD AUTO: 12.3 % — LOW (ref 13–44)
LYMPHOCYTES NFR SPEC AUTO: 20 % — SIGNIFICANT CHANGE UP (ref 13–44)
MAGNESIUM SERPL-MCNC: 1.8 MG/DL — SIGNIFICANT CHANGE UP (ref 1.6–2.6)
MANUAL SMEAR VERIFICATION: SIGNIFICANT CHANGE UP
MCHC RBC-ENTMCNC: 28.6 PG — SIGNIFICANT CHANGE UP (ref 27–34)
MCHC RBC-ENTMCNC: 31.6 % — LOW (ref 32–36)
MCV RBC AUTO: 90.5 FL — SIGNIFICANT CHANGE UP (ref 80–100)
METAMYELOCYTES # FLD: 3 % — HIGH (ref 0–1)
MONOCYTES # BLD AUTO: 0.88 K/UL — SIGNIFICANT CHANGE UP (ref 0–0.9)
MONOCYTES NFR BLD AUTO: 5.7 % — SIGNIFICANT CHANGE UP (ref 2–14)
MONOCYTES NFR BLD: 5 % — SIGNIFICANT CHANGE UP (ref 2–9)
MORPHOLOGY BLD-IMP: SIGNIFICANT CHANGE UP
MYELOCYTES NFR BLD: 4 % — HIGH (ref 0–0)
NEUTROPHIL AB SER-ACNC: 65 % — SIGNIFICANT CHANGE UP (ref 43–77)
NEUTROPHILS # BLD AUTO: 10.74 K/UL — HIGH (ref 1.8–7.4)
NEUTROPHILS NFR BLD AUTO: 69.9 % — SIGNIFICANT CHANGE UP (ref 43–77)
NEUTS BAND # BLD: 3 % — SIGNIFICANT CHANGE UP (ref 0–6)
NRBC # BLD: 1 /100WBC — SIGNIFICANT CHANGE UP
NRBC # FLD: 0.04 K/UL — SIGNIFICANT CHANGE UP (ref 0–0)
PLATELET # BLD AUTO: 104 K/UL — LOW (ref 150–400)
PLATELET COUNT - ESTIMATE: SIGNIFICANT CHANGE UP
PMV BLD: 11.3 FL — SIGNIFICANT CHANGE UP (ref 7–13)
POTASSIUM SERPL-MCNC: 4.1 MMOL/L — SIGNIFICANT CHANGE UP (ref 3.5–5.3)
POTASSIUM SERPL-SCNC: 4.1 MMOL/L — SIGNIFICANT CHANGE UP (ref 3.5–5.3)
RBC # BLD: 4.23 M/UL — SIGNIFICANT CHANGE UP (ref 3.8–5.2)
RBC # FLD: 18.3 % — HIGH (ref 10.3–14.5)
SODIUM SERPL-SCNC: 138 MMOL/L — SIGNIFICANT CHANGE UP (ref 135–145)
WBC # BLD: 15.37 K/UL — HIGH (ref 3.8–10.5)
WBC # FLD AUTO: 15.37 K/UL — HIGH (ref 3.8–10.5)

## 2020-09-03 PROCEDURE — 77431 RADIATION THERAPY MANAGEMENT: CPT

## 2020-09-03 PROCEDURE — 99233 SBSQ HOSP IP/OBS HIGH 50: CPT

## 2020-09-03 PROCEDURE — 77280 THER RAD SIMULAJ FIELD SMPL: CPT | Mod: 26

## 2020-09-03 RX ORDER — OXYCODONE HYDROCHLORIDE 5 MG/1
10 TABLET ORAL ONCE
Refills: 0 | Status: DISCONTINUED | OUTPATIENT
Start: 2020-09-03 | End: 2020-09-03

## 2020-09-03 RX ADMIN — NYSTATIN CREAM 1 APPLICATION(S): 100000 CREAM TOPICAL at 06:40

## 2020-09-03 RX ADMIN — BUDESONIDE AND FORMOTEROL FUMARATE DIHYDRATE 2 PUFF(S): 160; 4.5 AEROSOL RESPIRATORY (INHALATION) at 08:46

## 2020-09-03 RX ADMIN — GABAPENTIN 600 MILLIGRAM(S): 400 CAPSULE ORAL at 13:13

## 2020-09-03 RX ADMIN — OXYCODONE HYDROCHLORIDE 10 MILLIGRAM(S): 5 TABLET ORAL at 01:42

## 2020-09-03 RX ADMIN — CYCLOBENZAPRINE HYDROCHLORIDE 10 MILLIGRAM(S): 10 TABLET, FILM COATED ORAL at 21:15

## 2020-09-03 RX ADMIN — PANTOPRAZOLE SODIUM 40 MILLIGRAM(S): 20 TABLET, DELAYED RELEASE ORAL at 06:38

## 2020-09-03 RX ADMIN — Medication 15 UNIT(S): at 18:07

## 2020-09-03 RX ADMIN — TIOTROPIUM BROMIDE 1 CAPSULE(S): 18 CAPSULE ORAL; RESPIRATORY (INHALATION) at 08:47

## 2020-09-03 RX ADMIN — Medication 15 UNIT(S): at 08:45

## 2020-09-03 RX ADMIN — ALBUTEROL 2.5 MILLIGRAM(S): 90 AEROSOL, METERED ORAL at 04:25

## 2020-09-03 RX ADMIN — ATORVASTATIN CALCIUM 40 MILLIGRAM(S): 80 TABLET, FILM COATED ORAL at 21:15

## 2020-09-03 RX ADMIN — GABAPENTIN 600 MILLIGRAM(S): 400 CAPSULE ORAL at 21:15

## 2020-09-03 RX ADMIN — OXYCODONE HYDROCHLORIDE 20 MILLIGRAM(S): 5 TABLET ORAL at 22:10

## 2020-09-03 RX ADMIN — ALBUTEROL 2.5 MILLIGRAM(S): 90 AEROSOL, METERED ORAL at 17:03

## 2020-09-03 RX ADMIN — OXYCODONE HYDROCHLORIDE 20 MILLIGRAM(S): 5 TABLET ORAL at 09:49

## 2020-09-03 RX ADMIN — Medication 20 MILLIGRAM(S): at 13:13

## 2020-09-03 RX ADMIN — Medication 15 UNIT(S): at 13:12

## 2020-09-03 RX ADMIN — OXYCODONE HYDROCHLORIDE 20 MILLIGRAM(S): 5 TABLET ORAL at 09:19

## 2020-09-03 RX ADMIN — Medication 1: at 13:13

## 2020-09-03 RX ADMIN — AMLODIPINE BESYLATE 5 MILLIGRAM(S): 2.5 TABLET ORAL at 06:39

## 2020-09-03 RX ADMIN — CYCLOBENZAPRINE HYDROCHLORIDE 10 MILLIGRAM(S): 10 TABLET, FILM COATED ORAL at 09:19

## 2020-09-03 RX ADMIN — ALBUTEROL 2.5 MILLIGRAM(S): 90 AEROSOL, METERED ORAL at 13:21

## 2020-09-03 RX ADMIN — ALBUTEROL 2.5 MILLIGRAM(S): 90 AEROSOL, METERED ORAL at 21:49

## 2020-09-03 RX ADMIN — Medication 40 MILLIGRAM(S): at 06:38

## 2020-09-03 RX ADMIN — CARVEDILOL PHOSPHATE 6.25 MILLIGRAM(S): 80 CAPSULE, EXTENDED RELEASE ORAL at 06:38

## 2020-09-03 RX ADMIN — ALBUTEROL 2.5 MILLIGRAM(S): 90 AEROSOL, METERED ORAL at 00:52

## 2020-09-03 RX ADMIN — GABAPENTIN 600 MILLIGRAM(S): 400 CAPSULE ORAL at 06:38

## 2020-09-03 RX ADMIN — OXYCODONE HYDROCHLORIDE 10 MILLIGRAM(S): 5 TABLET ORAL at 00:51

## 2020-09-03 RX ADMIN — Medication 1: at 18:08

## 2020-09-03 RX ADMIN — BUDESONIDE AND FORMOTEROL FUMARATE DIHYDRATE 2 PUFF(S): 160; 4.5 AEROSOL RESPIRATORY (INHALATION) at 21:15

## 2020-09-03 RX ADMIN — OXYCODONE HYDROCHLORIDE 20 MILLIGRAM(S): 5 TABLET ORAL at 21:16

## 2020-09-03 RX ADMIN — CARVEDILOL PHOSPHATE 6.25 MILLIGRAM(S): 80 CAPSULE, EXTENDED RELEASE ORAL at 18:07

## 2020-09-03 RX ADMIN — ENOXAPARIN SODIUM 40 MILLIGRAM(S): 100 INJECTION SUBCUTANEOUS at 13:14

## 2020-09-03 RX ADMIN — CHLORHEXIDINE GLUCONATE 1 APPLICATION(S): 213 SOLUTION TOPICAL at 13:13

## 2020-09-03 RX ADMIN — INSULIN GLARGINE 60 UNIT(S): 100 INJECTION, SOLUTION SUBCUTANEOUS at 22:00

## 2020-09-03 RX ADMIN — Medication 1 MILLIGRAM(S): at 13:13

## 2020-09-03 RX ADMIN — ALBUTEROL 2.5 MILLIGRAM(S): 90 AEROSOL, METERED ORAL at 09:48

## 2020-09-03 NOTE — DISCHARGE NOTE PROVIDER - NSDCMRMEDTOKEN_GEN_ALL_CORE_FT
ALPRAZolam 2 mg oral tablet: 1 tab(s) orally every 6 hours, As Needed  amLODIPine 5 mg oral tablet: 1 tab(s) orally once a day  atorvastatin 40 mg oral tablet: 1 tab(s) orally once a day  Basaglar KwikPen 100 units/mL subcutaneous solution: Inject 40 units SQ at bedtime  Coreg 6.25 mg oral tablet: 1 tab(s) orally 2 times a day  ergocalciferol 50,000 intl units (1.25 mg) oral capsule: 1 cap(s) orally once a week  Flexeril 5 mg oral tablet: 1 tab(s) orally 3 times a day  folic acid 0.4 mg oral tablet: 1 tab(s) orally once a day  gabapentin 100 mg oral capsule: 2 cap(s) orally 3 times a day  metoclopramide 10 mg oral tablet: 1 tab(s) orally 2 times a day, As Needed  omeprazole 40 mg oral delayed release capsule: 1 cap(s) orally once a day  Percocet 10/325 oral tablet: 2 tab(s) orally 4 times a day, As Needed  Spiriva Respimat 1.25 mcg/inh inhalation aerosol: 2 puff(s) inhaled once a day  Symbicort 160 mcg-4.5 mcg/inh inhalation aerosol: 2 puff(s) inhaled 2 times a day  Zofran ODT 8 mg oral tablet, disintegratin tab(s) orally 2 times a day, As Needed amLODIPine 5 mg oral tablet: 1 tab(s) orally once a day  atorvastatin 40 mg oral tablet: 1 tab(s) orally once a day  Basaglar KwikPen 100 units/mL subcutaneous solution: 60 unit(s) subcutaneous once a day (at bedtime)  Coreg 6.25 mg oral tablet: 1 tab(s) orally 2 times a day  cyclobenzaprine 10 mg oral tablet: 1 tab(s) orally 3 times a day, As needed, Muscle Spasm  ergocalciferol 50,000 intl units (1.25 mg) oral capsule: 1 cap(s) orally once a week  folic acid 0.4 mg oral tablet: 1 tab(s) orally once a day  gabapentin 600 mg oral tablet: 1 tab(s) orally every 8 hours  HumaLOG KwikPen 100 units/mL injectable solution: 15 unit(s) injectable 3 times a day   omeprazole 40 mg oral delayed release capsule: 1 cap(s) orally once a day  Percocet 10/325 oral tablet: 2 tab(s) orally 4 times a day, As Needed  polyethylene glycol 3350 oral powder for reconstitution: 17 gram(s) orally 2 times a day  predniSONE 20 mg oral tablet: 3 tab(s) orally once a day for 7 days  Then 2 tabs orally daily for 7 days  Then 1 tab orally daily   senna oral tablet: 2 tab(s) orally once a day (at bedtime)  Spiriva Respimat 1.25 mcg/inh inhalation aerosol: 2 puff(s) inhaled once a day  Symbicort 160 mcg-4.5 mcg/inh inhalation aerosol: 2 puff(s) inhaled 2 times a day albuterol 2.5 mg/3 mL (0.083%) inhalation solution: 3 milliliter(s) inhaled every 6 hours, As Needed for SOB  amLODIPine 5 mg oral tablet: 1 tab(s) orally once a day  atorvastatin 40 mg oral tablet: 1 tab(s) orally once a day  Basaglar KwikPen 100 units/mL subcutaneous solution: 60 unit(s) subcutaneous once a day (at bedtime)  Coreg 6.25 mg oral tablet: 1 tab(s) orally 2 times a day  cyclobenzaprine 10 mg oral tablet: 1 tab(s) orally 3 times a day, As needed, Muscle Spasm  ergocalciferol 50,000 intl units (1.25 mg) oral capsule: 1 cap(s) orally once a week  folic acid 0.4 mg oral tablet: 1 tab(s) orally once a day  gabapentin 600 mg oral tablet: 1 tab(s) orally every 8 hours  HumaLOG KwikPen 100 units/mL injectable solution: 15 unit(s) injectable 3 times a day   omeprazole 40 mg oral delayed release capsule: 1 cap(s) orally once a day  Percocet 10/325 oral tablet: 2 tab(s) orally 4 times a day, As Needed  polyethylene glycol 3350 oral powder for reconstitution: 17 gram(s) orally 2 times a day  predniSONE 20 mg oral tablet: 3 tab(s) orally once a day for 7 days  Then 2 tabs orally daily for 7 days  Then 1 tab orally daily   senna oral tablet: 2 tab(s) orally once a day (at bedtime)  Spiriva Respimat 1.25 mcg/inh inhalation aerosol: 2 puff(s) inhaled once a day  Symbicort 160 mcg-4.5 mcg/inh inhalation aerosol: 2 puff(s) inhaled 2 times a day

## 2020-09-03 NOTE — PROGRESS NOTE ADULT - SUBJECTIVE AND OBJECTIVE BOX
[facility] Division of Hospital Medicine  Alexander Mosher MD  Pager [phone]      Patient is a 65y old  Female who presents with a chief complaint of SOB (02 Sep 2020 11:06)      SUBJECTIVE / OVERNIGHT EVENTS:    No acute event o/n. Pt received palliative radiation this am. She reports feeling more short of breath today, also endorses dizziness. Back pain is controlled.     ADDITIONAL REVIEW OF SYSTEMS:    RESPIRATORY: No cough, wheezing, chills or hemoptysis; No shortness of breath  CARDIOVASCULAR: No chest pain, palpitations, dizziness, or leg swelling  GASTROINTESTINAL: No abdominal or epigastric pain. No nausea, vomiting, or hematemesis; No diarrhea or constipation. No melena or hematochezia.      MEDICATIONS  (STANDING):  ALBUTerol    0.083% 2.5 milliGRAM(s) Nebulizer every 4 hours  amLODIPine   Tablet 5 milliGRAM(s) Oral daily  atorvastatin 40 milliGRAM(s) Oral at bedtime  budesonide 160 MICROgram(s)/formoterol 4.5 MICROgram(s) Inhaler 2 Puff(s) Inhalation two times a day  carvedilol 6.25 milliGRAM(s) Oral every 12 hours  chlorhexidine 4% Liquid 1 Application(s) Topical daily  dextrose 50% Injectable 12.5 Gram(s) IV Push once  dextrose 50% Injectable 25 Gram(s) IV Push once  dextrose 50% Injectable 25 Gram(s) IV Push once  enoxaparin Injectable 40 milliGRAM(s) SubCutaneous every 24 hours  ergocalciferol 37935 Unit(s) Oral every week  folic acid 1 milliGRAM(s) Oral daily  gabapentin 600 milliGRAM(s) Oral every 8 hours  insulin glargine Injectable (LANTUS) 60 Unit(s) SubCutaneous at bedtime  insulin lispro (HumaLOG) corrective regimen sliding scale   SubCutaneous three times a day before meals  insulin lispro (HumaLOG) corrective regimen sliding scale   SubCutaneous at bedtime  insulin lispro Injectable (HumaLOG) 15 Unit(s) SubCutaneous three times a day before meals  nystatin Powder 1 Application(s) Topical two times a day  pantoprazole    Tablet 40 milliGRAM(s) Oral before breakfast  polyethylene glycol 3350 17 Gram(s) Oral two times a day  predniSONE   Tablet 20 milliGRAM(s) Oral once  predniSONE   Tablet 40 milliGRAM(s) Oral daily  senna 2 Tablet(s) Oral at bedtime  tiotropium 18 MICROgram(s) Capsule 1 Capsule(s) Inhalation daily    MEDICATIONS  (PRN):  ALPRAZolam 1 milliGRAM(s) Oral three times a day PRN Anxiety  cyclobenzaprine 10 milliGRAM(s) Oral three times a day PRN Muscle Spasm  dextrose 40% Gel 15 Gram(s) Oral once PRN Blood Glucose LESS THAN 70 milliGRAM(s)/deciliter  oxyCODONE    IR 20 milliGRAM(s) Oral every 6 hours PRN Severe Pain (7 - 10)      CAPILLARY BLOOD GLUCOSE      POCT Blood Glucose.: 131 mg/dL (03 Sep 2020 08:42)  POCT Blood Glucose.: 272 mg/dL (02 Sep 2020 21:24)  POCT Blood Glucose.: 238 mg/dL (02 Sep 2020 17:20)  POCT Blood Glucose.: 288 mg/dL (02 Sep 2020 12:30)    I&O's Summary      PHYSICAL EXAM:  Vital Signs Last 24 Hrs  T(C): 36.3 (03 Sep 2020 06:26), Max: 36.8 (02 Sep 2020 17:54)  T(F): 97.3 (03 Sep 2020 06:26), Max: 98.3 (02 Sep 2020 17:54)  HR: 96 (03 Sep 2020 09:48) (79 - 103)  BP: 114/69 (03 Sep 2020 06:26) (114/69 - 137/86)  BP(mean): --  RR: 18 (03 Sep 2020 06:26) (18 - 20)  SpO2: 97% (03 Sep 2020 09:48) (96% - 100%)    CONSTITUTIONAL: NAD  EYES: PERRLA; conjunctiva and sclera clear  ENMT: Moist oral mucosa, no pharyngeal injection or exudates;  NECK: Supple, no palpable masses;  RESPIRATORY: Normal respiratory effort; scattered rhonchi to auscultation bilaterally  CARDIOVASCULAR: Regular rate and rhythm, normal S1 and S2, no murmur/rub/gallop; No lower extremity edema; Peripheral pulses are 2+ bilaterally  ABDOMEN: Nontender to palpation, normoactive bowel sounds, no rebound/guarding; No hepatosplenomegaly  MUSCLOSKELETAL:   no clubbing or cyanosis of digits; no joint swelling or tenderness to palpation T10-L1 TTP  PSYCH: A+O to person, place, and time; affect appropriate  NEUROLOGY: CN 2-12 are intact and symmetric; no gross sensory deficits;   SKIN: No rashes; no palpable lesions    LABS:                        12.1   15.37 )-----------( 104      ( 03 Sep 2020 07:21 )             38.3     09-03    138  |  97<L>  |  16  ----------------------------<  77  4.1   |  24  |  0.47<L>    Ca    9.1      03 Sep 2020 07:21  Phos  3.4     09-02  Mg     1.8     09-03                  RADIOLOGY & ADDITIONAL TESTS:  Results Reviewed:   Imaging Personally Reviewed:  Electrocardiogram Personally Reviewed:    COORDINATION OF CARE:  Care Discussed with Consultants/Other Providers [Y/N]:  Prior or Outpatient Records Reviewed [Y/N]:

## 2020-09-03 NOTE — DISCHARGE NOTE PROVIDER - CARE PROVIDER_API CALL
sIaias Lorenz  ORTHOPAEDIC SURGERY  [address], [address]  [place] [address]  Phone: [phone]  Fax: [phone]  Follow Up Time:

## 2020-09-03 NOTE — PROGRESS NOTE ADULT - PROBLEM SELECTOR PLAN 3
Chronic pain 2/2 mets to spine and compression fx. CT with multilevel thoracic lytic/sclerotic lesions and fractures T7 and L1with bony retropulsion.  - c/w gabapentin 400mg TID, increase to 600 mg TID  - c/w oxycodone 20mg q6 hours (pt. takes percocet at home); c/w bowel regimen Miralax and senna  - per d/w nuclear med MD bone scan would not help distinguish met from fx etc  - ortho spine eval appreciated, rec TLSO brace and MRI to plan for possible biopsy.  pt cannot tolerate MRI laying down 2/2 breathing, and does not want to get anesthesia. will hold off MRI until RT is completed, and obtain open MRI as oupt. ortho notified   -rad-onc consult appreciated- s/p  RT to T7 abd L1 lesion today. no additional RT planned inpt  - xray of R arm w/o pathologic met as described on CT

## 2020-09-03 NOTE — PROGRESS NOTE ADULT - PROBLEM SELECTOR PLAN 1
Increase WOB and hypoxia.  CTPA negative PNA or PE.  HsT negative x 2, BNP wnl.  -c/w prednisone 40. will give additional 20 today given worsening sob   - s/p azithro 250 mg x 5 days  - c/w MDIs (Symbicort, Spiriva) and standing nebs  - c/w continuos pulse ox, c/w O2, titrate sat to 89-92%  - will need close OP f/u with her pulm given frequent admits

## 2020-09-03 NOTE — DISCHARGE NOTE PROVIDER - HOSPITAL COURSE
66 yo obese F former heavy smoker (80 pack years) with DM2 on insulin, COPD on 5L (frequent admissions, ?steroid dependence), R lung cancer s/p resection (2017) on chemo q 3 weeks with concern for mets to spine and compression fractures, recent admission for COPD exacerbation earlier in August who presents for SOB and increased sputum production with COPD exacerbation. Patient was treated with prednisone, nebulizer treatments and completed a course of azithromycin. CTPA without evidence of PE or PNA. Patient is on 5L of home oxygen and is currently at his baseline O2 demands. Patient with chronic pain secondary to spine mets from lung cancer and compression fracture. CTA chest with "interval increase in compression fracture/deformity of the L1 vertebral body with mild retropulsion of fracture fragments into the spinal canal. Limited evaluation of the spinal canal on this modality. Additional areas of osseous metastatic disease and chronic pathologic compression fractures." Per discussion with nuclear medicine MD,  bone scan would not help distinguish met from fx etc. Ortho evaluated- recommends TLSO brace and MRI to plan for possible biopsy. Patient unable to tolerate laying flat for MRI due to respiratory issues and did not want anesthesia for study. Patient agreed to open MRI as outpatient. Radiation oncology consulted- s/p  RT to T7 abd L1 lesion today. no additional RT planned inpatient. 64 yo obese F former heavy smoker (80 pack years) with DM2 on insulin, COPD on 5L (frequent admissions, ?steroid dependence), R lung cancer s/p resection (2017) on chemo q 3 weeks with concern for mets to spine and compression fractures, recent admission for COPD exacerbation earlier in August who presents for SOB and increased sputum production with COPD exacerbation. Patient was treated with prednisone, nebulizer treatments and completed a course of azithromycin. CTPA without evidence of PE or PNA. Patient is on 5L of home oxygen and is currently at his baseline O2 demands. Patient with chronic pain secondary to spine mets from lung cancer and compression fracture. CTA chest with "interval increase in compression fracture/deformity of the L1 vertebral body with mild retropulsion of fracture fragments into the spinal canal. Limited evaluation of the spinal canal on this modality. Additional areas of osseous metastatic disease and chronic pathologic compression fractures." Per discussion with nuclear medicine MD,  bone scan would not help distinguish met from fx etc. Ortho evaluated- recommends TLSO brace and MRI to plan for possible biopsy. Patient unable to tolerate laying flat for MRI due to respiratory issues and did not want anesthesia for study. Patient agreed to open MRI as outpatient. Radiation oncology consulted- s/p  RT to T7 abd L1 lesion x1 session. No additional RT planned inpatient. 66 yo obese F former heavy smoker (80 pack years) with DM2 on insulin, COPD on 5L (frequent admissions, ?steroid dependence), R lung cancer s/p resection (2017) on chemo q 3 weeks with concern for mets to spine and compression fractures, recent admission for COPD exacerbation earlier in August who presents for SOB and increased sputum production with COPD exacerbation. Patient was treated with prednisone, nebulizer treatments and completed a course of azithromycin. CTPA without evidence of PE or PNA. Patient is on 5L of home oxygen and is currently at his baseline O2 demands. Patient with chronic pain secondary to spine mets from lung cancer and compression fracture. CTA chest with "interval increase in compression fracture/deformity of the L1 vertebral body with mild retropulsion of fracture fragments into the spinal canal. Limited evaluation of the spinal canal on this modality. Additional areas of osseous metastatic disease and chronic pathologic compression fractures." Per discussion with nuclear medicine MD,  bone scan would not help distinguish met from fx etc. Ortho evaluated- recommends TLSO brace and MRI to plan for possible biopsy. Patient unable to tolerate laying flat for MRI due to respiratory issues and did not want anesthesia for study. Patient agreed to open MRI as outpatient. Radiation oncology consulted- s/p  RT to T7 abd L1 lesion x1 session. No additional RT planned inpatient.     Case discussed with attending, Pt is Stable for discharge Home

## 2020-09-03 NOTE — DISCHARGE NOTE PROVIDER - NSDCCPCAREPLAN_GEN_ALL_CORE_FT
PRINCIPAL DISCHARGE DIAGNOSIS  Diagnosis: COPD exacerbation  Assessment and Plan of Treatment: You were treated for a COPD exacerbation with steroids, nebulizers and azithromycin antibiotic. You have improved. Continue your prednisone as prescribed. Continue your home oxygen as ordered. Follow up with your PCP within one week of discharge.      SECONDARY DISCHARGE DIAGNOSES  Diagnosis: Fracture of lumbar vertebra, compression  Assessment and Plan of Treatment: You have multiple metastases to your spine that are causing you pain in addition to a compression fracture. Orthopedics evaluated you and recommends and MRI however you would prefer to have an open MRI as an outpatient. Wear your TLSO brace as prescribed. Follow up with orthopedic doctor Dr. Lorenz within 1-2 weeks of discharge.  You underwent radiation therapy to reduce your pain symptoms. PRINCIPAL DISCHARGE DIAGNOSIS  Diagnosis: COPD exacerbation  Assessment and Plan of Treatment: You were treated for a COPD exacerbation with steroids, nebulizers and azithromycin antibiotic. You have improved. Continue your prednisone as prescribed. Continue your home oxygen as ordered. Follow up with your PCP within one week of discharge. If you experiencing worsening shortness of breath, please contact your physician and return to the hospital.      SECONDARY DISCHARGE DIAGNOSES  Diagnosis: Fracture of lumbar vertebra, compression  Assessment and Plan of Treatment: You have multiple metastases to your spine that are causing you pain in addition to a compression fracture. Orthopedics evaluated you and recommends and MRI however you would prefer to have an open MRI as an outpatient. Wear your TLSO brace as prescribed. Follow up with orthopedic doctor Dr. Lorenz within 1-2 weeks of discharge.  You underwent radiation therapy to reduce your pain symptoms. Follow up with your oncologist for further care.    Diagnosis: Lung cancer  Assessment and Plan of Treatment: Follow up with your oncologist Dr. Mendiola for your chemotherapy treatments.    Diagnosis: Diabetes  Assessment and Plan of Treatment: Continue your medication regimen and a consistent carbohydrate diet (Meaning eating the same amount of carbohydrates at the same time each day). Monitor blood glucose levels throughout the day before meals and at bedtime. Record blood sugars and bring to outpatient providers appointment in order to be reviewed by your doctor for management modifications. If your sugars are more than 400 or less than 70 you should contact your PCP immediately. Monitor for signs/symptoms of low blood glucose, such as, dizziness, altered mental status, or cool/clammy skin. In addition, monitor for signs/symptoms of high blood glucose, such as, feeling hot, dry, fatigued, or with increased thirst/urination. Make regular podiatry appointments in order to have feet checked for wounds and uncontrolled toe nail growth to prevent infections, as well as, appointments with an ophthalmologist to monitor your vision. PRINCIPAL DISCHARGE DIAGNOSIS  Diagnosis: COPD exacerbation  Assessment and Plan of Treatment: You were treated for a COPD exacerbation with steroids, nebulizers and azithromycin antibiotic. You have improved. Continue your prednisone as prescribed. Continue your home oxygen as ordered. Follow up with your PCP within one week of discharge. If you experiencing worsening shortness of breath, please contact your physician and return to the hospital.      SECONDARY DISCHARGE DIAGNOSES  Diagnosis: Lung cancer  Assessment and Plan of Treatment: Follow up with your oncologist Dr. Mendiola for your chemotherapy treatments.    Diagnosis: Diabetes  Assessment and Plan of Treatment: Continue your medication regimen and a consistent carbohydrate diet (Meaning eating the same amount of carbohydrates at the same time each day). Monitor blood glucose levels throughout the day before meals and at bedtime. Record blood sugars and bring to outpatient providers appointment in order to be reviewed by your doctor for management modifications. If your sugars are more than 400 or less than 70 you should contact your PCP immediately. Monitor for signs/symptoms of low blood glucose, such as, dizziness, altered mental status, or cool/clammy skin. In addition, monitor for signs/symptoms of high blood glucose, such as, feeling hot, dry, fatigued, or with increased thirst/urination. Make regular podiatry appointments in order to have feet checked for wounds and uncontrolled toe nail growth to prevent infections, as well as, appointments with an ophthalmologist to monitor your vision.    Diagnosis: Fracture of lumbar vertebra, compression  Assessment and Plan of Treatment: You have multiple metastases to your spine that are causing you pain in addition to a compression fracture. Orthopedics evaluated you and recommends and MRI however you would prefer to have an open MRI as an outpatient. Wear your TLSO brace as prescribed. Follow up with orthopedic doctor Dr. Lorenz within 1-2 weeks of discharge.  You underwent radiation therapy to reduce your pain symptoms. Follow up with your oncologist for further care.

## 2020-09-03 NOTE — PROGRESS NOTE ADULT - PROBLEM SELECTOR PLAN 5
Hx of DM. A1C in June 9.6% now 9% Elevated sugars in the setting of steroid use.  -200  - c/w Lantus 60 units qhs and 15 units with meals  - refuses DM diet, c/w BG checks and ZENON

## 2020-09-04 ENCOUNTER — TRANSCRIPTION ENCOUNTER (OUTPATIENT)
Age: 65
End: 2020-09-04

## 2020-09-04 VITALS — OXYGEN SATURATION: 98 %

## 2020-09-04 LAB
ANION GAP SERPL CALC-SCNC: 13 MMO/L — SIGNIFICANT CHANGE UP (ref 7–14)
BUN SERPL-MCNC: 19 MG/DL — SIGNIFICANT CHANGE UP (ref 7–23)
CALCIUM SERPL-MCNC: 9.1 MG/DL — SIGNIFICANT CHANGE UP (ref 8.4–10.5)
CHLORIDE SERPL-SCNC: 95 MMOL/L — LOW (ref 98–107)
CO2 SERPL-SCNC: 31 MMOL/L — SIGNIFICANT CHANGE UP (ref 22–31)
CREAT SERPL-MCNC: 0.49 MG/DL — LOW (ref 0.5–1.3)
GLUCOSE SERPL-MCNC: 166 MG/DL — HIGH (ref 70–99)
MAGNESIUM SERPL-MCNC: 1.7 MG/DL — SIGNIFICANT CHANGE UP (ref 1.6–2.6)
POTASSIUM SERPL-MCNC: 4 MMOL/L — SIGNIFICANT CHANGE UP (ref 3.5–5.3)
POTASSIUM SERPL-SCNC: 4 MMOL/L — SIGNIFICANT CHANGE UP (ref 3.5–5.3)
SODIUM SERPL-SCNC: 139 MMOL/L — SIGNIFICANT CHANGE UP (ref 135–145)

## 2020-09-04 PROCEDURE — 99239 HOSP IP/OBS DSCHRG MGMT >30: CPT

## 2020-09-04 RX ORDER — SENNA PLUS 8.6 MG/1
2 TABLET ORAL
Qty: 0 | Refills: 0 | DISCHARGE
Start: 2020-09-04

## 2020-09-04 RX ORDER — ALPRAZOLAM 0.25 MG
1 TABLET ORAL
Qty: 0 | Refills: 0 | DISCHARGE

## 2020-09-04 RX ORDER — INSULIN LISPRO 100/ML
15 VIAL (ML) SUBCUTANEOUS
Qty: 3 | Refills: 0
Start: 2020-09-04

## 2020-09-04 RX ORDER — GABAPENTIN 400 MG/1
1 CAPSULE ORAL
Qty: 90 | Refills: 0
Start: 2020-09-04 | End: 2020-10-03

## 2020-09-04 RX ORDER — CYCLOBENZAPRINE HYDROCHLORIDE 10 MG/1
1 TABLET, FILM COATED ORAL
Qty: 45 | Refills: 0
Start: 2020-09-04 | End: 2020-09-18

## 2020-09-04 RX ORDER — INSULIN GLARGINE 100 [IU]/ML
0 INJECTION, SOLUTION SUBCUTANEOUS
Qty: 0 | Refills: 0 | DISCHARGE

## 2020-09-04 RX ORDER — METOCLOPRAMIDE HCL 10 MG
1 TABLET ORAL
Qty: 0 | Refills: 0 | DISCHARGE

## 2020-09-04 RX ORDER — CYCLOBENZAPRINE HYDROCHLORIDE 10 MG/1
1 TABLET, FILM COATED ORAL
Qty: 0 | Refills: 0 | DISCHARGE

## 2020-09-04 RX ORDER — POLYETHYLENE GLYCOL 3350 17 G/17G
17 POWDER, FOR SOLUTION ORAL
Qty: 476 | Refills: 0
Start: 2020-09-04 | End: 2020-09-17

## 2020-09-04 RX ORDER — OXYCODONE HYDROCHLORIDE 5 MG/1
20 TABLET ORAL EVERY 6 HOURS
Refills: 0 | Status: DISCONTINUED | OUTPATIENT
Start: 2020-09-04 | End: 2020-09-04

## 2020-09-04 RX ORDER — GABAPENTIN 400 MG/1
2 CAPSULE ORAL
Qty: 0 | Refills: 0 | DISCHARGE

## 2020-09-04 RX ORDER — ONDANSETRON 8 MG/1
1 TABLET, FILM COATED ORAL
Qty: 0 | Refills: 0 | DISCHARGE

## 2020-09-04 RX ADMIN — AMLODIPINE BESYLATE 5 MILLIGRAM(S): 2.5 TABLET ORAL at 05:48

## 2020-09-04 RX ADMIN — CARVEDILOL PHOSPHATE 6.25 MILLIGRAM(S): 80 CAPSULE, EXTENDED RELEASE ORAL at 05:48

## 2020-09-04 RX ADMIN — Medication 3: at 13:11

## 2020-09-04 RX ADMIN — BUDESONIDE AND FORMOTEROL FUMARATE DIHYDRATE 2 PUFF(S): 160; 4.5 AEROSOL RESPIRATORY (INHALATION) at 08:42

## 2020-09-04 RX ADMIN — GABAPENTIN 600 MILLIGRAM(S): 400 CAPSULE ORAL at 13:12

## 2020-09-04 RX ADMIN — PANTOPRAZOLE SODIUM 40 MILLIGRAM(S): 20 TABLET, DELAYED RELEASE ORAL at 05:48

## 2020-09-04 RX ADMIN — TIOTROPIUM BROMIDE 1 CAPSULE(S): 18 CAPSULE ORAL; RESPIRATORY (INHALATION) at 09:00

## 2020-09-04 RX ADMIN — Medication 15 UNIT(S): at 13:12

## 2020-09-04 RX ADMIN — Medication 15 UNIT(S): at 08:42

## 2020-09-04 RX ADMIN — OXYCODONE HYDROCHLORIDE 20 MILLIGRAM(S): 5 TABLET ORAL at 16:20

## 2020-09-04 RX ADMIN — CHLORHEXIDINE GLUCONATE 1 APPLICATION(S): 213 SOLUTION TOPICAL at 13:10

## 2020-09-04 RX ADMIN — CYCLOBENZAPRINE HYDROCHLORIDE 10 MILLIGRAM(S): 10 TABLET, FILM COATED ORAL at 16:22

## 2020-09-04 RX ADMIN — Medication 1 MILLIGRAM(S): at 13:11

## 2020-09-04 RX ADMIN — Medication 2: at 08:42

## 2020-09-04 RX ADMIN — GABAPENTIN 600 MILLIGRAM(S): 400 CAPSULE ORAL at 05:48

## 2020-09-04 RX ADMIN — ALBUTEROL 2.5 MILLIGRAM(S): 90 AEROSOL, METERED ORAL at 05:07

## 2020-09-04 RX ADMIN — Medication 40 MILLIGRAM(S): at 05:48

## 2020-09-04 RX ADMIN — ALBUTEROL 2.5 MILLIGRAM(S): 90 AEROSOL, METERED ORAL at 11:57

## 2020-09-04 RX ADMIN — ALBUTEROL 2.5 MILLIGRAM(S): 90 AEROSOL, METERED ORAL at 15:31

## 2020-09-04 RX ADMIN — ALBUTEROL 2.5 MILLIGRAM(S): 90 AEROSOL, METERED ORAL at 00:16

## 2020-09-04 RX ADMIN — ENOXAPARIN SODIUM 40 MILLIGRAM(S): 100 INJECTION SUBCUTANEOUS at 13:15

## 2020-09-04 RX ADMIN — ALBUTEROL 2.5 MILLIGRAM(S): 90 AEROSOL, METERED ORAL at 09:05

## 2020-09-04 RX ADMIN — NYSTATIN CREAM 1 APPLICATION(S): 100000 CREAM TOPICAL at 05:48

## 2020-09-04 NOTE — PROGRESS NOTE ADULT - PROBLEM SELECTOR PLAN 2
currently on 5L O2 via AC  CTPA negative for PE or overt PNA, increase RLL atelectasis. Pt has been off steroids for ~1 week based off.  Presumed due to COPD exacerbation.   - management of COPD as above
Per report by EMS to have O2 sat of 89% on 5L; now seem at sating well on 2L. CTPA negative for PE or overt PNA, increase RLL atelectasis. Pt has been off steroids for ~1 week based off.  Presumed due to COPD exacerbation.   - management of COPD as above
Per report by EMS to have O2 sat of 89% on 5L; now seem at sating well on 2L. CTPA negative for PE or overt PNA, increase RLL atelectasis. Pt has been off steroids for ~1 week based off.  Presumed due to COPD exacerbation.   - management of COPD as above
currently on 5L O2 via AC  CTPA negative for PE or overt PNA, increase RLL atelectasis. Pt has been off steroids for ~1 week based off.  Presumed due to COPD exacerbation.   - management of COPD as above
Per report by EMS to have O2 sat of 89% on 5L; now seem at sating well on 2L. CTPA negative for PE or overt PNA, increase RLL atelectasis. Pt has been off steroids for ~1 week based off.  Presumed due to COPD exacerbation.   - management of COPD as above
Per report by EMS to have O2 sat of 89% on 5L; now seem at sating well on 2L. CTPA negative for PE or overt PNA, increase RLL atelectasis. Pt has been off steroids for ~1 week based off.  Presumed due to COPD exacerbation.   - management of COPD as above

## 2020-09-04 NOTE — PROGRESS NOTE ADULT - PROBLEM SELECTOR PLAN 1
Increase WOB and hypoxia.  CTPA negative PNA or PE.  HsT negative x 2, BNP wnl.  -c/w prednisone 60. will discharge on prednisone taper   - s/p azithro 250 mg x 5 days  - c/w MDIs (Symbicort, Spiriva) and standing nebs  - c/w continuos pulse ox, c/w O2, titrate sat to 89-92%  - will need close OP f/u with her pulm given frequent admits

## 2020-09-04 NOTE — PROGRESS NOTE ADULT - SUBJECTIVE AND OBJECTIVE BOX
[facility] Division of Hospital Medicine  Alexander Mosher MD  Pager [phone]      Patient is a 65y old  Female who presents with a chief complaint of SOB (03 Sep 2020 15:39)      SUBJECTIVE / OVERNIGHT EVENTS:    No acute event o/n. Reports breathing has improved. back pain controlled. agreed to go home     ADDITIONAL REVIEW OF SYSTEMS:    RESPIRATORY: No cough, wheezing, chills or hemoptysis; No shortness of breath  CARDIOVASCULAR: No chest pain, palpitations, dizziness, or leg swelling  GASTROINTESTINAL: No abdominal or epigastric pain. No nausea, vomiting, or hematemesis; No diarrhea or constipation. No melena or hematochezia.    MEDICATIONS  (STANDING):  ALBUTerol    0.083% 2.5 milliGRAM(s) Nebulizer every 4 hours  amLODIPine   Tablet 5 milliGRAM(s) Oral daily  atorvastatin 40 milliGRAM(s) Oral at bedtime  budesonide 160 MICROgram(s)/formoterol 4.5 MICROgram(s) Inhaler 2 Puff(s) Inhalation two times a day  carvedilol 6.25 milliGRAM(s) Oral every 12 hours  chlorhexidine 4% Liquid 1 Application(s) Topical daily  dextrose 50% Injectable 12.5 Gram(s) IV Push once  dextrose 50% Injectable 25 Gram(s) IV Push once  dextrose 50% Injectable 25 Gram(s) IV Push once  enoxaparin Injectable 40 milliGRAM(s) SubCutaneous every 24 hours  ergocalciferol 14827 Unit(s) Oral every week  folic acid 1 milliGRAM(s) Oral daily  gabapentin 600 milliGRAM(s) Oral every 8 hours  insulin glargine Injectable (LANTUS) 60 Unit(s) SubCutaneous at bedtime  insulin lispro (HumaLOG) corrective regimen sliding scale   SubCutaneous three times a day before meals  insulin lispro (HumaLOG) corrective regimen sliding scale   SubCutaneous at bedtime  insulin lispro Injectable (HumaLOG) 15 Unit(s) SubCutaneous three times a day before meals  nystatin Powder 1 Application(s) Topical two times a day  pantoprazole    Tablet 40 milliGRAM(s) Oral before breakfast  polyethylene glycol 3350 17 Gram(s) Oral two times a day  predniSONE   Tablet 40 milliGRAM(s) Oral daily  senna 2 Tablet(s) Oral at bedtime  tiotropium 18 MICROgram(s) Capsule 1 Capsule(s) Inhalation daily    MEDICATIONS  (PRN):  cyclobenzaprine 10 milliGRAM(s) Oral three times a day PRN Muscle Spasm  dextrose 40% Gel 15 Gram(s) Oral once PRN Blood Glucose LESS THAN 70 milliGRAM(s)/deciliter  oxyCODONE    IR 20 milliGRAM(s) Oral every 6 hours PRN Severe Pain (7 - 10)      CAPILLARY BLOOD GLUCOSE      POCT Blood Glucose.: 273 mg/dL (04 Sep 2020 12:32)  POCT Blood Glucose.: 214 mg/dL (04 Sep 2020 08:27)  POCT Blood Glucose.: 231 mg/dL (03 Sep 2020 21:26)  POCT Blood Glucose.: 196 mg/dL (03 Sep 2020 17:14)    I&O's Summary      PHYSICAL EXAM:  Vital Signs Last 24 Hrs  T(C): 36.4 (04 Sep 2020 05:42), Max: 36.7 (03 Sep 2020 18:05)  T(F): 97.5 (04 Sep 2020 05:42), Max: 98.1 (03 Sep 2020 18:05)  HR: 80 (04 Sep 2020 11:58) (80 - 102)  BP: 145/81 (04 Sep 2020 05:42) (123/81 - 147/88)  BP(mean): --  RR: 18 (04 Sep 2020 05:42) (18 - 18)  SpO2: 98% (04 Sep 2020 11:58) (97% - 99%)    CONSTITUTIONAL: NAD. obese  EYES: PERRLA; conjunctiva and sclera clear  ENMT: Moist oral mucosa, no pharyngeal injection or exudates;  NECK: Supple, no palpable masses;  RESPIRATORY: Normal respiratory effort; scattered rhonchi to auscultation bilaterally  CARDIOVASCULAR: Regular rate and rhythm, normal S1 and S2, no murmur/rub/gallop; No lower extremity edema; Peripheral pulses are 2+ bilaterally  ABDOMEN: Nontender to palpation, normoactive bowel sounds, no rebound/guarding;   MUSCLOSKELETAL:   no clubbing or cyanosis of digits; no joint swelling or tenderness to palpation T10-L1 TTP  PSYCH: A+O to person, place, and time; affect appropriate  NEUROLOGY: CN 2-12 are intact and symmetric; no gross sensory deficits;   SKIN: No rashes; no palpable lesion    LABS:                        12.1   15.37 )-----------( 104      ( 03 Sep 2020 07:21 )             38.3     09-04    139  |  95<L>  |  19  ----------------------------<  166<H>  4.0   |  31  |  0.49<L>    Ca    9.1      04 Sep 2020 06:13  Mg     1.7     09-04                  RADIOLOGY & ADDITIONAL TESTS:  Results Reviewed:   Imaging Personally Reviewed:  Electrocardiogram Personally Reviewed:    COORDINATION OF CARE:  Care Discussed with Consultants/Other Providers [Y/N]:  Prior or Outpatient Records Reviewed [Y/N]:

## 2020-09-04 NOTE — PROGRESS NOTE ADULT - REASON FOR ADMISSION
SOB
SOB
COPD exacerbation
SOB
COPD exacerbation
COPD exacerbation, compression fractures of spine

## 2020-09-04 NOTE — DISCHARGE NOTE NURSING/CASE MANAGEMENT/SOCIAL WORK - NSDCPEEMAIL_GEN_ALL_CORE
[facility] for Tobacco Control email [email or web address]

## 2020-09-04 NOTE — DISCHARGE NOTE NURSING/CASE MANAGEMENT/SOCIAL WORK - PATIENT PORTAL LINK FT
You can access the FollowMyHealth Patient Portal offered by [facility] by registering at the following website: [email or web address]. By joining [employer]’s FollowMyHealth portal, you will also be able to view your health information using other applications (apps) compatible with our system.

## 2020-09-04 NOTE — PROGRESS NOTE ADULT - PROVIDER SPECIALTY LIST ADULT
Hospitalist
Orthopedics
Hospitalist

## 2020-09-04 NOTE — PROGRESS NOTE ADULT - ASSESSMENT
66 yo obese F former heavy smoker (80 pack years) with DM2 on insulin, COPD on 5L (frequent admissions, ?steroid dependence), R lung cancer s/p resection (2017) on chemo q 3 weeks with concern for mets to spine and compression fractures, recent admission for COPD exacerbation earlier in August who presents for SOB and increased sputum production with COPD exacerbation.
64 yo obese F former heavy smoker (80 pack years) with DM2 on insulin, COPD on 5L (frequent admissions, ?steroid dependence), R lung cancer s/p resection (2017) on chemo q 3 weeks with concern for mets to spine and compression fractures, recent admission for COPD exacerbation earlier in August who presents for SOB and increased sputum production with COPD exacerbation.
64 yo obese F former heavy smoker (80 pack years) with DM2 on insulin, COPD on 5L (frequent admissions, ?steroid dependence), R lung cancer s/p resection (2017) on chemo q 3 weeks with concern for mets to spine and compression fractures, recent admission for COPD exacerbation earlier in August who presents for SOB and increased sputum production with COPD exacerbation.
66 yo obese F former heavy smoker (80 pack years) with DM2 on insulin, COPD on 5L (frequent admissions, ?steroid dependence), R lung cancer s/p resection (2017) on chemo q 3 weeks with concern for mets to spine and compression fractures, recent admission for COPD exacerbation earlier in August who presents for SOB and increased sputum production with COPD exacerbation.
66 yo obese F former heavy smoker (80 pack years) with DM2 on insulin, COPD on 5L (frequent admissions, ?steroid dependence), R lung cancer s/p resection (2017) on chemo q 3 weeks with concern for mets to spine and compression fractures, recent admission for COPD exacerbation earlier in August who presents for SOB and increased sputum production with COPD exacerbation.
66 yo obese F former heavy smoker (80py) with DM2 on insulin, COPD on 5L with steroid dependence, R lung cancer s/p resection (2017) on chemo q 3 weeks with mets to spine and compression fractures, recent admission for COPD exacerbation earlier in August who presents for SOB and increased sputum production with COPD exacerbation.
64 yo obese F former heavy smoker (80 pack years) with DM2 on insulin, COPD on 5L (frequent admissions, ?steroid dependence), R lung cancer s/p resection (2017) on chemo q 3 weeks with concern for mets to spine and compression fractures, recent admission for COPD exacerbation earlier in August who presents for SOB and increased sputum production with COPD exacerbation.
64 yo obese F former heavy smoker (80 pack years) with DM2 on insulin, COPD on 5L (frequent admissions, ?steroid dependence), R lung cancer s/p resection (2017) on chemo q 3 weeks with concern for mets to spine and compression fractures, recent admission for COPD exacerbation earlier in August who presents for SOB and increased sputum production with COPD exacerbation.

## 2020-09-04 NOTE — DISCHARGE NOTE NURSING/CASE MANAGEMENT/SOCIAL WORK - NSDCPEWEB_GEN_ALL_CORE
[facility] for Tobacco Control website --- [email or web address] website --- [email or web address]

## 2020-09-19 ENCOUNTER — INPATIENT (INPATIENT)
Facility: HOSPITAL | Age: 65
LOS: 25 days | Discharge: NURSING, REHAB OR LONG-TERM CARE | DRG: 4 | End: 2020-10-15
Attending: INTERNAL MEDICINE | Admitting: INTERNAL MEDICINE
Payer: MEDICAID

## 2020-09-19 VITALS
WEIGHT: 190.04 LBS | HEIGHT: 61 IN | SYSTOLIC BLOOD PRESSURE: 114 MMHG | OXYGEN SATURATION: 100 % | RESPIRATION RATE: 22 BRPM | HEART RATE: 112 BPM | TEMPERATURE: 98 F | DIASTOLIC BLOOD PRESSURE: 73 MMHG

## 2020-09-19 DIAGNOSIS — Z90.49 ACQUIRED ABSENCE OF OTHER SPECIFIED PARTS OF DIGESTIVE TRACT: Chronic | ICD-10-CM

## 2020-09-19 DIAGNOSIS — Z29.9 ENCOUNTER FOR PROPHYLACTIC MEASURES, UNSPECIFIED: ICD-10-CM

## 2020-09-19 DIAGNOSIS — E87.6 HYPOKALEMIA: ICD-10-CM

## 2020-09-19 DIAGNOSIS — E11.9 TYPE 2 DIABETES MELLITUS WITHOUT COMPLICATIONS: ICD-10-CM

## 2020-09-19 DIAGNOSIS — Z90.89 ACQUIRED ABSENCE OF OTHER ORGANS: Chronic | ICD-10-CM

## 2020-09-19 DIAGNOSIS — I10 ESSENTIAL (PRIMARY) HYPERTENSION: ICD-10-CM

## 2020-09-19 DIAGNOSIS — C34.90 MALIGNANT NEOPLASM OF UNSPECIFIED PART OF UNSPECIFIED BRONCHUS OR LUNG: ICD-10-CM

## 2020-09-19 DIAGNOSIS — J44.9 CHRONIC OBSTRUCTIVE PULMONARY DISEASE, UNSPECIFIED: ICD-10-CM

## 2020-09-19 DIAGNOSIS — Z90.2 ACQUIRED ABSENCE OF LUNG [PART OF]: Chronic | ICD-10-CM

## 2020-09-19 DIAGNOSIS — J96.21 ACUTE AND CHRONIC RESPIRATORY FAILURE WITH HYPOXIA: ICD-10-CM

## 2020-09-19 DIAGNOSIS — D69.6 THROMBOCYTOPENIA, UNSPECIFIED: ICD-10-CM

## 2020-09-19 DIAGNOSIS — D64.9 ANEMIA, UNSPECIFIED: ICD-10-CM

## 2020-09-19 LAB
ALBUMIN SERPL ELPH-MCNC: 3 G/DL — LOW (ref 3.5–5)
ALP SERPL-CCNC: 117 U/L — SIGNIFICANT CHANGE UP (ref 40–120)
ALT FLD-CCNC: 30 U/L DA — SIGNIFICANT CHANGE UP (ref 10–60)
ANION GAP SERPL CALC-SCNC: 3 MMOL/L — LOW (ref 5–17)
ANION GAP SERPL CALC-SCNC: 5 MMOL/L — SIGNIFICANT CHANGE UP (ref 5–17)
APPEARANCE UR: CLEAR — SIGNIFICANT CHANGE UP
APTT BLD: 25.3 SEC — LOW (ref 27.5–35.5)
AST SERPL-CCNC: 16 U/L — SIGNIFICANT CHANGE UP (ref 10–40)
BASOPHILS # BLD AUTO: 0 K/UL — SIGNIFICANT CHANGE UP (ref 0–0.2)
BASOPHILS NFR BLD AUTO: 0 % — SIGNIFICANT CHANGE UP (ref 0–2)
BILIRUB SERPL-MCNC: 1 MG/DL — SIGNIFICANT CHANGE UP (ref 0.2–1.2)
BILIRUB UR-MCNC: NEGATIVE — SIGNIFICANT CHANGE UP
BUN SERPL-MCNC: 6 MG/DL — LOW (ref 7–18)
BUN SERPL-MCNC: 8 MG/DL — SIGNIFICANT CHANGE UP (ref 7–18)
CALCIUM SERPL-MCNC: 7.9 MG/DL — LOW (ref 8.4–10.5)
CALCIUM SERPL-MCNC: 8 MG/DL — LOW (ref 8.4–10.5)
CHLORIDE SERPL-SCNC: 101 MMOL/L — SIGNIFICANT CHANGE UP (ref 96–108)
CHLORIDE SERPL-SCNC: 95 MMOL/L — LOW (ref 96–108)
CO2 SERPL-SCNC: 33 MMOL/L — HIGH (ref 22–31)
CO2 SERPL-SCNC: 37 MMOL/L — HIGH (ref 22–31)
COLOR SPEC: YELLOW — SIGNIFICANT CHANGE UP
CREAT SERPL-MCNC: 0.52 MG/DL — SIGNIFICANT CHANGE UP (ref 0.5–1.3)
CREAT SERPL-MCNC: 0.52 MG/DL — SIGNIFICANT CHANGE UP (ref 0.5–1.3)
DIFF PNL FLD: ABNORMAL
EOSINOPHIL # BLD AUTO: 0.04 K/UL — SIGNIFICANT CHANGE UP (ref 0–0.5)
EOSINOPHIL NFR BLD AUTO: 0.6 % — SIGNIFICANT CHANGE UP (ref 0–6)
EPI CELLS # UR: SIGNIFICANT CHANGE UP /HPF
GLUCOSE BLDC GLUCOMTR-MCNC: 224 MG/DL — HIGH (ref 70–99)
GLUCOSE BLDC GLUCOMTR-MCNC: 323 MG/DL — HIGH (ref 70–99)
GLUCOSE SERPL-MCNC: 156 MG/DL — HIGH (ref 70–99)
GLUCOSE SERPL-MCNC: 278 MG/DL — HIGH (ref 70–99)
GLUCOSE UR QL: 250
HCT VFR BLD CALC: 33 % — LOW (ref 34.5–45)
HGB BLD-MCNC: 10.9 G/DL — LOW (ref 11.5–15.5)
HIV 1 & 2 AB SERPL IA.RAPID: SIGNIFICANT CHANGE UP
IMM GRANULOCYTES NFR BLD AUTO: 0.9 % — SIGNIFICANT CHANGE UP (ref 0–1.5)
INR BLD: 1.2 RATIO — HIGH (ref 0.88–1.16)
KETONES UR-MCNC: NEGATIVE — SIGNIFICANT CHANGE UP
LACTATE SERPL-SCNC: 1.7 MMOL/L — SIGNIFICANT CHANGE UP (ref 0.7–2)
LEUKOCYTE ESTERASE UR-ACNC: ABNORMAL
LYMPHOCYTES # BLD AUTO: 0.8 K/UL — LOW (ref 1–3.3)
LYMPHOCYTES # BLD AUTO: 11.5 % — LOW (ref 13–44)
MCHC RBC-ENTMCNC: 29.6 PG — SIGNIFICANT CHANGE UP (ref 27–34)
MCHC RBC-ENTMCNC: 33 GM/DL — SIGNIFICANT CHANGE UP (ref 32–36)
MCV RBC AUTO: 89.7 FL — SIGNIFICANT CHANGE UP (ref 80–100)
MONOCYTES # BLD AUTO: 0.48 K/UL — SIGNIFICANT CHANGE UP (ref 0–0.9)
MONOCYTES NFR BLD AUTO: 6.9 % — SIGNIFICANT CHANGE UP (ref 2–14)
NEUTROPHILS # BLD AUTO: 5.56 K/UL — SIGNIFICANT CHANGE UP (ref 1.8–7.4)
NEUTROPHILS NFR BLD AUTO: 80.1 % — HIGH (ref 43–77)
NITRITE UR-MCNC: NEGATIVE — SIGNIFICANT CHANGE UP
NRBC # BLD: 0 /100 WBCS — SIGNIFICANT CHANGE UP (ref 0–0)
NT-PROBNP SERPL-SCNC: 162 PG/ML — HIGH (ref 0–125)
PH UR: 5 — SIGNIFICANT CHANGE UP (ref 5–8)
PLATELET # BLD AUTO: 71 K/UL — LOW (ref 150–400)
POTASSIUM SERPL-MCNC: 2.6 MMOL/L — CRITICAL LOW (ref 3.5–5.3)
POTASSIUM SERPL-MCNC: 3.3 MMOL/L — LOW (ref 3.5–5.3)
POTASSIUM SERPL-SCNC: 2.6 MMOL/L — CRITICAL LOW (ref 3.5–5.3)
POTASSIUM SERPL-SCNC: 3.3 MMOL/L — LOW (ref 3.5–5.3)
PROT SERPL-MCNC: 6.1 G/DL — SIGNIFICANT CHANGE UP (ref 6–8.3)
PROT UR-MCNC: 15
PROTHROM AB SERPL-ACNC: 13.9 SEC — HIGH (ref 10.6–13.6)
RBC # BLD: 3.68 M/UL — LOW (ref 3.8–5.2)
RBC # FLD: 16 % — HIGH (ref 10.3–14.5)
RBC CASTS # UR COMP ASSIST: SIGNIFICANT CHANGE UP /HPF (ref 0–2)
SARS-COV-2 RNA SPEC QL NAA+PROBE: SIGNIFICANT CHANGE UP
SODIUM SERPL-SCNC: 137 MMOL/L — SIGNIFICANT CHANGE UP (ref 135–145)
SODIUM SERPL-SCNC: 137 MMOL/L — SIGNIFICANT CHANGE UP (ref 135–145)
SP GR SPEC: 1.01 — SIGNIFICANT CHANGE UP (ref 1.01–1.02)
TROPONIN I SERPL-MCNC: <0.015 NG/ML — SIGNIFICANT CHANGE UP (ref 0–0.04)
UROBILINOGEN FLD QL: NEGATIVE — SIGNIFICANT CHANGE UP
WBC # BLD: 6.94 K/UL — SIGNIFICANT CHANGE UP (ref 3.8–10.5)
WBC # FLD AUTO: 6.94 K/UL — SIGNIFICANT CHANGE UP (ref 3.8–10.5)
WBC UR QL: SIGNIFICANT CHANGE UP /HPF (ref 0–5)

## 2020-09-19 PROCEDURE — 71275 CT ANGIOGRAPHY CHEST: CPT | Mod: 26

## 2020-09-19 PROCEDURE — 99285 EMERGENCY DEPT VISIT HI MDM: CPT

## 2020-09-19 PROCEDURE — 71045 X-RAY EXAM CHEST 1 VIEW: CPT | Mod: 26

## 2020-09-19 PROCEDURE — 99223 1ST HOSP IP/OBS HIGH 75: CPT | Mod: GC

## 2020-09-19 RX ORDER — ENOXAPARIN SODIUM 100 MG/ML
40 INJECTION SUBCUTANEOUS DAILY
Refills: 0 | Status: DISCONTINUED | OUTPATIENT
Start: 2020-09-19 | End: 2020-10-06

## 2020-09-19 RX ORDER — OXYCODONE AND ACETAMINOPHEN 5; 325 MG/1; MG/1
2 TABLET ORAL EVERY 4 HOURS
Refills: 0 | Status: DISCONTINUED | OUTPATIENT
Start: 2020-09-19 | End: 2020-09-26

## 2020-09-19 RX ORDER — ATORVASTATIN CALCIUM 80 MG/1
40 TABLET, FILM COATED ORAL AT BEDTIME
Refills: 0 | Status: DISCONTINUED | OUTPATIENT
Start: 2020-09-19 | End: 2020-09-19

## 2020-09-19 RX ORDER — PANTOPRAZOLE SODIUM 20 MG/1
40 TABLET, DELAYED RELEASE ORAL
Refills: 0 | Status: DISCONTINUED | OUTPATIENT
Start: 2020-09-19 | End: 2020-09-21

## 2020-09-19 RX ORDER — ACETYLCYSTEINE 200 MG/ML
1 VIAL (ML) MISCELLANEOUS
Refills: 0 | Status: DISCONTINUED | OUTPATIENT
Start: 2020-09-19 | End: 2020-09-19

## 2020-09-19 RX ORDER — POTASSIUM CHLORIDE 20 MEQ
40 PACKET (EA) ORAL DAILY
Refills: 0 | Status: DISCONTINUED | OUTPATIENT
Start: 2020-09-19 | End: 2020-09-19

## 2020-09-19 RX ORDER — GABAPENTIN 400 MG/1
600 CAPSULE ORAL EVERY 8 HOURS
Refills: 0 | Status: DISCONTINUED | OUTPATIENT
Start: 2020-09-19 | End: 2020-09-24

## 2020-09-19 RX ORDER — ACETYLCYSTEINE 200 MG/ML
4 VIAL (ML) MISCELLANEOUS THREE TIMES A DAY
Refills: 0 | Status: COMPLETED | OUTPATIENT
Start: 2020-09-19 | End: 2020-09-20

## 2020-09-19 RX ORDER — ACETYLCYSTEINE 200 MG/ML
800 VIAL (ML) MISCELLANEOUS EVERY 8 HOURS
Refills: 0 | Status: DISCONTINUED | OUTPATIENT
Start: 2020-09-19 | End: 2020-09-21

## 2020-09-19 RX ORDER — AMLODIPINE BESYLATE 2.5 MG/1
5 TABLET ORAL DAILY
Refills: 0 | Status: DISCONTINUED | OUTPATIENT
Start: 2020-09-19 | End: 2020-09-21

## 2020-09-19 RX ORDER — INSULIN LISPRO 100/ML
15 VIAL (ML) SUBCUTANEOUS
Refills: 0 | Status: DISCONTINUED | OUTPATIENT
Start: 2020-09-19 | End: 2020-09-20

## 2020-09-19 RX ORDER — ERGOCALCIFEROL 1.25 MG/1
50000 CAPSULE ORAL
Refills: 0 | Status: DISCONTINUED | OUTPATIENT
Start: 2020-09-19 | End: 2020-10-08

## 2020-09-19 RX ORDER — CARVEDILOL PHOSPHATE 80 MG/1
6.25 CAPSULE, EXTENDED RELEASE ORAL
Refills: 0 | Status: DISCONTINUED | OUTPATIENT
Start: 2020-09-19 | End: 2020-09-19

## 2020-09-19 RX ORDER — POTASSIUM CHLORIDE 20 MEQ
40 PACKET (EA) ORAL ONCE
Refills: 0 | Status: COMPLETED | OUTPATIENT
Start: 2020-09-19 | End: 2020-09-19

## 2020-09-19 RX ORDER — POLYETHYLENE GLYCOL 3350 17 G/17G
17 POWDER, FOR SOLUTION ORAL
Refills: 0 | Status: DISCONTINUED | OUTPATIENT
Start: 2020-09-19 | End: 2020-10-08

## 2020-09-19 RX ORDER — POTASSIUM CHLORIDE 20 MEQ
40 PACKET (EA) ORAL EVERY 4 HOURS
Refills: 0 | Status: COMPLETED | OUTPATIENT
Start: 2020-09-19 | End: 2020-09-19

## 2020-09-19 RX ORDER — INSULIN GLARGINE 100 [IU]/ML
60 INJECTION, SOLUTION SUBCUTANEOUS AT BEDTIME
Refills: 0 | Status: DISCONTINUED | OUTPATIENT
Start: 2020-09-19 | End: 2020-09-21

## 2020-09-19 RX ORDER — ATORVASTATIN CALCIUM 80 MG/1
40 TABLET, FILM COATED ORAL DAILY
Refills: 0 | Status: DISCONTINUED | OUTPATIENT
Start: 2020-09-19 | End: 2020-09-20

## 2020-09-19 RX ORDER — POTASSIUM CHLORIDE 20 MEQ
10 PACKET (EA) ORAL
Refills: 0 | Status: COMPLETED | OUTPATIENT
Start: 2020-09-19 | End: 2020-09-19

## 2020-09-19 RX ORDER — FOLIC ACID 0.8 MG
1 TABLET ORAL DAILY
Refills: 0 | Status: DISCONTINUED | OUTPATIENT
Start: 2020-09-19 | End: 2020-10-08

## 2020-09-19 RX ORDER — ALBUTEROL 90 UG/1
2 AEROSOL, METERED ORAL EVERY 4 HOURS
Refills: 0 | Status: DISCONTINUED | OUTPATIENT
Start: 2020-09-19 | End: 2020-10-07

## 2020-09-19 RX ORDER — ATORVASTATIN CALCIUM 80 MG/1
40 TABLET, FILM COATED ORAL DAILY
Refills: 0 | Status: DISCONTINUED | OUTPATIENT
Start: 2020-09-19 | End: 2020-09-19

## 2020-09-19 RX ORDER — INSULIN LISPRO 100/ML
VIAL (ML) SUBCUTANEOUS
Refills: 0 | Status: DISCONTINUED | OUTPATIENT
Start: 2020-09-19 | End: 2020-09-21

## 2020-09-19 RX ORDER — ALBUTEROL 90 UG/1
2 AEROSOL, METERED ORAL EVERY 4 HOURS
Refills: 0 | Status: COMPLETED | OUTPATIENT
Start: 2020-09-19 | End: 2021-08-18

## 2020-09-19 RX ORDER — OXYCODONE AND ACETAMINOPHEN 5; 325 MG/1; MG/1
2 TABLET ORAL ONCE
Refills: 0 | Status: DISCONTINUED | OUTPATIENT
Start: 2020-09-19 | End: 2020-09-19

## 2020-09-19 RX ORDER — CYCLOBENZAPRINE HYDROCHLORIDE 10 MG/1
10 TABLET, FILM COATED ORAL THREE TIMES A DAY
Refills: 0 | Status: DISCONTINUED | OUTPATIENT
Start: 2020-09-19 | End: 2020-09-21

## 2020-09-19 RX ORDER — CARVEDILOL PHOSPHATE 80 MG/1
6.25 CAPSULE, EXTENDED RELEASE ORAL EVERY 12 HOURS
Refills: 0 | Status: DISCONTINUED | OUTPATIENT
Start: 2020-09-19 | End: 2020-09-21

## 2020-09-19 RX ORDER — SODIUM CHLORIDE 9 MG/ML
1000 INJECTION INTRAMUSCULAR; INTRAVENOUS; SUBCUTANEOUS ONCE
Refills: 0 | Status: COMPLETED | OUTPATIENT
Start: 2020-09-19 | End: 2020-09-19

## 2020-09-19 RX ORDER — BUDESONIDE AND FORMOTEROL FUMARATE DIHYDRATE 160; 4.5 UG/1; UG/1
2 AEROSOL RESPIRATORY (INHALATION)
Refills: 0 | Status: DISCONTINUED | OUTPATIENT
Start: 2020-09-19 | End: 2020-09-21

## 2020-09-19 RX ADMIN — Medication 20 MILLIGRAM(S): at 12:12

## 2020-09-19 RX ADMIN — ALBUTEROL 2 PUFF(S): 90 AEROSOL, METERED ORAL at 17:41

## 2020-09-19 RX ADMIN — Medication 1 MILLIGRAM(S): at 23:42

## 2020-09-19 RX ADMIN — Medication 4: at 17:41

## 2020-09-19 RX ADMIN — INSULIN GLARGINE 60 UNIT(S): 100 INJECTION, SOLUTION SUBCUTANEOUS at 23:41

## 2020-09-19 RX ADMIN — BUDESONIDE AND FORMOTEROL FUMARATE DIHYDRATE 2 PUFF(S): 160; 4.5 AEROSOL RESPIRATORY (INHALATION) at 23:44

## 2020-09-19 RX ADMIN — GABAPENTIN 600 MILLIGRAM(S): 400 CAPSULE ORAL at 13:41

## 2020-09-19 RX ADMIN — OXYCODONE AND ACETAMINOPHEN 2 TABLET(S): 5; 325 TABLET ORAL at 12:00

## 2020-09-19 RX ADMIN — Medication 100 MILLIEQUIVALENT(S): at 12:00

## 2020-09-19 RX ADMIN — Medication 15 UNIT(S): at 17:44

## 2020-09-19 RX ADMIN — Medication 2: at 23:40

## 2020-09-19 RX ADMIN — Medication 40 MILLIEQUIVALENT(S): at 17:43

## 2020-09-19 RX ADMIN — Medication 40 MILLIEQUIVALENT(S): at 23:41

## 2020-09-19 RX ADMIN — SODIUM CHLORIDE 1000 MILLILITER(S): 9 INJECTION INTRAMUSCULAR; INTRAVENOUS; SUBCUTANEOUS at 10:06

## 2020-09-19 RX ADMIN — ALBUTEROL 2 PUFF(S): 90 AEROSOL, METERED ORAL at 23:43

## 2020-09-19 RX ADMIN — ALBUTEROL 2 PUFF(S): 90 AEROSOL, METERED ORAL at 13:22

## 2020-09-19 RX ADMIN — OXYCODONE AND ACETAMINOPHEN 2 TABLET(S): 5; 325 TABLET ORAL at 12:30

## 2020-09-19 RX ADMIN — GABAPENTIN 600 MILLIGRAM(S): 400 CAPSULE ORAL at 23:42

## 2020-09-19 RX ADMIN — SODIUM CHLORIDE 1000 MILLILITER(S): 9 INJECTION INTRAMUSCULAR; INTRAVENOUS; SUBCUTANEOUS at 09:06

## 2020-09-19 RX ADMIN — Medication 4 MILLILITER(S): at 23:42

## 2020-09-19 RX ADMIN — ENOXAPARIN SODIUM 40 MILLIGRAM(S): 100 INJECTION SUBCUTANEOUS at 23:41

## 2020-09-19 RX ADMIN — CARVEDILOL PHOSPHATE 6.25 MILLIGRAM(S): 80 CAPSULE, EXTENDED RELEASE ORAL at 17:50

## 2020-09-19 RX ADMIN — Medication 100 MILLIEQUIVALENT(S): at 12:38

## 2020-09-19 RX ADMIN — Medication 100 MILLIEQUIVALENT(S): at 13:42

## 2020-09-19 NOTE — H&P ADULT - PROBLEM SELECTOR PLAN 1
Pt pw with shortness of breath and hypoxia   SpO2 improved with 5 L nc which is patients   CTA in ED is negative for PE; Trace small secretions in the inferior trachea extending into the left mainstem bronchus. Pt pw with shortness of breath and hypoxia   SpO2 improved with 5 L nc which is patients   CTA in ED is negative for PE; Trace small secretions in the inferior trachea extending into the left mainstem bronchus.  low suspicion for PNA as pt has no fevers, wbc elevation, lactate 1.7  fu procal and legionella   cw supplemental oxygen  cw prednisone and inhalers   Pulm Dr Nugent consulted

## 2020-09-19 NOTE — ED PROVIDER NOTE - PROGRESS NOTE DETAILS
Spoke w Dr Perera/silvestre ESCOBAR w admission for further w/u and monitoring of dyspnea and hypokalemia.

## 2020-09-19 NOTE — H&P ADULT - HISTORY OF PRESENT ILLNESS
64 yo obese F former heavy smoker (80 pack years) with DM2 on insulin, COPD on 5L (frequent admissions), R lung cancer s/p resection (2017) on chemo q 3 weeks with concern for mets to spine and compression fractures, recent admission for COPD exacerbation earlier in August presents with SOB, hypoxia and increased sputum production x 1 day. Patient states cough feels like secretion is coming out but unable to expectorate. Patient is on 5L of oxygen at home. As per EMS, patient is hypoxic at 84% on room air which came up to 98% with 4L of nasal canula. Patient was recently in august treated with prednisone, nebulizer treatments and completed a course of azithromycin for copd exacerbation. Denies nausea, vomiting, diarrhea, abdominal pain, fever, chills.   66 yo obese F former heavy smoker (80 pack years) with, HTN, DM2 on insulin, COPD on 5L (frequent admissions), R lung cancer s/p resection (2017) on chemo q 3 weeks with concern for mets to spine and compression fractures, recent admission for COPD exacerbation earlier in August presents with SOB, hypoxia and increased sputum production x 1 day. Patient states cough feels like secretion is coming out but unable to expectorate. Patient is on 5L of oxygen at home. As per EMS, patient is hypoxic at 84% on room air which came up to 98% with 4L of nasal canula. Patient was recently in august treated with prednisone, nebulizer treatments and completed a course of azithromycin for copd exacerbation. Denies nausea, vomiting, diarrhea, abdominal pain, fever, chills.

## 2020-09-19 NOTE — H&P ADULT - ATTENDING COMMENTS
Patient is a 65y old  Female who presents with a chief complaint of shortness of breath, cough. She reports it has improved since came to hospital. Denies any wheezing, chest pain. Was on Prednisone tapering dose since last discharge from hospital. She uses 5L NC O2 at home.    Vitals: WNL, requiring 5L NC now     REVIEW OF SYSTEMS: denies fever, chills, chest pain, abdominal pain, nausea, vomiting, diarrhea  PHYSICAL EXAM:  GENERAL: NAD, obese  NERVOUS SYSTEM:  Alert & Oriented X3, Good concentration; no focal deficit   CHEST/LUNG: no wheezing in auscultation but BL upper airway sound   HEART: Regular rate and rhythm; No murmurs, rubs, or gallops  ABDOMEN: Soft, Nontender, obese, Bowel sounds present  EXTREMITIES:  2+ Peripheral Pulses, No clubbing, cyanosis, or edema  SKIN: chronic skin changes     LABS:                        10.9   6.94  )-----------( 71       ( 19 Sep 2020 08:54 )             33.0     137  |  101  |  6<L>  ----------------------------<  278<H>  3.3<L>   |  33<H>  |  0.52    Assessment and plan:  Acute on chronic hypoxic respiratory failure due to Acute bronchitis  Right lung cancer s/p resection on chemo and radiation   COPD on home o2  Chronic pain due to multiple compression fractures and sclerotic and lytic lesions in spine   DM  HTN  Constipation       Plan:  Hypoxia markedly improved   Mucomyst nasal inhalation once then PO  Robitussin cough syrup  Watch off abx for now   Monitor CBC   Cont prednisone home dose   Cont inhalers home dose- Spiriva, Symbicort and albuterol  Cont home dose insulin   Cont pain management as home with bowel regimen, patient reports pain is better controlled   Cont home meds for HTN  Dr Mendiola and Dr Nugent to be informed   Lovenox for DVT ppx

## 2020-09-19 NOTE — ED PROVIDER NOTE - OBJECTIVE STATEMENT
Breastfeeding Discharge Instructions       Feed the baby at the earliest sign of hunger or comfort  o Hands to mouth, sucking motions  o Rooting or searching for something to suck on  o Dont wait for crying - it is a late sign of hunger and comfort.     The feedings may be 8-12 times per 24hrs and will not follow a schedule   Avoid pacifiers and bottles for the first 4 weeks   Alternate the breast you start the feeding with, or start with the breast that feels the fullest   Switch breasts when the baby takes himself off the breast or falls asleep   Keep offering breasts until the baby looks full, no longer gives hunger signs, and stays asleep when placed on his back in the crib   If the baby is sleepy and wont wake for a feeding, put the baby skin-to-skin dressed in a diaper against the mothers bare chest   Sleep near your baby   The baby should be positioned and latched on to the breast correctly  o Chest-to-chest, chin in the breast  o Babys lips are flipped outward  o Babys mouth is stretched open wide like a shout  o Babys sucking should feel like tugging to the mother  - The baby should be drinking at the breast:  o You should hear swallowing or gulping throughout the feeding  o You should see milk on the babys lips when he comes off the breast  o Your breasts should be softer when the baby is finished feeding  o The baby should look relaxed at the end of feedings  o After the 4th day and your milk is in:  o The babys poop should turn bright yellow and be loose, watery, and seedy  o The baby should have at least 3-4 poops the size of the palm of your hand per day  o The baby should have at least 6-8 wet diapers per day  o The urine should be light yellow in color  You should drink when you are thirsty and eat a healthy diet when you are    hungry.     Take naps to get the rest you need.   Take medications and/or drink alcohol only with permission of your obstetrician    or the babys  pediatrician.  You can also call the [facility]ant Risk Center,   ([phone]), Monday-Friday, 8am-5pm Central time, to get the most   up-to-date evidence-based information on the use of medications during   pregnancy and breastfeeding.      The baby should be examined by a pediatrician at 3-5 days of age.  Once your   milk comes in, the baby should be gaining at least ½ - 1oz each day and should be back to birthweight no later than 10-14 days of age.          Community Resources    [facility] Breastfeeding Warmline: 7935153796    Local [facility] clinics: provide incentives and breastpumps to eligible mothers  La Leche League International (LLLI):  mother-to-mother support group website        www.[employer]  Local La Leche League mother-to-mother support groups:        www.RuckPack.Benefitter        La Leche [facility]         [email or web address]@[email or web address].com  Dr. Kevin Kapoor website for latch videos and general information:        www.breastfeedinginc.ca  [facility] Risk [facility] is a call center that provides information about the safety of taking medications while breastfeeding.  Call 1-304-681-2935, M-F, 8am-5pm, CT.  International Lactation Consultant Association provides resources for assistance:        www.ilca.org  [place] Breastfeeding Coalition provides informationand resources for parents  and the community    http://[facility]breastfeeding.org     [employer] mom provides resources for assistance:166.214.6388        www.nolamom.org  [facility] for Healthy Babies:  8-476-014-BABY(9826)  [facility] provides a list of breastfeeding services by zip code:        www.[facility].org  [employer]:  [phone] a breastfeeding support group for women of color     64 y/o F with history of lung cancer s/p lobectomy with metastasis of spine receiving chemo and radiation therapy currently has PICC line to left arm, HTN, DM, COPD, GERD, ITP presents to the ED c/o shortness of breath since yesterday. Patient states cough feels like secretion is coming out but unable to expectorate. Patient is on 4L of oxygen at home. As per EMS, patient is hypoxic at 84% on room air which came up to 98% with 4L of nasal canula. Patient denies any fevers, chills, chest pain or any other complaints.

## 2020-09-19 NOTE — H&P ADULT - ASSESSMENT
64 yo obese F former heavy smoker (80 pack years) with DM2 on insulin, COPD on 5L (frequent admissions), R lung cancer s/p resection (2017) on chemo q 3 weeks with concern for mets to spine and compression fractures, recent admission for COPD exacerbation earlier in August presents with SOB, hypoxia and increased sputum production x 1 day. 64 yo obese F former heavy smoker (80 pack years) with DM2 on insulin, COPD on 5L (frequent admissions), R lung cancer s/p resection (2017) on chemo q 3 weeks with concern for mets to spine and compression fractures, recent admission for COPD exacerbation earlier in August presents with SOB, hypoxia and increased sputum production x 1 day. Pt admitted for acute on chronic hypoxic respiratory failure 66 yo obese F former heavy smoker (80 pack years) with HTN, DM2 on insulin, COPD on 5L (frequent admissions), R lung cancer s/p resection (2017) on chemo q 3 weeks with concern for mets to spine and compression fractures, recent admission for COPD exacerbation earlier in August presents with SOB, hypoxia and increased sputum production x 1 day. Pt admitted for acute on chronic hypoxic respiratory failure

## 2020-09-19 NOTE — ED PROVIDER NOTE - CLINICAL SUMMARY MEDICAL DECISION MAKING FREE TEXT BOX
Possible ACS, PE, pneumonia. Will get infectious workup, cardiac CTA, r/o PE. Low threshold for admission.

## 2020-09-19 NOTE — H&P ADULT - PROBLEM SELECTOR PLAN 3
A1C in June 9.6% now 9% Elevated sugars in the setting of steroid use.  c/w Lantus 60 units qhs and 15 units with meals  cw DM diet, c/w BG checks and HISS.

## 2020-09-19 NOTE — H&P ADULT - NSHPPHYSICALEXAM_GEN_ALL_CORE
Vital Signs Last 24 Hrs  T(C): 36.6 (19 Sep 2020 13:52), Max: 36.7 (19 Sep 2020 08:00)  T(F): 97.9 (19 Sep 2020 13:52), Max: 98.1 (19 Sep 2020 08:00)  HR: 102 (19 Sep 2020 13:52) (102 - 112)  BP: 100/65 (19 Sep 2020 13:52) (100/65 - 114/73)  BP(mean): --  RR: 22 (19 Sep 2020 13:52) (22 - 22)  SpO2: 98% (19 Sep 2020 13:52) (98% - 100%)    GENERAL: NAD, lying in bed comfortably  HEAD:  Atraumatic, Normocephalic  EYES: EOMI, PERRLA, conjunctiva and sclera clear  ENT: Moist mucous membranes  NECK: Supple, No JVD  CHEST/LUNG: coarse breath sounds + rhales, + wheezing  HEART: Regular rate and rhythm; S1+ S2+  ABDOMEN: Bowel sounds present; Soft, Nontender, Nondistended. No hepatomegaly  EXTREMITIES:  2+ Peripheral Pulses, brisk capillary refill. No clubbing, cyanosis, or edema  NERVOUS SYSTEM:  Alert & Oriented X3, speech clear. No deficits   MSK: FROM all 4 extremities, full and equal strength  SKIN: chronic venous stasis Vital Signs Last 24 Hrs  T(C): 36.6 (19 Sep 2020 13:52), Max: 36.7 (19 Sep 2020 08:00)  T(F): 97.9 (19 Sep 2020 13:52), Max: 98.1 (19 Sep 2020 08:00)  HR: 102 (19 Sep 2020 13:52) (102 - 112)  BP: 100/65 (19 Sep 2020 13:52) (100/65 - 114/73)  BP(mean): --  RR: 22 (19 Sep 2020 13:52) (22 - 22)  SpO2: 98% (19 Sep 2020 13:52) (98% - 100%)    GENERAL: NAD, lying in bed comfortably  HEAD:  Atraumatic, Normocephalic  EYES: EOMI, PERRLA, conjunctiva and sclera clear  ENT: Moist mucous membranes  NECK: Supple, No JVD  CHEST/LUNG: coarse breath sounds + rhales, + wheezing  HEART: Regular rate and rhythm; S1+ S2+  ABDOMEN: Bowel sounds present; Soft, Nontender, Nondistended. No hepatomegaly  EXTREMITIES:  2+ Peripheral Pulses, Left ext PICC line. brisk capillary refill. No clubbing, cyanosis, or edema  NERVOUS SYSTEM:  Alert & Oriented X3, speech clear. No deficits   MSK: FROM all 4 extremities, full and equal strength  SKIN: chronic venous stasis

## 2020-09-19 NOTE — H&P ADULT - PROBLEM SELECTOR PLAN 4
pt with hx of lung cancer with metastasis.   On chemo q3 weeks.  Dr. Mendiola is oncologist. Pt with hx of Lung cancer on active chemo   Pt pw hgb 10.9  bs 11-12   pt with no active bleeding   likely in the setting of recent chemo   will send anemia panel   fu am cbc Pt with hx of Lung cancer on active chemo   Pt pw hgb 10.9  bs 11-12   PICC LINE on left arm   pt with no active bleeding   likely in the setting of recent chemo   will send anemia panel   fu am cbc

## 2020-09-19 NOTE — H&P ADULT - PROBLEM SELECTOR PLAN 5
IMPROVE VTE Individual Risk Assessment  RISK                                                         Points  [  ] Previous VTE                                      3  [  ] Thrombophilia                                   2  [  ] Lower limb paralysis                         2 (unable to hold up >15 seconds)    [  ] Current Cancer                                  2       (within 6 months)  [  ] Immobilization > 24 hrs                    1  [  ] ICU/CCU stay > 24 hrs                         1  [  ] Age > 60                                              1  Pt with thrombocytopenia pt with hx of lung cancer with metastasis.   On chemo q3 weeks.  Dr. Mendiola is oncologist.

## 2020-09-19 NOTE — H&P ADULT - PROBLEM SELECTOR PLAN 6
Pt with hx of ITP in the setting of Lung cancer and chemo therapy  plts noted to be 72, have trended btw  in the past few months   fu cbc qd

## 2020-09-19 NOTE — H&P ADULT - PROBLEM SELECTOR PLAN 8
IMPROVE VTE Individual Risk Assessment  RISK                                                         Points  [  ] Previous VTE                                      3  [  ] Thrombophilia                                   2  [  ] Lower limb paralysis                         2 (unable to hold up >15 seconds)    [ x ] Current Cancer                                  2       (within 6 months)  [ x ] Immobilization > 24 hrs                    1  [  ] ICU/CCU stay > 24 hrs                         1  [ x ] Age > 60                                              1  score is > 4 cw lovenox for dvt ppx

## 2020-09-19 NOTE — ED PROVIDER NOTE - NS ED MD DISPO ISOLATION TYPES
Height: 60Inches   Weight: 133.8lb on 8/2  Body Mass Index (BMI): kg/m2   Ideal Body Weight Range: 100lb (+/-10%)   Percent Ideal Body Weight ~134%
None

## 2020-09-19 NOTE — H&P ADULT - NSHPLABSRESULTS_GEN_ALL_CORE
< from: CT Angio Chest w/ IV Cont (09.19.20 @ 10:22) >    IMPRESSION:  No evidence of acute pulmonary embolism.    Subsegmental atelectasis and/or scarring within the lingula.    Trace small secretions in the inferior trachea extending into the left mainstem bronchus.    Osseous metastasis and associated compression fractures and rib fractures unchanged..      < end of copied text >

## 2020-09-19 NOTE — ED ADULT NURSE NOTE - OBJECTIVE STATEMENT
Pt arrived BIBA from home , c/o difficulty breathing, gurgling, painful swallowing  Denies fever, states she has lung CA, surgery 4 years ago, on chemo via Lt arm PICC , receveing radiation for mets to spine

## 2020-09-20 DIAGNOSIS — R78.81 BACTEREMIA: ICD-10-CM

## 2020-09-20 LAB
24R-OH-CALCIDIOL SERPL-MCNC: 25.7 NG/ML — LOW (ref 30–80)
A1C WITH ESTIMATED AVERAGE GLUCOSE RESULT: 8.1 % — HIGH (ref 4–5.6)
ANION GAP SERPL CALC-SCNC: 1 MMOL/L — LOW (ref 5–17)
BUN SERPL-MCNC: 5 MG/DL — LOW (ref 7–18)
CALCIUM SERPL-MCNC: 8.7 MG/DL — SIGNIFICANT CHANGE UP (ref 8.4–10.5)
CHLORIDE SERPL-SCNC: 100 MMOL/L — SIGNIFICANT CHANGE UP (ref 96–108)
CHOLEST SERPL-MCNC: 159 MG/DL — SIGNIFICANT CHANGE UP (ref 10–199)
CO2 SERPL-SCNC: 37 MMOL/L — HIGH (ref 22–31)
CREAT SERPL-MCNC: 0.46 MG/DL — LOW (ref 0.5–1.3)
CULTURE RESULTS: SIGNIFICANT CHANGE UP
ENTEROCOC DNA BLD POS QL NAA+NON-PROBE: SIGNIFICANT CHANGE UP
ESTIMATED AVERAGE GLUCOSE: 186 MG/DL — HIGH (ref 68–114)
FOLATE SERPL-MCNC: >20 NG/ML — SIGNIFICANT CHANGE UP
GLUCOSE BLDC GLUCOMTR-MCNC: 121 MG/DL — HIGH (ref 70–99)
GLUCOSE BLDC GLUCOMTR-MCNC: 179 MG/DL — HIGH (ref 70–99)
GLUCOSE BLDC GLUCOMTR-MCNC: 233 MG/DL — HIGH (ref 70–99)
GLUCOSE BLDC GLUCOMTR-MCNC: 89 MG/DL — SIGNIFICANT CHANGE UP (ref 70–99)
GLUCOSE SERPL-MCNC: 62 MG/DL — LOW (ref 70–99)
GRAM STN FLD: SIGNIFICANT CHANGE UP
HCT VFR BLD CALC: 34.4 % — LOW (ref 34.5–45)
HDLC SERPL-MCNC: 94 MG/DL — SIGNIFICANT CHANGE UP
HGB BLD-MCNC: 11.7 G/DL — SIGNIFICANT CHANGE UP (ref 11.5–15.5)
LIPID PNL WITH DIRECT LDL SERPL: 44 MG/DL — SIGNIFICANT CHANGE UP
MAGNESIUM SERPL-MCNC: 1.9 MG/DL — SIGNIFICANT CHANGE UP (ref 1.6–2.6)
MCHC RBC-ENTMCNC: 30.9 PG — SIGNIFICANT CHANGE UP (ref 27–34)
MCHC RBC-ENTMCNC: 34 GM/DL — SIGNIFICANT CHANGE UP (ref 32–36)
MCV RBC AUTO: 90.8 FL — SIGNIFICANT CHANGE UP (ref 80–100)
METHOD TYPE: SIGNIFICANT CHANGE UP
NRBC # BLD: 0 /100 WBCS — SIGNIFICANT CHANGE UP (ref 0–0)
PHOSPHATE SERPL-MCNC: 2.3 MG/DL — LOW (ref 2.5–4.5)
PLATELET # BLD AUTO: 78 K/UL — LOW (ref 150–400)
POTASSIUM SERPL-MCNC: 4.2 MMOL/L — SIGNIFICANT CHANGE UP (ref 3.5–5.3)
POTASSIUM SERPL-SCNC: 4.2 MMOL/L — SIGNIFICANT CHANGE UP (ref 3.5–5.3)
PROCALCITONIN SERPL-MCNC: 0.08 NG/ML — SIGNIFICANT CHANGE UP (ref 0.02–0.1)
RBC # BLD: 3.79 M/UL — LOW (ref 3.8–5.2)
RBC # FLD: 16.4 % — HIGH (ref 10.3–14.5)
SODIUM SERPL-SCNC: 138 MMOL/L — SIGNIFICANT CHANGE UP (ref 135–145)
SPECIMEN SOURCE: SIGNIFICANT CHANGE UP
SPECIMEN SOURCE: SIGNIFICANT CHANGE UP
TOTAL CHOLESTEROL/HDL RATIO MEASUREMENT: 1.7 RATIO — LOW (ref 3.3–7.1)
TRIGL SERPL-MCNC: 105 MG/DL — SIGNIFICANT CHANGE UP (ref 10–149)
TSH SERPL-MCNC: 1.59 UU/ML — SIGNIFICANT CHANGE UP (ref 0.34–4.82)
VIT B12 SERPL-MCNC: >2000 PG/ML — HIGH (ref 232–1245)
WBC # BLD: 10.53 K/UL — HIGH (ref 3.8–10.5)
WBC # FLD AUTO: 10.53 K/UL — HIGH (ref 3.8–10.5)

## 2020-09-20 PROCEDURE — 99233 SBSQ HOSP IP/OBS HIGH 50: CPT | Mod: GC

## 2020-09-20 RX ORDER — INFLUENZA VIRUS VACCINE 15; 15; 15; 15 UG/.5ML; UG/.5ML; UG/.5ML; UG/.5ML
0.5 SUSPENSION INTRAMUSCULAR ONCE
Refills: 0 | Status: DISCONTINUED | OUTPATIENT
Start: 2020-09-20 | End: 2020-10-15

## 2020-09-20 RX ORDER — ATORVASTATIN CALCIUM 80 MG/1
40 TABLET, FILM COATED ORAL DAILY
Refills: 0 | Status: DISCONTINUED | OUTPATIENT
Start: 2020-09-20 | End: 2020-09-20

## 2020-09-20 RX ORDER — ONDANSETRON 8 MG/1
4 TABLET, FILM COATED ORAL EVERY 6 HOURS
Refills: 0 | Status: DISCONTINUED | OUTPATIENT
Start: 2020-09-20 | End: 2020-10-08

## 2020-09-20 RX ORDER — LANOLIN ALCOHOL/MO/W.PET/CERES
3 CREAM (GRAM) TOPICAL AT BEDTIME
Refills: 0 | Status: DISCONTINUED | OUTPATIENT
Start: 2020-09-20 | End: 2020-09-21

## 2020-09-20 RX ORDER — FLUCONAZOLE 150 MG/1
200 TABLET ORAL ONCE
Refills: 0 | Status: COMPLETED | OUTPATIENT
Start: 2020-09-20 | End: 2020-09-20

## 2020-09-20 RX ORDER — INSULIN LISPRO 100/ML
5 VIAL (ML) SUBCUTANEOUS
Refills: 0 | Status: DISCONTINUED | OUTPATIENT
Start: 2020-09-20 | End: 2020-09-21

## 2020-09-20 RX ORDER — FLUCONAZOLE 150 MG/1
100 TABLET ORAL DAILY
Refills: 0 | Status: DISCONTINUED | OUTPATIENT
Start: 2020-09-21 | End: 2020-09-23

## 2020-09-20 RX ORDER — ATORVASTATIN CALCIUM 80 MG/1
40 TABLET, FILM COATED ORAL AT BEDTIME
Refills: 0 | Status: DISCONTINUED | OUTPATIENT
Start: 2020-09-20 | End: 2020-10-08

## 2020-09-20 RX ADMIN — AMLODIPINE BESYLATE 5 MILLIGRAM(S): 2.5 TABLET ORAL at 07:05

## 2020-09-20 RX ADMIN — Medication 15 UNIT(S): at 17:02

## 2020-09-20 RX ADMIN — GABAPENTIN 600 MILLIGRAM(S): 400 CAPSULE ORAL at 14:02

## 2020-09-20 RX ADMIN — GABAPENTIN 600 MILLIGRAM(S): 400 CAPSULE ORAL at 22:18

## 2020-09-20 RX ADMIN — OXYCODONE AND ACETAMINOPHEN 2 TABLET(S): 5; 325 TABLET ORAL at 12:30

## 2020-09-20 RX ADMIN — OXYCODONE AND ACETAMINOPHEN 2 TABLET(S): 5; 325 TABLET ORAL at 07:05

## 2020-09-20 RX ADMIN — OXYCODONE AND ACETAMINOPHEN 2 TABLET(S): 5; 325 TABLET ORAL at 11:22

## 2020-09-20 RX ADMIN — Medication 20 MILLIGRAM(S): at 07:07

## 2020-09-20 RX ADMIN — OXYCODONE AND ACETAMINOPHEN 2 TABLET(S): 5; 325 TABLET ORAL at 22:18

## 2020-09-20 RX ADMIN — GABAPENTIN 600 MILLIGRAM(S): 400 CAPSULE ORAL at 07:11

## 2020-09-20 RX ADMIN — FLUCONAZOLE 200 MILLIGRAM(S): 150 TABLET ORAL at 15:35

## 2020-09-20 RX ADMIN — ALBUTEROL 2 PUFF(S): 90 AEROSOL, METERED ORAL at 15:37

## 2020-09-20 RX ADMIN — CYCLOBENZAPRINE HYDROCHLORIDE 10 MILLIGRAM(S): 10 TABLET, FILM COATED ORAL at 22:38

## 2020-09-20 RX ADMIN — Medication 1 MILLIGRAM(S): at 11:25

## 2020-09-20 RX ADMIN — BUDESONIDE AND FORMOTEROL FUMARATE DIHYDRATE 2 PUFF(S): 160; 4.5 AEROSOL RESPIRATORY (INHALATION) at 21:10

## 2020-09-20 RX ADMIN — CARVEDILOL PHOSPHATE 6.25 MILLIGRAM(S): 80 CAPSULE, EXTENDED RELEASE ORAL at 07:05

## 2020-09-20 RX ADMIN — Medication 2: at 17:02

## 2020-09-20 RX ADMIN — ENOXAPARIN SODIUM 40 MILLIGRAM(S): 100 INJECTION SUBCUTANEOUS at 11:24

## 2020-09-20 RX ADMIN — ATORVASTATIN CALCIUM 40 MILLIGRAM(S): 80 TABLET, FILM COATED ORAL at 22:18

## 2020-09-20 RX ADMIN — INSULIN GLARGINE 60 UNIT(S): 100 INJECTION, SOLUTION SUBCUTANEOUS at 22:18

## 2020-09-20 RX ADMIN — Medication 4 MILLILITER(S): at 07:05

## 2020-09-20 RX ADMIN — ALBUTEROL 2 PUFF(S): 90 AEROSOL, METERED ORAL at 07:11

## 2020-09-20 RX ADMIN — ALBUTEROL 2 PUFF(S): 90 AEROSOL, METERED ORAL at 02:49

## 2020-09-20 RX ADMIN — OXYCODONE AND ACETAMINOPHEN 2 TABLET(S): 5; 325 TABLET ORAL at 22:48

## 2020-09-20 RX ADMIN — Medication 4 MILLILITER(S): at 14:59

## 2020-09-20 RX ADMIN — BUDESONIDE AND FORMOTEROL FUMARATE DIHYDRATE 2 PUFF(S): 160; 4.5 AEROSOL RESPIRATORY (INHALATION) at 12:10

## 2020-09-20 RX ADMIN — Medication 200 MILLIGRAM(S): at 22:18

## 2020-09-20 RX ADMIN — Medication 200 MILLIGRAM(S): at 11:30

## 2020-09-20 RX ADMIN — CARVEDILOL PHOSPHATE 6.25 MILLIGRAM(S): 80 CAPSULE, EXTENDED RELEASE ORAL at 17:10

## 2020-09-20 RX ADMIN — Medication 1: at 12:10

## 2020-09-20 RX ADMIN — PANTOPRAZOLE SODIUM 40 MILLIGRAM(S): 20 TABLET, DELAYED RELEASE ORAL at 07:05

## 2020-09-20 RX ADMIN — ALBUTEROL 2 PUFF(S): 90 AEROSOL, METERED ORAL at 11:26

## 2020-09-20 RX ADMIN — ONDANSETRON 4 MILLIGRAM(S): 8 TABLET, FILM COATED ORAL at 01:37

## 2020-09-20 RX ADMIN — Medication 3 MILLIGRAM(S): at 22:18

## 2020-09-20 RX ADMIN — OXYCODONE AND ACETAMINOPHEN 2 TABLET(S): 5; 325 TABLET ORAL at 07:35

## 2020-09-20 RX ADMIN — ALBUTEROL 2 PUFF(S): 90 AEROSOL, METERED ORAL at 21:11

## 2020-09-20 NOTE — PROGRESS NOTE ADULT - PROBLEM SELECTOR PLAN 5
- hx of Lung cancer on active chemo   - pw hgb 10.9  bs 11-12   - PICC LINE on left arm   - no active bleeding   - likely in the setting of recent chemo   - f/u anemia panel  - monitor CBC

## 2020-09-20 NOTE — PROGRESS NOTE ADULT - PROBLEM SELECTOR PLAN 7
- hx of ITP in the setting of Lung cancer and chemo therapy  - noted to have plt 71, have trended btw  in the past few months   - monitor CBC

## 2020-09-20 NOTE — CHART NOTE - NSCHARTNOTEFT_GEN_A_CORE
Unable to obtain blood culture by phlebotomist, nurse, and residents; patient refuses further blood drawing at the time. She is agreeable try again in am.

## 2020-09-20 NOTE — PROGRESS NOTE ADULT - SUBJECTIVE AND OBJECTIVE BOX
PGY-1 Progress Note discussed with attending    PAGER #: [[phone]] TILL 5:00 PM  PLEASE CONTACT ON CALL TEAM:  - On Call Team (Please refer to Rachel) FROM 5:00 PM - 8:30PM  - Nightfloat Team FROM 8:30 -7:30 AM    INTERVAL HPI/OVERNIGHT EVENTS:   - Patient reports improved breathing status. She is resting comfortably in her chair. She complains of esophageal pain w/ oral intake, both fluid and solid.     REVIEW OF SYSTEMS:  CONSTITUTIONAL: No fever, weight loss, or fatigue  RESPIRATORY: No cough, wheezing, chills or hemoptysis; No shortness of breath  CARDIOVASCULAR: No chest pain, palpitations, dizziness, or leg swelling  GASTROINTESTINAL: complains of esophageal pain w/ oral intake; no abdominal pain. No nausea, vomiting, or hematemesis; No diarrhea or constipation. No melena or hematochezia.  GENITOURINARY: No dysuria or hematuria, urinary frequency  NEUROLOGICAL: No headaches, memory loss, loss of strength, numbness, or tremors    MEDICATIONS  (STANDING):  acetylcysteine  Oral Solution 800 milliGRAM(s) Oral every 8 hours  ALBUTerol    90 MICROgram(s) HFA Inhaler 2 Puff(s) Inhalation every 4 hours  amLODIPine   Tablet 5 milliGRAM(s) Oral daily  atorvastatin 40 milliGRAM(s) Oral at bedtime  budesonide 160 MICROgram(s)/formoterol 4.5 MICROgram(s) Inhaler 2 Puff(s) Inhalation two times a day  carvedilol 6.25 milliGRAM(s) Oral every 12 hours  enoxaparin Injectable 40 milliGRAM(s) SubCutaneous daily  ergocalciferol 13317 Unit(s) Oral <User Schedule>  folic acid 1 milliGRAM(s) Oral daily  gabapentin 600 milliGRAM(s) Oral every 8 hours  influenza   Vaccine 0.5 milliLiter(s) IntraMuscular once  insulin glargine SubCutaneous Injection (LANTUS) - Peds 60 Unit(s) SubCutaneous at bedtime  insulin lispro (HumaLOG) corrective regimen sliding scale   SubCutaneous Before meals and at bedtime  insulin lispro Injectable (HumaLOG) 5 Unit(s) SubCutaneous three times a day before meals  melatonin 3 milliGRAM(s) Oral at bedtime  pantoprazole    Tablet 40 milliGRAM(s) Oral before breakfast  polyethylene glycol 3350 17 Gram(s) Oral two times a day  predniSONE   Tablet 20 milliGRAM(s) Oral daily    MEDICATIONS  (PRN):  cyclobenzaprine 10 milliGRAM(s) Oral three times a day PRN Muscle Spasm  guaiFENesin   Syrup  (Sugar-Free) 200 milliGRAM(s) Oral every 6 hours PRN Cough  ondansetron Injectable 4 milliGRAM(s) IV Push every 6 hours PRN Nausea and/or Vomiting  oxycodone    5 mG/acetaminophen 325 mG 2 Tablet(s) Oral every 4 hours PRN Moderate Pain (4 - 6)      Vital Signs Last 24 Hrs  T(C): 37.4 (20 Sep 2020 17:44), Max: 37.4 (20 Sep 2020 17:44)  T(F): 99.4 (20 Sep 2020 17:44), Max: 99.4 (20 Sep 2020 17:44)  HR: 112 (20 Sep 2020 18:11) (57 - 132)  BP: 127/74 (20 Sep 2020 17:44) (116/70 - 151/82)  BP(mean): 74 (19 Sep 2020 19:30) (74 - 74)  RR: 18 (20 Sep 2020 17:44) (18 - 18)  SpO2: 100% (20 Sep 2020 17:44) (93% - 100%)    PHYSICAL EXAMINATION:  GENERAL: NAD, AAOx3  HEAD: AT/NC  EYES: conjunctiva and sclera clear  NECK: supple, No JVD noted, Normal thyroid  CHEST/LUNG: CTABL; no rales, rhonchi, wheezing, or rubs  HEART: regular rate and rhythm; no murmurs, rubs, or gallops  ABDOMEN: soft, nontender, nondistended; Bowel sounds present  EXTREMITIES: +3 pitting edema and chronic skin changes noted b/l; 2+ Peripheral Pulses, No clubbing and cyanosis                          11.7   10.53 )-----------( 78       ( 20 Sep 2020 06:29 )             34.4     09-20    138  |  100  |  5<L>  ----------------------------<  62<L>  4.2   |  37<H>  |  0.46<L>    Ca    8.7      20 Sep 2020 06:29  Phos  2.3     09-20  Mg     1.9     09-20    TPro  6.1  /  Alb  3.0<L>  /  TBili  1.0  /  DBili  x   /  AST  16  /  ALT  30  /  AlkPhos  117  09-19    LIVER FUNCTIONS - ( 19 Sep 2020 08:54 )  Alb: 3.0 g/dL / Pro: 6.1 g/dL / ALK PHOS: 117 U/L / ALT: 30 U/L DA / AST: 16 U/L / GGT: x           CARDIAC MARKERS ( 19 Sep 2020 08:54 )  <0.015 ng/mL / x     / x     / x     / x          PT/INR - ( 19 Sep 2020 08:54 )   PT: 13.9 sec;   INR: 1.20 ratio         PTT - ( 19 Sep 2020 08:54 )  PTT:25.3 sec  COVID-19 PCR: NotDetec (19 Sep 2020 08:54)      CAPILLARY BLOOD GLUCOSE      POCT Blood Glucose.: 233 mg/dL (20 Sep 2020 16:42)  POCT Blood Glucose.: 179 mg/dL (20 Sep 2020 11:46)  POCT Blood Glucose.: 89 mg/dL (20 Sep 2020 08:12)  POCT Blood Glucose.: 224 mg/dL (19 Sep 2020 23:28)      RADIOLOGY & ADDITIONAL TESTS:

## 2020-09-20 NOTE — CONSULT NOTE ADULT - ASSESSMENT
COPD with acute asthmatic bronchitis   Recurrent Lung cancer with Bone Mets   IDDM   Htn with MR   Thrombocytopenia sec to C/T  Obesity with CRIS   r/o esophageal candidiasis  GPC bacteremia ? contaminant    PLAN- IV steroids 60 mg q8h x 48hrs   then 40 mg qd x 4 days and slowly taper off   Symbicort 160/4.5 2 puffs bid on d/c   Duonb by HFN rx qid    s/c lovenox  FS coverage  One dose vanco and repeat Blood c/s   Diflucan 100 mg qd x 1 week  If no response, EGD

## 2020-09-20 NOTE — CONSULT NOTE ADULT - SUBJECTIVE AND OBJECTIVE BOX
PULMONARY CONSULT NOTE      BARB COLÓN  MRN-[id number]    Patient is a 65y old  Female who presents with a chief complaint of shortness of breath (19 Sep 2020 14:07)  c/o panful throat , hard to swallow, coughing and wheezing, pt on home o2  Hx chart lab and xrays reviewed , Pt was discussed with ER /admitting Resident last evening and Dr Perera this am    HISTORY OF PRESENT ILLNESS:66 yo obese F former heavy smoker (80 pack years) with, HTN, DM2 on insulin, COPD on 5L (frequent admissions), R lung cancer s/p resection (2017) on chemo q 3 weeks with concern for mets to spine and compression fractures, recent admission for COPD exacerbation earlier in August presents with SOB, hypoxia and increased sputum production x 1 day. Patient states cough feels like secretion is coming out but unable to expectorate. Patient is on 5L of oxygen at home. As per EMS, patient is hypoxic at 84% on room air which came up to 98% with 4L of nasal canula. Patient was recently in august treated with prednisone, nebulizer treatments and completed a course of azithromycin for copd exacerbation. Denies nausea, vomiting, diarrhea, abdominal pain, fever, chills.    Home Medications:  albuterol 2.5 mg/3 mL (0.083%) inhalation solution: 3 milliliter(s) inhaled every 6 hours, As Needed for SOB (19 Sep 2020 11:56)  amLODIPine 5 mg oral tablet: 1 tab(s) orally once a day (19 Sep 2020 11:56)  atorvastatin 40 mg oral tablet: 1 tab(s) orally once a day (19 Sep 2020 11:56)  Basaglar KwikPen 100 units/mL subcutaneous solution: 60 unit(s) subcutaneous once a day (at bedtime) (19 Sep 2020 11:56)  Coreg 6.25 mg oral tablet: 1 tab(s) orally 2 times a day (19 Sep 2020 11:56)  ergocalciferol 50,000 intl units (1.25 mg) oral capsule: 1 cap(s) orally once a week (19 Sep 2020 11:56)  folic acid 0.4 mg oral tablet: 1 tab(s) orally once a day (19 Sep 2020 11:56)  omeprazole 40 mg oral delayed release capsule: 1 cap(s) orally once a day (19 Sep 2020 11:56)  Percocet 10/325 oral tablet: 2 tab(s) orally 4 times a day, As Needed (19 Sep 2020 11:56)  predniSONE 20 mg oral tablet: 1 tab(s) orally once a day (19 Sep 2020 12:15)  senna oral tablet: 2 tab(s) orally once a day (at bedtime) (19 Sep 2020 11:56)  Spiriva Respimat 1.25 mcg/inh inhalation aerosol: 2 puff(s) inhaled once a day (19 Sep 2020 11:56)  Symbicort 160 mcg-4.5 mcg/inh inhalation aerosol: 2 puff(s) inhaled 2 times a day (19 Sep 2020 11:56)      MEDICATIONS  (STANDING):  acetylcysteine  Oral Solution 800 milliGRAM(s) Oral every 8 hours  acetylcysteine 20%  Inhalation 4 milliLiter(s) Inhalation three times a day  ALBUTerol    90 MICROgram(s) HFA Inhaler 2 Puff(s) Inhalation every 4 hours  amLODIPine   Tablet 5 milliGRAM(s) Oral daily  atorvastatin 40 milliGRAM(s) Oral daily  budesonide 160 MICROgram(s)/formoterol 4.5 MICROgram(s) Inhaler 2 Puff(s) Inhalation two times a day  carvedilol 6.25 milliGRAM(s) Oral every 12 hours  enoxaparin Injectable 40 milliGRAM(s) SubCutaneous daily  ergocalciferol 96054 Unit(s) Oral <User Schedule>  folic acid 1 milliGRAM(s) Oral daily  gabapentin 600 milliGRAM(s) Oral every 8 hours  influenza   Vaccine 0.5 milliLiter(s) IntraMuscular once  insulin glargine SubCutaneous Injection (LANTUS) - Peds 60 Unit(s) SubCutaneous at bedtime  insulin lispro (HumaLOG) corrective regimen sliding scale   SubCutaneous Before meals and at bedtime  insulin lispro Injectable (HumaLOG) 15 Unit(s) SubCutaneous three times a day before meals  melatonin 3 milliGRAM(s) Oral at bedtime  pantoprazole    Tablet 40 milliGRAM(s) Oral before breakfast  polyethylene glycol 3350 17 Gram(s) Oral two times a day  predniSONE   Tablet 20 milliGRAM(s) Oral daily      MEDICATIONS  (PRN):  cyclobenzaprine 10 milliGRAM(s) Oral three times a day PRN Muscle Spasm  guaiFENesin   Syrup  (Sugar-Free) 200 milliGRAM(s) Oral every 6 hours PRN Cough  ondansetron Injectable 4 milliGRAM(s) IV Push every 6 hours PRN Nausea and/or Vomiting  oxycodone    5 mG/acetaminophen 325 mG 2 Tablet(s) Oral every 4 hours PRN Moderate Pain (4 - 6)      Allergies    fish (Angioedema)  penicillins (Rash)            PAST MEDICAL & SURGICAL HISTORY:  Malignant neoplasm of  right  lung  s/p RLL lobectomy 3 yrs ago  Recurrence RML20 with bone mets  HTN (hypertension)    DM (diabetes mellitus)    COPD (chronic obstructive pulmonary disease)    Morbid obesity    GERD (gastroesophageal reflux disease)    Deviated septum    Prolapsed uterus    ITP (idiopathic thrombocytopenic purpura)    Emphysema/COPD    History of cholecystectomy    S/P lobectomy of lung  right 2017    History of tonsillectomy        FAMILY HISTORY:  FH: lung cancer    Family history of stroke    Family history of lung cancer    Diabetes mellitus    HTN    DM   IHD   Asthma  COPD     SOCIAL HISTORY SMOKING    ETOH     DRUGS    REVIEW OF SYSTEMS:  CONSTITUTIONAL: No fever, weight loss, or fatigue   EYES: No eye pain, visual disturbances, or discharge  ENT:  No difficulty hearing, tinnitus, vertigo;  throat pain++  NECK: No pain or stiffness   RESPIRATORY:  cough++   wheezing ++  chills--   hemoptysis--    Shortness of Breath++  CARDIOVASCULAR: No chest pain, palpitations, passing out, dizziness, or leg swelling  GASTROINTESTINAL: No abdominal or epigastric pain. No nausea, vomiting, or hematemesis; No diarrhea or constipation.  GENITOURINARY: No dysuria, frequency, hematuria, or incontinence  NEUROLOGICAL: No headaches, memory loss, loss of strength, numbness, or tremors  SKIN: No itching, burning, rashes, or lesions   LYMPH Nodes: No enlarged glands  ENDOCRINE: No heat or cold intolerance; No hair loss  MUSCULOSKELETAL: No joint pain or swelling; No muscle, back, or extremity pain  PSYCHIATRIC: No depression, anxiety+, no mood swings, or difficulty sleeping  HEME/LYMPH: No easy bruising, or bleeding gums  ALLERGY AND IMMUNOLOGIC: No hives or eczema      Vital Signs Last 24 Hrs  T(C): 36.8 (20 Sep 2020 05:25), Max: 36.8 (20 Sep 2020 02:34)  T(F): 98.3 (20 Sep 2020 05:25), Max: 98.3 (20 Sep 2020 05:25)  HR: 110 (20 Sep 2020 05:25) (102 - 118)  BP: 151/82 (20 Sep 2020 05:25) (100/65 - 151/82)  BP(mean): 74 (19 Sep 2020 19:30) (74 - 82)  RR: 18 (20 Sep 2020 05:25) (18 - 22)  SpO2: 97% (20 Sep 2020 05:25) (97% - 100%)  I&O's Detail      PHYSICAL EXAMINATION:    GENERAL: The patient is a well-developed,  obese w/f in no apparent distress.   SKIN: No rashes ecchymoses or cyanosis, cushingoid   facies  HEENT: Head is normocephalic and atraumatic. Extraocular muscles are intact. Mucous membranes are moist.   Throat - no congestion  Neck supple LN not felt, JVP not increased  Thyroid not enlarged  Lymphatic: No lymphadenopathy  Cardiovascular:  S1 S2  heard ,RSR , JVP not increased , systolic  murmur at apex, No  gallop or rub  Respiratory:  Symmetrical chest wall movements Breathing vesicular , Percussion note normal no dulness   with   scattered   wheeze with prolonged expiratory  phase  ABDOMEN:  Soft, Non-tender, obese  No  hepatosplenomegaly ,BS positive		  Extremities: Normal range of motion, No clubbing, cyanosis or edema , No calf tenderness  Vascular: Peripheral pulses palpable 2+ bilaterally  CNS: Alert and oriented x3,  Mood and affect appropriate  Cranial nerves intact  sensory intact  motor Power5/5, DTR 2+   Babinski neg    LABS:                        11.7   10.53 )-----------( 78       ( 20 Sep 2020 06:29 )             34.4     09-20    138  |  100  |  5<L>  ----------------------------<  62<L>  4.2   |  37<H>  |  0.46<L>    Ca    8.7      20 Sep 2020 06:29  Phos  2.3     09-20  Mg     1.9     09-20    TPro  6.1  /  Alb  3.0<L>  /  TBili  1.0  /  DBili  x   /  AST  16  /  ALT  30  /  AlkPhos  117  09-19    PT/INR - ( 19 Sep 2020 08:54 )   PT: 13.9 sec;   INR: 1.20 ratio         PTT - ( 19 Sep 2020 08:54 )  PTT:25.3 sec  Urinalysis Basic - ( 19 Sep 2020 11:25 )    Color: Yellow / Appearance: Clear / S.010 / pH: x  Gluc: x / Ketone: Negative  / Bili: Negative / Urobili: Negative   Blood: x / Protein: 15 / Nitrite: Negative   Leuk Esterase: Trace / RBC: 0-2 /HPF / WBC 0-2 /HPF   Sq Epi: x / Non Sq Epi: Few /HPF / Bacteria: x    CARDIAC MARKERS ( 19 Sep 2020 08:54 )  <0.015 ng/mL / x     / x     / x     / x        Serum Pro-Brain Natriuretic Peptide: 162 pg/mL (20 @ 08:54)      MICROBIOLOGY:    Culture - Blood (collected 20 @ 11:48)  Source: .Blood Blood-Peripheral  Gram Stain (20 @ 02:57):    Growth in anaerobic bottle: Gram positive cocci in pairs  Preliminary Report (20 @ 02:57):    Growth in anaerobic bottle: Gram positive cocci in pairs    "Due to technical problems, Proteus sp. will Not be reported as part of    the BCID panel until further notice"    ***Blood Panel PCR results on this specimen are available    approximately 3 hours after the Gram stain result.***    Gram stain, PCR, and/or culture results may not always    correspond due to difference in methodologies.    ************************************************************    This PCR assay was performed using [employer].    The following targets are tested for: Enterococcus,    vancomycin resistant enterococci, Listeria monocytogenes,    coagulase negative staphylococci, S. aureus,    methicillin resistant S. aureus, Streptococcus agalactiae    (Group B), S. pneumoniae, S. pyogenes (Group A),    Acinetobacter baumannii, Enterobacter cloacae, E. coli,    Klebsiella oxytoca, K. pneumoniae, Proteus sp.,    Serratia marcescens, Haemophilus influenzae,    Neisseria meningitidis, Pseudomonas aeruginosa, Candida    albicans, C. glabrata, C krusei, C parapsilosis,    C. tropicalis and the KPC resistance gene.  Organism: Blood Culture PCR (20 @ 06:01)  Organism: Blood Culture PCR (09-20-20 @ 06:01)      -  Vancomycin resistant Enterococcus sp.: Detec      Method Type: PCR        RADIOLOGY & ADDITIONAL STUDIES:    Ct scan chest:r< from: CT Angio Chest w/ IV Cont (20 @ 10:22) >  No evidence of acute pulmonary embolism.    Subsegmental atelectasis and/or scarring within the lingula.    Trace small secretions in the inferior trachea extending into the left mainstem bronchus.    Osseous metastasis and associated compression fractures and rib fractures unchanged..      CXR- No infiltrate

## 2020-09-20 NOTE — PROGRESS NOTE ADULT - ASSESSMENT
Candida esophagitis   Chronic hypoxic respiratory failure due to Acute bronchitis  Right lung cancer s/p resection on chemo and radiation   COPD on home o2  Chronic pain due to multiple compression fractures and sclerotic and lytic lesions in spine   DM  HTN  Constipation   VRE in blood culture       Plan:  Hypoxia markedly improved   Mucomyst nasal inhalation once then PO  Robitussin cough syrup  Will defer Steroid IV as patient is not having any wheezing   Start Diflucan   Blood cx possible contamination, will repeat today, please call ID consult Dr Corcoran, Watch off abx for now   Monitor CBC   Cont prednisone home dose   Cont inhalers home dose- Spiriva, Symbicort and albuterol  Cont home dose insulin   Cont pain management as home with bowel regimen, patient reports pain is better controlled   Cont home meds for HTN  Dr Mendiola to be informed and Dr Nugent on board   Lovenox for DVT ppx.

## 2020-09-20 NOTE — PROGRESS NOTE ADULT - SUBJECTIVE AND OBJECTIVE BOX
Patient is a 65y old  Female who presents with a chief complaint of shortness of breath (20 Sep 2020 11:19)    Patient was seen and examined at bedside   SOB has improved   Still complains of pain with swallowing     INTERVAL HPI/OVERNIGHT EVENTS:  T(C): 37.4 (20 @ 17:44), Max: 37.4 (20 @ 17:44)  HR: 132 (20 @ 17:44) (57 - 132)  BP: 127/74 (20 @ 17:44) (116/70 - 151/82)  RR: 18 (20 @ 17:44) (18 - 18)  SpO2: 100% (20 @ 17:44) (93% - 100%)  Wt(kg): --  I&O's Summary      REVIEW OF SYSTEMS: denies fever, chills, SOB, palpitations, chest pain, abdominal pain, nausea, vomit ting diarrhea, constipation, dizziness    MEDICATIONS  (STANDING):  acetylcysteine  Oral Solution 800 milliGRAM(s) Oral every 8 hours  ALBUTerol    90 MICROgram(s) HFA Inhaler 2 Puff(s) Inhalation every 4 hours  amLODIPine   Tablet 5 milliGRAM(s) Oral daily  atorvastatin 40 milliGRAM(s) Oral at bedtime  budesonide 160 MICROgram(s)/formoterol 4.5 MICROgram(s) Inhaler 2 Puff(s) Inhalation two times a day  carvedilol 6.25 milliGRAM(s) Oral every 12 hours  enoxaparin Injectable 40 milliGRAM(s) SubCutaneous daily  ergocalciferol 09188 Unit(s) Oral <User Schedule>  folic acid 1 milliGRAM(s) Oral daily  gabapentin 600 milliGRAM(s) Oral every 8 hours  influenza   Vaccine 0.5 milliLiter(s) IntraMuscular once  insulin glargine SubCutaneous Injection (LANTUS) - Peds 60 Unit(s) SubCutaneous at bedtime  insulin lispro (HumaLOG) corrective regimen sliding scale   SubCutaneous Before meals and at bedtime  insulin lispro Injectable (HumaLOG) 5 Unit(s) SubCutaneous three times a day before meals  melatonin 3 milliGRAM(s) Oral at bedtime  pantoprazole    Tablet 40 milliGRAM(s) Oral before breakfast  polyethylene glycol 3350 17 Gram(s) Oral two times a day  predniSONE   Tablet 20 milliGRAM(s) Oral daily    MEDICATIONS  (PRN):  cyclobenzaprine 10 milliGRAM(s) Oral three times a day PRN Muscle Spasm  guaiFENesin   Syrup  (Sugar-Free) 200 milliGRAM(s) Oral every 6 hours PRN Cough  ondansetron Injectable 4 milliGRAM(s) IV Push every 6 hours PRN Nausea and/or Vomiting  oxycodone    5 mG/acetaminophen 325 mG 2 Tablet(s) Oral every 4 hours PRN Moderate Pain (4 - 6)      PHYSICAL EXAM:  GENERAL: NAD, obese  No oral thrush  NERVOUS SYSTEM:  Alert & Oriented X3, Good concentration; no focal deficit   CHEST/LUNG: BL upper airway transmitted sound  HEART: Regular rate and rhythm; No murmurs, rubs, or gallops  ABDOMEN: Soft, Nontender, Nondistended; Bowel sounds present  EXTREMITIES: BL chronic skin changes   SKIN: No rashes or lesions    LABS:                        11.7   10.53 )-----------( 78       ( 20 Sep 2020 06:29 )             34.4     138  |  100  |  5<L>  ----------------------------<  62<L>  4.2   |  37<H>  |  0.46<L>    Ca    8.7      20 Sep 2020 06:29  Phos  2.3     09-20  Mg     1.9     09-20    TPro  6.1  /  Alb  3.0<L>  /  TBili  1.0  /  DBili  x   /  AST  16  /  ALT  30  /  AlkPhos  117  09-19    PT/INR - ( 19 Sep 2020 08:54 )   PT: 13.9 sec;   INR: 1.20 ratio         PTT - ( 19 Sep 2020 08:54 )  PTT:25.3 sec  Urinalysis Basic - ( 19 Sep 2020 11:25 )    Color: Yellow / Appearance: Clear / S.010 / pH: x  Gluc: x / Ketone: Negative  / Bili: Negative / Urobili: Negative   Blood: x / Protein: 15 / Nitrite: Negative   Leuk Esterase: Trace / RBC: 0-2 /HPF / WBC 0-2 /HPF   Sq Epi: x / Non Sq Epi: Few /HPF / Bacteria: x      CAPILLARY BLOOD GLUCOSE      POCT Blood Glucose.: 233 mg/dL (20 Sep 2020 16:42)  POCT Blood Glucose.: 179 mg/dL (20 Sep 2020 11:46)  POCT Blood Glucose.: 89 mg/dL (20 Sep 2020 08:12)  POCT Blood Glucose.: 224 mg/dL (19 Sep 2020 23:28)

## 2020-09-20 NOTE — PROGRESS NOTE ADULT - PROBLEM SELECTOR PLAN 4
- h/o DM  - A1C in June 9.6% now 9% Elevated sugars in the setting of steroid use.  - c/w Lantus 60u qHs & humalog 15u qAC  - accucheck qACHs

## 2020-09-20 NOTE — PATIENT PROFILE ADULT - FUNCTIONAL SCREEN CURRENT LEVEL: SWALLOWING (IF SCORE 2 OR MORE FOR ANY ITEM, CONSULT REHAB SERVICES), MLM)
Lactation Rounds: Mother states that she breast fed her previous children for 2-4 months, which is when her milk 'dried up.' Mother states her last infant had lost too much weight soon after birth but then go on to gain weight adequately. Mother states that she is having difficulty latching infant to the left breast, encouraged mother to call for assistance when feeding cues begin for further assessment, contact number provided.    Lactation admit information reviewed. Mother verbalizes understanding of expected  behaviors and output for the first 48 hours of life.  Discussed the importance of cue based feedings on demand, unrestricted access to the breast, and frequent uninterrupted skin to skin contact.  Risk and implications of artificial nipples and non medically indicated formula supplementation discussed.  Encouraged mother to call for assistance when desired or when infant is showing signs of hunger. Mother verbalizes understanding of all education and counseling.     0 = swallows foods/liquids without difficulty

## 2020-09-20 NOTE — PROGRESS NOTE ADULT - ASSESSMENT
a 65yoF, former heavy smoker (80 pack years), with PMH of HTN, DM2 on insulin, COPD on 5L (frequent admissions), R lung cancer s/p resection (2017) on chemo q3wk, with concern for mets to spine and compression fractures, recent admission for COPD exacerbation earlier in August, p/w SOB, hypoxia and increased sputum production x 1 day. Pt admitted for acute on chronic hypoxic respiratory failure

## 2020-09-20 NOTE — PROGRESS NOTE ADULT - PROBLEM SELECTOR PLAN 2
- p/w shortness of breath and hypoxia   - SpO2 improved s/p 5L  - low suspicion for PNA as pt has no fevers, wbc elevation, lactate 1.7  - procalcitonin 0.08  - CTA negative for PE; Trace small secretions in the inferior trachea extending into the left mainstem bronchus  - c/w supplemental oxygen, prednisone and inhalers   - f/u legionella     - Pulm, Dr Nugent, consulted

## 2020-09-21 DIAGNOSIS — C34.90 MALIGNANT NEOPLASM OF UNSPECIFIED PART OF UNSPECIFIED BRONCHUS OR LUNG: ICD-10-CM

## 2020-09-21 DIAGNOSIS — R53.81 OTHER MALAISE: ICD-10-CM

## 2020-09-21 DIAGNOSIS — A41.9 SEPSIS, UNSPECIFIED ORGANISM: ICD-10-CM

## 2020-09-21 DIAGNOSIS — Z51.5 ENCOUNTER FOR PALLIATIVE CARE: ICD-10-CM

## 2020-09-21 DIAGNOSIS — J96.21 ACUTE AND CHRONIC RESPIRATORY FAILURE WITH HYPOXIA: ICD-10-CM

## 2020-09-21 LAB
ANION GAP SERPL CALC-SCNC: 2 MMOL/L — LOW (ref 5–17)
BASE EXCESS BLDA CALC-SCNC: 8.5 MMOL/L — HIGH (ref -2–2)
BLOOD GAS COMMENTS ARTERIAL: SIGNIFICANT CHANGE UP
BLOOD GAS COMMENTS ARTERIAL: SIGNIFICANT CHANGE UP
BUN SERPL-MCNC: 4 MG/DL — LOW (ref 7–18)
CALCIUM SERPL-MCNC: 8.2 MG/DL — LOW (ref 8.4–10.5)
CHLORIDE SERPL-SCNC: 93 MMOL/L — LOW (ref 96–108)
CO2 SERPL-SCNC: 37 MMOL/L — HIGH (ref 22–31)
CREAT SERPL-MCNC: 0.45 MG/DL — LOW (ref 0.5–1.3)
GLUCOSE BLDC GLUCOMTR-MCNC: 192 MG/DL — HIGH (ref 70–99)
GLUCOSE BLDC GLUCOMTR-MCNC: 222 MG/DL — HIGH (ref 70–99)
GLUCOSE BLDC GLUCOMTR-MCNC: 250 MG/DL — HIGH (ref 70–99)
GLUCOSE BLDC GLUCOMTR-MCNC: 29 MG/DL — CRITICAL LOW (ref 70–99)
GLUCOSE BLDC GLUCOMTR-MCNC: 378 MG/DL — HIGH (ref 70–99)
GLUCOSE BLDC GLUCOMTR-MCNC: 66 MG/DL — LOW (ref 70–99)
GLUCOSE SERPL-MCNC: 75 MG/DL — SIGNIFICANT CHANGE UP (ref 70–99)
HCO3 BLDA-SCNC: 34 MMOL/L — HIGH (ref 23–27)
HCT VFR BLD CALC: 31.5 % — LOW (ref 34.5–45)
HGB BLD-MCNC: 11.2 G/DL — LOW (ref 11.5–15.5)
HOROWITZ INDEX BLDA+IHG-RTO: 100 — SIGNIFICANT CHANGE UP
LEGIONELLA AG UR QL: NEGATIVE — SIGNIFICANT CHANGE UP
MAGNESIUM SERPL-MCNC: 1.7 MG/DL — SIGNIFICANT CHANGE UP (ref 1.6–2.6)
MCHC RBC-ENTMCNC: 31.5 PG — SIGNIFICANT CHANGE UP (ref 27–34)
MCHC RBC-ENTMCNC: 35.6 GM/DL — SIGNIFICANT CHANGE UP (ref 32–36)
MCV RBC AUTO: 88.5 FL — SIGNIFICANT CHANGE UP (ref 80–100)
NRBC # BLD: 0 /100 WBCS — SIGNIFICANT CHANGE UP (ref 0–0)
PCO2 BLDA: 53 MMHG — HIGH (ref 32–46)
PH BLDA: 7.42 — SIGNIFICANT CHANGE UP (ref 7.35–7.45)
PHOSPHATE SERPL-MCNC: 2.7 MG/DL — SIGNIFICANT CHANGE UP (ref 2.5–4.5)
PLATELET # BLD AUTO: 94 K/UL — LOW (ref 150–400)
PO2 BLDA: 87 MMHG — SIGNIFICANT CHANGE UP (ref 74–108)
POTASSIUM SERPL-MCNC: 4.8 MMOL/L — SIGNIFICANT CHANGE UP (ref 3.5–5.3)
POTASSIUM SERPL-SCNC: 4.8 MMOL/L — SIGNIFICANT CHANGE UP (ref 3.5–5.3)
RBC # BLD: 3.56 M/UL — LOW (ref 3.8–5.2)
RBC # FLD: 16.3 % — HIGH (ref 10.3–14.5)
SAO2 % BLDA: 97 % — HIGH (ref 92–96)
SARS-COV-2 IGG SERPL QL IA: NEGATIVE — SIGNIFICANT CHANGE UP
SARS-COV-2 IGM SERPL IA-ACNC: <3.8 AU/ML — SIGNIFICANT CHANGE UP
SODIUM SERPL-SCNC: 132 MMOL/L — LOW (ref 135–145)
WBC # BLD: 8.01 K/UL — SIGNIFICANT CHANGE UP (ref 3.8–10.5)
WBC # FLD AUTO: 8.01 K/UL — SIGNIFICANT CHANGE UP (ref 3.8–10.5)

## 2020-09-21 PROCEDURE — 99223 1ST HOSP IP/OBS HIGH 75: CPT

## 2020-09-21 PROCEDURE — 71045 X-RAY EXAM CHEST 1 VIEW: CPT | Mod: 26,76

## 2020-09-21 RX ORDER — MORPHINE SULFATE 50 MG/1
4 CAPSULE, EXTENDED RELEASE ORAL ONCE
Refills: 0 | Status: DISCONTINUED | OUTPATIENT
Start: 2020-09-21 | End: 2020-09-21

## 2020-09-21 RX ORDER — LINEZOLID 600 MG/300ML
600 INJECTION, SOLUTION INTRAVENOUS EVERY 12 HOURS
Refills: 0 | Status: DISCONTINUED | OUTPATIENT
Start: 2020-09-21 | End: 2020-10-05

## 2020-09-21 RX ORDER — LINEZOLID 600 MG/300ML
600 INJECTION, SOLUTION INTRAVENOUS ONCE
Refills: 0 | Status: COMPLETED | OUTPATIENT
Start: 2020-09-21 | End: 2020-09-21

## 2020-09-21 RX ORDER — PANTOPRAZOLE SODIUM 20 MG/1
40 TABLET, DELAYED RELEASE ORAL DAILY
Refills: 0 | Status: DISCONTINUED | OUTPATIENT
Start: 2020-09-21 | End: 2020-10-08

## 2020-09-21 RX ORDER — PROPOFOL 10 MG/ML
20 INJECTION, EMULSION INTRAVENOUS ONCE
Refills: 0 | Status: COMPLETED | OUTPATIENT
Start: 2020-09-21 | End: 2020-09-21

## 2020-09-21 RX ORDER — CHLORHEXIDINE GLUCONATE 213 G/1000ML
15 SOLUTION TOPICAL EVERY 12 HOURS
Refills: 0 | Status: DISCONTINUED | OUTPATIENT
Start: 2020-09-21 | End: 2020-09-22

## 2020-09-21 RX ORDER — MAGNESIUM SULFATE 500 MG/ML
2 VIAL (ML) INJECTION ONCE
Refills: 0 | Status: COMPLETED | OUTPATIENT
Start: 2020-09-21 | End: 2020-09-21

## 2020-09-21 RX ORDER — INSULIN LISPRO 100/ML
VIAL (ML) SUBCUTANEOUS EVERY 6 HOURS
Refills: 0 | Status: DISCONTINUED | OUTPATIENT
Start: 2020-09-21 | End: 2020-09-21

## 2020-09-21 RX ORDER — LINEZOLID 600 MG/300ML
INJECTION, SOLUTION INTRAVENOUS
Refills: 0 | Status: DISCONTINUED | OUTPATIENT
Start: 2020-09-21 | End: 2020-10-05

## 2020-09-21 RX ORDER — PHENYLEPHRINE HYDROCHLORIDE 10 MG/ML
1 INJECTION INTRAVENOUS
Qty: 40 | Refills: 0 | Status: DISCONTINUED | OUTPATIENT
Start: 2020-09-21 | End: 2020-09-22

## 2020-09-21 RX ORDER — ETOMIDATE 2 MG/ML
20 INJECTION INTRAVENOUS ONCE
Refills: 0 | Status: COMPLETED | OUTPATIENT
Start: 2020-09-21 | End: 2020-09-21

## 2020-09-21 RX ORDER — DEXTROSE 50 % IN WATER 50 %
100 SYRINGE (ML) INTRAVENOUS ONCE
Refills: 0 | Status: COMPLETED | OUTPATIENT
Start: 2020-09-21 | End: 2020-09-21

## 2020-09-21 RX ORDER — SODIUM CHLORIDE 9 MG/ML
1000 INJECTION, SOLUTION INTRAVENOUS
Refills: 0 | Status: DISCONTINUED | OUTPATIENT
Start: 2020-09-21 | End: 2020-09-21

## 2020-09-21 RX ORDER — DEXMEDETOMIDINE HYDROCHLORIDE IN 0.9% SODIUM CHLORIDE 4 UG/ML
0.2 INJECTION INTRAVENOUS
Qty: 400 | Refills: 0 | Status: DISCONTINUED | OUTPATIENT
Start: 2020-09-21 | End: 2020-09-23

## 2020-09-21 RX ORDER — MAGNESIUM SULFATE 500 MG/ML
1 VIAL (ML) INJECTION ONCE
Refills: 0 | Status: DISCONTINUED | OUTPATIENT
Start: 2020-09-21 | End: 2020-09-21

## 2020-09-21 RX ORDER — NYSTATIN CREAM 100000 [USP'U]/G
1 CREAM TOPICAL
Refills: 0 | Status: DISCONTINUED | OUTPATIENT
Start: 2020-09-21 | End: 2020-10-08

## 2020-09-21 RX ORDER — MORPHINE SULFATE 50 MG/1
2 CAPSULE, EXTENDED RELEASE ORAL ONCE
Refills: 0 | Status: DISCONTINUED | OUTPATIENT
Start: 2020-09-21 | End: 2020-09-21

## 2020-09-21 RX ORDER — LANOLIN ALCOHOL/MO/W.PET/CERES
5 CREAM (GRAM) TOPICAL ONCE
Refills: 0 | Status: COMPLETED | OUTPATIENT
Start: 2020-09-21 | End: 2020-09-21

## 2020-09-21 RX ORDER — ACETAMINOPHEN 500 MG
1000 TABLET ORAL ONCE
Refills: 0 | Status: COMPLETED | OUTPATIENT
Start: 2020-09-21 | End: 2020-09-22

## 2020-09-21 RX ORDER — PROPOFOL 10 MG/ML
10 INJECTION, EMULSION INTRAVENOUS
Qty: 1000 | Refills: 0 | Status: DISCONTINUED | OUTPATIENT
Start: 2020-09-21 | End: 2020-09-21

## 2020-09-21 RX ADMIN — AMLODIPINE BESYLATE 5 MILLIGRAM(S): 2.5 TABLET ORAL at 06:32

## 2020-09-21 RX ADMIN — ALBUTEROL 2 PUFF(S): 90 AEROSOL, METERED ORAL at 06:33

## 2020-09-21 RX ADMIN — PHENYLEPHRINE HYDROCHLORIDE 32.3 MICROGRAM(S)/KG/MIN: 10 INJECTION INTRAVENOUS at 21:12

## 2020-09-21 RX ADMIN — PHENYLEPHRINE HYDROCHLORIDE 32.3 MICROGRAM(S)/KG/MIN: 10 INJECTION INTRAVENOUS at 12:34

## 2020-09-21 RX ADMIN — ALBUTEROL 2 PUFF(S): 90 AEROSOL, METERED ORAL at 20:24

## 2020-09-21 RX ADMIN — CHLORHEXIDINE GLUCONATE 15 MILLILITER(S): 213 SOLUTION TOPICAL at 18:11

## 2020-09-21 RX ADMIN — MORPHINE SULFATE 4 MILLIGRAM(S): 50 CAPSULE, EXTENDED RELEASE ORAL at 08:56

## 2020-09-21 RX ADMIN — Medication 101.92 MILLIGRAM(S): at 07:19

## 2020-09-21 RX ADMIN — SODIUM CHLORIDE 50 MILLILITER(S): 9 INJECTION, SOLUTION INTRAVENOUS at 10:26

## 2020-09-21 RX ADMIN — DEXMEDETOMIDINE HYDROCHLORIDE IN 0.9% SODIUM CHLORIDE 4.24 MICROGRAM(S)/KG/HR: 4 INJECTION INTRAVENOUS at 21:11

## 2020-09-21 RX ADMIN — MORPHINE SULFATE 2 MILLIGRAM(S): 50 CAPSULE, EXTENDED RELEASE ORAL at 22:45

## 2020-09-21 RX ADMIN — ALBUTEROL 2 PUFF(S): 90 AEROSOL, METERED ORAL at 00:25

## 2020-09-21 RX ADMIN — MORPHINE SULFATE 4 MILLIGRAM(S): 50 CAPSULE, EXTENDED RELEASE ORAL at 10:20

## 2020-09-21 RX ADMIN — ENOXAPARIN SODIUM 40 MILLIGRAM(S): 100 INJECTION SUBCUTANEOUS at 13:10

## 2020-09-21 RX ADMIN — CARVEDILOL PHOSPHATE 6.25 MILLIGRAM(S): 80 CAPSULE, EXTENDED RELEASE ORAL at 06:32

## 2020-09-21 RX ADMIN — LINEZOLID 300 MILLIGRAM(S): 600 INJECTION, SOLUTION INTRAVENOUS at 21:11

## 2020-09-21 RX ADMIN — MORPHINE SULFATE 2 MILLIGRAM(S): 50 CAPSULE, EXTENDED RELEASE ORAL at 23:30

## 2020-09-21 RX ADMIN — NYSTATIN CREAM 1 APPLICATION(S): 100000 CREAM TOPICAL at 18:12

## 2020-09-21 RX ADMIN — ETOMIDATE 20 MILLIGRAM(S): 2 INJECTION INTRAVENOUS at 08:08

## 2020-09-21 RX ADMIN — PANTOPRAZOLE SODIUM 40 MILLIGRAM(S): 20 TABLET, DELAYED RELEASE ORAL at 06:32

## 2020-09-21 RX ADMIN — Medication 40 MILLIGRAM(S): at 21:14

## 2020-09-21 RX ADMIN — PANTOPRAZOLE SODIUM 40 MILLIGRAM(S): 20 TABLET, DELAYED RELEASE ORAL at 13:10

## 2020-09-21 RX ADMIN — Medication 100 MILLILITER(S): at 10:19

## 2020-09-21 RX ADMIN — DEXMEDETOMIDINE HYDROCHLORIDE IN 0.9% SODIUM CHLORIDE 4.24 MICROGRAM(S)/KG/HR: 4 INJECTION INTRAVENOUS at 15:14

## 2020-09-21 RX ADMIN — Medication 40 MILLIGRAM(S): at 13:12

## 2020-09-21 RX ADMIN — Medication 125 MILLIGRAM(S): at 07:59

## 2020-09-21 RX ADMIN — LINEZOLID 300 MILLIGRAM(S): 600 INJECTION, SOLUTION INTRAVENOUS at 11:44

## 2020-09-21 NOTE — CONSULT NOTE ADULT - PROBLEM SELECTOR RECOMMENDATION 9
2/2 shock; comorbid ES COPD, metastatic lung Ca to bone. Family reports patient requires 5L O2 at baseline. Patient s/p RRT this morning for encephalopathy and hypoxia. Patient remains sedated/intubated, on IV abx and pressor support. Overall, patient with poor prognosis - family aware patient likely difficult to wean. Full code for now.
with mets to bones  on chemo heytruda, alimta and carboplatin  she had one RT to spine

## 2020-09-21 NOTE — RAPID RESPONSE TEAM SUMMARY - NSSITUATIONBACKGROUNDRRT_GEN_ALL_CORE
65 years old obese female , active smoker with COPD (on 5L oxygen at home). Patient was hypoxic, tachycardiac and tachypneic. Code Rapid response was called. RRT team arrived on scene. Patient has encephalopathy from hypoxia with wheezing and stridor on examination. Patient was tachycardiac to 140/min. Patient was hypoxic and started on non-rebreather but patient has increased work of breathing. Decision was made to intubate the patient. Patient become hypotensive post intubation and was given LR bolus and started on peripheral phenylephrine.

## 2020-09-21 NOTE — ED PROVIDER NOTE - CARDIAC RHYTHM
Pt's mom called pre service because pt needs shots before he can return to school. Pt was last seen by [facility] 1-2-19. Pt already has appt to reestablish with new resident 9-28-20, but mom wants pt to be seen sooner so he can return to school. No sooner openings were available for a well child visit. Please call mom back. Thanks!
regular

## 2020-09-21 NOTE — CONSULT NOTE ADULT - SUBJECTIVE AND OBJECTIVE BOX
Patient is a 65y old  Female who presents with a chief complaint of shortness of breath (21 Sep 2020 08:49)      HPI:  64 yo obese F former heavy smoker (80 pack years) with, HTN, DM2 on insulin, COPD on 5L (frequent admissions), R lung cancer s/p resection (2017) on chemo q 3 weeks with concern for mets to spine and compression fractures, recent admission for COPD exacerbation earlier in August presents with SOB, hypoxia and increased sputum production x 1 day. Patient states cough feels like secretion is coming out but unable to expectorate. Patient is on 5L of oxygen at home. As per EMS, patient is hypoxic at 84% on room air which came up to 98% with 4L of nasal canula. Patient was recently in august treated with prednisone, nebulizer treatments and completed a course of azithromycin for copd exacerbation. Denies nausea, vomiting, diarrhea, abdominal pain, fever, chills.she has pain at throat for 1-2 months    it did not respond to diflucan.  the chaom, alimta can cause mucositis also.  she had RRT and intubated.   (19 Sep 2020 14:07)       ROS:  Negative except for:    PAST MEDICAL & SURGICAL HISTORY:  Malignant neoplasm of unspecified part of right bronchus or lung    HTN (hypertension)    DM (diabetes mellitus)    COPD (chronic obstructive pulmonary disease)    Lung cancer    Morbid obesity    GERD (gastroesophageal reflux disease)    Deviated septum    Prolapsed uterus    ITP (idiopathic thrombocytopenic purpura)    Emphysema/COPD    History of cholecystectomy    S/P lobectomy of lung  right 2017    History of tonsillectomy        SOCIAL HISTORY:    FAMILY HISTORY:  FH: lung cancer    Family history of stroke    Family history of lung cancer    Diabetes mellitus        MEDICATIONS  (STANDING):  ALBUTerol    90 MICROgram(s) HFA Inhaler 2 Puff(s) Inhalation every 4 hours  atorvastatin 40 milliGRAM(s) Oral at bedtime  budesonide 160 MICROgram(s)/formoterol 4.5 MICROgram(s) Inhaler 2 Puff(s) Inhalation two times a day  enoxaparin Injectable 40 milliGRAM(s) SubCutaneous daily  ergocalciferol 12238 Unit(s) Oral <User Schedule>  fluconAZOLE   Tablet 100 milliGRAM(s) Oral daily  folic acid 1 milliGRAM(s) Oral daily  gabapentin 600 milliGRAM(s) Oral every 8 hours  influenza   Vaccine 0.5 milliLiter(s) IntraMuscular once  insulin lispro (HumaLOG) corrective regimen sliding scale   SubCutaneous Before meals and at bedtime  insulin lispro Injectable (HumaLOG) 5 Unit(s) SubCutaneous three times a day before meals  melatonin 3 milliGRAM(s) Oral at bedtime  methylPREDNISolone sodium succinate Injectable 40 milliGRAM(s) IV Push every 8 hours  pantoprazole  Injectable 40 milliGRAM(s) IV Push daily  phenylephrine    Infusion 1 MICROgram(s)/kG/Min (32.3 mL/Hr) IV Continuous <Continuous>  polyethylene glycol 3350 17 Gram(s) Oral two times a day    MEDICATIONS  (PRN):  cyclobenzaprine 10 milliGRAM(s) Oral three times a day PRN Muscle Spasm  guaiFENesin   Syrup  (Sugar-Free) 200 milliGRAM(s) Oral every 6 hours PRN Cough  ondansetron Injectable 4 milliGRAM(s) IV Push every 6 hours PRN Nausea and/or Vomiting  oxycodone    5 mG/acetaminophen 325 mG 2 Tablet(s) Oral every 4 hours PRN Moderate Pain (4 - 6)      Allergies    fish (Angioedema)  penicillins (Rash)    Intolerances        Vital Signs Last 24 Hrs  T(C): 37 (21 Sep 2020 05:08), Max: 37.4 (20 Sep 2020 17:44)  T(F): 98.6 (21 Sep 2020 05:08), Max: 99.4 (20 Sep 2020 17:44)  HR: 136 (21 Sep 2020 07:42) (57 - 140)  BP: 134/82 (21 Sep 2020 05:08) (122/75 - 134/82)  BP(mean): --  RR: 20 (21 Sep 2020 07:42) (16 - 20)  SpO2: 95% (21 Sep 2020 07:42) (90% - 100%)    PHYSICAL EXAM  General: adult in NAD  HEENT: clear oropharynx, anicteric sclera, pink conjunctiva  Neck: supple  CV: normal S1/S2 with no murmur rubs or gallops  Lungs: positive air movement b/l ant lungs,clear to auscultation, no wheezes, no rales  Abdomen: soft non-tender non-distended, no hepatosplenomegaly  Ext: no clubbing cyanosis or edema  Skin: no rashes and no petechiae  Neuro: alert and oriented X 4, no focal deficits      LABS:                          11.2   8.01  )-----------( x        ( 21 Sep 2020 06:05 )             31.5         Mean Cell Volume : 88.5 fl  Mean Cell Hemoglobin : 31.5 pg  Mean Cell Hemoglobin Concentration : 35.6 gm/dL  Auto Neutrophil # : x  Auto Lymphocyte # : x  Auto Monocyte # : x  Auto Eosinophil # : x  Auto Basophil # : x  Auto Neutrophil % : x  Auto Lymphocyte % : x  Auto Monocyte % : x  Auto Eosinophil % : x  Auto Basophil % : x      Serial CBC's  09-21 @ 06:05  Hct-31.5 / Hgb-11.2 / Plat--- / RBC-3.56 / WBC-8.01  Serial CBC's  09-20 @ 06:29  Hct-34.4 / Hgb-11.7 / Plat-78 / RBC-3.79 / WBC-10.53  Serial CBC's  09-19 @ 08:54  Hct-33.0 / Hgb-10.9 / Plat-71 / RBC-3.68 / WBC-6.94      09-21    132<L>  |  93<L>  |  4<L>  ----------------------------<  75  4.8   |  37<H>  |  0.45<L>    Ca    8.2<L>      21 Sep 2020 06:05  Phos  2.7     09-21  Mg     1.7     09-21            Folate, Serum: >20.0 ng/mL (09-20 @ 11:36)  Vitamin B12, Serum: >2000 pg/mL (09-20 @ 11:36)              BLOOD SMEAR INTERPRETATION:       RADIOLOGY & ADDITIONAL STUDIES:    < from: CT Angio Chest w/ IV Cont (09.19.20 @ 10:22) >  PROCEDURE:  CT Angiography of the Chest.  90 ml of Omnipaque 350 was injected intravenously. 10 ml were discarded.  Sagittal and coronal reformats were performed as well as 3D (MIP) reconstructions.    FINDINGS:    LUNGS AND AIRWAYS: Small mucosal debris within the distal posterior tracheal wall extending into the left mainstem bronchus. Minimal bronchiectasis. Subsegmental consolidative changes within the lingula compatible with atelectasis and or scarring. Diffuse severe extensive centrilobular emphysema. Minimal bibasilar atelectasis. Minimal biapical pleural parenchymal scarring. Postsurgical changes along the superior aspect of the right lower lobe with adjacent cysts linear scarring unchanged. No new segmental consolidations. No discrete pulmonary nodules.  PLEURA: No pleural effusion.  MEDIASTINUM AND GUILLE: 2 cm right hilar lymph node unchanged. Surgical clips in the subcarinal region. Additional subcentimeter mediastinal lymph nodes.  VESSELS: No evidence of acute pulmonary embolism. No aortic aneurysm. Coronary vascular calcification.  HEART: Heart size is normal. Trace pericardial effusion.  CHEST WALL AND LOWER NECK: Within normal limits.  VISUALIZED UPPER ABDOMEN: Within normal limits.  BONES: Lytic and blastic lesions again noted throughout the visualized vertebral bodies and bilateral ribs. Severe compression deformity of L1 again seen with stable retropulsion.. Superior endplate deformity of T12 slightly more prominent. Severe compression deformity of T7 again seen. Bilateral ribfractures again identified.    IMPRESSION:  No evidence of acute pulmonary embolism.    Subsegmental atelectasis and/or scarring within the lingula.    Trace small secretions in the inferior trachea extending into the left mainstem bronchus.    < end of copied text >  < from: CT Angio Chest w/ IV Cont (09.19.20 @ 10:22) >  Osseous metastasis and associated compression fractures and rib fractures unchanged..    < end of copied text >

## 2020-09-21 NOTE — CONSULT NOTE ADULT - CONSULT REASON
64 y/o F, + ES COPD, + stage 4 lung Ca, s/p RRT  - intubated, on pressor. Overall, poor prognosis. Request to identify surrogate; establish goals of care.

## 2020-09-21 NOTE — CONSULT NOTE ADULT - PROBLEM SELECTOR RECOMMENDATION 3
ECOG: 3-4. Patient with primary R lung Ca with bone mets. CT indicates Osseous metastasis and associated compression fractures and rib fractures unchanged. Dependent on O2 with increasing debility as per family report. Patient sedated/intubated - likely difficult to wean 2/2 underlying ES COPD and metastatic disease. Per oncology, patient on chemo (heytruda, alimta and carboplatin); had 1 RT to spine. Overall, poor prognosis. Full code.
?aspiration causing resp failure requiring intubation  she has pain at throat for a while and has difficulty swallowing  now intubated  prognosis poor.

## 2020-09-21 NOTE — CONSULT NOTE ADULT - PROBLEM SELECTOR RECOMMENDATION 4
2/2 ES COPD, metastatic lung Ca.  Per family, patient dependent 5L O2, increasing need for assistance with ADLs except meals, dyspnea with minimal exertion, + functional decline reported. Likely difficult to wean. Patient eligible for hospice.

## 2020-09-21 NOTE — CONSULT NOTE ADULT - ATTENDING COMMENTS
I have examined pt personally Hx chart lab and xrays reviewed and pt discussed with residents
IMP: This is a 65 yr old  obese woman , former heavy smoker (80 pack years) with, HTN, DM2 on insulin, chronic hypoxic resp failure due to   COPD /Emphysema requiring O2 supp @  5LPM,(frequent admissions), R lung cancer s/p resection (2017) on chemo q 3 weeks with concern for mets to spine and compression fractures, recent admission for COPD exacerbation earlier in August presents with SOB, hypoxia and increased sputum production x 1 day. Patient states cough feels like secretion is coming out but unable to expectorate. Patient is on 5L of oxygen at home. As per EMS, patient is hypoxic at 84% on room air which came up to 98% with 4L of nasal canula. Patient was recently in august treated with prednisone, nebulizer treatments and completed a course of azithromycin for copd exacerbation. Denies nausea, vomiting, diarrhea, abdominal pain, fever, chills.   (19 Sep 2020 14:07)    This AM ( 9/21) Patient became  hypoxic, tachycardiac and tachypneic. Code Rapid response was called. RRT team arrived on scene. Patient has encephalopathy from hypoxia with wheezing and stridor on examination. Patient was tachycardiac to 140/min. Patient was hypoxic and started on non-rebreather but patient has increased work of breathing. Decision was made to intubate the patient after contacting relatives. Patient become hypotensive post intubation and was given LR bolus and started on peripheral phenylephrine.      Assessment:    -  Septic Shock due to Bacteremia .. VRE  -  Acute on Chronic hypoxic Respiratory failure secondary to COPD exacerbation.  -  Acute Exa of COPD/ Emphysema   -  Lung cancer with suspected metastasis on active chemotherapy.  -  Acute Encephalopathy.  -  HTN.  -  DM.-2 uncontrol   -  Active Smoker.      Plan  -pat was emergently intubated and placed on vent   -transfer to ICU  -started pressors for septic shock  -titrate pressors to maintain MAP>65  -hemodynamic monitoring and support  -d/c PICC   -repeat BC  -solumedrol   -proventil inhaler  -dvt/gi prophy  -blood sugar control
65y F hx COPD on home O2, Stage IV lung CA on chemo, under ICU care for septic shock 2/2 VRE bacteremia, intubated/sedated, on pressor support. Req assistance for ADLs. Likely difficult to wean. Family aware. Remain full code for now. Partner deferring to daughter for decisionmaking. Palliative will follow for ongoing GOC discussions pending clinical course.

## 2020-09-21 NOTE — PROGRESS NOTE ADULT - SUBJECTIVE AND OBJECTIVE BOX
PULMONARY  progress note    BARB COLÓN  MRN-[id number]    Patient is a 65y old  Female who presents with a chief complaint of shortness of breath (20 Sep 2020 18:21)  Pt was intubated 1 few minutes ago , found by resident with respiratory distress being transferred to icu ,Pt seen and discussed with icu team and Admitting Dr and Dr Mendiola      MEDICATIONS  (STANDING):  acetylcysteine  Oral Solution 800 milliGRAM(s) Oral every 8 hours  ALBUTerol    90 MICROgram(s) HFA Inhaler 2 Puff(s) Inhalation every 4 hours  amLODIPine   Tablet 5 milliGRAM(s) Oral daily  atorvastatin 40 milliGRAM(s) Oral at bedtime  budesonide 160 MICROgram(s)/formoterol 4.5 MICROgram(s) Inhaler 2 Puff(s) Inhalation two times a day  carvedilol 6.25 milliGRAM(s) Oral every 12 hours  enoxaparin Injectable 40 milliGRAM(s) SubCutaneous daily  ergocalciferol 55598 Unit(s) Oral <User Schedule>  etomidate Injectable 20 milliGRAM(s) IV Push once  fluconAZOLE   Tablet 100 milliGRAM(s) Oral daily  folic acid 1 milliGRAM(s) Oral daily  gabapentin 600 milliGRAM(s) Oral every 8 hours  influenza   Vaccine 0.5 milliLiter(s) IntraMuscular once  insulin glargine SubCutaneous Injection (LANTUS) - Peds 60 Unit(s) SubCutaneous at bedtime  insulin lispro (HumaLOG) corrective regimen sliding scale   SubCutaneous Before meals and at bedtime  insulin lispro Injectable (HumaLOG) 5 Unit(s) SubCutaneous three times a day before meals  magnesium sulfate  IVPB 2 Gram(s) IV Intermittent once  melatonin 3 milliGRAM(s) Oral at bedtime  methylPREDNISolone sodium succinate Injectable 125 milliGRAM(s) IV Push once  methylPREDNISolone sodium succinate IVPB 60 milliGRAM(s) IV Intermittent every 8 hours  morphine  - Injectable 4 milliGRAM(s) IV Push once  pantoprazole    Tablet 40 milliGRAM(s) Oral before breakfast  phenylephrine    Infusion 1 MICROgram(s)/kG/Min (32.3 mL/Hr) IV Continuous <Continuous>  polyethylene glycol 3350 17 Gram(s) Oral two times a day  propofol Injectable 20 milliGRAM(s) IV Push once      MEDICATIONS  (PRN):  cyclobenzaprine 10 milliGRAM(s) Oral three times a day PRN Muscle Spasm  guaiFENesin   Syrup  (Sugar-Free) 200 milliGRAM(s) Oral every 6 hours PRN Cough  ondansetron Injectable 4 milliGRAM(s) IV Push every 6 hours PRN Nausea and/or Vomiting  oxycodone    5 mG/acetaminophen 325 mG 2 Tablet(s) Oral every 4 hours PRN Moderate Pain (4 - 6)      Allergies    fish (Angioedema)  penicillins (Rash)            PAST MEDICAL & SURGICAL HISTORY:  Malignant neoplasm of unspecified part of right bronchus or lung    HTN (hypertension)    DM (diabetes mellitus)    COPD (chronic obstructive pulmonary disease)    Lung cancer    Morbid obesity    GERD (gastroesophageal reflux disease)    Deviated septum    Prolapsed uterus    ITP (idiopathic thrombocytopenic purpura)    Emphysema/COPD    History of cholecystectomy    S/P lobectomy of lung  right 2017    History of tonsillectomy             REVIEW OF SYSTEMS: as per staff  CONSTITUTIONAL: No fever, weight loss, or fatigue   EYES: No eye pain, visual disturbances, or discharge  ENT:  No difficulty hearing, tinnitus, vertigo; No sinus or throat pain  NECK: No pain or stiffness or nodes  RESPIRATORY:  cough +  wheezing +  chills--   hemoptysis -- Shortness of Breath++  CARDIOVASCULAR: No chest pain, palpitations, passing out, dizziness, or leg swelling  GASTROINTESTINAL: No abdominal or epigastric pain. No nausea, vomiting, or hematemesis; No diarrhea or constipation. No melena or hematochezia.  SKIN: No itching, burning, rashes, or lesions   LYMPH Nodes: No enlarged glands  ENDOCRINE: No heat or cold intolerance; No hair loss  ALLERGY AND IMMUNOLOGIC: No hives or eczema    Vital Signs Last 24 Hrs  T(C): 37 (21 Sep 2020 05:08), Max: 37.4 (20 Sep 2020 17:44)  T(F): 98.6 (21 Sep 2020 05:08), Max: 99.4 (20 Sep 2020 17:44)  HR: 136 (21 Sep 2020 07:42) (57 - 140)  BP: 134/82 (21 Sep 2020 05:08) (122/75 - 134/82)  BP(mean): --  RR: 20 (21 Sep 2020 07:42) (16 - 20)  SpO2: 95% (21 Sep 2020 07:42) (90% - 100%)  I&O's Detail      PHYSICAL EXAMINATION:    GENERAL: The patient is a well-developed, well-nourished intubated sedated on vent   SKIN no rash ecchymoses or bruises  HEENT: Head is normocephalic and atraumatic  CARMEN , . Mucous membranes  moist.   Neck supple ,No LN felt JVP not increased  Thyroid not enlarged  Cardiovascular:  S1 S2 heard, RSR, No JVD , systolic  murmur at apex, No gallop or rub  Respiratory: Chest wall symmetrical with good air entry ,Percussion note normal,    Lungs vesicular breathing with  no  rales , expiratory wheeze   ABDOMEN:  Soft, Non-tender, obese,  no hepatomegaly or splenomegaly BS positive	  Extremities: No clubbing, cyanosis or edema  Vascular: Peripheral pulses palpable 2+ bilaterally  CNS: Sedated and intubated  dtr 2+   Babinski neg    LABS:                        11.2   8.01  )-----------( x        ( 21 Sep 2020 06:05 )             31.5     09-    132<L>  |  93<L>  |  4<L>  ----------------------------<  75  4.8   |  37<H>  |  0.45<L>    Ca    8.2<L>      21 Sep 2020 06:05  Phos  2.7     -  Mg     1.7         TPro  6.1  /  Alb  3.0<L>  /  TBili  1.0  /  DBili  x   /  AST  16  /  ALT  30  /  AlkPhos  117  -19    PT/INR - ( 19 Sep 2020 08:54 )   PT: 13.9 sec;   INR: 1.20 ratio         PTT - ( 19 Sep 2020 08:54 )  PTT:25.3 sec  Urinalysis Basic - ( 19 Sep 2020 11:25 )    Color: Yellow / Appearance: Clear / S.010 / pH: x  Gluc: x / Ketone: Negative  / Bili: Negative / Urobili: Negative   Blood: x / Protein: 15 / Nitrite: Negative   Leuk Esterase: Trace / RBC: 0-2 /HPF / WBC 0-2 /HPF   Sq Epi: x / Non Sq Epi: Few /HPF / Bacteria: x        CARDIAC MARKERS ( 19 Sep 2020 08:54 )  <0.015 ng/mL / x     / x     / x     / x            Serum Pro-Brain Natriuretic Peptide: 162 pg/mL (20 @ 08:54)      Procalcitonin, Serum: 0.08 ng/mL (20 @ 11:57)     HbA1C 8.1  Folate, Serum: >20.0 ng/mL (20 @ 11:36)  Vitamin B12, Serum: >2000 pg/mL (20 @ 11:36)      MICROBIOLOGY:    Culture - Urine (collected 20 @ 16:20)  Source: .Urine Clean Catch (Midstream)  Final Report (20 @ 17:00):    <10,000 CFU/mL Normal Urogenital Shannan    Culture - Blood (collected 20 @ 11:48)  Source: .Blood Blood-Peripheral  Preliminary Report (20 @ 12:01):    No growth to date.    Culture - Blood (collected 20 @ 11:48)  Source: .Blood Blood-Peripheral  Gram Stain (20 @ 02:57):    Growth in anaerobic bottle: Gram positive cocci in pairs  Preliminary Report (20 @ 02:57):    Growth in anaerobic bottle: Gram positive cocci in pairs    "Due to technical problems, Proteus sp. will Not be reported as part of    the BCID panel until further notice"    ***Blood Panel PCR results on this specimen are available    approximately 3 hours after the Gram stain result.***    Gram stain, PCR, and/or culture results may not always    correspond due to difference in methodologies.    ************************************************************    This PCR assay was performed using [employer].    The following targets are tested for: Enterococcus,    vancomycin resistant enterococci, Listeria monocytogenes,    coagulase negative staphylococci, S. aureus,    methicillin resistant S. aureus, Streptococcus agalactiae    (Group B), S. pneumoniae, S. pyogenes (Group A),    Acinetobacter baumannii, Enterobacter cloacae, E. coli,    Klebsiella oxytoca, K. pneumoniae, Proteus sp.,    Serratia marcescens, Haemophilus influenzae,    Neisseria meningitidis, Pseudomonas aeruginosa, Candida    albicans, C. glabrata, C krusei, C parapsilosis,    C. tropicalis and the KPC resistance gene.  Organism: Blood Culture PCR (20 @ 06:01)  Organism: Blood Culture PCR (20 @ 06:01)      -  Vancomycin resistant Enterococcus sp.: Detec      Method Type: PCR

## 2020-09-21 NOTE — CONSULT NOTE ADULT - SUBJECTIVE AND OBJECTIVE BOX
Patient is a 65y old  Female who presents with a chief complaint of shortness of breath (21 Sep 2020 08:31)      HPI:  64 yo obese F former heavy smoker (80 pack years) with, HTN, DM2 on insulin, COPD on 5L (frequent admissions), R lung cancer s/p resection (2017) on chemo q 3 weeks with concern for mets to spine and compression fractures, recent admission for COPD exacerbation earlier in August presents with SOB, hypoxia and increased sputum production x 1 day. Patient states cough feels like secretion is coming out but unable to expectorate. Patient is on 5L of oxygen at home. As per EMS, patient is hypoxic at 84% on room air which came up to 98% with 4L of nasal canula. Patient was recently in august treated with prednisone, nebulizer treatments and completed a course of azithromycin for copd exacerbation. Denies nausea, vomiting, diarrhea, abdominal pain, fever, chills.   (19 Sep 2020 14:07)    Interval HPI:  65 years old obese female , active smoker with COPD (on 5L oxygen at home). Patient was hypoxic, tachycardiac and tachypneic. Code Rapid response was called. RRT team arrived on scene. Patient has encephalopathy from hypoxia with wheezing and stridor on examination. Patient was tachycardiac to 140/min. Patient was hypoxic and started on non-rebreather but patient has increased work of breathing. Decision was made to intubate the patient. Patient become hypotensive post intubation and was given LR bolus and started on peripheral phenylephrine.      Allergies    fish (Angioedema)  penicillins (Rash)    Intolerances        MEDICATIONS  (STANDING):  acetylcysteine  Oral Solution 800 milliGRAM(s) Oral every 8 hours  ALBUTerol    90 MICROgram(s) HFA Inhaler 2 Puff(s) Inhalation every 4 hours  amLODIPine   Tablet 5 milliGRAM(s) Oral daily  atorvastatin 40 milliGRAM(s) Oral at bedtime  budesonide 160 MICROgram(s)/formoterol 4.5 MICROgram(s) Inhaler 2 Puff(s) Inhalation two times a day  carvedilol 6.25 milliGRAM(s) Oral every 12 hours  enoxaparin Injectable 40 milliGRAM(s) SubCutaneous daily  ergocalciferol 23094 Unit(s) Oral <User Schedule>  etomidate Injectable 20 milliGRAM(s) IV Push once  fluconAZOLE   Tablet 100 milliGRAM(s) Oral daily  folic acid 1 milliGRAM(s) Oral daily  gabapentin 600 milliGRAM(s) Oral every 8 hours  influenza   Vaccine 0.5 milliLiter(s) IntraMuscular once  insulin glargine SubCutaneous Injection (LANTUS) - Peds 60 Unit(s) SubCutaneous at bedtime  insulin lispro (HumaLOG) corrective regimen sliding scale   SubCutaneous Before meals and at bedtime  insulin lispro Injectable (HumaLOG) 5 Unit(s) SubCutaneous three times a day before meals  magnesium sulfate  IVPB 2 Gram(s) IV Intermittent once  melatonin 3 milliGRAM(s) Oral at bedtime  methylPREDNISolone sodium succinate Injectable 125 milliGRAM(s) IV Push once  methylPREDNISolone sodium succinate IVPB 60 milliGRAM(s) IV Intermittent every 8 hours  morphine  - Injectable 4 milliGRAM(s) IV Push once  pantoprazole    Tablet 40 milliGRAM(s) Oral before breakfast  phenylephrine    Infusion 1 MICROgram(s)/kG/Min (32.3 mL/Hr) IV Continuous <Continuous>  polyethylene glycol 3350 17 Gram(s) Oral two times a day  propofol Injectable 20 milliGRAM(s) IV Push once    MEDICATIONS  (PRN):  cyclobenzaprine 10 milliGRAM(s) Oral three times a day PRN Muscle Spasm  guaiFENesin   Syrup  (Sugar-Free) 200 milliGRAM(s) Oral every 6 hours PRN Cough  ondansetron Injectable 4 milliGRAM(s) IV Push every 6 hours PRN Nausea and/or Vomiting  oxycodone    5 mG/acetaminophen 325 mG 2 Tablet(s) Oral every 4 hours PRN Moderate Pain (4 - 6)      Daily     Daily     Drug Dosing Weight  Height (cm): 154.9 (19 Sep 2020 08:00)  Weight (kg): 86.2 (19 Sep 2020 08:00)  BMI (kg/m2): 35.9 (19 Sep 2020 08:00)  BSA (m2): 1.85 (19 Sep 2020 08:00)    PAST MEDICAL & SURGICAL HISTORY:  Malignant neoplasm of unspecified part of right bronchus or lung    HTN (hypertension)    DM (diabetes mellitus)    COPD (chronic obstructive pulmonary disease)    Lung cancer    Morbid obesity    GERD (gastroesophageal reflux disease)    Deviated septum    Prolapsed uterus    ITP (idiopathic thrombocytopenic purpura)    Emphysema/COPD    History of cholecystectomy    S/P lobectomy of lung  right 2017    History of tonsillectomy        FAMILY HISTORY:  FH: lung cancer    Family history of stroke    Family history of lung cancer    Diabetes mellitus        SOCIAL HISTORY:    ADVANCE DIRECTIVES:              ICU Vital Signs Last 24 Hrs  T(C): 37 (21 Sep 2020 05:08), Max: 37.4 (20 Sep 2020 17:44)  T(F): 98.6 (21 Sep 2020 05:08), Max: 99.4 (20 Sep 2020 17:44)  HR: 136 (21 Sep 2020 07:42) (57 - 140)  BP: 134/82 (21 Sep 2020 05:08) (122/75 - 134/82)  BP(mean): --  ABP: --  ABP(mean): --  RR: 20 (21 Sep 2020 07:42) (16 - 20)  SpO2: 95% (21 Sep 2020 07:42) (90% - 100%)          I&O's Detail      PHYSICAL EXAM:    GENERAL: Intubated and sedated.  HEAD:  Atraumatic, Normocephalic  EYES: pupils b/l equal and reactive  ENMT: No tonsillar erythema, exudates, or enlargement; Moist mucous membranes, Good dentition, No lesions  NECK: Supple, No JVD, Normal thyroid  NERVOUS SYSTEM:  Alert & Oriented X0, sedated and intuabted  CHEST/LUNG: stridor and b/l wheeze  HEART: Regular rate and rhythm; No murmurs, rubs, or gallops  ABDOMEN: Soft, Nontender, obese, Nondistended; Bowel sounds present  EXTREMITIES:  2+ Peripheral Pulses, No clubbing, cyanosis, or edema  SKIN: No rashes or lesions    LABS:  CBC Full  -  ( 21 Sep 2020 06:05 )  WBC Count : 8.01 K/uL  RBC Count : 3.56 M/uL  Hemoglobin : 11.2 g/dL  Hematocrit : 31.5 %  Platelet Count - Automated : x  Mean Cell Volume : 88.5 fl  Mean Cell Hemoglobin : 31.5 pg  Mean Cell Hemoglobin Concentration : 35.6 gm/dL  Auto Neutrophil # : x  Auto Lymphocyte # : x  Auto Monocyte # : x  Auto Eosinophil # : x  Auto Basophil # : x  Auto Neutrophil % : x  Auto Lymphocyte % : x  Auto Monocyte % : x  Auto Eosinophil % : x  Auto Basophil % : x    -    132<L>  |  93<L>  |  4<L>  ----------------------------<  75  4.8   |  37<H>  |  0.45<L>    Ca    8.2<L>      21 Sep 2020 06:05  Phos  2.7       Mg     1.7         TPro  6.1  /  Alb  3.0<L>  /  TBili  1.0  /  DBili  x   /  AST  16  /  ALT  30  /  AlkPhos  117      CAPILLARY BLOOD GLUCOSE      POCT Blood Glucose.: 66 mg/dL (21 Sep 2020 07:56)    PT/INR - ( 19 Sep 2020 08:54 )   PT: 13.9 sec;   INR: 1.20 ratio         PTT - ( 19 Sep 2020 08:54 )  PTT:25.3 sec  Urinalysis Basic - ( 19 Sep 2020 11:25 )    Color: Yellow / Appearance: Clear / S.010 / pH: x  Gluc: x / Ketone: Negative  / Bili: Negative / Urobili: Negative   Blood: x / Protein: 15 / Nitrite: Negative   Leuk Esterase: Trace / RBC: 0-2 /HPF / WBC 0-2 /HPF   Sq Epi: x / Non Sq Epi: Few /HPF / Bacteria: x      CARDIAC MARKERS ( 19 Sep 2020 08:54 )  <0.015 ng/mL / x     / x     / x     / x          Culture Results:   <10,000 CFU/mL Normal Urogenital Shannan ( @ 16:20)  Culture Results:   Growth in anaerobic bottle: Gram positive cocci in pairs  "Due to technical problems, Proteus sp. will Not be reported as part of  the BCID panel until further notice"  ***Blood Panel PCR results on this specimen are available  approximately 3 hours after the Gram stain result.***  Gram stain, PCR, and/or culture results may not always  correspond due to difference in methodologies.  ************************************************************  This PCR assay was performed using [employer].  The following targets are tested for: Enterococcus,  vancomycin resistant enterococci, Listeria monocytogenes,  coagulase negative staphylococci, S. aureus,  methicillin resistant S. aureus, Streptococcus agalactiae  (Group B), S. pneumoniae, S. pyogenes (Group A),  Acinetobacter baumannii, Enterobacter cloacae, E. coli,  Klebsiella oxytoca, K. pneumoniae, Proteus sp.,  Serratia marcescens, Haemophilus influenzae,  Neisseria meningitidis, Pseudomonas aeruginosa, Candida  albicans, C. glabrata, C krusei, C parapsilosis,  C. tropicalis and the KPC resistance gene. ( @ 11:48)  Culture Results:   No growth to date. ( @ 11:48)      EKG:    ECHO, US:    RADIOLOGY:    CRITICAL CARE TIME SPENT:   Patient is a 65y old  Female who presents with a chief complaint of shortness of breath (21 Sep 2020 08:31)      HPI:  66 yo obese F former heavy smoker (80 pack years) with, HTN, DM2 on insulin, COPD on 5L (frequent admissions), R lung cancer s/p resection (2017) on chemo q 3 weeks with concern for mets to spine and compression fractures, recent admission for COPD exacerbation earlier in August presents with SOB, hypoxia and increased sputum production x 1 day. Patient states cough feels like secretion is coming out but unable to expectorate. Patient is on 5L of oxygen at home. As per EMS, patient is hypoxic at 84% on room air which came up to 98% with 4L of nasal canula. Patient was recently in august treated with prednisone, nebulizer treatments and completed a course of azithromycin for copd exacerbation. Denies nausea, vomiting, diarrhea, abdominal pain, fever, chills.   (19 Sep 2020 14:07)    Interval HPI:  65 years old obese female , active smoker with COPD (on 5L oxygen at home). Patient was hypoxic, tachycardiac and tachypneic. Code Rapid response was called. RRT team arrived on scene. Patient has encephalopathy from hypoxia with wheezing and stridor on examination. Patient was tachycardiac to 140/min. Patient was hypoxic and started on non-rebreather but patient has increased work of breathing. Decision was made to intubate the patient. Patient become hypotensive post intubation and was given LR bolus and started on peripheral phenylephrine.      Allergies    fish (Angioedema)  penicillins (Rash)    Intolerances        MEDICATIONS  (STANDING):  acetylcysteine  Oral Solution 800 milliGRAM(s) Oral every 8 hours  ALBUTerol    90 MICROgram(s) HFA Inhaler 2 Puff(s) Inhalation every 4 hours  amLODIPine   Tablet 5 milliGRAM(s) Oral daily  atorvastatin 40 milliGRAM(s) Oral at bedtime  budesonide 160 MICROgram(s)/formoterol 4.5 MICROgram(s) Inhaler 2 Puff(s) Inhalation two times a day  carvedilol 6.25 milliGRAM(s) Oral every 12 hours  enoxaparin Injectable 40 milliGRAM(s) SubCutaneous daily  ergocalciferol 16867 Unit(s) Oral <User Schedule>  etomidate Injectable 20 milliGRAM(s) IV Push once  fluconAZOLE   Tablet 100 milliGRAM(s) Oral daily  folic acid 1 milliGRAM(s) Oral daily  gabapentin 600 milliGRAM(s) Oral every 8 hours  influenza   Vaccine 0.5 milliLiter(s) IntraMuscular once  insulin glargine SubCutaneous Injection (LANTUS) - Peds 60 Unit(s) SubCutaneous at bedtime  insulin lispro (HumaLOG) corrective regimen sliding scale   SubCutaneous Before meals and at bedtime  insulin lispro Injectable (HumaLOG) 5 Unit(s) SubCutaneous three times a day before meals  magnesium sulfate  IVPB 2 Gram(s) IV Intermittent once  melatonin 3 milliGRAM(s) Oral at bedtime  methylPREDNISolone sodium succinate Injectable 125 milliGRAM(s) IV Push once  methylPREDNISolone sodium succinate IVPB 60 milliGRAM(s) IV Intermittent every 8 hours  morphine  - Injectable 4 milliGRAM(s) IV Push once  pantoprazole    Tablet 40 milliGRAM(s) Oral before breakfast  phenylephrine    Infusion 1 MICROgram(s)/kG/Min (32.3 mL/Hr) IV Continuous <Continuous>  polyethylene glycol 3350 17 Gram(s) Oral two times a day  propofol Injectable 20 milliGRAM(s) IV Push once    MEDICATIONS  (PRN):  cyclobenzaprine 10 milliGRAM(s) Oral three times a day PRN Muscle Spasm  guaiFENesin   Syrup  (Sugar-Free) 200 milliGRAM(s) Oral every 6 hours PRN Cough  ondansetron Injectable 4 milliGRAM(s) IV Push every 6 hours PRN Nausea and/or Vomiting  oxycodone    5 mG/acetaminophen 325 mG 2 Tablet(s) Oral every 4 hours PRN Moderate Pain (4 - 6)      Daily     Daily     Drug Dosing Weight  Height (cm): 154.9 (19 Sep 2020 08:00)  Weight (kg): 86.2 (19 Sep 2020 08:00)  BMI (kg/m2): 35.9 (19 Sep 2020 08:00)  BSA (m2): 1.85 (19 Sep 2020 08:00)    PAST MEDICAL & SURGICAL HISTORY:  Malignant neoplasm of unspecified part of right bronchus or lung    HTN (hypertension)    DM (diabetes mellitus)    COPD (chronic obstructive pulmonary disease)    Lung cancer    Morbid obesity    GERD (gastroesophageal reflux disease)    Deviated septum    Prolapsed uterus    ITP (idiopathic thrombocytopenic purpura)    Emphysema/COPD    History of cholecystectomy    S/P lobectomy of lung  right 2017    History of tonsillectomy        FAMILY HISTORY:  FH: lung cancer    Family history of stroke    Family history of lung cancer    Diabetes mellitus        SOCIAL HISTORY:    ADVANCE DIRECTIVES:              ICU Vital Signs Last 24 Hrs  T(C): 37 (21 Sep 2020 05:08), Max: 37.4 (20 Sep 2020 17:44)  T(F): 98.6 (21 Sep 2020 05:08), Max: 99.4 (20 Sep 2020 17:44)  HR: 136 (21 Sep 2020 07:42) (57 - 140)  BP: 134/82 (21 Sep 2020 05:08) (122/75 - 134/82)  BP(mean): --  ABP: --  ABP(mean): --  RR: 20 (21 Sep 2020 07:42) (16 - 20)  SpO2: 95% (21 Sep 2020 07:42) (90% - 100%)          I&O's Detail      PHYSICAL EXAM:    GENERAL: Intubated and sedated.  HEAD:  Atraumatic, Normocephalic  EYES: pupils b/l equal and reactive  ENMT: No tonsillar erythema, exudates, or enlargement; Moist mucous membranes, Good dentition, No lesions  NECK: Supple, No JVD, Normal thyroid  NERVOUS SYSTEM:  Alert & Oriented X0, sedated and intuabted  CHEST/LUNG: stridor and b/l wheeze  HEART: Regular rate and rhythm; No murmurs, rubs, or gallops  ABDOMEN: Soft, Nontender, obese, Nondistended; Bowel sounds present  EXTREMITIES:  2+ Peripheral Pulses, No clubbing, cyanosis, or edema  SKIN: No rashes or lesions    LABS:  CBC Full  -  ( 21 Sep 2020 06:05 )  WBC Count : 8.01 K/uL  RBC Count : 3.56 M/uL  Hemoglobin : 11.2 g/dL  Hematocrit : 31.5 %  Platelet Count - Automated : x  Mean Cell Volume : 88.5 fl  Mean Cell Hemoglobin : 31.5 pg  Mean Cell Hemoglobin Concentration : 35.6 gm/dL  Auto Neutrophil # : x  Auto Lymphocyte # : x  Auto Monocyte # : x  Auto Eosinophil # : x  Auto Basophil # : x  Auto Neutrophil % : x  Auto Lymphocyte % : x  Auto Monocyte % : x  Auto Eosinophil % : x  Auto Basophil % : x    -    132<L>  |  93<L>  |  4<L>  ----------------------------<  75  4.8   |  37<H>  |  0.45<L>    Ca    8.2<L>      21 Sep 2020 06:05  Phos  2.7       Mg     1.7         TPro  6.1  /  Alb  3.0<L>  /  TBili  1.0  /  DBili  x   /  AST  16  /  ALT  30  /  AlkPhos  117      CAPILLARY BLOOD GLUCOSE      POCT Blood Glucose.: 66 mg/dL (21 Sep 2020 07:56)    PT/INR - ( 19 Sep 2020 08:54 )   PT: 13.9 sec;   INR: 1.20 ratio         PTT - ( 19 Sep 2020 08:54 )  PTT:25.3 sec  Urinalysis Basic - ( 19 Sep 2020 11:25 )    Color: Yellow / Appearance: Clear / S.010 / pH: x  Gluc: x / Ketone: Negative  / Bili: Negative / Urobili: Negative   Blood: x / Protein: 15 / Nitrite: Negative   Leuk Esterase: Trace / RBC: 0-2 /HPF / WBC 0-2 /HPF   Sq Epi: x / Non Sq Epi: Few /HPF / Bacteria: x      CARDIAC MARKERS ( 19 Sep 2020 08:54 )  <0.015 ng/mL / x     / x     / x     / x          Culture Results:   <10,000 CFU/mL Normal Urogenital Shannan ( @ 16:20)  Culture Results:   Growth in anaerobic bottle: Gram positive cocci in pairs  "Due to technical problems, Proteus sp. will Not be reported as part of  the BCID panel until further notice"  ***Blood Panel PCR results on this specimen are available  approximately 3 hours after the Gram stain result.***  Gram stain, PCR, and/or culture results may not always  correspond due to difference in methodologies.  ************************************************************  This PCR assay was performed using [employer].  The following targets are tested for: Enterococcus,  vancomycin resistant enterococci, Listeria monocytogenes,  coagulase negative staphylococci, S. aureus,  methicillin resistant S. aureus, Streptococcus agalactiae  (Group B), S. pneumoniae, S. pyogenes (Group A),  Acinetobacter baumannii, Enterobacter cloacae, E. coli,  Klebsiella oxytoca, K. pneumoniae, Proteus sp.,  Serratia marcescens, Haemophilus influenzae,  Neisseria meningitidis, Pseudomonas aeruginosa, Candida  albicans, C. glabrata, C krusei, C parapsilosis,  C. tropicalis and the KPC resistance gene. ( @ 11:48)  Culture Results:   No growth to date. ( @ 11:48)      EKG:    ECHO, US:    RADIOLOGY:      CRITICAL CARE TIME SPENT:

## 2020-09-21 NOTE — CHART NOTE - NSCHARTNOTEFT_GEN_A_CORE
RN paged for pt desaturating to 86% while on 5L NC. Pt examined at bedside. Pt chart reviewed with COPD and Lung CA. Pulmonology note recommended to give IV steroid 60mg q8 x 48hours. IM attending Progress noted reviewed: held iv steroids since no wheezing at the time. Will start IV steroids now as per pulm note 60mg q8 x 48hrs.  Pt placed on NRB mask since pt breathes through mouth. Transition to NC as tolerated.    Case reviewed with senior resident on call. Primary care team to follow RN paged for pt desaturating to 86% while on 5L NC. Pt examined at bedside w/ crackles on b/l lungs, tachycardic 130-140s. Pt chart reviewed with COPD and Lung CA. Pulmonology note recommended to give IV steroid 60mg q8 x 48hours. IM attending Progress noted reviewed: held iv steroids since no wheezing at the time. Will start IV steroids now as per pulm note 60mg q8 x 48hrs.  Pt placed on NRB mask since pt breathes through mouth with improvement to 95%. Transition to NC as tolerated.     Case reviewed with senior resident on call. Primary care team to follow

## 2020-09-21 NOTE — CONSULT NOTE ADULT - NSHPATTENDINGPLANDISCUSS_GEN_ALL_CORE
Resident, pt and adm doctor
icu team / medical team / family/ medicine attend
Dr. Lezama, ICU team, Dr. Mendiola, Dr. Nugent, Dr. Perera

## 2020-09-21 NOTE — CONSULT NOTE ADULT - PROBLEM SELECTOR RECOMMENDATION 2
2/2 +VRE bacteremia, comorbid COPD. Patient sedated/intubated, on IV abx, pressor support. Overall, poor prognosis. Full code.
required freq steroids

## 2020-09-21 NOTE — CONSULT NOTE ADULT - ASSESSMENT
65 year old lady with severe COPD and lung ca with mets to bones which causing compression Fx.  She had one RT for that.  she was admitted for dyspnea and hypoxia.

## 2020-09-21 NOTE — CONSULT NOTE ADULT - ASSESSMENT
65 years old obese female , active smoker with COPD (on 5L oxygen at home). Patient was hypoxic, tachycardiac and tachypneic. Code Rapid response was called. RRT team arrived on scene. Patient has encephalopathy from hypoxia with wheezing and stridor on examination. Patient was tachycardiac to 140/min. Patient was hypoxic and started on non-rebreather but patient has increased work of breathing. Decision was made to intubate the patient. Patient become hypotensive post intubation and was given LR bolus and started on peripheral phenylephrine.    Assessment:  1. Acute Respiratory failure secondary to COPD exacerbation.  2. COPD.  3. Lung cancer with suspected metastasis on active chemotherapy.  4. HTN.  5. DM.  6. Active Smoker.      CNS:  #Acute Encephalopathy:  likely hypoxic.  Patient was intubated for work of breathing, hypoxia, tachypnea and tachycardia.  Sedated with propofol.   65 years old obese female , active smoker with COPD (on 5L oxygen at home). Patient was hypoxic, tachycardiac and tachypneic. Code Rapid response was called. RRT team arrived on scene. Patient has encephalopathy from hypoxia with wheezing and stridor on examination. Patient was tachycardiac to 140/min. Patient was hypoxic and started on non-rebreather but patient has increased work of breathing. Decision was made to intubate the patient. Patient become hypotensive post intubation and was given LR bolus and started on peripheral phenylephrine.    Assessment:  1. Acute Respiratory failure secondary to COPD exacerbation.  2. COPD.  3. Lung cancer with suspected metastasis on active chemotherapy.  4. Acute Encephalopathy.  5. HTN.  6. DM.  7. Active Smoker.      CNS:  #Acute Encephalopathy:  likely hypoxic.  Patient was intubated for work of breathing, hypoxia, tachypnea and tachycardia.  Sedated with propofol.      CVS:  #Hypotension  Patient was hypotensive after intubation.  likely medication induced.  Started on peripheral phenylephrine.  Will keep MAP>65 mmHg.    #History of HTN:  Patient was on amlodipine and carvedilol.  on hold for hypotension.  Will monitor blood pressure.      Respiratory:  #Acute hypoxic Respiratory Failure Secondary to COPD exacerbation.  Patient was intubated for work of breathing, hypoxia, tachypnea and tachycardia.  Will f/u post intubation ABG and chest X-ray.    #COPD exacerbation:  [facility] on [facility] L oxygen at home.  started on IV solumedrol.  Albuterol inhaler q 6 hourly.    #Lung CA:  R lung cancer s/p resection (2017)   Patient was on chemo q 3 weeks with concern for mets to spine and compression fractures.  Left arm midline for chemotherapy  Hemtology/oncology Dr Mendiola on case.      GI:  NPO except medication.  IV protonix.  NGT placed.  f/u chest Xray for confirmation      Nephrology:  No active issue.  monitor BUN; creatinine.  f/u serum electrolytes.      ID:  #VRE bacteremia:  in 1/4 bottles.  will cover with Zyvox until repeat cultures are back.  ID conuslt dr bales.      Dermatology:  No skin breakdown.  dry skin.      Endocrinology:  #Diabetes Mellitis:  patient was hypoglycemic in am.  Discontinued lantus for now.  restart as needed.  Will continue insulin HSS for now.  monitor blood glucose level.    Goals of care:  Full code.  Spoke with family regarding goals of care.    Disposition:  ICU                 65 years old obese female , active smoker with COPD (on 5L oxygen at home). Patient was hypoxic, tachycardiac and tachypneic. Code Rapid response was called. RRT team arrived on scene. Patient has encephalopathy from hypoxia with wheezing and stridor on examination. Patient was tachycardiac to 140/min. Patient was hypoxic and started on non-rebreather but patient has increased work of breathing. Decision was made to intubate the patient. Patient become hypotensive post intubation and was given LR bolus and started on peripheral phenylephrine.    Assessment:  1. Acute Respiratory failure secondary to COPD exacerbation.  2. COPD.  3. Lung cancer with suspected metastasis on active chemotherapy.  4. Acute Encephalopathy.  5. HTN.  6. DM.  7. Active Smoker.      CNS:  #Acute Encephalopathy:  likely hypoxic.  Patient was intubated for work of breathing, hypoxia, tachypnea and tachycardia.  Sedated with propofol.      CVS:  #Hypotension  Patient was hypotensive after intubation.  likely medication induced.  Started on peripheral phenylephrine.  Will keep MAP>65 mmHg.    #History of HTN:  Patient was on amlodipine and carvedilol.  on hold for hypotension.  Will monitor blood pressure.      Respiratory:  #Acute hypoxic Respiratory Failure Secondary to COPD exacerbation.  Patient was intubated for work of breathing, hypoxia, tachypnea and tachycardia.  Will f/u post intubation ABG and chest X-ray.    #COPD exacerbation:  [facility] on [facility] oxygen at home.  started on IV solumedrol.  Albuterol inhaler q 6 hourly.    #Lung CA:  R lung cancer s/p resection (2017)   Patient was on chemo q 3 weeks with concern for mets to spine and compression fractures.  Left arm midline for chemotherapy  Hemtology/oncology Dr Mendiola on case.      GI:  NPO except medication.  IV protonix.  NGT placed.  f/u chest Xray for confirmation      Nephrology:  No active issue.  monitor BUN; creatinine.  f/u serum electrolytes.      ID:  #VRE bacteremia:  in 1/4 bottles.  will cover with Zyvox until repeat cultures are back.  ID conuslt dr bales.      Dermatology:  No skin breakdown.  dry skin.      Endocrinology:  #Diabetes Mellitis:  patient was hypoglycemic in am.  Discontinued lantus for now.  restart as needed.  Will continue insulin HSS for now.  monitor blood glucose level.    Goals of care:  Full code.  Spoke with family regarding goals of care.  Palliative care follow up as patient has metastatic lung cancer and advanced COPD.    Disposition:  ICU

## 2020-09-21 NOTE — CONSULT NOTE ADULT - PROBLEM SELECTOR RECOMMENDATION 5
Spoke with patient's partner x 20 years/Williams and daughter/Gavi on the phone; PC SHARAD present. Family reports patient may have previously completed HCP - will further investigate; however, aware that in lieu of HCP form, patient's partner is surrogate. He is deferring medical decision making to daughter/Gavi. Discussed status. Family acknowledges + functional decline and reports +dyspnea with minimal activity. Aware patient likely difficult to wean 2/2 ES COPD and metastatic disease. Will further investigate if an existing HCP form exists and continue to educate family regarding patient's status and realistic expectation. At this time, patient remains a full code.

## 2020-09-21 NOTE — CHART NOTE - NSCHARTNOTEFT_GEN_A_CORE
Patient Left arm PICC line was removed as the patient was found to have VRE Bacteremia. 2 peripheral IV lines where inserted prior to discontinuation of PICC line. Currently patient has no Central line. We will monitor and utilize the peripheral lines for at least 24 hours prior to insertion of another picc or central line as patient is a hard stick due to generalized swelling.

## 2020-09-21 NOTE — PROGRESS NOTE ADULT - ASSESSMENT
COPD with acute asthmatic bronchitis with Ac on chr Respiratory Failure   Recurrent Lung cancer with Bone Mets   IDDM   Htn with MR   Thrombocytopenia sec to C/T  Obesity with CRIS   r/o esophageal candidiasis  GPC bacteremia ? contaminant    PLAN- IV steroids 60 mg q8h x 48hrs    CMV12/ /40% with f/u ABG and CXR   Transfer to ICU  NGT feeds    Symbicort 160/4.5 2 puffs bid on d/c   Duoneb  by HFN rx qid    s/c lovenox  FS coverage  One dose vanco and repeat Blood c/s   Prognosis guarded, discussed with family , DR ardon and staff   Diflucan 100 mg qd x 1 week  If no response, EGD

## 2020-09-21 NOTE — CONSULT NOTE ADULT - SUBJECTIVE AND OBJECTIVE BOX
HPI:  64 yo obese F former heavy smoker (80 pack years) with, HTN, DM2 on insulin, COPD on 5L (frequent admissions), R lung cancer s/p resection (2017) on chemo q 3 weeks with concern for mets to spine and compression fractures, recent admission for COPD exacerbation earlier in August presents with SOB, hypoxia and increased sputum production x 1 day. Patient states cough feels like secretion is coming out but unable to expectorate. Patient is on 5L of oxygen at home. As per EMS, patient is hypoxic at 84% on room air which came up to 98% with 4L of nasal canula. Patient was recently in august treated with prednisone, nebulizer treatments and completed a course of azithromycin for copd exacerbation. Denies nausea, vomiting, diarrhea, abdominal pain, fever, chills.   (19 Sep 2020 14:07)    Brief hospital course:  Patient found to be hypoxic, tachycardiac and tachypneic this morning. s/p RRT. Patient encephalopathic from hypoxia with wheezing and stridor on examination. Patient was tachycardiac to 140/min. Failed on NRB. Patient subsequently intubated. +VRE bacteremia.  Admitted to ICU - sedated/intubated, on pressor. Request to identify surrogate and establish goals of care.       PAST MEDICAL & SURGICAL HISTORY:  Malignant neoplasm of unspecified part of right bronchus or lung  HTN (hypertension)  DM (diabetes mellitus)  COPD (chronic obstructive pulmonary disease)  Lung cancer  Morbid obesity  GERD (gastroesophageal reflux disease)  Deviated septum  Prolapsed uterus  ITP (idiopathic thrombocytopenic purpura)  Emphysema/COPD  History of cholecystectomy  S/P lobectomy of lung  right 2017  History of tonsillectomy    SOCIAL HISTORY:    Admitted from:  home, lives with partner x 20+ years  Substance abuse history: none             Tobacco hx:    former heavy smoker (80 pack years)               Alcohol hx:   unknown due to mentation  Home Opioid hx: Percocet PRN  Orthodox:      Yarsanism                              Preferred Language: English    Surrogate/HCP/Guardian: Gavi Olson (dtr): [phone]    FAMILY HISTORY:  FH: lung cancer  Family history of stroke  Family history of lung cancer  Diabetes mellitus    Baseline ADLs (prior to admission): Per family, patient dependent 5L O2, increasing need for assistance with ADLs except meals, dyspnea with minimal exertion, + functional decline reported    Allergies:  fish (Angioedema)  penicillins (Rash)      Present Symptoms:      Review of Systems: Unable to obtain due to poor mentation    MEDICATIONS  (STANDING):  ALBUTerol    90 MICROgram(s) HFA Inhaler 2 Puff(s) Inhalation every 4 hours  atorvastatin 40 milliGRAM(s) Oral at bedtime  chlorhexidine 0.12% Liquid 15 milliLiter(s) Oral Mucosa every 12 hours  dexMEDEtomidine Infusion 0.2 MICROgram(s)/kG/Hr (4.24 mL/Hr) IV Continuous <Continuous>  dextrose 5%. 1000 milliLiter(s) (50 mL/Hr) IV Continuous <Continuous>  enoxaparin Injectable 40 milliGRAM(s) SubCutaneous daily  ergocalciferol 73811 Unit(s) Oral <User Schedule>  fluconAZOLE   Tablet 100 milliGRAM(s) Oral daily  folic acid 1 milliGRAM(s) Oral daily  gabapentin 600 milliGRAM(s) Oral every 8 hours  influenza   Vaccine 0.5 milliLiter(s) IntraMuscular once  insulin lispro (HumaLOG) corrective regimen sliding scale   SubCutaneous every 6 hours  linezolid  IVPB      linezolid  IVPB 600 milliGRAM(s) IV Intermittent every 12 hours  methylPREDNISolone sodium succinate Injectable 40 milliGRAM(s) IV Push every 8 hours  nystatin Powder 1 Application(s) Topical two times a day  pantoprazole  Injectable 40 milliGRAM(s) IV Push daily  phenylephrine    Infusion 1 MICROgram(s)/kG/Min (32.3 mL/Hr) IV Continuous <Continuous>  polyethylene glycol 3350 17 Gram(s) Oral two times a day    MEDICATIONS  (PRN):  ondansetron Injectable 4 milliGRAM(s) IV Push every 6 hours PRN Nausea and/or Vomiting  oxycodone    5 mG/acetaminophen 325 mG 2 Tablet(s) Oral every 4 hours PRN Moderate Pain (4 - 6)      PHYSICAL EXAM:    Vital Signs Last 24 Hrs  T(C): 36.1 (21 Sep 2020 16:18), Max: 38.1 (21 Sep 2020 10:00)  T(F): 97 (21 Sep 2020 16:18), Max: 100.5 (21 Sep 2020 10:00)  HR: 99 (21 Sep 2020 16:30) (68 - 140)  BP: 132/83 (21 Sep 2020 16:00) (75/47 - 161/88)  BP(mean): 96 (21 Sep 2020 16:00) (48 - 107)  RR: 19 (21 Sep 2020 16:00) (14 - 26)  SpO2: 100% (21 Sep 2020 16:30) (90% - 100%)    General: patient sedated/intubated, + pressor. Not responsive, unable to follow commands. No signs of distress.   Karnofsky Performance Score/Palliative Performance Status Version2:     20%    HEENT:    ET tube   Lungs: comfortable, on ventilator. Continues precedex    CV: S1S2, IRR, + pressor  GI:    incontinent  :   benitez  Musculoskeletal: sedated/intubated, dependent on ADLs  Skin: +ecchymosis  Neuro: patient sedated/intubated, unable to follow commands   Oral intake ability: unable/only mouth care   Diet: NPO    LABS:                        11.2   8.01  )-----------( 94       ( 21 Sep 2020 06:05 )             31.5     09-21    132<L>  |  93<L>  |  4<L>  ----------------------------<  75  4.8   |  37<H>  |  0.45<L>    Ca    8.2<L>      21 Sep 2020 06:05  Phos  2.7     09-21  Mg     1.7     09-21    Albumin: 3.0      RADIOLOGY & ADDITIONAL STUDIES:  ct< from: CT Angio Chest w/ IV Cont (09.19.20 @ 10:22) >  EXAM:  CT ANGIO CHEST (W)AW IC                        PROCEDURE DATE:  09/19/2020    INTERPRETATION:  CLINICAL INFORMATION: Shortness of breath, history of lung cancer and prior lobectomy  COMPARISON: CT chest 8/27/2020  PROCEDURE:  CT Angiography of the Chest.  90 ml of Omnipaque 350 was injected intravenously. 10 ml were discarded.  Sagittal and coronal reformats were performed as well as 3D (MIP) reconstructions.  FINDINGS:  LUNGS AND AIRWAYS: Small mucosal debris within the distal posterior tracheal wall extending into the left mainstem bronchus. Minimal bronchiectasis. Subsegmental consolidative changes within the lingula compatible with atelectasis and or scarring. Diffuse severe extensive centrilobular emphysema. Minimal bibasilar atelectasis. Minimal biapical pleural parenchymal scarring. Postsurgical changes along the superior aspect of the right lower lobe with adjacent cysts linear scarring unchanged. No new segmental consolidations. No discrete pulmonary nodules.  PLEURA: No pleural effusion.  MEDIASTINUM AND GUILLE: 2 cm right hilar lymph node unchanged. Surgical clips in the subcarinal region. Additional subcentimeter mediastinal lymph nodes.  VESSELS: No evidence of acute pulmonary embolism. No aortic aneurysm. Coronary vascular calcification.  HEART: Heart size is normal. Trace pericardial effusion.  CHEST WALL AND LOWER NECK: Within normal limits.  VISUALIZED UPPER ABDOMEN: Within normal limits.  BONES: Lytic and blastic lesions again noted throughout the visualized vertebral bodies and bilateral ribs. Severe compression deformity of L1 again seen with stable retropulsion.. Superior endplate deformity of T12 slightly more prominent. Severe compression deformity of T7 again seen. Bilateral ribfractures again identified.    IMPRESSION:  No evidence of acute pulmonary embolism.  Subsegmental atelectasis and/or scarring within the lingula.  Trace small secretions in the inferior trachea extending into the left mainstem bronchus.  Osseous metastasis and associated compression fractures and rib fractures unchanged..  < end of copied text >    < from: Xray Chest 1 View-PORTABLE IMMEDIATE (Xray Chest 1 View-PORTABLE IMMEDIATE .) (09.21.20 @ 11:08) >  EXAM:  XR CHEST PORTABLE IMMED 1V                        PROCEDURE DATE:  09/21/2020    INTERPRETATION:  AP semierect chest on September 21, 2020 at 10:29 AM. Prior imaging showed a coiled NG tube.  Heart is magnified by technique. Clips over the central chest noted. Endotracheal tube remains in good position.  There is a mild right base effusion increased from 9:07 AM.  There is a slight left base process roughly similar to prior.  NG tube tip is seen in the mid upper esophageal area.  Left PICC line remains.  Suture line in the right lower lung field remains.    IMPRESSION: NG tube tube tip ends in mid upper thoracic esophagus. Increasing right effusion.  < end of copied text >      ADVANCE DIRECTIVES: Full code

## 2020-09-21 NOTE — CHART NOTE - NSCHARTNOTEFT_GEN_A_CORE
Pt in ICU intubated and sedated on CMV14//50 % with PP19 andO2 sat95%  P=86, RR=16. BP97/68  Lungs Improved air entry    CVS RSR P2 loud  ABD Obese soft non tender BS+  EXT No edema    PLAN- CXR and ABG   Keep on Vent 24-48 hrs , then quick weaning Pt in ICU intubated and sedated on CMV14//50 % with PP19 andO2 sat95%  P=86, RR=16. BP97/68  Lungs Improved air entry    CVS RSR P2 loud  ABD Obese soft non tender BS+  EXT No edema     ABG-- 7.42/53/87    CXR-- ETT in place Haze LLL     PLAN-   Keep on Vent 24-48 hrs , then quick weaning   NGT feeds  IV steroids and Levaquin, protonix

## 2020-09-22 LAB
-  AMPICILLIN: SIGNIFICANT CHANGE UP
-  CANDIDA ALBICANS: SIGNIFICANT CHANGE UP
-  CANDIDA GLABRATA: SIGNIFICANT CHANGE UP
-  CANDIDA KRUSEI: SIGNIFICANT CHANGE UP
-  CANDIDA PARAPSILOSIS: SIGNIFICANT CHANGE UP
-  CANDIDA TROPICALIS: SIGNIFICANT CHANGE UP
-  COAGULASE NEGATIVE STAPHYLOCOCCUS: SIGNIFICANT CHANGE UP
-  DAPTOMYCIN: SIGNIFICANT CHANGE UP
-  GENTAMICIN SYNERGY: SIGNIFICANT CHANGE UP
-  K. PNEUMONIAE GROUP: SIGNIFICANT CHANGE UP
-  KPC RESISTANCE GENE: SIGNIFICANT CHANGE UP
-  LINEZOLID: SIGNIFICANT CHANGE UP
-  STREPTOCOCCUS SP. (NOT GRP A, B OR S PNEUMONIAE): SIGNIFICANT CHANGE UP
-  STREPTOMYCIN SYNERGY: SIGNIFICANT CHANGE UP
-  VANCOMYCIN: SIGNIFICANT CHANGE UP
A BAUMANNII DNA SPEC QL NAA+PROBE: SIGNIFICANT CHANGE UP
ALBUMIN SERPL ELPH-MCNC: 2.4 G/DL — LOW (ref 3.5–5)
ALP SERPL-CCNC: 106 U/L — SIGNIFICANT CHANGE UP (ref 40–120)
ALT FLD-CCNC: 25 U/L DA — SIGNIFICANT CHANGE UP (ref 10–60)
ANION GAP SERPL CALC-SCNC: 7 MMOL/L — SIGNIFICANT CHANGE UP (ref 5–17)
ANISOCYTOSIS BLD QL: SLIGHT — SIGNIFICANT CHANGE UP
AST SERPL-CCNC: 15 U/L — SIGNIFICANT CHANGE UP (ref 10–40)
BASE EXCESS BLDA CALC-SCNC: 8.7 MMOL/L — HIGH (ref -2–2)
BASOPHILS # BLD AUTO: 0 K/UL — SIGNIFICANT CHANGE UP (ref 0–0.2)
BASOPHILS NFR BLD AUTO: 0 % — SIGNIFICANT CHANGE UP (ref 0–2)
BILIRUB SERPL-MCNC: 1.1 MG/DL — SIGNIFICANT CHANGE UP (ref 0.2–1.2)
BLOOD GAS COMMENTS ARTERIAL: SIGNIFICANT CHANGE UP
BUN SERPL-MCNC: 8 MG/DL — SIGNIFICANT CHANGE UP (ref 7–18)
CALCIUM SERPL-MCNC: 8.2 MG/DL — LOW (ref 8.4–10.5)
CHLORIDE SERPL-SCNC: 96 MMOL/L — SIGNIFICANT CHANGE UP (ref 96–108)
CO2 SERPL-SCNC: 33 MMOL/L — HIGH (ref 22–31)
CREAT SERPL-MCNC: 0.56 MG/DL — SIGNIFICANT CHANGE UP (ref 0.5–1.3)
CULTURE RESULTS: SIGNIFICANT CHANGE UP
E CLOAC COMP DNA BLD POS QL NAA+PROBE: SIGNIFICANT CHANGE UP
E COLI DNA BLD POS QL NAA+NON-PROBE: SIGNIFICANT CHANGE UP
ENTEROCOC DNA BLD POS QL NAA+NON-PROBE: SIGNIFICANT CHANGE UP
ENTEROCOC DNA BLD POS QL NAA+NON-PROBE: SIGNIFICANT CHANGE UP
EOSINOPHIL # BLD AUTO: 0 K/UL — SIGNIFICANT CHANGE UP (ref 0–0.5)
EOSINOPHIL NFR BLD AUTO: 0 % — SIGNIFICANT CHANGE UP (ref 0–6)
GLUCOSE BLDC GLUCOMTR-MCNC: 240 MG/DL — HIGH (ref 70–99)
GLUCOSE BLDC GLUCOMTR-MCNC: 293 MG/DL — HIGH (ref 70–99)
GLUCOSE BLDC GLUCOMTR-MCNC: 336 MG/DL — HIGH (ref 70–99)
GLUCOSE SERPL-MCNC: 225 MG/DL — HIGH (ref 70–99)
GP B STREP DNA BLD POS QL NAA+NON-PROBE: SIGNIFICANT CHANGE UP
GRAM STN FLD: SIGNIFICANT CHANGE UP
HAEM INFLU DNA BLD POS QL NAA+NON-PROBE: SIGNIFICANT CHANGE UP
HCO3 BLDA-SCNC: 33 MMOL/L — HIGH (ref 23–27)
HCT VFR BLD CALC: 31.9 % — LOW (ref 34.5–45)
HGB BLD-MCNC: 10.6 G/DL — LOW (ref 11.5–15.5)
HOROWITZ INDEX BLDA+IHG-RTO: 50 — SIGNIFICANT CHANGE UP
K OXYTOCA DNA BLD POS QL NAA+NON-PROBE: SIGNIFICANT CHANGE UP
L MONOCYTOG DNA BLD POS QL NAA+NON-PROBE: SIGNIFICANT CHANGE UP
LYMPHOCYTES # BLD AUTO: 0.34 K/UL — LOW (ref 1–3.3)
LYMPHOCYTES # BLD AUTO: 5 % — LOW (ref 13–44)
MAGNESIUM SERPL-MCNC: 1.8 MG/DL — SIGNIFICANT CHANGE UP (ref 1.6–2.6)
MANUAL SMEAR VERIFICATION: SIGNIFICANT CHANGE UP
MCHC RBC-ENTMCNC: 29.7 PG — SIGNIFICANT CHANGE UP (ref 27–34)
MCHC RBC-ENTMCNC: 33.2 GM/DL — SIGNIFICANT CHANGE UP (ref 32–36)
MCV RBC AUTO: 89.4 FL — SIGNIFICANT CHANGE UP (ref 80–100)
METHOD TYPE: SIGNIFICANT CHANGE UP
METHOD TYPE: SIGNIFICANT CHANGE UP
MICROCYTES BLD QL: SLIGHT — SIGNIFICANT CHANGE UP
MONOCYTES # BLD AUTO: 0.34 K/UL — SIGNIFICANT CHANGE UP (ref 0–0.9)
MONOCYTES NFR BLD AUTO: 5 % — SIGNIFICANT CHANGE UP (ref 2–14)
MRSA SPEC QL CULT: SIGNIFICANT CHANGE UP
MSSA DNA SPEC QL NAA+PROBE: SIGNIFICANT CHANGE UP
N MEN ISLT CULT: SIGNIFICANT CHANGE UP
NEUTROPHILS # BLD AUTO: 6.2 K/UL — SIGNIFICANT CHANGE UP (ref 1.8–7.4)
NEUTROPHILS NFR BLD AUTO: 87 % — HIGH (ref 43–77)
NEUTS BAND # BLD: 3 % — SIGNIFICANT CHANGE UP (ref 0–8)
NRBC # BLD: 0 /100 — SIGNIFICANT CHANGE UP (ref 0–0)
ORGANISM # SPEC MICROSCOPIC CNT: SIGNIFICANT CHANGE UP
P AERUGINOSA DNA BLD POS NAA+NON-PROBE: SIGNIFICANT CHANGE UP
PCO2 BLDA: 42 MMHG — SIGNIFICANT CHANGE UP (ref 32–46)
PH BLDA: 7.5 — HIGH (ref 7.35–7.45)
PHOSPHATE SERPL-MCNC: 3.3 MG/DL — SIGNIFICANT CHANGE UP (ref 2.5–4.5)
PLAT MORPH BLD: NORMAL — SIGNIFICANT CHANGE UP
PLATELET # BLD AUTO: 71 K/UL — LOW (ref 150–400)
PLATELET COUNT - ESTIMATE: ABNORMAL
PO2 BLDA: 122 MMHG — HIGH (ref 74–108)
POTASSIUM SERPL-MCNC: 3.5 MMOL/L — SIGNIFICANT CHANGE UP (ref 3.5–5.3)
POTASSIUM SERPL-SCNC: 3.5 MMOL/L — SIGNIFICANT CHANGE UP (ref 3.5–5.3)
PROT SERPL-MCNC: 6 G/DL — SIGNIFICANT CHANGE UP (ref 6–8.3)
RBC # BLD: 3.57 M/UL — LOW (ref 3.8–5.2)
RBC # FLD: 16.1 % — HIGH (ref 10.3–14.5)
RBC BLD AUTO: ABNORMAL
S MARCESCENS DNA BLD POS NAA+NON-PROBE: SIGNIFICANT CHANGE UP
S PNEUM DNA BLD POS QL NAA+NON-PROBE: SIGNIFICANT CHANGE UP
S PYO DNA BLD POS QL NAA+NON-PROBE: SIGNIFICANT CHANGE UP
SAO2 % BLDA: 99 % — HIGH (ref 92–96)
SODIUM SERPL-SCNC: 136 MMOL/L — SIGNIFICANT CHANGE UP (ref 135–145)
SPECIMEN SOURCE: SIGNIFICANT CHANGE UP
WBC # BLD: 6.89 K/UL — SIGNIFICANT CHANGE UP (ref 3.8–10.5)
WBC # FLD AUTO: 6.89 K/UL — SIGNIFICANT CHANGE UP (ref 3.8–10.5)

## 2020-09-22 PROCEDURE — 71045 X-RAY EXAM CHEST 1 VIEW: CPT | Mod: 26

## 2020-09-22 RX ORDER — FENTANYL CITRATE 50 UG/ML
0.5 INJECTION INTRAVENOUS
Qty: 2500 | Refills: 0 | Status: DISCONTINUED | OUTPATIENT
Start: 2020-09-22 | End: 2020-09-22

## 2020-09-22 RX ORDER — MORPHINE SULFATE 50 MG/1
2 CAPSULE, EXTENDED RELEASE ORAL EVERY 4 HOURS
Refills: 0 | Status: DISCONTINUED | OUTPATIENT
Start: 2020-09-22 | End: 2020-09-28

## 2020-09-22 RX ORDER — MORPHINE SULFATE 50 MG/1
2 CAPSULE, EXTENDED RELEASE ORAL EVERY 4 HOURS
Refills: 0 | Status: DISCONTINUED | OUTPATIENT
Start: 2020-09-22 | End: 2020-09-22

## 2020-09-22 RX ORDER — MAGNESIUM SULFATE 500 MG/ML
1 VIAL (ML) INJECTION ONCE
Refills: 0 | Status: COMPLETED | OUTPATIENT
Start: 2020-09-22 | End: 2020-09-22

## 2020-09-22 RX ORDER — HYDROMORPHONE HYDROCHLORIDE 2 MG/ML
0.5 INJECTION INTRAMUSCULAR; INTRAVENOUS; SUBCUTANEOUS ONCE
Refills: 0 | Status: DISCONTINUED | OUTPATIENT
Start: 2020-09-22 | End: 2020-09-22

## 2020-09-22 RX ORDER — FENTANYL CITRATE 50 UG/ML
0.5 INJECTION INTRAVENOUS
Qty: 2500 | Refills: 0 | Status: DISCONTINUED | OUTPATIENT
Start: 2020-09-22 | End: 2020-09-23

## 2020-09-22 RX ORDER — POTASSIUM CHLORIDE 20 MEQ
40 PACKET (EA) ORAL EVERY 4 HOURS
Refills: 0 | Status: COMPLETED | OUTPATIENT
Start: 2020-09-22 | End: 2020-09-22

## 2020-09-22 RX ADMIN — CHLORHEXIDINE GLUCONATE 15 MILLILITER(S): 213 SOLUTION TOPICAL at 05:53

## 2020-09-22 RX ADMIN — PANTOPRAZOLE SODIUM 40 MILLIGRAM(S): 20 TABLET, DELAYED RELEASE ORAL at 14:32

## 2020-09-22 RX ADMIN — Medication 40 MILLIGRAM(S): at 05:53

## 2020-09-22 RX ADMIN — NYSTATIN CREAM 1 APPLICATION(S): 100000 CREAM TOPICAL at 17:04

## 2020-09-22 RX ADMIN — FENTANYL CITRATE 4.24 MICROGRAM(S)/KG/HR: 50 INJECTION INTRAVENOUS at 03:28

## 2020-09-22 RX ADMIN — ALBUTEROL 2 PUFF(S): 90 AEROSOL, METERED ORAL at 17:26

## 2020-09-22 RX ADMIN — NYSTATIN CREAM 1 APPLICATION(S): 100000 CREAM TOPICAL at 05:54

## 2020-09-22 RX ADMIN — LINEZOLID 300 MILLIGRAM(S): 600 INJECTION, SOLUTION INTRAVENOUS at 05:53

## 2020-09-22 RX ADMIN — Medication 40 MILLIGRAM(S): at 21:50

## 2020-09-22 RX ADMIN — ALBUTEROL 2 PUFF(S): 90 AEROSOL, METERED ORAL at 10:38

## 2020-09-22 RX ADMIN — Medication 40 MILLIGRAM(S): at 14:37

## 2020-09-22 RX ADMIN — ALBUTEROL 2 PUFF(S): 90 AEROSOL, METERED ORAL at 13:37

## 2020-09-22 RX ADMIN — ALBUTEROL 2 PUFF(S): 90 AEROSOL, METERED ORAL at 05:57

## 2020-09-22 RX ADMIN — ENOXAPARIN SODIUM 40 MILLIGRAM(S): 100 INJECTION SUBCUTANEOUS at 12:30

## 2020-09-22 RX ADMIN — Medication 100 GRAM(S): at 14:34

## 2020-09-22 RX ADMIN — LINEZOLID 300 MILLIGRAM(S): 600 INJECTION, SOLUTION INTRAVENOUS at 17:04

## 2020-09-22 RX ADMIN — DEXMEDETOMIDINE HYDROCHLORIDE IN 0.9% SODIUM CHLORIDE 4.24 MICROGRAM(S)/KG/HR: 4 INJECTION INTRAVENOUS at 18:35

## 2020-09-22 RX ADMIN — ALBUTEROL 2 PUFF(S): 90 AEROSOL, METERED ORAL at 03:38

## 2020-09-22 NOTE — PROGRESS NOTE ADULT - SUBJECTIVE AND OBJECTIVE BOX
INTERVAL HPI/OVERNIGHT EVENTS: ***    PRESSORS: [ ] YES [ ] NO  WHICH:    ANTIBIOTICS:                  DATE STARTED:  ANTIBIOTICS:                  DATE STARTED:  ANTIBIOTICS:                  DATE STARTED:    Antimicrobial:  fluconAZOLE   Tablet 100 milliGRAM(s) Oral daily  linezolid  IVPB      linezolid  IVPB 600 milliGRAM(s) IV Intermittent every 12 hours    Cardiovascular:  phenylephrine    Infusion 1 MICROgram(s)/kG/Min IV Continuous <Continuous>    Pulmonary:  ALBUTerol    90 MICROgram(s) HFA Inhaler 2 Puff(s) Inhalation every 4 hours    Hematalogic:  enoxaparin Injectable 40 milliGRAM(s) SubCutaneous daily    Other:  atorvastatin 40 milliGRAM(s) Oral at bedtime  chlorhexidine 0.12% Liquid 15 milliLiter(s) Oral Mucosa every 12 hours  dexMEDEtomidine Infusion 0.2 MICROgram(s)/kG/Hr IV Continuous <Continuous>  ergocalciferol 50005 Unit(s) Oral <User Schedule>  fentaNYL   Infusion 0.5 MICROgram(s)/kG/Hr IV Continuous <Continuous>  folic acid 1 milliGRAM(s) Oral daily  gabapentin 600 milliGRAM(s) Oral every 8 hours  influenza   Vaccine 0.5 milliLiter(s) IntraMuscular once  methylPREDNISolone sodium succinate Injectable 40 milliGRAM(s) IV Push every 8 hours  nystatin Powder 1 Application(s) Topical two times a day  ondansetron Injectable 4 milliGRAM(s) IV Push every 6 hours PRN  oxycodone    5 mG/acetaminophen 325 mG 2 Tablet(s) Oral every 4 hours PRN  pantoprazole  Injectable 40 milliGRAM(s) IV Push daily  polyethylene glycol 3350 17 Gram(s) Oral two times a day    ALBUTerol    90 MICROgram(s) HFA Inhaler 2 Puff(s) Inhalation every 4 hours  atorvastatin 40 milliGRAM(s) Oral at bedtime  chlorhexidine 0.12% Liquid 15 milliLiter(s) Oral Mucosa every 12 hours  dexMEDEtomidine Infusion 0.2 MICROgram(s)/kG/Hr IV Continuous <Continuous>  enoxaparin Injectable 40 milliGRAM(s) SubCutaneous daily  ergocalciferol 63830 Unit(s) Oral <User Schedule>  fentaNYL   Infusion 0.5 MICROgram(s)/kG/Hr IV Continuous <Continuous>  fluconAZOLE   Tablet 100 milliGRAM(s) Oral daily  folic acid 1 milliGRAM(s) Oral daily  gabapentin 600 milliGRAM(s) Oral every 8 hours  influenza   Vaccine 0.5 milliLiter(s) IntraMuscular once  linezolid  IVPB      linezolid  IVPB 600 milliGRAM(s) IV Intermittent every 12 hours  methylPREDNISolone sodium succinate Injectable 40 milliGRAM(s) IV Push every 8 hours  nystatin Powder 1 Application(s) Topical two times a day  ondansetron Injectable 4 milliGRAM(s) IV Push every 6 hours PRN  oxycodone    5 mG/acetaminophen 325 mG 2 Tablet(s) Oral every 4 hours PRN  pantoprazole  Injectable 40 milliGRAM(s) IV Push daily  phenylephrine    Infusion 1 MICROgram(s)/kG/Min IV Continuous <Continuous>  polyethylene glycol 3350 17 Gram(s) Oral two times a day    Drug Dosing Weight  Height (cm): 154.9 (19 Sep 2020 08:00)  Weight (kg): 84.8 (21 Sep 2020 09:15)  BMI (kg/m2): 35.3 (21 Sep 2020 09:15)  BSA (m2): 1.84 (21 Sep 2020 09:15)    CENTRAL LINE: [ ] YES [ ] NO  LOCATION:   DATE INSERTED:  REMOVE: [ ] YES [ ] NO  EXPLAIN:    ROBLEDO: [ ] YES [ ] NO    DATE INSERTED:  REMOVE:  [ ] YES [ ] NO  EXPLAIN:    A-LINE:  [ ] YES [ ] NO  LOCATION:   DATE INSERTED:  REMOVE:  [ ] YES [ ] NO  EXPLAIN:    [facility] -reviewed admission note, no change since admission  PAST MEDICAL & SURGICAL HISTORY:  Malignant neoplasm of unspecified part of right bronchus or lung    HTN (hypertension)    DM (diabetes mellitus)    COPD (chronic obstructive pulmonary disease)    Lung cancer    Morbid obesity    GERD (gastroesophageal reflux disease)    Deviated septum    Prolapsed uterus    ITP (idiopathic thrombocytopenic purpura)    Emphysema/COPD    History of cholecystectomy    S/P lobectomy of lung  right 2017    History of tonsillectomy        ICU Vital Signs Last 24 Hrs  T(C): 37.1 (22 Sep 2020 05:15), Max: 38.1 (21 Sep 2020 10:00)  T(F): 98.8 (22 Sep 2020 05:15), Max: 100.5 (21 Sep 2020 10:00)  HR: 70 (22 Sep 2020 08:00) (60 - 102)  BP: 134/85 (22 Sep 2020 08:00) (75/47 - 213/160)  BP(mean): 96 (22 Sep 2020 08:00) (48 - 173)  ABP: --  ABP(mean): --  RR: 14 (22 Sep 2020 08:00) (13 - 26)  SpO2: 98% (22 Sep 2020 08:00) (87% - 100%)      ABG - ( 22 Sep 2020 03:27 )  pH, Arterial: 7.50  pH, Blood: x     /  pCO2: 42    /  pO2: 122   / HCO3: 33    / Base Excess: 8.7   /  SaO2: 99                    09-21 @ 07:01  -  09-22 @ 07:00  --------------------------------------------------------  IN: 1522.5 mL / OUT: 2410 mL / NET: -887.5 mL        Mode: AC/ CMV (Assist Control/ Continuous Mandatory Ventilation)  RR (machine): 14  TV (machine): 400  FiO2: 50  PEEP: 5  ITime: 1  MAP: 11  PIP: 20      PHYSICAL EXAM:    GENERAL: [ ]NAD, [ ]well-groomed, [ ]well-developed  HEAD:  [ ]Atraumatic, [ ]Normocephalic  EYES: [ ]EOMI, [ ]PERRLA, [ ]conjunctiva and sclera clear  ENMT: [ ]No tonsillar erythema, exudates, or enlargement; [ ]Moist mucous membranes, [ ]Good dentition, [ ]No lesions  NECK: [ ]Supple, normal appearance, [ ]No JVD; [ ]Normal thyroid; [ ]Trachea midline  NERVOUS SYSTEM:  [ ]Alert & Oriented X3, [ ]Good concentration; [ ]Motor Strength 5/5 B/L upper and lower extremities; [ ]DTRs 2+ intact and symmetric  CHEST/LUNG: [ ]No chest deformity; [ ]Normal percussion bilaterally; [ ]No rales, rhonchi, wheezing   HEART: [ ]Regular rate and rhythm; [ ]No murmurs, rubs, or gallops  ABDOMEN: [ ]Soft, Nontender, Nondistended; [ ]Bowel sounds present  EXTREMITIES:  [ ]2+ Peripheral Pulses, [ ]No clubbing, cyanosis, or edema  LYMPH: [ ]No lymphadenopathy noted  SKIN: [ ]No rashes or lesions; [ ]Good capillary refill      LABS:  CBC Full  -  ( 22 Sep 2020 05:50 )  WBC Count : 6.89 K/uL  RBC Count : 3.57 M/uL  Hemoglobin : 10.6 g/dL  Hematocrit : 31.9 %  Platelet Count - Automated : 71 K/uL  Mean Cell Volume : 89.4 fl  Mean Cell Hemoglobin : 29.7 pg  Mean Cell Hemoglobin Concentration : 33.2 gm/dL  Auto Neutrophil # : x  Auto Lymphocyte # : x  Auto Monocyte # : x  Auto Eosinophil # : x  Auto Basophil # : x  Auto Neutrophil % : x  Auto Lymphocyte % : x  Auto Monocyte % : x  Auto Eosinophil % : x  Auto Basophil % : x    09-22    136  |  96  |  8   ----------------------------<  225<H>  3.5   |  33<H>  |  0.56    Ca    8.2<L>      22 Sep 2020 05:50  Phos  3.3     09-22  Mg     1.8     09-22    TPro  6.0  /  Alb  2.4<L>  /  TBili  1.1  /  DBili  x   /  AST  15  /  ALT  25  /  AlkPhos  106  09-22        Culture Results:   <10,000 CFU/mL Normal Urogenital Shannan (09-19 @ 16:20)  Culture Results:   Growth in anaerobic bottle: Enterococcus faecium  "Due to technical problems, Proteus sp. will Not be reported as part of  the BCID panel until further notice"  ***Blood Panel PCR results on this specimen are available  approximately 3 hours after the Gram stain result.***  Gram stain, PCR, and/or culture results may not always  correspond due to difference in methodologies.  ************************************************************  This PCR assay was performed using [employer].  Thefollowing targets are tested for: Enterococcus,  vancomycin resistant enterococci, Listeria monocytogenes,  coagulase negative staphylococci, S. aureus,  methicillin resistant S. aureus, Streptococcus agalactiae  (Group B), S. pneumoniae, S. pyogenes(Group A),  Acinetobacter baumannii, Enterobacter cloacae, E. coli,  Klebsiella oxytoca, K. pneumoniae, Proteus sp.,  Serratia marcescens, Haemophilus influenzae,  Neisseria meningitidis, Pseudomonas aeruginosa, Candida  albicans, C. glabrata, C krusei, C parapsilosis,  C. tropicalis and the KPC resistance gene. (09-19 @ 11:48)  Culture Results:   No growth to date. (09-19 @ 11:48)      RADIOLOGY & ADDITIONAL STUDIES REVIEWED:  ***    [ ]GOALS OF CARE DISCUSSION WITH PATIENT/FAMILY/PROXY:    CRITICAL CARE TIME SPENT: 35 minutes INTERVAL HPI/OVERNIGHT EVENTS: ***    PRESSORS: [ ] YES [ ] NO  WHICH:    ANTIBIOTICS:                  DATE STARTED:  ANTIBIOTICS:                  DATE STARTED:  ANTIBIOTICS:                  DATE STARTED:    Antimicrobial:  fluconAZOLE   Tablet 100 milliGRAM(s) Oral daily  linezolid  IVPB      linezolid  IVPB 600 milliGRAM(s) IV Intermittent every 12 hours    Cardiovascular:  phenylephrine    Infusion 1 MICROgram(s)/kG/Min IV Continuous <Continuous>    Pulmonary:  ALBUTerol    90 MICROgram(s) HFA Inhaler 2 Puff(s) Inhalation every 4 hours    Hematalogic:  enoxaparin Injectable 40 milliGRAM(s) SubCutaneous daily    Other:  atorvastatin 40 milliGRAM(s) Oral at bedtime  chlorhexidine 0.12% Liquid 15 milliLiter(s) Oral Mucosa every 12 hours  dexMEDEtomidine Infusion 0.2 MICROgram(s)/kG/Hr IV Continuous <Continuous>  ergocalciferol 23206 Unit(s) Oral <User Schedule>  fentaNYL   Infusion 0.5 MICROgram(s)/kG/Hr IV Continuous <Continuous>  folic acid 1 milliGRAM(s) Oral daily  gabapentin 600 milliGRAM(s) Oral every 8 hours  influenza   Vaccine 0.5 milliLiter(s) IntraMuscular once  methylPREDNISolone sodium succinate Injectable 40 milliGRAM(s) IV Push every 8 hours  nystatin Powder 1 Application(s) Topical two times a day  ondansetron Injectable 4 milliGRAM(s) IV Push every 6 hours PRN  oxycodone    5 mG/acetaminophen 325 mG 2 Tablet(s) Oral every 4 hours PRN  pantoprazole  Injectable 40 milliGRAM(s) IV Push daily  polyethylene glycol 3350 17 Gram(s) Oral two times a day    ALBUTerol    90 MICROgram(s) HFA Inhaler 2 Puff(s) Inhalation every 4 hours  atorvastatin 40 milliGRAM(s) Oral at bedtime  chlorhexidine 0.12% Liquid 15 milliLiter(s) Oral Mucosa every 12 hours  dexMEDEtomidine Infusion 0.2 MICROgram(s)/kG/Hr IV Continuous <Continuous>  enoxaparin Injectable 40 milliGRAM(s) SubCutaneous daily  ergocalciferol 67126 Unit(s) Oral <User Schedule>  fentaNYL   Infusion 0.5 MICROgram(s)/kG/Hr IV Continuous <Continuous>  fluconAZOLE   Tablet 100 milliGRAM(s) Oral daily  folic acid 1 milliGRAM(s) Oral daily  gabapentin 600 milliGRAM(s) Oral every 8 hours  influenza   Vaccine 0.5 milliLiter(s) IntraMuscular once  linezolid  IVPB      linezolid  IVPB 600 milliGRAM(s) IV Intermittent every 12 hours  methylPREDNISolone sodium succinate Injectable 40 milliGRAM(s) IV Push every 8 hours  nystatin Powder 1 Application(s) Topical two times a day  ondansetron Injectable 4 milliGRAM(s) IV Push every 6 hours PRN  oxycodone    5 mG/acetaminophen 325 mG 2 Tablet(s) Oral every 4 hours PRN  pantoprazole  Injectable 40 milliGRAM(s) IV Push daily  phenylephrine    Infusion 1 MICROgram(s)/kG/Min IV Continuous <Continuous>  polyethylene glycol 3350 17 Gram(s) Oral two times a day    Drug Dosing Weight  Height (cm): 154.9 (19 Sep 2020 08:00)  Weight (kg): 84.8 (21 Sep 2020 09:15)  BMI (kg/m2): 35.3 (21 Sep 2020 09:15)  BSA (m2): 1.84 (21 Sep 2020 09:15)    CENTRAL LINE: [ ] YES [ ] NO  LOCATION:   DATE INSERTED:  REMOVE: [ ] YES [ ] NO  EXPLAIN:    ROBLEDO: [ ] YES [ ] NO    DATE INSERTED:  REMOVE:  [ ] YES [ ] NO  EXPLAIN:    A-LINE:  [ ] YES [ ] NO  LOCATION:   DATE INSERTED:  REMOVE:  [ ] YES [ ] NO  EXPLAIN:    [facility] -reviewed admission note, no change since admission  PAST MEDICAL & SURGICAL HISTORY:  Malignant neoplasm of unspecified part of right bronchus or lung    HTN (hypertension)    DM (diabetes mellitus)    COPD (chronic obstructive pulmonary disease)    Lung cancer    Morbid obesity    GERD (gastroesophageal reflux disease)    Deviated septum    Prolapsed uterus    ITP (idiopathic thrombocytopenic purpura)    Emphysema/COPD    History of cholecystectomy    S/P lobectomy of lung  right 2017    History of tonsillectomy        ICU Vital Signs Last 24 Hrs  T(C): 37.1 (22 Sep 2020 05:15), Max: 38.1 (21 Sep 2020 10:00)  T(F): 98.8 (22 Sep 2020 05:15), Max: 100.5 (21 Sep 2020 10:00)  HR: 70 (22 Sep 2020 08:00) (60 - 102)  BP: 134/85 (22 Sep 2020 08:00) (75/47 - 213/160)  BP(mean): 96 (22 Sep 2020 08:00) (48 - 173)  ABP: --  ABP(mean): --  RR: 14 (22 Sep 2020 08:00) (13 - 26)  SpO2: 98% (22 Sep 2020 08:00) (87% - 100%)      ABG - ( 22 Sep 2020 03:27 )  pH, Arterial: 7.50  pH, Blood: x     /  pCO2: 42    /  pO2: 122   / HCO3: 33    / Base Excess: 8.7   /  SaO2: 99                    09-21 @ 07:01  -  09-22 @ 07:00  --------------------------------------------------------  IN: 1522.5 mL / OUT: 2410 mL / NET: -887.5 mL        Mode: AC/ CMV (Assist Control/ Continuous Mandatory Ventilation)  RR (machine): 14  TV (machine): 400  FiO2: 50  PEEP: 5  ITime: 1  MAP: 11  PIP: 20      PHYSICAL EXAM:    GENERAL: [ ]NAD, [ ]well-groomed, [x ]well-developed  + ETT  HEAD:  [ ]Atraumatic, [ ]Normocephalic  EYES: [ ]EOMI, [ ]PERRLA, [ ]conjunctiva and sclera clear  ENMT: [ ]No tonsillar erythema, exudates, or enlargement; [ ]Moist mucous membranes, [ ]Good dentition, [ ]No lesions  + ETT  NECK: [ ]Supple, normal appearance, [ ]No JVD; [ ]Normal thyroid; [ ]Trachea midline  NERVOUS SYSTEM:  [ ]Alert & Oriented X3, [ ]Good concentration; [ ]Motor Strength 5/5 B/L upper and lower extremities; [ ]DTRs 2+ intact and symmetric sedated, open eyes to voice   CHEST/LUNG: [ ]No chest deformity; [ ]Normal percussion bilaterally; [ ]No rales, rhonchi, wheezing   HEART: [ ]Regular rate and rhythm; [ ]No murmurs, rubs, or gallops  ABDOMEN: [ ]Soft, Nontender, Nondistended; [ ]Bowel sounds present  EXTREMITIES:  [ ]2+ Peripheral Pulses, [ ]No clubbing, cyanosis, or edema  LYMPH: [ ]No lymphadenopathy noted  SKIN: [ ]No rashes or lesions; [ ]Good capillary refill      LABS:  CBC Full  -  ( 22 Sep 2020 05:50 )  WBC Count : 6.89 K/uL  RBC Count : 3.57 M/uL  Hemoglobin : 10.6 g/dL  Hematocrit : 31.9 %  Platelet Count - Automated : 71 K/uL  Mean Cell Volume : 89.4 fl  Mean Cell Hemoglobin : 29.7 pg  Mean Cell Hemoglobin Concentration : 33.2 gm/dL  Auto Neutrophil # : x  Auto Lymphocyte # : x  Auto Monocyte # : x  Auto Eosinophil # : x  Auto Basophil # : x  Auto Neutrophil % : x  Auto Lymphocyte % : x  Auto Monocyte % : x  Auto Eosinophil % : x  Auto Basophil % : x    09-22    136  |  96  |  8   ----------------------------<  225<H>  3.5   |  33<H>  |  0.56    Ca    8.2<L>      22 Sep 2020 05:50  Phos  3.3     09-22  Mg     1.8     09-22    TPro  6.0  /  Alb  2.4<L>  /  TBili  1.1  /  DBili  x   /  AST  15  /  ALT  25  /  AlkPhos  106  09-22        Culture Results:   <10,000 CFU/mL Normal Urogenital Shannan (09-19 @ 16:20)  Culture Results:   Growth in anaerobic bottle: Enterococcus faecium  "Due to technical problems, Proteus sp. will Not be reported as part of  the BCID panel until further notice"  ***Blood Panel PCR results on this specimen are available  approximately 3 hours after the Gram stain result.***  Gram stain, PCR, and/or culture results may not always  correspond due to difference in methodologies.  ************************************************************  This PCR assay was performed using [employer].  Thefollowing targets are tested for: Enterococcus,  vancomycin resistant enterococci, Listeria monocytogenes,  coagulase negative staphylococci, S. aureus,  methicillin resistant S. aureus, Streptococcus agalactiae  (Group B), S. pneumoniae, S. pyogenes(Group A),  Acinetobacter baumannii, Enterobacter cloacae, E. coli,  Klebsiella oxytoca, K. pneumoniae, Proteus sp.,  Serratia marcescens, Haemophilus influenzae,  Neisseria meningitidis, Pseudomonas aeruginosa, Candida  albicans, C. glabrata, C krusei, C parapsilosis,  C. tropicalis and the KPC resistance gene. (09-19 @ 11:48)  Culture Results:   No growth to date. (09-19 @ 11:48)      RADIOLOGY & ADDITIONAL STUDIES REVIEWED:  ***    CXR:  < from: Xray Chest 1 View- PORTABLE-Routine (Xray Chest 1 View- PORTABLE-Routine in AM.) (09.22.20 @ 08:05) >    EXAM:  XR CHEST PORTABLE ROUTINE 1V                            PROCEDURE DATE:  09/22/2020          INTERPRETATION:  Portable chest x-ray    Indication: Hypokalemia. ICU follow-up.    Portable chest x-ray is compared to a previous examination dated9/21/2020.    Impression: ET tube terminates at the level of the arjun pointing towards the right mainstem bronchus; retraction is recommended.    Previously noted NG tube and left PICC line have been removed.    The left lung apex is obscured by the patient's chin. No gross pneumothorax is identified.    Grossly stable small left pleural effusion; underlying left basilar pulmonary infiltrate cannot be excluded.    Previously noted small right pleural effusion is no longer seen on the current examination. Right basilar pulmonary consolidation versus atelectasis.    Grossly stable cardiac silhouette.    DR. Mirza is informed.              JUAN FRANCISCO HARP M.D., ATTENDING RADIOLOGIST  This document has been electronically signed. Sep 22 2020  9:19AM    < end of copied text >    [ ]GOALS OF CARE DISCUSSION WITH PATIENT/FAMILY/PROXY:    CRITICAL CARE TIME SPENT: 35 minutes INTERVAL HPI/OVERNIGHT EVENTS: Patient started on fentanyl drip last night 2/2 pain and restlessness. She is alert and responding to commands, communicating via a talking board and pen an paper. She complained of having "bad dreams" overnight. patients vitals have been stable. Patient passed SBT and will undergo extubation.     PRESSORS: [ ] YES [x ] NO  WHICH:    ANTIBIOTICS:      Fluconazole            DATE STARTED:   ANTIBIOTICS:      Linezolid            DATE STARTED:  ANTIBIOTICS:                  DATE STARTED:    Antimicrobial:  fluconAZOLE   Tablet 100 milliGRAM(s) Oral daily  linezolid  IVPB      linezolid  IVPB 600 milliGRAM(s) IV Intermittent every 12 hours    Cardiovascular:  phenylephrine    Infusion 1 MICROgram(s)/kG/Min IV Continuous <Continuous>    Pulmonary:  ALBUTerol    90 MICROgram(s) HFA Inhaler 2 Puff(s) Inhalation every 4 hours    Hematalogic:  enoxaparin Injectable 40 milliGRAM(s) SubCutaneous daily    Other:  atorvastatin 40 milliGRAM(s) Oral at bedtime  chlorhexidine 0.12% Liquid 15 milliLiter(s) Oral Mucosa every 12 hours  dexMEDEtomidine Infusion 0.2 MICROgram(s)/kG/Hr IV Continuous <Continuous>  ergocalciferol 08364 Unit(s) Oral <User Schedule>  fentaNYL   Infusion 0.5 MICROgram(s)/kG/Hr IV Continuous <Continuous>  folic acid 1 milliGRAM(s) Oral daily  gabapentin 600 milliGRAM(s) Oral every 8 hours  influenza   Vaccine 0.5 milliLiter(s) IntraMuscular once  methylPREDNISolone sodium succinate Injectable 40 milliGRAM(s) IV Push every 8 hours  nystatin Powder 1 Application(s) Topical two times a day  ondansetron Injectable 4 milliGRAM(s) IV Push every 6 hours PRN  oxycodone    5 mG/acetaminophen 325 mG 2 Tablet(s) Oral every 4 hours PRN  pantoprazole  Injectable 40 milliGRAM(s) IV Push daily  polyethylene glycol 3350 17 Gram(s) Oral two times a day    ALBUTerol    90 MICROgram(s) HFA Inhaler 2 Puff(s) Inhalation every 4 hours  atorvastatin 40 milliGRAM(s) Oral at bedtime  chlorhexidine 0.12% Liquid 15 milliLiter(s) Oral Mucosa every 12 hours  dexMEDEtomidine Infusion 0.2 MICROgram(s)/kG/Hr IV Continuous <Continuous>  enoxaparin Injectable 40 milliGRAM(s) SubCutaneous daily  ergocalciferol 50411 Unit(s) Oral <User Schedule>  fentaNYL   Infusion 0.5 MICROgram(s)/kG/Hr IV Continuous <Continuous>  fluconAZOLE   Tablet 100 milliGRAM(s) Oral daily  folic acid 1 milliGRAM(s) Oral daily  gabapentin 600 milliGRAM(s) Oral every 8 hours  influenza   Vaccine 0.5 milliLiter(s) IntraMuscular once  linezolid  IVPB      linezolid  IVPB 600 milliGRAM(s) IV Intermittent every 12 hours  methylPREDNISolone sodium succinate Injectable 40 milliGRAM(s) IV Push every 8 hours  nystatin Powder 1 Application(s) Topical two times a day  ondansetron Injectable 4 milliGRAM(s) IV Push every 6 hours PRN  oxycodone    5 mG/acetaminophen 325 mG 2 Tablet(s) Oral every 4 hours PRN  pantoprazole  Injectable 40 milliGRAM(s) IV Push daily  phenylephrine    Infusion 1 MICROgram(s)/kG/Min IV Continuous <Continuous>  polyethylene glycol 3350 17 Gram(s) Oral two times a day    Drug Dosing Weight  Height (cm): 154.9 (19 Sep 2020 08:00)  Weight (kg): 84.8 (21 Sep 2020 09:15)  BMI (kg/m2): 35.3 (21 Sep 2020 09:15)  BSA (m2): 1.84 (21 Sep 2020 09:15)    CENTRAL LINE: [ ] YES [ ] NO  LOCATION:   DATE INSERTED:  REMOVE: [ ] YES [ ] NO  EXPLAIN:    ROBLEDO: [ ] YES [ ] NO    DATE INSERTED:  REMOVE:  [ ] YES [ ] NO  EXPLAIN:    A-LINE:  [ ] YES [ ] NO  LOCATION:   DATE INSERTED:  REMOVE:  [ ] YES [ ] NO  EXPLAIN:    [facility] -reviewed admission note, no change since admission  PAST MEDICAL & SURGICAL HISTORY:  Malignant neoplasm of unspecified part of right bronchus or lung    HTN (hypertension)    DM (diabetes mellitus)    COPD (chronic obstructive pulmonary disease)    Lung cancer    Morbid obesity    GERD (gastroesophageal reflux disease)    Deviated septum    Prolapsed uterus    ITP (idiopathic thrombocytopenic purpura)    Emphysema/COPD    History of cholecystectomy    S/P lobectomy of lung  right 2017    History of tonsillectomy        ICU Vital Signs Last 24 Hrs  T(C): 37.1 (22 Sep 2020 05:15), Max: 38.1 (21 Sep 2020 10:00)  T(F): 98.8 (22 Sep 2020 05:15), Max: 100.5 (21 Sep 2020 10:00)  HR: 70 (22 Sep 2020 08:00) (60 - 102)  BP: 134/85 (22 Sep 2020 08:00) (75/47 - 213/160)  BP(mean): 96 (22 Sep 2020 08:00) (48 - 173)  ABP: --  ABP(mean): --  RR: 14 (22 Sep 2020 08:00) (13 - 26)  SpO2: 98% (22 Sep 2020 08:00) (87% - 100%)      ABG - ( 22 Sep 2020 03:27 )  pH, Arterial: 7.50  pH, Blood: x     /  pCO2: 42    /  pO2: 122   / HCO3: 33    / Base Excess: 8.7   /  SaO2: 99                    09-21 @ 07:01  -  09-22 @ 07:00  --------------------------------------------------------  IN: 1522.5 mL / OUT: 2410 mL / NET: -887.5 mL        Mode: AC/ CMV (Assist Control/ Continuous Mandatory Ventilation)  RR (machine): 14  TV (machine): 400  FiO2: 50  PEEP: 5  ITime: 1  MAP: 11  PIP: 20      PHYSICAL EXAM:    GENERAL: [ ]NAD, [ ]well-groomed, [x ]well-developed  + ETT  HEAD:  [ ]Atraumatic, [ ]Normocephalic  EYES: [ ]EOMI, [ ]PERRLA, [ ]conjunctiva and sclera clear  ENMT: [ ]No tonsillar erythema, exudates, or enlargement; [ ]Moist mucous membranes, [ ]Good dentition, [ ]No lesions  + ETT  NECK: [ ]Supple, normal appearance, [ ]No JVD; [ ]Normal thyroid; [ ]Trachea midline  NERVOUS SYSTEM:  [ ]Alert & Oriented X3, [ ]Good concentration; [ ]Motor Strength 5/5 B/L upper and lower extremities; [ ]DTRs 2+ intact and symmetric sedated, open eyes to voice   CHEST/LUNG: [ ]No chest deformity; [ ]Normal percussion bilaterally; [ ]No rales, rhonchi, wheezing   HEART: [ ]Regular rate and rhythm; [ ]No murmurs, rubs, or gallops  ABDOMEN: [ ]Soft, Nontender, Nondistended; [ ]Bowel sounds present  EXTREMITIES:  [ ]2+ Peripheral Pulses, [ ]No clubbing, cyanosis, or edema  LYMPH: [ ]No lymphadenopathy noted  SKIN: [ ]No rashes or lesions; [ ]Good capillary refill      LABS:  CBC Full  -  ( 22 Sep 2020 05:50 )  WBC Count : 6.89 K/uL  RBC Count : 3.57 M/uL  Hemoglobin : 10.6 g/dL  Hematocrit : 31.9 %  Platelet Count - Automated : 71 K/uL  Mean Cell Volume : 89.4 fl  Mean Cell Hemoglobin : 29.7 pg  Mean Cell Hemoglobin Concentration : 33.2 gm/dL  Auto Neutrophil # : x  Auto Lymphocyte # : x  Auto Monocyte # : x  Auto Eosinophil # : x  Auto Basophil # : x  Auto Neutrophil % : x  Auto Lymphocyte % : x  Auto Monocyte % : x  Auto Eosinophil % : x  Auto Basophil % : x    09-22    136  |  96  |  8   ----------------------------<  225<H>  3.5   |  33<H>  |  0.56    Ca    8.2<L>      22 Sep 2020 05:50  Phos  3.3     09-22  Mg     1.8     09-22    TPro  6.0  /  Alb  2.4<L>  /  TBili  1.1  /  DBili  x   /  AST  15  /  ALT  25  /  AlkPhos  106  09-22        Culture Results:   <10,000 CFU/mL Normal Urogenital Shannan (09-19 @ 16:20)  Culture Results:   Growth in anaerobic bottle: Enterococcus faecium  "Due to technical problems, Proteus sp. will Not be reported as part of  the BCID panel until further notice"  ***Blood Panel PCR results on this specimen are available  approximately 3 hours after the Gram stain result.***  Gram stain, PCR, and/or culture results may not always  correspond due to difference in methodologies.  ************************************************************  This PCR assay was performed using [employer].  Thefollowing targets are tested for: Enterococcus,  vancomycin resistant enterococci, Listeria monocytogenes,  coagulase negative staphylococci, S. aureus,  methicillin resistant S. aureus, Streptococcus agalactiae  (Group B), S. pneumoniae, S. pyogenes(Group A),  Acinetobacter baumannii, Enterobacter cloacae, E. coli,  Klebsiella oxytoca, K. pneumoniae, Proteus sp.,  Serratia marcescens, Haemophilus influenzae,  Neisseria meningitidis, Pseudomonas aeruginosa, Candida  albicans, C. glabrata, C krusei, C parapsilosis,  C. tropicalis and the KPC resistance gene. (09-19 @ 11:48)  Culture Results:   No growth to date. (09-19 @ 11:48)      RADIOLOGY & ADDITIONAL STUDIES REVIEWED:  ***    CXR:  < from: Xray Chest 1 View- PORTABLE-Routine (Xray Chest 1 View- PORTABLE-Routine in AM.) (09.22.20 @ 08:05) >    EXAM:  XR CHEST PORTABLE ROUTINE 1V                            PROCEDURE DATE:  09/22/2020          INTERPRETATION:  Portable chest x-ray    Indication: Hypokalemia. ICU follow-up.    Portable chest x-ray is compared to a previous examination dated9/21/2020.    Impression: ET tube terminates at the level of the arjun pointing towards the right mainstem bronchus; retraction is recommended.    Previously noted NG tube and left PICC line have been removed.    The left lung apex is obscured by the patient's chin. No gross pneumothorax is identified.    Grossly stable small left pleural effusion; underlying left basilar pulmonary infiltrate cannot be excluded.    Previously noted small right pleural effusion is no longer seen on the current examination. Right basilar pulmonary consolidation versus atelectasis.    Grossly stable cardiac silhouette.    DR. Mirza is informed.              JUAN FRANCISCO HARP M.D., ATTENDING RADIOLOGIST  This document has been electronically signed. Sep 22 2020  9:19AM    < end of copied text >    [ ]GOALS OF CARE DISCUSSION WITH PATIENT/FAMILY/PROXY:    CRITICAL CARE TIME SPENT: 35 minutes INTERVAL HPI/OVERNIGHT EVENTS: Patient started on fentanyl drip last night 2/2 pain and restlessness. She is alert and responding to commands, communicating via a talking board and pen an paper. She complained of having "bad dreams" overnight. patients vitals have been stable. Patient passed SBT and will undergo extubation.     PRESSORS: [ ] YES [x ] NO  WHICH:    ANTIBIOTICS:      Fluconazole            DATE STARTED:   ANTIBIOTICS:      Linezolid            DATE STARTED:  ANTIBIOTICS:                  DATE STARTED:    Antimicrobial:  fluconAZOLE   Tablet 100 milliGRAM(s) Oral daily  linezolid  IVPB      linezolid  IVPB 600 milliGRAM(s) IV Intermittent every 12 hours    Cardiovascular:  phenylephrine    Infusion 1 MICROgram(s)/kG/Min IV Continuous <Continuous>    Pulmonary:  ALBUTerol    90 MICROgram(s) HFA Inhaler 2 Puff(s) Inhalation every 4 hours    Hematalogic:  enoxaparin Injectable 40 milliGRAM(s) SubCutaneous daily    Other:  atorvastatin 40 milliGRAM(s) Oral at bedtime  chlorhexidine 0.12% Liquid 15 milliLiter(s) Oral Mucosa every 12 hours  dexMEDEtomidine Infusion 0.2 MICROgram(s)/kG/Hr IV Continuous <Continuous>  ergocalciferol 07961 Unit(s) Oral <User Schedule>  fentaNYL   Infusion 0.5 MICROgram(s)/kG/Hr IV Continuous <Continuous>  folic acid 1 milliGRAM(s) Oral daily  gabapentin 600 milliGRAM(s) Oral every 8 hours  influenza   Vaccine 0.5 milliLiter(s) IntraMuscular once  methylPREDNISolone sodium succinate Injectable 40 milliGRAM(s) IV Push every 8 hours  nystatin Powder 1 Application(s) Topical two times a day  ondansetron Injectable 4 milliGRAM(s) IV Push every 6 hours PRN  oxycodone    5 mG/acetaminophen 325 mG 2 Tablet(s) Oral every 4 hours PRN  pantoprazole  Injectable 40 milliGRAM(s) IV Push daily  polyethylene glycol 3350 17 Gram(s) Oral two times a day    ALBUTerol    90 MICROgram(s) HFA Inhaler 2 Puff(s) Inhalation every 4 hours  atorvastatin 40 milliGRAM(s) Oral at bedtime  chlorhexidine 0.12% Liquid 15 milliLiter(s) Oral Mucosa every 12 hours  dexMEDEtomidine Infusion 0.2 MICROgram(s)/kG/Hr IV Continuous <Continuous>  enoxaparin Injectable 40 milliGRAM(s) SubCutaneous daily  ergocalciferol 38870 Unit(s) Oral <User Schedule>  fentaNYL   Infusion 0.5 MICROgram(s)/kG/Hr IV Continuous <Continuous>  fluconAZOLE   Tablet 100 milliGRAM(s) Oral daily  folic acid 1 milliGRAM(s) Oral daily  gabapentin 600 milliGRAM(s) Oral every 8 hours  influenza   Vaccine 0.5 milliLiter(s) IntraMuscular once  linezolid  IVPB      linezolid  IVPB 600 milliGRAM(s) IV Intermittent every 12 hours  methylPREDNISolone sodium succinate Injectable 40 milliGRAM(s) IV Push every 8 hours  nystatin Powder 1 Application(s) Topical two times a day  ondansetron Injectable 4 milliGRAM(s) IV Push every 6 hours PRN  oxycodone    5 mG/acetaminophen 325 mG 2 Tablet(s) Oral every 4 hours PRN  pantoprazole  Injectable 40 milliGRAM(s) IV Push daily  phenylephrine    Infusion 1 MICROgram(s)/kG/Min IV Continuous <Continuous>  polyethylene glycol 3350 17 Gram(s) Oral two times a day    Drug Dosing Weight  Height (cm): 154.9 (19 Sep 2020 08:00)  Weight (kg): 84.8 (21 Sep 2020 09:15)  BMI (kg/m2): 35.3 (21 Sep 2020 09:15)  BSA (m2): 1.84 (21 Sep 2020 09:15)    CENTRAL LINE: [ ] YES [ ] NO  LOCATION:   DATE INSERTED:  REMOVE: [ ] YES [ ] NO  EXPLAIN:    ROBLEDO: [ ] YES [ ] NO    DATE INSERTED:  REMOVE:  [ ] YES [ ] NO  EXPLAIN:    A-LINE:  [ ] YES [ ] NO  LOCATION:   DATE INSERTED:  REMOVE:  [ ] YES [ ] NO  EXPLAIN:    [facility] -reviewed admission note, no change since admission  PAST MEDICAL & SURGICAL HISTORY:  Malignant neoplasm of unspecified part of right bronchus or lung    HTN (hypertension)    DM (diabetes mellitus)    COPD (chronic obstructive pulmonary disease)    Lung cancer    Morbid obesity    GERD (gastroesophageal reflux disease)    Deviated septum    Prolapsed uterus    ITP (idiopathic thrombocytopenic purpura)    Emphysema/COPD    History of cholecystectomy    S/P lobectomy of lung  right 2017    History of tonsillectomy        ICU Vital Signs Last 24 Hrs  T(C): 37.1 (22 Sep 2020 05:15), Max: 38.1 (21 Sep 2020 10:00)  T(F): 98.8 (22 Sep 2020 05:15), Max: 100.5 (21 Sep 2020 10:00)  HR: 70 (22 Sep 2020 08:00) (60 - 102)  BP: 134/85 (22 Sep 2020 08:00) (75/47 - 213/160)  BP(mean): 96 (22 Sep 2020 08:00) (48 - 173)  ABP: --  ABP(mean): --  RR: 14 (22 Sep 2020 08:00) (13 - 26)  SpO2: 98% (22 Sep 2020 08:00) (87% - 100%)      ABG - ( 22 Sep 2020 03:27 )  pH, Arterial: 7.50  pH, Blood: x     /  pCO2: 42    /  pO2: 122   / HCO3: 33    / Base Excess: 8.7   /  SaO2: 99                    09-21 @ 07:01  -  09-22 @ 07:00  --------------------------------------------------------  IN: 1522.5 mL / OUT: 2410 mL / NET: -887.5 mL        Mode: AC/ CMV (Assist Control/ Continuous Mandatory Ventilation)  RR (machine): 14  TV (machine): 400  FiO2: 50  PEEP: 5  ITime: 1  MAP: 11  PIP: 20      PHYSICAL EXAM:    GENERAL: NAD, + ETT, restless at times  HEAD:  Atraumatic, normocephalic  EYES: EOMI, PERRLA, conjunctiva and sclera clear  ENMT: + ETT, drooling noted, no lesions appreciated   NECK: Supple, normal appearance, No JVD; Trachea midline  NERVOUS SYSTEM:  Alert & Oriented X3, communicating via writing; DTRs 2+ intact and symmetric sedated, open eyes to voice, moving all extremities.   CHEST/LUNG: Good air entry bilaterally, clear to auscultation, no rales, wheezing, or crackles noted  HEART: Regular rate and rhythm; No murmurs, rubs, or gallops, loud s1, s2  ABDOMEN: [ ]Soft, Nontender, Nondistended; [ ]Bowel sounds present  EXTREMITIES:  2+ Peripheral Pulses, edema no upper arms noted  SKIN: multiple bruises noted on upper extremities bilaterally.       LABS:  CBC Full  -  ( 22 Sep 2020 05:50 )  WBC Count : 6.89 K/uL  RBC Count : 3.57 M/uL  Hemoglobin : 10.6 g/dL  Hematocrit : 31.9 %  Platelet Count - Automated : 71 K/uL  Mean Cell Volume : 89.4 fl  Mean Cell Hemoglobin : 29.7 pg  Mean Cell Hemoglobin Concentration : 33.2 gm/dL  Auto Neutrophil # : x  Auto Lymphocyte # : x  Auto Monocyte # : x  Auto Eosinophil # : x  Auto Basophil # : x  Auto Neutrophil % : x  Auto Lymphocyte % : x  Auto Monocyte % : x  Auto Eosinophil % : x  Auto Basophil % : x    09-22    136  |  96  |  8   ----------------------------<  225<H>  3.5   |  33<H>  |  0.56    Ca    8.2<L>      22 Sep 2020 05:50  Phos  3.3     09-22  Mg     1.8     09-22    TPro  6.0  /  Alb  2.4<L>  /  TBili  1.1  /  DBili  x   /  AST  15  /  ALT  25  /  AlkPhos  106  09-22        Culture Results:   <10,000 CFU/mL Normal Urogenital Shannan (09-19 @ 16:20)  Culture Results:   Growth in anaerobic bottle: Enterococcus faecium  "Due to technical problems, Proteus sp. will Not be reported as part of  the BCID panel until further notice"  ***Blood Panel PCR results on this specimen are available  approximately 3 hours after the Gram stain result.***  Gram stain, PCR, and/or culture results may not always  correspond due to difference in methodologies.  ************************************************************  This PCR assay was performed using [employer].  Thefollowing targets are tested for: Enterococcus,  vancomycin resistant enterococci, Listeria monocytogenes,  coagulase negative staphylococci, S. aureus,  methicillin resistant S. aureus, Streptococcus agalactiae  (Group B), S. pneumoniae, S. pyogenes(Group A),  Acinetobacter baumannii, Enterobacter cloacae, E. coli,  Klebsiella oxytoca, K. pneumoniae, Proteus sp.,  Serratia marcescens, Haemophilus influenzae,  Neisseria meningitidis, Pseudomonas aeruginosa, Candida  albicans, C. glabrata, C krusei, C parapsilosis,  C. tropicalis and the KPC resistance gene. (09-19 @ 11:48)  Culture Results:   No growth to date. (09-19 @ 11:48)      RADIOLOGY & ADDITIONAL STUDIES REVIEWED:  ***    CXR:  < from: Xray Chest 1 View- PORTABLE-Routine (Xray Chest 1 View- PORTABLE-Routine in AM.) (09.22.20 @ 08:05) >    EXAM:  XR CHEST PORTABLE ROUTINE 1V                            PROCEDURE DATE:  09/22/2020          INTERPRETATION:  Portable chest x-ray    Indication: Hypokalemia. ICU follow-up.    Portable chest x-ray is compared to a previous examination dated9/21/2020.    Impression: ET tube terminates at the level of the arjun pointing towards the right mainstem bronchus; retraction is recommended.    Previously noted NG tube and left PICC line have been removed.    The left lung apex is obscured by the patient's chin. No gross pneumothorax is identified.    Grossly stable small left pleural effusion; underlying left basilar pulmonary infiltrate cannot be excluded.    Previously noted small right pleural effusion is no longer seen on the current examination. Right basilar pulmonary consolidation versus atelectasis.    Grossly stable cardiac silhouette.    DR. Mirza is informed.              JUAN FRANCISCO HARP M.D., ATTENDING RADIOLOGIST  This document has been electronically signed. Sep 22 2020  9:19AM    < end of copied text >    [ ]GOALS OF CARE DISCUSSION WITH PATIENT/FAMILY/PROXY:    CRITICAL CARE TIME SPENT: 35 minutes

## 2020-09-22 NOTE — PHYSICAL THERAPY INITIAL EVALUATION ADULT - LIVES WITH, PROFILE
pt repots she lives with her boyfriend in an apartment; pt was drowsy and did not respond to all questions; As per Care coordinator assessment pt lives in a 4th floor walk up

## 2020-09-22 NOTE — AIRWAY REMOVAL NOTE  ADULT & PEDS - ARTIFICAL AIRWAY REMOVAL COMMENTS
pt was weaned and extubated.  RR15, HR75, O2sat.97%. no resp. distress noted. continue close monitoring

## 2020-09-22 NOTE — PHYSICAL THERAPY INITIAL EVALUATION ADULT - PATIENT/FAMILY/SIGNIFICANT OTHER GOALS STATEMENT, PT EVAL
As per care coordinator assessment, daughter requesting rehab placement due to pts decline in ability to take care of herself

## 2020-09-22 NOTE — PROGRESS NOTE ADULT - ASSESSMENT
65 years old obese female , active smoker with COPD (on 5L oxygen at home). Patient was hypoxic, tachycardiac and tachypneic. Code Rapid response was called. RRT team arrived on scene. Patient has encephalopathy from hypoxia with wheezing and stridor on examination. Patient was tachycardiac to 140/min. Patient was hypoxic and started on non-rebreather but patient has increased work of breathing. Decision was made to intubate the patient. Patient become hypotensive post intubation and was given LR bolus and started on peripheral phenylephrine.    Assessment:  1. Acute Respiratory failure secondary to COPD exacerbation.  2. COPD.  3. Lung cancer with suspected metastasis on active chemotherapy.  4. Acute Encephalopathy.  5. HTN.  6. DM.  7. Active Smoker.      CNS:  #Acute Encephalopathy:  likely hypoxic.  Patient was intubated for work of breathing, hypoxia, tachypnea and tachycardia.  Sedated with propofol.      CVS:  #Hypotension  Patient was hypotensive after intubation.  likely medication induced.  Started on peripheral phenylephrine.  Will keep MAP>65 mmHg.    #History of HTN:  Patient was on amlodipine and carvedilol.  on hold for hypotension.  Will monitor blood pressure.      Respiratory:  #Acute hypoxic Respiratory Failure Secondary to COPD exacerbation.  Patient was intubated for work of breathing, hypoxia, tachypnea and tachycardia.  Will f/u post intubation ABG and chest X-ray.    #COPD exacerbation:  [facility] on 5 L oxygen at home.  started on IV solumedrol.  Albuterol inhaler q 6 hourly.    #Lung CA:  R lung cancer s/p resection (2017)   Patient was on chemo q 3 weeks with concern for mets to spine and compression fractures.  Left arm midline for chemotherapy  Hemtology/oncology Dr Mendiola on case.      GI:  NPO except medication.  IV protonix.  NGT placed.  f/u chest Xray for confirmation      Nephrology:  No active issue.  monitor BUN; creatinine.  f/u serum electrolytes.      ID:  #VRE bacteremia:  in 1/4 bottles.  will cover with Zyvox until repeat cultures are back.  ID conuslt dr bales.      Dermatology:  No skin breakdown.  dry skin.      Endocrinology:  #Diabetes Mellitis:  patient was hypoglycemic in am.  Discontinued lantus for now.  restart as needed.  Will continue insulin HSS for now.  monitor blood glucose level.    Goals of care:  Full code.  Spoke with family regarding goals of care.  Palliative care follow up as patient has metastatic lung cancer and advanced COPD.    Disposition:  ICU   65 years old obese female , active smoker with COPD (on 5L oxygen at home). Patient was hypoxic, tachycardiac and tachypneic. Code Rapid response was called. RRT team arrived on scene. Patient has encephalopathy from hypoxia with wheezing and stridor on examination. Patient was tachycardiac to 140/min. Patient was hypoxic and started on non-rebreather but patient has increased work of breathing. Decision was made to intubate the patient. Patient become hypotensive post intubation and was given LR bolus and started on peripheral phenylephrine.    Assessment:  1. Acute Respiratory failure secondary to COPD exacerbation.  2. COPD.  3. Lung cancer with suspected metastasis on active chemotherapy.  4. Acute Encephalopathy.  5. HTN.  6. DM.  7. Active Smoker.      CNS:  #Acute Encephalopathy: Resolved  - likely hypoxic.  - Patient was intubated for work of breathing, hypoxia, tachypnea and tachycardia.  - S/p Sedated with propofol.  - on Fentanyl and Predexa for sedation while intubated  - patient awake and alert, using writing for communication.       CVS:  #Hypotension  - Patient was hypotensive after intubation.  - likely medication induced.  - S/p phenylephrine, now stopped  -  MAP maintaining >65 mmHg.    #History of HTN:  - Patient was on amlodipine and carvedilol.  - on hold for hypotension.  - Will monitor blood pressure.      Respiratory:  #Acute hypoxic Respiratory Failure Secondary to COPD exacerbation.  - Patient was intubated for work of breathing, hypoxia, tachypnea and tachycardia.  - ABG showing mild respiratory alkalosis, Hypercapnia resolved  - patient will be extubated today.     #COPD exacerbation:  - Active smoker with multiple COPD exacerbations hospital addmisssons in past, on 5 L oxygen at home.  - started on IV solumedrol.  - Albuterol inhaler q 6 hourly.    #Lung CA:  - R lung cancer s/p resection (2017)   - Patient was on chemo q 3 weeks with concern for mets to spine and compression fractures.  - Left arm midline for chemotherapy  - Hemtology/oncology Dr Mendiola on case.      GI:  - NPO except medication.  - IV protonix.        Nephrology:  - No active issue.  - monitor BUN; creatinine.  - K and magnesium replaced       ID:  #VRE bacteremia:  - in 1/4 bottles.  - will cover with Zyvox until repeat cultures are back.  - will repeat blood cultures tomorrow.   - ID conuslt dr bales.      Dermatology:  - No skin breakdown.  - dry skin.      Endocrinology:  #Diabetes Mellitis:  - patient was hypoglycemic in am.  - Discontinued lantus for now.  - restart as needed.  - Will continue insulin HSS for now.  - monitor blood glucose level.    Goals of care:  - Full code.  - Spoke with family regarding goals of care.  - Palliative care follow up as patient has metastatic lung cancer and advanced COPD.    Disposition:  - ICU   65 years old obese female , active smoker with COPD (on 5L oxygen at home). Patient was hypoxic, tachycardiac and tachypneic. Code Rapid response was called. RRT team arrived on scene. Patient has encephalopathy from hypoxia with wheezing and stridor on examination. Patient was tachycardiac to 140/min. Patient was hypoxic and started on non-rebreather but patient has increased work of breathing. Decision was made to intubate the patient. Patient become hypotensive post intubation and was given LR bolus and started on peripheral phenylephrine.    Assessment:  1. Acute Respiratory failure secondary to COPD exacerbation.  2. COPD.  3. Lung cancer with suspected metastasis on active chemotherapy.  4. Acute Encephalopathy.  5. HTN.  6. DM.  7. Active Smoker.      CNS:  #Acute Encephalopathy: Resolved  - likely hypoxic.  - Patient was intubated for work of breathing, hypoxia, tachypnea and tachycardia.  - S/p Sedated with propofol.  - was on Fentanyl and Predexa for sedation while intubated  - Tapering off Fentanyl,  - Continue predexa.   - patient awake and alert, using writing for communication.       CVS:  #Hypotension  - Patient was hypotensive after intubation.  - likely medication induced.  - S/p phenylephrine, now stopped  -  MAP maintaining >65 mmHg.    #History of HTN:  - Patient was on amlodipine and carvedilol.  - on hold for hypotension.  - Will monitor blood pressure.      Respiratory:  #Acute hypoxic Respiratory Failure Secondary to COPD exacerbation.  - Patient was intubated for work of breathing, hypoxia, tachypnea and tachycardia.  - ABG showing mild respiratory alkalosis, Hypercapnia resolved  - patient will be extubated today.     #COPD exacerbation:  - Active smoker with multiple COPD exacerbations [facility] in past, on 5 L oxygen at home.  - started on IV solumedrol.  - Albuterol inhaler q 6 hourly.    #Lung CA:  - R lung cancer s/p resection (2017)   - Patient was on chemo q 3 weeks with concern for mets to spine and compression fractures.  - Left arm midline for chemotherapy  - Hemtology/oncology Dr Mendiola on case.      GI:  - NPO except medication.  - IV protonix.        Nephrology:  - No active issue.  - monitor BUN; creatinine.  - K and magnesium replaced       ID:  #VRE bacteremia:  - in 1/4 bottles.  - will cover with Zyvox until repeat cultures are back.  - D/C PICC line on L arm   - will repeat blood cultures tomorrow.   - ID samir bales.      Dermatology:  - No skin breakdown.  - dry skin.      Endocrinology:  #Diabetes Mellitis:  - patient was hypoglycemic in am.  - Discontinued lantus for now.  - restart as needed.  - Will continue insulin HSS for now.  - monitor blood glucose level.    Goals of care:  - Full code.  - Spoke with family regarding goals of care.  - Palliative care follow up as patient has metastatic lung cancer and advanced COPD.    Disposition:  - ICU

## 2020-09-22 NOTE — PROGRESS NOTE ADULT - SUBJECTIVE AND OBJECTIVE BOX
awake now  lucid  still on vent    MEDICATIONS  (STANDING):  ALBUTerol    90 MICROgram(s) HFA Inhaler 2 Puff(s) Inhalation every 4 hours  atorvastatin 40 milliGRAM(s) Oral at bedtime  chlorhexidine 0.12% Liquid 15 milliLiter(s) Oral Mucosa every 12 hours  dexMEDEtomidine Infusion 0.2 MICROgram(s)/kG/Hr (4.24 mL/Hr) IV Continuous <Continuous>  enoxaparin Injectable 40 milliGRAM(s) SubCutaneous daily  ergocalciferol 34122 Unit(s) Oral <User Schedule>  fentaNYL   Infusion 0.5 MICROgram(s)/kG/Hr (4.24 mL/Hr) IV Continuous <Continuous>  fluconAZOLE   Tablet 100 milliGRAM(s) Oral daily  folic acid 1 milliGRAM(s) Oral daily  gabapentin 600 milliGRAM(s) Oral every 8 hours  influenza   Vaccine 0.5 milliLiter(s) IntraMuscular once  linezolid  IVPB      linezolid  IVPB 600 milliGRAM(s) IV Intermittent every 12 hours  methylPREDNISolone sodium succinate Injectable 40 milliGRAM(s) IV Push every 8 hours  nystatin Powder 1 Application(s) Topical two times a day  pantoprazole  Injectable 40 milliGRAM(s) IV Push daily  phenylephrine    Infusion 1 MICROgram(s)/kG/Min (32.3 mL/Hr) IV Continuous <Continuous>  polyethylene glycol 3350 17 Gram(s) Oral two times a day    MEDICATIONS  (PRN):  ondansetron Injectable 4 milliGRAM(s) IV Push every 6 hours PRN Nausea and/or Vomiting  oxycodone    5 mG/acetaminophen 325 mG 2 Tablet(s) Oral every 4 hours PRN Moderate Pain (4 - 6)      Allergies    fish (Angioedema)  penicillins (Rash)    Intolerances        Vital Signs Last 24 Hrs  T(C): 37.1 (22 Sep 2020 05:15), Max: 38.1 (21 Sep 2020 10:00)  T(F): 98.8 (22 Sep 2020 05:15), Max: 100.5 (21 Sep 2020 10:00)  HR: 74 (22 Sep 2020 08:30) (60 - 102)  BP: 128/76 (22 Sep 2020 08:30) (75/47 - 213/160)  BP(mean): 89 (22 Sep 2020 08:30) (48 - 173)  RR: 15 (22 Sep 2020 08:30) (13 - 26)  SpO2: 95% (22 Sep 2020 08:30) (87% - 100%)    PHYSICAL EXAM  General: adult in NAD  HEENT: clear oropharynx, anicteric sclera, pink conjunctiva  Neck: supple  CV: normal S1/S2 with no murmur rubs or gallops  Lungs: positive air movement b/l ant lungs,clear to auscultation, no wheezes, no rales  Abdomen: soft non-tender non-distended, no hepatosplenomegaly  Ext: no clubbing cyanosis or edema  Skin: no rashes and no petechiae  Neuro: alert and oriented X 4, no focal deficits  LABS:                          10.6   6.89  )-----------( 71       ( 22 Sep 2020 05:50 )             31.9         Mean Cell Volume : 89.4 fl  Mean Cell Hemoglobin : 29.7 pg  Mean Cell Hemoglobin Concentration : 33.2 gm/dL  Auto Neutrophil # : 6.20 K/uL  Auto Lymphocyte # : 0.34 K/uL  Auto Monocyte # : 0.34 K/uL  Auto Eosinophil # : 0.00 K/uL  Auto Basophil # : 0.00 K/uL  Auto Neutrophil % : 87.0 %  Auto Lymphocyte % : 5.0 %  Auto Monocyte % : 5.0 %  Auto Eosinophil % : 0.0 %  Auto Basophil % : 0.0 %    Serial CBC  Hematocrit 31.9  Hemoglobin 10.6  Plat 71  RBC 3.57  WBC 6.89  Serial CBC  Hematocrit 31.5  Hemoglobin 11.2  Plat 94  RBC 3.56  WBC 8.01  Serial CBC  Hematocrit 34.4  Hemoglobin 11.7  Plat 78  RBC 3.79  WBC 10.53  Serial CBC  Hematocrit 33.0  Hemoglobin 10.9  Plat 71  RBC 3.68  WBC 6.94    09-22    136  |  96  |  8   ----------------------------<  225<H>  3.5   |  33<H>  |  0.56    Ca    8.2<L>      22 Sep 2020 05:50  Phos  3.3     09-22  Mg     1.8     09-22    TPro  6.0  /  Alb  2.4<L>  /  TBili  1.1  /  DBili  x   /  AST  15  /  ALT  25  /  AlkPhos  106  09-22          Folate, Serum: >20.0 ng/mL (09-20 @ 11:36)  Vitamin B12, Serum: >2000 pg/mL (09-20 @ 11:36)            BLOOD SMEAR INTERPRETATION:       RADIOLOGY & ADDITIONAL STUDIES:

## 2020-09-22 NOTE — PROGRESS NOTE ADULT - ASSESSMENT
· Assessment	  65 year old lady with severe COPD and lung ca with mets to bones which causing compression Fx.  She had one RT for that.  she was admitted for dyspnea and hypoxia.    Problem/Recommendation - 1:  Problem: Lung cancer. Recommendation: with mets to bones  on chemo heytruda, alimta and carboplatin  she had one RT to spine.  the tumor markers have been trending down with chemo    Problem/Recommendation - 2:  ·  Problem: COPD (chronic obstructive pulmonary disease).  Recommendation: required freq steroids.     Problem/Recommendation - 3:  ·  Problem: Acute on chronic respiratory failure with hypoxia.  Recommendation: ?aspiration causing resp failure requiring intubation  she has pain at throat for a while and has difficulty swallowing  now intubated  prognosis poor.   she is lucid now  and express wishes to continue chemo

## 2020-09-22 NOTE — CONSULT NOTE ADULT - SUBJECTIVE AND OBJECTIVE BOX
HPI:  64 yo obese F former heavy smoker (80 pack years) with, HTN, DM2 on insulin, COPD on 5L (frequent admissions), R lung cancer s/p resection (2017) on chemo q 3 weeks with concern for mets to spine and compression fractures, recent admission for COPD exacerbation earlier in August presents with SOB, hypoxia and increased sputum production x 1 day. Patient states cough feels like secretion is coming out but unable to expectorate. Patient is on 5L of oxygen at home. As per EMS, patient is hypoxic at 84% on room air which came up to 98% with 4L of nasal canula. Patient was recently in august treated with prednisone, nebulizer treatments and completed a course of azithromycin for copd exacerbation. Denies nausea, vomiting, diarrhea, abdominal pain, fever, chills.   (19 Sep 2020 14:07)      PAST MEDICAL & SURGICAL HISTORY:  Malignant neoplasm of unspecified part of right bronchus or lung    HTN (hypertension)    DM (diabetes mellitus)    COPD (chronic obstructive pulmonary disease)    Lung cancer    Morbid obesity    GERD (gastroesophageal reflux disease)    Deviated septum    Prolapsed uterus    ITP (idiopathic thrombocytopenic purpura)    Emphysema/COPD    History of cholecystectomy    S/P lobectomy of lung  right 2017    History of tonsillectomy        fish (Angioedema)  penicillins (Rash)      Meds:  ALBUTerol    90 MICROgram(s) HFA Inhaler 2 Puff(s) Inhalation every 4 hours  atorvastatin 40 milliGRAM(s) Oral at bedtime  chlorhexidine 0.12% Liquid 15 milliLiter(s) Oral Mucosa every 12 hours  dexMEDEtomidine Infusion 0.2 MICROgram(s)/kG/Hr IV Continuous <Continuous>  enoxaparin Injectable 40 milliGRAM(s) SubCutaneous daily  ergocalciferol 56551 Unit(s) Oral <User Schedule>  fluconAZOLE   Tablet 100 milliGRAM(s) Oral daily  folic acid 1 milliGRAM(s) Oral daily  gabapentin 600 milliGRAM(s) Oral every 8 hours  influenza   Vaccine 0.5 milliLiter(s) IntraMuscular once  linezolid  IVPB      linezolid  IVPB 600 milliGRAM(s) IV Intermittent every 12 hours  magnesium sulfate  IVPB 1 Gram(s) IV Intermittent once  methylPREDNISolone sodium succinate Injectable 40 milliGRAM(s) IV Push every 8 hours  morphine  - Injectable 2 milliGRAM(s) IV Push every 4 hours  nystatin Powder 1 Application(s) Topical two times a day  ondansetron Injectable 4 milliGRAM(s) IV Push every 6 hours PRN  oxycodone    5 mG/acetaminophen 325 mG 2 Tablet(s) Oral every 4 hours PRN  pantoprazole  Injectable 40 milliGRAM(s) IV Push daily  phenylephrine    Infusion 1 MICROgram(s)/kG/Min IV Continuous <Continuous>  polyethylene glycol 3350 17 Gram(s) Oral two times a day  potassium chloride   Powder 40 milliEquivalent(s) Oral every 4 hours      SOCIAL HISTORY:  Smoker:  ex smoker  ETOH use:  unknown      FAMILY HISTORY:  FH: lung cancer    Family history of stroke    Family history of lung cancer    Diabetes mellitus        VITALS:  Vital Signs Last 24 Hrs  T(C): 37.1 (22 Sep 2020 09:00), Max: 37.1 (21 Sep 2020 23:30)  T(F): 98.7 (22 Sep 2020 09:00), Max: 98.8 (22 Sep 2020 05:15)  HR: 73 (22 Sep 2020 10:00) (60 - 99)  BP: 139/78 (22 Sep 2020 10:00) (83/61 - 213/160)  BP(mean): 91 (22 Sep 2020 10:00) (65 - 173)  RR: 14 (22 Sep 2020 10:00) (13 - 25)  SpO2: 100% (22 Sep 2020 10:00) (87% - 100%)    LABS/DIAGNOSTIC TESTS:                          10.6   6.89  )-----------( 71       ( 22 Sep 2020 05:50 )             31.9     WBC Count: 6.89 K/uL (09-22 @ 05:50)  WBC Count: 8.01 K/uL (09-21 @ 06:05)  WBC Count: 10.53 K/uL (09-20 @ 06:29)      09-22    136  |  96  |  8   ----------------------------<  225<H>  3.5   |  33<H>  |  0.56    Ca    8.2<L>      22 Sep 2020 05:50  Phos  3.3     09-22  Mg     1.8     09-22    TPro  6.0  /  Alb  2.4<L>  /  TBili  1.1  /  DBili  x   /  AST  15  /  ALT  25  /  AlkPhos  106  09-22          LIVER FUNCTIONS - ( 22 Sep 2020 05:50 )  Alb: 2.4 g/dL / Pro: 6.0 g/dL / ALK PHOS: 106 U/L / ALT: 25 U/L DA / AST: 15 U/L / GGT: x                 LACTATE:    ABG - ABG - ( 22 Sep 2020 03:27 )  pH, Arterial: 7.50  pH, Blood: x     /  pCO2: 42    /  pO2: 122   / HCO3: 33    / Base Excess: 8.7   /  SaO2: 99                  CULTURES:   .Urine Clean Catch (Midstream)  09-19 @ 16:20   <10,000 CFU/mL Normal Urogenital Shannan  --  --      .Blood Blood-Peripheral  09-19 @ 11:48   Growth in anaerobic bottle: Enterococcus faecium (vancomycin resistant)                  RADIOLOGY:< from: Xray Chest 1 View- PORTABLE-Routine (Xray Chest 1 View- PORTABLE-Routine in AM.) (09.22.20 @ 08:05) >  EXAM:  XR CHEST PORTABLE ROUTINE 1V                            PROCEDURE DATE:  09/22/2020          INTERPRETATION:  Portable chest x-ray    Indication: Hypokalemia. ICU follow-up.    Portable chest x-ray is compared to a previous examination dated9/21/2020.    Impression: ET tube terminates at the level of the arjun pointing towards the right mainstem bronchus; retraction is recommended.    Previously noted NG tube and left PICC line have been removed.    The left lung apex is obscured by the patient's chin. No gross pneumothorax is identified.    Grossly stable small left pleural effusion; underlying left basilar pulmonary infiltrate cannot be excluded.    Previously noted small right pleural effusion is no longer seen on the current examination. Right basilar pulmonary consolidation versus atelectasis.    Grossly stable cardiac silhouette.    DR. Mirza is informed.              JUAN FRANCISCO HARP M.D., ATTENDING RADIOLOGIST  This document has been electronically signed. Sep 22 2020  9:19AM    ---------------------------------------------------------------------------------------------------------------------------------------------------------------------------------------------------  EXAM:  CT ANGIO CHEST (W)AW IC                            PROCEDURE DATE:  09/19/2020          INTERPRETATION:  CLINICAL INFORMATION: Shortness of breath, history of lung cancer and prior lobectomy    COMPARISON: CT chest 8/27/2020    PROCEDURE:  CT Angiography of the Chest.  90 ml of Omnipaque 350 was injected intravenously. 10 ml were discarded.  Sagittal and coronal reformats were performed as well as 3D (MIP) reconstructions.    FINDINGS:    LUNGS AND AIRWAYS: Small mucosal debris within the distal posterior tracheal wall extending into the left mainstem bronchus. Minimal bronchiectasis. Subsegmental consolidative changes within the lingula compatible with atelectasis and or scarring. Diffuse severe extensive centrilobular emphysema. Minimal bibasilar atelectasis. Minimal biapical pleural parenchymal scarring. Postsurgical changes along the superior aspect of the right lower lobe with adjacent cysts linear scarring unchanged. No new segmental consolidations. No discrete pulmonary nodules.  PLEURA: No pleural effusion.  MEDIASTINUM AND GUILLE: 2 cm right hilar lymph node unchanged. Surgical clips in the subcarinal region. Additional subcentimeter mediastinal lymph nodes.  VESSELS: No evidence of acute pulmonary embolism. No aortic aneurysm. Coronary vascular calcification.  HEART: Heart size is normal. Trace pericardial effusion.  CHEST WALL AND LOWER NECK: Within normal limits.  VISUALIZED UPPER ABDOMEN: Within normal limits.  BONES: Lytic and blastic lesions again noted throughout the visualized vertebral bodies and bilateral ribs. Severe compression deformity of L1 again seen with stable retropulsion.. Superior endplate deformity of T12 slightly more prominent. Severe compression deformity of T7 again seen. Bilateral ribfractures again identified.    IMPRESSION:  No evidence of acute pulmonary embolism.    Subsegmental atelectasis and/or scarring within the lingula.    Trace small secretions in the inferior trachea extending into the left mainstem bronchus.    Osseous metastasis and associated compression fractures and rib fractures unchanged..              NANCY ENAMORADO M.D., ATTENDING RADIOLOGIST  This document has been electronically signed. Sep 19 2020 10:42AM    < end of copied text >        ROS  [  ] UNABLE TO ELICIT               HPI:  66 yo obese F former heavy smoker (80 pack years) with, HTN, DM2 on insulin, COPD on 5L (frequent admissions), R lung cancer s/p resection (2017) on chemo q 3 weeks with concern for mets to spine and compression fractures, recent admission for COPD exacerbation earlier in August presents with SOB, hypoxia and increased sputum production x 1 day. Patient states cough feels like secretion is coming out but unable to expectorate. Patient is on 5L of oxygen at home. As per EMS, patient is hypoxic at 84% on room air which came up to 98% with 4L of nasal canula. Patient was recently in august treated with prednisone, nebulizer treatments and completed a course of azithromycin for copd exacerbation. Denies nausea, vomiting, diarrhea, abdominal pain, fever, chills.   (19 Sep 2020 14:07)      history as above, pt is in the ICU, she is vent dependent and intubated and is currently agitated and keeps getting upset and writing down that she wants to go home. She is not answering any of my questions. She presented to the hospital with sob but was found to be Bacteremic secondary to a PICC line infection. Her PICC line has been removed. she is growing out VRE in her blood cultures.    PAST MEDICAL & SURGICAL HISTORY:  Malignant neoplasm of unspecified part of right bronchus or lung    HTN (hypertension)    DM (diabetes mellitus)    COPD (chronic obstructive pulmonary disease)    Lung cancer    Morbid obesity    GERD (gastroesophageal reflux disease)    Deviated septum    Prolapsed uterus    ITP (idiopathic thrombocytopenic purpura)    Emphysema/COPD    History of cholecystectomy    S/P lobectomy of lung  right 2017    History of tonsillectomy        fish (Angioedema)  penicillins (Rash)      Meds:  ALBUTerol    90 MICROgram(s) HFA Inhaler 2 Puff(s) Inhalation every 4 hours  atorvastatin 40 milliGRAM(s) Oral at bedtime  chlorhexidine 0.12% Liquid 15 milliLiter(s) Oral Mucosa every 12 hours  dexMEDEtomidine Infusion 0.2 MICROgram(s)/kG/Hr IV Continuous <Continuous>  enoxaparin Injectable 40 milliGRAM(s) SubCutaneous daily  ergocalciferol 65169 Unit(s) Oral <User Schedule>  fluconAZOLE   Tablet 100 milliGRAM(s) Oral daily  folic acid 1 milliGRAM(s) Oral daily  gabapentin 600 milliGRAM(s) Oral every 8 hours  influenza   Vaccine 0.5 milliLiter(s) IntraMuscular once  linezolid  IVPB      linezolid  IVPB 600 milliGRAM(s) IV Intermittent every 12 hours  magnesium sulfate  IVPB 1 Gram(s) IV Intermittent once  methylPREDNISolone sodium succinate Injectable 40 milliGRAM(s) IV Push every 8 hours  morphine  - Injectable 2 milliGRAM(s) IV Push every 4 hours  nystatin Powder 1 Application(s) Topical two times a day  ondansetron Injectable 4 milliGRAM(s) IV Push every 6 hours PRN  oxycodone    5 mG/acetaminophen 325 mG 2 Tablet(s) Oral every 4 hours PRN  pantoprazole  Injectable 40 milliGRAM(s) IV Push daily  phenylephrine    Infusion 1 MICROgram(s)/kG/Min IV Continuous <Continuous>  polyethylene glycol 3350 17 Gram(s) Oral two times a day  potassium chloride   Powder 40 milliEquivalent(s) Oral every 4 hours      SOCIAL HISTORY:  Smoker:  ex smoker  ETOH use:  unknown      FAMILY HISTORY:  FH: lung cancer    Family history of stroke    Family history of lung cancer    Diabetes mellitus        VITALS:  Vital Signs Last 24 Hrs  T(C): 37.1 (22 Sep 2020 09:00), Max: 37.1 (21 Sep 2020 23:30)  T(F): 98.7 (22 Sep 2020 09:00), Max: 98.8 (22 Sep 2020 05:15)  HR: 73 (22 Sep 2020 10:00) (60 - 99)  BP: 139/78 (22 Sep 2020 10:00) (83/61 - 213/160)  BP(mean): 91 (22 Sep 2020 10:00) (65 - 173)  RR: 14 (22 Sep 2020 10:00) (13 - 25)  SpO2: 100% (22 Sep 2020 10:00) (87% - 100%)    LABS/DIAGNOSTIC TESTS:                          10.6   6.89  )-----------( 71       ( 22 Sep 2020 05:50 )             31.9     WBC Count: 6.89 K/uL (09-22 @ 05:50)  WBC Count: 8.01 K/uL (09-21 @ 06:05)  WBC Count: 10.53 K/uL (09-20 @ 06:29)      09-22    136  |  96  |  8   ----------------------------<  225<H>  3.5   |  33<H>  |  0.56    Ca    8.2<L>      22 Sep 2020 05:50  Phos  3.3     09-22  Mg     1.8     09-22    TPro  6.0  /  Alb  2.4<L>  /  TBili  1.1  /  DBili  x   /  AST  15  /  ALT  25  /  AlkPhos  106  09-22          LIVER FUNCTIONS - ( 22 Sep 2020 05:50 )  Alb: 2.4 g/dL / Pro: 6.0 g/dL / ALK PHOS: 106 U/L / ALT: 25 U/L DA / AST: 15 U/L / GGT: x                 LACTATE:    ABG - ABG - ( 22 Sep 2020 03:27 )  pH, Arterial: 7.50  pH, Blood: x     /  pCO2: 42    /  pO2: 122   / HCO3: 33    / Base Excess: 8.7   /  SaO2: 99                  CULTURES:   .Urine Clean Catch (Midstream)  09-19 @ 16:20   <10,000 CFU/mL Normal Urogenital Shannan  --  --      .Blood Blood-Peripheral  09-19 @ 11:48   Growth in anaerobic bottle: Enterococcus faecium (vancomycin resistant)                  RADIOLOGY:< from: Xray Chest 1 View- PORTABLE-Routine (Xray Chest 1 View- PORTABLE-Routine in AM.) (09.22.20 @ 08:05) >  EXAM:  XR CHEST PORTABLE ROUTINE 1V                            PROCEDURE DATE:  09/22/2020          INTERPRETATION:  Portable chest x-ray    Indication: Hypokalemia. ICU follow-up.    Portable chest x-ray is compared to a previous examination dated9/21/2020.    Impression: ET tube terminates at the level of the arjun pointing towards the right mainstem bronchus; retraction is recommended.    Previously noted NG tube and left PICC line have been removed.    The left lung apex is obscured by the patient's chin. No gross pneumothorax is identified.    Grossly stable small left pleural effusion; underlying left basilar pulmonary infiltrate cannot be excluded.    Previously noted small right pleural effusion is no longer seen on the current examination. Right basilar pulmonary consolidation versus atelectasis.    Grossly stable cardiac silhouette.    DR. Mirza is informed.              JUAN FRANCISCO HARP M.D., ATTENDING RADIOLOGIST  This document has been electronically signed. Sep 22 2020  9:19AM    ---------------------------------------------------------------------------------------------------------------------------------------------------------------------------------------------------  EXAM:  CT ANGIO CHEST (W)AW IC                            PROCEDURE DATE:  09/19/2020          INTERPRETATION:  CLINICAL INFORMATION: Shortness of breath, history of lung cancer and prior lobectomy    COMPARISON: CT chest 8/27/2020    PROCEDURE:  CT Angiography of the Chest.  90 ml of Omnipaque 350 was injected intravenously. 10 ml were discarded.  Sagittal and coronal reformats were performed as well as 3D (MIP) reconstructions.    FINDINGS:    LUNGS AND AIRWAYS: Small mucosal debris within the distal posterior tracheal wall extending into the left mainstem bronchus. Minimal bronchiectasis. Subsegmental consolidative changes within the lingula compatible with atelectasis and or scarring. Diffuse severe extensive centrilobular emphysema. Minimal bibasilar atelectasis. Minimal biapical pleural parenchymal scarring. Postsurgical changes along the superior aspect of the right lower lobe with adjacent cysts linear scarring unchanged. No new segmental consolidations. No discrete pulmonary nodules.  PLEURA: No pleural effusion.  MEDIASTINUM AND GUILLE: 2 cm right hilar lymph node unchanged. Surgical clips in the subcarinal region. Additional subcentimeter mediastinal lymph nodes.  VESSELS: No evidence of acute pulmonary embolism. No aortic aneurysm. Coronary vascular calcification.  HEART: Heart size is normal. Trace pericardial effusion.  CHEST WALL AND LOWER NECK: Within normal limits.  VISUALIZED UPPER ABDOMEN: Within normal limits.  BONES: Lytic and blastic lesions again noted throughout the visualized vertebral bodies and bilateral ribs. Severe compression deformity of L1 again seen with stable retropulsion.. Superior endplate deformity of T12 slightly more prominent. Severe compression deformity of T7 again seen. Bilateral ribfractures again identified.    IMPRESSION:  No evidence of acute pulmonary embolism.    Subsegmental atelectasis and/or scarring within the lingula.    Trace small secretions in the inferior trachea extending into the left mainstem bronchus.    Osseous metastasis and associated compression fractures and rib fractures unchanged..              NANCY ENAMORADO M.D., ATTENDING RADIOLOGIST  This document has been electronically signed. Sep 19 2020 10:42AM    < end of copied text >        CRISTINA  [ x ] UNABLE TO ELICIT

## 2020-09-22 NOTE — PROGRESS NOTE ADULT - ASSESSMENT
COPD with acute asthmatic bronchitis with RLL pneumonia with Ac on chr Respiratory Failure   Recurrent Lung cancer with Bone Mets   IDDM   Htn with MR   Thrombocytopenia sec to C/T  Obesity with CRIS   r/o esophageal candidiasis  GPC bacteremia sec to VRE    PLAN- IV steroids 60 mg q 12 h x 24 hrs. then po prednisone 20 mg qd ngt   CMV12/ /30% with f/u ABG and CXR  Sputum and blood c/s   IV Zyvox and add Gram neg coverage  NGT feeds    Symbicort 160/4.5 2 puffs bid on d/c   Duoneb  by HFN rx qid    s/c lovenox  FS coverage   Prognosis guarded, discussed with family , DR ardon and staff   Diflucan 100 mg qd x 1 week   COPD with acute asthmatic bronchitis with RLL pneumonia with Ac on chr Respiratory Failure   Recurrent Lung cancer with Bone Mets   IDDM   Htn with MR   Thrombocytopenia sec to C/T  Obesity with CRIS   r/o esophageal candidiasis  GPC bacteremia sec to VRE    PLAN- IV steroids 60 mg q 12 h x 24 hrs. then po prednisone 20 mg qd ngt   CMV12/ /40% with f/u ABG and CXR   Decrease sedation and CPAP trial am  Sputum and blood c/s   IV Zyvox and add Gram neg coverage  NGT feeds    Symbicort 160/4.5 2 puffs bid on d/c   Duoneb  by HFN rx qid    s/c lovenox  FS coverage   Prognosis guarded, discussed with family , DR ardon and staff   Diflucan 100 mg qd x 1 week

## 2020-09-22 NOTE — PROGRESS NOTE ADULT - SUBJECTIVE AND OBJECTIVE BOX
PULMONARY  progress note    BARB COLÓN  [id number]    Patient is a 65y old  Female who presents with a chief complaint of shortness of breath (22 Sep 2020 08:12)  Pt intubated on CMV14//40% with PP20 and [id number]%  Pt sedated      MEDICATIONS  (STANDING):  ALBUTerol    90 MICROgram(s) HFA Inhaler 2 Puff(s) Inhalation every 4 hours  atorvastatin 40 milliGRAM(s) Oral at bedtime  chlorhexidine 0.12% Liquid 15 milliLiter(s) Oral Mucosa every 12 hours  dexMEDEtomidine Infusion 0.2 MICROgram(s)/kG/Hr (4.24 mL/Hr) IV Continuous <Continuous>  enoxaparin Injectable 40 milliGRAM(s) SubCutaneous daily  ergocalciferol 88632 Unit(s) Oral <User Schedule>  fentaNYL   Infusion 0.5 MICROgram(s)/kG/Hr (4.24 mL/Hr) IV Continuous <Continuous>  fluconAZOLE   Tablet 100 milliGRAM(s) Oral daily  folic acid 1 milliGRAM(s) Oral daily  gabapentin 600 milliGRAM(s) Oral every 8 hours  influenza   Vaccine 0.5 milliLiter(s) IntraMuscular once  linezolid  IVPB      linezolid  IVPB 600 milliGRAM(s) IV Intermittent every 12 hours  methylPREDNISolone sodium succinate Injectable 40 milliGRAM(s) IV Push every 8 hours  nystatin Powder 1 Application(s) Topical two times a day  pantoprazole  Injectable 40 milliGRAM(s) IV Push daily  phenylephrine    Infusion 1 MICROgram(s)/kG/Min (32.3 mL/Hr) IV Continuous <Continuous>  polyethylene glycol 3350 17 Gram(s) Oral two times a day      Allergies    fish (Angioedema)  penicillins (Rash)        PAST MEDICAL & SURGICAL HISTORY:  Malignant neoplasm of unspecified part of right bronchus or lung    HTN (hypertension)    DM (diabetes mellitus)    COPD (chronic obstructive pulmonary disease)    Lung cancer    Morbid obesity    GERD (gastroesophageal reflux disease)    Deviated septum    Prolapsed uterus    ITP (idiopathic thrombocytopenic purpura)    Emphysema/COPD    History of cholecystectomy    S/P lobectomy of lung  right 2017    History of tonsillectomy             REVIEW OF SYSTEMS: as per Staff  CONSTITUTIONAL: No fever, weight loss, or fatigue   EYES: No eye pain, visual disturbances, or discharge  ENT:  No difficulty hearing, tinnitus, vertigo; No sinus or throat pain  NECK: No pain or stiffness or nodes  RESPIRATORY:  cough--   wheezing--   chills--   hemoptysis--  Shortness of Breath--  CARDIOVASCULAR: No chest pain, palpitations, passing out, dizziness, or leg swelling  GASTROINTESTINAL: No abdominal or epigastric pain. No nausea, vomiting, or hematemesis; No diarrhea or constipation. No melena or hematochezia.  LYMPH Nodes: No enlarged glands  HEME/LYMPH: No easy bruising, or bleeding gums  ALLERGY AND IMMUNOLOGIC: No hives or eczema    Vital Signs Last 24 Hrs  T(C): 37.1 (22 Sep 2020 05:15), Max: 38.1 (21 Sep 2020 10:00)  T(F): 98.8 (22 Sep 2020 05:15), Max: 100.5 (21 Sep 2020 10:00)  HR: 74 (22 Sep 2020 08:30) (60 - 102)  BP: 128/76 (22 Sep 2020 08:30) (75/47 - 213/160)  BP(mean): 89 (22 Sep 2020 08:30) (48 - 173)  RR: 15 (22 Sep 2020 08:30) (13 - 26)  SpO2: 95% (22 Sep 2020 08:30) (87% - 100%)  I&O's Detail    21 Sep 2020 07:01  -  22 Sep 2020 07:00  --------------------------------------------------------  IN:    Dexmedetomidine: 161.2 mL    dextrose 5%: 550 mL    FentaNYL: 72 mL    IV PiggyBack: 300 mL    Phenylephrine: 439.3 mL  Total IN: 1522.5 mL    OUT:    Indwelling Catheter - Urethral (mL): 2410 mL  Total OUT: 2410 mL    Total NET: -887.5 mL          PHYSICAL EXAMINATION:    GENERAL: The patient is a well-developed,  obese w/f intubated in no apparent distress.   SKIN no rash ecchymoses or bruises  HEENT: Head is normocephalic and atraumatic  SHAMA , Extraocular muscles are intact. Mucous membranes  moist.   Neck supple ,No LN felt JVP not increased  Thyroid not enlarged  Cardiovascular:  S1 S2 heard, RSR, No JVD , systolic  murmur at apex, No gallop or rub  Respiratory: Chest wall symmetrical with good air entry ,Percussion note normal,    Lungs vesicular breathing with  rt basilar  rales  , no  wheeze	  ABDOMEN:  Soft, Non-tender, obese, no hepatomegaly or splenomegaly BS positive	  Extremities:  No clubbing, cyanosis or edema  Vascular: Peripheral pulses palpable 2+ bilaterally  CNS: Intubated and sedated  motor power5/5  dtr 2+   Babinski neg    LABS:                        10.6   6.89  )-----------( 71       ( 22 Sep 2020 05:50 )             31.9     09-22    136  |  96  |  8   ----------------------------<  225<H>  3.5   |  33<H>  |  0.56    Ca    8.2<L>      22 Sep 2020 05:50  Phos  3.3     09-22  Mg     1.8     09-22    TPro  6.0  /  Alb  2.4<L>  /  TBili  1.1  /  DBili  x   /  AST  15  /  ALT  25  /  AlkPhos  106  09-22        ABG - ( 22 Sep 2020 03:27 )  pH, Arterial: 7.50  pH, Blood: x     /  pCO2: 42    /  pO2: 122   / HCO3: 33    / Base Excess: 8.7   /  SaO2: 99          Serum Pro-Brain Natriuretic Peptide: 162 pg/mL (09-19-20 @ 08:54)  Procalcitonin, Serum: 0.08 ng/mL (09-20-20 @ 11:57)  Folate, Serum: >20.0 ng/mL (09-20-20 @ 11:36)  Vitamin B12, Serum: >2000 pg/mL (09-20-20 @ 11:36)      MICROBIOLOGY:    Culture - Urine (collected 09-19-20 @ 16:20)  Source: .Urine Clean Catch (Midstream)  Final Report (09-20-20 @ 17:00):    <10,000 CFU/mL Normal Urogenital Shannan    Culture - Blood (collected 09-19-20 @ 11:48)  Source: .Blood Blood-Peripheral  Preliminary Report (09-20-20 @ 12:01):    No growth to date.    Culture - Blood (collected 09-19-20 @ 11:48)  Source: .Blood Blood-Peripheral  Gram Stain (09-20-20 @ 02:57):    Growth in anaerobic bottle: Gram positive cocci in pairs  Preliminary Report (09-21-20 @ 09:59):    Growth in anaerobic bottle: Enterococcus faecium    "Due to technical problems, Proteus sp. will Not be reported as part of    the BCID panel until further notice"    ***Blood Panel PCR results on this specimen are available    approximately 3 hours after the Gram stain result.***    Gram stain, PCR, and/or culture results may not always    correspond due to difference in methodologies.    ************************************************************    This PCR assay was performed using [employer].    Thefollowing targets are tested for: Enterococcus,    vancomycin resistant enterococci, Listeria monocytogenes,    coagulase negative staphylococci, S. aureus,    methicillin resistant S. aureus, Streptococcus agalactiae    (Group B), S. pneumoniae, S. pyogenes(Group A),    Acinetobacter baumannii, Enterobacter cloacae, E. coli,    Klebsiella oxytoca, K. pneumoniae, Proteus sp.,    Serratia marcescens, Haemophilus influenzae,    Neisseria meningitidis, Pseudomonas aeruginosa, Candida    albicans, C. glabrata, C krusei, C parapsilosis,    C. tropicalis and the KPC resistance gene.  Organism: Blood Culture PCR (09-20-20 @ 06:01)  Organism: Blood Culture PCR (09-20-20 @ 06:01)      -  Vancomycin resistant Enterococcus sp.: Detec      Method Type: PCR          RADIOLOGY & ADDITIONAL STUDIES:    CXR:< from: Xray Chest 1 View-PORTABLE IMMEDIATE (Xray Chest 1 View-PORTABLE IMMEDIATE .) (09.21.20 @ 11:08) >  Heart is magnified by technique. Clips over the central chest noted. Endotracheal tube remains in good position.    There is a mild right base effusion increased from 9:07 AM.    There is a slight left base process roughly similar to prior.    NG tube tip is seen in the mid upper esophageal area.    Left PICC line remains.    Suture line in the right lower lung field remains.           PULMONARY  progress note    BARB COLÓN  [id number]    Patient is a 65y old  Female who presents with a chief complaint of shortness of breath (22 Sep 2020 08:12)  Pt intubated on CMV14//50% with PP20 and o2sat 99%  Pt sedated      MEDICATIONS  (STANDING):  ALBUTerol    90 MICROgram(s) HFA Inhaler 2 Puff(s) Inhalation every 4 hours  atorvastatin 40 milliGRAM(s) Oral at bedtime  chlorhexidine 0.12% Liquid 15 milliLiter(s) Oral Mucosa every 12 hours  dexMEDEtomidine Infusion 0.2 MICROgram(s)/kG/Hr (4.24 mL/Hr) IV Continuous <Continuous>  enoxaparin Injectable 40 milliGRAM(s) SubCutaneous daily  ergocalciferol 78321 Unit(s) Oral <User Schedule>  fentaNYL   Infusion 0.5 MICROgram(s)/kG/Hr (4.24 mL/Hr) IV Continuous <Continuous>  fluconAZOLE   Tablet 100 milliGRAM(s) Oral daily  folic acid 1 milliGRAM(s) Oral daily  gabapentin 600 milliGRAM(s) Oral every 8 hours  influenza   Vaccine 0.5 milliLiter(s) IntraMuscular once  linezolid  IVPB      linezolid  IVPB 600 milliGRAM(s) IV Intermittent every 12 hours  methylPREDNISolone sodium succinate Injectable 40 milliGRAM(s) IV Push every 8 hours  nystatin Powder 1 Application(s) Topical two times a day  pantoprazole  Injectable 40 milliGRAM(s) IV Push daily  phenylephrine    Infusion 1 MICROgram(s)/kG/Min (32.3 mL/Hr) IV Continuous <Continuous>  polyethylene glycol 3350 17 Gram(s) Oral two times a day      Allergies    fish (Angioedema)  penicillins (Rash)        PAST MEDICAL & SURGICAL HISTORY:  Malignant neoplasm of unspecified part of right bronchus or lung    HTN (hypertension)    DM (diabetes mellitus)    COPD (chronic obstructive pulmonary disease)    Lung cancer    Morbid obesity    GERD (gastroesophageal reflux disease)    Deviated septum    Prolapsed uterus    ITP (idiopathic thrombocytopenic purpura)    Emphysema/COPD    History of cholecystectomy    S/P lobectomy of lung  right 2017    History of tonsillectomy             REVIEW OF SYSTEMS: as per Staff  CONSTITUTIONAL: No fever, weight loss, or fatigue   EYES: No eye pain, visual disturbances, or discharge  ENT:  No difficulty hearing, tinnitus, vertigo; No sinus or throat pain  NECK: No pain or stiffness or nodes  RESPIRATORY:  cough--   wheezing--   chills--   hemoptysis--  Shortness of Breath--  CARDIOVASCULAR: No chest pain, palpitations, passing out, dizziness, or leg swelling  GASTROINTESTINAL: No abdominal or epigastric pain. No nausea, vomiting, or hematemesis; No diarrhea or constipation. No melena or hematochezia.  LYMPH Nodes: No enlarged glands  HEME/LYMPH: No easy bruising, or bleeding gums  ALLERGY AND IMMUNOLOGIC: No hives or eczema    Vital Signs Last 24 Hrs  T(C): 37.1 (22 Sep 2020 05:15), Max: 38.1 (21 Sep 2020 10:00)  T(F): 98.8 (22 Sep 2020 05:15), Max: 100.5 (21 Sep 2020 10:00)  HR: 74 (22 Sep 2020 08:30) (60 - 102)  BP: 128/76 (22 Sep 2020 08:30) (75/47 - 213/160)  BP(mean): 89 (22 Sep 2020 08:30) (48 - 173)  RR: 15 (22 Sep 2020 08:30) (13 - 26)  SpO2: 95% (22 Sep 2020 08:30) (87% - 100%)  I&O's Detail    21 Sep 2020 07:01  -  22 Sep 2020 07:00  --------------------------------------------------------  IN:    Dexmedetomidine: 161.2 mL    dextrose 5%: 550 mL    FentaNYL: 72 mL    IV PiggyBack: 300 mL    Phenylephrine: 439.3 mL  Total IN: 1522.5 mL    OUT:    Indwelling Catheter - Urethral (mL): 2410 mL  Total OUT: 2410 mL    Total NET: -887.5 mL          PHYSICAL EXAMINATION:    GENERAL: The patient is a well-developed,  obese w/f intubated in no apparent distress.   SKIN no rash ecchymoses or bruises  HEENT: Head is normocephalic and atraumatic  SHAMA , Extraocular muscles are intact. Mucous membranes  moist.   Neck supple ,No LN felt JVP not increased  Thyroid not enlarged  Cardiovascular:  S1 S2 heard, RSR, No JVD , systolic  murmur at apex, No gallop or rub  Respiratory: Chest wall symmetrical with good air entry ,Percussion note normal,    Lungs vesicular breathing with  rt basilar  rales  , no  wheeze	  ABDOMEN:  Soft, Non-tender, obese, no hepatomegaly or splenomegaly BS positive	  Extremities:  No clubbing, cyanosis or edema  Vascular: Peripheral pulses palpable 2+ bilaterally  CNS: Intubated and sedated  motor power5/5  dtr 2+   Babinski neg    LABS:                        10.6   6.89  )-----------( 71       ( 22 Sep 2020 05:50 )             31.9     09-22    136  |  96  |  8   ----------------------------<  225<H>  3.5   |  33<H>  |  0.56    Ca    8.2<L>      22 Sep 2020 05:50  Phos  3.3     09-22  Mg     1.8     09-22    TPro  6.0  /  Alb  2.4<L>  /  TBili  1.1  /  DBili  x   /  AST  15  /  ALT  25  /  AlkPhos  106  09-22        ABG - ( 22 Sep 2020 03:27 )  pH, Arterial: 7.50  pH, Blood: x     /  pCO2: 42    /  pO2: 122   / HCO3: 33    / Base Excess: 8.7   /  SaO2: 99          Serum Pro-Brain Natriuretic Peptide: 162 pg/mL (09-19-20 @ 08:54)  Procalcitonin, Serum: 0.08 ng/mL (09-20-20 @ 11:57)  Folate, Serum: >20.0 ng/mL (09-20-20 @ 11:36)  Vitamin B12, Serum: >2000 pg/mL (09-20-20 @ 11:36)      MICROBIOLOGY:    Culture - Urine (collected 09-19-20 @ 16:20)  Source: .Urine Clean Catch (Midstream)  Final Report (09-20-20 @ 17:00):    <10,000 CFU/mL Normal Urogenital Shannan    Culture - Blood (collected 09-19-20 @ 11:48)  Source: .Blood Blood-Peripheral  Preliminary Report (09-20-20 @ 12:01):    No growth to date.    Culture - Blood (collected 09-19-20 @ 11:48)  Source: .Blood Blood-Peripheral  Gram Stain (09-20-20 @ 02:57):    Growth in anaerobic bottle: Gram positive cocci in pairs  Preliminary Report (09-21-20 @ 09:59):    Growth in anaerobic bottle: Enterococcus faecium    "Due to technical problems, Proteus sp. will Not be reported as part of    the BCID panel until further notice"    ***Blood Panel PCR results on this specimen are available    approximately 3 hours after the Gram stain result.***    Gram stain, PCR, and/or culture results may not always    correspond due to difference in methodologies.    ************************************************************    This PCR assay was performed using [employer].    Thefollowing targets are tested for: Enterococcus,    vancomycin resistant enterococci, Listeria monocytogenes,    coagulase negative staphylococci, S. aureus,    methicillin resistant S. aureus, Streptococcus agalactiae    (Group B), S. pneumoniae, S. pyogenes(Group A),    Acinetobacter baumannii, Enterobacter cloacae, E. coli,    Klebsiella oxytoca, K. pneumoniae, Proteus sp.,    Serratia marcescens, Haemophilus influenzae,    Neisseria meningitidis, Pseudomonas aeruginosa, Candida    albicans, C. glabrata, C krusei, C parapsilosis,    C. tropicalis and the KPC resistance gene.  Organism: Blood Culture PCR (09-20-20 @ 06:01)  Organism: Blood Culture PCR (09-20-20 @ 06:01)      -  Vancomycin resistant Enterococcus sp.: Detec      Method Type: PCR          RADIOLOGY & ADDITIONAL STUDIES:    CXR:< from: Xray Chest 1 View-PORTABLE IMMEDIATE (Xray Chest 1 View-PORTABLE IMMEDIATE .) (09.21.20 @ 11:08) >  Heart is magnified by technique. Clips over the central chest noted. Endotracheal tube remains in good position.    There is a mild right base effusion / Infiltrate  There is a slight left base process roughly similar to prior.    NG tube tip is seen in the mid upper esophageal area.    Left PICC line remains. ETT in place    Suture line in the right lower lung field remains.

## 2020-09-22 NOTE — CONSULT NOTE ADULT - GASTROINTESTINAL DETAILS
soft/nontender/no masses palpable/bowel sounds normal/no rigidity/no distention/no guarding/no organomegaly

## 2020-09-22 NOTE — CONSULT NOTE ADULT - ASSESSMENT
Septic Shock  PICC line infection  Bacteremia      Plan - Cont Zyvox 600mgs iv q12 hrs  repeat blood cultures.

## 2020-09-23 DIAGNOSIS — J96.11 CHRONIC RESPIRATORY FAILURE WITH HYPOXIA: ICD-10-CM

## 2020-09-23 LAB
ALBUMIN SERPL ELPH-MCNC: 2.4 G/DL — LOW (ref 3.5–5)
ALP SERPL-CCNC: 98 U/L — SIGNIFICANT CHANGE UP (ref 40–120)
ALT FLD-CCNC: 23 U/L DA — SIGNIFICANT CHANGE UP (ref 10–60)
ANION GAP SERPL CALC-SCNC: 5 MMOL/L — SIGNIFICANT CHANGE UP (ref 5–17)
AST SERPL-CCNC: 13 U/L — SIGNIFICANT CHANGE UP (ref 10–40)
BASOPHILS # BLD AUTO: 0 K/UL — SIGNIFICANT CHANGE UP (ref 0–0.2)
BASOPHILS NFR BLD AUTO: 0 % — SIGNIFICANT CHANGE UP (ref 0–2)
BILIRUB SERPL-MCNC: 1 MG/DL — SIGNIFICANT CHANGE UP (ref 0.2–1.2)
BUN SERPL-MCNC: 13 MG/DL — SIGNIFICANT CHANGE UP (ref 7–18)
CALCIUM SERPL-MCNC: 8.6 MG/DL — SIGNIFICANT CHANGE UP (ref 8.4–10.5)
CHLORIDE SERPL-SCNC: 99 MMOL/L — SIGNIFICANT CHANGE UP (ref 96–108)
CO2 SERPL-SCNC: 32 MMOL/L — HIGH (ref 22–31)
CREAT SERPL-MCNC: 0.48 MG/DL — LOW (ref 0.5–1.3)
EOSINOPHIL # BLD AUTO: 0 K/UL — SIGNIFICANT CHANGE UP (ref 0–0.5)
EOSINOPHIL NFR BLD AUTO: 0 % — SIGNIFICANT CHANGE UP (ref 0–6)
GLUCOSE BLDC GLUCOMTR-MCNC: 250 MG/DL — HIGH (ref 70–99)
GLUCOSE BLDC GLUCOMTR-MCNC: 251 MG/DL — HIGH (ref 70–99)
GLUCOSE BLDC GLUCOMTR-MCNC: 252 MG/DL — HIGH (ref 70–99)
GLUCOSE BLDC GLUCOMTR-MCNC: 355 MG/DL — HIGH (ref 70–99)
GLUCOSE SERPL-MCNC: 232 MG/DL — HIGH (ref 70–99)
HCT VFR BLD CALC: 28.3 % — LOW (ref 34.5–45)
HGB BLD-MCNC: 9.8 G/DL — LOW (ref 11.5–15.5)
IMM GRANULOCYTES NFR BLD AUTO: 0.7 % — SIGNIFICANT CHANGE UP (ref 0–1.5)
LYMPHOCYTES # BLD AUTO: 0.17 K/UL — LOW (ref 1–3.3)
LYMPHOCYTES # BLD AUTO: 2.8 % — LOW (ref 13–44)
MAGNESIUM SERPL-MCNC: 2.1 MG/DL — SIGNIFICANT CHANGE UP (ref 1.6–2.6)
MCHC RBC-ENTMCNC: 30.4 PG — SIGNIFICANT CHANGE UP (ref 27–34)
MCHC RBC-ENTMCNC: 34.6 GM/DL — SIGNIFICANT CHANGE UP (ref 32–36)
MCV RBC AUTO: 87.9 FL — SIGNIFICANT CHANGE UP (ref 80–100)
MONOCYTES # BLD AUTO: 0.22 K/UL — SIGNIFICANT CHANGE UP (ref 0–0.9)
MONOCYTES NFR BLD AUTO: 3.6 % — SIGNIFICANT CHANGE UP (ref 2–14)
MRSA PCR RESULT.: DETECTED
NEUTROPHILS # BLD AUTO: 5.66 K/UL — SIGNIFICANT CHANGE UP (ref 1.8–7.4)
NEUTROPHILS NFR BLD AUTO: 92.9 % — HIGH (ref 43–77)
NRBC # BLD: 0 /100 WBCS — SIGNIFICANT CHANGE UP (ref 0–0)
PHOSPHATE SERPL-MCNC: 2.6 MG/DL — SIGNIFICANT CHANGE UP (ref 2.5–4.5)
PLATELET # BLD AUTO: 66 K/UL — LOW (ref 150–400)
POTASSIUM SERPL-MCNC: 3.1 MMOL/L — LOW (ref 3.5–5.3)
POTASSIUM SERPL-SCNC: 3.1 MMOL/L — LOW (ref 3.5–5.3)
PROT SERPL-MCNC: 6 G/DL — SIGNIFICANT CHANGE UP (ref 6–8.3)
RBC # BLD: 3.22 M/UL — LOW (ref 3.8–5.2)
RBC # FLD: 15.8 % — HIGH (ref 10.3–14.5)
S AUREUS DNA NOSE QL NAA+PROBE: DETECTED
SODIUM SERPL-SCNC: 136 MMOL/L — SIGNIFICANT CHANGE UP (ref 135–145)
WBC # BLD: 6.09 K/UL — SIGNIFICANT CHANGE UP (ref 3.8–10.5)
WBC # FLD AUTO: 6.09 K/UL — SIGNIFICANT CHANGE UP (ref 3.8–10.5)

## 2020-09-23 PROCEDURE — 99497 ADVNCD CARE PLAN 30 MIN: CPT | Mod: 25

## 2020-09-23 PROCEDURE — 71045 X-RAY EXAM CHEST 1 VIEW: CPT | Mod: 26

## 2020-09-23 PROCEDURE — 99233 SBSQ HOSP IP/OBS HIGH 50: CPT

## 2020-09-23 RX ORDER — INSULIN LISPRO 100/ML
VIAL (ML) SUBCUTANEOUS EVERY 6 HOURS
Refills: 0 | Status: DISCONTINUED | OUTPATIENT
Start: 2020-09-23 | End: 2020-09-23

## 2020-09-23 RX ORDER — DIPHENHYDRAMINE HYDROCHLORIDE AND LIDOCAINE HYDROCHLORIDE AND ALUMINUM HYDROXIDE AND MAGNESIUM HYDRO
10 KIT EVERY 8 HOURS
Refills: 0 | Status: DISCONTINUED | OUTPATIENT
Start: 2020-09-23 | End: 2020-09-23

## 2020-09-23 RX ORDER — OXYCODONE AND ACETAMINOPHEN 5; 325 MG/1; MG/1
1 TABLET ORAL ONCE
Refills: 0 | Status: DISCONTINUED | OUTPATIENT
Start: 2020-09-23 | End: 2020-09-23

## 2020-09-23 RX ORDER — CASPOFUNGIN ACETATE 7 MG/ML
INJECTION, POWDER, LYOPHILIZED, FOR SOLUTION INTRAVENOUS
Refills: 0 | Status: DISCONTINUED | OUTPATIENT
Start: 2020-09-23 | End: 2020-10-07

## 2020-09-23 RX ORDER — CASPOFUNGIN ACETATE 7 MG/ML
70 INJECTION, POWDER, LYOPHILIZED, FOR SOLUTION INTRAVENOUS ONCE
Refills: 0 | Status: COMPLETED | OUTPATIENT
Start: 2020-09-23 | End: 2020-09-23

## 2020-09-23 RX ORDER — ZOLPIDEM TARTRATE 10 MG/1
5 TABLET ORAL ONCE
Refills: 0 | Status: DISCONTINUED | OUTPATIENT
Start: 2020-09-23 | End: 2020-09-23

## 2020-09-23 RX ORDER — LINEZOLID 600 MG/300ML
600 INJECTION, SOLUTION INTRAVENOUS ONCE
Refills: 0 | Status: COMPLETED | OUTPATIENT
Start: 2020-09-23 | End: 2020-09-23

## 2020-09-23 RX ORDER — LANOLIN ALCOHOL/MO/W.PET/CERES
3 CREAM (GRAM) TOPICAL ONCE
Refills: 0 | Status: DISCONTINUED | OUTPATIENT
Start: 2020-09-23 | End: 2020-09-23

## 2020-09-23 RX ORDER — DIPHENHYDRAMINE HYDROCHLORIDE AND LIDOCAINE HYDROCHLORIDE AND ALUMINUM HYDROXIDE AND MAGNESIUM HYDRO
10 KIT EVERY 8 HOURS
Refills: 0 | Status: DISCONTINUED | OUTPATIENT
Start: 2020-09-23 | End: 2020-10-07

## 2020-09-23 RX ORDER — INSULIN LISPRO 100/ML
3 VIAL (ML) SUBCUTANEOUS ONCE
Refills: 0 | Status: COMPLETED | OUTPATIENT
Start: 2020-09-23 | End: 2020-09-23

## 2020-09-23 RX ORDER — POTASSIUM CHLORIDE 20 MEQ
40 PACKET (EA) ORAL
Refills: 0 | Status: COMPLETED | OUTPATIENT
Start: 2020-09-23 | End: 2020-09-23

## 2020-09-23 RX ORDER — INSULIN LISPRO 100/ML
VIAL (ML) SUBCUTANEOUS
Refills: 0 | Status: DISCONTINUED | OUTPATIENT
Start: 2020-09-23 | End: 2020-09-27

## 2020-09-23 RX ORDER — CASPOFUNGIN ACETATE 7 MG/ML
50 INJECTION, POWDER, LYOPHILIZED, FOR SOLUTION INTRAVENOUS ONCE
Refills: 0 | Status: DISCONTINUED | OUTPATIENT
Start: 2020-09-23 | End: 2020-09-23

## 2020-09-23 RX ORDER — CASPOFUNGIN ACETATE 7 MG/ML
50 INJECTION, POWDER, LYOPHILIZED, FOR SOLUTION INTRAVENOUS EVERY 24 HOURS
Refills: 0 | Status: DISCONTINUED | OUTPATIENT
Start: 2020-09-24 | End: 2020-10-07

## 2020-09-23 RX ORDER — CARVEDILOL PHOSPHATE 80 MG/1
6.25 CAPSULE, EXTENDED RELEASE ORAL EVERY 12 HOURS
Refills: 0 | Status: DISCONTINUED | OUTPATIENT
Start: 2020-09-23 | End: 2020-10-07

## 2020-09-23 RX ORDER — SENNA PLUS 8.6 MG/1
2 TABLET ORAL AT BEDTIME
Refills: 0 | Status: DISCONTINUED | OUTPATIENT
Start: 2020-09-23 | End: 2020-10-08

## 2020-09-23 RX ORDER — NYSTATIN 500MM UNIT
500000 POWDER (EA) MISCELLANEOUS EVERY 6 HOURS
Refills: 0 | Status: DISCONTINUED | OUTPATIENT
Start: 2020-09-23 | End: 2020-09-23

## 2020-09-23 RX ADMIN — ALBUTEROL 2 PUFF(S): 90 AEROSOL, METERED ORAL at 22:06

## 2020-09-23 RX ADMIN — Medication 500000 UNIT(S): at 11:15

## 2020-09-23 RX ADMIN — ALBUTEROL 2 PUFF(S): 90 AEROSOL, METERED ORAL at 11:16

## 2020-09-23 RX ADMIN — NYSTATIN CREAM 1 APPLICATION(S): 100000 CREAM TOPICAL at 17:36

## 2020-09-23 RX ADMIN — NYSTATIN CREAM 1 APPLICATION(S): 100000 CREAM TOPICAL at 06:21

## 2020-09-23 RX ADMIN — ZOLPIDEM TARTRATE 5 MILLIGRAM(S): 10 TABLET ORAL at 00:42

## 2020-09-23 RX ADMIN — ALBUTEROL 2 PUFF(S): 90 AEROSOL, METERED ORAL at 06:22

## 2020-09-23 RX ADMIN — ENOXAPARIN SODIUM 40 MILLIGRAM(S): 100 INJECTION SUBCUTANEOUS at 11:15

## 2020-09-23 RX ADMIN — CASPOFUNGIN ACETATE 260 MILLIGRAM(S): 7 INJECTION, POWDER, LYOPHILIZED, FOR SOLUTION INTRAVENOUS at 22:36

## 2020-09-23 RX ADMIN — ALBUTEROL 2 PUFF(S): 90 AEROSOL, METERED ORAL at 00:43

## 2020-09-23 RX ADMIN — DIPHENHYDRAMINE HYDROCHLORIDE AND LIDOCAINE HYDROCHLORIDE AND ALUMINUM HYDROXIDE AND MAGNESIUM HYDRO 10 MILLILITER(S): KIT at 22:34

## 2020-09-23 RX ADMIN — OXYCODONE AND ACETAMINOPHEN 1 TABLET(S): 5; 325 TABLET ORAL at 22:55

## 2020-09-23 RX ADMIN — Medication 40 MILLIGRAM(S): at 06:20

## 2020-09-23 RX ADMIN — Medication 40 MILLIEQUIVALENT(S): at 09:34

## 2020-09-23 RX ADMIN — Medication 40 MILLIGRAM(S): at 14:53

## 2020-09-23 RX ADMIN — DIPHENHYDRAMINE HYDROCHLORIDE AND LIDOCAINE HYDROCHLORIDE AND ALUMINUM HYDROXIDE AND MAGNESIUM HYDRO 10 MILLILITER(S): KIT at 14:52

## 2020-09-23 RX ADMIN — ALBUTEROL 2 PUFF(S): 90 AEROSOL, METERED ORAL at 14:53

## 2020-09-23 RX ADMIN — OXYCODONE AND ACETAMINOPHEN 1 TABLET(S): 5; 325 TABLET ORAL at 22:06

## 2020-09-23 RX ADMIN — Medication 40 MILLIEQUIVALENT(S): at 11:15

## 2020-09-23 RX ADMIN — Medication 3 UNIT(S): at 16:50

## 2020-09-23 RX ADMIN — GABAPENTIN 600 MILLIGRAM(S): 400 CAPSULE ORAL at 14:54

## 2020-09-23 RX ADMIN — Medication 1 MILLIGRAM(S): at 11:16

## 2020-09-23 RX ADMIN — ZOLPIDEM TARTRATE 5 MILLIGRAM(S): 10 TABLET ORAL at 22:22

## 2020-09-23 RX ADMIN — ALBUTEROL 2 PUFF(S): 90 AEROSOL, METERED ORAL at 17:36

## 2020-09-23 RX ADMIN — ALBUTEROL 2 PUFF(S): 90 AEROSOL, METERED ORAL at 02:00

## 2020-09-23 RX ADMIN — LINEZOLID 300 MILLIGRAM(S): 600 INJECTION, SOLUTION INTRAVENOUS at 06:21

## 2020-09-23 RX ADMIN — SENNA PLUS 2 TABLET(S): 8.6 TABLET ORAL at 22:22

## 2020-09-23 RX ADMIN — LINEZOLID 300 MILLIGRAM(S): 600 INJECTION, SOLUTION INTRAVENOUS at 22:25

## 2020-09-23 RX ADMIN — Medication 5: at 22:33

## 2020-09-23 RX ADMIN — Medication 40 MILLIGRAM(S): at 22:06

## 2020-09-23 RX ADMIN — Medication 3: at 06:41

## 2020-09-23 RX ADMIN — ATORVASTATIN CALCIUM 40 MILLIGRAM(S): 80 TABLET, FILM COATED ORAL at 22:55

## 2020-09-23 RX ADMIN — Medication 2: at 11:15

## 2020-09-23 RX ADMIN — CARVEDILOL PHOSPHATE 6.25 MILLIGRAM(S): 80 CAPSULE, EXTENDED RELEASE ORAL at 17:36

## 2020-09-23 RX ADMIN — FLUCONAZOLE 100 MILLIGRAM(S): 150 TABLET ORAL at 11:16

## 2020-09-23 NOTE — PROGRESS NOTE ADULT - PROBLEM SELECTOR PLAN 4
See GOC section above. See GOC section above. Patient wishes for daughter/Gavi to be primary HCP and partner/Williams to be alternate agent - HCP form completed as per patient's wishes. MOLST form completed as per patient's wishes: CPR / long term ventilation / long term feeding tube / trial IV fluids / use abx / NO LIMITATION on medical interventions.   Advance Care Planning discussion time: +30 minutes (9:20-9:50am)

## 2020-09-23 NOTE — PROGRESS NOTE ADULT - SUBJECTIVE AND OBJECTIVE BOX
OVERNIGHT EVENTS: patient extubated 9/22; on nasal canula.     Present Symptoms:   Dyspnea: none  Nausea/Vomiting: none  Fatigue: severe  Loss of appetite:   Pain:  chronic back pain reported; no pain this morning  Review of Systems: All others negative     MEDICATIONS  (STANDING):  ALBUTerol    90 MICROgram(s) HFA Inhaler 2 Puff(s) Inhalation every 4 hours  atorvastatin 40 milliGRAM(s) Oral at bedtime  enoxaparin Injectable 40 milliGRAM(s) SubCutaneous daily  ergocalciferol 86371 Unit(s) Oral <User Schedule>  FIRST- Mouthwash  BLM 1 milliLiter(s) Swish and Spit every 8 hours  fluconAZOLE   Tablet 100 milliGRAM(s) Oral daily  folic acid 1 milliGRAM(s) Oral daily  gabapentin 600 milliGRAM(s) Oral every 8 hours  influenza   Vaccine 0.5 milliLiter(s) IntraMuscular once  insulin lispro (HumaLOG) corrective regimen sliding scale   SubCutaneous every 6 hours  linezolid  IVPB      linezolid  IVPB 600 milliGRAM(s) IV Intermittent every 12 hours  methylPREDNISolone sodium succinate Injectable 40 milliGRAM(s) IV Push every 8 hours  nystatin    Suspension 450070 Unit(s) Oral every 6 hours  nystatin Powder 1 Application(s) Topical two times a day  pantoprazole  Injectable 40 milliGRAM(s) IV Push daily  polyethylene glycol 3350 17 Gram(s) Oral two times a day  potassium chloride   Powder 40 milliEquivalent(s) Oral every 2 hours    MEDICATIONS  (PRN):  morphine  - Injectable 2 milliGRAM(s) IV Push every 4 hours PRN Severe Pain (7 - 10)  ondansetron Injectable 4 milliGRAM(s) IV Push every 6 hours PRN Nausea and/or Vomiting  oxycodone    5 mG/acetaminophen 325 mG 2 Tablet(s) Oral every 4 hours PRN Moderate Pain (4 - 6)      PHYSICAL EXAM:  Vital Signs Last 24 Hrs  T(C): 36.9 (23 Sep 2020 06:00), Max: 36.9 (23 Sep 2020 06:00)  T(F): 98.4 (23 Sep 2020 06:00), Max: 98.4 (23 Sep 2020 06:00)  HR: 113 (23 Sep 2020 08:01) (70 - 117)  BP: 158/98 (23 Sep 2020 08:00) (110/67 - 158/98)  BP(mean): 111 (23 Sep 2020 08:00) (76 - 126)  RR: 20 (23 Sep 2020 08:00) (13 - 28)  SpO2: 97% (23 Sep 2020 08:01) (94% - 100%)  General: alert  oriented, verbal, moves extremities/follows commands, on nasal canula. No signs of distress.   Karnofsky Performance Score/Palliative Performance Status Version2:   20%    HEENT:   dry lips   Lungs: comfortable, on nasal canula. No signs of respiratory distress.     CV: S1S2, IRR, + tachycardia  GI: abdomen soft, nontender, + obese, continent   :   benitez  Musculoskeletal: + generalized weakness, follows commands, dependent on ADLs  Skin: BUE edema/ecchymosis  Neuro: no deficits   Oral intake ability: unable/only mouth care   Diet: NPO    LABS:                          9.8    6.09  )-----------( 66       ( 23 Sep 2020 06:37 )             28.3     09-23    136  |  99  |  13  ----------------------------<  232<H>  3.1<L>   |  32<H>  |  0.48<L>    Ca    8.6      23 Sep 2020 06:37  Phos  2.6     09-23  Mg     2.1     09-23    TPro  6.0  /  Alb  2.4<L>  /  TBili  1.0  /  DBili  x   /  AST  13  /  ALT  23  /  AlkPhos  98  09-23    Albumin: 2.4    RADIOLOGY & ADDITIONAL STUDIES:  ches< from: Xray Chest 1 View- PORTABLE-Routine (Xray Chest 1 View- PORTABLE-Routine in AM.) (09.22.20 @ 08:05) >  EXAM:  XR CHEST PORTABLE ROUTINE 1V                        PROCEDURE DATE:  09/22/2020    INTERPRETATION:  Portable chest x-ray  Indication: Hypokalemia. ICU follow-up.  Portable chest x-ray is compared to a previous examination dated9/21/2020.    Impression: ET tube terminates at the level of the arjun pointing towards the right mainstem bronchus; retraction is recommended.  Previously noted NG tube and left PICC line have been removed.  The left lung apex is obscured by the patient's chin. No gross pneumothorax is identified.  Grossly stable small left pleural effusion; underlying left basilar pulmonary infiltrate cannot be excluded.  Previously noted small right pleural effusion is no longer seen on the current examination. Right basilar pulmonary consolidation versus atelectasis.  Grossly stable cardiac silhouette.  < end of copied text >    c< from: Transthoracic Echocardiogram (09.22.20 @ 12:07) >  Patient name: BARB COLÓN  YOB: 1955   Age: 65 (F)   MR#: [id number]  Study Date: 9/22/2020  Location: [place] [id number]: [facility]  Study quality: Technically difficult  Referring Physician: Obdulio Lezama MD  Blood Pressure: 114/63 mmHg  Height: 159 cm  Weight: 84 kg  BSA: 1.9 m2  ------------------------------------------------------------------------    PROCEDURE: Transthoracic echocardiogram with 2-D, M-Mode  and complete spectral and color flow Doppler.  INDICATION: Acute and subacute infective endocarditis  (I33.0)  HISTORY:  ------------------------------------------------------------------------  DIMENSIONS:  Dimensions:     Normal Values:  LA:     3.9 cm    2.0 - 4.0 cm  Ao:     2.7 cm    2.0 - 3.8 cm  SEPTUM: 0.7 cm    0.6 - 1.2 cm  PWT:    0.7 cm    0.6 - 1.1 cm  LVIDd:  5.7 cm    3.0 - 5.6 cm  LVIDs:  4.2 cm    1.8 - 4.0 cm      Derived Variables:  LVMI: 77 g/m2  RWT: 0.24  Ejection Fraction Visual Estimate: 50-55 %  Ejection Fraction Kwan: 52 %    ------------------------------------------------------------------------  OBSERVATIONS:  Mitral Valve: Normal mitral valve.  Aortic Root: Aortic Root: 2.7 cm.  LVOT diameter: 1.9 cm.    Aortic Valve: Normal trileaflet aortic valve.  Left Atrium: LA volume index = 11 cc/m2.  Left Ventricle: Normal Left Ventricular Systolic Function,  (EF = 50-55%) Normal left ventricular internal dimensions  and wall thicknesses. Grade I diastolic dysfunction  (Impaired relaxation).  Right Heart: Normal right atrium. Normal right ventricular  size and systolic function (TAPSE  2.0cm). Normal tricuspid  valve. Pulmonic valve not well seen.  Pericardium/PleuraNormal pericardium with no pericardial  effusion.  Hemodynamic: Unable to estimate RVSP.  ------------------------------------------------------------------------  CONCLUSIONS:  1. Normal mitral valve.  2. Normal trileaflet aortic valve.  3. Aortic Root: 2.7 cm.  LVOT diameter: 1.9 cm.  4. LA volume index = 11 cc/m2.  5. Normal left ventricular internal dimensions and wall  thicknesses.  6. Normal Left Ventricular Systolic Function,  (EF =  50-55%)  7. Grade I diastolic dysfunction (Impaired relaxation).  8. Normal right atrium.  9. Normal right ventricular size and systolic function  (TAPSE  2.0cm).  10. Unable to estimate RVSP.  11. Normal tricuspid valve.  12. Pulmonic valve not well seen.  13. Normal pericardium with no pericardial effusion.      < end of copied text >      ADVANCE DIRECTIVES:     OVERNIGHT EVENTS: patient extubated 9/22; on nasal canula.     Present Symptoms:   Dyspnea: none  Nausea/Vomiting: none  Fatigue: severe  Loss of appetite:   Pain:  chronic back pain reported; no pain this morning  Review of Systems: All others negative     MEDICATIONS  (STANDING):  ALBUTerol    90 MICROgram(s) HFA Inhaler 2 Puff(s) Inhalation every 4 hours  atorvastatin 40 milliGRAM(s) Oral at bedtime  enoxaparin Injectable 40 milliGRAM(s) SubCutaneous daily  ergocalciferol 88868 Unit(s) Oral <User Schedule>  FIRST- Mouthwash  BLM 1 milliLiter(s) Swish and Spit every 8 hours  fluconAZOLE   Tablet 100 milliGRAM(s) Oral daily  folic acid 1 milliGRAM(s) Oral daily  gabapentin 600 milliGRAM(s) Oral every 8 hours  influenza   Vaccine 0.5 milliLiter(s) IntraMuscular once  insulin lispro (HumaLOG) corrective regimen sliding scale   SubCutaneous every 6 hours  linezolid  IVPB      linezolid  IVPB 600 milliGRAM(s) IV Intermittent every 12 hours  methylPREDNISolone sodium succinate Injectable 40 milliGRAM(s) IV Push every 8 hours  nystatin    Suspension 120730 Unit(s) Oral every 6 hours  nystatin Powder 1 Application(s) Topical two times a day  pantoprazole  Injectable 40 milliGRAM(s) IV Push daily  polyethylene glycol 3350 17 Gram(s) Oral two times a day  potassium chloride   Powder 40 milliEquivalent(s) Oral every 2 hours    MEDICATIONS  (PRN):  morphine  - Injectable 2 milliGRAM(s) IV Push every 4 hours PRN Severe Pain (7 - 10)  ondansetron Injectable 4 milliGRAM(s) IV Push every 6 hours PRN Nausea and/or Vomiting  oxycodone    5 mG/acetaminophen 325 mG 2 Tablet(s) Oral every 4 hours PRN Moderate Pain (4 - 6)      PHYSICAL EXAM:  Vital Signs Last 24 Hrs  T(C): 36.9 (23 Sep 2020 06:00), Max: 36.9 (23 Sep 2020 06:00)  T(F): 98.4 (23 Sep 2020 06:00), Max: 98.4 (23 Sep 2020 06:00)  HR: 113 (23 Sep 2020 08:01) (70 - 117)  BP: 158/98 (23 Sep 2020 08:00) (110/67 - 158/98)  BP(mean): 111 (23 Sep 2020 08:00) (76 - 126)  RR: 20 (23 Sep 2020 08:00) (13 - 28)  SpO2: 97% (23 Sep 2020 08:01) (94% - 100%)  General: alert & oriented, verbal, moves extremities/follows commands, on nasal canula. No signs of distress.   Karnofsky Performance Score/Palliative Performance Status Version2:   20%    HEENT:   dry lips   Lungs: comfortable, on nasal canula. No signs of respiratory distress.     CV: S1S2, IRR, + tachycardia  GI: abdomen soft, nontender, + obese, continent   :   benitez  Musculoskeletal: + generalized weakness, follows commands, dependent on ADLs  Skin: BUE edema/ecchymosis  Neuro: no deficits   Oral intake ability: unable/only mouth care   Diet: NPO    LABS:                          9.8    6.09  )-----------( 66       ( 23 Sep 2020 06:37 )             28.3     09-23    136  |  99  |  13  ----------------------------<  232<H>  3.1<L>   |  32<H>  |  0.48<L>    Ca    8.6      23 Sep 2020 06:37  Phos  2.6     09-23  Mg     2.1     09-23    TPro  6.0  /  Alb  2.4<L>  /  TBili  1.0  /  DBili  x   /  AST  13  /  ALT  23  /  AlkPhos  98  09-23    Albumin: 2.4    RADIOLOGY & ADDITIONAL STUDIES:  ches< from: Xray Chest 1 View- PORTABLE-Routine (Xray Chest 1 View- PORTABLE-Routine in AM.) (09.22.20 @ 08:05) >  EXAM:  XR CHEST PORTABLE ROUTINE 1V                        PROCEDURE DATE:  09/22/2020    INTERPRETATION:  Portable chest x-ray  Indication: Hypokalemia. ICU follow-up.  Portable chest x-ray is compared to a previous examination dated9/21/2020.    Impression: ET tube terminates at the level of the arjun pointing towards the right mainstem bronchus; retraction is recommended.  Previously noted NG tube and left PICC line have been removed.  The left lung apex is obscured by the patient's chin. No gross pneumothorax is identified.  Grossly stable small left pleural effusion; underlying left basilar pulmonary infiltrate cannot be excluded.  Previously noted small right pleural effusion is no longer seen on the current examination. Right basilar pulmonary consolidation versus atelectasis.  Grossly stable cardiac silhouette.  < end of copied text >    c< from: Transthoracic Echocardiogram (09.22.20 @ 12:07) >  Patient name: BARB COLÓN  YOB: 1955   Age: 65 (F)   MR#: [id number]  Study Date: 9/22/2020  Location: [place] [id number]: [facility]  Study quality: Technically difficult  Referring Physician: Obdulio Lezama MD  Blood Pressure: 114/63 mmHg  Height: 159 cm  Weight: 84 kg  BSA: 1.9 m2  ------------------------------------------------------------------------    PROCEDURE: Transthoracic echocardiogram with 2-D, M-Mode  and complete spectral and color flow Doppler.  INDICATION: Acute and subacute infective endocarditis  (I33.0)  HISTORY:  ------------------------------------------------------------------------  DIMENSIONS:  Dimensions:     Normal Values:  LA:     3.9 cm    2.0 - 4.0 cm  Ao:     2.7 cm    2.0 - 3.8 cm  SEPTUM: 0.7 cm    0.6 - 1.2 cm  PWT:    0.7 cm    0.6 - 1.1 cm  LVIDd:  5.7 cm    3.0 - 5.6 cm  LVIDs:  4.2 cm    1.8 - 4.0 cm      Derived Variables:  LVMI: 77 g/m2  RWT: 0.24  Ejection Fraction Visual Estimate: 50-55 %  Ejection Fraction Kwan: 52 %    ------------------------------------------------------------------------  OBSERVATIONS:  Mitral Valve: Normal mitral valve.  Aortic Root: Aortic Root: 2.7 cm.  LVOT diameter: 1.9 cm.    Aortic Valve: Normal trileaflet aortic valve.  Left Atrium: LA volume index = 11 cc/m2.  Left Ventricle: Normal Left Ventricular Systolic Function,  (EF = 50-55%) Normal left ventricular internal dimensions  and wall thicknesses. Grade I diastolic dysfunction  (Impaired relaxation).  Right Heart: Normal right atrium. Normal right ventricular  size and systolic function (TAPSE  2.0cm). Normal tricuspid  valve. Pulmonic valve not well seen.  Pericardium/PleuraNormal pericardium with no pericardial  effusion.  Hemodynamic: Unable to estimate RVSP.  ------------------------------------------------------------------------  CONCLUSIONS:  1. Normal mitral valve.  2. Normal trileaflet aortic valve.  3. Aortic Root: 2.7 cm.  LVOT diameter: 1.9 cm.  4. LA volume index = 11 cc/m2.  5. Normal left ventricular internal dimensions and wall  thicknesses.  6. Normal Left Ventricular Systolic Function,  (EF =  50-55%)  7. Grade I diastolic dysfunction (Impaired relaxation).  8. Normal right atrium.  9. Normal right ventricular size and systolic function  (TAPSE  2.0cm).  10. Unable to estimate RVSP.  11. Normal tricuspid valve.  12. Pulmonic valve not well seen.  13. Normal pericardium with no pericardial effusion.      < end of copied text >      ADVANCE DIRECTIVES: HCP and MOLST forms completed as per patient's wishes: CPR / long term ventilation / long term feeding tube / trial IV fluids / use abx / NO LIMITATION on medical interventions/ send to hospital.

## 2020-09-23 NOTE — PROGRESS NOTE ADULT - PROBLEM SELECTOR PLAN 1
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 Patient utilizes 5L O2 at baseline 24/7 - reports increasing weakness/dyspnea at rest. Patient successfully extubated 9/22; currently on nasal canula; continues IV abx, proventil, solu-medrol. Overall, patient with poor prognosis. 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 Patient utilizes 5L O2 at baseline 24/7 - reports increasing weakness/dyspnea at rest. Patient successfully extubated 9/22; currently on nasal canula; continues IV abx, proventil, solu-medrol. Overall, patient acknowledges poor prognosis but wants to continue aggressive treatment. Patient remains a full code.

## 2020-09-23 NOTE — SWALLOW BEDSIDE ASSESSMENT ADULT - SLP PERTINENT HISTORY OF CURRENT PROBLEM
Patient is a 65y old female who presents with a chief complaint of shortness of breath. Pt p/w severe COPD and lung ca with mets to bones which causing compression Fx. She had one RT for that. Pt was admitted for dyspnea and hypoxia. Pt extubated 9/22, feels down, no respiratory distress.

## 2020-09-23 NOTE — PROGRESS NOTE ADULT - CONVERSATION DETAILS
9/23/20: Spoke with patient in the morning - discussed status. Aware of overall prognosis. Reports being overwhelmed with social situation including financial/housing concerns. PC SW aware.    Met with patient in afternoon; PC SW present. Again discussed status. Patient acknowledges COPD and metastatic disease as not being "curable." Reviewed HCP form - patient wishes for daughter/Gavi to be primary agent and partner/Williams to be alternate agent; form completed as per patient's wishes. Discussed risks vs benefits of resuscitation, intubation, artificial nutrition. Patient reports she wants to live for her grandchildren - and wishes to be kept alive at all costs. Reviewed MOLST form and completed as per patient's wishes: CPR / long term ventilation / long term feeding tube / trial IV fluids / use abx / NO LIMITATION on medical interventions. All questions answered; supportive counseling provided. 9/23/20: Spoke with patient in the morning - discussed status. Aware of overall prognosis. Reports being overwhelmed with social situation including financial/housing concerns. PC SHARAD aware.    Met with patient again with ALONSO OROURKE. Again discussed status. Patient acknowledges COPD and metastatic disease as not being "curable." Reviewed HCP form - patient wishes for daughter/Gavi to be primary agent and partner/Williams to be alternate agent; form completed as per patient's wishes. Discussed risks vs benefits of resuscitation, intubation, artificial nutrition. Patient reports she wants to live for her grandchildren - and wishes to be kept alive at all costs. Reviewed MOLST form and completed as per patient's wishes: CPR / long term ventilation / long term feeding tube / trial IV fluids / use abx / NO LIMITATION on medical interventions. All questions answered; supportive counseling provided.

## 2020-09-23 NOTE — PROGRESS NOTE ADULT - ASSESSMENT
COPD with acute asthmatic bronchitis with  s/p  Respiratory Failure   Recurrent Lung cancer with Bone Mets   IDDM   Htn with MR   Thrombocytopenia sec to C/T  Obesity with CRIS   r/o esophageal candidiasis  GPC bacteremia VRE/ yeast    PLAN- IV steroids -- D/C -po prednisone 2o mg qd x 4 days  IV Zyvox and diflucan   o2 n/c 2pm  full, liquid diet  OOB in chair  F/U CXR , ABG and Blood c/s   Symbicort 160/4.5 2 puffs bid on d/c   Duoneb  by HFN rx qid    s/c lovenox  FS coverage   Prognosis guarded, discussed with family , DR ardon and staff   Diflucan 100 mg qd x 1 week  If no response, EGD

## 2020-09-23 NOTE — SWALLOW BEDSIDE ASSESSMENT ADULT - PHARYNGEAL PHASE
Decreased laryngeal elevation Decreased laryngeal elevation/Multiple swallows Within functional limits/Multiple swallows

## 2020-09-23 NOTE — GOALS OF CARE CONVERSATION - ADVANCED CARE PLANNING - CONVERSATION DETAILS
PC Sw went to the bedside to provide emotional support as she was advised the patient would benefit from addl. support.  PC Sw introduced herself to Ms. Olson and explained her role.  Patient was easily engaged and receptive to Social Work support.  Patient became tearful and expressed concern regarding finances, remaining in her home versus NILDA and the consequences for her significant other if she does not return home.  Ms. Olson stated she and her S.O. "Nathen" can afford their rent, however they are struggling "with a small amount of bills."  PC SW explored their monthly income and was informed that Ms. Olson receives Social Security Disability benefits and Mr. Cee is receiving a "small" amount of income through Public Assistance.  Mr. Cee was employed "off the books" prior to Covid and has not been eligible for unemployment.  The couple receives food stamps and has active health insurance.  Ms. Olson was very clear that she does not want to impose upon her four adult daughters for financual support.  in addition, patient stated she does not want  to transition to [facility] as she is fearful of lisa Covid 19.  When asked what is most important to Ms. Olson she stated " returning home to Nathen and their cat and receiving ambulance transportation for her medical appts."  Patient shared feeling her control and autonomy are being taken from her by her children.  Ms. Olson stated they seem to feel they know what is best for her, however discount her opinion.  PC  emphasized the importance of a safe, realistic discharge plan and encouraged Ms. Olson to speak with her daughters and communicate about what she needs rather than having them assume they know what she needs.  Patient agreed to speak with her S.O. this afternoon and subsequently speak with her family regarding d/c plans, including housing and physical care.  PC  recognized the feeling of loss of control in regard to the patient's declining health and her desire to retain her autonomy.  PC  provided her contact information and stated she will remain available.  PC NP joined the discussion and the HCP form and MOLST were completed to reflect the patient's wishes: FULL CODE, long term intubation, IV fluids, use antibiotics, use artificial nutrition, No limitations. Patient identifies with the Christian rigoberto, is  and in a relationship for over 20 yrs. with Mr. Cee.  Ms. Olson spoke proudly of her four daughters and lovingly of her 7 grandchildren.  Patient stated "I would cut off my right finger for my grandbabies."

## 2020-09-23 NOTE — PROGRESS NOTE ADULT - SUBJECTIVE AND OBJECTIVE BOX
extubated  oxygenation well with NC  no fever  still has sore throat which make her hard to swallow.    MEDICATIONS  (STANDING):  ALBUTerol    90 MICROgram(s) HFA Inhaler 2 Puff(s) Inhalation every 4 hours  atorvastatin 40 milliGRAM(s) Oral at bedtime  enoxaparin Injectable 40 milliGRAM(s) SubCutaneous daily  ergocalciferol 76685 Unit(s) Oral <User Schedule>  FIRST- Mouthwash  BLM 1 milliLiter(s) Swish and Spit every 8 hours  fluconAZOLE   Tablet 100 milliGRAM(s) Oral daily  folic acid 1 milliGRAM(s) Oral daily  gabapentin 600 milliGRAM(s) Oral every 8 hours  influenza   Vaccine 0.5 milliLiter(s) IntraMuscular once  insulin lispro (HumaLOG) corrective regimen sliding scale   SubCutaneous every 6 hours  linezolid  IVPB      linezolid  IVPB 600 milliGRAM(s) IV Intermittent every 12 hours  methylPREDNISolone sodium succinate Injectable 40 milliGRAM(s) IV Push every 8 hours  nystatin    Suspension 645097 Unit(s) Oral every 6 hours  nystatin Powder 1 Application(s) Topical two times a day  pantoprazole  Injectable 40 milliGRAM(s) IV Push daily  polyethylene glycol 3350 17 Gram(s) Oral two times a day  potassium chloride   Powder 40 milliEquivalent(s) Oral every 2 hours    MEDICATIONS  (PRN):  morphine  - Injectable 2 milliGRAM(s) IV Push every 4 hours PRN Severe Pain (7 - 10)  ondansetron Injectable 4 milliGRAM(s) IV Push every 6 hours PRN Nausea and/or Vomiting  oxycodone    5 mG/acetaminophen 325 mG 2 Tablet(s) Oral every 4 hours PRN Moderate Pain (4 - 6)      Allergies    fish (Angioedema)  penicillins (Rash)    Intolerances        Vital Signs Last 24 Hrs  T(C): 36.9 (23 Sep 2020 06:00), Max: 36.9 (23 Sep 2020 06:00)  T(F): 98.4 (23 Sep 2020 06:00), Max: 98.4 (23 Sep 2020 06:00)  HR: 113 (23 Sep 2020 08:01) (70 - 117)  BP: 158/98 (23 Sep 2020 08:00) (110/67 - 158/98)  BP(mean): 111 (23 Sep 2020 08:00) (76 - 126)  RR: 20 (23 Sep 2020 08:00) (13 - 28)  SpO2: 97% (23 Sep 2020 08:01) (94% - 100%)    PHYSICAL EXAM  General: adult in NAD  HEENT: clear oropharynx, anicteric sclera, pink conjunctiva  Neck: supple  CV: normal S1/S2 with no murmur rubs or gallops  Lungs: positive air movement b/l ant lungs,clear to auscultation, no wheezes, no rales  Abdomen: soft non-tender non-distended, no hepatosplenomegaly  Ext: no clubbing cyanosis or edema  Skin: no rashes and no petechiae  Neuro: alert and oriented X 4, no focal deficits  LABS:                          9.8    6.09  )-----------( 66       ( 23 Sep 2020 06:37 )             28.3         Mean Cell Volume : 87.9 fl  Mean Cell Hemoglobin : 30.4 pg  Mean Cell Hemoglobin Concentration : 34.6 gm/dL  Auto Neutrophil # : 5.66 K/uL  Auto Lymphocyte # : 0.17 K/uL  Auto Monocyte # : 0.22 K/uL  Auto Eosinophil # : 0.00 K/uL  Auto Basophil # : 0.00 K/uL  Auto Neutrophil % : 92.9 %  Auto Lymphocyte % : 2.8 %  Auto Monocyte % : 3.6 %  Auto Eosinophil % : 0.0 %  Auto Basophil % : 0.0 %    Serial CBC  Hematocrit 28.3  Hemoglobin 9.8  Plat 66  RBC 3.22  WBC 6.09  Serial CBC  Hematocrit 31.9  Hemoglobin 10.6  Plat 71  RBC 3.57  WBC 6.89  Serial CBC  Hematocrit 31.5  Hemoglobin 11.2  Plat 94  RBC 3.56  WBC 8.01  Serial CBC  Hematocrit 34.4  Hemoglobin 11.7  Plat 78  RBC 3.79  WBC 10.53    09-23    136  |  99  |  13  ----------------------------<  232<H>  3.1<L>   |  32<H>  |  0.48<L>    Ca    8.6      23 Sep 2020 06:37  Phos  2.6     09-23  Mg     2.1     09-23    TPro  6.0  /  Alb  2.4<L>  /  TBili  1.0  /  DBili  x   /  AST  13  /  ALT  23  /  AlkPhos  98  09-23          Folate, Serum: >20.0 ng/mL (09-20 @ 11:36)  Vitamin B12, Serum: >2000 pg/mL (09-20 @ 11:36)            BLOOD SMEAR INTERPRETATION:       RADIOLOGY & ADDITIONAL STUDIES:

## 2020-09-23 NOTE — PROGRESS NOTE ADULT - SUBJECTIVE AND OBJECTIVE BOX
INTERVAL HPI/OVERNIGHT EVENTS: Patient refused bipap overnight. she's been saturating well on Nasal Canula. Patient complains of difficulty swallowing, and pain. She also complains of pain on the Right arm where the IV site is. Left arm Iv line is infiltrated. Lines were removed, and we will attempt to place a peripheral line    PRESSORS: [ ] YES [x ] NO    Antimicrobial:  fluconAZOLE   Tablet 100 milliGRAM(s) Oral daily  linezolid  IVPB      linezolid  IVPB 600 milliGRAM(s) IV Intermittent every 12 hours  nystatin    Suspension 879995 Unit(s) Oral every 6 hours    Cardiovascular:    Pulmonary:  ALBUTerol    90 MICROgram(s) HFA Inhaler 2 Puff(s) Inhalation every 4 hours    Hematalogic:  enoxaparin Injectable 40 milliGRAM(s) SubCutaneous daily    Other:  atorvastatin 40 milliGRAM(s) Oral at bedtime  ergocalciferol 67049 Unit(s) Oral <User Schedule>  FIRST- Mouthwash  BLM 10 milliLiter(s) Swish and Swallow every 8 hours  folic acid 1 milliGRAM(s) Oral daily  gabapentin 600 milliGRAM(s) Oral every 8 hours  influenza   Vaccine 0.5 milliLiter(s) IntraMuscular once  insulin lispro (HumaLOG) corrective regimen sliding scale   SubCutaneous every 6 hours  methylPREDNISolone sodium succinate Injectable 40 milliGRAM(s) IV Push every 8 hours  morphine  - Injectable 2 milliGRAM(s) IV Push every 4 hours PRN  nystatin Powder 1 Application(s) Topical two times a day  ondansetron Injectable 4 milliGRAM(s) IV Push every 6 hours PRN  oxycodone    5 mG/acetaminophen 325 mG 2 Tablet(s) Oral every 4 hours PRN  pantoprazole  Injectable 40 milliGRAM(s) IV Push daily  polyethylene glycol 3350 17 Gram(s) Oral two times a day    ALBUTerol    90 MICROgram(s) HFA Inhaler 2 Puff(s) Inhalation every 4 hours  atorvastatin 40 milliGRAM(s) Oral at bedtime  enoxaparin Injectable 40 milliGRAM(s) SubCutaneous daily  ergocalciferol 28022 Unit(s) Oral <User Schedule>  FIRST- Mouthwash  BLM 10 milliLiter(s) Swish and Swallow every 8 hours  fluconAZOLE   Tablet 100 milliGRAM(s) Oral daily  folic acid 1 milliGRAM(s) Oral daily  gabapentin 600 milliGRAM(s) Oral every 8 hours  influenza   Vaccine 0.5 milliLiter(s) IntraMuscular once  insulin lispro (HumaLOG) corrective regimen sliding scale   SubCutaneous every 6 hours  linezolid  IVPB      linezolid  IVPB 600 milliGRAM(s) IV Intermittent every 12 hours  methylPREDNISolone sodium succinate Injectable 40 milliGRAM(s) IV Push every 8 hours  morphine  - Injectable 2 milliGRAM(s) IV Push every 4 hours PRN  nystatin    Suspension 551939 Unit(s) Oral every 6 hours  nystatin Powder 1 Application(s) Topical two times a day  ondansetron Injectable 4 milliGRAM(s) IV Push every 6 hours PRN  oxycodone    5 mG/acetaminophen 325 mG 2 Tablet(s) Oral every 4 hours PRN  pantoprazole  Injectable 40 milliGRAM(s) IV Push daily  polyethylene glycol 3350 17 Gram(s) Oral two times a day    Drug Dosing Weight  Height (cm): 154.9 (19 Sep 2020 08:00)  Weight (kg): 84.8 (21 Sep 2020 09:15)  BMI (kg/m2): 35.3 (21 Sep 2020 09:15)  BSA (m2): 1.84 (21 Sep 2020 09:15)    CENTRAL LINE: [ ] YES [x ] NO  LOCATION:   DATE INSERTED:  REMOVE: [ ] YES [ ] NO  EXPLAIN:    ROBLEDO: [ ] YES [ ] NO    DATE INSERTED:  REMOVE:  [ ] YES [ ] NO  EXPLAIN:    A-LINE:  [ ] YES [x ] NO  LOCATION:   DATE INSERTED:  REMOVE:  [ ] YES [ ] NO  EXPLAIN:    PMH -reviewed admission note, no change since admission      ABG - ( 22 Sep 2020 03:27 )  pH, Arterial: 7.50  pH, Blood: x     /  pCO2: 42    /  pO2: 122   / HCO3: 33    / Base Excess: 8.7   /  SaO2: 99                    09-22 @ 07:01  -  09-23 @ 07:00  --------------------------------------------------------  IN: 423.7 mL / OUT: 1040 mL / NET: -616.3 mL        Mode: CPAP with PS  FiO2: 40  PEEP: 5  PS: 7  ITime: 1  MAP: 7.4  PIP: 12      PHYSICAL EXAM:    GENERAL: NAD, well-groomed, well-developed  HEAD:  Atraumatic, Normocephalic  EYES: EOMI, PERRLA, conjunctiva and sclera clear  ENMT: No tonsillar erythema, exudates, or enlargement; Moist mucous membranes, Good dentition, [ ]No lesions  NECK: Supple, normal appearance, No JVD; Normal thyroid; Trachea midline  NERVOUS SYSTEM:  Alert & Oriented X3, Good concentration; Motor Strength 5/5 B/L upper and lower extremities; DTRs 2+ intact and symmetric  CHEST/LUNG: No chest deformity; Normal percussion bilaterally; No rales, rhonchi, wheezing   HEART: Regular rate and rhythm; No murmurs, rubs, or gallops  ABDOMEN: Soft, Nontender, Nondistended;Bowel sounds present  EXTREMITIES:  2+ Peripheral Pulses, No clubbing, cyanosis, or edema  LYMPH: No lymphadenopathy noted  SKIN: No rashes or lesions; Good capillary refill      LABS:  CBC Full  -  ( 23 Sep 2020 06:37 )  WBC Count : 6.09 K/uL  RBC Count : 3.22 M/uL  Hemoglobin : 9.8 g/dL  Hematocrit : 28.3 %  Platelet Count - Automated : 66 K/uL  Mean Cell Volume : 87.9 fl  Mean Cell Hemoglobin : 30.4 pg  Mean Cell Hemoglobin Concentration : 34.6 gm/dL  Auto Neutrophil # : 5.66 K/uL  Auto Lymphocyte # : 0.17 K/uL  Auto Monocyte # : 0.22 K/uL  Auto Eosinophil # : 0.00 K/uL  Auto Basophil # : 0.00 K/uL  Auto Neutrophil % : 92.9 %  Auto Lymphocyte % : 2.8 %  Auto Monocyte % : 3.6 %  Auto Eosinophil % : 0.0 %  Auto Basophil % : 0.0 %    09-23    136  |  99  |  13  ----------------------------<  232<H>  3.1<L>   |  32<H>  |  0.48<L>    Ca    8.6      23 Sep 2020 06:37  Phos  2.6     09-23  Mg     2.1     09-23    TPro  6.0  /  Alb  2.4<L>  /  TBili  1.0  /  DBili  x   /  AST  13  /  ALT  23  /  AlkPhos  98  09-23        Culture Results:   No growth to date. (09-21 @ 14:47)      RADIOLOGY & ADDITIONAL STUDIES REVIEWED:  ***    [ ]GOALS OF CARE DISCUSSION WITH PATIENT/FAMILY/PROXY:    CRITICAL CARE TIME SPENT: 35 minutes INTERVAL HPI/OVERNIGHT EVENTS: Patient refused bipap overnight. she's been saturating well on Nasal Canula. Patient complains of difficulty swallowing, and pain. She also complains of pain on the Right arm where the IV site is. Left arm Iv line is infiltrated. Lines were removed, and we will attempt to place a peripheral line    PRESSORS: [ ] YES [x ] NO    Antimicrobial:  fluconAZOLE   Tablet 100 milliGRAM(s) Oral daily  linezolid  IVPB      linezolid  IVPB 600 milliGRAM(s) IV Intermittent every 12 hours  nystatin    Suspension 695766 Unit(s) Oral every 6 hours    Cardiovascular:    Pulmonary:  ALBUTerol    90 MICROgram(s) HFA Inhaler 2 Puff(s) Inhalation every 4 hours    Hematalogic:  enoxaparin Injectable 40 milliGRAM(s) SubCutaneous daily    Other:  atorvastatin 40 milliGRAM(s) Oral at bedtime  ergocalciferol 86044 Unit(s) Oral <User Schedule>  FIRST- Mouthwash  BLM 10 milliLiter(s) Swish and Swallow every 8 hours  folic acid 1 milliGRAM(s) Oral daily  gabapentin 600 milliGRAM(s) Oral every 8 hours  influenza   Vaccine 0.5 milliLiter(s) IntraMuscular once  insulin lispro (HumaLOG) corrective regimen sliding scale   SubCutaneous every 6 hours  methylPREDNISolone sodium succinate Injectable 40 milliGRAM(s) IV Push every 8 hours  morphine  - Injectable 2 milliGRAM(s) IV Push every 4 hours PRN  nystatin Powder 1 Application(s) Topical two times a day  ondansetron Injectable 4 milliGRAM(s) IV Push every 6 hours PRN  oxycodone    5 mG/acetaminophen 325 mG 2 Tablet(s) Oral every 4 hours PRN  pantoprazole  Injectable 40 milliGRAM(s) IV Push daily  polyethylene glycol 3350 17 Gram(s) Oral two times a day    ALBUTerol    90 MICROgram(s) HFA Inhaler 2 Puff(s) Inhalation every 4 hours  atorvastatin 40 milliGRAM(s) Oral at bedtime  enoxaparin Injectable 40 milliGRAM(s) SubCutaneous daily  ergocalciferol 92164 Unit(s) Oral <User Schedule>  FIRST- Mouthwash  BLM 10 milliLiter(s) Swish and Swallow every 8 hours  fluconAZOLE   Tablet 100 milliGRAM(s) Oral daily  folic acid 1 milliGRAM(s) Oral daily  gabapentin 600 milliGRAM(s) Oral every 8 hours  influenza   Vaccine 0.5 milliLiter(s) IntraMuscular once  insulin lispro (HumaLOG) corrective regimen sliding scale   SubCutaneous every 6 hours  linezolid  IVPB      linezolid  IVPB 600 milliGRAM(s) IV Intermittent every 12 hours  methylPREDNISolone sodium succinate Injectable 40 milliGRAM(s) IV Push every 8 hours  morphine  - Injectable 2 milliGRAM(s) IV Push every 4 hours PRN  nystatin    Suspension 068617 Unit(s) Oral every 6 hours  nystatin Powder 1 Application(s) Topical two times a day  ondansetron Injectable 4 milliGRAM(s) IV Push every 6 hours PRN  oxycodone    5 mG/acetaminophen 325 mG 2 Tablet(s) Oral every 4 hours PRN  pantoprazole  Injectable 40 milliGRAM(s) IV Push daily  polyethylene glycol 3350 17 Gram(s) Oral two times a day    Drug Dosing Weight  Height (cm): 154.9 (19 Sep 2020 08:00)  Weight (kg): 84.8 (21 Sep 2020 09:15)  BMI (kg/m2): 35.3 (21 Sep 2020 09:15)  BSA (m2): 1.84 (21 Sep 2020 09:15)    CENTRAL LINE: [ ] YES [x ] NO  LOCATION:   DATE INSERTED:  REMOVE: [ ] YES [ ] NO  EXPLAIN:    [place]ED[place]: [ ] YES [ x] NO    DATE INSERTED:  REMOVE:  [ ] YES [ ] NO  EXPLAIN:    A-LINE:  [ ] YES [x ] NO  LOCATION:   DATE INSERTED:  REMOVE:  [ ] YES [ ] NO  EXPLAIN:    PMH -reviewed admission note, no change since admission      ABG - ( 22 Sep 2020 03:27 )  pH, Arterial: 7.50  pH, Blood: x     /  pCO2: 42    /  pO2: 122   / HCO3: 33    / Base Excess: 8.7   /  SaO2: 99          09-22 @ 07:01  -  09-23 @ 07:00  --------------------------------------------------------  IN: 423.7 mL / OUT: 1040 mL / NET: -616.3 mL        Mode: CPAP with PS  FiO2: 40  PEEP: 5  PS: 7  ITime: 1  MAP: 7.4  PIP: 12      PHYSICAL EXAM:    GENERAL: obese, anxious  HEAD:  Atraumatic, Normocephalic  EYES: EOMI, PERRLA, conjunctiva and sclera clear  ENMT: white sputum noted in back of throat with mild erythema. No lesions, good dentition, dried lips.   NECK: Supple, normal appearance, No JVD; Normal thyroid; Trachea midline  NERVOUS SYSTEM:  Alert & Oriented X3, following commands, strength and sensation intact  CHEST/LUNG: diminished breath sounds bilaterally, wheezing noted bilaterally.   HEART: Regular rate and rhythm; No murmurs, rubs, or gallops  ABDOMEN: Soft, Nontender, Nondistended; Bowel sounds present  EXTREMITIES:  edema on Right upper arm and 1+ pitting edema on lower extremities bilaterally. 2+ Peripheral Pulses,   SKIN: bruises noted on arms bilaterally.       LABS:  CBC Full  -  ( 23 Sep 2020 06:37 )  WBC Count : 6.09 K/uL  RBC Count : 3.22 M/uL  Hemoglobin : 9.8 g/dL  Hematocrit : 28.3 %  Platelet Count - Automated : 66 K/uL  Mean Cell Volume : 87.9 fl  Mean Cell Hemoglobin : 30.4 pg  Mean Cell Hemoglobin Concentration : 34.6 gm/dL  Auto Neutrophil # : 5.66 K/uL  Auto Lymphocyte # : 0.17 K/uL  Auto Monocyte # : 0.22 K/uL  Auto Eosinophil # : 0.00 K/uL  Auto Basophil # : 0.00 K/uL  Auto Neutrophil % : 92.9 %  Auto Lymphocyte % : 2.8 %  Auto Monocyte % : 3.6 %  Auto Eosinophil % : 0.0 %  Auto Basophil % : 0.0 %    09-23    136  |  99  |  13  ----------------------------<  232<H>  3.1<L>   |  32<H>  |  0.48<L>    Ca    8.6      23 Sep 2020 06:37  Phos  2.6     09-23  Mg     2.1     09-23    TPro  6.0  /  Alb  2.4<L>  /  TBili  1.0  /  DBili  x   /  AST  13  /  ALT  23  /  AlkPhos  98  09-23        Culture Results:   No growth to date. (09-21 @ 14:47)    RADIOLOGY & ADDITIONAL STUDIES REVIEWED:       [ ]GOALS OF CARE DISCUSSION WITH PATIENT/FAMILY/PROXY:    CRITICAL CARE TIME SPENT: 35 minutes INTERVAL HPI/OVERNIGHT EVENTS: Patient refused bipap overnight. she's been saturating well on Nasal Canula. Patient complains of difficulty swallowing, and pain. She also complains of pain on the Right arm where the IV site is. Left arm Iv line is infiltrated. Lines were removed, and we will attempt to place a peripheral line    PRESSORS: [ ] YES [x ] NO    Antimicrobial:  fluconAZOLE   Tablet 100 milliGRAM(s) Oral daily  linezolid  IVPB      linezolid  IVPB 600 milliGRAM(s) IV Intermittent every 12 hours  nystatin    Suspension 246945 Unit(s) Oral every 6 hours    Cardiovascular:    Pulmonary:  ALBUTerol    90 MICROgram(s) HFA Inhaler 2 Puff(s) Inhalation every 4 hours    Hematalogic:  enoxaparin Injectable 40 milliGRAM(s) SubCutaneous daily    Other:  atorvastatin 40 milliGRAM(s) Oral at bedtime  ergocalciferol 53252 Unit(s) Oral <User Schedule>  FIRST- Mouthwash  BLM 10 milliLiter(s) Swish and Swallow every 8 hours  folic acid 1 milliGRAM(s) Oral daily  gabapentin 600 milliGRAM(s) Oral every 8 hours  influenza   Vaccine 0.5 milliLiter(s) IntraMuscular once  insulin lispro (HumaLOG) corrective regimen sliding scale   SubCutaneous every 6 hours  methylPREDNISolone sodium succinate Injectable 40 milliGRAM(s) IV Push every 8 hours  morphine  - Injectable 2 milliGRAM(s) IV Push every 4 hours PRN  nystatin Powder 1 Application(s) Topical two times a day  ondansetron Injectable 4 milliGRAM(s) IV Push every 6 hours PRN  oxycodone    5 mG/acetaminophen 325 mG 2 Tablet(s) Oral every 4 hours PRN  pantoprazole  Injectable 40 milliGRAM(s) IV Push daily  polyethylene glycol 3350 17 Gram(s) Oral two times a day    ALBUTerol    90 MICROgram(s) HFA Inhaler 2 Puff(s) Inhalation every 4 hours  atorvastatin 40 milliGRAM(s) Oral at bedtime  enoxaparin Injectable 40 milliGRAM(s) SubCutaneous daily  ergocalciferol 58132 Unit(s) Oral <User Schedule>  FIRST- Mouthwash  BLM 10 milliLiter(s) Swish and Swallow every 8 hours  fluconAZOLE   Tablet 100 milliGRAM(s) Oral daily  folic acid 1 milliGRAM(s) Oral daily  gabapentin 600 milliGRAM(s) Oral every 8 hours  influenza   Vaccine 0.5 milliLiter(s) IntraMuscular once  insulin lispro (HumaLOG) corrective regimen sliding scale   SubCutaneous every 6 hours  linezolid  IVPB      linezolid  IVPB 600 milliGRAM(s) IV Intermittent every 12 hours  methylPREDNISolone sodium succinate Injectable 40 milliGRAM(s) IV Push every 8 hours  morphine  - Injectable 2 milliGRAM(s) IV Push every 4 hours PRN  nystatin    Suspension 198785 Unit(s) Oral every 6 hours  nystatin Powder 1 Application(s) Topical two times a day  ondansetron Injectable 4 milliGRAM(s) IV Push every 6 hours PRN  oxycodone    5 mG/acetaminophen 325 mG 2 Tablet(s) Oral every 4 hours PRN  pantoprazole  Injectable 40 milliGRAM(s) IV Push daily  polyethylene glycol 3350 17 Gram(s) Oral two times a day    Drug Dosing Weight  Height (cm): 154.9 (19 Sep 2020 08:00)  Weight (kg): 84.8 (21 Sep 2020 09:15)  BMI (kg/m2): 35.3 (21 Sep 2020 09:15)  BSA (m2): 1.84 (21 Sep 2020 09:15)    CENTRAL LINE: [ ] YES [x ] NO  LOCATION:   DATE INSERTED:  REMOVE: [ ] YES [ ] NO  EXPLAIN:    [place]ED[place]: [ ] YES [ x] NO    DATE INSERTED:  REMOVE:  [ ] YES [ ] NO  EXPLAIN:    A-LINE:  [ ] YES [x ] NO  LOCATION:   DATE INSERTED:  REMOVE:  [ ] YES [ ] NO  EXPLAIN:    PMH -reviewed admission note, no change since admission      ABG - ( 22 Sep 2020 03:27 )  pH, Arterial: 7.50  pH, Blood: x     /  pCO2: 42    /  pO2: 122   / HCO3: 33    / Base Excess: 8.7   /  SaO2: 99          09-22 @ 07:01  -  09-23 @ 07:00  --------------------------------------------------------  IN: 423.7 mL / OUT: 1040 mL / NET: -616.3 mL        Mode: CPAP with PS  FiO2: 40  PEEP: 5  PS: 7  ITime: 1  MAP: 7.4  PIP: 12      PHYSICAL EXAM:    GENERAL: obese, anxious  HEAD:  Atraumatic, Normocephalic  EYES: EOMI, PERRLA, conjunctiva and sclera clear  ENMT: white sputum noted in back of throat with mild erythema. No lesions, good dentition, dried lips.   NECK: Supple, normal appearance, No JVD; Normal thyroid; Trachea midline  NERVOUS SYSTEM:  Alert & Oriented X3, following commands, strength and sensation intact  CHEST/LUNG: diminished breath sounds bilaterally, wheezing noted bilaterally.   HEART: Regular rate and rhythm; No murmurs, rubs, or gallops  ABDOMEN: Soft, Nontender, Nondistended; Bowel sounds present  EXTREMITIES:  edema on Right upper arm and 1+ pitting edema on lower extremities bilaterally. 2+ Peripheral Pulses,   SKIN: bruises noted on arms bilaterally.       LABS:  CBC Full  -  ( 23 Sep 2020 06:37 )  WBC Count : 6.09 K/uL  RBC Count : 3.22 M/uL  Hemoglobin : 9.8 g/dL  Hematocrit : 28.3 %  Platelet Count - Automated : 66 K/uL  Mean Cell Volume : 87.9 fl  Mean Cell Hemoglobin : 30.4 pg  Mean Cell Hemoglobin Concentration : 34.6 gm/dL  Auto Neutrophil # : 5.66 K/uL  Auto Lymphocyte # : 0.17 K/uL  Auto Monocyte # : 0.22 K/uL  Auto Eosinophil # : 0.00 K/uL  Auto Basophil # : 0.00 K/uL  Auto Neutrophil % : 92.9 %  Auto Lymphocyte % : 2.8 %  Auto Monocyte % : 3.6 %  Auto Eosinophil % : 0.0 %  Auto Basophil % : 0.0 %    09-23    136  |  99  |  13  ----------------------------<  232<H>  3.1<L>   |  32<H>  |  0.48<L>    Ca    8.6      23 Sep 2020 06:37  Phos  2.6     09-23  Mg     2.1     09-23    TPro  6.0  /  Alb  2.4<L>  /  TBili  1.0  /  DBili  x   /  AST  13  /  ALT  23  /  AlkPhos  98  09-23        Culture Results:   No growth to date. (09-21 @ 14:47)    RADIOLOGY & ADDITIONAL STUDIES REVIEWED:   1. Normal mitral valve.  2. Normal trileaflet aortic valve.  3. Aortic Root: 2.7 cm.  LVOT diameter: 1.9 cm.    4. LA volume index = 11 cc/m2.  5. Normal left ventricular internal dimensions and wall  thicknesses.  6. Normal Left Ventricular Systolic Function,  (EF =  50-55%)  7. Grade I diastolic dysfunction (Impaired relaxation).  8. Normal right atrium.  9. Normal right ventricular size and systolic function  (TAPSE  2.0cm).  10. Unable to estimate RVSP.  11. Normal tricuspid valve.  12. Pulmonic valve not well seen.  13. Normal pericardium with no pericardial effusion.    [ ]GOALS OF CARE DISCUSSION WITH PATIENT/FAMILY/PROXY:    CRITICAL CARE TIME SPENT: 35 minutes INTERVAL HPI/OVERNIGHT EVENTS: Patient refused bipap overnight. she's been saturating well on Nasal Canula. Patient complains of difficulty swallowing, and pain. She also complains of pain on the Right arm where the IV site is. Left arm Iv line is infiltrated. Lines were removed, and we will attempt to place a peripheral line    PRESSORS: [ ] YES [x ] NO    Antimicrobial:    linezolid  IVPB      linezolid  IVPB 600 milliGRAM(s) IV Intermittent every 12 hours  Caspofungin 70mg daily    Cardiovascular:    Pulmonary:  ALBUTerol    90 MICROgram(s) HFA Inhaler 2 Puff(s) Inhalation every 4 hours    Hematalogic:  enoxaparin Injectable 40 milliGRAM(s) SubCutaneous daily    Other:  atorvastatin 40 milliGRAM(s) Oral at bedtime  ergocalciferol 22052 Unit(s) Oral <User Schedule>  FIRST- Mouthwash  BLM 10 milliLiter(s) Swish and Swallow every 8 hours  folic acid 1 milliGRAM(s) Oral daily  gabapentin 600 milliGRAM(s) Oral every 8 hours  influenza   Vaccine 0.5 milliLiter(s) IntraMuscular once  insulin lispro (HumaLOG) corrective regimen sliding scale   SubCutaneous every 6 hours  methylPREDNISolone sodium succinate Injectable 40 milliGRAM(s) IV Push every 8 hours  morphine  - Injectable 2 milliGRAM(s) IV Push every 4 hours PRN  nystatin Powder 1 Application(s) Topical two times a day  ondansetron Injectable 4 milliGRAM(s) IV Push every 6 hours PRN  oxycodone    5 mG/acetaminophen 325 mG 2 Tablet(s) Oral every 4 hours PRN  pantoprazole  Injectable 40 milliGRAM(s) IV Push daily  polyethylene glycol 3350 17 Gram(s) Oral two times a day    ALBUTerol    90 MICROgram(s) HFA Inhaler 2 Puff(s) Inhalation every 4 hours  atorvastatin 40 milliGRAM(s) Oral at bedtime  enoxaparin Injectable 40 milliGRAM(s) SubCutaneous daily  ergocalciferol 46260 Unit(s) Oral <User Schedule>  FIRST- Mouthwash  BLM 10 milliLiter(s) Swish and Swallow every 8 hours  fluconAZOLE   Tablet 100 milliGRAM(s) Oral daily  folic acid 1 milliGRAM(s) Oral daily  gabapentin 600 milliGRAM(s) Oral every 8 hours  influenza   Vaccine 0.5 milliLiter(s) IntraMuscular once  insulin lispro (HumaLOG) corrective regimen sliding scale   SubCutaneous every 6 hours  linezolid  IVPB      linezolid  IVPB 600 milliGRAM(s) IV Intermittent every 12 hours  methylPREDNISolone sodium succinate Injectable 40 milliGRAM(s) IV Push every 8 hours  morphine  - Injectable 2 milliGRAM(s) IV Push every 4 hours PRN  nystatin Powder 1 Application(s) Topical two times a day  ondansetron Injectable 4 milliGRAM(s) IV Push every 6 hours PRN  oxycodone    5 mG/acetaminophen 325 mG 2 Tablet(s) Oral every 4 hours PRN  pantoprazole  Injectable 40 milliGRAM(s) IV Push daily  polyethylene glycol 3350 17 Gram(s) Oral two times a day    Drug Dosing Weight  Height (cm): 154.9 (19 Sep 2020 08:00)  Weight (kg): 84.8 (21 Sep 2020 09:15)  BMI (kg/m2): 35.3 (21 Sep 2020 09:15)  BSA (m2): 1.84 (21 Sep 2020 09:15)    CENTRAL LINE: [ ] YES [x ] NO  LOCATION:   DATE INSERTED:  REMOVE: [ ] YES [ ] NO  EXPLAIN:    [place]BLED[place]: [ ] YES [ x] NO    DATE INSERTED:  REMOVE:  [ ] YES [ ] NO  EXPLAIN:    A-LINE:  [ ] YES [x ] NO  LOCATION:   DATE INSERTED:  REMOVE:  [ ] YES [ ] NO  EXPLAIN:    PMH -reviewed admission note, no change since admission      ABG - ( 22 Sep 2020 03:27 )  pH, Arterial: 7.50  pH, Blood: x     /  pCO2: 42    /  pO2: 122   / HCO3: 33    / Base Excess: 8.7   /  SaO2: 99          09-22 @ 07:01  -  09-23 @ 07:00  --------------------------------------------------------  IN: 423.7 mL / OUT: 1040 mL / NET: -616.3 mL        Mode: CPAP with PS  FiO2: 40  PEEP: 5  PS: 7  ITime: 1  MAP: 7.4  PIP: 12      PHYSICAL EXAM:    GENERAL: obese, anxious  HEAD:  Atraumatic, Normocephalic  EYES: EOMI, PERRLA, conjunctiva and sclera clear  ENMT: white sputum noted in back of throat with mild erythema. No lesions, good dentition, dried lips.   NECK: Supple, normal appearance, No JVD; Normal thyroid; Trachea midline  NERVOUS SYSTEM:  Alert & Oriented X3, following commands, strength and sensation intact  CHEST/LUNG: diminished breath sounds bilaterally, wheezing noted bilaterally.   HEART: Regular rate and rhythm; No murmurs, rubs, or gallops  ABDOMEN: Soft, Nontender, Nondistended; Bowel sounds present  EXTREMITIES:  edema on Right upper arm and 1+ pitting edema on lower extremities bilaterally. 2+ Peripheral Pulses,   SKIN: bruises noted on arms bilaterally.       LABS:  CBC Full  -  ( 23 Sep 2020 06:37 )  WBC Count : 6.09 K/uL  RBC Count : 3.22 M/uL  Hemoglobin : 9.8 g/dL  Hematocrit : 28.3 %  Platelet Count - Automated : 66 K/uL  Mean Cell Volume : 87.9 fl  Mean Cell Hemoglobin : 30.4 pg  Mean Cell Hemoglobin Concentration : 34.6 gm/dL  Auto Neutrophil # : 5.66 K/uL  Auto Lymphocyte # : 0.17 K/uL  Auto Monocyte # : 0.22 K/uL  Auto Eosinophil # : 0.00 K/uL  Auto Basophil # : 0.00 K/uL  Auto Neutrophil % : 92.9 %  Auto Lymphocyte % : 2.8 %  Auto Monocyte % : 3.6 %  Auto Eosinophil % : 0.0 %  Auto Basophil % : 0.0 %    09-23    136  |  99  |  13  ----------------------------<  232<H>  3.1<L>   |  32<H>  |  0.48<L>    Ca    8.6      23 Sep 2020 06:37  Phos  2.6     09-23  Mg     2.1     09-23    TPro  6.0  /  Alb  2.4<L>  /  TBili  1.0  /  DBili  x   /  AST  13  /  ALT  23  /  AlkPhos  98  09-23        Culture Results:   No growth to date. (09-21 @ 14:47)    RADIOLOGY & ADDITIONAL STUDIES REVIEWED:   1. Normal mitral valve.  2. Normal trileaflet aortic valve.  3. Aortic Root: 2.7 cm.  LVOT diameter: 1.9 cm.    4. LA volume index = 11 cc/m2.  5. Normal left ventricular internal dimensions and wall  thicknesses.  6. Normal Left Ventricular Systolic Function,  (EF =  50-55%)  7. Grade I diastolic dysfunction (Impaired relaxation).  8. Normal right atrium.  9. Normal right ventricular size and systolic function  (TAPSE  2.0cm).  10. Unable to estimate RVSP.  11. Normal tricuspid valve.  12. Pulmonic valve not well seen.  13. Normal pericardium with no pericardial effusion.    [ ]GOALS OF CARE DISCUSSION WITH PATIENT/FAMILY/PROXY:    CRITICAL CARE TIME SPENT: 35 minutes

## 2020-09-23 NOTE — PROGRESS NOTE ADULT - ASSESSMENT
· Assessment	  65 year old lady with severe COPD and lung ca with mets to bones which causing compression Fx.  She had one RT for that.  she was admitted for dyspnea and hypoxia.    Problem/Recommendation - 1:  Problem: Lung cancer. Recommendation: with mets to bones  on chemo keytruda, alimta and carboplatin  she had one RT to spine.  the tumor markers have been trending down with chemo    Problem/Recommendation - 2:  ·  Problem: COPD (chronic obstructive pulmonary disease).  Recommendation: required freq steroids.     Problem/Recommendation - 3:  ·  Problem: Acute on chronic respiratory failure with hypoxia.  Recommendation: ?aspiration causing resp failure requiring intubation  she has pain at throat for a while and has difficulty swallowing  now extubated  prognosis poor.   she is lucid now  and express wishes to continue chemo  4. sore throat  no thrush in oral cavity  need ENT to take a look

## 2020-09-23 NOTE — SWALLOW BEDSIDE ASSESSMENT ADULT - SWALLOW EVAL: DIAGNOSIS
Pt p/w s&s of oropharyngeal dysphagia c/b impaired bolus formation 2/2 missing dentition, reduced hylolaryngeal elevation and cont. gag reflex 2/2 possible anxiety a/b textures and family living situations (pt cont. cried throughout eval). Suction warranted for all trials of ice chips, nectar thick, and puree except for thin liquid via straw. Pt p/w s&s of oropharyngeal dysphagia c/b impaired bolus formation 2/2 missing dentition, reduced hylolaryngeal elevation and cont. gag reflex 2/2 possible anxiety a/b textures and family living situations (pt cont. cried throughout eval). S/s of penetration/aspiration included gurgling, wet coughing, and warranted suctioning for all trials of ice chips, nectar thick, and puree except for thin liquid via straw.

## 2020-09-23 NOTE — PROGRESS NOTE ADULT - SUBJECTIVE AND OBJECTIVE BOX
PULMONARY  progress note    BARB COLÓN  MRN-[id number]    Patient is a 65y old  Female who presents with a chief complaint of shortness of breath (22 Sep 2020 11:29)  Pt extubated yesterday, feels down, no respiratory distress, Picc line out as blood c/s shows yeast like cells      MEDICATIONS  (STANDING):  ALBUTerol    90 MICROgram(s) HFA Inhaler 2 Puff(s) Inhalation every 4 hours  atorvastatin 40 milliGRAM(s) Oral at bedtime  dexMEDEtomidine Infusion 0.2 MICROgram(s)/kG/Hr (4.24 mL/Hr) IV Continuous <Continuous>  enoxaparin Injectable 40 milliGRAM(s) SubCutaneous daily  ergocalciferol 20109 Unit(s) Oral <User Schedule>  fluconAZOLE   Tablet 100 milliGRAM(s) Oral daily  folic acid 1 milliGRAM(s) Oral daily  gabapentin 600 milliGRAM(s) Oral every 8 hours  influenza   Vaccine 0.5 milliLiter(s) IntraMuscular once  insulin lispro (HumaLOG) corrective regimen sliding scale   SubCutaneous every 6 hours  linezolid  IVPB      linezolid  IVPB 600 milliGRAM(s) IV Intermittent every 12 hours  methylPREDNISolone sodium succinate Injectable 40 milliGRAM(s) IV Push every 8 hours  nystatin Powder 1 Application(s) Topical two times a day  pantoprazole  Injectable 40 milliGRAM(s) IV Push daily  polyethylene glycol 3350 17 Gram(s) Oral two times a day  potassium chloride   Powder 40 milliEquivalent(s) Oral every 2 hours      MEDICATIONS  (PRN):  morphine  - Injectable 2 milliGRAM(s) IV Push every 4 hours PRN Severe Pain (7 - 10)  ondansetron Injectable 4 milliGRAM(s) IV Push every 6 hours PRN Nausea and/or Vomiting  oxycodone    5 mG/acetaminophen 325 mG 2 Tablet(s) Oral every 4 hours PRN Moderate Pain (4 - 6)      Allergies    fish (Angioedema)  penicillins (Rash)    Intolerances        PAST MEDICAL & SURGICAL HISTORY:  Malignant neoplasm of unspecified part of right bronchus or lung    HTN (hypertension)    DM (diabetes mellitus)    COPD (chronic obstructive pulmonary disease)    Lung cancer    Morbid obesity    GERD (gastroesophageal reflux disease)    Deviated septum    Prolapsed uterus    ITP (idiopathic thrombocytopenic purpura)    Emphysema/COPD    History of cholecystectomy    S/P lobectomy of lung  right 2017    History of tonsillectomy             REVIEW OF SYSTEMS:  CONSTITUTIONAL: No fever, weight loss, or fatigue   EYES: No eye pain, visual disturbances, or discharge  ENT:  No difficulty hearing, tinnitus, vertigo; No sinus or throat pain  NECK: No pain or stiffness or nodes  RESPIRATORY:  cough+   wheezing--   chills--   hemoptysis--  Shortness of Breath--  CARDIOVASCULAR: No chest pain, palpitations, passing out, dizziness, or leg swelling  GASTROINTESTINAL: No abdominal or epigastric pain. No nausea, vomiting, or hematemesis; No diarrhea or constipation. No melena or hematochezia.  GENITOURINARY: No dysuria, frequency, hematuria, or incontinence  NEUROLOGICAL: No headaches, memory loss, loss of strength, numbness, or tremors  SKIN: No itching, burning, rashes, or lesions   LYMPH Nodes: No enlarged glands  HEME/LYMPH: No easy bruising, or bleeding gums  ALLERGY AND IMMUNOLOGIC: No hives or eczema    Vital Signs Last 24 Hrs  T(C): 36.9 (23 Sep 2020 06:00), Max: 37.1 (22 Sep 2020 09:00)  T(F): 98.4 (23 Sep 2020 06:00), Max: 98.7 (22 Sep 2020 09:00)  HR: 117 (23 Sep 2020 08:00) (70 - 117)  BP: 158/98 (23 Sep 2020 08:00) (104/87 - 158/98)  BP(mean): 111 (23 Sep 2020 08:00) (76 - 126)  RR: 20 (23 Sep 2020 08:00) (13 - 28)  SpO2: 95% (23 Sep 2020 08:00) (94% - 100%)  I&O's Detail    22 Sep 2020 07:01  -  23 Sep 2020 07:00  --------------------------------------------------------  IN:    Dexmedetomidine: 364.4 mL    FentaNYL: 55.1 mL    FentaNYL: 4.2 mL  Total IN: 423.7 mL    OUT:    Indwelling Catheter - Urethral (mL): 1010 mL    Phenylephrine: 0 mL  Total OUT: 1010 mL    Total NET: -586.3 mL          PHYSICAL EXAMINATION:    GENERAL: The patient is a well-developed, well-nourished in no apparent distress.   SKIN no rash ecchymoses or bruises  HEENT: Head is normocephalic and atraumatic  SHAMA , Extraocular muscles are intact. Mucous membranes  moist.   Neck supple ,No LN felt JVP not increased  Thyroid not enlarged  Cardiovascular:  S1 S2 heard, RSR, No JVD , systolic  murmur at apex, No gallop or rub  Respiratory: Chest wall symmetrical with good air entry ,Percussion note normal,    Lungs vesicular breathing with rt basilar   rales , no   wheeze	  ABDOMEN:  Soft, Non-tender, obese,  no hepatomegaly or splenomegaly BS positive	  Extremities: Normal range of motion, No clubbing, cyanosis or edema  Vascular: Peripheral pulses palpable 2+ bilaterally  CNS:  Alert and oriented x3   Cranial nerves intact  sensory intact  motor power5/5  dtr 2+   Babinski neg    LABS:                        9.8    6.09  )-----------( 66       ( 23 Sep 2020 06:37 )             28.3     09-23    136  |  99  |  13  ----------------------------<  232<H>  3.1<L>   |  32<H>  |  0.48<L>    Ca    8.6      23 Sep 2020 06:37  Phos  2.6     09-23  Mg     2.1     09-23    TPro  6.0  /  Alb  2.4<L>  /  TBili  1.0  /  DBili  x   /  AST  13  /  ALT  23  /  AlkPhos  98  09-23        ABG - ( 22 Sep 2020 03:27 )  pH, Arterial: 7.50  pH, Blood: x     /  pCO2: 42    /  pO2: 122   / HCO3: 33    / Base Excess: 8.7   /  SaO2: 99        Procalcitonin, Serum: 0.08 ng/mL (09-20-20 @ 11:57)    Folate, Serum: >20.0 ng/mL (09-20-20 @ 11:36)  Vitamin B12, Serum: >2000 pg/mL (09-20-20 @ 11:36)      MICROBIOLOGY:    Culture - Blood (collected 09-21-20 @ 14:47)  Source: .Blood Blood  Preliminary Report (09-22-20 @ 15:01):    No growth to date.    Culture - Urine (collected 09-19-20 @ 16:20)  Source: .Urine Clean Catch (Midstream)  Final Report (09-20-20 @ 17:00):    <10,000 CFU/mL Normal Urogenital Shannan    Culture - Blood (collected 09-19-20 @ 11:48)  Source: .Blood Blood-Peripheral  Gram Stain (09-22-20 @ 14:57):    Growth in aerobic bottle: Yeast like cells  Preliminary Report (09-22-20 @ 14:57):    Growth in aerobic bottle: Yeast like cells    "Due to technical problems, Proteus sp. will Not be reported as part of    the BCID panel until further notice"    ***Blood Panel PCR results on this specimen are available    approximately 3 hours after the Gram stain result.***    Gram stain, PCR, and/or culture results may not always    correspond due to difference in methodologies.    ************************************************************    This PCR assay was performed using [employer].    The following targets are tested for: Enterococcus,    vancomycin resistant enterococci, Listeria monocytogenes,    coagulase negative staphylococci, S. aureus,    methicillin resistant S. aureus, Streptococcus agalactiae    (Group B), S. pneumoniae, S. pyogenes (Group A),    Acinetobacter baumannii, Enterobacter cloacae, E. coli,    Klebsiella oxytoca, K. pneumoniae, Proteus sp.,    Serratia marcescens, Haemophilus influenzae,    Neisseria meningitidis, Pseudomonas aeruginosa, Candida    albicans, C. glabrata, C krusei, C parapsilosis,    C. tropicalis and the KPC resistance gene.  Organism: Blood Culture PCR (09-22-20 @ 16:46)  Organism: Blood Culture PCR (09-22-20 @ 16:46)      -  Acinetobacter baumanii: Nondet      -  Candida albicans: Nondet      -  Candida glabrata: Nondet      -  Candida krusei: Nondet      -  Candida parapsilosis: Nondet      -  Candida tropicalis: Nondet      -  Coagulase negative Staphylococcus: Nondet      -  Enterobacter cloacae complex: Nondet      -  Enterococcus species: Nondet      -  Escherichia coli: Nondet      -  Haemophilus influenzae: Nondet      -  Klebsiella oxytoca: Nondet      -  Klebsiella pneumoniae: Nondet      -  Listeria monocytogenes: Nondet      -  Methicillin resistant Staphylococcus aureus (MRSA): Nondet      -  Multidrug (KPC pos) resistant organism: Nondet      -  Neisseria meningitidis: Nondet      -  Pseudomonas aeruginosa: Nondet      -  Serratia marcescens: Nondet      -  Staphylococcus aureus: Nondet      -  Streptococcus agalactiae (Group B): Nondet      -  Streptococcus pneumoniae: Nondet      -  Streptococcus pyogenes (Group A): Nondet      -  Streptococcus sp. (Not Grp A, B or S pneumoniae): Nondet      -  Vancomycin resistant Enterococcus sp.: Nondet      Method Type: PCR    Culture - Blood (collected 09-19-20 @ 11:48)  Source: .Blood Blood-Peripheral  Gram Stain (09-20-20 @ 02:57):    Growth in anaerobic bottle: Gram positive cocci in pairs  Final Report (09-22-20 @ 09:49):    Growth in anaerobic bottle: Enterococcus faecium (vancomycin resistant)    "Due to technical problems, Proteus sp. will Not be reported as part of    the BCID panel until further notice"    ***Blood Panel PCR results on this specimen are available    approximately 3 hours after the Gram stain result.***    Gram stain, PCR, and/or culture results may not always    correspond due to difference in methodologies.    ************************************************************    This PCR assay was performed using [employer].    The following targets are tested for: Enterococcus,    vancomycin resistant enterococci, Listeria monocytogenes,    coagulase negative staphylococci, S. aureus,    methicillin resistant S. aureus, Streptococcus agalactiae    (Group B), S. pneumoniae, S. pyogenes (Group A),    Acinetobacter baumannii, Enterobacter cloacae, E. coli,    Klebsiella oxytoca, K. pneumoniae, Proteus sp.,    Serratia marcescens, Haemophilus influenzae,    Neisseria meningitidis, Pseudomonas aeruginosa, Candida    albicans, C. glabrata, C krusei, C parapsilosis,    C. tropicalis and the KPC resistance gene.  Organism: Blood Culture PCR  Enterococcus faecium (vancomycin resistant) (09-22-20 @ 09:49)  Organism: Enterococcus faecium (vancomycin resistant) (09-22-20 @ 09:49)      -  Ampicillin: R >8 Predicts results to ampicillin/sulbactam, amoxacillin-clavulanate and  piperacillin-tazobactam.      -  Daptomycin: SDD 4 The breakpoint for SDD (sensitive dose dependent)is based on a dosage regimen of 8-12 mg/kg administered every 24 h in adults and is intended for serious infections due to E. faecium. Consultation with an infectious diseases specialist is recommended.      -  Gentamicin synergy: R      -  Linezolid: S 2      -  Streptomycin synergy: R      -  Vancomycin: R >16      Method Type: DALE  Organism: Blood Culture PCR (09-22-20 @ 09:49)      -  Vancomycin resistant Enterococcus sp.: Detec      Method Type: PCR          RADIOLOGY & ADDITIONAL STUDIES:    CXR:< from: Xray Chest 1 View- PORTABLE-Routine (Xray Chest 1 View- PORTABLE-Routine in AM.) (09.22.20 @ 08:05) >  ET tube terminates at the level of the arjun pointing towards the right mainstem bronchus; retraction is recommended.    Previously noted NG tube and left PICC line have been removed.    The left lung apex is obscured by the patient's chin. No gross pneumothorax is identified.    Grossly stable small left pleural effusion; underlying left basilar pulmonary infiltrate cannot be excluded.    Previously noted small right pleural effusion is no longer seen on the current examination. Right basilar pulmonary consolidation versus atelectasis.    Grossly stable cardiac silhouette.

## 2020-09-23 NOTE — PROGRESS NOTE ADULT - PROBLEM SELECTOR PLAN 2
ECOG: 3-4. Patient with primary R lung Ca with bone mets. CT indicates Osseous metastasis and associated compression fractures and rib fractures unchanged. Patient utilizes 5L home O2, confirms family report of increasing debility/weakness. Per oncology, patient on chemo (heytruda, alimta and carboplatin); had 1 RT to spine. Overall, poor prognosis. Full code.

## 2020-09-23 NOTE — PROGRESS NOTE ADULT - PROBLEM SELECTOR PLAN 3
2/2 ES COPD, metastatic lung Ca.  Patient dependent 5L O2, with increasing debility/weakness and dyspnea reported at rest. 2/2 ES COPD, metastatic lung Ca.  Patient dependent 5L O2, with increasing debility/weakness and dyspnea reported at rest. PT recommending VY.

## 2020-09-23 NOTE — PROGRESS NOTE ADULT - ASSESSMENT
65 years old obese female , active smoker with COPD (on 5L oxygen at home). Patient was hypoxic, tachycardiac and tachypneic. Code Rapid response was called. RRT team arrived on scene. Patient has encephalopathy from hypoxia with wheezing and stridor on examination. Patient was tachycardiac to 140/min. Patient was hypoxic and started on non-rebreather but patient has increased work of breathing. Decision was made to intubate the patient. Patient become hypotensive post intubation and was given LR bolus and started on peripheral phenylephrine.    Assessment:  1. Acute Respiratory failure secondary to COPD exacerbation.  2. COPD.  3. Lung cancer with suspected metastasis on active chemotherapy.  4. Acute Encephalopathy.  5. HTN.  6. DM.  7. Active Smoker.      CNS:  #Acute Encephalopathy: Resolved  - likely hypoxic.  - Patient was intubated for work of breathing, hypoxia, tachypnea and tachycardia  - D/C phenethyl   - D/C predexa.   - patient awake and alert, using writing for communication.       CVS:  #Hypotension   - Patient was hypotensive after intubation.  - likely medication induced.  - S/p phenylephrine, now stopped  -  MAP maintaining >65 mmHg.    #History of HTN:  - Patient was on amlodipine and carvedilol.  - on hold for hypotension.  - Will monitor blood pressure.      Respiratory:  #Acute hypoxic Respiratory Failure Secondary to COPD exacerbation.  - Patient was intubated for work of breathing, hypoxia, tachypnea and tachycardia.  - ABG showing mild respiratory alkalosis, Hypercapnia resolved  - On Nasal canula, saturating well.     #COPD exacerbation:  - Past smoker with multiple COPD exacerbations hospital admissions in past, on 5 L oxygen at home.  -  on IV solumedrol.  - Albuterol inhaler q 6 hourly.    #Lung CA:  - R lung cancer s/p resection (2017)   - Patient was on chemo q 3 weeks with concern for mets to spine and compression fractures.  - Left arm midline for chemotherapy removed 2/2 bacteremia  - Hemtology/oncology Dr Mendiola on case.      GI:  - NPO except medication.  - IV protonix.      Nephrology:  - No active issue.  - monitor BUN; creatinine.  - K and magnesium replaced       ID:  #VRE bacteremia:  - in 1/4 bottles.  - will cover with Zyvox until repeat cultures are back.  - D/C PICC line on L arm   - will repeat blood cultures tomorrow.   - ID conuslt dr bales.      Dermatology:  - No skin breakdown.  - dry skin.    Endocrinology:  #Diabetes Mellitis:  - patient was hypoglycemic in am.  - Discontinued lantus for now.  - restart as needed.  - Will continue insulin HSS for now.  - monitor blood glucose level.    Goals of care:  - Full code.  - Spoke with family regarding goals of care.  - Palliative care follow up as patient has metastatic lung cancer and advanced COPD.    Disposition:  - ICU 65 years old obese female , active smoker with COPD (on 5L oxygen at home). Patient was hypoxic, tachycardiac and tachypneic. Code Rapid response was called. RRT team arrived on scene. Patient has encephalopathy from hypoxia with wheezing and stridor on examination. Patient was tachycardiac to 140/min. Patient was hypoxic and started on non-rebreather but patient has increased work of breathing. Decision was made to intubate the patient. Patient become hypotensive post intubation and was given LR bolus and started on peripheral phenylephrine.    Assessment:  1. Acute Respiratory failure secondary to COPD exacerbation.  2. VRE bacteremia  3. Lung cancer with suspected metastasis on active chemotherapy.  4. Acute Encephalopathy.  5. HTN.  6. DM.  7. Active Smoker.      CNS:  #Acute Encephalopathy: Resolved  - likely hypoxic.  - Patient was intubated for work of breathing, hypoxia, tachypnea and tachycardia  - D/C phenethyl   - D/C predexa.   - patient alert and oriented x 3.       CVS:  #Hypotension   - Patient was hypotensive after intubation.  - likely medication induced.  - S/p phenylephrine, now stopped  -  MAP maintaining >65 mmHg.    #History of HTN:  - Patient was on amlodipine and carvedilol.  - on hold for hypotension.  - Will monitor blood pressure.    Respiratory:  #Acute hypoxic Respiratory Failure Secondary to COPD exacerbation.  - Patient was intubated for work of breathing, hypoxia, tachypnea and tachycardia.  - On Nasal canula, saturating well.     #COPD exacerbation:  - Past smoker with multiple COPD exacerbations hospital admissions in past, on 5 L oxygen at home.  - on IV solumedrol.  - Albuterol inhaler q 6 hourly.    #Lung CA:  - R lung cancer s/p resection (2017)   - Patient was on chemo q 3 weeks with concern for mets to spine and compression fractures.  - Left arm midline for chemotherapy removed 2/2 bacteremia  - Hemtology/oncology Dr Mendiola on case.      GI:  #odynophagia  - p/w symptoms prior to intubation  - on chemo for lung cancer  - started on magic mouthwash and nystatin swish and swallow  - no thrush visualized    #prophylaxis  - IV protonix.      Nephrology:  - No active issue.  - monitor BUN; creatinine.  - K replaced       ID:  #VRE bacteremia:  - in 1/4 bottles.  - will cover with Zyvox until repeat cultures are back.  - PICC line on L arm removed on 9/21/2020  - repeat blood cultures 9/21/2020 no growth to date.  - ID conuslt dr corcoran.  - f/u duration of abx    #Yeast bacteremia:  - in aerobic bottle from 9/19/2020  - On Fluconazole 100 PO  - ID consult Dr. Corcoran.      Dermatology:  - No skin breakdown.  - dry skin, multiple bruises noted on upper extremities.     Endocrinology:  #Diabetes Mellitis:  - BS > 200  - continue insulin HSS for now.  - monitor blood glucose level.    Goals of care:  - Full code.  - Spoke with family regarding goals of care.  - Palliative care follow up as patient has metastatic lung cancer and advanced COPD.    Disposition:  - ICU 65 years old obese female , active smoker with COPD (on 5L oxygen at home). Patient was hypoxic, tachycardiac and tachypneic. Code Rapid response was called. RRT team arrived on scene. Patient has encephalopathy from hypoxia with wheezing and stridor on examination. Patient was tachycardiac to 140/min. Patient was hypoxic and started on non-rebreather but patient has increased work of breathing. Decision was made to intubate the patient. Patient become hypotensive post intubation and was given LR bolus and started on peripheral phenylephrine.    Assessment:  1. Acute Respiratory failure secondary to COPD exacerbation.  2. VRE bacteremia  3. Lung cancer with suspected metastasis on active chemotherapy.  4. Acute Encephalopathy.  5. HTN.  6. DM.  7. Active Smoker.      CNS:  #Acute Encephalopathy: Resolved  - likely hypoxic.  - Patient was intubated for work of breathing, hypoxia, tachypnea and tachycardia  - D/C phenethyl   - D/C predexa.   - patient alert and oriented x 3.       CVS:  #Hypotension   - Patient was hypotensive after intubation.  - likely medication induced.  - S/p phenylephrine, now stopped  -  MAP maintaining >65 mmHg.    #History of HTN:  - Patient was on amlodipine and carvedilol.  - on hold for hypotension.  - Will monitor blood pressure.    Respiratory:  #Acute hypoxic Respiratory Failure Secondary to COPD exacerbation.  - Patient was intubated for work of breathing, hypoxia, tachypnea and tachycardia.  - On Nasal canula, saturating well.     #COPD exacerbation:  - Past smoker with multiple COPD exacerbations hospital admissions in past, on 5 L oxygen at home.  - on IV solumedrol.  - Albuterol inhaler q 6 hourly.    #Lung CA:  - R lung cancer s/p resection (2017)   - Patient was on chemo q 3 weeks with concern for mets to spine and compression fractures.  - Left arm midline for chemotherapy removed 2/2 bacteremia  - Hemtology/oncology Dr Mendiola on case.      GI:  #odynophagia  - p/w symptoms prior to intubation  - on chemo for lung cancer  - started on magic mouthwash and nystatin swish and swallow  - no thrush visualized    #prophylaxis  - IV protonix.      Nephrology:  - No active issue.  - monitor BUN; creatinine.  - K replaced       ID:  #VRE bacteremia:  - in 1/4 bottles.  - will cover with Zyvox until repeat cultures are back.  - PICC line on L arm removed on 9/21/2020  - repeat blood cultures 9/21/2020 no growth to date.  - ID conuslt dr corcoran.  - f/u duration of abx  - no mention of vegetations on echo    #Yeast bacteremia:  - in aerobic bottle from 9/19/2020  - On Fluconazole 100 PO  - ID consult Dr. Corcoran.      Dermatology:  - No skin breakdown.  - dry skin, multiple bruises noted on upper extremities.     Endocrinology:  #Diabetes Mellitis:  - BS > 200  - continue insulin HSS for now.  - monitor blood glucose level.    Goals of care:  - Full code.  - Spoke with family regarding goals of care.  - Palliative care follow up as patient has metastatic lung cancer and advanced COPD.    Disposition:  - ICU 65 years old obese female , active smoker with COPD (on 5L oxygen at home). Patient was hypoxic, tachycardiac and tachypneic. Code Rapid response was called. RRT team arrived on scene. Patient has encephalopathy from hypoxia with wheezing and stridor on examination. Patient was tachycardiac to 140/min. Patient was hypoxic and started on non-rebreather but patient has increased work of breathing. Decision was made to intubate the patient. Patient become hypotensive post intubation and was given LR bolus and started on peripheral phenylephrine.    Assessment:  1. Acute Respiratory failure secondary to COPD exacerbation.  2. VRE bacteremia  3. Lung cancer with suspected metastasis on active chemotherapy.  4. Acute Encephalopathy.  5. HTN.  6. DM.  7. Active Smoker.      CNS:  #Acute Encephalopathy: Resolved  - likely hypoxic.  - Patient was intubated for work of breathing, hypoxia, tachypnea and tachycardia  - D/C phenethyl   - D/C predexa.   - patient alert and oriented x 3.       CVS:  #Hypotension   - Patient was hypotensive after intubation.  - likely medication induced.  - S/p phenylephrine, now stopped  -  MAP maintaining >65 mmHg.    #History of HTN:  - Patient was on amlodipine and carvedilol.  - SBP now in 150-160  - will start carvedilol   - Will monitor blood pressure.    Respiratory:  #Acute hypoxic Respiratory Failure Secondary to COPD exacerbation.  - Patient was intubated for work of breathing, hypoxia, tachypnea and tachycardia.  - On Nasal canula, saturating well.   - using Bipap as needed    #COPD exacerbation:  - Past smoker with multiple COPD exacerbations hospital admissions in past, on 5 L oxygen at home.  - on IV solumedrol.  - Albuterol inhaler q 6 hourly.    #Lung CA:  - R lung cancer s/p resection (2017)   - Patient was on chemo q 3 weeks with concern for mets to spine and compression fractures.  - Left arm midline for chemotherapy removed 2/2 bacteremia  - Hemtology/oncology Dr Mendiola on case.      GI:  #odynophagia  - p/w symptoms prior to intubation  - on chemo for lung cancer  - started on magic mouthwash  - no thrush visualized    #prophylaxis  - IV protonix.      Nephrology:  - No active issue.  - monitor BUN; creatinine.  - K replaced       ID:  #VRE bacteremia:  - in 1/4 bottles.  - will cover with Zyvox until repeat cultures are back.  - PICC line on L arm removed on 9/21/2020  - repeat blood cultures 9/21/2020 no growth to date.  - ID conuslt dr corcoran.  - f/u duration of abx  - no mention of vegetations on echo    #Yeast bacteremia:  - in aerobic bottle from 9/19/2020  - started on Capsofungin  - ID consult Dr. Corcoran.      Dermatology:  - No skin breakdown.  - dry skin, multiple bruises noted on upper extremities.     Endocrinology:  #Diabetes Mellitis:  - BS > 200  - continue insulin HSS for now.  - monitor blood glucose level.    Goals of care:  - Full code.  - Spoke with family regarding goals of care.  - Palliative care follow up as patient has metastatic lung cancer and advanced COPD.    Disposition:  - ICU

## 2020-09-23 NOTE — PROGRESS NOTE ADULT - TREATMENT GUIDELINE COMMENT
MOLST form completed as per patient's wishes: CPR / long term ventilation / long term feeding tube / trial IV fluids / use abx / NO LIMITATION on medical interventions/ send to hospital.

## 2020-09-23 NOTE — SWALLOW BEDSIDE ASSESSMENT ADULT - ORAL PREPARATORY PHASE
Anterior loss of bolus/gag reflex;  suction warranted/Decreased mastication ability gag reflex; suction warranted/Anterior loss of bolus/Decreased mastication ability gag reflex;  suction warranted/Anterior loss of bolus Within functional limits

## 2020-09-24 LAB
ALBUMIN SERPL ELPH-MCNC: 2.5 G/DL — LOW (ref 3.5–5)
ALP SERPL-CCNC: 91 U/L — SIGNIFICANT CHANGE UP (ref 40–120)
ALT FLD-CCNC: 22 U/L DA — SIGNIFICANT CHANGE UP (ref 10–60)
ANION GAP SERPL CALC-SCNC: 8 MMOL/L — SIGNIFICANT CHANGE UP (ref 5–17)
AST SERPL-CCNC: 10 U/L — SIGNIFICANT CHANGE UP (ref 10–40)
BASOPHILS # BLD AUTO: 0 K/UL — SIGNIFICANT CHANGE UP (ref 0–0.2)
BASOPHILS NFR BLD AUTO: 0 % — SIGNIFICANT CHANGE UP (ref 0–2)
BILIRUB SERPL-MCNC: 0.8 MG/DL — SIGNIFICANT CHANGE UP (ref 0.2–1.2)
BUN SERPL-MCNC: 15 MG/DL — SIGNIFICANT CHANGE UP (ref 7–18)
CALCIUM SERPL-MCNC: 8.3 MG/DL — LOW (ref 8.4–10.5)
CHLORIDE SERPL-SCNC: 97 MMOL/L — SIGNIFICANT CHANGE UP (ref 96–108)
CO2 SERPL-SCNC: 33 MMOL/L — HIGH (ref 22–31)
CREAT SERPL-MCNC: 0.54 MG/DL — SIGNIFICANT CHANGE UP (ref 0.5–1.3)
EOSINOPHIL # BLD AUTO: 0 K/UL — SIGNIFICANT CHANGE UP (ref 0–0.5)
EOSINOPHIL NFR BLD AUTO: 0 % — SIGNIFICANT CHANGE UP (ref 0–6)
FERRITIN SERPL-MCNC: 1109 NG/ML — HIGH (ref 15–150)
GLUCOSE BLDC GLUCOMTR-MCNC: 276 MG/DL — HIGH (ref 70–99)
GLUCOSE BLDC GLUCOMTR-MCNC: 278 MG/DL — HIGH (ref 70–99)
GLUCOSE BLDC GLUCOMTR-MCNC: 307 MG/DL — HIGH (ref 70–99)
GLUCOSE BLDC GLUCOMTR-MCNC: 319 MG/DL — HIGH (ref 70–99)
GLUCOSE SERPL-MCNC: 276 MG/DL — HIGH (ref 70–99)
HCT VFR BLD CALC: 29.4 % — LOW (ref 34.5–45)
HGB BLD-MCNC: 9.7 G/DL — LOW (ref 11.5–15.5)
IMM GRANULOCYTES NFR BLD AUTO: 0.8 % — SIGNIFICANT CHANGE UP (ref 0–1.5)
IRON SATN MFR SERPL: 136 UG/DL — SIGNIFICANT CHANGE UP (ref 40–150)
IRON SATN MFR SERPL: 62 % — HIGH (ref 15–50)
LYMPHOCYTES # BLD AUTO: 0.14 K/UL — LOW (ref 1–3.3)
LYMPHOCYTES # BLD AUTO: 2.8 % — LOW (ref 13–44)
MAGNESIUM SERPL-MCNC: 2.2 MG/DL — SIGNIFICANT CHANGE UP (ref 1.6–2.6)
MCHC RBC-ENTMCNC: 30 PG — SIGNIFICANT CHANGE UP (ref 27–34)
MCHC RBC-ENTMCNC: 33 GM/DL — SIGNIFICANT CHANGE UP (ref 32–36)
MCV RBC AUTO: 91 FL — SIGNIFICANT CHANGE UP (ref 80–100)
MONOCYTES # BLD AUTO: 0.2 K/UL — SIGNIFICANT CHANGE UP (ref 0–0.9)
MONOCYTES NFR BLD AUTO: 4 % — SIGNIFICANT CHANGE UP (ref 2–14)
NEUTROPHILS # BLD AUTO: 4.65 K/UL — SIGNIFICANT CHANGE UP (ref 1.8–7.4)
NEUTROPHILS NFR BLD AUTO: 92.4 % — HIGH (ref 43–77)
NRBC # BLD: 0 /100 WBCS — SIGNIFICANT CHANGE UP (ref 0–0)
PHOSPHATE SERPL-MCNC: 3.2 MG/DL — SIGNIFICANT CHANGE UP (ref 2.5–4.5)
PLATELET # BLD AUTO: 82 K/UL — LOW (ref 150–400)
POTASSIUM SERPL-MCNC: 3.7 MMOL/L — SIGNIFICANT CHANGE UP (ref 3.5–5.3)
POTASSIUM SERPL-SCNC: 3.7 MMOL/L — SIGNIFICANT CHANGE UP (ref 3.5–5.3)
PROT SERPL-MCNC: 5.9 G/DL — LOW (ref 6–8.3)
RBC # BLD: 3.23 M/UL — LOW (ref 3.8–5.2)
RBC # FLD: 15.9 % — HIGH (ref 10.3–14.5)
SODIUM SERPL-SCNC: 138 MMOL/L — SIGNIFICANT CHANGE UP (ref 135–145)
TIBC SERPL-MCNC: 219 UG/DL — LOW (ref 250–450)
UIBC SERPL-MCNC: 83 UG/DL — LOW (ref 110–370)
WBC # BLD: 5.03 K/UL — SIGNIFICANT CHANGE UP (ref 3.8–10.5)
WBC # FLD AUTO: 5.03 K/UL — SIGNIFICANT CHANGE UP (ref 3.8–10.5)

## 2020-09-24 RX ORDER — BUDESONIDE AND FORMOTEROL FUMARATE DIHYDRATE 160; 4.5 UG/1; UG/1
2 AEROSOL RESPIRATORY (INHALATION)
Refills: 0 | Status: DISCONTINUED | OUTPATIENT
Start: 2020-09-24 | End: 2020-10-07

## 2020-09-24 RX ORDER — GABAPENTIN 400 MG/1
600 CAPSULE ORAL EVERY 8 HOURS
Refills: 0 | Status: DISCONTINUED | OUTPATIENT
Start: 2020-09-24 | End: 2020-09-29

## 2020-09-24 RX ORDER — TIOTROPIUM BROMIDE 18 UG/1
1 CAPSULE ORAL; RESPIRATORY (INHALATION) DAILY
Refills: 0 | Status: DISCONTINUED | OUTPATIENT
Start: 2020-09-24 | End: 2020-10-08

## 2020-09-24 RX ORDER — MUPIROCIN 20 MG/G
1 OINTMENT TOPICAL
Refills: 0 | Status: COMPLETED | OUTPATIENT
Start: 2020-09-24 | End: 2020-09-29

## 2020-09-24 RX ORDER — CHLORHEXIDINE GLUCONATE 213 G/1000ML
1 SOLUTION TOPICAL
Refills: 0 | Status: DISCONTINUED | OUTPATIENT
Start: 2020-09-24 | End: 2020-10-08

## 2020-09-24 RX ADMIN — ALBUTEROL 2 PUFF(S): 90 AEROSOL, METERED ORAL at 03:06

## 2020-09-24 RX ADMIN — ALBUTEROL 2 PUFF(S): 90 AEROSOL, METERED ORAL at 05:20

## 2020-09-24 RX ADMIN — BUDESONIDE AND FORMOTEROL FUMARATE DIHYDRATE 2 PUFF(S): 160; 4.5 AEROSOL RESPIRATORY (INHALATION) at 22:00

## 2020-09-24 RX ADMIN — Medication 3: at 17:47

## 2020-09-24 RX ADMIN — ALBUTEROL 2 PUFF(S): 90 AEROSOL, METERED ORAL at 17:49

## 2020-09-24 RX ADMIN — Medication 1 MILLIGRAM(S): at 12:28

## 2020-09-24 RX ADMIN — ATORVASTATIN CALCIUM 40 MILLIGRAM(S): 80 TABLET, FILM COATED ORAL at 22:00

## 2020-09-24 RX ADMIN — PANTOPRAZOLE SODIUM 40 MILLIGRAM(S): 20 TABLET, DELAYED RELEASE ORAL at 12:28

## 2020-09-24 RX ADMIN — CARVEDILOL PHOSPHATE 6.25 MILLIGRAM(S): 80 CAPSULE, EXTENDED RELEASE ORAL at 17:50

## 2020-09-24 RX ADMIN — ALBUTEROL 2 PUFF(S): 90 AEROSOL, METERED ORAL at 13:32

## 2020-09-24 RX ADMIN — GABAPENTIN 600 MILLIGRAM(S): 400 CAPSULE ORAL at 13:30

## 2020-09-24 RX ADMIN — ONDANSETRON 4 MILLIGRAM(S): 8 TABLET, FILM COATED ORAL at 03:06

## 2020-09-24 RX ADMIN — LINEZOLID 300 MILLIGRAM(S): 600 INJECTION, SOLUTION INTRAVENOUS at 17:48

## 2020-09-24 RX ADMIN — Medication 40 MILLIGRAM(S): at 13:31

## 2020-09-24 RX ADMIN — TIOTROPIUM BROMIDE 1 CAPSULE(S): 18 CAPSULE ORAL; RESPIRATORY (INHALATION) at 13:31

## 2020-09-24 RX ADMIN — ENOXAPARIN SODIUM 40 MILLIGRAM(S): 100 INJECTION SUBCUTANEOUS at 12:30

## 2020-09-24 RX ADMIN — ALBUTEROL 2 PUFF(S): 90 AEROSOL, METERED ORAL at 10:51

## 2020-09-24 RX ADMIN — CHLORHEXIDINE GLUCONATE 1 APPLICATION(S): 213 SOLUTION TOPICAL at 13:32

## 2020-09-24 RX ADMIN — Medication 3: at 12:29

## 2020-09-24 RX ADMIN — Medication 40 MILLIGRAM(S): at 22:02

## 2020-09-24 RX ADMIN — CARVEDILOL PHOSPHATE 6.25 MILLIGRAM(S): 80 CAPSULE, EXTENDED RELEASE ORAL at 07:57

## 2020-09-24 RX ADMIN — BUDESONIDE AND FORMOTEROL FUMARATE DIHYDRATE 2 PUFF(S): 160; 4.5 AEROSOL RESPIRATORY (INHALATION) at 13:30

## 2020-09-24 RX ADMIN — SENNA PLUS 2 TABLET(S): 8.6 TABLET ORAL at 22:02

## 2020-09-24 RX ADMIN — NYSTATIN CREAM 1 APPLICATION(S): 100000 CREAM TOPICAL at 17:49

## 2020-09-24 RX ADMIN — Medication 4: at 22:01

## 2020-09-24 RX ADMIN — ALBUTEROL 2 PUFF(S): 90 AEROSOL, METERED ORAL at 21:59

## 2020-09-24 RX ADMIN — LINEZOLID 300 MILLIGRAM(S): 600 INJECTION, SOLUTION INTRAVENOUS at 05:20

## 2020-09-24 RX ADMIN — DIPHENHYDRAMINE HYDROCHLORIDE AND LIDOCAINE HYDROCHLORIDE AND ALUMINUM HYDROXIDE AND MAGNESIUM HYDRO 10 MILLILITER(S): KIT at 22:12

## 2020-09-24 RX ADMIN — Medication 40 MILLIGRAM(S): at 05:20

## 2020-09-24 RX ADMIN — Medication 4: at 06:18

## 2020-09-24 RX ADMIN — NYSTATIN CREAM 1 APPLICATION(S): 100000 CREAM TOPICAL at 05:22

## 2020-09-24 RX ADMIN — CASPOFUNGIN ACETATE 260 MILLIGRAM(S): 7 INJECTION, POWDER, LYOPHILIZED, FOR SOLUTION INTRAVENOUS at 14:00

## 2020-09-24 RX ADMIN — MUPIROCIN 1 APPLICATION(S): 20 OINTMENT TOPICAL at 17:48

## 2020-09-24 NOTE — PROGRESS NOTE ADULT - ASSESSMENT
COPD with acute asthmatic bronchitis with  s/p  Respiratory Failure   Recurrent Lung cancer with Bone Mets   IDDM   Htn with MR   Thrombocytopenia sec to C/T  Obesity with CRIS   r/o esophageal candidiasis  GPC bacteremia VRE/ yeast    PLAN-po prednisone 2o mg qd x 4 days  IV Zyvox and Cancidas   D/C iv steroids and protonix   o2 n/c 2pm  full, liquid diet  OOB in chair  F/U  ABG and Blood c/s   Symbicort 160/4.5 2 puffs bid    Duoneb  by HFN rx qid prn   s/c lovenox  FS coverage

## 2020-09-24 NOTE — PROGRESS NOTE ADULT - ATTENDING COMMENTS
IMP: This is a 65 yr old  obese woman , former heavy smoker (80 pack years) with, HTN, DM2 on insulin, chronic hypoxic resp failure due to   COPD /Emphysema requiring O2 supp @  5LPM,(frequent admissions), R lung cancer s/p resection (2017) on chemo q 3 weeks with concern for mets to spine and compression fractures, recent admission for COPD exacerbation earlier in August presents with SOB, hypoxia and increased sputum production x 1 day. Patient states cough feels like secretion is coming out but unable to expectorate. Patient is on 5L of oxygen at home. As per EMS, patient is hypoxic at 84% on room air which came up to 98% with 4L of nasal canula. Patient was recently in august treated with prednisone, nebulizer treatments and completed a course of azithromycin for copd exacerbation. Denies nausea, vomiting, diarrhea, abdominal pain, fever, chills.   (19 Sep 2020 14:07)    This AM ( 9/21) Patient became  hypoxic, tachycardiac and tachypneic. Code Rapid response was called. RRT team arrived on scene. Patient has encephalopathy from hypoxia with wheezing and stridor on examination. Patient was tachycardiac to 140/min. Patient was hypoxic and started on non-rebreather but patient has increased work of breathing. Decision was made to intubate the patient after contacting relatives. Patient become hypotensive post intubation and was given LR bolus and started on peripheral phenylephrine.      Assessment:    -  Septic Shock due to Bacteremia .. VRE  -  Fungemia   -  Acute on Chronic hypoxic Respiratory failure secondary to COPD exacerbation.  -  Acute Exa of COPD/ Emphysema   -  Lung cancer with suspected metastasis on active chemotherapy.  -  Acute Encephalopathy.  -  HTN.  -  DM.-2 uncontrol   -  Active Smoker.      Plan  -pat was emergently intubated and placed on vent 9/21  -extubated 9/22  -continue antifungal for fungemia started 9/23  -VRE bacteremia probable due to PICC which was removed  -ID f/u for fungemia  -d/c PICC  9/21  -repeat BC  -will need cardiac echo to [place] for vegetation   -solumedrol taper  -proventil inhaler  -dvt/gi prophy  -blood sugar control .  -ID eval pend  -Palliative following.   -pat refused BiPaP.

## 2020-09-24 NOTE — PROGRESS NOTE ADULT - SUBJECTIVE AND OBJECTIVE BOX
PULMONARY  progress note    BARB COLÓN  MRN-[id number]    Patient is a 65y old  Female who presents with a chief complaint of shortness of breath (23 Sep 2020 11:42)  Feels better, pt agrees to go to Rehab when ready      MEDICATIONS  (STANDING):  ALBUTerol    90 MICROgram(s) HFA Inhaler 2 Puff(s) Inhalation every 4 hours  atorvastatin 40 milliGRAM(s) Oral at bedtime  carvedilol 6.25 milliGRAM(s) Oral every 12 hours  caspofungin IVPB      caspofungin IVPB 50 milliGRAM(s) IV Intermittent every 24 hours  enoxaparin Injectable 40 milliGRAM(s) SubCutaneous daily  ergocalciferol 68679 Unit(s) Oral <User Schedule>  FIRST- Mouthwash  BLM 10 milliLiter(s) Swish and Swallow every 8 hours  folic acid 1 milliGRAM(s) Oral daily  gabapentin 600 milliGRAM(s) Oral every 8 hours  influenza   Vaccine 0.5 milliLiter(s) IntraMuscular once  insulin lispro (HumaLOG) corrective regimen sliding scale   SubCutaneous Before meals and at bedtime  linezolid  IVPB      linezolid  IVPB 600 milliGRAM(s) IV Intermittent every 12 hours  methylPREDNISolone sodium succinate Injectable 40 milliGRAM(s) IV Push every 8 hours  nystatin Powder 1 Application(s) Topical two times a day  pantoprazole  Injectable 40 milliGRAM(s) IV Push daily  polyethylene glycol 3350 17 Gram(s) Oral two times a day  senna 2 Tablet(s) Oral at bedtime      MEDICATIONS  (PRN):  morphine  - Injectable 2 milliGRAM(s) IV Push every 4 hours PRN Severe Pain (7 - 10)  ondansetron Injectable 4 milliGRAM(s) IV Push every 6 hours PRN Nausea and/or Vomiting  oxycodone    5 mG/acetaminophen 325 mG 2 Tablet(s) Oral every 4 hours PRN Moderate Pain (4 - 6)      Allergies    fish (Angioedema)  penicillins (Rash)            PAST MEDICAL & SURGICAL HISTORY:  Malignant neoplasm of unspecified part of right bronchus or lung    HTN (hypertension)    DM (diabetes mellitus)    COPD (chronic obstructive pulmonary disease)    Lung cancer    Morbid obesity    GERD (gastroesophageal reflux disease)    Deviated septum    Prolapsed uterus    ITP (idiopathic thrombocytopenic purpura)    Emphysema/COPD    History of cholecystectomy    S/P lobectomy of lung  right 2017    History of tonsillectomy             REVIEW OF SYSTEMS:  CONSTITUTIONAL: No fever, weight loss, or fatigue   EYES: No eye pain, visual disturbances, or discharge  ENT:  No difficulty hearing, tinnitus, vertigo; No sinus or throat pain  NECK: No pain or stiffness or nodes  RESPIRATORY:  cough+   wheezing --  chills --  hemoptysis -- Shortness of Breath+  CARDIOVASCULAR: No chest pain, palpitations, passing out, dizziness, or leg swelling  GASTROINTESTINAL: No abdominal or epigastric pain. No nausea, vomiting, or hematemesis; No diarrhea or constipation. No melena or hematochezia.  GENITOURINARY: No dysuria, frequency, hematuria, or incontinence  NEUROLOGICAL: No headaches, memory loss, loss of strength, numbness, or tremors  SKIN: No itching, burning, rashes, or lesions   LYMPH Nodes: No enlarged glands  ENDOCRINE: No heat or cold intolerance; No hair loss  MUSCULOSKELETAL: No joint pain or swelling; No muscle, back, or extremity pain  PSYCHIATRIC: No depression, anxiety+, no mood swings, or difficulty sleeping  HEME/LYMPH: No easy bruising, or bleeding gums  ALLERGY AND IMMUNOLOGIC: No hives or eczema    Vital Signs Last 24 Hrs  T(C): 36.4 (24 Sep 2020 08:00), Max: 37.3 (23 Sep 2020 12:29)  T(F): 97.6 (24 Sep 2020 08:00), Max: 99.1 (23 Sep 2020 12:29)  HR: 111 (24 Sep 2020 09:00) (94 - 130)  BP: 152/95 (24 Sep 2020 09:00) (126/103 - 171/92)  BP(mean): 107 (24 Sep 2020 09:00) (94 - 123)  RR: 16 (24 Sep 2020 09:00) (16 - 29)  SpO2: 94% (24 Sep 2020 09:00) (91% - 99%)  I&O's Detail    23 Sep 2020 07:01  -  24 Sep 2020 07:00  --------------------------------------------------------  IN:    IV PiggyBack: 250 mL    IV PiggyBack: 600 mL    Oral Fluid: 330 mL  Total IN: 1180 mL    OUT:    Indwelling Catheter - Urethral (mL): 810 mL  Total OUT: 810 mL    Total NET: 370 mL      24 Sep 2020 07:01  -  24 Sep 2020 09:04  --------------------------------------------------------  IN:    Oral Fluid: 50 mL  Total IN: 50 mL    OUT:    Indwelling Catheter - Urethral (mL): 165 mL  Total OUT: 165 mL    Total NET: -115 mL          PHYSICAL EXAMINATION:    GENERAL: The patient is a well-developed, obese h/f in no apparent distress.   SKIN no rash ecchymoses or bruises  HEENT: Head is normocephalic and atraumatic  SHAMA , Extraocular muscles are intact. Mucous membranes  moist.   Neck supple ,No LN felt JVP not increased  Thyroid not enlarged  Cardiovascular:  S1 S2 heard, RSR, No JVD , systolic  murmur at apex, No gallop or rub  Respiratory: Chest wall symmetrical with good air entry ,Percussion note normal,    Lungs vesicular breathing with rt basilar   rales , no   wheeze	  ABDOMEN:  Soft, Non-tender, obese, no hepatomegaly or splenomegaly BS positive	  Extremities: Normal range of motion, No clubbing, cyanosis or edema  Vascular: Peripheral pulses palpable 2+ bilaterally  CNS:  Alert and oriented x3   Cranial nerves intact  sensory intact  motor power5/5  dtr 2+   Babinski neg    LABS:                        9.7    5.03  )-----------( 82       ( 24 Sep 2020 06:08 )             29.4     09-24    138  |  97  |  15  ----------------------------<  276<H>  3.7   |  33<H>  |  0.54    Ca    8.3<L>      24 Sep 2020 06:08  Phos  3.2     09-24  Mg     2.2     09-24    TPro  5.9<L>  /  Alb  2.5<L>  /  TBili  0.8  /  DBili  x   /  AST  10  /  ALT  22  /  AlkPhos  91  09-24      Folate, Serum: >20.0 ng/mL (09-20-20 @ 11:36)  Vitamin B12, Serum: >2000 pg/mL (09-20-20 @ 11:36)      MICROBIOLOGY:    Culture - Blood (collected 09-21-20 @ 14:47)  Source: .Blood Blood  Preliminary Report (09-22-20 @ 15:01):    No growth to date.    Culture - Urine (collected 09-19-20 @ 16:20)  Source: .Urine Clean Catch (Midstream)  Final Report (09-20-20 @ 17:00):    <10,000 CFU/mL Normal Urogenital Shannan    Culture - Blood (collected 09-19-20 @ 11:48)  Source: .Blood Blood-Peripheral  Gram Stain (09-22-20 @ 14:57):    Growth in aerobic bottle: Yeast like cells  Preliminary Report (09-22-20 @ 14:57):    Growth in aerobic bottle: Yeast like cells    "Due to technical problems, Proteus sp. will Not be reported as part of    the BCID panel until further notice"    ***Blood Panel PCR results on this specimen are available    approximately 3 hours after the Gram stain result.***    Gram stain, PCR, and/or culture results may not always    correspond due to difference in methodologies.    ************************************************************    This PCR assay was performed using [employer].    The following targets are tested for: Enterococcus,    vancomycin resistant enterococci, Listeria monocytogenes,    coagulase negative staphylococci, S. aureus,    methicillin resistant S. aureus, Streptococcus agalactiae    (Group B), S. pneumoniae, S. pyogenes (Group A),    Acinetobacter baumannii, Enterobacter cloacae, E. coli,    Klebsiella oxytoca, K. pneumoniae, Proteus sp.,    Serratia marcescens, Haemophilus influenzae,    Neisseria meningitidis, Pseudomonas aeruginosa, Candida    albicans, C. glabrata, C krusei, C parapsilosis,    C. tropicalis and the KPC resistance gene.  Organism: Blood Culture PCR (09-22-20 @ 16:46)  Organism: Blood Culture PCR (09-22-20 @ 16:46)      -  Acinetobacter baumanii: Nondet      -  Candida albicans: Nondet      -  Candida glabrata: Nondet      -  Candida krusei: Nondet      -  Candida parapsilosis: Nondet      -  Candida tropicalis: Nondet      -  Coagulase negative Staphylococcus: Nondet      -  Enterobacter cloacae complex: Nondet      -  Enterococcus species: Nondet      -  Escherichia coli: Nondet      -  Haemophilus influenzae: Nondet      -  Klebsiella oxytoca: Nondet      -  Klebsiella pneumoniae: Nondet      -  Listeria monocytogenes: Nondet      -  Methicillin resistant Staphylococcus aureus (MRSA): Nondet      -  Multidrug (KPC pos) resistant organism: Nondet      -  Neisseria meningitidis: Nondet      -  Pseudomonas aeruginosa: Nondet      -  Serratia marcescens: Nondet      -  Staphylococcus aureus: Nondet      -  Streptococcus agalactiae (Group B): Nondet      -  Streptococcus pneumoniae: Nondet      -  Streptococcus pyogenes (Group A): Nondet      -  Streptococcus sp. (Not Grp A, B or S pneumoniae): Nondet      -  Vancomycin resistant Enterococcus sp.: Nondet      Method Type: PCR    Culture - Blood (collected 09-19-20 @ 11:48)  Source: .Blood Blood-Peripheral  Gram Stain (09-20-20 @ 02:57):    Growth in anaerobic bottle: Gram positive cocci in pairs  Final Report (09-22-20 @ 09:49):    Growth in anaerobic bottle: Enterococcus faecium (vancomycin resistant)    "Due to technical problems, Proteus sp. will Not be reported as part of    the BCID panel until further notice"    ***Blood Panel PCR results on this specimen are available    approximately 3 hours after the Gram stain result.***    Gram stain, PCR, and/or culture results may not always    correspond due to difference in methodologies.    ************************************************************    This PCR assay was performed using [employer].    The following targets are tested for: Enterococcus,    vancomycin resistant enterococci, Listeria monocytogenes,    coagulase negative staphylococci, S. aureus,    methicillin resistant S. aureus, Streptococcus agalactiae    (Group B), S. pneumoniae, S. pyogenes (Group A),    Acinetobacter baumannii, Enterobacter cloacae, E. coli,    Klebsiella oxytoca, K. pneumoniae, Proteus sp.,    Serratia marcescens, Haemophilus influenzae,    Neisseria meningitidis, Pseudomonas aeruginosa, Candida    albicans, C. glabrata, C krusei, C parapsilosis,    C. tropicalis and the KPC resistance gene.  Organism: Blood Culture PCR  Enterococcus faecium (vancomycin resistant) (09-22-20 @ 09:49)  Organism: Enterococcus faecium (vancomycin resistant) (09-22-20 @ 09:49)      -  Ampicillin: R >8 Predicts results to ampicillin/sulbactam, amoxacillin-clavulanate and  piperacillin-tazobactam.      -  Daptomycin: SDD 4 The breakpoint for SDD (sensitive dose dependent)is based on a dosage regimen of 8-12 mg/kg administered every 24 h in adults and is intended for serious infections due to E. faecium. Consultation with an infectious diseases specialist is recommended.      -  Gentamicin synergy: R      -  Linezolid: S 2      -  Streptomycin synergy: R      -  Vancomycin: R >16      Method Type: DALE  Organism: Blood Culture PCR (09-22-20 @ 09:49)      -  Vancomycin resistant Enterococcus sp.: Detec      Method Type: PCR          RADIOLOGY & ADDITIONAL STUDIES:    CXR:< from: Xray Chest 1 View- PORTABLE-Routine (Xray Chest 1 View- PORTABLE-Routine in AM.) (09.23.20 @ 10:09) >    The patient has been extubated.    AP view of the chest is markedly rotated to the left and demonstrates persistent right perihilar infiltrate versus atelectasis. There is mild left perihilar discoid atelectasis.. There is no pleural effusion. There is no pneumothorax.    The heart, mediastinum and galina cannot be assessed due to rotation..    The pulmonary vasculature is normal.    Mild thoracic degenerative changes are present.

## 2020-09-24 NOTE — PROGRESS NOTE ADULT - ASSESSMENT
· Assessment	  · Assessment	  65 year old lady with severe COPD and lung ca with mets to bones which causing compression Fx.  She had one RT for that.  she was admitted for dyspnea and hypoxia.    Problem/Recommendation - 1:  Problem: Lung cancer. Recommendation: with mets to bones  on chemo keytruda, alimta and carboplatin  she had one RT to spine.  the tumor markers have been trending down with chemo    Problem/Recommendation - 2:  ·  Problem: COPD (chronic obstructive pulmonary disease).  Recommendation: required freq steroids.     Problem/Recommendation - 3:  ·  Problem: Acute on chronic respiratory failure with hypoxia.  Recommendation: ?aspiration causing resp failure requiring intubation  she has pain at throat for a while and has difficulty swallowing  now extubated  prognosis poor.   she is lucid now  and express wishes to continue chemo  she needs placement because she is not able to climb the stairs  4. sore throat

## 2020-09-24 NOTE — PROGRESS NOTE ADULT - ASSESSMENT
65 years old obese female , active smoker with COPD (on 5L oxygen at home). Patient was hypoxic, tachycardiac and tachypneic. Code Rapid response was called. RRT team arrived on scene. Patient has encephalopathy from hypoxia with wheezing and stridor on examination. Patient was tachycardiac to 140/min. Patient was hypoxic and started on non-rebreather but patient has increased work of breathing. Decision was made to intubate the patient. Patient become hypotensive post intubation and was given LR bolus and started on peripheral phenylephrine.    Assessment:  1. Acute Respiratory failure secondary to COPD exacerbation.  2. VRE bacteremia  3. Lung cancer with suspected metastasis on active chemotherapy.  4. Acute Encephalopathy.  5. HTN.  6. DM.  7. Active Smoker.      CNS:  #Acute Encephalopathy: Resolved  - likely hypoxic.  - Patient was intubated for work of breathing, hypoxia, tachypnea and tachycardia  - D/C phenethyl   - D/C predexa.   - patient alert and oriented x 3.       CVS:  #Hypotension   - Patient was hypotensive after intubation.  - likely medication induced.  - S/p phenylephrine, now stopped  -  MAP maintaining >65 mmHg.    #History of HTN:  - Patient was on amlodipine and carvedilol.  - SBP now in 150-160  - will start carvedilol   - Will monitor blood pressure.    Respiratory:  #Acute hypoxic Respiratory Failure Secondary to COPD exacerbation.  - Patient was intubated for work of breathing, hypoxia, tachypnea and tachycardia.  - On Nasal canula, saturating well.   - using Bipap as needed    #COPD exacerbation:  - Past smoker with multiple COPD exacerbations hospital admissions in past, on 5 L oxygen at home.  - on IV solumedrol.  - Albuterol inhaler q 6 hourly.    #Lung CA:  - R lung cancer s/p resection (2017)   - Patient was on chemo q 3 weeks with concern for mets to spine and compression fractures.  - Left arm midline for chemotherapy removed 2/2 bacteremia  - Hemtology/oncology Dr Mendiola on case.      GI:  #odynophagia  - p/w symptoms prior to intubation  - on chemo for lung cancer  - started on magic mouthwash  - no thrush visualized    #prophylaxis  - IV protonix.      Nephrology:  - No active issue.  - monitor BUN; creatinine.  - K replaced       ID:  #VRE bacteremia:  - in 1/4 bottles.  - will cover with Zyvox until repeat cultures are back.  - PICC line on L arm removed on 9/21/2020  - repeat blood cultures 9/21/2020 no growth to date.  - ID conuslt dr corcoran.  - f/u duration of abx  - no mention of vegetations on echo    #Yeast bacteremia:  - in aerobic bottle from 9/19/2020  - started on Capsofungin  - ID consult Dr. Corcoran.      Dermatology:  - No skin breakdown.  - dry skin, multiple bruises noted on upper extremities.     Endocrinology:  #Diabetes Mellitis:  - BS > 200  - continue insulin HSS for now.  - monitor blood glucose level.    Goals of care:  - Full code.  - Spoke with family regarding goals of care.  - Palliative care follow up as patient has metastatic lung cancer and advanced COPD.    Disposition:  - ICU 65 years old obese female , active smoker with COPD (on 5L oxygen at home). Patient was hypoxic, tachycardiac and tachypneic. Code Rapid response was called. RRT team arrived on scene. Patient has encephalopathy from hypoxia with wheezing and stridor on examination. Patient was tachycardiac to 140/min. Patient was hypoxic and started on non-rebreather but patient has increased work of breathing. Decision was made to intubate the patient. Patient become hypotensive post intubation and was given LR bolus and started on peripheral phenylephrine.    Assessment:  1. Acute Respiratory failure secondary to COPD exacerbation.  2. VRE bacteremia  3. Lung cancer with suspected metastasis on active chemotherapy.  4. Acute Encephalopathy.  5. HTN.  6. DM.  7. Active Smoker.      CNS:  #Acute Encephalopathy: Resolved  - likely hypoxic.  - Patient was intubated for work of breathing, hypoxia, tachypnea and tachycardia  - D/C phenethyl   - D/C predexa.   - patient alert and oriented x 3.       CVS:  #Hypotension   - Patient was hypotensive after intubation.  - likely medication induced.  - S/p phenylephrine, now stopped  -  MAP maintaining >65 mmHg.    #History of HTN:  - Patient was on amlodipine and carvedilol.  - SBP now in 150-160  - will start carvedilol   - Will monitor blood pressure.     Respiratory:  #Acute hypoxic Respiratory Failure Secondary to COPD exacerbation.  - Patient was intubated for work of breathing, hypoxia, tachypnea and tachycardia.  - On Nasal canula, saturating well.   - using Bipap as needed    #COPD exacerbation:  - Past smoker with multiple COPD exacerbations hospital admissions in past, on 5 L oxygen at home.  - on IV solumedrol.  - Albuterol inhaler q 6 hourly.    #Lung CA:  - R lung cancer s/p resection (2017)   - Patient was on chemo q 3 weeks with concern for mets to spine and compression fractures.  - Left arm midline for chemotherapy removed 2/2 bacteremia  - Hemtology/oncology Dr Mendiola on case.      GI:  #odynophagia  - p/w symptoms prior to intubation  - on chemo for lung cancer  - started on magic mouthwash  - no thrush visualized    #prophylaxis  - IV protonix.      Nephrology:  - No active issue.  - monitor BUN; creatinine.  - K replaced       ID:  #VRE bacteremia:  - in 1/4 bottles.  - will cover with Zyvox until repeat cultures are back.  - PICC line on L arm removed on 9/21/2020  - repeat blood cultures 9/21/2020 no growth to date.  - ID conuslt dr corcoran.  - f/u duration of abx  - no mention of vegetations on echo    #Yeast bacteremia:  - in aerobic bottle from 9/19/2020  - started on Capsofungin  - ID consult Dr. Corcoran.      Dermatology:  - No skin breakdown.  - dry skin, multiple bruises noted on upper extremities.     Endocrinology:  #Diabetes Mellitis:  - BS > 200  - continue insulin HSS for now.  - monitor blood glucose level.    Goals of care:  - Full code.  - Spoke with family regarding goals of care.  - Palliative care follow up as patient has metastatic lung cancer and advanced COPD.    Disposition:  - ICU 65 years old obese female , active smoker with COPD (on 5L oxygen at home). Patient was hypoxic, tachycardiac and tachypneic. Code Rapid response was called. RRT team arrived on scene. Patient has encephalopathy from hypoxia with wheezing and stridor on examination. Patient was tachycardiac to 140/min. Patient was hypoxic and started on non-rebreather but patient has increased work of breathing. Decision was made to intubate the patient. Patient become hypotensive post intubation and was given LR bolus and started on peripheral phenylephrine.    Assessment:  1. Acute Respiratory failure secondary to COPD exacerbation.  2. VRE bacteremia  3. Lung cancer with suspected metastasis on active chemotherapy.  4. Acute Encephalopathy.  5. HTN.  6. DM.  7. Active Smoker.      CNS:  #Acute Encephalopathy: Resolved  - likely hypoxic.  - Patient was intubated for work of breathing, hypoxia, tachypnea and tachycardia  - Extubated on 9/22  - On NC 3L  - patient alert and oriented x 3.       CVS:  #Hypotension   - Patient was hypotensive after intubation.  - likely medication induced  -  MAP maintaining >65 mmHg.    #History of HTN:  - Patient was on amlodipine and carvedilol.  - SBP now in 150-160  - continue carvedilol   -  monitor blood pressure.     Respiratory:  #Acute hypoxic Respiratory Failure Secondary to COPD exacerbation.  - Patient was intubated for work of breathing, hypoxia, tachypnea and tachycardia.  - On Nasal canula, saturating well.   - using Bipap as needed    #COPD exacerbation:  - Past smoker with multiple COPD exacerbations hospital admissions in past, on 5 L oxygen at home.  - on IV solumedrol.  - Albuterol inhaler q 6 hourly.    #Lung CA:  - R lung cancer s/p resection (2017)   - Patient was on chemo q 3 weeks with concern for mets to spine and compression fractures.  - Left arm midline for chemotherapy removed 2/2 bacteremia  - Hemtology/oncology Dr Mendiola on case.      GI:  #odynophagia  - p/w symptoms prior to intubation  - on chemo for lung cancer  - started on magic mouthwash  - no thrush visualized    #prophylaxis  - IV protonix.      Nephrology:  - No active issue.  - monitor BUN; creatinine.  - K replaced       ID:  #VRE bacteremia:  - in 1/4 bottles.  - will cover with Zyvox until repeat cultures are back.  - PICC line on L arm removed on 9/21/2020  - repeat blood cultures 9/21/2020 no growth to date.  - ID conuslt dr corcoran.  - f/u duration of abx  - no mention of vegetations on echo    #Yeast bacteremia:  - in aerobic bottle from 9/19/2020  - started on Capsofungin  - ID consult Dr. Corcoran.      Dermatology:  - No skin breakdown.  - dry skin, multiple bruises noted on upper extremities.     Endocrinology:  #Diabetes Mellitis:  - Had episode of Hypoglycemia and medications were held  - BS > 200  - continue insulin HSS for now.  - monitor blood glucose level.    Goals of care:  - Full code.  - Spoke with family regarding goals of care.  - Palliative care follow up as patient has metastatic lung cancer and advanced COPD.    Disposition:  - ICU

## 2020-09-24 NOTE — CHART NOTE - NSCHARTNOTEFT_GEN_A_CORE
64 yo obese F former heavy smoker (80 pack years) with, HTN, DM2 on insulin, COPD on 5L (frequent admissions), R lung cancer s/p resection (2017) on chemo q 3 weeks with concern for mets to spine and compression fractures, recent admission for COPD exacerbation earlier in August presents with SOB, hypoxia and increased sputum production x 1 day. Patient states cough feels like secretion is coming out but unable to expectorate. As per EMS, patient is hypoxic at 84% on room air which came up to 98% with 4L of nasal canula.     On 9/21/2020. Patient was hypoxic, tachycardiac and tachypneic. Rapid response was called. RRT team arrived on scene. Patient has encephalopathy from hypoxia with wheezing and stridor on examination. Patient was tachycardiac to 140/min. Patient was hypoxic and started on non-rebreather but patient has increased work of breathing. Decision was made to intubate the patient. Patient become hypotensive post intubation and was given LR bolus and started on peripheral phenylephrine.    In ICU patient was awake and alert following commands and using a talking board and pen and paper to communicate. She was started on Precedex and Fentanyl to sedate the patient.   Patient was extubated on 9/22/20. Saturating well on NC. Patient was titrated off the Precedex and Fentanyl by 9/23/20. She has anxiety and when anxious she desaturates. on 9/23/2020 patient was started on Bipab after an episode of anxiety and desaturation and tolerated it. For now patient is using bipap as needed.     Blood cultures from 9/19/2020 grew VRE and yeast like cells. Patient's PICC line on Left arm was removed on 9/21/2020 as possible source of infection. Patient was started on Zyvox on 9/21 and Caspofungin on 9/23 after the results came back. Repeat blood cultures from 9/21/2020 are showig no growth to date. Repeat blood cultures were sent on 9/23/2020, follow up results and sensitivites, and recommendation of continuation or discontinuation of antifungal. 66 yo obese F former heavy smoker (80 pack years) with, HTN, DM2 on insulin, COPD on 5L (frequent admissions), R lung cancer s/p resection (2017) on chemo q 3 weeks with concern for mets to spine and compression fractures, recent admission for COPD exacerbation earlier in August presents with SOB, hypoxia and increased sputum production x 1 day. Patient states cough feels like secretion is coming out but unable to expectorate. As per EMS, patient is hypoxic at 84% on room air which came up to 98% with 4L of nasal canula.     On 9/21/2020. Patient was hypoxic, tachycardiac and tachypneic. Rapid response was called. RRT team arrived on scene. Patient has encephalopathy from hypoxia with wheezing and stridor on examination. Patient was tachycardiac to 140/min. Patient was hypoxic and started on non-rebreather but patient has increased work of breathing. Decision was made to intubate the patient. Patient become hypotensive post intubation and was given LR bolus and started on peripheral phenylephrine.    In ICU patient was awake and alert following commands and using a talking board and pen and paper to communicate. She was started on Precedex and Fentanyl to sedate the patient.   Patient was extubated on 9/22/20. Saturating well on NC. Patient was titrated off the Precedex and Fentanyl by 9/23/20. She has anxiety and when anxious she desaturates. on 9/23/2020 patient was started on Bipab after an episode of anxiety and desaturation and tolerated it. For now patient is using bipap as needed.     Blood cultures from 9/19/2020 grew VRE and yeast like cells. Patient's PICC line on Left arm was removed on 9/21/2020 as possible source of infection. Patient was started on Zyvox on 9/21 and Caspofungin on 9/23 after the results came back. Repeat blood cultures from 9/21/2020 are showig no growth to date. Repeat blood cultures were sent on 9/23/2020, follow up results and sensitivites, and recommendation of continuation or discontinuation of antifungal.    Case discussed with Dr. Kwok.

## 2020-09-24 NOTE — PROGRESS NOTE ADULT - SUBJECTIVE AND OBJECTIVE BOX
feel fine  no sob on NC  throat pain, but able to  MEDICATIONS  (STANDING):  ALBUTerol    90 MICROgram(s) HFA Inhaler 2 Puff(s) Inhalation every 4 hours  atorvastatin 40 milliGRAM(s) Oral at bedtime  carvedilol 6.25 milliGRAM(s) Oral every 12 hours  caspofungin IVPB      caspofungin IVPB 50 milliGRAM(s) IV Intermittent every 24 hours  enoxaparin Injectable 40 milliGRAM(s) SubCutaneous daily  ergocalciferol 22679 Unit(s) Oral <User Schedule>  FIRST- Mouthwash  BLM 10 milliLiter(s) Swish and Swallow every 8 hours  folic acid 1 milliGRAM(s) Oral daily  gabapentin 600 milliGRAM(s) Oral every 8 hours  influenza   Vaccine 0.5 milliLiter(s) IntraMuscular once  insulin lispro (HumaLOG) corrective regimen sliding scale   SubCutaneous Before meals and at bedtime  linezolid  IVPB      linezolid  IVPB 600 milliGRAM(s) IV Intermittent every 12 hours  methylPREDNISolone sodium succinate Injectable 40 milliGRAM(s) IV Push every 8 hours  nystatin Powder 1 Application(s) Topical two times a day  pantoprazole  Injectable 40 milliGRAM(s) IV Push daily  polyethylene glycol 3350 17 Gram(s) Oral two times a day  senna 2 Tablet(s) Oral at bedtime    MEDICATIONS  (PRN):  morphine  - Injectable 2 milliGRAM(s) IV Push every 4 hours PRN Severe Pain (7 - 10)  ondansetron Injectable 4 milliGRAM(s) IV Push every 6 hours PRN Nausea and/or Vomiting  oxycodone    5 mG/acetaminophen 325 mG 2 Tablet(s) Oral every 4 hours PRN Moderate Pain (4 - 6)      Allergies    fish (Angioedema)  penicillins (Rash)    Intolerances        Vital Signs Last 24 Hrs  T(C): 36.4 (24 Sep 2020 08:00), Max: 37.3 (23 Sep 2020 12:29)  T(F): 97.6 (24 Sep 2020 08:00), Max: 99.1 (23 Sep 2020 12:29)  HR: 111 (24 Sep 2020 09:00) (94 - 130)  BP: 152/95 (24 Sep 2020 09:00) (126/103 - 171/92)  BP(mean): 107 (24 Sep 2020 09:00) (94 - 123)  RR: 16 (24 Sep 2020 09:00) (16 - 29)  SpO2: 94% (24 Sep 2020 09:00) (91% - 99%)    PHYSICAL EXAM  General: adult in NAD  HEENT: clear oropharynx, anicteric sclera, pink conjunctiva  Neck: supple  CV: normal S1/S2 with no murmur rubs or gallops  Lungs: positive air movement b/l ant lungs,clear to auscultation, no wheezes, no rales  Abdomen: soft non-tender non-distended, no hepatosplenomegaly  Ext: no clubbing cyanosis or edema  Skin: no rashes and no petechiae  Neuro: alert and oriented X 4, no focal deficits  LABS:                          9.7    5.03  )-----------( 82       ( 24 Sep 2020 06:08 )             29.4         Mean Cell Volume : 91.0 fl  Mean Cell Hemoglobin : 30.0 pg  Mean Cell Hemoglobin Concentration : 33.0 gm/dL  Auto Neutrophil # : 4.65 K/uL  Auto Lymphocyte # : 0.14 K/uL  Auto Monocyte # : 0.20 K/uL  Auto Eosinophil # : 0.00 K/uL  Auto Basophil # : 0.00 K/uL  Auto Neutrophil % : 92.4 %  Auto Lymphocyte % : 2.8 %  Auto Monocyte % : 4.0 %  Auto Eosinophil % : 0.0 %  Auto Basophil % : 0.0 %    Serial CBC  Hematocrit 29.4  Hemoglobin 9.7  Plat 82  RBC 3.23  WBC 5.03  Serial CBC  Hematocrit 28.3  Hemoglobin 9.8  Plat 66  RBC 3.22  WBC 6.09  Serial CBC  Hematocrit 31.9  Hemoglobin 10.6  Plat 71  RBC 3.57  WBC 6.89  Serial CBC  Hematocrit 31.5  Hemoglobin 11.2  Plat 94  RBC 3.56  WBC 8.01    09-24    138  |  97  |  15  ----------------------------<  276<H>  3.7   |  33<H>  |  0.54    Ca    8.3<L>      24 Sep 2020 06:08  Phos  3.2     09-24  Mg     2.2     09-24    TPro  5.9<L>  /  Alb  2.5<L>  /  TBili  0.8  /  DBili  x   /  AST  10  /  ALT  22  /  AlkPhos  91  09-24          Iron - Total Binding Capacity.: 219 ug/dL (09-24 @ 06:08)  Folate, Serum: >20.0 ng/mL (09-20 @ 11:36)  Vitamin B12, Serum: >2000 pg/mL (09-20 @ 11:36)            BLOOD SMEAR INTERPRETATION:       RADIOLOGY & ADDITIONAL STUDIES:

## 2020-09-24 NOTE — PROGRESS NOTE ADULT - SUBJECTIVE AND OBJECTIVE BOX
INTERVAL HPI/OVERNIGHT EVENTS: ***    PRESSORS: [ ] YES [ ] NO  WHICH:    Antimicrobial:  caspofungin IVPB      caspofungin IVPB 50 milliGRAM(s) IV Intermittent every 24 hours  linezolid  IVPB      linezolid  IVPB 600 milliGRAM(s) IV Intermittent every 12 hours    Cardiovascular:  carvedilol 6.25 milliGRAM(s) Oral every 12 hours    Pulmonary:  ALBUTerol    90 MICROgram(s) HFA Inhaler 2 Puff(s) Inhalation every 4 hours    Hematalogic:  enoxaparin Injectable 40 milliGRAM(s) SubCutaneous daily    Other:  atorvastatin 40 milliGRAM(s) Oral at bedtime  chlorhexidine 2% Cloths 1 Application(s) Topical <User Schedule>  ergocalciferol 17775 Unit(s) Oral <User Schedule>  FIRST- Mouthwash  BLM 10 milliLiter(s) Swish and Swallow every 8 hours  folic acid 1 milliGRAM(s) Oral daily  gabapentin 600 milliGRAM(s) Oral every 8 hours  influenza   Vaccine 0.5 milliLiter(s) IntraMuscular once  insulin lispro (HumaLOG) corrective regimen sliding scale   SubCutaneous Before meals and at bedtime  methylPREDNISolone sodium succinate Injectable 40 milliGRAM(s) IV Push every 8 hours  morphine  - Injectable 2 milliGRAM(s) IV Push every 4 hours PRN  mupirocin 2% Nasal 1 Application(s) Nasal two times a day  nystatin Powder 1 Application(s) Topical two times a day  ondansetron Injectable 4 milliGRAM(s) IV Push every 6 hours PRN  oxycodone    5 mG/acetaminophen 325 mG 2 Tablet(s) Oral every 4 hours PRN  pantoprazole  Injectable 40 milliGRAM(s) IV Push daily  polyethylene glycol 3350 17 Gram(s) Oral two times a day  senna 2 Tablet(s) Oral at bedtime    ALBUTerol    90 MICROgram(s) HFA Inhaler 2 Puff(s) Inhalation every 4 hours  atorvastatin 40 milliGRAM(s) Oral at bedtime  carvedilol 6.25 milliGRAM(s) Oral every 12 hours  caspofungin IVPB      caspofungin IVPB 50 milliGRAM(s) IV Intermittent every 24 hours  chlorhexidine 2% Cloths 1 Application(s) Topical <User Schedule>  enoxaparin Injectable 40 milliGRAM(s) SubCutaneous daily  ergocalciferol 96891 Unit(s) Oral <User Schedule>  FIRST- Mouthwash  BLM 10 milliLiter(s) Swish and Swallow every 8 hours  folic acid 1 milliGRAM(s) Oral daily  gabapentin 600 milliGRAM(s) Oral every 8 hours  influenza   Vaccine 0.5 milliLiter(s) IntraMuscular once  insulin lispro (HumaLOG) corrective regimen sliding scale   SubCutaneous Before meals and at bedtime  linezolid  IVPB      linezolid  IVPB 600 milliGRAM(s) IV Intermittent every 12 hours  methylPREDNISolone sodium succinate Injectable 40 milliGRAM(s) IV Push every 8 hours  morphine  - Injectable 2 milliGRAM(s) IV Push every 4 hours PRN  mupirocin 2% Nasal 1 Application(s) Nasal two times a day  nystatin Powder 1 Application(s) Topical two times a day  ondansetron Injectable 4 milliGRAM(s) IV Push every 6 hours PRN  oxycodone    5 mG/acetaminophen 325 mG 2 Tablet(s) Oral every 4 hours PRN  pantoprazole  Injectable 40 milliGRAM(s) IV Push daily  polyethylene glycol 3350 17 Gram(s) Oral two times a day  senna 2 Tablet(s) Oral at bedtime    Drug Dosing Weight  Height (cm): 154.9 (19 Sep 2020 08:00)  Weight (kg): 84.8 (21 Sep 2020 09:15)  BMI (kg/m2): 35.3 (21 Sep 2020 09:15)  BSA (m2): 1.84 (21 Sep 2020 09:15)    CENTRAL LINE: [ ] YES [ ] NO  LOCATION:   DATE INSERTED:  REMOVE: [ ] YES [ ] NO  EXPLAIN:    ROBLEDO: [ ] YES [ ] NO    DATE INSERTED:  REMOVE:  [ ] YES [ ] NO  EXPLAIN:    A-LINE:  [ ] YES [ ] NO  LOCATION:   DATE INSERTED:  REMOVE:  [ ] YES [ ] NO  EXPLAIN:    PMH -reviewed admission note, no change since admission  PAST MEDICAL & SURGICAL HISTORY:  Malignant neoplasm of unspecified part of right bronchus or lung    HTN (hypertension)    DM (diabetes mellitus)    COPD (chronic obstructive pulmonary disease)    Lung cancer    Morbid obesity    GERD (gastroesophageal reflux disease)    Deviated septum    Prolapsed uterus    ITP (idiopathic thrombocytopenic purpura)    Emphysema/COPD    History of cholecystectomy    S/P lobectomy of lung  right 2017    History of tonsillectomy        ICU Vital Signs Last 24 Hrs  T(C): 36.7 (24 Sep 2020 10:49), Max: 37.3 (23 Sep 2020 12:29)  T(F): 98 (24 Sep 2020 10:49), Max: 99.1 (23 Sep 2020 12:29)  HR: 96 (24 Sep 2020 10:49) (94 - 130)  BP: 151/72 (24 Sep 2020 10:49) (126/103 - 161/102)  BP(mean): 107 (24 Sep 2020 09:00) (94 - 123)  ABP: --  ABP(mean): --  RR: 16 (24 Sep 2020 10:49) (16 - 29)  SpO2: 97% (24 Sep 2020 10:49) (91% - 99%)            09-23 @ 07:01  -  09-24 @ 07:00  --------------------------------------------------------  IN: 1180 mL / OUT: 810 mL / NET: 370 mL            PHYSICAL EXAM:    GENERAL: NAD, well-groomed, well-developed  HEAD:  Atraumatic, Normocephalic  EYES: EOMI, PERRLA, conjunctiva and sclera clear  ENMT: No tonsillar erythema, exudates, or enlargement; Moist mucous membranes, Good dentition, No lesions  NECK: Supple, normal appearance, No JVD; Normal thyroid; Trachea midline  NERVOUS SYSTEM:  Alert & Oriented X3,  Motor Strength 5/5 B/L upper and lower extremities; DTRs 2+ intact and symmetric  CHEST/LUNG: No chest deformity; Normal percussion bilaterally; No rales, rhonchi, wheezing   HEART: Regular rate and rhythm; No murmurs, rubs, or gallops  ABDOMEN: Soft, Nontender, Nondistended; Bowel sounds present  EXTREMITIES:  2+ Peripheral Pulses, No clubbing, cyanosis, or edema  LYMPH: No lymphadenopathy noted  SKIN: No rashes or lesions;  Good capillary refill      LABS:  CBC Full  -  ( 24 Sep 2020 06:08 )  WBC Count : 5.03 K/uL  RBC Count : 3.23 M/uL  Hemoglobin : 9.7 g/dL  Hematocrit : 29.4 %  Platelet Count - Automated : 82 K/uL  Mean Cell Volume : 91.0 fl  Mean Cell Hemoglobin : 30.0 pg  Mean Cell Hemoglobin Concentration : 33.0 gm/dL  Auto Neutrophil # : 4.65 K/uL  Auto Lymphocyte # : 0.14 K/uL  Auto Monocyte # : 0.20 K/uL  Auto Eosinophil # : 0.00 K/uL  Auto Basophil # : 0.00 K/uL  Auto Neutrophil % : 92.4 %  Auto Lymphocyte % : 2.8 %  Auto Monocyte % : 4.0 %  Auto Eosinophil % : 0.0 %  Auto Basophil % : 0.0 %    09-24    138  |  97  |  15  ----------------------------<  276<H>  3.7   |  33<H>  |  0.54    Ca    8.3<L>      24 Sep 2020 06:08  Phos  3.2     09-24  Mg     2.2     09-24    TPro  5.9<L>  /  Alb  2.5<L>  /  TBili  0.8  /  DBili  x   /  AST  10  /  ALT  22  /  AlkPhos  91  09-24        Culture Results:   No growth to date. (09-23 @ 10:33)  Culture Results:   No growth to date. (09-23 @ 10:33)  Culture Results:   No growth to date. (09-21 @ 14:47)      RADIOLOGY & ADDITIONAL STUDIES REVIEWED:  ***    [ ]GOALS OF CARE DISCUSSION WITH PATIENT/FAMILY/PROXY:    CRITICAL CARE TIME SPENT: 35 minutes INTERVAL HPI/OVERNIGHT EVENTS: Patient was tachy overngi    PRESSORS: [ ] YES [ ] NO  WHICH:    Antimicrobial:  caspofungin IVPB      caspofungin IVPB 50 milliGRAM(s) IV Intermittent every 24 hours  linezolid  IVPB      linezolid  IVPB 600 milliGRAM(s) IV Intermittent every 12 hours    Cardiovascular:  carvedilol 6.25 milliGRAM(s) Oral every 12 hours    Pulmonary:  ALBUTerol    90 MICROgram(s) HFA Inhaler 2 Puff(s) Inhalation every 4 hours    Hematalogic:  enoxaparin Injectable 40 milliGRAM(s) SubCutaneous daily    Other:  atorvastatin 40 milliGRAM(s) Oral at bedtime  chlorhexidine 2% Cloths 1 Application(s) Topical <User Schedule>  ergocalciferol 89237 Unit(s) Oral <User Schedule>  FIRST- Mouthwash  BLM 10 milliLiter(s) Swish and Swallow every 8 hours  folic acid 1 milliGRAM(s) Oral daily  gabapentin 600 milliGRAM(s) Oral every 8 hours  influenza   Vaccine 0.5 milliLiter(s) IntraMuscular once  insulin lispro (HumaLOG) corrective regimen sliding scale   SubCutaneous Before meals and at bedtime  methylPREDNISolone sodium succinate Injectable 40 milliGRAM(s) IV Push every 8 hours  morphine  - Injectable 2 milliGRAM(s) IV Push every 4 hours PRN  mupirocin 2% Nasal 1 Application(s) Nasal two times a day  nystatin Powder 1 Application(s) Topical two times a day  ondansetron Injectable 4 milliGRAM(s) IV Push every 6 hours PRN  oxycodone    5 mG/acetaminophen 325 mG 2 Tablet(s) Oral every 4 hours PRN  pantoprazole  Injectable 40 milliGRAM(s) IV Push daily  polyethylene glycol 3350 17 Gram(s) Oral two times a day  senna 2 Tablet(s) Oral at bedtime    ALBUTerol    90 MICROgram(s) HFA Inhaler 2 Puff(s) Inhalation every 4 hours  atorvastatin 40 milliGRAM(s) Oral at bedtime  carvedilol 6.25 milliGRAM(s) Oral every 12 hours  caspofungin IVPB      caspofungin IVPB 50 milliGRAM(s) IV Intermittent every 24 hours  chlorhexidine 2% Cloths 1 Application(s) Topical <User Schedule>  enoxaparin Injectable 40 milliGRAM(s) SubCutaneous daily  ergocalciferol 41122 Unit(s) Oral <User Schedule>  FIRST- Mouthwash  BLM 10 milliLiter(s) Swish and Swallow every 8 hours  folic acid 1 milliGRAM(s) Oral daily  gabapentin 600 milliGRAM(s) Oral every 8 hours  influenza   Vaccine 0.5 milliLiter(s) IntraMuscular once  insulin lispro (HumaLOG) corrective regimen sliding scale   SubCutaneous Before meals and at bedtime  linezolid  IVPB      linezolid  IVPB 600 milliGRAM(s) IV Intermittent every 12 hours  methylPREDNISolone sodium succinate Injectable 40 milliGRAM(s) IV Push every 8 hours  morphine  - Injectable 2 milliGRAM(s) IV Push every 4 hours PRN  mupirocin 2% Nasal 1 Application(s) Nasal two times a day  nystatin Powder 1 Application(s) Topical two times a day  ondansetron Injectable 4 milliGRAM(s) IV Push every 6 hours PRN  oxycodone    5 mG/acetaminophen 325 mG 2 Tablet(s) Oral every 4 hours PRN  pantoprazole  Injectable 40 milliGRAM(s) IV Push daily  polyethylene glycol 3350 17 Gram(s) Oral two times a day  senna 2 Tablet(s) Oral at bedtime    Drug Dosing Weight  Height (cm): 154.9 (19 Sep 2020 08:00)  Weight (kg): 84.8 (21 Sep 2020 09:15)  BMI (kg/m2): 35.3 (21 Sep 2020 09:15)  BSA (m2): 1.84 (21 Sep 2020 09:15)    CENTRAL LINE: [ ] YES [ ] NO  LOCATION:   DATE INSERTED:  REMOVE: [ ] YES [ ] NO  EXPLAIN:    ROBLEDO: [ ] YES [ ] NO    DATE INSERTED:  REMOVE:  [ ] YES [ ] NO  EXPLAIN:    A-LINE:  [ ] YES [ ] NO  LOCATION:   DATE INSERTED:  REMOVE:  [ ] YES [ ] NO  EXPLAIN:    PMH -reviewed admission note, no change since admission  PAST MEDICAL & SURGICAL HISTORY:  Malignant neoplasm of unspecified part of right bronchus or lung    HTN (hypertension)    DM (diabetes mellitus)    COPD (chronic obstructive pulmonary disease)    Lung cancer    Morbid obesity    GERD (gastroesophageal reflux disease)    Deviated septum    Prolapsed uterus    ITP (idiopathic thrombocytopenic purpura)    Emphysema/COPD    History of cholecystectomy    S/P lobectomy of lung  right 2017    History of tonsillectomy        ICU Vital Signs Last 24 Hrs  T(C): 36.7 (24 Sep 2020 10:49), Max: 37.3 (23 Sep 2020 12:29)  T(F): 98 (24 Sep 2020 10:49), Max: 99.1 (23 Sep 2020 12:29)  HR: 96 (24 Sep 2020 10:49) (94 - 130)  BP: 151/72 (24 Sep 2020 10:49) (126/103 - 161/102)  BP(mean): 107 (24 Sep 2020 09:00) (94 - 123)  ABP: --  ABP(mean): --  RR: 16 (24 Sep 2020 10:49) (16 - 29)  SpO2: 97% (24 Sep 2020 10:49) (91% - 99%)            09-23 @ 07:01  -  09-24 @ 07:00  --------------------------------------------------------  IN: 1180 mL / OUT: 810 mL / NET: 370 mL            PHYSICAL EXAM:    GENERAL: NAD, well-groomed, well-developed  HEAD:  Atraumatic, Normocephalic  EYES: EOMI, PERRLA, conjunctiva and sclera clear  ENMT: No tonsillar erythema, exudates, or enlargement; Moist mucous membranes, Good dentition, No lesions  NECK: Supple, normal appearance, No JVD; Normal thyroid; Trachea midline  NERVOUS SYSTEM:  Alert & Oriented X3,  Motor Strength 5/5 B/L upper and lower extremities; DTRs 2+ intact and symmetric  CHEST/LUNG: No chest deformity; Normal percussion bilaterally; No rales, rhonchi, wheezing   HEART: Regular rate and rhythm; No murmurs, rubs, or gallops  ABDOMEN: Soft, Nontender, Nondistended; Bowel sounds present  EXTREMITIES:  2+ Peripheral Pulses, No clubbing, cyanosis, or edema  LYMPH: No lymphadenopathy noted  SKIN: No rashes or lesions;  Good capillary refill      LABS:  CBC Full  -  ( 24 Sep 2020 06:08 )  WBC Count : 5.03 K/uL  RBC Count : 3.23 M/uL  Hemoglobin : 9.7 g/dL  Hematocrit : 29.4 %  Platelet Count - Automated : 82 K/uL  Mean Cell Volume : 91.0 fl  Mean Cell Hemoglobin : 30.0 pg  Mean Cell Hemoglobin Concentration : 33.0 gm/dL  Auto Neutrophil # : 4.65 K/uL  Auto Lymphocyte # : 0.14 K/uL  Auto Monocyte # : 0.20 K/uL  Auto Eosinophil # : 0.00 K/uL  Auto Basophil # : 0.00 K/uL  Auto Neutrophil % : 92.4 %  Auto Lymphocyte % : 2.8 %  Auto Monocyte % : 4.0 %  Auto Eosinophil % : 0.0 %  Auto Basophil % : 0.0 %    09-24    138  |  97  |  15  ----------------------------<  276<H>  3.7   |  33<H>  |  0.54    Ca    8.3<L>      24 Sep 2020 06:08  Phos  3.2     09-24  Mg     2.2     09-24    TPro  5.9<L>  /  Alb  2.5<L>  /  TBili  0.8  /  DBili  x   /  AST  10  /  ALT  22  /  AlkPhos  91  09-24        Culture Results:   No growth to date. (09-23 @ 10:33)  Culture Results:   No growth to date. (09-23 @ 10:33)  Culture Results:   No growth to date. (09-21 @ 14:47)      RADIOLOGY & ADDITIONAL STUDIES REVIEWED:  ***    [ ]GOALS OF CARE DISCUSSION WITH PATIENT/FAMILY/PROXY:    CRITICAL CARE TIME SPENT: 35 minutes INTERVAL HPI/OVERNIGHT EVENTS: Patient was tachy overnight, now resolved. She is anxious about going to [facility] and was crying in the morning. She has been saturating well on NC, refusing to use the Bipap. Patient mentions that she hasn't had a bowel movement in a couple of days. She denies any other complains    PRESSORS: [ ] YES [x ] NO  WHICH:    Antimicrobial:  caspofungin IVPB      caspofungin IVPB 50 milliGRAM(s) IV Intermittent every 24 hours  linezolid  IVPB      linezolid  IVPB 600 milliGRAM(s) IV Intermittent every 12 hours    Cardiovascular:  carvedilol 6.25 milliGRAM(s) Oral every 12 hours    Pulmonary:  ALBUTerol    90 MICROgram(s) HFA Inhaler 2 Puff(s) Inhalation every 4 hours    Hematalogic:  enoxaparin Injectable 40 milliGRAM(s) SubCutaneous daily    Other:  atorvastatin 40 milliGRAM(s) Oral at bedtime  chlorhexidine 2% Cloths 1 Application(s) Topical <User Schedule>  ergocalciferol 86743 Unit(s) Oral <User Schedule>  FIRST- Mouthwash  BLM 10 milliLiter(s) Swish and Swallow every 8 hours  folic acid 1 milliGRAM(s) Oral daily  gabapentin 600 milliGRAM(s) Oral every 8 hours  influenza   Vaccine 0.5 milliLiter(s) IntraMuscular once  insulin lispro (HumaLOG) corrective regimen sliding scale   SubCutaneous Before meals and at bedtime  methylPREDNISolone sodium succinate Injectable 40 milliGRAM(s) IV Push every 8 hours  morphine  - Injectable 2 milliGRAM(s) IV Push every 4 hours PRN  mupirocin 2% Nasal 1 Application(s) Nasal two times a day  nystatin Powder 1 Application(s) Topical two times a day  ondansetron Injectable 4 milliGRAM(s) IV Push every 6 hours PRN  oxycodone    5 mG/acetaminophen 325 mG 2 Tablet(s) Oral every 4 hours PRN  pantoprazole  Injectable 40 milliGRAM(s) IV Push daily  polyethylene glycol 3350 17 Gram(s) Oral two times a day  senna 2 Tablet(s) Oral at bedtime    ALBUTerol    90 MICROgram(s) HFA Inhaler 2 Puff(s) Inhalation every 4 hours  atorvastatin 40 milliGRAM(s) Oral at bedtime  carvedilol 6.25 milliGRAM(s) Oral every 12 hours  caspofungin IVPB      caspofungin IVPB 50 milliGRAM(s) IV Intermittent every 24 hours  chlorhexidine 2% Cloths 1 Application(s) Topical <User Schedule>  enoxaparin Injectable 40 milliGRAM(s) SubCutaneous daily  ergocalciferol 79802 Unit(s) Oral <User Schedule>  FIRST- Mouthwash  BLM 10 milliLiter(s) Swish and Swallow every 8 hours  folic acid 1 milliGRAM(s) Oral daily  gabapentin 600 milliGRAM(s) Oral every 8 hours  influenza   Vaccine 0.5 milliLiter(s) IntraMuscular once  insulin lispro (HumaLOG) corrective regimen sliding scale   SubCutaneous Before meals and at bedtime  linezolid  IVPB      linezolid  IVPB 600 milliGRAM(s) IV Intermittent every 12 hours  methylPREDNISolone sodium succinate Injectable 40 milliGRAM(s) IV Push every 8 hours  morphine  - Injectable 2 milliGRAM(s) IV Push every 4 hours PRN  mupirocin 2% Nasal 1 Application(s) Nasal two times a day  nystatin Powder 1 Application(s) Topical two times a day  ondansetron Injectable 4 milliGRAM(s) IV Push every 6 hours PRN  oxycodone    5 mG/acetaminophen 325 mG 2 Tablet(s) Oral every 4 hours PRN  pantoprazole  Injectable 40 milliGRAM(s) IV Push daily  polyethylene glycol 3350 17 Gram(s) Oral two times a day  senna 2 Tablet(s) Oral at bedtime    Drug Dosing Weight  Height (cm): 154.9 (19 Sep 2020 08:00)  Weight (kg): 84.8 (21 Sep 2020 09:15)  BMI (kg/m2): 35.3 (21 Sep 2020 09:15)  BSA (m2): 1.84 (21 Sep 2020 09:15)    CENTRAL LINE: [ ] YES [ ] NO  LOCATION:   DATE INSERTED:  REMOVE: [ ] YES [ ] NO  EXPLAIN:    ROBLEDO: [ ] YES [ ] NO    DATE INSERTED:  REMOVE:  [ ] YES [ ] NO  EXPLAIN:    A-LINE:  [ ] YES [ ] NO  LOCATION:   DATE INSERTED:  REMOVE:  [ ] YES [ ] NO  EXPLAIN:    PMH -reviewed admission note, no change since admission  PAST MEDICAL & SURGICAL HISTORY:  Malignant neoplasm of unspecified part of right bronchus or lung    HTN (hypertension)    DM (diabetes mellitus)    COPD (chronic obstructive pulmonary disease)    Lung cancer    Morbid obesity    GERD (gastroesophageal reflux disease)    Deviated septum    Prolapsed uterus    ITP (idiopathic thrombocytopenic purpura)    Emphysema/COPD    History of cholecystectomy    S/P lobectomy of lung  right 2017    History of tonsillectomy        ICU Vital Signs Last 24 Hrs  T(C): 36.7 (24 Sep 2020 10:49), Max: 37.3 (23 Sep 2020 12:29)  T(F): 98 (24 Sep 2020 10:49), Max: 99.1 (23 Sep 2020 12:29)  HR: 96 (24 Sep 2020 10:49) (94 - 130)  BP: 151/72 (24 Sep 2020 10:49) (126/103 - 161/102)  BP(mean): 107 (24 Sep 2020 09:00) (94 - 123)  ABP: --  ABP(mean): --  RR: 16 (24 Sep 2020 10:49) (16 - 29)  SpO2: 97% (24 Sep 2020 10:49) (91% - 99%)            09-23 @ 07:01  -  09-24 @ 07:00  --------------------------------------------------------  IN: 1180 mL / OUT: 810 mL / NET: 370 mL            PHYSICAL EXAM:    GENERAL: NAD, Obese  HEAD:  Atraumatic, Normocephalic  EYES: EOMI, PERRLA, conjunctiva and sclera clear  ENMT: NC in place, No tonsillar erythema, Moist mucous membranes, Good dentition,  NECK: Supple, normal appearance, No JVD; Normal thyroid; Trachea midline  NERVOUS SYSTEM:  Alert & Oriented X3,  strength and sensation intact  CHEST/LUNG: Coarse crackles bilaterally. good air entry.  HEART: Regular rate and rhythm; No murmurs, rubs, or gallops  ABDOMEN: Soft, Nontender, Nondistended; Bowel sounds present  EXTREMITIES:  2+ Peripheral Pulses, No clubbing, cyanosis, or edema  SKIN: multiple bruises in upper extremities, dry scaly skin on lower extremities.       LABS:  CBC Full  -  ( 24 Sep 2020 06:08 )  WBC Count : 5.03 K/uL  RBC Count : 3.23 M/uL  Hemoglobin : 9.7 g/dL  Hematocrit : 29.4 %  Platelet Count - Automated : 82 K/uL  Mean Cell Volume : 91.0 fl  Mean Cell Hemoglobin : 30.0 pg  Mean Cell Hemoglobin Concentration : 33.0 gm/dL  Auto Neutrophil # : 4.65 K/uL  Auto Lymphocyte # : 0.14 K/uL  Auto Monocyte # : 0.20 K/uL  Auto Eosinophil # : 0.00 K/uL  Auto Basophil # : 0.00 K/uL  Auto Neutrophil % : 92.4 %  Auto Lymphocyte % : 2.8 %  Auto Monocyte % : 4.0 %  Auto Eosinophil % : 0.0 %  Auto Basophil % : 0.0 %    09-24    138  |  97  |  15  ----------------------------<  276<H>  3.7   |  33<H>  |  0.54    Ca    8.3<L>      24 Sep 2020 06:08  Phos  3.2     09-24  Mg     2.2     09-24    TPro  5.9<L>  /  Alb  2.5<L>  /  TBili  0.8  /  DBili  x   /  AST  10  /  ALT  22  /  AlkPhos  91  09-24        Culture Results:   No growth to date. (09-23 @ 10:33)  Culture Results:   No growth to date. (09-23 @ 10:33)  Culture Results:   No growth to date. (09-21 @ 14:47)      RADIOLOGY & ADDITIONAL STUDIES REVIEWED:  ***    [ ]GOALS OF CARE DISCUSSION WITH PATIENT/FAMILY/PROXY:    CRITICAL CARE TIME SPENT: 35 minutes

## 2020-09-25 LAB
ALBUMIN SERPL ELPH-MCNC: 2.7 G/DL — LOW (ref 3.5–5)
ALP SERPL-CCNC: 94 U/L — SIGNIFICANT CHANGE UP (ref 40–120)
ALT FLD-CCNC: 26 U/L DA — SIGNIFICANT CHANGE UP (ref 10–60)
ANION GAP SERPL CALC-SCNC: 5 MMOL/L — SIGNIFICANT CHANGE UP (ref 5–17)
AST SERPL-CCNC: 10 U/L — SIGNIFICANT CHANGE UP (ref 10–40)
BILIRUB SERPL-MCNC: 0.8 MG/DL — SIGNIFICANT CHANGE UP (ref 0.2–1.2)
BUN SERPL-MCNC: 13 MG/DL — SIGNIFICANT CHANGE UP (ref 7–18)
CALCIUM SERPL-MCNC: 8.3 MG/DL — LOW (ref 8.4–10.5)
CHLORIDE SERPL-SCNC: 96 MMOL/L — SIGNIFICANT CHANGE UP (ref 96–108)
CO2 SERPL-SCNC: 36 MMOL/L — HIGH (ref 22–31)
CREAT SERPL-MCNC: 0.54 MG/DL — SIGNIFICANT CHANGE UP (ref 0.5–1.3)
GLUCOSE BLDC GLUCOMTR-MCNC: 269 MG/DL — HIGH (ref 70–99)
GLUCOSE BLDC GLUCOMTR-MCNC: 352 MG/DL — HIGH (ref 70–99)
GLUCOSE BLDC GLUCOMTR-MCNC: 365 MG/DL — HIGH (ref 70–99)
GLUCOSE BLDC GLUCOMTR-MCNC: 376 MG/DL — HIGH (ref 70–99)
GLUCOSE SERPL-MCNC: 268 MG/DL — HIGH (ref 70–99)
HCT VFR BLD CALC: 29.9 % — LOW (ref 34.5–45)
HGB BLD-MCNC: 10.1 G/DL — LOW (ref 11.5–15.5)
MCHC RBC-ENTMCNC: 30.1 PG — SIGNIFICANT CHANGE UP (ref 27–34)
MCHC RBC-ENTMCNC: 33.8 GM/DL — SIGNIFICANT CHANGE UP (ref 32–36)
MCV RBC AUTO: 89.3 FL — SIGNIFICANT CHANGE UP (ref 80–100)
NRBC # BLD: 0 /100 WBCS — SIGNIFICANT CHANGE UP (ref 0–0)
PLATELET # BLD AUTO: 85 K/UL — LOW (ref 150–400)
POTASSIUM SERPL-MCNC: 3.4 MMOL/L — LOW (ref 3.5–5.3)
POTASSIUM SERPL-SCNC: 3.4 MMOL/L — LOW (ref 3.5–5.3)
PROT SERPL-MCNC: 6.1 G/DL — SIGNIFICANT CHANGE UP (ref 6–8.3)
RBC # BLD: 3.35 M/UL — LOW (ref 3.8–5.2)
RBC # FLD: 15.5 % — HIGH (ref 10.3–14.5)
SODIUM SERPL-SCNC: 137 MMOL/L — SIGNIFICANT CHANGE UP (ref 135–145)
WBC # BLD: 4.59 K/UL — SIGNIFICANT CHANGE UP (ref 3.8–10.5)
WBC # FLD AUTO: 4.59 K/UL — SIGNIFICANT CHANGE UP (ref 3.8–10.5)

## 2020-09-25 RX ORDER — LANOLIN ALCOHOL/MO/W.PET/CERES
3 CREAM (GRAM) TOPICAL AT BEDTIME
Refills: 0 | Status: DISCONTINUED | OUTPATIENT
Start: 2020-09-25 | End: 2020-10-08

## 2020-09-25 RX ORDER — SODIUM CHLORIDE 0.65 %
1 AEROSOL, SPRAY (ML) NASAL
Refills: 0 | Status: DISCONTINUED | OUTPATIENT
Start: 2020-09-25 | End: 2020-10-08

## 2020-09-25 RX ORDER — POTASSIUM CHLORIDE 20 MEQ
40 PACKET (EA) ORAL ONCE
Refills: 0 | Status: COMPLETED | OUTPATIENT
Start: 2020-09-25 | End: 2020-09-25

## 2020-09-25 RX ADMIN — ALBUTEROL 2 PUFF(S): 90 AEROSOL, METERED ORAL at 13:17

## 2020-09-25 RX ADMIN — DIPHENHYDRAMINE HYDROCHLORIDE AND LIDOCAINE HYDROCHLORIDE AND ALUMINUM HYDROXIDE AND MAGNESIUM HYDRO 10 MILLILITER(S): KIT at 21:54

## 2020-09-25 RX ADMIN — LINEZOLID 300 MILLIGRAM(S): 600 INJECTION, SOLUTION INTRAVENOUS at 06:10

## 2020-09-25 RX ADMIN — NYSTATIN CREAM 1 APPLICATION(S): 100000 CREAM TOPICAL at 17:38

## 2020-09-25 RX ADMIN — CHLORHEXIDINE GLUCONATE 1 APPLICATION(S): 213 SOLUTION TOPICAL at 06:09

## 2020-09-25 RX ADMIN — Medication 40 MILLIGRAM(S): at 06:17

## 2020-09-25 RX ADMIN — Medication 3: at 12:18

## 2020-09-25 RX ADMIN — ALBUTEROL 2 PUFF(S): 90 AEROSOL, METERED ORAL at 21:43

## 2020-09-25 RX ADMIN — ATORVASTATIN CALCIUM 40 MILLIGRAM(S): 80 TABLET, FILM COATED ORAL at 21:41

## 2020-09-25 RX ADMIN — DIPHENHYDRAMINE HYDROCHLORIDE AND LIDOCAINE HYDROCHLORIDE AND ALUMINUM HYDROXIDE AND MAGNESIUM HYDRO 10 MILLILITER(S): KIT at 13:17

## 2020-09-25 RX ADMIN — GABAPENTIN 600 MILLIGRAM(S): 400 CAPSULE ORAL at 06:13

## 2020-09-25 RX ADMIN — NYSTATIN CREAM 1 APPLICATION(S): 100000 CREAM TOPICAL at 06:17

## 2020-09-25 RX ADMIN — MUPIROCIN 1 APPLICATION(S): 20 OINTMENT TOPICAL at 17:35

## 2020-09-25 RX ADMIN — DIPHENHYDRAMINE HYDROCHLORIDE AND LIDOCAINE HYDROCHLORIDE AND ALUMINUM HYDROXIDE AND MAGNESIUM HYDRO 10 MILLILITER(S): KIT at 06:11

## 2020-09-25 RX ADMIN — ALBUTEROL 2 PUFF(S): 90 AEROSOL, METERED ORAL at 11:09

## 2020-09-25 RX ADMIN — CARVEDILOL PHOSPHATE 6.25 MILLIGRAM(S): 80 CAPSULE, EXTENDED RELEASE ORAL at 06:10

## 2020-09-25 RX ADMIN — Medication 3 MILLIGRAM(S): at 22:35

## 2020-09-25 RX ADMIN — Medication 5: at 08:34

## 2020-09-25 RX ADMIN — PANTOPRAZOLE SODIUM 40 MILLIGRAM(S): 20 TABLET, DELAYED RELEASE ORAL at 11:10

## 2020-09-25 RX ADMIN — BUDESONIDE AND FORMOTEROL FUMARATE DIHYDRATE 2 PUFF(S): 160; 4.5 AEROSOL RESPIRATORY (INHALATION) at 11:09

## 2020-09-25 RX ADMIN — CARVEDILOL PHOSPHATE 6.25 MILLIGRAM(S): 80 CAPSULE, EXTENDED RELEASE ORAL at 17:34

## 2020-09-25 RX ADMIN — MUPIROCIN 1 APPLICATION(S): 20 OINTMENT TOPICAL at 06:17

## 2020-09-25 RX ADMIN — GABAPENTIN 600 MILLIGRAM(S): 400 CAPSULE ORAL at 22:35

## 2020-09-25 RX ADMIN — LINEZOLID 300 MILLIGRAM(S): 600 INJECTION, SOLUTION INTRAVENOUS at 17:35

## 2020-09-25 RX ADMIN — Medication 40 MILLIEQUIVALENT(S): at 15:32

## 2020-09-25 RX ADMIN — ALBUTEROL 2 PUFF(S): 90 AEROSOL, METERED ORAL at 17:38

## 2020-09-25 RX ADMIN — BUDESONIDE AND FORMOTEROL FUMARATE DIHYDRATE 2 PUFF(S): 160; 4.5 AEROSOL RESPIRATORY (INHALATION) at 21:42

## 2020-09-25 RX ADMIN — Medication 5: at 17:34

## 2020-09-25 RX ADMIN — Medication 5: at 21:42

## 2020-09-25 RX ADMIN — Medication 40 MILLIGRAM(S): at 13:13

## 2020-09-25 RX ADMIN — CASPOFUNGIN ACETATE 260 MILLIGRAM(S): 7 INJECTION, POWDER, LYOPHILIZED, FOR SOLUTION INTRAVENOUS at 15:32

## 2020-09-25 RX ADMIN — Medication 1 MILLIGRAM(S): at 11:10

## 2020-09-25 RX ADMIN — ALBUTEROL 2 PUFF(S): 90 AEROSOL, METERED ORAL at 06:09

## 2020-09-25 RX ADMIN — TIOTROPIUM BROMIDE 1 CAPSULE(S): 18 CAPSULE ORAL; RESPIRATORY (INHALATION) at 12:18

## 2020-09-25 RX ADMIN — Medication 40 MILLIGRAM(S): at 21:43

## 2020-09-25 RX ADMIN — ENOXAPARIN SODIUM 40 MILLIGRAM(S): 100 INJECTION SUBCUTANEOUS at 11:19

## 2020-09-25 RX ADMIN — SENNA PLUS 2 TABLET(S): 8.6 TABLET ORAL at 21:41

## 2020-09-25 NOTE — SWALLOW BEDSIDE ASSESSMENT ADULT - SWALLOW EVAL: RECOMMENDED FEEDING/EATING TECHNIQUES
alternate food with liquid/position upright (90 degrees)/tuck chin/maintain upright posture during/after eating for 30 mins/small sips/bites
check mouth frequently for oral residue/pocketing/position upright (90 degrees)/small sips/bites/crush medication (when feasible)/maintain upright posture during/after eating for 30 mins/straw with thin liquid to promote independence and controlled intake/oral hygiene/alternate food with liquid

## 2020-09-25 NOTE — DIETITIAN INITIAL EVALUATION ADULT. - PROBLEM SELECTOR PLAN 4
Pt with hx of Lung cancer on active chemo   Pt pw hgb 10.9  bs 11-12   PICC LINE on left arm   pt with no active bleeding   likely in the setting of recent chemo   will send anemia panel   fu am cbc

## 2020-09-25 NOTE — PROGRESS NOTE ADULT - SUBJECTIVE AND OBJECTIVE BOX
no fever  but has more cough  still sore throat    ROS:  Negative except for:    MEDICATIONS  (STANDING):  ALBUTerol    90 MICROgram(s) HFA Inhaler 2 Puff(s) Inhalation every 4 hours  atorvastatin 40 milliGRAM(s) Oral at bedtime  budesonide 160 MICROgram(s)/formoterol 4.5 MICROgram(s) Inhaler 2 Puff(s) Inhalation two times a day  carvedilol 6.25 milliGRAM(s) Oral every 12 hours  caspofungin IVPB      caspofungin IVPB 50 milliGRAM(s) IV Intermittent every 24 hours  chlorhexidine 2% Cloths 1 Application(s) Topical <User Schedule>  enoxaparin Injectable 40 milliGRAM(s) SubCutaneous daily  ergocalciferol 44234 Unit(s) Oral <User Schedule>  FIRST- Mouthwash  BLM 10 milliLiter(s) Swish and Swallow every 8 hours  folic acid 1 milliGRAM(s) Oral daily  gabapentin   Solution 600 milliGRAM(s) Oral every 8 hours  influenza   Vaccine 0.5 milliLiter(s) IntraMuscular once  insulin lispro (HumaLOG) corrective regimen sliding scale   SubCutaneous Before meals and at bedtime  linezolid  IVPB      linezolid  IVPB 600 milliGRAM(s) IV Intermittent every 12 hours  methylPREDNISolone sodium succinate Injectable 40 milliGRAM(s) IV Push every 8 hours  mupirocin 2% Nasal 1 Application(s) Nasal two times a day  nystatin Powder 1 Application(s) Topical two times a day  pantoprazole  Injectable 40 milliGRAM(s) IV Push daily  polyethylene glycol 3350 17 Gram(s) Oral two times a day  senna 2 Tablet(s) Oral at bedtime  tiotropium 18 MICROgram(s) Capsule 1 Capsule(s) Inhalation daily    MEDICATIONS  (PRN):  morphine  - Injectable 2 milliGRAM(s) IV Push every 4 hours PRN Severe Pain (7 - 10)  ondansetron Injectable 4 milliGRAM(s) IV Push every 6 hours PRN Nausea and/or Vomiting  oxycodone    5 mG/acetaminophen 325 mG 2 Tablet(s) Oral every 4 hours PRN Moderate Pain (4 - 6)      Allergies    fish (Angioedema)  penicillins (Rash)    Intolerances        Vital Signs Last 24 Hrs  T(C): 36.2 (25 Sep 2020 06:13), Max: 36.7 (24 Sep 2020 10:49)  T(F): 97.2 (25 Sep 2020 06:13), Max: 98 (24 Sep 2020 10:49)  HR: 96 (25 Sep 2020 06:13) (94 - 111)  BP: 160/89 (25 Sep 2020 06:13) (148/75 - 160/89)  BP(mean): --  RR: 18 (25 Sep 2020 06:13) (16 - 20)  SpO2: 96% (25 Sep 2020 06:13) (96% - 99%)    PHYSICAL EXAM  General: adult in NAD  HEENT: clear oropharynx, anicteric sclera, pink conjunctiva  Neck: supple  CV: normal S1/S2 with no murmur rubs or gallops  Lungs: positive air movement b/l ant lungs,clear to auscultation, no wheezes, no rales  Abdomen: soft non-tender non-distended, no hepatosplenomegaly  Ext: no clubbing cyanosis or edema  Skin: no rashes and no petechiae  Neuro: alert and oriented X 4, no focal deficits  LABS:                          9.7    5.03  )-----------( 82       ( 24 Sep 2020 06:08 )             29.4         Mean Cell Volume : 91.0 fl  Mean Cell Hemoglobin : 30.0 pg  Mean Cell Hemoglobin Concentration : 33.0 gm/dL  Auto Neutrophil # : 4.65 K/uL  Auto Lymphocyte # : 0.14 K/uL  Auto Monocyte # : 0.20 K/uL  Auto Eosinophil # : 0.00 K/uL  Auto Basophil # : 0.00 K/uL  Auto Neutrophil % : 92.4 %  Auto Lymphocyte % : 2.8 %  Auto Monocyte % : 4.0 %  Auto Eosinophil % : 0.0 %  Auto Basophil % : 0.0 %    Serial CBC  Hematocrit 29.4  Hemoglobin 9.7  Plat 82  RBC 3.23  WBC 5.03  Serial CBC  Hematocrit 28.3  Hemoglobin 9.8  Plat 66  RBC 3.22  WBC 6.09  Serial CBC  Hematocrit 31.9  Hemoglobin 10.6  Plat 71  RBC 3.57  WBC 6.89    09-24    138  |  97  |  15  ----------------------------<  276<H>  3.7   |  33<H>  |  0.54    Ca    8.3<L>      24 Sep 2020 06:08  Phos  3.2     09-24  Mg     2.2     09-24    TPro  5.9<L>  /  Alb  2.5<L>  /  TBili  0.8  /  DBili  x   /  AST  10  /  ALT  22  /  AlkPhos  91  09-24          Ferritin, Serum: 1109 ng/mL (09-24 @ 10:15)  Iron - Total Binding Capacity.: 219 ug/dL (09-24 @ 06:08)  Folate, Serum: >20.0 ng/mL (09-20 @ 11:36)  Vitamin B12, Serum: >2000 pg/mL (09-20 @ 11:36)            BLOOD SMEAR INTERPRETATION:       RADIOLOGY & ADDITIONAL STUDIES:

## 2020-09-25 NOTE — PROGRESS NOTE ADULT - ASSESSMENT
66 yo obese F former heavy smoker (80 pack years) with, HTN, DM2 on insulin, COPD on 5L (frequent admissions), R lung cancer s/p resection (2017) on chemo q 3 weeks with concern for mets to spine and compression fractures, recent admission for COPD exacerbation earlier in August presents with SOB, hypoxia and increased sputum production x 1 day. Patient states cough feels like secretion is coming out but unable to expectorate. As per EMS, patient is hypoxic at 84% on room air which came up to 98% with 4L of nasal canula.     On 9/21/2020. Patient was hypoxic, tachycardiac and tachypneic. Rapid response was called. RRT team arrived on scene. Patient has encephalopathy from hypoxia with wheezing and stridor on examination. Patient was tachycardiac to 140/min. Patient was hypoxic and started on non-rebreather but patient has increased work of breathing. Decision was made to intubate the patient. Patient become hypotensive post intubation and was given LR bolus and started on peripheral phenylephrine.    In ICU patient was awake and alert following commands and using a talking board and pen and paper to communicate. She was started on Precedex and Fentanyl to sedate the patient.   Patient was extubated on 9/22/20. Saturating well on NC. Patient was titrated off the Precedex and Fentanyl by 9/23/20. She has anxiety and when anxious she desaturates. on 9/23/2020 patient was started on Bipab after an episode of anxiety and desaturation and tolerated it. For now patient is using bipap as needed.     Blood cultures from 9/19/2020 grew VRE and yeast like cells. Patient's PICC line on Left arm was removed on 9/21/2020 as possible source of infection. Patient was started on Zyvox on 9/21 and Caspofungin on 9/23 after the results came back. Repeat blood cultures from 9/21/2020 are showig no growth to date. Repeat blood cultures were sent on 9/23/2020, follow up results and sensitivities and recommendation of continuation or discontinuation of antifungal.  9/24  Patient transferred to SCU

## 2020-09-25 NOTE — DIETITIAN INITIAL EVALUATION ADULT. - PROBLEM SELECTOR PLAN 1
Pt pw with shortness of breath and hypoxia   SpO2 improved with 5 L nc which is patients   CTA in ED is negative for PE; Trace small secretions in the inferior trachea extending into the left mainstem bronchus.  low suspicion for PNA as pt has no fevers, wbc elevation, lactate 1.7  fu procal and legionella   cw supplemental oxygen  cw prednisone and inhalers   Pulm Dr Nugent consulted

## 2020-09-25 NOTE — DIETITIAN INITIAL EVALUATION ADULT. - PERTINENT MEDS FT
MEDICATIONS  (STANDING):  ALBUTerol    90 MICROgram(s) HFA Inhaler 2 Puff(s) Inhalation every 4 hours  atorvastatin 40 milliGRAM(s) Oral at bedtime  budesonide 160 MICROgram(s)/formoterol 4.5 MICROgram(s) Inhaler 2 Puff(s) Inhalation two times a day  carvedilol 6.25 milliGRAM(s) Oral every 12 hours  caspofungin IVPB      caspofungin IVPB 50 milliGRAM(s) IV Intermittent every 24 hours  chlorhexidine 2% Cloths 1 Application(s) Topical <User Schedule>  enoxaparin Injectable 40 milliGRAM(s) SubCutaneous daily  ergocalciferol 07498 Unit(s) Oral <User Schedule>  FIRST- Mouthwash  BLM 10 milliLiter(s) Swish and Swallow every 8 hours  folic acid 1 milliGRAM(s) Oral daily  gabapentin   Solution 600 milliGRAM(s) Oral every 8 hours  influenza   Vaccine 0.5 milliLiter(s) IntraMuscular once  insulin lispro (HumaLOG) corrective regimen sliding scale   SubCutaneous Before meals and at bedtime  linezolid  IVPB      linezolid  IVPB 600 milliGRAM(s) IV Intermittent every 12 hours  methylPREDNISolone sodium succinate Injectable 40 milliGRAM(s) IV Push every 8 hours  mupirocin 2% Nasal 1 Application(s) Nasal two times a day  nystatin Powder 1 Application(s) Topical two times a day  pantoprazole  Injectable 40 milliGRAM(s) IV Push daily  polyethylene glycol 3350 17 Gram(s) Oral two times a day  potassium chloride   Powder 40 milliEquivalent(s) Oral once  senna 2 Tablet(s) Oral at bedtime  tiotropium 18 MICROgram(s) Capsule 1 Capsule(s) Inhalation daily    MEDICATIONS  (PRN):  morphine  - Injectable 2 milliGRAM(s) IV Push every 4 hours PRN Severe Pain (7 - 10)  ondansetron Injectable 4 milliGRAM(s) IV Push every 6 hours PRN Nausea and/or Vomiting  oxycodone    5 mG/acetaminophen 325 mG 2 Tablet(s) Oral every 4 hours PRN Moderate Pain (4 - 6)  sodium chloride 0.65% Nasal 1 Spray(s) Both Nostrils four times a day PRN Nasal Congestion

## 2020-09-25 NOTE — PROGRESS NOTE ADULT - ASSESSMENT
· Assessment	  65 year old lady with severe COPD and lung ca with mets to bones which causing compression Fx.  She had one RT for that.  she was admitted for dyspnea and hypoxia.    Problem/Recommendation - 1:  Problem: Lung cancer. Recommendation: with mets to bones  on chemo keytruda, alimta and carboplatin  she had one RT to spine.  the tumor markers have been trending down with chemo    Problem/Recommendation - 2:  ·  Problem: COPD (chronic obstructive pulmonary disease).  Recommendation: required freq steroids.     Problem/Recommendation - 3:  ·  Problem: Acute on chronic respiratory failure with hypoxia.  Recommendation: ?aspiration causing resp failure requiring intubation  she has pain at throat for a while and has difficulty swallowing  now extubated  prognosis poor.   she is lucid now  and express wishes to continue chemo. she needs placement because she is not able to climb the stairs  4. sore throat   she has difficulty swallowing  need a scope  now on puree diet  5. cough, more now  will watch for possibility of aspiration

## 2020-09-25 NOTE — PROGRESS NOTE ADULT - SUBJECTIVE AND OBJECTIVE BOX
PULMONARY  progress note    BARB COLÓN  MRN-[id number]    Patient is a 65y old  Female who presents with a chief complaint of shortness of breath (24 Sep 2020 11:36)  Feels hungry, Lives on 3rd floor, can not walk up, Needs Rehab      MEDICATIONS  (STANDING):  ALBUTerol    90 MICROgram(s) HFA Inhaler 2 Puff(s) Inhalation every 4 hours  atorvastatin 40 milliGRAM(s) Oral at bedtime  budesonide 160 MICROgram(s)/formoterol 4.5 MICROgram(s) Inhaler 2 Puff(s) Inhalation two times a day  carvedilol 6.25 milliGRAM(s) Oral every 12 hours  caspofungin IVPB      caspofungin IVPB 50 milliGRAM(s) IV Intermittent every 24 hours  chlorhexidine 2% Cloths 1 Application(s) Topical <User Schedule>  enoxaparin Injectable 40 milliGRAM(s) SubCutaneous daily  ergocalciferol 98582 Unit(s) Oral <User Schedule>  FIRST- Mouthwash  BLM 10 milliLiter(s) Swish and Swallow every 8 hours  folic acid 1 milliGRAM(s) Oral daily  gabapentin   Solution 600 milliGRAM(s) Oral every 8 hours  influenza   Vaccine 0.5 milliLiter(s) IntraMuscular once  insulin lispro (HumaLOG) corrective regimen sliding scale   SubCutaneous Before meals and at bedtime  linezolid  IVPB      linezolid  IVPB 600 milliGRAM(s) IV Intermittent every 12 hours  methylPREDNISolone sodium succinate Injectable 40 milliGRAM(s) IV Push every 8 hours  mupirocin 2% Nasal 1 Application(s) Nasal two times a day  nystatin Powder 1 Application(s) Topical two times a day  pantoprazole  Injectable 40 milliGRAM(s) IV Push daily  polyethylene glycol 3350 17 Gram(s) Oral two times a day  senna 2 Tablet(s) Oral at bedtime  tiotropium 18 MICROgram(s) Capsule 1 Capsule(s) Inhalation daily      MEDICATIONS  (PRN):  morphine  - Injectable 2 milliGRAM(s) IV Push every 4 hours PRN Severe Pain (7 - 10)  ondansetron Injectable 4 milliGRAM(s) IV Push every 6 hours PRN Nausea and/or Vomiting  oxycodone    5 mG/acetaminophen 325 mG 2 Tablet(s) Oral every 4 hours PRN Moderate Pain (4 - 6)      Allergies    fish (Angioedema)  penicillins (Rash)            PAST MEDICAL & SURGICAL HISTORY:  Malignant neoplasm of unspecified part of right bronchus or lung    HTN (hypertension)    DM (diabetes mellitus)    COPD (chronic obstructive pulmonary disease)    Lung cancer    Morbid obesity    GERD (gastroesophageal reflux disease)    Deviated septum    Prolapsed uterus    ITP (idiopathic thrombocytopenic purpura)    Emphysema/COPD    History of cholecystectomy    S/P lobectomy of lung  right 2017    History of tonsillectomy             REVIEW OF SYSTEMS:  CONSTITUTIONAL: No fever, weight loss, or fatigue   EYES: No eye pain, visual disturbances, or discharge  ENT:  No difficulty hearing, tinnitus, vertigo; No sinus or throat pain  NECK: No pain or stiffness or nodes  RESPIRATORY:  cough +  wheezing +  chills --  hemoptysis--  Shortness of Breath+  CARDIOVASCULAR: No chest pain, palpitations, passing out, dizziness, or leg swelling  GASTROINTESTINAL: No abdominal or epigastric pain. No nausea, vomiting, or hematemesis; No diarrhea or constipation. No melena or hematochezia.  GENITOURINARY: No dysuria, frequency, hematuria, or incontinence  NEUROLOGICAL: No headaches, memory loss, loss of strength, numbness, or tremors  SKIN: No itching, burning, rashes, or lesions   LYMPH Nodes: No enlarged glands  ENDOCRINE: No heat or cold intolerance; No hair loss  MUSCULOSKELETAL: No joint pain or swelling; No muscle, back, or extremity pain  PSYCHIATRIC: No depression, anxiety, mood swings, or difficulty sleeping  HEME/LYMPH: No easy bruising, or bleeding gums        Vital Signs Last 24 Hrs  T(C): 36.2 (25 Sep 2020 06:13), Max: 36.7 (24 Sep 2020 10:49)  T(F): 97.2 (25 Sep 2020 06:13), Max: 98 (24 Sep 2020 10:49)  HR: 96 (25 Sep 2020 06:13) (94 - 111)  BP: 160/89 (25 Sep 2020 06:13) (148/75 - 160/89)  BP(mean): 107 (24 Sep 2020 09:00) (107 - 107)  RR: 18 (25 Sep 2020 06:13) (16 - 20)  SpO2: 96% (25 Sep 2020 06:13) (94% - 99%)  I&O's Detail    24 Sep 2020 07:01  -  25 Sep 2020 07:00  --------------------------------------------------------  IN:    Oral Fluid: 50 mL  Total IN: 50 mL    OUT:    Indwelling Catheter - Urethral (mL): 165 mL  Total OUT: 165 mL    Total NET: -115 mL          PHYSICAL EXAMINATION:    GENERAL: The patient is a well-developed,  obese h/f  in no apparent distress.   SKIN no rash ecchymoses or bruises  HEENT: Head is normocephalic and atraumatic  SHAMA , Extraocular muscles are intact. Mucous membranes  moist.   Neck supple ,No LN felt JVP not increased  Thyroid not enlarged  Cardiovascular:  S1 S2 heard, RSR, No JVD , systolic  murmur at apex, No gallop or rub  Respiratory: Chest wall symmetrical with good air entry ,Percussion note normal,    Lungs vesicular breathing with  prolonged expiratory phase with minimal   wheeze	  ABDOMEN:  Soft, Non-tender, obese, no hepatomegaly or splenomegaly BS positive	  Extremities: Normal range of motion, No clubbing, cyanosis or edema  Vascular: Peripheral pulses palpable 2+ bilaterally  CNS:  Alert and oriented x3   Cranial nerves intact  sensory intact  motor power5/5  dtr 2+   Babinski neg    LABS:                        9.7    5.03  )-----------( 82       ( 24 Sep 2020 06:08 )             29.4     09-24    138  |  97  |  15  ----------------------------<  276<H>  3.7   |  33<H>  |  0.54    Ca    8.3<L>      24 Sep 2020 06:08  Phos  3.2     09-24  Mg     2.2     09-24    TPro  5.9<L>  /  Alb  2.5<L>  /  TBili  0.8  /  DBili  x   /  AST  10  /  ALT  22  /  AlkPhos  91  09-24    Ferritin, Serum: 1109 ng/mL (09-24-20 @ 10:15)  Folate, Serum: >20.0 ng/mL (09-20-20 @ 11:36)  Vitamin B12, Serum: >2000 pg/mL (09-20-20 @ 11:36)      MICROBIOLOGY:    Culture - Blood (collected 09-23-20 @ 10:33)  Source: .Blood Blood  Preliminary Report (09-24-20 @ 11:01):    No growth to date.    Culture - Blood (collected 09-23-20 @ 10:33)  Source: .Blood Blood  Preliminary Report (09-24-20 @ 11:01):    No growth to date.    Culture - Blood (collected 09-21-20 @ 14:47)  Source: .Blood Blood  Preliminary Report (09-22-20 @ 15:01):    No growth to date.    Culture - Urine (collected 09-19-20 @ 16:20)  Source: .Urine Clean Catch (Midstream)  Final Report (09-20-20 @ 17:00):    <10,000 CFU/mL Normal Urogenital Shannan    Culture - Blood (collected 09-19-20 @ 11:48)  Source: .Blood Blood-Peripheral  Gram Stain (09-22-20 @ 14:57):    Growth in aerobic bottle: Yeast like cells  Preliminary Report (09-22-20 @ 14:57):    Growth in aerobic bottle: Yeast like cells    "Due to technical problems, Proteus sp. will Not be reported as part of    the BCID panel until further notice"    ***Blood Panel PCR results on this specimen are available    approximately 3 hours after the Gram stain result.***    Gram stain, PCR, and/or culture results may not always    correspond due to difference in methodologies.    ************************************************************    This PCR assay was performed using [employer].    The following targets are tested for: Enterococcus,    vancomycin resistant enterococci, Listeria monocytogenes,    coagulase negative staphylococci, S. aureus,    methicillin resistant S. aureus, Streptococcus agalactiae    (Group B), S. pneumoniae, S. pyogenes (Group A),    Acinetobacter baumannii, Enterobacter cloacae, E. coli,    Klebsiella oxytoca, K. pneumoniae, Proteus sp.,    Serratia marcescens, Haemophilus influenzae,    Neisseria meningitidis, Pseudomonas aeruginosa, Candida    albicans, C. glabrata, C krusei, C parapsilosis,    C. tropicalis and the KPC resistance gene.  Organism: Blood Culture PCR (09-22-20 @ 16:46)  Organism: Blood Culture PCR (09-22-20 @ 16:46)      -  Acinetobacter baumanii: Nondet      -  Candida albicans: Nondet      -  Candida glabrata: Nondet      -  Candida krusei: Nondet      -  Candida parapsilosis: Nondet      -  Candida tropicalis: Nondet      -  Coagulase negative Staphylococcus: Nondet      -  Enterobacter cloacae complex: Nondet      -  Enterococcus species: Nondet      -  Escherichia coli: Nondet      -  Haemophilus influenzae: Nondet      -  Klebsiella oxytoca: Nondet      -  Klebsiella pneumoniae: Nondet      -  Listeria monocytogenes: Nondet      -  Methicillin resistant Staphylococcus aureus (MRSA): Nondet      -  Multidrug (KPC pos) resistant organism: Nondet      -  Neisseria meningitidis: Nondet      -  Pseudomonas aeruginosa: Nondet      -  Serratia marcescens: Nondet      -  Staphylococcus aureus: Nondet      -  Streptococcus agalactiae (Group B): Nondet      -  Streptococcus pneumoniae: Nondet      -  Streptococcus pyogenes (Group A): Nondet      -  Streptococcus sp. (Not Grp A, B or S pneumoniae): Nondet      -  Vancomycin resistant Enterococcus sp.: Nondet      Method Type: PCR    Culture - Blood (collected 09-19-20 @ 11:48)  Source: .Blood Blood-Peripheral  Gram Stain (09-20-20 @ 02:57):    Growth in anaerobic bottle: Gram positive cocci in pairs  Final Report (09-22-20 @ 09:49):    Growth in anaerobic bottle: Enterococcus faecium (vancomycin resistant)    "Due to technical problems, Proteus sp. will Not be reported as part of    the BCID panel until further notice"    ***Blood Panel PCR results on this specimen are available    approximately 3 hours after the Gram stain result.***    Gram stain, PCR, and/or culture results may not always    correspond due to difference in methodologies.    ************************************************************    This PCR assay was performed using [employer].    The following targets are tested for: Enterococcus,    vancomycin resistant enterococci, Listeria monocytogenes,    coagulase negative staphylococci, S. aureus,    methicillin resistant S. aureus, Streptococcus agalactiae    (Group B), S. pneumoniae, S. pyogenes (Group A),    Acinetobacter baumannii, Enterobacter cloacae, E. coli,    Klebsiella oxytoca, K. pneumoniae, Proteus sp.,    Serratia marcescens, Haemophilus influenzae,    Neisseria meningitidis, Pseudomonas aeruginosa, Candida    albicans, C. glabrata, C krusei, C parapsilosis,    C. tropicalis and the KPC resistance gene.  Organism: Blood Culture PCR  Enterococcus faecium (vancomycin resistant) (09-22-20 @ 09:49)  Organism: Enterococcus faecium (vancomycin resistant) (09-22-20 @ 09:49)      -  Ampicillin: R >8 Predicts results to ampicillin/sulbactam, amoxacillin-clavulanate and  piperacillin-tazobactam.      -  Daptomycin: SDD 4 The breakpoint for SDD (sensitive dose dependent)is based on a dosage regimen of 8-12 mg/kg administered every 24 h in adults and is intended for serious infections due to E. faecium. Consultation with an infectious diseases specialist is recommended.      -  Gentamicin synergy: R      -  Linezolid: S 2      -  Streptomycin synergy: R      -  Vancomycin: R >16      Method Type: DALE  Organism: Blood Culture PCR (09-22-20 @ 09:49)      -  Vancomycin resistant Enterococcus sp.: Detec      Method Type: PCR      RADIOLOGY & ADDITIONAL STUDIES:    CXR:< from: Xray Chest 1 View- PORTABLE-Routine (Xray Chest 1 View- PORTABLE-Routine in AM.) (09.23.20 @ 10:09) >  Persistent right perihilar infiltrate versus atelectasis. Left midlung discoid atelectasis.      ECHO:c< from: Transthoracic Echocardiogram (09.22.20 @ 12:07) >  1. Normal mitral valve.  2. Normal trileaflet aortic valve.  3. Aortic Root: 2.7 cm.  LVOT diameter: 1.9 cm.    4. LA volume index = 11 cc/m2.  5. Normal left ventricular internal dimensions and wall  thicknesses.  6. Normal Left Ventricular Systolic Function,  (EF =  50-55%)  7. Grade I diastolic dysfunction (Impaired relaxation).  8. Normal right atrium.  9. Normal right ventricular size and systolic function  (TAPSE  2.0cm).  10. Unable to estimate RVSP.  11. Normal tricuspid valve.  12. Pulmonic valve not well seen.  13. Normal pericardium with no pericardial effusion.

## 2020-09-25 NOTE — CHART NOTE - NSCHARTNOTEFT_GEN_A_CORE
PC  spoke with the patient to provide emotional support and assess needs.  Ms. Olson continues to express concern regarding her discharge plan and disease directed treatment.  PC Sw suggested  attempt to prioritize needs as the IV antibiotics to treat her bacteremia need to be completed before  will offer any further cancer treatment, as per Medicine NP.  Patient became very upset stating "if the COPD does not kill me the cancer will."  PC Sw recognized Ms. Chaudhary' concerns and stated if she requires long term IV antibiotics she will likely need to transition to [facility] and perhaps could receive P.T. and following [facility] admission be evaluated for treatment.  Sw acknowledged this is not a perfect plan and emphasized the importance of treating the infection.  following this discussion the patient requested this  speak with her daughters who were in the corridor.  PC Sw reviewed this discussion with Liberty and Barbara who describe the patient as unrealistic and childlike.  PC Sw validated their frustration and recognized the patient seems to be feeling a loss of control in regard to functional status and would benefit from being included in her d/c plans, knowing a safe d/c plan needs to be established.  PC Sw spoke at length with Liberty and the discussion evolved into potential hospice in the future.  Liberty expressed appreciation of this sw's support and information and was provided this sw's contact information.  PC Sw will remain available as needed.

## 2020-09-25 NOTE — PROGRESS NOTE ADULT - PROBLEM SELECTOR PLAN 1
Secondary to end stage COPD  Continue oxygen at 4 LPM via nasal cannula. Do not increase.  Continue LABA and ICS  Continue solumedrol every 8 hours. Decrease to every 12 hours tomorrow.  Refuses to use BIPAP.

## 2020-09-25 NOTE — SWALLOW BEDSIDE ASSESSMENT ADULT - SLP GENERAL OBSERVATIONS
Pt AA+Ox3; HOB 90 degrees. Pt reported complaints of mech soft "getting stuck and staying" in throat as well as pain in the surrounding oral areas. Pt reported no complaints of swallowing in general, and progress made from last eval on 9/23 (less secretions and no need for suctioning). Pt maintained a positive demeanor throughout the evaluation and exhibited less anxiety than compared to previous 9/23 examination.
Pt reported swallowing "feels like glass", 2/2 to possible thrush. Pt p/w missing dentition affecting mastication and anterior secretions. Pt demonstrated anxiety a/b textures of foods (picky eater; no pudding, no apple sauce, only chocolate flavoring) as well as family living situations and cried throughout the evaluation. Pt demonstrated improved swallow with cup holding and straw on thin liquid.

## 2020-09-25 NOTE — SWALLOW BEDSIDE ASSESSMENT ADULT - ASR SWALLOW DENTITION
edentulous, does not have dentures/incomplete/Cannot wear dentures due to pt reported autoimmune disease causing bleeding; pt p/w 1 lower tooth
edentulous, does not have dentures/Cannot wear dentures due to pt reported autoimmune disease causing bleeding; pt p/w 1 lower tooth/incomplete

## 2020-09-25 NOTE — PROGRESS NOTE ADULT - ASSESSMENT
COPD with acute asthmatic bronchitis with  s/p  Respiratory Failure   Recurrent Lung cancer with Bone Mets   IDDM   Htn with MR   Thrombocytopenia sec to C/T  Obesity with CRIS   r/o esophageal candidiasis  GPC bacteremia VRE/ yeast    PLAN-po prednisone 4o mg qd x 4 days  IV Zyvox and Cancidas  Get PICC LINE   D/C iv steroids and protonix   o2 n/c 2pm/ 24 hrs a day  Advance diet  OOB in chair   D/C plan to rehab on IV antibiotics  Will reevaluate in 2-3 days   Symbicort 160/4.5 2 puffs bid    Duoneb  by HFN rx qid prn   s/c lovenox  FS coverage

## 2020-09-25 NOTE — DIETITIAN INITIAL EVALUATION ADULT. - PERTINENT LABORATORY DATA
09-25 Na137 mmol/L Glu 268 mg/dL<H> K+ 3.4 mmol/L<L> Cr  0.54 mg/dL BUN 13 mg/dL 09-24 Phos 3.2 mg/dL 09-25 Alb 2.7 g/dL<L> 09-20 Chol 159 mg/dL LDL 44 mg/dL HDL 94 mg/dL Trig 105 mg/dL

## 2020-09-25 NOTE — SWALLOW BEDSIDE ASSESSMENT ADULT - H & P REVIEW
Consult received, EMR, labs, radiology reviewed./yes
yes/Consult received, EMR, labs, radiology reviewed.

## 2020-09-25 NOTE — DIETITIAN INITIAL EVALUATION ADULT. - FACTORS AFF FOOD INTAKE
vomiting/difficulty swallowing/difficulty chewing/missing teeth, no dentures, chronic illnesses including COPD/other (specify)

## 2020-09-25 NOTE — PROGRESS NOTE ADULT - SUBJECTIVE AND OBJECTIVE BOX
BARB COLÓN    SCU progress note    INTERVAL HPI/OVERNIGHT EVENTS: ***transferred from ICU to SCU last night. Currently on 4LPM nasal cannula.  66 yo obese F former heavy smoker (80 pack years) with, HTN, DM2 on insulin, COPD on 5L (frequent admissions), R lung cancer s/p resection (2017) on chemo q 3 weeks with concern for mets to spine and compression fractures, recent admission for COPD exacerbation earlier in August presents with SOB, hypoxia and increased sputum production x 1 day. Patient states cough feels like secretion is coming out but unable to expectorate. As per EMS, patient is hypoxic at 84% on room air which came up to 98% with 4L of nasal canula.     On 9/21/2020. Patient was hypoxic, tachycardiac and tachypneic. Rapid response was called. RRT team arrived on scene. Patient has encephalopathy from hypoxia with wheezing and stridor on examination. Patient was tachycardiac to 140/min. Patient was hypoxic and started on non-rebreather but patient has increased work of breathing. Decision was made to intubate the patient. Patient become hypotensive post intubation and was given LR bolus and started on peripheral phenylephrine.    In ICU patient was awake and alert following commands and using a talking board and pen and paper to communicate. She was started on Precedex and Fentanyl to sedate the patient.   Patient was extubated on 9/22/20. Saturating well on NC. Patient was titrated off the Precedex and Fentanyl by 9/23/20. She has anxiety and when anxious she desaturates. on 9/23/2020 patient was started on Bipab after an episode of anxiety and desaturation and tolerated it. For now patient is using bipap as needed.     Blood cultures from 9/19/2020 grew VRE and yeast like cells. Patient's PICC line on Left arm was removed on 9/21/2020 as possible source of infection. Patient was started on Zyvox on 9/21 and Caspofungin on 9/23 after the results came back. Repeat blood cultures from 9/21/2020 are showig no growth to date. Repeat blood cultures were sent on 9/23/2020, follow up results and sensitivites, and recommendation of continuation or discontinuation of antifungal.      DNR [ ]   DNI  [  ]   FULL CODE    Covid - 19 PCR:     The 4Ms    What Matters Most: see GOC  Age appropriate Medications/Screen for High Risk Medication: Yes  Mentation: see CAM below  Mobility: defer to physical exam    The Confusion Assessment Method (CAM) Diagnostic Algorithm     1: Acute Onset or Fluctuating Course  - Is there evidence of an acute change in mental status from the patient’s baseline? Did the (abnormal) behavior  fluctuate during the day, that is, tend to come and go, or increase and decrease in severity?  [ ] YES [X] NO     3: Disorganized thinking  -Was the patient’s thinking disorganized or incoherent, such as rambling or irrelevant conversation, unclear or illogical flow of ideas, or unpredictable switching from subject to subject?  [ ] YES [X ] NO    4: Altered Level of consciousness?  [ ] YES [x ] NO    The diagnosis of delirium by CAM requires the presence of features 1 and 2 and either 3 or 4.    PRESSORS: [ ] YES [x ] NO  caspofungin IVPB      caspofungin IVPB 50 milliGRAM(s) IV Intermittent every 24 hours  linezolid  IVPB      linezolid  IVPB 600 milliGRAM(s) IV Intermittent every 12 hours    Cardiovascular:  Heart Failure  Acute   Acute on Chronic  Chronic       carvedilol 6.25 milliGRAM(s) Oral every 12 hours    Pulmonary:  ALBUTerol    90 MICROgram(s) HFA Inhaler 2 Puff(s) Inhalation every 4 hours  budesonide 160 MICROgram(s)/formoterol 4.5 MICROgram(s) Inhaler 2 Puff(s) Inhalation two times a day  tiotropium 18 MICROgram(s) Capsule 1 Capsule(s) Inhalation daily    Hematalogic:  enoxaparin Injectable 40 milliGRAM(s) SubCutaneous daily    Other:  atorvastatin 40 milliGRAM(s) Oral at bedtime  chlorhexidine 2% Cloths 1 Application(s) Topical <User Schedule>  ergocalciferol 94771 Unit(s) Oral <User Schedule>  FIRST- Mouthwash  BLM 10 milliLiter(s) Swish and Swallow every 8 hours  folic acid 1 milliGRAM(s) Oral daily  gabapentin   Solution 600 milliGRAM(s) Oral every 8 hours  influenza   Vaccine 0.5 milliLiter(s) IntraMuscular once  insulin lispro (HumaLOG) corrective regimen sliding scale   SubCutaneous Before meals and at bedtime  methylPREDNISolone sodium succinate Injectable 40 milliGRAM(s) IV Push every 8 hours  morphine  - Injectable 2 milliGRAM(s) IV Push every 4 hours PRN  mupirocin 2% Nasal 1 Application(s) Nasal two times a day  nystatin Powder 1 Application(s) Topical two times a day  ondansetron Injectable 4 milliGRAM(s) IV Push every 6 hours PRN  oxycodone    5 mG/acetaminophen 325 mG 2 Tablet(s) Oral every 4 hours PRN  pantoprazole  Injectable 40 milliGRAM(s) IV Push daily  polyethylene glycol 3350 17 Gram(s) Oral two times a day  potassium chloride   Powder 40 milliEquivalent(s) Oral once  senna 2 Tablet(s) Oral at bedtime  sodium chloride 0.65% Nasal 1 Spray(s) Both Nostrils four times a day PRN    ALBUTerol    90 MICROgram(s) HFA Inhaler 2 Puff(s) Inhalation every 4 hours  atorvastatin 40 milliGRAM(s) Oral at bedtime  budesonide 160 MICROgram(s)/formoterol 4.5 MICROgram(s) Inhaler 2 Puff(s) Inhalation two times a day  carvedilol 6.25 milliGRAM(s) Oral every 12 hours  caspofungin IVPB      caspofungin IVPB 50 milliGRAM(s) IV Intermittent every 24 hours  chlorhexidine 2% Cloths 1 Application(s) Topical <User Schedule>  enoxaparin Injectable 40 milliGRAM(s) SubCutaneous daily  ergocalciferol 83476 Unit(s) Oral <User Schedule>  FIRST- Mouthwash  BLM 10 milliLiter(s) Swish and Swallow every 8 hours  folic acid 1 milliGRAM(s) Oral daily  gabapentin   Solution 600 milliGRAM(s) Oral every 8 hours  influenza   Vaccine 0.5 milliLiter(s) IntraMuscular once  insulin lispro (HumaLOG) corrective regimen sliding scale   SubCutaneous Before meals and at bedtime  linezolid  IVPB      linezolid  IVPB 600 milliGRAM(s) IV Intermittent every 12 hours  methylPREDNISolone sodium succinate Injectable 40 milliGRAM(s) IV Push every 8 hours  morphine  - Injectable 2 milliGRAM(s) IV Push every 4 hours PRN  mupirocin 2% Nasal 1 Application(s) Nasal two times a day  nystatin Powder 1 Application(s) Topical two times a day  ondansetron Injectable 4 milliGRAM(s) IV Push every 6 hours PRN  oxycodone    5 mG/acetaminophen 325 mG 2 Tablet(s) Oral every 4 hours PRN  pantoprazole  Injectable 40 milliGRAM(s) IV Push daily  polyethylene glycol 3350 17 Gram(s) Oral two times a day  potassium chloride   Powder 40 milliEquivalent(s) Oral once  senna 2 Tablet(s) Oral at bedtime  sodium chloride 0.65% Nasal 1 Spray(s) Both Nostrils four times a day PRN  tiotropium 18 MICROgram(s) Capsule 1 Capsule(s) Inhalation daily    Drug Dosing Weight  Height (cm): 154.9 (19 Sep 2020 08:00)  Weight (kg): 84.8 (21 Sep 2020 09:15)  BMI (kg/m2): 35.3 (21 Sep 2020 09:15)  BSA (m2): 1.84 (21 Sep 2020 09:15)    CENTRAL LINE: [ ] YES [x ] NO  LOCATION:   DATE INSERTED:  REMOVE: [ ] YES [ ] NO  EXPLAIN:    ROBLEDO: [ ] YES [x ] NO    DATE INSERTED:  REMOVE:  [ ] YES [ ] NO  EXPLAIN:    PAST MEDICAL & SURGICAL HISTORY:  Malignant neoplasm of unspecified part of right bronchus or lung    HTN (hypertension)    DM (diabetes mellitus)    COPD (chronic obstructive pulmonary disease)    Lung cancer    Morbid obesity    GERD (gastroesophageal reflux disease)    Deviated septum    Prolapsed uterus    ITP (idiopathic thrombocytopenic purpura)    Emphysema/COPD    History of cholecystectomy    S/P lobectomy of lung  right 2017    History of tonsillectomy                09-24 @ 07:01  -  09-25 @ 07:00  --------------------------------------------------------  IN: 50 mL / OUT: 165 mL / NET: -115 mL            PHYSICAL EXAM:    GENERAL: NAD, obese  HEAD:  Atraumatic, Normocephalic  EYES: EOMI, PERRLA, conjunctiva and sclera clear  ENMT: No tonsillar erythema, exudates  NECK: Supple, No JVD  NERVOUS SYSTEM:  Alert & Oriented X3, follows commands, moving all extremities  CHEST/LUNG: Diminished breath sounds bilateral base.  HEART: Regular rate and rhythm; No murmurs, rubs, or gallops  ABDOMEN: Soft, Obese Nontender, Nondistended; Bowel sounds present  EXTREMITIES:  2+ Peripheral Pulses, No clubbing, cyanosis, or edema  LYMPH: No lymphadenopathy noted  SKIN: No rashes or lesions      LABS:  CBC Full  -  ( 25 Sep 2020 11:18 )  WBC Count : 4.59 K/uL  RBC Count : 3.35 M/uL  Hemoglobin : 10.1 g/dL  Hematocrit : 29.9 %  Platelet Count - Automated : 85 K/uL  Mean Cell Volume : 89.3 fl  Mean Cell Hemoglobin : 30.1 pg  Mean Cell Hemoglobin Concentration : 33.8 gm/dL  Auto Neutrophil # : x  Auto Lymphocyte # : x  Auto Monocyte # : x  Auto Eosinophil # : x  Auto Basophil # : x  Auto Neutrophil % : x  Auto Lymphocyte % : x  Auto Monocyte % : x  Auto Eosinophil % : x  Auto Basophil % : x    09-25    137  |  96  |  13  ----------------------------<  268<H>  3.4<L>   |  36<H>  |  0.54    Ca    8.3<L>      25 Sep 2020 11:18  Phos  3.2     09-24  Mg     2.2     09-24    TPro  6.1  /  Alb  2.7<L>  /  TBili  0.8  /  DBili  x   /  AST  10  /  ALT  26  /  AlkPhos  94  09-25              [  ]  DVT Prophylaxis  [  ]  Nutrition, Brand, Rate         Goal Rate        Abnormal Nutritional Status -  Malnutrition   Cachexia      Morbid Obesity BMI >/=40    RADIOLOGY & ADDITIONAL STUDIES:  ***    Goals of Care Discussion with Family/Proxy/Other   - see note from/family meeting set up for...     BARB COLÓN    SCU progress note    INTERVAL HPI/OVERNIGHT EVENTS: ***transferred from ICU to SCU last night. Currently on 4LPM nasal cannula.  66 yo obese F former heavy smoker (80 pack years) with, HTN, DM2 on insulin, COPD on 5L (frequent admissions), R lung cancer s/p resection (2017) on chemo q 3 weeks with concern for mets to spine and compression fractures, recent admission for COPD exacerbation earlier in August presents with SOB, hypoxia and increased sputum production x 1 day. Patient states cough feels like secretion is coming out but unable to expectorate. As per EMS, patient is hypoxic at 84% on room air which came up to 98% with 4L of nasal canula.     On 9/21/2020. Patient was hypoxic, tachycardiac and tachypneic. Rapid response was called. RRT team arrived on scene. Patient has encephalopathy from hypoxia with wheezing and stridor on examination. Patient was tachycardiac to 140/min. Patient was hypoxic and started on non-rebreather but patient has increased work of breathing. Decision was made to intubate the patient. Patient become hypotensive post intubation and was given LR bolus and started on peripheral phenylephrine.    In ICU patient was awake and alert following commands and using a talking board and pen and paper to communicate. She was started on Precedex and Fentanyl to sedate the patient.   Patient was extubated on 9/22/20. Saturating well on NC. Patient was titrated off the Precedex and Fentanyl by 9/23/20. She has anxiety and when anxious she desaturates. on 9/23/2020 patient was started on Bipab after an episode of anxiety and desaturation and tolerated it. For now patient is using bipap as needed.     Blood cultures from 9/19/2020 grew VRE and yeast like cells. Patient's PICC line on Left arm was removed on 9/21/2020 as possible source of infection. Patient was started on Zyvox on 9/21 and Caspofungin on 9/23 after the results came back. Repeat blood cultures from 9/21/2020 are showig no growth to date. Repeat blood cultures were sent on 9/23/2020, follow up results and sensitivites, and recommendation of continuation or discontinuation of antifungal.      DNR [ ]   DNI  [  ]   FULL CODE    Covid - 19 PCR:     The 4Ms    What Matters Most: see GOC  Age appropriate Medications/Screen for High Risk Medication: Yes  Mentation: see CAM below  Mobility: defer to physical exam    The Confusion Assessment Method (CAM) Diagnostic Algorithm     1: Acute Onset or Fluctuating Course  - Is there evidence of an acute change in mental status from the patient’s baseline? Did the (abnormal) behavior  fluctuate during the day, that is, tend to come and go, or increase and decrease in severity?  [ ] YES [X] NO     3: Disorganized thinking  -Was the patient’s thinking disorganized or incoherent, such as rambling or irrelevant conversation, unclear or illogical flow of ideas, or unpredictable switching from subject to subject?  [ ] YES [X ] NO    4: Altered Level of consciousness?  [ ] YES [x ] NO    The diagnosis of delirium by CAM requires the presence of features 1 and 2 and either 3 or 4.    PRESSORS: [ ] YES [x ] NO  caspofungin IVPB      caspofungin IVPB 50 milliGRAM(s) IV Intermittent every 24 hours  linezolid  IVPB      linezolid  IVPB 600 milliGRAM(s) IV Intermittent every 12 hours    Cardiovascular:  Heart Failure  Acute   Acute on Chronic  Chronic       carvedilol 6.25 milliGRAM(s) Oral every 12 hours    Pulmonary:  ALBUTerol    90 MICROgram(s) HFA Inhaler 2 Puff(s) Inhalation every 4 hours  budesonide 160 MICROgram(s)/formoterol 4.5 MICROgram(s) Inhaler 2 Puff(s) Inhalation two times a day  tiotropium 18 MICROgram(s) Capsule 1 Capsule(s) Inhalation daily    Hematalogic:  enoxaparin Injectable 40 milliGRAM(s) SubCutaneous daily    Other:  atorvastatin 40 milliGRAM(s) Oral at bedtime  chlorhexidine 2% Cloths 1 Application(s) Topical <User Schedule>  ergocalciferol 66560 Unit(s) Oral <User Schedule>  FIRST- Mouthwash  BLM 10 milliLiter(s) Swish and Swallow every 8 hours  folic acid 1 milliGRAM(s) Oral daily  gabapentin   Solution 600 milliGRAM(s) Oral every 8 hours  influenza   Vaccine 0.5 milliLiter(s) IntraMuscular once  insulin lispro (HumaLOG) corrective regimen sliding scale   SubCutaneous Before meals and at bedtime  methylPREDNISolone sodium succinate Injectable 40 milliGRAM(s) IV Push every 8 hours  morphine  - Injectable 2 milliGRAM(s) IV Push every 4 hours PRN  mupirocin 2% Nasal 1 Application(s) Nasal two times a day  nystatin Powder 1 Application(s) Topical two times a day  ondansetron Injectable 4 milliGRAM(s) IV Push every 6 hours PRN  oxycodone    5 mG/acetaminophen 325 mG 2 Tablet(s) Oral every 4 hours PRN  pantoprazole  Injectable 40 milliGRAM(s) IV Push daily  polyethylene glycol 3350 17 Gram(s) Oral two times a day  potassium chloride   Powder 40 milliEquivalent(s) Oral once  senna 2 Tablet(s) Oral at bedtime  sodium chloride 0.65% Nasal 1 Spray(s) Both Nostrils four times a day PRN    ALBUTerol    90 MICROgram(s) HFA Inhaler 2 Puff(s) Inhalation every 4 hours  atorvastatin 40 milliGRAM(s) Oral at bedtime  budesonide 160 MICROgram(s)/formoterol 4.5 MICROgram(s) Inhaler 2 Puff(s) Inhalation two times a day  carvedilol 6.25 milliGRAM(s) Oral every 12 hours  caspofungin IVPB      caspofungin IVPB 50 milliGRAM(s) IV Intermittent every 24 hours  chlorhexidine 2% Cloths 1 Application(s) Topical <User Schedule>  enoxaparin Injectable 40 milliGRAM(s) SubCutaneous daily  ergocalciferol 55232 Unit(s) Oral <User Schedule>  FIRST- Mouthwash  BLM 10 milliLiter(s) Swish and Swallow every 8 hours  folic acid 1 milliGRAM(s) Oral daily  gabapentin   Solution 600 milliGRAM(s) Oral every 8 hours  influenza   Vaccine 0.5 milliLiter(s) IntraMuscular once  insulin lispro (HumaLOG) corrective regimen sliding scale   SubCutaneous Before meals and at bedtime  linezolid  IVPB      linezolid  IVPB 600 milliGRAM(s) IV Intermittent every 12 hours  methylPREDNISolone sodium succinate Injectable 40 milliGRAM(s) IV Push every 8 hours  morphine  - Injectable 2 milliGRAM(s) IV Push every 4 hours PRN  mupirocin 2% Nasal 1 Application(s) Nasal two times a day  nystatin Powder 1 Application(s) Topical two times a day  ondansetron Injectable 4 milliGRAM(s) IV Push every 6 hours PRN  oxycodone    5 mG/acetaminophen 325 mG 2 Tablet(s) Oral every 4 hours PRN  pantoprazole  Injectable 40 milliGRAM(s) IV Push daily  polyethylene glycol 3350 17 Gram(s) Oral two times a day  potassium chloride   Powder 40 milliEquivalent(s) Oral once  senna 2 Tablet(s) Oral at bedtime  sodium chloride 0.65% Nasal 1 Spray(s) Both Nostrils four times a day PRN  tiotropium 18 MICROgram(s) Capsule 1 Capsule(s) Inhalation daily    Drug Dosing Weight  Height (cm): 154.9 (19 Sep 2020 08:00)  Weight (kg): 84.8 (21 Sep 2020 09:15)  BMI (kg/m2): 35.3 (21 Sep 2020 09:15)  BSA (m2): 1.84 (21 Sep 2020 09:15)    CENTRAL LINE: [ ] YES [x ] NO  LOCATION:   DATE INSERTED:  REMOVE: [ ] YES [ ] NO  EXPLAIN:    ROBLEDO: [ ] YES [x ] NO    DATE INSERTED:  REMOVE:  [ ] YES [ ] NO  EXPLAIN:    PAST MEDICAL & SURGICAL HISTORY:  Malignant neoplasm of unspecified part of right bronchus or lung    HTN (hypertension)    DM (diabetes mellitus)    COPD (chronic obstructive pulmonary disease)    Lung cancer    Morbid obesity    GERD (gastroesophageal reflux disease)    Deviated septum    Prolapsed uterus    ITP (idiopathic thrombocytopenic purpura)    Emphysema/COPD    History of cholecystectomy    S/P lobectomy of lung  right 2017    History of tonsillectomy                09-24 @ 07:01  -  09-25 @ 07:00  --------------------------------------------------------  IN: 50 mL / OUT: 165 mL / NET: -115 mL            PHYSICAL EXAM:    GENERAL: NAD, obese  HEAD:  Atraumatic, Normocephalic  EYES: EOMI, PERRLA, conjunctiva and sclera clear  ENMT: No tonsillar erythema, exudates  NECK: Supple, No JVD  NERVOUS SYSTEM:  Alert & Oriented X3, follows commands, moving all extremities  CHEST/LUNG: Diminished breath sounds bilateral base.  HEART: Regular rate and rhythm; No murmurs, rubs, or gallops  ABDOMEN: Soft, Obese Nontender, Nondistended; Bowel sounds present  EXTREMITIES:  2+ Peripheral Pulses, No clubbing, cyanosis, or edema  LYMPH: No lymphadenopathy noted  SKIN: Bilateral arms ecchymotic      LABS:  CBC Full  -  ( 25 Sep 2020 11:18 )  WBC Count : 4.59 K/uL  RBC Count : 3.35 M/uL  Hemoglobin : 10.1 g/dL  Hematocrit : 29.9 %  Platelet Count - Automated : 85 K/uL  Mean Cell Volume : 89.3 fl  Mean Cell Hemoglobin : 30.1 pg  Mean Cell Hemoglobin Concentration : 33.8 gm/dL  Auto Neutrophil # : x  Auto Lymphocyte # : x  Auto Monocyte # : x  Auto Eosinophil # : x  Auto Basophil # : x  Auto Neutrophil % : x  Auto Lymphocyte % : x  Auto Monocyte % : x  Auto Eosinophil % : x  Auto Basophil % : x    09-25    137  |  96  |  13  ----------------------------<  268<H>  3.4<L>   |  36<H>  |  0.54    Ca    8.3<L>      25 Sep 2020 11:18  Phos  3.2     09-24  Mg     2.2     09-24    TPro  6.1  /  Alb  2.7<L>  /  TBili  0.8  /  DBili  x   /  AST  10  /  ALT  26  /  AlkPhos  94  09-25              [  ]  DVT Prophylaxis  [  ]  Nutrition, Brand, Rate         Goal Rate        Abnormal Nutritional Status -  Malnutrition   Cachexia      Morbid Obesity BMI >/=40    RADIOLOGY & ADDITIONAL STUDIES:  ***  < from: Xray Chest 1 View- PORTABLE-Routine (Xray Chest 1 View- PORTABLE-Routine in AM.) (09.23.20 @ 10:09) >  AP view of the chest is markedly rotated to the left and demonstrates persistent right perihilar infiltrate versus atelectasis. There is mild left perihilar discoid atelectasis.. There is no pleural effusion. There is no pneumothorax.    The heart, mediastinum and galina cannot be assessed due to rotation..    The pulmonary vasculature is normal.    Mild thoracic degenerative changes are present.    IMPRESSION:    Persistent right perihilar infiltrate versus atelectasis. Left midlung discoid atelectasis.    < end of copied text >    Goals of Care Discussion with Family/Proxy/Other   - see note from 9/23

## 2020-09-25 NOTE — SWALLOW BEDSIDE ASSESSMENT ADULT - SWALLOW EVAL: FEEDING ASSISTANCE
minimal assistance required
Pt dependent for scooping, but held spoon on own as well as cup with straw/minimal assistance required

## 2020-09-25 NOTE — PROGRESS NOTE ADULT - PROBLEM SELECTOR PLAN 10
Continue DVT and GI prophylaxis.  F/U cultures  ID f/u  Overall prognosis is poor.  Daughter updated.

## 2020-09-25 NOTE — DIETITIAN INITIAL EVALUATION ADULT. - OTHER INFO
pt admitted for acute on chronic RF, hx of COPD, on O2 at home; frequent admissions for SOB, briefly intubated during this admission, ICU stepdown. pt on active chemo; lung cancer with mets to bone. pt seen by palliative, remains full code. pt visited. reports poor PO intake for past few days given dislike of puree texture. pt revisited by speech and swallow, diet upgraded today to mech soft, thin- pt tolerating texture. reports good appetite PTA. pt unsure of UBW. flowrecords reviewed, fluctuations in wt noted-pt with fluid retention during this and past admissions. presently +3 edema of right arm 9/24. last BM noted 9/21, on bowel regimen-receiving senna.

## 2020-09-25 NOTE — SWALLOW BEDSIDE ASSESSMENT ADULT - ASR SWALLOW ASPIRATION MONITOR
change of breathing pattern/cough/throat clearing/oral hygiene/position upright (90Y)/gurgly voice
oral hygiene/position upright (90Y)/throat clearing/change of breathing pattern/cough/gurgly voice/upper respiratory infection/fever/pneumonia

## 2020-09-25 NOTE — PROGRESS NOTE ADULT - ATTENDING COMMENTS
·  Problem: Acute on chronic respiratory failure with hypoxia and hypercapnia.  Plan: Secondary to end stage COPD  Continue oxygen at 4 LPM via nasal cannula. Do not increase.  Continue LABA and ICS  Continue solumedrol every 8 hours. Decrease to every 12 hours tomorrow.  Refuses to use BIPAP.      Problem/Plan - 2:  ·  Problem: COPD (chronic obstructive pulmonary disease).  Plan: Continue LABA and ICS  Decrease solumedrol to every 12 hours tomorrow.  Continue oxygen at 4LPM.

## 2020-09-25 NOTE — SWALLOW BEDSIDE ASSESSMENT ADULT - SWALLOW EVAL: DIAGNOSIS
Pt p/w mild oral dysphagia 2/2 thrush but improving. No overt s/s of penetration/aspiration during this evaluation; however, pt complained of mech soft food getting stuck in throat. Pt instructed to chin tuck and use liquid wash, clearing all residue.

## 2020-09-26 LAB
ALBUMIN SERPL ELPH-MCNC: 2.7 G/DL — LOW (ref 3.5–5)
ALP SERPL-CCNC: 101 U/L — SIGNIFICANT CHANGE UP (ref 40–120)
ALT FLD-CCNC: 27 U/L DA — SIGNIFICANT CHANGE UP (ref 10–60)
ANION GAP SERPL CALC-SCNC: 5 MMOL/L — SIGNIFICANT CHANGE UP (ref 5–17)
AST SERPL-CCNC: 11 U/L — SIGNIFICANT CHANGE UP (ref 10–40)
BILIRUB SERPL-MCNC: 1.1 MG/DL — SIGNIFICANT CHANGE UP (ref 0.2–1.2)
BUN SERPL-MCNC: 13 MG/DL — SIGNIFICANT CHANGE UP (ref 7–18)
CALCIUM SERPL-MCNC: 8.5 MG/DL — SIGNIFICANT CHANGE UP (ref 8.4–10.5)
CHLORIDE SERPL-SCNC: 99 MMOL/L — SIGNIFICANT CHANGE UP (ref 96–108)
CO2 SERPL-SCNC: 32 MMOL/L — HIGH (ref 22–31)
CREAT SERPL-MCNC: 0.61 MG/DL — SIGNIFICANT CHANGE UP (ref 0.5–1.3)
CULTURE RESULTS: SIGNIFICANT CHANGE UP
GLUCOSE BLDC GLUCOMTR-MCNC: 402 MG/DL — HIGH (ref 70–99)
GLUCOSE BLDC GLUCOMTR-MCNC: 425 MG/DL — HIGH (ref 70–99)
GLUCOSE BLDC GLUCOMTR-MCNC: 444 MG/DL — HIGH (ref 70–99)
GLUCOSE BLDC GLUCOMTR-MCNC: 451 MG/DL — CRITICAL HIGH (ref 70–99)
GLUCOSE SERPL-MCNC: 337 MG/DL — HIGH (ref 70–99)
HCT VFR BLD CALC: 32.2 % — LOW (ref 34.5–45)
HGB BLD-MCNC: 10.9 G/DL — LOW (ref 11.5–15.5)
MAGNESIUM SERPL-MCNC: 2.3 MG/DL — SIGNIFICANT CHANGE UP (ref 1.6–2.6)
MCHC RBC-ENTMCNC: 30.8 PG — SIGNIFICANT CHANGE UP (ref 27–34)
MCHC RBC-ENTMCNC: 33.9 GM/DL — SIGNIFICANT CHANGE UP (ref 32–36)
MCV RBC AUTO: 91 FL — SIGNIFICANT CHANGE UP (ref 80–100)
NRBC # BLD: 0 /100 WBCS — SIGNIFICANT CHANGE UP (ref 0–0)
PHOSPHATE SERPL-MCNC: 2.6 MG/DL — SIGNIFICANT CHANGE UP (ref 2.5–4.5)
PLATELET # BLD AUTO: 90 K/UL — LOW (ref 150–400)
POTASSIUM SERPL-MCNC: 4.6 MMOL/L — SIGNIFICANT CHANGE UP (ref 3.5–5.3)
POTASSIUM SERPL-SCNC: 4.6 MMOL/L — SIGNIFICANT CHANGE UP (ref 3.5–5.3)
PROT SERPL-MCNC: 6.1 G/DL — SIGNIFICANT CHANGE UP (ref 6–8.3)
RBC # BLD: 3.54 M/UL — LOW (ref 3.8–5.2)
RBC # FLD: 15.6 % — HIGH (ref 10.3–14.5)
SODIUM SERPL-SCNC: 136 MMOL/L — SIGNIFICANT CHANGE UP (ref 135–145)
SPECIMEN SOURCE: SIGNIFICANT CHANGE UP
WBC # BLD: 4.8 K/UL — SIGNIFICANT CHANGE UP (ref 3.8–10.5)
WBC # FLD AUTO: 4.8 K/UL — SIGNIFICANT CHANGE UP (ref 3.8–10.5)

## 2020-09-26 RX ADMIN — CARVEDILOL PHOSPHATE 6.25 MILLIGRAM(S): 80 CAPSULE, EXTENDED RELEASE ORAL at 05:33

## 2020-09-26 RX ADMIN — DIPHENHYDRAMINE HYDROCHLORIDE AND LIDOCAINE HYDROCHLORIDE AND ALUMINUM HYDROXIDE AND MAGNESIUM HYDRO 10 MILLILITER(S): KIT at 16:13

## 2020-09-26 RX ADMIN — TIOTROPIUM BROMIDE 1 CAPSULE(S): 18 CAPSULE ORAL; RESPIRATORY (INHALATION) at 11:51

## 2020-09-26 RX ADMIN — LINEZOLID 300 MILLIGRAM(S): 600 INJECTION, SOLUTION INTRAVENOUS at 05:47

## 2020-09-26 RX ADMIN — GABAPENTIN 600 MILLIGRAM(S): 400 CAPSULE ORAL at 21:28

## 2020-09-26 RX ADMIN — ALBUTEROL 2 PUFF(S): 90 AEROSOL, METERED ORAL at 16:41

## 2020-09-26 RX ADMIN — PANTOPRAZOLE SODIUM 40 MILLIGRAM(S): 20 TABLET, DELAYED RELEASE ORAL at 11:45

## 2020-09-26 RX ADMIN — Medication 1 MILLIGRAM(S): at 11:44

## 2020-09-26 RX ADMIN — ALBUTEROL 2 PUFF(S): 90 AEROSOL, METERED ORAL at 21:27

## 2020-09-26 RX ADMIN — BUDESONIDE AND FORMOTEROL FUMARATE DIHYDRATE 2 PUFF(S): 160; 4.5 AEROSOL RESPIRATORY (INHALATION) at 11:51

## 2020-09-26 RX ADMIN — Medication 40 MILLIGRAM(S): at 17:04

## 2020-09-26 RX ADMIN — SENNA PLUS 2 TABLET(S): 8.6 TABLET ORAL at 21:27

## 2020-09-26 RX ADMIN — ONDANSETRON 4 MILLIGRAM(S): 8 TABLET, FILM COATED ORAL at 22:09

## 2020-09-26 RX ADMIN — OXYCODONE AND ACETAMINOPHEN 2 TABLET(S): 5; 325 TABLET ORAL at 21:30

## 2020-09-26 RX ADMIN — ENOXAPARIN SODIUM 40 MILLIGRAM(S): 100 INJECTION SUBCUTANEOUS at 11:44

## 2020-09-26 RX ADMIN — Medication 40 MILLIGRAM(S): at 05:36

## 2020-09-26 RX ADMIN — ATORVASTATIN CALCIUM 40 MILLIGRAM(S): 80 TABLET, FILM COATED ORAL at 21:27

## 2020-09-26 RX ADMIN — DIPHENHYDRAMINE HYDROCHLORIDE AND LIDOCAINE HYDROCHLORIDE AND ALUMINUM HYDROXIDE AND MAGNESIUM HYDRO 10 MILLILITER(S): KIT at 05:47

## 2020-09-26 RX ADMIN — LINEZOLID 300 MILLIGRAM(S): 600 INJECTION, SOLUTION INTRAVENOUS at 17:09

## 2020-09-26 RX ADMIN — MUPIROCIN 1 APPLICATION(S): 20 OINTMENT TOPICAL at 17:09

## 2020-09-26 RX ADMIN — NYSTATIN CREAM 1 APPLICATION(S): 100000 CREAM TOPICAL at 17:11

## 2020-09-26 RX ADMIN — Medication 6: at 08:13

## 2020-09-26 RX ADMIN — Medication 6: at 16:41

## 2020-09-26 RX ADMIN — CARVEDILOL PHOSPHATE 6.25 MILLIGRAM(S): 80 CAPSULE, EXTENDED RELEASE ORAL at 17:09

## 2020-09-26 RX ADMIN — DIPHENHYDRAMINE HYDROCHLORIDE AND LIDOCAINE HYDROCHLORIDE AND ALUMINUM HYDROXIDE AND MAGNESIUM HYDRO 10 MILLILITER(S): KIT at 21:30

## 2020-09-26 RX ADMIN — Medication 3 MILLIGRAM(S): at 21:27

## 2020-09-26 RX ADMIN — ALBUTEROL 2 PUFF(S): 90 AEROSOL, METERED ORAL at 05:35

## 2020-09-26 RX ADMIN — OXYCODONE AND ACETAMINOPHEN 2 TABLET(S): 5; 325 TABLET ORAL at 20:31

## 2020-09-26 RX ADMIN — BUDESONIDE AND FORMOTEROL FUMARATE DIHYDRATE 2 PUFF(S): 160; 4.5 AEROSOL RESPIRATORY (INHALATION) at 21:27

## 2020-09-26 RX ADMIN — Medication 6: at 11:49

## 2020-09-26 RX ADMIN — GABAPENTIN 600 MILLIGRAM(S): 400 CAPSULE ORAL at 05:35

## 2020-09-26 RX ADMIN — MUPIROCIN 1 APPLICATION(S): 20 OINTMENT TOPICAL at 05:36

## 2020-09-26 RX ADMIN — Medication 6: at 22:05

## 2020-09-26 RX ADMIN — ALBUTEROL 2 PUFF(S): 90 AEROSOL, METERED ORAL at 11:51

## 2020-09-26 RX ADMIN — CASPOFUNGIN ACETATE 260 MILLIGRAM(S): 7 INJECTION, POWDER, LYOPHILIZED, FOR SOLUTION INTRAVENOUS at 16:12

## 2020-09-26 NOTE — PROGRESS NOTE ADULT - SUBJECTIVE AND OBJECTIVE BOX
BARB COLÓN    SCU progress note    HPI 66 yo obese Woman with hx of 80 pack year tobacco use, HTN, DM2 on insulin, COPD on 5L (frequent admissions), R lung cancer s/p resection (2017) on chemo q 3 weeks with concern for mets to spine and compression fractures, recent admission for COPD exacerbation earlier in August who on 9/19/2020 presented to ED with c/o SOB, hypoxia and increased sputum production x 1 day.    INTERVAL HX: On 9/21/2020. Patient was hypoxic, tachycardiac and tachypneic. Rapid response was called. RRT team arrived on scene. Patient has encephalopathy from hypoxia with wheezing and stridor on examination. Patient was tachycardiac to 140/min. Patient was hypoxic and started on non-rebreather but patient has increased work of breathing. Decision was made to intubate the patient. Patient become hypotensive post intubation and was given LR bolus and started on peripheral phenylephrine.    In ICU patient was awake and alert following commands and using a talking board and pen and paper to communicate. She was started on Precedex and Fentanyl to sedate the patient.   Patient was extubated on 9/22/20. Saturating well on NC. Patient was titrated off the Precedex and Fentanyl by 9/23/20. She has anxiety and when anxious she desaturates. on 9/23/2020 patient was started on Bipab after an episode of anxiety and desaturation and tolerated it. For now patient is using bipap as needed.     Blood cultures from 9/19/2020 grew VRE and yeast like cells. Patient's PICC line on Left arm was removed on 9/21/2020 as possible source of infection. Patient was started on Zyvox on 9/21 and Caspofungin on 9/23 after the results came back. Repeat blood cultures from 9/21/2020 are showig no growth to date. Repeat blood cultures were sent on 9/23/2020,     OVERNIGHT EVENTS/SUBJECTIVE: no significant events overnight; reports she feels depress from prolonged hospital stay,       DNR [ ]   DNI  [  ]   FULL CODE    Covid - 19 PCR:     The 4Ms    What Matters Most: see GOC  Age appropriate Medications/Screen for High Risk Medication: Yes  Mentation: see CAM below  Mobility: defer to physical exam    The Confusion Assessment Method (CAM) Diagnostic Algorithm     1: Acute Onset or Fluctuating Course  - Is there evidence of an acute change in mental status from the patient’s baseline? Did the (abnormal) behavior  fluctuate during the day, that is, tend to come and go, or increase and decrease in severity?  [ ] YES [X] NO     3: Disorganized thinking  -Was the patient’s thinking disorganized or incoherent, such as rambling or irrelevant conversation, unclear or illogical flow of ideas, or unpredictable switching from subject to subject?  [ ] YES [X ] NO    4: Altered Level of consciousness?  [ ] YES [x ] NO    The diagnosis of delirium by CAM requires the presence of features 1 and 2 and either 3 or 4.    PRESSORS: [ ] YES [ ] NO  caspofungin IVPB      caspofungin IVPB 50 milliGRAM(s) IV Intermittent every 24 hours  linezolid  IVPB      linezolid  IVPB 600 milliGRAM(s) IV Intermittent every 12 hours    Cardiovascular:  Heart Failure  Acute   Acute on Chronic  Chronic       carvedilol 6.25 milliGRAM(s) Oral every 12 hours    Pulmonary:  ALBUTerol    90 MICROgram(s) HFA Inhaler 2 Puff(s) Inhalation every 4 hours  budesonide 160 MICROgram(s)/formoterol 4.5 MICROgram(s) Inhaler 2 Puff(s) Inhalation two times a day  tiotropium 18 MICROgram(s) Capsule 1 Capsule(s) Inhalation daily    Hematalogic:  enoxaparin Injectable 40 milliGRAM(s) SubCutaneous daily    Other:  atorvastatin 40 milliGRAM(s) Oral at bedtime  chlorhexidine 2% Cloths 1 Application(s) Topical <User Schedule>  ergocalciferol 25754 Unit(s) Oral <User Schedule>  FIRST- Mouthwash  BLM 10 milliLiter(s) Swish and Swallow every 8 hours  folic acid 1 milliGRAM(s) Oral daily  gabapentin   Solution 600 milliGRAM(s) Oral every 8 hours  influenza   Vaccine 0.5 milliLiter(s) IntraMuscular once  insulin lispro (HumaLOG) corrective regimen sliding scale   SubCutaneous Before meals and at bedtime  melatonin 3 milliGRAM(s) Oral at bedtime  methylPREDNISolone sodium succinate Injectable 40 milliGRAM(s) IV Push every 8 hours  morphine  - Injectable 2 milliGRAM(s) IV Push every 4 hours PRN  mupirocin 2% Nasal 1 Application(s) Nasal two times a day  nystatin Powder 1 Application(s) Topical two times a day  ondansetron Injectable 4 milliGRAM(s) IV Push every 6 hours PRN  oxycodone    5 mG/acetaminophen 325 mG 2 Tablet(s) Oral every 4 hours PRN  pantoprazole  Injectable 40 milliGRAM(s) IV Push daily  polyethylene glycol 3350 17 Gram(s) Oral two times a day  senna 2 Tablet(s) Oral at bedtime  sodium chloride 0.65% Nasal 1 Spray(s) Both Nostrils four times a day PRN    ALBUTerol    90 MICROgram(s) HFA Inhaler 2 Puff(s) Inhalation every 4 hours  atorvastatin 40 milliGRAM(s) Oral at bedtime  budesonide 160 MICROgram(s)/formoterol 4.5 MICROgram(s) Inhaler 2 Puff(s) Inhalation two times a day  carvedilol 6.25 milliGRAM(s) Oral every 12 hours  caspofungin IVPB      caspofungin IVPB 50 milliGRAM(s) IV Intermittent every 24 hours  chlorhexidine 2% Cloths 1 Application(s) Topical <User Schedule>  enoxaparin Injectable 40 milliGRAM(s) SubCutaneous daily  ergocalciferol 62673 Unit(s) Oral <User Schedule>  FIRST- Mouthwash  BLM 10 milliLiter(s) Swish and Swallow every 8 hours  folic acid 1 milliGRAM(s) Oral daily  gabapentin   Solution 600 milliGRAM(s) Oral every 8 hours  influenza   Vaccine 0.5 milliLiter(s) IntraMuscular once  insulin lispro (HumaLOG) corrective regimen sliding scale   SubCutaneous Before meals and at bedtime  linezolid  IVPB      linezolid  IVPB 600 milliGRAM(s) IV Intermittent every 12 hours  melatonin 3 milliGRAM(s) Oral at bedtime  methylPREDNISolone sodium succinate Injectable 40 milliGRAM(s) IV Push every 8 hours  morphine  - Injectable 2 milliGRAM(s) IV Push every 4 hours PRN  mupirocin 2% Nasal 1 Application(s) Nasal two times a day  nystatin Powder 1 Application(s) Topical two times a day  ondansetron Injectable 4 milliGRAM(s) IV Push every 6 hours PRN  oxycodone    5 mG/acetaminophen 325 mG 2 Tablet(s) Oral every 4 hours PRN  pantoprazole  Injectable 40 milliGRAM(s) IV Push daily  polyethylene glycol 3350 17 Gram(s) Oral two times a day  senna 2 Tablet(s) Oral at bedtime  sodium chloride 0.65% Nasal 1 Spray(s) Both Nostrils four times a day PRN  tiotropium 18 MICROgram(s) Capsule 1 Capsule(s) Inhalation daily    Drug Dosing Weight  Height (cm): 154.9 (19 Sep 2020 08:00)  Weight (kg): 84.8 (21 Sep 2020 09:15)  BMI (kg/m2): 35.3 (21 Sep 2020 09:15)  BSA (m2): 1.84 (21 Sep 2020 09:15)    CENTRAL LINE: [ ] YES [x ] NO  LOCATION:   DATE INSERTED:  REMOVE: [ ] YES [ ] NO  EXPLAIN:    ROBLEDO: [ ] YES [x] NO    DATE INSERTED:  REMOVE:  [ ] YES [ ] NO  EXPLAIN:    PAST MEDICAL & SURGICAL HISTORY:  Malignant neoplasm of unspecified part of right bronchus or lung  HTN (hypertension)  DM (diabetes mellitus)  COPD (chronic obstructive pulmonary disease)  Lung cancer  Morbid obesity  GERD (gastroesophageal reflux disease)  Deviated septum  Prolapsed uterus  ITP (idiopathic thrombocytopenic purpura)  Emphysema/COPD  History of cholecystectomy  S/P lobectomy of lung  right 2017  History of tonsillectomy        PHYSICAL EXAM:    GENERAL: out of bed to chair,  NAD, obese  HEENTAD:  Atraumatic, Normocephalic, pupils equal, round, non-icteric, no exudates  NECK: Supple, No JVD  CHEST/LUNG: scattered wheezing with bibasilar diminished breath sounds; even and unlabored breathing with O2 via NC  HEART: S1S2, RRR  ABDOMEN: obese soft NTND, +BS  : no suprapubic tenderness  EXTREMITIES:  no c/c/e, +distal pedal pulses  NERVOUS SYSTEM:  Alert & Oriented X3, follows commands, moving all extremities  LYMPH: No lymphadenopathy noted  SKIN: BUE ecchymosis otherwise no lesions/ulcers    LABS:  CBC Full  -  ( 26 Sep 2020 06:20 )  WBC Count : 4.80 K/uL  RBC Count : 3.54 M/uL  Hemoglobin : 10.9 g/dL  Hematocrit : 32.2 %  Platelet Count - Automated : 90 K/uL  Mean Cell Volume : 91.0 fl  Mean Cell Hemoglobin : 30.8 pg  Mean Cell Hemoglobin Concentration : 33.9 gm/dL  Auto Neutrophil # : x  Auto Lymphocyte # : x  Auto Monocyte # : x  Auto Eosinophil # : x  Auto Basophil # : x  Auto Neutrophil % : x  Auto Lymphocyte % : x  Auto Monocyte % : x  Auto Eosinophil % : x  Auto Basophil % : x    09-26    136  |  99  |  13  ----------------------------<  337<H>  4.6   |  32<H>  |  0.61    Ca    8.5      26 Sep 2020 06:20  Phos  2.6     09-26  Mg     2.3     09-26    TPro  6.1  /  Alb  2.7<L>  /  TBili  1.1  /  DBili  x   /  AST  11  /  ALT  27  /  AlkPhos  101  09-26    [ x ]  DVT Prophylaxis          Abnormal Nutritional Status -  Morbid Obesity BMI >/=40    RADIOLOGY & ADDITIONAL STUDIES:    < from: Xray Chest 1 View- PORTABLE-Routine (Xray Chest 1 View- PORTABLE-Routine in AM.) (09.23.20 @ 10:09) >    The patient has been extubated.    AP view of the chest is markedly rotated to the left and demonstrates persistent right perihilar infiltrate versus atelectasis. There is mild left perihilar discoid atelectasis.. There is no pleural effusion. There is no pneumothorax.    The heart, mediastinum and galina cannot be assessed due to rotation..    The pulmonary vasculature is normal.    Mild thoracic degenerative changes are present.    IMPRESSION:    Persistent right perihilar infiltrate versus atelectasis. Left midlung discoid atelectasis.    < end of copied text >      Goals of Care Discussion with Family/Proxy/Other   - see note from/family meeting set up for...

## 2020-09-26 NOTE — PROGRESS NOTE ADULT - ASSESSMENT
Problem/Plan    Acute on chronic respiratory failure with hypoxia and hypercapnia due to end stage COPD  Continue oxygen at 4 LPM via nasal cannula. Do not increase.  Continue LABA and ICS  currently on solumedrol 40mg Q 8, will decrease to Q12 hours  refuses to use BIPAP  taper steroid as tolerated     Problem/Plan   septic shock due to VRE Bacteremia  Continue IV antibiotics as per ID  Bld culture 9/23 no growth to date (prelim report)      Problem/Plan   Lung cancer metastatic to bone.  f/b Dr PHILLIPS  Chemo on hold until bacteremia resolved.  recheck blood culture with am labs (9/27)      Problem/Plan   DM (diabetes mellitus).   fingerstick AC/HS with ISS     Problem/Plan    Anemia H&H low but stable  Continue to monitor.      Problem/Plan -   Thrombocytopenia.   Continue to monitor.      Problem/Plan   HTN (hypertension).    Continue current medications  DASH diet.      Problem/Plan    Prophylactic measure   Continue DVT and GI prophylaxis.  Overall prognosis is poor.  Daughter updated.

## 2020-09-27 LAB
ALBUMIN SERPL ELPH-MCNC: 2.6 G/DL — LOW (ref 3.5–5)
ALP SERPL-CCNC: 98 U/L — SIGNIFICANT CHANGE UP (ref 40–120)
ALT FLD-CCNC: 24 U/L DA — SIGNIFICANT CHANGE UP (ref 10–60)
ANION GAP SERPL CALC-SCNC: 5 MMOL/L — SIGNIFICANT CHANGE UP (ref 5–17)
ANISOCYTOSIS BLD QL: SLIGHT — SIGNIFICANT CHANGE UP
AST SERPL-CCNC: 19 U/L — SIGNIFICANT CHANGE UP (ref 10–40)
BASO STIPL BLD QL SMEAR: PRESENT — SIGNIFICANT CHANGE UP
BASOPHILS # BLD AUTO: 0 K/UL — SIGNIFICANT CHANGE UP (ref 0–0.2)
BASOPHILS NFR BLD AUTO: 0 % — SIGNIFICANT CHANGE UP (ref 0–2)
BILIRUB SERPL-MCNC: 0.9 MG/DL — SIGNIFICANT CHANGE UP (ref 0.2–1.2)
BUN SERPL-MCNC: 11 MG/DL — SIGNIFICANT CHANGE UP (ref 7–18)
CALCIUM SERPL-MCNC: 8.3 MG/DL — LOW (ref 8.4–10.5)
CHLORIDE SERPL-SCNC: 96 MMOL/L — SIGNIFICANT CHANGE UP (ref 96–108)
CO2 SERPL-SCNC: 35 MMOL/L — HIGH (ref 22–31)
CREAT SERPL-MCNC: 0.45 MG/DL — LOW (ref 0.5–1.3)
EOSINOPHIL # BLD AUTO: 0 K/UL — SIGNIFICANT CHANGE UP (ref 0–0.5)
EOSINOPHIL NFR BLD AUTO: 0 % — SIGNIFICANT CHANGE UP (ref 0–6)
GIANT PLATELETS BLD QL SMEAR: PRESENT — SIGNIFICANT CHANGE UP
GLUCOSE BLDC GLUCOMTR-MCNC: 272 MG/DL — HIGH (ref 70–99)
GLUCOSE BLDC GLUCOMTR-MCNC: 301 MG/DL — HIGH (ref 70–99)
GLUCOSE BLDC GLUCOMTR-MCNC: 338 MG/DL — HIGH (ref 70–99)
GLUCOSE BLDC GLUCOMTR-MCNC: 402 MG/DL — HIGH (ref 70–99)
GLUCOSE SERPL-MCNC: 308 MG/DL — HIGH (ref 70–99)
HCT VFR BLD CALC: 28.5 % — LOW (ref 34.5–45)
HGB BLD-MCNC: 9.7 G/DL — LOW (ref 11.5–15.5)
LYMPHOCYTES # BLD AUTO: 0.12 K/UL — LOW (ref 1–3.3)
LYMPHOCYTES # BLD AUTO: 4 % — LOW (ref 13–44)
MACROCYTES BLD QL: SLIGHT — SIGNIFICANT CHANGE UP
MANUAL SMEAR VERIFICATION: SIGNIFICANT CHANGE UP
MCHC RBC-ENTMCNC: 30.2 PG — SIGNIFICANT CHANGE UP (ref 27–34)
MCHC RBC-ENTMCNC: 34 GM/DL — SIGNIFICANT CHANGE UP (ref 32–36)
MCV RBC AUTO: 88.8 FL — SIGNIFICANT CHANGE UP (ref 80–100)
MICROCYTES BLD QL: SLIGHT — SIGNIFICANT CHANGE UP
MONOCYTES # BLD AUTO: 0.09 K/UL — SIGNIFICANT CHANGE UP (ref 0–0.9)
MONOCYTES NFR BLD AUTO: 3 % — SIGNIFICANT CHANGE UP (ref 2–14)
NEUTROPHILS # BLD AUTO: 2.74 K/UL — SIGNIFICANT CHANGE UP (ref 1.8–7.4)
NEUTROPHILS NFR BLD AUTO: 93 % — HIGH (ref 43–77)
NRBC # BLD: 3 /100 — HIGH (ref 0–0)
OVALOCYTES BLD QL SMEAR: SLIGHT — SIGNIFICANT CHANGE UP
PLAT MORPH BLD: NORMAL — SIGNIFICANT CHANGE UP
PLATELET # BLD AUTO: 56 K/UL — LOW (ref 150–400)
POIKILOCYTOSIS BLD QL AUTO: SLIGHT — SIGNIFICANT CHANGE UP
POLYCHROMASIA BLD QL SMEAR: SLIGHT — SIGNIFICANT CHANGE UP
POTASSIUM SERPL-MCNC: 4.2 MMOL/L — SIGNIFICANT CHANGE UP (ref 3.5–5.3)
POTASSIUM SERPL-SCNC: 4.2 MMOL/L — SIGNIFICANT CHANGE UP (ref 3.5–5.3)
PROT SERPL-MCNC: 5.8 G/DL — LOW (ref 6–8.3)
RBC # BLD: 3.21 M/UL — LOW (ref 3.8–5.2)
RBC # FLD: 15.4 % — HIGH (ref 10.3–14.5)
RBC BLD AUTO: ABNORMAL
SMUDGE CELLS # BLD: PRESENT — SIGNIFICANT CHANGE UP
SODIUM SERPL-SCNC: 136 MMOL/L — SIGNIFICANT CHANGE UP (ref 135–145)
SPHEROCYTES BLD QL SMEAR: SLIGHT — SIGNIFICANT CHANGE UP
WBC # BLD: 2.95 K/UL — LOW (ref 3.8–10.5)
WBC # FLD AUTO: 2.95 K/UL — LOW (ref 3.8–10.5)

## 2020-09-27 RX ORDER — INSULIN GLARGINE 100 [IU]/ML
20 INJECTION, SOLUTION SUBCUTANEOUS EVERY MORNING
Refills: 0 | Status: DISCONTINUED | OUTPATIENT
Start: 2020-09-27 | End: 2020-09-28

## 2020-09-27 RX ORDER — ALBUTEROL 90 UG/1
2.5 AEROSOL, METERED ORAL ONCE
Refills: 0 | Status: COMPLETED | OUTPATIENT
Start: 2020-09-27 | End: 2020-09-27

## 2020-09-27 RX ORDER — OXYCODONE AND ACETAMINOPHEN 5; 325 MG/1; MG/1
2 TABLET ORAL EVERY 6 HOURS
Refills: 0 | Status: DISCONTINUED | OUTPATIENT
Start: 2020-09-27 | End: 2020-10-02

## 2020-09-27 RX ORDER — INSULIN LISPRO 100/ML
VIAL (ML) SUBCUTANEOUS
Refills: 0 | Status: DISCONTINUED | OUTPATIENT
Start: 2020-09-27 | End: 2020-10-06

## 2020-09-27 RX ORDER — INSULIN LISPRO 100/ML
8 VIAL (ML) SUBCUTANEOUS
Refills: 0 | Status: DISCONTINUED | OUTPATIENT
Start: 2020-09-27 | End: 2020-09-28

## 2020-09-27 RX ADMIN — MUPIROCIN 1 APPLICATION(S): 20 OINTMENT TOPICAL at 05:13

## 2020-09-27 RX ADMIN — CARVEDILOL PHOSPHATE 6.25 MILLIGRAM(S): 80 CAPSULE, EXTENDED RELEASE ORAL at 17:13

## 2020-09-27 RX ADMIN — BUDESONIDE AND FORMOTEROL FUMARATE DIHYDRATE 2 PUFF(S): 160; 4.5 AEROSOL RESPIRATORY (INHALATION) at 21:14

## 2020-09-27 RX ADMIN — ALBUTEROL 2 PUFF(S): 90 AEROSOL, METERED ORAL at 17:14

## 2020-09-27 RX ADMIN — Medication 100 MILLIGRAM(S): at 11:43

## 2020-09-27 RX ADMIN — ALBUTEROL 2.5 MILLIGRAM(S): 90 AEROSOL, METERED ORAL at 20:31

## 2020-09-27 RX ADMIN — MUPIROCIN 1 APPLICATION(S): 20 OINTMENT TOPICAL at 17:13

## 2020-09-27 RX ADMIN — DIPHENHYDRAMINE HYDROCHLORIDE AND LIDOCAINE HYDROCHLORIDE AND ALUMINUM HYDROXIDE AND MAGNESIUM HYDRO 10 MILLILITER(S): KIT at 21:30

## 2020-09-27 RX ADMIN — INSULIN GLARGINE 20 UNIT(S): 100 INJECTION, SOLUTION SUBCUTANEOUS at 12:21

## 2020-09-27 RX ADMIN — ATORVASTATIN CALCIUM 40 MILLIGRAM(S): 80 TABLET, FILM COATED ORAL at 21:17

## 2020-09-27 RX ADMIN — GABAPENTIN 600 MILLIGRAM(S): 400 CAPSULE ORAL at 14:04

## 2020-09-27 RX ADMIN — OXYCODONE AND ACETAMINOPHEN 2 TABLET(S): 5; 325 TABLET ORAL at 21:55

## 2020-09-27 RX ADMIN — PANTOPRAZOLE SODIUM 40 MILLIGRAM(S): 20 TABLET, DELAYED RELEASE ORAL at 11:41

## 2020-09-27 RX ADMIN — SENNA PLUS 2 TABLET(S): 8.6 TABLET ORAL at 21:15

## 2020-09-27 RX ADMIN — GABAPENTIN 600 MILLIGRAM(S): 400 CAPSULE ORAL at 21:30

## 2020-09-27 RX ADMIN — Medication 1 MILLIGRAM(S): at 11:41

## 2020-09-27 RX ADMIN — Medication 6: at 11:37

## 2020-09-27 RX ADMIN — CASPOFUNGIN ACETATE 260 MILLIGRAM(S): 7 INJECTION, POWDER, LYOPHILIZED, FOR SOLUTION INTRAVENOUS at 15:31

## 2020-09-27 RX ADMIN — Medication 100 MILLIGRAM(S): at 21:17

## 2020-09-27 RX ADMIN — Medication 40 MILLIGRAM(S): at 05:12

## 2020-09-27 RX ADMIN — Medication 4: at 07:57

## 2020-09-27 RX ADMIN — LINEZOLID 300 MILLIGRAM(S): 600 INJECTION, SOLUTION INTRAVENOUS at 05:18

## 2020-09-27 RX ADMIN — ALBUTEROL 2 PUFF(S): 90 AEROSOL, METERED ORAL at 02:07

## 2020-09-27 RX ADMIN — ENOXAPARIN SODIUM 40 MILLIGRAM(S): 100 INJECTION SUBCUTANEOUS at 11:40

## 2020-09-27 RX ADMIN — Medication 8 UNIT(S): at 17:13

## 2020-09-27 RX ADMIN — ALBUTEROL 2 PUFF(S): 90 AEROSOL, METERED ORAL at 21:14

## 2020-09-27 RX ADMIN — BUDESONIDE AND FORMOTEROL FUMARATE DIHYDRATE 2 PUFF(S): 160; 4.5 AEROSOL RESPIRATORY (INHALATION) at 10:36

## 2020-09-27 RX ADMIN — POLYETHYLENE GLYCOL 3350 17 GRAM(S): 17 POWDER, FOR SOLUTION ORAL at 17:13

## 2020-09-27 RX ADMIN — Medication 6: at 17:13

## 2020-09-27 RX ADMIN — GABAPENTIN 600 MILLIGRAM(S): 400 CAPSULE ORAL at 05:37

## 2020-09-27 RX ADMIN — DIPHENHYDRAMINE HYDROCHLORIDE AND LIDOCAINE HYDROCHLORIDE AND ALUMINUM HYDROXIDE AND MAGNESIUM HYDRO 10 MILLILITER(S): KIT at 14:05

## 2020-09-27 RX ADMIN — ALBUTEROL 2 PUFF(S): 90 AEROSOL, METERED ORAL at 05:12

## 2020-09-27 RX ADMIN — Medication 40 MILLIGRAM(S): at 17:13

## 2020-09-27 RX ADMIN — NYSTATIN CREAM 1 APPLICATION(S): 100000 CREAM TOPICAL at 17:14

## 2020-09-27 RX ADMIN — LINEZOLID 300 MILLIGRAM(S): 600 INJECTION, SOLUTION INTRAVENOUS at 17:13

## 2020-09-27 RX ADMIN — OXYCODONE AND ACETAMINOPHEN 2 TABLET(S): 5; 325 TABLET ORAL at 22:56

## 2020-09-27 RX ADMIN — ALBUTEROL 2 PUFF(S): 90 AEROSOL, METERED ORAL at 14:05

## 2020-09-27 RX ADMIN — CARVEDILOL PHOSPHATE 6.25 MILLIGRAM(S): 80 CAPSULE, EXTENDED RELEASE ORAL at 05:12

## 2020-09-27 RX ADMIN — DIPHENHYDRAMINE HYDROCHLORIDE AND LIDOCAINE HYDROCHLORIDE AND ALUMINUM HYDROXIDE AND MAGNESIUM HYDRO 10 MILLILITER(S): KIT at 05:24

## 2020-09-27 RX ADMIN — ALBUTEROL 2 PUFF(S): 90 AEROSOL, METERED ORAL at 10:37

## 2020-09-27 RX ADMIN — TIOTROPIUM BROMIDE 1 CAPSULE(S): 18 CAPSULE ORAL; RESPIRATORY (INHALATION) at 11:41

## 2020-09-27 NOTE — CHART NOTE - NSCHARTNOTEFT_GEN_A_CORE
EVENT:   - Brief HPI: HPI 66 yo obese woman with hx of 80 pack year tobacco use, HTN, DM2 on insulin, COPD on 5L (frequent admissions), R lung cancer s/p resection (2017) on chemo q 3 weeks with concern for mets to spine and compression fractures, recent admission for COPD exacerbation earlier in August who on 9/19/2020 presented to ED with c/o SOB, hypoxia and increased sputum production x 1 day.    - Received phone call from RN that pt is wheezing. Pt is on standing Albuterol HFA inhaler puffs Q4hrs & it's not due until 10PM. Upon assessment, pt is wheezing on jeniffer Lower lobes.      OBJECTIVE:  Vital Signs Last 24 Hrs  T(C): 36.6 (27 Sep 2020 20:12), Max: 36.8 (27 Sep 2020 14:03)  T(F): 97.9 (27 Sep 2020 20:12), Max: 98.3 (27 Sep 2020 14:03)  HR: 98 (27 Sep 2020 20:12) (91 - 108)  BP: 128/82 (27 Sep 2020 20:12) (128/82 - 164/95)  BP(mean): 108 (27 Sep 2020 14:03) (108 - 108)  RR: 18 (27 Sep 2020 20:12) (16 - 18)  SpO2: 100% (27 Sep 2020 20:12) (95% - 100%)    FOCUSED PHYSICAL EXAM:  Neuro: awake, alert, oriented x 3. No neuro deficit  Cardiovascular: Pulses +2 B/L in lower and upper extremities, HR regular, BP stable, No edema.  Respiratory: Respirations regular,  B/L lower lobes wheezing +.  GI: Abdomen soft, non-tender, positive bowel sounds.  : no bladder distention noted. No complaints at this time.  Skin: Dry, intact, no bruising, no diaphoresis.    LABS:                        9.7    2.95  )-----------( 56       ( 27 Sep 2020 07:39 )             28.5     09-27    136  |  96  |  11  ----------------------------<  308<H>  4.2   |  35<H>  |  0.45<L>    Ca    8.3<L>      27 Sep 2020 07:39  Phos  2.6     09-26  Mg     2.3     09-26    TPro  5.8<L>  /  Alb  2.6<L>  /  TBili  0.9  /  DBili  x   /  AST  19  /  ALT  24  /  AlkPhos  98  09-27      EKG:   IMAGING:    ASSESSMENT/Problem: Wheezing 2/2 to COPD exacerbation      PLAN:   1. Albuterol nebulizer 2.5mg x 1 dose ordered  2. Cont Albuterol MDI 2 puffs, Q4hrs as ordered  3. Monitor effectiveness of Albuterol Nebulizer Rx  4. Cont present care/treatment

## 2020-09-27 NOTE — CONSULT NOTE ADULT - SUBJECTIVE AND OBJECTIVE BOX
Patient is a 65y old  Female who presents with a chief complaint of shortness of breath (27 Sep 2020 10:25)      HPI:  64 yo obese F former heavy smoker (80 pack years) with, HTN, DM2 on insulin, COPD on 5L (frequent admissions), R lung cancer s/p resection (2017) on chemo q 3 weeks with concern for mets to spine and compression fractures, recent admission for COPD exacerbation earlier in August presents with SOB, hypoxia and increased sputum production x 1 day. Patient states cough feels like secretion is coming out but unable to expectorate. Patient is on 5L of oxygen at home. As per EMS, patient is hypoxic at 84% on room air which came up to 98% with 4L of nasal canula. Patient was recently in august treated with prednisone, nebulizer treatments and completed a course of azithromycin for copd exacerbation. Denies nausea, vomiting, diarrhea, abdominal pain, fever, chills.   On 9/21/2020. Patient was hypoxic, tachycardiac and tachypneic. Rapid response was called. RRT team arrived on scene. Patient has encephalopathy from hypoxia with wheezing and stridor on examination. Patient was tachycardiac to 140/min. Patient was hypoxic and started on non-rebreather but patient has increased work of breathing. Decision was made to intubate the patient. Patient become hypotensive post intubation and was given LR bolus and started on peripheral phenylephrine.    In ICU patient was awake and alert following commands and using a talking board and pen and paper to communicate. She was started on Precedex and Fentanyl to sedate the patient.   Patient was extubated on 9/22/20. Saturating well on NC. Patient was titrated off the Precedex and Fentanyl by 9/23/20. She has anxiety and when anxious she desaturates. on 9/23/2020 patient was started on Bipab after an episode of anxiety and desaturation and tolerated it. For now patient is using bipap as needed.     Blood cultures from 9/19/2020 grew VRE and yeast like cells. Patient's PICC line on Left arm was removed on 9/21/2020 as possible source of infection. Patient was started on Zyvox on 9/21 and Caspofungin on 9/23 after the results came back. Repeat blood cultures from 9/21/2020 are showig no growth to date. Repeat blood cultures were sent on 9/23/2020,   endocrinology consulted for glycemic management  home regimen includes   basaglar 60 units daily, novolog 15 units pre meals  tolerating dysphagia diet at present  remains on high dose standing steroids  poc glucose uncontrolled      PAST MEDICAL & SURGICAL HISTORY:  Malignant neoplasm of unspecified part of right bronchus or lung    HTN (hypertension)    DM (diabetes mellitus)    COPD (chronic obstructive pulmonary disease)    Lung cancer    Morbid obesity    GERD (gastroesophageal reflux disease)    Deviated septum    Prolapsed uterus    ITP (idiopathic thrombocytopenic purpura)    Emphysema/COPD    History of cholecystectomy    S/P lobectomy of lung  right 2017    History of tonsillectomy           MEDICATIONS  (STANDING):  ALBUTerol    90 MICROgram(s) HFA Inhaler 2 Puff(s) Inhalation every 4 hours  atorvastatin 40 milliGRAM(s) Oral at bedtime  budesonide 160 MICROgram(s)/formoterol 4.5 MICROgram(s) Inhaler 2 Puff(s) Inhalation two times a day  carvedilol 6.25 milliGRAM(s) Oral every 12 hours  caspofungin IVPB      caspofungin IVPB 50 milliGRAM(s) IV Intermittent every 24 hours  chlorhexidine 2% Cloths 1 Application(s) Topical <User Schedule>  enoxaparin Injectable 40 milliGRAM(s) SubCutaneous daily  ergocalciferol 25151 Unit(s) Oral <User Schedule>  FIRST- Mouthwash  BLM 10 milliLiter(s) Swish and Swallow every 8 hours  folic acid 1 milliGRAM(s) Oral daily  gabapentin   Solution 600 milliGRAM(s) Oral every 8 hours  influenza   Vaccine 0.5 milliLiter(s) IntraMuscular once  insulin lispro (HumaLOG) corrective regimen sliding scale   SubCutaneous Before meals and at bedtime  linezolid  IVPB      linezolid  IVPB 600 milliGRAM(s) IV Intermittent every 12 hours  melatonin 3 milliGRAM(s) Oral at bedtime  methylPREDNISolone sodium succinate Injectable 40 milliGRAM(s) IV Push every 12 hours  mupirocin 2% Nasal 1 Application(s) Nasal two times a day  nystatin Powder 1 Application(s) Topical two times a day  pantoprazole  Injectable 40 milliGRAM(s) IV Push daily  polyethylene glycol 3350 17 Gram(s) Oral two times a day  senna 2 Tablet(s) Oral at bedtime  tiotropium 18 MICROgram(s) Capsule 1 Capsule(s) Inhalation daily    MEDICATIONS  (PRN):  guaiFENesin   Syrup  (Sugar-Free) 100 milliGRAM(s) Oral every 6 hours PRN congestion  morphine  - Injectable 2 milliGRAM(s) IV Push every 4 hours PRN Severe Pain (7 - 10)  ondansetron Injectable 4 milliGRAM(s) IV Push every 6 hours PRN Nausea and/or Vomiting  sodium chloride 0.65% Nasal 1 Spray(s) Both Nostrils four times a day PRN Nasal Congestion      FAMILY HISTORY:  FH: lung cancer    Family history of stroke    Family history of lung cancer    Diabetes mellitus        SOCIAL HISTORY:      REVIEW OF SYSTEMS:  CONSTITUTIONAL:  fatigue  EYES: No eye pain, visual disturbances, or discharge  ENT:  No difficulty hearing  NECK: No pain or stiffness  RESPIRATORY: cough, shortness of breath  CARDIOVASCULAR: No chest pain  GASTROINTESTINAL: No abdominal or epigastric pain.  GENITOURINARY: frequency, thirst  NEUROLOGICAL: No headaches, memory loss, loss of strength, numbness, or tremors  SKIN: No itching, burning, rashes, or lesions   LYMPH Nodes: No enlarged glands  ENDOCRINE: No heat or cold intolerance; No hair loss  MUSCULOSKELETAL: No joint pain or swelling; No muscle, back, or extremity pain  PSYCHIATRIC: No depression, anxiety, mood swings, or difficulty sleeping  HEME/LYMPH: No easy bruising, or bleeding gums  ALLERGY AND IMMUNOLOGIC: No hives or eczema	        Vital Signs Last 24 Hrs  T(C): 36.3 (27 Sep 2020 05:46), Max: 36.7 (26 Sep 2020 21:21)  T(F): 97.4 (27 Sep 2020 05:46), Max: 98 (26 Sep 2020 21:21)  HR: 91 (27 Sep 2020 05:46) (91 - 107)  BP: 153/93 (27 Sep 2020 05:46) (150/90 - 154/88)  BP(mean): --  RR: 18 (27 Sep 2020 05:46) (17 - 18)  SpO2: 97% (27 Sep 2020 05:46) (97% - 100%)      Constitutional:    NC/AT:    HEENT:    Neck:  No JVD    Respiratory:  reduced breath sounds b/l bases    Cardiovascular:  RR    Gastrointestinal: Soft    Extremities: without cyanosis  Neurological:  non focal      LABS:                        9.7    2.95  )-----------( 56       ( 27 Sep 2020 07:39 )             28.5     09-27    136  |  96  |  11  ----------------------------<  308<H>  4.2   |  35<H>  |  0.45<L>    Ca    8.3<L>      27 Sep 2020 07:39  Phos  2.6     09-26  Mg     2.3     09-26    TPro  5.8<L>  /  Alb  2.6<L>  /  TBili  0.9  /  DBili  x   /  AST  19  /  ALT  24  /  AlkPhos  98  09-27            CAPILLARY BLOOD GLUCOSE      POCT Blood Glucose.: 338 mg/dL (27 Sep 2020 07:53)  POCT Blood Glucose.: 402 mg/dL (26 Sep 2020 21:53)  POCT Blood Glucose.: 451 mg/dL (26 Sep 2020 16:38)  POCT Blood Glucose.: 444 mg/dL (26 Sep 2020 11:34)      RADIOLOGY & ADDITIONAL STUDIES:      64 yo female with multiple comorbidities including h/o DM type 2, HTN, COPD on o2, lung ca on right sided s/p resection on chemo admitted with shortness of breath  was intubated/extubated for acute resp failure this admit, now extubated on general medical floor, standing high dose steroids with uncontrolled hyperglycemia    endocrinology consulted for glycemic management    DM type 2  uncontrolled  complicated by high dose steroid use, acute on chronic resp failure  home regimen:  basaglar 60 units daily  novolog 15 units pre meals    recommendations:  steroids would cause mainly prandial hyperglycemia  - start insulin lantus 20 units daily in am, first dose now 9/27/20  - start insulin lispro 8 units pre meals  - increase to moderate dose sliding scale  - reassess based on requirements  - goal inpatient glucose range: 140-180mg/dl    COPD exacerbation  acute on chronic hypoxic resp failure  s/p intubation/extubation current admit  on standing steroids  pulmonary on board    sepsis  VRE bacteremia  received zosyn  ID on board    Discussed with patient and primary team   Please call  Endocrine- Dr Katlyn Garcia as needed

## 2020-09-27 NOTE — PROGRESS NOTE ADULT - SUBJECTIVE AND OBJECTIVE BOX
BARB COLÓN    SCU progress note    HPI 64 yo obese Woman with hx of 80 pack year tobacco use, HTN, DM2 on insulin, COPD on 5L (frequent admissions), R lung cancer s/p resection (2017) on chemo q 3 weeks with concern for mets to spine and compression fractures, recent admission for COPD exacerbation earlier in August who on 9/19/2020 presented to ED with c/o SOB, hypoxia and increased sputum production x 1 day.    INTERVAL HX: On 9/21/2020. Patient was hypoxic, tachycardiac and tachypneic. Rapid response was called. RRT team arrived on scene. Patient has encephalopathy from hypoxia with wheezing and stridor on examination. Patient was tachycardiac to 140/min. Patient was hypoxic and started on non-rebreather but patient has increased work of breathing. Decision was made to intubate the patient. Patient become hypotensive post intubation and was given LR bolus and started on peripheral phenylephrine.    In ICU patient was awake and alert following commands and using a talking board and pen and paper to communicate. She was started on Precedex and Fentanyl to sedate the patient.   Patient was extubated on 9/22/20. Saturating well on NC. Patient was titrated off the Precedex and Fentanyl by 9/23/20. She has anxiety and when anxious she desaturates. on 9/23/2020 patient was started on Bipab after an episode of anxiety and desaturation and tolerated it. For now patient is using bipap as needed.     Blood cultures from 9/19/2020 grew VRE and yeast like cells. Patient's PICC line on Left arm was removed on 9/21/2020 as possible source of infection. Patient was started on Zyvox on 9/21 and Caspofungin on 9/23 after the results came back. Repeat blood cultures from 9/21/2020 are showig no growth to date. Repeat blood cultures were sent on 9/23/2020,     OVERNIGHT EVENTS/SUBJECTIVE: no significant events overnight; reports she feels depress from prolonged hospital stay,       DNR [ ]   DNI  [  ]   FULL CODE    Covid - 19 PCR:     The 4Ms    What Matters Most: see GOC  Age appropriate Medications/Screen for High Risk Medication: Yes  Mentation: see CAM below  Mobility: defer to physical exam    The Confusion Assessment Method (CAM) Diagnostic Algorithm     1: Acute Onset or Fluctuating Course  - Is there evidence of an acute change in mental status from the patient’s baseline? Did the (abnormal) behavior  fluctuate during the day, that is, tend to come and go, or increase and decrease in severity?  [ ] YES [X] NO     3: Disorganized thinking  -Was the patient’s thinking disorganized or incoherent, such as rambling or irrelevant conversation, unclear or illogical flow of ideas, or unpredictable switching from subject to subject?  [ ] YES [X ] NO    4: Altered Level of consciousness?  [ ] YES [x ] NO    The diagnosis of delirium by CAM requires the presence of features 1 and 2 and either 3 or 4.    PRESSORS: [ ] YES [x ] NO  caspofungin IVPB      caspofungin IVPB 50 milliGRAM(s) IV Intermittent every 24 hours  linezolid  IVPB      linezolid  IVPB 600 milliGRAM(s) IV Intermittent every 12 hours    Cardiovascular:  Heart Failure  Acute   Acute on Chronic  Chronic       carvedilol 6.25 milliGRAM(s) Oral every 12 hours    Pulmonary:  ALBUTerol    90 MICROgram(s) HFA Inhaler 2 Puff(s) Inhalation every 4 hours  budesonide 160 MICROgram(s)/formoterol 4.5 MICROgram(s) Inhaler 2 Puff(s) Inhalation two times a day  tiotropium 18 MICROgram(s) Capsule 1 Capsule(s) Inhalation daily    Hematalogic:  enoxaparin Injectable 40 milliGRAM(s) SubCutaneous daily    Other:  atorvastatin 40 milliGRAM(s) Oral at bedtime  chlorhexidine 2% Cloths 1 Application(s) Topical <User Schedule>  ergocalciferol 00314 Unit(s) Oral <User Schedule>  FIRST- Mouthwash  BLM 10 milliLiter(s) Swish and Swallow every 8 hours  folic acid 1 milliGRAM(s) Oral daily  gabapentin   Solution 600 milliGRAM(s) Oral every 8 hours  influenza   Vaccine 0.5 milliLiter(s) IntraMuscular once  insulin lispro (HumaLOG) corrective regimen sliding scale   SubCutaneous Before meals and at bedtime  melatonin 3 milliGRAM(s) Oral at bedtime  methylPREDNISolone sodium succinate Injectable 40 milliGRAM(s) IV Push every 8 hours  morphine  - Injectable 2 milliGRAM(s) IV Push every 4 hours PRN  mupirocin 2% Nasal 1 Application(s) Nasal two times a day  nystatin Powder 1 Application(s) Topical two times a day  ondansetron Injectable 4 milliGRAM(s) IV Push every 6 hours PRN  oxycodone    5 mG/acetaminophen 325 mG 2 Tablet(s) Oral every 4 hours PRN  pantoprazole  Injectable 40 milliGRAM(s) IV Push daily  polyethylene glycol 3350 17 Gram(s) Oral two times a day  senna 2 Tablet(s) Oral at bedtime  sodium chloride 0.65% Nasal 1 Spray(s) Both Nostrils four times a day PRN    ALBUTerol    90 MICROgram(s) HFA Inhaler 2 Puff(s) Inhalation every 4 hours  atorvastatin 40 milliGRAM(s) Oral at bedtime  budesonide 160 MICROgram(s)/formoterol 4.5 MICROgram(s) Inhaler 2 Puff(s) Inhalation two times a day  carvedilol 6.25 milliGRAM(s) Oral every 12 hours  caspofungin IVPB      caspofungin IVPB 50 milliGRAM(s) IV Intermittent every 24 hours  chlorhexidine 2% Cloths 1 Application(s) Topical <User Schedule>  enoxaparin Injectable 40 milliGRAM(s) SubCutaneous daily  ergocalciferol 76714 Unit(s) Oral <User Schedule>  FIRST- Mouthwash  BLM 10 milliLiter(s) Swish and Swallow every 8 hours  folic acid 1 milliGRAM(s) Oral daily  gabapentin   Solution 600 milliGRAM(s) Oral every 8 hours  influenza   Vaccine 0.5 milliLiter(s) IntraMuscular once  insulin lispro (HumaLOG) corrective regimen sliding scale   SubCutaneous Before meals and at bedtime  linezolid  IVPB      linezolid  IVPB 600 milliGRAM(s) IV Intermittent every 12 hours  melatonin 3 milliGRAM(s) Oral at bedtime  methylPREDNISolone sodium succinate Injectable 40 milliGRAM(s) IV Push every 8 hours  morphine  - Injectable 2 milliGRAM(s) IV Push every 4 hours PRN  mupirocin 2% Nasal 1 Application(s) Nasal two times a day  nystatin Powder 1 Application(s) Topical two times a day  ondansetron Injectable 4 milliGRAM(s) IV Push every 6 hours PRN  oxycodone    5 mG/acetaminophen 325 mG 2 Tablet(s) Oral every 4 hours PRN  pantoprazole  Injectable 40 milliGRAM(s) IV Push daily  polyethylene glycol 3350 17 Gram(s) Oral two times a day  senna 2 Tablet(s) Oral at bedtime  sodium chloride 0.65% Nasal 1 Spray(s) Both Nostrils four times a day PRN  tiotropium 18 MICROgram(s) Capsule 1 Capsule(s) Inhalation daily    Drug Dosing Weight  Height (cm): 154.9 (19 Sep 2020 08:00)  Weight (kg): 84.8 (21 Sep 2020 09:15)  BMI (kg/m2): 35.3 (21 Sep 2020 09:15)  BSA (m2): 1.84 (21 Sep 2020 09:15)    CENTRAL LINE: [ ] YES [x ] NO  LOCATION:   DATE INSERTED:  REMOVE: [ ] YES [ ] NO  EXPLAIN:    ROBLEDO: [ ] YES [x] NO    DATE INSERTED:  REMOVE:  [ ] YES [ ] NO  EXPLAIN:    PAST MEDICAL & SURGICAL HISTORY:  Malignant neoplasm of unspecified part of right bronchus or lung  HTN (hypertension)  DM (diabetes mellitus)  COPD (chronic obstructive pulmonary disease)  Lung cancer  Morbid obesity  GERD (gastroesophageal reflux disease)  Deviated septum  Prolapsed uterus  ITP (idiopathic thrombocytopenic purpura)  Emphysema/COPD  History of cholecystectomy  S/P lobectomy of lung  right 2017  History of tonsillectomy        PHYSICAL EXAM:    GENERAL: out of bed to chair,  NAD, obese  HEENTAD:  Atraumatic, Normocephalic, pupils equal, round, non-icteric, no exudates  NECK: Supple, No JVD  CHEST/LUNG: scattered wheezing with bibasilar diminished breath sounds; even and unlabored breathing with O2 via NC + cough - no sputum  HEART: S1S2, RRR  ABDOMEN: obese soft NTND, +BS  : no suprapubic tenderness  EXTREMITIES:  no c/c/e, +distal pedal pulses + back pain  NERVOUS SYSTEM:  Alert & Oriented X3, follows commands, moving all extremities  LYMPH: No lymphadenopathy noted  SKIN: BUE ecchymosis otherwise no lesions/ulcers  Complete Blood Count + Automated Diff (09.27.20 @ 07:39)    WBC Count: 2.95 K/uL    RBC Count: 3.21 M/uL    Hemoglobin: 9.7 g/dL    Hematocrit: 28.5 %    Mean Cell Volume: 88.8 fl    Mean Cell Hemoglobin: 30.2 pg    Mean Cell Hemoglobin Conc: 34.0 gm/dL    Red Cell Distrib Width: 15.4 %    Platelet Count - Automated: 56: Specimen integrity verified. K/uL    Auto Neutrophil #: 2.74 K/uL    Auto Lymphocyte #: 0.12 K/uL    Auto Monocyte #: 0.09 K/uL    Auto Eosinophil #: 0.00 K/uL    Auto Basophil #: 0.00 K/uL    Auto Neutrophil %: 93.0: Differential percentages must be correlated with absolute numbers for  clinical significance. %    Auto Lymphocyte %: 4.0 %    Auto Monocyte %: 3.0 %    Auto Eosinophil %: 0.0 %    Auto Basophil %: 0.0 %    Basic Metabolic Panel (09.21.20 @ 06:05)    Sodium, Serum: 132 mmol/L    Potassium, Serum: 4.8: Specimen is moderately hemolyzed, results may be affected. mmol/L    Chloride, Serum: 93 mmol/L    Carbon Dioxide, Serum: 37 mmol/L    Anion Gap, Serum: 2 mmol/L    Blood Urea Nitrogen, Serum: 4 mg/dL    Creatinine, Serum: 0.45 mg/dL    Glucose, Serum: 75 mg/dL    Calcium, Total Serum: 8.2 mg/dL    eGFR if Non : 106: Interpretative comment  The units for eGFR are mL/min/1.73M2 (normalized body surface area). The  eGFR is calculated from a serum creatinine using the CKD-EPI equation.  Other variables required for calculation are race, age and sex. Among  patients with chronic kidney disease (CKD), the eGFR is useful in  determining the stage of disease according to KDOQI CKD classification.  All eGFR results are reported numerically with the following  interpretation.          GFR                    With                 Without     (ml/min/1.73 m2)    Kidney Damage       Kidney Damage        >= 90                    Stage 1                     Normal        60-89                    Stage 2                     Decreased GFR        30-59     Stage 3                     Stage 3        15-29                    Stage 4                     Stage 4        < 15                      Stage 5                     Stage 5  Each stage of CKD assumes that the associated GFR level has been in  effect for at least 3 months. Determination of stages one and two (with  eGFR > 59 ml/min/m2) requires estimation of kidney damage for at least 3  months as defined by structural or functional abnormalities.  Limitations: All estimates of GFR will be less accurate for patients at  extremes of muscle mass (including but not limited to frail elderly,  critically ill, or cancer patients), those with unusual diets, and those  with conditions associated with reduced secretion or extrarenal  elimination of creatinine. The eGFR equation is not recommended for use  in patients with unstable creatinine levels. mL/min/1.73M2    eGFR if African American: 123 mL/min/1.73M2    [ x ]  DVT Prophylaxis          Abnormal Nutritional Status -  Morbid Obesity BMI >/=40    RADIOLOGY & ADDITIONAL STUDIES:    < from: Xray Chest 1 View- PORTABLE-Routine (Xray Chest 1 View- PORTABLE-Routine in AM.) (09.23.20 @ 10:09) >    The patient has been extubated.    AP view of the chest is markedly rotated to the left and demonstrates persistent right perihilar infiltrate versus atelectasis. There is mild left perihilar discoid atelectasis.. There is no pleural effusion. There is no pneumothorax.    The heart, mediastinum and galina cannot be assessed due to rotation..    The pulmonary vasculature is normal.    Mild thoracic degenerative changes are present.    IMPRESSION:    Persistent right perihilar infiltrate versus atelectasis. Left midlung discoid atelectasis.    < end of copied text >      Goals of Care Discussion with Family/Proxy/Other   - see note from/family meeting set up for...

## 2020-09-27 NOTE — PROGRESS NOTE ADULT - ATTENDING COMMENTS
Acute on chronic respiratory failure with hypoxia and hypercapnia due to end stage COPD  Continue oxygen at 4 LPM via nasal cannula. Do not increase.  Continue LABA and ICS  currently on solumedrol 40mg Q 8, will decrease to Q12 hours  refuses to use BIPAP  taper steroid as tolerated     Problem/Plan   septic shock due to VRE Bacteremia and fungemia  Continue IV antibiotics as per ID. Pt on Zyvox and Caspofungin   Bld culture 9/23 no growth to date  ID f/u to determine which abx to dc home on

## 2020-09-27 NOTE — CHART NOTE - NSCHARTNOTEFT_GEN_A_CORE
EVENT:   -  Brief HPI: HPI 64 yo obese woman with hx of 80 pack year tobacco use, HTN, DM2 on insulin, COPD on 5L (frequent admissions), R lung cancer s/p resection (2017) on chemo q 3 weeks with concern for mets to spine and compression fractures, recent admission for COPD exacerbation earlier in August who on 2020 presented to ED with c/o SOB, hypoxia and increased sputum production x 1 day.    - Received phone call from RN that pt is c/o of back pain which she rated as a 6/10. Denied any other symptoms. Pt had 2 percocet for moderate pain but the order has .    OBJECTIVE:  Vital Signs Last 24 Hrs  T(C): 36.6 (27 Sep 2020 20:12), Max: 36.8 (27 Sep 2020 14:03)  T(F): 97.9 (27 Sep 2020 20:12), Max: 98.3 (27 Sep 2020 14:03)  HR: 98 (27 Sep 2020 20:12) (91 - 108)  BP: 128/82 (27 Sep 2020 20:12) (128/82 - 164/95)  BP(mean): 108 (27 Sep 2020 14:03) (108 - 108)  RR: 18 (27 Sep 2020 20:12) (16 - 18)  SpO2: 100% (27 Sep 2020 20:12) (95% - 100%)    FOCUSED PHYSICAL EXAM:  Neuro: awake, alert, oriented x 3. No neuro deficit  Cardiovascular: Pulses +2 B/L in lower and upper extremities, HR regular, BP stable, No edema.  Respiratory: Respirations regular, unlabored, breath sounds clear B/L.   GI: Abdomen soft, non-tender, positive bowel sounds.  : no bladder distention noted. No complaints at this time.  Skin: Dry, intact, no bruising, no diaphoresis.    LABS:                        9.7    2.95  )-----------( 56       ( 27 Sep 2020 07:39 )             28.5     09-    136  |  96  |  11  ----------------------------<  308<H>  4.2   |  35<H>  |  0.45<L>    Ca    8.3<L>      27 Sep 2020 07:39  Phos  2.6     09-26  Mg     2.3     09-26    TPro  5.8<L>  /  Alb  2.6<L>  /  TBili  0.9  /  DBili  x   /  AST  19  /  ALT  24  /  AlkPhos  98        EKG:   IMAGING:    ASSESSMENT/Problem: Back pain most likely related to lung cancer with concern for mets to the spine/compression fractures??      PLAN:   1. Percocet 2 tabs, Po, Q6hrs, PRN, moderate to severe pain ordered  2. Monitor response to treatment  3. Cont present care/treatment

## 2020-09-27 NOTE — PROGRESS NOTE ADULT - ASSESSMENT
Problem/Plan    Acute on chronic respiratory failure with hypoxia and hypercapnia due to end stage COPD  Continue oxygen at 4 LPM via nasal cannula. Do not increase.  Continue LABA and ICS  currently on solumedrol 40mg Q 8, will decrease to Q12 hours  refuses to use BIPAP  taper steroid as tolerated     Problem/Plan   septic shock due to VRE Bacteremia and fungemia  Continue IV antibiotics as per ID. Pt on Zyvox and Caspofungin   Bld culture 9/23 no growth to date  pending repeat bc - drawn 9/27     Problem/Plan   Lung cancer metastatic to bone.  f/b Dr PHILLIPS  Chemo on hold until bacteremia resolved.  pending repeat bc - drawn 9/27     Problem/Plan   DM (diabetes mellitus).   Pt has been hyperglycemic - blood sugars >350 over last 24-48.  Pt is on lantus 60units at home.  Lantus was dc on 9/21 due to hypoglycemic episode and pt in icu.    Endo consult - Dr. Garcia  Currently on fingerstick AC/HS with ISS     Problem/Plan    Anemia H&H low but stable  Continue to monitor.      Problem/Plan -   Thrombocytopenia - today 56  Continue to monitor.      Problem/Plan   HTN (hypertension).    Continue current medications  DASH diet.      Problem/Plan    Prophylactic measure   Continue DVT and GI prophylaxis.  Overall prognosis is poor. Palliative team on board.    Pt would like to continue chemo once stable.   Daughter updated.         Problem/Plan    Acute on chronic respiratory failure with hypoxia and hypercapnia due to end stage COPD  Continue oxygen at 4 LPM via nasal cannula. Do not increase.  Continue LABA and ICS  currently on solumedrol 40mg Q 8, will decrease to Q12 hours  refuses to use BIPAP  taper steroid as tolerated     Problem/Plan   septic shock due to VRE Bacteremia and fungemia  Continue IV antibiotics as per ID. Pt on Zyvox and Caspofungin   Bld culture 9/23 no growth to date  ID f/u to determine which abx to dc home on     Problem/Plan   Lung cancer metastatic to bone.  f/b Dr PHILLIPS  Chemo on hold until bacteremia resolved.  pending repeat bc - drawn 9/27     Problem/Plan   DM (diabetes mellitus).   Pt has been hyperglycemic - blood sugars >350 over last 24-48.  Pt is on lantus 60units at home.  Lantus was dc on 9/21 due to hypoglycemic episode and pt in icu.    Endo consult - Dr. Garcia  Currently on fingerstick AC/HS with ISS     Problem/Plan    Anemia H&H low but stable  Continue to monitor.      Problem/Plan -   Thrombocytopenia - today 56  Continue to monitor.      Problem/Plan   HTN (hypertension).    Continue current medications  DASH diet.      Problem/Plan    Prophylactic measure   Continue DVT and GI prophylaxis.  Overall prognosis is poor. Palliative team on board.    Pt would like to continue chemo once stable.   Daughter updated.         Problem/Plan    Acute on chronic respiratory failure with hypoxia and hypercapnia due to end stage COPD  Continue oxygen at 4 LPM via nasal cannula. Do not increase.  Continue LABA and ICS  currently on solumedrol 40mg Q 8, will decrease to Q12 hours  refuses to use BIPAP  taper steroid as tolerated     Problem/Plan   septic shock due to VRE Bacteremia and fungemia  Continue IV antibiotics as per ID. Pt on Zyvox and Caspofungin   Bld culture 9/23 no growth to date  ID f/u to determine which abx to dc home on     Problem/Plan   Lung cancer metastatic to bone.  f/b Dr PHILLIPS  Chemo on hold until bacteremia resolved.  pending repeat bc - drawn 9/27     Problem/Plan   DM (diabetes mellitus).   Pt has been hyperglycemic - blood sugars >350 over last 24-48.  Pt is on lantus 60units at home.  Lantus was dc on 9/21 due to hypoglycemic episode and pt in icu.    Endo consult - Dr. Garcia - Lantus 20 units sc daily, lispro 8 units prior to meals, mod SSI  Currently on fingerstick AC/HS with ISS     Problem/Plan    Anemia H&H low but stable  Continue to monitor.      Problem/Plan -   Thrombocytopenia - today 56  Continue to monitor.      Problem/Plan   HTN (hypertension).    Continue current medications  DASH diet.      Problem/Plan    Prophylactic measure   Continue DVT and GI prophylaxis.  Overall prognosis is poor. Palliative team on board.    Pt would like to continue chemo once stable.   Daughter updated.

## 2020-09-28 DIAGNOSIS — B49 UNSPECIFIED MYCOSIS: ICD-10-CM

## 2020-09-28 DIAGNOSIS — R78.81 BACTEREMIA: ICD-10-CM

## 2020-09-28 DIAGNOSIS — E11.65 TYPE 2 DIABETES MELLITUS WITH HYPERGLYCEMIA: ICD-10-CM

## 2020-09-28 DIAGNOSIS — Z71.89 OTHER SPECIFIED COUNSELING: ICD-10-CM

## 2020-09-28 LAB
ALBUMIN SERPL ELPH-MCNC: 2.9 G/DL — LOW (ref 3.5–5)
ALP SERPL-CCNC: 107 U/L — SIGNIFICANT CHANGE UP (ref 40–120)
ALT FLD-CCNC: 26 U/L DA — SIGNIFICANT CHANGE UP (ref 10–60)
ANION GAP SERPL CALC-SCNC: 4 MMOL/L — LOW (ref 5–17)
AST SERPL-CCNC: 12 U/L — SIGNIFICANT CHANGE UP (ref 10–40)
BASOPHILS # BLD AUTO: 0 K/UL — SIGNIFICANT CHANGE UP (ref 0–0.2)
BASOPHILS NFR BLD AUTO: 0 % — SIGNIFICANT CHANGE UP (ref 0–2)
BILIRUB SERPL-MCNC: 0.8 MG/DL — SIGNIFICANT CHANGE UP (ref 0.2–1.2)
BUN SERPL-MCNC: 12 MG/DL — SIGNIFICANT CHANGE UP (ref 7–18)
CALCIUM SERPL-MCNC: 8.1 MG/DL — LOW (ref 8.4–10.5)
CHLORIDE SERPL-SCNC: 95 MMOL/L — LOW (ref 96–108)
CO2 SERPL-SCNC: 35 MMOL/L — HIGH (ref 22–31)
CREAT SERPL-MCNC: 0.52 MG/DL — SIGNIFICANT CHANGE UP (ref 0.5–1.3)
CULTURE RESULTS: SIGNIFICANT CHANGE UP
CULTURE RESULTS: SIGNIFICANT CHANGE UP
EOSINOPHIL # BLD AUTO: 0.01 K/UL — SIGNIFICANT CHANGE UP (ref 0–0.5)
EOSINOPHIL NFR BLD AUTO: 0.3 % — SIGNIFICANT CHANGE UP (ref 0–6)
GLUCOSE BLDC GLUCOMTR-MCNC: 125 MG/DL — HIGH (ref 70–99)
GLUCOSE BLDC GLUCOMTR-MCNC: 176 MG/DL — HIGH (ref 70–99)
GLUCOSE BLDC GLUCOMTR-MCNC: 352 MG/DL — HIGH (ref 70–99)
GLUCOSE BLDC GLUCOMTR-MCNC: 384 MG/DL — HIGH (ref 70–99)
GLUCOSE SERPL-MCNC: 306 MG/DL — HIGH (ref 70–99)
HCT VFR BLD CALC: 29.1 % — LOW (ref 34.5–45)
HGB BLD-MCNC: 10.2 G/DL — LOW (ref 11.5–15.5)
IMM GRANULOCYTES NFR BLD AUTO: 2.1 % — HIGH (ref 0–1.5)
LYMPHOCYTES # BLD AUTO: 0.28 K/UL — LOW (ref 1–3.3)
LYMPHOCYTES # BLD AUTO: 7.3 % — LOW (ref 13–44)
MCHC RBC-ENTMCNC: 30.9 PG — SIGNIFICANT CHANGE UP (ref 27–34)
MCHC RBC-ENTMCNC: 35.1 GM/DL — SIGNIFICANT CHANGE UP (ref 32–36)
MCV RBC AUTO: 88.2 FL — SIGNIFICANT CHANGE UP (ref 80–100)
MONOCYTES # BLD AUTO: 0.17 K/UL — SIGNIFICANT CHANGE UP (ref 0–0.9)
MONOCYTES NFR BLD AUTO: 4.5 % — SIGNIFICANT CHANGE UP (ref 2–14)
NEUTROPHILS # BLD AUTO: 3.27 K/UL — SIGNIFICANT CHANGE UP (ref 1.8–7.4)
NEUTROPHILS NFR BLD AUTO: 85.8 % — HIGH (ref 43–77)
NRBC # BLD: 0 /100 WBCS — SIGNIFICANT CHANGE UP (ref 0–0)
PLATELET # BLD AUTO: 56 K/UL — LOW (ref 150–400)
POTASSIUM SERPL-MCNC: 3.9 MMOL/L — SIGNIFICANT CHANGE UP (ref 3.5–5.3)
POTASSIUM SERPL-SCNC: 3.9 MMOL/L — SIGNIFICANT CHANGE UP (ref 3.5–5.3)
PROT SERPL-MCNC: 5.9 G/DL — LOW (ref 6–8.3)
RBC # BLD: 3.3 M/UL — LOW (ref 3.8–5.2)
RBC # FLD: 15.2 % — HIGH (ref 10.3–14.5)
SODIUM SERPL-SCNC: 134 MMOL/L — LOW (ref 135–145)
SPECIMEN SOURCE: SIGNIFICANT CHANGE UP
SPECIMEN SOURCE: SIGNIFICANT CHANGE UP
WBC # BLD: 3.81 K/UL — SIGNIFICANT CHANGE UP (ref 3.8–10.5)
WBC # FLD AUTO: 3.81 K/UL — SIGNIFICANT CHANGE UP (ref 3.8–10.5)

## 2020-09-28 RX ORDER — INSULIN GLARGINE 100 [IU]/ML
15 INJECTION, SOLUTION SUBCUTANEOUS ONCE
Refills: 0 | Status: COMPLETED | OUTPATIENT
Start: 2020-09-28 | End: 2020-09-28

## 2020-09-28 RX ORDER — DEXTROSE 50 % IN WATER 50 %
15 SYRINGE (ML) INTRAVENOUS ONCE
Refills: 0 | Status: DISCONTINUED | OUTPATIENT
Start: 2020-09-28 | End: 2020-10-08

## 2020-09-28 RX ORDER — SODIUM CHLORIDE 9 MG/ML
1000 INJECTION, SOLUTION INTRAVENOUS
Refills: 0 | Status: DISCONTINUED | OUTPATIENT
Start: 2020-09-28 | End: 2020-10-08

## 2020-09-28 RX ORDER — INSULIN GLARGINE 100 [IU]/ML
35 INJECTION, SOLUTION SUBCUTANEOUS EVERY MORNING
Refills: 0 | Status: DISCONTINUED | OUTPATIENT
Start: 2020-09-29 | End: 2020-10-01

## 2020-09-28 RX ORDER — DEXTROSE 50 % IN WATER 50 %
25 SYRINGE (ML) INTRAVENOUS ONCE
Refills: 0 | Status: DISCONTINUED | OUTPATIENT
Start: 2020-09-28 | End: 2020-10-08

## 2020-09-28 RX ORDER — INSULIN LISPRO 100/ML
12 VIAL (ML) SUBCUTANEOUS
Refills: 0 | Status: DISCONTINUED | OUTPATIENT
Start: 2020-09-28 | End: 2020-09-30

## 2020-09-28 RX ORDER — GLUCAGON INJECTION, SOLUTION 0.5 MG/.1ML
1 INJECTION, SOLUTION SUBCUTANEOUS ONCE
Refills: 0 | Status: DISCONTINUED | OUTPATIENT
Start: 2020-09-28 | End: 2020-10-08

## 2020-09-28 RX ORDER — DEXTROSE 50 % IN WATER 50 %
12.5 SYRINGE (ML) INTRAVENOUS ONCE
Refills: 0 | Status: DISCONTINUED | OUTPATIENT
Start: 2020-09-28 | End: 2020-10-08

## 2020-09-28 RX ADMIN — BUDESONIDE AND FORMOTEROL FUMARATE DIHYDRATE 2 PUFF(S): 160; 4.5 AEROSOL RESPIRATORY (INHALATION) at 10:54

## 2020-09-28 RX ADMIN — Medication 600 MILLIGRAM(S): at 22:32

## 2020-09-28 RX ADMIN — LINEZOLID 300 MILLIGRAM(S): 600 INJECTION, SOLUTION INTRAVENOUS at 05:49

## 2020-09-28 RX ADMIN — Medication 3 MILLIGRAM(S): at 00:05

## 2020-09-28 RX ADMIN — CARVEDILOL PHOSPHATE 6.25 MILLIGRAM(S): 80 CAPSULE, EXTENDED RELEASE ORAL at 17:12

## 2020-09-28 RX ADMIN — INSULIN GLARGINE 20 UNIT(S): 100 INJECTION, SOLUTION SUBCUTANEOUS at 08:43

## 2020-09-28 RX ADMIN — INSULIN GLARGINE 15 UNIT(S): 100 INJECTION, SOLUTION SUBCUTANEOUS at 17:09

## 2020-09-28 RX ADMIN — Medication 10: at 12:38

## 2020-09-28 RX ADMIN — Medication 2: at 17:08

## 2020-09-28 RX ADMIN — POLYETHYLENE GLYCOL 3350 17 GRAM(S): 17 POWDER, FOR SOLUTION ORAL at 18:00

## 2020-09-28 RX ADMIN — ONDANSETRON 4 MILLIGRAM(S): 8 TABLET, FILM COATED ORAL at 22:34

## 2020-09-28 RX ADMIN — Medication 8 UNIT(S): at 08:41

## 2020-09-28 RX ADMIN — ALBUTEROL 2 PUFF(S): 90 AEROSOL, METERED ORAL at 10:53

## 2020-09-28 RX ADMIN — ALBUTEROL 2 PUFF(S): 90 AEROSOL, METERED ORAL at 22:33

## 2020-09-28 RX ADMIN — MUPIROCIN 1 APPLICATION(S): 20 OINTMENT TOPICAL at 17:12

## 2020-09-28 RX ADMIN — GABAPENTIN 600 MILLIGRAM(S): 400 CAPSULE ORAL at 05:49

## 2020-09-28 RX ADMIN — ENOXAPARIN SODIUM 40 MILLIGRAM(S): 100 INJECTION SUBCUTANEOUS at 12:39

## 2020-09-28 RX ADMIN — MORPHINE SULFATE 2 MILLIGRAM(S): 50 CAPSULE, EXTENDED RELEASE ORAL at 22:34

## 2020-09-28 RX ADMIN — DIPHENHYDRAMINE HYDROCHLORIDE AND LIDOCAINE HYDROCHLORIDE AND ALUMINUM HYDROXIDE AND MAGNESIUM HYDRO 10 MILLILITER(S): KIT at 16:06

## 2020-09-28 RX ADMIN — CARVEDILOL PHOSPHATE 6.25 MILLIGRAM(S): 80 CAPSULE, EXTENDED RELEASE ORAL at 05:50

## 2020-09-28 RX ADMIN — CASPOFUNGIN ACETATE 260 MILLIGRAM(S): 7 INJECTION, POWDER, LYOPHILIZED, FOR SOLUTION INTRAVENOUS at 16:07

## 2020-09-28 RX ADMIN — GABAPENTIN 600 MILLIGRAM(S): 400 CAPSULE ORAL at 16:59

## 2020-09-28 RX ADMIN — LINEZOLID 300 MILLIGRAM(S): 600 INJECTION, SOLUTION INTRAVENOUS at 17:12

## 2020-09-28 RX ADMIN — ATORVASTATIN CALCIUM 40 MILLIGRAM(S): 80 TABLET, FILM COATED ORAL at 22:34

## 2020-09-28 RX ADMIN — Medication 1 MILLIGRAM(S): at 12:40

## 2020-09-28 RX ADMIN — ALBUTEROL 2 PUFF(S): 90 AEROSOL, METERED ORAL at 05:49

## 2020-09-28 RX ADMIN — ALBUTEROL 2 PUFF(S): 90 AEROSOL, METERED ORAL at 17:13

## 2020-09-28 RX ADMIN — Medication 12 UNIT(S): at 17:08

## 2020-09-28 RX ADMIN — NYSTATIN CREAM 1 APPLICATION(S): 100000 CREAM TOPICAL at 17:13

## 2020-09-28 RX ADMIN — PANTOPRAZOLE SODIUM 40 MILLIGRAM(S): 20 TABLET, DELAYED RELEASE ORAL at 12:40

## 2020-09-28 RX ADMIN — TIOTROPIUM BROMIDE 1 CAPSULE(S): 18 CAPSULE ORAL; RESPIRATORY (INHALATION) at 12:40

## 2020-09-28 RX ADMIN — MUPIROCIN 1 APPLICATION(S): 20 OINTMENT TOPICAL at 05:50

## 2020-09-28 RX ADMIN — BUDESONIDE AND FORMOTEROL FUMARATE DIHYDRATE 2 PUFF(S): 160; 4.5 AEROSOL RESPIRATORY (INHALATION) at 21:12

## 2020-09-28 RX ADMIN — Medication 12 UNIT(S): at 12:39

## 2020-09-28 RX ADMIN — Medication 40 MILLIGRAM(S): at 05:50

## 2020-09-28 RX ADMIN — ALBUTEROL 2 PUFF(S): 90 AEROSOL, METERED ORAL at 16:07

## 2020-09-28 RX ADMIN — Medication 3 MILLIGRAM(S): at 22:34

## 2020-09-28 RX ADMIN — SENNA PLUS 2 TABLET(S): 8.6 TABLET ORAL at 22:33

## 2020-09-28 RX ADMIN — MORPHINE SULFATE 2 MILLIGRAM(S): 50 CAPSULE, EXTENDED RELEASE ORAL at 23:20

## 2020-09-28 RX ADMIN — DIPHENHYDRAMINE HYDROCHLORIDE AND LIDOCAINE HYDROCHLORIDE AND ALUMINUM HYDROXIDE AND MAGNESIUM HYDRO 10 MILLILITER(S): KIT at 05:51

## 2020-09-28 RX ADMIN — Medication 10: at 08:42

## 2020-09-28 NOTE — PROGRESS NOTE ADULT - PROBLEM SELECTOR PLAN 4
c/w Steroids, inhalers  Pt refusing BIPAP  c/w supplemental O2 , no more than 4L NC  Monitor oxygenation

## 2020-09-28 NOTE — PROGRESS NOTE ADULT - PROBLEM SELECTOR PLAN 1
due to end stage COPD  Continue oxygen at 4 LPM via nasal cannula. Do not increase.  Continue LABA and ICS  Refuses BIPAP   Taper steroids to prednisone 40mg po daily.  Monitor oxygenation

## 2020-09-28 NOTE — PROGRESS NOTE ADULT - PROBLEM SELECTOR PLAN 3
BC from 9/19 growing "yeast like cells"  c/w Caspofungin   per Sterling Ballesteros from [facility] lab:low ID of 'yeast like cells' as Rhodotorual mucilaginosa. Await final ID and susceptibility (likely 2 more days as it is a send out).  No indication for PICC at this time per ID  ID Dr. Corcoran following

## 2020-09-28 NOTE — PROGRESS NOTE ADULT - NSHPATTENDINGPLANDISCUSS_GEN_ALL_CORE
Family and  floor  team [Fatigue] : fatigue [Recent Change In Weight] : ~T recent weight change [Dysphagia] : dysphagia [Odynophagia] : odynophagia [Leg Claudication] : intermittent leg claudication [Lower Ext Edema] : lower extremity edema [SOB on Exertion] : shortness of breath during exertion [Abdominal Pain] : abdominal pain [Constipation] : constipation [Incontinence] : incontinence [Confused] : confusion [Difficulty Walking] : difficulty walking [Anxiety] : anxiety [Negative] : Psychiatric [Fever] : no fever [Chills] : no chills [Night Sweats] : no night sweats [Eye Pain] : no eye pain [Red Eyes] : eyes not red [Dry Eyes] : no dryness of the eyes [Vision Problems] : no vision problems [Loss of Hearing] : no loss of hearing [Nosebleeds] : no nosebleeds [Hoarseness] : no hoarseness [Mucosal Pain] : no mucosal pain [Chest Pain] : no chest pain [Palpitations] : no palpitations [Shortness Of Breath] : no shortness of breath [Wheezing] : no wheezing [Cough] : no cough [Vomiting] : no vomiting [Diarrhea] : no diarrhea [Dysuria] : no dysuria [Joint Pain] : no joint pain [Joint Stiffness] : no joint stiffness [Muscle Pain] : no muscle pain [Muscle Weakness] : no muscle weakness [Skin Rash] : no skin rash [Skin Wound] : no skin wound [Dizziness] : no dizziness [Fainting] : no fainting [FreeTextEntry2] : 30 lbs weight loss  [FreeTextEntry7] : tolerating soft diet

## 2020-09-28 NOTE — PROGRESS NOTE ADULT - ASSESSMENT
66 yo obese F with hx of 80 pack year tobacco use, HTN, DM2 on insulin, COPD on 5L (frequent admissions), R lung cancer s/p resection (2017) on chemo q 3 weeks with concern for mets to spine and compression fractures, recent admission for COPD exacerbation earlier in August who on 9/19/2020 presented to ED with c/o SOB, hypoxia and increased sputum production x 1 day.    INTERVAL HX: On 9/21/2020. Patient was hypoxic, tachycardiac and tachypneic. Rapid response was called. RRT team arrived on scene. Patient had encephalopathy from hypoxia , wheezing and stridor on examination. Patient was tachycardiac to 140/min. Patient was hypoxic and started on non-rebreather but patient has increased work of breathing. Decision was made to intubate the patient. Patient become hypotensive post intubation and was given LR bolus and started on peripheral phenylephrine.    In ICU patient was awake and alert following commands and using a talking board and pen and paper to communicate. She was started on Precedex and Fentanyl to sedate the patient.   Patient was extubated on 9/22/20. Saturating well on NC. Patient was titrated off the Precedex and Fentanyl by 9/23/20. She has anxiety and when anxious she desaturates. on 9/23/2020 patient was started on BIPAP after an episode of anxiety and desaturation and tolerated it. For now patient is using BIPAP as needed.     Blood cultures from 9/19/2020 grew VRE and yeast like cells. Patient's PICC line on Left arm was removed on 9/21/2020 as possible source of infection. Patient was started on Zyvox on 9/21 and Caspofungin on 9/23 after the results came back. Repeat blood cultures from 9/21/2020 are showing no growth to date. Repeat blood cultures were sent on 9/23/2020  and 9/27. ID Dr. Corcoran on board.

## 2020-09-28 NOTE — PROGRESS NOTE ADULT - SUBJECTIVE AND OBJECTIVE BOX
BARB COLÓN    SCU progress note    INTERVAL HPI/OVERNIGHT EVENTS: ***    DNR [ ]   DNI  [  ]    Covid - 19 PCR:     The 4Ms    What Matters Most: see GOC  Age appropriate Medications/Screen for High Risk Medication: Yes  Mentation: see CAM below  Mobility: defer to physical exam    The Confusion Assessment Method (CAM) Diagnostic Algorithm     1: Acute Onset or Fluctuating Course  - Is there evidence of an acute change in mental status from the patient’s baseline? Did the (abnormal) behavior  fluctuate during the day, that is, tend to come and go, or increase and decrease in severity?  [ ] YES [ ] NO     2: Inattention  - Did the patient have difficulty focusing attention, being easily distractible, or having difficulty keeping track of what was being said?  [ ] YES [ ] NO     3: Disorganized thinking  -Was the patient’s thinking disorganized or incoherent, such as rambling or irrelevant conversation, unclear or illogical flow of ideas, or unpredictable switching from subject to subject?  [ ] YES [ ] NO    4: Altered Level of consciousness?  [ ] YES [ ] NO    The diagnosis of delirium by CAM requires the presence of features 1 and 2 and either 3 or 4.    PRESSORS: [ ] YES [ ] NO  caspofungin IVPB      caspofungin IVPB 50 milliGRAM(s) IV Intermittent every 24 hours  linezolid  IVPB      linezolid  IVPB 600 milliGRAM(s) IV Intermittent every 12 hours    Cardiovascular:  Heart Failure  Acute   Acute on Chronic  Chronic       carvedilol 6.25 milliGRAM(s) Oral every 12 hours    Pulmonary:  ALBUTerol    90 MICROgram(s) HFA Inhaler 2 Puff(s) Inhalation every 4 hours  budesonide 160 MICROgram(s)/formoterol 4.5 MICROgram(s) Inhaler 2 Puff(s) Inhalation two times a day  guaiFENesin   Syrup  (Sugar-Free) 100 milliGRAM(s) Oral every 6 hours PRN  tiotropium 18 MICROgram(s) Capsule 1 Capsule(s) Inhalation daily    Hematalogic:  enoxaparin Injectable 40 milliGRAM(s) SubCutaneous daily    Other:  atorvastatin 40 milliGRAM(s) Oral at bedtime  chlorhexidine 2% Cloths 1 Application(s) Topical <User Schedule>  ergocalciferol 64136 Unit(s) Oral <User Schedule>  FIRST- Mouthwash  BLM 10 milliLiter(s) Swish and Swallow every 8 hours  folic acid 1 milliGRAM(s) Oral daily  gabapentin   Solution 600 milliGRAM(s) Oral every 8 hours  influenza   Vaccine 0.5 milliLiter(s) IntraMuscular once  insulin glargine Injectable (LANTUS) 20 Unit(s) SubCutaneous every morning  insulin lispro (HumaLOG) corrective regimen sliding scale   SubCutaneous three times a day before meals  insulin lispro Injectable (HumaLOG) 8 Unit(s) SubCutaneous three times a day before meals  melatonin 3 milliGRAM(s) Oral at bedtime  methylPREDNISolone sodium succinate Injectable 40 milliGRAM(s) IV Push every 12 hours  morphine  - Injectable 2 milliGRAM(s) IV Push every 4 hours PRN  mupirocin 2% Nasal 1 Application(s) Nasal two times a day  nystatin Powder 1 Application(s) Topical two times a day  ondansetron Injectable 4 milliGRAM(s) IV Push every 6 hours PRN  oxycodone    5 mG/acetaminophen 325 mG 2 Tablet(s) Oral every 6 hours PRN  pantoprazole  Injectable 40 milliGRAM(s) IV Push daily  polyethylene glycol 3350 17 Gram(s) Oral two times a day  senna 2 Tablet(s) Oral at bedtime  sodium chloride 0.65% Nasal 1 Spray(s) Both Nostrils four times a day PRN    ALBUTerol    90 MICROgram(s) HFA Inhaler 2 Puff(s) Inhalation every 4 hours  atorvastatin 40 milliGRAM(s) Oral at bedtime  budesonide 160 MICROgram(s)/formoterol 4.5 MICROgram(s) Inhaler 2 Puff(s) Inhalation two times a day  carvedilol 6.25 milliGRAM(s) Oral every 12 hours  caspofungin IVPB      caspofungin IVPB 50 milliGRAM(s) IV Intermittent every 24 hours  chlorhexidine 2% Cloths 1 Application(s) Topical <User Schedule>  enoxaparin Injectable 40 milliGRAM(s) SubCutaneous daily  ergocalciferol 77685 Unit(s) Oral <User Schedule>  FIRST- Mouthwash  BLM 10 milliLiter(s) Swish and Swallow every 8 hours  folic acid 1 milliGRAM(s) Oral daily  gabapentin   Solution 600 milliGRAM(s) Oral every 8 hours  guaiFENesin   Syrup  (Sugar-Free) 100 milliGRAM(s) Oral every 6 hours PRN  influenza   Vaccine 0.5 milliLiter(s) IntraMuscular once  insulin glargine Injectable (LANTUS) 20 Unit(s) SubCutaneous every morning  insulin lispro (HumaLOG) corrective regimen sliding scale   SubCutaneous three times a day before meals  insulin lispro Injectable (HumaLOG) 8 Unit(s) SubCutaneous three times a day before meals  linezolid  IVPB      linezolid  IVPB 600 milliGRAM(s) IV Intermittent every 12 hours  melatonin 3 milliGRAM(s) Oral at bedtime  methylPREDNISolone sodium succinate Injectable 40 milliGRAM(s) IV Push every 12 hours  morphine  - Injectable 2 milliGRAM(s) IV Push every 4 hours PRN  mupirocin 2% Nasal 1 Application(s) Nasal two times a day  nystatin Powder 1 Application(s) Topical two times a day  ondansetron Injectable 4 milliGRAM(s) IV Push every 6 hours PRN  oxycodone    5 mG/acetaminophen 325 mG 2 Tablet(s) Oral every 6 hours PRN  pantoprazole  Injectable 40 milliGRAM(s) IV Push daily  polyethylene glycol 3350 17 Gram(s) Oral two times a day  senna 2 Tablet(s) Oral at bedtime  sodium chloride 0.65% Nasal 1 Spray(s) Both Nostrils four times a day PRN  tiotropium 18 MICROgram(s) Capsule 1 Capsule(s) Inhalation daily    Drug Dosing Weight  Height (cm): 154.9 (19 Sep 2020 08:00)  Weight (kg): 84.8 (21 Sep 2020 09:15)  BMI (kg/m2): 35.3 (21 Sep 2020 09:15)  BSA (m2): 1.84 (21 Sep 2020 09:15)    CENTRAL LINE: [ ] YES [ ] NO  LOCATION:   DATE INSERTED:  REMOVE: [ ] YES [ ] NO  EXPLAIN:    ROBLEDO: [ ] YES [ ] NO    DATE INSERTED:  REMOVE:  [ ] YES [ ] NO  EXPLAIN:    PAST MEDICAL & SURGICAL HISTORY:  Malignant neoplasm of unspecified part of right bronchus or lung    HTN (hypertension)    DM (diabetes mellitus)    COPD (chronic obstructive pulmonary disease)    Lung cancer    Morbid obesity    GERD (gastroesophageal reflux disease)    Deviated septum    Prolapsed uterus    ITP (idiopathic thrombocytopenic purpura)    Emphysema/COPD    History of cholecystectomy    S/P lobectomy of lung  right 2017    History of tonsillectomy                      PHYSICAL EXAM:    GENERAL: NAD, well-groomed, well-developed  HEAD:  Atraumatic, Normocephalic  EYES: EOMI, PERRLA, conjunctiva and sclera clear  ENMT: No tonsillar erythema, exudates, or enlargement; Moist mucous membranes, Good dentition, No lesions  NECK: Supple, No JVD, Normal thyroid  NERVOUS SYSTEM:  Alert & Oriented X3, Good concentration; Motor Strength 4 5/5 B/L upper and 3-4/5lower extremities; DTRs 2+ intact and symmetric  CHEST/LUNG: Clear to percussion bilaterally; No rales, rhonchi, wheezing, or rubs  HEART: Regular rate and rhythm; No murmurs, rubs, or gallops  ABDOMEN: Soft, Nontender, Nondistended; Bowel sounds present  EXTREMITIES:  2+ Peripheral Pulses, No clubbing, cyanosis, or edema  SKIN: warm , dry      LABS:  CBC Full  -  ( 28 Sep 2020 07:24 )  WBC Count : 3.81 K/uL  RBC Count : 3.30 M/uL  Hemoglobin : 10.2 g/dL  Hematocrit : 29.1 %  Platelet Count - Automated : 56 K/uL  Mean Cell Volume : 88.2 fl  Mean Cell Hemoglobin : 30.9 pg  Mean Cell Hemoglobin Concentration : 35.1 gm/dL  Auto Neutrophil # : 3.27 K/uL  Auto Lymphocyte # : 0.28 K/uL  Auto Monocyte # : 0.17 K/uL  Auto Eosinophil # : 0.01 K/uL  Auto Basophil # : 0.00 K/uL  Auto Neutrophil % : 85.8 %  Auto Lymphocyte % : 7.3 %  Auto Monocyte % : 4.5 %  Auto Eosinophil % : 0.3 %  Auto Basophil % : 0.0 %    09-28    134<L>  |  95<L>  |  12  ----------------------------<  306<H>  3.9   |  35<H>  |  0.52    Ca    8.1<L>      28 Sep 2020 07:24    TPro  5.9<L>  /  Alb  2.9<L>  /  TBili  0.8  /  DBili  x   /  AST  12  /  ALT  26  /  AlkPhos  107  09-28              [  ]  DVT Prophylaxis  [  ]  Nutrition, Brand, Rate         Goal Rate        Abnormal Nutritional Status -  Malnutrition   Cachexia      Morbid Obesity BMI >/=40    RADIOLOGY & ADDITIONAL STUDIES:  ***    Goals of Care Discussion with Family/Proxy/Other   - see note from/family meeting set up for...     BARB COLÓN    SCU progress note    INTERVAL HPI/OVERNIGHT EVENTS: ***Pt seen and examined this morning. Pt reports feeling better, less SOB, "want to go rehab ."    DNR [ ]   DNI  [  ] FULL CODE (X)    Covid - 19 PCR: not detected 9/19    The 4Ms    What Matters Most: see GOC  Age appropriate Medications/Screen for High Risk Medication: Yes  Mentation: see CAM below  Mobility: defer to physical exam    The Confusion Assessment Method (CAM) Diagnostic Algorithm     1: Acute Onset or Fluctuating Course  - Is there evidence of an acute change in mental status from the patient’s baseline? Did the (abnormal) behavior  fluctuate during the day, that is, tend to come and go, or increase and decrease in severity?  [ ] YES [x ] NO     2: Inattention  - Did the patient have difficulty focusing attention, being easily distractible, or having difficulty keeping track of what was being said?  [ ] YES [x ] NO     3: Disorganized thinking  -Was the patient’s thinking disorganized or incoherent, such as rambling or irrelevant conversation, unclear or illogical flow of ideas, or unpredictable switching from subject to subject?  [ ] YES [x ] NO    4: Altered Level of consciousness?  [ ] YES [x ] NO    The diagnosis of delirium by CAM requires the presence of features 1 and 2 and either 3 or 4.    PRESSORS: [ ] YES [ x] NO  caspofungin IVPB      caspofungin IVPB 50 milliGRAM(s) IV Intermittent every 24 hours  linezolid  IVPB      linezolid  IVPB 600 milliGRAM(s) IV Intermittent every 12 hours    Cardiovascular:  Heart Failure  Acute   Acute on Chronic  Chronic       carvedilol 6.25 milliGRAM(s) Oral every 12 hours    Pulmonary:  ALBUTerol    90 MICROgram(s) HFA Inhaler 2 Puff(s) Inhalation every 4 hours  budesonide 160 MICROgram(s)/formoterol 4.5 MICROgram(s) Inhaler 2 Puff(s) Inhalation two times a day  guaiFENesin   Syrup  (Sugar-Free) 100 milliGRAM(s) Oral every 6 hours PRN  tiotropium 18 MICROgram(s) Capsule 1 Capsule(s) Inhalation daily    Hematalogic:  enoxaparin Injectable 40 milliGRAM(s) SubCutaneous daily    Other:  atorvastatin 40 milliGRAM(s) Oral at bedtime  chlorhexidine 2% Cloths 1 Application(s) Topical <User Schedule>  ergocalciferol 01563 Unit(s) Oral <User Schedule>  FIRST- Mouthwash  BLM 10 milliLiter(s) Swish and Swallow every 8 hours  folic acid 1 milliGRAM(s) Oral daily  gabapentin   Solution 600 milliGRAM(s) Oral every 8 hours  influenza   Vaccine 0.5 milliLiter(s) IntraMuscular once  insulin glargine Injectable (LANTUS) 20 Unit(s) SubCutaneous every morning  insulin lispro (HumaLOG) corrective regimen sliding scale   SubCutaneous three times a day before meals  insulin lispro Injectable (HumaLOG) 8 Unit(s) SubCutaneous three times a day before meals  melatonin 3 milliGRAM(s) Oral at bedtime  methylPREDNISolone sodium succinate Injectable 40 milliGRAM(s) IV Push every 12 hours  morphine  - Injectable 2 milliGRAM(s) IV Push every 4 hours PRN  mupirocin 2% Nasal 1 Application(s) Nasal two times a day  nystatin Powder 1 Application(s) Topical two times a day  ondansetron Injectable 4 milliGRAM(s) IV Push every 6 hours PRN  oxycodone    5 mG/acetaminophen 325 mG 2 Tablet(s) Oral every 6 hours PRN  pantoprazole  Injectable 40 milliGRAM(s) IV Push daily  polyethylene glycol 3350 17 Gram(s) Oral two times a day  senna 2 Tablet(s) Oral at bedtime  sodium chloride 0.65% Nasal 1 Spray(s) Both Nostrils four times a day PRN    ALBUTerol    90 MICROgram(s) HFA Inhaler 2 Puff(s) Inhalation every 4 hours  atorvastatin 40 milliGRAM(s) Oral at bedtime  budesonide 160 MICROgram(s)/formoterol 4.5 MICROgram(s) Inhaler 2 Puff(s) Inhalation two times a day  carvedilol 6.25 milliGRAM(s) Oral every 12 hours  caspofungin IVPB      caspofungin IVPB 50 milliGRAM(s) IV Intermittent every 24 hours  chlorhexidine 2% Cloths 1 Application(s) Topical <User Schedule>  enoxaparin Injectable 40 milliGRAM(s) SubCutaneous daily  ergocalciferol 53482 Unit(s) Oral <User Schedule>  FIRST- Mouthwash  BLM 10 milliLiter(s) Swish and Swallow every 8 hours  folic acid 1 milliGRAM(s) Oral daily  gabapentin   Solution 600 milliGRAM(s) Oral every 8 hours  guaiFENesin   Syrup  (Sugar-Free) 100 milliGRAM(s) Oral every 6 hours PRN  influenza   Vaccine 0.5 milliLiter(s) IntraMuscular once  insulin glargine Injectable (LANTUS) 20 Unit(s) SubCutaneous every morning  insulin lispro (HumaLOG) corrective regimen sliding scale   SubCutaneous three times a day before meals  insulin lispro Injectable (HumaLOG) 8 Unit(s) SubCutaneous three times a day before meals  linezolid  IVPB      linezolid  IVPB 600 milliGRAM(s) IV Intermittent every 12 hours  melatonin 3 milliGRAM(s) Oral at bedtime  methylPREDNISolone sodium succinate Injectable 40 milliGRAM(s) IV Push every 12 hours  morphine  - Injectable 2 milliGRAM(s) IV Push every 4 hours PRN  mupirocin 2% Nasal 1 Application(s) Nasal two times a day  nystatin Powder 1 Application(s) Topical two times a day  ondansetron Injectable 4 milliGRAM(s) IV Push every 6 hours PRN  oxycodone    5 mG/acetaminophen 325 mG 2 Tablet(s) Oral every 6 hours PRN  pantoprazole  Injectable 40 milliGRAM(s) IV Push daily  polyethylene glycol 3350 17 Gram(s) Oral two times a day  senna 2 Tablet(s) Oral at bedtime  sodium chloride 0.65% Nasal 1 Spray(s) Both Nostrils four times a day PRN  tiotropium 18 MICROgram(s) Capsule 1 Capsule(s) Inhalation daily    Drug Dosing Weight  Height (cm): 154.9 (19 Sep 2020 08:00)  Weight (kg): 84.8 (21 Sep 2020 09:15)  BMI (kg/m2): 35.3 (21 Sep 2020 09:15)  BSA (m2): 1.84 (21 Sep 2020 09:15)    CENTRAL LINE: [ ] YES [x ] NO  LOCATION:   DATE INSERTED:  REMOVE: [ ] YES [ ] NO  EXPLAIN:    ROBLEDO: [ ] YES [ x] NO    DATE INSERTED:  REMOVE:  [ ] YES [ ] NO  EXPLAIN:    PAST MEDICAL & SURGICAL HISTORY:  Malignant neoplasm of unspecified part of right bronchus or lung    HTN (hypertension)    DM (diabetes mellitus)    COPD (chronic obstructive pulmonary disease)    Lung cancer    Morbid obesity    GERD (gastroesophageal reflux disease)    Deviated septum    Prolapsed uterus    ITP (idiopathic thrombocytopenic purpura)    Emphysema/COPD    History of cholecystectomy    S/P lobectomy of lung  right 2017    History of tonsillectomy                      PHYSICAL EXAM:    GENERAL: NAD, well-groomed, well-developed  HEAD:  Atraumatic, Normocephalic  EYES: EOMI, PERRLA, conjunctiva and sclera clear  ENMT: No tonsillar erythema, exudates, or enlargement; Moist mucous membranes, Good dentition, No lesions  NECK: Supple, No JVD, Normal thyroid  NERVOUS SYSTEM:  Alert & Oriented X3, Good concentration; Motor Strength 4 5/5 B/L upper and 3-4/5lower extremities; DTRs 2+ intact and symmetric  CHEST/LUNG: Clear to percussion bilaterally; No rales, rhonchi, wheezing, or rubs  HEART: Regular rate and rhythm; No murmurs, rubs, or gallops  ABDOMEN: Soft, Nontender, Nondistended; Bowel sounds present  EXTREMITIES:  2+ Peripheral Pulses, No clubbing, cyanosis, or edema  SKIN: warm , dry      LABS:  CBC Full  -  ( 28 Sep 2020 07:24 )  WBC Count : 3.81 K/uL  RBC Count : 3.30 M/uL  Hemoglobin : 10.2 g/dL  Hematocrit : 29.1 %  Platelet Count - Automated : 56 K/uL  Mean Cell Volume : 88.2 fl  Mean Cell Hemoglobin : 30.9 pg  Mean Cell Hemoglobin Concentration : 35.1 gm/dL  Auto Neutrophil # : 3.27 K/uL  Auto Lymphocyte # : 0.28 K/uL  Auto Monocyte # : 0.17 K/uL  Auto Eosinophil # : 0.01 K/uL  Auto Basophil # : 0.00 K/uL  Auto Neutrophil % : 85.8 %  Auto Lymphocyte % : 7.3 %  Auto Monocyte % : 4.5 %  Auto Eosinophil % : 0.3 %  Auto Basophil % : 0.0 %    09-28    134<L>  |  95<L>  |  12  ----------------------------<  306<H>  3.9   |  35<H>  |  0.52    Ca    8.1<L>      28 Sep 2020 07:24    TPro  5.9<L>  /  Alb  2.9<L>  /  TBili  0.8  /  DBili  x   /  AST  12  /  ALT  26  /  AlkPhos  107  09-28              [  ]  DVT Prophylaxis  [  ]  Nutrition, Brand, Rate         Goal Rate        Abnormal Nutritional Status -  Malnutrition   Cachexia      Morbid Obesity BMI >/=40    RADIOLOGY & ADDITIONAL STUDIES:  ***    Goals of Care Discussion with Family/Proxy/Other   - see note from/family meeting set up for...

## 2020-09-28 NOTE — PROGRESS NOTE ADULT - PROBLEM SELECTOR PLAN 5
`A1C 8.1  exacerbated by steroids  Lantus: additional 15 units today , then 35 units daily starting tomorrow  Humalog increased to 12 units TID before meals  Monitor BG ac/HS , and cover with moderate HISS   Hypoglycemia protocol  Endo Dr. Garcia following    Monitor BG ac/HS

## 2020-09-28 NOTE — PROGRESS NOTE ADULT - PROBLEM SELECTOR PLAN 7
h/o ITP  Platelets 56K today  No overt bleeding or bruising  Monitor platelets   Heme/Onc Dr. Mendiola following

## 2020-09-28 NOTE — PROGRESS NOTE ADULT - PROBLEM SELECTOR PLAN 2
Repeat BC NGTD on 9/23 and 9/27  c/w Linezolid for 4 more days from tomorrow  can change to po at discharge  ID Dr. Corcoran following

## 2020-09-28 NOTE — PROGRESS NOTE ADULT - SUBJECTIVE AND OBJECTIVE BOX
Interval Events:    tolerating po intake  receiving po prednisone  remains hyperglycemic  requiring significant doses on sliding scale    Allergies    fish (Angioedema)  penicillins (Rash)    Intolerances      Endocrine/Metabolic Medications:  atorvastatin 40 milliGRAM(s) Oral at bedtime  insulin glargine Injectable (LANTUS) 20 Unit(s) SubCutaneous every morning  insulin lispro (HumaLOG) corrective regimen sliding scale   SubCutaneous three times a day before meals  insulin lispro Injectable (HumaLOG) 8 Unit(s) SubCutaneous three times a day before meals  predniSONE   Tablet 40 milliGRAM(s) Oral daily      Vital Signs Last 24 Hrs  T(C): 36.4 (28 Sep 2020 04:56), Max: 36.8 (27 Sep 2020 14:03)  T(F): 97.5 (28 Sep 2020 04:56), Max: 98.3 (27 Sep 2020 14:03)  HR: 100 (28 Sep 2020 04:56) (98 - 108)  BP: 115/60 (28 Sep 2020 04:56) (115/60 - 164/95)  BP(mean): 108 (27 Sep 2020 14:03) (108 - 108)  RR: 16 (28 Sep 2020 04:56) (16 - 18)  SpO2: 100% (28 Sep 2020 04:56) (95% - 100%)      PHYSICAL EXAM    Constitutional:    NC/AT:    HEENT:    Neck:  No JVD    Respiratory:  reduced breath sounds b/l bases    Cardiovascular:  RR    Gastrointestinal: Soft    Extremities: without cyanosis  Neurological:  non focal      LABS                        10.2   3.81  )-----------( 56       ( 28 Sep 2020 07:24 )             29.1                               134    |  95     |  12                  Calcium: 8.1   / iCa: x      (09-28 @ 07:24)    ----------------------------<  306       Magnesium: x                                3.9     |  35     |  0.52             Phosphorous: x        TPro  5.9    /  Alb  2.9    /  TBili  0.8    /  DBili  x      /  AST  12     /  ALT  26     /  AlkPhos  107    28 Sep 2020 07:24    CAPILLARY BLOOD GLUCOSE      POCT Blood Glucose.: 352 mg/dL (28 Sep 2020 08:04)  POCT Blood Glucose.: 301 mg/dL (27 Sep 2020 20:56)  POCT Blood Glucose.: 272 mg/dL (27 Sep 2020 16:49)        Assesment/plan        64 yo female with multiple comorbidities including h/o DM type 2, HTN, COPD on o2, lung ca on right sided s/p resection on chemo admitted with shortness of breath  was intubated/extubated for acute resp failure this admit, now extubated on general medical floor, standing high dose steroids with uncontrolled hyperglycemia    endocrinology consulted for glycemic management    DM type 2  uncontrolled  complicated by high dose steroid use, acute on chronic resp failure  home regimen:  basaglar 60 units daily  novolog 15 units pre meals    recommendations:  steroids would cause mainly prandial hyperglycemia  requiring significant sliding scale   remains hyperglycemic  increase basal/bolus:    - additional lantus 15 units x 1 today 9/28  - increase insulin lantus to 35 units daily in am from tomorrow 9/29  - increase insulin lispro to 12 units pre meals  - continue moderate dose sliding scale  - reassess based on requirements  - goal inpatient glucose range: 140-180mg/dl    COPD exacerbation  acute on chronic hypoxic resp failure  s/p intubation/extubation current admit  on standing steroids  on po prednisone  pulmonary on board    sepsis  VRE bacteremia  received zosyn  ID on board    Discussed with patient and primary team   Please call  Endocrine- Dr Katlyn Garcia as needed

## 2020-09-28 NOTE — PROGRESS NOTE ADULT - SUBJECTIVE AND OBJECTIVE BOX
PULMONARY  progress note    BARB COLÓN  MRN-[id number]    Patient is a 65y old  Female who presents with a chief complaint of shortness of breath (27 Sep 2020 11:28)  Feels better, ready for Rehab x 2 weeks      MEDICATIONS  (STANDING):  ALBUTerol    90 MICROgram(s) HFA Inhaler 2 Puff(s) Inhalation every 4 hours  atorvastatin 40 milliGRAM(s) Oral at bedtime  budesonide 160 MICROgram(s)/formoterol 4.5 MICROgram(s) Inhaler 2 Puff(s) Inhalation two times a day  carvedilol 6.25 milliGRAM(s) Oral every 12 hours  caspofungin IVPB      caspofungin IVPB 50 milliGRAM(s) IV Intermittent every 24 hours  chlorhexidine 2% Cloths 1 Application(s) Topical <User Schedule>  enoxaparin Injectable 40 milliGRAM(s) SubCutaneous daily  ergocalciferol 69018 Unit(s) Oral <User Schedule>  FIRST- Mouthwash  BLM 10 milliLiter(s) Swish and Swallow every 8 hours  folic acid 1 milliGRAM(s) Oral daily  gabapentin   Solution 600 milliGRAM(s) Oral every 8 hours  influenza   Vaccine 0.5 milliLiter(s) IntraMuscular once  insulin glargine Injectable (LANTUS) 20 Unit(s) SubCutaneous every morning  insulin lispro (HumaLOG) corrective regimen sliding scale   SubCutaneous three times a day before meals  insulin lispro Injectable (HumaLOG) 8 Unit(s) SubCutaneous three times a day before meals  linezolid  IVPB      linezolid  IVPB 600 milliGRAM(s) IV Intermittent every 12 hours  melatonin 3 milliGRAM(s) Oral at bedtime  methylPREDNISolone sodium succinate Injectable 40 milliGRAM(s) IV Push every 12 hours  mupirocin 2% Nasal 1 Application(s) Nasal two times a day  nystatin Powder 1 Application(s) Topical two times a day  pantoprazole  Injectable 40 milliGRAM(s) IV Push daily  polyethylene glycol 3350 17 Gram(s) Oral two times a day  senna 2 Tablet(s) Oral at bedtime  tiotropium 18 MICROgram(s) Capsule 1 Capsule(s) Inhalation daily      MEDICATIONS  (PRN):  guaiFENesin   Syrup  (Sugar-Free) 100 milliGRAM(s) Oral every 6 hours PRN congestion  morphine  - Injectable 2 milliGRAM(s) IV Push every 4 hours PRN Severe Pain (7 - 10)  ondansetron Injectable 4 milliGRAM(s) IV Push every 6 hours PRN Nausea and/or Vomiting  oxycodone    5 mG/acetaminophen 325 mG 2 Tablet(s) Oral every 6 hours PRN Moderate Pain (4 - 6)  sodium chloride 0.65% Nasal 1 Spray(s) Both Nostrils four times a day PRN Nasal Congestion      Allergies    fish (Angioedema)  penicillins (Rash)        PAST MEDICAL & SURGICAL HISTORY:  Malignant neoplasm of unspecified part of right bronchus or lung    HTN (hypertension)    DM (diabetes mellitus)    COPD (chronic obstructive pulmonary disease)    Lung cancer    Morbid obesity    GERD (gastroesophageal reflux disease)    Deviated septum    Prolapsed uterus    ITP (idiopathic thrombocytopenic purpura)    Emphysema/COPD    History of cholecystectomy    S/P lobectomy of lung  right 2017    History of tonsillectomy             REVIEW OF SYSTEMS:  CONSTITUTIONAL: No fever, weight loss, or fatigue   EYES: No eye pain, visual disturbances, or discharge  ENT:  No difficulty hearing, tinnitus, vertigo; No sinus or throat pain  NECK: No pain or stiffness or nodes  RESPIRATORY:  cough+   wheezing--   chills--   hemoptysis -- Shortness of Breath+  CARDIOVASCULAR: No chest pain, palpitations, passing out, dizziness, or leg swelling  GASTROINTESTINAL: No abdominal or epigastric pain. No nausea, vomiting, or hematemesis; No diarrhea or constipation. No melena or hematochezia.  GENITOURINARY: No dysuria, frequency, hematuria, or incontinence  NEUROLOGICAL: No headaches, memory loss, loss of strength, numbness, or tremors  SKIN: No itching, burning, rashes, or lesions   LYMPH Nodes: No enlarged glands  ENDOCRINE: No heat or cold intolerance; No hair loss  MUSCULOSKELETAL: No joint pain or swelling; No muscle, back, or extremity pain  PSYCHIATRIC: No depression, anxiety+, no mood swings, or difficulty sleeping  HEME/LYMPH: No easy bruising, or bleeding gums  ALLERGY AND IMMUNOLOGIC: No hives or eczema    Vital Signs Last 24 Hrs  T(C): 36.4 (28 Sep 2020 04:56), Max: 36.8 (27 Sep 2020 14:03)  T(F): 97.5 (28 Sep 2020 04:56), Max: 98.3 (27 Sep 2020 14:03)  HR: 100 (28 Sep 2020 04:56) (98 - 108)  BP: 115/60 (28 Sep 2020 04:56) (115/60 - 164/95)  BP(mean): 108 (27 Sep 2020 14:03) (108 - 108)  RR: 16 (28 Sep 2020 04:56) (16 - 18)  SpO2: 100% (28 Sep 2020 04:56) (95% - 100%)  I&O's Detail      PHYSICAL EXAMINATION:    GENERAL: The patient is a well-developed,  obese h/f  in no apparent distress.   SKIN no rash ecchymoses or bruises  HEENT: Head is normocephalic and atraumatic  SHAMA , Extraocular muscles are intact. Mucous membranes  moist.   Neck supple ,No LN felt JVP not increased  Thyroid not enlarged  Cardiovascular:  S1 S2 heard, RSR, No JVD , systolic  murmur at apex, No gallop or rub  Respiratory: Chest wall symmetrical with good air entry ,Percussion note normal,    Lungs vesicular breathing with  no  rales  or  wheeze	  ABDOMEN:  Soft, Non-tender, obese, no hepatomegaly or splenomegaly BS positive	  Extremities: Normal range of motion, No clubbing, cyanosis or edema  Vascular: Peripheral pulses palpable 2+ bilaterally  CNS:  Alert and oriented x3   Cranial nerves intact  sensory intact  motor power5/5  dtr 2+   Babinski neg    LABS:                        10.2   3.81  )-----------( 56       ( 28 Sep 2020 07:24 )             29.1     09-28    134<L>  |  95<L>  |  12  ----------------------------<  306<H>  3.9   |  35<H>  |  0.52    Ca    8.1<L>      28 Sep 2020 07:24    TPro  5.9<L>  /  Alb  2.9<L>  /  TBili  0.8  /  DBili  x   /  AST  12  /  ALT  26  /  AlkPhos  107  09-28        Ferritin, Serum: 1109 ng/mL (09-24-20 @ 10:15)      MICROBIOLOGY:    Culture - Blood (collected 09-23-20 @ 10:33)  Source: .Blood Blood  Preliminary Report (09-24-20 @ 11:01):    No growth to date.    Culture - Blood (collected 09-23-20 @ 10:33)  Source: .Blood Blood  Preliminary Report (09-24-20 @ 11:01):    No growth to date.    Culture - Blood (collected 09-21-20 @ 14:47)  Source: .Blood Blood  Final Report (09-26-20 @ 15:04):    No Growth Final

## 2020-09-28 NOTE — PROGRESS NOTE ADULT - PROBLEM SELECTOR PLAN 6
likely multifactoria: chronic disease (cancer, DM, COPD)   Hgb 10.2  No overt bleeding   Monitor CBC  Heme/Onc Dr. Mendiola following

## 2020-09-28 NOTE — PROGRESS NOTE ADULT - PROBLEM SELECTOR PLAN 10
Pt from home  Full code  Dispo: [facility] when medically cleared  f/u ID and susceptibility  of "yeast like cells" per [facility] from 9/19   No indication for PICC at this time per ID  ID Dr. Corcoran following.

## 2020-09-28 NOTE — PROGRESS NOTE ADULT - ASSESSMENT
Bacteremia  Fungemia both from a PICC line infection (s/p removal)    Plan - Start Mucinex 600mgs po q12hrs  Cont Cancidas 50mgs iv q24 hrs x 6 days more  Cont Zyvox 600 mgs q12hrs (can switch to po) x 6 days more.  DC planning.  Reconsult prn.

## 2020-09-28 NOTE — PROGRESS NOTE ADULT - SUBJECTIVE AND OBJECTIVE BOX
still tired  no fever or chills  pain is much less    MEDICATIONS  (STANDING):  ALBUTerol    90 MICROgram(s) HFA Inhaler 2 Puff(s) Inhalation every 4 hours  atorvastatin 40 milliGRAM(s) Oral at bedtime  budesonide 160 MICROgram(s)/formoterol 4.5 MICROgram(s) Inhaler 2 Puff(s) Inhalation two times a day  carvedilol 6.25 milliGRAM(s) Oral every 12 hours  caspofungin IVPB      caspofungin IVPB 50 milliGRAM(s) IV Intermittent every 24 hours  chlorhexidine 2% Cloths 1 Application(s) Topical <User Schedule>  dextrose 5%. 1000 milliLiter(s) (50 mL/Hr) IV Continuous <Continuous>  dextrose 50% Injectable 12.5 Gram(s) IV Push once  dextrose 50% Injectable 25 Gram(s) IV Push once  dextrose 50% Injectable 25 Gram(s) IV Push once  enoxaparin Injectable 40 milliGRAM(s) SubCutaneous daily  ergocalciferol 37413 Unit(s) Oral <User Schedule>  FIRST- Mouthwash  BLM 10 milliLiter(s) Swish and Swallow every 8 hours  folic acid 1 milliGRAM(s) Oral daily  gabapentin   Solution 600 milliGRAM(s) Oral every 8 hours  influenza   Vaccine 0.5 milliLiter(s) IntraMuscular once  insulin glargine Injectable (LANTUS) 15 Unit(s) SubCutaneous once  insulin lispro (HumaLOG) corrective regimen sliding scale   SubCutaneous three times a day before meals  insulin lispro Injectable (HumaLOG) 12 Unit(s) SubCutaneous three times a day before meals  linezolid  IVPB      linezolid  IVPB 600 milliGRAM(s) IV Intermittent every 12 hours  melatonin 3 milliGRAM(s) Oral at bedtime  mupirocin 2% Nasal 1 Application(s) Nasal two times a day  nystatin Powder 1 Application(s) Topical two times a day  pantoprazole  Injectable 40 milliGRAM(s) IV Push daily  polyethylene glycol 3350 17 Gram(s) Oral two times a day  predniSONE   Tablet 40 milliGRAM(s) Oral daily  senna 2 Tablet(s) Oral at bedtime  tiotropium 18 MICROgram(s) Capsule 1 Capsule(s) Inhalation daily    MEDICATIONS  (PRN):  dextrose 40% Gel 15 Gram(s) Oral once PRN Blood Glucose LESS THAN 70 milliGRAM(s)/deciLiter  glucagon  Injectable 1 milliGRAM(s) IntraMuscular once PRN Glucose <70 milliGRAM(s)/deciLiter  guaiFENesin   Syrup  (Sugar-Free) 100 milliGRAM(s) Oral every 6 hours PRN congestion  morphine  - Injectable 2 milliGRAM(s) IV Push every 4 hours PRN Severe Pain (7 - 10)  ondansetron Injectable 4 milliGRAM(s) IV Push every 6 hours PRN Nausea and/or Vomiting  oxycodone    5 mG/acetaminophen 325 mG 2 Tablet(s) Oral every 6 hours PRN Moderate Pain (4 - 6)  sodium chloride 0.65% Nasal 1 Spray(s) Both Nostrils four times a day PRN Nasal Congestion      Allergies    fish (Angioedema)  penicillins (Rash)    Intolerances        Vital Signs Last 24 Hrs  T(C): 36.7 (28 Sep 2020 14:25), Max: 36.7 (28 Sep 2020 14:25)  T(F): 98 (28 Sep 2020 14:25), Max: 98 (28 Sep 2020 14:25)  HR: 112 (28 Sep 2020 14:25) (98 - 112)  BP: 158/83 (28 Sep 2020 14:25) (115/60 - 158/83)  BP(mean): --  RR: 18 (28 Sep 2020 14:25) (16 - 18)  SpO2: 100% (28 Sep 2020 14:25) (100% - 100%)    PHYSICAL EXAM  General: adult in NAD  HEENT: clear oropharynx, anicteric sclera, pink conjunctiva  Neck: supple  CV: normal S1/S2 with no murmur rubs or gallops  Lungs: positive air movement b/l ant lungs,clear to auscultation, no wheezes, no rales  Abdomen: soft non-tender non-distended, no hepatosplenomegaly  Ext: no clubbing cyanosis or edema  Skin: no rashes and no petechiae  Neuro: alert and oriented X 4, no focal deficits  LABS:                          10.2   3.81  )-----------( 56       ( 28 Sep 2020 07:24 )             29.1         Mean Cell Volume : 88.2 fl  Mean Cell Hemoglobin : 30.9 pg  Mean Cell Hemoglobin Concentration : 35.1 gm/dL  Auto Neutrophil # : 3.27 K/uL  Auto Lymphocyte # : 0.28 K/uL  Auto Monocyte # : 0.17 K/uL  Auto Eosinophil # : 0.01 K/uL  Auto Basophil # : 0.00 K/uL  Auto Neutrophil % : 85.8 %  Auto Lymphocyte % : 7.3 %  Auto Monocyte % : 4.5 %  Auto Eosinophil % : 0.3 %  Auto Basophil % : 0.0 %    Serial CBC  Hematocrit 29.1  Hemoglobin 10.2  Plat 56  RBC 3.30  WBC 3.81  Serial CBC  Hematocrit 28.5  Hemoglobin 9.7  Plat 56  RBC 3.21  WBC 2.95  Serial CBC  Hematocrit 32.2  Hemoglobin 10.9  Plat 90  RBC 3.54  WBC 4.80  Serial CBC  Hematocrit 29.9  Hemoglobin 10.1  Plat 85  RBC 3.35  WBC 4.59    09-28    134<L>  |  95<L>  |  12  ----------------------------<  306<H>  3.9   |  35<H>  |  0.52    Ca    8.1<L>      28 Sep 2020 07:24    TPro  5.9<L>  /  Alb  2.9<L>  /  TBili  0.8  /  DBili  x   /  AST  12  /  ALT  26  /  AlkPhos  107  09-28          Ferritin, Serum: 1109 ng/mL (09-24 @ 10:15)  Iron - Total Binding Capacity.: 219 ug/dL (09-24 @ 06:08)            BLOOD SMEAR INTERPRETATION:       RADIOLOGY & ADDITIONAL STUDIES:

## 2020-09-28 NOTE — PROGRESS NOTE ADULT - SUBJECTIVE AND OBJECTIVE BOX
65y Female    Meds:  caspofungin IVPB      caspofungin IVPB 50 milliGRAM(s) IV Intermittent every 24 hours  linezolid  IVPB 600 milliGRAM(s) IV Intermittent every 12 hours  linezolid  IVPB        Allergies    fish (Angioedema)  penicillins (Rash)    Intolerances        VITALS:  Vital Signs Last 24 Hrs  T(C): 36.7 (28 Sep 2020 14:25), Max: 36.7 (28 Sep 2020 14:25)  T(F): 98 (28 Sep 2020 14:25), Max: 98 (28 Sep 2020 14:25)  HR: 112 (28 Sep 2020 14:25) (98 - 112)  BP: 158/83 (28 Sep 2020 14:25) (115/60 - 158/83)  BP(mean): --  RR: 18 (28 Sep 2020 14:25) (16 - 18)  SpO2: 100% (28 Sep 2020 14:25) (100% - 100%)    LABS/DIAGNOSTIC TESTS:                          10.2   3.81  )-----------( 56       ( 28 Sep 2020 07:24 )             29.1         09-28    134<L>  |  95<L>  |  12  ----------------------------<  306<H>  3.9   |  35<H>  |  0.52    Ca    8.1<L>      28 Sep 2020 07:24    TPro  5.9<L>  /  Alb  2.9<L>  /  TBili  0.8  /  DBili  x   /  AST  12  /  ALT  26  /  AlkPhos  107  09-28      LIVER FUNCTIONS - ( 28 Sep 2020 07:24 )  Alb: 2.9 g/dL / Pro: 5.9 g/dL / ALK PHOS: 107 U/L / ALT: 26 U/L DA / AST: 12 U/L / GGT: x             CULTURES: .Blood Blood  09-27 @ 12:06   No growth to date.  --  --      .Blood Blood  09-23 @ 10:33   No Growth Final  --  --      .Blood Blood  09-21 @ 14:47   No Growth Final  --  --      .Urine Clean Catch (Midstream)  09-19 @ 16:20   <10,000 CFU/mL Normal Urogenital Shannan  --  --      .Blood Blood-Peripheral  09-19 @ 11:48   Growth in anaerobic bottle: Enterococcus faecium (vancomycin resistant)              RADIOLOGY:      ROS:  [  ] UNABLE TO ELICIT 65y Female who is doing much better, but c/o worsening congestion with inability to bring up her sputum, she has no fevers  , chills, diarrhea, she has no chest pain or abdominal pain. She is on Day # 8 of zyvox and Cancidas post negative culture, yeast has still not been identified.    Meds:  caspofungin IVPB      caspofungin IVPB 50 milliGRAM(s) IV Intermittent every 24 hours  linezolid  IVPB 600 milliGRAM(s) IV Intermittent every 12 hours  linezolid  IVPB        Allergies    fish (Angioedema)  penicillins (Rash)    Intolerances        VITALS:  Vital Signs Last 24 Hrs  T(C): 36.7 (28 Sep 2020 14:25), Max: 36.7 (28 Sep 2020 14:25)  T(F): 98 (28 Sep 2020 14:25), Max: 98 (28 Sep 2020 14:25)  HR: 112 (28 Sep 2020 14:25) (98 - 112)  BP: 158/83 (28 Sep 2020 14:25) (115/60 - 158/83)  BP(mean): --  RR: 18 (28 Sep 2020 14:25) (16 - 18)  SpO2: 100% (28 Sep 2020 14:25) (100% - 100%)    LABS/DIAGNOSTIC TESTS:                          10.2   3.81  )-----------( 56       ( 28 Sep 2020 07:24 )             29.1         09-28    134<L>  |  95<L>  |  12  ----------------------------<  306<H>  3.9   |  35<H>  |  0.52    Ca    8.1<L>      28 Sep 2020 07:24    TPro  5.9<L>  /  Alb  2.9<L>  /  TBili  0.8  /  DBili  x   /  AST  12  /  ALT  26  /  AlkPhos  107  09-28      LIVER FUNCTIONS - ( 28 Sep 2020 07:24 )  Alb: 2.9 g/dL / Pro: 5.9 g/dL / ALK PHOS: 107 U/L / ALT: 26 U/L DA / AST: 12 U/L / GGT: x             CULTURES: .Blood Blood  09-27 @ 12:06   No growth to date.  --  --      .Blood Blood  09-23 @ 10:33   No Growth Final  --  --      .Blood Blood  09-21 @ 14:47   No Growth Final  --  --      .Urine Clean Catch (Midstream)  09-19 @ 16:20   <10,000 CFU/mL Normal Urogenital Shannan  --  --      .Blood Blood-Peripheral  09-19 @ 11:48   Growth in anaerobic bottle: Enterococcus faecium (vancomycin resistant)              RADIOLOGY:      ROS:  [  ] UNABLE TO ELICIT

## 2020-09-29 DIAGNOSIS — E87.6 HYPOKALEMIA: ICD-10-CM

## 2020-09-29 LAB
ANION GAP SERPL CALC-SCNC: 1 MMOL/L — LOW (ref 5–17)
ANION GAP SERPL CALC-SCNC: 5 MMOL/L — SIGNIFICANT CHANGE UP (ref 5–17)
BUN SERPL-MCNC: 13 MG/DL — SIGNIFICANT CHANGE UP (ref 7–18)
BUN SERPL-MCNC: 8 MG/DL — SIGNIFICANT CHANGE UP (ref 7–18)
CALCIUM SERPL-MCNC: 8.2 MG/DL — LOW (ref 8.4–10.5)
CALCIUM SERPL-MCNC: 8.3 MG/DL — LOW (ref 8.4–10.5)
CHLORIDE SERPL-SCNC: 96 MMOL/L — SIGNIFICANT CHANGE UP (ref 96–108)
CHLORIDE SERPL-SCNC: 98 MMOL/L — SIGNIFICANT CHANGE UP (ref 96–108)
CO2 SERPL-SCNC: 37 MMOL/L — HIGH (ref 22–31)
CO2 SERPL-SCNC: 37 MMOL/L — HIGH (ref 22–31)
CREAT SERPL-MCNC: 0.5 MG/DL — SIGNIFICANT CHANGE UP (ref 0.5–1.3)
CREAT SERPL-MCNC: 0.56 MG/DL — SIGNIFICANT CHANGE UP (ref 0.5–1.3)
GLUCOSE BLDC GLUCOMTR-MCNC: 142 MG/DL — HIGH (ref 70–99)
GLUCOSE BLDC GLUCOMTR-MCNC: 151 MG/DL — HIGH (ref 70–99)
GLUCOSE BLDC GLUCOMTR-MCNC: 186 MG/DL — HIGH (ref 70–99)
GLUCOSE BLDC GLUCOMTR-MCNC: 203 MG/DL — HIGH (ref 70–99)
GLUCOSE BLDC GLUCOMTR-MCNC: 240 MG/DL — HIGH (ref 70–99)
GLUCOSE BLDC GLUCOMTR-MCNC: 260 MG/DL — HIGH (ref 70–99)
GLUCOSE BLDC GLUCOMTR-MCNC: 302 MG/DL — HIGH (ref 70–99)
GLUCOSE BLDC GLUCOMTR-MCNC: 577 MG/DL — CRITICAL HIGH (ref 70–99)
GLUCOSE SERPL-MCNC: 135 MG/DL — HIGH (ref 70–99)
GLUCOSE SERPL-MCNC: 163 MG/DL — HIGH (ref 70–99)
HCT VFR BLD CALC: 27.8 % — LOW (ref 34.5–45)
HGB BLD-MCNC: 9.6 G/DL — LOW (ref 11.5–15.5)
MAGNESIUM SERPL-MCNC: 1.9 MG/DL — SIGNIFICANT CHANGE UP (ref 1.6–2.6)
MCHC RBC-ENTMCNC: 30.4 PG — SIGNIFICANT CHANGE UP (ref 27–34)
MCHC RBC-ENTMCNC: 34.5 GM/DL — SIGNIFICANT CHANGE UP (ref 32–36)
MCV RBC AUTO: 88 FL — SIGNIFICANT CHANGE UP (ref 80–100)
NRBC # BLD: 0 /100 WBCS — SIGNIFICANT CHANGE UP (ref 0–0)
PHOSPHATE SERPL-MCNC: 2.5 MG/DL — SIGNIFICANT CHANGE UP (ref 2.5–4.5)
PLATELET # BLD AUTO: 50 K/UL — LOW (ref 150–400)
POTASSIUM SERPL-MCNC: 2.9 MMOL/L — CRITICAL LOW (ref 3.5–5.3)
POTASSIUM SERPL-MCNC: 4.2 MMOL/L — SIGNIFICANT CHANGE UP (ref 3.5–5.3)
POTASSIUM SERPL-SCNC: 2.9 MMOL/L — CRITICAL LOW (ref 3.5–5.3)
POTASSIUM SERPL-SCNC: 4.2 MMOL/L — SIGNIFICANT CHANGE UP (ref 3.5–5.3)
RBC # BLD: 3.16 M/UL — LOW (ref 3.8–5.2)
RBC # FLD: 15.3 % — HIGH (ref 10.3–14.5)
SODIUM SERPL-SCNC: 136 MMOL/L — SIGNIFICANT CHANGE UP (ref 135–145)
SODIUM SERPL-SCNC: 138 MMOL/L — SIGNIFICANT CHANGE UP (ref 135–145)
WBC # BLD: 5.31 K/UL — SIGNIFICANT CHANGE UP (ref 3.8–10.5)
WBC # FLD AUTO: 5.31 K/UL — SIGNIFICANT CHANGE UP (ref 3.8–10.5)

## 2020-09-29 RX ORDER — POTASSIUM CHLORIDE 20 MEQ
40 PACKET (EA) ORAL EVERY 4 HOURS
Refills: 0 | Status: COMPLETED | OUTPATIENT
Start: 2020-09-29 | End: 2020-09-29

## 2020-09-29 RX ORDER — GABAPENTIN 400 MG/1
600 CAPSULE ORAL EVERY 8 HOURS
Refills: 0 | Status: DISCONTINUED | OUTPATIENT
Start: 2020-09-29 | End: 2020-10-08

## 2020-09-29 RX ADMIN — ATORVASTATIN CALCIUM 40 MILLIGRAM(S): 80 TABLET, FILM COATED ORAL at 21:51

## 2020-09-29 RX ADMIN — POLYETHYLENE GLYCOL 3350 17 GRAM(S): 17 POWDER, FOR SOLUTION ORAL at 17:14

## 2020-09-29 RX ADMIN — GABAPENTIN 600 MILLIGRAM(S): 400 CAPSULE ORAL at 21:51

## 2020-09-29 RX ADMIN — GABAPENTIN 600 MILLIGRAM(S): 400 CAPSULE ORAL at 06:43

## 2020-09-29 RX ADMIN — BUDESONIDE AND FORMOTEROL FUMARATE DIHYDRATE 2 PUFF(S): 160; 4.5 AEROSOL RESPIRATORY (INHALATION) at 09:36

## 2020-09-29 RX ADMIN — ALBUTEROL 2 PUFF(S): 90 AEROSOL, METERED ORAL at 02:50

## 2020-09-29 RX ADMIN — Medication 40 MILLIEQUIVALENT(S): at 09:29

## 2020-09-29 RX ADMIN — Medication 600 MILLIGRAM(S): at 17:14

## 2020-09-29 RX ADMIN — NYSTATIN CREAM 1 APPLICATION(S): 100000 CREAM TOPICAL at 06:47

## 2020-09-29 RX ADMIN — CARVEDILOL PHOSPHATE 6.25 MILLIGRAM(S): 80 CAPSULE, EXTENDED RELEASE ORAL at 06:45

## 2020-09-29 RX ADMIN — ENOXAPARIN SODIUM 40 MILLIGRAM(S): 100 INJECTION SUBCUTANEOUS at 12:23

## 2020-09-29 RX ADMIN — Medication 12 UNIT(S): at 09:13

## 2020-09-29 RX ADMIN — DIPHENHYDRAMINE HYDROCHLORIDE AND LIDOCAINE HYDROCHLORIDE AND ALUMINUM HYDROXIDE AND MAGNESIUM HYDRO 10 MILLILITER(S): KIT at 06:41

## 2020-09-29 RX ADMIN — Medication 1 MILLIGRAM(S): at 12:22

## 2020-09-29 RX ADMIN — CARVEDILOL PHOSPHATE 6.25 MILLIGRAM(S): 80 CAPSULE, EXTENDED RELEASE ORAL at 17:14

## 2020-09-29 RX ADMIN — CASPOFUNGIN ACETATE 260 MILLIGRAM(S): 7 INJECTION, POWDER, LYOPHILIZED, FOR SOLUTION INTRAVENOUS at 15:04

## 2020-09-29 RX ADMIN — POLYETHYLENE GLYCOL 3350 17 GRAM(S): 17 POWDER, FOR SOLUTION ORAL at 06:48

## 2020-09-29 RX ADMIN — LINEZOLID 300 MILLIGRAM(S): 600 INJECTION, SOLUTION INTRAVENOUS at 17:14

## 2020-09-29 RX ADMIN — Medication 12: at 09:13

## 2020-09-29 RX ADMIN — OXYCODONE AND ACETAMINOPHEN 2 TABLET(S): 5; 325 TABLET ORAL at 04:00

## 2020-09-29 RX ADMIN — Medication 40 MILLIEQUIVALENT(S): at 18:20

## 2020-09-29 RX ADMIN — PANTOPRAZOLE SODIUM 40 MILLIGRAM(S): 20 TABLET, DELAYED RELEASE ORAL at 12:22

## 2020-09-29 RX ADMIN — ALBUTEROL 2 PUFF(S): 90 AEROSOL, METERED ORAL at 14:12

## 2020-09-29 RX ADMIN — OXYCODONE AND ACETAMINOPHEN 2 TABLET(S): 5; 325 TABLET ORAL at 21:55

## 2020-09-29 RX ADMIN — MUPIROCIN 1 APPLICATION(S): 20 OINTMENT TOPICAL at 06:45

## 2020-09-29 RX ADMIN — ALBUTEROL 2 PUFF(S): 90 AEROSOL, METERED ORAL at 06:46

## 2020-09-29 RX ADMIN — Medication 4: at 17:00

## 2020-09-29 RX ADMIN — ALBUTEROL 2 PUFF(S): 90 AEROSOL, METERED ORAL at 17:14

## 2020-09-29 RX ADMIN — OXYCODONE AND ACETAMINOPHEN 2 TABLET(S): 5; 325 TABLET ORAL at 03:05

## 2020-09-29 RX ADMIN — DIPHENHYDRAMINE HYDROCHLORIDE AND LIDOCAINE HYDROCHLORIDE AND ALUMINUM HYDROXIDE AND MAGNESIUM HYDRO 10 MILLILITER(S): KIT at 21:51

## 2020-09-29 RX ADMIN — Medication 600 MILLIGRAM(S): at 06:45

## 2020-09-29 RX ADMIN — ERGOCALCIFEROL 50000 UNIT(S): 1.25 CAPSULE ORAL at 06:45

## 2020-09-29 RX ADMIN — TIOTROPIUM BROMIDE 1 CAPSULE(S): 18 CAPSULE ORAL; RESPIRATORY (INHALATION) at 13:52

## 2020-09-29 RX ADMIN — ALBUTEROL 2 PUFF(S): 90 AEROSOL, METERED ORAL at 21:52

## 2020-09-29 RX ADMIN — Medication 12 UNIT(S): at 17:00

## 2020-09-29 RX ADMIN — INSULIN GLARGINE 35 UNIT(S): 100 INJECTION, SOLUTION SUBCUTANEOUS at 09:18

## 2020-09-29 RX ADMIN — CHLORHEXIDINE GLUCONATE 1 APPLICATION(S): 213 SOLUTION TOPICAL at 06:45

## 2020-09-29 RX ADMIN — Medication 3 MILLIGRAM(S): at 21:51

## 2020-09-29 RX ADMIN — BUDESONIDE AND FORMOTEROL FUMARATE DIHYDRATE 2 PUFF(S): 160; 4.5 AEROSOL RESPIRATORY (INHALATION) at 21:52

## 2020-09-29 RX ADMIN — SENNA PLUS 2 TABLET(S): 8.6 TABLET ORAL at 21:51

## 2020-09-29 RX ADMIN — NYSTATIN CREAM 1 APPLICATION(S): 100000 CREAM TOPICAL at 18:21

## 2020-09-29 RX ADMIN — Medication 40 MILLIEQUIVALENT(S): at 13:47

## 2020-09-29 RX ADMIN — Medication 40 MILLIGRAM(S): at 06:47

## 2020-09-29 RX ADMIN — LINEZOLID 300 MILLIGRAM(S): 600 INJECTION, SOLUTION INTRAVENOUS at 06:49

## 2020-09-29 RX ADMIN — DIPHENHYDRAMINE HYDROCHLORIDE AND LIDOCAINE HYDROCHLORIDE AND ALUMINUM HYDROXIDE AND MAGNESIUM HYDRO 10 MILLILITER(S): KIT at 13:47

## 2020-09-29 RX ADMIN — OXYCODONE AND ACETAMINOPHEN 2 TABLET(S): 5; 325 TABLET ORAL at 22:17

## 2020-09-29 RX ADMIN — ALBUTEROL 2 PUFF(S): 90 AEROSOL, METERED ORAL at 09:35

## 2020-09-29 NOTE — PROGRESS NOTE ADULT - PROBLEM SELECTOR PLAN 10
DVT and GI prophylaxis.  Needs 4 more days of antibiotics as per Dr Corcoran  PICC line not needed.  Discharge planning started.  Remains Full code as per patient's wishes.

## 2020-09-29 NOTE — PROGRESS NOTE ADULT - SUBJECTIVE AND OBJECTIVE BOX
BARB COLÓN    SCU progress note    INTERVAL HPI/OVERNIGHT EVENTS: ***No overnight events.    DNR [ ]   DNI  [  ]   FULL CODE    Covid - 19 PCR: Negative 9/19    The 4Ms    What Matters Most: see GOC  Age appropriate Medications/Screen for High Risk Medication:  Mentation: Screen for delirium at least every 12 hours: CAM????  Mobility: Screen for mobility limitations        PRESSORS: [ ] YES [x ] NO  caspofungin IVPB      caspofungin IVPB 50 milliGRAM(s) IV Intermittent every 24 hours  linezolid  IVPB      linezolid  IVPB 600 milliGRAM(s) IV Intermittent every 12 hours    Cardiovascular:  Heart Failure  Acute   Acute on Chronic  Chronic       carvedilol 6.25 milliGRAM(s) Oral every 12 hours    Pulmonary:  ALBUTerol    90 MICROgram(s) HFA Inhaler 2 Puff(s) Inhalation every 4 hours  budesonide 160 MICROgram(s)/formoterol 4.5 MICROgram(s) Inhaler 2 Puff(s) Inhalation two times a day  guaiFENesin  milliGRAM(s) Oral every 12 hours  tiotropium 18 MICROgram(s) Capsule 1 Capsule(s) Inhalation daily    Hematalogic:  enoxaparin Injectable 40 milliGRAM(s) SubCutaneous daily    Other:  atorvastatin 40 milliGRAM(s) Oral at bedtime  chlorhexidine 2% Cloths 1 Application(s) Topical <User Schedule>  dextrose 40% Gel 15 Gram(s) Oral once PRN  dextrose 5%. 1000 milliLiter(s) IV Continuous <Continuous>  dextrose 50% Injectable 12.5 Gram(s) IV Push once  dextrose 50% Injectable 25 Gram(s) IV Push once  dextrose 50% Injectable 25 Gram(s) IV Push once  ergocalciferol 72826 Unit(s) Oral <User Schedule>  FIRST- Mouthwash  BLM 10 milliLiter(s) Swish and Swallow every 8 hours  folic acid 1 milliGRAM(s) Oral daily  gabapentin 600 milliGRAM(s) Oral every 8 hours  glucagon  Injectable 1 milliGRAM(s) IntraMuscular once PRN  influenza   Vaccine 0.5 milliLiter(s) IntraMuscular once  insulin glargine Injectable (LANTUS) 35 Unit(s) SubCutaneous every morning  insulin lispro (HumaLOG) corrective regimen sliding scale   SubCutaneous three times a day before meals  insulin lispro Injectable (HumaLOG) 12 Unit(s) SubCutaneous three times a day before meals  melatonin 3 milliGRAM(s) Oral at bedtime  morphine  - Injectable 2 milliGRAM(s) IV Push every 4 hours PRN  nystatin Powder 1 Application(s) Topical two times a day  ondansetron Injectable 4 milliGRAM(s) IV Push every 6 hours PRN  oxycodone    5 mG/acetaminophen 325 mG 2 Tablet(s) Oral every 6 hours PRN  pantoprazole  Injectable 40 milliGRAM(s) IV Push daily  polyethylene glycol 3350 17 Gram(s) Oral two times a day  potassium chloride    Tablet ER 40 milliEquivalent(s) Oral every 4 hours  predniSONE   Tablet 40 milliGRAM(s) Oral daily  senna 2 Tablet(s) Oral at bedtime  sodium chloride 0.65% Nasal 1 Spray(s) Both Nostrils four times a day PRN    ALBUTerol    90 MICROgram(s) HFA Inhaler 2 Puff(s) Inhalation every 4 hours  atorvastatin 40 milliGRAM(s) Oral at bedtime  budesonide 160 MICROgram(s)/formoterol 4.5 MICROgram(s) Inhaler 2 Puff(s) Inhalation two times a day  carvedilol 6.25 milliGRAM(s) Oral every 12 hours  caspofungin IVPB      caspofungin IVPB 50 milliGRAM(s) IV Intermittent every 24 hours  chlorhexidine 2% Cloths 1 Application(s) Topical <User Schedule>  dextrose 40% Gel 15 Gram(s) Oral once PRN  dextrose 5%. 1000 milliLiter(s) IV Continuous <Continuous>  dextrose 50% Injectable 12.5 Gram(s) IV Push once  dextrose 50% Injectable 25 Gram(s) IV Push once  dextrose 50% Injectable 25 Gram(s) IV Push once  enoxaparin Injectable 40 milliGRAM(s) SubCutaneous daily  ergocalciferol 43157 Unit(s) Oral <User Schedule>  FIRST- Mouthwash  BLM 10 milliLiter(s) Swish and Swallow every 8 hours  folic acid 1 milliGRAM(s) Oral daily  gabapentin 600 milliGRAM(s) Oral every 8 hours  glucagon  Injectable 1 milliGRAM(s) IntraMuscular once PRN  guaiFENesin  milliGRAM(s) Oral every 12 hours  influenza   Vaccine 0.5 milliLiter(s) IntraMuscular once  insulin glargine Injectable (LANTUS) 35 Unit(s) SubCutaneous every morning  insulin lispro (HumaLOG) corrective regimen sliding scale   SubCutaneous three times a day before meals  insulin lispro Injectable (HumaLOG) 12 Unit(s) SubCutaneous three times a day before meals  linezolid  IVPB      linezolid  IVPB 600 milliGRAM(s) IV Intermittent every 12 hours  melatonin 3 milliGRAM(s) Oral at bedtime  morphine  - Injectable 2 milliGRAM(s) IV Push every 4 hours PRN  nystatin Powder 1 Application(s) Topical two times a day  ondansetron Injectable 4 milliGRAM(s) IV Push every 6 hours PRN  oxycodone    5 mG/acetaminophen 325 mG 2 Tablet(s) Oral every 6 hours PRN  pantoprazole  Injectable 40 milliGRAM(s) IV Push daily  polyethylene glycol 3350 17 Gram(s) Oral two times a day  potassium chloride    Tablet ER 40 milliEquivalent(s) Oral every 4 hours  predniSONE   Tablet 40 milliGRAM(s) Oral daily  senna 2 Tablet(s) Oral at bedtime  sodium chloride 0.65% Nasal 1 Spray(s) Both Nostrils four times a day PRN  tiotropium 18 MICROgram(s) Capsule 1 Capsule(s) Inhalation daily    Drug Dosing Weight  Height (cm): 154.9 (19 Sep 2020 08:00)  Weight (kg): 84.8 (21 Sep 2020 09:15)  BMI (kg/m2): 35.3 (21 Sep 2020 09:15)  BSA (m2): 1.84 (21 Sep 2020 09:15)    CENTRAL LINE: [ ] YES [x ] NO  LOCATION:   DATE INSERTED:  REMOVE: [ ] YES [ ] NO  EXPLAIN:    ROBLEDO: [ ] YES [x ] NO    DATE INSERTED:  REMOVE:  [ ] YES [ ] NO  EXPLAIN:    PAST MEDICAL & SURGICAL HISTORY:  Malignant neoplasm of unspecified part of right bronchus or lung    HTN (hypertension)    DM (diabetes mellitus)    COPD (chronic obstructive pulmonary disease)    Lung cancer    Morbid obesity    GERD (gastroesophageal reflux disease)    Deviated septum    Prolapsed uterus    ITP (idiopathic thrombocytopenic purpura)    Emphysema/COPD    History of cholecystectomy    S/P lobectomy of lung  right 2017    History of tonsillectomy                09-28 @ 07:01  -  09-29 @ 07:00  --------------------------------------------------------  IN: 550 mL / OUT: 1500 mL / NET: -950 mL            PHYSICAL EXAM:    GENERAL: NAD, well-groomed, well-developed  HEAD:  Atraumatic, Normocephalic  EYES: EOMI, PERRLA, conjunctiva and sclera clear  ENMT: No tonsillar erythema, exudates  NECK: Supple, No JVD  NERVOUS SYSTEM:  Alert & Oriented X3, Follows commands/ Moving all extremities  CHEST/LUNG: Diminished breath sounds bilateral bases  HEART: Regular rate and rhythm; No murmurs, rubs, or gallops  ABDOMEN: Soft, Nontender, Nondistended; Bowel sounds present  EXTREMITIES:  2+ Peripheral Pulses, No clubbing, cyanosis, or edema  LYMPH: No lymphadenopathy noted  SKIN: No rashes or lesions      LABS:  CBC Full  -  ( 29 Sep 2020 07:17 )  WBC Count : 5.31 K/uL  RBC Count : 3.16 M/uL  Hemoglobin : 9.6 g/dL  Hematocrit : 27.8 %  Platelet Count - Automated : 50 K/uL  Mean Cell Volume : 88.0 fl  Mean Cell Hemoglobin : 30.4 pg  Mean Cell Hemoglobin Concentration : 34.5 gm/dL  Auto Neutrophil # : x  Auto Lymphocyte # : x  Auto Monocyte # : x  Auto Eosinophil # : x  Auto Basophil # : x  Auto Neutrophil % : x  Auto Lymphocyte % : x  Auto Monocyte % : x  Auto Eosinophil % : x  Auto Basophil % : x    09-29    138  |  96  |  13  ----------------------------<  163<H>  2.9<LL>   |  37<H>  |  0.50    Ca    8.2<L>      29 Sep 2020 07:17  Phos  2.5     09-29  Mg     1.9     09-29    TPro  5.9<L>  /  Alb  2.9<L>  /  TBili  0.8  /  DBili  x   /  AST  12  /  ALT  26  /  AlkPhos  107  09-28              [  ]  DVT Prophylaxis  [  ]  Nutrition, Brand, Rate         Goal Rate         Abdominal Nutritional Status -  Malnutrition   Cachexia      Morbid Obesity BMI >/=40    RADIOLOGY & ADDITIONAL STUDIES:  ***  < from: Xray Chest 1 View- PORTABLE-Routine (Xray Chest 1 View- PORTABLE-Routine in AM.) (09.23.20 @ 10:09) >  AP view of the chest is markedly rotated to the left and demonstrates persistent right perihilar infiltrate versus atelectasis. There is mild left perihilar discoid atelectasis.. There is no pleural effusion. There is no pneumothorax.    The heart, mediastinum and galina cannot be assessed due to rotation..    The pulmonary vasculature is normal.    Mild thoracic degenerative changes are present.    IMPRESSION:    Persistent right perihilar infiltrate versus atelectasis. Left midlung discoid atelectasis.    < end of copied text >    [  ] Goals of Care Discussion with Family 9/23    TIME SPENT: 35 minutes BARB COLÓN    SCU progress note    INTERVAL HPI/OVERNIGHT EVENTS: ***No overnight events.    DNR [ ]   DNI  [  ]   FULL CODE    Covid - 19 PCR: Negative 9/19    The 4Ms    What Matters Most: see GOC  Age appropriate Medications/Screen for High Risk Medication:  Mentation: Screen for delirium at least every 12 hours: CAM????  Mobility: Screen for mobility limitations        PRESSORS: [ ] YES [x ] NO  caspofungin IVPB      caspofungin IVPB 50 milliGRAM(s) IV Intermittent every 24 hours  linezolid  IVPB      linezolid  IVPB 600 milliGRAM(s) IV Intermittent every 12 hours    Cardiovascular:  Heart Failure  Acute   Acute on Chronic  Chronic       carvedilol 6.25 milliGRAM(s) Oral every 12 hours    Pulmonary:  ALBUTerol    90 MICROgram(s) HFA Inhaler 2 Puff(s) Inhalation every 4 hours  budesonide 160 MICROgram(s)/formoterol 4.5 MICROgram(s) Inhaler 2 Puff(s) Inhalation two times a day  guaiFENesin  milliGRAM(s) Oral every 12 hours  tiotropium 18 MICROgram(s) Capsule 1 Capsule(s) Inhalation daily    Hematalogic:  enoxaparin Injectable 40 milliGRAM(s) SubCutaneous daily    Other:  atorvastatin 40 milliGRAM(s) Oral at bedtime  chlorhexidine 2% Cloths 1 Application(s) Topical <User Schedule>  dextrose 40% Gel 15 Gram(s) Oral once PRN  dextrose 5%. 1000 milliLiter(s) IV Continuous <Continuous>  dextrose 50% Injectable 12.5 Gram(s) IV Push once  dextrose 50% Injectable 25 Gram(s) IV Push once  dextrose 50% Injectable 25 Gram(s) IV Push once  ergocalciferol 38536 Unit(s) Oral <User Schedule>  FIRST- Mouthwash  BLM 10 milliLiter(s) Swish and Swallow every 8 hours  folic acid 1 milliGRAM(s) Oral daily  gabapentin 600 milliGRAM(s) Oral every 8 hours  glucagon  Injectable 1 milliGRAM(s) IntraMuscular once PRN  influenza   Vaccine 0.5 milliLiter(s) IntraMuscular once  insulin glargine Injectable (LANTUS) 35 Unit(s) SubCutaneous every morning  insulin lispro (HumaLOG) corrective regimen sliding scale   SubCutaneous three times a day before meals  insulin lispro Injectable (HumaLOG) 12 Unit(s) SubCutaneous three times a day before meals  melatonin 3 milliGRAM(s) Oral at bedtime  morphine  - Injectable 2 milliGRAM(s) IV Push every 4 hours PRN  nystatin Powder 1 Application(s) Topical two times a day  ondansetron Injectable 4 milliGRAM(s) IV Push every 6 hours PRN  oxycodone    5 mG/acetaminophen 325 mG 2 Tablet(s) Oral every 6 hours PRN  pantoprazole  Injectable 40 milliGRAM(s) IV Push daily  polyethylene glycol 3350 17 Gram(s) Oral two times a day  potassium chloride    Tablet ER 40 milliEquivalent(s) Oral every 4 hours  predniSONE   Tablet 40 milliGRAM(s) Oral daily  senna 2 Tablet(s) Oral at bedtime  sodium chloride 0.65% Nasal 1 Spray(s) Both Nostrils four times a day PRN    ALBUTerol    90 MICROgram(s) HFA Inhaler 2 Puff(s) Inhalation every 4 hours  atorvastatin 40 milliGRAM(s) Oral at bedtime  budesonide 160 MICROgram(s)/formoterol 4.5 MICROgram(s) Inhaler 2 Puff(s) Inhalation two times a day  carvedilol 6.25 milliGRAM(s) Oral every 12 hours  caspofungin IVPB      caspofungin IVPB 50 milliGRAM(s) IV Intermittent every 24 hours  chlorhexidine 2% Cloths 1 Application(s) Topical <User Schedule>  dextrose 40% Gel 15 Gram(s) Oral once PRN  dextrose 5%. 1000 milliLiter(s) IV Continuous <Continuous>  dextrose 50% Injectable 12.5 Gram(s) IV Push once  dextrose 50% Injectable 25 Gram(s) IV Push once  dextrose 50% Injectable 25 Gram(s) IV Push once  enoxaparin Injectable 40 milliGRAM(s) SubCutaneous daily  ergocalciferol 70836 Unit(s) Oral <User Schedule>  FIRST- Mouthwash  BLM 10 milliLiter(s) Swish and Swallow every 8 hours  folic acid 1 milliGRAM(s) Oral daily  gabapentin 600 milliGRAM(s) Oral every 8 hours  glucagon  Injectable 1 milliGRAM(s) IntraMuscular once PRN  guaiFENesin  milliGRAM(s) Oral every 12 hours  influenza   Vaccine 0.5 milliLiter(s) IntraMuscular once  insulin glargine Injectable (LANTUS) 35 Unit(s) SubCutaneous every morning  insulin lispro (HumaLOG) corrective regimen sliding scale   SubCutaneous three times a day before meals  insulin lispro Injectable (HumaLOG) 12 Unit(s) SubCutaneous three times a day before meals  linezolid  IVPB      linezolid  IVPB 600 milliGRAM(s) IV Intermittent every 12 hours  melatonin 3 milliGRAM(s) Oral at bedtime  morphine  - Injectable 2 milliGRAM(s) IV Push every 4 hours PRN  nystatin Powder 1 Application(s) Topical two times a day  ondansetron Injectable 4 milliGRAM(s) IV Push every 6 hours PRN  oxycodone    5 mG/acetaminophen 325 mG 2 Tablet(s) Oral every 6 hours PRN  pantoprazole  Injectable 40 milliGRAM(s) IV Push daily  polyethylene glycol 3350 17 Gram(s) Oral two times a day  potassium chloride    Tablet ER 40 milliEquivalent(s) Oral every 4 hours  predniSONE   Tablet 40 milliGRAM(s) Oral daily  senna 2 Tablet(s) Oral at bedtime  sodium chloride 0.65% Nasal 1 Spray(s) Both Nostrils four times a day PRN  tiotropium 18 MICROgram(s) Capsule 1 Capsule(s) Inhalation daily    Drug Dosing Weight  Height (cm): 154.9 (19 Sep 2020 08:00)  Weight (kg): 84.8 (21 Sep 2020 09:15)  BMI (kg/m2): 35.3 (21 Sep 2020 09:15)  BSA (m2): 1.84 (21 Sep 2020 09:15)    CENTRAL LINE: [ ] YES [x ] NO  LOCATION:   DATE INSERTED:  REMOVE: [ ] YES [ ] NO  EXPLAIN:    ROBLEDO: [ ] YES [x ] NO    DATE INSERTED:  REMOVE:  [ ] YES [ ] NO  EXPLAIN:    PAST MEDICAL & SURGICAL HISTORY:  Malignant neoplasm of unspecified part of right bronchus or lung    HTN (hypertension)    DM (diabetes mellitus)    COPD (chronic obstructive pulmonary disease)    Lung cancer    Morbid obesity    GERD (gastroesophageal reflux disease)    Deviated septum    Prolapsed uterus    ITP (idiopathic thrombocytopenic purpura)    Emphysema/COPD    History of cholecystectomy    S/P lobectomy of lung  right 2017    History of tonsillectomy                09-28 @ 07:01  -  09-29 @ 07:00  --------------------------------------------------------  IN: 550 mL / OUT: 1500 mL / NET: -950 mL            PHYSICAL EXAM:    GENERAL: NAD, well-groomed, well-developed  HEAD:  Atraumatic, Normocephalic  EYES: EOMI, PERRLA, conjunctiva and sclera clear  ENMT: No tonsillar erythema, exudates  NECK: Supple, No JVD  NERVOUS SYSTEM:  Alert & Oriented X3, Follows commands/ Moving all extremities  CHEST/LUNG: Diminished breath sounds bilateral bases  HEART: Regular rate and rhythm; No murmurs, rubs, or gallops  ABDOMEN: Soft, Nontender, Nondistended; Bowel sounds present  EXTREMITIES:  2+ Peripheral Pulses, No clubbing, cyanosis, or edema  LYMPH: No lymphadenopathy noted  SKIN: No rashes or lesions      LABS:  CBC Full  -  ( 29 Sep 2020 07:17 )  WBC Count : 5.31 K/uL  RBC Count : 3.16 M/uL  Hemoglobin : 9.6 g/dL  Hematocrit : 27.8 %  Platelet Count - Automated : 50 K/uL  Mean Cell Volume : 88.0 fl  Mean Cell Hemoglobin : 30.4 pg  Mean Cell Hemoglobin Concentration : 34.5 gm/dL  Auto Neutrophil # : x  Auto Lymphocyte # : x  Auto Monocyte # : x  Auto Eosinophil # : x  Auto Basophil # : x  Auto Neutrophil % : x  Auto Lymphocyte % : x  Auto Monocyte % : x  Auto Eosinophil % : x  Auto Basophil % : x    09-29    138  |  96  |  13  ----------------------------<  163<H>  2.9<LL>   |  37<H>  |  0.50    Ca    8.2<L>      29 Sep 2020 07:17  Phos  2.5     09-29  Mg     1.9     09-29    TPro  5.9<L>  /  Alb  2.9<L>  /  TBili  0.8  /  DBili  x   /  AST  12  /  ALT  26  /  AlkPhos  107  0          [  ]  DVT Prophylaxis  [  ]  Nutrition, Brand, Rate         Goal Rate         Abdominal Nutritional Status -  Malnutrition   Cachexia      Morbid Obesity BMI >/=40    RADIOLOGY & ADDITIONAL STUDIES:  ***  < from: Xray Chest 1 View- PORTABLE-Routine (Xray Chest 1 View- PORTABLE-Routine in AM.) (09.23.20 @ 10:09) >  AP view of the chest is markedly rotated to the left and demonstrates persistent right perihilar infiltrate versus atelectasis. There is mild left perihilar discoid atelectasis.. There is no pleural effusion. There is no pneumothorax.    The heart, mediastinum and galina cannot be assessed due to rotation..    The pulmonary vasculature is normal.    Mild thoracic degenerative changes are present.    IMPRESSION:    Persistent right perihilar infiltrate versus atelectasis. Left midlung discoid atelectasis.    < end of copied text >    [  ] Goals of Care Discussion with Family 9/23    TIME SPENT: 35 minutes

## 2020-09-29 NOTE — PROGRESS NOTE ADULT - PROBLEM SELECTOR PLAN 3
BC from 9/19 growing "yeast like cells"  c/w Caspofungin   per Sterling Ballesteros from [facility] lab:low ID of 'yeast like cells' as Rhodotorual mucilaginosa. Await final ID and susceptibility (likely 2 more days as it is a send out).  No indication for PICC at this time per ID  ID Dr. Corcoran following.

## 2020-09-29 NOTE — PROGRESS NOTE ADULT - SUBJECTIVE AND OBJECTIVE BOX
Interval Events:    tolerating po intake  poc glucose improved  receiving po prednisone    Allergies    fish (Angioedema)  penicillins (Rash)    Intolerances      Endocrine/Metabolic Medications:  atorvastatin 40 milliGRAM(s) Oral at bedtime  dextrose 40% Gel 15 Gram(s) Oral once PRN  dextrose 50% Injectable 12.5 Gram(s) IV Push once  dextrose 50% Injectable 25 Gram(s) IV Push once  dextrose 50% Injectable 25 Gram(s) IV Push once  glucagon  Injectable 1 milliGRAM(s) IntraMuscular once PRN  insulin glargine Injectable (LANTUS) 35 Unit(s) SubCutaneous every morning  insulin lispro (HumaLOG) corrective regimen sliding scale   SubCutaneous three times a day before meals  insulin lispro Injectable (HumaLOG) 12 Unit(s) SubCutaneous three times a day before meals  predniSONE   Tablet 40 milliGRAM(s) Oral daily      Vital Signs Last 24 Hrs  T(C): 36.4 (29 Sep 2020 05:31), Max: 36.7 (28 Sep 2020 14:25)  T(F): 97.6 (29 Sep 2020 05:31), Max: 98.1 (28 Sep 2020 21:40)  HR: 98 (29 Sep 2020 05:31) (98 - 112)  BP: 146/68 (29 Sep 2020 05:31) (141/83 - 158/83)  BP(mean): --  RR: 19 (29 Sep 2020 05:31) (18 - 20)  SpO2: 100% (29 Sep 2020 05:31) (98% - 100%)      PHYSICAL EXAM  Constitutional:    NC/AT:    HEENT:    Neck:  No JVD    Respiratory:  reduced breath sounds b/l bases    Cardiovascular:  RR    Gastrointestinal: Soft    Extremities: without cyanosis  Neurological:  non focal    LABS                        9.6    5.31  )-----------( 50       ( 29 Sep 2020 07:17 )             27.8                               x      |  x      |  x                   Calcium: x     / iCa: x      (09-29 @ 07:17)    ----------------------------<  x         Magnesium: 1.9                              x       |  x      |  0.50             Phosphorous: 2.5        CAPILLARY BLOOD GLUCOSE      POCT Blood Glucose.: 125 mg/dL (28 Sep 2020 22:39)  POCT Blood Glucose.: 176 mg/dL (28 Sep 2020 17:07)  POCT Blood Glucose.: 384 mg/dL (28 Sep 2020 11:46)        Assesment/plan            66 yo female with multiple comorbidities including h/o DM type 2, HTN, COPD on o2, lung ca on right sided s/p resection on chemo admitted with shortness of breath  was intubated/extubated for acute resp failure this admit, now extubated on general medical floor, standing high dose steroids with uncontrolled hyperglycemia    endocrinology consulted for glycemic management    DM type 2  uncontrolled  complicated by high dose steroid use, acute on chronic resp failure  home regimen:  basaglar 60 units daily  novolog 15 units pre meals    recommendations:  steroids would cause mainly prandial hyperglycemia  poc glucose improved, reduce sliding scale    - continue insulin lantus 35 units daily in am   - continue insulin lispro 12 units pre meals  - reduce to low dose sliding scale  - reassess based on requirements  - goal inpatient glucose range: 140-180mg/dl    COPD exacerbation  acute on chronic hypoxic resp failure  s/p intubation/extubation current admit  on standing steroids  on po prednisone  pulmonary on board    sepsis  VRE bacteremia  received zosyn  ID on board    Discussed with patient and primary team   Please call  Endocrine- Dr Katlyn Garcia as needed

## 2020-09-29 NOTE — PROGRESS NOTE ADULT - ATTENDING COMMENTS
Cont. antimicrobials  Platelets continue to drop  F/u ID and hematology recs  Supplement electrolytes  Dispo planning pending final culture results

## 2020-09-29 NOTE — PROGRESS NOTE ADULT - SUBJECTIVE AND OBJECTIVE BOX
feel ok  no sob at rest  feel congested but can not cough up phlegm  no fever  pain is less    MEDICATIONS  (STANDING):  ALBUTerol    90 MICROgram(s) HFA Inhaler 2 Puff(s) Inhalation every 4 hours  atorvastatin 40 milliGRAM(s) Oral at bedtime  budesonide 160 MICROgram(s)/formoterol 4.5 MICROgram(s) Inhaler 2 Puff(s) Inhalation two times a day  carvedilol 6.25 milliGRAM(s) Oral every 12 hours  caspofungin IVPB      caspofungin IVPB 50 milliGRAM(s) IV Intermittent every 24 hours  chlorhexidine 2% Cloths 1 Application(s) Topical <User Schedule>  dextrose 5%. 1000 milliLiter(s) (50 mL/Hr) IV Continuous <Continuous>  dextrose 50% Injectable 12.5 Gram(s) IV Push once  dextrose 50% Injectable 25 Gram(s) IV Push once  dextrose 50% Injectable 25 Gram(s) IV Push once  enoxaparin Injectable 40 milliGRAM(s) SubCutaneous daily  ergocalciferol 44747 Unit(s) Oral <User Schedule>  FIRST- Mouthwash  BLM 10 milliLiter(s) Swish and Swallow every 8 hours  folic acid 1 milliGRAM(s) Oral daily  gabapentin   Solution 600 milliGRAM(s) Oral every 8 hours  guaiFENesin  milliGRAM(s) Oral every 12 hours  influenza   Vaccine 0.5 milliLiter(s) IntraMuscular once  insulin glargine Injectable (LANTUS) 35 Unit(s) SubCutaneous every morning  insulin lispro (HumaLOG) corrective regimen sliding scale   SubCutaneous three times a day before meals  insulin lispro Injectable (HumaLOG) 12 Unit(s) SubCutaneous three times a day before meals  linezolid  IVPB      linezolid  IVPB 600 milliGRAM(s) IV Intermittent every 12 hours  melatonin 3 milliGRAM(s) Oral at bedtime  nystatin Powder 1 Application(s) Topical two times a day  pantoprazole  Injectable 40 milliGRAM(s) IV Push daily  polyethylene glycol 3350 17 Gram(s) Oral two times a day  potassium chloride    Tablet ER 40 milliEquivalent(s) Oral every 4 hours  predniSONE   Tablet 40 milliGRAM(s) Oral daily  senna 2 Tablet(s) Oral at bedtime  tiotropium 18 MICROgram(s) Capsule 1 Capsule(s) Inhalation daily    MEDICATIONS  (PRN):  dextrose 40% Gel 15 Gram(s) Oral once PRN Blood Glucose LESS THAN 70 milliGRAM(s)/deciLiter  glucagon  Injectable 1 milliGRAM(s) IntraMuscular once PRN Glucose <70 milliGRAM(s)/deciLiter  morphine  - Injectable 2 milliGRAM(s) IV Push every 4 hours PRN Severe Pain (7 - 10)  ondansetron Injectable 4 milliGRAM(s) IV Push every 6 hours PRN Nausea and/or Vomiting  oxycodone    5 mG/acetaminophen 325 mG 2 Tablet(s) Oral every 6 hours PRN Moderate Pain (4 - 6)  sodium chloride 0.65% Nasal 1 Spray(s) Both Nostrils four times a day PRN Nasal Congestion      Allergies    fish (Angioedema)  penicillins (Rash)    Intolerances        Vital Signs Last 24 Hrs  T(C): 36.4 (29 Sep 2020 05:31), Max: 36.7 (28 Sep 2020 14:25)  T(F): 97.6 (29 Sep 2020 05:31), Max: 98.1 (28 Sep 2020 21:40)  HR: 98 (29 Sep 2020 05:31) (98 - 112)  BP: 146/68 (29 Sep 2020 05:31) (141/83 - 158/83)  BP(mean): --  RR: 19 (29 Sep 2020 05:31) (18 - 20)  SpO2: 100% (29 Sep 2020 05:31) (98% - 100%)    PHYSICAL EXAM  General: adult in NAD  HEENT: clear oropharynx, anicteric sclera, pink conjunctiva  Neck: supple  CV: normal S1/S2 with no murmur rubs or gallops  Lungs: positive air movement b/l ant lungs,clear to auscultation, no wheezes, no rales  Abdomen: soft non-tender non-distended, no hepatosplenomegaly  Ext: no clubbing cyanosis or edema  Skin: no rashes and no petechiae  Neuro: alert and oriented X 4, no focal deficits  LABS:                          9.6    5.31  )-----------( 50       ( 29 Sep 2020 07:17 )             27.8         Mean Cell Volume : 88.0 fl  Mean Cell Hemoglobin : 30.4 pg  Mean Cell Hemoglobin Concentration : 34.5 gm/dL  Auto Neutrophil # : x  Auto Lymphocyte # : x  Auto Monocyte # : x  Auto Eosinophil # : x  Auto Basophil # : x  Auto Neutrophil % : x  Auto Lymphocyte % : x  Auto Monocyte % : x  Auto Eosinophil % : x  Auto Basophil % : x    Serial CBC  Hematocrit 27.8  Hemoglobin 9.6  Plat 50  RBC 3.16  WBC 5.31  Serial CBC  Hematocrit 29.1  Hemoglobin 10.2  Plat 56  RBC 3.30  WBC 3.81  Serial CBC  Hematocrit 28.5  Hemoglobin 9.7  Plat 56  RBC 3.21  WBC 2.95  Serial CBC  Hematocrit 32.2  Hemoglobin 10.9  Plat 90  RBC 3.54  WBC 4.80  Serial CBC  Hematocrit 29.9  Hemoglobin 10.1  Plat 85  RBC 3.35  WBC 4.59    09-29    138  |  96  |  13  ----------------------------<  163<H>  2.9<LL>   |  37<H>  |  0.50    Ca    8.2<L>      29 Sep 2020 07:17  Phos  2.5     09-29  Mg     1.9     09-29    TPro  5.9<L>  /  Alb  2.9<L>  /  TBili  0.8  /  DBili  x   /  AST  12  /  ALT  26  /  AlkPhos  107  09-28          Ferritin, Serum: 1109 ng/mL (09-24 @ 10:15)  Iron - Total Binding Capacity.: 219 ug/dL (09-24 @ 06:08)            BLOOD SMEAR INTERPRETATION:       RADIOLOGY & ADDITIONAL STUDIES:

## 2020-09-29 NOTE — PROGRESS NOTE ADULT - PROBLEM SELECTOR PLAN 1
Secondary to end stage COPD.  Continue oxygen support. Refuses BIPAP  Continue LABA and ICS  Continue prednisone 40mg daILY  Monitor oxygen saturation.

## 2020-09-29 NOTE — PROGRESS NOTE ADULT - PROBLEM SELECTOR PLAN 5
Continue insulin glargine Injectable (LANTUS) 35 Unit(s) SubCutaneous every morning  insulin lispro (HumaLOG) corrective regimen sliding scale   SubCutaneous three times a day before meals  insulin lispro Injectable (HumaLOG) 12 Unit(s) SubCutaneous three times a day before meals  Monitor glucose.  Encourage patient not to snack on things brought from home.

## 2020-09-29 NOTE — PROGRESS NOTE ADULT - SUBJECTIVE AND OBJECTIVE BOX
PULMONARY  progress note    BARB COLÓN  MRN-[id number]    Patient is a 65y old  Female who presents with a chief complaint of shortness of breath (29 Sep 2020 08:46)  Feels weak, no sob, agrees for rehab      MEDICATIONS  (STANDING):  ALBUTerol    90 MICROgram(s) HFA Inhaler 2 Puff(s) Inhalation every 4 hours  atorvastatin 40 milliGRAM(s) Oral at bedtime  budesonide 160 MICROgram(s)/formoterol 4.5 MICROgram(s) Inhaler 2 Puff(s) Inhalation two times a day  carvedilol 6.25 milliGRAM(s) Oral every 12 hours  caspofungin IVPB      caspofungin IVPB 50 milliGRAM(s) IV Intermittent every 24 hours  chlorhexidine 2% Cloths 1 Application(s) Topical <User Schedule>  dextrose 5%. 1000 milliLiter(s) (50 mL/Hr) IV Continuous <Continuous>  dextrose 50% Injectable 12.5 Gram(s) IV Push once  dextrose 50% Injectable 25 Gram(s) IV Push once  dextrose 50% Injectable 25 Gram(s) IV Push once  enoxaparin Injectable 40 milliGRAM(s) SubCutaneous daily  ergocalciferol 26163 Unit(s) Oral <User Schedule>  FIRST- Mouthwash  BLM 10 milliLiter(s) Swish and Swallow every 8 hours  folic acid 1 milliGRAM(s) Oral daily  gabapentin   Solution 600 milliGRAM(s) Oral every 8 hours  guaiFENesin  milliGRAM(s) Oral every 12 hours  influenza   Vaccine 0.5 milliLiter(s) IntraMuscular once  insulin glargine Injectable (LANTUS) 35 Unit(s) SubCutaneous every morning  insulin lispro (HumaLOG) corrective regimen sliding scale   SubCutaneous three times a day before meals  insulin lispro Injectable (HumaLOG) 12 Unit(s) SubCutaneous three times a day before meals  linezolid  IVPB      linezolid  IVPB 600 milliGRAM(s) IV Intermittent every 12 hours  melatonin 3 milliGRAM(s) Oral at bedtime  nystatin Powder 1 Application(s) Topical two times a day  pantoprazole  Injectable 40 milliGRAM(s) IV Push daily  polyethylene glycol 3350 17 Gram(s) Oral two times a day  potassium chloride    Tablet ER 40 milliEquivalent(s) Oral every 4 hours  predniSONE   Tablet 40 milliGRAM(s) Oral daily  senna 2 Tablet(s) Oral at bedtime  tiotropium 18 MICROgram(s) Capsule 1 Capsule(s) Inhalation daily      MEDICATIONS  (PRN):  dextrose 40% Gel 15 Gram(s) Oral once PRN Blood Glucose LESS THAN 70 milliGRAM(s)/deciLiter  glucagon  Injectable 1 milliGRAM(s) IntraMuscular once PRN Glucose <70 milliGRAM(s)/deciLiter  morphine  - Injectable 2 milliGRAM(s) IV Push every 4 hours PRN Severe Pain (7 - 10)  ondansetron Injectable 4 milliGRAM(s) IV Push every 6 hours PRN Nausea and/or Vomiting  oxycodone    5 mG/acetaminophen 325 mG 2 Tablet(s) Oral every 6 hours PRN Moderate Pain (4 - 6)  sodium chloride 0.65% Nasal 1 Spray(s) Both Nostrils four times a day PRN Nasal Congestion      Allergies    fish (Angioedema)  penicillins (Rash)        PAST MEDICAL & SURGICAL HISTORY:  Malignant neoplasm of unspecified part of right bronchus or lung    HTN (hypertension)    DM (diabetes mellitus)    COPD (chronic obstructive pulmonary disease)    Lung cancer    Morbid obesity    GERD (gastroesophageal reflux disease)    Deviated septum    Prolapsed uterus    ITP (idiopathic thrombocytopenic purpura)    Emphysema/COPD    History of cholecystectomy    S/P lobectomy of lung  right 2017    History of tonsillectomy             REVIEW OF SYSTEMS:  CONSTITUTIONAL: No fever, weight loss, or fatigue   EYES: No eye pain, visual disturbances, or discharge  ENT:  No difficulty hearing, tinnitus, vertigo; No sinus or throat pain  NECK: No pain or stiffness or nodes  RESPIRATORY:  cough +  wheezing --chills --  hemoptysis--  Shortness of Breath+  CARDIOVASCULAR: No chest pain, palpitations, passing out, dizziness, or leg swelling  GASTROINTESTINAL: No abdominal or epigastric pain. No nausea, vomiting, or hematemesis; No diarrhea or constipation. No melena or hematochezia.  GENITOURINARY: No dysuria, frequency, hematuria, or incontinence  NEUROLOGICAL: No headaches, memory loss, loss of strength, numbness, or tremors  SKIN: No itching, burning, rashes, or lesions   LYMPH Nodes: No enlarged glands  PSYCHIATRIC: No depression, anxiety, mood swings, or difficulty sleeping  HEME/LYMPH: No easy bruising, or bleeding gums  ALLERGY AND IMMUNOLOGIC: No hives or eczema    Vital Signs Last 24 Hrs  T(C): 36.4 (29 Sep 2020 05:31), Max: 36.7 (28 Sep 2020 14:25)  T(F): 97.6 (29 Sep 2020 05:31), Max: 98.1 (28 Sep 2020 21:40)  HR: 98 (29 Sep 2020 05:31) (98 - 112)  BP: 146/68 (29 Sep 2020 05:31) (141/83 - 158/83)  BP(mean): --  RR: 19 (29 Sep 2020 05:31) (18 - 20)  SpO2: 100% (29 Sep 2020 05:31) (98% - 100%)  I&O's Detail    28 Sep 2020 07:01  -  29 Sep 2020 07:00  --------------------------------------------------------  IN:    IV PiggyBack: 250 mL    IV PiggyBack: 300 mL  Total IN: 550 mL    OUT:    Voided (mL): 1500 mL  Total OUT: 1500 mL    Total NET: -950 mL          PHYSICAL EXAMINATION:    GENERAL: The patient is a well-developed, well-nourished in no apparent distress.   SKIN no rash ecchymoses or bruises  HEENT: Head is normocephalic and atraumatic  SHAMA , Extraocular muscles are intact. Mucous membranes  moist.   Neck supple ,No LN felt JVP not increased  Thyroid not enlarged  Cardiovascular:  S1 S2 heard, RSR, No JVD , systolic  murmur at apex, No gallop or rub  Respiratory: Chest wall symmetrical with good air entry ,Percussion note normal,    Lungs vesicular breathing with no   rales  or  wheeze	  ABDOMEN:  Soft, Non-tender, obese, no hepatomegaly or splenomegaly BS positive	  Extremities: Normal range of motion, No clubbing, cyanosis or edema  Vascular: Peripheral pulses palpable 2+ bilaterally  CNS:  Alert and oriented x3   Cranial nerves intact  sensory intact  motor power5/5  dtr 2+   Babinski neg    LABS:                        9.6    5.31  )-----------( 50       ( 29 Sep 2020 07:17 )             27.8     09-29    138  |  96  |  13  ----------------------------<  163<H>  2.9<LL>   |  37<H>  |  0.50    Ca    8.2<L>      29 Sep 2020 07:17  Phos  2.5     09-29  Mg     1.9     09-29    TPro  5.9<L>  /  Alb  2.9<L>  /  TBili  0.8  /  DBili  x   /  AST  12  /  ALT  26  /  AlkPhos  107  09-28      Ferritin, Serum: 1109 ng/mL (09-24-20 @ 10:15)      MICROBIOLOGY:    Culture - Blood (collected 09-27-20 @ 12:06)  Source: .Blood Blood  Preliminary Report (09-28-20 @ 13:02):    No growth to date.    Culture - Blood (collected 09-27-20 @ 12:06)  Source: .Blood Blood  Preliminary Report (09-28-20 @ 13:02):    No growth to date.    Culture - Blood (collected 09-23-20 @ 10:33)  Source: .Blood Blood  Final Report (09-28-20 @ 11:01):    No Growth Final    Culture - Blood (collected 09-23-20 @ 10:33)  Source: .Blood Blood  Final Report (09-28-20 @ 11:01):    No Growth Final

## 2020-09-29 NOTE — PROGRESS NOTE ADULT - ASSESSMENT
· Assessment	  65 year old lady with severe COPD and lung ca with mets to bones which causing compression Fx.  She had one RT for that.  she was admitted for dyspnea and hypoxia.    Problem/Recommendation - 1:  Problem: Lung cancer. Recommendation: with mets to bones  on chemo keytruda, alimta and carboplatin  she had one RT to spine.  the tumor markers have been trending down with chemo    Problem/Recommendation - 2:  ·  Problem: COPD (chronic obstructive pulmonary disease).  Recommendation: required freq steroids.     Problem/Recommendation - 3:  ·  Problem: Acute on chronic respiratory failure with hypoxia.  Recommendation: ?aspiration causing resp failure requiring intubation  she has pain at throat for a while and has difficulty swallowing  now extubated  prognosis poor.   she is lucid now and express wishes to continue chemo. she needs placement because she is not able to climb the stairs  4. sore throat   she has difficulty swallowing  need a scope  now on puree diet  th pain is less now  able to swallow food better  5. cough, more now  will watch for possibility of aspiration

## 2020-09-29 NOTE — PROGRESS NOTE ADULT - ASSESSMENT
COPD with acute asthmatic bronchitis with  s/p  Respiratory Failure   Recurrent Lung cancer with Bone Mets   IDDM   Htn with MR   Thrombocytopenia sec to C/T  Obesity with CRIS   r/o esophageal candidiasis  GPC bacteremia VRE/ yeast    PLAN-po prednisone 4o mg qd x 4 days  D/C Solumedrol  IV Zyvox and Cancidas  Get PICC LINE   o2 n/c 2pm/ 24 hrs a day  Advance diet  OOB in chair/ BRP   D/C plan to rehab on IV antibiotics   Symbicort 160/4.5 2 puffs bid    Duoneb  by HFN rx qid prn   s/c lovenox  FS coverage

## 2020-09-30 DIAGNOSIS — G93.40 ENCEPHALOPATHY, UNSPECIFIED: ICD-10-CM

## 2020-09-30 LAB
BASE EXCESS BLDA CALC-SCNC: 10.7 MMOL/L — HIGH (ref -2–2)
BLOOD GAS COMMENTS ARTERIAL: SIGNIFICANT CHANGE UP
GLUCOSE BLDC GLUCOMTR-MCNC: 126 MG/DL — HIGH (ref 70–99)
GLUCOSE BLDC GLUCOMTR-MCNC: 127 MG/DL — HIGH (ref 70–99)
GLUCOSE BLDC GLUCOMTR-MCNC: 136 MG/DL — HIGH (ref 70–99)
GLUCOSE BLDC GLUCOMTR-MCNC: 310 MG/DL — HIGH (ref 70–99)
HCO3 BLDA-SCNC: 37 MMOL/L — HIGH (ref 23–27)
HOROWITZ INDEX BLDA+IHG-RTO: 36 — SIGNIFICANT CHANGE UP
PCO2 BLDA: 59 MMHG — HIGH (ref 32–46)
PH BLDA: 7.41 — SIGNIFICANT CHANGE UP (ref 7.35–7.45)
PO2 BLDA: 80 MMHG — SIGNIFICANT CHANGE UP (ref 74–108)
SAO2 % BLDA: 96 % — SIGNIFICANT CHANGE UP (ref 92–96)
SARS-COV-2 RNA SPEC QL NAA+PROBE: SIGNIFICANT CHANGE UP

## 2020-09-30 PROCEDURE — 99233 SBSQ HOSP IP/OBS HIGH 50: CPT

## 2020-09-30 PROCEDURE — 71045 X-RAY EXAM CHEST 1 VIEW: CPT | Mod: 26

## 2020-09-30 RX ORDER — MORPHINE SULFATE 50 MG/1
2 CAPSULE, EXTENDED RELEASE ORAL ONCE
Refills: 0 | Status: DISCONTINUED | OUTPATIENT
Start: 2020-09-30 | End: 2020-09-30

## 2020-09-30 RX ORDER — FUROSEMIDE 40 MG
40 TABLET ORAL ONCE
Refills: 0 | Status: COMPLETED | OUTPATIENT
Start: 2020-09-30 | End: 2020-09-30

## 2020-09-30 RX ORDER — ZOLPIDEM TARTRATE 10 MG/1
5 TABLET ORAL ONCE
Refills: 0 | Status: DISCONTINUED | OUTPATIENT
Start: 2020-09-30 | End: 2020-10-01

## 2020-09-30 RX ORDER — INSULIN LISPRO 100/ML
8 VIAL (ML) SUBCUTANEOUS
Refills: 0 | Status: DISCONTINUED | OUTPATIENT
Start: 2020-09-30 | End: 2020-10-01

## 2020-09-30 RX ADMIN — ALBUTEROL 2 PUFF(S): 90 AEROSOL, METERED ORAL at 02:16

## 2020-09-30 RX ADMIN — ALBUTEROL 2 PUFF(S): 90 AEROSOL, METERED ORAL at 06:05

## 2020-09-30 RX ADMIN — LINEZOLID 300 MILLIGRAM(S): 600 INJECTION, SOLUTION INTRAVENOUS at 06:03

## 2020-09-30 RX ADMIN — BUDESONIDE AND FORMOTEROL FUMARATE DIHYDRATE 2 PUFF(S): 160; 4.5 AEROSOL RESPIRATORY (INHALATION) at 21:15

## 2020-09-30 RX ADMIN — Medication 3 MILLIGRAM(S): at 21:03

## 2020-09-30 RX ADMIN — NYSTATIN CREAM 1 APPLICATION(S): 100000 CREAM TOPICAL at 18:00

## 2020-09-30 RX ADMIN — Medication 8 UNIT(S): at 17:48

## 2020-09-30 RX ADMIN — CARVEDILOL PHOSPHATE 6.25 MILLIGRAM(S): 80 CAPSULE, EXTENDED RELEASE ORAL at 06:03

## 2020-09-30 RX ADMIN — ALBUTEROL 2 PUFF(S): 90 AEROSOL, METERED ORAL at 08:00

## 2020-09-30 RX ADMIN — GABAPENTIN 600 MILLIGRAM(S): 400 CAPSULE ORAL at 21:03

## 2020-09-30 RX ADMIN — Medication 600 MILLIGRAM(S): at 17:47

## 2020-09-30 RX ADMIN — POLYETHYLENE GLYCOL 3350 17 GRAM(S): 17 POWDER, FOR SOLUTION ORAL at 17:50

## 2020-09-30 RX ADMIN — NYSTATIN CREAM 1 APPLICATION(S): 100000 CREAM TOPICAL at 06:04

## 2020-09-30 RX ADMIN — SENNA PLUS 2 TABLET(S): 8.6 TABLET ORAL at 21:03

## 2020-09-30 RX ADMIN — GABAPENTIN 600 MILLIGRAM(S): 400 CAPSULE ORAL at 06:02

## 2020-09-30 RX ADMIN — LINEZOLID 300 MILLIGRAM(S): 600 INJECTION, SOLUTION INTRAVENOUS at 17:51

## 2020-09-30 RX ADMIN — ENOXAPARIN SODIUM 40 MILLIGRAM(S): 100 INJECTION SUBCUTANEOUS at 12:10

## 2020-09-30 RX ADMIN — ALBUTEROL 2 PUFF(S): 90 AEROSOL, METERED ORAL at 21:09

## 2020-09-30 RX ADMIN — Medication 40 MILLIGRAM(S): at 06:02

## 2020-09-30 RX ADMIN — BUDESONIDE AND FORMOTEROL FUMARATE DIHYDRATE 2 PUFF(S): 160; 4.5 AEROSOL RESPIRATORY (INHALATION) at 07:59

## 2020-09-30 RX ADMIN — POLYETHYLENE GLYCOL 3350 17 GRAM(S): 17 POWDER, FOR SOLUTION ORAL at 06:04

## 2020-09-30 RX ADMIN — Medication 40 MILLIGRAM(S): at 13:48

## 2020-09-30 RX ADMIN — MORPHINE SULFATE 2 MILLIGRAM(S): 50 CAPSULE, EXTENDED RELEASE ORAL at 08:37

## 2020-09-30 RX ADMIN — CARVEDILOL PHOSPHATE 6.25 MILLIGRAM(S): 80 CAPSULE, EXTENDED RELEASE ORAL at 17:50

## 2020-09-30 RX ADMIN — Medication 40 MILLIGRAM(S): at 17:48

## 2020-09-30 RX ADMIN — DIPHENHYDRAMINE HYDROCHLORIDE AND LIDOCAINE HYDROCHLORIDE AND ALUMINUM HYDROXIDE AND MAGNESIUM HYDRO 10 MILLILITER(S): KIT at 06:02

## 2020-09-30 RX ADMIN — CASPOFUNGIN ACETATE 260 MILLIGRAM(S): 7 INJECTION, POWDER, LYOPHILIZED, FOR SOLUTION INTRAVENOUS at 16:28

## 2020-09-30 RX ADMIN — ATORVASTATIN CALCIUM 40 MILLIGRAM(S): 80 TABLET, FILM COATED ORAL at 21:03

## 2020-09-30 RX ADMIN — CHLORHEXIDINE GLUCONATE 1 APPLICATION(S): 213 SOLUTION TOPICAL at 06:33

## 2020-09-30 RX ADMIN — PANTOPRAZOLE SODIUM 40 MILLIGRAM(S): 20 TABLET, DELAYED RELEASE ORAL at 12:10

## 2020-09-30 RX ADMIN — INSULIN GLARGINE 35 UNIT(S): 100 INJECTION, SOLUTION SUBCUTANEOUS at 08:09

## 2020-09-30 RX ADMIN — Medication 0.5 MILLIGRAM(S): at 03:44

## 2020-09-30 RX ADMIN — TIOTROPIUM BROMIDE 1 CAPSULE(S): 18 CAPSULE ORAL; RESPIRATORY (INHALATION) at 12:10

## 2020-09-30 RX ADMIN — ALBUTEROL 2 PUFF(S): 90 AEROSOL, METERED ORAL at 17:49

## 2020-09-30 RX ADMIN — Medication 600 MILLIGRAM(S): at 06:02

## 2020-09-30 NOTE — CHART NOTE - NSCHARTNOTEFT_GEN_A_CORE
EVENT:     OBJECTIVE:  Vital Signs Last 24 Hrs  T(C): 36.7 (29 Sep 2020 21:29), Max: 36.8 (29 Sep 2020 14:22)  T(F): 98.1 (29 Sep 2020 21:29), Max: 98.3 (29 Sep 2020 14:22)  HR: 103 (29 Sep 2020 21:29) (98 - 108)  BP: 126/59 (29 Sep 2020 21:29) (126/59 - 146/68)  BP(mean): --  RR: 24 (30 Sep 2020 03:32) (18 - 24)  SpO2: 100% (30 Sep 2020 03:32) (99% - 100%)    FOCUSED PHYSICAL EXAM:  Neuro: awake, alert, oriented x 3. No neuro deficit  Cardiovascular: Pulses +2 B/L in lower and upper extremities, HR regular, BP stable, No edema.  Respiratory: Respirations regular, unlabored, breath sounds clear B/L.   GI: Abdomen soft, non-tender, positive bowel sounds.  : no bladder distention noted. No complaints at this time.  Skin: Dry, intact, no bruising, no diaphoresis.    LABS:                        9.6    5.31  )-----------( 50       ( 29 Sep 2020 07:17 )             27.8     09-29    136  |  98  |  8   ----------------------------<  135<H>  4.2   |  37<H>  |  0.56    Ca    8.3<L>      29 Sep 2020 22:54  Phos  2.5     09-29  Mg     1.9     09-29    TPro  5.9<L>  /  Alb  2.9<L>  /  TBili  0.8  /  DBili  x   /  AST  12  /  ALT  26  /  AlkPhos  107  09-28      EKG:   IMGAGING:    ASSESSMENT:  HPI:  64 yo obese F former heavy smoker (80 pack years) with, HTN, DM2 on insulin, COPD on 5L (frequent admissions), R lung cancer s/p resection (2017) on chemo q 3 weeks with concern for mets to spine and compression fractures, recent admission for COPD exacerbation earlier in August presents with SOB, hypoxia and increased sputum production x 1 day. Patient states cough feels like secretion is coming out but unable to expectorate. Patient is on 5L of oxygen at home. As per EMS, patient is hypoxic at 84% on room air which came up to 98% with 4L of nasal canula. Patient was recently in august treated with prednisone, nebulizer treatments and completed a course of azithromycin for copd exacerbation. Denies nausea, vomiting, diarrhea, abdominal pain, fever, chills.   (19 Sep 2020 14:07)      PLAN:     FOLLOW UP / RESULT: EVENT:   - HPI: 64 yo obese F with hx of 80 pack year tobacco use, HTN, DM2 on insulin, COPD on 5L (frequent admissions), R lung cancer s/p resection (2017) on chemo q 3 weeks with concern for mets to spine and compression fractures, recent admission for COPD exacerbation earlier in August who on 9/19/2020 presented to ED with c/o SOB, hypoxia and increased sputum production x 1 day.    INTERVAL HX: On 9/21/2020. Patient was hypoxic, tachycardiac and tachypneic. Rapid response was called. RRT team arrived on scene. Patient had encephalopathy from hypoxia , wheezing and stridor on examination. Patient was tachycardiac to 140/min. Patient was hypoxic and started on non-rebreather but patient has increased work of breathing. Decision was made to intubate the patient. Patient become hypotensive post intubation and was given LR bolus and started on peripheral phenylephrine.    In ICU patient was awake and alert following commands and using a talking board and pen and paper to communicate. She was started on Precedex and Fentanyl to sedate the patient.   Patient was extubated on 9/22/20. Saturating well on NC. Patient was titrated off the Precedex and Fentanyl by 9/23/20. She has anxiety and when anxious she desaturates. on 9/23/2020 patient was started on BIPAP after an episode of anxiety and desaturation and tolerated it. For now patient is using BIPAP as needed.     - Received telephone call from RN that pt is very anxious and is hyperventilating. O2 sat 100%      OBJECTIVE:  Vital Signs Last 24 Hrs  T(C): 36.7 (29 Sep 2020 21:29), Max: 36.8 (29 Sep 2020 14:22)  T(F): 98.1 (29 Sep 2020 21:29), Max: 98.3 (29 Sep 2020 14:22)  HR: 103 (29 Sep 2020 21:29) (98 - 108)  BP: 126/59 (29 Sep 2020 21:29) (126/59 - 146/68)  BP(mean): --  RR: 24 (30 Sep 2020 03:32) (18 - 24)  SpO2: 100% (30 Sep 2020 03:32) (99% - 100%)    FOCUSED PHYSICAL EXAM:  Neuro: awake, alert, oriented x 3. No neuro deficit  Cardiovascular: Pulses +2 B/L in lower and upper extremities, HR regular, BP stable, No edema.  Respiratory: Respirations regular, unlabored, breath sounds clear B/L.   GI: Abdomen soft, non-tender, positive bowel sounds.  : no bladder distention noted. No complaints at this time.  Skin: Dry, intact, no bruising, no diaphoresis.    LABS:                        9.6    5.31  )-----------( 50       ( 29 Sep 2020 07:17 )             27.8     09-29    136  |  98  |  8   ----------------------------<  135<H>  4.2   |  37<H>  |  0.56    Ca    8.3<L>      29 Sep 2020 22:54  Phos  2.5     09-29  Mg     1.9     09-29    TPro  5.9<L>  /  Alb  2.9<L>  /  TBili  0.8  /  DBili  x   /  AST  12  /  ALT  26  /  AlkPhos  107  09-28      EKG:   IMAGING:    ASSESSMENT/Problem: Anxiety probably related to hospitalization???      PLAN:   1. Lorazepam 0.5mg, PO x 1 dose ordered  2. Cont present care/treatment  3. Monitor response to Lorazepam

## 2020-09-30 NOTE — PROGRESS NOTE ADULT - ASSESSMENT
· Assessment	  65 year old lady with severe COPD and lung ca with mets to bones which causing compression Fx.  She had one RT for that.  she was admitted for dyspnea and hypoxia.    Problem/Recommendation - 1:  Problem: Lung cancer. Recommendation: with mets to bones  on chemo keytruda, alimta and carboplatin  she had one RT to spine.  the tumor markers have been trending down with chemo    Problem/Recommendation - 2:  ·  Problem: COPD (chronic obstructive pulmonary disease).  Recommendation: required freq steroids.     Problem/Recommendation - 3:  ·  Problem: Acute on chronic respiratory failure with hypoxia.  Recommendation: ?aspiration causing resp failure requiring intubation  she has pain at throat for a while and has difficulty swallowing  now extubated  prognosis poor.   she is lucid now and express wishes to continue chemo. she needs placement because she is not able to climb the stairs  4. sore throat   she has difficulty swallowing  need a scope  now on puree diet  th pain is less now  able to swallow food better  5. cough, more now  will watch for possibility of aspiration No Vaccines Administered.

## 2020-09-30 NOTE — PROGRESS NOTE ADULT - PROBLEM SELECTOR PLAN 5
Patient previously identified daughter/Gavi as primary HCP and partner/Williams as alternate agent. MOLST form previously completed as per patient's wishes: CPR / long term ventilation / long term feeding tube / trial IV fluids / use abx / NO LIMITATION on medical interventions. Daughter updated on status - acknowledges patient may be difficult to wean if intubated and may need trach/peg. Overall, patient with poor prognosis.

## 2020-09-30 NOTE — PROGRESS NOTE ADULT - ATTENDING COMMENTS
Placed on AVAPS this morning   Change steroids to BID IV   Lasix 20 mg IVP   Encourage respiratory support as much as tolerated  Antibiotics and antifungals per ID  Palliative care follow up

## 2020-09-30 NOTE — PROGRESS NOTE ADULT - PROBLEM SELECTOR PLAN 2
Continue antibiotics as per ID  Needs 3 more days of antibiotics after today.  repeat blood cultures negative.

## 2020-09-30 NOTE — PROGRESS NOTE ADULT - PROBLEM SELECTOR PLAN 1
Secondary to End stage COPD  Increasing respiratory distress. ABG hypercapnic.  BIPAP ordered.  Continue bronchodilators, ICS and oral prednisone.  Morphine 2mg IVP  CXR pending. Secondary to End stage COPD  Increasing respiratory distress. ABG hypercapnic.  BIPAP ordered. Started on BIPAP without improvement. Patient switched to AVAPS  Prednisone switched to IV steroids every 12 hours  Lasix 40mg IVP given.  Continue bronchodilators, ICS  Morphine 2mg IVP  Low threshold for intubation. Patient remains full code as per her wishes.

## 2020-09-30 NOTE — PROGRESS NOTE ADULT - PROBLEM SELECTOR PLAN 1
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 Patient utilized 5L O2 at baseline 24/7 - reports increasing weakness/dyspnea at rest. Patient extubated 9/22. Per report, patient was in respiratory distress this morning - now on continuous AVAPS. CXR pending. Patient lethargic, but states, "I know my breathing sucks." Continues IV abx, solu-medrol, symbicort, spiriva, albuterol. Patient eligible for hospice services. Daughter/HCP aware if patient is intubated, she may be difficult to wean and may need trach/peg. Patient previously completed own MOLST: FULL CODE.

## 2020-09-30 NOTE — PROGRESS NOTE ADULT - PROBLEM SELECTOR PLAN 5
Continue insulin glargine Injectable (LANTUS) 35 Unit(s) SubCutaneous every morning  insulin lispro (HumaLOG) corrective regimen sliding scale   SubCutaneous three times a day before meals  insulin lispro Injectable (HumaLOG) 12 Unit(s) SubCutaneous three times a day before meals  Monitor glucose.

## 2020-09-30 NOTE — PROGRESS NOTE ADULT - PROBLEM SELECTOR PLAN 4
2/2 ES COPD, metastatic lung Ca.  Patient dependent 5L O2, with increasing debility/weakness and dyspnea reported at rest prior to hospitalization. Patient with worsening respiratory status today - now on AVAPS, lethargic. Daughter/HCP aware patient may be difficult to wean if intubated and may need trach/peg. PT previously recommended [facility].

## 2020-09-30 NOTE — PROGRESS NOTE ADULT - SUBJECTIVE AND OBJECTIVE BOX
has more sob  lethargic at times  no fever    MEDICATIONS  (STANDING):  ALBUTerol    90 MICROgram(s) HFA Inhaler 2 Puff(s) Inhalation every 4 hours  atorvastatin 40 milliGRAM(s) Oral at bedtime  budesonide 160 MICROgram(s)/formoterol 4.5 MICROgram(s) Inhaler 2 Puff(s) Inhalation two times a day  carvedilol 6.25 milliGRAM(s) Oral every 12 hours  caspofungin IVPB      caspofungin IVPB 50 milliGRAM(s) IV Intermittent every 24 hours  chlorhexidine 2% Cloths 1 Application(s) Topical <User Schedule>  dextrose 5%. 1000 milliLiter(s) (50 mL/Hr) IV Continuous <Continuous>  dextrose 50% Injectable 12.5 Gram(s) IV Push once  dextrose 50% Injectable 25 Gram(s) IV Push once  dextrose 50% Injectable 25 Gram(s) IV Push once  enoxaparin Injectable 40 milliGRAM(s) SubCutaneous daily  ergocalciferol 26281 Unit(s) Oral <User Schedule>  FIRST- Mouthwash  BLM 10 milliLiter(s) Swish and Swallow every 8 hours  folic acid 1 milliGRAM(s) Oral daily  gabapentin 600 milliGRAM(s) Oral every 8 hours  guaiFENesin  milliGRAM(s) Oral every 12 hours  influenza   Vaccine 0.5 milliLiter(s) IntraMuscular once  insulin glargine Injectable (LANTUS) 35 Unit(s) SubCutaneous every morning  insulin lispro (HumaLOG) corrective regimen sliding scale   SubCutaneous three times a day before meals  insulin lispro Injectable (HumaLOG) 8 Unit(s) SubCutaneous three times a day before meals  linezolid  IVPB      linezolid  IVPB 600 milliGRAM(s) IV Intermittent every 12 hours  melatonin 3 milliGRAM(s) Oral at bedtime  nystatin Powder 1 Application(s) Topical two times a day  pantoprazole  Injectable 40 milliGRAM(s) IV Push daily  polyethylene glycol 3350 17 Gram(s) Oral two times a day  predniSONE   Tablet 40 milliGRAM(s) Oral daily  senna 2 Tablet(s) Oral at bedtime  tiotropium 18 MICROgram(s) Capsule 1 Capsule(s) Inhalation daily    MEDICATIONS  (PRN):  dextrose 40% Gel 15 Gram(s) Oral once PRN Blood Glucose LESS THAN 70 milliGRAM(s)/deciLiter  glucagon  Injectable 1 milliGRAM(s) IntraMuscular once PRN Glucose <70 milliGRAM(s)/deciLiter  ondansetron Injectable 4 milliGRAM(s) IV Push every 6 hours PRN Nausea and/or Vomiting  oxycodone    5 mG/acetaminophen 325 mG 2 Tablet(s) Oral every 6 hours PRN Moderate Pain (4 - 6)  sodium chloride 0.65% Nasal 1 Spray(s) Both Nostrils four times a day PRN Nasal Congestion      Allergies    fish (Angioedema)  penicillins (Rash)    Intolerances        Vital Signs Last 24 Hrs  T(C): 36.7 (30 Sep 2020 07:40), Max: 36.8 (29 Sep 2020 14:22)  T(F): 98 (30 Sep 2020 07:40), Max: 98.3 (29 Sep 2020 14:22)  HR: 88 (30 Sep 2020 12:23) (88 - 108)  BP: 121/47 (30 Sep 2020 07:40) (121/47 - 138/73)  BP(mean): --  RR: 30 (30 Sep 2020 07:40) (18 - 30)  SpO2: 94% (30 Sep 2020 12:23) (94% - 100%)    PHYSICAL EXAM  General: adult in NAD  HEENT: clear oropharynx, anicteric sclera, pink conjunctiva  Neck: supple  CV: normal S1/S2 with no murmur rubs or gallops  Lungs: positive air movement b/l ant lungs,clear to auscultation, no wheezes, no rales  Abdomen: soft non-tender non-distended, no hepatosplenomegaly  Ext: no clubbing cyanosis or edema  Skin: no rashes and no petechiae  Neuro: alert and oriented X 4, no focal deficits  LABS:                          9.6    5.31  )-----------( 50       ( 29 Sep 2020 07:17 )             27.8         Mean Cell Volume : 88.0 fl  Mean Cell Hemoglobin : 30.4 pg  Mean Cell Hemoglobin Concentration : 34.5 gm/dL  Auto Neutrophil # : x  Auto Lymphocyte # : x  Auto Monocyte # : x  Auto Eosinophil # : x  Auto Basophil # : x  Auto Neutrophil % : x  Auto Lymphocyte % : x  Auto Monocyte % : x  Auto Eosinophil % : x  Auto Basophil % : x    Serial CBC  Hematocrit 27.8  Hemoglobin 9.6  Plat 50  RBC 3.16  WBC 5.31  Serial CBC  Hematocrit 29.1  Hemoglobin 10.2  Plat 56  RBC 3.30  WBC 3.81  Serial CBC  Hematocrit 28.5  Hemoglobin 9.7  Plat 56  RBC 3.21  WBC 2.95    09-29    136  |  98  |  8   ----------------------------<  135<H>  4.2   |  37<H>  |  0.56    Ca    8.3<L>      29 Sep 2020 22:54  Phos  2.5     09-29  Mg     1.9     09-29            Ferritin, Serum: 1109 ng/mL (09-24 @ 10:15)  Iron - Total Binding Capacity.: 219 ug/dL (09-24 @ 06:08)            BLOOD SMEAR INTERPRETATION:       RADIOLOGY & ADDITIONAL STUDIES:

## 2020-09-30 NOTE — PROGRESS NOTE ADULT - PROBLEM SELECTOR PLAN 2
2/2 acute on chronic respiratory failure; comorbid bacteremia. Patient alert/oriented at baseline. Now lethargic, does not follow commands, minimally verbal, only stating, "I know my breathing sucks." Daughter/Gavi is primary HCP.

## 2020-09-30 NOTE — PROGRESS NOTE ADULT - ASSESSMENT
66 yo obese F with hx of 80 pack year tobacco use, HTN, DM2 on insulin, COPD on 5L (frequent admissions), R lung cancer s/p resection (2017) on chemo q 3 weeks with concern for mets to spine and compression fractures, recent admission for COPD exacerbation earlier in August who on 9/19/2020 presented to ED with c/o SOB, hypoxia and increased sputum production x 1 day.    INTERVAL HX: On 9/21/2020. Patient was hypoxic, tachycardiac and tachypneic. Rapid response was called. RRT team arrived on scene. Patient had encephalopathy from hypoxia , wheezing and stridor on examination. Patient was tachycardiac to 140/min. Patient was hypoxic and started on non-rebreather but patient has increased work of breathing. Decision was made to intubate the patient. Patient become hypotensive post intubation and was given LR bolus and started on peripheral phenylephrine.    In ICU patient was awake and alert following commands and using a talking board and pen and paper to communicate. She was started on Precedex and Fentanyl to sedate the patient.   Patient was extubated on 9/22/20. Saturating well on NC. Patient was titrated off the Precedex and Fentanyl by 9/23/20. She has anxiety and when anxious she desaturates. on 9/23/2020 patient was started on BIPAP after an episode of anxiety and desaturation and tolerated it. For now patient is using BIPAP as needed.     Blood cultures from 9/19/2020 grew VRE and yeast like cells. Patient's PICC line on Left arm was removed on 9/21/2020 as possible source of infection. Patient was started on Zyvox on 9/21 and Caspofungin on 9/23 after the results came back. Repeat blood cultures from 9/21/2020 are showing no growth to date. Repeat blood cultures were sent on 9/23/2020  and 9/27. ID Dr. Corcoran on board.   9/30   Mild to moderate respiratory distress. Morphine ordered. ABG and CXR ordered.

## 2020-09-30 NOTE — PROGRESS NOTE ADULT - SUBJECTIVE AND OBJECTIVE BOX
Interval Events:    poc glucose improved  refusing bipap  was hyperglycemic yesterday- required additional doses on sliding scale    Allergies    fish (Angioedema)  penicillins (Rash)    Intolerances      Endocrine/Metabolic Medications:  atorvastatin 40 milliGRAM(s) Oral at bedtime  dextrose 40% Gel 15 Gram(s) Oral once PRN  dextrose 50% Injectable 12.5 Gram(s) IV Push once  dextrose 50% Injectable 25 Gram(s) IV Push once  dextrose 50% Injectable 25 Gram(s) IV Push once  glucagon  Injectable 1 milliGRAM(s) IntraMuscular once PRN  insulin glargine Injectable (LANTUS) 35 Unit(s) SubCutaneous every morning  insulin lispro (HumaLOG) corrective regimen sliding scale   SubCutaneous three times a day before meals  insulin lispro Injectable (HumaLOG) 12 Unit(s) SubCutaneous three times a day before meals  predniSONE   Tablet 40 milliGRAM(s) Oral daily      Vital Signs Last 24 Hrs  T(C): 36.7 (30 Sep 2020 07:40), Max: 36.8 (29 Sep 2020 14:22)  T(F): 98 (30 Sep 2020 07:40), Max: 98.3 (29 Sep 2020 14:22)  HR: 92 (30 Sep 2020 09:31) (92 - 108)  BP: 121/47 (30 Sep 2020 07:40) (121/47 - 138/73)  BP(mean): --  RR: 30 (30 Sep 2020 07:40) (18 - 30)  SpO2: 100% (30 Sep 2020 09:31) (99% - 100%)      PHYSICAL EXAM  Constitutional:    NC/AT:    HEENT:    Neck:  No JVD    Respiratory:  reduced breath sounds b/l bases    Cardiovascular:  RR    Gastrointestinal: Soft    Extremities: without cyanosis  Neurological:  non focal    LABS                              136    |  98     |  8                   Calcium: 8.3   / iCa: x      (09-29 @ 22:54)    ----------------------------<  135       Magnesium: x                                4.2     |  37     |  0.56             Phosphorous: x          CAPILLARY BLOOD GLUCOSE      POCT Blood Glucose.: 127 mg/dL (30 Sep 2020 07:48)  POCT Blood Glucose.: 186 mg/dL (29 Sep 2020 21:14)  POCT Blood Glucose.: 260 mg/dL (29 Sep 2020 17:52)  POCT Blood Glucose.: 203 mg/dL (29 Sep 2020 16:35)  POCT Blood Glucose.: 151 mg/dL (29 Sep 2020 12:46)  POCT Blood Glucose.: 142 mg/dL (29 Sep 2020 12:14)        Assesment/plan        64 yo female with multiple comorbidities including h/o DM type 2, HTN, COPD on o2, lung ca on right sided s/p resection on chemo admitted with shortness of breath  was intubated/extubated for acute resp failure this admit, now extubated on general medical floor, standing high dose steroids with uncontrolled hyperglycemia    endocrinology consulted for glycemic management    DM type 2  uncontrolled  complicated by high dose steroid use, acute on chronic resp failure  home regimen:  basaglar 60 units daily  novolog 15 units pre meals    recommendations:  steroids would cause mainly prandial hyperglycemia  poc glucose improved, reduce sliding scale    - continue insulin lantus 35 units daily in am   - reduce insulin lispro to 8 units pre meals  continue moderate dose sliding scale  - reassess based on requirements  - goal inpatient glucose range: 140-180mg/dl    COPD exacerbation  acute on chronic hypoxic resp failure  s/p intubation/extubation current admit  on standing steroids  on po prednisone  refusing bipap  pulmonary on board    sepsis  VRE bacteremia  received zosyn  ID on board    Discussed with patient and primary team   Please call  Endocrine- Dr Katlyn Garcia as needed     Interval Events:    poc glucose improved  on bipap at present  was hyperglycemic yesterday- required additional doses on sliding scale    Allergies    fish (Angioedema)  penicillins (Rash)    Intolerances      Endocrine/Metabolic Medications:  atorvastatin 40 milliGRAM(s) Oral at bedtime  dextrose 40% Gel 15 Gram(s) Oral once PRN  dextrose 50% Injectable 12.5 Gram(s) IV Push once  dextrose 50% Injectable 25 Gram(s) IV Push once  dextrose 50% Injectable 25 Gram(s) IV Push once  glucagon  Injectable 1 milliGRAM(s) IntraMuscular once PRN  insulin glargine Injectable (LANTUS) 35 Unit(s) SubCutaneous every morning  insulin lispro (HumaLOG) corrective regimen sliding scale   SubCutaneous three times a day before meals  insulin lispro Injectable (HumaLOG) 12 Unit(s) SubCutaneous three times a day before meals  predniSONE   Tablet 40 milliGRAM(s) Oral daily      Vital Signs Last 24 Hrs  T(C): 36.7 (30 Sep 2020 07:40), Max: 36.8 (29 Sep 2020 14:22)  T(F): 98 (30 Sep 2020 07:40), Max: 98.3 (29 Sep 2020 14:22)  HR: 92 (30 Sep 2020 09:31) (92 - 108)  BP: 121/47 (30 Sep 2020 07:40) (121/47 - 138/73)  BP(mean): --  RR: 30 (30 Sep 2020 07:40) (18 - 30)  SpO2: 100% (30 Sep 2020 09:31) (99% - 100%)      PHYSICAL EXAM  Constitutional:    NC/AT:    HEENT:    Neck:  No JVD    Respiratory:  reduced breath sounds b/l bases    Cardiovascular:  RR    Gastrointestinal: Soft    Extremities: without cyanosis  Neurological:  non focal    LABS                              136    |  98     |  8                   Calcium: 8.3   / iCa: x      (09-29 @ 22:54)    ----------------------------<  135       Magnesium: x                                4.2     |  37     |  0.56             Phosphorous: x          CAPILLARY BLOOD GLUCOSE      POCT Blood Glucose.: 127 mg/dL (30 Sep 2020 07:48)  POCT Blood Glucose.: 186 mg/dL (29 Sep 2020 21:14)  POCT Blood Glucose.: 260 mg/dL (29 Sep 2020 17:52)  POCT Blood Glucose.: 203 mg/dL (29 Sep 2020 16:35)  POCT Blood Glucose.: 151 mg/dL (29 Sep 2020 12:46)  POCT Blood Glucose.: 142 mg/dL (29 Sep 2020 12:14)        Assesment/plan        64 yo female with multiple comorbidities including h/o DM type 2, HTN, COPD on o2, lung ca on right sided s/p resection on chemo admitted with shortness of breath  was intubated/extubated for acute resp failure this admit, now extubated on general medical floor, standing high dose steroids with uncontrolled hyperglycemia    endocrinology consulted for glycemic management    DM type 2  uncontrolled  complicated by high dose steroid use, acute on chronic resp failure  home regimen:  basaglar 60 units daily  novolog 15 units pre meals    recommendations:  steroids would cause mainly prandial hyperglycemia  poc glucose improved, reduce sliding scale    - continue insulin lantus 35 units daily in am   - reduce insulin lispro to 8 units pre meals  continue moderate dose sliding scale  - reassess based on requirements  - goal inpatient glucose range: 140-180mg/dl    COPD exacerbation  acute on chronic hypoxic resp failure  s/p intubation/extubation current admit  on standing steroids  on po prednisone  refusing bipap  pulmonary on board    sepsis  VRE bacteremia  received zosyn  ID on board    Discussed with patient and primary team   Please call  Endocrine- Dr Katlyn Garcia as needed

## 2020-09-30 NOTE — PROGRESS NOTE ADULT - PROBLEM SELECTOR PLAN 3
ECOG: 3-4. Patient with primary R lung Ca with bone mets; comorbid end stage COPD. CT previously indicated osseous metastasis and associated compression fractures and rib fractures unchanged. Patient dependent on O2. Per oncology, patient on chemo (heytruda, alimta and carboplatin); had 1 RT to spine. Overall, poor prognosis. Full code.

## 2020-09-30 NOTE — PROGRESS NOTE ADULT - PROBLEM SELECTOR PLAN 4
End stage.  Increasing respiratory distress this morning.  ABG Hypercapnic\  BIPAP ordered.  Continue LABA, ICS and oral steriods

## 2020-09-30 NOTE — PROGRESS NOTE ADULT - SUBJECTIVE AND OBJECTIVE BOX
PULMONARY  progress note    BARB COLÓN  [id number]    Patient is a 65y old  Female who presents with a chief complaint of shortness of breath (30 Sep 2020 14:07)  Feels netter, less  cough      MEDICATIONS  (STANDING):  ALBUTerol    90 MICROgram(s) HFA Inhaler 2 Puff(s) Inhalation every 4 hours  atorvastatin 40 milliGRAM(s) Oral at bedtime  budesonide 160 MICROgram(s)/formoterol 4.5 MICROgram(s) Inhaler 2 Puff(s) Inhalation two times a day  carvedilol 6.25 milliGRAM(s) Oral every 12 hours  caspofungin IVPB      caspofungin IVPB 50 milliGRAM(s) IV Intermittent every 24 hours  chlorhexidine 2% Cloths 1 Application(s) Topical <User Schedule>  dextrose 5%. 1000 milliLiter(s) (50 mL/Hr) IV Continuous <Continuous>  dextrose 50% Injectable 12.5 Gram(s) IV Push once  dextrose 50% Injectable 25 Gram(s) IV Push once  dextrose 50% Injectable 25 Gram(s) IV Push once  enoxaparin Injectable 40 milliGRAM(s) SubCutaneous daily  ergocalciferol 17535 Unit(s) Oral <User Schedule>  FIRST- Mouthwash  BLM 10 milliLiter(s) Swish and Swallow every 8 hours  folic acid 1 milliGRAM(s) Oral daily  gabapentin 600 milliGRAM(s) Oral every 8 hours  guaiFENesin  milliGRAM(s) Oral every 12 hours  influenza   Vaccine 0.5 milliLiter(s) IntraMuscular once  insulin glargine Injectable (LANTUS) 35 Unit(s) SubCutaneous every morning  insulin lispro (HumaLOG) corrective regimen sliding scale   SubCutaneous three times a day before meals  insulin lispro Injectable (HumaLOG) 8 Unit(s) SubCutaneous three times a day before meals  linezolid  IVPB      linezolid  IVPB 600 milliGRAM(s) IV Intermittent every 12 hours  melatonin 3 milliGRAM(s) Oral at bedtime  methylPREDNISolone sodium succinate Injectable 40 milliGRAM(s) IV Push every 12 hours  nystatin Powder 1 Application(s) Topical two times a day  pantoprazole  Injectable 40 milliGRAM(s) IV Push daily  polyethylene glycol 3350 17 Gram(s) Oral two times a day  senna 2 Tablet(s) Oral at bedtime  tiotropium 18 MICROgram(s) Capsule 1 Capsule(s) Inhalation daily      MEDICATIONS  (PRN):  dextrose 40% Gel 15 Gram(s) Oral once PRN Blood Glucose LESS THAN 70 milliGRAM(s)/deciLiter  glucagon  Injectable 1 milliGRAM(s) IntraMuscular once PRN Glucose <70 milliGRAM(s)/deciLiter  ondansetron Injectable 4 milliGRAM(s) IV Push every 6 hours PRN Nausea and/or Vomiting  oxycodone    5 mG/acetaminophen 325 mG 2 Tablet(s) Oral every 6 hours PRN Moderate Pain (4 - 6)  sodium chloride 0.65% Nasal 1 Spray(s) Both Nostrils four times a day PRN Nasal Congestion      Allergies    fish (Angioedema)  penicillins (Rash)    Intolerances        PAST MEDICAL & SURGICAL HISTORY:  Malignant neoplasm of unspecified part of right bronchus or lung    HTN (hypertension)    DM (diabetes mellitus)    COPD (chronic obstructive pulmonary disease)    Lung cancer    Morbid obesity    GERD (gastroesophageal reflux disease)    Deviated septum    Prolapsed uterus    ITP (idiopathic thrombocytopenic purpura)    Emphysema/COPD    History of cholecystectomy    S/P lobectomy of lung  right 2017    History of tonsillectomy             REVIEW OF SYSTEMS:  CONSTITUTIONAL: No fever, weight loss, or fatigue   EYES: No eye pain, visual disturbances, or discharge  ENT:  No difficulty hearing, tinnitus, vertigo; No sinus or throat pain  NECK: No pain or stiffness or nodes  RESPIRATORY:  cough  ++ wheezing --  chills --  hemoptysis--  Shortness of Breath+  CARDIOVASCULAR: No chest pain, palpitations, passing out, dizziness, or leg swelling  GASTROINTESTINAL: No abdominal or epigastric pain. No nausea, vomiting, or hematemesis; No diarrhea or constipation. No melena or hematochezia.  GENITOURINARY: No dysuria, frequency, hematuria, or incontinence  NEUROLOGICAL: No headaches, memory loss, loss of strength, numbness, or tremors  SKIN: No itching, burning, rashes, or lesions   LYMPH Nodes: No enlarged glands  ENDOCRINE: No heat or cold intolerance; No hair loss  MUSCULOSKELETAL: No joint pain or swelling; No muscle, back, or extremity pain  PSYCHIATRIC: No depression, anxiety, mood swings, or difficulty sleeping  HEME/LYMPH: No easy bruising, or bleeding gums  ALLERGY AND IMMUNOLOGIC: No hives or eczema    Vital Signs Last 24 Hrs  T(C): 36.7 (30 Sep 2020 12:34), Max: 36.7 (29 Sep 2020 21:29)  T(F): 98 (30 Sep 2020 12:34), Max: 98.1 (29 Sep 2020 21:29)  HR: 117 (30 Sep 2020 16:28) (88 - 117)  BP: 119/80 (30 Sep 2020 12:34) (119/80 - 128/79)  BP(mean): --  RR: 24 (30 Sep 2020 12:34) (20 - 30)  SpO2: 99% (30 Sep 2020 16:28) (94% - 100%)  I&O's Detail    30 Sep 2020 07:01  -  30 Sep 2020 17:18  --------------------------------------------------------  IN:    IV PiggyBack: 260 mL  Total IN: 260 mL    OUT:    Voided (mL): 1500 mL  Total OUT: 1500 mL    Total NET: -1240 mL          PHYSICAL EXAMINATION:    GENERAL: The patient is a well-developed, well-nourished in no apparent distress.   SKIN no rash ecchymoses or bruises  HEENT: Head is normocephalic and atraumatic  SHAMA , Extraocular muscles are intact. Mucous membranes  moist.   Neck supple ,No LN felt JVP not increased  Thyroid not enlarged  Cardiovascular:  S1 S2 heard, RSR, No JVD , systolic  murmur at apex, No gallop or rub  Respiratory: Chest wall symmetrical with good air entry ,Percussion note normal,    Lungs vesicular breathing with  no   rales  minimal  wheeze	  ABDOMEN:  Soft, Non-tender,obese,  no hepatomegaly or splenomegaly BS positive	  Extremities: Normal range of motion, No clubbing, cyanosis or edema  Vascular: Peripheral pulses palpable 2+ bilaterally  CNS:  Alert and oriented x3   Cranial nerves intact  sensory intact  motor power5/5  dtr 2+   Babinski neg    LABS:                        9.6    5.31  )-----------( 50       ( 29 Sep 2020 07:17 )             27.8     09-29    136  |  98  |  8   ----------------------------<  135<H>  4.2   |  37<H>  |  0.56    Ca    8.3<L>      29 Sep 2020 22:54  Phos  2.5     09-29  Mg     1.9     09-29          ABG - ( 30 Sep 2020 08:22 )  pH, Arterial: 7.41  pH, Blood: x     /  pCO2: 59    /  pO2: 80    / HCO3: 37    / Base Excess: 10.7  /  SaO2: 96                             TSH  Ferritin, Serum: 1109 ng/mL (09-24-20 @ 10:15)      MICROBIOLOGY:    Culture - Blood (collected 09-27-20 @ 12:06)  Source: .Blood Blood  Preliminary Report (09-28-20 @ 13:02):    No growth to date.    Culture - Blood (collected 09-27-20 @ 12:06)  Source: .Blood Blood  Preliminary Report (09-28-20 @ 13:02):    No growth to date.          RADIOLOGY & ADDITIONAL STUDIES:

## 2020-09-30 NOTE — PROGRESS NOTE ADULT - SUBJECTIVE AND OBJECTIVE BOX
BARB COLÓN    SCU progress note    INTERVAL HPI/OVERNIGHT EVENTS: ***Anxious overnight. Moderate respiratory distress. ABG, CXR and Morphine ordered. Symbicort given early. Patient refusing BIPAP at this time.    DNR [ ]   DNI  [  ]    FULL CODE    Covid - 19 PCR: Negative 9/29    The 4Ms    What Matters Most: see GOC  Age appropriate Medications/Screen for High Risk Medication: Yes  Mentation: see CAM below  Mobility: defer to physical exam    The Confusion Assessment Method (CAM) Diagnostic Algorithm     1: Acute Onset or Fluctuating Course  - Is there evidence of an acute change in mental status from the patient’s baseline? Did the (abnormal) behavior  fluctuate during the day, that is, tend to come and go, or increase and decrease in severity?  [ ] YES [x ] NO     2: Inattention  - Did the patient have difficulty focusing attention, being easily distractible, or having difficulty keeping track of what was being said?  [ ] YES [x ] NO     3: Disorganized thinking  -Was the patient’s thinking disorganized or incoherent, such as rambling or irrelevant conversation, unclear or illogical flow of ideas, or unpredictable switching from subject to subject?  [ ] YES [x ] NO    4: Altered Level of consciousness?  [ ] YES [x ] NO    The diagnosis of delirium by CAM requires the presence of features 1 and 2 and either 3 or 4.    PRESSORS: [ ] YES [x ] NO  caspofungin IVPB      caspofungin IVPB 50 milliGRAM(s) IV Intermittent every 24 hours  linezolid  IVPB      linezolid  IVPB 600 milliGRAM(s) IV Intermittent every 12 hours    Cardiovascular:  Heart Failure  Acute   Acute on Chronic  Chronic       carvedilol 6.25 milliGRAM(s) Oral every 12 hours    Pulmonary:  ALBUTerol    90 MICROgram(s) HFA Inhaler 2 Puff(s) Inhalation every 4 hours  budesonide 160 MICROgram(s)/formoterol 4.5 MICROgram(s) Inhaler 2 Puff(s) Inhalation two times a day  guaiFENesin  milliGRAM(s) Oral every 12 hours  tiotropium 18 MICROgram(s) Capsule 1 Capsule(s) Inhalation daily    Hematalogic:  enoxaparin Injectable 40 milliGRAM(s) SubCutaneous daily    Other:  atorvastatin 40 milliGRAM(s) Oral at bedtime  chlorhexidine 2% Cloths 1 Application(s) Topical <User Schedule>  dextrose 40% Gel 15 Gram(s) Oral once PRN  dextrose 5%. 1000 milliLiter(s) IV Continuous <Continuous>  dextrose 50% Injectable 12.5 Gram(s) IV Push once  dextrose 50% Injectable 25 Gram(s) IV Push once  dextrose 50% Injectable 25 Gram(s) IV Push once  ergocalciferol 90092 Unit(s) Oral <User Schedule>  FIRST- Mouthwash  BLM 10 milliLiter(s) Swish and Swallow every 8 hours  folic acid 1 milliGRAM(s) Oral daily  gabapentin 600 milliGRAM(s) Oral every 8 hours  glucagon  Injectable 1 milliGRAM(s) IntraMuscular once PRN  influenza   Vaccine 0.5 milliLiter(s) IntraMuscular once  insulin glargine Injectable (LANTUS) 35 Unit(s) SubCutaneous every morning  insulin lispro (HumaLOG) corrective regimen sliding scale   SubCutaneous three times a day before meals  insulin lispro Injectable (HumaLOG) 12 Unit(s) SubCutaneous three times a day before meals  melatonin 3 milliGRAM(s) Oral at bedtime  morphine  - Injectable 2 milliGRAM(s) IV Push once  nystatin Powder 1 Application(s) Topical two times a day  ondansetron Injectable 4 milliGRAM(s) IV Push every 6 hours PRN  oxycodone    5 mG/acetaminophen 325 mG 2 Tablet(s) Oral every 6 hours PRN  pantoprazole  Injectable 40 milliGRAM(s) IV Push daily  polyethylene glycol 3350 17 Gram(s) Oral two times a day  predniSONE   Tablet 40 milliGRAM(s) Oral daily  senna 2 Tablet(s) Oral at bedtime  sodium chloride 0.65% Nasal 1 Spray(s) Both Nostrils four times a day PRN    ALBUTerol    90 MICROgram(s) HFA Inhaler 2 Puff(s) Inhalation every 4 hours  atorvastatin 40 milliGRAM(s) Oral at bedtime  budesonide 160 MICROgram(s)/formoterol 4.5 MICROgram(s) Inhaler 2 Puff(s) Inhalation two times a day  carvedilol 6.25 milliGRAM(s) Oral every 12 hours  caspofungin IVPB      caspofungin IVPB 50 milliGRAM(s) IV Intermittent every 24 hours  chlorhexidine 2% Cloths 1 Application(s) Topical <User Schedule>  dextrose 40% Gel 15 Gram(s) Oral once PRN  dextrose 5%. 1000 milliLiter(s) IV Continuous <Continuous>  dextrose 50% Injectable 12.5 Gram(s) IV Push once  dextrose 50% Injectable 25 Gram(s) IV Push once  dextrose 50% Injectable 25 Gram(s) IV Push once  enoxaparin Injectable 40 milliGRAM(s) SubCutaneous daily  ergocalciferol 38227 Unit(s) Oral <User Schedule>  FIRST- Mouthwash  BLM 10 milliLiter(s) Swish and Swallow every 8 hours  folic acid 1 milliGRAM(s) Oral daily  gabapentin 600 milliGRAM(s) Oral every 8 hours  glucagon  Injectable 1 milliGRAM(s) IntraMuscular once PRN  guaiFENesin  milliGRAM(s) Oral every 12 hours  influenza   Vaccine 0.5 milliLiter(s) IntraMuscular once  insulin glargine Injectable (LANTUS) 35 Unit(s) SubCutaneous every morning  insulin lispro (HumaLOG) corrective regimen sliding scale   SubCutaneous three times a day before meals  insulin lispro Injectable (HumaLOG) 12 Unit(s) SubCutaneous three times a day before meals  linezolid  IVPB      linezolid  IVPB 600 milliGRAM(s) IV Intermittent every 12 hours  melatonin 3 milliGRAM(s) Oral at bedtime  morphine  - Injectable 2 milliGRAM(s) IV Push once  nystatin Powder 1 Application(s) Topical two times a day  ondansetron Injectable 4 milliGRAM(s) IV Push every 6 hours PRN  oxycodone    5 mG/acetaminophen 325 mG 2 Tablet(s) Oral every 6 hours PRN  pantoprazole  Injectable 40 milliGRAM(s) IV Push daily  polyethylene glycol 3350 17 Gram(s) Oral two times a day  predniSONE   Tablet 40 milliGRAM(s) Oral daily  senna 2 Tablet(s) Oral at bedtime  sodium chloride 0.65% Nasal 1 Spray(s) Both Nostrils four times a day PRN  tiotropium 18 MICROgram(s) Capsule 1 Capsule(s) Inhalation daily    Drug Dosing Weight  Height (cm): 154.9 (19 Sep 2020 08:00)  Weight (kg): 84.8 (21 Sep 2020 09:15)  BMI (kg/m2): 35.3 (21 Sep 2020 09:15)  BSA (m2): 1.84 (21 Sep 2020 09:15)    CENTRAL LINE: [ ] YES [x ] NO  LOCATION:   DATE INSERTED:  REMOVE: [ ] YES [ ] NO  EXPLAIN:    ROBLEDO: [ ] YES [x ] NO    DATE INSERTED:  REMOVE:  [ ] YES [ ] NO  EXPLAIN:    PAST MEDICAL & SURGICAL HISTORY:  Malignant neoplasm of unspecified part of right bronchus or lung    HTN (hypertension)    DM (diabetes mellitus)    COPD (chronic obstructive pulmonary disease)    Lung cancer    Morbid obesity    GERD (gastroesophageal reflux disease)    Deviated septum    Prolapsed uterus    ITP (idiopathic thrombocytopenic purpura)    Emphysema/COPD    History of cholecystectomy    S/P lobectomy of lung  right 2017    History of tonsillectomy                      PHYSICAL EXAM:    GENERAL: Milld to moderate respiratory distress. Very   HEAD:  Atraumatic, Normocephalic  EYES: EOMI, PERRLA, conjunctiva and sclera clear  ENMT: No tonsillar erythema, exudates  NECK: Supple, No JVD  NERVOUS SYSTEM:  Alert & Oriented X3, Anxious. Follows commands, moving all extremities.  CHEST/LUNG: Diminished breath sounds with scattered wheezing  HEART: Regular rate and rhythm; No murmurs, rubs, or gallops  ABDOMEN: Soft, Obese, Nontender, Nondistended; Bowel sounds present  EXTREMITIES:  2+ Peripheral Pulses, No clubbing, cyanosis, or edema  LYMPH: No lymphadenopathy noted  SKIN: No rashes or lesions      LABS:  CBC Full  -  ( 29 Sep 2020 07:17 )  WBC Count : 5.31 K/uL  RBC Count : 3.16 M/uL  Hemoglobin : 9.6 g/dL  Hematocrit : 27.8 %  Platelet Count - Automated : 50 K/uL  Mean Cell Volume : 88.0 fl  Mean Cell Hemoglobin : 30.4 pg  Mean Cell Hemoglobin Concentration : 34.5 gm/dL  Auto Neutrophil # : x  Auto Lymphocyte # : x  Auto Monocyte # : x  Auto Eosinophil # : x  Auto Basophil # : x  Auto Neutrophil % : x  Auto Lymphocyte % : x  Auto Monocyte % : x  Auto Eosinophil % : x  Auto Basophil % : x    09-29    136  |  98  |  8   ----------------------------<  135<H>  4.2   |  37<H>  |  0.56    Ca    8.3<L>      29 Sep 2020 22:54  Phos  2.5     09-29  Mg     1.9     09-29                [  ]  DVT Prophylaxis  [  ]  Nutrition, Brand, Rate         Goal Rate        Abnormal Nutritional Status -  Malnutrition   Cachexia      Morbid Obesity BMI >/=40    RADIOLOGY & ADDITIONAL STUDIES:  ***  < from: Xray Chest 1 View- PORTABLE-Routine (Xray Chest 1 View- PORTABLE-Routine in AM.) (09.23.20 @ 10:09) >  AP view of the chest is markedly rotated to the left and demonstrates persistent right perihilar infiltrate versus atelectasis. There is mild left perihilar discoid atelectasis.. There is no pleural effusion. There is no pneumothorax.    The heart, mediastinum and galina cannot be assessed due to rotation..    The pulmonary vasculature is normal.    Mild thoracic degenerative changes are present.    IMPRESSION:    Persistent right perihilar infiltrate versus atelectasis. Left midlung discoid atelectasis.    < end of copied text >    Goals of Care Discussion with Family/Proxy/Other   - see note from 9/23     BARB COLÓN    SCU progress note    INTERVAL HPI/OVERNIGHT EVENTS: ***Anxious overnight. Moderate respiratory distress. ABG, CXR and Morphine ordered. Symbicort given early. Patient refusing BIPAP at this time.    DNR [ ]   DNI  [  ]    FULL CODE    Covid - 19 PCR: Negative 9/29    The 4Ms    What Matters Most: see GOC  Age appropriate Medications/Screen for High Risk Medication: Yes  Mentation: see CAM below  Mobility: defer to physical exam    The Confusion Assessment Method (CAM) Diagnostic Algorithm     1: Acute Onset or Fluctuating Course  - Is there evidence of an acute change in mental status from the patient’s baseline? Did the (abnormal) behavior  fluctuate during the day, that is, tend to come and go, or increase and decrease in severity?  [ ] YES [x ] NO     2: Inattention  - Did the patient have difficulty focusing attention, being easily distractible, or having difficulty keeping track of what was being said?  [ ] YES [x ] NO     3: Disorganized thinking  -Was the patient’s thinking disorganized or incoherent, such as rambling or irrelevant conversation, unclear or illogical flow of ideas, or unpredictable switching from subject to subject?  [ ] YES [x ] NO    4: Altered Level of consciousness?  [ ] YES [x ] NO    The diagnosis of delirium by CAM requires the presence of features 1 and 2 and either 3 or 4.    PRESSORS: [ ] YES [x ] NO  caspofungin IVPB      caspofungin IVPB 50 milliGRAM(s) IV Intermittent every 24 hours  linezolid  IVPB      linezolid  IVPB 600 milliGRAM(s) IV Intermittent every 12 hours    Cardiovascular:  Heart Failure  Acute   Acute on Chronic  Chronic       carvedilol 6.25 milliGRAM(s) Oral every 12 hours    Pulmonary:  ALBUTerol    90 MICROgram(s) HFA Inhaler 2 Puff(s) Inhalation every 4 hours  budesonide 160 MICROgram(s)/formoterol 4.5 MICROgram(s) Inhaler 2 Puff(s) Inhalation two times a day  guaiFENesin  milliGRAM(s) Oral every 12 hours  tiotropium 18 MICROgram(s) Capsule 1 Capsule(s) Inhalation daily    Hematalogic:  enoxaparin Injectable 40 milliGRAM(s) SubCutaneous daily    Other:  atorvastatin 40 milliGRAM(s) Oral at bedtime  chlorhexidine 2% Cloths 1 Application(s) Topical <User Schedule>  dextrose 40% Gel 15 Gram(s) Oral once PRN  dextrose 5%. 1000 milliLiter(s) IV Continuous <Continuous>  dextrose 50% Injectable 12.5 Gram(s) IV Push once  dextrose 50% Injectable 25 Gram(s) IV Push once  dextrose 50% Injectable 25 Gram(s) IV Push once  ergocalciferol 89327 Unit(s) Oral <User Schedule>  FIRST- Mouthwash  BLM 10 milliLiter(s) Swish and Swallow every 8 hours  folic acid 1 milliGRAM(s) Oral daily  gabapentin 600 milliGRAM(s) Oral every 8 hours  glucagon  Injectable 1 milliGRAM(s) IntraMuscular once PRN  influenza   Vaccine 0.5 milliLiter(s) IntraMuscular once  insulin glargine Injectable (LANTUS) 35 Unit(s) SubCutaneous every morning  insulin lispro (HumaLOG) corrective regimen sliding scale   SubCutaneous three times a day before meals  insulin lispro Injectable (HumaLOG) 12 Unit(s) SubCutaneous three times a day before meals  melatonin 3 milliGRAM(s) Oral at bedtime  morphine  - Injectable 2 milliGRAM(s) IV Push once  nystatin Powder 1 Application(s) Topical two times a day  ondansetron Injectable 4 milliGRAM(s) IV Push every 6 hours PRN  oxycodone    5 mG/acetaminophen 325 mG 2 Tablet(s) Oral every 6 hours PRN  pantoprazole  Injectable 40 milliGRAM(s) IV Push daily  polyethylene glycol 3350 17 Gram(s) Oral two times a day  predniSONE   Tablet 40 milliGRAM(s) Oral daily  senna 2 Tablet(s) Oral at bedtime  sodium chloride 0.65% Nasal 1 Spray(s) Both Nostrils four times a day PRN    ALBUTerol    90 MICROgram(s) HFA Inhaler 2 Puff(s) Inhalation every 4 hours  atorvastatin 40 milliGRAM(s) Oral at bedtime  budesonide 160 MICROgram(s)/formoterol 4.5 MICROgram(s) Inhaler 2 Puff(s) Inhalation two times a day  carvedilol 6.25 milliGRAM(s) Oral every 12 hours  caspofungin IVPB      caspofungin IVPB 50 milliGRAM(s) IV Intermittent every 24 hours  chlorhexidine 2% Cloths 1 Application(s) Topical <User Schedule>  dextrose 40% Gel 15 Gram(s) Oral once PRN  dextrose 5%. 1000 milliLiter(s) IV Continuous <Continuous>  dextrose 50% Injectable 12.5 Gram(s) IV Push once  dextrose 50% Injectable 25 Gram(s) IV Push once  dextrose 50% Injectable 25 Gram(s) IV Push once  enoxaparin Injectable 40 milliGRAM(s) SubCutaneous daily  ergocalciferol 92278 Unit(s) Oral <User Schedule>  FIRST- Mouthwash  BLM 10 milliLiter(s) Swish and Swallow every 8 hours  folic acid 1 milliGRAM(s) Oral daily  gabapentin 600 milliGRAM(s) Oral every 8 hours  glucagon  Injectable 1 milliGRAM(s) IntraMuscular once PRN  guaiFENesin  milliGRAM(s) Oral every 12 hours  influenza   Vaccine 0.5 milliLiter(s) IntraMuscular once  insulin glargine Injectable (LANTUS) 35 Unit(s) SubCutaneous every morning  insulin lispro (HumaLOG) corrective regimen sliding scale   SubCutaneous three times a day before meals  insulin lispro Injectable (HumaLOG) 12 Unit(s) SubCutaneous three times a day before meals  linezolid  IVPB      linezolid  IVPB 600 milliGRAM(s) IV Intermittent every 12 hours  melatonin 3 milliGRAM(s) Oral at bedtime  morphine  - Injectable 2 milliGRAM(s) IV Push once  nystatin Powder 1 Application(s) Topical two times a day  ondansetron Injectable 4 milliGRAM(s) IV Push every 6 hours PRN  oxycodone    5 mG/acetaminophen 325 mG 2 Tablet(s) Oral every 6 hours PRN  pantoprazole  Injectable 40 milliGRAM(s) IV Push daily  polyethylene glycol 3350 17 Gram(s) Oral two times a day  predniSONE   Tablet 40 milliGRAM(s) Oral daily  senna 2 Tablet(s) Oral at bedtime  sodium chloride 0.65% Nasal 1 Spray(s) Both Nostrils four times a day PRN  tiotropium 18 MICROgram(s) Capsule 1 Capsule(s) Inhalation daily    Drug Dosing Weight  Height (cm): 154.9 (19 Sep 2020 08:00)  Weight (kg): 84.8 (21 Sep 2020 09:15)  BMI (kg/m2): 35.3 (21 Sep 2020 09:15)  BSA (m2): 1.84 (21 Sep 2020 09:15)    CENTRAL LINE: [ ] YES [x ] NO  LOCATION:   DATE INSERTED:  REMOVE: [ ] YES [ ] NO  EXPLAIN:    ROBLEDO: [ ] YES [x ] NO    DATE INSERTED:  REMOVE:  [ ] YES [ ] NO  EXPLAIN:    PAST MEDICAL & SURGICAL HISTORY:  Malignant neoplasm of unspecified part of right bronchus or lung    HTN (hypertension)    DM (diabetes mellitus)    COPD (chronic obstructive pulmonary disease)    Lung cancer    Morbid obesity    GERD (gastroesophageal reflux disease)    Deviated septum    Prolapsed uterus    ITP (idiopathic thrombocytopenic purpura)    Emphysema/COPD    History of cholecystectomy    S/P lobectomy of lung  right 2017    History of tonsillectomy        ABG  7.14/59/80/37/10.7/ 96%              PHYSICAL EXAM:    GENERAL: Milld to moderate respiratory distress. Very   HEAD:  Atraumatic, Normocephalic  EYES: EOMI, PERRLA, conjunctiva and sclera clear  ENMT: No tonsillar erythema, exudates. +oral thrush  NECK: Supple, No JVD  NERVOUS SYSTEM:  Alert & Oriented X3, Anxious. Follows commands, moving all extremities.  CHEST/LUNG: Diminished breath sounds with scattered wheezing  HEART: Regular rate and rhythm; No murmurs, rubs, or gallops  ABDOMEN: Soft, Obese, Nontender, Nondistended; Bowel sounds present  EXTREMITIES:  2+ Peripheral Pulses, No clubbing, cyanosis, or edema  LYMPH: No lymphadenopathy noted  SKIN: No rashes or lesions      LABS:  CBC Full  -  ( 29 Sep 2020 07:17 )  WBC Count : 5.31 K/uL  RBC Count : 3.16 M/uL  Hemoglobin : 9.6 g/dL  Hematocrit : 27.8 %  Platelet Count - Automated : 50 K/uL  Mean Cell Volume : 88.0 fl  Mean Cell Hemoglobin : 30.4 pg  Mean Cell Hemoglobin Concentration : 34.5 gm/dL  Auto Neutrophil # : x  Auto Lymphocyte # : x  Auto Monocyte # : x  Auto Eosinophil # : x  Auto Basophil # : x  Auto Neutrophil % : x  Auto Lymphocyte % : x  Auto Monocyte % : x  Auto Eosinophil % : x  Auto Basophil % : x    09-29    136  |  98  |  8   ----------------------------<  135<H>  4.2   |  37<H>  |  0.56    Ca    8.3<L>      29 Sep 2020 22:54  Phos  2.5     09-29  Mg     1.9     09-29                [  ]  DVT Prophylaxis  [  ]  Nutrition, Brand, Rate         Goal Rate        Abnormal Nutritional Status -  Malnutrition   Cachexia      Morbid Obesity BMI >/=40    RADIOLOGY & ADDITIONAL STUDIES:  ***  < from: Xray Chest 1 View- PORTABLE-Routine (Xray Chest 1 View- PORTABLE-Routine in AM.) (09.23.20 @ 10:09) >  AP view of the chest is markedly rotated to the left and demonstrates persistent right perihilar infiltrate versus atelectasis. There is mild left perihilar discoid atelectasis.. There is no pleural effusion. There is no pneumothorax.    The heart, mediastinum and galina cannot be assessed due to rotation..    The pulmonary vasculature is normal.    Mild thoracic degenerative changes are present.    IMPRESSION:    Persistent right perihilar infiltrate versus atelectasis. Left midlung discoid atelectasis.    < end of copied text >    Goals of Care Discussion with Family/Proxy/Other   - see note from 9/23

## 2020-09-30 NOTE — PROGRESS NOTE ADULT - SUBJECTIVE AND OBJECTIVE BOX
OVERNIGHT EVENTS: patient in respiratory distress earlier - no on continuous AVAPS    Present Symptoms:   Review of Systems: Patient states, "I know my breathing sucks." Limited ROS 2/2 mentation      MEDICATIONS  (STANDING):  ALBUTerol    90 MICROgram(s) HFA Inhaler 2 Puff(s) Inhalation every 4 hours  atorvastatin 40 milliGRAM(s) Oral at bedtime  budesonide 160 MICROgram(s)/formoterol 4.5 MICROgram(s) Inhaler 2 Puff(s) Inhalation two times a day  carvedilol 6.25 milliGRAM(s) Oral every 12 hours  caspofungin IVPB      caspofungin IVPB 50 milliGRAM(s) IV Intermittent every 24 hours  chlorhexidine 2% Cloths 1 Application(s) Topical <User Schedule>  dextrose 5%. 1000 milliLiter(s) (50 mL/Hr) IV Continuous <Continuous>  dextrose 50% Injectable 12.5 Gram(s) IV Push once  dextrose 50% Injectable 25 Gram(s) IV Push once  dextrose 50% Injectable 25 Gram(s) IV Push once  enoxaparin Injectable 40 milliGRAM(s) SubCutaneous daily  ergocalciferol 69224 Unit(s) Oral <User Schedule>  FIRST- Mouthwash  BLM 10 milliLiter(s) Swish and Swallow every 8 hours  folic acid 1 milliGRAM(s) Oral daily  gabapentin 600 milliGRAM(s) Oral every 8 hours  guaiFENesin  milliGRAM(s) Oral every 12 hours  influenza   Vaccine 0.5 milliLiter(s) IntraMuscular once  insulin glargine Injectable (LANTUS) 35 Unit(s) SubCutaneous every morning  insulin lispro (HumaLOG) corrective regimen sliding scale   SubCutaneous three times a day before meals  insulin lispro Injectable (HumaLOG) 8 Unit(s) SubCutaneous three times a day before meals  linezolid  IVPB      linezolid  IVPB 600 milliGRAM(s) IV Intermittent every 12 hours  melatonin 3 milliGRAM(s) Oral at bedtime  methylPREDNISolone sodium succinate Injectable 40 milliGRAM(s) IV Push every 12 hours  nystatin Powder 1 Application(s) Topical two times a day  pantoprazole  Injectable 40 milliGRAM(s) IV Push daily  polyethylene glycol 3350 17 Gram(s) Oral two times a day  senna 2 Tablet(s) Oral at bedtime  tiotropium 18 MICROgram(s) Capsule 1 Capsule(s) Inhalation daily    MEDICATIONS  (PRN):  dextrose 40% Gel 15 Gram(s) Oral once PRN Blood Glucose LESS THAN 70 milliGRAM(s)/deciLiter  glucagon  Injectable 1 milliGRAM(s) IntraMuscular once PRN Glucose <70 milliGRAM(s)/deciLiter  ondansetron Injectable 4 milliGRAM(s) IV Push every 6 hours PRN Nausea and/or Vomiting  oxycodone    5 mG/acetaminophen 325 mG 2 Tablet(s) Oral every 6 hours PRN Moderate Pain (4 - 6)  sodium chloride 0.65% Nasal 1 Spray(s) Both Nostrils four times a day PRN Nasal Congestion      PHYSICAL EXAM:  Vital Signs Last 24 Hrs  T(C): 36.7 (30 Sep 2020 12:34), Max: 36.8 (29 Sep 2020 14:22)  T(F): 98 (30 Sep 2020 12:34), Max: 98.3 (29 Sep 2020 14:22)  HR: 103 (30 Sep 2020 12:34) (88 - 108)  BP: 119/80 (30 Sep 2020 12:34) (119/80 - 138/73)  BP(mean): --  RR: 24 (30 Sep 2020 12:34) (18 - 30)  SpO2: 97% (30 Sep 2020 12:34) (94% - 100%)  General: patent lethargic, minimally verbal, does not follow commands. On AVAPS. No signs of distress.   Karnofsky Performance Score/Palliative Performance Status Version2:    20%    HEENT:   dry lips   Lungs: comfortable, on AVAPS.    CV: S1S2, IRR, + tachycardia  GI: abdomen soft, nontender, + obese, incontinent   :   prima fit  Musculoskeletal: + generalized weakness, does not follow commands, dependent on ADLs  Skin: BUE edema/ecchymosis  Neuro: lethargic, minimally verbal, does not follow commands    Oral intake ability: unable/only mouth care   Diet: mechanical soft    LABS:                          9.6    5.31  )-----------( 50       ( 29 Sep 2020 07:17 )             27.8     09-29    136  |  98  |  8   ----------------------------<  135<H>  4.2   |  37<H>  |  0.56    Ca    8.3<L>      29 Sep 2020 22:54  Phos  2.5     09-29  Mg     1.9     09-29          RADIOLOGY & ADDITIONAL STUDIES: reviewed    ADVANCE DIRECTIVES: HCP and MOLST forms previously completed as per patient's wishes: CPR / long term ventilation / long term feeding tube / trial IV fluids / use abx / NO LIMITATION on medical interventions/ send to hospital.

## 2020-09-30 NOTE — PROGRESS NOTE ADULT - PROBLEM SELECTOR PLAN 7
Likely secondary to infection and recent chemo.  Continnue to follow.  Heme/Onc  Dr Mendiola following.

## 2020-10-01 LAB
ALBUMIN SERPL ELPH-MCNC: 2.8 G/DL — LOW (ref 3.5–5)
ALP SERPL-CCNC: 126 U/L — HIGH (ref 40–120)
ALT FLD-CCNC: 28 U/L DA — SIGNIFICANT CHANGE UP (ref 10–60)
ANION GAP SERPL CALC-SCNC: 4 MMOL/L — LOW (ref 5–17)
AST SERPL-CCNC: 14 U/L — SIGNIFICANT CHANGE UP (ref 10–40)
BASE EXCESS BLDA CALC-SCNC: 12.5 MMOL/L — HIGH (ref -2–2)
BILIRUB SERPL-MCNC: 1 MG/DL — SIGNIFICANT CHANGE UP (ref 0.2–1.2)
BLOOD GAS COMMENTS ARTERIAL: SIGNIFICANT CHANGE UP
BUN SERPL-MCNC: 12 MG/DL — SIGNIFICANT CHANGE UP (ref 7–18)
CALCIUM SERPL-MCNC: 8.7 MG/DL — SIGNIFICANT CHANGE UP (ref 8.4–10.5)
CHLORIDE SERPL-SCNC: 89 MMOL/L — LOW (ref 96–108)
CO2 SERPL-SCNC: 39 MMOL/L — HIGH (ref 22–31)
CREAT SERPL-MCNC: 0.56 MG/DL — SIGNIFICANT CHANGE UP (ref 0.5–1.3)
GLUCOSE BLDC GLUCOMTR-MCNC: 174 MG/DL — HIGH (ref 70–99)
GLUCOSE BLDC GLUCOMTR-MCNC: 284 MG/DL — HIGH (ref 70–99)
GLUCOSE BLDC GLUCOMTR-MCNC: 303 MG/DL — HIGH (ref 70–99)
GLUCOSE BLDC GLUCOMTR-MCNC: 72 MG/DL — SIGNIFICANT CHANGE UP (ref 70–99)
GLUCOSE BLDC GLUCOMTR-MCNC: 86 MG/DL — SIGNIFICANT CHANGE UP (ref 70–99)
GLUCOSE SERPL-MCNC: 273 MG/DL — HIGH (ref 70–99)
HCO3 BLDA-SCNC: 38 MMOL/L — HIGH (ref 23–27)
HCT VFR BLD CALC: 30.7 % — LOW (ref 34.5–45)
HGB BLD-MCNC: 10.3 G/DL — LOW (ref 11.5–15.5)
HOROWITZ INDEX BLDA+IHG-RTO: 40 — SIGNIFICANT CHANGE UP
MAGNESIUM SERPL-MCNC: 2 MG/DL — SIGNIFICANT CHANGE UP (ref 1.6–2.6)
MCHC RBC-ENTMCNC: 29.8 PG — SIGNIFICANT CHANGE UP (ref 27–34)
MCHC RBC-ENTMCNC: 33.6 GM/DL — SIGNIFICANT CHANGE UP (ref 32–36)
MCV RBC AUTO: 88.7 FL — SIGNIFICANT CHANGE UP (ref 80–100)
NRBC # BLD: 0 /100 WBCS — SIGNIFICANT CHANGE UP (ref 0–0)
PCO2 BLDA: 51 MMHG — HIGH (ref 32–46)
PH BLDA: 7.48 — HIGH (ref 7.35–7.45)
PHOSPHATE SERPL-MCNC: 3.3 MG/DL — SIGNIFICANT CHANGE UP (ref 2.5–4.5)
PLATELET # BLD AUTO: 65 K/UL — LOW (ref 150–400)
PO2 BLDA: 84 MMHG — SIGNIFICANT CHANGE UP (ref 74–108)
POTASSIUM SERPL-MCNC: 3.4 MMOL/L — LOW (ref 3.5–5.3)
POTASSIUM SERPL-SCNC: 3.4 MMOL/L — LOW (ref 3.5–5.3)
PROT SERPL-MCNC: 6.1 G/DL — SIGNIFICANT CHANGE UP (ref 6–8.3)
RBC # BLD: 3.46 M/UL — LOW (ref 3.8–5.2)
RBC # FLD: 15.2 % — HIGH (ref 10.3–14.5)
SAO2 % BLDA: 96 % — SIGNIFICANT CHANGE UP (ref 92–96)
SODIUM SERPL-SCNC: 132 MMOL/L — LOW (ref 135–145)
WBC # BLD: 8.65 K/UL — SIGNIFICANT CHANGE UP (ref 3.8–10.5)
WBC # FLD AUTO: 8.65 K/UL — SIGNIFICANT CHANGE UP (ref 3.8–10.5)

## 2020-10-01 PROCEDURE — 99233 SBSQ HOSP IP/OBS HIGH 50: CPT

## 2020-10-01 RX ORDER — POTASSIUM CHLORIDE 20 MEQ
40 PACKET (EA) ORAL ONCE
Refills: 0 | Status: COMPLETED | OUTPATIENT
Start: 2020-10-01 | End: 2020-10-01

## 2020-10-01 RX ORDER — INSULIN GLARGINE 100 [IU]/ML
40 INJECTION, SOLUTION SUBCUTANEOUS EVERY MORNING
Refills: 0 | Status: DISCONTINUED | OUTPATIENT
Start: 2020-10-01 | End: 2020-10-03

## 2020-10-01 RX ORDER — INSULIN LISPRO 100/ML
10 VIAL (ML) SUBCUTANEOUS
Refills: 0 | Status: DISCONTINUED | OUTPATIENT
Start: 2020-10-01 | End: 2020-10-02

## 2020-10-01 RX ADMIN — Medication 600 MILLIGRAM(S): at 05:21

## 2020-10-01 RX ADMIN — ALBUTEROL 2 PUFF(S): 90 AEROSOL, METERED ORAL at 17:12

## 2020-10-01 RX ADMIN — Medication 40 MILLIGRAM(S): at 05:20

## 2020-10-01 RX ADMIN — DIPHENHYDRAMINE HYDROCHLORIDE AND LIDOCAINE HYDROCHLORIDE AND ALUMINUM HYDROXIDE AND MAGNESIUM HYDRO 10 MILLILITER(S): KIT at 21:56

## 2020-10-01 RX ADMIN — GABAPENTIN 600 MILLIGRAM(S): 400 CAPSULE ORAL at 13:52

## 2020-10-01 RX ADMIN — NYSTATIN CREAM 1 APPLICATION(S): 100000 CREAM TOPICAL at 05:36

## 2020-10-01 RX ADMIN — Medication 10 UNIT(S): at 12:23

## 2020-10-01 RX ADMIN — TIOTROPIUM BROMIDE 1 CAPSULE(S): 18 CAPSULE ORAL; RESPIRATORY (INHALATION) at 12:24

## 2020-10-01 RX ADMIN — Medication 40 MILLIGRAM(S): at 17:13

## 2020-10-01 RX ADMIN — Medication 40 MILLIEQUIVALENT(S): at 12:26

## 2020-10-01 RX ADMIN — CHLORHEXIDINE GLUCONATE 1 APPLICATION(S): 213 SOLUTION TOPICAL at 05:35

## 2020-10-01 RX ADMIN — Medication 8 UNIT(S): at 08:29

## 2020-10-01 RX ADMIN — OXYCODONE AND ACETAMINOPHEN 2 TABLET(S): 5; 325 TABLET ORAL at 21:57

## 2020-10-01 RX ADMIN — INSULIN GLARGINE 35 UNIT(S): 100 INJECTION, SOLUTION SUBCUTANEOUS at 08:29

## 2020-10-01 RX ADMIN — PANTOPRAZOLE SODIUM 40 MILLIGRAM(S): 20 TABLET, DELAYED RELEASE ORAL at 12:24

## 2020-10-01 RX ADMIN — Medication 1 MILLIGRAM(S): at 12:24

## 2020-10-01 RX ADMIN — POLYETHYLENE GLYCOL 3350 17 GRAM(S): 17 POWDER, FOR SOLUTION ORAL at 05:36

## 2020-10-01 RX ADMIN — Medication 10 UNIT(S): at 17:11

## 2020-10-01 RX ADMIN — ALBUTEROL 2 PUFF(S): 90 AEROSOL, METERED ORAL at 21:55

## 2020-10-01 RX ADMIN — ALBUTEROL 2 PUFF(S): 90 AEROSOL, METERED ORAL at 10:25

## 2020-10-01 RX ADMIN — SENNA PLUS 2 TABLET(S): 8.6 TABLET ORAL at 21:56

## 2020-10-01 RX ADMIN — BUDESONIDE AND FORMOTEROL FUMARATE DIHYDRATE 2 PUFF(S): 160; 4.5 AEROSOL RESPIRATORY (INHALATION) at 10:24

## 2020-10-01 RX ADMIN — BUDESONIDE AND FORMOTEROL FUMARATE DIHYDRATE 2 PUFF(S): 160; 4.5 AEROSOL RESPIRATORY (INHALATION) at 21:57

## 2020-10-01 RX ADMIN — CARVEDILOL PHOSPHATE 6.25 MILLIGRAM(S): 80 CAPSULE, EXTENDED RELEASE ORAL at 17:12

## 2020-10-01 RX ADMIN — DIPHENHYDRAMINE HYDROCHLORIDE AND LIDOCAINE HYDROCHLORIDE AND ALUMINUM HYDROXIDE AND MAGNESIUM HYDRO 10 MILLILITER(S): KIT at 13:53

## 2020-10-01 RX ADMIN — ALBUTEROL 2 PUFF(S): 90 AEROSOL, METERED ORAL at 06:00

## 2020-10-01 RX ADMIN — OXYCODONE AND ACETAMINOPHEN 2 TABLET(S): 5; 325 TABLET ORAL at 20:52

## 2020-10-01 RX ADMIN — DIPHENHYDRAMINE HYDROCHLORIDE AND LIDOCAINE HYDROCHLORIDE AND ALUMINUM HYDROXIDE AND MAGNESIUM HYDRO 10 MILLILITER(S): KIT at 05:35

## 2020-10-01 RX ADMIN — POLYETHYLENE GLYCOL 3350 17 GRAM(S): 17 POWDER, FOR SOLUTION ORAL at 17:14

## 2020-10-01 RX ADMIN — ENOXAPARIN SODIUM 40 MILLIGRAM(S): 100 INJECTION SUBCUTANEOUS at 12:26

## 2020-10-01 RX ADMIN — ALBUTEROL 2 PUFF(S): 90 AEROSOL, METERED ORAL at 13:53

## 2020-10-01 RX ADMIN — NYSTATIN CREAM 1 APPLICATION(S): 100000 CREAM TOPICAL at 17:14

## 2020-10-01 RX ADMIN — ALBUTEROL 2 PUFF(S): 90 AEROSOL, METERED ORAL at 02:00

## 2020-10-01 RX ADMIN — LINEZOLID 300 MILLIGRAM(S): 600 INJECTION, SOLUTION INTRAVENOUS at 05:21

## 2020-10-01 RX ADMIN — Medication 2: at 17:11

## 2020-10-01 RX ADMIN — GABAPENTIN 600 MILLIGRAM(S): 400 CAPSULE ORAL at 21:56

## 2020-10-01 RX ADMIN — CASPOFUNGIN ACETATE 260 MILLIGRAM(S): 7 INJECTION, POWDER, LYOPHILIZED, FOR SOLUTION INTRAVENOUS at 14:22

## 2020-10-01 RX ADMIN — ATORVASTATIN CALCIUM 40 MILLIGRAM(S): 80 TABLET, FILM COATED ORAL at 21:55

## 2020-10-01 RX ADMIN — LINEZOLID 300 MILLIGRAM(S): 600 INJECTION, SOLUTION INTRAVENOUS at 17:13

## 2020-10-01 RX ADMIN — Medication 600 MILLIGRAM(S): at 17:13

## 2020-10-01 RX ADMIN — Medication 6: at 08:28

## 2020-10-01 RX ADMIN — Medication 8: at 12:23

## 2020-10-01 RX ADMIN — GABAPENTIN 600 MILLIGRAM(S): 400 CAPSULE ORAL at 07:04

## 2020-10-01 RX ADMIN — CARVEDILOL PHOSPHATE 6.25 MILLIGRAM(S): 80 CAPSULE, EXTENDED RELEASE ORAL at 05:21

## 2020-10-01 RX ADMIN — Medication 3 MILLIGRAM(S): at 21:56

## 2020-10-01 NOTE — PROGRESS NOTE ADULT - PROBLEM SELECTOR PLAN 1
Acute resolving. Continue AVAPS nightly and as needed during the day. OXygen at 3LPM  Continue bronchodilators and ICS  Continue solumedrol every 12 hours.

## 2020-10-01 NOTE — PROGRESS NOTE ADULT - ASSESSMENT
66 yo obese F with hx of 80 pack year tobacco use, HTN, DM2 on insulin, COPD on 5L (frequent admissions), R lung cancer s/p resection (2017) on chemo q 3 weeks with concern for mets to spine and compression fractures, recent admission for COPD exacerbation earlier in August who on 9/19/2020 presented to ED with c/o SOB, hypoxia and increased sputum production x 1 day.    INTERVAL HX: On 9/21/2020. Patient was hypoxic, tachycardiac and tachypneic. Rapid response was called. RRT team arrived on scene. Patient had encephalopathy from hypoxia , wheezing and stridor on examination. Patient was tachycardiac to 140/min. Patient was hypoxic and started on non-rebreather but patient has increased work of breathing. Decision was made to intubate the patient. Patient become hypotensive post intubation and was given LR bolus and started on peripheral phenylephrine.    In ICU patient was awake and alert following commands and using a talking board and pen and paper to communicate. She was started on Precedex and Fentanyl to sedate the patient.   Patient was extubated on 9/22/20. Saturating well on NC. Patient was titrated off the Precedex and Fentanyl by 9/23/20. She has anxiety and when anxious she desaturates. on 9/23/2020 patient was started on BIPAP after an episode of anxiety and desaturation and tolerated it. For now patient is using BIPAP as needed.     Blood cultures from 9/19/2020 grew VRE and yeast like cells. Patient's PICC line on Left arm was removed on 9/21/2020 as possible source of infection. Patient was started on Zyvox on 9/21 and Caspofungin on 9/23 after the results came back. Repeat blood cultures from 9/21/2020 are showing no growth to date. Repeat blood cultures were sent on 9/23/2020  and 9/27. ID Dr. Corcoran on board.   9/30   Mild to moderate respiratory distress. Morphine ordered. ABG and CXR ordered. Started on AVAPS

## 2020-10-01 NOTE — PROGRESS NOTE ADULT - PROBLEM SELECTOR PLAN 4
Patient previously identified daughter/Gavi as primary HCP and partner/Williams as alternate agent. MOLST form previously completed as per patient's wishes: CPR / long term ventilation / long term feeding tube / trial IV fluids / use abx / NO LIMITATION on medical interventions. Overall, patient with poor prognosis.

## 2020-10-01 NOTE — PROGRESS NOTE ADULT - SUBJECTIVE AND OBJECTIVE BOX
BARB COLÓN    SCU progress note    INTERVAL HPI/OVERNIGHT EVENTS: ***Using AVAPS intermittently. C/O pressure to high. TV decreased to 400. Tolerating AVAPS better.     DNR [ ]   DNI  [  ]    Covid - 19 PCR:     The 4Ms    What Matters Most: see GOC  Age appropriate Medications/Screen for High Risk Medication: Yes  Mentation: see CAM below  Mobility: defer to physical exam    The Confusion Assessment Method (CAM) Diagnostic Algorithm     1: Acute Onset or Fluctuating Course  - Is there evidence of an acute change in mental status from the patient’s baseline? Did the (abnormal) behavior  fluctuate during the day, that is, tend to come and go, or increase and decrease in severity?  [ ] YES [ ] NO     2: Inattention  - Did the patient have difficulty focusing attention, being easily distractible, or having difficulty keeping track of what was being said?  [ ] YES [ ] NO     3: Disorganized thinking  -Was the patient’s thinking disorganized or incoherent, such as rambling or irrelevant conversation, unclear or illogical flow of ideas, or unpredictable switching from subject to subject?  [ ] YES [ ] NO    4: Altered Level of consciousness?  [ ] YES [ ] NO    The diagnosis of delirium by CAM requires the presence of features 1 and 2 and either 3 or 4.    PRESSORS: [ ] YES [ ] NO  caspofungin IVPB 50 milliGRAM(s) IV Intermittent every 24 hours  caspofungin IVPB      linezolid  IVPB      linezolid  IVPB 600 milliGRAM(s) IV Intermittent every 12 hours    Cardiovascular:  Heart Failure  Acute   Acute on Chronic  Chronic       carvedilol 6.25 milliGRAM(s) Oral every 12 hours    Pulmonary:  ALBUTerol    90 MICROgram(s) HFA Inhaler 2 Puff(s) Inhalation every 4 hours  budesonide 160 MICROgram(s)/formoterol 4.5 MICROgram(s) Inhaler 2 Puff(s) Inhalation two times a day  guaiFENesin  milliGRAM(s) Oral every 12 hours  tiotropium 18 MICROgram(s) Capsule 1 Capsule(s) Inhalation daily    Hematalogic:  enoxaparin Injectable 40 milliGRAM(s) SubCutaneous daily    Other:  atorvastatin 40 milliGRAM(s) Oral at bedtime  chlorhexidine 2% Cloths 1 Application(s) Topical <User Schedule>  dextrose 40% Gel 15 Gram(s) Oral once PRN  dextrose 5%. 1000 milliLiter(s) IV Continuous <Continuous>  dextrose 50% Injectable 12.5 Gram(s) IV Push once  dextrose 50% Injectable 25 Gram(s) IV Push once  dextrose 50% Injectable 25 Gram(s) IV Push once  ergocalciferol 91226 Unit(s) Oral <User Schedule>  FIRST- Mouthwash  BLM 10 milliLiter(s) Swish and Swallow every 8 hours  folic acid 1 milliGRAM(s) Oral daily  gabapentin 600 milliGRAM(s) Oral every 8 hours  glucagon  Injectable 1 milliGRAM(s) IntraMuscular once PRN  influenza   Vaccine 0.5 milliLiter(s) IntraMuscular once  insulin glargine Injectable (LANTUS) 40 Unit(s) SubCutaneous every morning  insulin lispro (HumaLOG) corrective regimen sliding scale   SubCutaneous three times a day before meals  insulin lispro Injectable (HumaLOG) 10 Unit(s) SubCutaneous three times a day before meals  melatonin 3 milliGRAM(s) Oral at bedtime  methylPREDNISolone sodium succinate Injectable 40 milliGRAM(s) IV Push every 12 hours  nystatin Powder 1 Application(s) Topical two times a day  ondansetron Injectable 4 milliGRAM(s) IV Push every 6 hours PRN  oxycodone    5 mG/acetaminophen 325 mG 2 Tablet(s) Oral every 6 hours PRN  pantoprazole  Injectable 40 milliGRAM(s) IV Push daily  polyethylene glycol 3350 17 Gram(s) Oral two times a day  potassium chloride    Tablet ER 40 milliEquivalent(s) Oral once  senna 2 Tablet(s) Oral at bedtime  sodium chloride 0.65% Nasal 1 Spray(s) Both Nostrils four times a day PRN  zolpidem 5 milliGRAM(s) Oral once PRN    ALBUTerol    90 MICROgram(s) HFA Inhaler 2 Puff(s) Inhalation every 4 hours  atorvastatin 40 milliGRAM(s) Oral at bedtime  budesonide 160 MICROgram(s)/formoterol 4.5 MICROgram(s) Inhaler 2 Puff(s) Inhalation two times a day  carvedilol 6.25 milliGRAM(s) Oral every 12 hours  caspofungin IVPB 50 milliGRAM(s) IV Intermittent every 24 hours  caspofungin IVPB      chlorhexidine 2% Cloths 1 Application(s) Topical <User Schedule>  dextrose 40% Gel 15 Gram(s) Oral once PRN  dextrose 5%. 1000 milliLiter(s) IV Continuous <Continuous>  dextrose 50% Injectable 12.5 Gram(s) IV Push once  dextrose 50% Injectable 25 Gram(s) IV Push once  dextrose 50% Injectable 25 Gram(s) IV Push once  enoxaparin Injectable 40 milliGRAM(s) SubCutaneous daily  ergocalciferol 53829 Unit(s) Oral <User Schedule>  FIRST- Mouthwash  BLM 10 milliLiter(s) Swish and Swallow every 8 hours  folic acid 1 milliGRAM(s) Oral daily  gabapentin 600 milliGRAM(s) Oral every 8 hours  glucagon  Injectable 1 milliGRAM(s) IntraMuscular once PRN  guaiFENesin  milliGRAM(s) Oral every 12 hours  influenza   Vaccine 0.5 milliLiter(s) IntraMuscular once  insulin glargine Injectable (LANTUS) 40 Unit(s) SubCutaneous every morning  insulin lispro (HumaLOG) corrective regimen sliding scale   SubCutaneous three times a day before meals  insulin lispro Injectable (HumaLOG) 10 Unit(s) SubCutaneous three times a day before meals  linezolid  IVPB      linezolid  IVPB 600 milliGRAM(s) IV Intermittent every 12 hours  melatonin 3 milliGRAM(s) Oral at bedtime  methylPREDNISolone sodium succinate Injectable 40 milliGRAM(s) IV Push every 12 hours  nystatin Powder 1 Application(s) Topical two times a day  ondansetron Injectable 4 milliGRAM(s) IV Push every 6 hours PRN  oxycodone    5 mG/acetaminophen 325 mG 2 Tablet(s) Oral every 6 hours PRN  pantoprazole  Injectable 40 milliGRAM(s) IV Push daily  polyethylene glycol 3350 17 Gram(s) Oral two times a day  potassium chloride    Tablet ER 40 milliEquivalent(s) Oral once  senna 2 Tablet(s) Oral at bedtime  sodium chloride 0.65% Nasal 1 Spray(s) Both Nostrils four times a day PRN  tiotropium 18 MICROgram(s) Capsule 1 Capsule(s) Inhalation daily  zolpidem 5 milliGRAM(s) Oral once PRN    Drug Dosing Weight  Height (cm): 154.9 (19 Sep 2020 08:00)  Weight (kg): 84.8 (21 Sep 2020 09:15)  BMI (kg/m2): 35.3 (21 Sep 2020 09:15)  BSA (m2): 1.84 (21 Sep 2020 09:15)    CENTRAL LINE: [ ] YES [ ] NO  LOCATION:   DATE INSERTED:  REMOVE: [ ] YES [ ] NO  EXPLAIN:    ROBLEDO: [ ] YES [ ] NO    DATE INSERTED:  REMOVE:  [ ] YES [ ] NO  EXPLAIN:    PAST MEDICAL & SURGICAL HISTORY:  Malignant neoplasm of unspecified part of right bronchus or lung    HTN (hypertension)    DM (diabetes mellitus)    COPD (chronic obstructive pulmonary disease)    Lung cancer    Morbid obesity    GERD (gastroesophageal reflux disease)    Deviated septum    Prolapsed uterus    ITP (idiopathic thrombocytopenic purpura)    Emphysema/COPD    History of cholecystectomy    S/P lobectomy of lung  right 2017    History of tonsillectomy          ABG - ( 01 Oct 2020 06:16 )  pH, Arterial: 7.48  pH, Blood: x     /  pCO2: 51    /  pO2: 84    / HCO3: 38    / Base Excess: 12.5  /  SaO2: 96                    09-30 @ 07:01  -  10-01 @ 07:00  --------------------------------------------------------  IN: 260 mL / OUT: 1500 mL / NET: -1240 mL            PHYSICAL EXAM:    GENERAL: NAD, well-groomed, well-developed  HEAD:  Atraumatic, Normocephalic  EYES: EOMI, PERRLA, conjunctiva and sclera clear  ENMT: No tonsillar erythema, exudates, or enlargement; Moist mucous membranes, Good dentition, No lesions  NECK: Supple, No JVD, Normal thyroid  NERVOUS SYSTEM:  Alert & Oriented X3, Good concentration; Motor Strength 5/5 B/L upper and lower extremities; DTRs 2+ intact and symmetric  CHEST/LUNG: Clear to percussion bilaterally; No rales, rhonchi, wheezing, or rubs  HEART: Regular rate and rhythm; No murmurs, rubs, or gallops  ABDOMEN: Soft, Nontender, Nondistended; Bowel sounds present  EXTREMITIES:  2+ Peripheral Pulses, No clubbing, cyanosis, or edema  LYMPH: No lymphadenopathy noted  SKIN: No rashes or lesions      LABS:  CBC Full  -  ( 01 Oct 2020 07:14 )  WBC Count : 8.65 K/uL  RBC Count : 3.46 M/uL  Hemoglobin : 10.3 g/dL  Hematocrit : 30.7 %  Platelet Count - Automated : 65 K/uL  Mean Cell Volume : 88.7 fl  Mean Cell Hemoglobin : 29.8 pg  Mean Cell Hemoglobin Concentration : 33.6 gm/dL  Auto Neutrophil # : x  Auto Lymphocyte # : x  Auto Monocyte # : x  Auto Eosinophil # : x  Auto Basophil # : x  Auto Neutrophil % : x  Auto Lymphocyte % : x  Auto Monocyte % : x  Auto Eosinophil % : x  Auto Basophil % : x    10-01    132<L>  |  89<L>  |  12  ----------------------------<  273<H>  3.4<L>   |  39<H>  |  0.56    Ca    8.7      01 Oct 2020 07:14  Phos  3.3     10-01  Mg     2.0     10-01    TPro  6.1  /  Alb  2.8<L>  /  TBili  1.0  /  DBili  x   /  AST  14  /  ALT  28  /  AlkPhos  126<H>  10-01              [  ]  DVT Prophylaxis  [  ]  Nutrition, Brand, Rate         Goal Rate        Abnormal Nutritional Status -  Malnutrition   Cachexia      Morbid Obesity BMI >/=40    RADIOLOGY & ADDITIONAL STUDIES:  ***    Goals of Care Discussion with Family/Proxy/Other   - see note from/family meeting set up for...     BARB COLÓN    SCU progress note    INTERVAL HPI/OVERNIGHT EVENTS: ***Using AVAPS intermittently. C/O pressure to high. TV decreased to 400. Tolerating AVAPS better.     DNR [ ]   DNI  [  ]  FULL CODE    Covid - 19 PCR: Negative 9/29    The 4Ms    What Matters Most: see GOC  Age appropriate Medications/Screen for High Risk Medication: Yes  Mentation: see CAM below  Mobility: defer to physical exam    The Confusion Assessment Method (CAM) Diagnostic Algorithm     1: Acute Onset or Fluctuating Course  - Is there evidence of an acute change in mental status from the patient’s baseline? Did the (abnormal) behavior  fluctuate during the day, that is, tend to come and go, or increase and decrease in severity?  [ ] YES [x ] NO     2: Inattention  - Did the patient have difficulty focusing attention, being easily distractible, or having difficulty keeping track of what was being said?  [ ] YES [x ] NO     3: Disorganized thinking  -Was the patient’s thinking disorganized or incoherent, such as rambling or irrelevant conversation, unclear or illogical flow of ideas, or unpredictable switching from subject to subject?  [ ] YES [ x] NO    4: Altered Level of consciousness?  [ ] YES [x ] NO    The diagnosis of delirium by CAM requires the presence of features 1 and 2 and either 3 or 4.    PRESSORS: [ ] YES [x ] NO  caspofungin IVPB 50 milliGRAM(s) IV Intermittent every 24 hours  caspofungin IVPB      linezolid  IVPB      linezolid  IVPB 600 milliGRAM(s) IV Intermittent every 12 hours    Cardiovascular:  Heart Failure  Acute   Acute on Chronic  Chronic       carvedilol 6.25 milliGRAM(s) Oral every 12 hours    Pulmonary:  ALBUTerol    90 MICROgram(s) HFA Inhaler 2 Puff(s) Inhalation every 4 hours  budesonide 160 MICROgram(s)/formoterol 4.5 MICROgram(s) Inhaler 2 Puff(s) Inhalation two times a day  guaiFENesin  milliGRAM(s) Oral every 12 hours  tiotropium 18 MICROgram(s) Capsule 1 Capsule(s) Inhalation daily    Hematalogic:  enoxaparin Injectable 40 milliGRAM(s) SubCutaneous daily    Other:  atorvastatin 40 milliGRAM(s) Oral at bedtime  chlorhexidine 2% Cloths 1 Application(s) Topical <User Schedule>  dextrose 40% Gel 15 Gram(s) Oral once PRN  dextrose 5%. 1000 milliLiter(s) IV Continuous <Continuous>  dextrose 50% Injectable 12.5 Gram(s) IV Push once  dextrose 50% Injectable 25 Gram(s) IV Push once  dextrose 50% Injectable 25 Gram(s) IV Push once  ergocalciferol 77535 Unit(s) Oral <User Schedule>  FIRST- Mouthwash  BLM 10 milliLiter(s) Swish and Swallow every 8 hours  folic acid 1 milliGRAM(s) Oral daily  gabapentin 600 milliGRAM(s) Oral every 8 hours  glucagon  Injectable 1 milliGRAM(s) IntraMuscular once PRN  influenza   Vaccine 0.5 milliLiter(s) IntraMuscular once  insulin glargine Injectable (LANTUS) 40 Unit(s) SubCutaneous every morning  insulin lispro (HumaLOG) corrective regimen sliding scale   SubCutaneous three times a day before meals  insulin lispro Injectable (HumaLOG) 10 Unit(s) SubCutaneous three times a day before meals  melatonin 3 milliGRAM(s) Oral at bedtime  methylPREDNISolone sodium succinate Injectable 40 milliGRAM(s) IV Push every 12 hours  nystatin Powder 1 Application(s) Topical two times a day  ondansetron Injectable 4 milliGRAM(s) IV Push every 6 hours PRN  oxycodone    5 mG/acetaminophen 325 mG 2 Tablet(s) Oral every 6 hours PRN  pantoprazole  Injectable 40 milliGRAM(s) IV Push daily  polyethylene glycol 3350 17 Gram(s) Oral two times a day  potassium chloride    Tablet ER 40 milliEquivalent(s) Oral once  senna 2 Tablet(s) Oral at bedtime  sodium chloride 0.65% Nasal 1 Spray(s) Both Nostrils four times a day PRN  zolpidem 5 milliGRAM(s) Oral once PRN    ALBUTerol    90 MICROgram(s) HFA Inhaler 2 Puff(s) Inhalation every 4 hours  atorvastatin 40 milliGRAM(s) Oral at bedtime  budesonide 160 MICROgram(s)/formoterol 4.5 MICROgram(s) Inhaler 2 Puff(s) Inhalation two times a day  carvedilol 6.25 milliGRAM(s) Oral every 12 hours  caspofungin IVPB 50 milliGRAM(s) IV Intermittent every 24 hours  caspofungin IVPB      chlorhexidine 2% Cloths 1 Application(s) Topical <User Schedule>  dextrose 40% Gel 15 Gram(s) Oral once PRN  dextrose 5%. 1000 milliLiter(s) IV Continuous <Continuous>  dextrose 50% Injectable 12.5 Gram(s) IV Push once  dextrose 50% Injectable 25 Gram(s) IV Push once  dextrose 50% Injectable 25 Gram(s) IV Push once  enoxaparin Injectable 40 milliGRAM(s) SubCutaneous daily  ergocalciferol 49060 Unit(s) Oral <User Schedule>  FIRST- Mouthwash  BLM 10 milliLiter(s) Swish and Swallow every 8 hours  folic acid 1 milliGRAM(s) Oral daily  gabapentin 600 milliGRAM(s) Oral every 8 hours  glucagon  Injectable 1 milliGRAM(s) IntraMuscular once PRN  guaiFENesin  milliGRAM(s) Oral every 12 hours  influenza   Vaccine 0.5 milliLiter(s) IntraMuscular once  insulin glargine Injectable (LANTUS) 40 Unit(s) SubCutaneous every morning  insulin lispro (HumaLOG) corrective regimen sliding scale   SubCutaneous three times a day before meals  insulin lispro Injectable (HumaLOG) 10 Unit(s) SubCutaneous three times a day before meals  linezolid  IVPB      linezolid  IVPB 600 milliGRAM(s) IV Intermittent every 12 hours  melatonin 3 milliGRAM(s) Oral at bedtime  methylPREDNISolone sodium succinate Injectable 40 milliGRAM(s) IV Push every 12 hours  nystatin Powder 1 Application(s) Topical two times a day  ondansetron Injectable 4 milliGRAM(s) IV Push every 6 hours PRN  oxycodone    5 mG/acetaminophen 325 mG 2 Tablet(s) Oral every 6 hours PRN  pantoprazole  Injectable 40 milliGRAM(s) IV Push daily  polyethylene glycol 3350 17 Gram(s) Oral two times a day  potassium chloride    Tablet ER 40 milliEquivalent(s) Oral once  senna 2 Tablet(s) Oral at bedtime  sodium chloride 0.65% Nasal 1 Spray(s) Both Nostrils four times a day PRN  tiotropium 18 MICROgram(s) Capsule 1 Capsule(s) Inhalation daily  zolpidem 5 milliGRAM(s) Oral once PRN    Drug Dosing Weight  Height (cm): 154.9 (19 Sep 2020 08:00)  Weight (kg): 84.8 (21 Sep 2020 09:15)  BMI (kg/m2): 35.3 (21 Sep 2020 09:15)  BSA (m2): 1.84 (21 Sep 2020 09:15)    CENTRAL LINE: [ ] YES [x ] NO  LOCATION:   DATE INSERTED:  REMOVE: [ ] YES [ ] NO  EXPLAIN:    ROBLEDO: [ ] YES [x ] NO    DATE INSERTED:  REMOVE:  [ ] YES [ ] NO  EXPLAIN:    PAST MEDICAL & SURGICAL HISTORY:  Malignant neoplasm of unspecified part of right bronchus or lung    HTN (hypertension)    DM (diabetes mellitus)    COPD (chronic obstructive pulmonary disease)    Lung cancer    Morbid obesity    GERD (gastroesophageal reflux disease)    Deviated septum    Prolapsed uterus    ITP (idiopathic thrombocytopenic purpura)    Emphysema/COPD    History of cholecystectomy    S/P lobectomy of lung  right 2017    History of tonsillectomy          ABG - ( 01 Oct 2020 06:16 )  pH, Arterial: 7.48  pH, Blood: x     /  pCO2: 51    /  pO2: 84    / HCO3: 38    / Base Excess: 12.5  /  SaO2: 96                    09-30 @ 07:01  -  10-01 @ 07:00  --------------------------------------------------------  IN: 260 mL / OUT: 1500 mL / NET: -1240 mL            PHYSICAL EXAM:    GENERAL: Mild respiratory distress/improved from yesterday.  HEAD:  Atraumatic, Normocephalic  EYES: EOMI, PERRLA, conjunctiva and sclera clear  ENMT: No tonsillar erythema, exudates  NECK: Supple, No JVD  NERVOUS SYSTEM:  Alert & Oriented X3, Follows commands. Moving all extremities.  CHEST/LUNG: Diminished breath sounds with scattered rhonchi. No wheezing  HEART: Regular rate and rhythm; No murmurs, rubs, or gallops  ABDOMEN: Soft, Obese. Nontender, Nondistended; Bowel sounds present  EXTREMITIES:  2+ Peripheral Pulses, No clubbing, cyanosis, or edema  LYMPH: No lymphadenopathy noted  SKIN: Arms ecchymotic      LABS:  CBC Full  -  ( 01 Oct 2020 07:14 )  WBC Count : 8.65 K/uL  RBC Count : 3.46 M/uL  Hemoglobin : 10.3 g/dL  Hematocrit : 30.7 %  Platelet Count - Automated : 65 K/uL  Mean Cell Volume : 88.7 fl  Mean Cell Hemoglobin : 29.8 pg  Mean Cell Hemoglobin Concentration : 33.6 gm/dL  Auto Neutrophil # : x  Auto Lymphocyte # : x  Auto Monocyte # : x  Auto Eosinophil # : x  Auto Basophil # : x  Auto Neutrophil % : x  Auto Lymphocyte % : x  Auto Monocyte % : x  Auto Eosinophil % : x  Auto Basophil % : x    10-01    132<L>  |  89<L>  |  12  ----------------------------<  273<H>  3.4<L>   |  39<H>  |  0.56    Ca    8.7      01 Oct 2020 07:14  Phos  3.3     10-01  Mg     2.0     10-01    TPro  6.1  /  Alb  2.8<L>  /  TBili  1.0  /  DBili  x   /  AST  14  /  ALT  28  /  AlkPhos  126<H>  10-01              [  ]  DVT Prophylaxis  [  ]  Nutrition, Brand, Rate         Goal Rate        Abnormal Nutritional Status -  Malnutrition   Cachexia      Morbid Obesity BMI >/=40    RADIOLOGY & ADDITIONAL STUDIES:  ***  < from: Xray Chest 1 View- PORTABLE-Urgent (Xray Chest 1 View- PORTABLE-Urgent .) (09.30.20 @ 11:46) >  Limited shallow inspiration. The patient's chin obscures portion left apex. No change heart mediastinum. Fibrotic atelectatic stranding at the lung bases. No definite acute infiltrate or significant pleural effusion.      < end of copied text >    Goals of Care Discussion with Family/Proxy/Other   - see note from 9/23

## 2020-10-01 NOTE — PROGRESS NOTE ADULT - ASSESSMENT
COPD with acute asthmatic bronchitis with  s/p  Respiratory Failure   Recurrent Lung cancer with Bone Mets   IDDM   Htn with MR   Thrombocytopenia sec to C/T  Obesity with CRIS   r/o esophageal candidiasis  GPC bacteremia VRE/ yeast    PLAN-po prednisone 20 mg qd x 3 days  D/C Solumedrol  IV Zyvox and Cancidas  Get PICC LINE   o2 n/c 2pm/ 24 hrs a day  Advance diet  OOB in chair/ BRP   D/C plan to rehab on IV antibiotics   Symbicort 160/4.5 2 puffs bid    Duoneb  by HFN rx qid prn   s/c lovenox  FS coverage

## 2020-10-01 NOTE — PROGRESS NOTE ADULT - ASSESSMENT
· Assessment	  65 year old lady with severe COPD and lung ca with mets to bones which causing compression Fx.  She had one RT for that.  she was admitted for dyspnea and hypoxia.    Problem/Recommendation - 1:  Problem: Lung cancer. Recommendation: with mets to bones  on chemo keytruda, alimta and carboplatin  she had one RT to spine.  the tumor markers have been trending down with chemo    Problem/Recommendation - 2:  ·  Problem: COPD (chronic obstructive pulmonary disease).  Recommendation: required freq steroids.     Problem/Recommendation - 3:  ·  Problem: Acute on chronic respiratory failure with hypoxia.  Recommendation: ?aspiration causing resp failure requiring intubation  she has pain at throat for a while and has difficulty swallowing  now extubated  prognosis poor.   she is lucid now and express wishes to continue chemo. she needs placement because she is not able to climb the stairs  4. sore throat   she has difficulty swallowing  need a scope  th pain is less now  able to swallow food better    will watch for possibility of aspiration

## 2020-10-01 NOTE — PROGRESS NOTE ADULT - SUBJECTIVE AND OBJECTIVE BOX
condition same  lethargic  can not sleep well due to BIPAP  no fever  pain is better controled    MEDICATIONS  (STANDING):  ALBUTerol    90 MICROgram(s) HFA Inhaler 2 Puff(s) Inhalation every 4 hours  atorvastatin 40 milliGRAM(s) Oral at bedtime  budesonide 160 MICROgram(s)/formoterol 4.5 MICROgram(s) Inhaler 2 Puff(s) Inhalation two times a day  carvedilol 6.25 milliGRAM(s) Oral every 12 hours  caspofungin IVPB 50 milliGRAM(s) IV Intermittent every 24 hours  caspofungin IVPB      chlorhexidine 2% Cloths 1 Application(s) Topical <User Schedule>  dextrose 5%. 1000 milliLiter(s) (50 mL/Hr) IV Continuous <Continuous>  dextrose 50% Injectable 12.5 Gram(s) IV Push once  dextrose 50% Injectable 25 Gram(s) IV Push once  dextrose 50% Injectable 25 Gram(s) IV Push once  enoxaparin Injectable 40 milliGRAM(s) SubCutaneous daily  ergocalciferol 15111 Unit(s) Oral <User Schedule>  FIRST- Mouthwash  BLM 10 milliLiter(s) Swish and Swallow every 8 hours  folic acid 1 milliGRAM(s) Oral daily  gabapentin 600 milliGRAM(s) Oral every 8 hours  guaiFENesin  milliGRAM(s) Oral every 12 hours  influenza   Vaccine 0.5 milliLiter(s) IntraMuscular once  insulin glargine Injectable (LANTUS) 40 Unit(s) SubCutaneous every morning  insulin lispro (HumaLOG) corrective regimen sliding scale   SubCutaneous three times a day before meals  insulin lispro Injectable (HumaLOG) 10 Unit(s) SubCutaneous three times a day before meals  linezolid  IVPB      linezolid  IVPB 600 milliGRAM(s) IV Intermittent every 12 hours  melatonin 3 milliGRAM(s) Oral at bedtime  methylPREDNISolone sodium succinate Injectable 40 milliGRAM(s) IV Push every 12 hours  nystatin Powder 1 Application(s) Topical two times a day  pantoprazole  Injectable 40 milliGRAM(s) IV Push daily  polyethylene glycol 3350 17 Gram(s) Oral two times a day  potassium chloride    Tablet ER 40 milliEquivalent(s) Oral once  senna 2 Tablet(s) Oral at bedtime  tiotropium 18 MICROgram(s) Capsule 1 Capsule(s) Inhalation daily    MEDICATIONS  (PRN):  dextrose 40% Gel 15 Gram(s) Oral once PRN Blood Glucose LESS THAN 70 milliGRAM(s)/deciLiter  glucagon  Injectable 1 milliGRAM(s) IntraMuscular once PRN Glucose <70 milliGRAM(s)/deciLiter  ondansetron Injectable 4 milliGRAM(s) IV Push every 6 hours PRN Nausea and/or Vomiting  oxycodone    5 mG/acetaminophen 325 mG 2 Tablet(s) Oral every 6 hours PRN Moderate Pain (4 - 6)  sodium chloride 0.65% Nasal 1 Spray(s) Both Nostrils four times a day PRN Nasal Congestion  zolpidem 5 milliGRAM(s) Oral once PRN Insomnia      Allergies    fish (Angioedema)  penicillins (Rash)    Intolerances        Vital Signs Last 24 Hrs  T(C): 36.4 (01 Oct 2020 05:34), Max: 36.7 (30 Sep 2020 12:34)  T(F): 97.6 (01 Oct 2020 05:34), Max: 98.1 (30 Sep 2020 20:29)  HR: 64 (01 Oct 2020 08:29) (64 - 118)  BP: 126/89 (01 Oct 2020 05:34) (119/80 - 126/89)  BP(mean): --  RR: 20 (01 Oct 2020 05:34) (19 - 24)  SpO2: 98% (01 Oct 2020 08:29) (94% - 100%)    PHYSICAL EXAM  General: adult in NAD  HEENT: clear oropharynx, anicteric sclera, pink conjunctiva  Neck: supple  CV: normal S1/S2 with no murmur rubs or gallops  Lungs: positive air movement b/l ant lungs,clear to auscultation, no wheezes, no rales  Abdomen: soft non-tender non-distended, no hepatosplenomegaly  Ext: no clubbing cyanosis or edema  Skin: no rashes and no petechiae  Neuro: alert and oriented X 4, no focal deficits  LABS:                          10.3   8.65  )-----------( 65       ( 01 Oct 2020 07:14 )             30.7         Mean Cell Volume : 88.7 fl  Mean Cell Hemoglobin : 29.8 pg  Mean Cell Hemoglobin Concentration : 33.6 gm/dL  Auto Neutrophil # : x  Auto Lymphocyte # : x  Auto Monocyte # : x  Auto Eosinophil # : x  Auto Basophil # : x  Auto Neutrophil % : x  Auto Lymphocyte % : x  Auto Monocyte % : x  Auto Eosinophil % : x  Auto Basophil % : x    Serial CBC  Hematocrit 30.7  Hemoglobin 10.3  Plat 65  RBC 3.46  WBC 8.65  Serial CBC  Hematocrit 27.8  Hemoglobin 9.6  Plat 50  RBC 3.16  WBC 5.31  Serial CBC  Hematocrit 29.1  Hemoglobin 10.2  Plat 56  RBC 3.30  WBC 3.81    10-01    132<L>  |  89<L>  |  12  ----------------------------<  273<H>  3.4<L>   |  39<H>  |  0.56    Ca    8.7      01 Oct 2020 07:14  Phos  3.3     10-01  Mg     2.0     10-01    TPro  6.1  /  Alb  2.8<L>  /  TBili  1.0  /  DBili  x   /  AST  14  /  ALT  28  /  AlkPhos  126<H>  10-01          Ferritin, Serum: 1109 ng/mL (09-24 @ 10:15)            BLOOD SMEAR INTERPRETATION:       RADIOLOGY & ADDITIONAL STUDIES:

## 2020-10-01 NOTE — PROGRESS NOTE ADULT - SUBJECTIVE AND OBJECTIVE BOX
OVERNIGHT EVENTS:  ***Using AVAPS intermittently. C/O pressure to high. TV decreased to 400. Tolerating AVAPS better.     Present Symptoms:   Dyspnea: +dyspnea  Nausea/Vomiting: none  Fatigue: moderate  Loss of appetite: moderate  Pain:  none  Review of Systems: All others negative     MEDICATIONS  (STANDING):  ALBUTerol    90 MICROgram(s) HFA Inhaler 2 Puff(s) Inhalation every 4 hours  atorvastatin 40 milliGRAM(s) Oral at bedtime  budesonide 160 MICROgram(s)/formoterol 4.5 MICROgram(s) Inhaler 2 Puff(s) Inhalation two times a day  carvedilol 6.25 milliGRAM(s) Oral every 12 hours  caspofungin IVPB      caspofungin IVPB 50 milliGRAM(s) IV Intermittent every 24 hours  chlorhexidine 2% Cloths 1 Application(s) Topical <User Schedule>  dextrose 5%. 1000 milliLiter(s) (50 mL/Hr) IV Continuous <Continuous>  dextrose 50% Injectable 12.5 Gram(s) IV Push once  dextrose 50% Injectable 25 Gram(s) IV Push once  dextrose 50% Injectable 25 Gram(s) IV Push once  enoxaparin Injectable 40 milliGRAM(s) SubCutaneous daily  ergocalciferol 81445 Unit(s) Oral <User Schedule>  FIRST- Mouthwash  BLM 10 milliLiter(s) Swish and Swallow every 8 hours  folic acid 1 milliGRAM(s) Oral daily  gabapentin 600 milliGRAM(s) Oral every 8 hours  guaiFENesin  milliGRAM(s) Oral every 12 hours  influenza   Vaccine 0.5 milliLiter(s) IntraMuscular once  insulin glargine Injectable (LANTUS) 40 Unit(s) SubCutaneous every morning  insulin lispro (HumaLOG) corrective regimen sliding scale   SubCutaneous three times a day before meals  insulin lispro Injectable (HumaLOG) 10 Unit(s) SubCutaneous three times a day before meals  linezolid  IVPB 600 milliGRAM(s) IV Intermittent every 12 hours  linezolid  IVPB      melatonin 3 milliGRAM(s) Oral at bedtime  methylPREDNISolone sodium succinate Injectable 40 milliGRAM(s) IV Push every 12 hours  nystatin Powder 1 Application(s) Topical two times a day  pantoprazole  Injectable 40 milliGRAM(s) IV Push daily  polyethylene glycol 3350 17 Gram(s) Oral two times a day  senna 2 Tablet(s) Oral at bedtime  tiotropium 18 MICROgram(s) Capsule 1 Capsule(s) Inhalation daily    MEDICATIONS  (PRN):  dextrose 40% Gel 15 Gram(s) Oral once PRN Blood Glucose LESS THAN 70 milliGRAM(s)/deciLiter  glucagon  Injectable 1 milliGRAM(s) IntraMuscular once PRN Glucose <70 milliGRAM(s)/deciLiter  ondansetron Injectable 4 milliGRAM(s) IV Push every 6 hours PRN Nausea and/or Vomiting  oxycodone    5 mG/acetaminophen 325 mG 2 Tablet(s) Oral every 6 hours PRN Moderate Pain (4 - 6)  sodium chloride 0.65% Nasal 1 Spray(s) Both Nostrils four times a day PRN Nasal Congestion      PHYSICAL EXAM:  Vital Signs Last 24 Hrs  T(C): 36.8 (01 Oct 2020 12:37), Max: 36.8 (01 Oct 2020 12:37)  T(F): 98.2 (01 Oct 2020 12:37), Max: 98.2 (01 Oct 2020 12:37)  HR: 116 (01 Oct 2020 12:37) (64 - 118)  BP: 128/72 (01 Oct 2020 12:37) (122/89 - 128/72)  BP(mean): --  RR: 22 (01 Oct 2020 12:37) (19 - 22)  SpO2: 100% (01 Oct 2020 12:37) (98% - 100%)  General: patent awake, A&O, verbal, on nasal canula.  No signs of distress.   Karnofsky Performance Score/Palliative Performance Status Version2:    20%    HEENT:   dry lips   Lungs: comfortable, on NC  CV: S1S2, IRR, + tachycardia  GI: abdomen soft, nontender, + obese, incontinent   :   prima fit  Musculoskeletal: + generalized weakness, follows commands, dependent on ADLs  Skin: BUE edema/ecchymosis  Neuro: patient awake, A&O, follow commands   Oral intake ability: minimal  Diet: mechanical soft        LABS:                          10.3   8.65  )-----------( 65       ( 01 Oct 2020 07:14 )             30.7     10-01    132<L>  |  89<L>  |  12  ----------------------------<  273<H>  3.4<L>   |  39<H>  |  0.56    Ca    8.7      01 Oct 2020 07:14  Phos  3.3     10-01  Mg     2.0     10-01    TPro  6.1  /  Alb  2.8<L>  /  TBili  1.0  /  DBili  x   /  AST  14  /  ALT  28  /  AlkPhos  126<H>  10-01        RADIOLOGY & ADDITIONAL STUDIES:  < from: Xray Chest 1 View- PORTABLE-Urgent (Xray Chest 1 View- PORTABLE-Urgent .) (09.30.20 @ 11:46) >    EXAM:  XR CHEST PORTABLE URGENT 1V                            PROCEDURE DATE:  09/30/2020          INTERPRETATION:  Respiratory distress.    AP chest. Prior 9/23/2020.    Limited shallow inspiration. The patient's chin obscures portion left apex. No change heart mediastinum. Fibrotic atelectatic stranding at the lung bases. No definite acute infiltrate or significant pleural effusion.    Impression: As above      < end of copied text >      ADVANCE DIRECTIVES: HCP and MOL[facility] forms previously completed as per patient's wishes: CPR / long term ventilation / long term feeding tube / trial IV fluids / use abx / NO LIMITATION on medical interventions/ send to hospital.

## 2020-10-01 NOTE — PROGRESS NOTE ADULT - SUBJECTIVE AND OBJECTIVE BOX
PULMONARY  progress note    BARB COLÓN  MRN-[id number]    Patient is a 65y old  Female who presents with a chief complaint of shortness of breath (01 Oct 2020 09:54)  feels weak and tired      MEDICATIONS  (STANDING):  ALBUTerol    90 MICROgram(s) HFA Inhaler 2 Puff(s) Inhalation every 4 hours  atorvastatin 40 milliGRAM(s) Oral at bedtime  budesonide 160 MICROgram(s)/formoterol 4.5 MICROgram(s) Inhaler 2 Puff(s) Inhalation two times a day  carvedilol 6.25 milliGRAM(s) Oral every 12 hours  caspofungin IVPB      caspofungin IVPB 50 milliGRAM(s) IV Intermittent every 24 hours  chlorhexidine 2% Cloths 1 Application(s) Topical <User Schedule>  dextrose 5%. 1000 milliLiter(s) (50 mL/Hr) IV Continuous <Continuous>  dextrose 50% Injectable 12.5 Gram(s) IV Push once  dextrose 50% Injectable 25 Gram(s) IV Push once  dextrose 50% Injectable 25 Gram(s) IV Push once  enoxaparin Injectable 40 milliGRAM(s) SubCutaneous daily  ergocalciferol 65334 Unit(s) Oral <User Schedule>  FIRST- Mouthwash  BLM 10 milliLiter(s) Swish and Swallow every 8 hours  folic acid 1 milliGRAM(s) Oral daily  gabapentin 600 milliGRAM(s) Oral every 8 hours  guaiFENesin  milliGRAM(s) Oral every 12 hours  influenza   Vaccine 0.5 milliLiter(s) IntraMuscular once  insulin glargine Injectable (LANTUS) 40 Unit(s) SubCutaneous every morning  insulin lispro (HumaLOG) corrective regimen sliding scale   SubCutaneous three times a day before meals  insulin lispro Injectable (HumaLOG) 10 Unit(s) SubCutaneous three times a day before meals  linezolid  IVPB      linezolid  IVPB 600 milliGRAM(s) IV Intermittent every 12 hours  melatonin 3 milliGRAM(s) Oral at bedtime  methylPREDNISolone sodium succinate Injectable 40 milliGRAM(s) IV Push every 12 hours  nystatin Powder 1 Application(s) Topical two times a day  pantoprazole  Injectable 40 milliGRAM(s) IV Push daily  polyethylene glycol 3350 17 Gram(s) Oral two times a day  potassium chloride    Tablet ER 40 milliEquivalent(s) Oral once  senna 2 Tablet(s) Oral at bedtime  tiotropium 18 MICROgram(s) Capsule 1 Capsule(s) Inhalation daily      MEDICATIONS  (PRN):  dextrose 40% Gel 15 Gram(s) Oral once PRN Blood Glucose LESS THAN 70 milliGRAM(s)/deciLiter  glucagon  Injectable 1 milliGRAM(s) IntraMuscular once PRN Glucose <70 milliGRAM(s)/deciLiter  ondansetron Injectable 4 milliGRAM(s) IV Push every 6 hours PRN Nausea and/or Vomiting  oxycodone    5 mG/acetaminophen 325 mG 2 Tablet(s) Oral every 6 hours PRN Moderate Pain (4 - 6)  sodium chloride 0.65% Nasal 1 Spray(s) Both Nostrils four times a day PRN Nasal Congestion      Allergies    fish (Angioedema)  penicillins (Rash)        PAST MEDICAL & SURGICAL HISTORY:  Malignant neoplasm of unspecified part of right bronchus or lung    HTN (hypertension)    DM (diabetes mellitus)    COPD (chronic obstructive pulmonary disease)    Lung cancer    Morbid obesity    GERD (gastroesophageal reflux disease)    Deviated septum    Prolapsed uterus    ITP (idiopathic thrombocytopenic purpura)    Emphysema/COPD    History of cholecystectomy    S/P lobectomy of lung  right 2017    History of tonsillectomy             REVIEW OF SYSTEMS:  CONSTITUTIONAL: No fever, weight loss, but  fatigue +  EYES: No eye pain, visual disturbances, or discharge  ENT:  No difficulty hearing, tinnitus, vertigo; No sinus or throat pain  NECK: No pain or stiffness or nodes  RESPIRATORY:  cough--   wheezing --  chills--   hemoptysis--  Shortness of Breath+  CARDIOVASCULAR: No chest pain, palpitations, passing out, dizziness, or leg swelling  GASTROINTESTINAL: No abdominal or epigastric pain. No nausea, vomiting, or hematemesis; No diarrhea or constipation. No melena or hematochezia.  GENITOURINARY: No dysuria, frequency, hematuria, or incontinence  NEUROLOGICAL: No headaches, memory loss, loss of strength, numbness, or tremors  SKIN: No itching, burning, rashes, or lesions   LYMPH Nodes: No enlarged glands  ENDOCRINE: No heat or cold intolerance; No hair loss  MUSCULOSKELETAL: No joint pain or swelling; No muscle, back, or extremity pain  PSYCHIATRIC: No depression, anxiety, mood swings, or difficulty sleeping  HEME/LYMPH: No easy bruising, or bleeding gums  ALLERGY AND IMMUNOLOGIC: No hives or eczema    Vital Signs Last 24 Hrs  T(C): 36.4 (01 Oct 2020 05:34), Max: 36.7 (30 Sep 2020 12:34)  T(F): 97.6 (01 Oct 2020 05:34), Max: 98.1 (30 Sep 2020 20:29)  HR: 64 (01 Oct 2020 08:29) (64 - 118)  BP: 126/89 (01 Oct 2020 05:34) (119/80 - 126/89)  BP(mean): --  RR: 20 (01 Oct 2020 05:34) (19 - 24)  SpO2: 98% (01 Oct 2020 08:29) (94% - 100%)  I&O's Detail    30 Sep 2020 07:01  -  01 Oct 2020 07:00  --------------------------------------------------------  IN:    IV PiggyBack: 260 mL  Total IN: 260 mL    OUT:    Voided (mL): 1500 mL  Total OUT: 1500 mL    Total NET: -1240 mL          PHYSICAL EXAMINATION:    GENERAL: The patient is a well-developed obese w/f in no apparent distress.   SKIN no rash ecchymoses or bruises  HEENT: Head is normocephalic and atraumatic  SHAMA , Extraocular muscles are intact. Mucous membranes  moist.   Neck supple ,No LN felt JVP not increased  Thyroid not enlarged  Cardiovascular:  S1 S2 heard, RSR, No JVD , systolic  murmur at apex, No gallop or rub  Respiratory: Chest wall symmetrical with good air entry ,Percussion note normal,    Lungs vesicular breathing with  no   rales  or  wheeze	  ABDOMEN:  Soft, Non-tender, obese, no hepatomegaly or splenomegaly BS positive	  Extremities: Normal range of motion, No clubbing, cyanosis or edema  Vascular: Peripheral pulses palpable 2+ bilaterally  CNS:  Alert and oriented x3   Cranial nerves intact  sensory intact  motor power5/5  dtr 2+   Babinski neg    LABS:                        10.3   8.65  )-----------( 65       ( 01 Oct 2020 07:14 )             30.7     10-01    132<L>  |  89<L>  |  12  ----------------------------<  273<H>  3.4<L>   |  39<H>  |  0.56    Ca    8.7      01 Oct 2020 07:14  Phos  3.3     10-01  Mg     2.0     10-01    TPro  6.1  /  Alb  2.8<L>  /  TBili  1.0  /  DBili  x   /  AST  14  /  ALT  28  /  AlkPhos  126<H>  10-01        ABG - ( 01 Oct 2020 06:16 )  pH, Arterial: 7.48  pH, Blood: x     /  pCO2: 51    /  pO2: 84    / HCO3: 38    / Base Excess: 12.5  /  SaO2: 96          MICROBIOLOGY:    Culture - Blood (collected 09-27-20 @ 12:06)  Source: .Blood Blood  Preliminary Report (09-28-20 @ 13:02):    No growth to date.    Culture - Blood (collected 09-27-20 @ 12:06)  Source: .Blood Blood  Preliminary Report (09-28-20 @ 13:02):    No growth to date.

## 2020-10-01 NOTE — PROGRESS NOTE ADULT - SUBJECTIVE AND OBJECTIVE BOX
Interval Events:      Allergies    fish (Angioedema)  penicillins (Rash)    Intolerances      Endocrine/Metabolic Medications:  atorvastatin 40 milliGRAM(s) Oral at bedtime  dextrose 40% Gel 15 Gram(s) Oral once PRN  dextrose 50% Injectable 12.5 Gram(s) IV Push once  dextrose 50% Injectable 25 Gram(s) IV Push once  dextrose 50% Injectable 25 Gram(s) IV Push once  glucagon  Injectable 1 milliGRAM(s) IntraMuscular once PRN  insulin glargine Injectable (LANTUS) 35 Unit(s) SubCutaneous every morning  insulin lispro (HumaLOG) corrective regimen sliding scale   SubCutaneous three times a day before meals  insulin lispro Injectable (HumaLOG) 8 Unit(s) SubCutaneous three times a day before meals  methylPREDNISolone sodium succinate Injectable 40 milliGRAM(s) IV Push every 12 hours      Vital Signs Last 24 Hrs  T(C): 36.4 (01 Oct 2020 05:34), Max: 36.7 (30 Sep 2020 12:34)  T(F): 97.6 (01 Oct 2020 05:34), Max: 98.1 (30 Sep 2020 20:29)  HR: 98 (01 Oct 2020 05:34) (88 - 118)  BP: 126/89 (01 Oct 2020 05:34) (119/80 - 126/89)  BP(mean): --  RR: 20 (01 Oct 2020 05:34) (19 - 24)  SpO2: 100% (01 Oct 2020 05:34) (94% - 100%)      PHYSICAL EXAM  All physical exam findings normal, except those marked:  General:	Alert, active, cooperative, NAD, well hydrated  .		[] Abnormal:  Neck		Normal: supple, no cervical adenopathy, no palpable thyroid  .		[] Abnormal:  Cardiovascular	Normal: regular rate, normal S1, S2, no murmurs  .		[] Abnormal:  Respiratory	Normal: no chest wall deformity, normal respiratory pattern, CTA B/L  .		[] Abnormal:  Abdominal	Normal: soft, ND, NT, bowel sounds present, no masses, no organomegaly  .		[] Abnormal:  		Normal normal genitalia, testes descended, circumcised/uncircumcised  .		Dejon stage:			Breast dejon:  .		Menstrual history:  .		[] Abnormal:  Extremities	Normal: FROM x4  .		[] Abnormal:  Skin		Normal: intact and not indurated, no rash, no acanthosis nigricans  .		[] Abnormal:  Neurologic	Normal: grossly intact  .		[] Abnormal:    LABS                        10.3   8.65  )-----------( 65       ( 01 Oct 2020 07:14 )             30.7                               132    |  89     |  12                  Calcium: 8.7   / iCa: x      (10-01 @ 07:14)    ----------------------------<  273       Magnesium: 2.0                              3.4     |  39     |  0.56             Phosphorous: 3.3      TPro  6.1    /  Alb  2.8    /  TBili  1.0    /  DBili  x      /  AST  14     /  ALT  28     /  AlkPhos  126    01 Oct 2020 07:14    CAPILLARY BLOOD GLUCOSE      POCT Blood Glucose.: 284 mg/dL (01 Oct 2020 08:16)  POCT Blood Glucose.: 310 mg/dL (30 Sep 2020 21:09)  POCT Blood Glucose.: 126 mg/dL (30 Sep 2020 17:02)  POCT Blood Glucose.: 136 mg/dL (30 Sep 2020 11:39)        Assesment/plan          64 yo female with multiple comorbidities including h/o DM type 2, HTN, COPD on o2, lung ca on right sided s/p resection on chemo admitted with shortness of breath  was intubated/extubated for acute resp failure this admit, now extubated on general medical floor, standing high dose steroids with uncontrolled hyperglycemia    endocrinology consulted for glycemic management    DM type 2  uncontrolled  complicated by high dose steroid use, acute on chronic resp failure  home regimen:  basaglar 60 units daily  novolog 15 units pre meals    recommendations:  elevated glucoses    - increase  insulin lantus to 40 units daily in am   - increase insulin lispro to 10 units pre meals  continue moderate dose sliding scale  - reassess based on requirements  - goal inpatient glucose range: 140-180mg/dl    COPD exacerbation  acute on chronic hypoxic resp failure  s/p intubation/extubation current admit  on standing steroids  on po prednisone  refusing bipap  pulmonary on board    sepsis  VRE bacteremia  received zosyn  ID on board    Discussed with patient and primary team   Please call  Endocrine- Dr Katlyn Garcia as needed   Interval Events:    tolerating po intake  on higher dose of standing steroids today  poc glucose elevated  requiring significant sliding scale    Allergies    fish (Angioedema)  penicillins (Rash)    Intolerances      Endocrine/Metabolic Medications:  atorvastatin 40 milliGRAM(s) Oral at bedtime  dextrose 40% Gel 15 Gram(s) Oral once PRN  dextrose 50% Injectable 12.5 Gram(s) IV Push once  dextrose 50% Injectable 25 Gram(s) IV Push once  dextrose 50% Injectable 25 Gram(s) IV Push once  glucagon  Injectable 1 milliGRAM(s) IntraMuscular once PRN  insulin glargine Injectable (LANTUS) 35 Unit(s) SubCutaneous every morning  insulin lispro (HumaLOG) corrective regimen sliding scale   SubCutaneous three times a day before meals  insulin lispro Injectable (HumaLOG) 8 Unit(s) SubCutaneous three times a day before meals  methylPREDNISolone sodium succinate Injectable 40 milliGRAM(s) IV Push every 12 hours      Vital Signs Last 24 Hrs  T(C): 36.4 (01 Oct 2020 05:34), Max: 36.7 (30 Sep 2020 12:34)  T(F): 97.6 (01 Oct 2020 05:34), Max: 98.1 (30 Sep 2020 20:29)  HR: 98 (01 Oct 2020 05:34) (88 - 118)  BP: 126/89 (01 Oct 2020 05:34) (119/80 - 126/89)  BP(mean): --  RR: 20 (01 Oct 2020 05:34) (19 - 24)  SpO2: 100% (01 Oct 2020 05:34) (94% - 100%)      PHYSICAL EXAM  Constitutional:    NC/AT:    HEENT:    Neck:  No JVD    Respiratory:  reduced breath sounds b/l bases    Cardiovascular:  RR    Gastrointestinal: Soft    Extremities: without cyanosis  Neurological:  non focal    LABS                        10.3   8.65  )-----------( 65       ( 01 Oct 2020 07:14 )             30.7                               132    |  89     |  12                  Calcium: 8.7   / iCa: x      (10-01 @ 07:14)    ----------------------------<  273       Magnesium: 2.0                              3.4     |  39     |  0.56             Phosphorous: 3.3      TPro  6.1    /  Alb  2.8    /  TBili  1.0    /  DBili  x      /  AST  14     /  ALT  28     /  AlkPhos  126    01 Oct 2020 07:14    CAPILLARY BLOOD GLUCOSE      POCT Blood Glucose.: 284 mg/dL (01 Oct 2020 08:16)  POCT Blood Glucose.: 310 mg/dL (30 Sep 2020 21:09)  POCT Blood Glucose.: 126 mg/dL (30 Sep 2020 17:02)  POCT Blood Glucose.: 136 mg/dL (30 Sep 2020 11:39)        Assesment/plan          64 yo female with multiple comorbidities including h/o DM type 2, HTN, COPD on o2, lung ca on right sided s/p resection on chemo admitted with shortness of breath  was intubated/extubated for acute resp failure this admit, now extubated on general medical floor, standing high dose steroids with uncontrolled hyperglycemia    endocrinology consulted for glycemic management    DM type 2  uncontrolled  complicated by high dose steroid use, acute on chronic resp failure  home regimen:  basaglar 60 units daily  novolog 15 units pre meals    recommendations:  elevated glucoses    - increase  insulin lantus to 40 units daily in am   - increase insulin lispro to 16 units pre meals  continue moderate dose sliding scale  - reassess based on requirements  - goal inpatient glucose range: 140-180mg/dl    COPD exacerbation  acute on chronic hypoxic resp failure  s/p intubation/extubation current admit  on standing steroids  steroids increased  bipap   pulmonary on board    sepsis  VRE bacteremia  received zosyn  ID on board    Discussed with patient and primary team   Please call  Endocrine- Dr Katlyn Garcia as needed

## 2020-10-01 NOTE — PROGRESS NOTE ADULT - ATTENDING COMMENTS
Respiratory status with slight improvement  AVAPS alternated with Oxygen support  Cont. steroids to BID IV   Lasix 20 mg IVP   Encourage respiratory support as much as tolerated  Antibiotics and antifungals per ID  Palliative care follow up  D.c planning upon completion of antibiotics/antifungals

## 2020-10-01 NOTE — PROGRESS NOTE ADULT - PROBLEM SELECTOR PLAN 4
End stage. Continue AVAPS and oxygen.  Monitor saturation.  Continue bronchodilators and ICS  Continue solumedrol

## 2020-10-01 NOTE — PROGRESS NOTE ADULT - PROBLEM SELECTOR PLAN 1
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 Patient utilized 5L O2 at baseline 24/7 with increasing weakness/dyspnea at rest. Patient extubated 9/22. Using AVAPS intermittently; on nasal canula at time of exam. Continues IV abx, solu-medrol, symbicort, spiriva, albuterol. Patient eligible for hospice services but wants to continue full aggressive measures. Patient previously identified daughter/Gavi as HCP. Patient previously completed own MOLST: FULL CODE.

## 2020-10-01 NOTE — PROGRESS NOTE ADULT - CONVERSATION DETAILS
10/1/20: Met with patient at bedside; SCU NP and  present. Discussed status. Patient appears realistic, recognizing own mortality. She again confirms she lives for her grandchildren and would be ok if she were trach/peg - as long as she could see her grandchildren. All questions answered; supportive counseling provided.

## 2020-10-02 ENCOUNTER — TRANSCRIPTION ENCOUNTER (OUTPATIENT)
Age: 65
End: 2020-10-02

## 2020-10-02 DIAGNOSIS — R07.9 CHEST PAIN, UNSPECIFIED: ICD-10-CM

## 2020-10-02 LAB
ALBUMIN SERPL ELPH-MCNC: 2.8 G/DL — LOW (ref 3.5–5)
ALP SERPL-CCNC: 125 U/L — HIGH (ref 40–120)
ALT FLD-CCNC: 27 U/L DA — SIGNIFICANT CHANGE UP (ref 10–60)
ANION GAP SERPL CALC-SCNC: 4 MMOL/L — LOW (ref 5–17)
AST SERPL-CCNC: 20 U/L — SIGNIFICANT CHANGE UP (ref 10–40)
BILIRUB SERPL-MCNC: 0.9 MG/DL — SIGNIFICANT CHANGE UP (ref 0.2–1.2)
BUN SERPL-MCNC: 16 MG/DL — SIGNIFICANT CHANGE UP (ref 7–18)
CALCIUM SERPL-MCNC: 8.9 MG/DL — SIGNIFICANT CHANGE UP (ref 8.4–10.5)
CHLORIDE SERPL-SCNC: 93 MMOL/L — LOW (ref 96–108)
CO2 SERPL-SCNC: 37 MMOL/L — HIGH (ref 22–31)
CREAT SERPL-MCNC: 0.38 MG/DL — LOW (ref 0.5–1.3)
CULTURE RESULTS: SIGNIFICANT CHANGE UP
CULTURE RESULTS: SIGNIFICANT CHANGE UP
GLUCOSE BLDC GLUCOMTR-MCNC: 120 MG/DL — HIGH (ref 70–99)
GLUCOSE BLDC GLUCOMTR-MCNC: 140 MG/DL — HIGH (ref 70–99)
GLUCOSE BLDC GLUCOMTR-MCNC: 144 MG/DL — HIGH (ref 70–99)
GLUCOSE BLDC GLUCOMTR-MCNC: 172 MG/DL — HIGH (ref 70–99)
GLUCOSE SERPL-MCNC: 101 MG/DL — HIGH (ref 70–99)
HCT VFR BLD CALC: 29.5 % — LOW (ref 34.5–45)
HGB BLD-MCNC: 10.5 G/DL — LOW (ref 11.5–15.5)
MAGNESIUM SERPL-MCNC: 1.9 MG/DL — SIGNIFICANT CHANGE UP (ref 1.6–2.6)
MCHC RBC-ENTMCNC: 30.7 PG — SIGNIFICANT CHANGE UP (ref 27–34)
MCHC RBC-ENTMCNC: 35.6 GM/DL — SIGNIFICANT CHANGE UP (ref 32–36)
MCV RBC AUTO: 86.3 FL — SIGNIFICANT CHANGE UP (ref 80–100)
NRBC # BLD: 0 /100 WBCS — SIGNIFICANT CHANGE UP (ref 0–0)
PHOSPHATE SERPL-MCNC: 3 MG/DL — SIGNIFICANT CHANGE UP (ref 2.5–4.5)
PLATELET # BLD AUTO: 80 K/UL — LOW (ref 150–400)
POTASSIUM SERPL-MCNC: 3.5 MMOL/L — SIGNIFICANT CHANGE UP (ref 3.5–5.3)
POTASSIUM SERPL-SCNC: 3.5 MMOL/L — SIGNIFICANT CHANGE UP (ref 3.5–5.3)
PROT SERPL-MCNC: 6.1 G/DL — SIGNIFICANT CHANGE UP (ref 6–8.3)
RBC # BLD: 3.42 M/UL — LOW (ref 3.8–5.2)
RBC # FLD: 15.2 % — HIGH (ref 10.3–14.5)
SODIUM SERPL-SCNC: 134 MMOL/L — LOW (ref 135–145)
SPECIMEN SOURCE: SIGNIFICANT CHANGE UP
SPECIMEN SOURCE: SIGNIFICANT CHANGE UP
WBC # BLD: 10.73 K/UL — HIGH (ref 3.8–10.5)
WBC # FLD AUTO: 10.73 K/UL — HIGH (ref 3.8–10.5)

## 2020-10-02 PROCEDURE — 71045 X-RAY EXAM CHEST 1 VIEW: CPT | Mod: 26

## 2020-10-02 PROCEDURE — 93010 ELECTROCARDIOGRAM REPORT: CPT

## 2020-10-02 RX ORDER — INSULIN LISPRO 100/ML
6 VIAL (ML) SUBCUTANEOUS
Refills: 0 | Status: DISCONTINUED | OUTPATIENT
Start: 2020-10-02 | End: 2020-10-06

## 2020-10-02 RX ORDER — FUROSEMIDE 40 MG
20 TABLET ORAL DAILY
Refills: 0 | Status: DISCONTINUED | OUTPATIENT
Start: 2020-10-02 | End: 2020-10-04

## 2020-10-02 RX ORDER — BENZOCAINE AND MENTHOL 5; 1 G/100ML; G/100ML
1 LIQUID ORAL EVERY 4 HOURS
Refills: 0 | Status: DISCONTINUED | OUTPATIENT
Start: 2020-10-02 | End: 2020-10-07

## 2020-10-02 RX ORDER — OXYCODONE AND ACETAMINOPHEN 5; 325 MG/1; MG/1
2 TABLET ORAL EVERY 6 HOURS
Refills: 0 | Status: DISCONTINUED | OUTPATIENT
Start: 2020-10-02 | End: 2020-10-06

## 2020-10-02 RX ADMIN — Medication 20 MILLIGRAM(S): at 12:42

## 2020-10-02 RX ADMIN — CASPOFUNGIN ACETATE 260 MILLIGRAM(S): 7 INJECTION, POWDER, LYOPHILIZED, FOR SOLUTION INTRAVENOUS at 15:45

## 2020-10-02 RX ADMIN — OXYCODONE AND ACETAMINOPHEN 2 TABLET(S): 5; 325 TABLET ORAL at 22:54

## 2020-10-02 RX ADMIN — GABAPENTIN 600 MILLIGRAM(S): 400 CAPSULE ORAL at 21:15

## 2020-10-02 RX ADMIN — Medication 1 MILLIGRAM(S): at 11:52

## 2020-10-02 RX ADMIN — ENOXAPARIN SODIUM 40 MILLIGRAM(S): 100 INJECTION SUBCUTANEOUS at 11:52

## 2020-10-02 RX ADMIN — ALBUTEROL 2 PUFF(S): 90 AEROSOL, METERED ORAL at 17:06

## 2020-10-02 RX ADMIN — CARVEDILOL PHOSPHATE 6.25 MILLIGRAM(S): 80 CAPSULE, EXTENDED RELEASE ORAL at 17:06

## 2020-10-02 RX ADMIN — ATORVASTATIN CALCIUM 40 MILLIGRAM(S): 80 TABLET, FILM COATED ORAL at 21:16

## 2020-10-02 RX ADMIN — ONDANSETRON 4 MILLIGRAM(S): 8 TABLET, FILM COATED ORAL at 06:37

## 2020-10-02 RX ADMIN — Medication 6 UNIT(S): at 17:05

## 2020-10-02 RX ADMIN — LINEZOLID 300 MILLIGRAM(S): 600 INJECTION, SOLUTION INTRAVENOUS at 05:41

## 2020-10-02 RX ADMIN — ALBUTEROL 2 PUFF(S): 90 AEROSOL, METERED ORAL at 10:50

## 2020-10-02 RX ADMIN — NYSTATIN CREAM 1 APPLICATION(S): 100000 CREAM TOPICAL at 17:07

## 2020-10-02 RX ADMIN — GABAPENTIN 600 MILLIGRAM(S): 400 CAPSULE ORAL at 13:56

## 2020-10-02 RX ADMIN — NYSTATIN CREAM 1 APPLICATION(S): 100000 CREAM TOPICAL at 05:42

## 2020-10-02 RX ADMIN — Medication 40 MILLIGRAM(S): at 05:31

## 2020-10-02 RX ADMIN — Medication 2: at 11:50

## 2020-10-02 RX ADMIN — ALBUTEROL 2 PUFF(S): 90 AEROSOL, METERED ORAL at 21:24

## 2020-10-02 RX ADMIN — OXYCODONE AND ACETAMINOPHEN 2 TABLET(S): 5; 325 TABLET ORAL at 21:25

## 2020-10-02 RX ADMIN — BENZOCAINE AND MENTHOL 1 LOZENGE: 5; 1 LIQUID ORAL at 09:57

## 2020-10-02 RX ADMIN — DIPHENHYDRAMINE HYDROCHLORIDE AND LIDOCAINE HYDROCHLORIDE AND ALUMINUM HYDROXIDE AND MAGNESIUM HYDRO 10 MILLILITER(S): KIT at 13:54

## 2020-10-02 RX ADMIN — DIPHENHYDRAMINE HYDROCHLORIDE AND LIDOCAINE HYDROCHLORIDE AND ALUMINUM HYDROXIDE AND MAGNESIUM HYDRO 10 MILLILITER(S): KIT at 05:56

## 2020-10-02 RX ADMIN — Medication 3 MILLIGRAM(S): at 21:16

## 2020-10-02 RX ADMIN — Medication 600 MILLIGRAM(S): at 17:06

## 2020-10-02 RX ADMIN — ALBUTEROL 2 PUFF(S): 90 AEROSOL, METERED ORAL at 02:32

## 2020-10-02 RX ADMIN — ALBUTEROL 2 PUFF(S): 90 AEROSOL, METERED ORAL at 13:55

## 2020-10-02 RX ADMIN — ALBUTEROL 2 PUFF(S): 90 AEROSOL, METERED ORAL at 05:41

## 2020-10-02 RX ADMIN — PANTOPRAZOLE SODIUM 40 MILLIGRAM(S): 20 TABLET, DELAYED RELEASE ORAL at 11:51

## 2020-10-02 RX ADMIN — CARVEDILOL PHOSPHATE 6.25 MILLIGRAM(S): 80 CAPSULE, EXTENDED RELEASE ORAL at 05:42

## 2020-10-02 RX ADMIN — ONDANSETRON 4 MILLIGRAM(S): 8 TABLET, FILM COATED ORAL at 21:26

## 2020-10-02 RX ADMIN — Medication 600 MILLIGRAM(S): at 05:42

## 2020-10-02 RX ADMIN — LINEZOLID 300 MILLIGRAM(S): 600 INJECTION, SOLUTION INTRAVENOUS at 17:06

## 2020-10-02 RX ADMIN — BUDESONIDE AND FORMOTEROL FUMARATE DIHYDRATE 2 PUFF(S): 160; 4.5 AEROSOL RESPIRATORY (INHALATION) at 10:51

## 2020-10-02 RX ADMIN — BUDESONIDE AND FORMOTEROL FUMARATE DIHYDRATE 2 PUFF(S): 160; 4.5 AEROSOL RESPIRATORY (INHALATION) at 21:15

## 2020-10-02 RX ADMIN — SENNA PLUS 2 TABLET(S): 8.6 TABLET ORAL at 21:16

## 2020-10-02 RX ADMIN — TIOTROPIUM BROMIDE 1 CAPSULE(S): 18 CAPSULE ORAL; RESPIRATORY (INHALATION) at 11:52

## 2020-10-02 NOTE — PROGRESS NOTE ADULT - PROBLEM SELECTOR PLAN 6
Dr Mendiola following  Continue pain management  1 dose of radiation to spine  Last chemo in September

## 2020-10-02 NOTE — PROGRESS NOTE ADULT - ASSESSMENT
64 yo obese F with hx of 80 pack year tobacco use, HTN, DM2 on insulin, COPD on 5L (frequent admissions), R lung cancer s/p resection (2017) on chemo q 3 weeks with concern for mets to spine and compression fractures, recent admission for COPD exacerbation earlier in August who on 9/19/2020 presented to ED with c/o SOB, hypoxia and increased sputum production x 1 day.    INTERVAL HX: On 9/21/2020. Patient was hypoxic, tachycardiac and tachypneic. Rapid response was called. RRT team arrived on scene. Patient had encephalopathy from hypoxia , wheezing and stridor on examination. Patient was tachycardiac to 140/min. Patient was hypoxic and started on non-rebreather but patient has increased work of breathing. Decision was made to intubate the patient. Patient become hypotensive post intubation and was given LR bolus and started on peripheral phenylephrine.    In ICU patient was awake and alert following commands and using a talking board and pen and paper to communicate. She was started on Precedex and Fentanyl to sedate the patient.   Patient was extubated on 9/22/20. Saturating well on NC. Patient was titrated off the Precedex and Fentanyl by 9/23/20. She has anxiety and when anxious she desaturates. on 9/23/2020 patient was started on BIPAP after an episode of anxiety and desaturation and tolerated it. For now patient is using BIPAP as needed.     Blood cultures from 9/19/2020 grew VRE and yeast like cells. Patient's PICC line on Left arm was removed on 9/21/2020 as possible source of infection. Patient was started on Zyvox on 9/21 and Caspofungin on 9/23 after the results came back. Repeat blood cultures from 9/21/2020 are showing no growth to date. Repeat blood cultures were sent on 9/23/2020  and 9/27. ID Dr. Corcoran on board.   9/30   Mild to moderate respiratory distress. Morphine ordered. ABG and CXR ordered. Started on AVAPS  10/02   Chest pain

## 2020-10-02 NOTE — PROGRESS NOTE ADULT - ASSESSMENT
COPD with acute asthmatic bronchitis with  s/p  Respiratory Failure   Recurrent Lung cancer with Bone Mets   IDDM   Htn with MR   Thrombocytopenia sec to C/T  Obesity with CRIS   r/o esophageal candidiasis  GPC bacteremia VRE/ yeast    PLAN-po prednisone 20 mg qd x 3 days  IV Zyvox and Cancidas  Get PICC LINE   o2 n/c 2pm/ 24 hrs a day  Advance diet  OOB in chair/ BRP   D/C plan to rehab on IV antibiotics   Symbicort 160/4.5 2 puffs bid    Duoneb  by HFN rx qid prn   s/c lovenox  FS coverage

## 2020-10-02 NOTE — CHART NOTE - NSCHARTNOTEFT_GEN_A_CORE
Reassessment:   65yFemalePatient is a 65y old  Female who presents with a chief complaint of shortness of breath (02 Oct 2020 11:12)  pt visited. reports <50% intake of meals 2/2 dislike of chopped texture. pt requesting "regular food", describes chopped food as "dog food". pt c/o sore throat today, pain while swallowing, feeling of food getting stuck in throat at times. explained to pt risk of aspiration given pt has no teeth and is having difficulty swallowing, stated understands risk but still wants regular food. d/w NP. pt agreeable to glucerna supplements. poor prognosis noted. followed by palliative, eligible for hospice-pt declined. pt for VY.     Factors impacting intake: [ ] none [ ] nausea  [ ] vomiting [ ] diarrhea [X ] constipation  [ ]chewing problems [ ] swallowing issues  [ ] other:     Diet Prescription: Diet, Dysphagia 2 Mechanical Soft-Thin Liquids:   Consistent Carbohydrate {Evening Snacks} (10-01-20 @ 08:25)    Intake: <50% per pt. per RN varies. flowrecords  70%    Daily Weight in k.3 (25 Sep 2020 15:01)    % Weight Change-obtain current wt. [facility] did not detect wt on visit.     Pertinent Medications: MEDICATIONS  (STANDING):  ALBUTerol    90 MICROgram(s) HFA Inhaler 2 Puff(s) Inhalation every 4 hours  atorvastatin 40 milliGRAM(s) Oral at bedtime  budesonide 160 MICROgram(s)/formoterol 4.5 MICROgram(s) Inhaler 2 Puff(s) Inhalation two times a day  carvedilol 6.25 milliGRAM(s) Oral every 12 hours  caspofungin IVPB      caspofungin IVPB 50 milliGRAM(s) IV Intermittent every 24 hours  chlorhexidine 2% Cloths 1 Application(s) Topical <User Schedule>  dextrose 5%. 1000 milliLiter(s) (50 mL/Hr) IV Continuous <Continuous>  dextrose 50% Injectable 12.5 Gram(s) IV Push once  dextrose 50% Injectable 25 Gram(s) IV Push once  dextrose 50% Injectable 25 Gram(s) IV Push once  enoxaparin Injectable 40 milliGRAM(s) SubCutaneous daily  ergocalciferol 07706 Unit(s) Oral <User Schedule>  FIRST- Mouthwash  BLM 10 milliLiter(s) Swish and Swallow every 8 hours  folic acid 1 milliGRAM(s) Oral daily  gabapentin 600 milliGRAM(s) Oral every 8 hours  guaiFENesin  milliGRAM(s) Oral every 12 hours  influenza   Vaccine 0.5 milliLiter(s) IntraMuscular once  insulin glargine Injectable (LANTUS) 40 Unit(s) SubCutaneous every morning  insulin lispro (HumaLOG) corrective regimen sliding scale   SubCutaneous three times a day before meals  insulin lispro Injectable (HumaLOG) 10 Unit(s) SubCutaneous three times a day before meals  linezolid  IVPB      linezolid  IVPB 600 milliGRAM(s) IV Intermittent every 12 hours  melatonin 3 milliGRAM(s) Oral at bedtime  nystatin Powder 1 Application(s) Topical two times a day  pantoprazole  Injectable 40 milliGRAM(s) IV Push daily  polyethylene glycol 3350 17 Gram(s) Oral two times a day  senna 2 Tablet(s) Oral at bedtime  tiotropium 18 MICROgram(s) Capsule 1 Capsule(s) Inhalation daily    MEDICATIONS  (PRN):  benzocaine 15 mG/menthol 3.6 mG (Sugar-Free) Lozenge 1 Lozenge Oral every 4 hours PRN Sore Throat  dextrose 40% Gel 15 Gram(s) Oral once PRN Blood Glucose LESS THAN 70 milliGRAM(s)/deciLiter  glucagon  Injectable 1 milliGRAM(s) IntraMuscular once PRN Glucose <70 milliGRAM(s)/deciLiter  ondansetron Injectable 4 milliGRAM(s) IV Push every 6 hours PRN Nausea and/or Vomiting  oxycodone    5 mG/acetaminophen 325 mG 2 Tablet(s) Oral every 6 hours PRN Moderate Pain (4 - 6)  sodium chloride 0.65% Nasal 1 Spray(s) Both Nostrils four times a day PRN Nasal Congestion    Pertinent Labs: 10-02 Na134 mmol/L<L> Glu 101 mg/dL<H> K+ 3.5 mmol/L Cr  0.38 mg/dL<L> BUN 16 mg/dL 10-02 Phos 3.0 mg/dL 10-02 Alb 2.8 g/dL<L>  Chol 159 mg/dL LDL 44 mg/dL HDL 94 mg/dL Trig 105 mg/dL     CAPILLARY BLOOD GLUCOSE      POCT Blood Glucose.: 172 mg/dL (02 Oct 2020 11:24)  POCT Blood Glucose.: 161 mg/dL (02 Oct 2020 07:46)  POCT Blood Glucose.: 140 mg/dL (02 Oct 2020 07:06)  POCT Blood Glucose.: 86 mg/dL (01 Oct 2020 23:49)  POCT Blood Glucose.: 72 mg/dL (01 Oct 2020 21:06)  POCT Blood Glucose.: 174 mg/dL (01 Oct 2020 16:57)  POCT Blood Glucose.: 303 mg/dL (01 Oct 2020 11:42)    Skin: intact    Estimated Needs:   [ X] no change since previous assessment  [ ] recalculated:     Previous Nutrition Diagnosis:   [ ] Inadequate Energy Intake [ X]Inadequate Oral Intake [ ] Excessive Energy Intake   [ ] Underweight [ ] Increased Nutrient Needs [ ] Overweight/Obesity   [ ] Altered GI Function [ ] Unintended Weight Loss [ ] Food & Nutrition Related Knowledge Deficit [ ] Malnutrition     Nutrition Diagnosis is [X ] ongoing  [ ] resolved [ ] not applicable     New Nutrition Diagnosis: [ ] not applicable       Interventions:   Recommend  [ ] Change Diet To:  [ ] Nutrition Supplement  [ ] Nutrition Support  [ X] Other: Rec add glucerna TID (660kcal, 30g pro). Keep CCD. per NP will allow upgrade to soft.     Monitoring and Evaluation:   [X ] PO intake [ x ] Tolerance to diet prescription [ x ] weights [ x ] labs[ x ] follow up per protocol  [ ] other: Reassessment:   65yFemalePatient is a 65y old  Female who presents with a chief complaint of shortness of breath (02 Oct 2020 11:12)  pt visited. reports <50% intake of meals 2/2 dislike of chopped texture. pt requesting "regular food", describes chopped food as "dog food". pt c/o sore throat today, pain while swallowing, feeling of food getting stuck in throat at times. explained to pt risk of aspiration given pt has no teeth and is having difficulty swallowing, stated understands risk but still wants regular food. d/w NP. pt agreeable to glucerna supplements. poor prognosis noted. followed by palliative, eligible for hospice-pt declined. pt for VY.     Factors impacting intake: [ ] none [ ] nausea  [ ] vomiting [ ] diarrhea [X ] constipation  [ ]chewing problems [ ] swallowing issues  [ ] other:     Diet Prescription: Diet, Dysphagia 2 Mechanical Soft-Thin Liquids:   Consistent Carbohydrate {Evening Snacks} (10-01-20 @ 08:25)    Intake: <50% per pt. per RN varies. flowrecords  70%    Daily Weight in k.3 (25 Sep 2020 15:01)    % Weight Change-obtain current wt. [facility] did not detect wt on visit.     Pertinent Medications: MEDICATIONS  (STANDING):  ALBUTerol    90 MICROgram(s) HFA Inhaler 2 Puff(s) Inhalation every 4 hours  atorvastatin 40 milliGRAM(s) Oral at bedtime  budesonide 160 MICROgram(s)/formoterol 4.5 MICROgram(s) Inhaler 2 Puff(s) Inhalation two times a day  carvedilol 6.25 milliGRAM(s) Oral every 12 hours  caspofungin IVPB      caspofungin IVPB 50 milliGRAM(s) IV Intermittent every 24 hours  chlorhexidine 2% Cloths 1 Application(s) Topical <User Schedule>  dextrose 5%. 1000 milliLiter(s) (50 mL/Hr) IV Continuous <Continuous>  dextrose 50% Injectable 12.5 Gram(s) IV Push once  dextrose 50% Injectable 25 Gram(s) IV Push once  dextrose 50% Injectable 25 Gram(s) IV Push once  enoxaparin Injectable 40 milliGRAM(s) SubCutaneous daily  ergocalciferol 86797 Unit(s) Oral <User Schedule>  FIRST- Mouthwash  BLM 10 milliLiter(s) Swish and Swallow every 8 hours  folic acid 1 milliGRAM(s) Oral daily  gabapentin 600 milliGRAM(s) Oral every 8 hours  guaiFENesin  milliGRAM(s) Oral every 12 hours  influenza   Vaccine 0.5 milliLiter(s) IntraMuscular once  insulin glargine Injectable (LANTUS) 40 Unit(s) SubCutaneous every morning  insulin lispro (HumaLOG) corrective regimen sliding scale   SubCutaneous three times a day before meals  insulin lispro Injectable (HumaLOG) 10 Unit(s) SubCutaneous three times a day before meals  linezolid  IVPB      linezolid  IVPB 600 milliGRAM(s) IV Intermittent every 12 hours  melatonin 3 milliGRAM(s) Oral at bedtime  nystatin Powder 1 Application(s) Topical two times a day  pantoprazole  Injectable 40 milliGRAM(s) IV Push daily  polyethylene glycol 3350 17 Gram(s) Oral two times a day  senna 2 Tablet(s) Oral at bedtime  tiotropium 18 MICROgram(s) Capsule 1 Capsule(s) Inhalation daily    MEDICATIONS  (PRN):  benzocaine 15 mG/menthol 3.6 mG (Sugar-Free) Lozenge 1 Lozenge Oral every 4 hours PRN Sore Throat  dextrose 40% Gel 15 Gram(s) Oral once PRN Blood Glucose LESS THAN 70 milliGRAM(s)/deciLiter  glucagon  Injectable 1 milliGRAM(s) IntraMuscular once PRN Glucose <70 milliGRAM(s)/deciLiter  ondansetron Injectable 4 milliGRAM(s) IV Push every 6 hours PRN Nausea and/or Vomiting  oxycodone    5 mG/acetaminophen 325 mG 2 Tablet(s) Oral every 6 hours PRN Moderate Pain (4 - 6)  sodium chloride 0.65% Nasal 1 Spray(s) Both Nostrils four times a day PRN Nasal Congestion    Pertinent Labs: 10-02 Na134 mmol/L<L> Glu 101 mg/dL<H> K+ 3.5 mmol/L Cr  0.38 mg/dL<L> BUN 16 mg/dL 10-02 Phos 3.0 mg/dL 10-02 Alb 2.8 g/dL<L>  Chol 159 mg/dL LDL 44 mg/dL HDL 94 mg/dL Trig 105 mg/dL     CAPILLARY BLOOD GLUCOSE      POCT Blood Glucose.: 172 mg/dL (02 Oct 2020 11:24)  POCT Blood Glucose.: 161 mg/dL (02 Oct 2020 07:46)  POCT Blood Glucose.: 140 mg/dL (02 Oct 2020 07:06)  POCT Blood Glucose.: 86 mg/dL (01 Oct 2020 23:49)  POCT Blood Glucose.: 72 mg/dL (01 Oct 2020 21:06)  POCT Blood Glucose.: 174 mg/dL (01 Oct 2020 16:57)  POCT Blood Glucose.: 303 mg/dL (01 Oct 2020 11:42)    Skin: intact    Estimated Needs:   [ X] no change since previous assessment  [ ] recalculated:     Previous Nutrition Diagnosis:   [ ] Inadequate Energy Intake [ X]Inadequate Oral Intake [ ] Excessive Energy Intake   [ ] Underweight [ ] Increased Nutrient Needs [ ] Overweight/Obesity   [ ] Altered GI Function [ ] Unintended Weight Loss [ ] Food & Nutrition Related Knowledge Deficit [ ] Malnutrition     Nutrition Diagnosis is [X ] ongoing  [ ] resolved [ ] not applicable     New Nutrition Diagnosis: [ ] not applicable       Interventions:   Recommend  [ ] Change Diet To:  [ ] Nutrition Supplement  [ ] Nutrition Support  [ X] Other: Rec add glucerna TID (660kcal, 30g pro). Keep CCD.     Monitoring and Evaluation:   [X ] PO intake [ x ] Tolerance to diet prescription [ x ] weights [ x ] labs[ x ] follow up per protocol  [ ] other:

## 2020-10-02 NOTE — DISCHARGE NOTE PROVIDER - NSDCMRMEDTOKEN_GEN_ALL_CORE_FT
albuterol 2.5 mg/3 mL (0.083%) inhalation solution: 3 milliliter(s) inhaled every 6 hours, As Needed for SOB  amLODIPine 5 mg oral tablet: 1 tab(s) orally once a day  atorvastatin 40 mg oral tablet: 1 tab(s) orally once a day  Basaglar KwikPen 100 units/mL subcutaneous solution: 60 unit(s) subcutaneous once a day (at bedtime)  Coreg 6.25 mg oral tablet: 1 tab(s) orally 2 times a day  cyclobenzaprine 10 mg oral tablet: 1 tab(s) orally 3 times a day, As needed, Muscle Spasm  ergocalciferol 50,000 intl units (1.25 mg) oral capsule: 1 cap(s) orally once a week  folic acid 0.4 mg oral tablet: 1 tab(s) orally once a day  gabapentin 600 mg oral tablet: 1 tab(s) orally every 8 hours  HumaLOG KwikPen 100 units/mL injectable solution: 15 unit(s) injectable 3 times a day   omeprazole 40 mg oral delayed release capsule: 1 cap(s) orally once a day  Percocet 10/325 oral tablet: 2 tab(s) orally 4 times a day, As Needed  polyethylene glycol 3350 oral powder for reconstitution: 17 gram(s) orally 2 times a day  predniSONE 20 mg oral tablet: 1 tab(s) orally once a day  senna oral tablet: 2 tab(s) orally once a day (at bedtime)  Spiriva Respimat 1.25 mcg/inh inhalation aerosol: 2 puff(s) inhaled once a day  Symbicort 160 mcg-4.5 mcg/inh inhalation aerosol: 2 puff(s) inhaled 2 times a day   albuterol 2.5 mg/3 mL (0.083%) inhalation solution: 3 milliliter(s) inhaled every 6 hours, As Needed for SOB  amLODIPine 5 mg oral tablet: 1 tab(s) orally once a day  atorvastatin 40 mg oral tablet: 1 tab(s) orally once a day  carvedilol 12.5 mg oral tablet: 1 tab(s) orally every 12 hours  cyclobenzaprine 10 mg oral tablet: 1 tab(s) orally 3 times a day, As needed, Muscle Spasm  ergocalciferol 50,000 intl units (1.25 mg) oral capsule: 1 cap(s) orally once a week  folic acid 0.4 mg oral tablet: 1 tab(s) orally once a day  gabapentin 600 mg oral tablet: 1 tab(s) orally every 8 hours  HumaLOG KwikPen 100 units/mL injectable solution: 15 unit(s) injectable 3 times a day   omeprazole 40 mg oral delayed release capsule: 1 cap(s) orally once a day  oxycodone-acetaminophen 5 mg-325 mg oral tablet: 1 tab(s) orally every 4 hours, As needed, Moderate Pain (4 - 6)  polyethylene glycol 3350 oral powder for reconstitution: 17 gram(s) orally 2 times a day  predniSONE 10 mg oral tablet: 1 tab(s) orally once a day  senna oral tablet: 2 tab(s) orally once a day (at bedtime)  Symbicort 160 mcg-4.5 mcg/inh inhalation aerosol: 2 puff(s) inhaled 2 times a day   albuterol 2.5 mg/3 mL (0.083%) inhalation solution: 3 milliliter(s) inhaled every 6 hours, As Needed for SOB  atorvastatin 40 mg oral tablet: 1 tab(s) by gastrostomy tube once a day  carvedilol 12.5 mg oral tablet: 1 tab(s) by gastrostomy tube every 12 hours  ergocalciferol 50,000 intl units (1.25 mg) oral capsule: 1 cap(s) by gastrostomy tube once a week  folic acid 0.4 mg oral tablet: 1 tab(s) by gastrostomy tube once a day  gabapentin 600 mg oral tablet: 1 tab(s) by gastrostomy tube every 8 hours   HumaLOG 100 units/mL injectable solution: 3 unit(s) injectable 3 times a day (before meals)   insulin lispro: 2 Unit(s) if Glucose 151 - 200  4 Unit(s) if Glucose 201 - 250  6 Unit(s) if Glucose 251 - 300  8 Unit(s) if Glucose 301 - 350  10 Unit(s) if Glucose 351 - 400  12 Unit(s) if Glucose Greater Than 400  levoFLOXacin 750 mg oral tablet: 1 tab(s) by gastrostomy tube every 24 hours   omeprazole 40 mg oral delayed release capsule: 1 cap(s) by gastrostomy tube once a day  oxycodone-acetaminophen 5 mg-325 mg oral tablet: 1 tab(s) by gastrostomy tube every 4 hours, As Needed - 6)  polyethylene glycol 3350 oral powder for reconstitution: 17 gram(s) by gastrostomy tube 2 times a day   predniSONE 10 mg oral tablet: 1 tab(s) by gastrostomy tube once a day  senna oral tablet: 2 tab(s) by gastrostomy tube once a day (at bedtime)  Symbicort 160 mcg-4.5 mcg/inh inhalation aerosol: 2 puff(s) inhaled 2 times a day

## 2020-10-02 NOTE — PROGRESS NOTE ADULT - PROBLEM SELECTOR PLAN 1
Acute resolving. Continue AVAPS at night and as needed during the day. Continue oxygen at 3 LPM. Monitor saturation.  Continue bronchodilators and ICS  Will change solumedrol to prednisone tomorrow.  Trilogy ordered for patient when discharged from rehab. Acute resolving. Continue AVAPS at night and as needed during the day. Continue oxygen at 3 LPM. Monitor saturation.  Continue bronchodilators and ICS  Will change solumedrol to prednisone tomorrow.  Trilogy ordered for patient when discharged from rehab.  Repeat CXR Acute resolving. Continue AVAPS at night and as needed during the day. Continue oxygen at 3 LPM. Monitor saturation.  Continue bronchodilators and ICS  Will change solumedrol to prednisone tomorrow.  Trilogy ordered for patient when discharged from rehab. Multiple deformities in spine are contributing to respiratory failure.  Repeat CXR Acute resolving. Continue AVAPS at night and as needed during the day. Continue oxygen at 3 LPM. Monitor saturation.  Continue bronchodilators and ICS  Will change solumedrol to prednisone tomorrow.  Trilogy ordered for patient when discharged from rehab. Failing BIPAP therapy. Multiple deformities in spine are contributing to respiratory failure. Trilogy is needed because patient needs the tidal volume that a trilogy delivers. Without trilogy patient is a high risk for intubation and death.  Repeat CXR

## 2020-10-02 NOTE — DISCHARGE NOTE PROVIDER - HOSPITAL COURSE
66 yo obese F with hx of 80 pack year tobacco use, HTN, DM2 on insulin, COPD on 5L (frequent admissions), R lung cancer s/p resection (2017) on chemo q 3 weeks with concern for mets to spine and compression fractures, recent admission for COPD exacerbation earlier in August who on 9/19/2020 presented to ED with c/o SOB, hypoxia and increased sputum production x 1 day.    INTERVAL HX: On 9/21/2020. Patient was hypoxic, tachycardiac and tachypneic. Rapid response was called. RRT team arrived on scene. Patient had encephalopathy from hypoxia , wheezing and stridor on examination. Patient was tachycardiac to 140/min. Patient was hypoxic and started on non-rebreather but patient has increased work of breathing. Decision was made to intubate the patient. Patient become hypotensive post intubation and was given LR bolus and started on peripheral phenylephrine.    In ICU patient was awake and alert following commands and using a talking board and pen and paper to communicate. She was started on Precedex and Fentanyl to sedate the patient.   Patient was extubated on 9/22/20. Saturating well on NC. Patient was titrated off the Precedex and Fentanyl by 9/23/20. She has anxiety and when anxious she desaturates. on 9/23/2020 patient was started on BIPAP after an episode of anxiety and desaturation and tolerated it. For now patient is using BIPAP as needed.     Blood cultures from 9/19/2020 grew VRE and yeast like cells. Patient's PICC line on Left arm was removed on 9/21/2020 as possible source of infection. Patient was started on Zyvox on 9/21 and Caspofungin on 9/23 after the results came back. Repeat blood cultures from 9/21/2020 are showing no growth to date. Repeat blood cultures were sent on 9/23/2020  and 9/27. ID Dr. Corcoran on board.   9/30   Mild to moderate respiratory distress. Morphine ordered. ABG and CXR ordered. Started on AVAPS  10/02   Chest pain 64 yo obese F with hx of 80 pack year tobacco use, HTN, DM2 on insulin, COPD on 5L (frequent admissions), R lung cancer s/p resection (2017) on chemo q 3 weeks with concern for mets to spine and compression fractures, recent admission for COPD exacerbation earlier in August who on 9/19/2020 presented to ED with c/o SOB, hypoxia and increased sputum production x 1 day.    INTERVAL HX: On 9/21/2020. Patient was hypoxic, tachycardiac and tachypneic. Rapid response was called. RRT team arrived on scene. Patient had encephalopathy from hypoxia , wheezing and stridor on examination. Patient was tachycardiac to 140/min. Patient was hypoxic and started on non-rebreather but patient has increased work of breathing. Decision was made to intubate the patient. Patient become hypotensive post intubation and was given LR bolus and started on peripheral phenylephrine.    In ICU patient was awake and alert following commands and using a talking board and pen and paper to communicate. She was started on Precedex and Fentanyl to sedate the patient.   Patient was extubated on 9/22/20. Saturating well on NC. Patient was titrated off the Precedex and Fentanyl by 9/23/20. She has anxiety and when anxious she desaturates. on 9/23/2020 patient was started on BIPAP after an episode of anxiety and desaturation and tolerated it. For now patient is using BIPAP as needed.     Blood cultures from 9/19/2020 grew VRE and yeast like cells. Patient's PICC line on Left arm was removed on 9/21/2020 as possible source of infection. Patient was started on Zyvox on 9/21 and Caspofungin on 9/23 after the results came back. Repeat blood cultures from 9/21/2020 are showing no growth to date. Repeat blood cultures were sent on 9/23/2020  and 9/27. ID Dr. Corcoran on board.   9/30   Mild to moderate respiratory distress. Morphine ordered. ABG and CXR ordered. Started on AVAPS  10/02   Chest pain  10/05 Transferred back to ICU and Intubated for severe respiratory distress.  0/8  Tracheostomy and Peg placement  10/9 Transferred back to SCU   64 yo obese F with hx of 80 pack year tobacco use, HTN, DM2 on insulin, COPD on 5L (frequent admissions), R lung cancer s/p resection (2017) on chemo q 3 weeks with concern for mets to spine and compression fractures, recent admission for COPD exacerbation earlier in August who on 9/19/2020 presented to ED with c/o SOB, hypoxia and increased sputum production x 1 day.    INTERVAL HX: On 9/21/2020. Patient was hypoxic, tachycardiac and tachypneic. Rapid response was called. RRT team arrived on scene. Patient had encephalopathy from hypoxia , wheezing and stridor on examination. Patient was tachycardiac to 140/min. Patient was hypoxic and started on non-rebreather but patient has increased work of breathing. Decision was made to intubate the patient. Patient become hypotensive post intubation and was given LR bolus and started on peripheral phenylephrine.    In ICU patient was awake and alert following commands and using a talking board and pen and paper to communicate. She was started on Precedex and Fentanyl to sedate the patient.   Patient was extubated on 9/22/20. Saturating well on NC. Patient was titrated off the Precedex and Fentanyl by 9/23/20. She has anxiety and when anxious she desaturates. on 9/23/2020 patient was started on BIPAP after an episode of anxiety and desaturation and tolerated it. For now patient is using BIPAP as needed.     Blood cultures from 9/19/2020 grew VRE and yeast like cells. Patient's PICC line on Left arm was removed on 9/21/2020 as possible source of infection. Patient was started on Zyvox on 9/21 and Caspofungin on 9/23 after the results came back. Repeat blood cultures from 9/21/2020 are showing no growth to date. Repeat blood cultures were sent on 9/23/2020  and 9/27. ID Dr. Corcoran on board.   9/30   Mild to moderate respiratory distress. Morphine ordered. ABG and CXR ordered. Started on AVAPS  10/02   Chest pain  10/05 Transferred back to ICU and Intubated for severe respiratory distress.  0/8  Tracheostomy and Peg placement  10/9 Transferred back to SCU    Mode: AC/ CMV (Assist Control/ Continuous Mandatory Ventilation)  RR (machine): 16  TV (machine): 400  FiO2: 40  PEEP: 5  ITime: 1  MAP: 8  PIP: 21LABS:  CBC Full  -  ( 15 Oct 2020 06:36 )  WBC Count : 11.52 K/uL  RBC Count : 2.96 M/uL  Hemoglobin : 8.7 g/dL  Hematocrit : 27.1 %  Platelet Count - Automated : 129 K/uL  Mean Cell Volume : 91.6 fl  Mean Cell Hemoglobin : 29.4 pg  Mean Cell Hemoglobin Concentration : 32.1 gm/dL  Auto Neutrophil # : x  Auto Lymphocyte # : x  Auto Monocyte # : x  Auto Eosinophil # : x  Auto Basophil # : x  Auto Neutrophil % : x  Auto Lymphocyte % : x  Auto Monocyte % : x  Auto Eosinophil % : x  Auto Basophil % : x    10-15    137  |  95<L>  |  13  ----------------------------<  205<H>  3.5   |  36<H>  |  0.42<L>    Ca    8.3<L>      15 Oct 2020 06:36  Phos  2.7     10-15  Mg     2.1     10-15    TPro  5.2<L>  /  Alb  1.9<L>  /  TBili  0.7  /  DBili  x   /  AST  19  /  ALT  22  /  AlkPhos  142<H>  10-15    COVID-19 PCR: NotDetec: Testing is performed using polymerase chain reaction (PCR) or   transcription mediated amplification (TMA).     < from: Xray Chest 1 View- PORTABLE-Routine (Xray Chest 1 View- PORTABLE-Routine .) (10.11.20 @ 11:45) >    Tracheostomy is again noted.    Moderate bilateral pleural effusions are noted with mild pulmonary vascular congestion which is not significantly changed from previous exam.    Limited evaluation of the mediastinal contour.  The trachea is midline.    The osseous structures are intact.    IMPRESSION:  Lines and tubes as above.  Unchanged moderate bilateral pleural effusions with mild pulmonary vascular congestion.      < end of copied text >

## 2020-10-02 NOTE — PROGRESS NOTE ADULT - SUBJECTIVE AND OBJECTIVE BOX
Interval Events:    poor po intake  received high dose steroids  possible switch to po prednisone    Allergies    fish (Angioedema)  penicillins (Rash)    Intolerances      Endocrine/Metabolic Medications:  atorvastatin 40 milliGRAM(s) Oral at bedtime  dextrose 40% Gel 15 Gram(s) Oral once PRN  dextrose 50% Injectable 12.5 Gram(s) IV Push once  dextrose 50% Injectable 25 Gram(s) IV Push once  dextrose 50% Injectable 25 Gram(s) IV Push once  glucagon  Injectable 1 milliGRAM(s) IntraMuscular once PRN  insulin glargine Injectable (LANTUS) 40 Unit(s) SubCutaneous every morning  insulin lispro (HumaLOG) corrective regimen sliding scale   SubCutaneous three times a day before meals  insulin lispro Injectable (HumaLOG) 10 Unit(s) SubCutaneous three times a day before meals      Vital Signs Last 24 Hrs  T(C): 36.6 (02 Oct 2020 05:35), Max: 36.8 (01 Oct 2020 12:37)  T(F): 97.8 (02 Oct 2020 05:35), Max: 98.2 (01 Oct 2020 12:37)  HR: 88 (02 Oct 2020 08:11) (88 - 125)  BP: 104/57 (02 Oct 2020 06:55) (104/57 - 128/72)  BP(mean): --  RR: 22 (02 Oct 2020 06:55) (16 - 23)  SpO2: 96% (02 Oct 2020 08:11) (92% - 100%)      PHYSICAL EXAM  Constitutional:    NC/AT:    HEENT:    Neck:  No JVD    Respiratory:  reduced breath sounds b/l bases    Cardiovascular:  RR    Gastrointestinal: Soft    Extremities: without cyanosis  Neurological:  non focal    LABS                        10.5   10.73 )-----------( 80       ( 02 Oct 2020 06:39 )             29.5                               134    |  93     |  16                  Calcium: 8.9   / iCa: x      (10-02 @ 06:39)    ----------------------------<  101       Magnesium: 1.9                              3.5     |  37     |  0.38             Phosphorous: 3.0      TPro  6.1    /  Alb  2.8    /  TBili  0.9    /  DBili  x      /  AST  20     /  ALT  27     /  AlkPhos  125    02 Oct 2020 06:39    CAPILLARY BLOOD GLUCOSE      POCT Blood Glucose.: 161 mg/dL (02 Oct 2020 07:46)  POCT Blood Glucose.: 140 mg/dL (02 Oct 2020 07:06)  POCT Blood Glucose.: 86 mg/dL (01 Oct 2020 23:49)  POCT Blood Glucose.: 72 mg/dL (01 Oct 2020 21:06)  POCT Blood Glucose.: 174 mg/dL (01 Oct 2020 16:57)  POCT Blood Glucose.: 303 mg/dL (01 Oct 2020 11:42)        Assesment/plan          64 yo female with multiple comorbidities including h/o DM type 2, HTN, COPD on o2, lung ca on right sided s/p resection on chemo admitted with shortness of breath  was intubated/extubated for acute resp failure this admit, now extubated on general medical floor, standing high dose steroids with uncontrolled hyperglycemia    endocrinology consulted for glycemic management    DM type 2  uncontrolled  complicated by high dose steroid use, acute on chronic resp failure  home regimen:  basaglar 60 units daily  novolog 15 units pre meals    recommendations:  poc glucoses better controlled  plan to reduce dose of steroids  poor po intake  reduce lispro    - continue insulin lantus 40 units daily in am   - reduce insulin lispro to 6 units pre meals  continue moderate dose sliding scale  - reassess based on requirements  - goal inpatient glucose range: 140-180mg/dl    COPD exacerbation  acute on chronic hypoxic resp failure  s/p intubation/extubation current admit  on standing steroids  steroids increased  bipap   pulmonary on board    sepsis  VRE bacteremia  received zosyn  ID on board    Discussed with patient and primary team   Please call  Endocrine- Dr Katlyn Garcia as needed   Interval Events:    poor po intake  received high dose steroids  plan to switch to po prednisone    Allergies    fish (Angioedema)  penicillins (Rash)    Intolerances      Endocrine/Metabolic Medications:  atorvastatin 40 milliGRAM(s) Oral at bedtime  dextrose 40% Gel 15 Gram(s) Oral once PRN  dextrose 50% Injectable 12.5 Gram(s) IV Push once  dextrose 50% Injectable 25 Gram(s) IV Push once  dextrose 50% Injectable 25 Gram(s) IV Push once  glucagon  Injectable 1 milliGRAM(s) IntraMuscular once PRN  insulin glargine Injectable (LANTUS) 40 Unit(s) SubCutaneous every morning  insulin lispro (HumaLOG) corrective regimen sliding scale   SubCutaneous three times a day before meals  insulin lispro Injectable (HumaLOG) 10 Unit(s) SubCutaneous three times a day before meals      Vital Signs Last 24 Hrs  T(C): 36.6 (02 Oct 2020 05:35), Max: 36.8 (01 Oct 2020 12:37)  T(F): 97.8 (02 Oct 2020 05:35), Max: 98.2 (01 Oct 2020 12:37)  HR: 88 (02 Oct 2020 08:11) (88 - 125)  BP: 104/57 (02 Oct 2020 06:55) (104/57 - 128/72)  BP(mean): --  RR: 22 (02 Oct 2020 06:55) (16 - 23)  SpO2: 96% (02 Oct 2020 08:11) (92% - 100%)      PHYSICAL EXAM  Constitutional:    NC/AT:    HEENT:    Neck:  No JVD    Respiratory:  reduced breath sounds b/l bases    Cardiovascular:  RR    Gastrointestinal: Soft    Extremities: without cyanosis  Neurological:  non focal    LABS                        10.5   10.73 )-----------( 80       ( 02 Oct 2020 06:39 )             29.5                               134    |  93     |  16                  Calcium: 8.9   / iCa: x      (10-02 @ 06:39)    ----------------------------<  101       Magnesium: 1.9                              3.5     |  37     |  0.38             Phosphorous: 3.0      TPro  6.1    /  Alb  2.8    /  TBili  0.9    /  DBili  x      /  AST  20     /  ALT  27     /  AlkPhos  125    02 Oct 2020 06:39    CAPILLARY BLOOD GLUCOSE      POCT Blood Glucose.: 161 mg/dL (02 Oct 2020 07:46)  POCT Blood Glucose.: 140 mg/dL (02 Oct 2020 07:06)  POCT Blood Glucose.: 86 mg/dL (01 Oct 2020 23:49)  POCT Blood Glucose.: 72 mg/dL (01 Oct 2020 21:06)  POCT Blood Glucose.: 174 mg/dL (01 Oct 2020 16:57)  POCT Blood Glucose.: 303 mg/dL (01 Oct 2020 11:42)        Assesment/plan          66 yo female with multiple comorbidities including h/o DM type 2, HTN, COPD on o2, lung ca on right sided s/p resection on chemo admitted with shortness of breath  was intubated/extubated for acute resp failure this admit, now extubated on general medical floor, standing high dose steroids with uncontrolled hyperglycemia    endocrinology consulted for glycemic management    DM type 2  uncontrolled  complicated by high dose steroid use, acute on chronic resp failure  home regimen:  basaglar 60 units daily  novolog 15 units pre meals    recommendations:  poc glucoses better controlled  plan to reduce dose of steroids  poor po intake  reduce lispro    - continue insulin lantus 40 units daily in am   - reduce insulin lispro to 6 units pre meals  continue moderate dose sliding scale  - reassess based on requirements  - goal inpatient glucose range: 140-180mg/dl    COPD exacerbation  acute on chronic hypoxic resp failure  s/p intubation/extubation current admit  on standing steroids  steroids increased  bipap   pulmonary on board    sepsis  VRE bacteremia  received zosyn  ID on board    Discussed with patient and primary team   Please call  Endocrine- Dr Katlyn Garcia as needed

## 2020-10-02 NOTE — PROGRESS NOTE ADULT - PROBLEM SELECTOR PLAN 2
S/P chest pain this morning. C/O non-radiating pain which resolved on its own.  ECG sinus tachycardia rate 116 unchanged.  Cardiac enzymes normal.

## 2020-10-02 NOTE — DISCHARGE NOTE PROVIDER - PROVIDER TOKENS
FREE:[LAST:[Facility Provider],PHONE:[(   )    -],FAX:[(   )    -]],PROVIDER:[TOKEN:[[id number]]]

## 2020-10-02 NOTE — PROGRESS NOTE ADULT - ATTENDING COMMENTS
Cont. antibiotics/antifungals  ID follow up  AVAPS as tolerated  Cont. diuresis to maintain euvolemic volume status   D/c planning to [facility] for early next week

## 2020-10-02 NOTE — PROGRESS NOTE ADULT - PROBLEM SELECTOR PLAN 10
DVT and GI prophylaxis.  Continue AVAPS and oxygen.  Will need rehab placement  Overall prognosis is poor. Remains Full Code as per wishes.

## 2020-10-02 NOTE — PROGRESS NOTE ADULT - SUBJECTIVE AND OBJECTIVE BOX
PULMONARY  progress note    BARB COLÓN  MRN-[id number]    Patient is a 65y old  Female who presents with a chief complaint of shortness of breath (02 Oct 2020 08:16)  Feels better except sore throat      MEDICATIONS  (STANDING):  ALBUTerol    90 MICROgram(s) HFA Inhaler 2 Puff(s) Inhalation every 4 hours  atorvastatin 40 milliGRAM(s) Oral at bedtime  budesonide 160 MICROgram(s)/formoterol 4.5 MICROgram(s) Inhaler 2 Puff(s) Inhalation two times a day  carvedilol 6.25 milliGRAM(s) Oral every 12 hours  caspofungin IVPB      caspofungin IVPB 50 milliGRAM(s) IV Intermittent every 24 hours  chlorhexidine 2% Cloths 1 Application(s) Topical <User Schedule>  dextrose 5%. 1000 milliLiter(s) (50 mL/Hr) IV Continuous <Continuous>  dextrose 50% Injectable 12.5 Gram(s) IV Push once  dextrose 50% Injectable 25 Gram(s) IV Push once  dextrose 50% Injectable 25 Gram(s) IV Push once  enoxaparin Injectable 40 milliGRAM(s) SubCutaneous daily  ergocalciferol 46036 Unit(s) Oral <User Schedule>  FIRST- Mouthwash  BLM 10 milliLiter(s) Swish and Swallow every 8 hours  folic acid 1 milliGRAM(s) Oral daily  gabapentin 600 milliGRAM(s) Oral every 8 hours  guaiFENesin  milliGRAM(s) Oral every 12 hours  influenza   Vaccine 0.5 milliLiter(s) IntraMuscular once  insulin glargine Injectable (LANTUS) 40 Unit(s) SubCutaneous every morning  insulin lispro (HumaLOG) corrective regimen sliding scale   SubCutaneous three times a day before meals  insulin lispro Injectable (HumaLOG) 10 Unit(s) SubCutaneous three times a day before meals  linezolid  IVPB      linezolid  IVPB 600 milliGRAM(s) IV Intermittent every 12 hours  melatonin 3 milliGRAM(s) Oral at bedtime  nystatin Powder 1 Application(s) Topical two times a day  pantoprazole  Injectable 40 milliGRAM(s) IV Push daily  polyethylene glycol 3350 17 Gram(s) Oral two times a day  senna 2 Tablet(s) Oral at bedtime  tiotropium 18 MICROgram(s) Capsule 1 Capsule(s) Inhalation daily      MEDICATIONS  (PRN):  benzocaine 15 mG/menthol 3.6 mG (Sugar-Free) Lozenge 1 Lozenge Oral every 4 hours PRN Sore Throat  dextrose 40% Gel 15 Gram(s) Oral once PRN Blood Glucose LESS THAN 70 milliGRAM(s)/deciLiter  glucagon  Injectable 1 milliGRAM(s) IntraMuscular once PRN Glucose <70 milliGRAM(s)/deciLiter  ondansetron Injectable 4 milliGRAM(s) IV Push every 6 hours PRN Nausea and/or Vomiting  oxycodone    5 mG/acetaminophen 325 mG 2 Tablet(s) Oral every 6 hours PRN Moderate Pain (4 - 6)  sodium chloride 0.65% Nasal 1 Spray(s) Both Nostrils four times a day PRN Nasal Congestion      Allergies    fish (Angioedema)  penicillins (Rash)            PAST MEDICAL & SURGICAL HISTORY:  Malignant neoplasm of unspecified part of right bronchus or lung    HTN (hypertension)    DM (diabetes mellitus)    COPD (chronic obstructive pulmonary disease)    Lung cancer    Morbid obesity    GERD (gastroesophageal reflux disease)    Deviated septum    Prolapsed uterus    ITP (idiopathic thrombocytopenic purpura)    Emphysema/COPD    History of cholecystectomy    S/P lobectomy of lung  right 2017    History of tonsillectomy             REVIEW OF SYSTEMS:  CONSTITUTIONAL: No fever, weight loss, or fatigue   EYES: No eye pain, visual disturbances, or discharge  ENT:  No difficulty hearing, tinnitus, vertigo;   NECK: No pain or stiffness or nodes  RESPIRATORY:  cough +  wheezing--   chills--   hemoptysis--  Shortness of Breath+  CARDIOVASCULAR: No chest pain, palpitations, passing out, dizziness, or leg swelling  GASTROINTESTINAL: No abdominal or epigastric pain. No nausea, vomiting, or hematemesis; No diarrhea or constipation. No melena or hematochezia.  GENITOURINARY: No dysuria, frequency, hematuria, or incontinence  NEUROLOGICAL: No headaches, memory loss, loss of strength, numbness, or tremors  SKIN: No itching, burning, rashes, or lesions   LYMPH Nodes: No enlarged glands  ENDOCRINE: No heat or cold intolerance; No hair loss  HEME/LYMPH: No easy bruising, or bleeding gums  ALLERGY AND IMMUNOLOGIC: No hives or eczema    Vital Signs Last 24 Hrs  T(C): 36.6 (02 Oct 2020 05:35), Max: 36.8 (01 Oct 2020 12:37)  T(F): 97.8 (02 Oct 2020 05:35), Max: 98.2 (01 Oct 2020 12:37)  HR: 88 (02 Oct 2020 08:11) (88 - 125)  BP: 104/57 (02 Oct 2020 06:55) (104/57 - 128/72)  BP(mean): --  RR: 22 (02 Oct 2020 06:55) (16 - 23)  SpO2: 96% (02 Oct 2020 08:11) (92% - 100%)  I&O's Detail      PHYSICAL EXAMINATION:    GENERAL: The patient is a well-developed, well-nourished in no apparent distress.   SKIN no rash ecchymoses or bruises  HEENT: Head is normocephalic and atraumatic  SHAMA , Extraocular muscles are intact. Mucous membranes  moist.   Neck supple ,No LN felt JVP not increased  Thyroid not enlarged  Cardiovascular:  S1 S2 heard, RSR, No JVD , systolic  murmur at apex, No gallop or rub  Respiratory: Chest wall symmetrical with good air entry ,Percussion note normal,    Lungs vesicular breathing with no   rales  or  wheeze, Decreased BS at basis	  ABDOMEN:  Soft, Non-tender, obese,  no hepatomegaly or splenomegaly BS positive	  Extremities: Normal range of motion, No clubbing, cyanosis or edema  Vascular: Peripheral pulses palpable 2+ bilaterally  CNS:  Alert and oriented x3   Cranial nerves intact  sensory intact  motor power5/5  dtr 2+   Babinski neg    LABS:                        10.5   10.73 )-----------( 80       ( 02 Oct 2020 06:39 )             29.5     10-02    134<L>  |  93<L>  |  16  ----------------------------<  101<H>  3.5   |  37<H>  |  0.38<L>    Ca    8.9      02 Oct 2020 06:39  Phos  3.0     10-02  Mg     1.9     10-02    TPro  6.1  /  Alb  2.8<L>  /  TBili  0.9  /  DBili  x   /  AST  20  /  ALT  27  /  AlkPhos  125<H>  10-02        ABG - ( 01 Oct 2020 06:16 )  pH, Arterial: 7.48  pH, Blood: x     /  pCO2: 51    /  pO2: 84    / HCO3: 38    / Base Excess: 12.5  /  SaO2: 96          CARDIAC MARKERS ( 02 Oct 2020 06:40 )  <0.015 ng/mL / x     / 46 U/L / x     / <1.0 ng/m    Serum Pro-Brain Natriuretic Peptide: 261 pg/mL (10-02-20 @ 06:40)  MICROBIOLOGY:    Culture - Blood (collected 09-27-20 @ 12:06)  Source: .Blood   Preliminary Report (09-28-20 @ 13:02):    No growth to date.    Culture - Blood (collected 09-27-20 @ 12:06)  Source: .Blood   Preliminary Report (09-28-20 @ 13:02):    No growth to date.

## 2020-10-02 NOTE — PROGRESS NOTE ADULT - SUBJECTIVE AND OBJECTIVE BOX
no fever  but has sob  cough with some sputum    MEDICATIONS  (STANDING):  ALBUTerol    90 MICROgram(s) HFA Inhaler 2 Puff(s) Inhalation every 4 hours  atorvastatin 40 milliGRAM(s) Oral at bedtime  budesonide 160 MICROgram(s)/formoterol 4.5 MICROgram(s) Inhaler 2 Puff(s) Inhalation two times a day  carvedilol 6.25 milliGRAM(s) Oral every 12 hours  caspofungin IVPB 50 milliGRAM(s) IV Intermittent every 24 hours  caspofungin IVPB      chlorhexidine 2% Cloths 1 Application(s) Topical <User Schedule>  dextrose 5%. 1000 milliLiter(s) (50 mL/Hr) IV Continuous <Continuous>  dextrose 50% Injectable 12.5 Gram(s) IV Push once  dextrose 50% Injectable 25 Gram(s) IV Push once  dextrose 50% Injectable 25 Gram(s) IV Push once  enoxaparin Injectable 40 milliGRAM(s) SubCutaneous daily  ergocalciferol 09418 Unit(s) Oral <User Schedule>  FIRST- Mouthwash  BLM 10 milliLiter(s) Swish and Swallow every 8 hours  folic acid 1 milliGRAM(s) Oral daily  furosemide    Tablet 20 milliGRAM(s) Oral daily  gabapentin 600 milliGRAM(s) Oral every 8 hours  guaiFENesin  milliGRAM(s) Oral every 12 hours  influenza   Vaccine 0.5 milliLiter(s) IntraMuscular once  insulin glargine Injectable (LANTUS) 40 Unit(s) SubCutaneous every morning  insulin lispro (HumaLOG) corrective regimen sliding scale   SubCutaneous three times a day before meals  insulin lispro Injectable (HumaLOG) 6 Unit(s) SubCutaneous three times a day before meals  linezolid  IVPB      linezolid  IVPB 600 milliGRAM(s) IV Intermittent every 12 hours  melatonin 3 milliGRAM(s) Oral at bedtime  nystatin Powder 1 Application(s) Topical two times a day  pantoprazole  Injectable 40 milliGRAM(s) IV Push daily  polyethylene glycol 3350 17 Gram(s) Oral two times a day  senna 2 Tablet(s) Oral at bedtime  tiotropium 18 MICROgram(s) Capsule 1 Capsule(s) Inhalation daily    MEDICATIONS  (PRN):  benzocaine 15 mG/menthol 3.6 mG (Sugar-Free) Lozenge 1 Lozenge Oral every 4 hours PRN Sore Throat  dextrose 40% Gel 15 Gram(s) Oral once PRN Blood Glucose LESS THAN 70 milliGRAM(s)/deciLiter  glucagon  Injectable 1 milliGRAM(s) IntraMuscular once PRN Glucose <70 milliGRAM(s)/deciLiter  ondansetron Injectable 4 milliGRAM(s) IV Push every 6 hours PRN Nausea and/or Vomiting  oxycodone    5 mG/acetaminophen 325 mG 2 Tablet(s) Oral every 6 hours PRN Moderate Pain (4 - 6)  sodium chloride 0.65% Nasal 1 Spray(s) Both Nostrils four times a day PRN Nasal Congestion      Allergies    fish (Angioedema)  penicillins (Rash)    Intolerances        Vital Signs Last 24 Hrs  T(C): 37 (02 Oct 2020 11:34), Max: 37 (02 Oct 2020 11:34)  T(F): 98.6 (02 Oct 2020 11:34), Max: 98.6 (02 Oct 2020 11:34)  HR: 128 (02 Oct 2020 14:42) (88 - 128)  BP: 150/97 (02 Oct 2020 14:42) (104/57 - 156/103)  BP(mean): --  RR: 20 (02 Oct 2020 11:34) (16 - 23)  SpO2: 97% (02 Oct 2020 14:42) (92% - 100%)    PHYSICAL EXAM  General: adult in NAD  HEENT: clear oropharynx, anicteric sclera, pink conjunctiva  Neck: supple  CV: normal S1/S2 with no murmur rubs or gallops  Lungs: positive air movement b/l ant lungs,clear to auscultation, no wheezes, no rales  Abdomen: soft non-tender non-distended, no hepatosplenomegaly  Ext: no clubbing cyanosis or edema  Skin: no rashes and no petechiae  Neuro: alert and oriented X 4, no focal deficits  LABS:                          10.5   10.73 )-----------( 80       ( 02 Oct 2020 06:39 )             29.5         Mean Cell Volume : 86.3 fl  Mean Cell Hemoglobin : 30.7 pg  Mean Cell Hemoglobin Concentration : 35.6 gm/dL  Auto Neutrophil # : x  Auto Lymphocyte # : x  Auto Monocyte # : x  Auto Eosinophil # : x  Auto Basophil # : x  Auto Neutrophil % : x  Auto Lymphocyte % : x  Auto Monocyte % : x  Auto Eosinophil % : x  Auto Basophil % : x    Serial CBC  Hematocrit 29.5  Hemoglobin 10.5  Plat 80  RBC 3.42  WBC 10.73  Serial CBC  Hematocrit 30.7  Hemoglobin 10.3  Plat 65  RBC 3.46  WBC 8.65  Serial CBC  Hematocrit 27.8  Hemoglobin 9.6  Plat 50  RBC 3.16  WBC 5.31    10-02    134<L>  |  93<L>  |  16  ----------------------------<  101<H>  3.5   |  37<H>  |  0.38<L>    Ca    8.9      02 Oct 2020 06:39  Phos  3.0     10-02  Mg     1.9     10-02    TPro  6.1  /  Alb  2.8<L>  /  TBili  0.9  /  DBili  x   /  AST  20  /  ALT  27  /  AlkPhos  125<H>  10-02                    BLOOD SMEAR INTERPRETATION:       RADIOLOGY & ADDITIONAL STUDIES:

## 2020-10-02 NOTE — DISCHARGE NOTE PROVIDER - NSDCCPCAREPLAN_GEN_ALL_CORE_FT
PRINCIPAL DISCHARGE DIAGNOSIS  Diagnosis: Acute on chronic respiratory failure with hypoxia and hypercapnia  Assessment and Plan of Treatment: Acute on chronic respiratory failure with hypoxia and hypercapnia  Continue using Trilogy and oxygen as ordered. You must use trilogy at night and anytime you feel short of breath. When not using the Trilogy use oxygen at 3 LPM. Do not increase oxygen unless prescribed by your physician, Continue using your inhalers as prescribed. Rinse mouth after use.      SECONDARY DISCHARGE DIAGNOSES  Diagnosis: Lung cancer metastatic to bone  Assessment and Plan of Treatment: Lung cancer metastatic to bone  You have received 1 radiation treatment to your spine. Continue chemotherapy as instructed by Dr Mendiola.    Diagnosis: COPD (chronic obstructive pulmonary disease)  Assessment and Plan of Treatment: COPD (chronic obstructive pulmonary disease)  Continue using Trilogy and oxygen as prescribed. Use your inhalers as prescribed. Take all medications as prescribed.    Diagnosis: VRE bacteremia  Assessment and Plan of Treatment: VRE bacteremia  You were treated with IV antibiotics while in the hospital. Follow all directions if you need any more antibiotics upon discharge.    Diagnosis: Fungemia  Assessment and Plan of Treatment: Fungemia  You were treated with IV antifungals during your hospitalization    Diagnosis: HTN (hypertension)  Assessment and Plan of Treatment: HTN (hypertension)  Continue taking your medications as prescribed. Follow a low sodium, carbohydrate consistent diet    Diagnosis: Uncontrolled diabetes mellitus  Assessment and Plan of Treatment: Uncontrolled diabetes mellitus  Continue taking your medications as prescribed. Monitor your glucose.     PRINCIPAL DISCHARGE DIAGNOSIS  Diagnosis: Acute on chronic respiratory failure with hypoxia and hypercapnia  Assessment and Plan of Treatment: Acute on chronic respiratory failure with hypoxia and hypercapnia  Due to recurrent respiratory failure  with three intubations you required a tracheostomy. You will be transferred to a Vent faciltity. Continue mechanical ventilation as ordered. Continue bronchodilators and ICS.      SECONDARY DISCHARGE DIAGNOSES  Diagnosis: Lung cancer metastatic to bone  Assessment and Plan of Treatment: Lung cancer metastatic to bone  You have received 1 radiation treatment to your spine. At this point in  time you are no longer a candidate for cho or radiation..    Diagnosis: COPD (chronic obstructive pulmonary disease)  Assessment and Plan of Treatment: COPD (chronic obstructive pulmonary disease)  End stage requiring tracheostomy and permanent mechanical ventilation. You will be transferred to a Vent facility for further care and to see if you can be off ventilator during the day.    Diagnosis: VRE bacteremia  Assessment and Plan of Treatment: VRE bacteremia  You were treated with IV antibiotics while in the hospital. Follow all directions if you need any more antibiotics upon discharge.    Diagnosis: Fungemia  Assessment and Plan of Treatment: Fungemia  You were treated with IV antifungals during your hospitalization    Diagnosis: HTN (hypertension)  Assessment and Plan of Treatment: HTN (hypertension)  Continue taking your medications as prescribed. Follow a low sodium, carbohydrate consistent diet    Diagnosis: Uncontrolled diabetes mellitus  Assessment and Plan of Treatment: Uncontrolled diabetes mellitus  Continue taking your medications as prescribed. Monitor your glucose.

## 2020-10-02 NOTE — PROGRESS NOTE ADULT - PROBLEM SELECTOR PLAN 5
Continue morning insulin, pre-meal and sliding scale coverage.  Will be switching to prednisone tomorrow so insulin requirements may change  Continue to monitor glucose.

## 2020-10-02 NOTE — DISCHARGE NOTE PROVIDER - CARE PROVIDER_API CALL
Facility Provider,   Phone: (   )    -  Fax: (   )    -  Follow Up Time:     Obdulio Lezama)  Medicine  Dept Director  [address] [place] [address]  Phone: [phone]  Fax: [phone]  Follow Up Time:

## 2020-10-02 NOTE — PROGRESS NOTE ADULT - ASSESSMENT
· Assessment	  65 year old lady with severe COPD and lung ca with mets to bones which causing compression Fx.  She had one RT for that.  she was admitted for dyspnea and hypoxia.    Problem/Recommendation - 1:  Problem: Lung cancer. Recommendation: with mets to bones  on chemo keytruda, alimta and carboplatin  she had one RT to spine.  the tumor markers have been trending down with chemo    Problem/Recommendation - 2:  ·  Problem: COPD (chronic obstructive pulmonary disease).  Recommendation: required freq steroids.     Problem/Recommendation - 3:  ·  Problem: Acute on chronic respiratory failure with hypoxia.  Recommendation: ?aspiration causing resp failure requiring intubation  she has pain at throat for a while and has difficulty swallowing  now extubated  prognosis poor.   she is lucid now and express wishes to continue chemo. she needs placement because she is not able to climb the stairs  4. sore throat   she has difficulty swallowing  need a scope  th pain is less now  able to swallow food better    will watch for possibility of aspiration  she has cough with some sputum now  she is still on antibiotics  needs to monitor

## 2020-10-03 LAB
ALBUMIN SERPL ELPH-MCNC: 2.6 G/DL — LOW (ref 3.5–5)
ALP SERPL-CCNC: 143 U/L — HIGH (ref 40–120)
ALT FLD-CCNC: 28 U/L DA — SIGNIFICANT CHANGE UP (ref 10–60)
ANION GAP SERPL CALC-SCNC: 7 MMOL/L — SIGNIFICANT CHANGE UP (ref 5–17)
AST SERPL-CCNC: 18 U/L — SIGNIFICANT CHANGE UP (ref 10–40)
BASE EXCESS BLDA CALC-SCNC: 10.6 MMOL/L — HIGH (ref -2–2)
BASE EXCESS BLDA CALC-SCNC: 12.3 MMOL/L — HIGH (ref -2–2)
BILIRUB SERPL-MCNC: 1.2 MG/DL — SIGNIFICANT CHANGE UP (ref 0.2–1.2)
BLOOD GAS COMMENTS ARTERIAL: SIGNIFICANT CHANGE UP
BLOOD GAS COMMENTS ARTERIAL: SIGNIFICANT CHANGE UP
BUN SERPL-MCNC: 17 MG/DL — SIGNIFICANT CHANGE UP (ref 7–18)
CALCIUM SERPL-MCNC: 8.3 MG/DL — LOW (ref 8.4–10.5)
CHLORIDE SERPL-SCNC: 86 MMOL/L — LOW (ref 96–108)
CO2 SERPL-SCNC: 37 MMOL/L — HIGH (ref 22–31)
CREAT SERPL-MCNC: 0.47 MG/DL — LOW (ref 0.5–1.3)
GLUCOSE BLDC GLUCOMTR-MCNC: 112 MG/DL — HIGH (ref 70–99)
GLUCOSE BLDC GLUCOMTR-MCNC: 187 MG/DL — HIGH (ref 70–99)
GLUCOSE BLDC GLUCOMTR-MCNC: 207 MG/DL — HIGH (ref 70–99)
GLUCOSE BLDC GLUCOMTR-MCNC: 235 MG/DL — HIGH (ref 70–99)
GLUCOSE SERPL-MCNC: 197 MG/DL — HIGH (ref 70–99)
HCO3 BLDA-SCNC: 36 MMOL/L — HIGH (ref 23–27)
HCO3 BLDA-SCNC: 36 MMOL/L — HIGH (ref 23–27)
HCT VFR BLD CALC: 25 % — LOW (ref 34.5–45)
HGB BLD-MCNC: 8.9 G/DL — LOW (ref 11.5–15.5)
HOROWITZ INDEX BLDA+IHG-RTO: 30 — SIGNIFICANT CHANGE UP
HOROWITZ INDEX BLDA+IHG-RTO: 30 — SIGNIFICANT CHANGE UP
MAGNESIUM SERPL-MCNC: 1.7 MG/DL — SIGNIFICANT CHANGE UP (ref 1.6–2.6)
MCHC RBC-ENTMCNC: 31 PG — SIGNIFICANT CHANGE UP (ref 27–34)
MCHC RBC-ENTMCNC: 35.6 GM/DL — SIGNIFICANT CHANGE UP (ref 32–36)
MCV RBC AUTO: 87.1 FL — SIGNIFICANT CHANGE UP (ref 80–100)
NRBC # BLD: 0 /100 WBCS — SIGNIFICANT CHANGE UP (ref 0–0)
PCO2 BLDA: 45 MMHG — SIGNIFICANT CHANGE UP (ref 32–46)
PCO2 BLDA: 55 MMHG — HIGH (ref 32–46)
PH BLDA: 7.44 — SIGNIFICANT CHANGE UP (ref 7.35–7.45)
PH BLDA: 7.52 — HIGH (ref 7.35–7.45)
PHOSPHATE SERPL-MCNC: 3.5 MG/DL — SIGNIFICANT CHANGE UP (ref 2.5–4.5)
PLATELET # BLD AUTO: 70 K/UL — LOW (ref 150–400)
PO2 BLDA: 111 MMHG — HIGH (ref 74–108)
PO2 BLDA: 88 MMHG — SIGNIFICANT CHANGE UP (ref 74–108)
POTASSIUM SERPL-MCNC: 3.1 MMOL/L — LOW (ref 3.5–5.3)
POTASSIUM SERPL-SCNC: 3.1 MMOL/L — LOW (ref 3.5–5.3)
PROT SERPL-MCNC: 5.9 G/DL — LOW (ref 6–8.3)
RBC # BLD: 2.87 M/UL — LOW (ref 3.8–5.2)
RBC # FLD: 14.9 % — HIGH (ref 10.3–14.5)
SAO2 % BLDA: 96 % — SIGNIFICANT CHANGE UP (ref 92–96)
SAO2 % BLDA: 98 % — HIGH (ref 92–96)
SODIUM SERPL-SCNC: 130 MMOL/L — LOW (ref 135–145)
WBC # BLD: 7.73 K/UL — SIGNIFICANT CHANGE UP (ref 3.8–10.5)
WBC # FLD AUTO: 7.73 K/UL — SIGNIFICANT CHANGE UP (ref 3.8–10.5)

## 2020-10-03 PROCEDURE — 71045 X-RAY EXAM CHEST 1 VIEW: CPT | Mod: 26

## 2020-10-03 RX ORDER — INSULIN GLARGINE 100 [IU]/ML
20 INJECTION, SOLUTION SUBCUTANEOUS ONCE
Refills: 0 | Status: COMPLETED | OUTPATIENT
Start: 2020-10-03 | End: 2020-10-03

## 2020-10-03 RX ORDER — INSULIN GLARGINE 100 [IU]/ML
20 INJECTION, SOLUTION SUBCUTANEOUS EVERY MORNING
Refills: 0 | Status: DISCONTINUED | OUTPATIENT
Start: 2020-10-04 | End: 2020-10-08

## 2020-10-03 RX ORDER — ALPRAZOLAM 0.25 MG
0.25 TABLET ORAL ONCE
Refills: 0 | Status: DISCONTINUED | OUTPATIENT
Start: 2020-10-03 | End: 2020-10-03

## 2020-10-03 RX ORDER — FUROSEMIDE 40 MG
40 TABLET ORAL ONCE
Refills: 0 | Status: COMPLETED | OUTPATIENT
Start: 2020-10-03 | End: 2020-10-03

## 2020-10-03 RX ORDER — IPRATROPIUM BROMIDE 0.2 MG/ML
500 SOLUTION, NON-ORAL INHALATION ONCE
Refills: 0 | Status: COMPLETED | OUTPATIENT
Start: 2020-10-03 | End: 2020-10-03

## 2020-10-03 RX ORDER — MORPHINE SULFATE 50 MG/1
2 CAPSULE, EXTENDED RELEASE ORAL EVERY 4 HOURS
Refills: 0 | Status: DISCONTINUED | OUTPATIENT
Start: 2020-10-03 | End: 2020-10-06

## 2020-10-03 RX ADMIN — OXYCODONE AND ACETAMINOPHEN 2 TABLET(S): 5; 325 TABLET ORAL at 10:32

## 2020-10-03 RX ADMIN — Medication 600 MILLIGRAM(S): at 17:36

## 2020-10-03 RX ADMIN — ENOXAPARIN SODIUM 40 MILLIGRAM(S): 100 INJECTION SUBCUTANEOUS at 11:37

## 2020-10-03 RX ADMIN — MORPHINE SULFATE 2 MILLIGRAM(S): 50 CAPSULE, EXTENDED RELEASE ORAL at 13:20

## 2020-10-03 RX ADMIN — MORPHINE SULFATE 2 MILLIGRAM(S): 50 CAPSULE, EXTENDED RELEASE ORAL at 13:26

## 2020-10-03 RX ADMIN — MORPHINE SULFATE 2 MILLIGRAM(S): 50 CAPSULE, EXTENDED RELEASE ORAL at 23:56

## 2020-10-03 RX ADMIN — ALBUTEROL 2 PUFF(S): 90 AEROSOL, METERED ORAL at 17:50

## 2020-10-03 RX ADMIN — ALBUTEROL 2 PUFF(S): 90 AEROSOL, METERED ORAL at 01:00

## 2020-10-03 RX ADMIN — ALBUTEROL 2 PUFF(S): 90 AEROSOL, METERED ORAL at 14:49

## 2020-10-03 RX ADMIN — MORPHINE SULFATE 2 MILLIGRAM(S): 50 CAPSULE, EXTENDED RELEASE ORAL at 22:44

## 2020-10-03 RX ADMIN — LINEZOLID 300 MILLIGRAM(S): 600 INJECTION, SOLUTION INTRAVENOUS at 05:59

## 2020-10-03 RX ADMIN — PANTOPRAZOLE SODIUM 40 MILLIGRAM(S): 20 TABLET, DELAYED RELEASE ORAL at 11:36

## 2020-10-03 RX ADMIN — Medication 2: at 17:34

## 2020-10-03 RX ADMIN — Medication 4: at 12:11

## 2020-10-03 RX ADMIN — ALBUTEROL 2 PUFF(S): 90 AEROSOL, METERED ORAL at 05:57

## 2020-10-03 RX ADMIN — Medication 3 MILLIGRAM(S): at 21:44

## 2020-10-03 RX ADMIN — NYSTATIN CREAM 1 APPLICATION(S): 100000 CREAM TOPICAL at 17:37

## 2020-10-03 RX ADMIN — DIPHENHYDRAMINE HYDROCHLORIDE AND LIDOCAINE HYDROCHLORIDE AND ALUMINUM HYDROXIDE AND MAGNESIUM HYDRO 10 MILLILITER(S): KIT at 21:44

## 2020-10-03 RX ADMIN — Medication 1 MILLIGRAM(S): at 11:36

## 2020-10-03 RX ADMIN — Medication 20 MILLIGRAM(S): at 05:58

## 2020-10-03 RX ADMIN — INSULIN GLARGINE 20 UNIT(S): 100 INJECTION, SOLUTION SUBCUTANEOUS at 09:08

## 2020-10-03 RX ADMIN — ATORVASTATIN CALCIUM 40 MILLIGRAM(S): 80 TABLET, FILM COATED ORAL at 21:44

## 2020-10-03 RX ADMIN — Medication 40 MILLIGRAM(S): at 05:57

## 2020-10-03 RX ADMIN — GABAPENTIN 600 MILLIGRAM(S): 400 CAPSULE ORAL at 21:43

## 2020-10-03 RX ADMIN — MORPHINE SULFATE 2 MILLIGRAM(S): 50 CAPSULE, EXTENDED RELEASE ORAL at 18:36

## 2020-10-03 RX ADMIN — BENZOCAINE AND MENTHOL 1 LOZENGE: 5; 1 LIQUID ORAL at 00:54

## 2020-10-03 RX ADMIN — Medication 100 MILLIGRAM(S): at 10:31

## 2020-10-03 RX ADMIN — Medication 125 MILLIGRAM(S): at 11:59

## 2020-10-03 RX ADMIN — MORPHINE SULFATE 2 MILLIGRAM(S): 50 CAPSULE, EXTENDED RELEASE ORAL at 18:31

## 2020-10-03 RX ADMIN — Medication 600 MILLIGRAM(S): at 05:57

## 2020-10-03 RX ADMIN — SENNA PLUS 2 TABLET(S): 8.6 TABLET ORAL at 21:43

## 2020-10-03 RX ADMIN — Medication 40 MILLIGRAM(S): at 12:10

## 2020-10-03 RX ADMIN — OXYCODONE AND ACETAMINOPHEN 2 TABLET(S): 5; 325 TABLET ORAL at 11:33

## 2020-10-03 RX ADMIN — BUDESONIDE AND FORMOTEROL FUMARATE DIHYDRATE 2 PUFF(S): 160; 4.5 AEROSOL RESPIRATORY (INHALATION) at 21:44

## 2020-10-03 RX ADMIN — Medication 0.25 MILLIGRAM(S): at 02:56

## 2020-10-03 RX ADMIN — CARVEDILOL PHOSPHATE 6.25 MILLIGRAM(S): 80 CAPSULE, EXTENDED RELEASE ORAL at 05:57

## 2020-10-03 RX ADMIN — ALBUTEROL 2 PUFF(S): 90 AEROSOL, METERED ORAL at 21:44

## 2020-10-03 RX ADMIN — LINEZOLID 300 MILLIGRAM(S): 600 INJECTION, SOLUTION INTRAVENOUS at 17:35

## 2020-10-03 RX ADMIN — BUDESONIDE AND FORMOTEROL FUMARATE DIHYDRATE 2 PUFF(S): 160; 4.5 AEROSOL RESPIRATORY (INHALATION) at 08:59

## 2020-10-03 RX ADMIN — CARVEDILOL PHOSPHATE 6.25 MILLIGRAM(S): 80 CAPSULE, EXTENDED RELEASE ORAL at 17:36

## 2020-10-03 RX ADMIN — Medication 100 MILLIGRAM(S): at 00:55

## 2020-10-03 RX ADMIN — CASPOFUNGIN ACETATE 260 MILLIGRAM(S): 7 INJECTION, POWDER, LYOPHILIZED, FOR SOLUTION INTRAVENOUS at 14:48

## 2020-10-03 RX ADMIN — ALBUTEROL 2 PUFF(S): 90 AEROSOL, METERED ORAL at 08:57

## 2020-10-03 NOTE — PROGRESS NOTE ADULT - ATTENDING COMMENTS
Acute on chronic respiratory failure with hypoxia and hypercapnia due to end stage COPD   Continue oxygen at 4 LPM via nasal cannula.  Continue LABA and ICS  tapered off solumedrol, to tapering dosing of prednisone; now on prednisone 40mg daily  on BIPAP - used all of last night

## 2020-10-03 NOTE — PROGRESS NOTE ADULT - NSHPATTENDINGPLANDISCUSS_GEN_ALL_CORE
Agree with above assessment and plan as transcribed. Agree with above assessment and plan as transcribed...

## 2020-10-03 NOTE — PROGRESS NOTE ADULT - ASSESSMENT
Problem/Plan    Acute on chronic respiratory failure with hypoxia and hypercapnia due to end stage COPD   Continue oxygen at 4 LPM via nasal cannula.  Continue LABA and ICS  tapered off solumedrol, to tapering dosing of prednisone; now on prednisone 40mg daily  on BIPAP - used all of last night     Problem/Plan   septic shock due to VRE Bacteremia  Continue IV antibiotics as per ID with planned end date 10/4     Problem/Plan   Lung cancer metastatic to bone / cough with O2 demand / generalized Pain  f/b Dr PHILLIPS  Chemo on hold until bacteremia resolved  con't mucinex  O2 support / AVAP  percocet PRN     Problem/Plan   DM (diabetes mellitus).   fingerstick AC/HS with ISS  decrease po intake and fingersticks past 36 hrs = 72 - 176 (112 this am)  dose with lantus 20 units the am as appose to usual 40 Units dosing as she eaten <25 % of her breakfast tray  also pt now off solumedrol and on PO steroid so insulin need is likely not as high as before     Problem/Plan    Anemia H&H stable  Continue to monitor      Problem/Plan -   Thrombocytopenia   stable, no overt sign of bleeding  monitor (difficulty obtaining blood today, phlebotomist unable to and undersigned had failed attempts also)     Problem/Plan   HTN (hypertension).    BP controlled  Continue current medications  DASH diet     Problem/Plan    Prophylactic measure   Continue DVT and GI prophylaxis.  Overall prognosis is poor and she is decompensating  family / dtr Ally updated via telephone call    GOC  discussed in great details GOC with pt and pt's dtr: as per Ally continue with full code, intubation and trache if necessary           Problem/Plan    Acute on chronic respiratory failure with hypoxia and hypercapnia due to end stage COPD   Continue oxygen at 4 LPM via nasal cannula.  Continue LABA and ICS  tapered off solumedrol, to tapering dosing of prednisone; now on prednisone 40mg daily  on BIPAP - used all of last night     Problem/Plan   septic shock due to VRE Bacteremia  Continue IV antibiotics as per ID with planned end date 10/4     Problem/Plan   Lung cancer metastatic to bone / cough with O2 demand / generalized Pain  f/b Dr PHILLIPS  Chemo on hold until bacteremia resolved  con't mucinex  O2 support / AVAP  percocet PRN  morphine PRN for dyspnea     Problem/Plan   DM (diabetes mellitus).   fingerstick AC/HS with ISS  decrease po intake and fingersticks past 36 hrs = 72 - 176 (112 this am)  dose with lantus 20 units the am as appose to usual 40 Units dosing as she eaten <25 % of her breakfast tray  also pt now off solumedrol and on PO steroid so insulin need is likely not as high as before     Problem/Plan    Anemia H&H stable  Continue to monitor      Problem/Plan -   Thrombocytopenia   stable, no overt sign of bleeding  monitor (difficulty obtaining blood today, phlebotomist unable to and undersigned had failed attempts also)     Problem/Plan   HTN (hypertension).    BP controlled  Continue current medications  DASH diet     Problem/Plan    Prophylactic measure   Continue DVT and GI prophylaxis.  Overall prognosis is poor and she is decompensating  family / dtr Ally updated via telephone call    GOC  discussed in great details GOC with pt and pt's dtr: as per Ally continue with full code, intubation and trache if necessary

## 2020-10-03 NOTE — PROGRESS NOTE ADULT - SUBJECTIVE AND OBJECTIVE BOX
BARB COLÓN    SCU progress note    HPI 66 yo obese Woman with hx of 80 pack year tobacco use, HTN, DM2 on insulin, COPD on 5L (frequent admissions), R lung cancer s/p resection (2017) on chemo q 3 weeks with concern for mets to spine and compression fractures, recent admission for COPD exacerbation earlier in August who on 9/19/2020 presented to ED with c/o SOB, hypoxia and increased sputum production x 1 day.    INTERVAL HX: On 9/21/2020. Patient was hypoxic, tachycardiac and tachypneic. Rapid response was called. RRT team arrived on scene. Patient has encephalopathy from hypoxia with wheezing and stridor on examination. Patient was tachycardiac to 140/min. Patient was hypoxic and started on non-rebreather but patient has increased work of breathing. Decision was made to intubate the patient. Patient become hypotensive post intubation and was given LR bolus and started on peripheral phenylephrine.    In ICU patient was awake and alert following commands and using a talking board and pen and paper to communicate. She was started on Precedex and Fentanyl to sedate the patient.   Patient was extubated on 9/22/20. Saturating well on NC. Patient was titrated off the Precedex and Fentanyl by 9/23/20. She has anxiety and when anxious she desaturates. on 9/23/2020 patient was started on Bipab after an episode of anxiety and desaturation and tolerated it. For now patient is using bipap as needed.     Blood cultures from 9/19/2020 grew VRE and yeast like cells. Patient's PICC line on Left arm was removed on 9/21/2020 as possible source of infection. Patient was started on Zyvox on 9/21 and Caspofungin on 9/23 after the results came back. Repeat blood cultures from 9/21/2020 are showig no growth to date. Repeat blood cultures were sent on 9/23/2020,     OVERNIGHT EVENTS/SUBJECTIVE: no significant events overnight; reports she feels depress from prolonged hospital stay,       DNR [ ]   DNI  [  ]   FULL CODE    Covid - 19 PCR:     The 4Ms    What Matters Most: see GOC  Age appropriate Medications/Screen for High Risk Medication: Yes  Mentation: see CAM below  Mobility: defer to physical exam    The Confusion Assessment Method (CAM) Diagnostic Algorithm     1: Acute Onset or Fluctuating Course  - Is there evidence of an acute change in mental status from the patient’s baseline? Did the (abnormal) behavior  fluctuate during the day, that is, tend to come and go, or increase and decrease in severity?  [ ] YES [X] NO     3: Disorganized thinking  -Was the patient’s thinking disorganized or incoherent, such as rambling or irrelevant conversation, unclear or illogical flow of ideas, or unpredictable switching from subject to subject?  [ ] YES [X ] NO    4: Altered Level of consciousness?  [ ] YES [x ] NO    The diagnosis of delirium by CAM requires the presence of features 1 and 2 and either 3 or 4.    PRESSORS: [ ] YES [ ] NO  caspofungin IVPB      caspofungin IVPB 50 milliGRAM(s) IV Intermittent every 24 hours  linezolid  IVPB      linezolid  IVPB 600 milliGRAM(s) IV Intermittent every 12 hours    Cardiovascular:  Heart Failure  Acute   Acute on Chronic  Chronic       carvedilol 6.25 milliGRAM(s) Oral every 12 hours    Pulmonary:  ALBUTerol    90 MICROgram(s) HFA Inhaler 2 Puff(s) Inhalation every 4 hours  budesonide 160 MICROgram(s)/formoterol 4.5 MICROgram(s) Inhaler 2 Puff(s) Inhalation two times a day  tiotropium 18 MICROgram(s) Capsule 1 Capsule(s) Inhalation daily    Hematalogic:  enoxaparin Injectable 40 milliGRAM(s) SubCutaneous daily    Other:  MEDICATIONS  (STANDING):  ALBUTerol    90 MICROgram(s) HFA Inhaler 2 Puff(s) Inhalation every 4 hours  atorvastatin 40 milliGRAM(s) Oral at bedtime  budesonide 160 MICROgram(s)/formoterol 4.5 MICROgram(s) Inhaler 2 Puff(s) Inhalation two times a day  carvedilol 6.25 milliGRAM(s) Oral every 12 hours  caspofungin IVPB 50 milliGRAM(s) IV Intermittent every 24 hours  caspofungin IVPB      chlorhexidine 2% Cloths 1 Application(s) Topical <User Schedule>  dextrose 5%. 1000 milliLiter(s) (50 mL/Hr) IV Continuous <Continuous>  dextrose 50% Injectable 12.5 Gram(s) IV Push once  dextrose 50% Injectable 25 Gram(s) IV Push once  dextrose 50% Injectable 25 Gram(s) IV Push once  enoxaparin Injectable 40 milliGRAM(s) SubCutaneous daily  ergocalciferol 44422 Unit(s) Oral <User Schedule>  FIRST- Mouthwash  BLM 10 milliLiter(s) Swish and Swallow every 8 hours  folic acid 1 milliGRAM(s) Oral daily  furosemide    Tablet 20 milliGRAM(s) Oral daily  gabapentin 600 milliGRAM(s) Oral every 8 hours  guaiFENesin  milliGRAM(s) Oral every 12 hours  influenza   Vaccine 0.5 milliLiter(s) IntraMuscular once  insulin glargine Injectable (LANTUS) 40 Unit(s) SubCutaneous every morning  insulin lispro (HumaLOG) corrective regimen sliding scale   SubCutaneous three times a day before meals  insulin lispro Injectable (HumaLOG) 6 Unit(s) SubCutaneous three times a day before meals  linezolid  IVPB      linezolid  IVPB 600 milliGRAM(s) IV Intermittent every 12 hours  melatonin 3 milliGRAM(s) Oral at bedtime  nystatin Powder 1 Application(s) Topical two times a day  pantoprazole  Injectable 40 milliGRAM(s) IV Push daily  polyethylene glycol 3350 17 Gram(s) Oral two times a day  predniSONE   Tablet 40 milliGRAM(s) Oral daily  senna 2 Tablet(s) Oral at bedtime  tiotropium 18 MICROgram(s) Capsule 1 Capsule(s) Inhalation daily    MEDICATIONS  (PRN):  benzocaine 15 mG/menthol 3.6 mG (Sugar-Free) Lozenge 1 Lozenge Oral every 4 hours PRN Sore Throat  dextrose 40% Gel 15 Gram(s) Oral once PRN Blood Glucose LESS THAN 70 milliGRAM(s)/deciLiter  glucagon  Injectable 1 milliGRAM(s) IntraMuscular once PRN Glucose <70 milliGRAM(s)/deciLiter  guaiFENesin   Syrup  (Sugar-Free) 100 milliGRAM(s) Oral every 6 hours PRN Cough  ondansetron Injectable 4 milliGRAM(s) IV Push every 6 hours PRN Nausea and/or Vomiting  oxycodone    5 mG/acetaminophen 325 mG 2 Tablet(s) Oral every 6 hours PRN Moderate Pain (4 - 6)  sodium chloride 0.65% Nasal 1 Spray(s) Both Nostrils four times a day PRN Nasal Congestion    Drug Dosing Weight  Height (cm): 154.9 (19 Sep 2020 08:00)  Weight (kg): 84.8 (21 Sep 2020 09:15)  BMI (kg/m2): 35.3 (21 Sep 2020 09:15)  BSA (m2): 1.84 (21 Sep 2020 09:15)    CENTRAL LINE: [ ] YES [x ] NO  LOCATION:   DATE INSERTED:  REMOVE: [ ] YES [ ] NO  EXPLAIN:    ROBLEDO: [ ] YES [x] NO    DATE INSERTED:  REMOVE:  [ ] YES [ ] NO  EXPLAIN:    PAST MEDICAL & SURGICAL HISTORY:  Malignant neoplasm of unspecified part of right bronchus or lung  HTN (hypertension)  DM (diabetes mellitus)  COPD (chronic obstructive pulmonary disease)  Lung cancer  Morbid obesity  GERD (gastroesophageal reflux disease)  Deviated septum  Prolapsed uterus  ITP (idiopathic thrombocytopenic purpura)  Emphysema/COPD  History of cholecystectomy  S/P lobectomy of lung  right 2017  History of tonsillectomy    PHYSICAL EXAM:    GENERAL:   NAD, obese  HEENT  Atraumatic, Normocephalic, pupils equal, round, non-icteric, facial puffiness, no exudates  NECK: Supple, No JVD  CHEST/LUNG: occasional cough with thick greenish sputum, scattered wheezing with bibasilar diminished breath sounds; even and unlabored breathing with O2 via NC  HEART: S1S2, RRR  ABDOMEN: obese soft NTND, +BS  : no suprapubic tenderness  EXTREMITIES:  BUE edematous, trace BLE edema, +distal pedal pulses  NERVOUS SYSTEM:  Alert & Oriented X3, follows commands, moving all extremities  LYMPH: No lymphadenopathy noted  SKIN: BUE ecchymosis otherwise no lesions/ulcers    LABS:                       10.5   10.73 )-----------( 80       ( 02 Oct 2020 06:39 )             29.5     10-02    134<L>  |  93<L>  |  16  ----------------------------<  101<H>  3.5   |  37<H>  |  0.38<L>    Ca    8.9      02 Oct 2020 06:39  Phos  3.0     10-02  Mg     1.9     10-02    TPro  6.1  /  Alb  2.8<L>  /  TBili  0.9  /  DBili  x   /  AST  20  /  ALT  27  /  AlkPhos  125<H>  10-02    CARDIAC MARKERS ( 02 Oct 2020 06:40 )  <0.015 ng/mL / x     / 46 U/L / x     / <1.0 ng/mL    LIVER FUNCTIONS - ( 02 Oct 2020 06:39 )  Alb: 2.8 g/dL / Pro: 6.1 g/dL / ALK PHOS: 125 U/L / ALT: 27 U/L DA / AST: 20 U/L / GGT: x           ABG - ( 03 Oct 2020 04:49 )  pH, Arterial: 7.44  pH, Blood: x     /  pCO2: 55    /  pO2: 88    / HCO3: 36    / Base Excess: 10.6  /  SaO2: 96        Serum Pro-Brain Natriuretic Peptide: 261 pg/mL (10-02-20 @ 06:40)      [ x ]  DVT Prophylaxis          Abnormal Nutritional Status -  Morbid Obesity BMI >/=40    RADIOLOGY & ADDITIONAL STUDIES:    < from: Xray Chest 1 View- PORTABLE-Routine (Xray Chest 1 View- PORTABLE-Routine in AM.) (09.23.20 @ 10:09) >    The patient has been extubated.    AP view of the chest is markedly rotated to the left and demonstrates persistent right perihilar infiltrate versus atelectasis. There is mild left perihilar discoid atelectasis.. There is no pleural effusion. There is no pneumothorax.    The heart, mediastinum and galina cannot be assessed due to rotation..    The pulmonary vasculature is normal.    Mild thoracic degenerative changes are present.    IMPRESSION:    Persistent right perihilar infiltrate versus atelectasis. Left midlung discoid atelectasis.    < end of copied text >    Goals of Care Discussion with Family/Proxy/Other   - see note from/family meeting set up for...     BARB COLÓN    SCU progress note    HPI 64 yo obese Woman with hx of 80 pack year tobacco use, HTN, DM2 on insulin, COPD on 5L (frequent admissions), R lung cancer s/p resection (2017) on chemo q 3 weeks with concern for mets to spine and compression fractures, recent admission for COPD exacerbation earlier in August who on 9/19/2020 presented to ED with c/o SOB, hypoxia and increased sputum production x 1 day.      OVERNIGHT EVENTS/SUBJECTIVE: had multiple issues overnight: anxiety, cough, SOB; today feels same mostly, wonders if she's "dying soon"; wants "to live"; has generalized pain     Vital Signs Last 24 Hrs  T(C): 36.4 (03 Oct 2020 04:47), Max: 36.5 (02 Oct 2020 20:39)  T(F): 97.6 (03 Oct 2020 04:47), Max: 97.7 (02 Oct 2020 20:39)  HR: 91 (03 Oct 2020 09:22) (89 - 128)  BP: 150/96 (03 Oct 2020 04:47) (133/80 - 156/103)  BP(mean): --  RR: 22 (03 Oct 2020 04:47) (18 - 22)  SpO2: 97% (03 Oct 2020 09:22) (94% - 100%)      DNR [ ]   DNI  [  ]   FULL CODE    Covid - 19 PCR:     The 4Ms    What Matters Most: see GOC  Age appropriate Medications/Screen for High Risk Medication: Yes  Mentation: see CAM below  Mobility: defer to physical exam    The Confusion Assessment Method (CAM) Diagnostic Algorithm     1: Acute Onset or Fluctuating Course  - Is there evidence of an acute change in mental status from the patient’s baseline? Did the (abnormal) behavior  fluctuate during the day, that is, tend to come and go, or increase and decrease in severity?  [ ] YES [X] NO     3: Disorganized thinking  -Was the patient’s thinking disorganized or incoherent, such as rambling or irrelevant conversation, unclear or illogical flow of ideas, or unpredictable switching from subject to subject?  [ ] YES [X ] NO    4: Altered Level of consciousness?  [ ] YES [x ] NO    The diagnosis of delirium by CAM requires the presence of features 1 and 2 and either 3 or 4.    PRESSORS: [ ] YES [ ] NO  caspofungin IVPB      caspofungin IVPB 50 milliGRAM(s) IV Intermittent every 24 hours  linezolid  IVPB      linezolid  IVPB 600 milliGRAM(s) IV Intermittent every 12 hours    Cardiovascular:  Heart Failure  Acute   Acute on Chronic  Chronic       carvedilol 6.25 milliGRAM(s) Oral every 12 hours    Pulmonary:  ALBUTerol    90 MICROgram(s) HFA Inhaler 2 Puff(s) Inhalation every 4 hours  budesonide 160 MICROgram(s)/formoterol 4.5 MICROgram(s) Inhaler 2 Puff(s) Inhalation two times a day  tiotropium 18 MICROgram(s) Capsule 1 Capsule(s) Inhalation daily    Hematalogic:  enoxaparin Injectable 40 milliGRAM(s) SubCutaneous daily    Other:  MEDICATIONS  (STANDING):  ALBUTerol    90 MICROgram(s) HFA Inhaler 2 Puff(s) Inhalation every 4 hours  atorvastatin 40 milliGRAM(s) Oral at bedtime  budesonide 160 MICROgram(s)/formoterol 4.5 MICROgram(s) Inhaler 2 Puff(s) Inhalation two times a day  carvedilol 6.25 milliGRAM(s) Oral every 12 hours  caspofungin IVPB 50 milliGRAM(s) IV Intermittent every 24 hours  caspofungin IVPB      chlorhexidine 2% Cloths 1 Application(s) Topical <User Schedule>  dextrose 5%. 1000 milliLiter(s) (50 mL/Hr) IV Continuous <Continuous>  dextrose 50% Injectable 12.5 Gram(s) IV Push once  dextrose 50% Injectable 25 Gram(s) IV Push once  dextrose 50% Injectable 25 Gram(s) IV Push once  enoxaparin Injectable 40 milliGRAM(s) SubCutaneous daily  ergocalciferol 70934 Unit(s) Oral <User Schedule>  FIRST- Mouthwash  BLM 10 milliLiter(s) Swish and Swallow every 8 hours  folic acid 1 milliGRAM(s) Oral daily  furosemide    Tablet 20 milliGRAM(s) Oral daily  gabapentin 600 milliGRAM(s) Oral every 8 hours  guaiFENesin  milliGRAM(s) Oral every 12 hours  influenza   Vaccine 0.5 milliLiter(s) IntraMuscular once  insulin glargine Injectable (LANTUS) 40 Unit(s) SubCutaneous every morning  insulin lispro (HumaLOG) corrective regimen sliding scale   SubCutaneous three times a day before meals  insulin lispro Injectable (HumaLOG) 6 Unit(s) SubCutaneous three times a day before meals  linezolid  IVPB      linezolid  IVPB 600 milliGRAM(s) IV Intermittent every 12 hours  melatonin 3 milliGRAM(s) Oral at bedtime  nystatin Powder 1 Application(s) Topical two times a day  pantoprazole  Injectable 40 milliGRAM(s) IV Push daily  polyethylene glycol 3350 17 Gram(s) Oral two times a day  predniSONE   Tablet 40 milliGRAM(s) Oral daily  senna 2 Tablet(s) Oral at bedtime  tiotropium 18 MICROgram(s) Capsule 1 Capsule(s) Inhalation daily    MEDICATIONS  (PRN):  benzocaine 15 mG/menthol 3.6 mG (Sugar-Free) Lozenge 1 Lozenge Oral every 4 hours PRN Sore Throat  dextrose 40% Gel 15 Gram(s) Oral once PRN Blood Glucose LESS THAN 70 milliGRAM(s)/deciLiter  glucagon  Injectable 1 milliGRAM(s) IntraMuscular once PRN Glucose <70 milliGRAM(s)/deciLiter  guaiFENesin   Syrup  (Sugar-Free) 100 milliGRAM(s) Oral every 6 hours PRN Cough  ondansetron Injectable 4 milliGRAM(s) IV Push every 6 hours PRN Nausea and/or Vomiting  oxycodone    5 mG/acetaminophen 325 mG 2 Tablet(s) Oral every 6 hours PRN Moderate Pain (4 - 6)  sodium chloride 0.65% Nasal 1 Spray(s) Both Nostrils four times a day PRN Nasal Congestion    Drug Dosing Weight  Height (cm): 154.9 (19 Sep 2020 08:00)  Weight (kg): 84.8 (21 Sep 2020 09:15)  BMI (kg/m2): 35.3 (21 Sep 2020 09:15)  BSA (m2): 1.84 (21 Sep 2020 09:15)    CENTRAL LINE: [ ] YES [x ] NO  LOCATION:   DATE INSERTED:  REMOVE: [ ] YES [ ] NO  EXPLAIN:    ROBLEDO: [ ] YES [x] NO    DATE INSERTED:  REMOVE:  [ ] YES [ ] NO  EXPLAIN:    PAST MEDICAL & SURGICAL HISTORY:  Malignant neoplasm of unspecified part of right bronchus or lung  HTN (hypertension)  DM (diabetes mellitus)  COPD (chronic obstructive pulmonary disease)  Lung cancer  Morbid obesity  GERD (gastroesophageal reflux disease)  Deviated septum  Prolapsed uterus  ITP (idiopathic thrombocytopenic purpura)  Emphysema/COPD  History of cholecystectomy  S/P lobectomy of lung  right 2017  History of tonsillectomy    PHYSICAL EXAM:    GENERAL:   NAD, obese  HEENT  Atraumatic, Normocephalic, pupils equal, round, non-icteric, facial puffiness, no exudates  NECK: Supple, No JVD  CHEST/LUNG: occasional cough with thick greenish sputum, scattered wheezing with bibasilar diminished breath sounds; even and unlabored breathing with O2 via NC  HEART: S1S2, RRR  ABDOMEN: obese soft NTND, +BS  : no suprapubic tenderness  EXTREMITIES:  BUE edematous, trace BLE edema, +distal pedal pulses  NERVOUS SYSTEM:  Alert & Oriented X3, follows commands, moving all extremities  LYMPH: No lymphadenopathy noted  SKIN: BUE ecchymosis otherwise no lesions/ulcers    LABS:                       10.5   10.73 )-----------( 80       ( 02 Oct 2020 06:39 )             29.5     10-02    134<L>  |  93<L>  |  16  ----------------------------<  101<H>  3.5   |  37<H>  |  0.38<L>    Ca    8.9      02 Oct 2020 06:39  Phos  3.0     10-02  Mg     1.9     10-02    TPro  6.1  /  Alb  2.8<L>  /  TBili  0.9  /  DBili  x   /  AST  20  /  ALT  27  /  AlkPhos  125<H>  10-02    CARDIAC MARKERS ( 02 Oct 2020 06:40 )  <0.015 ng/mL / x     / 46 U/L / x     / <1.0 ng/mL    LIVER FUNCTIONS - ( 02 Oct 2020 06:39 )  Alb: 2.8 g/dL / Pro: 6.1 g/dL / ALK PHOS: 125 U/L / ALT: 27 U/L DA / AST: 20 U/L / GGT: x           ABG - ( 03 Oct 2020 04:49 )  pH, Arterial: 7.44  pH, Blood: x     /  pCO2: 55    /  pO2: 88    / HCO3: 36    / Base Excess: 10.6  /  SaO2: 96        Serum Pro-Brain Natriuretic Peptide: 261 pg/mL (10-02-20 @ 06:40)      [ x ]  DVT Prophylaxis          Abnormal Nutritional Status -  Morbid Obesity BMI >/=40    RADIOLOGY & ADDITIONAL STUDIES:    < from: Xray Chest 1 View- PORTABLE-Routine (Xray Chest 1 View- PORTABLE-Routine in AM.) (09.23.20 @ 10:09) >    The patient has been extubated.    AP view of the chest is markedly rotated to the left and demonstrates persistent right perihilar infiltrate versus atelectasis. There is mild left perihilar discoid atelectasis.. There is no pleural effusion. There is no pneumothorax.    The heart, mediastinum and galina cannot be assessed due to rotation..    The pulmonary vasculature is normal.    Mild thoracic degenerative changes are present.    IMPRESSION:    Persistent right perihilar infiltrate versus atelectasis. Left midlung discoid atelectasis.    < end of copied text >    Goals of Care Discussion with Family/Proxy/Other   - see note from/family meeting set up for...

## 2020-10-03 NOTE — PROGRESS NOTE ADULT - SUBJECTIVE AND OBJECTIVE BOX
Interval Events:    anxiety today am  poc glucose tightly controlled  received lowered lantus- half dose today  Allergies    fish (Angioedema)  penicillins (Rash)    Intolerances      Endocrine/Metabolic Medications:  atorvastatin 40 milliGRAM(s) Oral at bedtime  dextrose 40% Gel 15 Gram(s) Oral once PRN  dextrose 50% Injectable 12.5 Gram(s) IV Push once  dextrose 50% Injectable 25 Gram(s) IV Push once  dextrose 50% Injectable 25 Gram(s) IV Push once  glucagon  Injectable 1 milliGRAM(s) IntraMuscular once PRN  insulin glargine Injectable (LANTUS) 40 Unit(s) SubCutaneous every morning  insulin lispro (HumaLOG) corrective regimen sliding scale   SubCutaneous three times a day before meals  insulin lispro Injectable (HumaLOG) 6 Unit(s) SubCutaneous three times a day before meals  predniSONE   Tablet 40 milliGRAM(s) Oral daily      Vital Signs Last 24 Hrs  T(C): 36.4 (03 Oct 2020 04:47), Max: 37 (02 Oct 2020 11:34)  T(F): 97.6 (03 Oct 2020 04:47), Max: 98.6 (02 Oct 2020 11:34)  HR: 91 (03 Oct 2020 09:22) (89 - 128)  BP: 150/96 (03 Oct 2020 04:47) (107/69 - 156/103)  BP(mean): --  RR: 22 (03 Oct 2020 04:47) (18 - 22)  SpO2: 97% (03 Oct 2020 09:22) (94% - 100%)      PHYSICAL EXAM  Constitutional:    NC/AT:    HEENT:    Neck:  No JVD    Respiratory:  reduced breath sounds b/l bases    Cardiovascular:  RR    Gastrointestinal: Soft    Extremities: without cyanosis  Neurological:  non focal    LABS                        8.9    7.73  )-----------( 70       ( 03 Oct 2020 09:53 )             25.0                               130    |  86     |  17                  Calcium: 8.3   / iCa: x      (10-03 @ 09:53)    ----------------------------<  197       Magnesium: 1.7                              3.1     |  37     |  x                Phosphorous: x        TPro  x      /  Alb  2.6    /  TBili  x      /  DBili  x      /  AST  x      /  ALT  x      /  AlkPhos  x      03 Oct 2020 09:53    CAPILLARY BLOOD GLUCOSE      POCT Blood Glucose.: 112 mg/dL (03 Oct 2020 08:12)  POCT Blood Glucose.: 120 mg/dL (02 Oct 2020 20:57)  POCT Blood Glucose.: 144 mg/dL (02 Oct 2020 16:58)  POCT Blood Glucose.: 172 mg/dL (02 Oct 2020 11:24)        Assesment/plan      64 yo female with multiple comorbidities including h/o DM type 2, HTN, COPD on o2, lung ca on right sided s/p resection on chemo admitted with shortness of breath  was intubated/extubated for acute resp failure this admit, now extubated on general medical floor, standing high dose steroids with uncontrolled hyperglycemia    endocrinology consulted for glycemic management    DM type 2  uncontrolled  complicated by high dose steroid use, acute on chronic resp failure  home regimen:  basaglar 60 units daily  novolog 15 units pre meals    recommendations:  poc glucoses tightly controlled  on reduced steroid doses    - agree with reduced insulin lantus 20 units daily in am   - continue insulin lispro 6 units pre meals  continue moderate dose sliding scale  - reassess based on requirements  - goal inpatient glucose range: 140-180mg/dl    COPD exacerbation  acute on chronic hypoxic resp failure  s/p intubation/extubation current admit  on standing steroids  steroids increased  bipap   pulmonary on board    sepsis  VRE bacteremia  received zosyn  ID on board    Discussed with patient and primary team   Please call  Endocrine- Dr Katlyn Garcia as needed

## 2020-10-03 NOTE — CHART NOTE - NSCHARTNOTEFT_GEN_A_CORE
EVENT:   66 y/o female, former heavy smoker (80 pack years) with, HTN, DM2 on insulin, COPD on 5L (frequent admissions), R lung cancer s/p resection (2017) on chemo q 3 weeks with concern for mets to spine and compression fractures, recent admission for COPD exacerbation earlier in August presents with SOB, hypoxia and increased sputum production x 1 day. Patient stated cough feels like secretion is coming out but unable to expectorate. Patient is on 5L of oxygen at home. As per EMS, patient is hypoxic at 84% on room air which came up to 98% with 4L of nasal canula. Patient was recently in august treated with prednisone, nebulizer treatments and completed a course of azithromycin for copd exacerbation. Denied nausea, vomiting, diarrhea, abdominal pain, fever, chills.   10/03/20, 12am, patient with hx. advanced metastatic cancer and COPD exacerbation, on BIPAP at night, c/o difficulty breathing and cough. Assessed at bedside. Breathing - labored, congested. Crackles - on auscultation in both upper lobes of lungs. Robitussin 100 mg (5ml) po Q6hrs PRN for cough given. At 2am, patient c/o anxiety and requested Xanax. Patient's  RR = 24-26. Xanax 0.25 mg po x 1 dose given for anxiety. At 5am, patient c/o shortness of breath. RR - 22, O2sat = 96-97%.    OBJECTIVE:  Vital Signs Last 24 Hrs  T(C): 36.4 (03 Oct 2020 04:47), Max: 37 (02 Oct 2020 11:34)  T(F): 97.6 (03 Oct 2020 04:47), Max: 98.6 (02 Oct 2020 11:34)  HR: 117 (03 Oct 2020 04:47) (88 - 128)  BP: 150/96 (03 Oct 2020 04:47) (104/57 - 156/103)  BP(mean): --  RR: 22 (03 Oct 2020 04:47) (18 - 23)  SpO2: 96% (03 Oct 2020 04:47) (92% - 100%)    FOCUSED PHYSICAL EXAM:    LABS:                        10.5   10.73 )-----------( 80       ( 02 Oct 2020 06:39 )             29.5   CARDIAC MARKERS ( 02 Oct 2020 06:40 )  <0.015 ng/mL / x     / 46 U/L / x     / <1.0 ng/mL    10-02    134<L>  |  93<L>  |  16  ----------------------------<  101<H>  3.5   |  37<H>  |  0.38<L>    Ca    8.9      02 Oct 2020 06:39  Phos  3.0     10-02  Mg     1.9     10-02    TPro  6.1  /  Alb  2.8<L>  /  TBili  0.9  /  DBili  x   /  AST  20  /  ALT  27  /  AlkPhos  125<H>  10-02        PLAN: 1. Chest x ray, portable urgent (SOB),   2. ABG ordered.      FOLLOW UP / RESULT: Follow-up patient morning labs., and VS. Safety measures.

## 2020-10-04 LAB
ANION GAP SERPL CALC-SCNC: 6 MMOL/L — SIGNIFICANT CHANGE UP (ref 5–17)
ANION GAP SERPL CALC-SCNC: 9 MMOL/L — SIGNIFICANT CHANGE UP (ref 5–17)
APPEARANCE UR: CLEAR — SIGNIFICANT CHANGE UP
BASE EXCESS BLDA CALC-SCNC: 11 MMOL/L — HIGH (ref -2–2)
BASE EXCESS BLDA CALC-SCNC: 8.6 MMOL/L — HIGH (ref -2–2)
BILIRUB UR-MCNC: NEGATIVE — SIGNIFICANT CHANGE UP
BLOOD GAS COMMENTS ARTERIAL: SIGNIFICANT CHANGE UP
BLOOD GAS COMMENTS ARTERIAL: SIGNIFICANT CHANGE UP
BUN SERPL-MCNC: 19 MG/DL — HIGH (ref 7–18)
BUN SERPL-MCNC: 20 MG/DL — HIGH (ref 7–18)
CALCIUM SERPL-MCNC: 8.5 MG/DL — SIGNIFICANT CHANGE UP (ref 8.4–10.5)
CALCIUM SERPL-MCNC: 8.7 MG/DL — SIGNIFICANT CHANGE UP (ref 8.4–10.5)
CHLORIDE SERPL-SCNC: 86 MMOL/L — LOW (ref 96–108)
CHLORIDE SERPL-SCNC: 86 MMOL/L — LOW (ref 96–108)
CO2 SERPL-SCNC: 35 MMOL/L — HIGH (ref 22–31)
CO2 SERPL-SCNC: 37 MMOL/L — HIGH (ref 22–31)
COLOR SPEC: YELLOW — SIGNIFICANT CHANGE UP
CREAT SERPL-MCNC: 0.44 MG/DL — LOW (ref 0.5–1.3)
CREAT SERPL-MCNC: 0.64 MG/DL — SIGNIFICANT CHANGE UP (ref 0.5–1.3)
DIFF PNL FLD: NEGATIVE — SIGNIFICANT CHANGE UP
GLUCOSE BLDC GLUCOMTR-MCNC: 115 MG/DL — HIGH (ref 70–99)
GLUCOSE BLDC GLUCOMTR-MCNC: 137 MG/DL — HIGH (ref 70–99)
GLUCOSE BLDC GLUCOMTR-MCNC: 146 MG/DL — HIGH (ref 70–99)
GLUCOSE BLDC GLUCOMTR-MCNC: 203 MG/DL — HIGH (ref 70–99)
GLUCOSE BLDC GLUCOMTR-MCNC: 221 MG/DL — HIGH (ref 70–99)
GLUCOSE SERPL-MCNC: 214 MG/DL — HIGH (ref 70–99)
GLUCOSE SERPL-MCNC: 90 MG/DL — SIGNIFICANT CHANGE UP (ref 70–99)
GLUCOSE UR QL: NEGATIVE — SIGNIFICANT CHANGE UP
HCO3 BLDA-SCNC: 33 MMOL/L — HIGH (ref 23–27)
HCO3 BLDA-SCNC: 34 MMOL/L — HIGH (ref 23–27)
HCT VFR BLD CALC: 27.9 % — LOW (ref 34.5–45)
HGB BLD-MCNC: 10.2 G/DL — LOW (ref 11.5–15.5)
HOROWITZ INDEX BLDA+IHG-RTO: 40 — SIGNIFICANT CHANGE UP
HOROWITZ INDEX BLDA+IHG-RTO: 50 — SIGNIFICANT CHANGE UP
KETONES UR-MCNC: NEGATIVE — SIGNIFICANT CHANGE UP
LACTATE SERPL-SCNC: 2.6 MMOL/L — HIGH (ref 0.7–2)
LEUKOCYTE ESTERASE UR-ACNC: NEGATIVE — SIGNIFICANT CHANGE UP
MAGNESIUM SERPL-MCNC: 1.7 MG/DL — SIGNIFICANT CHANGE UP (ref 1.6–2.6)
MAGNESIUM SERPL-MCNC: 1.8 MG/DL — SIGNIFICANT CHANGE UP (ref 1.6–2.6)
MCHC RBC-ENTMCNC: 30.7 PG — SIGNIFICANT CHANGE UP (ref 27–34)
MCHC RBC-ENTMCNC: 36.6 GM/DL — HIGH (ref 32–36)
MCV RBC AUTO: 84 FL — SIGNIFICANT CHANGE UP (ref 80–100)
NITRITE UR-MCNC: NEGATIVE — SIGNIFICANT CHANGE UP
NRBC # BLD: 3 /100 WBCS — HIGH (ref 0–0)
PCO2 BLDA: 32 MMHG — SIGNIFICANT CHANGE UP (ref 32–46)
PCO2 BLDA: 43 MMHG — SIGNIFICANT CHANGE UP (ref 32–46)
PH BLDA: 7.5 — HIGH (ref 7.35–7.45)
PH BLDA: 7.62 — CRITICAL HIGH (ref 7.35–7.45)
PH UR: 5 — SIGNIFICANT CHANGE UP (ref 5–8)
PHOSPHATE SERPL-MCNC: 2.8 MG/DL — SIGNIFICANT CHANGE UP (ref 2.5–4.5)
PLATELET # BLD AUTO: 85 K/UL — LOW (ref 150–400)
PO2 BLDA: 54 MMHG — LOW (ref 74–108)
PO2 BLDA: 81 MMHG — SIGNIFICANT CHANGE UP (ref 74–108)
POTASSIUM SERPL-MCNC: 2.7 MMOL/L — CRITICAL LOW (ref 3.5–5.3)
POTASSIUM SERPL-MCNC: 4 MMOL/L — SIGNIFICANT CHANGE UP (ref 3.5–5.3)
POTASSIUM SERPL-SCNC: 2.7 MMOL/L — CRITICAL LOW (ref 3.5–5.3)
POTASSIUM SERPL-SCNC: 4 MMOL/L — SIGNIFICANT CHANGE UP (ref 3.5–5.3)
PROT UR-MCNC: NEGATIVE — SIGNIFICANT CHANGE UP
RBC # BLD: 3.32 M/UL — LOW (ref 3.8–5.2)
RBC # FLD: 14.7 % — HIGH (ref 10.3–14.5)
SAO2 % BLDA: 87 % — LOW (ref 92–96)
SAO2 % BLDA: 97 % — HIGH (ref 92–96)
SODIUM SERPL-SCNC: 129 MMOL/L — LOW (ref 135–145)
SODIUM SERPL-SCNC: 130 MMOL/L — LOW (ref 135–145)
SP GR SPEC: 1.01 — SIGNIFICANT CHANGE UP (ref 1.01–1.02)
UROBILINOGEN FLD QL: NEGATIVE — SIGNIFICANT CHANGE UP
WBC # BLD: 3.14 K/UL — LOW (ref 3.8–10.5)
WBC # FLD AUTO: 3.14 K/UL — LOW (ref 3.8–10.5)

## 2020-10-04 PROCEDURE — 71045 X-RAY EXAM CHEST 1 VIEW: CPT | Mod: 26,76

## 2020-10-04 PROCEDURE — 93970 EXTREMITY STUDY: CPT | Mod: 26

## 2020-10-04 PROCEDURE — 93010 ELECTROCARDIOGRAM REPORT: CPT

## 2020-10-04 RX ORDER — ACETAMINOPHEN 500 MG
650 TABLET ORAL EVERY 6 HOURS
Refills: 0 | Status: DISCONTINUED | OUTPATIENT
Start: 2020-10-04 | End: 2020-10-06

## 2020-10-04 RX ORDER — FUROSEMIDE 40 MG
40 TABLET ORAL ONCE
Refills: 0 | Status: COMPLETED | OUTPATIENT
Start: 2020-10-04 | End: 2020-10-04

## 2020-10-04 RX ORDER — POTASSIUM CHLORIDE 20 MEQ
10 PACKET (EA) ORAL
Refills: 0 | Status: DISCONTINUED | OUTPATIENT
Start: 2020-10-04 | End: 2020-10-04

## 2020-10-04 RX ORDER — FUROSEMIDE 40 MG
40 TABLET ORAL ONCE
Refills: 0 | Status: DISCONTINUED | OUTPATIENT
Start: 2020-10-04 | End: 2020-10-04

## 2020-10-04 RX ORDER — POTASSIUM CHLORIDE 20 MEQ
40 PACKET (EA) ORAL ONCE
Refills: 0 | Status: COMPLETED | OUTPATIENT
Start: 2020-10-04 | End: 2020-10-04

## 2020-10-04 RX ADMIN — CARVEDILOL PHOSPHATE 6.25 MILLIGRAM(S): 80 CAPSULE, EXTENDED RELEASE ORAL at 17:34

## 2020-10-04 RX ADMIN — ATORVASTATIN CALCIUM 40 MILLIGRAM(S): 80 TABLET, FILM COATED ORAL at 21:29

## 2020-10-04 RX ADMIN — Medication 100 MILLIEQUIVALENT(S): at 09:13

## 2020-10-04 RX ADMIN — Medication 40 MILLIGRAM(S): at 09:14

## 2020-10-04 RX ADMIN — ALBUTEROL 2 PUFF(S): 90 AEROSOL, METERED ORAL at 21:29

## 2020-10-04 RX ADMIN — Medication 40 MILLIEQUIVALENT(S): at 11:34

## 2020-10-04 RX ADMIN — DIPHENHYDRAMINE HYDROCHLORIDE AND LIDOCAINE HYDROCHLORIDE AND ALUMINUM HYDROXIDE AND MAGNESIUM HYDRO 10 MILLILITER(S): KIT at 21:29

## 2020-10-04 RX ADMIN — MORPHINE SULFATE 2 MILLIGRAM(S): 50 CAPSULE, EXTENDED RELEASE ORAL at 12:02

## 2020-10-04 RX ADMIN — LINEZOLID 300 MILLIGRAM(S): 600 INJECTION, SOLUTION INTRAVENOUS at 06:46

## 2020-10-04 RX ADMIN — ALBUTEROL 2 PUFF(S): 90 AEROSOL, METERED ORAL at 09:43

## 2020-10-04 RX ADMIN — MORPHINE SULFATE 2 MILLIGRAM(S): 50 CAPSULE, EXTENDED RELEASE ORAL at 04:56

## 2020-10-04 RX ADMIN — Medication 600 MILLIGRAM(S): at 17:34

## 2020-10-04 RX ADMIN — CASPOFUNGIN ACETATE 260 MILLIGRAM(S): 7 INJECTION, POWDER, LYOPHILIZED, FOR SOLUTION INTRAVENOUS at 16:00

## 2020-10-04 RX ADMIN — Medication 3 MILLIGRAM(S): at 21:29

## 2020-10-04 RX ADMIN — MORPHINE SULFATE 2 MILLIGRAM(S): 50 CAPSULE, EXTENDED RELEASE ORAL at 19:00

## 2020-10-04 RX ADMIN — BUDESONIDE AND FORMOTEROL FUMARATE DIHYDRATE 2 PUFF(S): 160; 4.5 AEROSOL RESPIRATORY (INHALATION) at 21:29

## 2020-10-04 RX ADMIN — ALBUTEROL 2 PUFF(S): 90 AEROSOL, METERED ORAL at 06:48

## 2020-10-04 RX ADMIN — Medication 650 MILLIGRAM(S): at 06:31

## 2020-10-04 RX ADMIN — ENOXAPARIN SODIUM 40 MILLIGRAM(S): 100 INJECTION SUBCUTANEOUS at 12:01

## 2020-10-04 RX ADMIN — SENNA PLUS 2 TABLET(S): 8.6 TABLET ORAL at 21:29

## 2020-10-04 RX ADMIN — ALBUTEROL 2 PUFF(S): 90 AEROSOL, METERED ORAL at 17:42

## 2020-10-04 RX ADMIN — GABAPENTIN 600 MILLIGRAM(S): 400 CAPSULE ORAL at 21:29

## 2020-10-04 RX ADMIN — NYSTATIN CREAM 1 APPLICATION(S): 100000 CREAM TOPICAL at 17:41

## 2020-10-04 RX ADMIN — LINEZOLID 300 MILLIGRAM(S): 600 INJECTION, SOLUTION INTRAVENOUS at 17:34

## 2020-10-04 RX ADMIN — ALBUTEROL 2 PUFF(S): 90 AEROSOL, METERED ORAL at 05:01

## 2020-10-04 RX ADMIN — MORPHINE SULFATE 2 MILLIGRAM(S): 50 CAPSULE, EXTENDED RELEASE ORAL at 08:18

## 2020-10-04 RX ADMIN — NYSTATIN CREAM 1 APPLICATION(S): 100000 CREAM TOPICAL at 06:48

## 2020-10-04 RX ADMIN — MORPHINE SULFATE 2 MILLIGRAM(S): 50 CAPSULE, EXTENDED RELEASE ORAL at 18:49

## 2020-10-04 RX ADMIN — PANTOPRAZOLE SODIUM 40 MILLIGRAM(S): 20 TABLET, DELAYED RELEASE ORAL at 12:01

## 2020-10-04 RX ADMIN — Medication 40 MILLIEQUIVALENT(S): at 08:57

## 2020-10-04 RX ADMIN — Medication 125 MILLIGRAM(S): at 05:34

## 2020-10-04 RX ADMIN — Medication 4: at 17:29

## 2020-10-04 RX ADMIN — BUDESONIDE AND FORMOTEROL FUMARATE DIHYDRATE 2 PUFF(S): 160; 4.5 AEROSOL RESPIRATORY (INHALATION) at 09:43

## 2020-10-04 RX ADMIN — MORPHINE SULFATE 2 MILLIGRAM(S): 50 CAPSULE, EXTENDED RELEASE ORAL at 11:31

## 2020-10-04 RX ADMIN — ALBUTEROL 2 PUFF(S): 90 AEROSOL, METERED ORAL at 13:12

## 2020-10-04 RX ADMIN — CHLORHEXIDINE GLUCONATE 1 APPLICATION(S): 213 SOLUTION TOPICAL at 06:47

## 2020-10-04 RX ADMIN — Medication 650 MILLIGRAM(S): at 08:50

## 2020-10-04 RX ADMIN — Medication 0.5 MILLIGRAM(S): at 09:03

## 2020-10-04 NOTE — PROGRESS NOTE ADULT - ASSESSMENT
66 yo obese F with hx of 80 pack year tobacco use, HTN, DM2 on insulin, COPD on 5L (frequent admissions), R lung cancer s/p resection (2017) on chemo q 3 weeks with concern for mets to spine and compression fractures, recent admission for COPD exacerbation earlier in August who on 9/19/2020 presented to ED with c/o SOB, hypoxia and increased sputum production x 1 day.    INTERVAL HX: On 9/21/2020. Patient was hypoxic, tachycardiac and tachypneic. Rapid response was called. RRT team arrived on scene. Patient had encephalopathy from hypoxia , wheezing and stridor on examination. Patient was tachycardiac to 140/min. Patient was hypoxic and started on non-rebreather but patient has increased work of breathing. Decision was made to intubate the patient. Patient become hypotensive post intubation and was given LR bolus and started on peripheral phenylephrine.    In ICU patient was awake and alert following commands and using a talking board and pen and paper to communicate. She was started on Precedex and Fentanyl to sedate the patient.   Patient was extubated on 9/22/20. Saturating well on NC. Patient was titrated off the Precedex and Fentanyl by 9/23/20. She has anxiety and when anxious she desaturates. on 9/23/2020 patient was started on BIPAP after an episode of anxiety and desaturation and tolerated it. For now patient is using BIPAP as needed.     Blood cultures from 9/19/2020 grew VRE and yeast like cells. Patient's PICC line on Left arm was removed on 9/21/2020 as possible source of infection. Patient was started on Zyvox on 9/21 and Caspofungin on 9/23 after the results came back. Repeat blood cultures from 9/21/2020 are showing no growth to date. Repeat blood cultures were sent on 9/23/2020  and 9/27. ID Dr. Corcoran on board.   9/30   Mild to moderate respiratory distress. Morphine ordered. ABG and CXR ordered. Started on AVAPS  10/02   Chest pain  10/4  Rapid response called at 5:30am for SOB, anxiety; Put on BIPAP during the day; Ativan, given; Fluid overloaded; CXR notable for cardiomegaly;    Placed benitez for urinary retention; UA negative

## 2020-10-04 NOTE — RAPID RESPONSE TEAM SUMMARY - NSADDTLFINDINGSRRT_GEN_ALL_CORE
Pt. is tachy to 150s with rectal temp 100.4. Pt. is being treated for VRE bacteremia with caspofungin and zyvox. Will send blood cultures. Pt. has rectal temp 100.4. Pt. is being treated for VRE bacteremia with caspofungin and zyvox. Will send blood cultures. Will send CBC, CMP. CXR shows haziness in Rt. lower lobe, could be atelectasis (follow official result). Will send UA.  ABG shows metabolic alkalosis with pH 7.62/32/81/34. Pt. has rectal temp 100.4. Pt. is being treated for VRE bacteremia with caspofungin and zyvox. Will send blood cultures. Will send CBC, CMP. CXR shows haziness in Rt. lower lobe, could be atelectasis (follow official result). Will send UA.  ABG shows metabolic alkalosis with pH 7.62/32/81/34. Likely contraction alkalosis due to lasix, will hold lasix.

## 2020-10-04 NOTE — RAPID RESPONSE TEAM SUMMARY - NSMEDICATIONSRRT_GEN_ALL_CORE
IV solumedrol
IV solumedrol.  Albuterol inhaler.  IV antibiotics to cover for suspected aspiration and positive blood cultures
no flank pain L/no flank pain R

## 2020-10-04 NOTE — PROGRESS NOTE ADULT - PROBLEM SELECTOR PLAN 1
Acute on chronic respiratory failure with hypoxia and hypercapnia due to end stage COPD, history of CA with mets  Continue oxygen at 4 LPM via nasal cannula AND BIPAP as needed; Patient received BIPAP all night, and rapid response was called at 5:30am 10.4.2020  Continue LABA and ICS  Off solumedrol, now on prednisone 40mg daily  Continues on BIPAP - used all of last night  Acute, and active; Continue AVAPS at night and as needed during the day. Continue BIPAP. Monitor saturation.  Continue bronchodilators and ICS  Trilogy ordered for patient when discharged from rehab. Failing BIPAP therapy. Multiple deformities in spine are contributing to respiratory failure. Trilogy is needed because patient needs the tidal volume that a trilogy delivers. Without trilogy patient is a high risk for intubation and death.  Repeat CXR done today 10.4.20 with cardiomegaly, s/p lasix IV

## 2020-10-04 NOTE — PROGRESS NOTE ADULT - PROBLEM SELECTOR PLAN 5
Continue morning insulin, pre-meal and sliding scale coverage.  Will be switching to prednisone tomorrow so insulin requirements may change  Continue to monitor glucose.  -Held lantus this morning 10/4/20; Continue to sliding scale for now and adjust as needed

## 2020-10-04 NOTE — PROGRESS NOTE ADULT - PROBLEM SELECTOR PLAN 2
Denies chest pain today; Last CP on Friday 10/2. non-radiating pain which resolved on its own.  ECG sinus tachycardia; Continue meds as above  Cardiac enzymes normal.   Cardiomegaly on chest xray, likely due to fluid overload  Lasix 40mg IV this morning; Will consider more as needed  Juan placed for urinary retention and to monitor i/o in setting of fluid overload;

## 2020-10-04 NOTE — PROGRESS NOTE ADULT - SUBJECTIVE AND OBJECTIVE BOX
Interval Events:    RRT today am   on bipap   has not required scheduled insulin doses  did not receive po prednisone today am      Allergies    fish (Angioedema)  penicillins (Rash)    Intolerances      Endocrine/Metabolic Medications:  atorvastatin 40 milliGRAM(s) Oral at bedtime  dextrose 40% Gel 15 Gram(s) Oral once PRN  dextrose 50% Injectable 12.5 Gram(s) IV Push once  dextrose 50% Injectable 25 Gram(s) IV Push once  dextrose 50% Injectable 25 Gram(s) IV Push once  glucagon  Injectable 1 milliGRAM(s) IntraMuscular once PRN  insulin glargine Injectable (LANTUS) 20 Unit(s) SubCutaneous every morning  insulin lispro (HumaLOG) corrective regimen sliding scale   SubCutaneous three times a day before meals  insulin lispro Injectable (HumaLOG) 6 Unit(s) SubCutaneous three times a day before meals  predniSONE   Tablet 40 milliGRAM(s) Oral daily      Vital Signs Last 24 Hrs  T(C): 37 (04 Oct 2020 08:38), Max: 38.1 (04 Oct 2020 06:00)  T(F): 98.6 (04 Oct 2020 08:38), Max: 100.5 (04 Oct 2020 06:00)  HR: 124 (04 Oct 2020 08:38) (88 - 140)  BP: 125/56 (04 Oct 2020 08:38) (110/78 - 174/84)  BP(mean): --  RR: 28 (04 Oct 2020 08:38) (18 - 32)  SpO2: 94% (04 Oct 2020 08:38) (90% - 99%)      PHYSICAL EXAM  Constitutional:    NC/AT:    HEENT:    Neck:  No JVD    Respiratory:  reduced breath sounds b/l bases    Cardiovascular:  RR    Gastrointestinal: Soft    Extremities: without cyanosis  Neurological:  non focal    LABS                        10.2   3.14  )-----------( 85       ( 04 Oct 2020 07:43 )             27.9                               130    |  86     |  19                  Calcium: 8.7   / iCa: x      (10-04 @ 07:43)    ----------------------------<  90        Magnesium: 1.7                              2.7     |  35     |  0.44             Phosphorous: x          CAPILLARY BLOOD GLUCOSE      POCT Blood Glucose.: 146 mg/dL (04 Oct 2020 11:56)  POCT Blood Glucose.: 137 mg/dL (04 Oct 2020 07:37)  POCT Blood Glucose.: 115 mg/dL (04 Oct 2020 05:09)  POCT Blood Glucose.: 207 mg/dL (03 Oct 2020 21:13)  POCT Blood Glucose.: 187 mg/dL (03 Oct 2020 16:57)        Assesment/plan          64 yo female with multiple comorbidities including h/o DM type 2, HTN, COPD on o2, lung ca on right sided s/p resection on chemo admitted with shortness of breath  was intubated/extubated for acute resp failure this admit, now extubated on general medical floor, standing high dose steroids with uncontrolled hyperglycemia    endocrinology consulted for glycemic management    DM type 2  uncontrolled  complicated by high dose steroid use, acute on chronic resp failure  home regimen:  basaglar 60 units daily  novolog 15 units pre meals    recommendations:  poc glucoses controlled  has not received po prednisone today- on bipap  has not required scheduled insulin doses  continue on mod dose sliding scale    - continue insulin lispro 6 units pre meals  continue moderate dose sliding scale  - stop scheduled lantus  - if poc glucose >180mg/dl, can start lantus 10 units daily  - reassess based on requirements  - goal inpatient glucose range: 140-180mg/dl    COPD exacerbation  acute on chronic hypoxic resp failure  s/p intubation/extubation current admit  on standing steroids  steroids increased  bipap   pulmonary on board    sepsis  VRE bacteremia  received zosyn  ID on board    Discussed with primary team   Please call  Endocrine- Dr Katlyn Garcia as needed

## 2020-10-04 NOTE — RAPID RESPONSE TEAM SUMMARY - NSSITUATIONBACKGROUNDRRT_GEN_ALL_CORE
65F with hx of 80 pack year tobacco use, HTN, DM2 on insulin, COPD on 5L (frequent admissions), R lung cancer s/p resection (2017) on chemo q 3 weeks with concern for mets to spine and compression fractures, admitted for COPD exacerbation requiring intubation and now on BIPAP. RRT was called for hypoxia to 85%. Pt. had BIPAP on whole night, and was removed to give morning meds and nebs, pt. then started desaturating, BIPAP was placed back but pt. desaturated persistently. FiO2 was increased to 50% from 35% and BIPAP was placed properly as it was leaking. S02 improved to 98% on BIPAP. Will give IV solumedrol.

## 2020-10-04 NOTE — CHART NOTE - NSCHARTNOTEFT_GEN_A_CORE
EVENT:     64 y/o female, former heavy smoker (80 pack years) with, HTN, DM2 on insulin, COPD on 5L (frequent admissions), R lung cancer s/p resection (2017) on chemo q 3 weeks with concern for mets to spine and compression fractures, recent admission for COPD exacerbation earlier in August presents with SOB, hypoxia and increased sputum production x 1 day. Patient states cough feels like secretion is coming out but unable to expectorate. Patient is on 5L of oxygen at home. As per EMS, patient is hypoxic at 84% on room air which came up to 98% with 4L of nasal canula. Patient was recently in august treated with prednisone, nebulizer treatments and completed a course of azithromycin for copd exacerbation. Denied nausea, vomiting, diarrhea, abdominal pain, fever, chills.  10/04/20, 5:30am, called by nurse for patient beeing desaturated. O2sat - 90% on BIPAP, HR- 140, BP -110/78, RR - 32, , T - 100.5F (rectal). Morphine 2 mg IVP was given for tachypnea and pain. Ipratropium inhal. - given. Tylenol 650 mg MD suppository Q6hrs PRN for temp. > 100.4F. Ice pack. Stat EKG and stat BMP - ordered. At 5: 30 RRT was called. Patient was given Solu Medrol 125 mg IVP x 1 dose. Stat chest x ray - done. Family - called. Stat blood culture, U/A, CBC, Mg, BMP - pending.     BIPAP mask was placed properly as if was leaking. Patient's Sat O2 improved to 98%.    (19 Sep 20  OBJECTIVE:  Vital Signs Last 24 Hrs  T(C): 38.1 (04 Oct 2020 06:00), Max: 38.1 (04 Oct 2020 06:00)  T(F): 100.5 (04 Oct 2020 06:00), Max: 100.5 (04 Oct 2020 06:00)  HR: 140 (04 Oct 2020 04:46) (88 - 140)  BP: 110/78 (04 Oct 2020 04:46) (110/78 - 174/84)  BP(mean): --  RR: 32 (04 Oct 2020 04:46) (18 - 32)  SpO2: 90% (04 Oct 2020 04:46) (90% - 99%)    FOCUSED PHYSICAL EXAM:    LABS:                        8.9    7.73  )-----------( 70       ( 03 Oct 2020 09:53 )             25.0   CARDIAC MARKERS ( 02 Oct 2020 06:40 )  <0.015 ng/mL / x     / 46 U/L / x     / <1.0 ng/mL    10-03    130<L>  |  86<L>  |  17  ----------------------------<  197<H>  3.1<L>   |  37<H>  |  0.47<L>    Ca    8.3<L>      03 Oct 2020 09:53  Phos  3.5     10-03  Mg     1.7     10-03    TPro  5.9<L>  /  Alb  2.6<L>  /  TBili  1.2  /  DBili  x   /  AST  18  /  ALT  28  /  AlkPhos  143<H>  10-03      PLAN: 1. C/W NIPPN / BIPAP,  2. stat EKG, 3. stat BMP.    FOLLOW UP / RESULT: Follow-up morning labs. Safety measures. EVENT:     66 y/o female, former heavy smoker (80 pack years) with, HTN, DM2 on insulin, COPD on 5L (frequent admissions), R lung cancer s/p resection (2017) on chemo q 3 weeks with concern for mets to spine and compression fractures, recent admission for COPD exacerbation earlier in August presents with SOB, hypoxia and increased sputum production x 1 day. Patient states cough feels like secretion is coming out but unable to expectorate. Patient is on 5L of oxygen at home. As per EMS, patient is hypoxic at 84% on room air which came up to 98% with 4L of nasal canula. Patient was recently in august treated with prednisone, nebulizer treatments and completed a course of azithromycin for copd exacerbation. Denied nausea, vomiting, diarrhea, abdominal pain, fever, chills.  10/04/20, 5:30am, called by nurse for patient beeing desaturated. O2sat - 90% on BIPAP, HR- 140, BP -110/78, RR - 32, , T - 100.5F (rectal). Morphine 2 mg IVP was given for tachypnea and pain. Ipratropium inhal. - given. Tylenol 650 mg MS suppository Q6hrs PRN for temp. > 100.4F. Ice pack. Stat EKG and stat BMP - ordered. At 5: 30 RRT was called. Patient was given Solu Medrol 125 mg IVP x 1 dose. Stat chest x ray and ABG - done. Family - called. Stat blood culture, U/A, CBC, Mg, BMP, serum lactate - pending. BIPAP mask was placed properly as it was leaking. Patient's Sat O2 improved to 98%.    (19 Sep 20  OBJECTIVE:  Vital Signs Last 24 Hrs  T(C): 38.1 (04 Oct 2020 06:00), Max: 38.1 (04 Oct 2020 06:00)  T(F): 100.5 (04 Oct 2020 06:00), Max: 100.5 (04 Oct 2020 06:00)  HR: 140 (04 Oct 2020 04:46) (88 - 140)  BP: 110/78 (04 Oct 2020 04:46) (110/78 - 174/84)  BP(mean): --  RR: 32 (04 Oct 2020 04:46) (18 - 32)  SpO2: 90% (04 Oct 2020 04:46) (90% - 99%)    FOCUSED PHYSICAL EXAM:    LABS:                        8.9    7.73  )-----------( 70       ( 03 Oct 2020 09:53 )             25.0   CARDIAC MARKERS ( 02 Oct 2020 06:40 )  <0.015 ng/mL / x     / 46 U/L / x     / <1.0 ng/mL    10-03    130<L>  |  86<L>  |  17  ----------------------------<  197<H>  3.1<L>   |  37<H>  |  0.47<L>    Ca    8.3<L>      03 Oct 2020 09:53  Phos  3.5     10-03  Mg     1.7     10-03    TPro  5.9<L>  /  Alb  2.6<L>  /  TBili  1.2  /  DBili  x   /  AST  18  /  ALT  28  /  AlkPhos  143<H>  10-03      PLAN: 1. C/W NIPPN / BIPAP,  2. stat EKG, 3. stat BMP, serum lactate.     FOLLOW UP / RESULT: Follow-up morning labs. Safety measures.

## 2020-10-04 NOTE — PROGRESS NOTE ADULT - ATTENDING COMMENTS
·  Problem: Acute on chronic respiratory failure with hypoxia and hypercapnia.  Plan: Acute on chronic respiratory failure with hypoxia and hypercapnia due to end stage COPD, history of CA with mets  Continue oxygen at 4 LPM via nasal cannula AND BIPAP as needed; Patient received BIPAP all night, and rapid response was called at 5:30am 10.4.2020  Continue LABA and ICS  Off solumedrol, now on prednisone 40mg daily  Continues on BIPAP - used all of last night  Acute, and active; Continue AVAPS at night and as needed during the day. Continue BIPAP. Monitor saturation.  Continue bronchodilators and ICS  Trilogy ordered for patient when discharged from rehab. Failing BIPAP therapy. Multiple deformities in spine are contributing to respiratory failure. Trilogy is needed because patient needs the tidal volume that a trilogy delivers. Without trilogy patient is a high risk for intubation and death.  Repeat CXR done today 10.4.20 with cardiomegaly, s/p lasix IV.      Problem/Plan - 2:  ·  Problem: Chest pain.  Plan: Denies chest pain today; Last CP on Friday 10/2. non-radiating pain which resolved on its own.  ECG sinus tachycardia; Continue meds as above  Cardiac enzymes normal.   Cardiomegaly on chest xray, likely due to fluid overload  Lasix 40mg IV this morning; Will consider more as needed  Juan placed for urinary retention and to monitor i/o in setting of fluid overload;      Problem/Plan - 3:  ·  Problem: VRE bacteremia.  Plan: Continue antibiotics thru 10/4 as per ID.  ID f/u  Remains on Linezolid.      Problem/Plan - 4:  ·  Problem: Fungemia.  Plan: Continue antifungals Caspofungin thru 10/4 as per ID  I/D f/u.

## 2020-10-04 NOTE — PROGRESS NOTE ADULT - SUBJECTIVE AND OBJECTIVE BOX
BARB OLSON    SCU progress note    HPI 66 yo obese Woman with hx of 80 pack year tobacco use, HTN, DM2 on insulin, COPD on 5L (frequent admissions), R lung cancer s/p resection (2017) on chemo q 3 weeks with concern for mets to spine and compression fractures, recent admission for COPD exacerbation earlier in August who on 9/19/2020 presented to ED with c/o SOB, hypoxia and increased sputum production x 1 day.      OVERNIGHT EVENTS/SUBJECTIVE: Patient was SOB in AM; Rapid Response was called at 5:30am(see NP/Rapid Note). On morning assessment, patient continues on BIPAP. RT at bedside, nebs+ativan+ morphine given; Lasix IV given, as it was held earlier due to rapid response; Patient with urinary retention, placed robledo with >1L output. BMP notable for hypokalemia, repleted this morning. IV Access limited; Multiple attempts made with ultrasound and ICU team help. Only able to get 22g peripheral IV on left arm; Daughter Gavi called this morning, updated on condition. Patient and family would like patient to remain FULL CODE at this time.    Vital Signs Last 24 Hrs  ICU Vital Signs Last 24 Hrs  T(C): 37 (04 Oct 2020 08:38), Max: 38.1 (04 Oct 2020 06:00)  T(F): 98.6 (04 Oct 2020 08:38), Max: 100.5 (04 Oct 2020 06:00)  HR: 124 (04 Oct 2020 08:38) (88 - 140)  BP: 125/56 (04 Oct 2020 08:38) (110/78 - 174/84)  BP(mean): --  ABP: --  ABP(mean): --  RR: 28 (04 Oct 2020 08:38) (18 - 32)  SpO2: 94% (04 Oct 2020 08:38) (90% - 99%)      DNR [ ]   DNI  [  ]   FULL CODE    Covid - 19 PCR: oCOVID-19 PCR . (09.29.20 @ 19:01)COVID-19 PCR: NotDetec: Testing is performed using polymerase chain reaction (PCR)    The 4Ms    What Matters Most: see GOC  Age appropriate Medications/Screen for High Risk Medication: Yes  Mentation: see CAM below  Mobility: defer to physical exam    The Confusion Assessment Method (CAM) Diagnostic Algorithm     1: Acute Onset or Fluctuating Course  - Is there evidence of an acute change in mental status from the patient’s baseline? Did the (abnormal) behavior  fluctuate during the day, that is, tend to come and go, or increase and decrease in severity?  [ ] YES [X] NO     3: Disorganized thinking  -Was the patient’s thinking disorganized or incoherent, such as rambling or irrelevant conversation, unclear or illogical flow of ideas, or unpredictable switching from subject to subject?  [ ] YES [X ] NO    4: Altered Level of consciousness?  [ ] YES [x ] NO    The diagnosis of delirium by CAM requires the presence of features 1 and 2 and either 3 or 4.    PRESSORS: [ ] YES [ x] NO    INFECTIOUS DISEASE MEDICATIONS  caspofungin IVPB      caspofungin IVPB 50 milliGRAM(s) IV Intermittent every 24 hours  linezolid  IVPB      linezolid  IVPB 600 milliGRAM(s) IV Intermittent every 12 hours    Cardiovascular:  Heart Failure  Acute   Acute on Chronic  Chronic       carvedilol 6.25 milliGRAM(s) Oral every 12 hours    Pulmonary:  ALBUTerol    90 MICROgram(s) HFA Inhaler 2 Puff(s) Inhalation every 4 hours  budesonide 160 MICROgram(s)/formoterol 4.5 MICROgram(s) Inhaler 2 Puff(s) Inhalation two times a day  tiotropium 18 MICROgram(s) Capsule 1 Capsule(s) Inhalation daily    Hematalogic:  enoxaparin Injectable 40 milliGRAM(s) SubCutaneous daily    Other:  MEDICATIONS  (STANDING):  MEDICATIONS  (STANDING):  ALBUTerol    90 MICROgram(s) HFA Inhaler 2 Puff(s) Inhalation every 4 hours  atorvastatin 40 milliGRAM(s) Oral at bedtime  budesonide 160 MICROgram(s)/formoterol 4.5 MICROgram(s) Inhaler 2 Puff(s) Inhalation two times a day  carvedilol 6.25 milliGRAM(s) Oral every 12 hours  caspofungin IVPB      caspofungin IVPB 50 milliGRAM(s) IV Intermittent every 24 hours  chlorhexidine 2% Cloths 1 Application(s) Topical <User Schedule>  dextrose 5%. 1000 milliLiter(s) (50 mL/Hr) IV Continuous <Continuous>  dextrose 50% Injectable 12.5 Gram(s) IV Push once  dextrose 50% Injectable 25 Gram(s) IV Push once  dextrose 50% Injectable 25 Gram(s) IV Push once  enoxaparin Injectable 40 milliGRAM(s) SubCutaneous daily  ergocalciferol 41289 Unit(s) Oral <User Schedule>  FIRST- Mouthwash  BLM 10 milliLiter(s) Swish and Swallow every 8 hours  folic acid 1 milliGRAM(s) Oral daily  gabapentin 600 milliGRAM(s) Oral every 8 hours  guaiFENesin  milliGRAM(s) Oral every 12 hours  influenza   Vaccine 0.5 milliLiter(s) IntraMuscular once  insulin glargine Injectable (LANTUS) 20 Unit(s) SubCutaneous every morning  insulin lispro (HumaLOG) corrective regimen sliding scale   SubCutaneous three times a day before meals  insulin lispro Injectable (HumaLOG) 6 Unit(s) SubCutaneous three times a day before meals  linezolid  IVPB      linezolid  IVPB 600 milliGRAM(s) IV Intermittent every 12 hours  melatonin 3 milliGRAM(s) Oral at bedtime  nystatin Powder 1 Application(s) Topical two times a day  pantoprazole  Injectable 40 milliGRAM(s) IV Push daily  polyethylene glycol 3350 17 Gram(s) Oral two times a day  predniSONE   Tablet 40 milliGRAM(s) Oral daily  senna 2 Tablet(s) Oral at bedtime  tiotropium 18 MICROgram(s) Capsule 1 Capsule(s) Inhalation daily    MEDICATIONS  (PRN):  acetaminophen  Suppository .. 650 milliGRAM(s) Rectal every 6 hours PRN Temp greater or equal to 38C (100.4F)  benzocaine 15 mG/menthol 3.6 mG (Sugar-Free) Lozenge 1 Lozenge Oral every 4 hours PRN Sore Throat  dextrose 40% Gel 15 Gram(s) Oral once PRN Blood Glucose LESS THAN 70 milliGRAM(s)/deciLiter  glucagon  Injectable 1 milliGRAM(s) IntraMuscular once PRN Glucose <70 milliGRAM(s)/deciLiter  guaiFENesin   Syrup  (Sugar-Free) 100 milliGRAM(s) Oral every 6 hours PRN Cough  morphine  - Injectable 2 milliGRAM(s) IV Push every 4 hours PRN Severe Pain (7 - 10)  ondansetron Injectable 4 milliGRAM(s) IV Push every 6 hours PRN Nausea and/or Vomiting  oxycodone    5 mG/acetaminophen 325 mG 2 Tablet(s) Oral every 6 hours PRN Moderate Pain (4 - 6)  sodium chloride 0.65% Nasal 1 Spray(s) Both Nostrils four times a day PRN Nasal Congestion  Drug Dosing Weight  Height (cm): 154.9 (19 Sep 2020 08:00)  Weight (kg): 84.8 (21 Sep 2020 09:15)  BMI (kg/m2): 35.3 (21 Sep 2020 09:15)  BSA (m2): 1.84 (21 Sep 2020 09:15)    CENTRAL LINE: [ ] YES [x ] NO  LOCATION:   DATE INSERTED:  REMOVE: [ ] YES [ ] NO  EXPLAIN:    ROBLEDO: [X ] YES [] NO    DATE INSERTED: 10/4/2020 AT 11AM  REMOVE:  [ ] YES [ ] NO  EXPLAIN: Urinary retention    PAST MEDICAL & SURGICAL HISTORY:  Malignant neoplasm of unspecified part of right bronchus or lung  HTN (hypertension)  DM (diabetes mellitus)  COPD (chronic obstructive pulmonary disease)  Lung cancer  Morbid obesity  GERD (gastroesophageal reflux disease)  Deviated septum  Prolapsed uterus  ITP (idiopathic thrombocytopenic purpura)  Emphysema/COPD  History of cholecystectomy  S/P lobectomy of lung  right 2017  History of tonsillectomy    PHYSICAL EXAM:    GENERAL:   labored breathing, on BIPAP, obese  HEENT  Atraumatic, Normocephalic, pupils equal, round, non-icteric, facial puffiness, no exudates  NECK: Supple, No JVD  CHEST/LUNG: occasional cough with thick greenish sputum, scattered wheezing with bibasilar diminished breath sounds; +crackles bilaterally; labored breathing, on BIPAP  HEART: S1S2, RRR, tachycardic  ABDOMEN: obese soft NTND, +BS  : no suprapubic tenderness; +Robledo, placed today  EXTREMITIES:  pitting +2 edema bilaterally upper and lower extremities, +distal pedal pulses  NERVOUS SYSTEM:  Alert & Oriented X3, follows commands, moving all extremities  LYMPH: No lymphadenopathy noted  SKIN: BUE ecchymosis; dry skin on calf and feet with thick callus    LABS:                        10.2   3.14  )-----------( 85       ( 04 Oct 2020 07:43 )             27.9             10-04    130<L>  |  86<L>  |  19<H>  ----------------------------<  90  2.7<LL>   |  35<H>  |  0.44<L>    Ca    8.7      04 Oct 2020 07:43  Phos  3.5     10-03  Mg     1.7     10-04    TPro  5.9<L>  /  Alb  2.6<L>  /  TBili  1.2  /  DBili  x   /  AST  18  /  ALT  28  /  AlkPhos  143<H>  10-03    10-02    134<L>  |  93<L>  |  16  ----------------------------<  101<H>  3.5   |  37<H>  |  0.38<L>    Ca    8.9      02 Oct 2020 06:39  Phos  3.0     10-02  Mg     1.9     10-02    TPro  6.1  /  Alb  2.8<L>  /  TBili  0.9  /  DBili  x   /  AST  20  /  ALT  27  /  AlkPhos  125<H>  10-02    CARDIAC MARKERS ( 02 Oct 2020 06:40 )  <0.015 ng/mL / x     / 46 U/L / x     / <1.0 ng/mL    LIVER FUNCTIONS - ( 03 Oct 2020 09:53 )  Alb: 2.6 g/dL / Pro: 5.9 g/dL / ALK PHOS: 143 U/L / ALT: 28 U/L DA / AST: 18 U/L / GGT: x           LIVER FUNCTIONS - ( 02 Oct 2020 06:39 )  Alb: 2.8 g/dL / Pro: 6.1 g/dL / ALK PHOS: 125 U/L / ALT: 27 U/L DA / AST: 20 U/L / GGT: x             ABG - ( 04 Oct 2020 05:53 )  pH, Arterial: 7.62  pH, Blood: x     /  pCO2: 32    /  pO2: 81    / HCO3: 34    / Base Excess: 11.0  /  SaO2: 97        ABG( 03 Oct 2020 04:49 )  pH, Arterial: 7.44  pH, Blood: x     /  pCO2: 55    /  pO2: 88    / HCO3: 36    / Base Excess: 10.6  /  SaO2: 96        Serum Pro-Brain Natriuretic Peptide: 261 pg/mL (10-02-20 @ 06:40)      [ x ]  DVT Prophylaxis : Lovenox as above          Abnormal Nutritional Status -  Morbid Obesity BMI >/=40    RADIOLOGY & ADDITIONAL STUDIES:    c< from: Xray Chest 1 View- PORTABLE-Urgent (Xray Chest 1 View- PORTABLE-Urgent .) (10.04.20 @ 09:59) >  EXAM:  XR CHEST PORTABLE URGENT 1V                        EXAM:  XR CHEST AP OR PA 1V                        PROCEDURE DATE:  10/04/2020    INTERPRETATION:  Clinical Information: copd exacerbation  Technique: 2 AP chest images from 10/04/2020.  Comparison: 10/02/2020  Findings/  Impression: Cardiomegaly. No lung consolidation. Prior right lung surgery.  < end of copied text >    < from: Xray Chest 1 View- PORTABLE-Routine (Xray Chest 1 View- PORTABLE-Routine in AM.) (09.23.20 @ 10:09) >  The patient has been extubated.  AP view of the chest is markedly rotated to the left and demonstrates persistent right perihilar infiltrate versus atelectasis. There is mild left perihilar discoid atelectasis.. There is no pleural effusion. There is no pneumothorax.  The heart, mediastinum and galina cannot be assessed due to rotation..  The pulmonary vasculature is normal.  Mild thoracic degenerative changes are present.  IMPRESSION:  Persistent right perihilar infiltrate versus atelectasis. Left midlung discoid atelectasis.  < end of copied text >    urinUrinalysis (10.04.20 @ 11:23)   pH Urine: 5.0   Glucose Qualitative, Urine: Negative   Blood, Urine: Negative   Color: Yellow   Urine Appearance: Clear   Bilirubin: Negative   Ketone - Urine: Negative   Specific Gravity: 1.015   Protein, Urine: Negative   Urobilinogen: Negative   Nitrite: Negative   Leukocyte Esterase Concentration: Negative       Historical Values  Urinalysis (10.04.20 @ 11:23)   pH Urine: 5.0   Glucose Qualitative, Urine: Negative   Blood, Urine: Negative   Color: Yellow   Urine Appearance: Clear   Bilirubin: Negative   Ketone - Urine: Negative   Specific Gravity: 1.015   Protein, Urine: Negative   Urobilinogen: Negative   Goals of Care Discussion with Family/Proxy/Other   - Spoke with daughter, Gavi Olson this morning about GOC and patient status. Gavi, HCP, and patient both wish the patient to remain FULL CODE at this time.

## 2020-10-04 NOTE — RAPID RESPONSE TEAM SUMMARY - NSOTHERINTERVENTIONSRRT_GEN_ALL_CORE
ABG  EKG  C/W bipap ABG  EKG  Blood cultures  C/W bipap ABG  EKG  Blood cultures  UA  C/W bipap  repeat ABG in afternoon

## 2020-10-05 DIAGNOSIS — A41.9 SEPSIS, UNSPECIFIED ORGANISM: ICD-10-CM

## 2020-10-05 DIAGNOSIS — E44.0 MODERATE PROTEIN-CALORIE MALNUTRITION: ICD-10-CM

## 2020-10-05 LAB
ANION GAP SERPL CALC-SCNC: 8 MMOL/L — SIGNIFICANT CHANGE UP (ref 5–17)
ANISOCYTOSIS BLD QL: SLIGHT — SIGNIFICANT CHANGE UP
BASE EXCESS BLDA CALC-SCNC: 12.4 MMOL/L — HIGH (ref -2–2)
BASE EXCESS BLDA CALC-SCNC: 9.7 MMOL/L — HIGH (ref -2–2)
BASE EXCESS BLDV CALC-SCNC: 10.1 MMOL/L — HIGH (ref -2–2)
BLOOD GAS COMMENTS ARTERIAL: SIGNIFICANT CHANGE UP
BLOOD GAS COMMENTS ARTERIAL: SIGNIFICANT CHANGE UP
BLOOD GAS COMMENTS, VENOUS: SIGNIFICANT CHANGE UP
BUN SERPL-MCNC: 20 MG/DL — HIGH (ref 7–18)
CALCIUM SERPL-MCNC: 8.2 MG/DL — LOW (ref 8.4–10.5)
CHLORIDE SERPL-SCNC: 86 MMOL/L — LOW (ref 96–108)
CO2 SERPL-SCNC: 33 MMOL/L — HIGH (ref 22–31)
CREAT SERPL-MCNC: 0.44 MG/DL — LOW (ref 0.5–1.3)
GLUCOSE BLDC GLUCOMTR-MCNC: 209 MG/DL — HIGH (ref 70–99)
GLUCOSE BLDC GLUCOMTR-MCNC: 217 MG/DL — HIGH (ref 70–99)
GLUCOSE BLDC GLUCOMTR-MCNC: 85 MG/DL — SIGNIFICANT CHANGE UP (ref 70–99)
GLUCOSE SERPL-MCNC: 168 MG/DL — HIGH (ref 70–99)
HCO3 BLDA-SCNC: 34 MMOL/L — HIGH (ref 23–27)
HCO3 BLDA-SCNC: 37 MMOL/L — HIGH (ref 23–27)
HCO3 BLDV-SCNC: 36 MMOL/L — HIGH (ref 21–29)
HCT VFR BLD CALC: 22.1 % — LOW (ref 34.5–45)
HGB BLD-MCNC: 7.4 G/DL — LOW (ref 11.5–15.5)
HOROWITZ INDEX BLDA+IHG-RTO: 50 — SIGNIFICANT CHANGE UP
HOROWITZ INDEX BLDA+IHG-RTO: 50 — SIGNIFICANT CHANGE UP
HOROWITZ INDEX BLDV+IHG-RTO: 21 — SIGNIFICANT CHANGE UP
HYPOCHROMIA BLD QL: SLIGHT — SIGNIFICANT CHANGE UP
LACTATE SERPL-SCNC: 3.1 MMOL/L — HIGH (ref 0.7–2)
MAGNESIUM SERPL-MCNC: 1.9 MG/DL — SIGNIFICANT CHANGE UP (ref 1.6–2.6)
MANUAL SMEAR VERIFICATION: SIGNIFICANT CHANGE UP
MCHC RBC-ENTMCNC: 29.1 PG — SIGNIFICANT CHANGE UP (ref 27–34)
MCHC RBC-ENTMCNC: 33.5 GM/DL — SIGNIFICANT CHANGE UP (ref 32–36)
MCV RBC AUTO: 87 FL — SIGNIFICANT CHANGE UP (ref 80–100)
NEUTS BAND # BLD: 9 % — HIGH (ref 0–8)
NRBC # BLD: 1 /100 — HIGH (ref 0–0)
PCO2 BLDA: 47 MMHG — HIGH (ref 32–46)
PCO2 BLDA: 49 MMHG — HIGH (ref 32–46)
PCO2 BLDV: 52 MMHG — HIGH (ref 35–50)
PH BLDA: 7.48 — HIGH (ref 7.35–7.45)
PH BLDA: 7.49 — HIGH (ref 7.35–7.45)
PH BLDV: 7.45 — SIGNIFICANT CHANGE UP (ref 7.35–7.45)
PLAT MORPH BLD: NORMAL — SIGNIFICANT CHANGE UP
PLATELET # BLD AUTO: 56 K/UL — LOW (ref 150–400)
PLATELET COUNT - ESTIMATE: ABNORMAL
PO2 BLDA: 73 MMHG — LOW (ref 74–108)
PO2 BLDA: 99 MMHG — SIGNIFICANT CHANGE UP (ref 74–108)
PO2 BLDV: 26 MMHG — SIGNIFICANT CHANGE UP (ref 25–45)
POIKILOCYTOSIS BLD QL AUTO: SLIGHT — SIGNIFICANT CHANGE UP
POLYCHROMASIA BLD QL SMEAR: SLIGHT — SIGNIFICANT CHANGE UP
POTASSIUM SERPL-MCNC: 3.8 MMOL/L — SIGNIFICANT CHANGE UP (ref 3.5–5.3)
POTASSIUM SERPL-SCNC: 3.8 MMOL/L — SIGNIFICANT CHANGE UP (ref 3.5–5.3)
RBC # BLD: 2.54 M/UL — LOW (ref 3.8–5.2)
RBC # FLD: 15 % — HIGH (ref 10.3–14.5)
RBC BLD AUTO: ABNORMAL
SAO2 % BLDA: 95 % — SIGNIFICANT CHANGE UP (ref 92–96)
SAO2 % BLDA: 98 % — HIGH (ref 92–96)
SAO2 % BLDV: 44 % — LOW (ref 67–88)
SODIUM SERPL-SCNC: 127 MMOL/L — LOW (ref 135–145)
WBC # BLD: 3.78 K/UL — LOW (ref 3.8–10.5)
WBC # FLD AUTO: 3.78 K/UL — LOW (ref 3.8–10.5)

## 2020-10-05 PROCEDURE — 71045 X-RAY EXAM CHEST 1 VIEW: CPT | Mod: 26,77

## 2020-10-05 PROCEDURE — 71045 X-RAY EXAM CHEST 1 VIEW: CPT | Mod: 26

## 2020-10-05 RX ORDER — ZOLPIDEM TARTRATE 10 MG/1
5 TABLET ORAL AT BEDTIME
Refills: 0 | Status: DISCONTINUED | OUTPATIENT
Start: 2020-10-05 | End: 2020-10-08

## 2020-10-05 RX ORDER — DEXMEDETOMIDINE HYDROCHLORIDE IN 0.9% SODIUM CHLORIDE 4 UG/ML
0.5 INJECTION INTRAVENOUS
Qty: 200 | Refills: 0 | Status: DISCONTINUED | OUTPATIENT
Start: 2020-10-05 | End: 2020-10-05

## 2020-10-05 RX ORDER — SODIUM CHLORIDE 9 MG/ML
10 INJECTION INTRAMUSCULAR; INTRAVENOUS; SUBCUTANEOUS
Refills: 0 | Status: DISCONTINUED | OUTPATIENT
Start: 2020-10-05 | End: 2020-10-08

## 2020-10-05 RX ORDER — HALOPERIDOL DECANOATE 100 MG/ML
5 INJECTION INTRAMUSCULAR ONCE
Refills: 0 | Status: COMPLETED | OUTPATIENT
Start: 2020-10-05 | End: 2020-10-05

## 2020-10-05 RX ORDER — SODIUM CHLORIDE 9 MG/ML
1000 INJECTION, SOLUTION INTRAVENOUS
Refills: 0 | Status: DISCONTINUED | OUTPATIENT
Start: 2020-10-05 | End: 2020-10-07

## 2020-10-05 RX ORDER — ROCURONIUM BROMIDE 10 MG/ML
50 VIAL (ML) INTRAVENOUS ONCE
Refills: 0 | Status: COMPLETED | OUTPATIENT
Start: 2020-10-05 | End: 2020-10-05

## 2020-10-05 RX ORDER — SODIUM CHLORIDE 9 MG/ML
1000 INJECTION INTRAMUSCULAR; INTRAVENOUS; SUBCUTANEOUS
Refills: 0 | Status: DISCONTINUED | OUTPATIENT
Start: 2020-10-05 | End: 2020-10-05

## 2020-10-05 RX ORDER — METOPROLOL TARTRATE 50 MG
2.5 TABLET ORAL ONCE
Refills: 0 | Status: COMPLETED | OUTPATIENT
Start: 2020-10-05 | End: 2020-10-05

## 2020-10-05 RX ORDER — PROPOFOL 10 MG/ML
100 INJECTION, EMULSION INTRAVENOUS ONCE
Refills: 0 | Status: COMPLETED | OUTPATIENT
Start: 2020-10-05 | End: 2020-10-05

## 2020-10-05 RX ORDER — PHENYLEPHRINE HYDROCHLORIDE 10 MG/ML
0.2 INJECTION INTRAVENOUS
Qty: 160 | Refills: 0 | Status: DISCONTINUED | OUTPATIENT
Start: 2020-10-05 | End: 2020-10-06

## 2020-10-05 RX ORDER — CHLORHEXIDINE GLUCONATE 213 G/1000ML
15 SOLUTION TOPICAL EVERY 12 HOURS
Refills: 0 | Status: DISCONTINUED | OUTPATIENT
Start: 2020-10-05 | End: 2020-10-08

## 2020-10-05 RX ORDER — DEXMEDETOMIDINE HYDROCHLORIDE IN 0.9% SODIUM CHLORIDE 4 UG/ML
0.3 INJECTION INTRAVENOUS
Qty: 400 | Refills: 0 | Status: DISCONTINUED | OUTPATIENT
Start: 2020-10-05 | End: 2020-10-07

## 2020-10-05 RX ORDER — HYDROMORPHONE HYDROCHLORIDE 2 MG/ML
1 INJECTION INTRAMUSCULAR; INTRAVENOUS; SUBCUTANEOUS ONCE
Refills: 0 | Status: DISCONTINUED | OUTPATIENT
Start: 2020-10-05 | End: 2020-10-05

## 2020-10-05 RX ORDER — FENTANYL CITRATE 50 UG/ML
0.5 INJECTION INTRAVENOUS
Qty: 5000 | Refills: 0 | Status: DISCONTINUED | OUTPATIENT
Start: 2020-10-05 | End: 2020-10-05

## 2020-10-05 RX ORDER — ETOMIDATE 2 MG/ML
20 INJECTION INTRAVENOUS ONCE
Refills: 0 | Status: COMPLETED | OUTPATIENT
Start: 2020-10-05 | End: 2020-10-05

## 2020-10-05 RX ADMIN — SODIUM CHLORIDE 50 MILLILITER(S): 9 INJECTION, SOLUTION INTRAVENOUS at 16:00

## 2020-10-05 RX ADMIN — DEXMEDETOMIDINE HYDROCHLORIDE IN 0.9% SODIUM CHLORIDE 6.36 MICROGRAM(S)/KG/HR: 4 INJECTION INTRAVENOUS at 23:59

## 2020-10-05 RX ADMIN — TIOTROPIUM BROMIDE 1 CAPSULE(S): 18 CAPSULE ORAL; RESPIRATORY (INHALATION) at 12:19

## 2020-10-05 RX ADMIN — LINEZOLID 300 MILLIGRAM(S): 600 INJECTION, SOLUTION INTRAVENOUS at 07:11

## 2020-10-05 RX ADMIN — GABAPENTIN 600 MILLIGRAM(S): 400 CAPSULE ORAL at 07:11

## 2020-10-05 RX ADMIN — Medication 1 MILLIGRAM(S): at 12:18

## 2020-10-05 RX ADMIN — Medication 6 UNIT(S): at 09:34

## 2020-10-05 RX ADMIN — Medication 40 MILLIGRAM(S): at 07:11

## 2020-10-05 RX ADMIN — ALBUTEROL 2 PUFF(S): 90 AEROSOL, METERED ORAL at 14:45

## 2020-10-05 RX ADMIN — NYSTATIN CREAM 1 APPLICATION(S): 100000 CREAM TOPICAL at 07:12

## 2020-10-05 RX ADMIN — Medication 600 MILLIGRAM(S): at 07:11

## 2020-10-05 RX ADMIN — CASPOFUNGIN ACETATE 260 MILLIGRAM(S): 7 INJECTION, POWDER, LYOPHILIZED, FOR SOLUTION INTRAVENOUS at 14:45

## 2020-10-05 RX ADMIN — DIPHENHYDRAMINE HYDROCHLORIDE AND LIDOCAINE HYDROCHLORIDE AND ALUMINUM HYDROXIDE AND MAGNESIUM HYDRO 10 MILLILITER(S): KIT at 07:11

## 2020-10-05 RX ADMIN — BUDESONIDE AND FORMOTEROL FUMARATE DIHYDRATE 2 PUFF(S): 160; 4.5 AEROSOL RESPIRATORY (INHALATION) at 09:35

## 2020-10-05 RX ADMIN — CARVEDILOL PHOSPHATE 6.25 MILLIGRAM(S): 80 CAPSULE, EXTENDED RELEASE ORAL at 07:11

## 2020-10-05 RX ADMIN — CHLORHEXIDINE GLUCONATE 1 APPLICATION(S): 213 SOLUTION TOPICAL at 07:12

## 2020-10-05 RX ADMIN — Medication 2.5 MILLIGRAM(S): at 20:03

## 2020-10-05 RX ADMIN — SODIUM CHLORIDE 50 MILLILITER(S): 9 INJECTION, SOLUTION INTRAVENOUS at 20:03

## 2020-10-05 RX ADMIN — Medication 4: at 12:19

## 2020-10-05 RX ADMIN — MORPHINE SULFATE 2 MILLIGRAM(S): 50 CAPSULE, EXTENDED RELEASE ORAL at 07:32

## 2020-10-05 RX ADMIN — ALBUTEROL 2 PUFF(S): 90 AEROSOL, METERED ORAL at 21:18

## 2020-10-05 RX ADMIN — ALBUTEROL 2 PUFF(S): 90 AEROSOL, METERED ORAL at 09:45

## 2020-10-05 RX ADMIN — MORPHINE SULFATE 2 MILLIGRAM(S): 50 CAPSULE, EXTENDED RELEASE ORAL at 07:11

## 2020-10-05 RX ADMIN — BUDESONIDE AND FORMOTEROL FUMARATE DIHYDRATE 2 PUFF(S): 160; 4.5 AEROSOL RESPIRATORY (INHALATION) at 22:07

## 2020-10-05 RX ADMIN — ZOLPIDEM TARTRATE 5 MILLIGRAM(S): 10 TABLET ORAL at 00:22

## 2020-10-05 RX ADMIN — ALBUTEROL 2 PUFF(S): 90 AEROSOL, METERED ORAL at 03:53

## 2020-10-05 RX ADMIN — INSULIN GLARGINE 20 UNIT(S): 100 INJECTION, SOLUTION SUBCUTANEOUS at 09:34

## 2020-10-05 RX ADMIN — PANTOPRAZOLE SODIUM 40 MILLIGRAM(S): 20 TABLET, DELAYED RELEASE ORAL at 12:18

## 2020-10-05 RX ADMIN — Medication 50 MILLIGRAM(S): at 20:41

## 2020-10-05 RX ADMIN — ALBUTEROL 2 PUFF(S): 90 AEROSOL, METERED ORAL at 18:00

## 2020-10-05 RX ADMIN — DEXMEDETOMIDINE HYDROCHLORIDE IN 0.9% SODIUM CHLORIDE 6.36 MICROGRAM(S)/KG/HR: 4 INJECTION INTRAVENOUS at 19:40

## 2020-10-05 RX ADMIN — Medication 40 MILLIGRAM(S): at 20:02

## 2020-10-05 RX ADMIN — ALBUTEROL 2 PUFF(S): 90 AEROSOL, METERED ORAL at 07:12

## 2020-10-05 RX ADMIN — ETOMIDATE 20 MILLIGRAM(S): 2 INJECTION INTRAVENOUS at 20:41

## 2020-10-05 RX ADMIN — ENOXAPARIN SODIUM 40 MILLIGRAM(S): 100 INJECTION SUBCUTANEOUS at 12:18

## 2020-10-05 RX ADMIN — Medication 4: at 09:33

## 2020-10-05 RX ADMIN — Medication 6 UNIT(S): at 12:19

## 2020-10-05 RX ADMIN — DIPHENHYDRAMINE HYDROCHLORIDE AND LIDOCAINE HYDROCHLORIDE AND ALUMINUM HYDROXIDE AND MAGNESIUM HYDRO 10 MILLILITER(S): KIT at 14:44

## 2020-10-05 NOTE — CHART NOTE - NSCHARTNOTEFT_GEN_A_CORE
PC  met with the patient's daughter Gavi and the patient's significant other Nathen in the ICU.  Patient was transferred from the SCU secondary to respiratory distress.  Ms. Olson has repeatedly informed her medical providers that she wishes to be intubated and if necessary trach/peg.  Patient was supported with the non-rebreather which could not provide sufficient respiratory support.  Patient's daughter Gavi stated "the patient wants to live and our presence helps her to fight."  PC  validated the importance of family for emotional support

## 2020-10-05 NOTE — CHART NOTE - NSCHARTNOTEFT_GEN_A_CORE
Patient continues to be anxious and in mild/moderate respiratory distress while on AVAPS FIO2 50%  AB.49/49/73/37/12.4/95% on AVAPS  FIO2 50%  PE:  Cardio: Tachycardic -761  Resp: Diminished breath sounds throughout with scattered rhonchi and wheezing.    AM LABS:  CBC Full  -  ( 04 Oct 2020 07:43 )  WBC Count : 3.14 K/uL  RBC Count : 3.32 M/uL  Hemoglobin : 10.2 g/dL  Hematocrit : 27.9 %  Platelet Count - Automated : 85 K/uL  Mean Cell Volume : 84.0 fl  Mean Cell Hemoglobin : 30.7 pg  Mean Cell Hemoglobin Concentration : 36.6 gm/dL  Auto Neutrophil # : x  Auto Lymphocyte # : x  Auto Monocyte # : x  Auto Eosinophil # : x  Auto Basophil # : x  Auto Neutrophil % : x  Auto Lymphocyte % : x  Auto Monocyte % : x  Auto Eosinophil % : x  Auto Basophil % : x    10-04    129<L>  |  86<L>  |  20<H>  ----------------------------<  214<H>  4.0   |  37<H>  |  0.64    Ca    8.5      04 Oct 2020 20:45  Phos  2.8     10-04  Mg     1.8     10-04    TPro  5.9<L>  /  Alb  2.6<L>  /  TBili  1.2  /  DBili  x   /  AST  18  /  ALT  28  /  AlkPhos  143<H>  10-03      64 yo obese F with hx of 80 pack year tobacco use, HTN, DM2 on insulin, COPD on 5L (frequent admissions), R lung cancer s/p resection () on chemo q 3 weeks with concern for mets to spine and compression fractures, recent admission for COPD exacerbation earlier in August who on 2020 presented to ED with c/o SOB, hypoxia and increased sputum production x 1 day.    INTERVAL HX: On 2020. Patient was hypoxic, tachycardiac and tachypneic. Rapid response was called. RRT team arrived on scene. Patient had encephalopathy from hypoxia , wheezing and stridor on examination. Patient was tachycardiac to 140/min. Patient was hypoxic and started on non-rebreather but patient has increased work of breathing. Decision was made to intubate the patient. Patient become hypotensive post intubation and was given LR bolus and started on peripheral phenylephrine.    In ICU patient was awake and alert following commands and using a talking board and pen and paper to communicate. She was started on Precedex and Fentanyl to sedate the patient.   Patient was extubated on 20. Saturating well on NC. Patient was titrated off the Precedex and Fentanyl by 20. She has anxiety and when anxious she desaturates. on 2020 patient was started on BIPAP after an episode of anxiety and desaturation and tolerated it. For now patient is using BIPAP as needed.     Blood cultures from 2020 grew VRE and yeast like cells. Patient's PICC line on Left arm was removed on 2020 as possible source of infection. Patient was started on Zyvox on  and Caspofungin on  after the results came back. Repeat blood cultures from 2020 are showing no growth to date. Repeat blood cultures were sent on 2020  and . ID Dr. Corcoran on board.      Mild to moderate respiratory distress. Morphine ordered. ABG and CXR ordered. Started on AVAPS  10/02   Chest pain  10/04  S/P RR secondary to hypoxia and possible sepsis.  10/05  Increasing anxiety with mild/moderate respiratory distress while on AVAPS not tolerating any time off AVAPS. Will transfer patient to ICU for closer monitoring and possible intubation. Patient remains a FULL CODE as per wishes.

## 2020-10-05 NOTE — PROGRESS NOTE ADULT - NSHPATTENDINGPLANDISCUSS_GEN_ALL_CORE
Agree with above assessment and plan as transcribed. Agree with above assessment and plan as transcribed..

## 2020-10-05 NOTE — PROGRESS NOTE ADULT - SUBJECTIVE AND OBJECTIVE BOX
BARB COLÓN    SCU progress note    INTERVAL HPI/OVERNIGHT EVENTS: ***Events from yesterday noted. +Wheezing and stridor this morning    DNR [ ]   DNI  [  ]   FULL CODE    Covid - 19 PCR: Negative     The 4Ms    What Matters Most: see [facility]  Age appropriate Medications/Screen for High Risk Medication: Yes  Mentation: see CAM below  Mobility: defer to physical exam    The Confusion Assessment Method (CAM) Diagnostic Algorithm     1: Acute Onset or Fluctuating Course  - Is there evidence of an acute change in mental status from the patient’s baseline? Did the (abnormal) behavior  fluctuate during the day, that is, tend to come and go, or increase and decrease in severity?  [ ] YES [x ] NO     2: Inattention  - Did the patient have difficulty focusing attention, being easily distractible, or having difficulty keeping track of what was being said?  [ ] YES [x ] NO     3: Disorganized thinking  -Was the patient’s thinking disorganized or incoherent, such as rambling or irrelevant conversation, unclear or illogical flow of ideas, or unpredictable switching from subject to subject?  [ ] YES [ ] NO    4: Altered Level of consciousness?  [ ] YES [x ] NO    The diagnosis of delirium by CAM requires the presence of features 1 and 2 and either 3 or 4.    PRESSORS: [ ] YES [x ] NO  caspofungin IVPB      caspofungin IVPB 50 milliGRAM(s) IV Intermittent every 24 hours  linezolid  IVPB      linezolid  IVPB 600 milliGRAM(s) IV Intermittent every 12 hours    Cardiovascular:  Heart Failure  Acute   Acute on Chronic  Chronic       carvedilol 6.25 milliGRAM(s) Oral every 12 hours    Pulmonary:  ALBUTerol    90 MICROgram(s) HFA Inhaler 2 Puff(s) Inhalation every 4 hours  budesonide 160 MICROgram(s)/formoterol 4.5 MICROgram(s) Inhaler 2 Puff(s) Inhalation two times a day  guaiFENesin   Syrup  (Sugar-Free) 100 milliGRAM(s) Oral every 6 hours PRN  guaiFENesin  milliGRAM(s) Oral every 12 hours  tiotropium 18 MICROgram(s) Capsule 1 Capsule(s) Inhalation daily    Hematalogic:  enoxaparin Injectable 40 milliGRAM(s) SubCutaneous daily    Other:  acetaminophen  Suppository .. 650 milliGRAM(s) Rectal every 6 hours PRN  atorvastatin 40 milliGRAM(s) Oral at bedtime  benzocaine 15 mG/menthol 3.6 mG (Sugar-Free) Lozenge 1 Lozenge Oral every 4 hours PRN  chlorhexidine 2% Cloths 1 Application(s) Topical <User Schedule>  dextrose 40% Gel 15 Gram(s) Oral once PRN  dextrose 5%. 1000 milliLiter(s) IV Continuous <Continuous>  dextrose 50% Injectable 12.5 Gram(s) IV Push once  dextrose 50% Injectable 25 Gram(s) IV Push once  dextrose 50% Injectable 25 Gram(s) IV Push once  ergocalciferol 95738 Unit(s) Oral <User Schedule>  FIRST- Mouthwash  BLM 10 milliLiter(s) Swish and Swallow every 8 hours  folic acid 1 milliGRAM(s) Oral daily  gabapentin 600 milliGRAM(s) Oral every 8 hours  glucagon  Injectable 1 milliGRAM(s) IntraMuscular once PRN  influenza   Vaccine 0.5 milliLiter(s) IntraMuscular once  insulin glargine Injectable (LANTUS) 20 Unit(s) SubCutaneous every morning  insulin lispro (HumaLOG) corrective regimen sliding scale   SubCutaneous three times a day before meals  insulin lispro Injectable (HumaLOG) 6 Unit(s) SubCutaneous three times a day before meals  melatonin 3 milliGRAM(s) Oral at bedtime  methylPREDNISolone sodium succinate Injectable 40 milliGRAM(s) IV Push every 12 hours  morphine  - Injectable 2 milliGRAM(s) IV Push every 4 hours PRN  nystatin Powder 1 Application(s) Topical two times a day  ondansetron Injectable 4 milliGRAM(s) IV Push every 6 hours PRN  oxycodone    5 mG/acetaminophen 325 mG 2 Tablet(s) Oral every 6 hours PRN  pantoprazole  Injectable 40 milliGRAM(s) IV Push daily  polyethylene glycol 3350 17 Gram(s) Oral two times a day  senna 2 Tablet(s) Oral at bedtime  sodium chloride 0.65% Nasal 1 Spray(s) Both Nostrils four times a day PRN  zolpidem 5 milliGRAM(s) Oral at bedtime PRN    acetaminophen  Suppository .. 650 milliGRAM(s) Rectal every 6 hours PRN  ALBUTerol    90 MICROgram(s) HFA Inhaler 2 Puff(s) Inhalation every 4 hours  atorvastatin 40 milliGRAM(s) Oral at bedtime  benzocaine 15 mG/menthol 3.6 mG (Sugar-Free) Lozenge 1 Lozenge Oral every 4 hours PRN  budesonide 160 MICROgram(s)/formoterol 4.5 MICROgram(s) Inhaler 2 Puff(s) Inhalation two times a day  carvedilol 6.25 milliGRAM(s) Oral every 12 hours  caspofungin IVPB      caspofungin IVPB 50 milliGRAM(s) IV Intermittent every 24 hours  chlorhexidine 2% Cloths 1 Application(s) Topical <User Schedule>  dextrose 40% Gel 15 Gram(s) Oral once PRN  dextrose 5%. 1000 milliLiter(s) IV Continuous <Continuous>  dextrose 50% Injectable 12.5 Gram(s) IV Push once  dextrose 50% Injectable 25 Gram(s) IV Push once  dextrose 50% Injectable 25 Gram(s) IV Push once  enoxaparin Injectable 40 milliGRAM(s) SubCutaneous daily  ergocalciferol 80648 Unit(s) Oral <User Schedule>  FIRST- Mouthwash  BLM 10 milliLiter(s) Swish and Swallow every 8 hours  folic acid 1 milliGRAM(s) Oral daily  gabapentin 600 milliGRAM(s) Oral every 8 hours  glucagon  Injectable 1 milliGRAM(s) IntraMuscular once PRN  guaiFENesin   Syrup  (Sugar-Free) 100 milliGRAM(s) Oral every 6 hours PRN  guaiFENesin  milliGRAM(s) Oral every 12 hours  influenza   Vaccine 0.5 milliLiter(s) IntraMuscular once  insulin glargine Injectable (LANTUS) 20 Unit(s) SubCutaneous every morning  insulin lispro (HumaLOG) corrective regimen sliding scale   SubCutaneous three times a day before meals  insulin lispro Injectable (HumaLOG) 6 Unit(s) SubCutaneous three times a day before meals  linezolid  IVPB      linezolid  IVPB 600 milliGRAM(s) IV Intermittent every 12 hours  melatonin 3 milliGRAM(s) Oral at bedtime  methylPREDNISolone sodium succinate Injectable 40 milliGRAM(s) IV Push every 12 hours  morphine  - Injectable 2 milliGRAM(s) IV Push every 4 hours PRN  nystatin Powder 1 Application(s) Topical two times a day  ondansetron Injectable 4 milliGRAM(s) IV Push every 6 hours PRN  oxycodone    5 mG/acetaminophen 325 mG 2 Tablet(s) Oral every 6 hours PRN  pantoprazole  Injectable 40 milliGRAM(s) IV Push daily  polyethylene glycol 3350 17 Gram(s) Oral two times a day  senna 2 Tablet(s) Oral at bedtime  sodium chloride 0.65% Nasal 1 Spray(s) Both Nostrils four times a day PRN  tiotropium 18 MICROgram(s) Capsule 1 Capsule(s) Inhalation daily  zolpidem 5 milliGRAM(s) Oral at bedtime PRN    Drug Dosing Weight  Height (cm): 154.9 (19 Sep 2020 08:00)  Weight (kg): 84.8 (21 Sep 2020 09:15)  BMI (kg/m2): 35.3 (21 Sep 2020 09:15)  BSA (m2): 1.84 (21 Sep 2020 09:15)    CENTRAL LINE: [ ] YES [x ] NO  LOCATION:   DATE INSERTED:  REMOVE: [ ] YES [ ] NO  EXPLAIN:    ROBLEDO: [x ] YES [ ] NO    DATE INSERTED:  REMOVE:  [ ] YES [ ] NO  EXPLAIN:    PAST MEDICAL & SURGICAL HISTORY:  Malignant neoplasm of unspecified part of right bronchus or lung    HTN (hypertension)    DM (diabetes mellitus)    COPD (chronic obstructive pulmonary disease)    Lung cancer    Morbid obesity    GERD (gastroesophageal reflux disease)    Deviated septum    Prolapsed uterus    ITP (idiopathic thrombocytopenic purpura)    Emphysema/COPD    History of cholecystectomy    S/P lobectomy of lung  right 2017    History of tonsillectomy          ABG - ( 05 Oct 2020 06:05 )  pH, Arterial: 7.48  pH, Blood: x     /  pCO2: 47    /  pO2: 99    / HCO3: 34    / Base Excess: 9.7   /  SaO2: 98                    10-04 @ 07:01  -  10-05 @ 07:00  --------------------------------------------------------  IN: 0 mL / OUT: 200 mL / NET: -200 mL            PHYSICAL EXAM:    GENERAL: More lethargic today. Mild respiratory distress +wheezing  HEAD:  Atraumatic, Normocephalic  EYES: EOMI, PERRLA, conjunctiva and sclera clear  ENMT: No tonsillar erythema, exudates  NECK: Supple, No JVD  NERVOUS SYSTEM:  Awake but lethargic. Follows commands. Moving all extremities  CHEST/LUNG: Diffusely diminished breath sounds throughout. +Wheezing and scattered rhonchi throughout.  HEART: Tachycardiac -120. No murmurs appreciated.  ABDOMEN: Soft, Obese.  Nontender, Nondistended; Bowel sounds present  EXTREMITIES:  +2 edema all extremities. 2+ Peripheral Pulses, No clubbing  LYMPH: No lymphadenopathy noted  SKIN: Bilateral arms ecchymotic.    LABS:  CBC Full  -  ( 04 Oct 2020 07:43 )  WBC Count : 3.14 K/uL  RBC Count : 3.32 M/uL  Hemoglobin : 10.2 g/dL  Hematocrit : 27.9 %  Platelet Count - Automated : 85 K/uL  Mean Cell Volume : 84.0 fl  Mean Cell Hemoglobin : 30.7 pg  Mean Cell Hemoglobin Concentration : 36.6 gm/dL  Auto Neutrophil # : x  Auto Lymphocyte # : x  Auto Monocyte # : x  Auto Eosinophil # : x  Auto Basophil # : x  Auto Neutrophil % : x  Auto Lymphocyte % : x  Auto Monocyte % : x  Auto Eosinophil % : x  Auto Basophil % : x    10-04    129<L>  |  86<L>  |  20<H>  ----------------------------<  214<H>  4.0   |  37<H>  |  0.64    Ca    8.5      04 Oct 2020 20:45  Phos  2.8     10-04  Mg     1.8     10-04    TPro  5.9<L>  /  Alb  2.6<L>  /  TBili  1.2  /  DBili  x   /  AST  18  /  ALT  28  /  AlkPhos  143<H>  10-03      Urinalysis Basic - ( 04 Oct 2020 11:23 )    Color: Yellow / Appearance: Clear / S.015 / pH: x  Gluc: x / Ketone: Negative  / Bili: Negative / Urobili: Negative   Blood: x / Protein: Negative / Nitrite: Negative   Leuk Esterase: Negative / RBC: x / WBC x   Sq Epi: x / Non Sq Epi: x / Bacteria: x            [  ]  DVT Prophylaxis  [  ]  Nutrition, Brand, Rate         Goal Rate        Abnormal Nutritional Status -  Malnutrition   Cachexia      Morbid Obesity BMI >/=40    RADIOLOGY & ADDITIONAL STUDIES:  ***  < from: Xray Chest 1 View- PORTABLE-Urgent (Xray Chest 1 View- PORTABLE-Urgent .) (10.04.20 @ 09:59) >  Findings/  Impression: Cardiomegaly. No lung consolidation. Prior right lung surgery.    < end of copied text >    Goals of Care Discussion with Family/Proxy/Other   - see note from  10/01

## 2020-10-05 NOTE — PROGRESS NOTE ADULT - SUBJECTIVE AND OBJECTIVE BOX
more sob  strider?  no fever  ABG OK    MEDICATIONS  (STANDING):  ALBUTerol    90 MICROgram(s) HFA Inhaler 2 Puff(s) Inhalation every 4 hours  atorvastatin 40 milliGRAM(s) Oral at bedtime  budesonide 160 MICROgram(s)/formoterol 4.5 MICROgram(s) Inhaler 2 Puff(s) Inhalation two times a day  carvedilol 6.25 milliGRAM(s) Oral every 12 hours  caspofungin IVPB      caspofungin IVPB 50 milliGRAM(s) IV Intermittent every 24 hours  chlorhexidine 2% Cloths 1 Application(s) Topical <User Schedule>  dextrose 5%. 1000 milliLiter(s) (50 mL/Hr) IV Continuous <Continuous>  dextrose 50% Injectable 12.5 Gram(s) IV Push once  dextrose 50% Injectable 25 Gram(s) IV Push once  dextrose 50% Injectable 25 Gram(s) IV Push once  enoxaparin Injectable 40 milliGRAM(s) SubCutaneous daily  ergocalciferol 37274 Unit(s) Oral <User Schedule>  FIRST- Mouthwash  BLM 10 milliLiter(s) Swish and Swallow every 8 hours  folic acid 1 milliGRAM(s) Oral daily  gabapentin 600 milliGRAM(s) Oral every 8 hours  guaiFENesin  milliGRAM(s) Oral every 12 hours  influenza   Vaccine 0.5 milliLiter(s) IntraMuscular once  insulin glargine Injectable (LANTUS) 20 Unit(s) SubCutaneous every morning  insulin lispro (HumaLOG) corrective regimen sliding scale   SubCutaneous three times a day before meals  insulin lispro Injectable (HumaLOG) 6 Unit(s) SubCutaneous three times a day before meals  linezolid  IVPB      linezolid  IVPB 600 milliGRAM(s) IV Intermittent every 12 hours  melatonin 3 milliGRAM(s) Oral at bedtime  methylPREDNISolone sodium succinate Injectable 40 milliGRAM(s) IV Push every 12 hours  nystatin Powder 1 Application(s) Topical two times a day  pantoprazole  Injectable 40 milliGRAM(s) IV Push daily  polyethylene glycol 3350 17 Gram(s) Oral two times a day  senna 2 Tablet(s) Oral at bedtime  tiotropium 18 MICROgram(s) Capsule 1 Capsule(s) Inhalation daily    MEDICATIONS  (PRN):  acetaminophen  Suppository .. 650 milliGRAM(s) Rectal every 6 hours PRN Temp greater or equal to 38C (100.4F)  benzocaine 15 mG/menthol 3.6 mG (Sugar-Free) Lozenge 1 Lozenge Oral every 4 hours PRN Sore Throat  dextrose 40% Gel 15 Gram(s) Oral once PRN Blood Glucose LESS THAN 70 milliGRAM(s)/deciLiter  glucagon  Injectable 1 milliGRAM(s) IntraMuscular once PRN Glucose <70 milliGRAM(s)/deciLiter  guaiFENesin   Syrup  (Sugar-Free) 100 milliGRAM(s) Oral every 6 hours PRN Cough  morphine  - Injectable 2 milliGRAM(s) IV Push every 4 hours PRN Severe Pain (7 - 10)  ondansetron Injectable 4 milliGRAM(s) IV Push every 6 hours PRN Nausea and/or Vomiting  oxycodone    5 mG/acetaminophen 325 mG 2 Tablet(s) Oral every 6 hours PRN Moderate Pain (4 - 6)  sodium chloride 0.65% Nasal 1 Spray(s) Both Nostrils four times a day PRN Nasal Congestion  zolpidem 5 milliGRAM(s) Oral at bedtime PRN Insomnia      Allergies    fish (Angioedema)  penicillins (Rash)    Intolerances        Vital Signs Last 24 Hrs  T(C): 36.6 (05 Oct 2020 05:00), Max: 36.9 (04 Oct 2020 15:09)  T(F): 97.9 (05 Oct 2020 05:00), Max: 98.5 (04 Oct 2020 15:09)  HR: 105 (05 Oct 2020 08:18) (105 - 123)  BP: 141/75 (05 Oct 2020 05:00) (127/79 - 154/46)  BP(mean): --  RR: 30 (05 Oct 2020 05:00) (19 - 30)  SpO2: 97% (05 Oct 2020 08:18) (95% - 100%)    PHYSICAL EXAM  General: adult in NAD  HEENT: clear oropharynx, anicteric sclera, pink conjunctiva  Neck: supple  CV: normal S1/S2 with no murmur rubs or gallops  Lungs: positive air movement b/l ant lungs,clear to auscultation, no wheezes, no rales  Abdomen: soft non-tender non-distended, no hepatosplenomegaly  Ext: no clubbing cyanosis or edema  Skin: no rashes and no petechiae  Neuro: alert and oriented X 4, no focal deficits  LABS:                          7.4    3.78  )-----------( x        ( 05 Oct 2020 08:28 )             22.1         Mean Cell Volume : 87.0 fl  Mean Cell Hemoglobin : 29.1 pg  Mean Cell Hemoglobin Concentration : 33.5 gm/dL  Auto Neutrophil # : x  Auto Lymphocyte # : x  Auto Monocyte # : x  Auto Eosinophil # : x  Auto Basophil # : x  Auto Neutrophil % : x  Auto Lymphocyte % : x  Auto Monocyte % : x  Auto Eosinophil % : x  Auto Basophil % : x    Serial CBC  Hematocrit 22.1  Hemoglobin 7.4  Plat --  RBC 2.54  WBC 3.78  Serial CBC  Hematocrit 27.9  Hemoglobin 10.2  Plat 85  RBC 3.32  WBC 3.14  Serial CBC  Hematocrit 25.0  Hemoglobin 8.9  Plat 70  RBC 2.87  WBC 7.73  Serial CBC  Hematocrit 29.5  Hemoglobin 10.5  Plat 80  RBC 3.42  WBC 10.73    10-05    127<L>  |  86<L>  |  20<H>  ----------------------------<  168<H>  3.8   |  33<H>  |  0.44<L>    Ca    8.2<L>      05 Oct 2020 08:28  Phos  2.8     10-04  Mg     1.9     10-05    TPro  5.9<L>  /  Alb  2.6<L>  /  TBili  1.2  /  DBili  x   /  AST  18  /  ALT  28  /  AlkPhos  143<H>  10-03                    BLOOD SMEAR INTERPRETATION:       RADIOLOGY & ADDITIONAL STUDIES:

## 2020-10-05 NOTE — PROGRESS NOTE ADULT - PROBLEM SELECTOR PLAN 10
DVT and GI prophylaxis.  Continue AVAPS.  F/U new cultures.  Overall prognosis is extremely poor. Patient remains full code as per her wishes  Low threshold for intubation.

## 2020-10-05 NOTE — PROGRESS NOTE ADULT - ASSESSMENT
66 yo obese F with hx of 80 pack year tobacco use, HTN, DM2 on insulin, COPD on 5L (frequent admissions), R lung cancer s/p resection (2017) on chemo q 3 weeks with concern for mets to spine and compression fractures, recent admission for COPD exacerbation earlier in August who on 9/19/2020 presented to ED with c/o SOB, hypoxia and increased sputum production x 1 day.    INTERVAL HX: On 9/21/2020. Patient was hypoxic, tachycardiac and tachypneic. Rapid response was called. RRT team arrived on scene. Patient had encephalopathy from hypoxia , wheezing and stridor on examination. Patient was tachycardiac to 140/min. Patient was hypoxic and started on non-rebreather but patient has increased work of breathing. Decision was made to intubate the patient. Patient become hypotensive post intubation and was given LR bolus and started on peripheral phenylephrine.    In ICU patient was awake and alert following commands and using a talking board and pen and paper to communicate. She was started on Precedex and Fentanyl to sedate the patient.   Patient was extubated on 9/22/20. Saturating well on NC. Patient was titrated off the Precedex and Fentanyl by 9/23/20. She has anxiety and when anxious she desaturates. on 9/23/2020 patient was started on BIPAP after an episode of anxiety and desaturation and tolerated it. For now patient is using BIPAP as needed.     Blood cultures from 9/19/2020 grew VRE and yeast like cells. Patient's PICC line on Left arm was removed on 9/21/2020 as possible source of infection. Patient was started on Zyvox on 9/21 and Caspofungin on 9/23 after the results came back. Repeat blood cultures from 9/21/2020 are showing no growth to date. Repeat blood cultures were sent on 9/23/2020  and 9/27. ID Dr. [email or web address] on board.   9/30   Mild to moderate respiratory distress. Morphine ordered. ABG and CXR ordered. Started on AVAPS  10/02   Chest pain  10/04  S/P RR secondary to hypoxia and possible sepsis.

## 2020-10-05 NOTE — CHART NOTE - NSCHARTNOTEFT_GEN_A_CORE
66 yo obese F with hx of 80 pack year tobacco use, HTN, DM2 on insulin, COPD on 5L (frequent admissions), R lung cancer s/p resection (2017) on chemo q 3 weeks with concern for mets to spine and compression fractures, recent admission for COPD exacerbation earlier in August who on 9/19/2020 presented to ED with c/o SOB, hypoxia and increased sputum production x 1 day. Patient was initially transferred to ICU s/p RRT on 9/21 requiring intubation for respiratory failure s/t copd exacerbation, extubated on 9/22 however required bipap thereafter. She was also noted to be bacteremic with VRE and fungemic s/t picc which was in place for chemo hence removed. Patient completed course of zyvox and continued on caspofungin, her repeat blood cultures were noted negative. Patient was transferred to SCU for continued care.   While in SCU, she was noted to have respiratory distress for which she was switched to AVAPS on 9/30 with good response however now noted to have respiratory distress even on AVAPS hence started on iv solumedrol and decision made to transfer patient to ICU for close monitoring for low threshold for intubation.   On assessment, patient was noted in moderate respiratory distress, saturating 90% on AVAPS. Emergent femoral central line placed due to difficult iv access and started on precedex drip.   Patient noted to have continued respiratory distress hence decision made to intubate for need for mechanical ventilation.

## 2020-10-05 NOTE — PROGRESS NOTE ADULT - ATTENDING COMMENTS
·  Problem: Acute on chronic respiratory failure with hypoxia and hypercapnia.  Plan: Continue AVAPS nightly and as needed during the day. Oxygen 3 LPM when off AVAPS.  Serial ABGs and CXRs  Continue bronchodilators and ICS  Switch prednisone to solumedrol 40mg BID.  Respiratory status deteriorating. Low threshold for intubation.

## 2020-10-05 NOTE — PROGRESS NOTE ADULT - PROBLEM SELECTOR PLAN 5
Continue AVAPS nightly and as needed during the day.  Switch prednisone to Solumedrol 40mg every 12 hours  Continue bronchodilators and ICS

## 2020-10-05 NOTE — PROGRESS NOTE ADULT - PROBLEM SELECTOR PLAN 1
Continue AVAPS nightly and as needed during the day. Oxygen 3 LPM when off AVAPS.  Serial ABGs and CXRs  Continue bronchodilators and ICS  Switch prednisone to solumedrol 40mg BID.  Respiratory status deteriorating. Low threshold for intubation.

## 2020-10-05 NOTE — PROGRESS NOTE ADULT - ASSESSMENT
COPD with acute asthmatic bronchitis with  s/p  Respiratory Failure   Recurrent Lung cancer with Bone Mets   IDDM   Htn with MR   Thrombocytopenia sec to C/T  Obesity with CRIS   r/o esophageal candidiasis  GPC bacteremia VRE/ yeast    PLAN- Increase  prednisone 40 mg qd x 4 days   Extra dose iv solumedrol 80 mg IVpb x 2 doses now   Nss 75 cc/hr x 24 hrs  IV Zyvox and Cancidas  Get PICC LINE   o2 n/c 2pm/ 24 hrs a day  Advance diet  OOB in chair/ BRP   Symbicort 160/4.5 2 puffs bid    Duoneb  by HFN rx qid prn   s/c lovenox  FS coverage

## 2020-10-05 NOTE — CHART NOTE - NSCHARTNOTEFT_GEN_A_CORE
EVENT:   66 y/o female, former heavy smoker (80 pack years) with, HTN, DM2 on insulin, COPD on 5L (frequent admissions), R lung - with SOB, hypoxia and increased sputum production x 1 day. Patient stated cough feels like secretion was coming out but unable to expectorate. Patient was on 5L of oxygen at home. As per EMS, patient was hypoxic at 84% on room air which came up to 98% with 4L of nasal canula. Patient was recently in august treated with prednisone, nebulizer treatments and completed a course of azithromycin for copd exacerbation. Denied nausea, vomiting, diarrhea, abdominal pain, fever, chills.  10/05/20, 12 : 06am, patient c/o insomnia likely due to frequent daytime naps. Stated Melatonin was not helpful. Requested Ambien.   OBJECTIVE:  Vital Signs Last 24 Hrs  T(C): 36.6 (04 Oct 2020 20:22), Max: 38.1 (04 Oct 2020 06:00)  T(F): 97.8 (04 Oct 2020 20:22), Max: 100.5 (04 Oct 2020 06:00)  HR: 115 (04 Oct 2020 20:22) (109 - 140)  BP: 127/79 (04 Oct 2020 20:22) (110/78 - 154/46)  BP(mean): --  RR: 19 (04 Oct 2020 20:22) (19 - 32)  SpO2: 98% (04 Oct 2020 20:22) (90% - 100%)    FOCUSED PHYSICAL EXAM:    LABS:                        10.2   3.14  )-----------( 85       ( 04 Oct 2020 07:43 )             27.9     10-04    129<L>  |  86<L>  |  20<H>  ----------------------------<  214<H>  4.0   |  37<H>  |  0.64    Ca    8.5      04 Oct 2020 20:45  Phos  2.8     10-04  Mg     1.8     10-04    TPro  5.9<L>  /  Alb  2.6<L>  /  TBili  1.2  /  DBili  x   /  AST  18  /  ALT  28  /  AlkPhos  143<H>  10-0    PLAN: Ambien 5 mg po x1 dose for c/o insomnia.     FOLLOW UP / RESULT: Maintain patient safety and comfort.

## 2020-10-05 NOTE — PROGRESS NOTE ADULT - PROBLEM SELECTOR PLAN 2
S/P RR and code sepsis yesterday  Lactate 3.1 this morning  F/U new blood cultures  Start gentle IVF  Continue current antibiotics. ID f/u

## 2020-10-05 NOTE — PROGRESS NOTE ADULT - SUBJECTIVE AND OBJECTIVE BOX
PULMONARY  progress note    BARB COÓLN  MRN-[id number]    Patient is a 65y old  Female who presents with a chief complaint of shortness of breath (05 Oct 2020 09:34)  Weekend's events noted and discussed with staff and daughter, They want intubation and trach if needed  Pt c/o sob and chest soreness , wants more prednisone      MEDICATIONS  (STANDING):  ALBUTerol    90 MICROgram(s) HFA Inhaler 2 Puff(s) Inhalation every 4 hours  atorvastatin 40 milliGRAM(s) Oral at bedtime  budesonide 160 MICROgram(s)/formoterol 4.5 MICROgram(s) Inhaler 2 Puff(s) Inhalation two times a day  carvedilol 6.25 milliGRAM(s) Oral every 12 hours  caspofungin IVPB 50 milliGRAM(s) IV Intermittent every 24 hours  caspofungin IVPB      chlorhexidine 2% Cloths 1 Application(s) Topical <User Schedule>  dextrose 5%. 1000 milliLiter(s) (50 mL/Hr) IV Continuous <Continuous>  dextrose 50% Injectable 12.5 Gram(s) IV Push once  dextrose 50% Injectable 25 Gram(s) IV Push once  dextrose 50% Injectable 25 Gram(s) IV Push once  enoxaparin Injectable 40 milliGRAM(s) SubCutaneous daily  ergocalciferol 19578 Unit(s) Oral <User Schedule>  FIRST- Mouthwash  BLM 10 milliLiter(s) Swish and Swallow every 8 hours  folic acid 1 milliGRAM(s) Oral daily  gabapentin 600 milliGRAM(s) Oral every 8 hours  guaiFENesin  milliGRAM(s) Oral every 12 hours  influenza   Vaccine 0.5 milliLiter(s) IntraMuscular once  insulin glargine Injectable (LANTUS) 20 Unit(s) SubCutaneous every morning  insulin lispro (HumaLOG) corrective regimen sliding scale   SubCutaneous three times a day before meals  insulin lispro Injectable (HumaLOG) 6 Unit(s) SubCutaneous three times a day before meals  linezolid  IVPB 600 milliGRAM(s) IV Intermittent every 12 hours  linezolid  IVPB      melatonin 3 milliGRAM(s) Oral at bedtime  methylPREDNISolone sodium succinate Injectable 40 milliGRAM(s) IV Push every 12 hours  nystatin Powder 1 Application(s) Topical two times a day  pantoprazole  Injectable 40 milliGRAM(s) IV Push daily  polyethylene glycol 3350 17 Gram(s) Oral two times a day  senna 2 Tablet(s) Oral at bedtime  tiotropium 18 MICROgram(s) Capsule 1 Capsule(s) Inhalation daily      MEDICATIONS  (PRN):  acetaminophen  Suppository .. 650 milliGRAM(s) Rectal every 6 hours PRN Temp greater or equal to 38C (100.4F)  benzocaine 15 mG/menthol 3.6 mG (Sugar-Free) Lozenge 1 Lozenge Oral every 4 hours PRN Sore Throat  dextrose 40% Gel 15 Gram(s) Oral once PRN Blood Glucose LESS THAN 70 milliGRAM(s)/deciLiter  glucagon  Injectable 1 milliGRAM(s) IntraMuscular once PRN Glucose <70 milliGRAM(s)/deciLiter  guaiFENesin   Syrup  (Sugar-Free) 100 milliGRAM(s) Oral every 6 hours PRN Cough  morphine  - Injectable 2 milliGRAM(s) IV Push every 4 hours PRN Severe Pain (7 - 10)  ondansetron Injectable 4 milliGRAM(s) IV Push every 6 hours PRN Nausea and/or Vomiting  oxycodone    5 mG/acetaminophen 325 mG 2 Tablet(s) Oral every 6 hours PRN Moderate Pain (4 - 6)  sodium chloride 0.65% Nasal 1 Spray(s) Both Nostrils four times a day PRN Nasal Congestion  zolpidem 5 milliGRAM(s) Oral at bedtime PRN Insomnia      Allergies    fish (Angioedema)  penicillins (Rash)        PAST MEDICAL & SURGICAL HISTORY:  Malignant neoplasm of unspecified part of right bronchus or lung    HTN (hypertension)    DM (diabetes mellitus)    COPD (chronic obstructive pulmonary disease)    Lung cancer    Morbid obesity    GERD (gastroesophageal reflux disease)    Deviated septum    Prolapsed uterus    ITP (idiopathic thrombocytopenic purpura)    Emphysema/COPD    History of cholecystectomy    S/P lobectomy of lung  right 2017    History of tonsillectomy             REVIEW OF SYSTEMS:   CONSTITUTIONAL: No fever, weight loss, or fatigue   EYES: No eye pain, visual disturbances, or discharge  ENT:  No difficulty hearing, tinnitus, vertigo; No sinus or throat pain  NECK: No pain or stiffness or nodes  RESPIRATORY:  cough +  wheezing ++ chills--   hemoptysis -- Shortness of Breath++  CARDIOVASCULAR: mid chest pain+, no  palpitations, passing out, dizziness, or leg swelling  GASTROINTESTINAL: No abdominal or epigastric pain. No nausea, vomiting, or hematemesis; No diarrhea or constipation. No melena or hematochezia.  GENITOURINARY: No dysuria, frequency, hematuria, or incontinence  NEUROLOGICAL: No headaches, memory loss, loss of strength, numbness, or tremors  SKIN: No itching, burning, rashes, or lesions   LYMPH Nodes: No enlarged glands  HEME/LYMPH: No easy bruising, or bleeding gums  ALLERGY AND IMMUNOLOGIC: No hives or eczema    Vital Signs Last 24 Hrs  T(C): 36.6 (05 Oct 2020 05:00), Max: 36.9 (04 Oct 2020 15:09)  T(F): 97.9 (05 Oct 2020 05:00), Max: 98.5 (04 Oct 2020 15:09)  HR: 105 (05 Oct 2020 08:18) (105 - 123)  BP: 141/75 (05 Oct 2020 05:00) (127/79 - 154/46)  BP(mean): --  RR: 30 (05 Oct 2020 05:00) (19 - 30)  SpO2: 97% (05 Oct 2020 08:18) (95% - 100%)  I&O's Detail    04 Oct 2020 07:01  -  05 Oct 2020 07:00  --------------------------------------------------------  IN:  Total IN: 0 mL    OUT:    Indwelling Catheter - Urethral (mL): 200 mL  Total OUT: 200 mL    Total NET: -200 mL          PHYSICAL EXAMINATION:    GENERAL: The patient is a well-developed, obese w/f  in no apparent distress.   SKIN no rash ecchymoses or bruises  HEENT: Head is normocephalic and atraumatic  SHAMA , Extraocular muscles are intact. Mucous membranes  moist.   Neck supple ,No LN felt JVP not increased  Thyroid not enlarged  Cardiovascular:  S1 S2 heard, RSR, No JVD , systolic  murmur at apex, No gallop or rub  Respiratory: Chest wall symmetrical with good air entry ,Percussion note normal,    Lungs vesicular breathing with  no  rales , expiratory   wheeze	  ABDOMEN:  Soft, Non-tender, obese,  no hepatomegaly or splenomegaly BS positive	  Extremities: Normal range of motion, No clubbing, cyanosis or edema  Vascular: Peripheral pulses palpable 2+ bilaterally  CNS:  Alert and oriented x3   Cranial nerves intact  sensory intact  motor power5/5  dtr 2+   Babinski neg    LABS:                        7.4    3.78  )-----------( x        ( 05 Oct 2020 08:28 )             22.1     10-    127<L>  |  86<L>  |  20<H>  ----------------------------<  168<H>  3.8   |  33<H>  |  0.44<L>    Ca    8.2<L>      05 Oct 2020 08:28  Phos  2.8     10-  Mg     1.9     10        Urinalysis Basic - ( 04 Oct 2020 11:23 )    Color: Yellow / Appearance: Clear / S.015 / pH: x  Gluc: x / Ketone: Negative  / Bili: Negative / Urobili: Negative   Blood: x / Protein: Negative / Nitrite: Negative   Leuk Esterase: Negative / RBC: x / WBC x   Sq Epi: x / Non Sq Epi: x / Bacteria: x      ABG - ( 05 Oct 2020 06:05 )  pH, Arterial: 7.48  pH, Blood: x     /  pCO2: 47    /  pO2: 99    / HCO3: 34    / Base Excess: 9.7   /  SaO2: 98      Lactate, Blood: 3.1 mmol/L (10-05-20 @ 08:21)      RADIOLOGY & ADDITIONAL STUDIES:    Venous Doppler Negative

## 2020-10-05 NOTE — PROGRESS NOTE ADULT - ASSESSMENT
· Assessment	  65 year old lady with severe COPD and lung ca with mets to bones which causing compression Fx.  She had one RT for that.  she was admitted for dyspnea and hypoxia.    Problem/Recommendation - 1:  Problem: Lung cancer. Recommendation: with mets to bones  on chemo keytruda, alimta and carboplatin  she had one RT to spine.  the tumor markers have been trending down with chemo    Problem/Recommendation - 2:  ·  Problem: COPD (chronic obstructive pulmonary disease).  Recommendation: required freq steroids.     Problem/Recommendation - 3:  ·  Problem: Acute on chronic respiratory failure with hypoxia.  Recommendation: ?aspiration causing resp failure requiring intubation  she has pain at throat for a while and has difficulty swallowing  now extubated  prognosis poor.   she is lucid now and express wishes to continue chemo. she needs placement because she is not able to climb the stairs  4. sore throat   she has difficulty swallowing  need a scope  th pain is less now  able to swallow food better    will watch for possibility of aspiration  she has cough with some sputum now  she is still on antibiotics    ?strider  pul to see her  ?need intubation again

## 2020-10-06 LAB
ALBUMIN SERPL ELPH-MCNC: 1.8 G/DL — LOW (ref 3.5–5)
ALBUMIN SERPL ELPH-MCNC: 1.8 G/DL — LOW (ref 3.5–5)
ALP SERPL-CCNC: 101 U/L — SIGNIFICANT CHANGE UP (ref 40–120)
ALP SERPL-CCNC: 109 U/L — SIGNIFICANT CHANGE UP (ref 40–120)
ALT FLD-CCNC: 25 U/L DA — SIGNIFICANT CHANGE UP (ref 10–60)
ALT FLD-CCNC: 26 U/L DA — SIGNIFICANT CHANGE UP (ref 10–60)
ANION GAP SERPL CALC-SCNC: 10 MMOL/L — SIGNIFICANT CHANGE UP (ref 5–17)
ANION GAP SERPL CALC-SCNC: 7 MMOL/L — SIGNIFICANT CHANGE UP (ref 5–17)
AST SERPL-CCNC: 21 U/L — SIGNIFICANT CHANGE UP (ref 10–40)
AST SERPL-CCNC: 24 U/L — SIGNIFICANT CHANGE UP (ref 10–40)
BASE EXCESS BLDA CALC-SCNC: 6.4 MMOL/L — HIGH (ref -2–2)
BASE EXCESS BLDA CALC-SCNC: 9 MMOL/L — HIGH (ref -2–2)
BILIRUB SERPL-MCNC: 0.9 MG/DL — SIGNIFICANT CHANGE UP (ref 0.2–1.2)
BILIRUB SERPL-MCNC: 1.1 MG/DL — SIGNIFICANT CHANGE UP (ref 0.2–1.2)
BLD GP AB SCN SERPL QL: SIGNIFICANT CHANGE UP
BLOOD GAS COMMENTS ARTERIAL: SIGNIFICANT CHANGE UP
BLOOD GAS COMMENTS ARTERIAL: SIGNIFICANT CHANGE UP
BUN SERPL-MCNC: 13 MG/DL — SIGNIFICANT CHANGE UP (ref 7–18)
BUN SERPL-MCNC: 8 MG/DL — SIGNIFICANT CHANGE UP (ref 7–18)
CALCIUM SERPL-MCNC: 7.4 MG/DL — LOW (ref 8.4–10.5)
CALCIUM SERPL-MCNC: 8 MG/DL — LOW (ref 8.4–10.5)
CHLORIDE SERPL-SCNC: 100 MMOL/L — SIGNIFICANT CHANGE UP (ref 96–108)
CHLORIDE SERPL-SCNC: 94 MMOL/L — LOW (ref 96–108)
CO2 SERPL-SCNC: 30 MMOL/L — SIGNIFICANT CHANGE UP (ref 22–31)
CO2 SERPL-SCNC: 31 MMOL/L — SIGNIFICANT CHANGE UP (ref 22–31)
CREAT SERPL-MCNC: 0.43 MG/DL — LOW (ref 0.5–1.3)
CREAT SERPL-MCNC: 0.47 MG/DL — LOW (ref 0.5–1.3)
GLUCOSE BLDC GLUCOMTR-MCNC: 116 MG/DL — HIGH (ref 70–99)
GLUCOSE BLDC GLUCOMTR-MCNC: 153 MG/DL — HIGH (ref 70–99)
GLUCOSE BLDC GLUCOMTR-MCNC: 175 MG/DL — HIGH (ref 70–99)
GLUCOSE BLDC GLUCOMTR-MCNC: 256 MG/DL — HIGH (ref 70–99)
GLUCOSE SERPL-MCNC: 162 MG/DL — HIGH (ref 70–99)
GLUCOSE SERPL-MCNC: 96 MG/DL — SIGNIFICANT CHANGE UP (ref 70–99)
HCO3 BLDA-SCNC: 29 MMOL/L — HIGH (ref 23–27)
HCO3 BLDA-SCNC: 33 MMOL/L — HIGH (ref 23–27)
HCT VFR BLD CALC: 23.3 % — LOW (ref 34.5–45)
HCT VFR BLD CALC: 23.9 % — LOW (ref 34.5–45)
HGB BLD-MCNC: 7.9 G/DL — LOW (ref 11.5–15.5)
HGB BLD-MCNC: 8 G/DL — LOW (ref 11.5–15.5)
HOROWITZ INDEX BLDA+IHG-RTO: 80 — SIGNIFICANT CHANGE UP
HOROWITZ INDEX BLDA+IHG-RTO: 90 — SIGNIFICANT CHANGE UP
INR BLD: 1.75 RATIO — HIGH (ref 0.88–1.16)
MAGNESIUM SERPL-MCNC: 1.4 MG/DL — LOW (ref 1.6–2.6)
MAGNESIUM SERPL-MCNC: 2.1 MG/DL — SIGNIFICANT CHANGE UP (ref 1.6–2.6)
MCHC RBC-ENTMCNC: 29.4 PG — SIGNIFICANT CHANGE UP (ref 27–34)
MCHC RBC-ENTMCNC: 29.5 PG — SIGNIFICANT CHANGE UP (ref 27–34)
MCHC RBC-ENTMCNC: 33.5 GM/DL — SIGNIFICANT CHANGE UP (ref 32–36)
MCHC RBC-ENTMCNC: 33.9 GM/DL — SIGNIFICANT CHANGE UP (ref 32–36)
MCV RBC AUTO: 86.9 FL — SIGNIFICANT CHANGE UP (ref 80–100)
MCV RBC AUTO: 87.9 FL — SIGNIFICANT CHANGE UP (ref 80–100)
NRBC # BLD: 2 /100 WBCS — HIGH (ref 0–0)
NRBC # BLD: 3 /100 WBCS — HIGH (ref 0–0)
PCO2 BLDA: 35 MMHG — SIGNIFICANT CHANGE UP (ref 32–46)
PCO2 BLDA: 45 MMHG — SIGNIFICANT CHANGE UP (ref 32–46)
PH BLDA: 7.48 — HIGH (ref 7.35–7.45)
PH BLDA: 7.53 — HIGH (ref 7.35–7.45)
PHOSPHATE SERPL-MCNC: 2.3 MG/DL — LOW (ref 2.5–4.5)
PHOSPHATE SERPL-MCNC: 2.7 MG/DL — SIGNIFICANT CHANGE UP (ref 2.5–4.5)
PLATELET # BLD AUTO: 42 K/UL — LOW (ref 150–400)
PLATELET # BLD AUTO: 54 K/UL — LOW (ref 150–400)
PO2 BLDA: 56 MMHG — LOW (ref 74–108)
PO2 BLDA: 75 MMHG — SIGNIFICANT CHANGE UP (ref 74–108)
POTASSIUM SERPL-MCNC: 3.1 MMOL/L — LOW (ref 3.5–5.3)
POTASSIUM SERPL-MCNC: 3.8 MMOL/L — SIGNIFICANT CHANGE UP (ref 3.5–5.3)
POTASSIUM SERPL-SCNC: 3.1 MMOL/L — LOW (ref 3.5–5.3)
POTASSIUM SERPL-SCNC: 3.8 MMOL/L — SIGNIFICANT CHANGE UP (ref 3.5–5.3)
PROT SERPL-MCNC: 4.9 G/DL — LOW (ref 6–8.3)
PROT SERPL-MCNC: 5.3 G/DL — LOW (ref 6–8.3)
PROTHROM AB SERPL-ACNC: 20.3 SEC — HIGH (ref 10.6–13.6)
RBC # BLD: 2.68 M/UL — LOW (ref 3.8–5.2)
RBC # BLD: 2.72 M/UL — LOW (ref 3.8–5.2)
RBC # FLD: 15.2 % — HIGH (ref 10.3–14.5)
RBC # FLD: 15.6 % — HIGH (ref 10.3–14.5)
SAO2 % BLDA: 90 % — LOW (ref 92–96)
SAO2 % BLDA: 96 % — SIGNIFICANT CHANGE UP (ref 92–96)
SARS-COV-2 RNA SPEC QL NAA+PROBE: SIGNIFICANT CHANGE UP
SODIUM SERPL-SCNC: 134 MMOL/L — LOW (ref 135–145)
SODIUM SERPL-SCNC: 138 MMOL/L — SIGNIFICANT CHANGE UP (ref 135–145)
WBC # BLD: 2.2 K/UL — LOW (ref 3.8–10.5)
WBC # BLD: 5.38 K/UL — SIGNIFICANT CHANGE UP (ref 3.8–10.5)
WBC # FLD AUTO: 2.2 K/UL — LOW (ref 3.8–10.5)
WBC # FLD AUTO: 5.38 K/UL — SIGNIFICANT CHANGE UP (ref 3.8–10.5)

## 2020-10-06 PROCEDURE — 71045 X-RAY EXAM CHEST 1 VIEW: CPT | Mod: 26

## 2020-10-06 PROCEDURE — 99223 1ST HOSP IP/OBS HIGH 75: CPT

## 2020-10-06 PROCEDURE — 99233 SBSQ HOSP IP/OBS HIGH 50: CPT

## 2020-10-06 RX ORDER — LINEZOLID 600 MG/300ML
INJECTION, SOLUTION INTRAVENOUS
Refills: 0 | Status: DISCONTINUED | OUTPATIENT
Start: 2020-10-06 | End: 2020-10-07

## 2020-10-06 RX ORDER — FENTANYL CITRATE 50 UG/ML
0.5 INJECTION INTRAVENOUS
Qty: 2500 | Refills: 0 | Status: DISCONTINUED | OUTPATIENT
Start: 2020-10-06 | End: 2020-10-06

## 2020-10-06 RX ORDER — ACETAMINOPHEN 500 MG
650 TABLET ORAL EVERY 6 HOURS
Refills: 0 | Status: DISCONTINUED | OUTPATIENT
Start: 2020-10-06 | End: 2020-10-08

## 2020-10-06 RX ORDER — INSULIN LISPRO 100/ML
VIAL (ML) SUBCUTANEOUS EVERY 6 HOURS
Refills: 0 | Status: DISCONTINUED | OUTPATIENT
Start: 2020-10-06 | End: 2020-10-08

## 2020-10-06 RX ORDER — FENTANYL CITRATE 50 UG/ML
4 INJECTION INTRAVENOUS
Qty: 2500 | Refills: 0 | Status: DISCONTINUED | OUTPATIENT
Start: 2020-10-06 | End: 2020-10-08

## 2020-10-06 RX ORDER — LINEZOLID 600 MG/300ML
600 INJECTION, SOLUTION INTRAVENOUS EVERY 12 HOURS
Refills: 0 | Status: DISCONTINUED | OUTPATIENT
Start: 2020-10-06 | End: 2020-10-07

## 2020-10-06 RX ORDER — FENTANYL CITRATE 50 UG/ML
0.5 INJECTION INTRAVENOUS
Qty: 5000 | Refills: 0 | Status: DISCONTINUED | OUTPATIENT
Start: 2020-10-06 | End: 2020-10-06

## 2020-10-06 RX ORDER — LINEZOLID 600 MG/300ML
600 INJECTION, SOLUTION INTRAVENOUS ONCE
Refills: 0 | Status: COMPLETED | OUTPATIENT
Start: 2020-10-06 | End: 2020-10-06

## 2020-10-06 RX ORDER — PHENYLEPHRINE HYDROCHLORIDE 10 MG/ML
1 INJECTION INTRAVENOUS
Qty: 40 | Refills: 0 | Status: DISCONTINUED | OUTPATIENT
Start: 2020-10-06 | End: 2020-10-08

## 2020-10-06 RX ORDER — FENTANYL CITRATE 50 UG/ML
50 INJECTION INTRAVENOUS
Refills: 0 | Status: DISCONTINUED | OUTPATIENT
Start: 2020-10-06 | End: 2020-10-08

## 2020-10-06 RX ORDER — INSULIN LISPRO 100/ML
4 VIAL (ML) SUBCUTANEOUS EVERY 6 HOURS
Refills: 0 | Status: DISCONTINUED | OUTPATIENT
Start: 2020-10-06 | End: 2020-10-08

## 2020-10-06 RX ORDER — MAGNESIUM SULFATE 500 MG/ML
1 VIAL (ML) INJECTION ONCE
Refills: 0 | Status: COMPLETED | OUTPATIENT
Start: 2020-10-06 | End: 2020-10-06

## 2020-10-06 RX ORDER — POTASSIUM CHLORIDE 20 MEQ
10 PACKET (EA) ORAL
Refills: 0 | Status: COMPLETED | OUTPATIENT
Start: 2020-10-06 | End: 2020-10-06

## 2020-10-06 RX ADMIN — Medication 100 MILLIEQUIVALENT(S): at 11:00

## 2020-10-06 RX ADMIN — Medication 650 MILLIGRAM(S): at 05:52

## 2020-10-06 RX ADMIN — CHLORHEXIDINE GLUCONATE 15 MILLILITER(S): 213 SOLUTION TOPICAL at 17:27

## 2020-10-06 RX ADMIN — Medication 4 UNIT(S): at 11:21

## 2020-10-06 RX ADMIN — Medication 2: at 11:22

## 2020-10-06 RX ADMIN — Medication 80 MILLIGRAM(S): at 15:14

## 2020-10-06 RX ADMIN — Medication 100 GRAM(S): at 08:21

## 2020-10-06 RX ADMIN — INSULIN GLARGINE 20 UNIT(S): 100 INJECTION, SOLUTION SUBCUTANEOUS at 09:17

## 2020-10-06 RX ADMIN — PANTOPRAZOLE SODIUM 40 MILLIGRAM(S): 20 TABLET, DELAYED RELEASE ORAL at 11:18

## 2020-10-06 RX ADMIN — GABAPENTIN 600 MILLIGRAM(S): 400 CAPSULE ORAL at 14:24

## 2020-10-06 RX ADMIN — DEXMEDETOMIDINE HYDROCHLORIDE IN 0.9% SODIUM CHLORIDE 6.36 MICROGRAM(S)/KG/HR: 4 INJECTION INTRAVENOUS at 14:29

## 2020-10-06 RX ADMIN — Medication 2: at 05:54

## 2020-10-06 RX ADMIN — Medication 1 MILLIGRAM(S): at 11:18

## 2020-10-06 RX ADMIN — TIOTROPIUM BROMIDE 1 CAPSULE(S): 18 CAPSULE ORAL; RESPIRATORY (INHALATION) at 12:00

## 2020-10-06 RX ADMIN — FENTANYL CITRATE 50 MICROGRAM(S): 50 INJECTION INTRAVENOUS at 03:47

## 2020-10-06 RX ADMIN — NYSTATIN CREAM 1 APPLICATION(S): 100000 CREAM TOPICAL at 05:53

## 2020-10-06 RX ADMIN — ALBUTEROL 2 PUFF(S): 90 AEROSOL, METERED ORAL at 01:02

## 2020-10-06 RX ADMIN — Medication 100 MILLIEQUIVALENT(S): at 07:56

## 2020-10-06 RX ADMIN — BUDESONIDE AND FORMOTEROL FUMARATE DIHYDRATE 2 PUFF(S): 160; 4.5 AEROSOL RESPIRATORY (INHALATION) at 13:36

## 2020-10-06 RX ADMIN — ALBUTEROL 2 PUFF(S): 90 AEROSOL, METERED ORAL at 21:01

## 2020-10-06 RX ADMIN — POLYETHYLENE GLYCOL 3350 17 GRAM(S): 17 POWDER, FOR SOLUTION ORAL at 17:29

## 2020-10-06 RX ADMIN — ENOXAPARIN SODIUM 40 MILLIGRAM(S): 100 INJECTION SUBCUTANEOUS at 11:22

## 2020-10-06 RX ADMIN — Medication 650 MILLIGRAM(S): at 06:22

## 2020-10-06 RX ADMIN — DIPHENHYDRAMINE HYDROCHLORIDE AND LIDOCAINE HYDROCHLORIDE AND ALUMINUM HYDROXIDE AND MAGNESIUM HYDRO 10 MILLILITER(S): KIT at 14:24

## 2020-10-06 RX ADMIN — Medication 40 MILLIGRAM(S): at 05:55

## 2020-10-06 RX ADMIN — LINEZOLID 300 MILLIGRAM(S): 600 INJECTION, SOLUTION INTRAVENOUS at 17:29

## 2020-10-06 RX ADMIN — POLYETHYLENE GLYCOL 3350 17 GRAM(S): 17 POWDER, FOR SOLUTION ORAL at 05:53

## 2020-10-06 RX ADMIN — CHLORHEXIDINE GLUCONATE 15 MILLILITER(S): 213 SOLUTION TOPICAL at 05:38

## 2020-10-06 RX ADMIN — Medication 100 MILLIEQUIVALENT(S): at 09:24

## 2020-10-06 RX ADMIN — Medication 3 MILLIGRAM(S): at 21:25

## 2020-10-06 RX ADMIN — ATORVASTATIN CALCIUM 40 MILLIGRAM(S): 80 TABLET, FILM COATED ORAL at 21:25

## 2020-10-06 RX ADMIN — PHENYLEPHRINE HYDROCHLORIDE 31.8 MICROGRAM(S)/KG/MIN: 10 INJECTION INTRAVENOUS at 21:25

## 2020-10-06 RX ADMIN — CHLORHEXIDINE GLUCONATE 1 APPLICATION(S): 213 SOLUTION TOPICAL at 05:38

## 2020-10-06 RX ADMIN — Medication 6: at 23:54

## 2020-10-06 RX ADMIN — FENTANYL CITRATE 50 MICROGRAM(S): 50 INJECTION INTRAVENOUS at 04:07

## 2020-10-06 RX ADMIN — GABAPENTIN 600 MILLIGRAM(S): 400 CAPSULE ORAL at 05:55

## 2020-10-06 RX ADMIN — GABAPENTIN 600 MILLIGRAM(S): 400 CAPSULE ORAL at 21:25

## 2020-10-06 RX ADMIN — ALBUTEROL 2 PUFF(S): 90 AEROSOL, METERED ORAL at 06:06

## 2020-10-06 RX ADMIN — NYSTATIN CREAM 1 APPLICATION(S): 100000 CREAM TOPICAL at 17:31

## 2020-10-06 RX ADMIN — ALBUTEROL 2 PUFF(S): 90 AEROSOL, METERED ORAL at 09:13

## 2020-10-06 RX ADMIN — ALBUTEROL 2 PUFF(S): 90 AEROSOL, METERED ORAL at 17:01

## 2020-10-06 RX ADMIN — FENTANYL CITRATE 33.9 MICROGRAM(S)/KG/HR: 50 INJECTION INTRAVENOUS at 11:21

## 2020-10-06 RX ADMIN — CASPOFUNGIN ACETATE 260 MILLIGRAM(S): 7 INJECTION, POWDER, LYOPHILIZED, FOR SOLUTION INTRAVENOUS at 15:14

## 2020-10-06 RX ADMIN — ALBUTEROL 2 PUFF(S): 90 AEROSOL, METERED ORAL at 13:35

## 2020-10-06 RX ADMIN — LINEZOLID 300 MILLIGRAM(S): 600 INJECTION, SOLUTION INTRAVENOUS at 01:59

## 2020-10-06 RX ADMIN — BUDESONIDE AND FORMOTEROL FUMARATE DIHYDRATE 2 PUFF(S): 160; 4.5 AEROSOL RESPIRATORY (INHALATION) at 21:00

## 2020-10-06 RX ADMIN — SENNA PLUS 2 TABLET(S): 8.6 TABLET ORAL at 21:25

## 2020-10-06 NOTE — PROGRESS NOTE ADULT - ATTENDING COMMENTS
66 yo obese F with hx of 80 pack year tobacco use, HTN, DM2 on insulin, COPD on 5L (frequent admissions), R lung cancer s/p resection (2017) on chemo q 3 weeks with concern for mets to spine and compression fractures, recent admission for COPD exacerbation earlier in August who on 9/19/2020 presented to ED with c/o SOB, hypoxia and increased sputum production x 1 day. Patient was initially transferred to ICU s/p RRT on 9/21 requiring intubation for respiratory failure s/t copd exacerbation, extubated on 9/22 however required bipap thereafter. She was also noted to be bacteremic with VRE and fungemic s/t picc which was in place for chemo hence removed. Patient completed course of zyvox and continued on caspofungin, her repeat blood cultures were noted negative. Patient was transferred to SCU for continued care.   While in SCU, she was noted to have respiratory distress for which she was switched to AVAPS on 9/30 with good response however now noted to have respiratory distress even on AVAPS hence started on iv solumedrol and decision made to transfer patient to ICU for close monitoring for low threshold for intubation.   On assessment, patient was noted in moderate respiratory distress, saturating 90% on AVAPS. Emergent femoral central line placed due to difficult iv access and started on precedex drip.   Patient noted to have continued respiratory distress hence decision made to intubate for need for mechanical ventilation. 66 yo obese F with hx of 80 pack year tobacco use, HTN, DM2 on insulin, COPD on 5L (frequent admissions), R lung cancer s/p resection (2017) on chemo q 3 weeks with concern for mets to spine and compression fractures, recent admission for COPD exacerbation earlier in August who on 9/19/2020 presented to ED with c/o SOB, hypoxia and increased sputum production x 1 day. Patient was initially transferred to ICU s/p RRT on 9/21 requiring intubation for respiratory failure s/t copd exacerbation, extubated on 9/22 however required bipap thereafter. She was also noted to be bacteremic with VRE and fungemic s/t picc which was in place for chemo hence removed. Patient completed course of zyvox and continued on caspofungin, her repeat blood cultures were noted negative. Patient was transferred to SCU for continued care.   While in SCU, she was noted to have respiratory distress for which she was switched to AVAPS on 9/30 with good response however now noted to have respiratory distress even on AVAPS hence started on iv solumedrol and decision made to transfer patient to ICU for close monitoring for low threshold for intubation.   On assessment, patient was noted in moderate respiratory distress, saturating 90% on AVAPS. Emergent femoral central line placed due to difficult iv access and started on precedex drip.   Patient noted to have continued respiratory distress hence decision made to intubate for need for mechanical ventilation.      Plan   -continue vent support  -sedated  -thoracic for trach and PEG  -intubation #2 10/5  -pat verbally stated that she wish for trach and PEG  -DVT/GI prophy  -hemodynamic support  -

## 2020-10-06 NOTE — PROGRESS NOTE ADULT - SUBJECTIVE AND OBJECTIVE BOX
Interval Events:    increased work of breathing - transferred to ICU- s/p intubation  on TF  on pressor  on high dose solumedrol    Allergies    fish (Angioedema)  penicillins (Rash)    Intolerances      Endocrine/Metabolic Medications:  atorvastatin 40 milliGRAM(s) Oral at bedtime  dextrose 40% Gel 15 Gram(s) Oral once PRN  dextrose 50% Injectable 12.5 Gram(s) IV Push once  dextrose 50% Injectable 25 Gram(s) IV Push once  dextrose 50% Injectable 25 Gram(s) IV Push once  glucagon  Injectable 1 milliGRAM(s) IntraMuscular once PRN  insulin glargine Injectable (LANTUS) 20 Unit(s) SubCutaneous every morning  insulin lispro (HumaLOG) corrective regimen sliding scale   SubCutaneous three times a day before meals  insulin lispro Injectable (HumaLOG) 6 Unit(s) SubCutaneous three times a day before meals  methylPREDNISolone sodium succinate Injectable 40 milliGRAM(s) IV Push every 8 hours      Vital Signs Last 24 Hrs  T(C): 37.3 (06 Oct 2020 07:30), Max: 38.4 (06 Oct 2020 05:45)  T(F): 99.1 (06 Oct 2020 07:30), Max: 101.1 (06 Oct 2020 05:45)  HR: 76 (06 Oct 2020 07:30) (75 - 159)  BP: 134/67 (06 Oct 2020 07:30) (86/67 - 149/127)  BP(mean): 82 (06 Oct 2020 07:30) (59 - 133)  RR: 14 (06 Oct 2020 07:30) (14 - 43)  SpO2: 100% (06 Oct 2020 07:30) (80% - 100%)      PHYSICAL EXAM  Constitutional:  s/p intubation   NC/AT:    HEENT:    Neck:  No JVD    Respiratory:  reduced breath sounds b/l bases    Cardiovascular:  RR    Gastrointestinal: Soft    Extremities: without cyanosis  Neurological:  sedated      LABS                        7.9    2.20  )-----------( 54       ( 06 Oct 2020 06:11 )             23.3                               134    |  94     |  13                  Calcium: 7.4   / iCa: x      (10-06 @ 06:11)    ----------------------------<  162       Magnesium: 1.4                              3.1     |  30     |  0.43             Phosphorous: 2.7      TPro  4.9    /  Alb  1.8    /  TBili  1.1    /  DBili  x      /  AST  24     /  ALT  25     /  AlkPhos  101    06 Oct 2020 06:11    CAPILLARY BLOOD GLUCOSE      POCT Blood Glucose.: 175 mg/dL (06 Oct 2020 05:13)  POCT Blood Glucose.: 85 mg/dL (05 Oct 2020 17:14)  POCT Blood Glucose.: 209 mg/dL (05 Oct 2020 11:35)  POCT Blood Glucose.: 217 mg/dL (05 Oct 2020 09:22)        Assesment/plan        64 yo female with multiple comorbidities including h/o DM type 2, HTN, COPD on o2, lung ca on right sided s/p resection on chemo admitted with shortness of breath  was intubated/extubated for acute resp failure this admit, now extubated on general medical floor, standing high dose steroids with uncontrolled hyperglycemia    endocrinology consulted for glycemic management    DM type 2  uncontrolled  complicated by high dose steroid use, acute on chronic resp failure  home regimen:  basaglar 60 units daily  novolog 15 units pre meals    recommendations:  deteriorated, intubated  on high dose solumedrol  on TF    moderate dose sliding scale q6h  - continue insulin lantus 20 units daily for today  - change to insulin lispro 4 units q6h  - reassess based on requirements  - goal inpatient glucose range: 140-180mg/dl    COPD exacerbation  acute on chronic hypoxic resp failure  s/p intubation/extubation current admit  on standing steroids  steroids increased  bipap   pulmonary on board  deteriorated, transferred to ICU, reintubated    sepsis  VRE bacteremia  received zosyn  ID on board    Discussed with primary team   Please call  Endocrine- Dr Katlyn Garcia as needed     Interval Events:    increased work of breathing - transferred to ICU- s/p intubation  on TF  on pressor  on high dose solumedrol    Allergies    fish (Angioedema)  penicillins (Rash)    Intolerances      Endocrine/Metabolic Medications:  atorvastatin 40 milliGRAM(s) Oral at bedtime  dextrose 40% Gel 15 Gram(s) Oral once PRN  dextrose 50% Injectable 12.5 Gram(s) IV Push once  dextrose 50% Injectable 25 Gram(s) IV Push once  dextrose 50% Injectable 25 Gram(s) IV Push once  glucagon  Injectable 1 milliGRAM(s) IntraMuscular once PRN  insulin glargine Injectable (LANTUS) 20 Unit(s) SubCutaneous every morning  insulin lispro (HumaLOG) corrective regimen sliding scale   SubCutaneous three times a day before meals  insulin lispro Injectable (HumaLOG) 6 Unit(s) SubCutaneous three times a day before meals  methylPREDNISolone sodium succinate Injectable 40 milliGRAM(s) IV Push every 8 hours      Vital Signs Last 24 Hrs  T(C): 37.3 (06 Oct 2020 07:30), Max: 38.4 (06 Oct 2020 05:45)  T(F): 99.1 (06 Oct 2020 07:30), Max: 101.1 (06 Oct 2020 05:45)  HR: 76 (06 Oct 2020 07:30) (75 - 159)  BP: 134/67 (06 Oct 2020 07:30) (86/67 - 149/127)  BP(mean): 82 (06 Oct 2020 07:30) (59 - 133)  RR: 14 (06 Oct 2020 07:30) (14 - 43)  SpO2: 100% (06 Oct 2020 07:30) (80% - 100%)      PHYSICAL EXAM  Constitutional:  s/p intubation   NC/AT:    HEENT:    Neck:  No JVD    Respiratory:  reduced breath sounds b/l bases    Cardiovascular:  RR    Gastrointestinal: Soft    Extremities: without cyanosis  Neurological:  sedated      LABS                        7.9    2.20  )-----------( 54       ( 06 Oct 2020 06:11 )             23.3                               134    |  94     |  13                  Calcium: 7.4   / iCa: x      (10-06 @ 06:11)    ----------------------------<  162       Magnesium: 1.4                              3.1     |  30     |  0.43             Phosphorous: 2.7      TPro  4.9    /  Alb  1.8    /  TBili  1.1    /  DBili  x      /  AST  24     /  ALT  25     /  AlkPhos  101    06 Oct 2020 06:11    CAPILLARY BLOOD GLUCOSE      POCT Blood Glucose.: 175 mg/dL (06 Oct 2020 05:13)  POCT Blood Glucose.: 85 mg/dL (05 Oct 2020 17:14)  POCT Blood Glucose.: 209 mg/dL (05 Oct 2020 11:35)  POCT Blood Glucose.: 217 mg/dL (05 Oct 2020 09:22)        Assesment/plan        66 yo female with multiple comorbidities including h/o DM type 2, HTN, COPD on o2, lung ca on right sided s/p resection on chemo admitted with shortness of breath  was intubated/extubated for acute resp failure this admit, now extubated on general medical floor, standing high dose steroids with uncontrolled hyperglycemia    endocrinology consulted for glycemic management    DM type 2  uncontrolled  complicated by high dose steroid use, acute on chronic resp failure  home regimen:  basaglar 60 units daily  novolog 15 units pre meals    recommendations:  deteriorated, intubated  on high dose solumedrol  on TF    moderate dose sliding scale q6h  - continue insulin lantus 20 units daily for today  - change to insulin lispro 4 units q6h  - reassess based on requirements  - goal inpatient glucose range: 140-180mg/dl    COPD exacerbation  acute on chronic hypoxic resp failure  s/p intubation/extubation current admit  on standing steroids  steroids increased  bipap   pulmonary on board  deteriorated, transferred to ICU, reintubated    sepsis  VRE bacteremia  received zosyn  ID on board    Discussed with primary team   Please call  Endocrine- Dr Katlyn Garcia as needed    Time spent evaluating patient including coordination of care 35mins.

## 2020-10-06 NOTE — PROGRESS NOTE ADULT - SUBJECTIVE AND OBJECTIVE BOX
INTERVAL HPI/OVERNIGHT EVENTS: ***    PRESSORS: [ ] YES [ ] NO    Antimicrobial:  caspofungin IVPB      caspofungin IVPB 50 milliGRAM(s) IV Intermittent every 24 hours  levoFLOXacin IVPB      linezolid  IVPB 600 milliGRAM(s) IV Intermittent every 12 hours  linezolid  IVPB        Cardiovascular:  carvedilol 6.25 milliGRAM(s) Oral every 12 hours  phenylephrine    Infusion 0.2 MICROgram(s)/kG/Min IV Continuous <Continuous>    Pulmonary:  ALBUTerol    90 MICROgram(s) HFA Inhaler 2 Puff(s) Inhalation every 4 hours  budesonide 160 MICROgram(s)/formoterol 4.5 MICROgram(s) Inhaler 2 Puff(s) Inhalation two times a day  guaiFENesin   Syrup  (Sugar-Free) 100 milliGRAM(s) Oral every 6 hours PRN  tiotropium 18 MICROgram(s) Capsule 1 Capsule(s) Inhalation daily    Hematologic:  enoxaparin Injectable 40 milliGRAM(s) SubCutaneous daily    Other:  acetaminophen    Suspension .. 650 milliGRAM(s) Oral every 6 hours PRN  atorvastatin 40 milliGRAM(s) Oral at bedtime  benzocaine 15 mG/menthol 3.6 mG (Sugar-Free) Lozenge 1 Lozenge Oral every 4 hours PRN  chlorhexidine 0.12% Liquid 15 milliLiter(s) Oral Mucosa every 12 hours  chlorhexidine 2% Cloths 1 Application(s) Topical <User Schedule>  dexMEDEtomidine Infusion 0.3 MICROgram(s)/kG/Hr IV Continuous <Continuous>  dextrose 40% Gel 15 Gram(s) Oral once PRN  dextrose 5% + sodium chloride 0.9%. 1000 milliLiter(s) IV Continuous <Continuous>  dextrose 5%. 1000 milliLiter(s) IV Continuous <Continuous>  dextrose 50% Injectable 12.5 Gram(s) IV Push once  dextrose 50% Injectable 25 Gram(s) IV Push once  dextrose 50% Injectable 25 Gram(s) IV Push once  ergocalciferol 66905 Unit(s) Oral <User Schedule>  fentaNYL    Injectable 50 MICROGram(s) IV Push every 2 hours PRN  fentaNYL   Infusion. 0.5 MICROgram(s)/kG/Hr IV Continuous <Continuous>  FIRST- Mouthwash  BLM 10 milliLiter(s) Swish and Swallow every 8 hours  folic acid 1 milliGRAM(s) Oral daily  gabapentin 600 milliGRAM(s) Oral every 8 hours  glucagon  Injectable 1 milliGRAM(s) IntraMuscular once PRN  influenza   Vaccine 0.5 milliLiter(s) IntraMuscular once  insulin glargine Injectable (LANTUS) 20 Unit(s) SubCutaneous every morning  insulin lispro (HumaLOG) corrective regimen sliding scale   SubCutaneous three times a day before meals  insulin lispro Injectable (HumaLOG) 6 Unit(s) SubCutaneous three times a day before meals  melatonin 3 milliGRAM(s) Oral at bedtime  methylPREDNISolone sodium succinate Injectable 40 milliGRAM(s) IV Push every 8 hours  nystatin Powder 1 Application(s) Topical two times a day  ondansetron Injectable 4 milliGRAM(s) IV Push every 6 hours PRN  pantoprazole  Injectable 40 milliGRAM(s) IV Push daily  polyethylene glycol 3350 17 Gram(s) Oral two times a day  senna 2 Tablet(s) Oral at bedtime  sodium chloride 0.65% Nasal 1 Spray(s) Both Nostrils four times a day PRN  sodium chloride 0.9% lock flush 10 milliLiter(s) IV Push every 1 hour PRN  zolpidem 5 milliGRAM(s) Oral at bedtime PRN    acetaminophen    Suspension .. 650 milliGRAM(s) Oral every 6 hours PRN  ALBUTerol    90 MICROgram(s) HFA Inhaler 2 Puff(s) Inhalation every 4 hours  atorvastatin 40 milliGRAM(s) Oral at bedtime  benzocaine 15 mG/menthol 3.6 mG (Sugar-Free) Lozenge 1 Lozenge Oral every 4 hours PRN  budesonide 160 MICROgram(s)/formoterol 4.5 MICROgram(s) Inhaler 2 Puff(s) Inhalation two times a day  carvedilol 6.25 milliGRAM(s) Oral every 12 hours  caspofungin IVPB      caspofungin IVPB 50 milliGRAM(s) IV Intermittent every 24 hours  chlorhexidine 0.12% Liquid 15 milliLiter(s) Oral Mucosa every 12 hours  chlorhexidine 2% Cloths 1 Application(s) Topical <User Schedule>  dexMEDEtomidine Infusion 0.3 MICROgram(s)/kG/Hr IV Continuous <Continuous>  dextrose 40% Gel 15 Gram(s) Oral once PRN  dextrose 5% + sodium chloride 0.9%. 1000 milliLiter(s) IV Continuous <Continuous>  dextrose 5%. 1000 milliLiter(s) IV Continuous <Continuous>  dextrose 50% Injectable 12.5 Gram(s) IV Push once  dextrose 50% Injectable 25 Gram(s) IV Push once  dextrose 50% Injectable 25 Gram(s) IV Push once  enoxaparin Injectable 40 milliGRAM(s) SubCutaneous daily  ergocalciferol 10203 Unit(s) Oral <User Schedule>  fentaNYL    Injectable 50 MICROGram(s) IV Push every 2 hours PRN  fentaNYL   Infusion. 0.5 MICROgram(s)/kG/Hr IV Continuous <Continuous>  FIRST- Mouthwash  BLM 10 milliLiter(s) Swish and Swallow every 8 hours  folic acid 1 milliGRAM(s) Oral daily  gabapentin 600 milliGRAM(s) Oral every 8 hours  glucagon  Injectable 1 milliGRAM(s) IntraMuscular once PRN  guaiFENesin   Syrup  (Sugar-Free) 100 milliGRAM(s) Oral every 6 hours PRN  influenza   Vaccine 0.5 milliLiter(s) IntraMuscular once  insulin glargine Injectable (LANTUS) 20 Unit(s) SubCutaneous every morning  insulin lispro (HumaLOG) corrective regimen sliding scale   SubCutaneous three times a day before meals  insulin lispro Injectable (HumaLOG) 6 Unit(s) SubCutaneous three times a day before meals  levoFLOXacin IVPB      linezolid  IVPB 600 milliGRAM(s) IV Intermittent every 12 hours  linezolid  IVPB      melatonin 3 milliGRAM(s) Oral at bedtime  methylPREDNISolone sodium succinate Injectable 40 milliGRAM(s) IV Push every 8 hours  nystatin Powder 1 Application(s) Topical two times a day  ondansetron Injectable 4 milliGRAM(s) IV Push every 6 hours PRN  pantoprazole  Injectable 40 milliGRAM(s) IV Push daily  phenylephrine    Infusion 0.2 MICROgram(s)/kG/Min IV Continuous <Continuous>  polyethylene glycol 3350 17 Gram(s) Oral two times a day  senna 2 Tablet(s) Oral at bedtime  sodium chloride 0.65% Nasal 1 Spray(s) Both Nostrils four times a day PRN  sodium chloride 0.9% lock flush 10 milliLiter(s) IV Push every 1 hour PRN  tiotropium 18 MICROgram(s) Capsule 1 Capsule(s) Inhalation daily  zolpidem 5 milliGRAM(s) Oral at bedtime PRN    Drug Dosing Weight  Height (cm): 154.9 (19 Sep 2020 08:00)  Weight (kg): 84.8 (21 Sep 2020 09:15)  BMI (kg/m2): 35.3 (21 Sep 2020 09:15)  BSA (m2): 1.84 (21 Sep 2020 09:15)    CENTRAL LINE: [ ] YES [ ] NO  LOCATION:   DATE INSERTED:  REMOVE: [ ] YES [ ] NO  EXPLAIN:    ROBLEDO: [ ] YES [ ] NO    DATE INSERTED:  REMOVE:  [ ] YES [ ] NO  EXPLAIN:    A-LINE:  [ ] YES [ ] NO  LOCATION:   DATE INSERTED:  REMOVE:  [ ] YES [ ] NO  EXPLAIN:    PMH -reviewed admission note, no change since admission      ABG - ( 06 Oct 2020 03:27 )  pH, Arterial: 7.53  pH, Blood: x     /  pCO2: 35    /  pO2: 75    / HCO3: 29    / Base Excess: 6.4   /  SaO2: 96                    10 @ 07:01  -  10-05 @ 07:00  --------------------------------------------------------  IN: 0 mL / OUT: 200 mL / NET: -200 mL        Mode: AC/ CMV (Assist Control/ Continuous Mandatory Ventilation)  RR (machine): 14  TV (machine): 450  FiO2: 90  PEEP: 10  ITime: 1  MAP: 15  PIP: 32      PHYSICAL EXAM:    GENERAL: NAD, well-groomed, well-developed  HEAD:  Atraumatic, Normocephalic  EYES: EOMI, PERRLA, conjunctiva and sclera clear  ENMT: No tonsillar erythema, exudates, or enlargement; Moist mucous membranes, Good dentition, [ ]No lesions  NECK: Supple, normal appearance, No JVD; Normal thyroid; Trachea midline  NERVOUS SYSTEM:  Alert & Oriented X3, Good concentration; Motor Strength 5/5 B/L upper and lower extremities; DTRs 2+ intact and symmetric  CHEST/LUNG: No chest deformity; Normal percussion bilaterally; No rales, rhonchi, wheezing   HEART: Regular rate and rhythm; No murmurs, rubs, or gallops  ABDOMEN: Soft, Nontender, Nondistended;Bowel sounds present  EXTREMITIES:  2+ Peripheral Pulses, No clubbing, cyanosis, or edema  LYMPH: No lymphadenopathy noted  SKIN: No rashes or lesions; Good capillary refill      LABS:  CBC Full  -  ( 05 Oct 2020 08:28 )  WBC Count : 3.78 K/uL  RBC Count : 2.54 M/uL  Hemoglobin : 7.4 g/dL  Hematocrit : 22.1 %  Platelet Count - Automated : 56 K/uL  Mean Cell Volume : 87.0 fl  Mean Cell Hemoglobin : 29.1 pg  Mean Cell Hemoglobin Concentration : 33.5 gm/dL  Auto Neutrophil # : x  Auto Lymphocyte # : x  Auto Monocyte # : x  Auto Eosinophil # : x  Auto Basophil # : x  Auto Neutrophil % : x  Auto Lymphocyte % : x  Auto Monocyte % : x  Auto Eosinophil % : x  Auto Basophil % : x    10-    127<L>  |  86<L>  |  20<H>  ----------------------------<  168<H>  3.8   |  33<H>  |  0.44<L>    Ca    8.2<L>      05 Oct 2020 08:28  Phos  2.8     10-  Mg     1.9     10        Urinalysis Basic - ( 04 Oct 2020 11:23 )    Color: Yellow / Appearance: Clear / S.015 / pH: x  Gluc: x / Ketone: Negative  / Bili: Negative / Urobili: Negative   Blood: x / Protein: Negative / Nitrite: Negative   Leuk Esterase: Negative / RBC: x / WBC x   Sq Epi: x / Non Sq Epi: x / Bacteria: x      Culture Results:   No growth to date. (10-04 @ 11:02)  Culture Results:   No growth to date. (10-04 @ 11:02)      RADIOLOGY & ADDITIONAL STUDIES REVIEWED:  ***    [ ]GOALS OF CARE DISCUSSION WITH PATIENT/FAMILY/PROXY:    CRITICAL CARE TIME SPENT: 35 minutes INTERVAL HPI/OVERNIGHT EVENTS: Overnight patient had a right femoral central line placed and was started on precedex, fentanyl and pressor support (phenylephrine) and intubated and placed on vent (14 | 450 | 90% | 10). ABG in AM 7.53 | 35 | 75 | 29 (Metabolic Alkalosis). 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 This AM patient is sedated and intubated, hemodynamically stable and saturating 100%. Will begin tube feeds, changed Insulin to q6 for tube feeds.    PRESSORS: [x] YES [ ] NO    Antimicrobial:  caspofungin IVPB      caspofungin IVPB 50 milliGRAM(s) IV Intermittent every 24 hours  levoFLOXacin IVPB      linezolid  IVPB 600 milliGRAM(s) IV Intermittent every 12 hours  linezolid  IVPB        Cardiovascular:  carvedilol 6.25 milliGRAM(s) Oral every 12 hours  phenylephrine    Infusion 0.2 MICROgram(s)/kG/Min IV Continuous <Continuous>    Pulmonary:  ALBUTerol    90 MICROgram(s) HFA Inhaler 2 Puff(s) Inhalation every 4 hours  budesonide 160 MICROgram(s)/formoterol 4.5 MICROgram(s) Inhaler 2 Puff(s) Inhalation two times a day  guaiFENesin   Syrup  (Sugar-Free) 100 milliGRAM(s) Oral every 6 hours PRN  tiotropium 18 MICROgram(s) Capsule 1 Capsule(s) Inhalation daily    Hematologic:  enoxaparin Injectable 40 milliGRAM(s) SubCutaneous daily    Other:  acetaminophen    Suspension .. 650 milliGRAM(s) Oral every 6 hours PRN  atorvastatin 40 milliGRAM(s) Oral at bedtime  benzocaine 15 mG/menthol 3.6 mG (Sugar-Free) Lozenge 1 Lozenge Oral every 4 hours PRN  chlorhexidine 0.12% Liquid 15 milliLiter(s) Oral Mucosa every 12 hours  chlorhexidine 2% Cloths 1 Application(s) Topical <User Schedule>  dexMEDEtomidine Infusion 0.3 MICROgram(s)/kG/Hr IV Continuous <Continuous>  dextrose 40% Gel 15 Gram(s) Oral once PRN  dextrose 5% + sodium chloride 0.9%. 1000 milliLiter(s) IV Continuous <Continuous>  dextrose 5%. 1000 milliLiter(s) IV Continuous <Continuous>  dextrose 50% Injectable 12.5 Gram(s) IV Push once  dextrose 50% Injectable 25 Gram(s) IV Push once  dextrose 50% Injectable 25 Gram(s) IV Push once  ergocalciferol 92203 Unit(s) Oral <User Schedule>  fentaNYL    Injectable 50 MICROGram(s) IV Push every 2 hours PRN  fentaNYL   Infusion. 0.5 MICROgram(s)/kG/Hr IV Continuous <Continuous>  FIRST- Mouthwash  BLM 10 milliLiter(s) Swish and Swallow every 8 hours  folic acid 1 milliGRAM(s) Oral daily  gabapentin 600 milliGRAM(s) Oral every 8 hours  glucagon  Injectable 1 milliGRAM(s) IntraMuscular once PRN  influenza   Vaccine 0.5 milliLiter(s) IntraMuscular once  insulin glargine Injectable (LANTUS) 20 Unit(s) SubCutaneous every morning  insulin lispro (HumaLOG) corrective regimen sliding scale   SubCutaneous three times a day before meals  insulin lispro Injectable (HumaLOG) 6 Unit(s) SubCutaneous three times a day before meals  melatonin 3 milliGRAM(s) Oral at bedtime  methylPREDNISolone sodium succinate Injectable 40 milliGRAM(s) IV Push every 8 hours  nystatin Powder 1 Application(s) Topical two times a day  ondansetron Injectable 4 milliGRAM(s) IV Push every 6 hours PRN  pantoprazole  Injectable 40 milliGRAM(s) IV Push daily  polyethylene glycol 3350 17 Gram(s) Oral two times a day  senna 2 Tablet(s) Oral at bedtime  sodium chloride 0.65% Nasal 1 Spray(s) Both Nostrils four times a day PRN  sodium chloride 0.9% lock flush 10 milliLiter(s) IV Push every 1 hour PRN  zolpidem 5 milliGRAM(s) Oral at bedtime PRN    acetaminophen    Suspension .. 650 milliGRAM(s) Oral every 6 hours PRN  ALBUTerol    90 MICROgram(s) HFA Inhaler 2 Puff(s) Inhalation every 4 hours  atorvastatin 40 milliGRAM(s) Oral at bedtime  benzocaine 15 mG/menthol 3.6 mG (Sugar-Free) Lozenge 1 Lozenge Oral every 4 hours PRN  budesonide 160 MICROgram(s)/formoterol 4.5 MICROgram(s) Inhaler 2 Puff(s) Inhalation two times a day  carvedilol 6.25 milliGRAM(s) Oral every 12 hours  caspofungin IVPB      caspofungin IVPB 50 milliGRAM(s) IV Intermittent every 24 hours  chlorhexidine 0.12% Liquid 15 milliLiter(s) Oral Mucosa every 12 hours  chlorhexidine 2% Cloths 1 Application(s) Topical <User Schedule>  dexMEDEtomidine Infusion 0.3 MICROgram(s)/kG/Hr IV Continuous <Continuous>  dextrose 40% Gel 15 Gram(s) Oral once PRN  dextrose 5% + sodium chloride 0.9%. 1000 milliLiter(s) IV Continuous <Continuous>  dextrose 5%. 1000 milliLiter(s) IV Continuous <Continuous>  dextrose 50% Injectable 12.5 Gram(s) IV Push once  dextrose 50% Injectable 25 Gram(s) IV Push once  dextrose 50% Injectable 25 Gram(s) IV Push once  enoxaparin Injectable 40 milliGRAM(s) SubCutaneous daily  ergocalciferol 58649 Unit(s) Oral <User Schedule>  fentaNYL    Injectable 50 MICROGram(s) IV Push every 2 hours PRN  fentaNYL   Infusion. 0.5 MICROgram(s)/kG/Hr IV Continuous <Continuous>  FIRST- Mouthwash  BLM 10 milliLiter(s) Swish and Swallow every 8 hours  folic acid 1 milliGRAM(s) Oral daily  gabapentin 600 milliGRAM(s) Oral every 8 hours  glucagon  Injectable 1 milliGRAM(s) IntraMuscular once PRN  guaiFENesin   Syrup  (Sugar-Free) 100 milliGRAM(s) Oral every 6 hours PRN  influenza   Vaccine 0.5 milliLiter(s) IntraMuscular once  insulin glargine Injectable (LANTUS) 20 Unit(s) SubCutaneous every morning  insulin lispro (HumaLOG) corrective regimen sliding scale   SubCutaneous three times a day before meals  insulin lispro Injectable (HumaLOG) 6 Unit(s) SubCutaneous three times a day before meals  levoFLOXacin IVPB      linezolid  IVPB 600 milliGRAM(s) IV Intermittent every 12 hours  linezolid  IVPB      melatonin 3 milliGRAM(s) Oral at bedtime  methylPREDNISolone sodium succinate Injectable 40 milliGRAM(s) IV Push every 8 hours  nystatin Powder 1 Application(s) Topical two times a day  ondansetron Injectable 4 milliGRAM(s) IV Push every 6 hours PRN  pantoprazole  Injectable 40 milliGRAM(s) IV Push daily  phenylephrine    Infusion 0.2 MICROgram(s)/kG/Min IV Continuous <Continuous>  polyethylene glycol 3350 17 Gram(s) Oral two times a day  senna 2 Tablet(s) Oral at bedtime  sodium chloride 0.65% Nasal 1 Spray(s) Both Nostrils four times a day PRN  sodium chloride 0.9% lock flush 10 milliLiter(s) IV Push every 1 hour PRN  tiotropium 18 MICROgram(s) Capsule 1 Capsule(s) Inhalation daily  zolpidem 5 milliGRAM(s) Oral at bedtime PRN    Drug Dosing Weight  Height (cm): 154.9 (19 Sep 2020 08:00)  Weight (kg): 84.8 (21 Sep 2020 09:15)  BMI (kg/m2): 35.3 (21 Sep 2020 09:15)  BSA (m2): 1.84 (21 Sep 2020 09:15)    CENTRAL LINE: [ ] YES [ ] NO  LOCATION:   DATE INSERTED:  REMOVE: [ ] YES [ ] NO  EXPLAIN:    [place]ED[place]: [ ] YES [ ] NO    DATE INSERTED:  REMOVE:  [ ] YES [ ] NO  EXPLAIN:    A-LINE:  [ ] YES [ ] NO  LOCATION:   DATE INSERTED:  REMOVE:  [ ] YES [ ] NO  EXPLAIN:    PMH -reviewed admission note, no change since admission      ABG - ( 06 Oct 2020 03:27 )  pH, Arterial: 7.53  pH, Blood: x     /  pCO2: 35    /  pO2: 75    / HCO3: 29    / Base Excess: 6.4   /  SaO2: 96                    10-04 @ 07:01  -  10-05 @ 07:00  --------------------------------------------------------  IN: 0 mL / OUT: 200 mL / NET: -200 mL        Mode: AC/ CMV (Assist Control/ Continuous Mandatory Ventilation)  RR (machine): 14  TV (machine): 450  FiO2: 90  PEEP: 10  ITime: 1  MAP: 15  PIP: 32      PHYSICAL EXAM:    GENERAL: NAD, well-groomed, well-developed  HEAD:  Atraumatic, Normocephalic  EYES: EOMI, PERRLA, conjunctiva and sclera clear  ENMT: No tonsillar erythema, exudates, or enlargement; Moist mucous membranes, Good dentition, [ ]No lesions  NECK: Supple, normal appearance, No JVD; Normal thyroid; Trachea midline  NERVOUS SYSTEM:  Alert & Oriented X3, Good concentration; Motor Strength 5/5 B/L upper and lower extremities; DTRs 2+ intact and symmetric  CHEST/LUNG: No chest deformity; Normal percussion bilaterally; No rales, rhonchi, wheezing   HEART: Regular rate and rhythm; No murmurs, rubs, or gallops  ABDOMEN: Soft, Nontender, Nondistended;Bowel sounds present  EXTREMITIES:  2+ Peripheral Pulses, No clubbing, cyanosis, or edema  LYMPH: No lymphadenopathy noted  SKIN: No rashes or lesions; Good capillary refill      LABS:  CBC Full  -  ( 05 Oct 2020 08:28 )  WBC Count : 3.78 K/uL  RBC Count : 2.54 M/uL  Hemoglobin : 7.4 g/dL  Hematocrit : 22.1 %  Platelet Count - Automated : 56 K/uL  Mean Cell Volume : 87.0 fl  Mean Cell Hemoglobin : 29.1 pg  Mean Cell Hemoglobin Concentration : 33.5 gm/dL  Auto Neutrophil # : x  Auto Lymphocyte # : x  Auto Monocyte # : x  Auto Eosinophil # : x  Auto Basophil # : x  Auto Neutrophil % : x  Auto Lymphocyte % : x  Auto Monocyte % : x  Auto Eosinophil % : x  Auto Basophil % : x    10-05    127<L>  |  86<L>  |  20<H>  ----------------------------<  168<H>  3.8   |  33<H>  |  0.44<L>    Ca    8.2<L>      05 Oct 2020 08:28  Phos  2.8     10-04  Mg     1.9     10-05        Urinalysis Basic - ( 04 Oct 2020 11:23 )    Color: Yellow / Appearance: Clear / S.015 / pH: x  Gluc: x / Ketone: Negative  / Bili: Negative / Urobili: Negative   Blood: x / Protein: Negative / Nitrite: Negative   Leuk Esterase: Negative / RBC: x / WBC x   Sq Epi: x / Non Sq Epi: x / Bacteria: x      Culture Results:   No growth to date. (10-04 @ 11:02)  Culture Results:   No growth to date. (10-04 @ 11:02)      RADIOLOGY & ADDITIONAL STUDIES REVIEWED:  ***    [ ]GOALS OF CARE DISCUSSION WITH PATIENT/FAMILY/PROXY:    CRITICAL CARE TIME SPENT: 35 minutes INTERVAL HPI/OVERNIGHT EVENTS: Overnight patient had a right femoral central line placed and was started on precedex, fentanyl and pressor support (phenylephrine) and intubated and placed on vent (14 | 450 | 90% | 10). ABG in AM 7.53 | 35 | 75 | 29 (Metabolic Alkalosis). 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 This AM patient is sedated and intubated, hemodynamically stable and saturating 100%. Will begin tube feeds, changed Insulin to q6 for tube feeds. Plan for Tracheostomy and PEG tmw AM. Vent settings changed to 14 | 450 | 60% | 8.    PRESSORS: [x] YES [ ] NO    Antimicrobial:  caspofungin IVPB      caspofungin IVPB 50 milliGRAM(s) IV Intermittent every 24 hours  levoFLOXacin IVPB      linezolid  IVPB 600 milliGRAM(s) IV Intermittent every 12 hours  linezolid  IVPB        Cardiovascular:  carvedilol 6.25 milliGRAM(s) Oral every 12 hours  phenylephrine    Infusion 0.2 MICROgram(s)/kG/Min IV Continuous <Continuous>    Pulmonary:  ALBUTerol    90 MICROgram(s) HFA Inhaler 2 Puff(s) Inhalation every 4 hours  budesonide 160 MICROgram(s)/formoterol 4.5 MICROgram(s) Inhaler 2 Puff(s) Inhalation two times a day  guaiFENesin   Syrup  (Sugar-Free) 100 milliGRAM(s) Oral every 6 hours PRN  tiotropium 18 MICROgram(s) Capsule 1 Capsule(s) Inhalation daily    Hematologic:  enoxaparin Injectable 40 milliGRAM(s) SubCutaneous daily    Other:  acetaminophen    Suspension .. 650 milliGRAM(s) Oral every 6 hours PRN  atorvastatin 40 milliGRAM(s) Oral at bedtime  benzocaine 15 mG/menthol 3.6 mG (Sugar-Free) Lozenge 1 Lozenge Oral every 4 hours PRN  chlorhexidine 0.12% Liquid 15 milliLiter(s) Oral Mucosa every 12 hours  chlorhexidine 2% Cloths 1 Application(s) Topical <User Schedule>  dexMEDEtomidine Infusion 0.3 MICROgram(s)/kG/Hr IV Continuous <Continuous>  dextrose 40% Gel 15 Gram(s) Oral once PRN  dextrose 5% + sodium chloride 0.9%. 1000 milliLiter(s) IV Continuous <Continuous>  dextrose 5%. 1000 milliLiter(s) IV Continuous <Continuous>  dextrose 50% Injectable 12.5 Gram(s) IV Push once  dextrose 50% Injectable 25 Gram(s) IV Push once  dextrose 50% Injectable 25 Gram(s) IV Push once  ergocalciferol 57141 Unit(s) Oral <User Schedule>  fentaNYL    Injectable 50 MICROGram(s) IV Push every 2 hours PRN  fentaNYL   Infusion. 0.5 MICROgram(s)/kG/Hr IV Continuous <Continuous>  FIRST- Mouthwash  BLM 10 milliLiter(s) Swish and Swallow every 8 hours  folic acid 1 milliGRAM(s) Oral daily  gabapentin 600 milliGRAM(s) Oral every 8 hours  glucagon  Injectable 1 milliGRAM(s) IntraMuscular once PRN  influenza   Vaccine 0.5 milliLiter(s) IntraMuscular once  insulin glargine Injectable (LANTUS) 20 Unit(s) SubCutaneous every morning  insulin lispro (HumaLOG) corrective regimen sliding scale   SubCutaneous three times a day before meals  insulin lispro Injectable (HumaLOG) 6 Unit(s) SubCutaneous three times a day before meals  melatonin 3 milliGRAM(s) Oral at bedtime  methylPREDNISolone sodium succinate Injectable 40 milliGRAM(s) IV Push every 8 hours  nystatin Powder 1 Application(s) Topical two times a day  ondansetron Injectable 4 milliGRAM(s) IV Push every 6 hours PRN  pantoprazole  Injectable 40 milliGRAM(s) IV Push daily  polyethylene glycol 3350 17 Gram(s) Oral two times a day  senna 2 Tablet(s) Oral at bedtime  sodium chloride 0.65% Nasal 1 Spray(s) Both Nostrils four times a day PRN  sodium chloride 0.9% lock flush 10 milliLiter(s) IV Push every 1 hour PRN  zolpidem 5 milliGRAM(s) Oral at bedtime PRN    acetaminophen    Suspension .. 650 milliGRAM(s) Oral every 6 hours PRN  ALBUTerol    90 MICROgram(s) HFA Inhaler 2 Puff(s) Inhalation every 4 hours  atorvastatin 40 milliGRAM(s) Oral at bedtime  benzocaine 15 mG/menthol 3.6 mG (Sugar-Free) Lozenge 1 Lozenge Oral every 4 hours PRN  budesonide 160 MICROgram(s)/formoterol 4.5 MICROgram(s) Inhaler 2 Puff(s) Inhalation two times a day  carvedilol 6.25 milliGRAM(s) Oral every 12 hours  caspofungin IVPB      caspofungin IVPB 50 milliGRAM(s) IV Intermittent every 24 hours  chlorhexidine 0.12% Liquid 15 milliLiter(s) Oral Mucosa every 12 hours  chlorhexidine 2% Cloths 1 Application(s) Topical <User Schedule>  dexMEDEtomidine Infusion 0.3 MICROgram(s)/kG/Hr IV Continuous <Continuous>  dextrose 40% Gel 15 Gram(s) Oral once PRN  dextrose 5% + sodium chloride 0.9%. 1000 milliLiter(s) IV Continuous <Continuous>  dextrose 5%. 1000 milliLiter(s) IV Continuous <Continuous>  dextrose 50% Injectable 12.5 Gram(s) IV Push once  dextrose 50% Injectable 25 Gram(s) IV Push once  dextrose 50% Injectable 25 Gram(s) IV Push once  enoxaparin Injectable 40 milliGRAM(s) SubCutaneous daily  ergocalciferol 39501 Unit(s) Oral <User Schedule>  fentaNYL    Injectable 50 MICROGram(s) IV Push every 2 hours PRN  fentaNYL   Infusion. 0.5 MICROgram(s)/kG/Hr IV Continuous <Continuous>  FIRST- Mouthwash  BLM 10 milliLiter(s) Swish and Swallow every 8 hours  folic acid 1 milliGRAM(s) Oral daily  gabapentin 600 milliGRAM(s) Oral every 8 hours  glucagon  Injectable 1 milliGRAM(s) IntraMuscular once PRN  guaiFENesin   Syrup  (Sugar-Free) 100 milliGRAM(s) Oral every 6 hours PRN  influenza   Vaccine 0.5 milliLiter(s) IntraMuscular once  insulin glargine Injectable (LANTUS) 20 Unit(s) SubCutaneous every morning  insulin lispro (HumaLOG) corrective regimen sliding scale   SubCutaneous three times a day before meals  insulin lispro Injectable (HumaLOG) 6 Unit(s) SubCutaneous three times a day before meals  levoFLOXacin IVPB      linezolid  IVPB 600 milliGRAM(s) IV Intermittent every 12 hours  linezolid  IVPB      melatonin 3 milliGRAM(s) Oral at bedtime  methylPREDNISolone sodium succinate Injectable 40 milliGRAM(s) IV Push every 8 hours  nystatin Powder 1 Application(s) Topical two times a day  ondansetron Injectable 4 milliGRAM(s) IV Push every 6 hours PRN  pantoprazole  Injectable 40 milliGRAM(s) IV Push daily  phenylephrine    Infusion 0.2 MICROgram(s)/kG/Min IV Continuous <Continuous>  polyethylene glycol 3350 17 Gram(s) Oral two times a day  senna 2 Tablet(s) Oral at bedtime  sodium chloride 0.65% Nasal 1 Spray(s) Both Nostrils four times a day PRN  sodium chloride 0.9% lock flush 10 milliLiter(s) IV Push every 1 hour PRN  tiotropium 18 MICROgram(s) Capsule 1 Capsule(s) Inhalation daily  zolpidem 5 milliGRAM(s) Oral at bedtime PRN    Drug Dosing Weight  Height (cm): 154.9 (19 Sep 2020 08:00)  Weight (kg): 84.8 (21 Sep 2020 09:15)  BMI (kg/m2): 35.3 (21 Sep 2020 09:15)  BSA (m2): 1.84 (21 Sep 2020 09:15)    CENTRAL LINE: [x ] YES [ ] NO  LOCATION: RFL   DATE INSERTED:10/5  REMOVE: [ ] YES [ ] NO  EXPLAIN:    ROBLEDO: [ x] YES [ ] NO    DATE INSERTED: 10/5  REMOVE:  [ ] YES [ ] NO  EXPLAIN:    A-LINE:  [ ] YES [ x] NO  LOCATION:   DATE INSERTED:  REMOVE:  [ ] YES [ ] NO  EXPLAIN:    PMH -reviewed admission note, no change since admission      ABG - ( 06 Oct 2020 03:27 )  pH, Arterial: 7.53  pH, Blood: x     /  pCO2: 35    /  pO2: 75    / HCO3: 29    / Base Excess: 6.4   /  SaO2: 96          10-04 @ 07:01  -  10-05 @ 07:00  --------------------------------------------------------  IN: 0 mL / OUT: 200 mL / NET: -200 mL      Mode: AC/ CMV (Assist Control/ Continuous Mandatory Ventilation)  RR (machine): 14  TV (machine): 450  FiO2: 90  PEEP: 10  ITime: 1  MAP: 15  PIP: 32      PHYSICAL EXAM:  GENERAL: Sedated, Intubated on Vent  HEAD:  Atraumatic, Normocephalic  EYES: PERRL, conjunctiva and sclera clear  ENMT: Moist mucous membranes, no exudates  NECK: Supple, normal appearance, No JVD; Normal thyroid; Trachea midline  NERVOUS SYSTEM: sedated  CHEST/LUNG: ventilated, bilateral breath sounds, diminished throughout, no crackles or rhonchi  HEART: Regular rate and rhythm; No murmurs, rubs, or gallops  ABDOMEN: Soft, Nontender, Non distended; Bowel sounds present  EXTREMITIES:  2+ Peripheral Pulses, No clubbing, cyanosis, or edema  SKIN: No rashes or lesions; Good capillary refill      LABS:  CBC Full  -  ( 05 Oct 2020 08:28 )  WBC Count : 3.78 K/uL  RBC Count : 2.54 M/uL  Hemoglobin : 7.4 g/dL  Hematocrit : 22.1 %  Platelet Count - Automated : 56 K/uL  Mean Cell Volume : 87.0 fl  Mean Cell Hemoglobin : 29.1 pg  Mean Cell Hemoglobin Concentration : 33.5 gm/dL  Auto Neutrophil # : x  Auto Lymphocyte # : x  Auto Monocyte # : x  Auto Eosinophil # : x  Auto Basophil # : x  Auto Neutrophil % : x  Auto Lymphocyte % : x  Auto Monocyte % : x  Auto Eosinophil % : x  Auto Basophil % : x    10-    127<L>  |  86<L>  |  20<H>  ----------------------------<  168<H>  3.8   |  33<H>  |  0.44<L>    Ca    8.2<L>      05 Oct 2020 08:28  Phos  2.8     10-  Mg     1.9     10-      Urinalysis Basic - ( 04 Oct 2020 11:23 )    Color: Yellow / Appearance: Clear / S.015 / pH: x  Gluc: x / Ketone: Negative  / Bili: Negative / Urobili: Negative   Blood: x / Protein: Negative / Nitrite: Negative   Leuk Esterase: Negative / RBC: x / WBC x   Sq Epi: x / Non Sq Epi: x / Bacteria: x      Culture Results:   No growth to date. (10-04 @ 11:02)  Culture Results:   No growth to date. (10-04 @ 11:02)      RADIOLOGY & ADDITIONAL STUDIES REVIEWED:     < from: Xray Chest 1 View- PORTABLE-Routine (Xray Chest 1 View- PORTABLE-Routine .) (10.06.20 @ 10:10) >    EXAM:  XR CHEST PORTABLE ROUTINE 1V                            PROCEDURE DATE:  10/06/2020          INTERPRETATION:  INDICATION: Ventilatory support.    PRIORS: 10/5/2020.    VIEWS: Portable AP radiography of the chest performed.    FINDINGS: ETT and NGT unchanged. Heart size within normal limits. No superior mediastinal widening. Right-sided infiltrate unchanged. Bilateral pleural effusions stable. No pneumothorax. No mediastinal shift.    IMPRESSION: No significant change.              JOSE DAVID SCHNEIDER, ATTENDING RADIOLOGIST  This document has been electronically signed. Oct  6 2020  3:03PM    < end of copied text >      [ ]GOALS OF CARE DISCUSSION WITH PATIENT/FAMILY/PROXY:    CRITICAL CARE TIME SPENT: 35 minutes INTERVAL HPI/OVERNIGHT EVENTS: Overnight patient had a right femoral central line placed and was started on precedex, fentanyl and pressor support (phenylephrine) and intubated and placed on vent (14 | 450 | 90% | 10). ABG in AM 7.53 | 35 | 75 | 29 (Metabolic Alkalosis). 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 This AM patient is sedated and intubated, hemodynamically stable and saturating 100%. Will begin tube feeds, changed Insulin to q6 for tube feeds. Plan for Tracheostomy and PEG tmw AM. Vent settings changed to 14 | 450 | 60% | 8. Will do pre-op clearance.    PRESSORS: [x] YES [ ] NO    Antimicrobial:  caspofungin IVPB      caspofungin IVPB 50 milliGRAM(s) IV Intermittent every 24 hours  levoFLOXacin IVPB      linezolid  IVPB 600 milliGRAM(s) IV Intermittent every 12 hours  linezolid  IVPB        Cardiovascular:  carvedilol 6.25 milliGRAM(s) Oral every 12 hours  phenylephrine    Infusion 0.2 MICROgram(s)/kG/Min IV Continuous <Continuous>    Pulmonary:  ALBUTerol    90 MICROgram(s) HFA Inhaler 2 Puff(s) Inhalation every 4 hours  budesonide 160 MICROgram(s)/formoterol 4.5 MICROgram(s) Inhaler 2 Puff(s) Inhalation two times a day  guaiFENesin   Syrup  (Sugar-Free) 100 milliGRAM(s) Oral every 6 hours PRN  tiotropium 18 MICROgram(s) Capsule 1 Capsule(s) Inhalation daily    Hematologic:  enoxaparin Injectable 40 milliGRAM(s) SubCutaneous daily    Other:  acetaminophen    Suspension .. 650 milliGRAM(s) Oral every 6 hours PRN  atorvastatin 40 milliGRAM(s) Oral at bedtime  benzocaine 15 mG/menthol 3.6 mG (Sugar-Free) Lozenge 1 Lozenge Oral every 4 hours PRN  chlorhexidine 0.12% Liquid 15 milliLiter(s) Oral Mucosa every 12 hours  chlorhexidine 2% Cloths 1 Application(s) Topical <User Schedule>  dexMEDEtomidine Infusion 0.3 MICROgram(s)/kG/Hr IV Continuous <Continuous>  dextrose 40% Gel 15 Gram(s) Oral once PRN  dextrose 5% + sodium chloride 0.9%. 1000 milliLiter(s) IV Continuous <Continuous>  dextrose 5%. 1000 milliLiter(s) IV Continuous <Continuous>  dextrose 50% Injectable 12.5 Gram(s) IV Push once  dextrose 50% Injectable 25 Gram(s) IV Push once  dextrose 50% Injectable 25 Gram(s) IV Push once  ergocalciferol 74459 Unit(s) Oral <User Schedule>  fentaNYL    Injectable 50 MICROGram(s) IV Push every 2 hours PRN  fentaNYL   Infusion. 0.5 MICROgram(s)/kG/Hr IV Continuous <Continuous>  FIRST- Mouthwash  BLM 10 milliLiter(s) Swish and Swallow every 8 hours  folic acid 1 milliGRAM(s) Oral daily  gabapentin 600 milliGRAM(s) Oral every 8 hours  glucagon  Injectable 1 milliGRAM(s) IntraMuscular once PRN  influenza   Vaccine 0.5 milliLiter(s) IntraMuscular once  insulin glargine Injectable (LANTUS) 20 Unit(s) SubCutaneous every morning  insulin lispro (HumaLOG) corrective regimen sliding scale   SubCutaneous three times a day before meals  insulin lispro Injectable (HumaLOG) 6 Unit(s) SubCutaneous three times a day before meals  melatonin 3 milliGRAM(s) Oral at bedtime  methylPREDNISolone sodium succinate Injectable 40 milliGRAM(s) IV Push every 8 hours  nystatin Powder 1 Application(s) Topical two times a day  ondansetron Injectable 4 milliGRAM(s) IV Push every 6 hours PRN  pantoprazole  Injectable 40 milliGRAM(s) IV Push daily  polyethylene glycol 3350 17 Gram(s) Oral two times a day  senna 2 Tablet(s) Oral at bedtime  sodium chloride 0.65% Nasal 1 Spray(s) Both Nostrils four times a day PRN  sodium chloride 0.9% lock flush 10 milliLiter(s) IV Push every 1 hour PRN  zolpidem 5 milliGRAM(s) Oral at bedtime PRN    acetaminophen    Suspension .. 650 milliGRAM(s) Oral every 6 hours PRN  ALBUTerol    90 MICROgram(s) HFA Inhaler 2 Puff(s) Inhalation every 4 hours  atorvastatin 40 milliGRAM(s) Oral at bedtime  benzocaine 15 mG/menthol 3.6 mG (Sugar-Free) Lozenge 1 Lozenge Oral every 4 hours PRN  budesonide 160 MICROgram(s)/formoterol 4.5 MICROgram(s) Inhaler 2 Puff(s) Inhalation two times a day  carvedilol 6.25 milliGRAM(s) Oral every 12 hours  caspofungin IVPB      caspofungin IVPB 50 milliGRAM(s) IV Intermittent every 24 hours  chlorhexidine 0.12% Liquid 15 milliLiter(s) Oral Mucosa every 12 hours  chlorhexidine 2% Cloths 1 Application(s) Topical <User Schedule>  dexMEDEtomidine Infusion 0.3 MICROgram(s)/kG/Hr IV Continuous <Continuous>  dextrose 40% Gel 15 Gram(s) Oral once PRN  dextrose 5% + sodium chloride 0.9%. 1000 milliLiter(s) IV Continuous <Continuous>  dextrose 5%. 1000 milliLiter(s) IV Continuous <Continuous>  dextrose 50% Injectable 12.5 Gram(s) IV Push once  dextrose 50% Injectable 25 Gram(s) IV Push once  dextrose 50% Injectable 25 Gram(s) IV Push once  enoxaparin Injectable 40 milliGRAM(s) SubCutaneous daily  ergocalciferol 73796 Unit(s) Oral <User Schedule>  fentaNYL    Injectable 50 MICROGram(s) IV Push every 2 hours PRN  fentaNYL   Infusion. 0.5 MICROgram(s)/kG/Hr IV Continuous <Continuous>  FIRST- Mouthwash  BLM 10 milliLiter(s) Swish and Swallow every 8 hours  folic acid 1 milliGRAM(s) Oral daily  gabapentin 600 milliGRAM(s) Oral every 8 hours  glucagon  Injectable 1 milliGRAM(s) IntraMuscular once PRN  guaiFENesin   Syrup  (Sugar-Free) 100 milliGRAM(s) Oral every 6 hours PRN  influenza   Vaccine 0.5 milliLiter(s) IntraMuscular once  insulin glargine Injectable (LANTUS) 20 Unit(s) SubCutaneous every morning  insulin lispro (HumaLOG) corrective regimen sliding scale   SubCutaneous three times a day before meals  insulin lispro Injectable (HumaLOG) 6 Unit(s) SubCutaneous three times a day before meals  levoFLOXacin IVPB      linezolid  IVPB 600 milliGRAM(s) IV Intermittent every 12 hours  linezolid  IVPB      melatonin 3 milliGRAM(s) Oral at bedtime  methylPREDNISolone sodium succinate Injectable 40 milliGRAM(s) IV Push every 8 hours  nystatin Powder 1 Application(s) Topical two times a day  ondansetron Injectable 4 milliGRAM(s) IV Push every 6 hours PRN  pantoprazole  Injectable 40 milliGRAM(s) IV Push daily  phenylephrine    Infusion 0.2 MICROgram(s)/kG/Min IV Continuous <Continuous>  polyethylene glycol 3350 17 Gram(s) Oral two times a day  senna 2 Tablet(s) Oral at bedtime  sodium chloride 0.65% Nasal 1 Spray(s) Both Nostrils four times a day PRN  sodium chloride 0.9% lock flush 10 milliLiter(s) IV Push every 1 hour PRN  tiotropium 18 MICROgram(s) Capsule 1 Capsule(s) Inhalation daily  zolpidem 5 milliGRAM(s) Oral at bedtime PRN    Drug Dosing Weight  Height (cm): 154.9 (19 Sep 2020 08:00)  Weight (kg): 84.8 (21 Sep 2020 09:15)  BMI (kg/m2): 35.3 (21 Sep 2020 09:15)  BSA (m2): 1.84 (21 Sep 2020 09:15)    CENTRAL LINE: [x ] YES [ ] NO  LOCATION: RFL   DATE INSERTED:10/5  REMOVE: [ ] YES [ ] NO  EXPLAIN:    ROBLEDO: [ x] YES [ ] NO    DATE INSERTED: 10/5  REMOVE:  [ ] YES [ ] NO  EXPLAIN:    A-LINE:  [ ] YES [ x] NO  LOCATION:   DATE INSERTED:  REMOVE:  [ ] YES [ ] NO  EXPLAIN:    PMH -reviewed admission note, no change since admission      ABG - ( 06 Oct 2020 03:27 )  pH, Arterial: 7.53  pH, Blood: x     /  pCO2: 35    /  pO2: 75    / HCO3: 29    / Base Excess: 6.4   /  SaO2: 96          10-04 @ 07:01  -  10-05 @ 07:00  --------------------------------------------------------  IN: 0 mL / OUT: 200 mL / NET: -200 mL      Mode: AC/ CMV (Assist Control/ Continuous Mandatory Ventilation)  RR (machine): 14  TV (machine): 450  FiO2: 90  PEEP: 10  ITime: 1  MAP: 15  PIP: 32      PHYSICAL EXAM:  GENERAL: Sedated, Intubated on vent  HEAD:  Atraumatic, Normocephalic  EYES: PERRL, conjunctiva and sclera clear  ENMT: Moist mucous membranes, no exudates  NECK: Supple, normal appearance, No JVD; Normal thyroid; Trachea midline  NERVOUS SYSTEM: sedated  CHEST/LUNG: ventilated, bilateral breath sounds, diminished throughout, no crackles or rhonchi  HEART: Regular rate and rhythm; No murmurs, rubs, or gallops  ABDOMEN: Soft, Nontender, Non distended; Bowel sounds present  EXTREMITIES:  2+ Peripheral Pulses, No clubbing, cyanosis, or edema  SKIN: No rashes or lesions; Good capillary refill      LABS:  CBC Full  -  ( 05 Oct 2020 08:28 )  WBC Count : 3.78 K/uL  RBC Count : 2.54 M/uL  Hemoglobin : 7.4 g/dL  Hematocrit : 22.1 %  Platelet Count - Automated : 56 K/uL  Mean Cell Volume : 87.0 fl  Mean Cell Hemoglobin : 29.1 pg  Mean Cell Hemoglobin Concentration : 33.5 gm/dL  Auto Neutrophil # : x  Auto Lymphocyte # : x  Auto Monocyte # : x  Auto Eosinophil # : x  Auto Basophil # : x  Auto Neutrophil % : x  Auto Lymphocyte % : x  Auto Monocyte % : x  Auto Eosinophil % : x  Auto Basophil % : x    10-05    127<L>  |  86<L>  |  20<H>  ----------------------------<  168<H>  3.8   |  33<H>  |  0.44<L>    Ca    8.2<L>      05 Oct 2020 08:28  Phos  2.8     10-04  Mg     1.9     10-05      Urinalysis Basic - ( 04 Oct 2020 11:23 )    Color: Yellow / Appearance: Clear / S.015 / pH: x  Gluc: x / Ketone: Negative  / Bili: Negative / Urobili: Negative   Blood: x / Protein: Negative / Nitrite: Negative   Leuk Esterase: Negative / RBC: x / WBC x   Sq Epi: x / Non Sq Epi: x / Bacteria: x      Culture Results:   No growth to date. (10-04 @ 11:02)  Culture Results:   No growth to date. (10-04 @ 11:02)      RADIOLOGY & ADDITIONAL STUDIES REVIEWED:     < from: Xray Chest 1 View- PORTABLE-Routine (Xray Chest 1 View- PORTABLE-Routine .) (10.06.20 @ 10:10) >    EXAM:  XR CHEST PORTABLE ROUTINE 1V                            PROCEDURE DATE:  10/06/2020          INTERPRETATION:  INDICATION: Ventilatory support.    PRIORS: 10/5/2020.    VIEWS: Portable AP radiography of the chest performed.    FINDINGS: ETT and NGT unchanged. Heart size within normal limits. No superior mediastinal widening. Right-sided infiltrate unchanged. Bilateral pleural effusions stable. No pneumothorax. No mediastinal shift.    IMPRESSION: No significant change.              JOSE DAVID SCHNEIDER, ATTENDING RADIOLOGIST  This document has been electronically signed. Oct  6 2020  3:03PM    < end of copied text >      [ ]GOALS OF CARE DISCUSSION WITH PATIENT/FAMILY/PROXY:    CRITICAL CARE TIME SPENT: 35 minutes

## 2020-10-06 NOTE — PROGRESS NOTE ADULT - SUBJECTIVE AND OBJECTIVE BOX
OVERNIGHT EVENTS: Overnight patient had a right femoral central line placed and was started on precedex, fentanyl and pressor support (phenylephrine) and intubated. 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     Present Symptoms:   Review of Systems:  Unable to obtain due to poor mentation - patient sedated/intubated     MEDICATIONS  (STANDING):  ALBUTerol    90 MICROgram(s) HFA Inhaler 2 Puff(s) Inhalation every 4 hours  atorvastatin 40 milliGRAM(s) Oral at bedtime  budesonide 160 MICROgram(s)/formoterol 4.5 MICROgram(s) Inhaler 2 Puff(s) Inhalation two times a day  carvedilol 6.25 milliGRAM(s) Oral every 12 hours  caspofungin IVPB      caspofungin IVPB 50 milliGRAM(s) IV Intermittent every 24 hours  chlorhexidine 0.12% Liquid 15 milliLiter(s) Oral Mucosa every 12 hours  chlorhexidine 2% Cloths 1 Application(s) Topical <User Schedule>  dexMEDEtomidine Infusion 0.3 MICROgram(s)/kG/Hr (6.36 mL/Hr) IV Continuous <Continuous>  dextrose 5% + sodium chloride 0.9%. 1000 milliLiter(s) (50 mL/Hr) IV Continuous <Continuous>  dextrose 5%. 1000 milliLiter(s) (50 mL/Hr) IV Continuous <Continuous>  dextrose 50% Injectable 12.5 Gram(s) IV Push once  dextrose 50% Injectable 25 Gram(s) IV Push once  dextrose 50% Injectable 25 Gram(s) IV Push once  enoxaparin Injectable 40 milliGRAM(s) SubCutaneous daily  ergocalciferol 47608 Unit(s) Oral <User Schedule>  fentaNYL   Infusion. 4 MICROgram(s)/kG/Hr (33.9 mL/Hr) IV Continuous <Continuous>  FIRST- Mouthwash  BLM 10 milliLiter(s) Swish and Swallow every 8 hours  folic acid 1 milliGRAM(s) Oral daily  gabapentin 600 milliGRAM(s) Oral every 8 hours  influenza   Vaccine 0.5 milliLiter(s) IntraMuscular once  insulin glargine Injectable (LANTUS) 20 Unit(s) SubCutaneous every morning  insulin lispro (HumaLOG) corrective regimen sliding scale   SubCutaneous every 6 hours  insulin lispro Injectable (HumaLOG) 4 Unit(s) SubCutaneous every 6 hours  levoFLOXacin IVPB      linezolid  IVPB 600 milliGRAM(s) IV Intermittent every 12 hours  linezolid  IVPB      melatonin 3 milliGRAM(s) Oral at bedtime  methylPREDNISolone sodium succinate Injectable 40 milliGRAM(s) IV Push every 8 hours  nystatin Powder 1 Application(s) Topical two times a day  pantoprazole  Injectable 40 milliGRAM(s) IV Push daily  phenylephrine    Infusion 0.2 MICROgram(s)/kG/Min (3.18 mL/Hr) IV Continuous <Continuous>  polyethylene glycol 3350 17 Gram(s) Oral two times a day  potassium chloride  10 mEq/100 mL IVPB 10 milliEquivalent(s) IV Intermittent every 1 hour  senna 2 Tablet(s) Oral at bedtime  tiotropium 18 MICROgram(s) Capsule 1 Capsule(s) Inhalation daily    MEDICATIONS  (PRN):  acetaminophen    Suspension .. 650 milliGRAM(s) Oral every 6 hours PRN Temp greater or equal to 38C (100.4F)  benzocaine 15 mG/menthol 3.6 mG (Sugar-Free) Lozenge 1 Lozenge Oral every 4 hours PRN Sore Throat  dextrose 40% Gel 15 Gram(s) Oral once PRN Blood Glucose LESS THAN 70 milliGRAM(s)/deciLiter  fentaNYL    Injectable 50 MICROGram(s) IV Push every 2 hours PRN agitation  glucagon  Injectable 1 milliGRAM(s) IntraMuscular once PRN Glucose <70 milliGRAM(s)/deciLiter  guaiFENesin   Syrup  (Sugar-Free) 100 milliGRAM(s) Oral every 6 hours PRN Cough  ondansetron Injectable 4 milliGRAM(s) IV Push every 6 hours PRN Nausea and/or Vomiting  sodium chloride 0.65% Nasal 1 Spray(s) Both Nostrils four times a day PRN Nasal Congestion  sodium chloride 0.9% lock flush 10 milliLiter(s) IV Push every 1 hour PRN Pre/post blood products, medications, blood draw, and to maintain line patency  zolpidem 5 milliGRAM(s) Oral at bedtime PRN Insomnia      PHYSICAL EXAM:  Vital Signs Last 24 Hrs  T(C): 37.3 (06 Oct 2020 07:30), Max: 38.4 (06 Oct 2020 05:45)  T(F): 99.1 (06 Oct 2020 07:30), Max: 101.1 (06 Oct 2020 05:45)  HR: 72 (06 Oct 2020 09:45) (72 - 159)  BP: 127/70 (06 Oct 2020 09:45) (86/67 - 149/127)  BP(mean): 83 (06 Oct 2020 09:45) (59 - 133)  RR: 15 (06 Oct 2020 09:45) (14 - 43)  SpO2: 100% (06 Oct 2020 09:45) (80% - 100%)  General: patient sedated/intubated, on pressor. No signs of distress.    Karnofsky Performance Score/Palliative Performance Status Version2:    20%    HEENT: ET tube   Lungs: comfortable, on ventilator. Continues fentanyl, precedex   CV: S1S2, RRR. On pressor  GI: abdomen nontender, + incontinent, OG  :  benitez  Musculoskeletal: patient sedated/intubated, dependent on ADLs  Skin: L buttock stage 2  Neuro: patient sedated/intubated, dependent on ADLs  Oral intake ability: unable/only mouth care   Diet: NPO, TF via OGT    LABS:                          7.9    2.20  )-----------( 54       ( 06 Oct 2020 06:11 )             23.3     10-06    134<L>  |  94<L>  |  13  ----------------------------<  162<H>  3.1<L>   |  30  |  0.43<L>    Ca    7.4<L>      06 Oct 2020 06:11  Phos  2.7     10-06  Mg     1.4     10-06    TPro  4.9<L>  /  Alb  1.8<L>  /  TBili  1.1  /  DBili  x   /  AST  24  /  ALT  25  /  AlkPhos  101  10-06    Urinalysis Basic - ( 04 Oct 2020 11:23 )    Color: Yellow / Appearance: Clear / S.015 / pH: x  Gluc: x / Ketone: Negative  / Bili: Negative / Urobili: Negative   Blood: x / Protein: Negative / Nitrite: Negative   Leuk Esterase: Negative / RBC: x / WBC x   Sq Epi: x / Non Sq Epi: x / Bacteria: x        RADIOLOGY & ADDITIONAL STUDIES: reviewed    ADVANCE DIRECTIVES: HCP and MOLST forms previously completed as per patient's wishes: CPR / long term ventilation / long term feeding tube / trial IV fluids / use abx / NO LIMITATION on medical interventions/ send to hospital.

## 2020-10-06 NOTE — PROGRESS NOTE ADULT - PROBLEM SELECTOR PLAN 1
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 Patient utilized 5L O2 at baseline 24/7 with increasing weakness/dyspnea at rest. Patient extubated 9/22; reintubated 10/5. Comorbid shock. Patient sedated/intubated, on IV abx, antifungals, pressor support, solu-medrol, symbicort, spiriva, albuterol. Lactate 3.1. Patient previously educated extensively about condition/prognosis - aware likely difficult to wean if patient were reintubated. She is eligible for hospice services but wished for aggressive measures including trach/peg if needed. Patient previously identified daughter/Gavi as HCP. Patient previously completed own MOLST: FULL CODE. 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 Patient utilized 5L O2 at baseline 24/7 with increasing weakness/dyspnea at rest. Patient extubated 9/22; reintubated 10/5. Comorbid shock. Patient sedated/intubated, on IV abx, antifungals, pressor support, solu-medrol, symbicort, spiriva, albuterol. Lactate 3.1. Patient previously educated extensively about condition/prognosis - aware likely difficult to wean if patient were reintubated. She is eligible for hospice services but wished for aggressive measures including trach/peg if needed. Patient previously identified daughter/Gavi as HCP - agreeable for trach/peg if needed. Patient previously completed own MOLST: FULL CODE.

## 2020-10-06 NOTE — PROGRESS NOTE ADULT - ASSESSMENT
64 yo obese F with hx of 80 pack year tobacco use, HTN, DM2 on insulin, COPD on 5L (frequent admissions), R lung cancer s/p resection (2017) on chemo q 3 weeks with concern for mets to spine and compression fractures, recent admission for COPD exacerbation earlier in August who on 9/19/2020 presented to ED with c/o SOB, hypoxia and increased sputum production x 1 day. Admitted to ICU x twice for hypoxemic respiratory distress requiring intubation (9/21 and 10/6).       PRE-OP CLEARANCE==========================  pre op labs for Trach and PEG on 10/6  EKG: sinus tachycardia  PT/INR  Type and Screen  NPO after midnight  Holding AC pre procedure  Vent at goal FiO2 60% with PEEP of 8  SANTOS SCORE : 3.9% mortality risk with ENT surgery    CNS=====================================  Intubated and sedated  Fentanyl for pain control  Propofol and Precedex for sedation    CARD====================================  Central Femoral line (10/5) on pressor support Phenylephrine  continue to monitor BP and HR for distress    PULM====================================  #Acute on chronic respiratory failure with hypoxia and hypercapnia.   2/2 COPD, heavy smoking history (80 pack years)  Intubated and sedated x 2 this admission (9/21 and 10/5)  Plan for Trach and PEG surgery 10/6 per patient request  On prednisone at home, will give stress dose and daily steroid per Pulm (Dr. Nugent)  - IV solumedrol 80 q12 x 48 hours then  - PO prednisone 40 QD   - bronchodilators and ICS    #COPD  on AVAPS previously, failed and was re-intubated 10/5  for Tracheostomy  managed as above    #Metastatic Lung cancer  holding chemo  port removed on this hospital stay  Heme/Onc following Dr Mendiola    ID=======================================  #Sepsis.   Fever, leukocytosis (on steroids0) and elevated Lactate 3.1  - f/u new blood cultures (Hx VRE and Fungemia)  - c/w Caspofungin (9/23) and Levaquin (10/6) + Zyvox (10/6)    #Pneumonia   CXR shows b/l pleural effusions with Left sided consolidation  Abx coverage as above: Levaquin (10/6) + Zyvox (10/6)    Heme/Onc=================================  #Anemia.  2/2 chronic disease  Hgb less than 7.0  Hb 7.9 today    #Lung cancer metastatic to bone.   s/p 1 radiation dose to spine.  Was on chemo before hospitalization  chemo port removed, Dr. Mendiola following  c/w pain management.     Endo====================================  #DM (diabetes mellitus).   Start on NPO + OG tube feeds  moderate dose sliding scale q6h  - continue insulin lantus 20 units daily for today  - insulin lispro 4 units q6h  - goal inpatient glucose range: 140-180mg/dl    GI======================================  #Moderate protein-calorie malnutrition.   - OG tube with tube feeds  - PEG tube for 10/7    Nephro=================================  BUN 13| Creat 0.43  No active issue monitor electrolytes and replace as needed    PPX=====================================  DVT (held for procedure) and GI prophylaxis.  Continue AVAPS.    GOC====================================  Overall prognosis is extremely poor.   Patient remains FULL CODE as per her wishes  would like everything done including trach and PEG   66 yo obese F with hx of 80 pack year tobacco use, HTN, DM2 on insulin, COPD on 5L (frequent admissions), R lung cancer s/p resection (2017) on chemo q 3 weeks with concern for mets to spine and compression fractures, recent admission for COPD exacerbation earlier in August who on 9/19/2020 presented to ED with c/o SOB, hypoxia and increased sputum production x 1 day. Admitted to ICU x twice for hypoxemic respiratory distress requiring intubation (9/21 and 10/6).       PRE-OP CLEARANCE==========================  pre op labs for Trach and PEG on 10/6  EKG: sinus tachycardia  PT/INR  CXR in AM  Type and Screen  NPO after midnight  Holding AC pre procedure  Vent at goal FiO2 60% with PEEP of 8  SANTOS SCORE : 3.9% mortality risk with ENT surgery    CNS=====================================  Intubated and sedated  Fentanyl for pain control  Propofol and Precedex for sedation    CARD====================================  Central Femoral line (10/5) on pressor support Phenylephrine  continue to monitor BP and HR for distress    PULM====================================  #Acute on chronic respiratory failure with hypoxia and hypercapnia.   2/2 COPD, heavy smoking history (80 pack years)  Intubated and sedated x 2 this admission (9/21 and 10/5)  Plan for Trach and PEG surgery 10/6 per patient request  On prednisone at home, will give stress dose and daily steroid per Pulm (Dr. Nugent)  - IV solumedrol 80 q12 x 48 hours then  - PO prednisone 40 QD   - bronchodilators and ICS    #COPD  on AVAPS previously, failed and was re-intubated 10/5  for Tracheostomy  managed as above    #Metastatic Lung cancer  holding chemo  port removed on this hospital stay  Heme/Onc following Dr Mendiola    ID=======================================  #Sepsis.   Fever, leukocytosis (on steroids0) and elevated Lactate 3.1  - f/u new blood cultures (Hx VRE and Fungemia)  - c/w Caspofungin (9/23) and Levaquin (10/6) + Zyvox (10/6)    #Pneumonia   CXR shows b/l pleural effusions with Left sided consolidation  Abx coverage as above: Levaquin (10/6) + Zyvox (10/6)    Heme/Onc=================================  #Anemia.  2/2 chronic disease  Hgb less than 7.0  Hb 7.9 today    #Lung cancer metastatic to bone.   s/p 1 radiation dose to spine.  Was on chemo before hospitalization  chemo port removed, Dr. Mendiola following  c/w pain management.     Endo====================================  #DM (diabetes mellitus).   Start on NPO + OG tube feeds  moderate dose sliding scale q6h  - continue insulin lantus 20 units daily for today  - insulin lispro 4 units q6h  - goal inpatient glucose range: 140-180mg/dl    GI======================================  #Moderate protein-calorie malnutrition.   - OG tube with tube feeds  - PEG tube for 10/7    Nephro=================================  BUN 13| Creat 0.43  No active issue monitor electrolytes and replace as needed    PPX=====================================  DVT (held for procedure) and GI prophylaxis.  Continue AVAPS.    GOC====================================  Overall prognosis is extremely poor.   Patient remains FULL CODE as per her wishes  would like everything done including trach and PEG

## 2020-10-06 NOTE — PROGRESS NOTE ADULT - PROBLEM SELECTOR PLAN 2
2/2 bacteremia. Lactate 3.1. Patient sedated/intubated, on IV abx/anti fungals, pressor support. Repeat blood Cx pending. Patient is a full code.

## 2020-10-06 NOTE — PROGRESS NOTE ADULT - ASSESSMENT
COPD with acute asthmatic bronchitis with  acute  Respiratory Failure   Recurrent Lung cancer with Bone Mets   IDDM   Htn with MR   Thrombocytopenia sec to C/T  Obesity with CRIS   r/o esophageal candidiasis  GPC bacteremia VRE/ yeast    PLAN-  iv solumedrol 80 mg IVpb x q 12 h x 48 hrs then decrease to 40 mg qd thru ngt  ( Pt has been on steroids a lot in last 4-5 years ,Please don't stop )   Nss 75 cc/hr x 24 hrs   Kcl and Mg supplement  IV Zyvox and Cancidas  Get PICC LINE  /CMV12/ $0% with Peep5 cm  Advance diet  OOB in chair/ BRP  Venodynes     Duoneb  by HFN rx qid prn   s/c lovenox  FS coverage

## 2020-10-06 NOTE — PROGRESS NOTE ADULT - ASSESSMENT
· Assessment	  65 year old lady with severe COPD and lung ca with mets to bones which causing compression Fx.  She had one RT for that.  she was admitted for dyspnea and hypoxia.    Problem/Recommendation - 1:  Problem: Lung cancer. Recommendation: with mets to bones  on chemo keytruda, alimta and carboplatin  she had one RT to spine.  the tumor markers have been trending down with chemo    Problem/Recommendation - 2:  ·  Problem: COPD (chronic obstructive pulmonary disease).  Recommendation: required freq steroids.     Problem/Recommendation - 3:  ·  Problem: Acute on chronic respiratory failure with hypoxia.  Recommendation: ?aspiration causing resp failure requiring intubation  she has pain at throat for a while and has difficulty swallowing  now extubated  prognosis poor.   she is lucid now and express wishes to continue chemo. she needs placement because she is not able to climb the stairs  4. sore throat   she has difficulty swallowing  need a scope  th pain is less now  able to swallow food better    will watch for possibility of aspiration  she has cough with some sputum now  she is still on antibiotics    ?strider  pul to see her   intubation again

## 2020-10-06 NOTE — AIRWAY PLACEMENT NOTE ADULT - POST AIRWAY PLACEMENT ASSESSMENT:
positive end tidal CO2 noted/breath sounds bilateral/CXR pending/chest excursion noted/breath sounds equal/skin color improved
positive end tidal CO2 noted/breath sounds bilateral/breath sounds equal/chest excursion noted

## 2020-10-06 NOTE — PROGRESS NOTE ADULT - PROBLEM SELECTOR PLAN 4
2/2 ES COPD, metastatic lung Ca. Patient dependent 5L O2, with increasing debility/weakness and dyspnea reported at rest prior to hospitalization. Patient intubated last night (x 2 this hospitalization), likely difficult to wean 2/2 underlying end stage COPD/lung Ca. + shock. Patient eligible for hospice but patient wished for aggressive measures, including trach/peg if needed. Patient requires 24 hr care.

## 2020-10-06 NOTE — PROGRESS NOTE ADULT - SUBJECTIVE AND OBJECTIVE BOX
PULMONARY  progress note    BARB COLÓN  MRN-[id number]    Patient is a 65y old  Female who presents with a chief complaint of shortness of breath (06 Oct 2020 09:22)  Pt was intubated and transferred to ICU, daughter aware of poor prognosis ,wants Trach and PEG if needed  Pt on CMV14/.70% with PP28 and o2 fnh344%      MEDICATIONS  (STANDING):  ALBUTerol    90 MICROgram(s) HFA Inhaler 2 Puff(s) Inhalation every 4 hours  atorvastatin 40 milliGRAM(s) Oral at bedtime  budesonide 160 MICROgram(s)/formoterol 4.5 MICROgram(s) Inhaler 2 Puff(s) Inhalation two times a day  carvedilol 6.25 milliGRAM(s) Oral every 12 hours  caspofungin IVPB      caspofungin IVPB 50 milliGRAM(s) IV Intermittent every 24 hours  chlorhexidine 0.12% Liquid 15 milliLiter(s) Oral Mucosa every 12 hours  chlorhexidine 2% Cloths 1 Application(s) Topical <User Schedule>  dexMEDEtomidine Infusion 0.3 MICROgram(s)/kG/Hr (6.36 mL/Hr) IV Continuous <Continuous>  dextrose 5% + sodium chloride 0.9%. 1000 milliLiter(s) (50 mL/Hr) IV Continuous <Continuous>  dextrose 5%. 1000 milliLiter(s) (50 mL/Hr) IV Continuous <Continuous>  dextrose 50% Injectable 12.5 Gram(s) IV Push once  dextrose 50% Injectable 25 Gram(s) IV Push once  dextrose 50% Injectable 25 Gram(s) IV Push once  enoxaparin Injectable 40 milliGRAM(s) SubCutaneous daily  ergocalciferol 51130 Unit(s) Oral <User Schedule>  fentaNYL   Infusion. 4 MICROgram(s)/kG/Hr (33.9 mL/Hr) IV Continuous <Continuous>  FIRST- Mouthwash  BLM 10 milliLiter(s) Swish and Swallow every 8 hours  folic acid 1 milliGRAM(s) Oral daily  gabapentin 600 milliGRAM(s) Oral every 8 hours  influenza   Vaccine 0.5 milliLiter(s) IntraMuscular once  insulin glargine Injectable (LANTUS) 20 Unit(s) SubCutaneous every morning  insulin lispro (HumaLOG) corrective regimen sliding scale   SubCutaneous every 6 hours  insulin lispro Injectable (HumaLOG) 4 Unit(s) SubCutaneous every 6 hours  levoFLOXacin IVPB      linezolid  IVPB 600 milliGRAM(s) IV Intermittent every 12 hours  linezolid  IVPB      melatonin 3 milliGRAM(s) Oral at bedtime  methylPREDNISolone sodium succinate Injectable 40 milliGRAM(s) IV Push every 8 hours  nystatin Powder 1 Application(s) Topical two times a day  pantoprazole  Injectable 40 milliGRAM(s) IV Push daily  phenylephrine    Infusion 0.2 MICROgram(s)/kG/Min (3.18 mL/Hr) IV Continuous <Continuous>  polyethylene glycol 3350 17 Gram(s) Oral two times a day  potassium chloride  10 mEq/100 mL IVPB 10 milliEquivalent(s) IV Intermittent every 1 hour  senna 2 Tablet(s) Oral at bedtime  tiotropium 18 MICROgram(s) Capsule 1 Capsule(s) Inhalation daily      Allergies    fish (Angioedema)  penicillins (Rash)      PAST MEDICAL & SURGICAL HISTORY:  Malignant neoplasm of unspecified part of right bronchus or lung    HTN (hypertension)    DM (diabetes mellitus)    COPD (chronic obstructive pulmonary disease)    Lung cancer    Morbid obesity    GERD (gastroesophageal reflux disease)    Deviated septum    Prolapsed uterus    ITP (idiopathic thrombocytopenic purpura)    Emphysema/COPD    History of cholecystectomy    S/P lobectomy of lung  right 2017    History of tonsillectomy             REVIEW OF SYSTEMS: as per RN  CONSTITUTIONAL: No fever, weight loss, or fatigue   EYES: No eye pain, visual disturbances, or discharge  ENT:  No difficulty hearing, tinnitus, vertigo; No sinus or throat pain  NECK: No pain or stiffness or nodes  RESPIRATORY:  cough--   wheezing--   chills--   hemoptysis--  Shortness of Breath+  CARDIOVASCULAR: No chest pain, palpitations, passing out, dizziness, or leg swelling  GASTROINTESTINAL: No abdominal or epigastric pain. No nausea, vomiting, or hematemesis; No diarrhea or constipation. No melena or hematochezia.  GENITOURINARY: No dysuria, frequency, hematuria, or incontinence  NEUROLOGICAL: sedated  LYMPH Nodes: No enlarged glands  ENDOCRINE: No heat or cold intolerance; No hair loss  MUSCULOSKELETAL: No joint pain or swelling; No muscle, back, or extremity pain  ALLERGY AND IMMUNOLOGIC: No hives or eczema        Vital Signs Last 24 Hrs  T(C): 37.3 (06 Oct 2020 07:30), Max: 38.4 (06 Oct 2020 05:45)  T(F): 99.1 (06 Oct 2020 07:30), Max: 101.1 (06 Oct 2020 05:45)  HR: 72 (06 Oct 2020 09:45) (72 - 159)  BP: 127/70 (06 Oct 2020 09:45) (86/67 - 149/127)  BP(mean): 83 (06 Oct 2020 09:45) (59 - 133)  RR: 15 (06 Oct 2020 09:45) (14 - 43)  SpO2: 100% (06 Oct 2020 09:45) (80% - 100%)  I&O's Detail    05 Oct 2020 07:01  -  06 Oct 2020 07:00  --------------------------------------------------------  IN:    Dexmedetomidine: 270 mL    dextrose 5% + sodium chloride 0.9%: 600 mL    IV PiggyBack: 75 mL    IV PiggyBack: 300 mL    IV PiggyBack: 750 mL    Phenylephrine: 455.4 mL  Total IN: 2450.4 mL    OUT:    Indwelling Catheter - Urethral (mL): 1555 mL  Total OUT: 1555 mL    Total NET: 895.4 mL          PHYSICAL EXAMINATION:    GENERAL: The patient is a well-developed, obese in no apparent distress intubated on vent   SKIN no rash ecchymoses or bruises  HEENT: Head is normocephalic and atraumatic  SHAMA , Extraocular muscles are intact. Mucous membranes  moist.   Neck supple ,No LN felt JVP not increased  Thyroid not enlarged  Cardiovascular:  S1 S2 heard, RSR, No JVD , systolic  murmur at apex, No gallop or rub  Respiratory: Chest wall symmetrical with good air entry ,Percussion note normal,    Lungs vesicular breathing with bibasilar   rales  , no  wheeze	  ABDOMEN:  Soft, Non-tender, obese, NGT + no hepatomegaly or splenomegaly BS positive	  Extremities: Normal range of motion, No clubbing, cyanosis or edema  Vascular: Peripheral pulses palpable 2+ bilaterally  CNS:   Sedated on vent  sensory intact  motor power5/5  dtr 2+   Babinski neg    LABS:                        7.9    2.20  )-----------( 54       ( 06 Oct 2020 06:11 )             23.3     10-06    134<L>  |  94<L>  |  13  ----------------------------<  162<H>  3.1<L>   |  30  |  0.43<L>    Ca    7.4<L>      06 Oct 2020 06:11  Phos  2.7     10-06  Mg     1.4     10-06    TPro  4.9<L>  /  Alb  1.8<L>  /  TBili  1.1  /  DBili  x   /  AST  24  /  ALT  25  /  AlkPhos  101  10      Urinalysis Basic - ( 04 Oct 2020 11:23 )    Color: Yellow / Appearance: Clear / S.015 / pH: x  Gluc: x / Ketone: Negative  / Bili: Negative / Urobili: Negative   Blood: x / Protein: Negative / Nitrite: Negative   Leuk Esterase: Negative / RBC: x / WBC x   Sq Epi: x / Non Sq Epi: x / Bacteria: x      ABG - ( 06 Oct 2020 03:27 )  pH, Arterial: 7.53  pH, Blood: x     /  pCO2: 35    /  pO2: 75    / HCO3: 29    / Base Excess: 6.4   /  SaO2: 96                      MICROBIOLOGY:    Culture - Blood (collected 10-04-20 @ 11:02)  Source: .Blood Blood  Preliminary Report (10-05-20 @ 12:01):    No growth to date.    Culture - Blood (collected 10-04-20 @ 11:02)  Source: .Blood Blood  Preliminary Report (10-05-20 @ 12:01):    No growth to date.          RADIOLOGY & ADDITIONAL STUDIES:    CXR: 10/6/20-- ETT and NGT in place, Patchy infiltrate RLL , Small Lt pl effusion

## 2020-10-06 NOTE — PROGRESS NOTE ADULT - PROBLEM SELECTOR PLAN 3
ECO. Patient with primary R lung Ca with bone mets; comorbid end stage COPD. CT previously indicated osseous metastasis and associated compression fractures and rib fractures unchanged. Patient dependent on O2; reintubated 10/5. Per oncology, patient on chemo (heytruda, alimta and carboplatin); had 1 RT to spine. Overall, poor prognosis. Full code.

## 2020-10-06 NOTE — CONSULT NOTE ADULT - ASSESSMENT
65F with Lung CA with METS p/w with acute hypoxic respiratory failure. Intubated with respiratory status deteriorating requiring trach/peg    -Plan for trach/peg in AM, once vent setting requirements are less than PEEP 8/FiO2 60  -Hold tube feeds at midnight   -Preop labs  -Remainder of care per ICU

## 2020-10-06 NOTE — CONSULT NOTE ADULT - SUBJECTIVE AND OBJECTIVE BOX
66 yo obese F former heavy smoker (80 pack years) with, HTN, DM2 on insulin, COPD on 5L (frequent admissions), R lung cancer s/p resection (2017) on chemo q 3 weeks with concern for mets to spine and compression fractures, recent admission for COPD exacerbation earlier in August presents with SOB, hypoxia and increased sputum production x 1 day. Patient states cough feels like secretion is coming out but unable to expectorate. Patient is on 5L of oxygen at home. As per EMS, patient is hypoxic at 84% on room air which came up to 98% with 4L of nasal canula. Patient was recently in august treated with prednisone, nebulizer treatments and completed a course of azithromycin for copd exacerbation. Denies nausea, vomiting, diarrhea, abdominal pain, fever, chills.    THORACIC SURGERY CONSULT:  Patient seen and examined at bedside. Patient remain intubated and sedated, failing spontaneous breathing trials. Vent settings 14/450/10/70. Thoracic surgery consulted for trach/peg.     PAST MEDICAL & SURGICAL HISTORY:  Malignant neoplasm of unspecified part of right bronchus or lung    HTN (hypertension)    DM (diabetes mellitus)    COPD (chronic obstructive pulmonary disease)    Lung cancer    Morbid obesity    GERD (gastroesophageal reflux disease)    Deviated septum    Prolapsed uterus    ITP (idiopathic thrombocytopenic purpura)    Emphysema/COPD    History of cholecystectomy    S/P lobectomy of lung  right 2017    History of tonsillectomy    MEDICATIONS  (STANDING):  ALBUTerol    90 MICROgram(s) HFA Inhaler 2 Puff(s) Inhalation every 4 hours  atorvastatin 40 milliGRAM(s) Oral at bedtime  budesonide 160 MICROgram(s)/formoterol 4.5 MICROgram(s) Inhaler 2 Puff(s) Inhalation two times a day  carvedilol 6.25 milliGRAM(s) Oral every 12 hours  caspofungin IVPB      caspofungin IVPB 50 milliGRAM(s) IV Intermittent every 24 hours  chlorhexidine 0.12% Liquid 15 milliLiter(s) Oral Mucosa every 12 hours  chlorhexidine 2% Cloths 1 Application(s) Topical <User Schedule>  dexMEDEtomidine Infusion 0.3 MICROgram(s)/kG/Hr (6.36 mL/Hr) IV Continuous <Continuous>  dextrose 5% + sodium chloride 0.9%. 1000 milliLiter(s) (50 mL/Hr) IV Continuous <Continuous>  dextrose 5%. 1000 milliLiter(s) (50 mL/Hr) IV Continuous <Continuous>  dextrose 50% Injectable 12.5 Gram(s) IV Push once  dextrose 50% Injectable 25 Gram(s) IV Push once  dextrose 50% Injectable 25 Gram(s) IV Push once  enoxaparin Injectable 40 milliGRAM(s) SubCutaneous daily  ergocalciferol 47373 Unit(s) Oral <User Schedule>  fentaNYL   Infusion. 4 MICROgram(s)/kG/Hr (33.9 mL/Hr) IV Continuous <Continuous>  FIRST- Mouthwash  BLM 10 milliLiter(s) Swish and Swallow every 8 hours  folic acid 1 milliGRAM(s) Oral daily  gabapentin 600 milliGRAM(s) Oral every 8 hours  influenza   Vaccine 0.5 milliLiter(s) IntraMuscular once  insulin glargine Injectable (LANTUS) 20 Unit(s) SubCutaneous every morning  insulin lispro (HumaLOG) corrective regimen sliding scale   SubCutaneous every 6 hours  insulin lispro Injectable (HumaLOG) 4 Unit(s) SubCutaneous every 6 hours  levoFLOXacin IVPB      linezolid  IVPB 600 milliGRAM(s) IV Intermittent every 12 hours  linezolid  IVPB      melatonin 3 milliGRAM(s) Oral at bedtime  methylPREDNISolone sodium succinate Injectable 40 milliGRAM(s) IV Push every 8 hours  nystatin Powder 1 Application(s) Topical two times a day  pantoprazole  Injectable 40 milliGRAM(s) IV Push daily  phenylephrine    Infusion 1 MICROgram(s)/kG/Min (31.8 mL/Hr) IV Continuous <Continuous>  polyethylene glycol 3350 17 Gram(s) Oral two times a day  senna 2 Tablet(s) Oral at bedtime  tiotropium 18 MICROgram(s) Capsule 1 Capsule(s) Inhalation daily    MEDICATIONS  (PRN):  acetaminophen    Suspension .. 650 milliGRAM(s) Oral every 6 hours PRN Temp greater or equal to 38C (100.4F)  benzocaine 15 mG/menthol 3.6 mG (Sugar-Free) Lozenge 1 Lozenge Oral every 4 hours PRN Sore Throat  dextrose 40% Gel 15 Gram(s) Oral once PRN Blood Glucose LESS THAN 70 milliGRAM(s)/deciLiter  fentaNYL    Injectable 50 MICROGram(s) IV Push every 2 hours PRN agitation  glucagon  Injectable 1 milliGRAM(s) IntraMuscular once PRN Glucose <70 milliGRAM(s)/deciLiter  guaiFENesin   Syrup  (Sugar-Free) 100 milliGRAM(s) Oral every 6 hours PRN Cough  ondansetron Injectable 4 milliGRAM(s) IV Push every 6 hours PRN Nausea and/or Vomiting  sodium chloride 0.65% Nasal 1 Spray(s) Both Nostrils four times a day PRN Nasal Congestion  sodium chloride 0.9% lock flush 10 milliLiter(s) IV Push every 1 hour PRN Pre/post blood products, medications, blood draw, and to maintain line patency  zolpidem 5 milliGRAM(s) Oral at bedtime PRN Insomnia    Allergies  fish (Angioedema)  penicillins (Rash)    Intolerances    Vital Signs Last 24 Hrs  T(C): 37.3 (06 Oct 2020 07:30), Max: 38.4 (06 Oct 2020 05:45)  T(F): 99.1 (06 Oct 2020 07:30), Max: 101.1 (06 Oct 2020 05:45)  HR: 73 (06 Oct 2020 12:00) (72 - 159)  BP: 113/64 (06 Oct 2020 12:00) (86/67 - 149/127)  BP(mean): 75 (06 Oct 2020 12:00) (59 - 133)  RR: 14 (06 Oct 2020 12:00) (14 - 43)  SpO2: 100% (06 Oct 2020 12:00) (80% - 100%)    Physical:  Gen: Intubated, sedated  Neck: Supple. Trachea midline. Thyroid not enlarged. No scars or abnormalities  Abdomen; Soft, nondistended, midline incisional scar from previous surgery    Mode: AC/ CMV (Assist Control/ Continuous Mandatory Ventilation)  RR (machine): 14  TV (machine): 450  FiO2: 70  PEEP: 10  ITime: 1  MAP: 16  PIP: 29      I&O's Detail    05 Oct 2020 07:01  -  06 Oct 2020 07:00  --------------------------------------------------------  IN:    Dexmedetomidine: 270 mL    dextrose 5% + sodium chloride 0.9%: 600 mL    IV PiggyBack: 75 mL    IV PiggyBack: 300 mL    IV PiggyBack: 750 mL    Phenylephrine: 455.4 mL  Total IN: 2450.4 mL    OUT:    Indwelling Catheter - Urethral (mL): 1555 mL  Total OUT: 1555 mL    Total NET: 895.4 mL    06 Oct 2020 07:01  -  06 Oct 2020 12:55  --------------------------------------------------------  IN:    Dexmedetomidine: 56 mL    dextrose 5% + sodium chloride 0.9%: 250 mL    FentaNYL: 216 mL    Glucerna 1.5: 30 mL    IV PiggyBack: 400 mL    Phenylephrine: 168.5 mL  Total IN: 1120.5 mL    OUT:    Indwelling Catheter - Urethral (mL): 350 mL  Total OUT: 350 mL    Total NET: 770.5 mL      LABS:                        7.9    2.20  )-----------( 54       ( 06 Oct 2020 06:11 )             23.3              10-06    134<L>  |  94<L>  |  13  ----------------------------<  162<H>  3.1<L>   |  30  |  0.43<L>    Ca    7.4<L>      06 Oct 2020 06:11  Phos  2.7     10-06  Mg     1.4     10-06    TPro  4.9<L>  /  Alb  1.8<L>  /  TBili  1.1  /  DBili  x   /  AST  24  /  ALT  25  /  AlkPhos  101  10-06

## 2020-10-06 NOTE — PROGRESS NOTE ADULT - SUBJECTIVE AND OBJECTIVE BOX
intubated again  on steroid    MEDICATIONS  (STANDING):  ALBUTerol    90 MICROgram(s) HFA Inhaler 2 Puff(s) Inhalation every 4 hours  atorvastatin 40 milliGRAM(s) Oral at bedtime  budesonide 160 MICROgram(s)/formoterol 4.5 MICROgram(s) Inhaler 2 Puff(s) Inhalation two times a day  carvedilol 6.25 milliGRAM(s) Oral every 12 hours  caspofungin IVPB      caspofungin IVPB 50 milliGRAM(s) IV Intermittent every 24 hours  chlorhexidine 0.12% Liquid 15 milliLiter(s) Oral Mucosa every 12 hours  chlorhexidine 2% Cloths 1 Application(s) Topical <User Schedule>  dexMEDEtomidine Infusion 0.3 MICROgram(s)/kG/Hr (6.36 mL/Hr) IV Continuous <Continuous>  dextrose 5% + sodium chloride 0.9%. 1000 milliLiter(s) (50 mL/Hr) IV Continuous <Continuous>  dextrose 5%. 1000 milliLiter(s) (50 mL/Hr) IV Continuous <Continuous>  dextrose 50% Injectable 12.5 Gram(s) IV Push once  dextrose 50% Injectable 25 Gram(s) IV Push once  dextrose 50% Injectable 25 Gram(s) IV Push once  enoxaparin Injectable 40 milliGRAM(s) SubCutaneous daily  ergocalciferol 83029 Unit(s) Oral <User Schedule>  fentaNYL   Infusion. 4 MICROgram(s)/kG/Hr (33.9 mL/Hr) IV Continuous <Continuous>  FIRST- Mouthwash  BLM 10 milliLiter(s) Swish and Swallow every 8 hours  folic acid 1 milliGRAM(s) Oral daily  gabapentin 600 milliGRAM(s) Oral every 8 hours  influenza   Vaccine 0.5 milliLiter(s) IntraMuscular once  insulin glargine Injectable (LANTUS) 20 Unit(s) SubCutaneous every morning  insulin lispro (HumaLOG) corrective regimen sliding scale   SubCutaneous every 6 hours  insulin lispro Injectable (HumaLOG) 4 Unit(s) SubCutaneous every 6 hours  levoFLOXacin IVPB      linezolid  IVPB 600 milliGRAM(s) IV Intermittent every 12 hours  linezolid  IVPB      melatonin 3 milliGRAM(s) Oral at bedtime  methylPREDNISolone sodium succinate Injectable 40 milliGRAM(s) IV Push every 8 hours  nystatin Powder 1 Application(s) Topical two times a day  pantoprazole  Injectable 40 milliGRAM(s) IV Push daily  phenylephrine    Infusion 0.2 MICROgram(s)/kG/Min (3.18 mL/Hr) IV Continuous <Continuous>  polyethylene glycol 3350 17 Gram(s) Oral two times a day  potassium chloride  10 mEq/100 mL IVPB 10 milliEquivalent(s) IV Intermittent every 1 hour  senna 2 Tablet(s) Oral at bedtime  tiotropium 18 MICROgram(s) Capsule 1 Capsule(s) Inhalation daily    MEDICATIONS  (PRN):  acetaminophen    Suspension .. 650 milliGRAM(s) Oral every 6 hours PRN Temp greater or equal to 38C (100.4F)  benzocaine 15 mG/menthol 3.6 mG (Sugar-Free) Lozenge 1 Lozenge Oral every 4 hours PRN Sore Throat  dextrose 40% Gel 15 Gram(s) Oral once PRN Blood Glucose LESS THAN 70 milliGRAM(s)/deciLiter  fentaNYL    Injectable 50 MICROGram(s) IV Push every 2 hours PRN agitation  glucagon  Injectable 1 milliGRAM(s) IntraMuscular once PRN Glucose <70 milliGRAM(s)/deciLiter  guaiFENesin   Syrup  (Sugar-Free) 100 milliGRAM(s) Oral every 6 hours PRN Cough  ondansetron Injectable 4 milliGRAM(s) IV Push every 6 hours PRN Nausea and/or Vomiting  sodium chloride 0.65% Nasal 1 Spray(s) Both Nostrils four times a day PRN Nasal Congestion  sodium chloride 0.9% lock flush 10 milliLiter(s) IV Push every 1 hour PRN Pre/post blood products, medications, blood draw, and to maintain line patency  zolpidem 5 milliGRAM(s) Oral at bedtime PRN Insomnia      Allergies    fish (Angioedema)  penicillins (Rash)    Intolerances        Vital Signs Last 24 Hrs  T(C): 37.3 (06 Oct 2020 07:30), Max: 38.4 (06 Oct 2020 05:45)  T(F): 99.1 (06 Oct 2020 07:30), Max: 101.1 (06 Oct 2020 05:45)  HR: 76 (06 Oct 2020 08:45) (75 - 159)  BP: 135/64 (06 Oct 2020 08:45) (86/67 - 149/127)  BP(mean): 80 (06 Oct 2020 08:45) (59 - 133)  RR: 16 (06 Oct 2020 08:45) (14 - 43)  SpO2: 100% (06 Oct 2020 08:45) (80% - 100%)    PHYSICAL EXAM  General: adult in NAD  HEENT: clear oropharynx, anicteric sclera, pink conjunctiva  Neck: supple  CV: normal S1/S2 with no murmur rubs or gallops  Lungs: positive air movement b/l ant lungs,clear to auscultation, no wheezes, no rales  Abdomen: soft non-tender non-distended, no hepatosplenomegaly  Ext: no clubbing cyanosis or edema  Skin: no rashes and no petechiae  Neuro: alert and oriented X 4, no focal deficits  LABS:                          7.9    2.20  )-----------( 54       ( 06 Oct 2020 06:11 )             23.3         Mean Cell Volume : 86.9 fl  Mean Cell Hemoglobin : 29.5 pg  Mean Cell Hemoglobin Concentration : 33.9 gm/dL  Auto Neutrophil # : x  Auto Lymphocyte # : x  Auto Monocyte # : x  Auto Eosinophil # : x  Auto Basophil # : x  Auto Neutrophil % : x  Auto Lymphocyte % : x  Auto Monocyte % : x  Auto Eosinophil % : x  Auto Basophil % : x    Serial CBC  Hematocrit 23.3  Hemoglobin 7.9  Plat 54  RBC 2.68  WBC 2.20  Serial CBC  Hematocrit 22.1  Hemoglobin 7.4  Plat 56  RBC 2.54  WBC 3.78  Serial CBC  Hematocrit 27.9  Hemoglobin 10.2  Plat 85  RBC 3.32  WBC 3.14  Serial CBC  Hematocrit 25.0  Hemoglobin 8.9  Plat 70  RBC 2.87  WBC 7.73    10-06    134<L>  |  94<L>  |  13  ----------------------------<  162<H>  3.1<L>   |  30  |  0.43<L>    Ca    7.4<L>      06 Oct 2020 06:11  Phos  2.7     10-06  Mg     1.4     10-06    TPro  4.9<L>  /  Alb  1.8<L>  /  TBili  1.1  /  DBili  x   /  AST  24  /  ALT  25  /  AlkPhos  101  10-06                    BLOOD SMEAR INTERPRETATION:       RADIOLOGY & ADDITIONAL STUDIES:

## 2020-10-06 NOTE — PROGRESS NOTE ADULT - PROBLEM SELECTOR PLAN 5
Patient previously identified daughter/Gavi as primary HCP and partner/Williams as alternate agent. MOLST form previously completed as per patient's wishes: CPR / long term ventilation / long term feeding tube / trial IV fluids / use abx / NO LIMITATION on medical interventions. Overall, patient with poor prognosis. Patient previously identified daughter/Gavi as primary HCP and partner/Williams as alternate agent. MOLST form previously completed as per patient's wishes: CPR / long term ventilation / long term feeding tube / trial IV fluids / use abx / NO LIMITATION on medical interventions. Spoke with dtr/HCP, Gavi, on the phone-  updated status. Confirms patient's wishes for trach and peg and is agreeable if needed. All questions answered. Overall, patient with poor prognosis.

## 2020-10-07 ENCOUNTER — TRANSCRIPTION ENCOUNTER (OUTPATIENT)
Age: 65
End: 2020-10-07

## 2020-10-07 LAB
ALBUMIN SERPL ELPH-MCNC: 1.7 G/DL — LOW (ref 3.5–5)
ALP SERPL-CCNC: 126 U/L — HIGH (ref 40–120)
ALT FLD-CCNC: 27 U/L DA — SIGNIFICANT CHANGE UP (ref 10–60)
ANION GAP SERPL CALC-SCNC: 6 MMOL/L — SIGNIFICANT CHANGE UP (ref 5–17)
ANION GAP SERPL CALC-SCNC: 7 MMOL/L — SIGNIFICANT CHANGE UP (ref 5–17)
ANISOCYTOSIS BLD QL: SLIGHT — SIGNIFICANT CHANGE UP
APTT BLD: 32.4 SEC — SIGNIFICANT CHANGE UP (ref 27.5–35.5)
AST SERPL-CCNC: 20 U/L — SIGNIFICANT CHANGE UP (ref 10–40)
BASOPHILS # BLD AUTO: 0 K/UL — SIGNIFICANT CHANGE UP (ref 0–0.2)
BASOPHILS NFR BLD AUTO: 0 % — SIGNIFICANT CHANGE UP (ref 0–2)
BILIRUB SERPL-MCNC: 0.8 MG/DL — SIGNIFICANT CHANGE UP (ref 0.2–1.2)
BUN SERPL-MCNC: 10 MG/DL — SIGNIFICANT CHANGE UP (ref 7–18)
BUN SERPL-MCNC: 10 MG/DL — SIGNIFICANT CHANGE UP (ref 7–18)
CALCIUM SERPL-MCNC: 7.6 MG/DL — LOW (ref 8.4–10.5)
CALCIUM SERPL-MCNC: 7.9 MG/DL — LOW (ref 8.4–10.5)
CHLORIDE SERPL-SCNC: 100 MMOL/L — SIGNIFICANT CHANGE UP (ref 96–108)
CHLORIDE SERPL-SCNC: 98 MMOL/L — SIGNIFICANT CHANGE UP (ref 96–108)
CO2 SERPL-SCNC: 30 MMOL/L — SIGNIFICANT CHANGE UP (ref 22–31)
CO2 SERPL-SCNC: 31 MMOL/L — SIGNIFICANT CHANGE UP (ref 22–31)
CREAT SERPL-MCNC: 0.37 MG/DL — LOW (ref 0.5–1.3)
CREAT SERPL-MCNC: 0.38 MG/DL — LOW (ref 0.5–1.3)
ELLIPTOCYTES BLD QL SMEAR: SLIGHT — SIGNIFICANT CHANGE UP
EOSINOPHIL # BLD AUTO: 0 K/UL — SIGNIFICANT CHANGE UP (ref 0–0.5)
EOSINOPHIL NFR BLD AUTO: 0 % — SIGNIFICANT CHANGE UP (ref 0–6)
GLUCOSE BLDC GLUCOMTR-MCNC: 150 MG/DL — HIGH (ref 70–99)
GLUCOSE BLDC GLUCOMTR-MCNC: 165 MG/DL — HIGH (ref 70–99)
GLUCOSE BLDC GLUCOMTR-MCNC: 204 MG/DL — HIGH (ref 70–99)
GLUCOSE BLDC GLUCOMTR-MCNC: 241 MG/DL — HIGH (ref 70–99)
GLUCOSE SERPL-MCNC: 166 MG/DL — HIGH (ref 70–99)
GLUCOSE SERPL-MCNC: 208 MG/DL — HIGH (ref 70–99)
GRAM STN FLD: SIGNIFICANT CHANGE UP
GRAM STN FLD: SIGNIFICANT CHANGE UP
HCT VFR BLD CALC: 19.5 % — CRITICAL LOW (ref 34.5–45)
HCT VFR BLD CALC: 20.3 % — CRITICAL LOW (ref 34.5–45)
HCT VFR BLD CALC: 25 % — LOW (ref 34.5–45)
HGB BLD-MCNC: 6.8 G/DL — CRITICAL LOW (ref 11.5–15.5)
HGB BLD-MCNC: 7.1 G/DL — LOW (ref 11.5–15.5)
HGB BLD-MCNC: 8.7 G/DL — LOW (ref 11.5–15.5)
HYPOCHROMIA BLD QL: SLIGHT — SIGNIFICANT CHANGE UP
INR BLD: 1.55 RATIO — HIGH (ref 0.88–1.16)
LDH SERPL L TO P-CCNC: 359 U/L — HIGH (ref 120–225)
LYMPHOCYTES # BLD AUTO: 0.1 K/UL — LOW (ref 1–3.3)
LYMPHOCYTES # BLD AUTO: 3 % — LOW (ref 13–44)
MANUAL SMEAR VERIFICATION: SIGNIFICANT CHANGE UP
MCHC RBC-ENTMCNC: 30.1 PG — SIGNIFICANT CHANGE UP (ref 27–34)
MCHC RBC-ENTMCNC: 30.8 PG — SIGNIFICANT CHANGE UP (ref 27–34)
MCHC RBC-ENTMCNC: 30.9 PG — SIGNIFICANT CHANGE UP (ref 27–34)
MCHC RBC-ENTMCNC: 34.8 GM/DL — SIGNIFICANT CHANGE UP (ref 32–36)
MCHC RBC-ENTMCNC: 34.9 GM/DL — SIGNIFICANT CHANGE UP (ref 32–36)
MCHC RBC-ENTMCNC: 35 GM/DL — SIGNIFICANT CHANGE UP (ref 32–36)
MCV RBC AUTO: 86.5 FL — SIGNIFICANT CHANGE UP (ref 80–100)
MCV RBC AUTO: 88.2 FL — SIGNIFICANT CHANGE UP (ref 80–100)
MCV RBC AUTO: 88.3 FL — SIGNIFICANT CHANGE UP (ref 80–100)
METHOD TYPE: SIGNIFICANT CHANGE UP
MICROCYTES BLD QL: SLIGHT — SIGNIFICANT CHANGE UP
MONOCYTES # BLD AUTO: 0.13 K/UL — SIGNIFICANT CHANGE UP (ref 0–0.9)
MONOCYTES NFR BLD AUTO: 4 % — SIGNIFICANT CHANGE UP (ref 2–14)
NEUTROPHILS # BLD AUTO: 2.99 K/UL — SIGNIFICANT CHANGE UP (ref 1.8–7.4)
NEUTROPHILS NFR BLD AUTO: 88 % — HIGH (ref 43–77)
NEUTS BAND # BLD: 5 % — SIGNIFICANT CHANGE UP (ref 0–8)
NRBC # BLD: 0 /100 — SIGNIFICANT CHANGE UP (ref 0–0)
NRBC # BLD: 2 /100 WBCS — HIGH (ref 0–0)
NRBC # BLD: 2 /100 WBCS — HIGH (ref 0–0)
PLAT MORPH BLD: NORMAL — SIGNIFICANT CHANGE UP
PLATELET # BLD AUTO: 19 K/UL — CRITICAL LOW (ref 150–400)
PLATELET # BLD AUTO: 22 K/UL — LOW (ref 150–400)
PLATELET # BLD AUTO: 57 K/UL — LOW (ref 150–400)
POIKILOCYTOSIS BLD QL AUTO: SLIGHT — SIGNIFICANT CHANGE UP
POLYCHROMASIA BLD QL SMEAR: SLIGHT — SIGNIFICANT CHANGE UP
POTASSIUM SERPL-MCNC: 3.6 MMOL/L — SIGNIFICANT CHANGE UP (ref 3.5–5.3)
POTASSIUM SERPL-MCNC: 3.7 MMOL/L — SIGNIFICANT CHANGE UP (ref 3.5–5.3)
POTASSIUM SERPL-SCNC: 3.6 MMOL/L — SIGNIFICANT CHANGE UP (ref 3.5–5.3)
POTASSIUM SERPL-SCNC: 3.7 MMOL/L — SIGNIFICANT CHANGE UP (ref 3.5–5.3)
PROT SERPL-MCNC: 5.1 G/DL — LOW (ref 6–8.3)
PROTHROM AB SERPL-ACNC: 18.1 SEC — HIGH (ref 10.6–13.6)
RBC # BLD: 2.21 M/UL — LOW (ref 3.8–5.2)
RBC # BLD: 2.3 M/UL — LOW (ref 3.8–5.2)
RBC # BLD: 2.89 M/UL — LOW (ref 3.8–5.2)
RBC # BLD: 2.89 M/UL — LOW (ref 3.8–5.2)
RBC # FLD: 15 % — HIGH (ref 10.3–14.5)
RBC # FLD: 15.3 % — HIGH (ref 10.3–14.5)
RBC # FLD: 15.3 % — HIGH (ref 10.3–14.5)
RBC BLD AUTO: ABNORMAL
RETICS #: 46 K/UL — SIGNIFICANT CHANGE UP (ref 25–125)
RETICS/RBC NFR: 1.6 % — SIGNIFICANT CHANGE UP (ref 0.5–2.5)
S MARCESCENS DNA BLD POS NAA+NON-PROBE: SIGNIFICANT CHANGE UP
SODIUM SERPL-SCNC: 135 MMOL/L — SIGNIFICANT CHANGE UP (ref 135–145)
SODIUM SERPL-SCNC: 137 MMOL/L — SIGNIFICANT CHANGE UP (ref 135–145)
SPECIMEN SOURCE: SIGNIFICANT CHANGE UP
SPHEROCYTES BLD QL SMEAR: SLIGHT — SIGNIFICANT CHANGE UP
WBC # BLD: 3.22 K/UL — LOW (ref 3.8–10.5)
WBC # BLD: 4.35 K/UL — SIGNIFICANT CHANGE UP (ref 3.8–10.5)
WBC # BLD: 5.31 K/UL — SIGNIFICANT CHANGE UP (ref 3.8–10.5)
WBC # FLD AUTO: 3.22 K/UL — LOW (ref 3.8–10.5)
WBC # FLD AUTO: 4.35 K/UL — SIGNIFICANT CHANGE UP (ref 3.8–10.5)
WBC # FLD AUTO: 5.31 K/UL — SIGNIFICANT CHANGE UP (ref 3.8–10.5)

## 2020-10-07 PROCEDURE — 71045 X-RAY EXAM CHEST 1 VIEW: CPT | Mod: 26

## 2020-10-07 RX ORDER — FUROSEMIDE 40 MG
20 TABLET ORAL ONCE
Refills: 0 | Status: COMPLETED | OUTPATIENT
Start: 2020-10-07 | End: 2020-10-07

## 2020-10-07 RX ORDER — SODIUM CHLORIDE 9 MG/ML
1000 INJECTION, SOLUTION INTRAVENOUS
Refills: 0 | Status: DISCONTINUED | OUTPATIENT
Start: 2020-10-07 | End: 2020-10-08

## 2020-10-07 RX ORDER — DEXMEDETOMIDINE HYDROCHLORIDE IN 0.9% SODIUM CHLORIDE 4 UG/ML
0.7 INJECTION INTRAVENOUS
Qty: 400 | Refills: 0 | Status: DISCONTINUED | OUTPATIENT
Start: 2020-10-07 | End: 2020-10-08

## 2020-10-07 RX ORDER — ENOXAPARIN SODIUM 100 MG/ML
40 INJECTION SUBCUTANEOUS DAILY
Refills: 0 | Status: DISCONTINUED | OUTPATIENT
Start: 2020-10-07 | End: 2020-10-07

## 2020-10-07 RX ORDER — ALBUTEROL 90 UG/1
2 AEROSOL, METERED ORAL EVERY 6 HOURS
Refills: 0 | Status: DISCONTINUED | OUTPATIENT
Start: 2020-10-07 | End: 2020-10-07

## 2020-10-07 RX ADMIN — DEXMEDETOMIDINE HYDROCHLORIDE IN 0.9% SODIUM CHLORIDE 6.36 MICROGRAM(S)/KG/HR: 4 INJECTION INTRAVENOUS at 07:22

## 2020-10-07 RX ADMIN — INSULIN GLARGINE 20 UNIT(S): 100 INJECTION, SOLUTION SUBCUTANEOUS at 12:03

## 2020-10-07 RX ADMIN — CHLORHEXIDINE GLUCONATE 15 MILLILITER(S): 213 SOLUTION TOPICAL at 05:38

## 2020-10-07 RX ADMIN — FENTANYL CITRATE 33.9 MICROGRAM(S)/KG/HR: 50 INJECTION INTRAVENOUS at 08:37

## 2020-10-07 RX ADMIN — ALBUTEROL 2 PUFF(S): 90 AEROSOL, METERED ORAL at 02:02

## 2020-10-07 RX ADMIN — Medication 4 UNIT(S): at 23:23

## 2020-10-07 RX ADMIN — Medication 4: at 18:11

## 2020-10-07 RX ADMIN — ATORVASTATIN CALCIUM 40 MILLIGRAM(S): 80 TABLET, FILM COATED ORAL at 21:17

## 2020-10-07 RX ADMIN — Medication 80 MILLIGRAM(S): at 17:27

## 2020-10-07 RX ADMIN — Medication 3 MILLIGRAM(S): at 21:17

## 2020-10-07 RX ADMIN — DEXMEDETOMIDINE HYDROCHLORIDE IN 0.9% SODIUM CHLORIDE 14.8 MICROGRAM(S)/KG/HR: 4 INJECTION INTRAVENOUS at 09:30

## 2020-10-07 RX ADMIN — ALBUTEROL 2 PUFF(S): 90 AEROSOL, METERED ORAL at 05:09

## 2020-10-07 RX ADMIN — PANTOPRAZOLE SODIUM 40 MILLIGRAM(S): 20 TABLET, DELAYED RELEASE ORAL at 12:03

## 2020-10-07 RX ADMIN — Medication 20 MILLIGRAM(S): at 12:03

## 2020-10-07 RX ADMIN — NYSTATIN CREAM 1 APPLICATION(S): 100000 CREAM TOPICAL at 18:56

## 2020-10-07 RX ADMIN — SENNA PLUS 2 TABLET(S): 8.6 TABLET ORAL at 21:16

## 2020-10-07 RX ADMIN — Medication 80 MILLIGRAM(S): at 05:47

## 2020-10-07 RX ADMIN — BUDESONIDE AND FORMOTEROL FUMARATE DIHYDRATE 2 PUFF(S): 160; 4.5 AEROSOL RESPIRATORY (INHALATION) at 09:56

## 2020-10-07 RX ADMIN — Medication 4 UNIT(S): at 12:05

## 2020-10-07 RX ADMIN — Medication 4: at 12:05

## 2020-10-07 RX ADMIN — FENTANYL CITRATE 33.9 MICROGRAM(S)/KG/HR: 50 INJECTION INTRAVENOUS at 01:00

## 2020-10-07 RX ADMIN — PHENYLEPHRINE HYDROCHLORIDE 31.8 MICROGRAM(S)/KG/MIN: 10 INJECTION INTRAVENOUS at 21:16

## 2020-10-07 RX ADMIN — Medication 1 MILLIGRAM(S): at 12:03

## 2020-10-07 RX ADMIN — Medication 4 UNIT(S): at 18:12

## 2020-10-07 RX ADMIN — LINEZOLID 300 MILLIGRAM(S): 600 INJECTION, SOLUTION INTRAVENOUS at 05:47

## 2020-10-07 RX ADMIN — DEXMEDETOMIDINE HYDROCHLORIDE IN 0.9% SODIUM CHLORIDE 14.8 MICROGRAM(S)/KG/HR: 4 INJECTION INTRAVENOUS at 20:53

## 2020-10-07 RX ADMIN — CHLORHEXIDINE GLUCONATE 1 APPLICATION(S): 213 SOLUTION TOPICAL at 05:38

## 2020-10-07 RX ADMIN — NYSTATIN CREAM 1 APPLICATION(S): 100000 CREAM TOPICAL at 05:38

## 2020-10-07 RX ADMIN — POLYETHYLENE GLYCOL 3350 17 GRAM(S): 17 POWDER, FOR SOLUTION ORAL at 17:33

## 2020-10-07 RX ADMIN — DEXMEDETOMIDINE HYDROCHLORIDE IN 0.9% SODIUM CHLORIDE 6.36 MICROGRAM(S)/KG/HR: 4 INJECTION INTRAVENOUS at 00:15

## 2020-10-07 RX ADMIN — SODIUM CHLORIDE 50 MILLILITER(S): 9 INJECTION, SOLUTION INTRAVENOUS at 10:00

## 2020-10-07 RX ADMIN — ALBUTEROL 2 PUFF(S): 90 AEROSOL, METERED ORAL at 09:55

## 2020-10-07 RX ADMIN — DEXMEDETOMIDINE HYDROCHLORIDE IN 0.9% SODIUM CHLORIDE 14.8 MICROGRAM(S)/KG/HR: 4 INJECTION INTRAVENOUS at 23:24

## 2020-10-07 RX ADMIN — GABAPENTIN 600 MILLIGRAM(S): 400 CAPSULE ORAL at 21:17

## 2020-10-07 RX ADMIN — FENTANYL CITRATE 33.9 MICROGRAM(S)/KG/HR: 50 INJECTION INTRAVENOUS at 23:38

## 2020-10-07 RX ADMIN — CHLORHEXIDINE GLUCONATE 15 MILLILITER(S): 213 SOLUTION TOPICAL at 18:55

## 2020-10-07 RX ADMIN — FENTANYL CITRATE 33.9 MICROGRAM(S)/KG/HR: 50 INJECTION INTRAVENOUS at 16:16

## 2020-10-07 RX ADMIN — Medication 2: at 05:47

## 2020-10-07 RX ADMIN — PHENYLEPHRINE HYDROCHLORIDE 31.8 MICROGRAM(S)/KG/MIN: 10 INJECTION INTRAVENOUS at 06:42

## 2020-10-07 RX ADMIN — GABAPENTIN 600 MILLIGRAM(S): 400 CAPSULE ORAL at 16:19

## 2020-10-07 NOTE — CHART NOTE - NSCHARTNOTEFT_GEN_A_CORE
PC  went to the bedside to provide emotional support to the patient's significant other Nathen and daughter Liberty.  Both parties were receptive and appreciative of the support as well as the care provided by the medical team.  Patient's daughter stated her mother is scheduled for trach/peg tomorrow and they feel this will beneficial as the patient wishes to "live."  In addition, both daughter and significant other expressed the importance of their presence to encourage the patient "to keep fighting."  PC SW recognized the support Ms. Olson has, the family's commitment to honoring her wishes and the importance of love and support in partnership with her medical care.  Patient's daughter shared the strong relationship the patient has with her grandchildren and expressed hope that her mother will be medically stable following the surgery.  PC  acknowledged the family's hope that the surgery will be successful and confirmed the patient speaks lovingly about her grandchildren.  Family denied concrete needs and is aware of the ongoing availability of this .  PC  will remain available for continued support and collaboration with the primary team.

## 2020-10-07 NOTE — PROGRESS NOTE ADULT - ASSESSMENT
COPD with acute asthmatic bronchitis with  acute  Respiratory Failure   Recurrent Lung cancer with Bone Mets   IDDM   Htn with MR   Thrombocytopenia  and Anemiasec to C/T  Obesity with CRIS   r/o esophageal candidiasis   serratia  bacteremia VRE/ yeast    Plan- D/c picc line and repeat blood and trach c/s   Transfuse blood   Nss 75 cc/hr x 24 hrs   Hold Zyvox as plts decreasing   Kcl and Mg supplement  IV levaquin and Cancidas  Get PICC LINE  /CMV12/ 40% with Peep5 cm   decrease sedation and try Cpap with PS 10 cm if ok with icu team  OOB in chair/ BRP  Regina Trimbleb  by HFN rx qid prn   s/c lovenox  FS coverage

## 2020-10-07 NOTE — PROGRESS NOTE ADULT - SUBJECTIVE AND OBJECTIVE BOX
Surgery Pre-op Note    Preoperative Diagnosis: Respiratory Failure    Planned Procedure: Tracheostomy and Percutaneous Endoscopic Gastrostomy                          6.8    4.35  )-----------( 19       ( 07 Oct 2020 11:07 )             19.5       10-07    135  |  98  |  10  ----------------------------<  208<H>  3.6   |  31  |  0.38<L>    Ca    7.6<L>      07 Oct 2020 11:07  Phos  2.3     10-06  Mg     2.1     10-06    TPro  5.1<L>  /  Alb  1.7<L>  /  TBili  0.8  /  DBili  x   /  AST  20  /  ALT  27  /  AlkPhos  126<H>  10-07      LIVER FUNCTIONS - ( 07 Oct 2020 06:22 )  Alb: 1.7 g/dL / Pro: 5.1 g/dL / ALK PHOS: 126 U/L / ALT: 27 U/L DA / AST: 20 U/L / GGT: x             PT/INR - ( 07 Oct 2020 06:22 )   PT: 18.1 sec;   INR: 1.55 ratio         PTT - ( 07 Oct 2020 06:22 )  PTT:32.4 sec    Type & Screen: ABO RH Interpretation: O POS (10.07.20 @ 06:38)     EKG: < from: 12 Lead ECG (10.04.20 @ 06:00) >  Diagnosis Line *** Poor data quality, interpretation may be adversely affected  Sinus tachycardia  Possible Anterior infarct , age undetermined  Abnormal ECG    < end of copied text >      Echo:  e< from: Transthoracic Echocardiogram (09.22.20 @ 12:07) >  CONCLUSIONS:  1. Normal mitral valve.  2. Normal trileaflet aortic valve.  3. Aortic Root: 2.7 cm.  LVOT diameter: 1.9 cm.    4. LA volume index = 11 cc/m2.  5. Normal left ventricular internal dimensions and wall  thicknesses.  6. Normal Left Ventricular Systolic Function,  (EF =  50-55%)  7. Grade I diastolic dysfunction (Impaired relaxation).  8. Normal right atrium.  9. Normal right ventricular size and systolic function  (TAPSE  2.0cm).  10. Unable to estimate RVSP.  11. Normal tricuspid valve.  12. Pulmonic valve not well seen.  13. Normal pericardium with no pericardial effusion.      < end of copied text >      CXR:  c< from: Xray Chest 1 View- PORTABLE-Routine (Xray Chest 1 View- PORTABLE-Routine in AM.) (10.07.20 @ 07:15) >  EXAM:  XR CHEST PORTABLE ROUTINE 1V                            PROCEDURE DATE:  10/07/2020          INTERPRETATION:  Chest one view    HISTORY: Preop    COMPARISON STUDY: 10/6/2020    Frontal expiratory view of the chest shows the heart to be similar in size. Endotracheal tube and feeding tube remain present.    The lungs show similar left effusion with similar right base infiltrate and there is no evidence of pneumothorax nor right pleural effusion.    IMPRESSION:  Similar right infiltrate.    < end of copied text >

## 2020-10-07 NOTE — PROGRESS NOTE ADULT - ASSESSMENT
Acute Respiratory Failure with failed spontaneous breathing trials  Pancytopenia  1. Plan for tracheostomy/PEG in AM  2. Stop Tube feeds at midnight  3. IVF   4.  Pancytopenia - pt receiving platelets and PRBCs - f/u AM labs. Platelets need to be greater than 50 to proceed with planned surgery  5. Keep FiO2 and PEEP at 10/8 respectively  6. Lovenox d/c'd; continue to hold  7. D/w Dr. Gallegos and agreed

## 2020-10-07 NOTE — PROGRESS NOTE ADULT - SUBJECTIVE AND OBJECTIVE BOX
INTERVAL HPI/OVERNIGHT EVENTS: ***    PRESSORS: [ ] YES [ ] NO    Antimicrobial:  caspofungin IVPB 50 milliGRAM(s) IV Intermittent every 24 hours  caspofungin IVPB      levoFLOXacin IVPB      levoFLOXacin IVPB 750 milliGRAM(s) IV Intermittent every 24 hours  linezolid  IVPB      linezolid  IVPB 600 milliGRAM(s) IV Intermittent every 12 hours    Cardiovascular:  carvedilol 6.25 milliGRAM(s) Oral every 12 hours  phenylephrine    Infusion 1 MICROgram(s)/kG/Min IV Continuous <Continuous>    Pulmonary:  ALBUTerol    90 MICROgram(s) HFA Inhaler 2 Puff(s) Inhalation every 4 hours  budesonide 160 MICROgram(s)/formoterol 4.5 MICROgram(s) Inhaler 2 Puff(s) Inhalation two times a day  guaiFENesin   Syrup  (Sugar-Free) 100 milliGRAM(s) Oral every 6 hours PRN  tiotropium 18 MICROgram(s) Capsule 1 Capsule(s) Inhalation daily    Hematologic:    Other:  acetaminophen    Suspension .. 650 milliGRAM(s) Oral every 6 hours PRN  atorvastatin 40 milliGRAM(s) Oral at bedtime  benzocaine 15 mG/menthol 3.6 mG (Sugar-Free) Lozenge 1 Lozenge Oral every 4 hours PRN  chlorhexidine 0.12% Liquid 15 milliLiter(s) Oral Mucosa every 12 hours  chlorhexidine 2% Cloths 1 Application(s) Topical <User Schedule>  dexMEDEtomidine Infusion 0.3 MICROgram(s)/kG/Hr IV Continuous <Continuous>  dextrose 40% Gel 15 Gram(s) Oral once PRN  dextrose 5% + sodium chloride 0.9%. 1000 milliLiter(s) IV Continuous <Continuous>  dextrose 5%. 1000 milliLiter(s) IV Continuous <Continuous>  dextrose 50% Injectable 12.5 Gram(s) IV Push once  dextrose 50% Injectable 25 Gram(s) IV Push once  dextrose 50% Injectable 25 Gram(s) IV Push once  ergocalciferol 39714 Unit(s) Oral <User Schedule>  fentaNYL    Injectable 50 MICROGram(s) IV Push every 2 hours PRN  fentaNYL   Infusion. 4 MICROgram(s)/kG/Hr IV Continuous <Continuous>  FIRST- Mouthwash  BLM 10 milliLiter(s) Swish and Swallow every 8 hours  folic acid 1 milliGRAM(s) Oral daily  gabapentin 600 milliGRAM(s) Oral every 8 hours  glucagon  Injectable 1 milliGRAM(s) IntraMuscular once PRN  influenza   Vaccine 0.5 milliLiter(s) IntraMuscular once  insulin glargine Injectable (LANTUS) 20 Unit(s) SubCutaneous every morning  insulin lispro (HumaLOG) corrective regimen sliding scale   SubCutaneous every 6 hours  insulin lispro Injectable (HumaLOG) 4 Unit(s) SubCutaneous every 6 hours  melatonin 3 milliGRAM(s) Oral at bedtime  methylPREDNISolone sodium succinate Injectable 80 milliGRAM(s) IV Push every 12 hours  nystatin Powder 1 Application(s) Topical two times a day  ondansetron Injectable 4 milliGRAM(s) IV Push every 6 hours PRN  pantoprazole  Injectable 40 milliGRAM(s) IV Push daily  polyethylene glycol 3350 17 Gram(s) Oral two times a day  senna 2 Tablet(s) Oral at bedtime  sodium chloride 0.65% Nasal 1 Spray(s) Both Nostrils four times a day PRN  sodium chloride 0.9% lock flush 10 milliLiter(s) IV Push every 1 hour PRN  zolpidem 5 milliGRAM(s) Oral at bedtime PRN    acetaminophen    Suspension .. 650 milliGRAM(s) Oral every 6 hours PRN  ALBUTerol    90 MICROgram(s) HFA Inhaler 2 Puff(s) Inhalation every 4 hours  atorvastatin 40 milliGRAM(s) Oral at bedtime  benzocaine 15 mG/menthol 3.6 mG (Sugar-Free) Lozenge 1 Lozenge Oral every 4 hours PRN  budesonide 160 MICROgram(s)/formoterol 4.5 MICROgram(s) Inhaler 2 Puff(s) Inhalation two times a day  carvedilol 6.25 milliGRAM(s) Oral every 12 hours  caspofungin IVPB 50 milliGRAM(s) IV Intermittent every 24 hours  caspofungin IVPB      chlorhexidine 0.12% Liquid 15 milliLiter(s) Oral Mucosa every 12 hours  chlorhexidine 2% Cloths 1 Application(s) Topical <User Schedule>  dexMEDEtomidine Infusion 0.3 MICROgram(s)/kG/Hr IV Continuous <Continuous>  dextrose 40% Gel 15 Gram(s) Oral once PRN  dextrose 5% + sodium chloride 0.9%. 1000 milliLiter(s) IV Continuous <Continuous>  dextrose 5%. 1000 milliLiter(s) IV Continuous <Continuous>  dextrose 50% Injectable 12.5 Gram(s) IV Push once  dextrose 50% Injectable 25 Gram(s) IV Push once  dextrose 50% Injectable 25 Gram(s) IV Push once  ergocalciferol 06530 Unit(s) Oral <User Schedule>  fentaNYL    Injectable 50 MICROGram(s) IV Push every 2 hours PRN  fentaNYL   Infusion. 4 MICROgram(s)/kG/Hr IV Continuous <Continuous>  FIRST- Mouthwash  BLM 10 milliLiter(s) Swish and Swallow every 8 hours  folic acid 1 milliGRAM(s) Oral daily  gabapentin 600 milliGRAM(s) Oral every 8 hours  glucagon  Injectable 1 milliGRAM(s) IntraMuscular once PRN  guaiFENesin   Syrup  (Sugar-Free) 100 milliGRAM(s) Oral every 6 hours PRN  influenza   Vaccine 0.5 milliLiter(s) IntraMuscular once  insulin glargine Injectable (LANTUS) 20 Unit(s) SubCutaneous every morning  insulin lispro (HumaLOG) corrective regimen sliding scale   SubCutaneous every 6 hours  insulin lispro Injectable (HumaLOG) 4 Unit(s) SubCutaneous every 6 hours  levoFLOXacin IVPB      levoFLOXacin IVPB 750 milliGRAM(s) IV Intermittent every 24 hours  linezolid  IVPB      linezolid  IVPB 600 milliGRAM(s) IV Intermittent every 12 hours  melatonin 3 milliGRAM(s) Oral at bedtime  methylPREDNISolone sodium succinate Injectable 80 milliGRAM(s) IV Push every 12 hours  nystatin Powder 1 Application(s) Topical two times a day  ondansetron Injectable 4 milliGRAM(s) IV Push every 6 hours PRN  pantoprazole  Injectable 40 milliGRAM(s) IV Push daily  phenylephrine    Infusion 1 MICROgram(s)/kG/Min IV Continuous <Continuous>  polyethylene glycol 3350 17 Gram(s) Oral two times a day  senna 2 Tablet(s) Oral at bedtime  sodium chloride 0.65% Nasal 1 Spray(s) Both Nostrils four times a day PRN  sodium chloride 0.9% lock flush 10 milliLiter(s) IV Push every 1 hour PRN  tiotropium 18 MICROgram(s) Capsule 1 Capsule(s) Inhalation daily  zolpidem 5 milliGRAM(s) Oral at bedtime PRN    Drug Dosing Weight  Height (cm): 154.9 (19 Sep 2020 08:00)  Weight (kg): 84.8 (21 Sep 2020 09:15)  BMI (kg/m2): 35.3 (21 Sep 2020 09:15)  BSA (m2): 1.84 (21 Sep 2020 09:15)    CENTRAL LINE: [ ] YES [ ] NO  LOCATION:   DATE INSERTED:  REMOVE: [ ] YES [ ] NO  EXPLAIN:    ROBLEDO: [ ] YES [ ] NO    DATE INSERTED:  REMOVE:  [ ] YES [ ] NO  EXPLAIN:    A-LINE:  [ ] YES [ ] NO  LOCATION:   DATE INSERTED:  REMOVE:  [ ] YES [ ] NO  EXPLAIN:    PMH -reviewed admission note, no change since admission      ABG - ( 06 Oct 2020 03:27 )  pH, Arterial: 7.53  pH, Blood: x     /  pCO2: 35    /  pO2: 75    / HCO3: 29    / Base Excess: 6.4   /  SaO2: 96                    10-05 @ 07:01  -  10-06 @ 07:00  --------------------------------------------------------  IN: 2450.4 mL / OUT: 1555 mL / NET: 895.4 mL        Mode: AC/ CMV (Assist Control/ Continuous Mandatory Ventilation)  RR (machine): 14  TV (machine): 450  FiO2: 50  PEEP: 8  ITime: 1  MAP: 12  PIP: 33      PHYSICAL EXAM:    GENERAL: NAD, well-groomed, well-developed  HEAD:  Atraumatic, Normocephalic  EYES: EOMI, PERRLA, conjunctiva and sclera clear  ENMT: No tonsillar erythema, exudates, or enlargement; Moist mucous membranes, Good dentition, [ ]No lesions  NECK: Supple, normal appearance, No JVD; Normal thyroid; Trachea midline  NERVOUS SYSTEM:  Alert & Oriented X3, Good concentration; Motor Strength 5/5 B/L upper and lower extremities; DTRs 2+ intact and symmetric  CHEST/LUNG: No chest deformity; Normal percussion bilaterally; No rales, rhonchi, wheezing   HEART: Regular rate and rhythm; No murmurs, rubs, or gallops  ABDOMEN: Soft, Nontender, Nondistended;Bowel sounds present  EXTREMITIES:  2+ Peripheral Pulses, No clubbing, cyanosis, or edema  LYMPH: No lymphadenopathy noted  SKIN: No rashes or lesions; Good capillary refill      LABS:  CBC Full  -  ( 06 Oct 2020 16:16 )  WBC Count : 5.38 K/uL  RBC Count : 2.72 M/uL  Hemoglobin : 8.0 g/dL  Hematocrit : 23.9 %  Platelet Count - Automated : 42 K/uL  Mean Cell Volume : 87.9 fl  Mean Cell Hemoglobin : 29.4 pg  Mean Cell Hemoglobin Concentration : 33.5 gm/dL  Auto Neutrophil # : x  Auto Lymphocyte # : x  Auto Monocyte # : x  Auto Eosinophil # : x  Auto Basophil # : x  Auto Neutrophil % : x  Auto Lymphocyte % : x  Auto Monocyte % : x  Auto Eosinophil % : x  Auto Basophil % : x    10-06    138  |  100  |  8   ----------------------------<  96  3.8   |  31  |  0.47<L>    Ca    8.0<L>      06 Oct 2020 16:16  Phos  2.3     10-06  Mg     2.1     10-06    TPro  5.3<L>  /  Alb  1.8<L>  /  TBili  0.9  /  DBili  x   /  AST  21  /  ALT  26  /  AlkPhos  109  10-06    PT/INR - ( 06 Oct 2020 16:16 )   PT: 20.3 sec;   INR: 1.75 ratio             Culture Results:   Growth in anaerobic bottle: Gram Negative Rods  ***Blood Panel PCR results on this specimen are available  approximately 3 hours after the Gram stain result.***  Gram stain, PCR, and/or culture results may not always  correspond due to difference in methodologies.  ************************************************************  This PCR assay was performed using [employer].  The following targets are tested for: Enterococcus,  vancomycin resistant enterococci, Listeria monocytogenes,  coagulase negative staphylococci, S. aureus,  methicillin resistant S. aureus, Streptococcus agalactiae  (Group B), S. pneumoniae, S. pyogenes (Group A),  Acinetobacter baumannii, Enterobacter cloacae, E. coli,  Klebsiella oxytoca, K. pneumoniae, Proteus sp.,  Serratia marcescens, Haemophilus influenzae,  Neisseria meningitidis, Pseudomonas aeruginosa, Candida  albicans, C. glabrata, C krusei, C parapsilosis,  C. tropicalis and the KPC resistance gene.  "For positive Serratia,  results cross-reactivity has been occasionally  observed with some species of Pseudomonas and Pantoea"  "Due to technical problems, Proteus sp. will Not be reported as part of  the BCID panel until further notice" (10-06 @ 10:10)  Culture Results:   No growth to date. (10-04 @ 11:02)  Culture Results:   No growth to date. (10-04 @ 11:02)      RADIOLOGY & ADDITIONAL STUDIES REVIEWED:  ***    [ ]GOALS OF CARE DISCUSSION WITH PATIENT/FAMILY/PROXY:    CRITICAL CARE TIME SPENT: 35 minutes INTERVAL HPI/OVERNIGHT EVENTS: Patient intubated, sedation lowered, awake and responds to questions with nodding. Denies any pain. Afebrile x 24 hours. BCx grew G - neg rods in anaerobic bottle. Surgery for PEG and Trach cancelled due to scheduling issue. Hb 7.0 this AM, will repeat labs and transfuse as needed.    PRESSORS: [x] YES Phenylephrine    Antimicrobial:  caspofungin IVPB 50 milliGRAM(s) IV Intermittent every 24 hours  caspofungin IVPB      levoFLOXacin IVPB      levoFLOXacin IVPB 750 milliGRAM(s) IV Intermittent every 24 hours  linezolid  IVPB      linezolid  IVPB 600 milliGRAM(s) IV Intermittent every 12 hours    Cardiovascular:  carvedilol 6.25 milliGRAM(s) Oral every 12 hours  phenylephrine    Infusion 1 MICROgram(s)/kG/Min IV Continuous <Continuous>    Pulmonary:  ALBUTerol    90 MICROgram(s) HFA Inhaler 2 Puff(s) Inhalation every 4 hours  budesonide 160 MICROgram(s)/formoterol 4.5 MICROgram(s) Inhaler 2 Puff(s) Inhalation two times a day  guaiFENesin   Syrup  (Sugar-Free) 100 milliGRAM(s) Oral every 6 hours PRN  tiotropium 18 MICROgram(s) Capsule 1 Capsule(s) Inhalation daily    Hematologic:    Other:  acetaminophen    Suspension .. 650 milliGRAM(s) Oral every 6 hours PRN  atorvastatin 40 milliGRAM(s) Oral at bedtime  benzocaine 15 mG/menthol 3.6 mG (Sugar-Free) Lozenge 1 Lozenge Oral every 4 hours PRN  chlorhexidine 0.12% Liquid 15 milliLiter(s) Oral Mucosa every 12 hours  chlorhexidine 2% Cloths 1 Application(s) Topical <User Schedule>  dexMEDEtomidine Infusion 0.3 MICROgram(s)/kG/Hr IV Continuous <Continuous>  dextrose 40% Gel 15 Gram(s) Oral once PRN  dextrose 5% + sodium chloride 0.9%. 1000 milliLiter(s) IV Continuous <Continuous>  dextrose 5%. 1000 milliLiter(s) IV Continuous <Continuous>  dextrose 50% Injectable 12.5 Gram(s) IV Push once  dextrose 50% Injectable 25 Gram(s) IV Push once  dextrose 50% Injectable 25 Gram(s) IV Push once  ergocalciferol 57908 Unit(s) Oral <User Schedule>  fentaNYL    Injectable 50 MICROGram(s) IV Push every 2 hours PRN  fentaNYL   Infusion. 4 MICROgram(s)/kG/Hr IV Continuous <Continuous>  FIRST- Mouthwash  BLM 10 milliLiter(s) Swish and Swallow every 8 hours  folic acid 1 milliGRAM(s) Oral daily  gabapentin 600 milliGRAM(s) Oral every 8 hours  glucagon  Injectable 1 milliGRAM(s) IntraMuscular once PRN  influenza   Vaccine 0.5 milliLiter(s) IntraMuscular once  insulin glargine Injectable (LANTUS) 20 Unit(s) SubCutaneous every morning  insulin lispro (HumaLOG) corrective regimen sliding scale   SubCutaneous every 6 hours  insulin lispro Injectable (HumaLOG) 4 Unit(s) SubCutaneous every 6 hours  melatonin 3 milliGRAM(s) Oral at bedtime  methylPREDNISolone sodium succinate Injectable 80 milliGRAM(s) IV Push every 12 hours  nystatin Powder 1 Application(s) Topical two times a day  ondansetron Injectable 4 milliGRAM(s) IV Push every 6 hours PRN  pantoprazole  Injectable 40 milliGRAM(s) IV Push daily  polyethylene glycol 3350 17 Gram(s) Oral two times a day  senna 2 Tablet(s) Oral at bedtime  sodium chloride 0.65% Nasal 1 Spray(s) Both Nostrils four times a day PRN  sodium chloride 0.9% lock flush 10 milliLiter(s) IV Push every 1 hour PRN  zolpidem 5 milliGRAM(s) Oral at bedtime PRN    acetaminophen    Suspension .. 650 milliGRAM(s) Oral every 6 hours PRN  ALBUTerol    90 MICROgram(s) HFA Inhaler 2 Puff(s) Inhalation every 4 hours  atorvastatin 40 milliGRAM(s) Oral at bedtime  benzocaine 15 mG/menthol 3.6 mG (Sugar-Free) Lozenge 1 Lozenge Oral every 4 hours PRN  budesonide 160 MICROgram(s)/formoterol 4.5 MICROgram(s) Inhaler 2 Puff(s) Inhalation two times a day  carvedilol 6.25 milliGRAM(s) Oral every 12 hours  caspofungin IVPB 50 milliGRAM(s) IV Intermittent every 24 hours  caspofungin IVPB      chlorhexidine 0.12% Liquid 15 milliLiter(s) Oral Mucosa every 12 hours  chlorhexidine 2% Cloths 1 Application(s) Topical <User Schedule>  dexMEDEtomidine Infusion 0.3 MICROgram(s)/kG/Hr IV Continuous <Continuous>  dextrose 40% Gel 15 Gram(s) Oral once PRN  dextrose 5% + sodium chloride 0.9%. 1000 milliLiter(s) IV Continuous <Continuous>  dextrose 5%. 1000 milliLiter(s) IV Continuous <Continuous>  dextrose 50% Injectable 12.5 Gram(s) IV Push once  dextrose 50% Injectable 25 Gram(s) IV Push once  dextrose 50% Injectable 25 Gram(s) IV Push once  ergocalciferol 24298 Unit(s) Oral <User Schedule>  fentaNYL    Injectable 50 MICROGram(s) IV Push every 2 hours PRN  fentaNYL   Infusion. 4 MICROgram(s)/kG/Hr IV Continuous <Continuous>  FIRST- Mouthwash  BLM 10 milliLiter(s) Swish and Swallow every 8 hours  folic acid 1 milliGRAM(s) Oral daily  gabapentin 600 milliGRAM(s) Oral every 8 hours  glucagon  Injectable 1 milliGRAM(s) IntraMuscular once PRN  guaiFENesin   Syrup  (Sugar-Free) 100 milliGRAM(s) Oral every 6 hours PRN  influenza   Vaccine 0.5 milliLiter(s) IntraMuscular once  insulin glargine Injectable (LANTUS) 20 Unit(s) SubCutaneous every morning  insulin lispro (HumaLOG) corrective regimen sliding scale   SubCutaneous every 6 hours  insulin lispro Injectable (HumaLOG) 4 Unit(s) SubCutaneous every 6 hours  levoFLOXacin IVPB      levoFLOXacin IVPB 750 milliGRAM(s) IV Intermittent every 24 hours  linezolid  IVPB      linezolid  IVPB 600 milliGRAM(s) IV Intermittent every 12 hours  melatonin 3 milliGRAM(s) Oral at bedtime  methylPREDNISolone sodium succinate Injectable 80 milliGRAM(s) IV Push every 12 hours  nystatin Powder 1 Application(s) Topical two times a day  ondansetron Injectable 4 milliGRAM(s) IV Push every 6 hours PRN  pantoprazole  Injectable 40 milliGRAM(s) IV Push daily  phenylephrine    Infusion 1 MICROgram(s)/kG/Min IV Continuous <Continuous>  polyethylene glycol 3350 17 Gram(s) Oral two times a day  senna 2 Tablet(s) Oral at bedtime  sodium chloride 0.65% Nasal 1 Spray(s) Both Nostrils four times a day PRN  sodium chloride 0.9% lock flush 10 milliLiter(s) IV Push every 1 hour PRN  tiotropium 18 MICROgram(s) Capsule 1 Capsule(s) Inhalation daily  zolpidem 5 milliGRAM(s) Oral at bedtime PRN    Drug Dosing Weight  Height (cm): 154.9 (19 Sep 2020 08:00)  Weight (kg): 84.8 (21 Sep 2020 09:15)  BMI (kg/m2): 35.3 (21 Sep 2020 09:15)  BSA (m2): 1.84 (21 Sep 2020 09:15)    CENTRAL LINE: [ ] YES [ ] NO  LOCATION:   DATE INSERTED:  REMOVE: [ ] YES [ ] NO  EXPLAIN:    ROBLEDO: [ ] YES [ ] NO    DATE INSERTED:  REMOVE:  [ ] YES [ ] NO  EXPLAIN:    A-LINE:  [ ] YES [ ] NO  LOCATION:   DATE INSERTED:  REMOVE:  [ ] YES [ ] NO  EXPLAIN:    PMH -reviewed admission note, no change since admission      ABG - ( 06 Oct 2020 03:27 )  pH, Arterial: 7.53  pH, Blood: x     /  pCO2: 35    /  pO2: 75    / HCO3: 29    / Base Excess: 6.4   /  SaO2: 96                    10-05 @ 07:01  -  10-06 @ 07:00  --------------------------------------------------------  IN: 2450.4 mL / OUT: 1555 mL / NET: 895.4 mL        Mode: AC/ CMV (Assist Control/ Continuous Mandatory Ventilation)  RR (machine): 14  TV (machine): 450  FiO2: 50  PEEP: 8  ITime: 1  MAP: 12  PIP: 33      PHYSICAL EXAM:    GENERAL: NAD, well-groomed, well-developed  HEAD:  Atraumatic, Normocephalic  EYES: EOMI, PERRLA, conjunctiva and sclera clear  ENMT: No tonsillar erythema, exudates, or enlargement; Moist mucous membranes, Good dentition, [ ]No lesions  NECK: Supple, normal appearance, No JVD; Normal thyroid; Trachea midline  NERVOUS SYSTEM:  Alert & Oriented X3, Good concentration; Motor Strength 5/5 B/L upper and lower extremities; DTRs 2+ intact and symmetric  CHEST/LUNG: No chest deformity; Normal percussion bilaterally; No rales, rhonchi, wheezing   HEART: Regular rate and rhythm; No murmurs, rubs, or gallops  ABDOMEN: Soft, Nontender, Nondistended;Bowel sounds present  EXTREMITIES:  2+ Peripheral Pulses, No clubbing, cyanosis, or edema  LYMPH: No lymphadenopathy noted  SKIN: No rashes or lesions; Good capillary refill      LABS:  CBC Full  -  ( 06 Oct 2020 16:16 )  WBC Count : 5.38 K/uL  RBC Count : 2.72 M/uL  Hemoglobin : 8.0 g/dL  Hematocrit : 23.9 %  Platelet Count - Automated : 42 K/uL  Mean Cell Volume : 87.9 fl  Mean Cell Hemoglobin : 29.4 pg  Mean Cell Hemoglobin Concentration : 33.5 gm/dL  Auto Neutrophil # : x  Auto Lymphocyte # : x  Auto Monocyte # : x  Auto Eosinophil # : x  Auto Basophil # : x  Auto Neutrophil % : x  Auto Lymphocyte % : x  Auto Monocyte % : x  Auto Eosinophil % : x  Auto Basophil % : x    10-06    138  |  100  |  8   ----------------------------<  96  3.8   |  31  |  0.47<L>    Ca    8.0<L>      06 Oct 2020 16:16  Phos  2.3     10-06  Mg     2.1     10-06    TPro  5.3<L>  /  Alb  1.8<L>  /  TBili  0.9  /  DBili  x   /  AST  21  /  ALT  26  /  AlkPhos  109  10-06    PT/INR - ( 06 Oct 2020 16:16 )   PT: 20.3 sec;   INR: 1.75 ratio             Culture Results:   Growth in anaerobic bottle: Gram Negative Rods  ***Blood Panel PCR results on this specimen are available  approximately 3 hours after the Gram stain result.***  Gram stain, PCR, and/or culture results may not always  correspond due to difference in methodologies.  ************************************************************  This PCR assay was performed using [employer].  The following targets are tested for: Enterococcus,  vancomycin resistant enterococci, Listeria monocytogenes,  coagulase negative staphylococci, S. aureus,  methicillin resistant S. aureus, Streptococcus agalactiae  (Group B), S. pneumoniae, S. pyogenes (Group A),  Acinetobacter baumannii, Enterobacter cloacae, E. coli,  Klebsiella oxytoca, K. pneumoniae, Proteus sp.,  Serratia marcescens, Haemophilus influenzae,  Neisseria meningitidis, Pseudomonas aeruginosa, Candida  albicans, C. glabrata, C krusei, C parapsilosis,  C. tropicalis and the KPC resistance gene.  "For positive Serratia,  results cross-reactivity has been occasionally  observed with some species of Pseudomonas and Pantoea"  "Due to technical problems, Proteus sp. will Not be reported as part of  the BCID panel until further notice" (10-06 @ 10:10)  Culture Results:   No growth to date. (10-04 @ 11:02)  Culture Results:   No growth to date. (10-04 @ 11:02)      RADIOLOGY & ADDITIONAL STUDIES REVIEWED:  ***    [ ]GOALS OF CARE DISCUSSION WITH PATIENT/FAMILY/PROXY:    CRITICAL CARE TIME SPENT: 35 minutes INTERVAL HPI/OVERNIGHT EVENTS: Patient intubated, sedation lowered, awake and responds to questions with nodding. Denies any pain. Afebrile x 24 hours. BCx grew G - neg rods in anaerobic bottle. Surgery for PEG and Trach cancelled due to scheduling issue. Hb 7.0 this AM, will repeat labs and transfuse as needed.    PRESSORS: [x] YES Phenylephrine    Antimicrobial:  caspofungin IVPB 50 milliGRAM(s) IV Intermittent every 24 hours  caspofungin IVPB      levoFLOXacin IVPB      levoFLOXacin IVPB 750 milliGRAM(s) IV Intermittent every 24 hours  linezolid  IVPB      linezolid  IVPB 600 milliGRAM(s) IV Intermittent every 12 hours    Cardiovascular:  carvedilol 6.25 milliGRAM(s) Oral every 12 hours  phenylephrine    Infusion 1 MICROgram(s)/kG/Min IV Continuous <Continuous>    Pulmonary:  ALBUTerol    90 MICROgram(s) HFA Inhaler 2 Puff(s) Inhalation every 4 hours  budesonide 160 MICROgram(s)/formoterol 4.5 MICROgram(s) Inhaler 2 Puff(s) Inhalation two times a day  guaiFENesin   Syrup  (Sugar-Free) 100 milliGRAM(s) Oral every 6 hours PRN  tiotropium 18 MICROgram(s) Capsule 1 Capsule(s) Inhalation daily    Hematologic:    Other:  acetaminophen    Suspension .. 650 milliGRAM(s) Oral every 6 hours PRN  atorvastatin 40 milliGRAM(s) Oral at bedtime  benzocaine 15 mG/menthol 3.6 mG (Sugar-Free) Lozenge 1 Lozenge Oral every 4 hours PRN  chlorhexidine 0.12% Liquid 15 milliLiter(s) Oral Mucosa every 12 hours  chlorhexidine 2% Cloths 1 Application(s) Topical <User Schedule>  dexMEDEtomidine Infusion 0.3 MICROgram(s)/kG/Hr IV Continuous <Continuous>  dextrose 40% Gel 15 Gram(s) Oral once PRN  dextrose 5% + sodium chloride 0.9%. 1000 milliLiter(s) IV Continuous <Continuous>  dextrose 5%. 1000 milliLiter(s) IV Continuous <Continuous>  dextrose 50% Injectable 12.5 Gram(s) IV Push once  dextrose 50% Injectable 25 Gram(s) IV Push once  dextrose 50% Injectable 25 Gram(s) IV Push once  ergocalciferol 26736 Unit(s) Oral <User Schedule>  fentaNYL    Injectable 50 MICROGram(s) IV Push every 2 hours PRN  fentaNYL   Infusion. 4 MICROgram(s)/kG/Hr IV Continuous <Continuous>  FIRST- Mouthwash  BLM 10 milliLiter(s) Swish and Swallow every 8 hours  folic acid 1 milliGRAM(s) Oral daily  gabapentin 600 milliGRAM(s) Oral every 8 hours  glucagon  Injectable 1 milliGRAM(s) IntraMuscular once PRN  influenza   Vaccine 0.5 milliLiter(s) IntraMuscular once  insulin glargine Injectable (LANTUS) 20 Unit(s) SubCutaneous every morning  insulin lispro (HumaLOG) corrective regimen sliding scale   SubCutaneous every 6 hours  insulin lispro Injectable (HumaLOG) 4 Unit(s) SubCutaneous every 6 hours  melatonin 3 milliGRAM(s) Oral at bedtime  methylPREDNISolone sodium succinate Injectable 80 milliGRAM(s) IV Push every 12 hours  nystatin Powder 1 Application(s) Topical two times a day  ondansetron Injectable 4 milliGRAM(s) IV Push every 6 hours PRN  pantoprazole  Injectable 40 milliGRAM(s) IV Push daily  polyethylene glycol 3350 17 Gram(s) Oral two times a day  senna 2 Tablet(s) Oral at bedtime  sodium chloride 0.65% Nasal 1 Spray(s) Both Nostrils four times a day PRN  sodium chloride 0.9% lock flush 10 milliLiter(s) IV Push every 1 hour PRN  zolpidem 5 milliGRAM(s) Oral at bedtime PRN    acetaminophen    Suspension .. 650 milliGRAM(s) Oral every 6 hours PRN  ALBUTerol    90 MICROgram(s) HFA Inhaler 2 Puff(s) Inhalation every 4 hours  atorvastatin 40 milliGRAM(s) Oral at bedtime  benzocaine 15 mG/menthol 3.6 mG (Sugar-Free) Lozenge 1 Lozenge Oral every 4 hours PRN  budesonide 160 MICROgram(s)/formoterol 4.5 MICROgram(s) Inhaler 2 Puff(s) Inhalation two times a day  carvedilol 6.25 milliGRAM(s) Oral every 12 hours  caspofungin IVPB 50 milliGRAM(s) IV Intermittent every 24 hours  caspofungin IVPB      chlorhexidine 0.12% Liquid 15 milliLiter(s) Oral Mucosa every 12 hours  chlorhexidine 2% Cloths 1 Application(s) Topical <User Schedule>  dexMEDEtomidine Infusion 0.3 MICROgram(s)/kG/Hr IV Continuous <Continuous>  dextrose 40% Gel 15 Gram(s) Oral once PRN  dextrose 5% + sodium chloride 0.9%. 1000 milliLiter(s) IV Continuous <Continuous>  dextrose 5%. 1000 milliLiter(s) IV Continuous <Continuous>  dextrose 50% Injectable 12.5 Gram(s) IV Push once  dextrose 50% Injectable 25 Gram(s) IV Push once  dextrose 50% Injectable 25 Gram(s) IV Push once  ergocalciferol 32633 Unit(s) Oral <User Schedule>  fentaNYL    Injectable 50 MICROGram(s) IV Push every 2 hours PRN  fentaNYL   Infusion. 4 MICROgram(s)/kG/Hr IV Continuous <Continuous>  FIRST- Mouthwash  BLM 10 milliLiter(s) Swish and Swallow every 8 hours  folic acid 1 milliGRAM(s) Oral daily  gabapentin 600 milliGRAM(s) Oral every 8 hours  glucagon  Injectable 1 milliGRAM(s) IntraMuscular once PRN  guaiFENesin   Syrup  (Sugar-Free) 100 milliGRAM(s) Oral every 6 hours PRN  influenza   Vaccine 0.5 milliLiter(s) IntraMuscular once  insulin glargine Injectable (LANTUS) 20 Unit(s) SubCutaneous every morning  insulin lispro (HumaLOG) corrective regimen sliding scale   SubCutaneous every 6 hours  insulin lispro Injectable (HumaLOG) 4 Unit(s) SubCutaneous every 6 hours  levoFLOXacin IVPB      levoFLOXacin IVPB 750 milliGRAM(s) IV Intermittent every 24 hours  linezolid  IVPB      linezolid  IVPB 600 milliGRAM(s) IV Intermittent every 12 hours  melatonin 3 milliGRAM(s) Oral at bedtime  methylPREDNISolone sodium succinate Injectable 80 milliGRAM(s) IV Push every 12 hours  nystatin Powder 1 Application(s) Topical two times a day  ondansetron Injectable 4 milliGRAM(s) IV Push every 6 hours PRN  pantoprazole  Injectable 40 milliGRAM(s) IV Push daily  phenylephrine    Infusion 1 MICROgram(s)/kG/Min IV Continuous <Continuous>  polyethylene glycol 3350 17 Gram(s) Oral two times a day  senna 2 Tablet(s) Oral at bedtime  sodium chloride 0.65% Nasal 1 Spray(s) Both Nostrils four times a day PRN  sodium chloride 0.9% lock flush 10 milliLiter(s) IV Push every 1 hour PRN  tiotropium 18 MICROgram(s) Capsule 1 Capsule(s) Inhalation daily  zolpidem 5 milliGRAM(s) Oral at bedtime PRN    Drug Dosing Weight  Height (cm): 154.9 (19 Sep 2020 08:00)  Weight (kg): 84.8 (21 Sep 2020 09:15)  BMI (kg/m2): 35.3 (21 Sep 2020 09:15)  BSA (m2): 1.84 (21 Sep 2020 09:15)      CENTRAL LINE: [x ] YES [ ] NO  LOCATION: RFL   DATE INSERTED:10/5  REMOVE: [ ] YES [ ] NO  EXPLAIN:    ROBLEDO: [ x] YES [ ] NO    DATE INSERTED: 10/5  REMOVE:  [ ] YES [ ] NO  EXPLAIN:    A-LINE:  [ ] YES [ x] NO  LOCATION:   DATE INSERTED:  REMOVE:  [ ] YES [ ] NO  EXPLAIN:    PMH -reviewed admission note, no change since admission      ABG - ( 06 Oct 2020 03:27 )  pH, Arterial: 7.53  pH, Blood: x     /  pCO2: 35    /  pO2: 75    / HCO3: 29    / Base Excess: 6.4   /  SaO2: 96          10-05 @ 07:01  -  10-06 @ 07:00  --------------------------------------------------------  IN: 2450.4 mL / OUT: 1555 mL / NET: 895.4 mL        Mode: AC/ CMV (Assist Control/ Continuous Mandatory Ventilation)  RR (machine): 14  TV (machine): 450  FiO2: 50  PEEP: 8  ITime: 1  MAP: 12  PIP: 33      PHYSICAL EXAM:  GENERAL: Sedated, Intubated on vent  HEAD:  Atraumatic, Normocephalic  EYES: PERRL, conjunctiva and sclera clear  ENMT: Moist mucous membranes, no exudates  NECK: Supple, normal appearance, No JVD; Normal thyroid; Trachea midline  NERVOUS SYSTEM: awake, nods in response to questioning  CHEST/LUNG: ventilated, bilateral breath sounds, diminished throughout, no crackles or rhonchi  HEART: Regular rate and rhythm; No murmurs, rubs, or gallops  ABDOMEN: Soft, Nontender, Non distended; Bowel sounds present  : Robledo in place, draining clear concentrated urine  EXTREMITIES:  2+ Peripheral Pulses, No clubbing, cyanosis, or edema  SKIN: No rashes or lesions; Good capillary refill    LABS:  CBC Full  -  ( 06 Oct 2020 16:16 )  WBC Count : 5.38 K/uL  RBC Count : 2.72 M/uL  Hemoglobin : 8.0 g/dL  Hematocrit : 23.9 %  Platelet Count - Automated : 42 K/uL  Mean Cell Volume : 87.9 fl  Mean Cell Hemoglobin : 29.4 pg  Mean Cell Hemoglobin Concentration : 33.5 gm/dL  Auto Neutrophil # : x  Auto Lymphocyte # : x  Auto Monocyte # : x  Auto Eosinophil # : x  Auto Basophil # : x  Auto Neutrophil % : x  Auto Lymphocyte % : x  Auto Monocyte % : x  Auto Eosinophil % : x  Auto Basophil % : x    10-06    138  |  100  |  8   ----------------------------<  96  3.8   |  31  |  0.47<L>    Ca    8.0<L>      06 Oct 2020 16:16  Phos  2.3     10-06  Mg     2.1     10-06    TPro  5.3<L>  /  Alb  1.8<L>  /  TBili  0.9  /  DBili  x   /  AST  21  /  ALT  26  /  AlkPhos  109  10-06    PT/INR - ( 06 Oct 2020 16:16 )   PT: 20.3 sec;   INR: 1.75 ratio             Culture Results:   Growth in anaerobic bottle: Gram Negative Rods  ***Blood Panel PCR results on this specimen are available  approximately 3 hours after the Gram stain result.***  Gram stain, PCR, and/or culture results may not always  correspond due to difference in methodologies.  ************************************************************  This PCR assay was performed using [employer].  The following targets are tested for: Enterococcus,  vancomycin resistant enterococci, Listeria monocytogenes,  coagulase negative staphylococci, S. aureus,  methicillin resistant S. aureus, Streptococcus agalactiae  (Group B), S. pneumoniae, S. pyogenes (Group A),  Acinetobacter baumannii, Enterobacter cloacae, E. coli,  Klebsiella oxytoca, K. pneumoniae, Proteus sp.,  Serratia marcescens, Haemophilus influenzae,  Neisseria meningitidis, Pseudomonas aeruginosa, Candida  albicans, C. glabrata, C krusei, C parapsilosis,  C. tropicalis and the KPC resistance gene.  "For positive Serratia,  results cross-reactivity has been occasionally  observed with some species of Pseudomonas and Pantoea"  "Due to technical problems, Proteus sp. will Not be reported as part of  the BCID panel until further notice" (10-06 @ 10:10)  Culture Results:   No growth to date. (10-04 @ 11:02)  Culture Results:   No growth to date. (10-04 @ 11:02)      RADIOLOGY & ADDITIONAL STUDIES REVIEWED:     < from: Xray Chest 1 View- PORTABLE-Routine (Xray Chest 1 View- PORTABLE-Routine .) (10.06.20 @ 10:10) >    EXAM:  XR CHEST PORTABLE ROUTINE 1V                            PROCEDURE DATE:  10/06/2020          INTERPRETATION:  INDICATION: Ventilatory support.    PRIORS: 10/5/2020.    VIEWS: Portable AP radiography of the chest performed.    FINDINGS: ETT and NGT unchanged. Heart size within normal limits. No superior mediastinal widening. Right-sided infiltrate unchanged. Bilateral pleural effusions stable. No pneumothorax. No mediastinal shift.    IMPRESSION: No significant change.              JOSE DAVID SCHNEIDER, ATTENDING RADIOLOGIST  This document has been electronically signed. Oct  6 2020  3:03PM    < end of copied text >      [ ]GOALS OF CARE DISCUSSION WITH PATIENT/FAMILY/PROXY:    CRITICAL CARE TIME SPENT: 35 minutes INTERVAL HPI/OVERNIGHT EVENTS: Patient intubated, sedation lowered, awake and responds to questions with nodding. Denies any pain. Afebrile x 24 hours. BCx grew G - neg rods in anaerobic bottle. Surgery for PEG and Trach cancelled due to scheduling issue. Hb 6.8 Plts 19 this AM. Ordered 2 units pRBCs and 1 unit plts. Will re-check labs in afternoon. Procedure date not known at this time.    PRESSORS: [x] YES Phenylephrine    Antimicrobial:  caspofungin IVPB 50 milliGRAM(s) IV Intermittent every 24 hours  caspofungin IVPB      levoFLOXacin IVPB      levoFLOXacin IVPB 750 milliGRAM(s) IV Intermittent every 24 hours  linezolid  IVPB      linezolid  IVPB 600 milliGRAM(s) IV Intermittent every 12 hours    Cardiovascular:  carvedilol 6.25 milliGRAM(s) Oral every 12 hours  phenylephrine    Infusion 1 MICROgram(s)/kG/Min IV Continuous <Continuous>    Pulmonary:  ALBUTerol    90 MICROgram(s) HFA Inhaler 2 Puff(s) Inhalation every 4 hours  budesonide 160 MICROgram(s)/formoterol 4.5 MICROgram(s) Inhaler 2 Puff(s) Inhalation two times a day  guaiFENesin   Syrup  (Sugar-Free) 100 milliGRAM(s) Oral every 6 hours PRN  tiotropium 18 MICROgram(s) Capsule 1 Capsule(s) Inhalation daily    Hematologic:    Other:  acetaminophen    Suspension .. 650 milliGRAM(s) Oral every 6 hours PRN  atorvastatin 40 milliGRAM(s) Oral at bedtime  benzocaine 15 mG/menthol 3.6 mG (Sugar-Free) Lozenge 1 Lozenge Oral every 4 hours PRN  chlorhexidine 0.12% Liquid 15 milliLiter(s) Oral Mucosa every 12 hours  chlorhexidine 2% Cloths 1 Application(s) Topical <User Schedule>  dexMEDEtomidine Infusion 0.3 MICROgram(s)/kG/Hr IV Continuous <Continuous>  dextrose 40% Gel 15 Gram(s) Oral once PRN  dextrose 5% + sodium chloride 0.9%. 1000 milliLiter(s) IV Continuous <Continuous>  dextrose 5%. 1000 milliLiter(s) IV Continuous <Continuous>  dextrose 50% Injectable 12.5 Gram(s) IV Push once  dextrose 50% Injectable 25 Gram(s) IV Push once  dextrose 50% Injectable 25 Gram(s) IV Push once  ergocalciferol 03048 Unit(s) Oral <User Schedule>  fentaNYL    Injectable 50 MICROGram(s) IV Push every 2 hours PRN  fentaNYL   Infusion. 4 MICROgram(s)/kG/Hr IV Continuous <Continuous>  FIRST- Mouthwash  BLM 10 milliLiter(s) Swish and Swallow every 8 hours  folic acid 1 milliGRAM(s) Oral daily  gabapentin 600 milliGRAM(s) Oral every 8 hours  glucagon  Injectable 1 milliGRAM(s) IntraMuscular once PRN  influenza   Vaccine 0.5 milliLiter(s) IntraMuscular once  insulin glargine Injectable (LANTUS) 20 Unit(s) SubCutaneous every morning  insulin lispro (HumaLOG) corrective regimen sliding scale   SubCutaneous every 6 hours  insulin lispro Injectable (HumaLOG) 4 Unit(s) SubCutaneous every 6 hours  melatonin 3 milliGRAM(s) Oral at bedtime  methylPREDNISolone sodium succinate Injectable 80 milliGRAM(s) IV Push every 12 hours  nystatin Powder 1 Application(s) Topical two times a day  ondansetron Injectable 4 milliGRAM(s) IV Push every 6 hours PRN  pantoprazole  Injectable 40 milliGRAM(s) IV Push daily  polyethylene glycol 3350 17 Gram(s) Oral two times a day  senna 2 Tablet(s) Oral at bedtime  sodium chloride 0.65% Nasal 1 Spray(s) Both Nostrils four times a day PRN  sodium chloride 0.9% lock flush 10 milliLiter(s) IV Push every 1 hour PRN  zolpidem 5 milliGRAM(s) Oral at bedtime PRN    acetaminophen    Suspension .. 650 milliGRAM(s) Oral every 6 hours PRN  ALBUTerol    90 MICROgram(s) HFA Inhaler 2 Puff(s) Inhalation every 4 hours  atorvastatin 40 milliGRAM(s) Oral at bedtime  benzocaine 15 mG/menthol 3.6 mG (Sugar-Free) Lozenge 1 Lozenge Oral every 4 hours PRN  budesonide 160 MICROgram(s)/formoterol 4.5 MICROgram(s) Inhaler 2 Puff(s) Inhalation two times a day  carvedilol 6.25 milliGRAM(s) Oral every 12 hours  caspofungin IVPB 50 milliGRAM(s) IV Intermittent every 24 hours  caspofungin IVPB      chlorhexidine 0.12% Liquid 15 milliLiter(s) Oral Mucosa every 12 hours  chlorhexidine 2% Cloths 1 Application(s) Topical <User Schedule>  dexMEDEtomidine Infusion 0.3 MICROgram(s)/kG/Hr IV Continuous <Continuous>  dextrose 40% Gel 15 Gram(s) Oral once PRN  dextrose 5% + sodium chloride 0.9%. 1000 milliLiter(s) IV Continuous <Continuous>  dextrose 5%. 1000 milliLiter(s) IV Continuous <Continuous>  dextrose 50% Injectable 12.5 Gram(s) IV Push once  dextrose 50% Injectable 25 Gram(s) IV Push once  dextrose 50% Injectable 25 Gram(s) IV Push once  ergocalciferol 15012 Unit(s) Oral <User Schedule>  fentaNYL    Injectable 50 MICROGram(s) IV Push every 2 hours PRN  fentaNYL   Infusion. 4 MICROgram(s)/kG/Hr IV Continuous <Continuous>  FIRST- Mouthwash  BLM 10 milliLiter(s) Swish and Swallow every 8 hours  folic acid 1 milliGRAM(s) Oral daily  gabapentin 600 milliGRAM(s) Oral every 8 hours  glucagon  Injectable 1 milliGRAM(s) IntraMuscular once PRN  guaiFENesin   Syrup  (Sugar-Free) 100 milliGRAM(s) Oral every 6 hours PRN  influenza   Vaccine 0.5 milliLiter(s) IntraMuscular once  insulin glargine Injectable (LANTUS) 20 Unit(s) SubCutaneous every morning  insulin lispro (HumaLOG) corrective regimen sliding scale   SubCutaneous every 6 hours  insulin lispro Injectable (HumaLOG) 4 Unit(s) SubCutaneous every 6 hours  levoFLOXacin IVPB      levoFLOXacin IVPB 750 milliGRAM(s) IV Intermittent every 24 hours  linezolid  IVPB      linezolid  IVPB 600 milliGRAM(s) IV Intermittent every 12 hours  melatonin 3 milliGRAM(s) Oral at bedtime  methylPREDNISolone sodium succinate Injectable 80 milliGRAM(s) IV Push every 12 hours  nystatin Powder 1 Application(s) Topical two times a day  ondansetron Injectable 4 milliGRAM(s) IV Push every 6 hours PRN  pantoprazole  Injectable 40 milliGRAM(s) IV Push daily  phenylephrine    Infusion 1 MICROgram(s)/kG/Min IV Continuous <Continuous>  polyethylene glycol 3350 17 Gram(s) Oral two times a day  senna 2 Tablet(s) Oral at bedtime  sodium chloride 0.65% Nasal 1 Spray(s) Both Nostrils four times a day PRN  sodium chloride 0.9% lock flush 10 milliLiter(s) IV Push every 1 hour PRN  tiotropium 18 MICROgram(s) Capsule 1 Capsule(s) Inhalation daily  zolpidem 5 milliGRAM(s) Oral at bedtime PRN    Drug Dosing Weight  Height (cm): 154.9 (19 Sep 2020 08:00)  Weight (kg): 84.8 (21 Sep 2020 09:15)  BMI (kg/m2): 35.3 (21 Sep 2020 09:15)  BSA (m2): 1.84 (21 Sep 2020 09:15)      CENTRAL LINE: [x ] YES [ ] NO  LOCATION: RFL   DATE INSERTED:10/5  REMOVE: [ ] YES [ ] NO  EXPLAIN:    ROBLEDO: [ x] YES [ ] NO    DATE INSERTED: 10/5  REMOVE:  [ ] YES [ ] NO  EXPLAIN:    A-LINE:  [ ] YES [ x] NO  LOCATION:   DATE INSERTED:  REMOVE:  [ ] YES [ ] NO  EXPLAIN:    PMH -reviewed admission note, no change since admission      ABG - ( 06 Oct 2020 03:27 )  pH, Arterial: 7.53  pH, Blood: x     /  pCO2: 35    /  pO2: 75    / HCO3: 29    / Base Excess: 6.4   /  SaO2: 96          10-05 @ 07:01  -  10-06 @ 07:00  --------------------------------------------------------  IN: 2450.4 mL / OUT: 1555 mL / NET: 895.4 mL        Mode: AC/ CMV (Assist Control/ Continuous Mandatory Ventilation)  RR (machine): 14  TV (machine): 450  FiO2: 50  PEEP: 8  ITime: 1  MAP: 12  PIP: 33      PHYSICAL EXAM:  GENERAL: Sedated, Intubated on vent  HEAD:  Atraumatic, Normocephalic  EYES: PERRL, conjunctiva and sclera clear  ENMT: Moist mucous membranes, no exudates  NECK: Supple, normal appearance, No JVD; Normal thyroid; Trachea midline  NERVOUS SYSTEM: awake, nods in response to questioning  CHEST/LUNG: ventilated, bilateral breath sounds, diminished throughout, no crackles or rhonchi  HEART: Regular rate and rhythm; No murmurs, rubs, or gallops  ABDOMEN: Soft, Nontender, Non distended; Bowel sounds present  : Robled[place] in place, draining clear concentrated urine  EXTREMITIES:  2+ Peripheral Pulses, No clubbing, cyanosis, or edema  SKIN: No rashes or lesions; Good capillary refill    LABS:  CBC Full  -  ( 06 Oct 2020 16:16 )  WBC Count : 5.38 K/uL  RBC Count : 2.72 M/uL  Hemoglobin : 8.0 g/dL  Hematocrit : 23.9 %  Platelet Count - Automated : 42 K/uL  Mean Cell Volume : 87.9 fl  Mean Cell Hemoglobin : 29.4 pg  Mean Cell Hemoglobin Concentration : 33.5 gm/dL  Auto Neutrophil # : x  Auto Lymphocyte # : x  Auto Monocyte # : x  Auto Eosinophil # : x  Auto Basophil # : x  Auto Neutrophil % : x  Auto Lymphocyte % : x  Auto Monocyte % : x  Auto Eosinophil % : x  Auto Basophil % : x    10-06    138  |  100  |  8   ----------------------------<  96  3.8   |  31  |  0.47<L>    Ca    8.0<L>      06 Oct 2020 16:16  Phos  2.3     10-06  Mg     2.1     10-06    TPro  5.3<L>  /  Alb  1.8<L>  /  TBili  0.9  /  DBili  x   /  AST  21  /  ALT  26  /  AlkPhos  109  10-06    PT/INR - ( 06 Oct 2020 16:16 )   PT: 20.3 sec;   INR: 1.75 ratio         Culture Results:   Growth in anaerobic bottle: Gram Negative Rods  ***Blood Panel PCR results on this specimen are available  approximately 3 hours after the Gram stain result.***  Gram stain, PCR, and/or culture results may not always  correspond due to difference in methodologies.  ************************************************************  This PCR assay was performed using [employer] [facility]  The following targets are tested for: Enterococcus,  vancomycin resistant enterococci, Listeria monocytogenes,  coagulase negative staphylococci, S. aureus,  methicillin resistant S. aureus, Streptococcus agalactiae  (Group B), S. pneumoniae, S. pyogenes (Group A),  Acinetobacter baumannii, Enterobacter cloacae, E. coli,  Klebsiella oxytoca, K. pneumoniae, Proteus sp.,  Serratia marcescens, Haemophilus influenzae,  Neisseria meningitidis, Pseudomonas aeruginosa, Candida  albicans, C. glabrata, C krusei, C parapsilosis,  C. tropicalis and the KPC resistance gene.  "For positive Serratia,  results cross-reactivity has been occasionally  observed with some species of Pseudomonas and Pantoea"  "Due to technical problems, Proteus sp. will Not be reported as part of  the BCID panel until further notice" (10-06 @ 10:10)  Culture Results:   No growth to date. (10-04 @ 11:02)  Culture Results:   No growth to date. (10-04 @ 11:02)      RADIOLOGY & ADDITIONAL STUDIES REVIEWED:     < from: Xray Chest 1 View- PORTABLE-Routine (Xray Chest 1 View- PORTABLE-Routine .) (10.06.20 @ 10:10) >    EXAM:  XR CHEST PORTABLE ROUTINE 1V                            PROCEDURE DATE:  10/06/2020          INTERPRETATION:  INDICATION: Ventilatory support.    PRIORS: 10/5/2020.    VIEWS: Portable AP radiography of the chest performed.    FINDINGS: ETT and NGT unchanged. Heart size within normal limits. No superior mediastinal widening. Right-sided infiltrate unchanged. Bilateral pleural effusions stable. No pneumothorax. No mediastinal shift.    IMPRESSION: No significant change.              JOSE DAVID SCHNEIDER, ATTENDING RADIOLOGIST  This document has been electronically signed. Oct  6 2020  3:03PM    < end of copied text >      [ ]GOALS OF CARE DISCUSSION WITH PATIENT/FAMILY/PROXY:    CRITICAL CARE TIME SPENT: 35 minutes

## 2020-10-07 NOTE — PROGRESS NOTE ADULT - SUBJECTIVE AND OBJECTIVE BOX
Interval Events:    remains intubated/sedated  planned for possible trach/PEG  on TF    Allergies    fish (Angioedema)  penicillins (Rash)    Intolerances      Endocrine/Metabolic Medications:  atorvastatin 40 milliGRAM(s) Oral at bedtime  dextrose 40% Gel 15 Gram(s) Oral once PRN  dextrose 50% Injectable 12.5 Gram(s) IV Push once  dextrose 50% Injectable 25 Gram(s) IV Push once  dextrose 50% Injectable 25 Gram(s) IV Push once  glucagon  Injectable 1 milliGRAM(s) IntraMuscular once PRN  insulin glargine Injectable (LANTUS) 20 Unit(s) SubCutaneous every morning  insulin lispro (HumaLOG) corrective regimen sliding scale   SubCutaneous every 6 hours  insulin lispro Injectable (HumaLOG) 4 Unit(s) SubCutaneous every 6 hours  methylPREDNISolone sodium succinate Injectable 80 milliGRAM(s) IV Push every 12 hours      Vital Signs Last 24 Hrs  T(C): 37.7 (07 Oct 2020 08:51), Max: 37.7 (06 Oct 2020 20:56)  T(F): 99.8 (07 Oct 2020 08:51), Max: 99.8 (06 Oct 2020 20:56)  HR: 115 (07 Oct 2020 10:00) (65 - 115)  BP: 129/65 (07 Oct 2020 10:00) (78/60 - 151/57)  BP(mean): 80 (07 Oct 2020 10:00) (56 - 98)  RR: 18 (07 Oct 2020 10:00) (13 - 25)  SpO2: 100% (07 Oct 2020 10:00) (99% - 100%)      PHYSICAL EXAM  Constitutional:  s/p intubation   NC/AT:    HEENT:    Neck:  No JVD    Respiratory:  reduced breath sounds b/l bases    Cardiovascular:  RR    Gastrointestinal: Soft    Extremities: without cyanosis  Neurological:  sedated      LABS                        7.1    5.31  )-----------( 22       ( 07 Oct 2020 06:22 )             20.3                               137    |  100    |  10                  Calcium: 7.9   / iCa: x      (10-07 @ 06:22)    ----------------------------<  166       Magnesium: x                                3.7     |  30     |  0.37             Phosphorous: x        TPro  5.1    /  Alb  1.7    /  TBili  0.8    /  DBili  x      /  AST  20     /  ALT  27     /  AlkPhos  126    07 Oct 2020 06:22    CAPILLARY BLOOD GLUCOSE      POCT Blood Glucose.: 165 mg/dL (07 Oct 2020 05:43)  POCT Blood Glucose.: 256 mg/dL (06 Oct 2020 23:38)  POCT Blood Glucose.: 116 mg/dL (06 Oct 2020 17:12)  POCT Blood Glucose.: 153 mg/dL (06 Oct 2020 11:05)        Assesment/plan      64 yo female with multiple comorbidities including h/o DM type 2, HTN, COPD on o2, lung ca on right sided s/p resection on chemo admitted with shortness of breath  was intubated/extubated for acute resp failure this admit, now extubated on general medical floor, standing high dose steroids with uncontrolled hyperglycemia    endocrinology consulted for glycemic management    DM type 2  uncontrolled  complicated by high dose steroid use, acute on chronic resp failure  home regimen:  basaglar 60 units daily  novolog 15 units pre meals    recommendations:  on high dose methylpred  on TF  planned for possible trach/PEG    moderate dose sliding scale q6h  - continue insulin lantus 20 units daily  - continue insulin lispro 4 units q6h  - reassess based on requirements  - goal inpatient glucose range: 140-180mg/dl    COPD exacerbation  acute on chronic hypoxic resp failure  s/p intubation/extubation current admit  on standing steroids  steroids increased  bipap   pulmonary on board  deteriorated, transferred to ICU, reintubated  planned for possible trach/PEG    sepsis  VRE bacteremia  received zosyn  ID on board    Discussed with primary team   Please call  Endocrine- Dr Katlyn Garcia as needed    Time spent evaluating patient including coordination of care 35mins.

## 2020-10-07 NOTE — CHART NOTE - NSCHARTNOTEFT_GEN_A_CORE
Assessment:   65yFemalePatient is a 65y old  Female who presents with a chief complaint of shortness of breath (07 Oct 2020 11:02) admitted to ICU on 10/5 for worsening respiratory distress, pmhx of COPD, lung cancer with mets to bone.      Pt visited. Currently intubated, vent placed 10/5 per worsening respiratory failure. Plan for PEG tube and trach for today but cancelled due to scheduling issues per critical care progress note 10/7. Order changed from DYS2 mechanical soft thin liquids to NPO with TF on 10/6. Currently ordered TF Glucerna 1.5, and tolerating the MD goal rate of 40mL per RN. Pt is full code, poor prognosis noted per palliative care.    Factors impacting intake: [ ] none [ ] nausea  [ ] vomiting [ ] diarrhea [ ] constipation  [ ]chewing problems [x ] swallowing issues  [x ] other: currently on vent/intubated.     Diet Prescription: Diet, NPO with Tube Feed:   Tube Feeding Modality: Orogastric  Glucerna 1.5 Inocente  Total Volume for 24 Hours (mL): 960  Continuous  Starting Tube Feed Rate {mL per Hour}: 10  Increase Tube Feed Rate by (mL): 10     Every hour  Until Goal Tube Feed Rate (mL per Hour): 40  Tube Feed Duration (in Hours): 24  Tube Feed Start Time: 05:00 (10-06-20 @ 15:34)    Intake: currently tolerating TF at goal rate of 40mL/hr, Glucerna 1.5 providing (1440 kcal, pro: 79.2 g, free water: 759mL).     Current Weight: 206 lbs. dosing wt: 186.9 per flow.   % Weight Change    Pertinent Medications: MEDICATIONS  (STANDING):  ALBUTerol    90 MICROgram(s) HFA Inhaler 2 Puff(s) Inhalation every 4 hours  atorvastatin 40 milliGRAM(s) Oral at bedtime  budesonide 160 MICROgram(s)/formoterol 4.5 MICROgram(s) Inhaler 2 Puff(s) Inhalation two times a day  carvedilol 6.25 milliGRAM(s) Oral every 12 hours  chlorhexidine 0.12% Liquid 15 milliLiter(s) Oral Mucosa every 12 hours  chlorhexidine 2% Cloths 1 Application(s) Topical <User Schedule>  dexMEDEtomidine Infusion 0.7 MICROgram(s)/kG/Hr (14.8 mL/Hr) IV Continuous <Continuous>  dextrose 5% + sodium chloride 0.9%. 1000 milliLiter(s) (50 mL/Hr) IV Continuous <Continuous>  dextrose 5%. 1000 milliLiter(s) (50 mL/Hr) IV Continuous <Continuous>  dextrose 50% Injectable 12.5 Gram(s) IV Push once  dextrose 50% Injectable 25 Gram(s) IV Push once  dextrose 50% Injectable 25 Gram(s) IV Push once  enoxaparin Injectable 40 milliGRAM(s) SubCutaneous daily  ergocalciferol 90443 Unit(s) Oral <User Schedule>  fentaNYL   Infusion. 4 MICROgram(s)/kG/Hr (33.9 mL/Hr) IV Continuous <Continuous>  FIRST- Mouthwash  BLM 10 milliLiter(s) Swish and Swallow every 8 hours  folic acid 1 milliGRAM(s) Oral daily  gabapentin 600 milliGRAM(s) Oral every 8 hours  influenza   Vaccine 0.5 milliLiter(s) IntraMuscular once  insulin glargine Injectable (LANTUS) 20 Unit(s) SubCutaneous every morning  insulin lispro (HumaLOG) corrective regimen sliding scale   SubCutaneous every 6 hours  insulin lispro Injectable (HumaLOG) 4 Unit(s) SubCutaneous every 6 hours  levoFLOXacin IVPB      levoFLOXacin IVPB 750 milliGRAM(s) IV Intermittent every 24 hours  linezolid  IVPB 600 milliGRAM(s) IV Intermittent every 12 hours  linezolid  IVPB      melatonin 3 milliGRAM(s) Oral at bedtime  methylPREDNISolone sodium succinate Injectable 80 milliGRAM(s) IV Push every 12 hours  nystatin Powder 1 Application(s) Topical two times a day  pantoprazole  Injectable 40 milliGRAM(s) IV Push daily  phenylephrine    Infusion 1 MICROgram(s)/kG/Min (31.8 mL/Hr) IV Continuous <Continuous>  polyethylene glycol 3350 17 Gram(s) Oral two times a day  senna 2 Tablet(s) Oral at bedtime  tiotropium 18 MICROgram(s) Capsule 1 Capsule(s) Inhalation daily    MEDICATIONS  (PRN):  acetaminophen    Suspension .. 650 milliGRAM(s) Oral every 6 hours PRN Temp greater or equal to 38C (100.4F)  benzocaine 15 mG/menthol 3.6 mG (Sugar-Free) Lozenge 1 Lozenge Oral every 4 hours PRN Sore Throat  dextrose 40% Gel 15 Gram(s) Oral once PRN Blood Glucose LESS THAN 70 milliGRAM(s)/deciLiter  fentaNYL    Injectable 50 MICROGram(s) IV Push every 2 hours PRN agitation  glucagon  Injectable 1 milliGRAM(s) IntraMuscular once PRN Glucose <70 milliGRAM(s)/deciLiter  guaiFENesin   Syrup  (Sugar-Free) 100 milliGRAM(s) Oral every 6 hours PRN Cough  ondansetron Injectable 4 milliGRAM(s) IV Push every 6 hours PRN Nausea and/or Vomiting  sodium chloride 0.65% Nasal 1 Spray(s) Both Nostrils four times a day PRN Nasal Congestion  sodium chloride 0.9% lock flush 10 milliLiter(s) IV Push every 1 hour PRN Pre/post blood products, medications, blood draw, and to maintain line patency  zolpidem 5 milliGRAM(s) Oral at bedtime PRN Insomnia    Pertinent Labs: 10-07 Na137 mmol/L Glu 166 mg/dL<H> K+ 3.7 mmol/L Cr  0.37 mg/dL<L> BUN 10 mg/dL 10-06 Phos 2.3 mg/dL<L> 10-07 Alb 1.7 g/dL<L> 09-20 Chol 159 mg/dL LDL 44 mg/dL HDL 94 mg/dL Trig 105 mg/dL     CAPILLARY BLOOD GLUCOSE      POCT Blood Glucose.: 165 mg/dL (07 Oct 2020 05:43)  POCT Blood Glucose.: 256 mg/dL (06 Oct 2020 23:38)  POCT Blood Glucose.: 116 mg/dL (06 Oct 2020 17:12)  POCT Blood Glucose.: 153 mg/dL (06 Oct 2020 11:05)    Skin: intact     Estimated Needs:   [] no change since previous assessment  [x ] recalculated:   ht: 5'1", IBW: 105.5 +10%= 115.5 or 52.2 kg   energy needs: 25-30 kcal/kg = 6849-8040 kcal.  protein needs: 1.5 g/kg= 78.3 g    Previous Nutrition Diagnosis:   [ ] Inadequate Energy Intake [ x]Inadequate Oral Intake [ ] Excessive Energy Intake   [ ] Underweight [ ] Increased Nutrient Needs [ ] Overweight/Obesity   [ ] Altered GI Function [ ] Unintended Weight Loss [ ] Food & Nutrition Related Knowledge Deficit [ ] Malnutrition       New Nutrition Diagnosis: [ ] not applicable   Moderate malnutrition in acute on chronic illness related to worsening respiratory failure, chronic comorbidities, as evidenced by: bilateral trace edema, <50% intake in preceding week, diet order change from DYS2 mechanical soft to NPO with TF.     Interventions:   Recommend  [ ] Change Diet To:  [ ] Nutrition Supplement  [x  ] Nutrition Support: Glucerna 1.5 40mL x 24hrs. providing (1440 kcal, pro: 79.2 g, free water: 759mL).   [x ] Other:  Continue with TF as medically appropriate.     Monitoring and Evaluation:    [ x ] Tolerance to diet prescription [ x ] weights [ x ] labs[ x ] follow up per protocol  [ ] other:

## 2020-10-07 NOTE — PROGRESS NOTE ADULT - ASSESSMENT
u  · Assessment	  65 year old lady with severe COPD and lung ca with mets to bones which causing compression Fx.  She had one RT for that.  she was admitted for dyspnea and hypoxia.    Problem/Recommendation - 1:  Problem: Lung cancer. Recommendation: with mets to bones  on chemo keytruda, alimta and carboplatin  she had one RT to spine.  the tumor markers have been trending down with chemo    Problem/Recommendation - 2:  ·  Problem: COPD (chronic obstructive pulmonary disease).  Recommendation: required freq steroids.     Problem/Recommendation - 3:  ·  Problem: Acute on chronic respiratory failure with hypoxia.  Recommendation: ?aspiration causing resp failure requiring intubation  she has pain at throat for a while and has difficulty swallowing, this got better  4?strider   intubation again  prognosis very poor

## 2020-10-07 NOTE — PROGRESS NOTE ADULT - ATTENDING COMMENTS
66 yo obese F with hx of 80 pack year tobacco use, HTN, DM2 on insulin, COPD on 5L (frequent admissions), R lung cancer s/p resection (2017) on chemo q 3 weeks with concern for mets to spine and compression fractures, recent admission for COPD exacerbation  clinical CRIS ,earlier in August who on 9/19/2020 presented to ED with c/o SOB, hypoxia and increased sputum production x 1 day. Patient was initially transferred to ICU s/p RRT on 9/21 requiring intubation for respiratory failure s/t copd exacerbation, extubated on 9/22 however required bipap thereafter. She was also noted to be bacteremic with VRE and fungemic s/t picc which was in place for chemo hence removed. Patient completed course of zyvox and continued on caspofungin, her repeat blood cultures were noted negative. Patient was transferred to SCU for continued care.   While in SCU, she was noted to have respiratory distress for which she was switched to AVAPS on 9/30 with good response however now noted to have respiratory distress even on AVAPS hence started on iv solumedrol and decision made to transfer patient to ICU for close monitoring for low threshold for intubation.   On assessment, patient was noted in moderate respiratory distress, saturating 90% on AVAPS. Emergent femoral central line placed due to difficult iv access and started on precedex drip.   Patient noted to have continued respiratory distress hence decision made to intubate for need for mechanical ventilation.      Plan   -continue vent support  -sedated  -thoracic for trach and PEG 10/8  -intubation #2 10/5  -pat verbally stated that she wish for trach and PEG  -DVT/GI prophy  -hemodynamic support  -New RLL consolidation    -Positive BC   -repeat BC..   - drop in H/H and PLT  -transfuse 2 PRBC and 1 PLT  -may need CT abd/ pelvis to evaluate for blood loss  -change central line

## 2020-10-07 NOTE — CHART NOTE - TREATMENT: THE FOLLOWING DIET HAS BEEN RECOMMENDED
Diet, NPO with Tube Feed:   Tube Feeding Modality: Orogastric  Glucerna 1.5 Inocente  Total Volume for 24 Hours (mL): 960  Continuous  Starting Tube Feed Rate {mL per Hour}: 10  Increase Tube Feed Rate by (mL): 10     Every hour  Until Goal Tube Feed Rate (mL per Hour): 40  Tube Feed Duration (in Hours): 24  Tube Feed Start Time: 05:00 (10-06-20 @ 15:34) [Active]

## 2020-10-07 NOTE — PROGRESS NOTE ADULT - ASSESSMENT
66 yo obese F with hx of 80 pack year tobacco use, HTN, DM2 on insulin, COPD on 5L (frequent admissions), R lung cancer s/p resection (2017) on chemo q 3 weeks with concern for mets to spine and compression fractures, recent admission for COPD exacerbation earlier in August who on 9/19/2020 presented to ED with c/o SOB, hypoxia and increased sputum production x 1 day. Admitted to ICU x twice for hypoxemic respiratory distress requiring intubation (9/21 and 10/6).       PRE-OP CLEARANCE==========================  pre op labs for Trach and PEG on 10/6  EKG: sinus tachycardia  PT/INR  CXR in AM  Type and Screen  NPO after midnight  Holding AC pre procedure  Vent at goal FiO2 60% with PEEP of 8  SANTOS SCORE : 3.9% mortality risk with ENT surgery    CNS=====================================  Intubated and sedated  Fentanyl for pain control  Propofol and Precedex for sedation    CARD====================================  Central Femoral line (10/5) on pressor support Phenylephrine  continue to monitor BP and HR for distress    PULM====================================  #Acute on chronic respiratory failure with hypoxia and hypercapnia.   2/2 COPD, heavy smoking history (80 pack years)  Intubated and sedated x 2 this admission (9/21 and 10/5)  Plan for Trach and PEG surgery 10/6 per patient request  On prednisone at home, will give stress dose and daily steroid per Pulm (Dr. Nugent)  - IV solumedrol 80 q12 x 48 hours then  - PO prednisone 40 QD   - bronchodilators and ICS    #COPD  on AVAPS previously, failed and was re-intubated 10/5  for Tracheostomy  managed as above    #Metastatic Lung cancer  holding chemo  port removed on this hospital stay  Heme/Onc following Dr Mendiola    ID=======================================  #Sepsis.   Fever, leukocytosis (on steroids0) and elevated Lactate 3.1  - f/u new blood cultures (Hx VRE and Fungemia)  - c/w Caspofungin (9/23) and Levaquin (10/6) + Zyvox (10/6)    #Pneumonia   CXR shows b/l pleural effusions with Left sided consolidation  Abx coverage as above: Levaquin (10/6) + Zyvox (10/6)    Heme/Onc=================================  #Anemia.  2/2 chronic disease  Hgb less than 7.0  Hb 7.9 today    #Lung cancer metastatic to bone.   s/p 1 radiation dose to spine.  Was on chemo before hospitalization  chemo port removed, Dr. Mendiola following  c/w pain management.     Endo====================================  #DM (diabetes mellitus).   Start on NPO + OG tube feeds  moderate dose sliding scale q6h  - continue insulin lantus 20 units daily for today  - insulin lispro 4 units q6h  - goal inpatient glucose range: 140-180mg/dl    GI======================================  #Moderate protein-calorie malnutrition.   - OG tube with tube feeds  - PEG tube for 10/7    Nephro=================================  BUN 13| Creat 0.43  No active issue monitor electrolytes and replace as needed    PPX=====================================  DVT (held for procedure) and GI prophylaxis.  Continue AVAPS.    GOC====================================  Overall prognosis is extremely poor.   Patient remains FULL CODE as per her wishes  would like everything done including trach and PEG   64 yo obese F with hx of 80 pack year tobacco use, HTN, DM2 on insulin, COPD on 5L (frequent admissions), R lung cancer s/p resection (2017) on chemo q 3 weeks with concern for mets to spine and compression fractures, recent admission for COPD exacerbation earlier in August who on 9/19/2020 presented to ED with c/o SOB, hypoxia and increased sputum production x 1 day. Admitted to ICU x twice for hypoxemic respiratory distress requiring intubation (9/21 and 10/6).       PRE-OP CLEARANCE==========================  pre op labs for Trach and PEG - re-scheduled  EKG: sinus tachycardia  PT/INR  CXR in AM  Type and Screen  NPO after midnight  Holding AC pre procedure  Vent at goal FiO2 60% with PEEP of 8  SANTOS SCORE : 3.9% mortality risk with ENT surgery    CNS=====================================  Intubated and sedated  Fentanyl for pain control  Propofol and Precedex for sedation  incr RASS goals    CARD====================================  Central Femoral line (10/5)  pressor support Phenylephrine  continue to monitor BP and HR for distress    PULM====================================  #Acute on chronic respiratory failure with hypoxia and hypercapnia.   2/2 COPD, heavy smoking history (80 pack years)  Intubated and sedated x 2 this admission (9/21 and 10/5)  Plan for Trach and PEG surgery 10/6 per patient request  On prednisone at home, will give stress dose and daily steroid per Pulm (Dr. Nugent)  - IV solumedrol 80 q12 x 48 hours then  - PO prednisone 40 QD from 10/9  - bronchodilators and ICS    #COPD  on AVAPS previously, failed and was re-intubated 10/5  for Tracheostomy  managed as above    #Metastatic Lung cancer  holding chemo  port removed on this hospital stay  Heme/Onc following Dr Mendiola    ID=======================================  #Sepsis.   Fever, leukocytosis (on steroids0) and elevated Lactate 3.1  - f/u new blood cultures (Hx VRE and Fungemia)  - c/w Caspofungin (9/23 -10/7)   - Levaquin (10/6) + Zyvox (10/6)    #Pneumonia   CXR shows b/l pleural effusions with Left sided consolidation  Abx coverage as above: Levaquin (10/6) + Zyvox (10/6)  BCx positive for Gram negative rods: Serratia    Heme/Onc=================================  #Anemia.  2/2 chronic disease  Hgb less than 7.0  Hb 6.9 -> transfused 2 pRBCs 10/7    #Thrombocytopenia  Hx of ITP  on steroids, will get 1 unit plts 10/7  will follow    #Lung cancer metastatic to bone.   s/p 1 radiation dose to spine.  Was on chemo before hospitalization  chemo port removed, Dr. Mendiola following  c/w pain management.     Endo====================================  #DM (diabetes mellitus).   Start on NPO + OG tube feeds  moderate dose sliding scale q6h  - continue insulin lantus 20 units daily for today  - insulin lispro 4 units q6h  - goal inpatient glucose range: 140-180mg/dl    GI======================================  #Moderate protein-calorie malnutrition.   - OG tube with tube feeds  - PEG tube rescheduled    Nephro=================================  BUN 13| Creat 0.43  No active issue monitor electrolytes and replace as needed    PPX=====================================  DVT (held for procedure) and GI prophylaxis.  Continue AVAPS.    GOC====================================  Overall prognosis is extremely poor.   Patient remains FULL CODE as per her wishes  would like everything done including trach and PEG

## 2020-10-07 NOTE — PROGRESS NOTE ADULT - SUBJECTIVE AND OBJECTIVE BOX
remains on vent and pressor  had low grade temp    MEDICATIONS  (STANDING):  ALBUTerol    90 MICROgram(s) HFA Inhaler 2 Puff(s) Inhalation every 4 hours  atorvastatin 40 milliGRAM(s) Oral at bedtime  budesonide 160 MICROgram(s)/formoterol 4.5 MICROgram(s) Inhaler 2 Puff(s) Inhalation two times a day  carvedilol 6.25 milliGRAM(s) Oral every 12 hours  chlorhexidine 0.12% Liquid 15 milliLiter(s) Oral Mucosa every 12 hours  chlorhexidine 2% Cloths 1 Application(s) Topical <User Schedule>  dexMEDEtomidine Infusion 0.7 MICROgram(s)/kG/Hr (14.8 mL/Hr) IV Continuous <Continuous>  dextrose 5% + sodium chloride 0.9%. 1000 milliLiter(s) (50 mL/Hr) IV Continuous <Continuous>  dextrose 5%. 1000 milliLiter(s) (50 mL/Hr) IV Continuous <Continuous>  dextrose 50% Injectable 12.5 Gram(s) IV Push once  dextrose 50% Injectable 25 Gram(s) IV Push once  dextrose 50% Injectable 25 Gram(s) IV Push once  enoxaparin Injectable 40 milliGRAM(s) SubCutaneous daily  ergocalciferol 55292 Unit(s) Oral <User Schedule>  fentaNYL   Infusion. 4 MICROgram(s)/kG/Hr (33.9 mL/Hr) IV Continuous <Continuous>  FIRST- Mouthwash  BLM 10 milliLiter(s) Swish and Swallow every 8 hours  folic acid 1 milliGRAM(s) Oral daily  gabapentin 600 milliGRAM(s) Oral every 8 hours  influenza   Vaccine 0.5 milliLiter(s) IntraMuscular once  insulin glargine Injectable (LANTUS) 20 Unit(s) SubCutaneous every morning  insulin lispro (HumaLOG) corrective regimen sliding scale   SubCutaneous every 6 hours  insulin lispro Injectable (HumaLOG) 4 Unit(s) SubCutaneous every 6 hours  levoFLOXacin IVPB      levoFLOXacin IVPB 750 milliGRAM(s) IV Intermittent every 24 hours  linezolid  IVPB 600 milliGRAM(s) IV Intermittent every 12 hours  linezolid  IVPB      melatonin 3 milliGRAM(s) Oral at bedtime  methylPREDNISolone sodium succinate Injectable 80 milliGRAM(s) IV Push every 12 hours  nystatin Powder 1 Application(s) Topical two times a day  pantoprazole  Injectable 40 milliGRAM(s) IV Push daily  phenylephrine    Infusion 1 MICROgram(s)/kG/Min (31.8 mL/Hr) IV Continuous <Continuous>  polyethylene glycol 3350 17 Gram(s) Oral two times a day  senna 2 Tablet(s) Oral at bedtime  tiotropium 18 MICROgram(s) Capsule 1 Capsule(s) Inhalation daily    MEDICATIONS  (PRN):  acetaminophen    Suspension .. 650 milliGRAM(s) Oral every 6 hours PRN Temp greater or equal to 38C (100.4F)  benzocaine 15 mG/menthol 3.6 mG (Sugar-Free) Lozenge 1 Lozenge Oral every 4 hours PRN Sore Throat  dextrose 40% Gel 15 Gram(s) Oral once PRN Blood Glucose LESS THAN 70 milliGRAM(s)/deciLiter  fentaNYL    Injectable 50 MICROGram(s) IV Push every 2 hours PRN agitation  glucagon  Injectable 1 milliGRAM(s) IntraMuscular once PRN Glucose <70 milliGRAM(s)/deciLiter  guaiFENesin   Syrup  (Sugar-Free) 100 milliGRAM(s) Oral every 6 hours PRN Cough  ondansetron Injectable 4 milliGRAM(s) IV Push every 6 hours PRN Nausea and/or Vomiting  sodium chloride 0.65% Nasal 1 Spray(s) Both Nostrils four times a day PRN Nasal Congestion  sodium chloride 0.9% lock flush 10 milliLiter(s) IV Push every 1 hour PRN Pre/post blood products, medications, blood draw, and to maintain line patency  zolpidem 5 milliGRAM(s) Oral at bedtime PRN Insomnia      Allergies    fish (Angioedema)  penicillins (Rash)    Intolerances        Vital Signs Last 24 Hrs  T(C): 37.7 (07 Oct 2020 08:51), Max: 37.7 (06 Oct 2020 20:56)  T(F): 99.8 (07 Oct 2020 08:51), Max: 99.8 (06 Oct 2020 20:56)  HR: 103 (07 Oct 2020 11:00) (65 - 115)  BP: 117/54 (07 Oct 2020 11:00) (78/60 - 151/57)  BP(mean): 70 (07 Oct 2020 11:00) (56 - 98)  RR: 17 (07 Oct 2020 11:00) (13 - 25)  SpO2: 100% (07 Oct 2020 11:00) (99% - 100%)    PHYSICAL EXAM  General: adult in NAD  HEENT: clear oropharynx, anicteric sclera, pink conjunctiva  Neck: supple  CV: normal S1/S2 with no murmur rubs or gallops  Lungs: positive air movement b/l ant lungs,clear to auscultation, no wheezes, no rales  Abdomen: soft non-tender non-distended, no hepatosplenomegaly  Ext: no clubbing cyanosis or edema  Skin: no rashes and no petechiae  Neuro: alert and oriented X 4, no focal deficits  LABS:                          7.1    5.31  )-----------( 22       ( 07 Oct 2020 06:22 )             20.3         Mean Cell Volume : 88.3 fl  Mean Cell Hemoglobin : 30.9 pg  Mean Cell Hemoglobin Concentration : 35.0 gm/dL  Auto Neutrophil # : x  Auto Lymphocyte # : x  Auto Monocyte # : x  Auto Eosinophil # : x  Auto Basophil # : x  Auto Neutrophil % : x  Auto Lymphocyte % : x  Auto Monocyte % : x  Auto Eosinophil % : x  Auto Basophil % : x    Serial CBC  Hematocrit 20.3  Hemoglobin 7.1  Plat 22  RBC 2.30  WBC 5.31  Serial CBC  Hematocrit 23.9  Hemoglobin 8.0  Plat 42  RBC 2.72  WBC 5.38  Serial CBC  Hematocrit 23.3  Hemoglobin 7.9  Plat 54  RBC 2.68  WBC 2.20  Serial CBC  Hematocrit 22.1  Hemoglobin 7.4  Plat 56  RBC 2.54  WBC 3.78  Serial CBC  Hematocrit 27.9  Hemoglobin 10.2  Plat 85  RBC 3.32  WBC 3.14    10-07    137  |  100  |  10  ----------------------------<  166<H>  3.7   |  30  |  0.37<L>    Ca    7.9<L>      07 Oct 2020 06:22  Phos  2.3     10-06  Mg     2.1     10-06    TPro  5.1<L>  /  Alb  1.7<L>  /  TBili  0.8  /  DBili  x   /  AST  20  /  ALT  27  /  AlkPhos  126<H>  10-07      PT/INR - ( 07 Oct 2020 06:22 )   PT: 18.1 sec;   INR: 1.55 ratio         PTT - ( 07 Oct 2020 06:22 )  PTT:32.4 sec              BLOOD SMEAR INTERPRETATION:       RADIOLOGY & ADDITIONAL STUDIES:

## 2020-10-07 NOTE — PROGRESS NOTE ADULT - SUBJECTIVE AND OBJECTIVE BOX
PULMONARY  progress note    BARB COLÓN  MRN-[id number]    Patient is a 65y old  Female who presents with a chief complaint of shortness of breath (07 Oct 2020 06:26)  Pt intubated on CMV14//50% with PP29 and o2 rvp223%, sedated      MEDICATIONS  (STANDING):  ALBUTerol    90 MICROgram(s) HFA Inhaler 2 Puff(s) Inhalation every 4 hours  atorvastatin 40 milliGRAM(s) Oral at bedtime  budesonide 160 MICROgram(s)/formoterol 4.5 MICROgram(s) Inhaler 2 Puff(s) Inhalation two times a day  carvedilol 6.25 milliGRAM(s) Oral every 12 hours  chlorhexidine 0.12% Liquid 15 milliLiter(s) Oral Mucosa every 12 hours  chlorhexidine 2% Cloths 1 Application(s) Topical <User Schedule>  dexMEDEtomidine Infusion 0.7 MICROgram(s)/kG/Hr (14.8 mL/Hr) IV Continuous <Continuous>  dextrose 5% + sodium chloride 0.9%. 1000 milliLiter(s) (50 mL/Hr) IV Continuous <Continuous>  dextrose 5%. 1000 milliLiter(s) (50 mL/Hr) IV Continuous <Continuous>  dextrose 50% Injectable 12.5 Gram(s) IV Push once  dextrose 50% Injectable 25 Gram(s) IV Push once  dextrose 50% Injectable 25 Gram(s) IV Push once  enoxaparin Injectable 40 milliGRAM(s) SubCutaneous daily  ergocalciferol 71601 Unit(s) Oral <User Schedule>  fentaNYL   Infusion. 4 MICROgram(s)/kG/Hr (33.9 mL/Hr) IV Continuous <Continuous>  FIRST- Mouthwash  BLM 10 milliLiter(s) Swish and Swallow every 8 hours  folic acid 1 milliGRAM(s) Oral daily  gabapentin 600 milliGRAM(s) Oral every 8 hours  influenza   Vaccine 0.5 milliLiter(s) IntraMuscular once  insulin glargine Injectable (LANTUS) 20 Unit(s) SubCutaneous every morning  insulin lispro (HumaLOG) corrective regimen sliding scale   SubCutaneous every 6 hours  insulin lispro Injectable (HumaLOG) 4 Unit(s) SubCutaneous every 6 hours  levoFLOXacin IVPB      levoFLOXacin IVPB 750 milliGRAM(s) IV Intermittent every 24 hours  linezolid  IVPB 600 milliGRAM(s) IV Intermittent every 12 hours  linezolid  IVPB      melatonin 3 milliGRAM(s) Oral at bedtime  methylPREDNISolone sodium succinate Injectable 80 milliGRAM(s) IV Push every 12 hours  nystatin Powder 1 Application(s) Topical two times a day  pantoprazole  Injectable 40 milliGRAM(s) IV Push daily  phenylephrine    Infusion 1 MICROgram(s)/kG/Min (31.8 mL/Hr) IV Continuous <Continuous>  polyethylene glycol 3350 17 Gram(s) Oral two times a day  senna 2 Tablet(s) Oral at bedtime  tiotropium 18 MICROgram(s) Capsule 1 Capsule(s) Inhalation daily      MEDICATIONS  (PRN):  acetaminophen    Suspension .. 650 milliGRAM(s) Oral every 6 hours PRN Temp greater or equal to 38C (100.4F)  benzocaine 15 mG/menthol 3.6 mG (Sugar-Free) Lozenge 1 Lozenge Oral every 4 hours PRN Sore Throat  dextrose 40% Gel 15 Gram(s) Oral once PRN Blood Glucose LESS THAN 70 milliGRAM(s)/deciLiter  fentaNYL    Injectable 50 MICROGram(s) IV Push every 2 hours PRN agitation  glucagon  Injectable 1 milliGRAM(s) IntraMuscular once PRN Glucose <70 milliGRAM(s)/deciLiter  guaiFENesin   Syrup  (Sugar-Free) 100 milliGRAM(s) Oral every 6 hours PRN Cough  ondansetron Injectable 4 milliGRAM(s) IV Push every 6 hours PRN Nausea and/or Vomiting  sodium chloride 0.65% Nasal 1 Spray(s) Both Nostrils four times a day PRN Nasal Congestion  sodium chloride 0.9% lock flush 10 milliLiter(s) IV Push every 1 hour PRN Pre/post blood products, medications, blood draw, and to maintain line patency  zolpidem 5 milliGRAM(s) Oral at bedtime PRN Insomnia      Allergies    fish (Angioedema)  penicillins (Rash)      PAST MEDICAL & SURGICAL HISTORY:  Malignant neoplasm of unspecified part of right bronchus or lung    HTN (hypertension)    DM (diabetes mellitus)    COPD (chronic obstructive pulmonary disease)    Lung cancer    Morbid obesity    GERD (gastroesophageal reflux disease)    Deviated septum    Prolapsed uterus    ITP (idiopathic thrombocytopenic purpura)    Emphysema/COPD    History of cholecystectomy    S/P lobectomy of lung  right 2017    History of tonsillectomy             REVIEW OF SYSTEMS: as per RN  CONSTITUTIONAL: No fever, weight loss, or fatigue   EYES: No eye pain, visual disturbances, or discharge  ENT:  No difficulty hearing, tinnitus, vertigo; No sinus or throat pain  NECK: No pain or stiffness or nodes  RESPIRATORY:  cough -- wheezing --  chills --  hemoptysis--  Shortness of Breath--  CARDIOVASCULAR: No chest pain, palpitations, passing out, dizziness, or leg swelling  GASTROINTESTINAL: No abdominal or epigastric pain. No nausea, vomiting, or hematemesis; No diarrhea or constipation. No melena or hematochezia.  GENITOURINARY: No dysuria, frequency, hematuria, or incontinence  LYMPH Nodes: No enlarged glands  ENDOCRINE: No heat or cold intolerance; No hair loss  HEME/LYMPH: No easy bruising, or bleeding gums  ALLERGY AND IMMUNOLOGIC: No hives or eczema    Vital Signs Last 24 Hrs  T(C): 37.7 (07 Oct 2020 08:51), Max: 37.7 (06 Oct 2020 20:56)  T(F): 99.8 (07 Oct 2020 08:51), Max: 99.8 (06 Oct 2020 20:56)  HR: 115 (07 Oct 2020 10:00) (65 - 115)  BP: 129/65 (07 Oct 2020 10:00) (78/60 - 151/57)  BP(mean): 80 (07 Oct 2020 10:00) (56 - 106)  RR: 18 (07 Oct 2020 10:00) (13 - 25)  SpO2: 100% (07 Oct 2020 10:00) (99% - 100%)  I&O's Detail    06 Oct 2020 07:01  -  07 Oct 2020 07:00  --------------------------------------------------------  IN:    Dexmedetomidine: 196.4 mL    dextrose 5% + sodium chloride 0.9%: 900 mL    FentaNYL: 645.9 mL    Glucerna 1.5: 250 mL    IV PiggyBack: 700 mL    Phenylephrine: 168.5 mL    Phenylephrine: 426.7 mL  Total IN: 3287.5 mL    OUT:    Indwelling Catheter - Urethral (mL): 1470 mL  Total OUT: 1470 mL    Total NET: 1817.5 mL      07 Oct 2020 07:01  -  07 Oct 2020 10:12  --------------------------------------------------------  IN:    Dexmedetomidine: 14.8 mL    dextrose 5% + sodium chloride 0.9%: 50 mL    FentaNYL: 33.9 mL    Phenylephrine: 22.3 mL  Total IN: 121 mL    OUT:    Indwelling Catheter - Urethral (mL): 70 mL  Total OUT: 70 mL    Total NET: 51 mL          PHYSICAL EXAMINATION:    GENERAL: The patient is a well-developed, obese w/f sedated and intubated  in no apparent distress.   SKIN no rash ecchymoses or bruises  HEENT: Head is normocephalic and atraumatic  SHAMA , Extraocular muscles are intact. Mucous membranes  moist.   Neck supple ,No LN felt JVP not increased  Thyroid not enlarged  Cardiovascular:  S1 S2 heard, RSR, No JVD , systolic  murmur at apex, No gallop or rub  Respiratory: Chest wall symmetrical with good air entry ,Percussion note normal,    Lungs vesicular breathing with  rt basal  rales , no   wheeze	  ABDOMEN:  Soft, Non-tender, obese, NGT+ no hepatomegaly or splenomegaly BS positive	  Extremities: , No clubbing, cyanosis or edema  Vascular: Peripheral pulses palpable 2+ bilaterally  CNS:   Sedated but responds to deep stimuli   Cranial nerves intact  sensory intact  motor power 5/5  dtr 2+   Babinski neg    LABS:                        7.1    5.31  )-----------( 22       ( 07 Oct 2020 06:22 )             20.3     10-07    137  |  100  |  10  ----------------------------<  166<H>  3.7   |  30  |  0.37<L>    Ca    7.9<L>      07 Oct 2020 06:22  Phos  2.3     10-06  Mg     2.1     10-06    TPro  5.1<L>  /  Alb  1.7<L>  /  TBili  0.8  /  DBili  x   /  AST  20  /  ALT  27  /  AlkPhos  126<H>  10-07    PT/INR - ( 07 Oct 2020 06:22 )   PT: 18.1 sec;   INR: 1.55 ratio         PTT - ( 07 Oct 2020 06:22 )  PTT:32.4 sec    ABG - ( 06 Oct 2020 03:27 )  pH, Arterial: 7.53  pH, Blood: x     /  pCO2: 35    /  pO2: 75    / HCO3: 29    / Base Excess: 6.4   /  SaO2: 96          MICROBIOLOGY:    Culture - Blood (collected 10-06-20 @ 10:10)  Source: .Blood Blood  Gram Stain (10-07-20 @ 01:07):    Growth in anaerobic bottle: Gram Negative Rods  Preliminary Report (10-07-20 @ 03:03):    Growth in anaerobic bottle: Gram Negative Rods    ***Blood Panel PCR results on this specimen are available    approximately 3 hours after the Gram stain result.***    Gram stain, PCR, and/or culture results may not always    correspond due to difference in methodologies.    ************************************************************    This PCR assay was performed using [employer].    The following targets are tested for: Enterococcus,    vancomycin resistant enterococci, Listeria monocytogenes,    coagulase negative staphylococci, S. aureus,    methicillin resistant S. aureus, Streptococcus agalactiae    (Group B), S. pneumoniae, S. pyogenes (Group A),    Acinetobacter baumannii, Enterobacter cloacae, E. coli,    Klebsiella oxytoca, K. pneumoniae, Proteus sp.,    Serratia marcescens, Haemophilus influenzae,    Neisseria meningitidis, Pseudomonas aeruginosa, Candida    albicans, C. glabrata, C krusei, C parapsilosis,    C. tropicalis and the KPC resistance gene.    "For positive Serratia,  results cross-reactivity has been occasionally    observed with some species of Pseudomonas and Pantoea"    "Due to technical problems, Proteus sp. will Not be reported as part of    the BCID panel until further notice"  Organism: Blood Culture PCR (10-07-20 @ 03:03)  Organism: Blood Culture PCR (10-07-20 @ 03:03)      -  Serratia marcescens: Detec      Method Type: PCR    Culture - Blood (collected 10-04-20 @ 11:02)  Source: .Blood Blood  Preliminary Report (10-05-20 @ 12:01):    No growth to date.    Culture - Blood (collected 10-04-20 @ 11:02)  Source: .Blood Blood  Preliminary Report (10-05-20 @ 12:01):    No growth to date.          RADIOLOGY & ADDITIONAL STUDIES:    CXR:  < from: Xray Chest 1 View- PORTABLE-Routine (Xray Chest 1 View- PORTABLE-Routine .) (10.06.20 @ 10:10) >  ETT and NGT unchanged. Heart size within normal limits. No superior mediastinal widening. Right-sided infiltrate unchanged. Bilateral pleural effusions stable. No pneumothorax. No mediastinal shift.

## 2020-10-08 ENCOUNTER — APPOINTMENT (OUTPATIENT)
Dept: THORACIC SURGERY | Facility: HOSPITAL | Age: 65
End: 2020-10-08

## 2020-10-08 LAB
-  AMIKACIN: SIGNIFICANT CHANGE UP
-  AMPICILLIN/SULBACTAM: SIGNIFICANT CHANGE UP
-  AMPICILLIN: SIGNIFICANT CHANGE UP
-  AZTREONAM: SIGNIFICANT CHANGE UP
-  CEFAZOLIN: SIGNIFICANT CHANGE UP
-  CEFEPIME: SIGNIFICANT CHANGE UP
-  CEFOXITIN: SIGNIFICANT CHANGE UP
-  CEFTRIAXONE: SIGNIFICANT CHANGE UP
-  CIPROFLOXACIN: SIGNIFICANT CHANGE UP
-  ERTAPENEM: SIGNIFICANT CHANGE UP
-  GENTAMICIN: SIGNIFICANT CHANGE UP
-  LEVOFLOXACIN: SIGNIFICANT CHANGE UP
-  MEROPENEM: SIGNIFICANT CHANGE UP
-  PIPERACILLIN/TAZOBACTAM: SIGNIFICANT CHANGE UP
-  TOBRAMYCIN: SIGNIFICANT CHANGE UP
-  TRIMETHOPRIM/SULFAMETHOXAZOLE: SIGNIFICANT CHANGE UP
ALBUMIN SERPL ELPH-MCNC: 1.9 G/DL — LOW (ref 3.5–5)
ALBUMIN SERPL ELPH-MCNC: 1.9 G/DL — LOW (ref 3.5–5)
ALP SERPL-CCNC: 118 U/L — SIGNIFICANT CHANGE UP (ref 40–120)
ALP SERPL-CCNC: 149 U/L — HIGH (ref 40–120)
ALT FLD-CCNC: 29 U/L DA — SIGNIFICANT CHANGE UP (ref 10–60)
ALT FLD-CCNC: 33 U/L DA — SIGNIFICANT CHANGE UP (ref 10–60)
ANION GAP SERPL CALC-SCNC: 6 MMOL/L — SIGNIFICANT CHANGE UP (ref 5–17)
ANION GAP SERPL CALC-SCNC: 6 MMOL/L — SIGNIFICANT CHANGE UP (ref 5–17)
APTT BLD: 26.1 SEC — LOW (ref 27.5–35.5)
AST SERPL-CCNC: 14 U/L — SIGNIFICANT CHANGE UP (ref 10–40)
AST SERPL-CCNC: 18 U/L — SIGNIFICANT CHANGE UP (ref 10–40)
BILIRUB SERPL-MCNC: 1 MG/DL — SIGNIFICANT CHANGE UP (ref 0.2–1.2)
BILIRUB SERPL-MCNC: 1 MG/DL — SIGNIFICANT CHANGE UP (ref 0.2–1.2)
BUN SERPL-MCNC: 18 MG/DL — SIGNIFICANT CHANGE UP (ref 7–18)
BUN SERPL-MCNC: 19 MG/DL — HIGH (ref 7–18)
CALCIUM SERPL-MCNC: 7.7 MG/DL — LOW (ref 8.4–10.5)
CALCIUM SERPL-MCNC: 8.2 MG/DL — LOW (ref 8.4–10.5)
CHLORIDE SERPL-SCNC: 100 MMOL/L — SIGNIFICANT CHANGE UP (ref 96–108)
CHLORIDE SERPL-SCNC: 98 MMOL/L — SIGNIFICANT CHANGE UP (ref 96–108)
CO2 SERPL-SCNC: 32 MMOL/L — HIGH (ref 22–31)
CO2 SERPL-SCNC: 32 MMOL/L — HIGH (ref 22–31)
CREAT SERPL-MCNC: 0.44 MG/DL — LOW (ref 0.5–1.3)
CREAT SERPL-MCNC: 0.45 MG/DL — LOW (ref 0.5–1.3)
CULTURE RESULTS: SIGNIFICANT CHANGE UP
GLUCOSE BLDC GLUCOMTR-MCNC: 190 MG/DL — HIGH (ref 70–99)
GLUCOSE BLDC GLUCOMTR-MCNC: 260 MG/DL — HIGH (ref 70–99)
GLUCOSE BLDC GLUCOMTR-MCNC: 266 MG/DL — HIGH (ref 70–99)
GLUCOSE BLDC GLUCOMTR-MCNC: 282 MG/DL — HIGH (ref 70–99)
GLUCOSE BLDC GLUCOMTR-MCNC: 283 MG/DL — HIGH (ref 70–99)
GLUCOSE SERPL-MCNC: 236 MG/DL — HIGH (ref 70–99)
GLUCOSE SERPL-MCNC: 273 MG/DL — HIGH (ref 70–99)
GRAM STN FLD: SIGNIFICANT CHANGE UP
HAPTOGLOB SERPL-MCNC: 239 MG/DL — HIGH (ref 34–200)
HCT VFR BLD CALC: 23.8 % — LOW (ref 34.5–45)
HCT VFR BLD CALC: 28.8 % — LOW (ref 34.5–45)
HEPARIN-PF4 AB RESULT: <0.6 U/ML — SIGNIFICANT CHANGE UP (ref 0–0.9)
HGB BLD-MCNC: 8.1 G/DL — LOW (ref 11.5–15.5)
HGB BLD-MCNC: 9.7 G/DL — LOW (ref 11.5–15.5)
INR BLD: 1.17 RATIO — HIGH (ref 0.88–1.16)
MAGNESIUM SERPL-MCNC: 2 MG/DL — SIGNIFICANT CHANGE UP (ref 1.6–2.6)
MCHC RBC-ENTMCNC: 29.2 PG — SIGNIFICANT CHANGE UP (ref 27–34)
MCHC RBC-ENTMCNC: 29.7 PG — SIGNIFICANT CHANGE UP (ref 27–34)
MCHC RBC-ENTMCNC: 33.7 GM/DL — SIGNIFICANT CHANGE UP (ref 32–36)
MCHC RBC-ENTMCNC: 34 GM/DL — SIGNIFICANT CHANGE UP (ref 32–36)
MCV RBC AUTO: 86.7 FL — SIGNIFICANT CHANGE UP (ref 80–100)
MCV RBC AUTO: 87.2 FL — SIGNIFICANT CHANGE UP (ref 80–100)
METHOD TYPE: SIGNIFICANT CHANGE UP
NRBC # BLD: 2 /100 WBCS — HIGH (ref 0–0)
NRBC # BLD: 3 /100 WBCS — HIGH (ref 0–0)
OSMOLALITY SERPL: 297 MOSMOL/KG — SIGNIFICANT CHANGE UP (ref 280–301)
PF4 HEPARIN CMPLX AB SER-ACNC: NEGATIVE — SIGNIFICANT CHANGE UP
PHOSPHATE SERPL-MCNC: 2.2 MG/DL — LOW (ref 2.5–4.5)
PLATELET # BLD AUTO: 50 K/UL — LOW (ref 150–400)
PLATELET # BLD AUTO: 56 K/UL — LOW (ref 150–400)
POTASSIUM SERPL-MCNC: 3.8 MMOL/L — SIGNIFICANT CHANGE UP (ref 3.5–5.3)
POTASSIUM SERPL-MCNC: 4.1 MMOL/L — SIGNIFICANT CHANGE UP (ref 3.5–5.3)
POTASSIUM SERPL-SCNC: 3.8 MMOL/L — SIGNIFICANT CHANGE UP (ref 3.5–5.3)
POTASSIUM SERPL-SCNC: 4.1 MMOL/L — SIGNIFICANT CHANGE UP (ref 3.5–5.3)
PROCALCITONIN SERPL-MCNC: 6.34 NG/ML — HIGH (ref 0.02–0.1)
PROT SERPL-MCNC: 4.9 G/DL — LOW (ref 6–8.3)
PROT SERPL-MCNC: 5.5 G/DL — LOW (ref 6–8.3)
PROTHROM AB SERPL-ACNC: 13.8 SEC — HIGH (ref 10.6–13.6)
RBC # BLD: 2.73 M/UL — LOW (ref 3.8–5.2)
RBC # BLD: 3.32 M/UL — LOW (ref 3.8–5.2)
RBC # FLD: 15.3 % — HIGH (ref 10.3–14.5)
RBC # FLD: 15.5 % — HIGH (ref 10.3–14.5)
SODIUM SERPL-SCNC: 136 MMOL/L — SIGNIFICANT CHANGE UP (ref 135–145)
SODIUM SERPL-SCNC: 138 MMOL/L — SIGNIFICANT CHANGE UP (ref 135–145)
SPECIMEN SOURCE: SIGNIFICANT CHANGE UP
URATE SERPL-MCNC: 2.8 MG/DL — SIGNIFICANT CHANGE UP (ref 2.5–7)
WBC # BLD: 3.12 K/UL — LOW (ref 3.8–10.5)
WBC # BLD: 3.45 K/UL — LOW (ref 3.8–10.5)
WBC # FLD AUTO: 3.12 K/UL — LOW (ref 3.8–10.5)
WBC # FLD AUTO: 3.45 K/UL — LOW (ref 3.8–10.5)

## 2020-10-08 PROCEDURE — 71045 X-RAY EXAM CHEST 1 VIEW: CPT | Mod: 26

## 2020-10-08 PROCEDURE — 43246 EGD PLACE GASTROSTOMY TUBE: CPT

## 2020-10-08 PROCEDURE — 31600 PLANNED TRACHEOSTOMY: CPT

## 2020-10-08 PROCEDURE — 31624 DX BRONCHOSCOPE/LAVAGE: CPT

## 2020-10-08 PROCEDURE — 99231 SBSQ HOSP IP/OBS SF/LOW 25: CPT

## 2020-10-08 PROCEDURE — 99232 SBSQ HOSP IP/OBS MODERATE 35: CPT | Mod: 57

## 2020-10-08 RX ORDER — DEXTROSE 50 % IN WATER 50 %
25 SYRINGE (ML) INTRAVENOUS ONCE
Refills: 0 | Status: DISCONTINUED | OUTPATIENT
Start: 2020-10-08 | End: 2020-10-15

## 2020-10-08 RX ORDER — POTASSIUM PHOSPHATE, MONOBASIC POTASSIUM PHOSPHATE, DIBASIC 236; 224 MG/ML; MG/ML
15 INJECTION, SOLUTION INTRAVENOUS ONCE
Refills: 0 | Status: COMPLETED | OUTPATIENT
Start: 2020-10-08 | End: 2020-10-08

## 2020-10-08 RX ORDER — DEXMEDETOMIDINE HYDROCHLORIDE IN 0.9% SODIUM CHLORIDE 4 UG/ML
0.2 INJECTION INTRAVENOUS
Qty: 400 | Refills: 0 | Status: DISCONTINUED | OUTPATIENT
Start: 2020-10-08 | End: 2020-10-08

## 2020-10-08 RX ORDER — INSULIN GLARGINE 100 [IU]/ML
10 INJECTION, SOLUTION SUBCUTANEOUS ONCE
Refills: 0 | Status: DISCONTINUED | OUTPATIENT
Start: 2020-10-08 | End: 2020-10-08

## 2020-10-08 RX ORDER — DEXTROSE 50 % IN WATER 50 %
12.5 SYRINGE (ML) INTRAVENOUS ONCE
Refills: 0 | Status: DISCONTINUED | OUTPATIENT
Start: 2020-10-08 | End: 2020-10-15

## 2020-10-08 RX ORDER — ACETAMINOPHEN 500 MG
1000 TABLET ORAL ONCE
Refills: 0 | Status: COMPLETED | OUTPATIENT
Start: 2020-10-08 | End: 2020-10-08

## 2020-10-08 RX ORDER — CEFEPIME 1 G/1
INJECTION, POWDER, FOR SOLUTION INTRAMUSCULAR; INTRAVENOUS
Refills: 0 | Status: DISCONTINUED | OUTPATIENT
Start: 2020-10-08 | End: 2020-10-08

## 2020-10-08 RX ORDER — SODIUM CHLORIDE 9 MG/ML
1000 INJECTION, SOLUTION INTRAVENOUS
Refills: 0 | Status: DISCONTINUED | OUTPATIENT
Start: 2020-10-08 | End: 2020-10-15

## 2020-10-08 RX ORDER — FENTANYL CITRATE 50 UG/ML
2 INJECTION INTRAVENOUS
Qty: 2500 | Refills: 0 | Status: DISCONTINUED | OUTPATIENT
Start: 2020-10-08 | End: 2020-10-09

## 2020-10-08 RX ORDER — FENTANYL CITRATE 50 UG/ML
0.5 INJECTION INTRAVENOUS
Qty: 2500 | Refills: 0 | Status: DISCONTINUED | OUTPATIENT
Start: 2020-10-08 | End: 2020-10-08

## 2020-10-08 RX ORDER — MIDODRINE HYDROCHLORIDE 2.5 MG/1
10 TABLET ORAL EVERY 8 HOURS
Refills: 0 | Status: DISCONTINUED | OUTPATIENT
Start: 2020-10-08 | End: 2020-10-08

## 2020-10-08 RX ORDER — PANTOPRAZOLE SODIUM 20 MG/1
40 TABLET, DELAYED RELEASE ORAL DAILY
Refills: 0 | Status: DISCONTINUED | OUTPATIENT
Start: 2020-10-08 | End: 2020-10-15

## 2020-10-08 RX ORDER — CHLORHEXIDINE GLUCONATE 213 G/1000ML
15 SOLUTION TOPICAL EVERY 12 HOURS
Refills: 0 | Status: DISCONTINUED | OUTPATIENT
Start: 2020-10-08 | End: 2020-10-15

## 2020-10-08 RX ORDER — DEXTROSE 50 % IN WATER 50 %
15 SYRINGE (ML) INTRAVENOUS ONCE
Refills: 0 | Status: DISCONTINUED | OUTPATIENT
Start: 2020-10-08 | End: 2020-10-15

## 2020-10-08 RX ORDER — INSULIN GLARGINE 100 [IU]/ML
10 INJECTION, SOLUTION SUBCUTANEOUS ONCE
Refills: 0 | Status: COMPLETED | OUTPATIENT
Start: 2020-10-08 | End: 2020-10-08

## 2020-10-08 RX ORDER — CEFEPIME 1 G/1
1000 INJECTION, POWDER, FOR SOLUTION INTRAMUSCULAR; INTRAVENOUS ONCE
Refills: 0 | Status: DISCONTINUED | OUTPATIENT
Start: 2020-10-08 | End: 2020-10-08

## 2020-10-08 RX ORDER — INSULIN GLARGINE 100 [IU]/ML
10 INJECTION, SOLUTION SUBCUTANEOUS AT BEDTIME
Refills: 0 | Status: DISCONTINUED | OUTPATIENT
Start: 2020-10-08 | End: 2020-10-15

## 2020-10-08 RX ORDER — DEXMEDETOMIDINE HYDROCHLORIDE IN 0.9% SODIUM CHLORIDE 4 UG/ML
0.2 INJECTION INTRAVENOUS
Qty: 400 | Refills: 0 | Status: DISCONTINUED | OUTPATIENT
Start: 2020-10-08 | End: 2020-10-09

## 2020-10-08 RX ORDER — GLUCAGON INJECTION, SOLUTION 0.5 MG/.1ML
1 INJECTION, SOLUTION SUBCUTANEOUS ONCE
Refills: 0 | Status: DISCONTINUED | OUTPATIENT
Start: 2020-10-08 | End: 2020-10-15

## 2020-10-08 RX ADMIN — FENTANYL CITRATE 4.24 MICROGRAM(S)/KG/HR: 50 INJECTION INTRAVENOUS at 16:05

## 2020-10-08 RX ADMIN — CHLORHEXIDINE GLUCONATE 15 MILLILITER(S): 213 SOLUTION TOPICAL at 18:19

## 2020-10-08 RX ADMIN — POTASSIUM PHOSPHATE, MONOBASIC POTASSIUM PHOSPHATE, DIBASIC 62.5 MILLIMOLE(S): 236; 224 INJECTION, SOLUTION INTRAVENOUS at 09:01

## 2020-10-08 RX ADMIN — DEXMEDETOMIDINE HYDROCHLORIDE IN 0.9% SODIUM CHLORIDE 14.8 MICROGRAM(S)/KG/HR: 4 INJECTION INTRAVENOUS at 13:33

## 2020-10-08 RX ADMIN — NYSTATIN CREAM 1 APPLICATION(S): 100000 CREAM TOPICAL at 05:44

## 2020-10-08 RX ADMIN — Medication 1000 MILLIGRAM(S): at 07:31

## 2020-10-08 RX ADMIN — INSULIN GLARGINE 10 UNIT(S): 100 INJECTION, SOLUTION SUBCUTANEOUS at 23:25

## 2020-10-08 RX ADMIN — Medication 400 MILLIGRAM(S): at 07:01

## 2020-10-08 RX ADMIN — CHLORHEXIDINE GLUCONATE 15 MILLILITER(S): 213 SOLUTION TOPICAL at 05:44

## 2020-10-08 RX ADMIN — Medication 80 MILLIGRAM(S): at 04:16

## 2020-10-08 RX ADMIN — DEXMEDETOMIDINE HYDROCHLORIDE IN 0.9% SODIUM CHLORIDE 4.24 MICROGRAM(S)/KG/HR: 4 INJECTION INTRAVENOUS at 23:08

## 2020-10-08 RX ADMIN — INSULIN GLARGINE 10 UNIT(S): 100 INJECTION, SOLUTION SUBCUTANEOUS at 15:44

## 2020-10-08 RX ADMIN — DEXMEDETOMIDINE HYDROCHLORIDE IN 0.9% SODIUM CHLORIDE 14.8 MICROGRAM(S)/KG/HR: 4 INJECTION INTRAVENOUS at 05:42

## 2020-10-08 RX ADMIN — PANTOPRAZOLE SODIUM 40 MILLIGRAM(S): 20 TABLET, DELAYED RELEASE ORAL at 13:15

## 2020-10-08 RX ADMIN — Medication 6: at 06:12

## 2020-10-08 RX ADMIN — CHLORHEXIDINE GLUCONATE 1 APPLICATION(S): 213 SOLUTION TOPICAL at 05:44

## 2020-10-08 RX ADMIN — DEXMEDETOMIDINE HYDROCHLORIDE IN 0.9% SODIUM CHLORIDE 4.24 MICROGRAM(S)/KG/HR: 4 INJECTION INTRAVENOUS at 18:21

## 2020-10-08 NOTE — PROGRESS NOTE ADULT - ASSESSMENT
64 yo obese F with hx of 80 pack year tobacco use, HTN, DM2 on insulin, COPD on 5L (frequent admissions), R lung cancer s/p resection (2017) on chemo q 3 weeks with concern for mets to spine and compression fractures, recent admission for COPD exacerbation earlier in August who on 9/19/2020 presented to ED with c/o SOB, hypoxia and increased sputum production x 1 day. Admitted to ICU x twice for hypoxemic respiratory distress requiring intubation (9/21 and 10/6).       PRE-OP CLEARANCE==========================  pre op labs for Trach and PEG - re-scheduled  EKG: sinus tachycardia  PT/INR  CXR in AM  Type and Screen  NPO after midnight  Holding AC pre procedure  Vent at goal FiO2 60% with PEEP of 8  SANTOS SCORE : 3.9% mortality risk with ENT surgery    CNS=====================================  Intubated and sedated  Fentanyl for pain control  Propofol and Precedex for sedation  incr RASS goals    CARD====================================  Central Femoral line (10/5)  pressor support Phenylephrine  continue to monitor BP and HR for distress    PULM====================================  #Acute on chronic respiratory failure with hypoxia and hypercapnia.   2/2 COPD, heavy smoking history (80 pack years)  Intubated and sedated x 2 this admission (9/21 and 10/5)  Plan for Trach and PEG surgery 10/6 per patient request  On prednisone at home, will give stress dose and daily steroid per Pulm (Dr. Nugent)  - IV solumedrol 80 q12 x 48 hours then  - PO prednisone 40 QD from 10/9  - bronchodilators and ICS    #COPD  on AVAPS previously, failed and was re-intubated 10/5  for Tracheostomy  managed as above    #Metastatic Lung cancer  holding chemo  port removed on this hospital stay  Heme/Onc following Dr Mendiola    ID=======================================  #Sepsis.   Fever, leukocytosis (on steroids0) and elevated Lactate 3.1  - f/u new blood cultures (Hx VRE and Fungemia)  - c/w Caspofungin (9/23 -10/7)   - Levaquin (10/6) + Zyvox (10/6)    #Pneumonia   CXR shows b/l pleural effusions with Left sided consolidation  Abx coverage as above: Levaquin (10/6) + Zyvox (10/6)  BCx positive for Gram negative rods: Serratia    Heme/Onc=================================  #Anemia.  2/2 chronic disease  Hgb less than 7.0  Hb 6.9 -> transfused 2 pRBCs 10/7    #Thrombocytopenia  Hx of ITP  on steroids, will get 1 unit plts 10/7  will follow    #Lung cancer metastatic to bone.   s/p 1 radiation dose to spine.  Was on chemo before hospitalization  chemo port removed, Dr. Mendiola following  c/w pain management.     Endo====================================  #DM (diabetes mellitus).   Start on NPO + OG tube feeds  moderate dose sliding scale q6h  - continue insulin lantus 20 units daily for today  - insulin lispro 4 units q6h  - goal inpatient glucose range: 140-180mg/dl    GI======================================  #Moderate protein-calorie malnutrition.   - OG tube with tube feeds  - PEG tube rescheduled    Nephro=================================  BUN 13| Creat 0.43  No active issue monitor electrolytes and replace as needed    PPX=====================================  DVT (held for procedure) and GI prophylaxis.  Continue AVAPS.    GOC====================================  Overall prognosis is extremely poor.   Patient remains FULL CODE as per her wishes  would like everything done including trach and PEG 64 yo obese F with hx of 80 pack year tobacco use, HTN, DM2 on insulin, COPD on 5L (frequent admissions), R lung cancer s/p resection (2017) on chemo q 3 weeks with concern for mets to spine and compression fractures, recent admission for COPD exacerbation earlier in August who on 9/19/2020 presented to ED with c/o SOB, hypoxia and increased sputum production x 1 day. Admitted to ICU x twice for hypoxemic respiratory distress requiring intubation (9/21 and 10/6).       PRE-OP CLEARANCE==========================  pre op labs for Trach and PEG - re-scheduled  EKG: sinus tachycardia  PT/INR  CXR in AM  Type and Screen  NPO after midnight  Holding AC pre procedure  Vent at goal FiO2 60% with PEEP of 8  SANTOS SCORE : 3.9% mortality risk with ENT surgery    CNS=====================================  Intubated and sedated  Fentanyl for pain control  Propofol and Precedex for sedation  incr RASS goals    CARD====================================  Central Femoral line (10/5)  pressor support Phenylephrine  continue to monitor BP and HR for distress    PULM====================================  #Acute on chronic respiratory failure with hypoxia and hypercapnia.   2/2 COPD, heavy smoking history (80 pack years)  Intubated and sedated x 2 this admission (9/21 and 10/5)  Plan for Trach and PEG surgery 10/6 per patient request  On prednisone at home, will give stress dose and daily steroid per Pulm (Dr. Nugent)  - IV solumedrol 80 q12 x 48 hours then  - PO prednisone 40 QD from 10/9  - bronchodilators and ICS    #COPD  on AVAPS previously, failed and was re-intubated 10/5  for Tracheostomy  managed as above    #Metastatic Lung cancer  holding chemo  port removed on this hospital stay  Heme/Onc following Dr Mendiola    ID=======================================  #Sepsis.   Fever, leukocytosis (on steroids0) and elevated Lactate 3.1  - f/u new blood cultures (Hx VRE and Fungemia)  - c/w Caspofungin (9/23 -10/7)   - Levaquin (10/6) + Zyvox (10/6)    #Pneumonia   CXR shows b/l pleural effusions with Left sided consolidation  Abx coverage as above: Levaquin (10/6) + Zyvox (10/6)  BCx positive for Gram negative rods: Serratia    Heme/Onc=================================  #Anemia.  2/2 chronic disease  Hgb less than 7.0  Hb 6.9 -> transfused 2 pRBCs 10/7    #Thrombocytopenia  Hx of ITP  on steroids, will get 1 unit plts 10/7  will follow    #Lung cancer metastatic to bone.   s/p 1 radiation dose to spine.  Was on chemo before hospitalization  chemo port removed, Dr. Mendiola following  c/w pain management.     Endo====================================  #DM (diabetes mellitus).   Start on NPO + OG tube feeds  moderate dose sliding scale q6h  - continue insulin lantus 20 units daily for today  - insulin lispro 4 units q6h  - goal inpatient glucose range: 140-180mg/dl    GI======================================  #Moderate protein-calorie malnutrition.   - OG tube with tube feeds  - PEG tube rescheduled    Nephro=================================  BUN 13| Creat 0.43  No active issue monitor electrolytes and replace as needed    PPX=====================================  DVT (held for procedure) and GI prophylaxis.  Continue AVAPS.    GOC====================================  Overall prognosis is extremely poor.   Patient remains FULL CODE as per her wishes  would like everything done including trach and PEG 64 yo obese F with hx of 80 pack year tobacco use, HTN, DM2 on insulin, COPD on 5L (frequent admissions), R lung cancer s/p resection (2017) on chemo q 3 weeks with concern for mets to spine and compression fractures, recent admission for COPD exacerbation earlier in August who on 9/19/2020 presented to ED with c/o SOB, hypoxia and increased sputum production x 1 day. Admitted to ICU x twice for hypoxemic respiratory distress requiring intubation (9/21 and 10/6).       PRE-OP CLEARANCE==========================  pre op labs for Trach and PEG - re-scheduled  EKG: sinus tachycardia  PT/INR  CXR in AM  Type and Screen  NPO after midnight  Holding AC pre procedure  Vent  FiO2 40% with PEEP of 5  SANTOS SCORE : 3.9% mortality risk with ENT surgery    CNS=====================================  Intubated and sedated  Fentanyl for pain control  Propofol and Precedex for sedation  increased RASS goals -2 to -3    CARD====================================  Central Femoral line (10/5)  pressor support Phenylephrine discontinued 10/8  add Midodrine taper 10 q 8  continue to monitor BP and HR for distress    PULM====================================  #Acute on chronic respiratory failure with hypoxia and hypercapnia.   2/2 COPD, heavy smoking history (80 pack years)  Intubated and sedated x 2 this admission (9/21 and 10/5)  Plan for Trach and PEG surgery 10/6 per patient request  On prednisone at home, will give stress dose and daily steroid per Pulm (Dr. Nugent)  - IV solumedrol 80 q12 x 48 hours follow by   - PO prednisone 40 QD from 10/9  - bronchodilators and ICS    #COPD  on AVAPS previously, failed and was re-intubated 10/5  for Tracheostomy  managed as above    #Metastatic Lung cancer  holding chemo  port removed on this hospital stay  Heme/Onc following Dr Mendiola    ID=======================================  #Sepsis.   Fever, leukocytosis (on steroids0) and elevated Lactate 3.1  - Hx VRE and Fungemia)  - s/p Caspofungin (9/23 -10/7) + Zyvox (10/6 -10/7 stopped for low plts)  - BCx pos for Gram - neg rods; Serratia marcescens   - c/w Levaquin (10/6)   - f/u repeat BCx drawn 10/8    #Pneumonia   CXR shows b/l pleural effusions with Left sided consolidation  Abx coverage as above: Levaquin (10/6)   BCx positive for Gram negative rods: Serratia  plan above    Heme/Onc=================================  #Anemia  2/2 chronic disease  Hgb less than 7.0  Hb 6.9 -> transfused 2 pRBCs 10/7    #Thrombocytopenia  Hx of ITP  on steroids, 2 unit plts 10/7, 10/8  improving     #Lung cancer metastatic to bone.   s/p 1 radiation dose to spine.  Was on chemo before hospitalization  chemo port removed, Dr. Mendiola following  c/w pain management.     Endo====================================  #DM (diabetes mellitus).   Start on NPO + OG tube feeds  moderate dose sliding scale q6h  - continue insulin lantus 20 units daily for today  - insulin lispro 4 units q6h  - goal inpatient glucose range: 140-180mg/dl    GI======================================  #Moderate protein-calorie malnutrition.   - OG tube with tube feeds  - PEG tube rescheduled    Nephro=================================  BUN 18| Creat 0.45  No active issue monitor electrolytes and replace as needed    PPX=====================================  DVT (held for procedure)   GI prophylaxis.    GOC====================================  Overall prognosis is extremely poor.   Patient remains FULL CODE as per her wishes  would like everything done including trach and PEG

## 2020-10-08 NOTE — PROGRESS NOTE ADULT - SUBJECTIVE AND OBJECTIVE BOX
remains on vent and sedated  lab stable    MEDICATIONS  (STANDING):  atorvastatin 40 milliGRAM(s) Oral at bedtime  chlorhexidine 0.12% Liquid 15 milliLiter(s) Oral Mucosa every 12 hours  chlorhexidine 2% Cloths 1 Application(s) Topical <User Schedule>  dexMEDEtomidine Infusion 0.7 MICROgram(s)/kG/Hr (14.8 mL/Hr) IV Continuous <Continuous>  dextrose 5%. 1000 milliLiter(s) (50 mL/Hr) IV Continuous <Continuous>  dextrose 50% Injectable 12.5 Gram(s) IV Push once  dextrose 50% Injectable 25 Gram(s) IV Push once  dextrose 50% Injectable 25 Gram(s) IV Push once  ergocalciferol 43798 Unit(s) Oral <User Schedule>  fentaNYL   Infusion. 4 MICROgram(s)/kG/Hr (33.9 mL/Hr) IV Continuous <Continuous>  gabapentin 600 milliGRAM(s) Oral every 8 hours  influenza   Vaccine 0.5 milliLiter(s) IntraMuscular once  insulin glargine Injectable (LANTUS) 20 Unit(s) SubCutaneous every morning  insulin lispro (HumaLOG) corrective regimen sliding scale   SubCutaneous every 6 hours  insulin lispro Injectable (HumaLOG) 4 Unit(s) SubCutaneous every 6 hours  levoFLOXacin IVPB      levoFLOXacin IVPB 750 milliGRAM(s) IV Intermittent every 24 hours  nystatin Powder 1 Application(s) Topical two times a day  pantoprazole  Injectable 40 milliGRAM(s) IV Push daily  phenylephrine    Infusion 1 MICROgram(s)/kG/Min (31.8 mL/Hr) IV Continuous <Continuous>  polyethylene glycol 3350 17 Gram(s) Oral two times a day    MEDICATIONS  (PRN):  acetaminophen    Suspension .. 650 milliGRAM(s) Oral every 6 hours PRN Temp greater or equal to 38C (100.4F)  dextrose 40% Gel 15 Gram(s) Oral once PRN Blood Glucose LESS THAN 70 milliGRAM(s)/deciLiter  fentaNYL    Injectable 50 MICROGram(s) IV Push every 2 hours PRN agitation  glucagon  Injectable 1 milliGRAM(s) IntraMuscular once PRN Glucose <70 milliGRAM(s)/deciLiter  guaiFENesin   Syrup  (Sugar-Free) 100 milliGRAM(s) Oral every 6 hours PRN Cough  ondansetron Injectable 4 milliGRAM(s) IV Push every 6 hours PRN Nausea and/or Vomiting  sodium chloride 0.9% lock flush 10 milliLiter(s) IV Push every 1 hour PRN Pre/post blood products, medications, blood draw, and to maintain line patency      Allergies    fish (Angioedema)  penicillins (Rash)    Intolerances        Vital Signs Last 24 Hrs  T(C): 37.9 (08 Oct 2020 04:45), Max: 37.9 (08 Oct 2020 04:45)  T(F): 100.3 (08 Oct 2020 04:45), Max: 100.3 (08 Oct 2020 04:45)  HR: 91 (08 Oct 2020 08:36) (65 - 131)  BP: 141/64 (08 Oct 2020 07:00) (88/46 - 163/74)  BP(mean): 83 (08 Oct 2020 07:00) (56 - 97)  RR: 19 (08 Oct 2020 07:00) (12 - 26)  SpO2: 100% (08 Oct 2020 08:36) (98% - 100%)    PHYSICAL EXAM  General: adult in NAD  HEENT: clear oropharynx, anicteric sclera, pink conjunctiva  Neck: supple  CV: normal S1/S2 with no murmur rubs or gallops  Lungs: positive air movement b/l ant lungs,clear to auscultation, no wheezes, no rales  Abdomen: soft non-tender non-distended, no hepatosplenomegaly  Ext: no clubbing cyanosis or edema  Skin: no rashes and no petechiae  Neuro: alert and oriented X 4, no focal deficits  LABS:                          9.7    3.45  )-----------( 50       ( 08 Oct 2020 05:06 )             28.8         Mean Cell Volume : 86.7 fl  Mean Cell Hemoglobin : 29.2 pg  Mean Cell Hemoglobin Concentration : 33.7 gm/dL  Auto Neutrophil # : x  Auto Lymphocyte # : x  Auto Monocyte # : x  Auto Eosinophil # : x  Auto Basophil # : x  Auto Neutrophil % : x  Auto Lymphocyte % : x  Auto Monocyte % : x  Auto Eosinophil % : x  Auto Basophil % : x    Serial CBC  Hematocrit 28.8  Hemoglobin 9.7  Plat 50  RBC 3.32  WBC 3.45  Serial CBC  Hematocrit 25.0  Hemoglobin 8.7  Plat 57  RBC 2.89  WBC 3.22  Serial CBC  Hematocrit 19.5  Hemoglobin 6.8  Plat 19  RBC 2.21  WBC 4.35  Serial CBC  Hematocrit 20.3  Hemoglobin 7.1  Plat 22  RBC 2.30  WBC 5.31  Serial CBC  Hematocrit 23.9  Hemoglobin 8.0  Plat 42  RBC 2.72  WBC 5.38  Serial CBC  Hematocrit 23.3  Hemoglobin 7.9  Plat 54  RBC 2.68  WBC 2.20  Serial CBC  Hematocrit 22.1  Hemoglobin 7.4  Plat 56  RBC 2.54  WBC 3.78    10-08    136  |  98  |  18  ----------------------------<  236<H>  3.8   |  32<H>  |  0.45<L>    Ca    8.2<L>      08 Oct 2020 05:06  Phos  2.2     10-08  Mg     2.0     10-08    TPro  5.5<L>  /  Alb  1.9<L>  /  TBili  1.0  /  DBili  x   /  AST  18  /  ALT  33  /  AlkPhos  149<H>  10-08      PT/INR - ( 08 Oct 2020 05:06 )   PT: 13.8 sec;   INR: 1.17 ratio         PTT - ( 08 Oct 2020 05:06 )  PTT:26.1 sec    Reticulocyte Percent: 1.6 % (10-07 @ 21:03)            BLOOD SMEAR INTERPRETATION:       RADIOLOGY & ADDITIONAL STUDIES:

## 2020-10-08 NOTE — PROGRESS NOTE ADULT - SUBJECTIVE AND OBJECTIVE BOX
ICU visit:  Asked to re-consult on this 65y female for sepsis and bacteremia with serratia marcescens. Patient just returned from having trach and peg placed. +BC with 3/4 with serratia marcescens. Was going to start patient on maxipime, but organism is sensitive to quinolones so will resume levaquin. Will need repeat BC. No recurrent fever spikes.     REVIEW OF SYSTEMS:  [ X ] Not able to illicit    MEDS:  levoFLOXacin IVPB        ALLERGIES: Allergies    fish (Angioedema)  penicillins (Rash)    Intolerances      VITALS:  ICU Vital Signs Last 24 Hrs  T(C): 37.2 (08 Oct 2020 15:17), Max: 37.9 (08 Oct 2020 04:45)  T(F): 98.9 (08 Oct 2020 15:17), Max: 100.3 (08 Oct 2020 04:45)  HR: 98 (08 Oct 2020 18:00) (65 - 142)  BP: 137/74 (08 Oct 2020 18:00) (88/46 - 175/114)  BP(mean): 87 (08 Oct 2020 18:00) (56 - 128)  ABP: --  ABP(mean): --  RR: 10 (08 Oct 2020 18:00) (10 - 32)  SpO2: 99% (08 Oct 2020 18:00) (96% - 100%)      PHYSICAL EXAM:  HEENT: n/a  Neck: supple no LN's, +vent via trach  Respiratory: bilateral rhoncherous sounds no rales or wheezing  Cardiovascular: S1 S2 tachy no murmurs  Gastrointestinal: +BS with soft, nondistended abdomen; no grimace with palpation  +new peg +benitez  Extremities: no edema  Skin: no rashes  Ortho: n/a  Neuro: sedated      LABS/DIAGNOSTIC TESTS:                        9.7    3.45  )-----------( 50       ( 08 Oct 2020 05:06 )             28.8     WBC Count: 3.45 K/uL (10-08 @ 05:06)  WBC Count: 3.22 K/uL (10-07 @ 21:03)  WBC Count: 4.35 K/uL (10-07 @ 11:07)  WBC Count: 5.31 K/uL (10-07 @ 06:22)  WBC Count: 5.38 K/uL (10-06 @ 16:16)    10-08    136  |  98  |  18  ----------------------------<  236<H>  3.8   |  32<H>  |  0.45<L>    Ca    8.2<L>      08 Oct 2020 05:06  Phos  2.2     10-08  Mg     2.0     10-08    TPro  5.5<L>  /  Alb  1.9<L>  /  TBili  1.0  /  DBili  x   /  AST  18  /  ALT  33  /  AlkPhos  149<H>  10-08        CULTURES:   .Blood Blood  10-06 @ 10:10   Growth in aerobic bottle: Serratia marcescens  See previous culture [id number]  --  Blood Culture PCR  Serratia marcescens      .Blood Blood  10-04 @ 11:02   No growth to date.  --  --      .Blood Blood  09-27 @ 12:06   No Growth Final  --  --      .Blood Blood  09-23 @ 10:33   No Growth Final  --  --      .Blood Blood  09-21 @ 14:47   No Growth Final  --  --      .Urine Clean Catch (Midstream)  09-19 @ 16:20   <10,000 CFU/mL Normal Urogenital Shannan  --  --      .Blood Blood-Peripheral  09-19 @ 11:48   Growth in anaerobic bottle: Enterococcus faecium (vancomycin resistant)  "Due to technical problems, Proteus sp. will Not be reported as part of  the BCID panel until further notice"      .Urine Clean Catch (Midstream)  08-02 @ 14:58   50,000 - 99,000 CFU/mL Coag Negative Staphylococcus  "Susceptibilities not performed"  --  --      .Blood Blood  08-02 @ 11:14   No Growth Final  --  --        RADIOLOGY:  no new studies

## 2020-10-08 NOTE — PROGRESS NOTE ADULT - SUBJECTIVE AND OBJECTIVE BOX
Interval Events:    intermittent hyperglycemia  low grade temp spike  planned for trach/PEG today  TF held    Allergies    fish (Angioedema)  penicillins (Rash)    Intolerances      Endocrine/Metabolic Medications:  atorvastatin 40 milliGRAM(s) Oral at bedtime  dextrose 40% Gel 15 Gram(s) Oral once PRN  dextrose 50% Injectable 12.5 Gram(s) IV Push once  dextrose 50% Injectable 25 Gram(s) IV Push once  dextrose 50% Injectable 25 Gram(s) IV Push once  glucagon  Injectable 1 milliGRAM(s) IntraMuscular once PRN  insulin glargine Injectable (LANTUS) 20 Unit(s) SubCutaneous every morning  insulin lispro (HumaLOG) corrective regimen sliding scale   SubCutaneous every 6 hours  insulin lispro Injectable (HumaLOG) 4 Unit(s) SubCutaneous every 6 hours      Vital Signs Last 24 Hrs  T(C): 37.9 (08 Oct 2020 04:45), Max: 37.9 (08 Oct 2020 04:45)  T(F): 100.3 (08 Oct 2020 04:45), Max: 100.3 (08 Oct 2020 04:45)  HR: 91 (08 Oct 2020 08:36) (65 - 131)  BP: 141/64 (08 Oct 2020 07:00) (88/46 - 163/74)  BP(mean): 83 (08 Oct 2020 07:00) (56 - 97)  RR: 19 (08 Oct 2020 07:00) (12 - 26)  SpO2: 100% (08 Oct 2020 08:36) (98% - 100%)      PHYSICAL EXAM  Constitutional:  s/p intubation   NC/AT:    HEENT:    Neck:  No JVD    Respiratory:  reduced breath sounds b/l bases    Cardiovascular:  RR    Gastrointestinal: Soft    Extremities: without cyanosis  Neurological:  sedated      LABS                        9.7    3.45  )-----------( 50       ( 08 Oct 2020 05:06 )             28.8                               136    |  98     |  18                  Calcium: 8.2   / iCa: x      (10-08 @ 05:06)    ----------------------------<  236       Magnesium: 2.0                              3.8     |  32     |  0.45             Phosphorous: 2.2      TPro  5.5    /  Alb  1.9    /  TBili  1.0    /  DBili  x      /  AST  18     /  ALT  33     /  AlkPhos  149    08 Oct 2020 05:06    CAPILLARY BLOOD GLUCOSE      POCT Blood Glucose.: 282 mg/dL (08 Oct 2020 06:11)  POCT Blood Glucose.: 150 mg/dL (07 Oct 2020 23:04)  POCT Blood Glucose.: 241 mg/dL (07 Oct 2020 18:07)  POCT Blood Glucose.: 204 mg/dL (07 Oct 2020 11:54)        Assesment/plan        66 yo female with multiple comorbidities including h/o DM type 2, HTN, COPD on o2, lung ca on right sided s/p resection on chemo admitted with shortness of breath  was intubated/extubated for acute resp failure this admit, now extubated on general medical floor, standing high dose steroids with uncontrolled hyperglycemia    endocrinology consulted for glycemic management    DM type 2  uncontrolled  complicated by high dose steroid use, acute on chronic resp failure  home regimen:  basaglar 60 units daily  novolog 15 units pre meals    recommendations:  on high dose methylpred  planned for possible trach/PEG  TF held  continue current regimen for today  moderate dose sliding scale q6h  - continue insulin lantus 20 units daily  - continue insulin lispro 4 units q6h  - reassess based on requirements  - goal inpatient glucose range: 140-180mg/dl    COPD exacerbation  acute on chronic hypoxic resp failure  s/p intubation/extubation current admit  on standing steroids  steroids increased  bipap   pulmonary on board  deteriorated, transferred to ICU, reintubated  planned for trach/PEG    sepsis  VRE bacteremia  on zosyn  ID on board    Discussed with primary team   Please call  Endocrine- Dr Katlyn Garcia as needed    Time spent evaluating patient including coordination of care 35mins.

## 2020-10-08 NOTE — PROGRESS NOTE ADULT - SUBJECTIVE AND OBJECTIVE BOX
INTERVAL HPI/OVERNIGHT EVENTS: ***    PRESSORS: [ ] YES [ ] NO    Antimicrobial:  levoFLOXacin IVPB 750 milliGRAM(s) IV Intermittent every 24 hours  levoFLOXacin IVPB        Cardiovascular:  phenylephrine    Infusion 1 MICROgram(s)/kG/Min IV Continuous <Continuous>    Pulmonary:  guaiFENesin   Syrup  (Sugar-Free) 100 milliGRAM(s) Oral every 6 hours PRN  tiotropium 18 MICROgram(s) Capsule 1 Capsule(s) Inhalation daily    Hematologic:    Other:  acetaminophen    Suspension .. 650 milliGRAM(s) Oral every 6 hours PRN  atorvastatin 40 milliGRAM(s) Oral at bedtime  chlorhexidine 0.12% Liquid 15 milliLiter(s) Oral Mucosa every 12 hours  chlorhexidine 2% Cloths 1 Application(s) Topical <User Schedule>  dexMEDEtomidine Infusion 0.7 MICROgram(s)/kG/Hr IV Continuous <Continuous>  dextrose 40% Gel 15 Gram(s) Oral once PRN  dextrose 5%. 1000 milliLiter(s) IV Continuous <Continuous>  dextrose 50% Injectable 12.5 Gram(s) IV Push once  dextrose 50% Injectable 25 Gram(s) IV Push once  dextrose 50% Injectable 25 Gram(s) IV Push once  ergocalciferol 96947 Unit(s) Oral <User Schedule>  fentaNYL    Injectable 50 MICROGram(s) IV Push every 2 hours PRN  fentaNYL   Infusion. 4 MICROgram(s)/kG/Hr IV Continuous <Continuous>  folic acid 1 milliGRAM(s) Oral daily  gabapentin 600 milliGRAM(s) Oral every 8 hours  glucagon  Injectable 1 milliGRAM(s) IntraMuscular once PRN  influenza   Vaccine 0.5 milliLiter(s) IntraMuscular once  insulin glargine Injectable (LANTUS) 20 Unit(s) SubCutaneous every morning  insulin lispro (HumaLOG) corrective regimen sliding scale   SubCutaneous every 6 hours  insulin lispro Injectable (HumaLOG) 4 Unit(s) SubCutaneous every 6 hours  melatonin 3 milliGRAM(s) Oral at bedtime  nystatin Powder 1 Application(s) Topical two times a day  ondansetron Injectable 4 milliGRAM(s) IV Push every 6 hours PRN  pantoprazole  Injectable 40 milliGRAM(s) IV Push daily  polyethylene glycol 3350 17 Gram(s) Oral two times a day  potassium phosphate IVPB 15 milliMole(s) IV Intermittent once  senna 2 Tablet(s) Oral at bedtime  sodium chloride 0.65% Nasal 1 Spray(s) Both Nostrils four times a day PRN  sodium chloride 0.9% lock flush 10 milliLiter(s) IV Push every 1 hour PRN  zolpidem 5 milliGRAM(s) Oral at bedtime PRN    acetaminophen    Suspension .. 650 milliGRAM(s) Oral every 6 hours PRN  atorvastatin 40 milliGRAM(s) Oral at bedtime  chlorhexidine 0.12% Liquid 15 milliLiter(s) Oral Mucosa every 12 hours  chlorhexidine 2% Cloths 1 Application(s) Topical <User Schedule>  dexMEDEtomidine Infusion 0.7 MICROgram(s)/kG/Hr IV Continuous <Continuous>  dextrose 40% Gel 15 Gram(s) Oral once PRN  dextrose 5%. 1000 milliLiter(s) IV Continuous <Continuous>  dextrose 50% Injectable 12.5 Gram(s) IV Push once  dextrose 50% Injectable 25 Gram(s) IV Push once  dextrose 50% Injectable 25 Gram(s) IV Push once  ergocalciferol 54533 Unit(s) Oral <User Schedule>  fentaNYL    Injectable 50 MICROGram(s) IV Push every 2 hours PRN  fentaNYL   Infusion. 4 MICROgram(s)/kG/Hr IV Continuous <Continuous>  folic acid 1 milliGRAM(s) Oral daily  gabapentin 600 milliGRAM(s) Oral every 8 hours  glucagon  Injectable 1 milliGRAM(s) IntraMuscular once PRN  guaiFENesin   Syrup  (Sugar-Free) 100 milliGRAM(s) Oral every 6 hours PRN  influenza   Vaccine 0.5 milliLiter(s) IntraMuscular once  insulin glargine Injectable (LANTUS) 20 Unit(s) SubCutaneous every morning  insulin lispro (HumaLOG) corrective regimen sliding scale   SubCutaneous every 6 hours  insulin lispro Injectable (HumaLOG) 4 Unit(s) SubCutaneous every 6 hours  levoFLOXacin IVPB 750 milliGRAM(s) IV Intermittent every 24 hours  levoFLOXacin IVPB      melatonin 3 milliGRAM(s) Oral at bedtime  nystatin Powder 1 Application(s) Topical two times a day  ondansetron Injectable 4 milliGRAM(s) IV Push every 6 hours PRN  pantoprazole  Injectable 40 milliGRAM(s) IV Push daily  phenylephrine    Infusion 1 MICROgram(s)/kG/Min IV Continuous <Continuous>  polyethylene glycol 3350 17 Gram(s) Oral two times a day  potassium phosphate IVPB 15 milliMole(s) IV Intermittent once  senna 2 Tablet(s) Oral at bedtime  sodium chloride 0.65% Nasal 1 Spray(s) Both Nostrils four times a day PRN  sodium chloride 0.9% lock flush 10 milliLiter(s) IV Push every 1 hour PRN  tiotropium 18 MICROgram(s) Capsule 1 Capsule(s) Inhalation daily  zolpidem 5 milliGRAM(s) Oral at bedtime PRN    Drug Dosing Weight  Height (cm): 154.9 (19 Sep 2020 08:00)  Weight (kg): 84.8 (21 Sep 2020 09:15)  BMI (kg/m2): 35.3 (21 Sep 2020 09:15)  BSA (m2): 1.84 (21 Sep 2020 09:15)    CENTRAL LINE: [ ] YES [ ] NO  LOCATION:   DATE INSERTED:  REMOVE: [ ] YES [ ] NO  EXPLAIN:    ROBLEDO: [ ] YES [ ] NO    DATE INSERTED:  REMOVE:  [ ] YES [ ] NO  EXPLAIN:    A-LINE:  [ ] YES [ ] NO  LOCATION:   DATE INSERTED:  REMOVE:  [ ] YES [ ] NO  EXPLAIN:    PMH -reviewed admission note, no change since admission            10-07 @ 07:01  -  10-08 @ 07:00  --------------------------------------------------------  IN: 3424.3 mL / OUT: 1665 mL / NET: 1759.3 mL        Mode: AC/ CMV (Assist Control/ Continuous Mandatory Ventilation)  RR (machine): 14  TV (machine): 450  FiO2: 50  PEEP: 8  ITime: 1  MAP: 13  PIP: 34      PHYSICAL EXAM:    GENERAL: NAD, well-groomed, well-developed  HEAD:  Atraumatic, Normocephalic  EYES: EOMI, PERRLA, conjunctiva and sclera clear  ENMT: No tonsillar erythema, exudates, or enlargement; Moist mucous membranes, Good dentition, [ ]No lesions  NECK: Supple, normal appearance, No JVD; Normal thyroid; Trachea midline  NERVOUS SYSTEM:  Alert & Oriented X3, Good concentration; Motor Strength 5/5 B/L upper and lower extremities; DTRs 2+ intact and symmetric  CHEST/LUNG: No chest deformity; Normal percussion bilaterally; No rales, rhonchi, wheezing   HEART: Regular rate and rhythm; No murmurs, rubs, or gallops  ABDOMEN: Soft, Nontender, Nondistended;Bowel sounds present  EXTREMITIES:  2+ Peripheral Pulses, No clubbing, cyanosis, or edema  LYMPH: No lymphadenopathy noted  SKIN: No rashes or lesions; Good capillary refill      LABS:  CBC Full  -  ( 08 Oct 2020 05:06 )  WBC Count : 3.45 K/uL  RBC Count : 3.32 M/uL  Hemoglobin : 9.7 g/dL  Hematocrit : 28.8 %  Platelet Count - Automated : 50 K/uL  Mean Cell Volume : 86.7 fl  Mean Cell Hemoglobin : 29.2 pg  Mean Cell Hemoglobin Concentration : 33.7 gm/dL  Auto Neutrophil # : x  Auto Lymphocyte # : x  Auto Monocyte # : x  Auto Eosinophil # : x  Auto Basophil # : x  Auto Neutrophil % : x  Auto Lymphocyte % : x  Auto Monocyte % : x  Auto Eosinophil % : x  Auto Basophil % : x    10-08    136  |  98  |  18  ----------------------------<  236<H>  3.8   |  32<H>  |  0.45<L>    Ca    8.2<L>      08 Oct 2020 05:06  Phos  2.2     10-08  Mg     2.0     10-08    TPro  5.5<L>  /  Alb  1.9<L>  /  TBili  1.0  /  DBili  x   /  AST  18  /  ALT  33  /  AlkPhos  149<H>  10-08    PT/INR - ( 08 Oct 2020 05:06 )   PT: 13.8 sec;   INR: 1.17 ratio         PTT - ( 08 Oct 2020 05:06 )  PTT:26.1 sec    Culture Results:   Growth in aerobic bottle: Gram Negative Rods (10-06 @ 10:10)  Culture Results:   Growth in anaerobic bottle: Serratia marcescens  ***Blood Panel PCR results on this specimen are available  approximately 3 hours after the Gram stain result.***  Gram stain, PCR, and/or culture results may not always  correspond due to difference in methodologies.  ************************************************************  This PCR assay was performed using [address].  The following targets are tested for: Enterococcus,  vancomycin resistant enterococci, Listeria monocytogenes,  coagulase negative staphylococci, S. aureus,  methicillin resistant S. aureus, Streptococcus agalactiae  (Group B), S. pneumoniae, S. pyogenes (Group A),  Acinetobacter baumannii, Enterobacter cloacae, E. coli,  Klebsiella oxytoca, K. pneumoniae, Proteussp.,  Serratia marcescens, Haemophilus influenzae,  Neisseria meningitidis, Pseudomonas aeruginosa, Candida  albicans, C. glabrata, C krusei, C parapsilosis,  C. tropicalis and the KPC resistance gene.  "For positive Serratia,  results cross-reactivity has been occasionally  observed with some species of Pseudomonas and Pantoea"  "Due to technical problems, Proteus sp. will Not be reported as part of  the BCID panel until further notice" (10-06 @ 10:10)      RADIOLOGY & ADDITIONAL STUDIES REVIEWED:  ***    [ ]GOALS OF CARE DISCUSSION WITH PATIENT/FAMILY/PROXY:    CRITICAL CARE TIME SPENT: 35 minutes INTERVAL HPI/OVERNIGHT EVENTS: Patient spiking low grade fever this AM (100.3). Blood cultures sent this AM and are pending. Last culture grew serratia. S/p 2 pRBCs and 1 plts yesterday, Hb 9.7 and plts 50. For operative Trach and PEG.    PRESSORS: [x ] YES     Antimicrobial:  levoFLOXacin IVPB 750 milliGRAM(s) IV Intermittent every 24 hours  levoFLOXacin IVPB        Cardiovascular:  phenylephrine    Infusion 1 MICROgram(s)/kG/Min IV Continuous <Continuous>    Pulmonary:  guaiFENesin   Syrup  (Sugar-Free) 100 milliGRAM(s) Oral every 6 hours PRN  tiotropium 18 MICROgram(s) Capsule 1 Capsule(s) Inhalation daily    Hematologic:    Other:  acetaminophen    Suspension .. 650 milliGRAM(s) Oral every 6 hours PRN  atorvastatin 40 milliGRAM(s) Oral at bedtime  chlorhexidine 0.12% Liquid 15 milliLiter(s) Oral Mucosa every 12 hours  chlorhexidine 2% Cloths 1 Application(s) Topical <User Schedule>  dexMEDEtomidine Infusion 0.7 MICROgram(s)/kG/Hr IV Continuous <Continuous>  dextrose 40% Gel 15 Gram(s) Oral once PRN  dextrose 5%. 1000 milliLiter(s) IV Continuous <Continuous>  dextrose 50% Injectable 12.5 Gram(s) IV Push once  dextrose 50% Injectable 25 Gram(s) IV Push once  dextrose 50% Injectable 25 Gram(s) IV Push once  ergocalciferol 60612 Unit(s) Oral <User Schedule>  fentaNYL    Injectable 50 MICROGram(s) IV Push every 2 hours PRN  fentaNYL   Infusion. 4 MICROgram(s)/kG/Hr IV Continuous <Continuous>  folic acid 1 milliGRAM(s) Oral daily  gabapentin 600 milliGRAM(s) Oral every 8 hours  glucagon  Injectable 1 milliGRAM(s) IntraMuscular once PRN  influenza   Vaccine 0.5 milliLiter(s) IntraMuscular once  insulin glargine Injectable (LANTUS) 20 Unit(s) SubCutaneous every morning  insulin lispro (HumaLOG) corrective regimen sliding scale   SubCutaneous every 6 hours  insulin lispro Injectable (HumaLOG) 4 Unit(s) SubCutaneous every 6 hours  melatonin 3 milliGRAM(s) Oral at bedtime  nystatin Powder 1 Application(s) Topical two times a day  ondansetron Injectable 4 milliGRAM(s) IV Push every 6 hours PRN  pantoprazole  Injectable 40 milliGRAM(s) IV Push daily  polyethylene glycol 3350 17 Gram(s) Oral two times a day  potassium phosphate IVPB 15 milliMole(s) IV Intermittent once  senna 2 Tablet(s) Oral at bedtime  sodium chloride 0.65% Nasal 1 Spray(s) Both Nostrils four times a day PRN  sodium chloride 0.9% lock flush 10 milliLiter(s) IV Push every 1 hour PRN  zolpidem 5 milliGRAM(s) Oral at bedtime PRN    acetaminophen    Suspension .. 650 milliGRAM(s) Oral every 6 hours PRN  atorvastatin 40 milliGRAM(s) Oral at bedtime  chlorhexidine 0.12% Liquid 15 milliLiter(s) Oral Mucosa every 12 hours  chlorhexidine 2% Cloths 1 Application(s) Topical <User Schedule>  dexMEDEtomidine Infusion 0.7 MICROgram(s)/kG/Hr IV Continuous <Continuous>  dextrose 40% Gel 15 Gram(s) Oral once PRN  dextrose 5%. 1000 milliLiter(s) IV Continuous <Continuous>  dextrose 50% Injectable 12.5 Gram(s) IV Push once  dextrose 50% Injectable 25 Gram(s) IV Push once  dextrose 50% Injectable 25 Gram(s) IV Push once  ergocalciferol 67966 Unit(s) Oral <User Schedule>  fentaNYL    Injectable 50 MICROGram(s) IV Push every 2 hours PRN  fentaNYL   Infusion. 4 MICROgram(s)/kG/Hr IV Continuous <Continuous>  folic acid 1 milliGRAM(s) Oral daily  gabapentin 600 milliGRAM(s) Oral every 8 hours  glucagon  Injectable 1 milliGRAM(s) IntraMuscular once PRN  guaiFENesin   Syrup  (Sugar-Free) 100 milliGRAM(s) Oral every 6 hours PRN  influenza   Vaccine 0.5 milliLiter(s) IntraMuscular once  insulin glargine Injectable (LANTUS) 20 Unit(s) SubCutaneous every morning  insulin lispro (HumaLOG) corrective regimen sliding scale   SubCutaneous every 6 hours  insulin lispro Injectable (HumaLOG) 4 Unit(s) SubCutaneous every 6 hours  levoFLOXacin IVPB 750 milliGRAM(s) IV Intermittent every 24 hours  levoFLOXacin IVPB      melatonin 3 milliGRAM(s) Oral at bedtime  nystatin Powder 1 Application(s) Topical two times a day  ondansetron Injectable 4 milliGRAM(s) IV Push every 6 hours PRN  pantoprazole  Injectable 40 milliGRAM(s) IV Push daily  phenylephrine    Infusion 1 MICROgram(s)/kG/Min IV Continuous <Continuous>  polyethylene glycol 3350 17 Gram(s) Oral two times a day  potassium phosphate IVPB 15 milliMole(s) IV Intermittent once  senna 2 Tablet(s) Oral at bedtime  sodium chloride 0.65% Nasal 1 Spray(s) Both Nostrils four times a day PRN  sodium chloride 0.9% lock flush 10 milliLiter(s) IV Push every 1 hour PRN  tiotropium 18 MICROgram(s) Capsule 1 Capsule(s) Inhalation daily  zolpidem 5 milliGRAM(s) Oral at bedtime PRN    Drug Dosing Weight  Height (cm): 154.9 (19 Sep 2020 08:00)  Weight (kg): 84.8 (21 Sep 2020 09:15)  BMI (kg/m2): 35.3 (21 Sep 2020 09:15)  BSA (m2): 1.84 (21 Sep 2020 09:15)    CENTRAL LINE: [ ] YES [ ] NO  LOCATION:   DATE INSERTED:  REMOVE: [ ] YES [ ] NO  EXPLAIN:    VENKAT: [ ] YES [ ] NO    DATE INSERTED:  REMOVE:  [ ] YES [ ] NO  EXPLAIN:    A-LINE:  [ ] YES [ ] NO  LOCATION:   DATE INSERTED:  REMOVE:  [ ] YES [ ] NO  EXPLAIN:    PMH -reviewed admission note, no change since admission            10-07 @ 07:01  -  10-08 @ 07:00  --------------------------------------------------------  IN: 3424.3 mL / OUT: 1665 mL / NET: 1759.3 mL        Mode: AC/ CMV (Assist Control/ Continuous Mandatory Ventilation)  RR (machine): 14  TV (machine): 450  FiO2: 50  PEEP: 8  ITime: 1  MAP: 13  PIP: 34      PHYSICAL EXAM:    GENERAL: NAD, well-groomed, well-developed  HEAD:  Atraumatic, Normocephalic  EYES: EOMI, PERRLA, conjunctiva and sclera clear  ENMT: No tonsillar erythema, exudates, or enlargement; Moist mucous membranes, Good dentition, [ ]No lesions  NECK: Supple, normal appearance, No JVD; Normal thyroid; Trachea midline  NERVOUS SYSTEM:  Alert & Oriented X3, Good concentration; Motor Strength 5/5 B/L upper and lower extremities; DTRs 2+ intact and symmetric  CHEST/LUNG: No chest deformity; Normal percussion bilaterally; No rales, rhonchi, wheezing   HEART: Regular rate and rhythm; No murmurs, rubs, or gallops  ABDOMEN: Soft, Nontender, Nondistended;Bowel sounds present  EXTREMITIES:  2+ Peripheral Pulses, No clubbing, cyanosis, or edema  LYMPH: No lymphadenopathy noted  SKIN: No rashes or lesions; Good capillary refill      LABS:  CBC Full  -  ( 08 Oct 2020 05:06 )  WBC Count : 3.45 K/uL  RBC Count : 3.32 M/uL  Hemoglobin : 9.7 g/dL  Hematocrit : 28.8 %  Platelet Count - Automated : 50 K/uL  Mean Cell Volume : 86.7 fl  Mean Cell Hemoglobin : 29.2 pg  Mean Cell Hemoglobin Concentration : 33.7 gm/dL  Auto Neutrophil # : x  Auto Lymphocyte # : x  Auto Monocyte # : x  Auto Eosinophil # : x  Auto Basophil # : x  Auto Neutrophil % : x  Auto Lymphocyte % : x  Auto Monocyte % : x  Auto Eosinophil % : x  Auto Basophil % : x    10-08    136  |  98  |  18  ----------------------------<  236<H>  3.8   |  32<H>  |  0.45<L>    Ca    8.2<L>      08 Oct 2020 05:06  Phos  2.2     10-08  Mg     2.0     10-08    TPro  5.5<L>  /  Alb  1.9<L>  /  TBili  1.0  /  DBili  x   /  AST  18  /  ALT  33  /  AlkPhos  149<H>  10-08    PT/INR - ( 08 Oct 2020 05:06 )   PT: 13.8 sec;   INR: 1.17 ratio         PTT - ( 08 Oct 2020 05:06 )  PTT:26.1 sec    Culture Results:   Growth in aerobic bottle: Gram Negative Rods (10-06 @ 10:10)  Culture Results:   Growth in anaerobic bottle: Serratia marcescens  ***Blood Panel PCR results on this specimen are available  approximately 3 hours after the Gram stain result.***  Gram stain, PCR, and/or culture results may not always  correspond due to difference in methodologies.  ************************************************************  This PCR assay was performed using [facility].  The following targets are tested for: Enterococcus,  vancomycin resistant enterococci, Listeria monocytogenes,  coagulase negative staphylococci, S. aureus,  methicillin resistant S. aureus, Streptococcus agalactiae  (Group B), S. pneumoniae, S. pyogenes (Group A),  Acinetobacter baumannii, Enterobacter cloacae, E. coli,  Klebsiella oxytoca, K. pneumoniae, Proteussp.,  Serratia marcescens, Haemophilus influenzae,  Neisseria meningitidis, Pseudomonas aeruginosa, Candida  albicans, C. glabrata, C krusei, C parapsilosis,  C. tropicalis and the KPC resistance gene.  "For positive Serratia,  results cross-reactivity has been occasionally  observed with some species of Pseudomonas and Pantoea"  "Due to technical problems, Proteus sp. will Not be reported as part of  the BCID panel until further notice" (10-06 @ 10:10)      RADIOLOGY & ADDITIONAL STUDIES REVIEWED:  ***    [ ]GOALS OF CARE DISCUSSION WITH PATIENT/FAMILY/PROXY:    CRITICAL CARE TIME SPENT: 35 minutes INTERVAL HPI/OVERNIGHT EVENTS: Patient spiking low grade fever this AM (100.3). Blood cultures sent this AM and are pending. Last culture grew serratia. S/p 2 pRBCs and 1 plts yesterday, Hb 9.7 and plts 50. For operative Trach and PEG.    PRESSORS: [x ] YES     Antimicrobial:  levoFLOXacin IVPB 750 milliGRAM(s) IV Intermittent every 24 hours  levoFLOXacin IVPB        Cardiovascular:  phenylephrine    Infusion 1 MICROgram(s)/kG/Min IV Continuous <Continuous>    Pulmonary:  guaiFENesin   Syrup  (Sugar-Free) 100 milliGRAM(s) Oral every 6 hours PRN  tiotropium 18 MICROgram(s) Capsule 1 Capsule(s) Inhalation daily    Hematologic:    Other:  acetaminophen    Suspension .. 650 milliGRAM(s) Oral every 6 hours PRN  atorvastatin 40 milliGRAM(s) Oral at bedtime  chlorhexidine 0.12% Liquid 15 milliLiter(s) Oral Mucosa every 12 hours  chlorhexidine 2% Cloths 1 Application(s) Topical <User Schedule>  dexMEDEtomidine Infusion 0.7 MICROgram(s)/kG/Hr IV Continuous <Continuous>  dextrose 40% Gel 15 Gram(s) Oral once PRN  dextrose 5%. 1000 milliLiter(s) IV Continuous <Continuous>  dextrose 50% Injectable 12.5 Gram(s) IV Push once  dextrose 50% Injectable 25 Gram(s) IV Push once  dextrose 50% Injectable 25 Gram(s) IV Push once  ergocalciferol 82439 Unit(s) Oral <User Schedule>  fentaNYL    Injectable 50 MICROGram(s) IV Push every 2 hours PRN  fentaNYL   Infusion. 4 MICROgram(s)/kG/Hr IV Continuous <Continuous>  folic acid 1 milliGRAM(s) Oral daily  gabapentin 600 milliGRAM(s) Oral every 8 hours  glucagon  Injectable 1 milliGRAM(s) IntraMuscular once PRN  influenza   Vaccine 0.5 milliLiter(s) IntraMuscular once  insulin glargine Injectable (LANTUS) 20 Unit(s) SubCutaneous every morning  insulin lispro (HumaLOG) corrective regimen sliding scale   SubCutaneous every 6 hours  insulin lispro Injectable (HumaLOG) 4 Unit(s) SubCutaneous every 6 hours  melatonin 3 milliGRAM(s) Oral at bedtime  nystatin Powder 1 Application(s) Topical two times a day  ondansetron Injectable 4 milliGRAM(s) IV Push every 6 hours PRN  pantoprazole  Injectable 40 milliGRAM(s) IV Push daily  polyethylene glycol 3350 17 Gram(s) Oral two times a day  potassium phosphate IVPB 15 milliMole(s) IV Intermittent once  senna 2 Tablet(s) Oral at bedtime  sodium chloride 0.65% Nasal 1 Spray(s) Both Nostrils four times a day PRN  sodium chloride 0.9% lock flush 10 milliLiter(s) IV Push every 1 hour PRN  zolpidem 5 milliGRAM(s) Oral at bedtime PRN    acetaminophen    Suspension .. 650 milliGRAM(s) Oral every 6 hours PRN  atorvastatin 40 milliGRAM(s) Oral at bedtime  chlorhexidine 0.12% Liquid 15 milliLiter(s) Oral Mucosa every 12 hours  chlorhexidine 2% Cloths 1 Application(s) Topical <User Schedule>  dexMEDEtomidine Infusion 0.7 MICROgram(s)/kG/Hr IV Continuous <Continuous>  dextrose 40% Gel 15 Gram(s) Oral once PRN  dextrose 5%. 1000 milliLiter(s) IV Continuous <Continuous>  dextrose 50% Injectable 12.5 Gram(s) IV Push once  dextrose 50% Injectable 25 Gram(s) IV Push once  dextrose 50% Injectable 25 Gram(s) IV Push once  ergocalciferol 14351 Unit(s) Oral <User Schedule>  fentaNYL    Injectable 50 MICROGram(s) IV Push every 2 hours PRN  fentaNYL   Infusion. 4 MICROgram(s)/kG/Hr IV Continuous <Continuous>  folic acid 1 milliGRAM(s) Oral daily  gabapentin 600 milliGRAM(s) Oral every 8 hours  glucagon  Injectable 1 milliGRAM(s) IntraMuscular once PRN  guaiFENesin   Syrup  (Sugar-Free) 100 milliGRAM(s) Oral every 6 hours PRN  influenza   Vaccine 0.5 milliLiter(s) IntraMuscular once  insulin glargine Injectable (LANTUS) 20 Unit(s) SubCutaneous every morning  insulin lispro (HumaLOG) corrective regimen sliding scale   SubCutaneous every 6 hours  insulin lispro Injectable (HumaLOG) 4 Unit(s) SubCutaneous every 6 hours  levoFLOXacin IVPB 750 milliGRAM(s) IV Intermittent every 24 hours  levoFLOXacin IVPB      melatonin 3 milliGRAM(s) Oral at bedtime  nystatin Powder 1 Application(s) Topical two times a day  ondansetron Injectable 4 milliGRAM(s) IV Push every 6 hours PRN  pantoprazole  Injectable 40 milliGRAM(s) IV Push daily  phenylephrine    Infusion 1 MICROgram(s)/kG/Min IV Continuous <Continuous>  polyethylene glycol 3350 17 Gram(s) Oral two times a day  potassium phosphate IVPB 15 milliMole(s) IV Intermittent once  senna 2 Tablet(s) Oral at bedtime  sodium chloride 0.65% Nasal 1 Spray(s) Both Nostrils four times a day PRN  sodium chloride 0.9% lock flush 10 milliLiter(s) IV Push every 1 hour PRN  tiotropium 18 MICROgram(s) Capsule 1 Capsule(s) Inhalation daily  zolpidem 5 milliGRAM(s) Oral at bedtime PRN    Drug Dosing Weight  Height (cm): 154.9 (19 Sep 2020 08:00)  Weight (kg): 84.8 (21 Sep 2020 09:15)  BMI (kg/m2): 35.3 (21 Sep 2020 09:15)  BSA (m2): 1.84 (21 Sep 2020 09:15)    CENTRAL LINE: [x ] YES [ ] NO  LOCATION: [facility]   DATE INSERTED:10/5  REMOVE: [ ] YES [ ] NO  EXPLAIN:    [place]: [ x] YES [ ] NO    DATE INSERTED: 10/5  REMOVE:  [ ] YES [ ] NO  EXPLAIN:    A-LINE:  [ ] YES [ x] NO  LOCATION:   DATE INSERTED:  REMOVE:  [ ] YES [ ] NO  EXPLAIN:    [facility] -reviewed admission note, no change since admission            10-07 @ 07:01  -  10-08 @ 07:00  --------------------------------------------------------  IN: 3424.3 mL / OUT: 1665 mL / NET: 1759.3 mL        Mode: AC/ CMV (Assist Control/ Continuous Mandatory Ventilation)  RR (machine): 14  TV (machine): 450  FiO2: 50  PEEP: 8  ITime: 1  MAP: 13  PIP: 34      PHYSICAL EXAM:  GENERAL: Sedated, Intubated on vent  HEAD:  Atraumatic, Normocephalic  EYES: PERRL, conjunctiva and sclera clear  ENMT: Moist mucous membranes, no exudates  NECK: Supple, normal appearance, No JVD; Normal thyroid; Trachea midline  NERVOUS SYSTEM: awake, nods in response to questioning  CHEST/LUNG: ventilated, bilateral breath sounds, diminished throughout, no crackles or rhonchi  HEART: Regular rate and rhythm; No murmurs, rubs, or gallops  ABDOMEN: Soft, Nontender, Non distended; Bowel sounds present  : [place] in place, draining clear concentrated urine  EXTREMITIES:  2+ Peripheral Pulses, No clubbing, cyanosis, or edema  SKIN: No rashes or lesions; Good capillary refill      LABS:  CBC Full  -  ( 08 Oct 2020 05:06 )  WBC Count : 3.45 K/uL  RBC Count : 3.32 M/uL  Hemoglobin : 9.7 g/dL  Hematocrit : 28.8 %  Platelet Count - Automated : 50 K/uL  Mean Cell Volume : 86.7 fl  Mean Cell Hemoglobin : 29.2 pg  Mean Cell Hemoglobin Concentration : 33.7 gm/dL  Auto Neutrophil # : x  Auto Lymphocyte # : x  Auto Monocyte # : x  Auto Eosinophil # : x  Auto Basophil # : x  Auto Neutrophil % : x  Auto Lymphocyte % : x  Auto Monocyte % : x  Auto Eosinophil % : x  Auto Basophil % : x    10-08    136  |  98  |  18  ----------------------------<  236<H>  3.8   |  32<H>  |  0.45<L>    Ca    8.2<L>      08 Oct 2020 05:06  Phos  2.2     10-08  Mg     2.0     10-08    TPro  5.5<L>  /  Alb  1.9<L>  /  TBili  1.0  /  DBili  x   /  AST  18  /  ALT  33  /  AlkPhos  149<H>  10-08    PT/INR - ( 08 Oct 2020 05:06 )   PT: 13.8 sec;   INR: 1.17 ratio         PTT - ( 08 Oct 2020 05:06 )  PTT:26.1 sec    Culture Results:   Growth in aerobic bottle: Gram Negative Rods (10-06 @ 10:10)  Culture Results:   Growth in anaerobic bottle: Serratia marcescens  ***Blood Panel PCR results on this specimen are available  approximately 3 hours after the Gram stain result.***  Gram stain, PCR, and/or culture results may not always  correspond due to difference in methodologies.  ************************************************************  This PCR assay was performed using [employer].  The following targets are tested for: Enterococcus,  vancomycin resistant enterococci, Listeria monocytogenes,  coagulase negative staphylococci, S. aureus,  methicillin resistant S. aureus, Streptococcus agalactiae  (Group B), S. pneumoniae, S. pyogenes (Group A),  Acinetobacter baumannii, Enterobacter cloacae, E. coli,  Klebsiella oxytoca, K. pneumoniae, Proteussp.,  Serratia marcescens, Haemophilus influenzae,  Neisseria meningitidis, Pseudomonas aeruginosa, Candida  albicans, C. glabrata, C krusei, C parapsilosis,  C. tropicalis and the KPC resistance gene.  "For positive Serratia,  results cross-reactivity has been occasionally  observed with some species of Pseudomonas and Pantoea"  "Due to technical problems, Proteus sp. will Not be reported as part of  the BCID panel until further notice" (10-06 @ 10:10)      RADIOLOGY & ADDITIONAL STUDIES REVIEWED:    < from: Xray Chest 1 View- PORTABLE-Routine (Xray Chest 1 View- PORTABLE-Routine in AM.) (10.07.20 @ 07:15) >    EXAM:  XR CHEST PORTABLE ROUTINE 1V                            PROCEDURE DATE:  10/07/2020          INTERPRETATION:  Chest one view    HISTORY: Preop    COMPARISON STUDY: 10/6/2020    Frontal expiratory view of the chest shows the heart to be similar in size. Endotracheal tube and feeding tube remain present.    The lungs show similar left effusion with similar right base infiltrate and there is no evidence of pneumothorax nor right pleural effusion.    IMPRESSION:  Similar right infiltrate.    Thank you for the courtesy of this referral.      DILIA ARCOS M.D., ATTENDING RADIOLOGIST  This document has been electronically signed. Oct  7 2020  2:38PM    < end of copied text >      [ ]GOALS OF CARE DISCUSSION WITH PATIENT/FAMILY/PROXY: FULL CODE    CRITICAL CARE TIME SPENT: 35 minutes INTERVAL HPI/OVERNIGHT EVENTS: Patient spiking low grade fever this AM (100.3). Blood cultures sent this AM and are pending. Last culture grew serratia. S/p 2 pRBCs and 1 plts yesterday, Hb 9.7 and plts 50. For operative Trach and PEG today. Ordered 1 unit plts prior to procedure. Vent settings beyond goal: 14 | 450 | 40% | 5    PRESSORS: [x ] YES     Antimicrobial:  levoFLOXacin IVPB 750 milliGRAM(s) IV Intermittent every 24 hours  levoFLOXacin IVPB        Cardiovascular:  phenylephrine    Infusion 1 MICROgram(s)/kG/Min IV Continuous <Continuous>    Pulmonary:  guaiFENesin   Syrup  (Sugar-Free) 100 milliGRAM(s) Oral every 6 hours PRN  tiotropium 18 MICROgram(s) Capsule 1 Capsule(s) Inhalation daily    Hematologic:    Other:  acetaminophen    Suspension .. 650 milliGRAM(s) Oral every 6 hours PRN  atorvastatin 40 milliGRAM(s) Oral at bedtime  chlorhexidine 0.12% Liquid 15 milliLiter(s) Oral Mucosa every 12 hours  chlorhexidine 2% Cloths 1 Application(s) Topical <User Schedule>  dexMEDEtomidine Infusion 0.7 MICROgram(s)/kG/Hr IV Continuous <Continuous>  dextrose 40% Gel 15 Gram(s) Oral once PRN  dextrose 5%. 1000 milliLiter(s) IV Continuous <Continuous>  dextrose 50% Injectable 12.5 Gram(s) IV Push once  dextrose 50% Injectable 25 Gram(s) IV Push once  dextrose 50% Injectable 25 Gram(s) IV Push once  ergocalciferol 86279 Unit(s) Oral <User Schedule>  fentaNYL    Injectable 50 MICROGram(s) IV Push every 2 hours PRN  fentaNYL   Infusion. 4 MICROgram(s)/kG/Hr IV Continuous <Continuous>  folic acid 1 milliGRAM(s) Oral daily  gabapentin 600 milliGRAM(s) Oral every 8 hours  glucagon  Injectable 1 milliGRAM(s) IntraMuscular once PRN  influenza   Vaccine 0.5 milliLiter(s) IntraMuscular once  insulin glargine Injectable (LANTUS) 20 Unit(s) SubCutaneous every morning  insulin lispro (HumaLOG) corrective regimen sliding scale   SubCutaneous every 6 hours  insulin lispro Injectable (HumaLOG) 4 Unit(s) SubCutaneous every 6 hours  melatonin 3 milliGRAM(s) Oral at bedtime  nystatin Powder 1 Application(s) Topical two times a day  ondansetron Injectable 4 milliGRAM(s) IV Push every 6 hours PRN  pantoprazole  Injectable 40 milliGRAM(s) IV Push daily  polyethylene glycol 3350 17 Gram(s) Oral two times a day  potassium phosphate IVPB 15 milliMole(s) IV Intermittent once  senna 2 Tablet(s) Oral at bedtime  sodium chloride 0.65% Nasal 1 Spray(s) Both Nostrils four times a day PRN  sodium chloride 0.9% lock flush 10 milliLiter(s) IV Push every 1 hour PRN  zolpidem 5 milliGRAM(s) Oral at bedtime PRN    acetaminophen    Suspension .. 650 milliGRAM(s) Oral every 6 hours PRN  atorvastatin 40 milliGRAM(s) Oral at bedtime  chlorhexidine 0.12% Liquid 15 milliLiter(s) Oral Mucosa every 12 hours  chlorhexidine 2% Cloths 1 Application(s) Topical <User Schedule>  dexMEDEtomidine Infusion 0.7 MICROgram(s)/kG/Hr IV Continuous <Continuous>  dextrose 40% Gel 15 Gram(s) Oral once PRN  dextrose 5%. 1000 milliLiter(s) IV Continuous <Continuous>  dextrose 50% Injectable 12.5 Gram(s) IV Push once  dextrose 50% Injectable 25 Gram(s) IV Push once  dextrose 50% Injectable 25 Gram(s) IV Push once  ergocalciferol 28288 Unit(s) Oral <User Schedule>  fentaNYL    Injectable 50 MICROGram(s) IV Push every 2 hours PRN  fentaNYL   Infusion. 4 MICROgram(s)/kG/Hr IV Continuous <Continuous>  folic acid 1 milliGRAM(s) Oral daily  gabapentin 600 milliGRAM(s) Oral every 8 hours  glucagon  Injectable 1 milliGRAM(s) IntraMuscular once PRN  guaiFENesin   Syrup  (Sugar-Free) 100 milliGRAM(s) Oral every 6 hours PRN  influenza   Vaccine 0.5 milliLiter(s) IntraMuscular once  insulin glargine Injectable (LANTUS) 20 Unit(s) SubCutaneous every morning  insulin lispro (HumaLOG) corrective regimen sliding scale   SubCutaneous every 6 hours  insulin lispro Injectable (HumaLOG) 4 Unit(s) SubCutaneous every 6 hours  levoFLOXacin IVPB 750 milliGRAM(s) IV Intermittent every 24 hours  levoFLOXacin IVPB      melatonin 3 milliGRAM(s) Oral at bedtime  nystatin Powder 1 Application(s) Topical two times a day  ondansetron Injectable 4 milliGRAM(s) IV Push every 6 hours PRN  pantoprazole  Injectable 40 milliGRAM(s) IV Push daily  phenylephrine    Infusion 1 MICROgram(s)/kG/Min IV Continuous <Continuous>  polyethylene glycol 3350 17 Gram(s) Oral two times a day  potassium phosphate IVPB 15 milliMole(s) IV Intermittent once  senna 2 Tablet(s) Oral at bedtime  sodium chloride 0.65% Nasal 1 Spray(s) Both Nostrils four times a day PRN  sodium chloride 0.9% lock flush 10 milliLiter(s) IV Push every 1 hour PRN  tiotropium 18 MICROgram(s) Capsule 1 Capsule(s) Inhalation daily  zolpidem 5 milliGRAM(s) Oral at bedtime PRN    Drug Dosing Weight  Height (cm): 154.9 (19 Sep 2020 08:00)  Weight (kg): 84.8 (21 Sep 2020 09:15)  BMI (kg/m2): 35.3 (21 Sep 2020 09:15)  BSA (m2): 1.84 (21 Sep 2020 09:15)    CENTRAL LINE: [x ] YES [ ] NO  LOCATION: RFL   DATE INSERTED:10/5  REMOVE: [ ] YES [ ] NO  EXPLAIN:    ROBLEDO: [ x] YES [ ] NO    DATE INSERTED: 10/5  REMOVE:  [ ] YES [ ] NO  EXPLAIN:    A-LINE:  [ ] YES [ x] NO  LOCATION:   DATE INSERTED:  REMOVE:  [ ] YES [ ] NO  EXPLAIN:    [facility] -reviewed admission note, no change since admission            10-07 @ 07:01  -  10-08 @ 07:00  --------------------------------------------------------  IN: 3424.3 mL / OUT: 1665 mL / NET: 1759.3 mL        Mode: AC/ CMV (Assist Control/ Continuous Mandatory Ventilation)  RR (machine): 14  TV (machine): 450  FiO2: 50  PEEP: 8  ITime: 1  MAP: 13  PIP: 34      PHYSICAL EXAM:  GENERAL: Sedated, Intubated on vent  HEAD:  Atraumatic, Normocephalic  EYES: PERRL, conjunctiva and sclera clear  ENMT: Moist mucous membranes, no exudates  NECK: Supple, normal appearance, No JVD; Normal thyroid; Trachea midline  NERVOUS SYSTEM: awake, nods in response to questioning  CHEST/LUNG: ventilated, bilateral breath sounds, diminished throughout, no crackles or rhonchi  HEART: Regular rate and rhythm; No murmurs, rubs, or gallops  ABDOMEN: Soft, Nontender, Non distended; Bowel sounds present  : [place] in place, draining clear concentrated urine  EXTREMITIES:  2+ Peripheral Pulses, No clubbing, cyanosis, or edema  SKIN: No rashes or lesions; Good capillary refill      LABS:  CBC Full  -  ( 08 Oct 2020 05:06 )  WBC Count : 3.45 K/uL  RBC Count : 3.32 M/uL  Hemoglobin : 9.7 g/dL  Hematocrit : 28.8 %  Platelet Count - Automated : 50 K/uL  Mean Cell Volume : 86.7 fl  Mean Cell Hemoglobin : 29.2 pg  Mean Cell Hemoglobin Concentration : 33.7 gm/dL  Auto Neutrophil # : x  Auto Lymphocyte # : x  Auto Monocyte # : x  Auto Eosinophil # : x  Auto Basophil # : x  Auto Neutrophil % : x  Auto Lymphocyte % : x  Auto Monocyte % : x  Auto Eosinophil % : x  Auto Basophil % : x    10-08    136  |  98  |  18  ----------------------------<  236<H>  3.8   |  32<H>  |  0.45<L>    Ca    8.2<L>      08 Oct 2020 05:06  Phos  2.2     10-08  Mg     2.0     10-08    TPro  5.5<L>  /  Alb  1.9<L>  /  TBili  1.0  /  DBili  x   /  AST  18  /  ALT  33  /  AlkPhos  149<H>  10-08    PT/INR - ( 08 Oct 2020 05:06 )   PT: 13.8 sec;   INR: 1.17 ratio         PTT - ( 08 Oct 2020 05:06 )  PTT:26.1 sec    Culture Results:   Growth in aerobic bottle: Gram Negative Rods (10-06 @ 10:10)  Culture Results:   Growth in anaerobic bottle: Serratia marcescens  ***Blood Panel PCR results on this specimen are available  approximately 3 hours after the Gram stain result.***  Gram stain, PCR, and/or culture results may not always  correspond due to difference in methodologies.  ************************************************************  This PCR assay was performed using [employer].  The following targets are tested for: Enterococcus,  vancomycin resistant enterococci, Listeria monocytogenes,  coagulase negative staphylococci, S. aureus,  methicillin resistant S. aureus, Streptococcus agalactiae  (Group B), S. pneumoniae, S. pyogenes (Group A),  Acinetobacter baumannii, Enterobacter cloacae, E. coli,  Klebsiella oxytoca, K. pneumoniae, Proteussp.,  Serratia marcescens, Haemophilus influenzae,  Neisseria meningitidis, Pseudomonas aeruginosa, Candida  albicans, C. glabrata, C krusei, C parapsilosis,  C. tropicalis and the KPC resistance gene.  "For positive Serratia,  results cross-reactivity has been occasionally  observed with some species of Pseudomonas and Pantoea"  "Due to technical problems, Proteus sp. will Not be reported as part of  the BCID panel until further notice" (10-06 @ 10:10)      RADIOLOGY & ADDITIONAL STUDIES REVIEWED:    < from: Xray Chest 1 View- PORTABLE-Routine (Xray Chest 1 View- PORTABLE-Routine in AM.) (10.07.20 @ 07:15) >    EXAM:  XR CHEST PORTABLE ROUTINE 1V                            PROCEDURE DATE:  10/07/2020          INTERPRETATION:  Chest one view    HISTORY: Preop    COMPARISON STUDY: 10/6/2020    Frontal expiratory view of the chest shows the heart to be similar in size. Endotracheal tube and feeding tube remain present.    The lungs show similar left effusion with similar right base infiltrate and there is no evidence of pneumothorax nor right pleural effusion.    IMPRESSION:  Similar right infiltrate.    Thank you for the courtesy of this referral.      DILIA ARCOS M.D., ATTENDING RADIOLOGIST  This document has been electronically signed. Oct  7 2020  2:38PM    < end of copied text >      [ ]GOALS OF CARE DISCUSSION WITH PATIENT/FAMILY/PROXY: FULL CODE    CRITICAL CARE TIME SPENT: 35 minutes

## 2020-10-08 NOTE — PROGRESS NOTE ADULT - ASSESSMENT
Bacteremia with serratia marcescens - likely from a line infection or UTI  Sepsis - no recurrent fevers  ·	cont levaquin 750mg IV daily  ·	will need repeat BC in am  Time spent - 33 mins

## 2020-10-08 NOTE — BRIEF OPERATIVE NOTE - NSICDXBRIEFPOSTOP_GEN_ALL_CORE_FT
POST-OP DIAGNOSIS:  Respiratory failure 08-Oct-2020 15:23:17  Ebonie Mayes  Adult failure to thrive 08-Oct-2020 15:23:10  Ebonie Mayes

## 2020-10-08 NOTE — PROGRESS NOTE ADULT - ATTENDING COMMENTS
Assessment:  1. Acute on chronic respiratory failure  2. Bacteremia  3. Septic shock   4. Pancytopenia  5. Diabetes mellitus   6. Stage 4 lung cancer  7. COPD    Plan   -continue mechanical ventilation  - Sedation for vent synchrony  -thoracic for trach and PEG 10/8  -intubation #2 10/5  -IV antibiotics   -ID follow up given new bacteremia  -hemodynamic support, maintain MAP > 65  -New RLL consolidation    -transfuse 1 unit platelet prior to OR today  - Hold tube feedings and DVT prophylaxis for OR  - PPI for GI prophylaxis  - Will need placement to vent facility

## 2020-10-08 NOTE — PROGRESS NOTE ADULT - SUBJECTIVE AND OBJECTIVE BOX
plan for OR today for flex bronch, trach peg. FiO2 50/8   discussed and consented with daughter Gavi over phone

## 2020-10-08 NOTE — PROGRESS NOTE ADULT - ASSESSMENT
65y.o. Female s/p trach/PEG placement    -PEG to gravity. May use for medication tonight.  -Plan to start tube feeds tomorrow.  -Vent per ICU/resp teams  -Suture removal 2 weeks post op   65y.o. Female s/p trach/PEG placement    -PEG to gravity. May use for medication tonight.  -Plan to start tube feeds tomorrow.  -Vent per ICU/resp teams     65y.o. Female s/p trach/PEG placement    -PEG to gravity. May use for medication tonight.  -Plan to start tube feeds tomorrow.  -Vent per ICU/resp teams  -Suture removal 2 weeks Please follow-up with your surgeon in 1 week. Drink plenty of fluids and rest as needed. Call for any fever over 101, nausea, vomiting, severe pain, no passing of gas or bowel movement.       DIET    You may resume your regular diet as normal.       SURGICAL SITES    Remove outer dressing and keep white steri-strips in place allowing them to fall off on their own. You may shower 48 hours post-operatively but do not bathe or soak in the water for 1-2 weeks; pat dry. If you notice any signs of surgical site infection (ie. redness, swelling, pain, pus drainage), please seek medical care immediately.     ACTIVITY  Do not lift anything heavier than 10 pounds for 2 weeks and avoid strenuous activity for 4-6 weeks.       PAIN CONTROL    You may take Motrin 600mg (with food) or Tylenol 650mg as needed for mild pain. Stagger one medication 3 hours after the other for maximum pain control. Maximum daily dose of Tylenol should not exceed 4grams/day.   You may take your prescribed oxycodone for severe breakthrough pain not that is not relieved by Tylenol/Motrin. Do not drive or make important decisions while taking this medication and do not take more than 4 pills in 24 hours.   65y.o. Female s/p trach/PEG placement    -PEG to gravity. May use for medication tonight.  -Plan to start tube feeds tomorrow.  -Vent per ICU/resp teams  -Suture removal 2 weeks post-op

## 2020-10-08 NOTE — PROGRESS NOTE ADULT - ASSESSMENT
COPD with acute asthmatic bronchitis/ RLL pneumonia with  acute  Respiratory Failure   Recurrent Lung cancer with Bone Mets   IDDM   Htn with MR   Thrombocytopenia  and Anemia   Obesity with CRIS   serratia  bacteremia     Plan-  repeat blood and trach c/s   Nss 75 cc/hr x 24 hrs  Add merrem or azactam to cover for Serratia until c/s back   KPO4 and Mg supplement  IV levaquin   /CMV12/ 35% with Peep5 cm   decrease sedation and try Cpap with PS 10 cm if ok with icu team  OOB in chair/ BRP  Regina Trimbleb  by HFN rx qid prn   s/c lovenox  FS coverage

## 2020-10-08 NOTE — PROGRESS NOTE ADULT - ASSESSMENT
· Assessment	  65 year old lady with severe COPD and lung ca with mets to bones which causing compression Fx.  She had one RT for that.  she was admitted for dyspnea and hypoxia.    Problem/Recommendation - 1:  Problem: Lung cancer. Recommendation: with mets to bones  on chemo keytruda, alimta and carboplatin  she had one RT to spine.  the tumor markers have been trending down with chemo    Problem/Recommendation - 2:  ·  Problem: COPD (chronic obstructive pulmonary disease).  Recommendation: required freq steroids.     Problem/Recommendation - 3:  ·  Problem: Acute on chronic respiratory failure with hypoxia.  Recommendation: ?aspiration causing resp failure requiring intubation  she has pain at throat for a while and has difficulty swallowing, this got better  4?strider   intubation again  plsn for trach and peg  prognosis very poor

## 2020-10-08 NOTE — PROGRESS NOTE ADULT - SUBJECTIVE AND OBJECTIVE BOX
Thoracic Surgery    Subjective:  Pt resting comfortably. No post op events.  Trach to vent.  PEG to gravity.    T(C): 37.6 (10-08-20 @ 20:00), Max: 37.9 (10-08-20 @ 04:45)  HR: 82 (10-08-20 @ 21:00) (65 - 142)  BP: 106/71 (10-08-20 @ 21:00) (90/49 - 175/114)  RR: 20 (10-08-20 @ 21:00) (10 - 32)  SpO2: 100% (10-08-20 @ 21:00) (96% - 100%)    Physical:  Gen: NAD  HEENT: Trach in place. Dressing C/D/I. Sutures in place.  Abd: Soft ND, Dressing C/D/I. PEG in place.

## 2020-10-08 NOTE — BRIEF OPERATIVE NOTE - NSICDXBRIEFPREOP_GEN_ALL_CORE_FT
PRE-OP DIAGNOSIS:  Adult failure to thrive 08-Oct-2020 15:22:49  Ebonie Mayes  Respiratory failure 08-Oct-2020 15:23:03  Ebonie Mayes

## 2020-10-08 NOTE — BRIEF OPERATIVE NOTE - NSICDXBRIEFPROCEDURE_GEN_ALL_CORE_FT
PROCEDURES:  Percutaneous endoscopic gastrostomy (PEG) 08-Oct-2020 15:22:35  Ebonie Mayes  Open tracheostomy 08-Oct-2020 15:21:50  Ebonie Mayes

## 2020-10-08 NOTE — PROGRESS NOTE ADULT - SUBJECTIVE AND OBJECTIVE BOX
PULMONARY  progress note    BARB COLÓN  MRN-[id number]    Patient is a 65y old  Female who presents with a chief complaint of shortness of breath (08 Oct 2020 09:29)  Pt remains intubated and sedated on CMV14//40% with PP33 and O2 ucz089%      MEDICATIONS  (STANDING):  atorvastatin 40 milliGRAM(s) Oral at bedtime  chlorhexidine 0.12% Liquid 15 milliLiter(s) Oral Mucosa every 12 hours  chlorhexidine 2% Cloths 1 Application(s) Topical <User Schedule>  dexMEDEtomidine Infusion 0.7 MICROgram(s)/kG/Hr (14.8 mL/Hr) IV Continuous <Continuous>  dextrose 5%. 1000 milliLiter(s) (50 mL/Hr) IV Continuous <Continuous>  dextrose 50% Injectable 12.5 Gram(s) IV Push once  dextrose 50% Injectable 25 Gram(s) IV Push once  dextrose 50% Injectable 25 Gram(s) IV Push once  ergocalciferol 81713 Unit(s) Oral <User Schedule>  fentaNYL   Infusion. 4 MICROgram(s)/kG/Hr (33.9 mL/Hr) IV Continuous <Continuous>  gabapentin 600 milliGRAM(s) Oral every 8 hours  influenza   Vaccine 0.5 milliLiter(s) IntraMuscular once  insulin glargine Injectable (LANTUS) 20 Unit(s) SubCutaneous every morning  insulin lispro (HumaLOG) corrective regimen sliding scale   SubCutaneous every 6 hours  insulin lispro Injectable (HumaLOG) 4 Unit(s) SubCutaneous every 6 hours  levoFLOXacin IVPB 750 milliGRAM(s) IV Intermittent every 24 hours  levoFLOXacin IVPB      nystatin Powder 1 Application(s) Topical two times a day  pantoprazole  Injectable 40 milliGRAM(s) IV Push daily  phenylephrine    Infusion 1 MICROgram(s)/kG/Min (31.8 mL/Hr) IV Continuous <Continuous>  polyethylene glycol 3350 17 Gram(s) Oral two times a day        Allergies    fish (Angioedema)  penicillins (Rash)        PAST MEDICAL & SURGICAL HISTORY:  Malignant neoplasm of unspecified part of right bronchus or lung    HTN (hypertension)    DM (diabetes mellitus)    COPD (chronic obstructive pulmonary disease)    Lung cancer    Morbid obesity    GERD (gastroesophageal reflux disease)    Deviated septum    Prolapsed uterus    ITP (idiopathic thrombocytopenic purpura)    Emphysema/COPD    History of cholecystectomy    S/P lobectomy of lung  right 2017    History of tonsillectomy             REVIEW OF SYSTEMS: as per staff  CONSTITUTIONAL: No fever, weight loss, or fatigue   EYES: No eye pain, visual disturbances, or discharge  ENT:  No difficulty hearing, tinnitus, vertigo; No sinus or throat pain  NECK: No pain or stiffness or nodes  RESPIRATORY:  cough --  wheezing --  chills--   hemoptysis--  Shortness of Breath--  CARDIOVASCULAR: No chest pain, palpitations, passing out, dizziness, or leg swelling  GASTROINTESTINAL: No abdominal or epigastric pain. No nausea, vomiting, or hematemesis; No diarrhea or constipation. No melena or hematochezia.  GENITOURINARY: No dysuria, frequency, hematuria, or incontinence  NEUROLOGICAL: sedated  ENDOCRINE: No heat or cold intolerance; No hair loss  MUSCULOSKELETAL: No joint pain or swelling; No muscle, back, or extremity pain  HEME/LYMPH: No easy bruising, or bleeding gums  ALLERGY AND IMMUNOLOGIC: No hives or eczema    Vital Signs Last 24 Hrs  T(C): 36 (08 Oct 2020 08:30), Max: 37.9 (08 Oct 2020 04:45)  T(F): 96.8 (08 Oct 2020 08:30), Max: 100.3 (08 Oct 2020 04:45)  HR: 83 (08 Oct 2020 10:00) (65 - 131)  BP: 100/47 (08 Oct 2020 10:00) (88/46 - 163/74)  BP(mean): 59 (08 Oct 2020 10:00) (56 - 128)  RR: 14 (08 Oct 2020 10:00) (12 - 32)  SpO2: 99% (08 Oct 2020 10:00) (96% - 100%)  I&O's Detail    07 Oct 2020 07:01  -  08 Oct 2020 07:00  --------------------------------------------------------  IN:    Dexmedetomidine: 29.6 mL    Dexmedetomidine: 421.6 mL    dextrose 5% + sodium chloride 0.9%: 400 mL    Enteral Tube Flush: 270 mL    FentaNYL: 779.7 mL    Glucerna 1.5: 720 mL    IV PiggyBack: 50 mL    Phenylephrine: 178.4 mL    Platelets - Single Donor: 225 mL    PRBCs (Packed Red Blood Cells): 350 mL  Total IN: 3424.3 mL    OUT:    Indwelling Catheter - Urethral (mL): 1665 mL  Total OUT: 1665 mL    Total NET: 1759.3 mL          PHYSICAL EXAMINATION:    GENERAL: The patient is a obese w/f intubated and sedated in no apparent distress.   SKIN no rash ecchymoses or bruises  HEENT: Head is normocephalic and atraumatic  CARMEN , Extraocular muscles are intact. Mucous membranes  moist.   Neck supple ,No LN felt JVP not increased  Thyroid not enlarged  Cardiovascular:  S1 S2 heard, RSR, No JVD , systolic  murmur at apex, No gallop or rub  Respiratory: Chest wall symmetrical with good air entry ,Percussion note normal,    Lungs vesicular breathing with rt basilar   rales , no   wheeze	  ABDOMEN:  Soft, Non-tender, obese, NGT in place , no hepatomegaly or splenomegaly BS positive	  Extremities: Normal range of motion, No clubbing, cyanosis or edema  Vascular: Peripheral pulses palpable 2+ bilaterally  CNS: Sedated deeply  sensory intact  motor power5/5  dtr 2+   Babinski neg    LABS:                        9.7    3.45  )-----------( 50       ( 08 Oct 2020 05:06 )             28.8     10-08    136  |  98  |  18  ----------------------------<  236<H>  3.8   |  32<H>  |  0.45<L>    Ca    8.2<L>      08 Oct 2020 05:06  Phos  2.2     10-08  Mg     2.0     10-08    TPro  5.5<L>  /  Alb  1.9<L>  /  TBili  1.0  /  DBili  x   /  AST  18  /  ALT  33  /  AlkPhos  149<H>  10-08    PT/INR - ( 08 Oct 2020 05:06 )   PT: 13.8 sec;   INR: 1.17 ratio         PTT - ( 08 Oct 2020 05:06 )  PTT:26.1 sec            MICROBIOLOGY:    Culture - Blood (collected 10-06-20 @ 10:10)  Source: .Blood Blood  Gram Stain (10-07-20 @ 01:07):    Growth in anaerobic bottle: Gram Negative Rods  Preliminary Report (10-07-20 @ 21:40):    Growth in anaerobic bottle: Serratia marcescens    ***Blood Panel PCR results on this specimen are available    approximately 3 hours after the Gram stain result.***    Gram stain, PCR, and/or culture results may not always    correspond due to difference in methodologies.    ************************************************************    This PCR assay was performed using [employer].    The following targets are tested for: Enterococcus,    vancomycin resistant enterococci, Listeria monocytogenes,    coagulase negative staphylococci, S. aureus,    methicillin resistant S. aureus, Streptococcus agalactiae    (Group B), S. pneumoniae, S. pyogenes (Group A),    Acinetobacter baumannii, Enterobacter cloacae, E. coli,    Klebsiella oxytoca, K. pneumoniae, Proteussp.,    Serratia marcescens, Haemophilus influenzae,    Neisseria meningitidis, Pseudomonas aeruginosa, Candida    albicans, C. glabrata, C krusei, C parapsilosis,    C. tropicalis and the KPC resistance gene.    "For positive Serratia,  results cross-reactivity has been occasionally    observed with some species of Pseudomonas and Pantoea"    "Due to technical problems, Proteus sp. will Not be reported as part of    the BCID panel until further notice"  Organism: Blood Culture PCR (10-07-20 @ 03:03)  Organism: Blood Culture PCR (10-07-20 @ 03:03)      -  Serratia marcescens: Detec      Method Type: PCR    Culture - Blood (collected 10-06-20 @ 10:10)  Source: .Blood Blood  Gram Stain (10-07-20 @ 15:14):    Growth in aerobic bottle: Gram Negative Rods  Preliminary Report (10-07-20 @ 15:14):    Growth in aerobic bottle: Gram Negative Rods    Culture - Blood (collected 10-04-20 @ 11:02)  Source: .Blood Blood  Preliminary Report (10-05-20 @ 12:01):    No growth to date.    Culture - Blood (collected 10-04-20 @ 11:02)  Source: .Blood Blood  Preliminary Report (10-05-20 @ 12:01):    No growth to date.

## 2020-10-09 LAB
ALBUMIN SERPL ELPH-MCNC: 2 G/DL — LOW (ref 3.5–5)
ALP SERPL-CCNC: 114 U/L — SIGNIFICANT CHANGE UP (ref 40–120)
ALT FLD-CCNC: 31 U/L DA — SIGNIFICANT CHANGE UP (ref 10–60)
ANION GAP SERPL CALC-SCNC: 4 MMOL/L — LOW (ref 5–17)
ANION GAP SERPL CALC-SCNC: 6 MMOL/L — SIGNIFICANT CHANGE UP (ref 5–17)
AST SERPL-CCNC: 15 U/L — SIGNIFICANT CHANGE UP (ref 10–40)
BILIRUB SERPL-MCNC: 1 MG/DL — SIGNIFICANT CHANGE UP (ref 0.2–1.2)
BUN SERPL-MCNC: 12 MG/DL — SIGNIFICANT CHANGE UP (ref 7–18)
BUN SERPL-MCNC: 16 MG/DL — SIGNIFICANT CHANGE UP (ref 7–18)
CALCIUM SERPL-MCNC: 7.9 MG/DL — LOW (ref 8.4–10.5)
CALCIUM SERPL-MCNC: 7.9 MG/DL — LOW (ref 8.4–10.5)
CHLORIDE SERPL-SCNC: 101 MMOL/L — SIGNIFICANT CHANGE UP (ref 96–108)
CHLORIDE SERPL-SCNC: 97 MMOL/L — SIGNIFICANT CHANGE UP (ref 96–108)
CO2 SERPL-SCNC: 34 MMOL/L — HIGH (ref 22–31)
CO2 SERPL-SCNC: 36 MMOL/L — HIGH (ref 22–31)
CREAT SERPL-MCNC: 0.33 MG/DL — LOW (ref 0.5–1.3)
CREAT SERPL-MCNC: 0.35 MG/DL — LOW (ref 0.5–1.3)
CULTURE RESULTS: SIGNIFICANT CHANGE UP
GLUCOSE BLDC GLUCOMTR-MCNC: 102 MG/DL — HIGH (ref 70–99)
GLUCOSE BLDC GLUCOMTR-MCNC: 105 MG/DL — HIGH (ref 70–99)
GLUCOSE BLDC GLUCOMTR-MCNC: 141 MG/DL — HIGH (ref 70–99)
GLUCOSE BLDC GLUCOMTR-MCNC: 155 MG/DL — HIGH (ref 70–99)
GLUCOSE BLDC GLUCOMTR-MCNC: 168 MG/DL — HIGH (ref 70–99)
GLUCOSE SERPL-MCNC: 115 MG/DL — HIGH (ref 70–99)
GLUCOSE SERPL-MCNC: 130 MG/DL — HIGH (ref 70–99)
GRAM STN FLD: SIGNIFICANT CHANGE UP
HCT VFR BLD CALC: 24.3 % — LOW (ref 34.5–45)
HCT VFR BLD CALC: 27.4 % — LOW (ref 34.5–45)
HGB BLD-MCNC: 8.4 G/DL — LOW (ref 11.5–15.5)
HGB BLD-MCNC: 9.5 G/DL — LOW (ref 11.5–15.5)
MAGNESIUM SERPL-MCNC: 1.8 MG/DL — SIGNIFICANT CHANGE UP (ref 1.6–2.6)
MAGNESIUM SERPL-MCNC: 1.8 MG/DL — SIGNIFICANT CHANGE UP (ref 1.6–2.6)
MCHC RBC-ENTMCNC: 29.7 PG — SIGNIFICANT CHANGE UP (ref 27–34)
MCHC RBC-ENTMCNC: 29.9 PG — SIGNIFICANT CHANGE UP (ref 27–34)
MCHC RBC-ENTMCNC: 34.6 GM/DL — SIGNIFICANT CHANGE UP (ref 32–36)
MCHC RBC-ENTMCNC: 34.7 GM/DL — SIGNIFICANT CHANGE UP (ref 32–36)
MCV RBC AUTO: 85.9 FL — SIGNIFICANT CHANGE UP (ref 80–100)
MCV RBC AUTO: 86.2 FL — SIGNIFICANT CHANGE UP (ref 80–100)
NIGHT BLUE STAIN TISS: SIGNIFICANT CHANGE UP
NRBC # BLD: 0 /100 WBCS — SIGNIFICANT CHANGE UP (ref 0–0)
NRBC # BLD: 2 /100 WBCS — HIGH (ref 0–0)
ORGANISM # SPEC MICROSCOPIC CNT: SIGNIFICANT CHANGE UP
PHOSPHATE SERPL-MCNC: 2.5 MG/DL — SIGNIFICANT CHANGE UP (ref 2.5–4.5)
PHOSPHATE SERPL-MCNC: 2.6 MG/DL — SIGNIFICANT CHANGE UP (ref 2.5–4.5)
PLATELET # BLD AUTO: 37 K/UL — LOW (ref 150–400)
PLATELET # BLD AUTO: 46 K/UL — LOW (ref 150–400)
POTASSIUM SERPL-MCNC: 3.6 MMOL/L — SIGNIFICANT CHANGE UP (ref 3.5–5.3)
POTASSIUM SERPL-MCNC: 3.6 MMOL/L — SIGNIFICANT CHANGE UP (ref 3.5–5.3)
POTASSIUM SERPL-SCNC: 3.6 MMOL/L — SIGNIFICANT CHANGE UP (ref 3.5–5.3)
POTASSIUM SERPL-SCNC: 3.6 MMOL/L — SIGNIFICANT CHANGE UP (ref 3.5–5.3)
PROT SERPL-MCNC: 5.1 G/DL — LOW (ref 6–8.3)
RBC # BLD: 2.83 M/UL — LOW (ref 3.8–5.2)
RBC # BLD: 3.18 M/UL — LOW (ref 3.8–5.2)
RBC # FLD: 15.4 % — HIGH (ref 10.3–14.5)
RBC # FLD: 15.5 % — HIGH (ref 10.3–14.5)
SODIUM SERPL-SCNC: 139 MMOL/L — SIGNIFICANT CHANGE UP (ref 135–145)
SODIUM SERPL-SCNC: 139 MMOL/L — SIGNIFICANT CHANGE UP (ref 135–145)
SPECIMEN SOURCE: SIGNIFICANT CHANGE UP
SRA INTERP SER-IMP: SIGNIFICANT CHANGE UP
WBC # BLD: 2.59 K/UL — LOW (ref 3.8–10.5)
WBC # BLD: 3.3 K/UL — LOW (ref 3.8–10.5)
WBC # FLD AUTO: 2.59 K/UL — LOW (ref 3.8–10.5)
WBC # FLD AUTO: 3.3 K/UL — LOW (ref 3.8–10.5)

## 2020-10-09 PROCEDURE — 71045 X-RAY EXAM CHEST 1 VIEW: CPT | Mod: 26

## 2020-10-09 RX ORDER — INSULIN LISPRO 100/ML
VIAL (ML) SUBCUTANEOUS EVERY 6 HOURS
Refills: 0 | Status: DISCONTINUED | OUTPATIENT
Start: 2020-10-09 | End: 2020-10-15

## 2020-10-09 RX ORDER — ERGOCALCIFEROL 1.25 MG/1
50000 CAPSULE ORAL
Refills: 0 | Status: DISCONTINUED | OUTPATIENT
Start: 2020-10-09 | End: 2020-10-15

## 2020-10-09 RX ORDER — ACETAMINOPHEN 500 MG
650 TABLET ORAL EVERY 6 HOURS
Refills: 0 | Status: DISCONTINUED | OUTPATIENT
Start: 2020-10-09 | End: 2020-10-10

## 2020-10-09 RX ORDER — MORPHINE SULFATE 50 MG/1
1 CAPSULE, EXTENDED RELEASE ORAL ONCE
Refills: 0 | Status: DISCONTINUED | OUTPATIENT
Start: 2020-10-09 | End: 2020-10-09

## 2020-10-09 RX ORDER — MORPHINE SULFATE 50 MG/1
2 CAPSULE, EXTENDED RELEASE ORAL ONCE
Refills: 0 | Status: DISCONTINUED | OUTPATIENT
Start: 2020-10-09 | End: 2020-10-09

## 2020-10-09 RX ORDER — CARVEDILOL PHOSPHATE 80 MG/1
3.12 CAPSULE, EXTENDED RELEASE ORAL EVERY 12 HOURS
Refills: 0 | Status: DISCONTINUED | OUTPATIENT
Start: 2020-10-09 | End: 2020-10-10

## 2020-10-09 RX ORDER — CARVEDILOL PHOSPHATE 80 MG/1
3.12 CAPSULE, EXTENDED RELEASE ORAL EVERY 12 HOURS
Refills: 0 | Status: DISCONTINUED | OUTPATIENT
Start: 2020-10-09 | End: 2020-10-09

## 2020-10-09 RX ORDER — SODIUM CHLORIDE 9 MG/ML
10 INJECTION INTRAMUSCULAR; INTRAVENOUS; SUBCUTANEOUS
Refills: 0 | Status: DISCONTINUED | OUTPATIENT
Start: 2020-10-09 | End: 2020-10-15

## 2020-10-09 RX ORDER — CHLORHEXIDINE GLUCONATE 213 G/1000ML
1 SOLUTION TOPICAL
Refills: 0 | Status: DISCONTINUED | OUTPATIENT
Start: 2020-10-09 | End: 2020-10-15

## 2020-10-09 RX ORDER — MAGNESIUM SULFATE 500 MG/ML
1 VIAL (ML) INJECTION ONCE
Refills: 0 | Status: COMPLETED | OUTPATIENT
Start: 2020-10-09 | End: 2020-10-09

## 2020-10-09 RX ORDER — POLYETHYLENE GLYCOL 3350 17 G/17G
17 POWDER, FOR SOLUTION ORAL
Refills: 0 | Status: DISCONTINUED | OUTPATIENT
Start: 2020-10-09 | End: 2020-10-09

## 2020-10-09 RX ORDER — FUROSEMIDE 40 MG
40 TABLET ORAL ONCE
Refills: 0 | Status: COMPLETED | OUTPATIENT
Start: 2020-10-09 | End: 2020-10-09

## 2020-10-09 RX ORDER — INSULIN LISPRO 100/ML
4 VIAL (ML) SUBCUTANEOUS EVERY 6 HOURS
Refills: 0 | Status: DISCONTINUED | OUTPATIENT
Start: 2020-10-09 | End: 2020-10-12

## 2020-10-09 RX ORDER — ALBUTEROL 90 UG/1
2 AEROSOL, METERED ORAL EVERY 6 HOURS
Refills: 0 | Status: DISCONTINUED | OUTPATIENT
Start: 2020-10-09 | End: 2020-10-15

## 2020-10-09 RX ORDER — SODIUM CHLORIDE 9 MG/ML
1000 INJECTION INTRAMUSCULAR; INTRAVENOUS; SUBCUTANEOUS ONCE
Refills: 0 | Status: DISCONTINUED | OUTPATIENT
Start: 2020-10-09 | End: 2020-10-09

## 2020-10-09 RX ORDER — POTASSIUM CHLORIDE 20 MEQ
20 PACKET (EA) ORAL
Refills: 0 | Status: DISCONTINUED | OUTPATIENT
Start: 2020-10-09 | End: 2020-10-09

## 2020-10-09 RX ORDER — POTASSIUM CHLORIDE 20 MEQ
40 PACKET (EA) ORAL ONCE
Refills: 0 | Status: COMPLETED | OUTPATIENT
Start: 2020-10-09 | End: 2020-10-09

## 2020-10-09 RX ORDER — ATORVASTATIN CALCIUM 80 MG/1
40 TABLET, FILM COATED ORAL AT BEDTIME
Refills: 0 | Status: DISCONTINUED | OUTPATIENT
Start: 2020-10-09 | End: 2020-10-15

## 2020-10-09 RX ORDER — MORPHINE SULFATE 50 MG/1
2 CAPSULE, EXTENDED RELEASE ORAL EVERY 6 HOURS
Refills: 0 | Status: DISCONTINUED | OUTPATIENT
Start: 2020-10-09 | End: 2020-10-10

## 2020-10-09 RX ORDER — GABAPENTIN 400 MG/1
600 CAPSULE ORAL EVERY 8 HOURS
Refills: 0 | Status: DISCONTINUED | OUTPATIENT
Start: 2020-10-09 | End: 2020-10-15

## 2020-10-09 RX ORDER — ENOXAPARIN SODIUM 100 MG/ML
40 INJECTION SUBCUTANEOUS DAILY
Refills: 0 | Status: DISCONTINUED | OUTPATIENT
Start: 2020-10-09 | End: 2020-10-09

## 2020-10-09 RX ADMIN — MORPHINE SULFATE 2 MILLIGRAM(S): 50 CAPSULE, EXTENDED RELEASE ORAL at 12:17

## 2020-10-09 RX ADMIN — PANTOPRAZOLE SODIUM 40 MILLIGRAM(S): 20 TABLET, DELAYED RELEASE ORAL at 11:01

## 2020-10-09 RX ADMIN — GABAPENTIN 600 MILLIGRAM(S): 400 CAPSULE ORAL at 06:15

## 2020-10-09 RX ADMIN — MORPHINE SULFATE 1 MILLIGRAM(S): 50 CAPSULE, EXTENDED RELEASE ORAL at 11:17

## 2020-10-09 RX ADMIN — Medication 650 MILLIGRAM(S): at 06:57

## 2020-10-09 RX ADMIN — Medication 40 MILLIEQUIVALENT(S): at 09:12

## 2020-10-09 RX ADMIN — MORPHINE SULFATE 2 MILLIGRAM(S): 50 CAPSULE, EXTENDED RELEASE ORAL at 09:53

## 2020-10-09 RX ADMIN — ATORVASTATIN CALCIUM 40 MILLIGRAM(S): 80 TABLET, FILM COATED ORAL at 21:25

## 2020-10-09 RX ADMIN — ALBUTEROL 2 PUFF(S): 90 AEROSOL, METERED ORAL at 09:37

## 2020-10-09 RX ADMIN — DEXMEDETOMIDINE HYDROCHLORIDE IN 0.9% SODIUM CHLORIDE 4.24 MICROGRAM(S)/KG/HR: 4 INJECTION INTRAVENOUS at 03:34

## 2020-10-09 RX ADMIN — CHLORHEXIDINE GLUCONATE 15 MILLILITER(S): 213 SOLUTION TOPICAL at 17:37

## 2020-10-09 RX ADMIN — CHLORHEXIDINE GLUCONATE 1 APPLICATION(S): 213 SOLUTION TOPICAL at 06:16

## 2020-10-09 RX ADMIN — Medication 4 UNIT(S): at 17:37

## 2020-10-09 RX ADMIN — Medication 650 MILLIGRAM(S): at 06:17

## 2020-10-09 RX ADMIN — FENTANYL CITRATE 17 MICROGRAM(S)/KG/HR: 50 INJECTION INTRAVENOUS at 06:16

## 2020-10-09 RX ADMIN — GABAPENTIN 600 MILLIGRAM(S): 400 CAPSULE ORAL at 13:48

## 2020-10-09 RX ADMIN — Medication 100 GRAM(S): at 09:12

## 2020-10-09 RX ADMIN — CARVEDILOL PHOSPHATE 3.12 MILLIGRAM(S): 80 CAPSULE, EXTENDED RELEASE ORAL at 17:35

## 2020-10-09 RX ADMIN — CHLORHEXIDINE GLUCONATE 15 MILLILITER(S): 213 SOLUTION TOPICAL at 06:16

## 2020-10-09 RX ADMIN — ALBUTEROL 2 PUFF(S): 90 AEROSOL, METERED ORAL at 20:32

## 2020-10-09 RX ADMIN — Medication 2: at 17:38

## 2020-10-09 RX ADMIN — ALBUTEROL 2 PUFF(S): 90 AEROSOL, METERED ORAL at 15:55

## 2020-10-09 RX ADMIN — Medication 40 MILLIGRAM(S): at 09:13

## 2020-10-09 RX ADMIN — CARVEDILOL PHOSPHATE 3.12 MILLIGRAM(S): 80 CAPSULE, EXTENDED RELEASE ORAL at 13:49

## 2020-10-09 RX ADMIN — MORPHINE SULFATE 2 MILLIGRAM(S): 50 CAPSULE, EXTENDED RELEASE ORAL at 09:37

## 2020-10-09 RX ADMIN — MORPHINE SULFATE 1 MILLIGRAM(S): 50 CAPSULE, EXTENDED RELEASE ORAL at 11:01

## 2020-10-09 RX ADMIN — MORPHINE SULFATE 2 MILLIGRAM(S): 50 CAPSULE, EXTENDED RELEASE ORAL at 12:42

## 2020-10-09 RX ADMIN — Medication 20 MILLIGRAM(S): at 11:01

## 2020-10-09 RX ADMIN — GABAPENTIN 600 MILLIGRAM(S): 400 CAPSULE ORAL at 21:25

## 2020-10-09 NOTE — PROGRESS NOTE ADULT - SUBJECTIVE AND OBJECTIVE BOX
PULMONARY  progress note    BARB COLÓN  MRN-[id number]    Patient is a 65y old  Female who presents with a chief complaint of shortness of breath (09 Oct 2020 09:18)  Pt had Trach and Peg POD1  On vent CMV14//40% with PP33      MEDICATIONS  (STANDING):  ALBUTerol    90 MICROgram(s) HFA Inhaler 2 Puff(s) Inhalation every 6 hours  atorvastatin 40 milliGRAM(s) Oral at bedtime  carvedilol 3.125 milliGRAM(s) Oral every 12 hours  chlorhexidine 0.12% Liquid 15 milliLiter(s) Oral Mucosa every 12 hours  chlorhexidine 2% Cloths 1 Application(s) Topical <User Schedule>  dextrose 5%. 1000 milliLiter(s) (50 mL/Hr) IV Continuous <Continuous>  dextrose 50% Injectable 12.5 Gram(s) IV Push once  dextrose 50% Injectable 25 Gram(s) IV Push once  dextrose 50% Injectable 25 Gram(s) IV Push once  ergocalciferol 61822 Unit(s) Oral <User Schedule>  gabapentin 600 milliGRAM(s) Oral every 8 hours  influenza   Vaccine 0.5 milliLiter(s) IntraMuscular once  insulin glargine Injectable (LANTUS) 10 Unit(s) SubCutaneous at bedtime  insulin lispro (HumaLOG) corrective regimen sliding scale   SubCutaneous every 6 hours  insulin lispro Injectable (HumaLOG) 4 Unit(s) SubCutaneous every 6 hours  levoFLOXacin IVPB 750 milliGRAM(s) IV Intermittent every 24 hours  levoFLOXacin IVPB      pantoprazole  Injectable 40 milliGRAM(s) IV Push daily      MEDICATIONS  (PRN):  acetaminophen    Suspension .. 650 milliGRAM(s) Oral every 6 hours PRN Temp greater or equal to 38C (100.4F)  dextrose 40% Gel 15 Gram(s) Oral once PRN Blood Glucose LESS THAN 70 milliGRAM(s)/deciliter  glucagon  Injectable 1 milliGRAM(s) IntraMuscular once PRN Glucose LESS THAN 70 milligrams/deciliter  morphine  - Injectable 2 milliGRAM(s) IV Push every 6 hours PRN Severe Pain (7 - 10)  sodium chloride 0.9% lock flush 10 milliLiter(s) IV Push every 1 hour PRN Pre/post blood products, medications, blood draw, and to maintain line patency      Allergies    fish (Angioedema)  penicillins (Rash)            PAST MEDICAL & SURGICAL HISTORY:  Malignant neoplasm of unspecified part of right bronchus or lung    HTN (hypertension)    DM (diabetes mellitus)    COPD (chronic obstructive pulmonary disease)    Lung cancer    Morbid obesity    GERD (gastroesophageal reflux disease)    Deviated septum    Prolapsed uterus    ITP (idiopathic thrombocytopenic purpura)    Emphysema/COPD    History of cholecystectomy    S/P lobectomy of lung  right 2017    History of tonsillectomy             REVIEW OF SYSTEMS:  CONSTITUTIONAL: No fever, weight loss, or fatigue   EYES: No eye pain, visual disturbances, or discharge  ENT:  No difficulty hearing, tinnitus, vertigo; No sinus or throat pain  NECK: No pain or stiffness or nodes  RESPIRATORY:  cough+  wheezing --  chills --  hemoptysis--  Shortness of Breath+  CARDIOVASCULAR: No chest pain, palpitations, passing out, dizziness, or leg swelling  GASTROINTESTINAL: No abdominal or epigastric pain. No nausea, vomiting, or hematemesis; No diarrhea or constipation. No melena or hematochezia.  GENITOURINARY: No dysuria, frequency, hematuria, or incontinence  NEUROLOGICAL: No headaches, memory loss, loss of strength, numbness, or tremors  SKIN: No itching, burning, rashes, or lesions   LYMPH Nodes: No enlarged glands  ENDOCRINE: No heat or cold intolerance; No hair loss  HEME/LYMPH: No easy bruising, or bleeding gums  ALLERGY AND IMMUNOLOGIC: No hives or eczema    Vital Signs Last 24 Hrs  T(C): 38 (09 Oct 2020 04:45), Max: 38 (09 Oct 2020 04:45)  T(F): 100.4 (09 Oct 2020 04:45), Max: 100.4 (09 Oct 2020 04:45)  HR: 107 (09 Oct 2020 08:06) (73 - 142)  BP: 132/61 (09 Oct 2020 08:06) (76/44 - 175/114)  BP(mean): 78 (09 Oct 2020 08:06) (51 - 128)  RR: 17 (09 Oct 2020 08:06) (10 - 29)  SpO2: 96% (09 Oct 2020 08:06) (96% - 100%)  I&O's Detail    08 Oct 2020 07:01  -  09 Oct 2020 07:00  --------------------------------------------------------  IN:    Dexmedetomidine: 127.2 mL    Dexmedetomidine: 63.6 mL    Dexmedetomidine: 309.4 mL    FentaNYL: 152.2 mL    FentaNYL: 237.3 mL    FentaNYL: 203.4 mL    IV PiggyBack: 250 mL    IV PiggyBack: 150 mL    Platelets - Single Donor: 290 mL  Total IN: 1783.1 mL    OUT:    Glucerna 1.5: 0 mL    Indwelling Catheter - Urethral (mL): 815 mL    PEG (Percutaneous Endoscopic Gastrostomy) Tube (mL): 200 mL    Phenylephrine: 0 mL  Total OUT: 1015 mL    Total NET: 768.1 mL          PHYSICAL EXAMINATION:    GENERAL: The patient is a well-developed,  obese w/f in no apparent distress.   SKIN no rash ecchymoses or bruises  HEENT: Head is normocephalic and atraumatic  SHAMA , Extraocular muscles are intact. Mucous membranes  moist.   Neck supple ,No LN felt JVP not increased  Thyroid not enlarged, TT in place  Cardiovascular:  S1 S2 heard, RSR, No JVD , systolic  murmur at apex, No gallop or rub  Respiratory: Chest wall symmetrical with good air entry ,Percussion note normal,    Lungs vesicular breathing with  bibasilar  rales , no   wheeze	  ABDOMEN:  Soft, Non-tender, obese, G/T in place , no hepatomegaly or splenomegaly BS positive	  Extremities: Normal range of motion, No clubbing, cyanosis or edema  Vascular: Peripheral pulses palpable 2+ bilaterally  CNS:  Lethargic but  arousable  verbally   Cranial nerves intact  sensory intact  motor power5/5  dtr 2+   Babinski neg    LABS:                        8.4    2.59  )-----------( 46       ( 09 Oct 2020 03:48 )             24.3     10-09    139  |  101  |  16  ----------------------------<  115<H>  3.6   |  34<H>  |  0.35<L>    Ca    7.9<L>      09 Oct 2020 03:48  Phos  2.6     10-09  Mg     1.8     10-09    TPro  5.1<L>  /  Alb  2.0<L>  /  TBili  1.0  /  DBili  x   /  AST  15  /  ALT  31  /  AlkPhos  114  10-09    PT/INR - ( 08 Oct 2020 05:06 )   PT: 13.8 sec;   INR: 1.17 ratio         PTT - ( 08 Oct 2020 05:06 )  PTT:26.1 sec      Procalcitonin, Serum: 6.34 ng/mL (10-08-20 @ 11:28)      MICROBIOLOGY:    Culture - Fungal, Bronchial (collected 10-08-20 @ 22:37)  Source: .Bronchial Other, bronchial  Preliminary Report (10-09-20 @ 08:30):    Testing in progress    Culture - Bronchial (collected 10-08-20 @ 22:37)  Source: .Bronchial   Gram Stain (10-09-20 @ 07:53):    Moderate polymorphonuclear leukocytes seen per low power field    No Squamous epithelial cells seen per low power field    No organisms seen per oil power field    Culture - Blood (collected 10-08-20 @ 06:59)  Source: .Blood   Preliminary Report (10-09-20 @ 07:02):    No growth to date.    Culture - Blood (collected 10-08-20 @ 06:59)  Source: .Blood Blood  Preliminary Report (10-09-20 @ 07:02):    No growth to date.    Culture - Blood (collected 10-06-20 @ 10:10)  Source: .Blood Blood  Gram Stain (10-08-20 @ 12:16):    Growth in anaerobic bottle: Gram Negative Rods    Growth in aerobic bottle: Gram Negative Rods  Final Report (10-09-20 @ 08:44):    Growth in aerobic and anaerobic bottles: Serratia marcescens    "Due to technical problems, Proteus sp. will Not be reported as part of    the BCID panel until further notice"    ***Blood Panel PCR results on this specimen are available    approximately 3 hours after the Gram stain result.***    Gram stain, PCR, and/or culture results may not always    correspond due to difference in methodologies.    ************************************************************    This PCR assay was performed using [employer].    The following targets are tested for: Enterococcus,    vancomycin resistant enterococci, Listeria monocytogenes,    coagulase negative staphylococci, S. aureus,    methicillin resistant S. aureus, Streptococcus agalactiae    (Group B), S. pneumoniae,S. pyogenes (Group A),    Acinetobacter baumannii, Enterobacter cloacae, E. coli,    Klebsiella oxytoca, K. pneumoniae, Proteus sp.,    Serratia marcescens, Haemophilus influenzae,    Neisseria meningitidis, Pseudomonas aeruginosa, Candida    albicans, C. glabrata, C krusei, C parapsilosis,    C. tropicalis and the KPC resistance gene.    "For positive Serratia,  results cross-reactivity has been occasionally    observed with some species of Pseudomonas and Pantoea"  Organism: Blood Culture PCR  Serratia marcescens (10-09-20 @ 08:44)  Organism: Serratia marcescens (10-09-20 @ 08:44)      -  Amikacin: S <=16      -  Ampicillin: R <=8 These ampicillin results predict results for amoxicillin      -  Ampicillin/Sulbactam: R <=4/2 Enterobacter, Citrobacter, and Serratia may develop resistance during prolonged therapy (3-4 days)      -  Aztreonam: S <=4      -  Cefazolin: R >16 Enterobacter, Citrobacter, and Serratia may develop resistance during prolonged therapy (3-4 days)      -  Cefepime: S <=2      -  Cefoxitin: R 16      -  Ceftriaxone: S <=1 Enterobacter, Citrobacter, and Serratia may develop resistance during prolonged therapy      -  Ciprofloxacin: S <=0.25      -  Ertapenem: S <=0.5      -  Gentamicin: S <=2      -  Levofloxacin: S <=0.5      -  Meropenem: S <=1      -  Piperacillin/Tazobactam: S <=8      -  Tobramycin: S 4      -  Trimethoprim/Sulfamethoxazole: S <=0.5/9.5      Method Type: DALE  Organism: Blood Culture PCR (10-09-20 @ 08:44)      -  Serratia marcescens: Detec      Method Type: PCR    Culture - Blood (collected 10-06-20 @ 10:10)  Source: .Blood Blood  Gram Stain (10-07-20 @ 15:14):    Growth in aerobic bottle: Gram Negative Rods  Final Report (10-08-20 @ 14:28):    Growth in aerobic bottle: Serratia marcescens    See previous culture [id number]    Culture - Blood (collected 10-04-20 @ 11:02)  Source: .Blood Blood  Preliminary Report (10-05-20 @ 12:01):    No growth to date.    Culture - Blood (collected 10-04-20 @ 11:02)  Source: .Blood Blood  Preliminary Report (10-05-20 @ 12:01):    No growth to date.          RADIOLOGY & ADDITIONAL STUDIES:    CXR:< from: Xray Chest 1 View- PORTABLE-Urgent (Xray Chest 1 View- PORTABLE-Urgent .) (10.08.20 @ 15:37) >  Patient had tracheostomy replacement of endotracheal tube.    Heart is possibly enlarged. Tracheostomy now seen replacing endotracheal tube. NG tube on October 7 is been removed.    There is persistent infiltrates in the right mid lower lung field in the left base.

## 2020-10-09 NOTE — PROGRESS NOTE ADULT - ASSESSMENT
COPD with acute asthmatic bronchitis/ RLL pneumonia with  acute  Respiratory Failure with Trach and PEG   Recurrent Lung cancer with Bone Mets   IDDM   Htn with MR   Thrombocytopenia  and Anemia   Obesity with CRIS   serratia  bacteremia     Plan-  repeat blood and trach c/s   Nss 75 cc/hr x 24 hrs  Add merrem or azactam to cover for Serratia until c/s back   KPO4 and Mg supplement  IV levaquin   /CMV12/ 35% with Peep5 cm   decrease sedation and try Cpap with PS 10 cm  from am  Venodynes  FS coverage

## 2020-10-09 NOTE — PROGRESS NOTE ADULT - ATTENDING COMMENTS
Assessment:  1. Acute on chronic respiratory failure  2. Bacteremia  3. Septic shock   4. Pancytopenia  5. Diabetes mellitus   6. Stage 4 lung cancer  7. COPD    Plan   -continue mechanical ventilation  -S/p trach/peg yesterday  -PRN meds for pain  -thoracic for trach and PEG 10/8  -intubation #2 10/5  -IV antibiotics   -ID follow up given new bacteremia  - D/c central line today  -New RLL consolidation    - Restart tube feedings today as cleared by surgery to use peg  - PPI for GI prophylaxis  - Will need placement to vent facility  -Transfer to [facility] today

## 2020-10-09 NOTE — PROGRESS NOTE ADULT - ASSESSMENT
· Assessment	  65 year old lady with severe COPD and lung ca with mets to bones which causing compression Fx.  She had one RT for that.  she was admitted for dyspnea and hypoxia.    Problem/Recommendation - 1:  Problem: Lung cancer. Recommendation: with mets to bones  on chemo keytruda, alimta and carboplatin  she had one RT to spine.  the tumor markers have been trending down with chemo    Problem/Recommendation - 2:  ·  Problem: COPD (chronic obstructive pulmonary disease).  Recommendation: required freq steroids.     Problem/Recommendation - 3:  ·  Problem: Acute on chronic respiratory failure with hypoxia.  Recommendation: ?aspiration causing resp failure requiring intubation  she has pain at throat for a while and has difficulty swallowing, this got better  4?strider   intubation again  trach and peg are done  prognosis very poor

## 2020-10-09 NOTE — PROGRESS NOTE ADULT - SUBJECTIVE AND OBJECTIVE BOX
had trach and PEG  awake now    MEDICATIONS  (STANDING):  ALBUTerol    90 MICROgram(s) HFA Inhaler 2 Puff(s) Inhalation every 6 hours  atorvastatin 40 milliGRAM(s) Oral at bedtime  carvedilol 3.125 milliGRAM(s) Oral every 12 hours  chlorhexidine 0.12% Liquid 15 milliLiter(s) Oral Mucosa every 12 hours  chlorhexidine 2% Cloths 1 Application(s) Topical <User Schedule>  dextrose 5%. 1000 milliLiter(s) (50 mL/Hr) IV Continuous <Continuous>  dextrose 50% Injectable 12.5 Gram(s) IV Push once  dextrose 50% Injectable 25 Gram(s) IV Push once  dextrose 50% Injectable 25 Gram(s) IV Push once  ergocalciferol 32260 Unit(s) Oral <User Schedule>  gabapentin 600 milliGRAM(s) Oral every 8 hours  influenza   Vaccine 0.5 milliLiter(s) IntraMuscular once  insulin glargine Injectable (LANTUS) 10 Unit(s) SubCutaneous at bedtime  insulin lispro (HumaLOG) corrective regimen sliding scale   SubCutaneous every 6 hours  insulin lispro Injectable (HumaLOG) 4 Unit(s) SubCutaneous every 6 hours  levoFLOXacin IVPB 750 milliGRAM(s) IV Intermittent every 24 hours  levoFLOXacin IVPB      pantoprazole  Injectable 40 milliGRAM(s) IV Push daily    MEDICATIONS  (PRN):  acetaminophen    Suspension .. 650 milliGRAM(s) Oral every 6 hours PRN Temp greater or equal to 38C (100.4F)  dextrose 40% Gel 15 Gram(s) Oral once PRN Blood Glucose LESS THAN 70 milliGRAM(s)/deciliter  glucagon  Injectable 1 milliGRAM(s) IntraMuscular once PRN Glucose LESS THAN 70 milligrams/deciliter  morphine  - Injectable 2 milliGRAM(s) IV Push every 6 hours PRN Severe Pain (7 - 10)  sodium chloride 0.9% lock flush 10 milliLiter(s) IV Push every 1 hour PRN Pre/post blood products, medications, blood draw, and to maintain line patency      Allergies    fish (Angioedema)  penicillins (Rash)    Intolerances        Vital Signs Last 24 Hrs  T(C): 38 (09 Oct 2020 04:45), Max: 38 (09 Oct 2020 04:45)  T(F): 100.4 (09 Oct 2020 04:45), Max: 100.4 (09 Oct 2020 04:45)  HR: 107 (09 Oct 2020 08:06) (73 - 142)  BP: 132/61 (09 Oct 2020 08:06) (76/44 - 175/114)  BP(mean): 78 (09 Oct 2020 08:06) (51 - 128)  RR: 17 (09 Oct 2020 08:06) (10 - 29)  SpO2: 96% (09 Oct 2020 08:06) (96% - 100%)    PHYSICAL EXAM  General: adult in NAD  HEENT: clear oropharynx, anicteric sclera, pink conjunctiva  Neck: supple  CV: normal S1/S2 with no murmur rubs or gallops  Lungs: positive air movement b/l ant lungs,clear to auscultation, no wheezes, no rales  Abdomen: soft non-tender non-distended, no hepatosplenomegaly  Ext: no clubbing cyanosis or edema  Skin: no rashes and no petechiae  Neuro: alert and oriented X 4, no focal deficits  LABS:                          8.4    2.59  )-----------( 46       ( 09 Oct 2020 03:48 )             24.3         Mean Cell Volume : 85.9 fl  Mean Cell Hemoglobin : 29.7 pg  Mean Cell Hemoglobin Concentration : 34.6 gm/dL  Auto Neutrophil # : x  Auto Lymphocyte # : x  Auto Monocyte # : x  Auto Eosinophil # : x  Auto Basophil # : x  Auto Neutrophil % : x  Auto Lymphocyte % : x  Auto Monocyte % : x  Auto Eosinophil % : x  Auto Basophil % : x    Serial CBC  Hematocrit 24.3  Hemoglobin 8.4  Plat 46  RBC 2.83  WBC 2.59  Serial CBC  Hematocrit 23.8  Hemoglobin 8.1  Plat 56  RBC 2.73  WBC 3.12  Serial CBC  Hematocrit 28.8  Hemoglobin 9.7  Plat 50  RBC 3.32  WBC 3.45  Serial CBC  Hematocrit 25.0  Hemoglobin 8.7  Plat 57  RBC 2.89  WBC 3.22  Serial CBC  Hematocrit 19.5  Hemoglobin 6.8  Plat 19  RBC 2.21  WBC 4.35  Serial CBC  Hematocrit 20.3  Hemoglobin 7.1  Plat 22  RBC 2.30  WBC 5.31  Serial CBC  Hematocrit 23.9  Hemoglobin 8.0  Plat 42  RBC 2.72  WBC 5.38  Serial CBC  Hematocrit 23.3  Hemoglobin 7.9  Plat 54  RBC 2.68  WBC 2.20    10-09    139  |  101  |  16  ----------------------------<  115<H>  3.6   |  34<H>  |  0.35<L>    Ca    7.9<L>      09 Oct 2020 03:48  Phos  2.6     10-09  Mg     1.8     10-09    TPro  5.1<L>  /  Alb  2.0<L>  /  TBili  1.0  /  DBili  x   /  AST  15  /  ALT  31  /  AlkPhos  114  10-09      PT/INR - ( 08 Oct 2020 05:06 )   PT: 13.8 sec;   INR: 1.17 ratio         PTT - ( 08 Oct 2020 05:06 )  PTT:26.1 sec    Reticulocyte Percent: 1.6 % (10-07 @ 21:03)            BLOOD SMEAR INTERPRETATION:       RADIOLOGY & ADDITIONAL STUDIES:

## 2020-10-09 NOTE — PROGRESS NOTE ADULT - SUBJECTIVE AND OBJECTIVE BOX
INTERVAL HPI/OVERNIGHT EVENTS: ***    PRESSORS: [ ] YES [ ] NO    Antimicrobial:  levoFLOXacin IVPB 750 milliGRAM(s) IV Intermittent every 24 hours  levoFLOXacin IVPB        Cardiovascular:    Pulmonary:    Hematologic:    Other:  acetaminophen    Suspension .. 650 milliGRAM(s) Oral every 6 hours PRN  atorvastatin 40 milliGRAM(s) Oral at bedtime  chlorhexidine 0.12% Liquid 15 milliLiter(s) Oral Mucosa every 12 hours  chlorhexidine 2% Cloths 1 Application(s) Topical <User Schedule>  dexMEDEtomidine Infusion 0.2 MICROgram(s)/kG/Hr IV Continuous <Continuous>  dextrose 40% Gel 15 Gram(s) Oral once PRN  dextrose 5%. 1000 milliLiter(s) IV Continuous <Continuous>  dextrose 50% Injectable 12.5 Gram(s) IV Push once  dextrose 50% Injectable 25 Gram(s) IV Push once  dextrose 50% Injectable 25 Gram(s) IV Push once  ergocalciferol 87718 Unit(s) Oral <User Schedule>  fentaNYL   Infusion 2 MICROgram(s)/kG/Hr IV Continuous <Continuous>  gabapentin 600 milliGRAM(s) Oral every 8 hours  glucagon  Injectable 1 milliGRAM(s) IntraMuscular once PRN  influenza   Vaccine 0.5 milliLiter(s) IntraMuscular once  insulin glargine Injectable (LANTUS) 10 Unit(s) SubCutaneous at bedtime  insulin lispro (HumaLOG) corrective regimen sliding scale   SubCutaneous every 6 hours  insulin lispro Injectable (HumaLOG) 4 Unit(s) SubCutaneous every 6 hours  methylPREDNISolone sodium succinate Injectable 40 milliGRAM(s) IV Push once  pantoprazole  Injectable 40 milliGRAM(s) IV Push daily  sodium chloride 0.9% lock flush 10 milliLiter(s) IV Push every 1 hour PRN    acetaminophen    Suspension .. 650 milliGRAM(s) Oral every 6 hours PRN  atorvastatin 40 milliGRAM(s) Oral at bedtime  chlorhexidine 0.12% Liquid 15 milliLiter(s) Oral Mucosa every 12 hours  chlorhexidine 2% Cloths 1 Application(s) Topical <User Schedule>  dexMEDEtomidine Infusion 0.2 MICROgram(s)/kG/Hr IV Continuous <Continuous>  dextrose 40% Gel 15 Gram(s) Oral once PRN  dextrose 5%. 1000 milliLiter(s) IV Continuous <Continuous>  dextrose 50% Injectable 12.5 Gram(s) IV Push once  dextrose 50% Injectable 25 Gram(s) IV Push once  dextrose 50% Injectable 25 Gram(s) IV Push once  ergocalciferol 84031 Unit(s) Oral <User Schedule>  fentaNYL   Infusion 2 MICROgram(s)/kG/Hr IV Continuous <Continuous>  gabapentin 600 milliGRAM(s) Oral every 8 hours  glucagon  Injectable 1 milliGRAM(s) IntraMuscular once PRN  influenza   Vaccine 0.5 milliLiter(s) IntraMuscular once  insulin glargine Injectable (LANTUS) 10 Unit(s) SubCutaneous at bedtime  insulin lispro (HumaLOG) corrective regimen sliding scale   SubCutaneous every 6 hours  insulin lispro Injectable (HumaLOG) 4 Unit(s) SubCutaneous every 6 hours  levoFLOXacin IVPB 750 milliGRAM(s) IV Intermittent every 24 hours  levoFLOXacin IVPB      methylPREDNISolone sodium succinate Injectable 40 milliGRAM(s) IV Push once  pantoprazole  Injectable 40 milliGRAM(s) IV Push daily  sodium chloride 0.9% lock flush 10 milliLiter(s) IV Push every 1 hour PRN    Drug Dosing Weight  Height (cm): 154.9 (19 Sep 2020 08:00)  Weight (kg): 84.8 (21 Sep 2020 09:15)  BMI (kg/m2): 35.3 (21 Sep 2020 09:15)  BSA (m2): 1.84 (21 Sep 2020 09:15)    CENTRAL LINE: [ ] YES [ ] NO  LOCATION:   DATE INSERTED:  REMOVE: [ ] YES [ ] NO  EXPLAIN:    ROBLEDO: [ ] YES [ ] NO    DATE INSERTED:  REMOVE:  [ ] YES [ ] NO  EXPLAIN:    A-LINE:  [ ] YES [ ] NO  LOCATION:   DATE INSERTED:  REMOVE:  [ ] YES [ ] NO  EXPLAIN:    [facility] -reviewed admission note, no change since admission            10-07 @ 07:01  -  10-08 @ 07:00  --------------------------------------------------------  IN: 3482.6 mL / OUT: 1690 mL / NET: 1792.6 mL        Mode: AC/ CMV (Assist Control/ Continuous Mandatory Ventilation)  RR (machine): 14  TV (machine): 450  FiO2: 40  PEEP: 5  ITime: 1  MAP: 11  PIP: 30      PHYSICAL EXAM:    GENERAL: NAD, well-groomed, well-developed  HEAD:  Atraumatic, Normocephalic  EYES: EOMI, PERRLA, conjunctiva and sclera clear  ENMT: No tonsillar erythema, exudates, or enlargement; Moist mucous membranes, Good dentition, [ ]No lesions  NECK: Supple, normal appearance, No JVD; Normal thyroid; Trachea midline  NERVOUS SYSTEM:  Alert & Oriented X3, Good concentration; Motor Strength 5/5 B/L upper and lower extremities; DTRs 2+ intact and symmetric  CHEST/LUNG: No chest deformity; Normal percussion bilaterally; No rales, rhonchi, wheezing   HEART: Regular rate and rhythm; No murmurs, rubs, or gallops  ABDOMEN: Soft, Nontender, Nondistended;Bowel sounds present  EXTREMITIES:  2+ Peripheral Pulses, No clubbing, cyanosis, or edema  LYMPH: No lymphadenopathy noted  SKIN: No rashes or lesions; Good capillary refill      LABS:  CBC Full  -  ( 09 Oct 2020 03:48 )  WBC Count : 2.59 K/uL  RBC Count : 2.83 M/uL  Hemoglobin : 8.4 g/dL  Hematocrit : 24.3 %  Platelet Count - Automated : 46 K/uL  Mean Cell Volume : 85.9 fl  Mean Cell Hemoglobin : 29.7 pg  Mean Cell Hemoglobin Concentration : 34.6 gm/dL  Auto Neutrophil # : x  Auto Lymphocyte # : x  Auto Monocyte # : x  Auto Eosinophil # : x  Auto Basophil # : x  Auto Neutrophil % : x  Auto Lymphocyte % : x  Auto Monocyte % : x  Auto Eosinophil % : x  Auto Basophil % : x    10-09    139  |  101  |  16  ----------------------------<  115<H>  3.6   |  34<H>  |  0.35<L>    Ca    7.9<L>      09 Oct 2020 03:48  Phos  2.6     10-09  Mg     1.8     10-09    TPro  5.1<L>  /  Alb  2.0<L>  /  TBili  1.0  /  DBili  x   /  AST  15  /  ALT  31  /  AlkPhos  114  10-09    PT/INR - ( 08 Oct 2020 05:06 )   PT: 13.8 sec;   INR: 1.17 ratio         PTT - ( 08 Oct 2020 05:06 )  PTT:26.1 sec    Culture Results:   Growth in aerobic bottle: Serratia marcescens  See previous culture [id number] (10-06 @ 10:10)  Culture Results:   Growth in anaerobic bottle: Serratia marcescens  Growth in aerobic bottle: Gram Negative Rods  "Due to technical problems, Proteus sp. will Not be reported as part of  the BCID panel until further notice"  ***Blood Panel PCR results on this specimen are available  approximately 3 hours after the Gram stain result.***  Gram stain, PCR, and/or culture results may not always  correspond due to difference in methodologies.  ************************************************************  This PCR assay was performed using [employer].  The following targets are tested for: Enterococcus,  vancomycin resistant enterococci, Listeria monocytogenes,  coagulase negative staphylococci, S. aureus,  methicillin resistant S. aureus, Streptococcus agalactiae  (Group B), S. pneumoniae, S. pyogenes (Group A),  Acinetobacter baumannii, Enterobacter cloacae, E. coli,  Klebsiella oxytoca, K. pneumoniae, Proteus sp.,  Serratia marcescens, Haemophilus influenzae,  Neisseria meningitidis, Pseudomonas aeruginosa, Candida  albicans, C. glabrata, C krusei, C parapsilosis,  C. tropicalis and the KPC resistance gene.  "For positive Serratia,  results cross-reactivity has been occasionally  observed with some species of Pseudomonas and Pantoea" (10-06 @ 10:10)      RADIOLOGY & ADDITIONAL STUDIES REVIEWED:  ***    [ ]GOALS OF CARE DISCUSSION WITH PATIENT/FAMILY/PROXY:    CRITICAL CARE TIME SPENT: 35 minutes INTERVAL HPI/OVERNIGHT EVENTS: POD 1 s/p tracheostomy and PEG tube placement. Continues to have low grade fever 100.4 this AM with leukocytosis. Pending repeat BCx result from 10/8. On Levaquin (since 10/7) for Gram - bacteremia (serratia marcescens).     PRESSORS: [ ] YES [x ] NO    Antimicrobial:  levoFLOXacin IVPB 750 milliGRAM(s) IV Intermittent every 24 hours  levoFLOXacin IVPB        Cardiovascular:    Pulmonary:    Hematologic:    Other:  acetaminophen    Suspension .. 650 milliGRAM(s) Oral every 6 hours PRN  atorvastatin 40 milliGRAM(s) Oral at bedtime  chlorhexidine 0.12% Liquid 15 milliLiter(s) Oral Mucosa every 12 hours  chlorhexidine 2% Cloths 1 Application(s) Topical <User Schedule>  dexMEDEtomidine Infusion 0.2 MICROgram(s)/kG/Hr IV Continuous <Continuous>  dextrose 40% Gel 15 Gram(s) Oral once PRN  dextrose 5%. 1000 milliLiter(s) IV Continuous <Continuous>  dextrose 50% Injectable 12.5 Gram(s) IV Push once  dextrose 50% Injectable 25 Gram(s) IV Push once  dextrose 50% Injectable 25 Gram(s) IV Push once  ergocalciferol 52083 Unit(s) Oral <User Schedule>  fentaNYL   Infusion 2 MICROgram(s)/kG/Hr IV Continuous <Continuous>  gabapentin 600 milliGRAM(s) Oral every 8 hours  glucagon  Injectable 1 milliGRAM(s) IntraMuscular once PRN  influenza   Vaccine 0.5 milliLiter(s) IntraMuscular once  insulin glargine Injectable (LANTUS) 10 Unit(s) SubCutaneous at bedtime  insulin lispro (HumaLOG) corrective regimen sliding scale   SubCutaneous every 6 hours  insulin lispro Injectable (HumaLOG) 4 Unit(s) SubCutaneous every 6 hours  methylPREDNISolone sodium succinate Injectable 40 milliGRAM(s) IV Push once  pantoprazole  Injectable 40 milliGRAM(s) IV Push daily  sodium chloride 0.9% lock flush 10 milliLiter(s) IV Push every 1 hour PRN    acetaminophen    Suspension .. 650 milliGRAM(s) Oral every 6 hours PRN  atorvastatin 40 milliGRAM(s) Oral at bedtime  chlorhexidine 0.12% Liquid 15 milliLiter(s) Oral Mucosa every 12 hours  chlorhexidine 2% Cloths 1 Application(s) Topical <User Schedule>  dexMEDEtomidine Infusion 0.2 MICROgram(s)/kG/Hr IV Continuous <Continuous>  dextrose 40% Gel 15 Gram(s) Oral once PRN  dextrose 5%. 1000 milliLiter(s) IV Continuous <Continuous>  dextrose 50% Injectable 12.5 Gram(s) IV Push once  dextrose 50% Injectable 25 Gram(s) IV Push once  dextrose 50% Injectable 25 Gram(s) IV Push once  ergocalciferol 52727 Unit(s) Oral <User Schedule>  fentaNYL   Infusion 2 MICROgram(s)/kG/Hr IV Continuous <Continuous>  gabapentin 600 milliGRAM(s) Oral every 8 hours  glucagon  Injectable 1 milliGRAM(s) IntraMuscular once PRN  influenza   Vaccine 0.5 milliLiter(s) IntraMuscular once  insulin glargine Injectable (LANTUS) 10 Unit(s) SubCutaneous at bedtime  insulin lispro (HumaLOG) corrective regimen sliding scale   SubCutaneous every 6 hours  insulin lispro Injectable (HumaLOG) 4 Unit(s) SubCutaneous every 6 hours  levoFLOXacin IVPB 750 milliGRAM(s) IV Intermittent every 24 hours  levoFLOXacin IVPB      methylPREDNISolone sodium succinate Injectable 40 milliGRAM(s) IV Push once  pantoprazole  Injectable 40 milliGRAM(s) IV Push daily  sodium chloride 0.9% lock flush 10 milliLiter(s) IV Push every 1 hour PRN    Drug Dosing Weight  Height (cm): 154.9 (19 Sep 2020 08:00)  Weight (kg): 84.8 (21 Sep 2020 09:15)  BMI (kg/m2): 35.3 (21 Sep 2020 09:15)  BSA (m2): 1.84 (21 Sep 2020 09:15)    CENTRAL LINE: [ ] YES [ ] NO  LOCATION:   DATE INSERTED:  REMOVE: [ ] YES [ ] NO  EXPLAIN:    VENKAT: [ ] YES [ ] NO    DATE INSERTED:  REMOVE:  [ ] YES [ ] NO  EXPLAIN:    A-LINE:  [ ] YES [ ] NO  LOCATION:   DATE INSERTED:  REMOVE:  [ ] YES [ ] NO  EXPLAIN:    [facility] -reviewed admission note, no change since admission            10-07 @ 07:01  -  10-08 @ 07:00  --------------------------------------------------------  IN: 3482.6 mL / OUT: 1690 mL / NET: 1792.6 mL        Mode: AC/ CMV (Assist Control/ Continuous Mandatory Ventilation)  RR (machine): 14  TV (machine): 450  FiO2: 40  PEEP: 5  ITime: 1  MAP: 11  PIP: 30      PHYSICAL EXAM:    GENERAL: NAD, well-groomed, well-developed  HEAD:  Atraumatic, Normocephalic  EYES: EOMI, PERRLA, conjunctiva and sclera clear  ENMT: No tonsillar erythema, exudates, or enlargement; Moist mucous membranes, Good dentition, [ ]No lesions  NECK: Supple, normal appearance, No JVD; Normal thyroid; Trachea midline  NERVOUS SYSTEM:  Alert & Oriented X3, Good concentration; Motor Strength 5/5 B/L upper and lower extremities; DTRs 2+ intact and symmetric  CHEST/LUNG: No chest deformity; Normal percussion bilaterally; No rales, rhonchi, wheezing   HEART: Regular rate and rhythm; No murmurs, rubs, or gallops  ABDOMEN: Soft, Nontender, Nondistended;Bowel sounds present  EXTREMITIES:  2+ Peripheral Pulses, No clubbing, cyanosis, or edema  LYMPH: No lymphadenopathy noted  SKIN: No rashes or lesions; Good capillary refill      LABS:  CBC Full  -  ( 09 Oct 2020 03:48 )  WBC Count : 2.59 K/uL  RBC Count : 2.83 M/uL  Hemoglobin : 8.4 g/dL  Hematocrit : 24.3 %  Platelet Count - Automated : 46 K/uL  Mean Cell Volume : 85.9 fl  Mean Cell Hemoglobin : 29.7 pg  Mean Cell Hemoglobin Concentration : 34.6 gm/dL  Auto Neutrophil # : x  Auto Lymphocyte # : x  Auto Monocyte # : x  Auto Eosinophil # : x  Auto Basophil # : x  Auto Neutrophil % : x  Auto Lymphocyte % : x  Auto Monocyte % : x  Auto Eosinophil % : x  Auto Basophil % : x    10-09    139  |  101  |  16  ----------------------------<  115<H>  3.6   |  34<H>  |  0.35<L>    Ca    7.9<L>      09 Oct 2020 03:48  Phos  2.6     10-09  Mg     1.8     10-09    TPro  5.1<L>  /  Alb  2.0<L>  /  TBili  1.0  /  DBili  x   /  AST  15  /  ALT  31  /  AlkPhos  114  10-09    PT/INR - ( 08 Oct 2020 05:06 )   PT: 13.8 sec;   INR: 1.17 ratio         PTT - ( 08 Oct 2020 05:06 )  PTT:26.1 sec    Culture Results:   Growth in aerobic bottle: Serratia marcescens  See previous culture [id number] (10-06 @ 10:10)  Culture Results:   Growth in anaerobic bottle: Serratia marcescens  Growth in aerobic bottle: Gram Negative Rods  "Due to technical problems, Proteus sp. will Not be reported as part of  the BCID panel until further notice"  ***Blood Panel PCR results on this specimen are available  approximately 3 hours after the Gram stain result.***  Gram stain, PCR, and/or culture results may not always  correspond due to difference in methodologies.  ************************************************************  This PCR assay was performed using [employer].  The following targets are tested for: Enterococcus,  vancomycin resistant enterococci, Listeria monocytogenes,  coagulase negative staphylococci, S. aureus,  methicillin resistant S. aureus, Streptococcus agalactiae  (Group B), S. pneumoniae, S. pyogenes (Group A),  Acinetobacter baumannii, Enterobacter cloacae, E. coli,  Klebsiella oxytoca, K. pneumoniae, Proteus sp.,  Serratia marcescens, Haemophilus influenzae,  Neisseria meningitidis, Pseudomonas aeruginosa, Candida  albicans, C. glabrata, C krusei, C parapsilosis,  C. tropicalis and the KPC resistance gene.  "For positive Serratia,  results cross-reactivity has been occasionally  observed with some species of Pseudomonas and Pantoea" (10-06 @ 10:10)      RADIOLOGY & ADDITIONAL STUDIES REVIEWED:  ***    [ ]GOALS OF CARE DISCUSSION WITH PATIENT/FAMILY/PROXY:    CRITICAL CARE TIME SPENT: 35 minutes INTERVAL HPI/OVERNIGHT EVENTS: POD 1 s/p tracheostomy and PEG tube placement. Continues to have low grade fever 100.4 this AM with leukocytosis. Pending repeat BCx result from 10/8. On Levaquin (since 10/7) for Gram - bacteremia (serratia marcescens). Will give 40 IV lasix for CXR effusions.    PRESSORS: [ ] YES [x ] NO    Antimicrobial:  levoFLOXacin IVPB 750 milliGRAM(s) IV Intermittent every 24 hours  levoFLOXacin IVPB        Cardiovascular:    Pulmonary:    Hematologic:    Other:  acetaminophen    Suspension .. 650 milliGRAM(s) Oral every 6 hours PRN  atorvastatin 40 milliGRAM(s) Oral at bedtime  chlorhexidine 0.12% Liquid 15 milliLiter(s) Oral Mucosa every 12 hours  chlorhexidine 2% Cloths 1 Application(s) Topical <User Schedule>  dexMEDEtomidine Infusion 0.2 MICROgram(s)/kG/Hr IV Continuous <Continuous>  dextrose 40% Gel 15 Gram(s) Oral once PRN  dextrose 5%. 1000 milliLiter(s) IV Continuous <Continuous>  dextrose 50% Injectable 12.5 Gram(s) IV Push once  dextrose 50% Injectable 25 Gram(s) IV Push once  dextrose 50% Injectable 25 Gram(s) IV Push once  ergocalciferol 20237 Unit(s) Oral <User Schedule>  fentaNYL   Infusion 2 MICROgram(s)/kG/Hr IV Continuous <Continuous>  gabapentin 600 milliGRAM(s) Oral every 8 hours  glucagon  Injectable 1 milliGRAM(s) IntraMuscular once PRN  influenza   Vaccine 0.5 milliLiter(s) IntraMuscular once  insulin glargine Injectable (LANTUS) 10 Unit(s) SubCutaneous at bedtime  insulin lispro (HumaLOG) corrective regimen sliding scale   SubCutaneous every 6 hours  insulin lispro Injectable (HumaLOG) 4 Unit(s) SubCutaneous every 6 hours  methylPREDNISolone sodium succinate Injectable 40 milliGRAM(s) IV Push once  pantoprazole  Injectable 40 milliGRAM(s) IV Push daily  sodium chloride 0.9% lock flush 10 milliLiter(s) IV Push every 1 hour PRN    acetaminophen    Suspension .. 650 milliGRAM(s) Oral every 6 hours PRN  atorvastatin 40 milliGRAM(s) Oral at bedtime  chlorhexidine 0.12% Liquid 15 milliLiter(s) Oral Mucosa every 12 hours  chlorhexidine 2% Cloths 1 Application(s) Topical <User Schedule>  dexMEDEtomidine Infusion 0.2 MICROgram(s)/kG/Hr IV Continuous <Continuous>  dextrose 40% Gel 15 Gram(s) Oral once PRN  dextrose 5%. 1000 milliLiter(s) IV Continuous <Continuous>  dextrose 50% Injectable 12.5 Gram(s) IV Push once  dextrose 50% Injectable 25 Gram(s) IV Push once  dextrose 50% Injectable 25 Gram(s) IV Push once  ergocalciferol 12644 Unit(s) Oral <User Schedule>  fentaNYL   Infusion 2 MICROgram(s)/kG/Hr IV Continuous <Continuous>  gabapentin 600 milliGRAM(s) Oral every 8 hours  glucagon  Injectable 1 milliGRAM(s) IntraMuscular once PRN  influenza   Vaccine 0.5 milliLiter(s) IntraMuscular once  insulin glargine Injectable (LANTUS) 10 Unit(s) SubCutaneous at bedtime  insulin lispro (HumaLOG) corrective regimen sliding scale   SubCutaneous every 6 hours  insulin lispro Injectable (HumaLOG) 4 Unit(s) SubCutaneous every 6 hours  levoFLOXacin IVPB 750 milliGRAM(s) IV Intermittent every 24 hours  levoFLOXacin IVPB      methylPREDNISolone sodium succinate Injectable 40 milliGRAM(s) IV Push once  pantoprazole  Injectable 40 milliGRAM(s) IV Push daily  sodium chloride 0.9% lock flush 10 milliLiter(s) IV Push every 1 hour PRN    Drug Dosing Weight  Height (cm): 154.9 (19 Sep 2020 08:00)  Weight (kg): 84.8 (21 Sep 2020 09:15)  BMI (kg/m2): 35.3 (21 Sep 2020 09:15)  BSA (m2): 1.84 (21 Sep 2020 09:15)      CENTRAL LINE: [x ] YES [ ] NO  LOCATION: RFL   DATE INSERTED:10/5  REMOVE: [ ] YES [ ] NO  EXPLAIN:    VENKAT: [ x] YES [ ] NO    DATE INSERTED: 10/5  REMOVE:  [ ] YES [ ] NO  EXPLAIN:    A-LINE:  [ ] YES [ x] NO  LOCATION:   DATE INSERTED:  REMOVE:  [ ] YES [ ] NO  EXPLAIN:      [facility] -reviewed admission note, no change since admission            10-07 @ 07:01  -  10-08 @ 07:00  --------------------------------------------------------  IN: 3482.6 mL / OUT: 1690 mL / NET: 1792.6 mL        Mode: AC/ CMV (Assist Control/ Continuous Mandatory Ventilation)  RR (machine): 14  TV (machine): 450  FiO2: 40  PEEP: 5  ITime: 1  MAP: 11  PIP: 30      PHYSICAL EXAM:  GENERAL: Sedated, Intubated on vent  HEAD:  Atraumatic, Normocephalic  EYES: PERRL, conjunctiva and sclera clear  ENMT: Moist mucous membranes, no exudates  NECK: Supple, normal appearance, No JVD; Normal thyroid; Trachea midline  NERVOUS SYSTEM: awake, nods in response to questioning  CHEST/LUNG: ventilated, bilateral breath sounds, diminished throughout, no crackles or rhonchi  HEART: Regular rate and rhythm; No murmurs, rubs, or gallops  ABDOMEN: Soft, Nontender, Non distended; Bowel sounds present  : Juan in place, draining clear concentrated urine  EXTREMITIES:  2+ Peripheral Pulses, No clubbing, cyanosis, or edema  SKIN: No rashes or lesions; Good capillary refill      LABS:  CBC Full  -  ( 09 Oct 2020 03:48 )  WBC Count : 2.59 K/uL  RBC Count : 2.83 M/uL  Hemoglobin : 8.4 g/dL  Hematocrit : 24.3 %  Platelet Count - Automated : 46 K/uL  Mean Cell Volume : 85.9 fl  Mean Cell Hemoglobin : 29.7 pg  Mean Cell Hemoglobin Concentration : 34.6 gm/dL  Auto Neutrophil # : x  Auto Lymphocyte # : x  Auto Monocyte # : x  Auto Eosinophil # : x  Auto Basophil # : x  Auto Neutrophil % : x  Auto Lymphocyte % : x  Auto Monocyte % : x  Auto Eosinophil % : x  Auto Basophil % : x    10-09    139  |  101  |  16  ----------------------------<  115<H>  3.6   |  34<H>  |  0.35<L>    Ca    7.9<L>      09 Oct 2020 03:48  Phos  2.6     10-09  Mg     1.8     10-09    TPro  5.1<L>  /  Alb  2.0<L>  /  TBili  1.0  /  DBili  x   /  AST  15  /  ALT  31  /  AlkPhos  114  10-09    PT/INR - ( 08 Oct 2020 05:06 )   PT: 13.8 sec;   INR: 1.17 ratio         PTT - ( 08 Oct 2020 05:06 )  PTT:26.1 sec    Culture Results:   Growth in aerobic bottle: Serratia marcescens  See previous culture [id number] (10-06 @ 10:10)  Culture Results:   Growth in anaerobic bottle: Serratia marcescens  Growth in aerobic bottle: Gram Negative Rods  "Due to technical problems, Proteus sp. will Not be reported as part of  the BCID panel until further notice"  ***Blood Panel PCR results on this specimen are available  approximately 3 hours after the Gram stain result.***  Gram stain, PCR, and/or culture results may not always  correspond due to difference in methodologies.  ************************************************************  This PCR assay was performed using [employer].  The following targets are tested for: Enterococcus,  vancomycin resistant enterococci, Listeria monocytogenes,  coagulase negative staphylococci, S. aureus,  methicillin resistant S. aureus, Streptococcus agalactiae  (Group B), S. pneumoniae, S. pyogenes (Group A),  Acinetobacter baumannii, Enterobacter cloacae, E. coli,  Klebsiella oxytoca, K. pneumoniae, Proteus sp.,  Serratia marcescens, Haemophilus influenzae,  Neisseria meningitidis, Pseudomonas aeruginosa, Candida  albicans, C. glabrata, C krusei, C parapsilosis,  C. tropicalis and the KPC resistance gene.  "For positive Serratia,  results cross-reactivity has been occasionally  observed with some species of Pseudomonas and Pantoea" (10-06 @ 10:10)      RADIOLOGY & ADDITIONAL STUDIES REVIEWED:     < from: Xray Chest 1 View- PORTABLE-Urgent (Xray Chest 1 View- PORTABLE-Urgent .) (10.08.20 @ 15:37) >    EXAM:  XR CHEST PORTABLE URGENT 1V                            PROCEDURE DATE:  10/08/2020          INTERPRETATION:  AP chest on October 8, 2020 at 3:27 PM. Patient had tracheostomy replacement of endotracheal tube.    Heart is possibly enlarged. Tracheostomy now seen replacing endotracheal tube. NG tube on October 7 is been removed.    There is persistent infiltrates in the right mid lower lung field in the left base.    Infiltrates are unchanged.    IMPRESSION: Unchanged significant infiltrates. Tracheostomy now in place.      EUNICE STILL M.D., ATTENDING RADIOLOGIST  This document has been electronically signed. Oct  8 2020  3:59PM    < end of copied text >      [ ]GOALS OF CARE DISCUSSION WITH PATIENT/FAMILY/PROXY: FULL CODE    CRITICAL CARE TIME SPENT: 35 minutes

## 2020-10-09 NOTE — CHART NOTE - NSCHARTNOTEFT_GEN_A_CORE
64 yo obese F former heavy smoker (80 pack years) with, HTN, DM2 on insulin, COPD on 5L (frequent admissions), R lung cancer s/p resection (2017) on chemo q 3 weeks with concern for mets to spine and compression fractures, recent admission for COPD exacerbation earlier in August presents with SOB, hypoxia and increased sputum production x 1 day. Patient states cough feels like secretion is coming out but unable to expectorate. As per EMS, patient is hypoxic at 84% on room air which came up to 98% with 4L of nasal canula. On 9/21/2020. Patient was hypoxic, tachycardiac and tachypneic. Rapid response was called. RRT team arrived on scene. Patient has encephalopathy from hypoxia with wheezing and stridor on examination. Patient was tachycardiac to 140/min. Patient was hypoxic and started on non-rebreather but patient has increased work of breathing. Decision was made to intubate the patient. Patient become hypotensive post intubation and was given LR bolus and started on peripheral phenylephrine.    On 9/21in ICU patient was awake and alert following commands and using a talking board and pen and paper to communicate. She was started on Precedex and Fentanyl to sedate the patient.   Patient was extubated on 9/22/20. Was saturating well on NC. Patient was titrated off the Precedex and Fentanyl by 9/23/20. She has anxiety and when anxious she desaturates. on 9/23/2020 patient was started on BiPAP after an episode of anxiety and desaturation with improvement. Blood cultures from 9/19/2020 grew VRE and yeast like cells. Patient's PICC line on Left arm was removed on 9/21/2020 as possible source of infection. Patient was started on Zyvox on 9/21 and Caspofungin on 9/23 after the results came back. Repeat blood cultures from 9/21/2020 showed no growth to date. Repeat blood cultures were sent on 9/23/2020  and 9/27. ID Dr. Corcoran on board. On 9/30  patient had mild to moderate respiratory distress and patient was started on AVAPS. On 10/4 RR secondary to hypoxia and possible sepsis.  10/05 she had increasing anxiety with mild/moderate respiratory distress while on AVAPS was transferred to ICU for closer monitoring. Patient had a right femoral line placed on 10/5 and was intubated on 10/5. Blood cultures grew Patient remains a FULL CODE as per wishes. ** 66 yo obese F former heavy smoker (80 pack years) with, HTN, DM2 on insulin, COPD on 5L (frequent admissions), R lung cancer s/p resection (2017) on chemo q 3 weeks with concern for mets to spine and compression fractures, recent admission for COPD exacerbation earlier in August presents with SOB, hypoxia and increased sputum production x 1 day. Patient states cough feels like secretion is coming out but unable to expectorate. As per EMS, patient is hypoxic at 84% on room air which came up to 98% with 4L of nasal canula. On 2020. Patient was hypoxic, tachycardiac and tachypneic. Rapid response was called. RRT team arrived on scene. Patient has encephalopathy from hypoxia with wheezing and stridor on examination. Patient was tachycardiac to 140/min. Patient was hypoxic and started on non-rebreather but patient has increased work of breathing. Decision was made to intubate the patient. Patient become hypotensive post intubation and was given LR bolus and started on peripheral phenylephrine.    On  in ICU patient was awake and alert following commands and using a talking board and pen and paper to communicate. She was started on Precedex and Fentanyl to sedate the patient.   Patient was extubated on 20. Was saturating well on NC. Patient was titrated off the Precedex and Fentanyl by 20. She has anxiety and when anxious she desaturates. on 2020 patient was started on BiPAP after an episode of anxiety and desaturation with improvement. Blood cultures from 2020 grew VRE and yeast like cells. Patient's PICC line on Left arm was removed on 2020 as possible source of infection. Patient was started on Zyvox on  and Caspofungin on  after the results came back. Repeat blood cultures from 2020 showed no growth to date. Repeat blood cultures were sent on 2020  and . ID Dr. Corcoran on board. On   patient had mild to moderate respiratory distress and patient was started on AVAPS. On 10/4 RR secondary to hypoxia and possible sepsis.  10/05 she had increasing anxiety with mild/moderate respiratory distress while on AVAPS was transferred to ICU for closer monitoring. Patient had a right femoral line placed on 10/5 and was intubated on 10/5. Blood cultures grew serratia marcescens on 10/6, was on levaquin and zyvox. Zyvox was discontinued on 10/7 for thrombocytopenia. Repeat cultures on 10/8 show NGTD. Patient had low Hb and lot plts, was transfused 2pRBCs and 2 units plts with improvement. Has history of ITP. HIT negative. Planned Trach and PEG performed on 10/8. Patient was weaned off sedation and pressor support. PEG feeds started on 10/9. Stable for Downgrade to AI.       TO FOLLOW:   - f/u bronch Cx from trach  - restart Heparin once Platelets improve  - f/u ID re ABx , blood cultures NGTD since 10/8  - PO prednisone thru PE QD x 5d (10/14) and 10 QD from then on daily forever (for cancer burden)  - BG regimen: usually Lantus 20 and Lispro 4 q6h -> currently Lantus 10 and ss Insulin only      FULL A&P BELOW===============================  66 yo obese F with hx of 80 pack year tobacco use, HTN, DM2 on insulin, COPD on 5L (frequent admissions), R lung cancer s/p resection () on chemo q 3 weeks with concern for mets to spine and compression fractures, recent admission for COPD exacerbation earlier in August who on 2020 presented to ED with c/o SOB, hypoxia and increased sputum production x 1 day. Admitted to ICU x twice for hypoxemic respiratory distress requiring intubation ( and 10/6).       CNS=====================================  Intubated and sedated  tapering down Propofol and Precedex for sedation  adding fixed pain control    CARD====================================  Central Femoral line (10/5)  pressor support Phenylephrine discontinued 10/8  consider Midodrine taper 10 q 8 if BP reduces  continue to monitor BP and HR for distress    PULM====================================  #Acute on chronic respiratory failure with hypoxia and hypercapnia.   2/2 COPD, heavy smoking history (80 pack years)  Intubated and sedated x 2 this admission ( and 10/5)  Plan for Trach and PEG surgery 10/6 per patient request  On prednisone at home, will give stress dose and daily steroid per Pulm (Dr. Nugent)  - IV solumedrol 80 q12 x 48 hours follow by   - PO prednisone 40 QD from 10/9 taper 20 QD x 5d and 10 QD from then on daily  - bronchodilators and ICS    #COPD  on AVAPS previously, failed and was re-intubated 10/5  for Tracheostomy  managed as above    #Metastatic Lung cancer  holding chemo  continue steroids  port removed on this hospital stay  Heme/Onc following Dr Mendiola    ID=======================================  #Sepsis.   Fever, leukocytosis (on steroids0) and elevated Lactate 3.1  - Hx VRE and Fungemia)  - s/p Caspofungin ( -10/7) + Zyvox (10/6 -10/7 stopped for low plts)  - BCx pos for Gram - neg rods; Serratia marcescens   - c/w Levaquin (10/6)   - repeat BCx drawn 10/8 NEGATIVE, NGTD  - f/u ID Dr. Corcoran    #Pneumonia   CXR shows b/l pleural effusions with Left sided consolidation  Abx coverage as above: Levaquin (10/6)   BCx positive for Gram negative rods: Serratia  plan above    Heme/Onc=================================  #Anemia  2/2 chronic disease  Hgb less than 7.0, s/p 2 pRBCS  Hb 8.4     #Thrombocytopenia  Hx of ITP  on steroids, 2 unit plts 10/7, 10/8  improving     #Lung cancer metastatic to bone.   s/p 1 radiation dose to spine.  Was on chemo before hospitalization  chemo port removed, Dr. Mendiola following  c/w pain management.     Endo====================================  #DM (diabetes mellitus).   Start on NPO + OG tube feeds  moderate dose sliding scale q6h  - continue insulin lantus 20 units daily (usual dose 40 U)  - insulin lispro 4 units q6h  - goal inpatient glucose range: 140-180mg/dl    GI======================================  #Moderate protein-calorie malnutrition.   - OG tube with tube feeds  - PEG tube feeds begin 10/9    Nephro=================================  BUN 16| Creat 0.35  No active issue monitor electrolytes and replace as needed    PPX=====================================  DVT restart once plts over 50  GI prophylaxis.    GOC====================================  Overall prognosis is extremely poor.   Patient remains FULL CODE as per her wishes  would like everything done including trach and PEG

## 2020-10-09 NOTE — PROGRESS NOTE ADULT - ASSESSMENT
66 yo obese F with hx of 80 pack year tobacco use, HTN, DM2 on insulin, COPD on 5L (frequent admissions), R lung cancer s/p resection (2017) on chemo q 3 weeks with concern for mets to spine and compression fractures, recent admission for COPD exacerbation earlier in August who on 9/19/2020 presented to ED with c/o SOB, hypoxia and increased sputum production x 1 day. Admitted to ICU x twice for hypoxemic respiratory distress requiring intubation (9/21 and 10/6).       PRE-OP CLEARANCE==========================  pre op labs for Trach and PEG - re-scheduled  EKG: sinus tachycardia  PT/INR  CXR in AM  Type and Screen  NPO after midnight  Holding AC pre procedure  Vent  FiO2 40% with PEEP of 5  SANTOS SCORE : 3.9% mortality risk with ENT surgery    CNS=====================================  Intubated and sedated  Fentanyl for pain control  Propofol and Precedex for sedation  increased RASS goals -2 to -3    CARD====================================  Central Femoral line (10/5)  pressor support Phenylephrine discontinued 10/8  add Midodrine taper 10 q 8  continue to monitor BP and HR for distress    PULM====================================  #Acute on chronic respiratory failure with hypoxia and hypercapnia.   2/2 COPD, heavy smoking history (80 pack years)  Intubated and sedated x 2 this admission (9/21 and 10/5)  Plan for Trach and PEG surgery 10/6 per patient request  On prednisone at home, will give stress dose and daily steroid per Pulm (Dr. Nugent)  - IV solumedrol 80 q12 x 48 hours follow by   - PO prednisone 40 QD from 10/9  - bronchodilators and ICS    #COPD  on AVAPS previously, failed and was re-intubated 10/5  for Tracheostomy  managed as above    #Metastatic Lung cancer  holding chemo  port removed on this hospital stay  Heme/Onc following Dr Mendiola    ID=======================================  #Sepsis.   Fever, leukocytosis (on steroids0) and elevated Lactate 3.1  - Hx VRE and Fungemia)  - s/p Caspofungin (9/23 -10/7) + Zyvox (10/6 -10/7 stopped for low plts)  - BCx pos for Gram - neg rods; Serratia marcescens   - c/w Levaquin (10/6)   - f/u repeat BCx drawn 10/8    #Pneumonia   CXR shows b/l pleural effusions with Left sided consolidation  Abx coverage as above: Levaquin (10/6)   BCx positive for Gram negative rods: Serratia  plan above    Heme/Onc=================================  #Anemia  2/2 chronic disease  Hgb less than 7.0  Hb 6.9 -> transfused 2 pRBCs 10/7    #Thrombocytopenia  Hx of ITP  on steroids, 2 unit plts 10/7, 10/8  improving     #Lung cancer metastatic to bone.   s/p 1 radiation dose to spine.  Was on chemo before hospitalization  chemo port removed, Dr. Mendiola following  c/w pain management.     Endo====================================  #DM (diabetes mellitus).   Start on NPO + OG tube feeds  moderate dose sliding scale q6h  - continue insulin lantus 20 units daily for today  - insulin lispro 4 units q6h  - goal inpatient glucose range: 140-180mg/dl    GI======================================  #Moderate protein-calorie malnutrition.   - OG tube with tube feeds  - PEG tube rescheduled    Nephro=================================  BUN 18| Creat 0.45  No active issue monitor electrolytes and replace as needed    PPX=====================================  DVT (held for procedure)   GI prophylaxis.    GOC====================================  Overall prognosis is extremely poor.   Patient remains FULL CODE as per her wishes  would like everything done including trach and PEG 66 yo obese F with hx of 80 pack year tobacco use, HTN, DM2 on insulin, COPD on 5L (frequent admissions), R lung cancer s/p resection (2017) on chemo q 3 weeks with concern for mets to spine and compression fractures, recent admission for COPD exacerbation earlier in August who on 9/19/2020 presented to ED with c/o SOB, hypoxia and increased sputum production x 1 day. Admitted to ICU x twice for hypoxemic respiratory distress requiring intubation (9/21 and 10/6).       CNS=====================================  Intubated and sedated  tapering down Propofol and Precedex for sedation  adding fixed pain control    CARD====================================  Central Femoral line (10/5)  pressor support Phenylephrine discontinued 10/8  consider Midodrine taper 10 q 8 if BP reduces  continue to monitor BP and HR for distress    PULM====================================  #Acute on chronic respiratory failure with hypoxia and hypercapnia.   2/2 COPD, heavy smoking history (80 pack years)  Intubated and sedated x 2 this admission (9/21 and 10/5)  Plan for Trach and PEG surgery 10/6 per patient request  On prednisone at home, will give stress dose and daily steroid per Pulm (Dr. Nugent)  - IV solumedrol 80 q12 x 48 hours follow by   - PO prednisone 40 QD from 10/9 taper 20 QD x 5d and 10 QD from then on daily  - bronchodilators and ICS    #COPD  on AVAPS previously, failed and was re-intubated 10/5  for Tracheostomy  managed as above    #Metastatic Lung cancer  holding chemo  continue steroids  port removed on this hospital stay  Heme/Onc following Dr Mendiola    ID=======================================  #Sepsis.   Fever, leukocytosis (on steroids0) and elevated Lactate 3.1  - Hx VRE and Fungemia)  - s/p Caspofungin (9/23 -10/7) + Zyvox (10/6 -10/7 stopped for low plts)  - BCx pos for Gram - neg rods; Serratia marcescens   - c/w Levaquin (10/6)   - f/u repeat BCx drawn 10/8    #Pneumonia   CXR shows b/l pleural effusions with Left sided consolidation  Abx coverage as above: Levaquin (10/6)   BCx positive for Gram negative rods: Serratia  plan above    Heme/Onc=================================  #Anemia  2/2 chronic disease  Hgb less than 7.0, s/p 2 pRBCS  Hb 8.4     #Thrombocytopenia  Hx of ITP  on steroids, 2 unit plts 10/7, 10/8  improving     #Lung cancer metastatic to bone.   s/p 1 radiation dose to spine.  Was on chemo before hospitalization  chemo port removed, Dr. Mendiola following  c/w pain management.     Endo====================================  #DM (diabetes mellitus).   Start on NPO + OG tube feeds  moderate dose sliding scale q6h  - continue insulin lantus 20 units daily (usual dose 40 U)  - insulin lispro 4 units q6h  - goal inpatient glucose range: 140-180mg/dl    GI======================================  #Moderate protein-calorie malnutrition.   - OG tube with tube feeds  - PEG tube feeds begin 10/9    Nephro=================================  BUN 16| Creat 0.35  No active issue monitor electrolytes and replace as needed    PPX=====================================  DVT restarted 10/9  GI prophylaxis.    GOC====================================  Overall prognosis is extremely poor.   Patient remains FULL CODE as per her wishes  would like everything done including trach and PEG 66 yo obese F with hx of 80 pack year tobacco use, HTN, DM2 on insulin, COPD on 5L (frequent admissions), R lung cancer s/p resection (2017) on chemo q 3 weeks with concern for mets to spine and compression fractures, recent admission for COPD exacerbation earlier in August who on 9/19/2020 presented to ED with c/o SOB, hypoxia and increased sputum production x 1 day. Admitted to ICU x twice for hypoxemic respiratory distress requiring intubation (9/21 and 10/6).       CNS=====================================  Intubated and sedated  tapering down Propofol and Precedex for sedation  adding fixed pain control    CARD====================================  Central Femoral line (10/5)  pressor support Phenylephrine discontinued 10/8  consider Midodrine taper 10 q 8 if BP reduces  continue to monitor BP and HR for distress    PULM====================================  #Acute on chronic respiratory failure with hypoxia and hypercapnia.   2/2 COPD, heavy smoking history (80 pack years)  Intubated and sedated x 2 this admission (9/21 and 10/5)  Plan for Trach and PEG surgery 10/6 per patient request  On prednisone at home, will give stress dose and daily steroid per Pulm (Dr. Nugent)  - IV solumedrol 80 q12 x 48 hours follow by   - PO prednisone 40 QD from 10/9 taper 20 QD x 5d and 10 QD from then on daily  - bronchodilators and ICS    #COPD  on AVAPS previously, failed and was re-intubated 10/5  for Tracheostomy  managed as above    #Metastatic Lung cancer  holding chemo  continue steroids  port removed on this hospital stay  Heme/Onc following Dr Mendiola    ID=======================================  #Sepsis.   Fever, leukocytosis (on steroids0) and elevated Lactate 3.1  - Hx VRE and Fungemia)  - s/p Caspofungin (9/23 -10/7) + Zyvox (10/6 -10/7 stopped for low plts)  - BCx pos for Gram - neg rods; Serratia marcescens   - c/w Levaquin (10/6)   - f/u repeat BCx drawn 10/8    #Pneumonia   CXR shows b/l pleural effusions with Left sided consolidation  Abx coverage as above: Levaquin (10/6)   BCx positive for Gram negative rods: Serratia  plan above    Heme/Onc=================================  #Anemia  2/2 chronic disease  Hgb less than 7.0, s/p 2 pRBCS  Hb 8.4     #Thrombocytopenia  Hx of ITP  on steroids, 2 unit plts 10/7, 10/8  improving     #Lung cancer metastatic to bone.   s/p 1 radiation dose to spine.  Was on chemo before hospitalization  chemo port removed, Dr. Mendiola following  c/w pain management.     Endo====================================  #DM (diabetes mellitus).   Start on NPO + OG tube feeds  moderate dose sliding scale q6h  - continue insulin lantus 20 units daily (usual dose 40 U)  - insulin lispro 4 units q6h  - goal inpatient glucose range: 140-180mg/dl    GI======================================  #Moderate protein-calorie malnutrition.   - OG tube with tube feeds  - PEG tube feeds begin 10/9    Nephro=================================  BUN 16| Creat 0.35  No active issue monitor electrolytes and replace as needed    PPX=====================================  DVT restart once plts over 50  GI prophylaxis.    GOC====================================  Overall prognosis is extremely poor.   Patient remains FULL CODE as per her wishes  would like everything done including trach and PEG

## 2020-10-09 NOTE — PROGRESS NOTE ADULT - ASSESSMENT
65F s/p trach and PEG 10/8, POD#1    - continue trach site care; sutures to be removed POD#14  - PEG may be used for feeding today  - cont care as per primary team

## 2020-10-09 NOTE — PROGRESS NOTE ADULT - SUBJECTIVE AND OBJECTIVE BOX
INTERVAL HPI/OVERNIGHT EVENTS:  No acute events overnight. Trach to vent. PEG to gravity.    MEDICATIONS  (STANDING):  ALBUTerol    90 MICROgram(s) HFA Inhaler 2 Puff(s) Inhalation every 6 hours  atorvastatin 40 milliGRAM(s) Oral at bedtime  carvedilol 3.125 milliGRAM(s) Oral every 12 hours  chlorhexidine 0.12% Liquid 15 milliLiter(s) Oral Mucosa every 12 hours  chlorhexidine 2% Cloths 1 Application(s) Topical <User Schedule>  dextrose 5%. 1000 milliLiter(s) (50 mL/Hr) IV Continuous <Continuous>  dextrose 50% Injectable 12.5 Gram(s) IV Push once  dextrose 50% Injectable 25 Gram(s) IV Push once  dextrose 50% Injectable 25 Gram(s) IV Push once  ergocalciferol 11422 Unit(s) Oral <User Schedule>  gabapentin 600 milliGRAM(s) Oral every 8 hours  influenza   Vaccine 0.5 milliLiter(s) IntraMuscular once  insulin glargine Injectable (LANTUS) 10 Unit(s) SubCutaneous at bedtime  insulin lispro (HumaLOG) corrective regimen sliding scale   SubCutaneous every 6 hours  insulin lispro Injectable (HumaLOG) 4 Unit(s) SubCutaneous every 6 hours  levoFLOXacin IVPB 750 milliGRAM(s) IV Intermittent every 24 hours  levoFLOXacin IVPB      pantoprazole  Injectable 40 milliGRAM(s) IV Push daily    MEDICATIONS  (PRN):  acetaminophen    Suspension .. 650 milliGRAM(s) Oral every 6 hours PRN Temp greater or equal to 38C (100.4F)  dextrose 40% Gel 15 Gram(s) Oral once PRN Blood Glucose LESS THAN 70 milliGRAM(s)/deciliter  glucagon  Injectable 1 milliGRAM(s) IntraMuscular once PRN Glucose LESS THAN 70 milligrams/deciliter  morphine  - Injectable 2 milliGRAM(s) IV Push every 6 hours PRN Severe Pain (7 - 10)  sodium chloride 0.9% lock flush 10 milliLiter(s) IV Push every 1 hour PRN Pre/post blood products, medications, blood draw, and to maintain line patency      Vital Signs Last 24 Hrs  T(C): 38 (09 Oct 2020 04:45), Max: 38 (09 Oct 2020 04:45)  T(F): 100.4 (09 Oct 2020 04:45), Max: 100.4 (09 Oct 2020 04:45)  HR: 107 (09 Oct 2020 08:06) (73 - 142)  BP: 132/61 (09 Oct 2020 08:06) (76/44 - 175/114)  BP(mean): 78 (09 Oct 2020 08:06) (51 - 128)  RR: 17 (09 Oct 2020 08:06) (10 - 29)  SpO2: 96% (09 Oct 2020 08:06) (96% - 100%)    Physical:  General: follows commands  Resp: trach to vent; trach site clean/dry; surgicel removed at bedside  Abdomen: Soft, nondistended, PEG to gravity  Extremities: No pedal edema    I&O's Detail  08 Oct 2020 07:01  -  09 Oct 2020 07:00  --------------------------------------------------------  IN:    Dexmedetomidine: 127.2 mL    Dexmedetomidine: 63.6 mL    Dexmedetomidine: 309.4 mL    FentaNYL: 152.2 mL    FentaNYL: 237.3 mL    FentaNYL: 203.4 mL    IV PiggyBack: 250 mL    IV PiggyBack: 150 mL    Platelets - Single Donor: 290 mL  Total IN: 1783.1 mL    OUT:    Glucerna 1.5: 0 mL    Indwelling Catheter - Urethral (mL): 815 mL    PEG (Percutaneous Endoscopic Gastrostomy) Tube (mL): 200 mL    Phenylephrine: 0 mL  Total OUT: 1015 mL  Total NET: 768.1 mL    LABS:                     8.4    2.59  )-----------( 46       ( 09 Oct 2020 03:48 )             24.3             10-09    139  |  101  |  16  ----------------------------<  115<H>  3.6   |  34<H>  |  0.35<L>    Ca    7.9<L>      09 Oct 2020 03:48  Phos  2.6     10-09  Mg     1.8     10-09    TPro  5.1<L>  /  Alb  2.0<L>  /  TBili  1.0  /  DBili  x   /  AST  15  /  ALT  31  /  AlkPhos  114  10-09

## 2020-10-09 NOTE — PROGRESS NOTE ADULT - SUBJECTIVE AND OBJECTIVE BOX
Interval Events:    s/p trach/PEG  planned to switch to po prednisone  poc glucose controlled    Allergies    fish (Angioedema)  penicillins (Rash)    Intolerances      Endocrine/Metabolic Medications:  atorvastatin 40 milliGRAM(s) Oral at bedtime  dextrose 40% Gel 15 Gram(s) Oral once PRN  dextrose 50% Injectable 12.5 Gram(s) IV Push once  dextrose 50% Injectable 25 Gram(s) IV Push once  dextrose 50% Injectable 25 Gram(s) IV Push once  glucagon  Injectable 1 milliGRAM(s) IntraMuscular once PRN  insulin glargine Injectable (LANTUS) 10 Unit(s) SubCutaneous at bedtime  insulin lispro (HumaLOG) corrective regimen sliding scale   SubCutaneous every 6 hours  insulin lispro Injectable (HumaLOG) 4 Unit(s) SubCutaneous every 6 hours      Vital Signs Last 24 Hrs  T(C): 38 (09 Oct 2020 04:45), Max: 38 (09 Oct 2020 04:45)  T(F): 100.4 (09 Oct 2020 04:45), Max: 100.4 (09 Oct 2020 04:45)  HR: 107 (09 Oct 2020 08:06) (73 - 142)  BP: 132/61 (09 Oct 2020 08:06) (76/44 - 175/114)  BP(mean): 78 (09 Oct 2020 08:06) (51 - 128)  RR: 17 (09 Oct 2020 08:06) (10 - 29)  SpO2: 96% (09 Oct 2020 08:06) (96% - 100%)      PHYSICAL EXAM  Constitutional:  s/p trach  NC/AT:    HEENT:    Neck:  No JVD    Respiratory:  reduced breath sounds b/l bases    Cardiovascular:  RR    Gastrointestinal: Soft    Extremities: without cyanosis  Neurological:  non focal      LABS                        8.4    2.59  )-----------( 46       ( 09 Oct 2020 03:48 )             24.3                               139    |  101    |  16                  Calcium: 7.9   / iCa: x      (10-09 @ 03:48)    ----------------------------<  115       Magnesium: 1.8                              3.6     |  34     |  0.35             Phosphorous: 2.6      TPro  5.1    /  Alb  2.0    /  TBili  1.0    /  DBili  x      /  AST  15     /  ALT  31     /  AlkPhos  114    09 Oct 2020 03:48    CAPILLARY BLOOD GLUCOSE      POCT Blood Glucose.: 105 mg/dL (09 Oct 2020 06:54)  POCT Blood Glucose.: 190 mg/dL (08 Oct 2020 23:13)  POCT Blood Glucose.: 283 mg/dL (08 Oct 2020 17:23)  POCT Blood Glucose.: 260 mg/dL (08 Oct 2020 15:36)  POCT Blood Glucose.: 266 mg/dL (08 Oct 2020 13:13)        Assesment/plan            64 yo female with multiple comorbidities including h/o DM type 2, HTN, COPD on o2, lung ca on right sided s/p resection on chemo admitted with shortness of breath  was intubated/extubated for acute resp failure this admit, now extubated on general medical floor, standing high dose steroids with uncontrolled hyperglycemia    endocrinology consulted for glycemic management    DM type 2  uncontrolled  complicated by high dose steroid use, acute on chronic resp failure  home regimen:  basaglar 60 units daily  novolog 15 units pre meals    recommendations:  on high dose methylpred, plan to switch to prednisone  s/p trach/PEG    moderate dose sliding scale q6h  - continue insulin lantus 10 units daily  - continue insulin lispro 4 units q6h  - reassess based on requirements  - goal inpatient glucose range: 140-180mg/dl    COPD exacerbation  acute on chronic hypoxic resp failure  s/p intubation/extubation current admit  on standing steroids  steroids increased  bipap   pulmonary on board  deteriorated, transferred to ICU, reintubated  s/p trach/PEG    sepsis  VRE bacteremia  on zosyn  ID on board    Discussed with primary team   Please call  Endocrine- Dr Katlyn Garcia as needed    Time spent evaluating patient including coordination of care 35mins.

## 2020-10-10 DIAGNOSIS — R00.0 TACHYCARDIA, UNSPECIFIED: ICD-10-CM

## 2020-10-10 LAB
ALBUMIN SERPL ELPH-MCNC: 2.1 G/DL — LOW (ref 3.5–5)
ALP SERPL-CCNC: 127 U/L — HIGH (ref 40–120)
ALT FLD-CCNC: 29 U/L DA — SIGNIFICANT CHANGE UP (ref 10–60)
ANION GAP SERPL CALC-SCNC: 5 MMOL/L — SIGNIFICANT CHANGE UP (ref 5–17)
AST SERPL-CCNC: 19 U/L — SIGNIFICANT CHANGE UP (ref 10–40)
BILIRUB SERPL-MCNC: 1.2 MG/DL — SIGNIFICANT CHANGE UP (ref 0.2–1.2)
BUN SERPL-MCNC: 10 MG/DL — SIGNIFICANT CHANGE UP (ref 7–18)
CALCIUM SERPL-MCNC: 8.2 MG/DL — LOW (ref 8.4–10.5)
CHLORIDE SERPL-SCNC: 94 MMOL/L — LOW (ref 96–108)
CO2 SERPL-SCNC: 36 MMOL/L — HIGH (ref 22–31)
CREAT SERPL-MCNC: 0.2 MG/DL — LOW (ref 0.5–1.3)
CULTURE RESULTS: SIGNIFICANT CHANGE UP
GLUCOSE BLDC GLUCOMTR-MCNC: 117 MG/DL — HIGH (ref 70–99)
GLUCOSE BLDC GLUCOMTR-MCNC: 138 MG/DL — HIGH (ref 70–99)
GLUCOSE BLDC GLUCOMTR-MCNC: 181 MG/DL — HIGH (ref 70–99)
GLUCOSE BLDC GLUCOMTR-MCNC: 207 MG/DL — HIGH (ref 70–99)
GLUCOSE SERPL-MCNC: 124 MG/DL — HIGH (ref 70–99)
HCT VFR BLD CALC: 28.5 % — LOW (ref 34.5–45)
HGB BLD-MCNC: 10.2 G/DL — LOW (ref 11.5–15.5)
MAGNESIUM SERPL-MCNC: 1.8 MG/DL — SIGNIFICANT CHANGE UP (ref 1.6–2.6)
MCHC RBC-ENTMCNC: 30.4 PG — SIGNIFICANT CHANGE UP (ref 27–34)
MCHC RBC-ENTMCNC: 35.8 GM/DL — SIGNIFICANT CHANGE UP (ref 32–36)
MCV RBC AUTO: 85.1 FL — SIGNIFICANT CHANGE UP (ref 80–100)
NRBC # BLD: 1 /100 WBCS — HIGH (ref 0–0)
PHOSPHATE SERPL-MCNC: 2.6 MG/DL — SIGNIFICANT CHANGE UP (ref 2.5–4.5)
PLATELET # BLD AUTO: 37 K/UL — LOW (ref 150–400)
POTASSIUM SERPL-MCNC: 2.9 MMOL/L — CRITICAL LOW (ref 3.5–5.3)
POTASSIUM SERPL-SCNC: 2.9 MMOL/L — CRITICAL LOW (ref 3.5–5.3)
PROT SERPL-MCNC: 5.5 G/DL — LOW (ref 6–8.3)
RBC # BLD: 3.35 M/UL — LOW (ref 3.8–5.2)
RBC # FLD: 15.4 % — HIGH (ref 10.3–14.5)
SODIUM SERPL-SCNC: 135 MMOL/L — SIGNIFICANT CHANGE UP (ref 135–145)
SPECIMEN SOURCE: SIGNIFICANT CHANGE UP
WBC # BLD: 4.33 K/UL — SIGNIFICANT CHANGE UP (ref 3.8–10.5)
WBC # FLD AUTO: 4.33 K/UL — SIGNIFICANT CHANGE UP (ref 3.8–10.5)

## 2020-10-10 RX ORDER — CARVEDILOL PHOSPHATE 80 MG/1
3.12 CAPSULE, EXTENDED RELEASE ORAL ONCE
Refills: 0 | Status: COMPLETED | OUTPATIENT
Start: 2020-10-10 | End: 2020-10-10

## 2020-10-10 RX ORDER — CARVEDILOL PHOSPHATE 80 MG/1
6.25 CAPSULE, EXTENDED RELEASE ORAL EVERY 12 HOURS
Refills: 0 | Status: DISCONTINUED | OUTPATIENT
Start: 2020-10-10 | End: 2020-10-15

## 2020-10-10 RX ORDER — ACETAMINOPHEN 500 MG
650 TABLET ORAL EVERY 4 HOURS
Refills: 0 | Status: DISCONTINUED | OUTPATIENT
Start: 2020-10-10 | End: 2020-10-15

## 2020-10-10 RX ORDER — POTASSIUM CHLORIDE 20 MEQ
40 PACKET (EA) ORAL EVERY 4 HOURS
Refills: 0 | Status: COMPLETED | OUTPATIENT
Start: 2020-10-10 | End: 2020-10-10

## 2020-10-10 RX ORDER — MORPHINE SULFATE 50 MG/1
2 CAPSULE, EXTENDED RELEASE ORAL EVERY 4 HOURS
Refills: 0 | Status: DISCONTINUED | OUTPATIENT
Start: 2020-10-10 | End: 2020-10-15

## 2020-10-10 RX ORDER — OXYCODONE AND ACETAMINOPHEN 5; 325 MG/1; MG/1
1 TABLET ORAL EVERY 4 HOURS
Refills: 0 | Status: DISCONTINUED | OUTPATIENT
Start: 2020-10-10 | End: 2020-10-15

## 2020-10-10 RX ADMIN — CARVEDILOL PHOSPHATE 6.25 MILLIGRAM(S): 80 CAPSULE, EXTENDED RELEASE ORAL at 17:38

## 2020-10-10 RX ADMIN — OXYCODONE AND ACETAMINOPHEN 1 TABLET(S): 5; 325 TABLET ORAL at 14:45

## 2020-10-10 RX ADMIN — GABAPENTIN 600 MILLIGRAM(S): 400 CAPSULE ORAL at 14:48

## 2020-10-10 RX ADMIN — CHLORHEXIDINE GLUCONATE 1 APPLICATION(S): 213 SOLUTION TOPICAL at 05:17

## 2020-10-10 RX ADMIN — Medication 4 UNIT(S): at 23:08

## 2020-10-10 RX ADMIN — MORPHINE SULFATE 2 MILLIGRAM(S): 50 CAPSULE, EXTENDED RELEASE ORAL at 22:46

## 2020-10-10 RX ADMIN — CARVEDILOL PHOSPHATE 3.12 MILLIGRAM(S): 80 CAPSULE, EXTENDED RELEASE ORAL at 05:16

## 2020-10-10 RX ADMIN — MORPHINE SULFATE 2 MILLIGRAM(S): 50 CAPSULE, EXTENDED RELEASE ORAL at 06:22

## 2020-10-10 RX ADMIN — ALBUTEROL 2 PUFF(S): 90 AEROSOL, METERED ORAL at 04:46

## 2020-10-10 RX ADMIN — Medication 20 MILLIGRAM(S): at 05:16

## 2020-10-10 RX ADMIN — ALBUTEROL 2 PUFF(S): 90 AEROSOL, METERED ORAL at 20:29

## 2020-10-10 RX ADMIN — CHLORHEXIDINE GLUCONATE 15 MILLILITER(S): 213 SOLUTION TOPICAL at 17:38

## 2020-10-10 RX ADMIN — CARVEDILOL PHOSPHATE 3.12 MILLIGRAM(S): 80 CAPSULE, EXTENDED RELEASE ORAL at 11:26

## 2020-10-10 RX ADMIN — Medication 4 UNIT(S): at 11:33

## 2020-10-10 RX ADMIN — Medication 4: at 17:38

## 2020-10-10 RX ADMIN — ALBUTEROL 2 PUFF(S): 90 AEROSOL, METERED ORAL at 15:45

## 2020-10-10 RX ADMIN — PANTOPRAZOLE SODIUM 40 MILLIGRAM(S): 20 TABLET, DELAYED RELEASE ORAL at 12:10

## 2020-10-10 RX ADMIN — Medication 40 MILLIEQUIVALENT(S): at 12:10

## 2020-10-10 RX ADMIN — INSULIN GLARGINE 10 UNIT(S): 100 INJECTION, SOLUTION SUBCUTANEOUS at 22:52

## 2020-10-10 RX ADMIN — Medication 40 MILLIEQUIVALENT(S): at 14:48

## 2020-10-10 RX ADMIN — ALBUTEROL 2 PUFF(S): 90 AEROSOL, METERED ORAL at 09:08

## 2020-10-10 RX ADMIN — OXYCODONE AND ACETAMINOPHEN 1 TABLET(S): 5; 325 TABLET ORAL at 13:30

## 2020-10-10 RX ADMIN — OXYCODONE AND ACETAMINOPHEN 1 TABLET(S): 5; 325 TABLET ORAL at 09:53

## 2020-10-10 RX ADMIN — Medication 2: at 11:33

## 2020-10-10 RX ADMIN — MORPHINE SULFATE 2 MILLIGRAM(S): 50 CAPSULE, EXTENDED RELEASE ORAL at 22:15

## 2020-10-10 RX ADMIN — GABAPENTIN 600 MILLIGRAM(S): 400 CAPSULE ORAL at 21:19

## 2020-10-10 RX ADMIN — Medication 4 UNIT(S): at 17:39

## 2020-10-10 RX ADMIN — CHLORHEXIDINE GLUCONATE 15 MILLILITER(S): 213 SOLUTION TOPICAL at 05:17

## 2020-10-10 RX ADMIN — GABAPENTIN 600 MILLIGRAM(S): 400 CAPSULE ORAL at 05:15

## 2020-10-10 RX ADMIN — ATORVASTATIN CALCIUM 40 MILLIGRAM(S): 80 TABLET, FILM COATED ORAL at 21:19

## 2020-10-10 RX ADMIN — OXYCODONE AND ACETAMINOPHEN 1 TABLET(S): 5; 325 TABLET ORAL at 10:55

## 2020-10-10 RX ADMIN — MORPHINE SULFATE 2 MILLIGRAM(S): 50 CAPSULE, EXTENDED RELEASE ORAL at 05:30

## 2020-10-10 NOTE — PROGRESS NOTE ADULT - ASSESSMENT
Bacteremia with serratia marcescens - repeat BC are negative  S/p sepsis   ·	cont levaquin 750mg IV daily  D5, will need 14 days total of treatment

## 2020-10-10 NOTE — PROGRESS NOTE ADULT - ATTENDING COMMENTS
65 year old Woman with hx of 80 pack year tobacco use, HTN, DM2 on insulin, COPD on 5L (frequent admissions), R lung cancer s/p resection (2017) on chemo q 3 weeks with concern for mets to spine and compression fractures, recent admission for COPD exacerbation earlier in August who on 9/19/2020 presented to ED with c/o SOB, hypoxia and increased sputum production x 1 day.          Problem/Plan - 1:  ·  Problem: Acute on chronic respiratory failure with hypoxia and hypercapnia.  Plan: Acute on chronic respiratory failure with hypoxia and hypercapnia due to end stage COPD   - on AVAPS previously, failed and was re-intubated 10/5  - Tracheostomy placed 10/8  - s/p solumedrol 80 q12 x 48 hours   - on prednisone taper daily from 10/9 taper 20 QD x 5d then10 QD long standing   - bronchodilators and ICS  - con't mechanical ventilation.   -for placement to vent facility

## 2020-10-10 NOTE — PROGRESS NOTE ADULT - PROBLEM SELECTOR PLAN 1
Acute on chronic respiratory failure with hypoxia and hypercapnia due to end stage COPD   - on AVAPS previously, failed and was re-intubated 10/5  - Tracheostomy placed 10/8  - s/p solumedrol 80 q12 x 48 hours   - on prednisone taper fantasma from 10/9 taper 20 QD x 5d then10 QD long standin   - bronchodilators and ICS  - con't mechanical ventilation

## 2020-10-10 NOTE — PROGRESS NOTE ADULT - SUBJECTIVE AND OBJECTIVE BOX
Covering for Dr. Garcia-    Interval Events: h and p and interval events noted  pt admitted with copd exac/resp failure s/p trach on steroids   now on peg feeds starting this am at 10cc /hr  in nad     Allergies    fish (Angioedema)  penicillins (Rash)    Intolerances      Endocrine/Metabolic Medications:  atorvastatin 40 milliGRAM(s) Oral at bedtime  dextrose 40% Gel 15 Gram(s) Oral once PRN  dextrose 50% Injectable 12.5 Gram(s) IV Push once  dextrose 50% Injectable 25 Gram(s) IV Push once  dextrose 50% Injectable 25 Gram(s) IV Push once  glucagon  Injectable 1 milliGRAM(s) IntraMuscular once PRN  insulin glargine Injectable (LANTUS) 10 Unit(s) SubCutaneous at bedtime  insulin lispro (HumaLOG) corrective regimen sliding scale   SubCutaneous every 6 hours  insulin lispro Injectable (HumaLOG) 4 Unit(s) SubCutaneous every 6 hours  predniSONE   Tablet 20 milliGRAM(s) Oral daily      Vital Signs Last 24 Hrs  T(C): 36.8 (10 Oct 2020 04:53), Max: 37.3 (09 Oct 2020 15:00)  T(F): 98.2 (10 Oct 2020 04:53), Max: 99.1 (09 Oct 2020 15:00)  HR: 125 (10 Oct 2020 08:19) (96 - 140)  BP: 123/69 (10 Oct 2020 06:27) (105/79 - 153/107)  BP(mean): 87 (09 Oct 2020 17:00) (75 - 94)  RR: 20 (10 Oct 2020 06:27) (16 - 22)  SpO2: 97% (10 Oct 2020 08:19) (95% - 99%)      PHYSICAL EXAM  All physical exam findings normal, except those marked:  General:	Alert, active, cooperative, NAD, well hydrated  .		[] Abnormal:  Neck		Normal: supple, no cervical adenopathy, no palpable thyroid  .		[] Abnormal:  Cardiovascular	Normal: regular rate, normal S1, S2, no murmurs  .		[] Abnormal:  Respiratory	Normal: no chest wall deformity, normal respiratory pattern, CTA B/L  .		[] Abnormal:  Abdominal	Normal: soft, ND, NT, bowel sounds present, no masses, no organomegaly  .		[] Abnormal:  		Normal normal genitalia, testes descended, circumcised/uncircumcised  .		Dejon stage:			Breast dejon:  .		Menstrual history:  .		[] Abnormal:  Extremities	Normal: FROM x4  .		[] Abnormal:  Skin		Normal: intact and not indurated, no rash, no acanthosis nigricans  .		[] Abnormal:  Neurologic	Normal: grossly intact  .		[] Abnormal:    LABS                        10.2   4.33  )-----------( 37       ( 10 Oct 2020 06:40 )             28.5                               135    |  94     |  10                  Calcium: 8.2   / iCa: x      (10-10 @ 06:40)    ----------------------------<  124       Magnesium: 1.8                              2.9     |  36     |  0.20             Phosphorous: 2.6      TPro  5.5    /  Alb  2.1    /  TBili  1.2    /  DBili  x      /  AST  19     /  ALT  29     /  AlkPhos  127    10 Oct 2020 06:40    CAPILLARY BLOOD GLUCOSE      POCT Blood Glucose.: 181 mg/dL (10 Oct 2020 11:25)  POCT Blood Glucose.: 117 mg/dL (10 Oct 2020 05:45)  POCT Blood Glucose.: 141 mg/dL (09 Oct 2020 23:22)  POCT Blood Glucose.: 155 mg/dL (09 Oct 2020 21:32)  POCT Blood Glucose.: 168 mg/dL (09 Oct 2020 17:19)        Assesment/plan    64 yo female with multiple comorbidities including h/o DM type 2, HTN, COPD on o2, lung ca on right sided s/p resection on chemo admitted with shortness of breath  was intubated/extubated for acute resp failure this admit, now s/p trach and peg on steroids with uncontrolled hyperglycemia. home regimen: basaglar 60 units daily, novolog 15 units pre meals      DM type 2  uncontrolled  complicated by high dose steroid use, acute on chronic resp failure  cont with lantus 10 and humalog 4 q6   may need increase doses as tube feeds increase  fsg q6 hr  aim fsg 140-180    COPD exacerbation- s/p trach  on tapering doses of prednisone  f/u pulm recs

## 2020-10-10 NOTE — PROGRESS NOTE ADULT - PROBLEM SELECTOR PLAN 2
Persistent tachycardia  pain mgmt: percocet via peg 2 tabs for moderate pain, morphine 2mg IVP Q6hrs for severe pain  ECG sinus tach  increase coreg 3.125 mg bid to 6.25 bid  monitor per protocol Persistent tachycardia likely due to pain vs hypokalemia  replete k of 2.9mg/dl with 40meq via peg x 2 dose Q4hrs (pt has poor IV access)  pain mgmt: percocet via peg 2 tabs for moderate pain, morphine 2mg IVP Q6hrs for severe pain  ECG sinus tach  increase coreg 3.125 mg bid to 6.25 bid  monitor per protocol

## 2020-10-10 NOTE — PROGRESS NOTE ADULT - PROBLEM SELECTOR PLAN 3
F/U blood cultures (had recent (10/8) code sepsis and elevated lactate)  blood cx no growth to date  afebrile / WBC wnl  Continue above abx   ID f/u

## 2020-10-10 NOTE — PROGRESS NOTE ADULT - SUBJECTIVE AND OBJECTIVE BOX
65y female is under our care for bacteremia with serratia marcescens, s/p sepsis. Patient was downgraded to AI last night. She is not able to say much, but does point to her trach and suggests that it hurts. Floor RN plans to administer Percocet soon. Low grade temps have been tapering away. Repeat BC from 10/08 is negative.     REVIEW OF SYSTEMS:  [ X ] Not able to illicit    MEDS:  levoFLOXacin IVPB 750 milliGRAM(s) IV Intermittent every 24 hours  predniSONE   Tablet 20 milliGRAM(s) Oral daily      ALLERGIES: Allergies    fish (Angioedema)  penicillins (Rash)    Intolerances      VITALS:  Vital Signs Last 24 Hrs  T(C): 36.9 (10 Oct 2020 11:41), Max: 37.2 (09 Oct 2020 17:52)  T(F): 98.4 (10 Oct 2020 11:41), Max: 98.9 (09 Oct 2020 17:52)  HR: 127 (10 Oct 2020 13:16) (96 - 140)  BP: 138/92 (10 Oct 2020 11:41) (123/69 - 153/107)  BP(mean): 87 (09 Oct 2020 17:00) (87 - 87)  RR: 16 (10 Oct 2020 11:41) (16 - 22)  SpO2: 98% (10 Oct 2020 11:41) (96% - 99%)      PHYSICAL EXAM:  HEENT: n/a  Neck: supple no LN's, +vent via trach  Respiratory: bilateral rhoncherous sounds no rales or wheezing  Cardiovascular: S1 S2 tachy no murmurs  Gastrointestinal: +BS with soft, nondistended abdomen; grimaces with palpation around peg site   +peg +benitez  Extremities: no edema  Skin: no rashes  Ortho: n/a  Neuro: awake but lethargic      LABS/DIAGNOSTIC TESTS:                           10.2   4.33  )-----------( 37       ( 10 Oct 2020 06:40 )             28.5   WBC Count: 4.33 K/uL (10-10 @ 06:40)  WBC Count: 3.30 K/uL (10-09 @ 15:09)  WBC Count: 2.59 K/uL (10-09 @ 03:48)  WBC Count: 3.12 K/uL (10-08 @ 19:31)  WBC Count: 3.45 K/uL (10-08 @ 05:06)    10-10    135  |  94<L>  |  10  ----------------------------<  124<H>  2.9<LL>   |  36<H>  |  0.20<L>    Ca    8.2<L>      10 Oct 2020 06:40  Phos  2.6     10-10  Mg     1.8     10-10    TPro  5.5<L>  /  Alb  2.1<L>  /  TBili  1.2  /  DBili  x   /  AST  19  /  ALT  29  /  AlkPhos  127<H>  10-10          CULTURES:   .Blood Blood  10-06 @ 10:10   Growth in aerobic bottle: Serratia marcescens  See previous culture [id number]  --  Blood Culture PCR  Serratia marcescens      .Blood Blood  10-04 @ 11:02   No growth to date.  --  --      .Blood Blood  09-27 @ 12:06   No Growth Final  --  --      .Blood Blood  09-23 @ 10:33   No Growth Final  --  --      .Blood Blood  09-21 @ 14:47   No Growth Final  --  --      .Urine Clean Catch (Midstream)  09-19 @ 16:20   <10,000 CFU/mL Normal Urogenital Shannan  --  --      .Blood Blood-Peripheral  09-19 @ 11:48   Growth in anaerobic bottle: Enterococcus faecium (vancomycin resistant)  "Due to technical problems, Proteus sp. will Not be reported as part of  the BCID panel until further notice"      .Urine Clean Catch (Midstream)  08-02 @ 14:58   50,000 - 99,000 CFU/mL Coag Negative Staphylococcus  "Susceptibilities not performed"  --  --      .Blood Blood  08-02 @ 11:14   No Growth Final  --  --        RADIOLOGY:  no new studies

## 2020-10-10 NOTE — PROGRESS NOTE ADULT - SUBJECTIVE AND OBJECTIVE BOX
PULMONARY  progress note    BARB COLÓN  [id number]    Patient is a 65y old  Female who presents with a chief complaint of shortness of breath (09 Oct 2020 09:53)  Pt on CMV22//40% with PEEP 5 cm with PP28 and o2 sat 100%      MEDICATIONS  (STANDING):  ALBUTerol    90 MICROgram(s) HFA Inhaler 2 Puff(s) Inhalation every 6 hours  atorvastatin 40 milliGRAM(s) Oral at bedtime  carvedilol 3.125 milliGRAM(s) Oral every 12 hours  chlorhexidine 0.12% Liquid 15 milliLiter(s) Oral Mucosa every 12 hours  chlorhexidine 2% Cloths 1 Application(s) Topical <User Schedule>  dextrose 5%. 1000 milliLiter(s) (50 mL/Hr) IV Continuous <Continuous>  dextrose 50% Injectable 12.5 Gram(s) IV Push once  dextrose 50% Injectable 25 Gram(s) IV Push once  dextrose 50% Injectable 25 Gram(s) IV Push once  ergocalciferol 91313 Unit(s) Oral <User Schedule>  gabapentin 600 milliGRAM(s) Oral every 8 hours  influenza   Vaccine 0.5 milliLiter(s) IntraMuscular once  insulin glargine Injectable (LANTUS) 10 Unit(s) SubCutaneous at bedtime  insulin lispro (HumaLOG) corrective regimen sliding scale   SubCutaneous every 6 hours  insulin lispro Injectable (HumaLOG) 4 Unit(s) SubCutaneous every 6 hours  levoFLOXacin IVPB 750 milliGRAM(s) IV Intermittent every 24 hours  levoFLOXacin IVPB      pantoprazole  Injectable 40 milliGRAM(s) IV Push daily  predniSONE   Tablet 20 milliGRAM(s) Oral daily      MEDICATIONS  (PRN):  acetaminophen    Suspension .. 650 milliGRAM(s) Oral every 4 hours PRN Mild Pain (1 - 3)  dextrose 40% Gel 15 Gram(s) Oral once PRN Blood Glucose LESS THAN 70 milliGRAM(s)/deciliter  glucagon  Injectable 1 milliGRAM(s) IntraMuscular once PRN Glucose LESS THAN 70 milligrams/deciliter  morphine  - Injectable 2 milliGRAM(s) IV Push every 4 hours PRN Severe Pain (7 - 10)  oxycodone    5 mG/acetaminophen 325 mG 1 Tablet(s) Oral every 4 hours PRN Moderate Pain (4 - 6)  sodium chloride 0.9% lock flush 10 milliLiter(s) IV Push every 1 hour PRN Pre/post blood products, medications, blood draw, and to maintain line patency      Allergies    fish (Angioedema)  penicillins (Rash)        PAST MEDICAL & SURGICAL HISTORY:  Malignant neoplasm of unspecified part of right bronchus or lung    HTN (hypertension)    DM (diabetes mellitus)    COPD (chronic obstructive pulmonary disease)    Lung cancer    Morbid obesity    GERD (gastroesophageal reflux disease)    Deviated septum    Prolapsed uterus    ITP (idiopathic thrombocytopenic purpura)    Emphysema/COPD    History of cholecystectomy    S/P lobectomy of lung  right 2017    History of tonsillectomy             REVIEW OF SYSTEMS:  CONSTITUTIONAL: No fever, weight loss, or fatigue   EYES: No eye pain, visual disturbances, or discharge  ENT:  No difficulty hearing, tinnitus, vertigo; No sinus or throat pain  NECK: No pain or stiffness or nodes, TT in place  RESPIRATORY:  cough+   wheezing --  chills --  hemoptysis--  Shortness of Breath+  CARDIOVASCULAR: No chest pain, palpitations, passing out, dizziness, or leg swelling  GASTROINTESTINAL: No abdominal or epigastric pain. No nausea, vomiting, or hematemesis; G/T feeds  GENITOURINARY: No dysuria, frequency, hematuria, or incontinence  SKIN: No itching, burning, rashes, or lesions   LYMPH Nodes: No enlarged glands  ENDOCRINE: No heat or cold intolerance; No hair loss  HEME/LYMPH: No easy bruising, or bleeding gums  ALLERGY AND IMMUNOLOGIC: No hives or eczema    Vital Signs Last 24 Hrs  T(C): 36.8 (10 Oct 2020 04:53), Max: 37.3 (09 Oct 2020 15:00)  T(F): 98.2 (10 Oct 2020 04:53), Max: 99.1 (09 Oct 2020 15:00)  HR: 125 (10 Oct 2020 08:19) (96 - 140)  BP: 123/69 (10 Oct 2020 06:27) (102/61 - 153/107)  BP(mean): 87 (09 Oct 2020 17:00) (70 - 94)  RR: 20 (10 Oct 2020 06:27) (16 - 22)  SpO2: 97% (10 Oct 2020 08:19) (95% - 99%)  I&O's Detail    09 Oct 2020 07:01  -  10 Oct 2020 07:00  --------------------------------------------------------  IN:    Enteral Tube Flush: 110 mL  Total IN: 110 mL    OUT:    Glucerna 1.5: 0 mL    Indwelling Catheter - Urethral (mL): 4255 mL  Total OUT: 4255 mL    Total NET: -4145 mL          PHYSICAL EXAMINATION:    GENERAL: The patient is a well-developed, well-nourished in no apparent distress on vent  SKIN no rash ecchymoses or bruises  HEENT: Head is normocephalic and atraumatic  SHAMA , Extraocular muscles are intact. Mucous membranes  moist.   Neck supple ,No LN felt JVP not increased  Thyroid not enlarged, TT in place  Cardiovascular:  S1 S2 heard, RSR, No JVD , systolic  murmur at apex, No gallop or rub  Respiratory: Chest wall symmetrical with good air entry ,Percussion note normal,    Lungs vesicular breathing with rt basilar  rales , no   wheeze	  ABDOMEN:  Soft, Non-tender, Obese, G/T in place,  no hepatomegaly or splenomegaly BS positive	  Extremities: Normal range of motion, No clubbing, cyanosis or edema  Vascular: Peripheral pulses palpable 2+ bilaterally  CNS: Lethargic , responsive verbally  cranial  nerves intact  sensory intact  motor power5/5  dtr 2+   Babinski neg    LABS:                        10.2   4.33  )-----------( 37       ( 10 Oct 2020 06:40 )             28.5     10-10    135  |  94<L>  |  10  ----------------------------<  124<H>  2.9<LL>   |  36<H>  |  0.20<L>    Ca    8.2<L>      10 Oct 2020 06:40  Phos  2.6     10-10  Mg     1.8     10-10    TPro  5.5<L>  /  Alb  2.1<L>  /  TBili  1.2  /  DBili  x   /  AST  19  /  ALT  29  /  AlkPhos  127<H>  10-10      Procalcitonin, Serum: 6.34 ng/mL (10-08-20 @ 11:28)          MICROBIOLOGY:    Culture - Acid Fast - Bronchial w/Smear (collected 10-08-20 @ 22:38)  Source: Bronch Wash Other, bronchial    Culture - Fungal, Bronchial (collected 10-08-20 @ 22:37)  Source: .Bronchial Other, bronchial  Preliminary Report (10-09-20 @ 08:30):    Testing in progress    Culture - Bronchial (collected 10-08-20 @ 22:37)  Source: .Bronchial bronchial  Gram Stain (10-09-20 @ 07:53):    Moderate polymorphonuclear leukocytes seen per low power field    No Squamous epithelial cells seen per low power field    No organisms seen per oil power field  Preliminary Report (10-09-20 @ 21:36):    Normal Respiratory Shannan present    Culture - Blood (collected 10-08-20 @ 06:59)  Source: .Blood Blood  Preliminary Report (10-09-20 @ 07:02):    No growth to date.    Culture - Blood (collected 10-08-20 @ 06:59)  Source: .Blood Blood  Preliminary Report (10-09-20 @ 07:02):    No growth to date.    Culture - Blood (collected 10-06-20 @ 10:10)  Source: .Blood Blood  Gram Stain (10-08-20 @ 12:16):    Growth in anaerobic bottle: Gram Negative Rods    Growth in aerobic bottle: Gram Negative Rods  Final Report (10-09-20 @ 08:44):    Growth in aerobic and anaerobic bottles: Serratia marcescens    "Due to technical problems, Proteus sp. will Not be reported as part of    the BCID panel until further notice"    ***Blood Panel PCR results on this specimen are available    approximately 3 hours after the Gram stain result.***    Gram stain, PCR, and/or culture results may not always    correspond due to difference in methodologies.    ************************************************************    This PCR assay was performed using [employer].    The following targets are tested for: Enterococcus,    vancomycin resistant enterococci, Listeria monocytogenes,    coagulase negative staphylococci, S. aureus,    methicillin resistant S. aureus, Streptococcus agalactiae    (Group B), S. pneumoniae,S. pyogenes (Group A),    Acinetobacter baumannii, Enterobacter cloacae, E. coli,    Klebsiella oxytoca, K. pneumoniae, Proteus sp.,    Serratia marcescens, Haemophilus influenzae,    Neisseria meningitidis, Pseudomonas aeruginosa, Candida    albicans, C. glabrata, C krusei, C parapsilosis,    C. tropicalis and the KPC resistance gene.    "For positive Serratia,  results cross-reactivity has been occasionally    observed with some species of Pseudomonas and Pantoea"  Organism: Blood Culture PCR  Serratia marcescens (10-09-20 @ 08:44)  Organism: Serratia marcescens (10-09-20 @ 08:44)      -  Amikacin: S <=16      -  Ampicillin: R <=8 These ampicillin results predict results for amoxicillin      -  Ampicillin/Sulbactam: R <=4/2 Enterobacter, Citrobacter, and Serratia may develop resistance during prolonged therapy (3-4 days)      -  Aztreonam: S <=4      -  Cefazolin: R >16 Enterobacter, Citrobacter, and Serratia may develop resistance during prolonged therapy (3-4 days)      -  Cefepime: S <=2      -  Cefoxitin: R 16      -  Ceftriaxone: S <=1 Enterobacter, Citrobacter, and Serratia may develop resistance during prolonged therapy      -  Ciprofloxacin: S <=0.25      -  Ertapenem: S <=0.5      -  Gentamicin: S <=2      -  Levofloxacin: S <=0.5      -  Meropenem: S <=1      -  Piperacillin/Tazobactam: S <=8      -  Tobramycin: S 4      -  Trimethoprim/Sulfamethoxazole: S <=0.5/9.5      Method Type: DALE  Organism: Blood Culture PCR (10-09-20 @ 08:44)      -  Serratia marcescens: Detec      Method Type: PCR    Culture - Blood (collected 10-06-20 @ 10:10)  Source: .Blood Blood  Gram Stain (10-07-20 @ 15:14):    Growth in aerobic bottle: Gram Negative Rods  Final Report (10-08-20 @ 14:28):    Growth in aerobic bottle: Serratia marcescens    See previous culture [id number]    Culture - Blood (collected 10-04-20 @ 11:02)  Source: .Blood Blood  Final Report (10-09-20 @ 12:01):    No Growth Final    Culture - Blood (collected 10-04-20 @ 11:02)  Source: .Blood Blood  Final Report (10-09-20 @ 12:01):    No Growth Final          RADIOLOGY & ADDITIONAL STUDIES:    CXR:< from: Xray Chest 1 View- PORTABLE-Routine (Xray Chest 1 View- PORTABLE-Routine in AM.) (10.09.20 @ 09:26) >  Midline tracheostomy. Surgical staple lines right midlung field. Stable bilateral infiltrates. Small pleural effusions cannot be excluded. There is no evidence for pneumothorax. No mediastinal shift.

## 2020-10-10 NOTE — PROGRESS NOTE ADULT - PROBLEM SELECTOR PLAN 4
VRE bacteremia / Fungemia  s/p course of 10 day coure of  zyvox and Cancidas with negative cultures  code sepsis on 10/8  F/U new cultures  con't levaquin

## 2020-10-10 NOTE — PROGRESS NOTE ADULT - SUBJECTIVE AND OBJECTIVE BOX
BARB COLÓN    SCU progress note    INTERVAL: Admitted to ICU x twice for hypoxemic respiratory distress requiring intubation (9/21 and 10/5)  re-intubated on 10/5  trached and peg on 10/8/2020    OVERNIGHT EVENTS: ***    DNR [ ]   DNI  [  ]    Covid - 19 PCR:     The 4Ms    What Matters Most: see GOC  Age appropriate Medications/Screen for High Risk Medication: Yes  Mentation: see CAM below  Mobility: defer to physical exam    The Confusion Assessment Method (CAM) Diagnostic Algorithm     1: Acute Onset or Fluctuating Course  - Is there evidence of an acute change in mental status from the patient’s baseline? Did the (abnormal) behavior  fluctuate during the day, that is, tend to come and go, or increase and decrease in severity?  [ ] YES [ ] NO     2: Inattention  - Did the patient have difficulty focusing attention, being easily distractible, or having difficulty keeping track of what was being said?  [ ] YES [ ] NO     3: Disorganized thinking  -Was the patient’s thinking disorganized or incoherent, such as rambling or irrelevant conversation, unclear or illogical flow of ideas, or unpredictable switching from subject to subject?  [ ] YES [ ] NO    4: Altered Level of consciousness?  [ ] YES [x] NO    The diagnosis of delirium by CAM requires the presence of features 1 and 2 and either 3 or 4.    PRESSORS: [ ] YES [ ] NO  levoFLOXacin IVPB 750 milliGRAM(s) IV Intermittent every 24 hours  levoFLOXacin IVPB        Cardiovascular:  Heart Failure  Acute   Acute on Chronic  Chronic     carvedilol 3.125 milliGRAM(s) Oral every 12 hours    Pulmonary:  ALBUTerol    90 MICROgram(s) HFA Inhaler 2 Puff(s) Inhalation every 6 hours    Hematalogic:    Other:  acetaminophen    Suspension .. 650 milliGRAM(s) Oral every 4 hours PRN  atorvastatin 40 milliGRAM(s) Oral at bedtime  chlorhexidine 0.12% Liquid 15 milliLiter(s) Oral Mucosa every 12 hours  chlorhexidine 2% Cloths 1 Application(s) Topical <User Schedule>  dextrose 40% Gel 15 Gram(s) Oral once PRN  dextrose 5%. 1000 milliLiter(s) IV Continuous <Continuous>  dextrose 50% Injectable 12.5 Gram(s) IV Push once  dextrose 50% Injectable 25 Gram(s) IV Push once  dextrose 50% Injectable 25 Gram(s) IV Push once  ergocalciferol 31834 Unit(s) Oral <User Schedule>  gabapentin 600 milliGRAM(s) Oral every 8 hours  glucagon  Injectable 1 milliGRAM(s) IntraMuscular once PRN  influenza   Vaccine 0.5 milliLiter(s) IntraMuscular once  insulin glargine Injectable (LANTUS) 10 Unit(s) SubCutaneous at bedtime  insulin lispro (HumaLOG) corrective regimen sliding scale   SubCutaneous every 6 hours  insulin lispro Injectable (HumaLOG) 4 Unit(s) SubCutaneous every 6 hours  morphine  - Injectable 2 milliGRAM(s) IV Push every 4 hours PRN  oxycodone    5 mG/acetaminophen 325 mG 1 Tablet(s) Oral every 4 hours PRN  pantoprazole  Injectable 40 milliGRAM(s) IV Push daily  predniSONE   Tablet 20 milliGRAM(s) Oral daily  sodium chloride 0.9% lock flush 10 milliLiter(s) IV Push every 1 hour PRN    acetaminophen    Suspension .. 650 milliGRAM(s) Oral every 4 hours PRN  ALBUTerol    90 MICROgram(s) HFA Inhaler 2 Puff(s) Inhalation every 6 hours  atorvastatin 40 milliGRAM(s) Oral at bedtime  carvedilol 3.125 milliGRAM(s) Oral every 12 hours  chlorhexidine 0.12% Liquid 15 milliLiter(s) Oral Mucosa every 12 hours  chlorhexidine 2% Cloths 1 Application(s) Topical <User Schedule>  dextrose 40% Gel 15 Gram(s) Oral once PRN  dextrose 5%. 1000 milliLiter(s) IV Continuous <Continuous>  dextrose 50% Injectable 12.5 Gram(s) IV Push once  dextrose 50% Injectable 25 Gram(s) IV Push once  dextrose 50% Injectable 25 Gram(s) IV Push once  ergocalciferol 49173 Unit(s) Oral <User Schedule>  gabapentin 600 milliGRAM(s) Oral every 8 hours  glucagon  Injectable 1 milliGRAM(s) IntraMuscular once PRN  influenza   Vaccine 0.5 milliLiter(s) IntraMuscular once  insulin glargine Injectable (LANTUS) 10 Unit(s) SubCutaneous at bedtime  insulin lispro (HumaLOG) corrective regimen sliding scale   SubCutaneous every 6 hours  insulin lispro Injectable (HumaLOG) 4 Unit(s) SubCutaneous every 6 hours  levoFLOXacin IVPB 750 milliGRAM(s) IV Intermittent every 24 hours  levoFLOXacin IVPB      morphine  - Injectable 2 milliGRAM(s) IV Push every 4 hours PRN  oxycodone    5 mG/acetaminophen 325 mG 1 Tablet(s) Oral every 4 hours PRN  pantoprazole  Injectable 40 milliGRAM(s) IV Push daily  predniSONE   Tablet 20 milliGRAM(s) Oral daily  sodium chloride 0.9% lock flush 10 milliLiter(s) IV Push every 1 hour PRN    Drug Dosing Weight  Height (cm): 154.9 (19 Sep 2020 08:00)  Weight (kg): 84.8 (21 Sep 2020 09:15)  BMI (kg/m2): 35.3 (21 Sep 2020 09:15)  BSA (m2): 1.84 (21 Sep 2020 09:15)    CENTRAL LINE: [ ] YES [ ] NO  LOCATION:   DATE INSERTED:  REMOVE: [ ] YES [ ] NO  EXPLAIN:    ROBLEDO: [ ] YES [ ] NO    DATE INSERTED:  REMOVE:  [ ] YES [ ] NO  EXPLAIN:    PAST MEDICAL & SURGICAL HISTORY:  Malignant neoplasm of unspecified part of right bronchus or lung  HTN (hypertension)  DM (diabetes mellitus)  COPD (chronic obstructive pulmonary disease)  Lung cancer  Morbid obesity  GERD (gastroesophageal reflux disease)  Deviated septum  Prolapsed uterus  ITP (idiopathic thrombocytopenic purpura)  Emphysema/COPD  History of cholecystectomy  S/P lobectomy of lung  right 2017  History of tonsillectomy      10-09 @ 07:01  -  10-10 @ 07:00  --------------------------------------------------------  IN: 110 mL / OUT: 4255 mL / NET: -4145 mL    Mode: AC/ CMV (Assist Control/ Continuous Mandatory Ventilation)  RR (machine): 16  TV (machine): 400  FiO2: 40  PEEP: 5  ITime: 1  MAP: 8.6  PIP: 21      PHYSICAL EXAM:    GENERAL: NAD, well-groomed, well-developed  HEAD:  Atraumatic, Normocephalic  EYES: EOMI, PERRLA, conjunctiva and sclera clear  ENMT: No tonsillar erythema, exudates, or enlargement; Moist mucous membranes, Good dentition, No lesions  NECK: Supple, No JVD, Normal thyroid  NERVOUS SYSTEM:  Alert & Oriented X3, Good concentration; Motor Strength 5/5 B/L upper and lower extremities; DTRs 2+ intact and symmetric  CHEST/LUNG: Clear to percussion bilaterally; No rales, rhonchi, wheezing, or rubs  HEART: Regular rate and rhythm; No murmurs, rubs, or gallops  ABDOMEN: Soft, Nontender, Nondistended; Bowel sounds present  EXTREMITIES:  2+ Peripheral Pulses, No clubbing, cyanosis, or edema  LYMPH: No lymphadenopathy noted  SKIN: No rashes or lesions      LABS:  CBC Full  -  ( 10 Oct 2020 06:40 )  WBC Count : 4.33 K/uL  RBC Count : 3.35 M/uL  Hemoglobin : 10.2 g/dL  Hematocrit : 28.5 %  Platelet Count - Automated : 37 K/uL  Mean Cell Volume : 85.1 fl  Mean Cell Hemoglobin : 30.4 pg  Mean Cell Hemoglobin Concentration : 35.8 gm/dL  Auto Neutrophil # : x  Auto Lymphocyte # : x  Auto Monocyte # : x  Auto Eosinophil # : x  Auto Basophil # : x  Auto Neutrophil % : x  Auto Lymphocyte % : x  Auto Monocyte % : x  Auto Eosinophil % : x  Auto Basophil % : x    10-10    135  |  94<L>  |  10  ----------------------------<  124<H>  2.9<LL>   |  36<H>  |  0.20<L>    Ca    8.2<L>      10 Oct 2020 06:40  Phos  2.6     10-10  Mg     1.8     10-10    TPro  5.5<L>  /  Alb  2.1<L>  /  TBili  1.2  /  DBili  x   /  AST  19  /  ALT  29  /  AlkPhos  127<H>  10-10    [  ]  DVT Prophylaxis  [  ]  Nutrition, Brand, Rate         Goal Rate        Abnormal Nutritional Status -  Malnutrition   Cachexia      Morbid Obesity BMI >/=40    RADIOLOGY & ADDITIONAL STUDIES:  reviewed    Goals of Care Discussion with Family/Proxy/Other   - see note from/family meeting set up for...     BARB COLÓN    SCU progress note; chart reviewed, pt seen and examined with attending    HPI:  66 yo obese Woman with hx of 80 pack year tobacco use, HTN, DM2 on insulin, COPD on 5L (frequent admissions), R lung cancer s/p resection (2017) on chemo q 3 weeks with concern for mets to spine and compression fractures, recent admission for COPD exacerbation earlier in August who on 9/19/2020 presented to ED with c/o SOB, hypoxia and increased sputum production x 1 day.    INTERVAL HX/HOSPITAL COURSE:   Admitted to ICU x twice for hypoxemic respiratory distress requiring intubation (9/21 and 10/5)  re-intubated on 10/5  trached and peg on 10/8/2020  transferred back to SCU 10/9  start peg feeding today    OVERNIGHT EVENTS: tachycardiac overnight 120's - 150's    SUBJECTIVE: Acknowledges she has pain - generalized, no chest pain    CODE STATUS: FULL CODE     Covid - 19 PCR: Non-detected 10/6/2020    The 4Ms    What Matters Most: see GOC  Age appropriate Medications/Screen for High Risk Medication: Yes  Mentation: see below  Mobility: defer to physical exam    The Confusion Assessment Method (CAM) Diagnostic Algorithm     1: Acute Onset or Fluctuating Course  - Is there evidence of an acute change in mental status from the patient’s baseline? Did the (abnormal) behavior  fluctuate during the day, that is, tend to come and go, or increase and decrease in severity?  [ ] YES [x] NO     2: Inattention  - Did the patient have difficulty focusing attention, being easily distractible, or having difficulty keeping track of what was being said?  [ ] YES [ x] NO     3: Disorganized thinking  -Was the patient’s thinking disorganized or incoherent, such as rambling or irrelevant conversation, unclear or illogical flow of ideas, or unpredictable switching from subject to subject?  [ ] YES [x ] NO    4: Altered Level of consciousness?  [ ] YES [x] NO    The diagnosis of delirium by CAM requires the presence of features 1 and 2 and either 3 or 4.    PRESSORS: [ ] YES [ ] NO  levoFLOXacin IVPB 750 milliGRAM(s) IV Intermittent every 24 hours  levoFLOXacin IVPB        Cardiovascular:  Heart Failure  Acute   Acute on Chronic  Chronic     carvedilol 3.125 milliGRAM(s) Oral every 12 hours    Pulmonary:  ALBUTerol    90 MICROgram(s) HFA Inhaler 2 Puff(s) Inhalation every 6 hours    Hematalogic:    Other:  acetaminophen    Suspension .. 650 milliGRAM(s) Oral every 4 hours PRN  atorvastatin 40 milliGRAM(s) Oral at bedtime  chlorhexidine 0.12% Liquid 15 milliLiter(s) Oral Mucosa every 12 hours  chlorhexidine 2% Cloths 1 Application(s) Topical <User Schedule>  dextrose 40% Gel 15 Gram(s) Oral once PRN  dextrose 5%. 1000 milliLiter(s) IV Continuous <Continuous>  dextrose 50% Injectable 12.5 Gram(s) IV Push once  dextrose 50% Injectable 25 Gram(s) IV Push once  dextrose 50% Injectable 25 Gram(s) IV Push once  ergocalciferol [id number] Unit(s) Oral <User Schedule>  gabapentin 600 milliGRAM(s) Oral every 8 hours  glucagon  Injectable 1 milliGRAM(s) IntraMuscular once PRN  influenza   Vaccine 0.5 milliLiter(s) IntraMuscular once  insulin glargine Injectable (LANTUS) 10 Unit(s) SubCutaneous at bedtime  insulin lispro (HumaLOG) corrective regimen sliding scale   SubCutaneous every 6 hours  insulin lispro Injectable (HumaLOG) 4 Unit(s) SubCutaneous every 6 hours  morphine  - Injectable 2 milliGRAM(s) IV Push every 4 hours PRN  oxycodone    5 mG/acetaminophen 325 mG 1 Tablet(s) Oral every 4 hours PRN  pantoprazole  Injectable 40 milliGRAM(s) IV Push daily  predniSONE   Tablet 20 milliGRAM(s) Oral daily  sodium chloride 0.9% lock flush 10 milliLiter(s) IV Push every 1 hour PRN    acetaminophen    Suspension .. 650 milliGRAM(s) Oral every 4 hours PRN  ALBUTerol    90 MICROgram(s) HFA Inhaler 2 Puff(s) Inhalation every 6 hours  atorvastatin 40 milliGRAM(s) Oral at bedtime  carvedilol 3.125 milliGRAM(s) Oral every 12 hours  chlorhexidine 0.12% Liquid 15 milliLiter(s) Oral Mucosa every 12 hours  chlorhexidine 2% Cloths 1 Application(s) Topical <User Schedule>  dextrose 40% Gel 15 Gram(s) Oral once PRN  dextrose 5%. 1000 milliLiter(s) IV Continuous <Continuous>  dextrose 50% Injectable 12.5 Gram(s) IV Push once  dextrose 50% Injectable 25 Gram(s) IV Push once  dextrose 50% Injectable 25 Gram(s) IV Push once  ergocalciferol [id number] Unit(s) Oral <User Schedule>  gabapentin 600 milliGRAM(s) Oral every 8 hours  glucagon  Injectable 1 milliGRAM(s) IntraMuscular once PRN  influenza   Vaccine 0.5 milliLiter(s) IntraMuscular once  insulin glargine Injectable (LANTUS) 10 Unit(s) SubCutaneous at bedtime  insulin lispro (HumaLOG) corrective regimen sliding scale   SubCutaneous every 6 hours  insulin lispro Injectable (HumaLOG) 4 Unit(s) SubCutaneous every 6 hours  levoFLOXacin IVPB 750 milliGRAM(s) IV Intermittent every 24 hours  levoFLOXacin IVPB      morphine  - Injectable 2 milliGRAM(s) IV Push every 4 hours PRN  oxycodone    5 mG/acetaminophen 325 mG 1 Tablet(s) Oral every 4 hours PRN  pantoprazole  Injectable 40 milliGRAM(s) IV Push daily  predniSONE   Tablet 20 milliGRAM(s) Oral daily  sodium chloride 0.9% lock flush 10 milliLiter(s) IV Push every 1 hour PRN    Drug Dosing Weight  Height (cm): 154.9 (19 Sep 2020 08:00)  Weight (kg): 84.8 (21 Sep 2020 09:15)  BMI (kg/m2): 35.3 (21 Sep 2020 09:15)  BSA (m2): 1.84 (21 Sep 2020 09:15)    CENTRAL LINE: [ ] YES [x] NO  LOCATION:   DATE INSERTED:  REMOVE: [ ] YES [ ] NO  EXPLAIN:    ROBLEDO: [x ] YES [ ] NO    DATE INSERTED:  REMOVE:  [ ] YES [ ] NO  EXPLAIN:    PAST MEDICAL & SURGICAL HISTORY:  Malignant neoplasm of unspecified part of right bronchus or lung  HTN (hypertension)  DM (diabetes mellitus)  COPD (chronic obstructive pulmonary disease)  Lung cancer  Morbid obesity  GERD (gastroesophageal reflux disease)  Deviated septum  Prolapsed uterus  ITP (idiopathic thrombocytopenic purpura)  Emphysema/COPD  History of cholecystectomy  S/P lobectomy of lung  right 2017  History of tonsillectomy      10-09 @ 07:01  -  10-10 @ 07:00  --------------------------------------------------------  IN: 110 mL / OUT: 4255 mL / NET: -4145 mL    Mode: AC/ CMV (Assist Control/ Continuous Mandatory Ventilation)  RR (machine): 16  TV (machine): 400  FiO2: 40  PEEP: 5  ITime: 1  MAP: 8.6  PIP: 21      PHYSICAL EXAM:    GENERAL: supine in bed, NAD  HEAD:  Atraumatic, Normocephalic, facial puffiness vs fatty cheecks at baseline  EYES: pupils equal round, reactive, conjunctiva and sclera clear  ENMT: trache in placed to ventilator  NERVOUS SYSTEM:  awake alert, slightly nodds /shakes head or winks to answer simple questions  CHEST/LUNG: no wheezing/rales  HEART: Regular, tachycardic, no m/c/r;   ABDOMEN: obese, Soft, peg tube in place with localize tenderness proximal to insertion site (an expected finding), +BS  EXTREMITIES: generalized edema to BUE, +distal pulses  SKIN: No rashes or lesions      LABS:  CBC Full  -  ( 10 Oct 2020 06:40 )  WBC Count : 4.33 K/uL  RBC Count : 3.35 M/uL  Hemoglobin : 10.2 g/dL  Hematocrit : 28.5 %  Platelet Count - Automated : 37 K/uL  Mean Cell Volume : 85.1 fl  Mean Cell Hemoglobin : 30.4 pg  Mean Cell Hemoglobin Concentration : 35.8 gm/dL  Auto Neutrophil # : x  Auto Lymphocyte # : x  Auto Monocyte # : x  Auto Eosinophil # : x  Auto Basophil # : x  Auto Neutrophil % : x  Auto Lymphocyte % : x  Auto Monocyte % : x  Auto Eosinophil % : x  Auto Basophil % : x    10-10    135  |  94<L>  |  10  ----------------------------<  124<H>  2.9<LL>   |  36<H>  |  0.20<L>    Ca    8.2<L>      10 Oct 2020 06:40  Phos  2.6     10-10  Mg     1.8     10-10    TPro  5.5<L>  /  Alb  2.1<L>  /  TBili  1.2  /  DBili  x   /  AST  19  /  ALT  29  /  AlkPhos  127<H>  10-10    [  ]  DVT Prophylaxis  [  ]  Nutrition, Brand, Rate         Goal Rate        Abnormal Nutritional Status -  Malnutrition   Cachexia      Morbid Obesity BMI >/=40    RADIOLOGY & ADDITIONAL STUDIES:  reviewed    Goals of Care Discussion with Family/Proxy/Other   - see note from/family meeting set up for...

## 2020-10-10 NOTE — PROGRESS NOTE ADULT - ASSESSMENT
COPD with acute asthmatic bronchitis/ RLL pneumonia with  acute  Respiratory Failure with Trach and PEG   Recurrent Lung cancer with Bone Mets   IDDM   Htn with MR   Thrombocytopenia  and Anemia   Obesity with CRIS   serratia  bacteremia     Plan-  Kcl 40 meq bid thru G/T x 2 days  IV levaquin   /CMV12/ 35% with Peep5 cm alternating with cpap day time  Venodynes  FS coverage

## 2020-10-10 NOTE — PROGRESS NOTE ADULT - ASSESSMENT
65 year old Woman with hx of 80 pack year tobacco use, HTN, DM2 on insulin, COPD on 5L (frequent admissions), R lung cancer s/p resection (2017) on chemo q 3 weeks with concern for mets to spine and compression fractures, recent admission for COPD exacerbation earlier in August who on 9/19/2020 presented to ED with c/o SOB, hypoxia and increased sputum production x 1 day.

## 2020-10-11 DIAGNOSIS — Z02.9 ENCOUNTER FOR ADMINISTRATIVE EXAMINATIONS, UNSPECIFIED: ICD-10-CM

## 2020-10-11 LAB
ALBUMIN SERPL ELPH-MCNC: 1.8 G/DL — LOW (ref 3.5–5)
ALP SERPL-CCNC: 125 U/L — HIGH (ref 40–120)
ALT FLD-CCNC: 26 U/L DA — SIGNIFICANT CHANGE UP (ref 10–60)
ANION GAP SERPL CALC-SCNC: 5 MMOL/L — SIGNIFICANT CHANGE UP (ref 5–17)
AST SERPL-CCNC: 20 U/L — SIGNIFICANT CHANGE UP (ref 10–40)
BILIRUB SERPL-MCNC: 0.8 MG/DL — SIGNIFICANT CHANGE UP (ref 0.2–1.2)
BUN SERPL-MCNC: 10 MG/DL — SIGNIFICANT CHANGE UP (ref 7–18)
CALCIUM SERPL-MCNC: 8.2 MG/DL — LOW (ref 8.4–10.5)
CHLORIDE SERPL-SCNC: 96 MMOL/L — SIGNIFICANT CHANGE UP (ref 96–108)
CO2 SERPL-SCNC: 34 MMOL/L — HIGH (ref 22–31)
CREAT SERPL-MCNC: 0.33 MG/DL — LOW (ref 0.5–1.3)
GLUCOSE BLDC GLUCOMTR-MCNC: 105 MG/DL — HIGH (ref 70–99)
GLUCOSE BLDC GLUCOMTR-MCNC: 109 MG/DL — HIGH (ref 70–99)
GLUCOSE BLDC GLUCOMTR-MCNC: 162 MG/DL — HIGH (ref 70–99)
GLUCOSE BLDC GLUCOMTR-MCNC: 175 MG/DL — HIGH (ref 70–99)
GLUCOSE BLDC GLUCOMTR-MCNC: 250 MG/DL — HIGH (ref 70–99)
GLUCOSE SERPL-MCNC: 178 MG/DL — HIGH (ref 70–99)
HCT VFR BLD CALC: 26.6 % — LOW (ref 34.5–45)
HGB BLD-MCNC: 9.1 G/DL — LOW (ref 11.5–15.5)
MAGNESIUM SERPL-MCNC: 1.8 MG/DL — SIGNIFICANT CHANGE UP (ref 1.6–2.6)
MCHC RBC-ENTMCNC: 30.1 PG — SIGNIFICANT CHANGE UP (ref 27–34)
MCHC RBC-ENTMCNC: 34.2 GM/DL — SIGNIFICANT CHANGE UP (ref 32–36)
MCV RBC AUTO: 88.1 FL — SIGNIFICANT CHANGE UP (ref 80–100)
NRBC # BLD: 1 /100 WBCS — HIGH (ref 0–0)
PLATELET # BLD AUTO: 33 K/UL — LOW (ref 150–400)
POTASSIUM SERPL-MCNC: 3.7 MMOL/L — SIGNIFICANT CHANGE UP (ref 3.5–5.3)
POTASSIUM SERPL-SCNC: 3.7 MMOL/L — SIGNIFICANT CHANGE UP (ref 3.5–5.3)
PROT SERPL-MCNC: 5.1 G/DL — LOW (ref 6–8.3)
RBC # BLD: 3.02 M/UL — LOW (ref 3.8–5.2)
RBC # FLD: 15.4 % — HIGH (ref 10.3–14.5)
SODIUM SERPL-SCNC: 135 MMOL/L — SIGNIFICANT CHANGE UP (ref 135–145)
WBC # BLD: 4.7 K/UL — SIGNIFICANT CHANGE UP (ref 3.8–10.5)
WBC # FLD AUTO: 4.7 K/UL — SIGNIFICANT CHANGE UP (ref 3.8–10.5)

## 2020-10-11 PROCEDURE — 71045 X-RAY EXAM CHEST 1 VIEW: CPT | Mod: 26

## 2020-10-11 RX ADMIN — GABAPENTIN 600 MILLIGRAM(S): 400 CAPSULE ORAL at 21:17

## 2020-10-11 RX ADMIN — GABAPENTIN 600 MILLIGRAM(S): 400 CAPSULE ORAL at 05:08

## 2020-10-11 RX ADMIN — ALBUTEROL 2 PUFF(S): 90 AEROSOL, METERED ORAL at 16:03

## 2020-10-11 RX ADMIN — CARVEDILOL PHOSPHATE 6.25 MILLIGRAM(S): 80 CAPSULE, EXTENDED RELEASE ORAL at 05:08

## 2020-10-11 RX ADMIN — CHLORHEXIDINE GLUCONATE 15 MILLILITER(S): 213 SOLUTION TOPICAL at 05:10

## 2020-10-11 RX ADMIN — ALBUTEROL 2 PUFF(S): 90 AEROSOL, METERED ORAL at 20:36

## 2020-10-11 RX ADMIN — Medication 4 UNIT(S): at 11:46

## 2020-10-11 RX ADMIN — GABAPENTIN 600 MILLIGRAM(S): 400 CAPSULE ORAL at 13:37

## 2020-10-11 RX ADMIN — Medication 650 MILLIGRAM(S): at 06:14

## 2020-10-11 RX ADMIN — Medication 4 UNIT(S): at 06:15

## 2020-10-11 RX ADMIN — Medication 2: at 06:15

## 2020-10-11 RX ADMIN — Medication 650 MILLIGRAM(S): at 07:20

## 2020-10-11 RX ADMIN — ALBUTEROL 2 PUFF(S): 90 AEROSOL, METERED ORAL at 04:29

## 2020-10-11 RX ADMIN — CHLORHEXIDINE GLUCONATE 15 MILLILITER(S): 213 SOLUTION TOPICAL at 17:29

## 2020-10-11 RX ADMIN — INSULIN GLARGINE 10 UNIT(S): 100 INJECTION, SOLUTION SUBCUTANEOUS at 22:55

## 2020-10-11 RX ADMIN — ATORVASTATIN CALCIUM 40 MILLIGRAM(S): 80 TABLET, FILM COATED ORAL at 21:18

## 2020-10-11 RX ADMIN — CARVEDILOL PHOSPHATE 6.25 MILLIGRAM(S): 80 CAPSULE, EXTENDED RELEASE ORAL at 17:30

## 2020-10-11 RX ADMIN — MORPHINE SULFATE 2 MILLIGRAM(S): 50 CAPSULE, EXTENDED RELEASE ORAL at 09:10

## 2020-10-11 RX ADMIN — Medication 20 MILLIGRAM(S): at 05:08

## 2020-10-11 RX ADMIN — PANTOPRAZOLE SODIUM 40 MILLIGRAM(S): 20 TABLET, DELAYED RELEASE ORAL at 11:29

## 2020-10-11 RX ADMIN — CHLORHEXIDINE GLUCONATE 1 APPLICATION(S): 213 SOLUTION TOPICAL at 05:09

## 2020-10-11 RX ADMIN — Medication 2: at 17:30

## 2020-10-11 RX ADMIN — Medication 4 UNIT(S): at 17:30

## 2020-10-11 RX ADMIN — MORPHINE SULFATE 2 MILLIGRAM(S): 50 CAPSULE, EXTENDED RELEASE ORAL at 08:55

## 2020-10-11 RX ADMIN — ALBUTEROL 2 PUFF(S): 90 AEROSOL, METERED ORAL at 09:41

## 2020-10-11 RX ADMIN — Medication 4: at 11:46

## 2020-10-11 NOTE — PHYSICAL THERAPY INITIAL EVALUATION ADULT - ACTIVE RANGE OF MOTION EXAMINATION, REHAB EVAL
jeniffer. upper extremity Active ROM was WNL (within normal limits)/bilateral lower extremity Active ROM was WNL (within normal limits)
bilateral upper extremity Active ROM was WFL (within functional limits)/bilateral  lower extremity Active ROM was WFL (within functional limits)

## 2020-10-11 NOTE — PROGRESS NOTE ADULT - ATTENDING COMMENTS
Problem/Plan - 1:  ·  Problem: Acute on chronic respiratory failure with hypoxia and hypercapnia.  Plan: Acute on chronic respiratory failure with hypoxia and hypercapnia due to end stage COPD   - on AVAPS previously, failed and was re-intubated 10/5  - Tracheostomy placed 10/8  - s/p solumedrol 80 q12 x 48 hours   - on prednisone taper fantasma from 10/9 taper 20 QD x 5d then10 QD long standin   - bronchodilators and ICS  -for placement to vent facility

## 2020-10-11 NOTE — PROGRESS NOTE ADULT - PROBLEM SELECTOR PLAN 7
c/w supplementation as above VRE bacteremia / Fungemia  s/p course of 10 day coure of  zyvox and Cancidas with negative cultures  code sepsis on 10/8  F/U new cultures  con't levaquin.

## 2020-10-11 NOTE — PROGRESS NOTE ADULT - ASSESSMENT
64 yo obese F with hx of 80 pack year tobacco use, HTN, DM2 on insulin, COPD on 5L (frequent admissions), R lung cancer s/p resection (2017) on chemo q 3 weeks with concern for mets to spine and compression fractures, recent admission for COPD exacerbation earlier in August who on 9/19/2020 presented to ED with c/o SOB, hypoxia and increased sputum production x 1 day. Admitted to ICU x twice for hypoxemic respiratory distress requiring intubation (9/21 and 10/6).       CNS=====================================  Intubated and sedated  tapering down Propofol and Precedex for sedation  adding fixed pain control    CARD====================================  Central Femoral line (10/5)  pressor support Phenylephrine discontinued 10/8  consider Midodrine taper 10 q 8 if BP reduces  continue to monitor BP and HR for distress    PULM====================================  #Acute on chronic respiratory failure with hypoxia and hypercapnia.   2/2 COPD, heavy smoking history (80 pack years)  Intubated and sedated x 2 this admission (9/21 and 10/5)  Plan for Trach and PEG surgery 10/6 per patient request  On prednisone at home, will give stress dose and daily steroid per Pulm (Dr. Nugent)  - IV solumedrol 80 q12 x 48 hours follow by   - PO prednisone 40 QD from 10/9 taper 20 QD x 5d and 10 QD from then on daily  - bronchodilators and ICS    #COPD  on AVAPS previously, failed and was re-intubated 10/5  for Tracheostomy  managed as above    #Metastatic Lung cancer  holding chemo  continue steroids  port removed on this hospital stay  Heme/Onc following Dr Mendiola    ID=======================================  #Sepsis.   Fever, leukocytosis (on steroids0) and elevated Lactate 3.1  - Hx VRE and Fungemia)  - s/p Caspofungin (9/23 -10/7) + Zyvox (10/6 -10/7 stopped for low plts)  - BCx pos for Gram - neg rods; Serratia marcescens   - c/w Levaquin (10/6)   - f/u repeat BCx drawn 10/8    #Pneumonia   CXR shows b/l pleural effusions with Left sided consolidation  Abx coverage as above: Levaquin (10/6)   BCx positive for Gram negative rods: Serratia  plan above    Heme/Onc=================================  #Anemia  2/2 chronic disease  Hgb less than 7.0, s/p 2 pRBCS  Hb 8.4     #Thrombocytopenia  Hx of ITP  on steroids, 2 unit plts 10/7, 10/8  improving     #Lung cancer metastatic to bone.   s/p 1 radiation dose to spine.  Was on chemo before hospitalization  chemo port removed, Dr. Mendiola following  c/w pain management.     Endo====================================  #DM (diabetes mellitus).   Start on NPO + OG tube feeds  moderate dose sliding scale q6h  - continue insulin lantus 20 units daily (usual dose 40 U)  - insulin lispro 4 units q6h  - goal inpatient glucose range: 140-180mg/dl    GI======================================  #Moderate protein-calorie malnutrition.   - OG tube with tube feeds  - PEG tube feeds begin 10/9    Nephro=================================  BUN 16| Creat 0.35  No active issue monitor electrolytes and replace as needed    PPX=====================================  DVT restart once plts over 50  GI prophylaxis.    GOC====================================  Overall prognosis is extremely poor.   Patient remains FULL CODE as per her wishes  would like everything done including trach and PEG 64 yo obese F with hx of 80 pack year tobacco use, HTN, DM2 on insulin, COPD on 5L (frequent admissions), R lung cancer s/p resection (2017) on chemo q 3 weeks with concern for mets to spine and compression fractures, recent admission for COPD exacerbation earlier in August who on 9/19/2020 presented to ED with c/o SOB, hypoxia and increased sputum production x 1 day. Admitted to ICU x twice for hypoxemic respiratory distress requiring intubation (9/21 and 10/6).       HPI:  64 yo obese Woman with hx of 80 pack year tobacco use, HTN, DM2 on insulin, COPD on 5L (frequent admissions), R lung cancer s/p resection (2017) on chemo q 3 weeks with concern for mets to spine and compression fractures, recent admission for COPD exacerbation earlier in August who on 9/19/2020 presented to ED with c/o SOB, hypoxia and increased sputum production x 1 day.    INTERVAL HX/HOSPITAL COURSE:   Admitted to ICU x twice for hypoxemic respiratory distress requiring intubation (9/21 and 10/5)  re-intubated on 10/5  trached and peg on 10/8/2020  transferred back to SCU 10/9

## 2020-10-11 NOTE — PHYSICAL THERAPY INITIAL EVALUATION ADULT - MANUAL MUSCLE TESTING RESULTS, REHAB EVAL
pt could not tolerate full MMT however is at least 3/5 on all extremities
BUEs/BLEs 3/5 grossly throughout

## 2020-10-11 NOTE — PHYSICAL THERAPY INITIAL EVALUATION ADULT - DISCHARGE DISPOSITION, PT EVAL
Sub acute rehab for strengthening, gait, ADL training and to maximize functional status/rehabilitation facility
Sub- Acute Rehab.

## 2020-10-11 NOTE — PROGRESS NOTE ADULT - NS PRO AD PATIENT TYPE ON CHART
Health Care Proxy (HCP)/Medical Orders for Life-Sustaining Treatment (MOLST)
Medical Orders for Life-Sustaining Treatment (MOLST)

## 2020-10-11 NOTE — PROGRESS NOTE ADULT - PROBLEM SELECTOR PLAN 1
Acute on chronic respiratory failure with hypoxia and hypercapnia due to end stage COPD   - on AVAPS previously, failed and was re-intubated 10/5  - Tracheostomy placed 10/8  - s/p solumedrol 80 q12 x 48 hours   - on prednisone taper fantasma from 10/9 taper 20 QD x 5d then10 QD long standin   - bronchodilators and ICS  - con't mechanical ventilation, weaning trials

## 2020-10-11 NOTE — PROGRESS NOTE ADULT - PROBLEM SELECTOR PLAN 4
likely due to pain   pain mgmt: percocet via peg 2 tabs for moderate pain, morphine 2mg IVP Q6hrs for severe pain  ECG sinus tach  c/w coreg  6.25 bid  monitor per protocol.

## 2020-10-11 NOTE — PROGRESS NOTE ADULT - SUBJECTIVE AND OBJECTIVE BOX
BARB COLÓN    SCU progress note    INTERVAL HPI/OVERNIGHT EVENTS: still with low grade fever. 100.4. repeat blood cx negative. will f/u cxr.    DNR [ ]   DNI  [  ] FULL CODE    Covid - 19 PCR: COVID-19 PCR: Gasper (06 Oct 2020 18:08)      [facility] 4Ms    What Matters Most: see GOC  Age appropriate Medications/Screen for High Risk Medication: Yes  Mentation: see CAM below  Mobility: defer to physical exam    The Confusion Assessment Method (CAM) Diagnostic Algorithm     1: Acute Onset or Fluctuating Course  - Is there evidence of an acute change in mental status from the patient’s baseline? Did the (abnormal) behavior  fluctuate during the day, that is, tend to come and go, or increase and decrease in severity?  [ ] YES [ x] NO     2: Inattention  - Did the patient have difficulty focusing attention, being easily distractible, or having difficulty keeping track of what was being said?  [ ] YES [ x] NO     3: Disorganized thinking  -Was the patient’s thinking disorganized or incoherent, such as rambling or irrelevant conversation, unclear or illogical flow of ideas, or unpredictable switching from subject to subject?  [ ] YES [ x] NO    4: Altered Level of consciousness?  [ ] YES [x ] NO    The diagnosis of delirium by CAM requires the presence of features 1 and 2 and either 3 or 4.    PRESSORS: [ ] YES [ ] NO  levoFLOXacin IVPB 750 milliGRAM(s) IV Intermittent every 24 hours  levoFLOXacin IVPB        Cardiovascular:  Heart Failure  Acute   Acute on Chronic  Chronic       carvedilol 6.25 milliGRAM(s) Oral every 12 hours    Pulmonary:  ALBUTerol    90 MICROgram(s) HFA Inhaler 2 Puff(s) Inhalation every 6 hours    Hematalogic:    Other:  acetaminophen    Suspension .. 650 milliGRAM(s) Oral every 4 hours PRN  atorvastatin 40 milliGRAM(s) Oral at bedtime  chlorhexidine 0.12% Liquid 15 milliLiter(s) Oral Mucosa every 12 hours  chlorhexidine 2% Cloths 1 Application(s) Topical <User Schedule>  dextrose 40% Gel 15 Gram(s) Oral once PRN  dextrose 5%. 1000 milliLiter(s) IV Continuous <Continuous>  dextrose 50% Injectable 12.5 Gram(s) IV Push once  dextrose 50% Injectable 25 Gram(s) IV Push once  dextrose 50% Injectable 25 Gram(s) IV Push once  ergocalciferol 33892 Unit(s) Oral <User Schedule>  gabapentin 600 milliGRAM(s) Oral every 8 hours  glucagon  Injectable 1 milliGRAM(s) IntraMuscular once PRN  influenza   Vaccine 0.5 milliLiter(s) IntraMuscular once  insulin glargine Injectable (LANTUS) 10 Unit(s) SubCutaneous at bedtime  insulin lispro (HumaLOG) corrective regimen sliding scale   SubCutaneous every 6 hours  insulin lispro Injectable (HumaLOG) 4 Unit(s) SubCutaneous every 6 hours  morphine  - Injectable 2 milliGRAM(s) IV Push every 4 hours PRN  oxycodone    5 mG/acetaminophen 325 mG 1 Tablet(s) Oral every 4 hours PRN  pantoprazole  Injectable 40 milliGRAM(s) IV Push daily  predniSONE   Tablet 20 milliGRAM(s) Oral daily  sodium chloride 0.9% lock flush 10 milliLiter(s) IV Push every 1 hour PRN    acetaminophen    Suspension .. 650 milliGRAM(s) Oral every 4 hours PRN  ALBUTerol    90 MICROgram(s) HFA Inhaler 2 Puff(s) Inhalation every 6 hours  atorvastatin 40 milliGRAM(s) Oral at bedtime  carvedilol 6.25 milliGRAM(s) Oral every 12 hours  chlorhexidine 0.12% Liquid 15 milliLiter(s) Oral Mucosa every 12 hours  chlorhexidine 2% Cloths 1 Application(s) Topical <User Schedule>  dextrose 40% Gel 15 Gram(s) Oral once PRN  dextrose 5%. 1000 milliLiter(s) IV Continuous <Continuous>  dextrose 50% Injectable 12.5 Gram(s) IV Push once  dextrose 50% Injectable 25 Gram(s) IV Push once  dextrose 50% Injectable 25 Gram(s) IV Push once  ergocalciferol 68250 Unit(s) Oral <User Schedule>  gabapentin 600 milliGRAM(s) Oral every 8 hours  glucagon  Injectable 1 milliGRAM(s) IntraMuscular once PRN  influenza   Vaccine 0.5 milliLiter(s) IntraMuscular once  insulin glargine Injectable (LANTUS) 10 Unit(s) SubCutaneous at bedtime  insulin lispro (HumaLOG) corrective regimen sliding scale   SubCutaneous every 6 hours  insulin lispro Injectable (HumaLOG) 4 Unit(s) SubCutaneous every 6 hours  levoFLOXacin IVPB 750 milliGRAM(s) IV Intermittent every 24 hours  levoFLOXacin IVPB      morphine  - Injectable 2 milliGRAM(s) IV Push every 4 hours PRN  oxycodone    5 mG/acetaminophen 325 mG 1 Tablet(s) Oral every 4 hours PRN  pantoprazole  Injectable 40 milliGRAM(s) IV Push daily  predniSONE   Tablet 20 milliGRAM(s) Oral daily  sodium chloride 0.9% lock flush 10 milliLiter(s) IV Push every 1 hour PRN    Drug Dosing Weight  Height (cm): 154.9 (19 Sep 2020 08:00)  Weight (kg): 84.8 (21 Sep 2020 09:15)  BMI (kg/m2): 35.3 (21 Sep 2020 09:15)  BSA (m2): 1.84 (21 Sep 2020 09:15)    CENTRAL LINE: [ ] YES x[ ] NO  LOCATION:   DATE INSERTED:  REMOVE: [ ] YES [ ] NO  EXPLAIN:    ROBLEDO: [ ] YES [x ] NO    DATE INSERTED:  REMOVE:  [ ] YES [ ] NO  EXPLAIN:    PAST MEDICAL & SURGICAL HISTORY:  Malignant neoplasm of unspecified part of right bronchus or lung    HTN (hypertension)    DM (diabetes mellitus)    COPD (chronic obstructive pulmonary disease)    Lung cancer    Morbid obesity    GERD (gastroesophageal reflux disease)    Deviated septum    Prolapsed uterus    ITP (idiopathic thrombocytopenic purpura)    Emphysema/COPD    History of cholecystectomy    S/P lobectomy of lung  right 2017    History of tonsillectomy                10-10 @ 07:01  -  10-11 @ 07:00  --------------------------------------------------------  IN: 0 mL / OUT: 825 mL / NET: -825 mL        Mode: AC/ CMV (Assist Control/ Continuous Mandatory Ventilation)  RR (machine): 16  TV (machine): 400  FiO2: 40  PEEP: 5  ITime: 1  MAP: 9  PIP: 25      PHYSICAL EXAM:    GENERAL: NAD, chronically ill  HEAD:  Atraumatic, Normocephalic  EYES: EOMI, PERRLA, conjunctiva and sclera clear  ENMT: Moist mucous membranes, ns  NECK: Supple, No JVD, trach to vent  NERVOUS SYSTEM:  awake, nods to questions  CHEST/LUNG: no rhales, rhonchi  HEART: Regular rate and rhythm; tachy  ABDOMEN: Soft, Nontender, Nondistended; Bowel sounds present +PEG  EXTREMITIES:  2+ Peripheral Pulses, +edema  LYMPH: No lymphadenopathy noted  SKIN: No rashes or lesions      LABS:  CBC Full  -  ( 11 Oct 2020 07:38 )  WBC Count : 4.70 K/uL  RBC Count : 3.02 M/uL  Hemoglobin : 9.1 g/dL  Hematocrit : 26.6 %  Platelet Count - Automated : 33 K/uL  Mean Cell Volume : 88.1 fl  Mean Cell Hemoglobin : 30.1 pg  Mean Cell Hemoglobin Concentration : 34.2 gm/dL  Auto Neutrophil # : x  Auto Lymphocyte # : x  Auto Monocyte # : x  Auto Eosinophil # : x  Auto Basophil # : x  Auto Neutrophil % : x  Auto Lymphocyte % : x  Auto Monocyte % : x  Auto Eosinophil % : x  Auto Basophil % : x    10-11    135  |  96  |  10  ----------------------------<  178<H>  3.7   |  34<H>  |  0.33<L>    Ca    8.2<L>      11 Oct 2020 07:38  Phos  2.6     10-10  Mg     1.8     10-11    TPro  5.1<L>  /  Alb  1.8<L>  /  TBili  0.8  /  DBili  x   /  AST  20  /  ALT  26  /  AlkPhos  125<H>  10-11              [x  ]  DVT Prophylaxis  [ x ]  Nutrition, Brand, RateTube Feeding Modality: Orogastric  Glucerna 1.5 Inocente  Total Volume for 24 Hours (mL): 960  Continuous  Starting Tube Feed Rate {mL per Hour}: 10  Increase Tube Feed Rate by (mL): 10     Every hour  Until Goal Tube Feed Rate (mL per Hour): 40         Goal Rate        Abnormal Nutritional Status -  Malnutrition   Cachexia      Morbid Obesity BMI >/=40    RADIOLOGY & ADDITIONAL STUDIES:  < from: Xray Chest 1 View- PORTABLE-Routine (Xray Chest 1 View- PORTABLE-Routine in AM.) (10.09.20 @ 09:26) >  INTERPRETATION:  INDICATION: Bilateral infiltrates; follow up assessment.    PRIORS: 10/8/2020.    VIEWS: Portable AP radiography of the chest performed.    FINDINGS: Midline tracheostomy. Surgical staple lines right midlung field. Stable bilateral infiltrates. Small pleural effusions cannot be excluded. There is no evidence for pneumothorax. No mediastinal shift.    IMPRESSION: No significant interval change.    < end of copied text >      Goals of Care Discussion with Family/Proxy/Other   - see pall med note on 10/6     BARB COLÓN    SCU progress note    INTERVAL HPI/OVERNIGHT EVENTS: still with low grade fever. 100.4. repeat blood cx negative. will f/u cxr.    DNR [ ]   DNI  [  ] FULL CODE    Covid - 19 PCR: COVID-19 PCR: Gasper (06 Oct 2020 18:08)      [facility]    What Matters Most: see GOC  Age appropriate Medications/Screen for High Risk Medication: Yes  Mentation: see CAM below  Mobility: defer to physical exam    The Confusion Assessment Method (CAM) Diagnostic Algorithm     1: Acute Onset or Fluctuating Course  - Is there evidence of an acute change in mental status from the patient’s baseline? Did the (abnormal) behavior  fluctuate during the day, that is, tend to come and go, or increase and decrease in severity?  [ ] YES [ x] NO     2: Inattention  - Did the patient have difficulty focusing attention, being easily distractible, or having difficulty keeping track of what was being said?  [ ] YES [ x] NO     3: Disorganized thinking  -Was the patient’s thinking disorganized or incoherent, such as rambling or irrelevant conversation, unclear or illogical flow of ideas, or unpredictable switching from subject to subject?  [ ] YES [ x] NO    4: Altered Level of consciousness?  [ ] YES [x ] NO    The diagnosis of delirium by CAM requires the presence of features 1 and 2 and either 3 or 4.    PRESSORS: [ ] YES [ ] NO  levoFLOXacin IVPB 750 milliGRAM(s) IV Intermittent every 24 hours  levoFLOXacin IVPB        Cardiovascular:  Heart Failure  Acute   Acute on Chronic  Chronic       carvedilol 6.25 milliGRAM(s) Oral every 12 hours    Pulmonary:  ALBUTerol    90 MICROgram(s) HFA Inhaler 2 Puff(s) Inhalation every 6 hours    Hematalogic:    Other:  acetaminophen    Suspension .. 650 milliGRAM(s) Oral every 4 hours PRN  atorvastatin 40 milliGRAM(s) Oral at bedtime  chlorhexidine 0.12% Liquid 15 milliLiter(s) Oral Mucosa every 12 hours  chlorhexidine 2% Cloths 1 Application(s) Topical <User Schedule>  dextrose 40% Gel 15 Gram(s) Oral once PRN  dextrose 5%. 1000 milliLiter(s) IV Continuous <Continuous>  dextrose 50% Injectable 12.5 Gram(s) IV Push once  dextrose 50% Injectable 25 Gram(s) IV Push once  dextrose 50% Injectable 25 Gram(s) IV Push once  ergocalciferol 55375 Unit(s) Oral <User Schedule>  gabapentin 600 milliGRAM(s) Oral every 8 hours  glucagon  Injectable 1 milliGRAM(s) IntraMuscular once PRN  influenza   Vaccine 0.5 milliLiter(s) IntraMuscular once  insulin glargine Injectable (LANTUS) 10 Unit(s) SubCutaneous at bedtime  insulin lispro (HumaLOG) corrective regimen sliding scale   SubCutaneous every 6 hours  insulin lispro Injectable (HumaLOG) 4 Unit(s) SubCutaneous every 6 hours  morphine  - Injectable 2 milliGRAM(s) IV Push every 4 hours PRN  oxycodone    5 mG/acetaminophen 325 mG 1 Tablet(s) Oral every 4 hours PRN  pantoprazole  Injectable 40 milliGRAM(s) IV Push daily  predniSONE   Tablet 20 milliGRAM(s) Oral daily  sodium chloride 0.9% lock flush 10 milliLiter(s) IV Push every 1 hour PRN    acetaminophen    Suspension .. 650 milliGRAM(s) Oral every 4 hours PRN  ALBUTerol    90 MICROgram(s) HFA Inhaler 2 Puff(s) Inhalation every 6 hours  atorvastatin 40 milliGRAM(s) Oral at bedtime  carvedilol 6.25 milliGRAM(s) Oral every 12 hours  chlorhexidine 0.12% Liquid 15 milliLiter(s) Oral Mucosa every 12 hours  chlorhexidine 2% Cloths 1 Application(s) Topical <User Schedule>  dextrose 40% Gel 15 Gram(s) Oral once PRN  dextrose 5%. 1000 milliLiter(s) IV Continuous <Continuous>  dextrose 50% Injectable 12.5 Gram(s) IV Push once  dextrose 50% Injectable 25 Gram(s) IV Push once  dextrose 50% Injectable 25 Gram(s) IV Push once  ergocalciferol 47404 Unit(s) Oral <User Schedule>  gabapentin 600 milliGRAM(s) Oral every 8 hours  glucagon  Injectable 1 milliGRAM(s) IntraMuscular once PRN  influenza   Vaccine 0.5 milliLiter(s) IntraMuscular once  insulin glargine Injectable (LANTUS) 10 Unit(s) SubCutaneous at bedtime  insulin lispro (HumaLOG) corrective regimen sliding scale   SubCutaneous every 6 hours  insulin lispro Injectable (HumaLOG) 4 Unit(s) SubCutaneous every 6 hours  levoFLOXacin IVPB 750 milliGRAM(s) IV Intermittent every 24 hours  levoFLOXacin IVPB      morphine  - Injectable 2 milliGRAM(s) IV Push every 4 hours PRN  oxycodone    5 mG/acetaminophen 325 mG 1 Tablet(s) Oral every 4 hours PRN  pantoprazole  Injectable 40 milliGRAM(s) IV Push daily  predniSONE   Tablet 20 milliGRAM(s) Oral daily  sodium chloride 0.9% lock flush 10 milliLiter(s) IV Push every 1 hour PRN    Drug Dosing Weight  Height (cm): 154.9 (19 Sep 2020 08:00)  Weight (kg): 84.8 (21 Sep 2020 09:15)  BMI (kg/m2): 35.3 (21 Sep 2020 09:15)  BSA (m2): 1.84 (21 Sep 2020 09:15)    CENTRAL LINE: [x ] YES [ ] NO  LOCATION:   DATE INSERTED:  REMOVE: [ ] YES [x ] NO  EXPLAIN: until new access in place    ROBLEDO: [ ] YES [x ] NO    DATE INSERTED:  REMOVE:  [ ] YES [ ] NO  EXPLAIN:    PAST MEDICAL & SURGICAL HISTORY:  Malignant neoplasm of unspecified part of right bronchus or lung    HTN (hypertension)    DM (diabetes mellitus)    COPD (chronic obstructive pulmonary disease)    Lung cancer    Morbid obesity    GERD (gastroesophageal reflux disease)    Deviated septum    Prolapsed uterus    ITP (idiopathic thrombocytopenic purpura)    Emphysema/COPD    History of cholecystectomy    S/P lobectomy of lung  right 2017    History of tonsillectomy                10-10 @ 07:01  -  10-11 @ 07:00  --------------------------------------------------------  IN: 0 mL / OUT: 825 mL / NET: -825 mL        Mode: AC/ CMV (Assist Control/ Continuous Mandatory Ventilation)  RR (machine): 16  TV (machine): 400  FiO2: 40  PEEP: 5  ITime: 1  MAP: 9  PIP: 25      PHYSICAL EXAM:    GENERAL: NAD, chronically ill  HEAD:  Atraumatic, Normocephalic  EYES: EOMI, PERRLA, conjunctiva and sclera clear  ENMT: Moist mucous membranes, ns  NECK: Supple, No JVD, trach to vent  NERVOUS SYSTEM:  awake, nods to questions  CHEST/LUNG: no rhales, rhonchi  HEART: Regular rate and rhythm; tachy  ABDOMEN: Soft, Nontender, Nondistended; Bowel sounds present +PEG  EXTREMITIES:  2+ Peripheral Pulses, +edema  LYMPH: No lymphadenopathy noted  SKIN: No rashes or lesions      LABS:  CBC Full  -  ( 11 Oct 2020 07:38 )  WBC Count : 4.70 K/uL  RBC Count : 3.02 M/uL  Hemoglobin : 9.1 g/dL  Hematocrit : 26.6 %  Platelet Count - Automated : 33 K/uL  Mean Cell Volume : 88.1 fl  Mean Cell Hemoglobin : 30.1 pg  Mean Cell Hemoglobin Concentration : 34.2 gm/dL  Auto Neutrophil # : x  Auto Lymphocyte # : x  Auto Monocyte # : x  Auto Eosinophil # : x  Auto Basophil # : x  Auto Neutrophil % : x  Auto Lymphocyte % : x  Auto Monocyte % : x  Auto Eosinophil % : x  Auto Basophil % : x    10-11    135  |  96  |  10  ----------------------------<  178<H>  3.7   |  34<H>  |  0.33<L>    Ca    8.2<L>      11 Oct 2020 07:38  Phos  2.6     10-10  Mg     1.8     10-11    TPro  5.1<L>  /  Alb  1.8<L>  /  TBili  0.8  /  DBili  x   /  AST  20  /  ALT  26  /  AlkPhos  125<H>  10-11              [x  ]  DVT Prophylaxis  [ x ]  Nutrition, Brand, RateTube Feeding Modality: Orogastric  Glucerna 1.5 Inocente  Total Volume for 24 Hours (mL): 960  Continuous  Starting Tube Feed Rate {mL per Hour}: 10  Increase Tube Feed Rate by (mL): 10     Every hour  Until Goal Tube Feed Rate (mL per Hour): 40         Goal Rate        Abnormal Nutritional Status -  Malnutrition   Cachexia      Morbid Obesity BMI >/=40    RADIOLOGY & ADDITIONAL STUDIES:  < from: Xray Chest 1 View- PORTABLE-Routine (Xray Chest 1 View- PORTABLE-Routine in AM.) (10.09.20 @ 09:26) >  INTERPRETATION:  INDICATION: Bilateral infiltrates; follow up assessment.    PRIORS: 10/8/2020.    VIEWS: Portable AP radiography of the chest performed.    FINDINGS: Midline tracheostomy. Surgical staple lines right midlung field. Stable bilateral infiltrates. Small pleural effusions cannot be excluded. There is no evidence for pneumothorax. No mediastinal shift.    IMPRESSION: No significant interval change.    < end of copied text >      Goals of Care Discussion with Family/Proxy/Other   - see pall med note on 10/6     BARB COLÓN    SCU progress note    INTERVAL HPI/OVERNIGHT EVENTS: still with low grade fever. 100.4. repeat blood cx negative. will f/u cxr.    DNR [ ]   DNI  [  ] FULL CODE    Covid - 19 PCR: COVID-19 PCR: Gasper (06 Oct 2020 18:08)      [facility] 4[facility]    What Matters Most: see GOC  Age appropriate Medications/Screen for High Risk Medication: Yes  Mentation: see CAM below  Mobility: defer to physical exam    The Confusion Assessment Method (CAM) Diagnostic Algorithm     1: Acute Onset or Fluctuating Course  - Is there evidence of an acute change in mental status from the patient’s baseline? Did the (abnormal) behavior  fluctuate during the day, that is, tend to come and go, or increase and decrease in severity?  [ ] YES [ x] NO     2: Inattention  - Did the patient have difficulty focusing attention, being easily distractible, or having difficulty keeping track of what was being said?  [ ] YES [ x] NO     3: Disorganized thinking  -Was the patient’s thinking disorganized or incoherent, such as rambling or irrelevant conversation, unclear or illogical flow of ideas, or unpredictable switching from subject to subject?  [ ] YES [ x] NO    4: Altered Level of consciousness?  [ ] YES [x ] NO    The diagnosis of delirium by CAM requires the presence of features 1 and 2 and either 3 or 4.    PRESSORS: [ ] YES [ ] NO  levoFLOXacin IVPB 750 milliGRAM(s) IV Intermittent every 24 hours  levoFLOXacin IVPB        Cardiovascular:  Heart Failure  Acute   Acute on Chronic  Chronic       carvedilol 6.25 milliGRAM(s) Oral every 12 hours    Pulmonary:  ALBUTerol    90 MICROgram(s) HFA Inhaler 2 Puff(s) Inhalation every 6 hours    Hematalogic:    Other:  acetaminophen    Suspension .. 650 milliGRAM(s) Oral every 4 hours PRN  atorvastatin 40 milliGRAM(s) Oral at bedtime  chlorhexidine 0.12% Liquid 15 milliLiter(s) Oral Mucosa every 12 hours  chlorhexidine 2% Cloths 1 Application(s) Topical <User Schedule>  dextrose 40% Gel 15 Gram(s) Oral once PRN  dextrose 5%. 1000 milliLiter(s) IV Continuous <Continuous>  dextrose 50% Injectable 12.5 Gram(s) IV Push once  dextrose 50% Injectable 25 Gram(s) IV Push once  dextrose 50% Injectable 25 Gram(s) IV Push once  ergocalciferol 30561 Unit(s) Oral <User Schedule>  gabapentin 600 milliGRAM(s) Oral every 8 hours  glucagon  Injectable 1 milliGRAM(s) IntraMuscular once PRN  influenza   Vaccine 0.5 milliLiter(s) IntraMuscular once  insulin glargine Injectable (LANTUS) 10 Unit(s) SubCutaneous at bedtime  insulin lispro (HumaLOG) corrective regimen sliding scale   SubCutaneous every 6 hours  insulin lispro Injectable (HumaLOG) 4 Unit(s) SubCutaneous every 6 hours  morphine  - Injectable 2 milliGRAM(s) IV Push every 4 hours PRN  oxycodone    5 mG/acetaminophen 325 mG 1 Tablet(s) Oral every 4 hours PRN  pantoprazole  Injectable 40 milliGRAM(s) IV Push daily  predniSONE   Tablet 20 milliGRAM(s) Oral daily  sodium chloride 0.9% lock flush 10 milliLiter(s) IV Push every 1 hour PRN    acetaminophen    Suspension .. 650 milliGRAM(s) Oral every 4 hours PRN  ALBUTerol    90 MICROgram(s) HFA Inhaler 2 Puff(s) Inhalation every 6 hours  atorvastatin 40 milliGRAM(s) Oral at bedtime  carvedilol 6.25 milliGRAM(s) Oral every 12 hours  chlorhexidine 0.12% Liquid 15 milliLiter(s) Oral Mucosa every 12 hours  chlorhexidine 2% Cloths 1 Application(s) Topical <User Schedule>  dextrose 40% Gel 15 Gram(s) Oral once PRN  dextrose 5%. 1000 milliLiter(s) IV Continuous <Continuous>  dextrose 50% Injectable 12.5 Gram(s) IV Push once  dextrose 50% Injectable 25 Gram(s) IV Push once  dextrose 50% Injectable 25 Gram(s) IV Push once  ergocalciferol 02912 Unit(s) Oral <User Schedule>  gabapentin 600 milliGRAM(s) Oral every 8 hours  glucagon  Injectable 1 milliGRAM(s) IntraMuscular once PRN  influenza   Vaccine 0.5 milliLiter(s) IntraMuscular once  insulin glargine Injectable (LANTUS) 10 Unit(s) SubCutaneous at bedtime  insulin lispro (HumaLOG) corrective regimen sliding scale   SubCutaneous every 6 hours  insulin lispro Injectable (HumaLOG) 4 Unit(s) SubCutaneous every 6 hours  levoFLOXacin IVPB 750 milliGRAM(s) IV Intermittent every 24 hours  levoFLOXacin IVPB      morphine  - Injectable 2 milliGRAM(s) IV Push every 4 hours PRN  oxycodone    5 mG/acetaminophen 325 mG 1 Tablet(s) Oral every 4 hours PRN  pantoprazole  Injectable 40 milliGRAM(s) IV Push daily  predniSONE   Tablet 20 milliGRAM(s) Oral daily  sodium chloride 0.9% lock flush 10 milliLiter(s) IV Push every 1 hour PRN    Drug Dosing Weight  Height (cm): 154.9 (19 Sep 2020 08:00)  Weight (kg): 84.8 (21 Sep 2020 09:15)  BMI (kg/m2): 35.3 (21 Sep 2020 09:15)  BSA (m2): 1.84 (21 Sep 2020 09:15)    CENTRAL LINE: [ ] YES [x ] NO  LOCATION:   DATE INSERTED:  REMOVE: [ ] YES [ ] NO  EXPLAIN:    ROBLEDO: [ ] YES [x ] NO    DATE INSERTED:  REMOVE:  [ ] YES [ ] NO  EXPLAIN:    PAST MEDICAL & SURGICAL HISTORY:  Malignant neoplasm of unspecified part of right bronchus or lung    HTN (hypertension)    DM (diabetes mellitus)    COPD (chronic obstructive pulmonary disease)    Lung cancer    Morbid obesity    GERD (gastroesophageal reflux disease)    Deviated septum    Prolapsed uterus    ITP (idiopathic thrombocytopenic purpura)    Emphysema/COPD    History of cholecystectomy    S/P lobectomy of lung  right 2017    History of tonsillectomy                10-10 @ 07:01  -  10-11 @ 07:00  --------------------------------------------------------  IN: 0 mL / OUT: 825 mL / NET: -825 mL        Mode: AC/ CMV (Assist Control/ Continuous Mandatory Ventilation)  RR (machine): 16  TV (machine): 400  FiO2: 40  PEEP: 5  ITime: 1  MAP: 9  PIP: 25      PHYSICAL EXAM:    GENERAL: NAD, chronically ill  HEAD:  Atraumatic, Normocephalic  EYES: EOMI, PERRLA, conjunctiva and sclera clear  ENMT: Moist mucous membranes, ns  NECK: Supple, No JVD, trach to vent  NERVOUS SYSTEM:  awake, nods to questions  CHEST/LUNG: no rhales, rhonchi  HEART: Regular rate and rhythm; tachy  ABDOMEN: Soft, Nontender, Nondistended; Bowel sounds present +PEG  EXTREMITIES:  2+ Peripheral Pulses, +edema  LYMPH: No lymphadenopathy noted  SKIN: No rashes or lesions      LABS:  CBC Full  -  ( 11 Oct 2020 07:38 )  WBC Count : 4.70 K/uL  RBC Count : 3.02 M/uL  Hemoglobin : 9.1 g/dL  Hematocrit : 26.6 %  Platelet Count - Automated : 33 K/uL  Mean Cell Volume : 88.1 fl  Mean Cell Hemoglobin : 30.1 pg  Mean Cell Hemoglobin Concentration : 34.2 gm/dL  Auto Neutrophil # : x  Auto Lymphocyte # : x  Auto Monocyte # : x  Auto Eosinophil # : x  Auto Basophil # : x  Auto Neutrophil % : x  Auto Lymphocyte % : x  Auto Monocyte % : x  Auto Eosinophil % : x  Auto Basophil % : x    10-11    135  |  96  |  10  ----------------------------<  178<H>  3.7   |  34<H>  |  0.33<L>    Ca    8.2<L>      11 Oct 2020 07:38  Phos  2.6     10-10  Mg     1.8     10-11    TPro  5.1<L>  /  Alb  1.8<L>  /  TBili  0.8  /  DBili  x   /  AST  20  /  ALT  26  /  AlkPhos  125<H>  10-11              [x  ]  DVT Prophylaxis  [ x ]  Nutrition, Brand, RateTube Feeding Modality: Orogastric  Glucerna 1.5 Inocente  Total Volume for 24 Hours (mL): 960  Continuous  Starting Tube Feed Rate {mL per Hour}: 10  Increase Tube Feed Rate by (mL): 10     Every hour  Until Goal Tube Feed Rate (mL per Hour): 40         Goal Rate        Abnormal Nutritional Status -  Malnutrition   Cachexia      Morbid Obesity BMI >/=40    RADIOLOGY & ADDITIONAL STUDIES:  < from: Xray Chest 1 View- PORTABLE-Routine (Xray Chest 1 View- PORTABLE-Routine in AM.) (10.09.20 @ 09:26) >  INTERPRETATION:  INDICATION: Bilateral infiltrates; follow up assessment.    PRIORS: 10/8/2020.    VIEWS: Portable AP radiography of the chest performed.    FINDINGS: Midline tracheostomy. Surgical staple lines right midlung field. Stable bilateral infiltrates. Small pleural effusions cannot be excluded. There is no evidence for pneumothorax. No mediastinal shift.    IMPRESSION: No significant interval change.    < end of copied text >      Goals of Care Discussion with Family/Proxy/Other   - see pall med note on 10/6

## 2020-10-11 NOTE — PROGRESS NOTE ADULT - NS PRO AD PATIENT TYPE
Health Care Proxy (HCP)/Medical Orders for Life-Sustaining Treatment (MOLST)
Medical Orders for Life-Sustaining Treatment (MOLST)/Health Care Proxy (HCP)

## 2020-10-11 NOTE — PHYSICAL THERAPY INITIAL EVALUATION ADULT - CRITERIA FOR SKILLED THERAPEUTIC INTERVENTIONS
risk reduction/prevention/predicted duration of therapy intervention/rehab potential/impairments found/therapy frequency/functional limitations in following categories
impairments found/rehab potential

## 2020-10-11 NOTE — PROGRESS NOTE ADULT - SUBJECTIVE AND OBJECTIVE BOX
Interval Events:  pt in nad    Allergies    fish (Angioedema)  penicillins (Rash)    Intolerances      Endocrine/Metabolic Medications:  atorvastatin 40 milliGRAM(s) Oral at bedtime  dextrose 40% Gel 15 Gram(s) Oral once PRN  dextrose 50% Injectable 12.5 Gram(s) IV Push once  dextrose 50% Injectable 25 Gram(s) IV Push once  dextrose 50% Injectable 25 Gram(s) IV Push once  glucagon  Injectable 1 milliGRAM(s) IntraMuscular once PRN  insulin glargine Injectable (LANTUS) 10 Unit(s) SubCutaneous at bedtime  insulin lispro (HumaLOG) corrective regimen sliding scale   SubCutaneous every 6 hours  insulin lispro Injectable (HumaLOG) 4 Unit(s) SubCutaneous every 6 hours  predniSONE   Tablet 20 milliGRAM(s) Oral daily      Vital Signs Last 24 Hrs  T(C): 36.9 (11 Oct 2020 09:09), Max: 38 (11 Oct 2020 06:12)  T(F): 98.5 (11 Oct 2020 09:09), Max: 100.4 (11 Oct 2020 06:12)  HR: 118 (11 Oct 2020 09:10) (81 - 137)  BP: 135/71 (11 Oct 2020 05:04) (100/65 - 138/92)  BP(mean): --  RR: 17 (11 Oct 2020 05:04) (16 - 24)  SpO2: 97% (11 Oct 2020 09:10) (97% - 99%)      PHYSICAL EXAM  All physical exam findings normal, except those marked:  General:	Alert, active, cooperative, NAD, well hydrated  .		[] Abnormal:  Neck		Normal: supple, no cervical adenopathy, no palpable thyroid  .		[] Abnormal:  Cardiovascular	Normal: regular rate, normal S1, S2, no murmurs  .		[] Abnormal:  Respiratory	Normal: no chest wall deformity, normal respiratory pattern, CTA B/L  .		[] Abnormal:  Abdominal	Normal: soft, ND, NT, bowel sounds present, no masses, no organomegaly  .		[] Abnormal:  		Normal normal genitalia, testes descended, circumcised/uncircumcised  .		Dejon stage:			Breast djeon:  .		Menstrual history:  .		[] Abnormal:  Extremities	Normal: FROM x4  .		[] Abnormal:  Skin		Normal: intact and not indurated, no rash, no acanthosis nigricans  .		[] Abnormal:  Neurologic	Normal: grossly intact  .		[] Abnormal:    LABS                        9.1    4.70  )-----------( 33       ( 11 Oct 2020 07:38 )             26.6                               135    |  96     |  10                  Calcium: 8.2   / iCa: x      (10-11 @ 07:38)    ----------------------------<  178       Magnesium: 1.8                              3.7     |  34     |  0.33             Phosphorous: x        TPro  5.1    /  Alb  1.8    /  TBili  0.8    /  DBili  x      /  AST  20     /  ALT  26     /  AlkPhos  125    11 Oct 2020 07:38    CAPILLARY BLOOD GLUCOSE      POCT Blood Glucose.: 162 mg/dL (11 Oct 2020 05:58)  POCT Blood Glucose.: 138 mg/dL (10 Oct 2020 22:36)  POCT Blood Glucose.: 207 mg/dL (10 Oct 2020 17:07)  POCT Blood Glucose.: 181 mg/dL (10 Oct 2020 11:25)        Assesment/plan    66 yo female with multiple comorbidities including h/o DM type 2, HTN, COPD on o2, lung ca on right sided s/p resection on chemo admitted with shortness of breath  was intubated/extubated for acute resp failure this admit, now s/p trach and peg on steroids with uncontrolled hyperglycemia. home regimen: basaglar 60 units daily, novolog 15 units pre meals      DM type 2  uncontrolled- better controlled now   complicated by high dose steroid use, acute on chronic resp failure  cont with lantus 10 and humalog 4 q6   may need increase doses as tube feeds increase- on 40cc goal rate now   fsg q6 hr  aim fsg 140-180    COPD exacerbation- s/p trach  on tapering doses of prednisone  f/u pulm recs

## 2020-10-11 NOTE — PHYSICAL THERAPY INITIAL EVALUATION ADULT - GENERAL OBSERVATIONS, REHAB EVAL
Patient received supine in bed with ventilator trach, Alert, NAD, +SCDs
pt found supine in NAD +endotracheal intubation on mechanical vent +cardiac monitor +benitez +IV +SCDs and heel protectors

## 2020-10-11 NOTE — PROGRESS NOTE ADULT - PROBLEM SELECTOR PLAN 6
VRE bacteremia / Fungemia  s/p course of 10 day coure of  zyvox and Cancidas with negative cultures  code sepsis on 10/8  F/U new cultures  con't levaquin. uncontrolled- better controlled now   complicated by high dose steroid use, acute on chronic resp failure  cont with lantus 10 and humalog 4 q6   may need increase doses as tube feeds increase- on 40cc goal rate now   fsg q6 hr  aim fsg 140-180  appreciate endo follow up dr. Lindquist

## 2020-10-11 NOTE — PROGRESS NOTE ADULT - PROBLEM SELECTOR PLAN 3
Lung cancer metastatic to bone S/P 1 radiation dose to spine.  Was on chemo before hospitalization  Continue pain management.

## 2020-10-11 NOTE — PROGRESS NOTE ADULT - ASSESSMENT
COPD with acute asthmatic bronchitis/ RLL pneumonia with  acute  Respiratory Failure with Trach and PEG   Recurrent Lung cancer with Bone Mets   IDDM   Htn with MR   Thrombocytopenia  and Anemia   Obesity with CRIS   serratia  bacteremia     Plan-  IV levaquin   /CMV12/ 35% with Peep5 cm alternating with cpap day time  Venodynes  FS coverage

## 2020-10-11 NOTE — PROGRESS NOTE ADULT - SUBJECTIVE AND OBJECTIVE BOX
PULMONARY  progress note    BARB COLÓN  MRN-[id number]    Patient is a 65y old  Female who presents with a chief complaint of shortness of breath (10 Oct 2020 13:25)  Remains on vent CMV14//40% with PP20      MEDICATIONS  (STANDING):  ALBUTerol    90 MICROgram(s) HFA Inhaler 2 Puff(s) Inhalation every 6 hours  atorvastatin 40 milliGRAM(s) Oral at bedtime  carvedilol 6.25 milliGRAM(s) Oral every 12 hours  chlorhexidine 0.12% Liquid 15 milliLiter(s) Oral Mucosa every 12 hours  chlorhexidine 2% Cloths 1 Application(s) Topical <User Schedule>  dextrose 5%. 1000 milliLiter(s) (50 mL/Hr) IV Continuous <Continuous>  dextrose 50% Injectable 12.5 Gram(s) IV Push once  dextrose 50% Injectable 25 Gram(s) IV Push once  dextrose 50% Injectable 25 Gram(s) IV Push once  ergocalciferol 83491 Unit(s) Oral <User Schedule>  gabapentin 600 milliGRAM(s) Oral every 8 hours  influenza   Vaccine 0.5 milliLiter(s) IntraMuscular once  insulin glargine Injectable (LANTUS) 10 Unit(s) SubCutaneous at bedtime  insulin lispro (HumaLOG) corrective regimen sliding scale   SubCutaneous every 6 hours  insulin lispro Injectable (HumaLOG) 4 Unit(s) SubCutaneous every 6 hours  levoFLOXacin IVPB 750 milliGRAM(s) IV Intermittent every 24 hours  levoFLOXacin IVPB      pantoprazole  Injectable 40 milliGRAM(s) IV Push daily  predniSONE   Tablet 20 milliGRAM(s) Oral daily      MEDICATIONS  (PRN):  acetaminophen    Suspension .. 650 milliGRAM(s) Oral every 4 hours PRN Mild Pain (1 - 3)  dextrose 40% Gel 15 Gram(s) Oral once PRN Blood Glucose LESS THAN 70 milliGRAM(s)/deciliter  glucagon  Injectable 1 milliGRAM(s) IntraMuscular once PRN Glucose LESS THAN 70 milligrams/deciliter  morphine  - Injectable 2 milliGRAM(s) IV Push every 4 hours PRN Severe Pain (7 - 10)  oxycodone    5 mG/acetaminophen 325 mG 1 Tablet(s) Oral every 4 hours PRN Moderate Pain (4 - 6)  sodium chloride 0.9% lock flush 10 milliLiter(s) IV Push every 1 hour PRN Pre/post blood products, medications, blood draw, and to maintain line patency      Allergies    fish (Angioedema)  penicillins (Rash)            PAST MEDICAL & SURGICAL HISTORY:  Malignant neoplasm of unspecified part of right bronchus or lung    HTN (hypertension)    DM (diabetes mellitus)    COPD (chronic obstructive pulmonary disease)    Lung cancer    Morbid obesity    GERD (gastroesophageal reflux disease)    Deviated septum    Prolapsed uterus    ITP (idiopathic thrombocytopenic purpura)    Emphysema/COPD    History of cholecystectomy    S/P lobectomy of lung  right 2017    History of tonsillectomy             REVIEW OF SYSTEMS: as per staff  CONSTITUTIONAL: No fever, weight loss, or fatigue   EYES: No eye pain, visual disturbances, or discharge  ENT:  No difficulty hearing, tinnitus, vertigo; No sinus or throat pain  NECK: No pain or stiffness or nodes  RESPIRATORY:  cough+  wheezing --  chills--   hemoptysis--  Shortness of Breath--  CARDIOVASCULAR: No chest pain, palpitations, passing out, dizziness, or leg swelling  GASTROINTESTINAL: No abdominal or epigastric pain. No nausea, vomiting, or hematemesis; No diarrhea or constipation. No melena or hematochezia.  GENITOURINARY: No dysuria, frequency, hematuria, or incontinence  NEUROLOGICAL: No headaches, memory loss, loss of strength, numbness, or tremors  SKIN: No itching, burning, rashes, or lesions   LYMPH Nodes: No enlarged glands  ENDOCRINE: No heat or cold intolerance; No hair loss  MUSCULOSKELETAL: No joint pain or swelling; No muscle, back, or extremity pain  PSYCHIATRIC: No depression, anxiety, mood swings, or difficulty sleeping  HEME/LYMPH: No easy bruising, or bleeding gums  ALLERGY AND IMMUNOLOGIC: No hives or eczema    Vital Signs Last 24 Hrs  T(C): 36.9 (11 Oct 2020 09:09), Max: 38 (11 Oct 2020 06:12)  T(F): 98.5 (11 Oct 2020 09:09), Max: 100.4 (11 Oct 2020 06:12)  HR: 118 (11 Oct 2020 09:10) (81 - 137)  BP: 135/71 (11 Oct 2020 05:04) (100/65 - 138/92)  BP(mean): --  RR: 17 (11 Oct 2020 05:04) (16 - 24)  SpO2: 97% (11 Oct 2020 09:10) (97% - 99%)  I&O's Detail    10 Oct 2020 07:01  -  11 Oct 2020 07:00  --------------------------------------------------------  IN:  Total IN: 0 mL    OUT:    Indwelling Catheter - Urethral (mL): 825 mL  Total OUT: 825 mL    Total NET: -825 mL          PHYSICAL EXAMINATION:    GENERAL: The patient is a well-developed, obese h/f on vent in no apparent distress.   SKIN no rash ecchymoses or bruises  HEENT: Head is normocephalic and atraumatic  SHAMA , Extraocular muscles are intact. Mucous membranes  moist.   Neck supple ,No LN felt JVP not increased  Thyroid not enlarged, TT in place  Cardiovascular:  S1 S2 heard, RSR, No JVD , systolic  murmur at apex, No gallop or rub  Respiratory: Chest wall symmetrical with good air entry ,Percussion note normal,    Lungs vesicular breathing with  rt basilar  rales, no   wheeze	  ABDOMEN:  Soft, Non-tender, obese, G/T feeds  in progress no hepatomegaly or splenomegaly BS positive	  Extremities: Normal range of motion, No clubbing, cyanosis or edema  Vascular: Peripheral pulses palpable 2+ bilaterally  CNS:  Alert and oriented x3   Cranial nerves intact  sensory intact  motor power5/5  dtr 2+   Babinski neg    LABS:                        9.1    4.70  )-----------( 33       ( 11 Oct 2020 07:38 )             26.6     10-11    135  |  96  |  10  ----------------------------<  178<H>  3.7   |  34<H>  |  0.33<L>    Ca    8.2<L>      11 Oct 2020 07:38  Phos  2.6     10-10  Mg     1.8     10-11    TPro  5.1<L>  /  Alb  1.8<L>  /  TBili  0.8  /  DBili  x   /  AST  20  /  ALT  26  /  AlkPhos  125<H>  10-11                    Procalcitonin, Serum: 6.34 ng/mL (10-08-20 @ 11:28)     TSH      MICROBIOLOGY:    Culture - Acid Fast - Bronchial w/Smear (collected 10-08-20 @ 22:38)  Source: Bronch Wash Other, bronchial  Preliminary Report (10-10-20 @ 15:04):    Culture is being performed.    Culture - Fungal, Bronchial (collected 10-08-20 @ 22:37)  Source: .Bronchial Other, bronchial  Preliminary Report (10-09-20 @ 08:30):    Testing in progress    Culture - Bronchial (collected 10-08-20 @ 22:37)  Source: .Bronchial bronchial  Gram Stain (10-09-20 @ 07:53):    Moderate polymorphonuclear leukocytes seen per low power field    No Squamous epithelial cells seen per low power field    No organisms seen per oil power field  Final Report (10-10-20 @ 17:55):    Normal Respiratory Shannan present    Culture - Blood (collected 10-08-20 @ 06:59)  Source: .Blood Blood  Preliminary Report (10-09-20 @ 07:02):    No growth to date.    Culture - Blood (collected 10-08-20 @ 06:59)  Source: .Blood Blood  Preliminary Report (10-09-20 @ 07:02):    No growth to date.    Culture - Blood (collected 10-06-20 @ 10:10)  Source: .Blood Blood  Gram Stain (10-08-20 @ 12:16):    Growth in anaerobic bottle: Gram Negative Rods    Growth in aerobic bottle: Gram Negative Rods  Final Report (10-09-20 @ 08:44):    Growth in aerobic and anaerobic bottles: Serratia marcescens    "Due to technical problems, Proteus sp. will Not be reported as part of    the BCID panel until further notice"    ***Blood Panel PCR results on this specimen are available    approximately 3 hours after the Gram stain result.***    Gram stain, PCR, and/or culture results may not always    correspond due to difference in methodologies.    ************************************************************    This PCR assay was performed using smsPREP.    The following targets are tested for: Enterococcus,    vancomycin resistant enterococci, Listeria monocytogenes,    coagulase negative staphylococci, S. aureus,    methicillin resistant S. aureus, Streptococcus agalactiae    (Group B), S. pneumoniae,S. pyogenes (Group A),    Acinetobacter baumannii, Enterobacter cloacae, E. coli,    Klebsiella oxytoca, K. pneumoniae, Proteus sp.,    Serratia marcescens, Haemophilus influenzae,    Neisseria meningitidis, Pseudomonas aeruginosa, Candida    albicans, C. glabrata, C krusei, C parapsilosis,    C. tropicalis and the KPC resistance gene.    "For positive Serratia,  results cross-reactivity has been occasionally    observed with some species of Pseudomonas and Pantoea"  Organism: Blood Culture PCR  Serratia marcescens (10-09-20 @ 08:44)  Organism: Serratia marcescens (10-09-20 @ 08:44)      -  Amikacin: S <=16      -  Ampicillin: R <=8 These ampicillin results predict results for amoxicillin      -  Ampicillin/Sulbactam: R <=4/2 Enterobacter, Citrobacter, and Serratia may develop resistance during prolonged therapy (3-4 days)      -  Aztreonam: S <=4      -  Cefazolin: R >16 Enterobacter, Citrobacter, and Serratia may develop resistance during prolonged therapy (3-4 days)      -  Cefepime: S <=2      -  Cefoxitin: R 16      -  Ceftriaxone: S <=1 Enterobacter, Citrobacter, and Serratia may develop resistance during prolonged therapy      -  Ciprofloxacin: S <=0.25      -  Ertapenem: S <=0.5      -  Gentamicin: S <=2      -  Levofloxacin: S <=0.5      -  Meropenem: S <=1      -  Piperacillin/Tazobactam: S <=8      -  Tobramycin: S 4      -  Trimethoprim/Sulfamethoxazole: S <=0.5/9.5      Method Type: DALE  Organism: Blood Culture PCR (10-09-20 @ 08:44)      -  Serratia marcescens: Detec      Method Type: PCR    Culture - Blood (collected 10-06-20 @ 10:10)  Source: .Blood Blood  Gram Stain (10-07-20 @ 15:14):    Growth in aerobic bottle: Gram Negative Rods  Final Report (10-08-20 @ 14:28):    Growth in aerobic bottle: Serratia marcescens    See previous culture [id number]    Culture - Blood (collected 10-04-20 @ 11:02)  Source: .Blood Blood  Final Report (10-09-20 @ 12:01):    No Growth Final    Culture - Blood (collected 10-04-20 @ 11:02)  Source: .Blood Blood  Final Report (10-09-20 @ 12:01):    No Growth Final        EKG - sinus tachycardia

## 2020-10-11 NOTE — PHYSICAL THERAPY INITIAL EVALUATION ADULT - PRECAUTIONS/LIMITATIONS, REHAB EVAL
fall precautions/aspiration precautions
aspiration precautions/oxygen therapy device and L/min/mechanical vent/fall precautions

## 2020-10-11 NOTE — PHYSICAL THERAPY INITIAL EVALUATION ADULT - IMPAIRMENTS FOUND, PT EVAL
muscle strength/gait, locomotion, and balance/arousal, attention, and cognition/gross motor/ventilation and respiration/gas exchange/aerobic capacity/endurance
aerobic capacity/endurance/gait, locomotion, and balance/muscle strength

## 2020-10-11 NOTE — PHYSICAL THERAPY INITIAL EVALUATION ADULT - DIAGNOSIS, PT EVAL
Difficulty in walking secondary to decrease strength, endurance and balance
Decreased strength, endurance, and functional mobility

## 2020-10-11 NOTE — PHYSICAL THERAPY INITIAL EVALUATION ADULT - PLANNED THERAPY INTERVENTIONS, PT EVAL
balance training/strengthening/gait training/transfer training/bed mobility training
neuromuscular re-education/bed mobility training/strengthening/transfer training/balance training/gait training/Stair negotiation Training

## 2020-10-11 NOTE — PROGRESS NOTE ADULT - PROBLEM SELECTOR PROBLEM 2
March 11, 2019      Ifeoma Hurt MD  [address]  [facility] [address]           [facility]  [address]  [facility] [address]  Phone: [phone]  Fax: [phone]          Patient: Juan Jose Johnson   MR Number: [id number]   YOB: 2015   Date of Visit: 3/11/2019       Dear Dr. Ifeoma Hurt:    Thank you for referring Juan Jose Johnson to me for evaluation. Attached you will find relevant portions of my assessment and plan of care.    If you have questions, please do not hesitate to call me. I look forward to following Juan Jose Johnson along with you.    Sincerely,    Mark Gracia, OD    Enclosure  CC:  No Recipients    If you would like to receive this communication electronically, please contact [email or web address] or [phone] to request more information on [facility] Link access.    For providers and/or their staff who would like to refer a patient to [facility], please contact us through our one-stop-shop provider referral line, [facility], at [phone].    If you feel you have received this communication in error or would no longer like to receive these types of communications, please e-mail [email or web address]          COPD (chronic obstructive pulmonary disease)

## 2020-10-11 NOTE — PROGRESS NOTE ADULT - PROBLEM SELECTOR PLAN 5
uncontrolled- better controlled now   complicated by high dose steroid use, acute on chronic resp failure  cont with lantus 10 and humalog 4 q6   may need increase doses as tube feeds increase- on 40cc goal rate now   fsg q6 hr  aim fsg 140-180  appreciate endo follow up dr. Lindquist Hx of ITP  on steroids, 2 unit plts 10/7, 10/8  improving Hx of ITP  on steroids, 2 unit plts 10/7, 10/8  trending down  monitor

## 2020-10-11 NOTE — PHYSICAL THERAPY INITIAL EVALUATION ADULT - ADDITIONAL COMMENTS
pt was drowsy and did not respond to all questions; As per Care coordinator assessment pt was ambulating with rollator and required assistance with ADLs from her boyfriend  pt reports she was walking minimally
Patient reports she lives with boyfriend in private home.  Must negotiate approximately 10 steps to enter home.  No stairs within.  Reports ambulating with a Rollator independently within home but walks minimally.  Boyfriend assists her during ambulation when needed.  Patient report she was able to shower and feed self.  Boyfriend assisted with household chores and shopping. Patient also has wheelchair at home and commode.

## 2020-10-11 NOTE — PROGRESS NOTE ADULT - PROBLEM SELECTOR PLAN 10
Gi prophylaxis  DVT prophylaxis on hold 2/2 thrombocytopenia  slowly improving, restart once plt >50

## 2020-10-11 NOTE — PHYSICAL THERAPY INITIAL EVALUATION ADULT - PHYSICAL ASSIST/NONPHYSICAL ASSIST, REHAB EVAL
nonverbal cues (demo/gestures)/verbal cues/1 person assist
1 person assist/verbal cues/nonverbal cues (demo/gestures)

## 2020-10-12 DIAGNOSIS — E43 UNSPECIFIED SEVERE PROTEIN-CALORIE MALNUTRITION: ICD-10-CM

## 2020-10-12 LAB
ANION GAP SERPL CALC-SCNC: 4 MMOL/L — LOW (ref 5–17)
ANISOCYTOSIS BLD QL: SLIGHT — SIGNIFICANT CHANGE UP
BUN SERPL-MCNC: 11 MG/DL — SIGNIFICANT CHANGE UP (ref 7–18)
CALCIUM SERPL-MCNC: 8 MG/DL — LOW (ref 8.4–10.5)
CHLORIDE SERPL-SCNC: 97 MMOL/L — SIGNIFICANT CHANGE UP (ref 96–108)
CO2 SERPL-SCNC: 36 MMOL/L — HIGH (ref 22–31)
CREAT SERPL-MCNC: 0.31 MG/DL — LOW (ref 0.5–1.3)
GLUCOSE BLDC GLUCOMTR-MCNC: 141 MG/DL — HIGH (ref 70–99)
GLUCOSE BLDC GLUCOMTR-MCNC: 143 MG/DL — HIGH (ref 70–99)
GLUCOSE BLDC GLUCOMTR-MCNC: 174 MG/DL — HIGH (ref 70–99)
GLUCOSE BLDC GLUCOMTR-MCNC: 196 MG/DL — HIGH (ref 70–99)
GLUCOSE BLDC GLUCOMTR-MCNC: 210 MG/DL — HIGH (ref 70–99)
GLUCOSE SERPL-MCNC: 194 MG/DL — HIGH (ref 70–99)
HCT VFR BLD CALC: 23.9 % — LOW (ref 34.5–45)
HGB BLD-MCNC: 8.3 G/DL — LOW (ref 11.5–15.5)
MANUAL SMEAR VERIFICATION: SIGNIFICANT CHANGE UP
MCHC RBC-ENTMCNC: 30.4 PG — SIGNIFICANT CHANGE UP (ref 27–34)
MCHC RBC-ENTMCNC: 34.7 GM/DL — SIGNIFICANT CHANGE UP (ref 32–36)
MCV RBC AUTO: 87.5 FL — SIGNIFICANT CHANGE UP (ref 80–100)
NRBC # BLD: 0 /100 WBCS — SIGNIFICANT CHANGE UP (ref 0–0)
OVALOCYTES BLD QL SMEAR: SLIGHT — SIGNIFICANT CHANGE UP
PLAT MORPH BLD: NORMAL — SIGNIFICANT CHANGE UP
PLATELET # BLD AUTO: 59 K/UL — LOW (ref 150–400)
PLATELET COUNT - ESTIMATE: ABNORMAL
POIKILOCYTOSIS BLD QL AUTO: SLIGHT — SIGNIFICANT CHANGE UP
POLYCHROMASIA BLD QL SMEAR: SLIGHT — SIGNIFICANT CHANGE UP
POTASSIUM SERPL-MCNC: 3.7 MMOL/L — SIGNIFICANT CHANGE UP (ref 3.5–5.3)
POTASSIUM SERPL-SCNC: 3.7 MMOL/L — SIGNIFICANT CHANGE UP (ref 3.5–5.3)
RBC # BLD: 2.73 M/UL — LOW (ref 3.8–5.2)
RBC # FLD: 15.5 % — HIGH (ref 10.3–14.5)
RBC BLD AUTO: ABNORMAL
SODIUM SERPL-SCNC: 137 MMOL/L — SIGNIFICANT CHANGE UP (ref 135–145)
TOXIC GRANULES BLD QL SMEAR: PRESENT — SIGNIFICANT CHANGE UP
WBC # BLD: 5.77 K/UL — SIGNIFICANT CHANGE UP (ref 3.8–10.5)
WBC # FLD AUTO: 5.77 K/UL — SIGNIFICANT CHANGE UP (ref 3.8–10.5)

## 2020-10-12 PROCEDURE — 99233 SBSQ HOSP IP/OBS HIGH 50: CPT

## 2020-10-12 RX ORDER — INSULIN LISPRO 100/ML
3 VIAL (ML) SUBCUTANEOUS EVERY 6 HOURS
Refills: 0 | Status: DISCONTINUED | OUTPATIENT
Start: 2020-10-12 | End: 2020-10-15

## 2020-10-12 RX ADMIN — GABAPENTIN 600 MILLIGRAM(S): 400 CAPSULE ORAL at 21:55

## 2020-10-12 RX ADMIN — CHLORHEXIDINE GLUCONATE 15 MILLILITER(S): 213 SOLUTION TOPICAL at 05:30

## 2020-10-12 RX ADMIN — MORPHINE SULFATE 2 MILLIGRAM(S): 50 CAPSULE, EXTENDED RELEASE ORAL at 07:37

## 2020-10-12 RX ADMIN — MORPHINE SULFATE 2 MILLIGRAM(S): 50 CAPSULE, EXTENDED RELEASE ORAL at 23:47

## 2020-10-12 RX ADMIN — Medication 3 UNIT(S): at 11:40

## 2020-10-12 RX ADMIN — ALBUTEROL 2 PUFF(S): 90 AEROSOL, METERED ORAL at 02:07

## 2020-10-12 RX ADMIN — Medication 4 UNIT(S): at 06:08

## 2020-10-12 RX ADMIN — CHLORHEXIDINE GLUCONATE 1 APPLICATION(S): 213 SOLUTION TOPICAL at 05:28

## 2020-10-12 RX ADMIN — MORPHINE SULFATE 2 MILLIGRAM(S): 50 CAPSULE, EXTENDED RELEASE ORAL at 17:35

## 2020-10-12 RX ADMIN — Medication 2: at 06:08

## 2020-10-12 RX ADMIN — CARVEDILOL PHOSPHATE 6.25 MILLIGRAM(S): 80 CAPSULE, EXTENDED RELEASE ORAL at 05:28

## 2020-10-12 RX ADMIN — ATORVASTATIN CALCIUM 40 MILLIGRAM(S): 80 TABLET, FILM COATED ORAL at 21:55

## 2020-10-12 RX ADMIN — MORPHINE SULFATE 2 MILLIGRAM(S): 50 CAPSULE, EXTENDED RELEASE ORAL at 07:50

## 2020-10-12 RX ADMIN — CARVEDILOL PHOSPHATE 6.25 MILLIGRAM(S): 80 CAPSULE, EXTENDED RELEASE ORAL at 17:17

## 2020-10-12 RX ADMIN — GABAPENTIN 600 MILLIGRAM(S): 400 CAPSULE ORAL at 14:30

## 2020-10-12 RX ADMIN — Medication 2: at 17:18

## 2020-10-12 RX ADMIN — ALBUTEROL 2 PUFF(S): 90 AEROSOL, METERED ORAL at 08:13

## 2020-10-12 RX ADMIN — MORPHINE SULFATE 2 MILLIGRAM(S): 50 CAPSULE, EXTENDED RELEASE ORAL at 17:17

## 2020-10-12 RX ADMIN — ALBUTEROL 2 PUFF(S): 90 AEROSOL, METERED ORAL at 15:42

## 2020-10-12 RX ADMIN — INSULIN GLARGINE 10 UNIT(S): 100 INJECTION, SOLUTION SUBCUTANEOUS at 22:37

## 2020-10-12 RX ADMIN — PANTOPRAZOLE SODIUM 40 MILLIGRAM(S): 20 TABLET, DELAYED RELEASE ORAL at 11:40

## 2020-10-12 RX ADMIN — Medication 3 UNIT(S): at 17:18

## 2020-10-12 RX ADMIN — Medication 4: at 11:40

## 2020-10-12 RX ADMIN — GABAPENTIN 600 MILLIGRAM(S): 400 CAPSULE ORAL at 05:30

## 2020-10-12 RX ADMIN — CHLORHEXIDINE GLUCONATE 15 MILLILITER(S): 213 SOLUTION TOPICAL at 17:17

## 2020-10-12 RX ADMIN — ALBUTEROL 2 PUFF(S): 90 AEROSOL, METERED ORAL at 20:24

## 2020-10-12 RX ADMIN — Medication 20 MILLIGRAM(S): at 05:28

## 2020-10-12 NOTE — PROGRESS NOTE ADULT - SUBJECTIVE AND OBJECTIVE BOX
Interval Events:  pt in nad    Allergies    fish (Angioedema)  penicillins (Rash)    Intolerances      Endocrine/Metabolic Medications:  atorvastatin 40 milliGRAM(s) Oral at bedtime  dextrose 40% Gel 15 Gram(s) Oral once PRN  dextrose 50% Injectable 12.5 Gram(s) IV Push once  dextrose 50% Injectable 25 Gram(s) IV Push once  dextrose 50% Injectable 25 Gram(s) IV Push once  glucagon  Injectable 1 milliGRAM(s) IntraMuscular once PRN  insulin glargine Injectable (LANTUS) 10 Unit(s) SubCutaneous at bedtime  insulin lispro (HumaLOG) corrective regimen sliding scale   SubCutaneous every 6 hours  insulin lispro Injectable (HumaLOG) 4 Unit(s) SubCutaneous every 6 hours  predniSONE   Tablet 20 milliGRAM(s) Oral daily      Vital Signs Last 24 Hrs  T(C): 36.8 (12 Oct 2020 05:14), Max: 37.3 (11 Oct 2020 20:10)  T(F): 98.3 (12 Oct 2020 05:14), Max: 99.1 (11 Oct 2020 20:10)  HR: 123 (12 Oct 2020 05:14) (102 - 123)  BP: 135/61 (12 Oct 2020 05:14) (123/71 - 152/78)  BP(mean): --  RR: 16 (12 Oct 2020 05:14) (16 - 21)  SpO2: 99% (12 Oct 2020 05:14) (99% - 100%)      PHYSICAL EXAM  All physical exam findings normal, except those marked:  General:	Alert, active, cooperative, NAD, well hydrated  .		[] Abnormal:  Neck		Normal: supple, no cervical adenopathy, no palpable thyroid  .		[] Abnormal:  Cardiovascular	Normal: regular rate, normal S1, S2, no murmurs  .		[] Abnormal:  Respiratory	Normal: no chest wall deformity, normal respiratory pattern, CTA B/L  .		[] Abnormal:  Abdominal	Normal: soft, ND, NT, bowel sounds present, no masses, no organomegaly  .		[] Abnormal:  		Normal normal genitalia, testes descended, circumcised/uncircumcised  .		Dejon stage:			Breast dejon:  .		Menstrual history:  .		[] Abnormal:  Extremities	Normal: FROM x4  .		[] Abnormal:  Skin		Normal: intact and not indurated, no rash, no acanthosis nigricans  .		[] Abnormal:  Neurologic	Normal: grossly intact  .		[] Abnormal:    LABS                        8.3    5.77  )-----------( 59       ( 12 Oct 2020 06:54 )             23.9                               137    |  97     |  11                  Calcium: 8.0   / iCa: x      (10-12 @ 06:54)    ----------------------------<  194       Magnesium: x                                3.7     |  36     |  0.31             Phosphorous: x          CAPILLARY BLOOD GLUCOSE      POCT Blood Glucose.: 174 mg/dL (12 Oct 2020 05:51)  POCT Blood Glucose.: 105 mg/dL (11 Oct 2020 23:24)  POCT Blood Glucose.: 109 mg/dL (11 Oct 2020 22:39)  POCT Blood Glucose.: 175 mg/dL (11 Oct 2020 16:54)  POCT Blood Glucose.: 250 mg/dL (11 Oct 2020 11:41)        Assesment/plan    66 yo female with multiple comorbidities including h/o DM type 2, HTN, COPD on o2, lung ca on right sided s/p resection on chemo admitted with shortness of breath  was intubated/extubated for acute resp failure this admit, now s/p trach and peg on steroids with uncontrolled hyperglycemia. home regimen: basaglar 60 units daily, novolog 15 units pre meals      DM type 2  uncontrolled- better controlled now   complicated by high dose steroid use, acute on chronic resp failure  cont with lantus 10 and change  humalog to 3 q6   On tube feeds- 40cc goal rate now   fsg q6 hr  aim fsg 140-180    COPD exacerbation- s/p trach  on tapering doses of prednisone  f/u pulm recs

## 2020-10-12 NOTE — PROGRESS NOTE ADULT - PROBLEM SELECTOR PLAN 2
Continue antibiotics as per ID  Blood cultures serratia marcescens. Repeat cultures on 10/8 Negative  Will need levaquin thru 10/22

## 2020-10-12 NOTE — PROGRESS NOTE ADULT - SUBJECTIVE AND OBJECTIVE BOX
BARB COLÓN    SCU progress note    INTERVAL HPI/OVERNIGHT EVENTS: ***No overnight events.    DNR [ ]   DNI  [  ]  FULL CODE    Covid - 19 PCR: Negative 10/6    The 4Ms    What Matters Most: see GOC  Age appropriate Medications/Screen for High Risk Medication: Yes  Mentation: see CAM below  Mobility: defer to physical exam    The Confusion Assessment Method (CAM) Diagnostic Algorithm     1: Acute Onset or Fluctuating Course  - Is there evidence of an acute change in mental status from the patient’s baseline? Did the (abnormal) behavior  fluctuate during the day, that is, tend to come and go, or increase and decrease in severity?  [ ] YES [x ] NO     2: Inattention  - Did the patient have difficulty focusing attention, being easily distractible, or having difficulty keeping track of what was being said?  [ ] YES [x ] NO     3: Disorganized thinking  -Was the patient’s thinking disorganized or incoherent, such as rambling or irrelevant conversation, unclear or illogical flow of ideas, or unpredictable switching from subject to subject?  [ ] YES [x ] NO    4: Altered Level of consciousness?  [ ] YES [ x] NO    The diagnosis of delirium by CAM requires the presence of features 1 and 2 and either 3 or 4.    PRESSORS: [ ] YES [x ] NO  levoFLOXacin IVPB 750 milliGRAM(s) IV Intermittent every 24 hours  levoFLOXacin IVPB        Cardiovascular:  Heart Failure  Acute   Acute on Chronic  Chronic       carvedilol 6.25 milliGRAM(s) Oral every 12 hours    Pulmonary:  ALBUTerol    90 MICROgram(s) HFA Inhaler 2 Puff(s) Inhalation every 6 hours    Hematalogic:    Other:  acetaminophen    Suspension .. 650 milliGRAM(s) Oral every 4 hours PRN  atorvastatin 40 milliGRAM(s) Oral at bedtime  chlorhexidine 0.12% Liquid 15 milliLiter(s) Oral Mucosa every 12 hours  chlorhexidine 2% Cloths 1 Application(s) Topical <User Schedule>  dextrose 40% Gel 15 Gram(s) Oral once PRN  dextrose 5%. 1000 milliLiter(s) IV Continuous <Continuous>  dextrose 50% Injectable 12.5 Gram(s) IV Push once  dextrose 50% Injectable 25 Gram(s) IV Push once  dextrose 50% Injectable 25 Gram(s) IV Push once  ergocalciferol 24130 Unit(s) Oral <User Schedule>  gabapentin 600 milliGRAM(s) Oral every 8 hours  glucagon  Injectable 1 milliGRAM(s) IntraMuscular once PRN  influenza   Vaccine 0.5 milliLiter(s) IntraMuscular once  insulin glargine Injectable (LANTUS) 10 Unit(s) SubCutaneous at bedtime  insulin lispro (HumaLOG) corrective regimen sliding scale   SubCutaneous every 6 hours  insulin lispro Injectable (HumaLOG) 4 Unit(s) SubCutaneous every 6 hours  morphine  - Injectable 2 milliGRAM(s) IV Push every 4 hours PRN  oxycodone    5 mG/acetaminophen 325 mG 1 Tablet(s) Oral every 4 hours PRN  pantoprazole  Injectable 40 milliGRAM(s) IV Push daily  predniSONE   Tablet 20 milliGRAM(s) Oral daily  sodium chloride 0.9% lock flush 10 milliLiter(s) IV Push every 1 hour PRN    acetaminophen    Suspension .. 650 milliGRAM(s) Oral every 4 hours PRN  ALBUTerol    90 MICROgram(s) HFA Inhaler 2 Puff(s) Inhalation every 6 hours  atorvastatin 40 milliGRAM(s) Oral at bedtime  carvedilol 6.25 milliGRAM(s) Oral every 12 hours  chlorhexidine 0.12% Liquid 15 milliLiter(s) Oral Mucosa every 12 hours  chlorhexidine 2% Cloths 1 Application(s) Topical <User Schedule>  dextrose 40% Gel 15 Gram(s) Oral once PRN  dextrose 5%. 1000 milliLiter(s) IV Continuous <Continuous>  dextrose 50% Injectable 12.5 Gram(s) IV Push once  dextrose 50% Injectable 25 Gram(s) IV Push once  dextrose 50% Injectable 25 Gram(s) IV Push once  ergocalciferol 92110 Unit(s) Oral <User Schedule>  gabapentin 600 milliGRAM(s) Oral every 8 hours  glucagon  Injectable 1 milliGRAM(s) IntraMuscular once PRN  influenza   Vaccine 0.5 milliLiter(s) IntraMuscular once  insulin glargine Injectable (LANTUS) 10 Unit(s) SubCutaneous at bedtime  insulin lispro (HumaLOG) corrective regimen sliding scale   SubCutaneous every 6 hours  insulin lispro Injectable (HumaLOG) 4 Unit(s) SubCutaneous every 6 hours  levoFLOXacin IVPB 750 milliGRAM(s) IV Intermittent every 24 hours  levoFLOXacin IVPB      morphine  - Injectable 2 milliGRAM(s) IV Push every 4 hours PRN  oxycodone    5 mG/acetaminophen 325 mG 1 Tablet(s) Oral every 4 hours PRN  pantoprazole  Injectable 40 milliGRAM(s) IV Push daily  predniSONE   Tablet 20 milliGRAM(s) Oral daily  sodium chloride 0.9% lock flush 10 milliLiter(s) IV Push every 1 hour PRN    Drug Dosing Weight  Height (cm): 154.9 (19 Sep 2020 08:00)  Weight (kg): 84.8 (21 Sep 2020 09:15)  BMI (kg/m2): 35.3 (21 Sep 2020 09:15)  BSA (m2): 1.84 (21 Sep 2020 09:15)    CENTRAL LINE: [ ] YES [x ] NO  LOCATION:   DATE INSERTED:  REMOVE: [ ] YES [ ] NO  EXPLAIN:    ROBLEDO: [ ] YES [x ] NO    DATE INSERTED:  REMOVE:  [ ] YES [ ] NO  EXPLAIN:    PAST MEDICAL & SURGICAL HISTORY:  Malignant neoplasm of unspecified part of right bronchus or lung    HTN (hypertension)    DM (diabetes mellitus)    COPD (chronic obstructive pulmonary disease)    Lung cancer    Morbid obesity    GERD (gastroesophageal reflux disease)    Deviated septum    Prolapsed uterus    ITP (idiopathic thrombocytopenic purpura)    Emphysema/COPD    History of cholecystectomy    S/P lobectomy of lung  right 2017    History of tonsillectomy                10-11 @ 07:01  -  10-12 @ 07:00  --------------------------------------------------------  IN: 0 mL / OUT: 1175 mL / NET: -1175 mL        Mode: AC/ CMV (Assist Control/ Continuous Mandatory Ventilation)  RR (machine): 16  TV (machine): 400  FiO2: 40  PEEP: 5  ITime: 1  MAP: 11  PIP: 23      PHYSICAL EXAM:    GENERAL: NAD, obese  HEAD:  Atraumatic, Normocephalic  EYES: EOMI, PERRLA, conjunctiva and sclera clear  ENMT: No tonsillar erythema, exudates  NECK: Supple, No JVD, tracheostomy  NERVOUS SYSTEM:  Alert & Oriented X3, Follows directions moving all extremities.  CHEST/LUNG: Diminished breath sounds bilateral bases  HEART: Regular rate and rhythm; No murmurs, rubs, or gallops  ABDOMEN: Soft, Nontender, Nondistended; Bowel sounds present  EXTREMITIES:  2+ Peripheral Pulses, No clubbing, cyanosis, or edema  LYMPH: No lymphadenopathy noted  SKIN: No rashes or lesions      LABS:  CBC Full  -  ( 12 Oct 2020 06:54 )  WBC Count : 5.77 K/uL  RBC Count : 2.73 M/uL  Hemoglobin : 8.3 g/dL  Hematocrit : 23.9 %  Platelet Count - Automated : 59 K/uL  Mean Cell Volume : 87.5 fl  Mean Cell Hemoglobin : 30.4 pg  Mean Cell Hemoglobin Concentration : 34.7 gm/dL  Auto Neutrophil # : x  Auto Lymphocyte # : x  Auto Monocyte # : x  Auto Eosinophil # : x  Auto Basophil # : x  Auto Neutrophil % : x  Auto Lymphocyte % : x  Auto Monocyte % : x  Auto Eosinophil % : x  Auto Basophil % : x    10-12    137  |  97  |  11  ----------------------------<  194<H>  3.7   |  36<H>  |  0.31<L>    Ca    8.0<L>      12 Oct 2020 06:54  Mg     1.8     10-11    TPro  5.1<L>  /  Alb  1.8<L>  /  TBili  0.8  /  DBili  x   /  AST  20  /  ALT  26  /  AlkPhos  125<H>  10-11              [  ]  DVT Prophylaxis  [  ]  Nutrition, Brand, Rate         Goal Rate        Abnormal Nutritional Status -  Malnutrition   Cachexia      Morbid Obesity BMI >/=40    RADIOLOGY & ADDITIONAL STUDIES:  ***    Goals of Care Discussion with Family/Proxy/Other   - see note from/family meeting set up for...     BARB COLÓN    SCU progress note    INTERVAL HPI/OVERNIGHT EVENTS: ***No overnight events.    DNR [ ]   DNI  [  ]  FULL CODE    Covid - 19 PCR: Negative 10/6    The 4Ms    What Matters Most: see GOC  Age appropriate Medications/Screen for High Risk Medication: Yes  Mentation: see CAM below  Mobility: defer to physical exam    The Confusion Assessment Method (CAM) Diagnostic Algorithm     1: Acute Onset or Fluctuating Course  - Is there evidence of an acute change in mental status from the patient’s baseline? Did the (abnormal) behavior  fluctuate during the day, that is, tend to come and go, or increase and decrease in severity?  [ ] YES [x ] NO     2: Inattention  - Did the patient have difficulty focusing attention, being easily distractible, or having difficulty keeping track of what was being said?  [ ] YES [x ] NO     3: Disorganized thinking  -Was the patient’s thinking disorganized or incoherent, such as rambling or irrelevant conversation, unclear or illogical flow of ideas, or unpredictable switching from subject to subject?  [ ] YES [x ] NO    4: Altered Level of consciousness?  [ ] YES [ x] NO    The diagnosis of delirium by CAM requires the presence of features 1 and 2 and either 3 or 4.    PRESSORS: [ ] YES [x ] NO  levoFLOXacin IVPB 750 milliGRAM(s) IV Intermittent every 24 hours  levoFLOXacin IVPB        Cardiovascular:  Heart Failure  Acute   Acute on Chronic  Chronic       carvedilol 6.25 milliGRAM(s) Oral every 12 hours    Pulmonary:  ALBUTerol    90 MICROgram(s) HFA Inhaler 2 Puff(s) Inhalation every 6 hours    Hematalogic:    Other:  acetaminophen    Suspension .. 650 milliGRAM(s) Oral every 4 hours PRN  atorvastatin 40 milliGRAM(s) Oral at bedtime  chlorhexidine 0.12% Liquid 15 milliLiter(s) Oral Mucosa every 12 hours  chlorhexidine 2% Cloths 1 Application(s) Topical <User Schedule>  dextrose 40% Gel 15 Gram(s) Oral once PRN  dextrose 5%. 1000 milliLiter(s) IV Continuous <Continuous>  dextrose 50% Injectable 12.5 Gram(s) IV Push once  dextrose 50% Injectable 25 Gram(s) IV Push once  dextrose 50% Injectable 25 Gram(s) IV Push once  ergocalciferol 45922 Unit(s) Oral <User Schedule>  gabapentin 600 milliGRAM(s) Oral every 8 hours  glucagon  Injectable 1 milliGRAM(s) IntraMuscular once PRN  influenza   Vaccine 0.5 milliLiter(s) IntraMuscular once  insulin glargine Injectable (LANTUS) 10 Unit(s) SubCutaneous at bedtime  insulin lispro (HumaLOG) corrective regimen sliding scale   SubCutaneous every 6 hours  insulin lispro Injectable (HumaLOG) 4 Unit(s) SubCutaneous every 6 hours  morphine  - Injectable 2 milliGRAM(s) IV Push every 4 hours PRN  oxycodone    5 mG/acetaminophen 325 mG 1 Tablet(s) Oral every 4 hours PRN  pantoprazole  Injectable 40 milliGRAM(s) IV Push daily  predniSONE   Tablet 20 milliGRAM(s) Oral daily  sodium chloride 0.9% lock flush 10 milliLiter(s) IV Push every 1 hour PRN    acetaminophen    Suspension .. 650 milliGRAM(s) Oral every 4 hours PRN  ALBUTerol    90 MICROgram(s) HFA Inhaler 2 Puff(s) Inhalation every 6 hours  atorvastatin 40 milliGRAM(s) Oral at bedtime  carvedilol 6.25 milliGRAM(s) Oral every 12 hours  chlorhexidine 0.12% Liquid 15 milliLiter(s) Oral Mucosa every 12 hours  chlorhexidine 2% Cloths 1 Application(s) Topical <User Schedule>  dextrose 40% Gel 15 Gram(s) Oral once PRN  dextrose 5%. 1000 milliLiter(s) IV Continuous <Continuous>  dextrose 50% Injectable 12.5 Gram(s) IV Push once  dextrose 50% Injectable 25 Gram(s) IV Push once  dextrose 50% Injectable 25 Gram(s) IV Push once  ergocalciferol 35621 Unit(s) Oral <User Schedule>  gabapentin 600 milliGRAM(s) Oral every 8 hours  glucagon  Injectable 1 milliGRAM(s) IntraMuscular once PRN  influenza   Vaccine 0.5 milliLiter(s) IntraMuscular once  insulin glargine Injectable (LANTUS) 10 Unit(s) SubCutaneous at bedtime  insulin lispro (HumaLOG) corrective regimen sliding scale   SubCutaneous every 6 hours  insulin lispro Injectable (HumaLOG) 4 Unit(s) SubCutaneous every 6 hours  levoFLOXacin IVPB 750 milliGRAM(s) IV Intermittent every 24 hours  levoFLOXacin IVPB      morphine  - Injectable 2 milliGRAM(s) IV Push every 4 hours PRN  oxycodone    5 mG/acetaminophen 325 mG 1 Tablet(s) Oral every 4 hours PRN  pantoprazole  Injectable 40 milliGRAM(s) IV Push daily  predniSONE   Tablet 20 milliGRAM(s) Oral daily  sodium chloride 0.9% lock flush 10 milliLiter(s) IV Push every 1 hour PRN    Drug Dosing Weight  Height (cm): 154.9 (19 Sep 2020 08:00)  Weight (kg): 84.8 (21 Sep 2020 09:15)  BMI (kg/m2): 35.3 (21 Sep 2020 09:15)  BSA (m2): 1.84 (21 Sep 2020 09:15)    CENTRAL LINE: [ ] YES [x ] NO  LOCATION:   DATE INSERTED:  REMOVE: [ ] YES [ ] NO  EXPLAIN:    ROBLEDO: [ ] YES [x ] NO    DATE INSERTED:  REMOVE:  [ ] YES [ ] NO  EXPLAIN:    PAST MEDICAL & SURGICAL HISTORY:  Malignant neoplasm of unspecified part of right bronchus or lung    HTN (hypertension)    DM (diabetes mellitus)    COPD (chronic obstructive pulmonary disease)    Lung cancer    Morbid obesity    GERD (gastroesophageal reflux disease)    Deviated septum    Prolapsed uterus    ITP (idiopathic thrombocytopenic purpura)    Emphysema/COPD    History of cholecystectomy    S/P lobectomy of lung  right 2017    History of tonsillectomy                10-11 @ 07:01  -  10-12 @ 07:00  --------------------------------------------------------  IN: 0 mL / OUT: 1175 mL / NET: -1175 mL        Mode: AC/ CMV (Assist Control/ Continuous Mandatory Ventilation)  RR (machine): 16  TV (machine): 400  FiO2: 40  PEEP: 5  ITime: 1  MAP: 11  PIP: 23      PHYSICAL EXAM:    GENERAL: NAD, obese  HEAD:  Atraumatic, Normocephalic  EYES: EOMI, PERRLA, conjunctiva and sclera clear  ENMT: No tonsillar erythema, exudates  NECK: Supple, No JVD, tracheostomy  NERVOUS SYSTEM:  Alert & Oriented X3, Follows directions moving all extremities.  CHEST/LUNG: Diminished breath sounds bilateral bases  HEART: Regular rate and rhythm; No murmurs, rubs, or gallops  ABDOMEN: Soft, Nontender, Nondistended; Bowel sounds present; Peg intact  EXTREMITIES:  2+ Peripheral Pulses, No clubbing, cyanosis, or edema  LYMPH: No lymphadenopathy noted  SKIN: No rashes or lesions      LABS:  CBC Full  -  ( 12 Oct 2020 06:54 )  WBC Count : 5.77 K/uL  RBC Count : 2.73 M/uL  Hemoglobin : 8.3 g/dL  Hematocrit : 23.9 %  Platelet Count - Automated : 59 K/uL  Mean Cell Volume : 87.5 fl  Mean Cell Hemoglobin : 30.4 pg  Mean Cell Hemoglobin Concentration : 34.7 gm/dL  Auto Neutrophil # : x  Auto Lymphocyte # : x  Auto Monocyte # : x  Auto Eosinophil # : x  Auto Basophil # : x  Auto Neutrophil % : x  Auto Lymphocyte % : x  Auto Monocyte % : x  Auto Eosinophil % : x  Auto Basophil % : x    10-12    137  |  97  |  11  ----------------------------<  194<H>  3.7   |  36<H>  |  0.31<L>    Ca    8.0<L>      12 Oct 2020 06:54  Mg     1.8     10-11    TPro  5.1<L>  /  Alb  1.8<L>  /  TBili  0.8  /  DBili  x   /  AST  20  /  ALT  26  /  AlkPhos  125<H>  10-11              [  ]  DVT Prophylaxis  [  ]  Nutrition, Brand, Rate         Goal Rate        Abnormal Nutritional Status -  Malnutrition   Cachexia      Morbid Obesity BMI >/=40    RADIOLOGY & ADDITIONAL STUDIES:  ***    Goals of Care Discussion with Family/Proxy/Other   - see note from/family meeting set up for...

## 2020-10-12 NOTE — PROGRESS NOTE ADULT - SUBJECTIVE AND OBJECTIVE BOX
65y female is under our care for bacteremia with serratia marcescens. Patient is more awake today and able to nod her head to some questions. Currently only c/o some right foot discomfort. Had low grade fever of 100.4F yesterday morning, but no reoccurrence for over 24 hours.  Repeat BC remain negative.    REVIEW OF SYSTEMS:  [  ] Not able to illicit  General: no fevers no malaise   Chest: no cough no sob no CP  GI: no nvd no abdominal pain  Skin: no rashes no cyanosis  Musculoskeletal: no trauma no LBP +foot pain  Neuro: no ha's no dizziness     MEDS:  levoFLOXacin IVPB 750 milliGRAM(s) IV Intermittent every 24 hours  predniSONE   Tablet 10 milliGRAM(s) Oral daily      ALLERGIES: Allergies    fish (Angioedema)  penicillins (Rash)    Intolerances      VITALS:  Vital Signs Last 24 Hrs  T(C): 36.7 (12 Oct 2020 11:27), Max: 37.3 (11 Oct 2020 20:10)  T(F): 98.1 (12 Oct 2020 11:27), Max: 99.1 (11 Oct 2020 20:10)  HR: 104 (12 Oct 2020 11:27) (102 - 123)  BP: 104/60 (12 Oct 2020 11:27) (104/60 - 152/78)  BP(mean): --  RR: 16 (12 Oct 2020 11:27) (16 - 21)  SpO2: 100% (12 Oct 2020 11:27) (99% - 100%)      PHYSICAL EXAM:  HEENT: n/a  Neck: supple no LN's, +vent via trach  Respiratory: R>L rhoncherous sounds no rales or wheezing  Cardiovascular: S1 S2 tachy no murmurs  Gastrointestinal: +BS with soft, nondistended abdomen; nontender  +peg +benitez  Extremities: no edema  Skin: no rashes  Ortho: n/a  Neuro: awake and adequately alert      LABS/DIAGNOSTIC TESTS:                         8.3    5.77  )-----------( 59       ( 12 Oct 2020 06:54 )             23.9   WBC Count: 5.77 K/uL (10-12 @ 06:54)  WBC Count: 4.70 K/uL (10-11 @ 07:38)  WBC Count: 4.33 K/uL (10-10 @ 06:40)  WBC Count: 3.30 K/uL (10-09 @ 15:09)  WBC Count: 2.59 K/uL (10-09 @ 03:48)    10-12    137  |  97  |  11  ----------------------------<  194<H>  3.7   |  36<H>  |  0.31<L>    Ca    8.0<L>      12 Oct 2020 06:54  Mg     1.8     10-11    TPro  5.1<L>  /  Alb  1.8<L>  /  TBili  0.8  /  DBili  x   /  AST  20  /  ALT  26  /  AlkPhos  125<H>  10-11          CULTURES:   .Blood Blood  10-06 @ 10:10   Growth in aerobic bottle: Serratia marcescens  See previous culture [id number]  --  Blood Culture PCR  Serratia marcescens      .Blood Blood  10-04 @ 11:02   No growth to date.  --  --      .Blood Blood  09-27 @ 12:06   No Growth Final  --  --      .Blood Blood  09-23 @ 10:33   No Growth Final  --  --      .Blood Blood  09-21 @ 14:47   No Growth Final  --  --      .Urine Clean Catch (Midstream)  09-19 @ 16:20   <10,000 CFU/mL Normal Urogenital Shannan  --  --      .Blood Blood-Peripheral  09-19 @ 11:48   Growth in anaerobic bottle: Enterococcus faecium (vancomycin resistant)  "Due to technical problems, Proteus sp. will Not be reported as part of  the BCID panel until further notice"      .Urine Clean Catch (Midstream)  08-02 @ 14:58   50,000 - 99,000 CFU/mL Coag Negative Staphylococcus  "Susceptibilities not performed"  --  --      .Blood Blood  08-02 @ 11:14   No Growth Final  --  --        RADIOLOGY:  no new studies

## 2020-10-12 NOTE — PROGRESS NOTE ADULT - ASSESSMENT
Bacteremia with serratia marcescens - repeat BC are negative  Low grade fever - no reoccurrence   ·	cont levaquin 750mg IV daily, give for 7 days more  ·	reconsult prn

## 2020-10-12 NOTE — PROGRESS NOTE ADULT - PROBLEM SELECTOR PLAN 2
ECO. Patient with primary R lung Ca with bone mets; comorbid end stage COPD. CT previously indicated osseous metastasis and associated compression fractures and rib fractures unchanged. Patient dependent on O2; intubated x 2 during this hospitalization. s/p trach/peg 10/8. Overall, poor prognosis. Full code.

## 2020-10-12 NOTE — PROGRESS NOTE ADULT - PROBLEM SELECTOR PLAN 3
2/2 ES COPD, metastatic lung Ca. Patient dependent 5L O2, with increasing debility/weakness and dyspnea reported at rest prior to hospitalization. Patient intubated x 2 during this hospitalization. s/p trach/peg 10/8 - not a candidate for weaning. Patient eligible for hospice but wishes for aggressive measures. for VY. Patient requires 24 hr care.

## 2020-10-12 NOTE — PROGRESS NOTE ADULT - SUBJECTIVE AND OBJECTIVE BOX
OVERNIGHT EVENTS: s/p trach/peg 10/8    Present Symptoms:   Review of Systems: Patient with no c/o at time of exam, states she's "okay."     MEDICATIONS  (STANDING):  ALBUTerol    90 MICROgram(s) HFA Inhaler 2 Puff(s) Inhalation every 6 hours  atorvastatin 40 milliGRAM(s) Oral at bedtime  carvedilol 6.25 milliGRAM(s) Oral every 12 hours  chlorhexidine 0.12% Liquid 15 milliLiter(s) Oral Mucosa every 12 hours  chlorhexidine 2% Cloths 1 Application(s) Topical <User Schedule>  dextrose 5%. 1000 milliLiter(s) (50 mL/Hr) IV Continuous <Continuous>  dextrose 50% Injectable 12.5 Gram(s) IV Push once  dextrose 50% Injectable 25 Gram(s) IV Push once  dextrose 50% Injectable 25 Gram(s) IV Push once  ergocalciferol 79950 Unit(s) Oral <User Schedule>  gabapentin 600 milliGRAM(s) Oral every 8 hours  influenza   Vaccine 0.5 milliLiter(s) IntraMuscular once  insulin glargine Injectable (LANTUS) 10 Unit(s) SubCutaneous at bedtime  insulin lispro (HumaLOG) corrective regimen sliding scale   SubCutaneous every 6 hours  insulin lispro Injectable (HumaLOG) 3 Unit(s) SubCutaneous every 6 hours  levoFLOXacin IVPB 750 milliGRAM(s) IV Intermittent every 24 hours  levoFLOXacin IVPB      pantoprazole  Injectable 40 milliGRAM(s) IV Push daily  predniSONE   Tablet 20 milliGRAM(s) Oral daily    MEDICATIONS  (PRN):  acetaminophen    Suspension .. 650 milliGRAM(s) Oral every 4 hours PRN Mild Pain (1 - 3)  dextrose 40% Gel 15 Gram(s) Oral once PRN Blood Glucose LESS THAN 70 milliGRAM(s)/deciliter  glucagon  Injectable 1 milliGRAM(s) IntraMuscular once PRN Glucose LESS THAN 70 milligrams/deciliter  morphine  - Injectable 2 milliGRAM(s) IV Push every 4 hours PRN Severe Pain (7 - 10)  oxycodone    5 mG/acetaminophen 325 mG 1 Tablet(s) Oral every 4 hours PRN Moderate Pain (4 - 6)  sodium chloride 0.9% lock flush 10 milliLiter(s) IV Push every 1 hour PRN Pre/post blood products, medications, blood draw, and to maintain line patency      PHYSICAL EXAM:  Vital Signs Last 24 Hrs  T(C): 36.7 (12 Oct 2020 11:27), Max: 37.3 (11 Oct 2020 20:10)  T(F): 98.1 (12 Oct 2020 11:27), Max: 99.1 (11 Oct 2020 20:10)  HR: 104 (12 Oct 2020 11:27) (102 - 123)  BP: 104/60 (12 Oct 2020 11:27) (104/60 - 152/78)  BP(mean): --  RR: 16 (12 Oct 2020 11:27) (16 - 21)  SpO2: 100% (12 Oct 2020 11:27) (99% - 100%)  General: patient awake, verbal, follows commands. + trach/peg. No signs of distress.    Karnofsky Performance Score/Palliative Performance Status Version2:    20%    HEENT: + trach  Lungs: comfortable, + trach. No signs of respiratory distress.    CV: S1S2, IRR. + tachycardia  GI: abdomen nontender, + incontinent, +PEG  :  benitez  Musculoskeletal: patient awake, verbal, follows commands, dependent on ADLs  Skin: L buttock stage 2  Neuro:  patient awake, verbal, follows commands,   Oral intake ability: unable/only mouth care   Diet: NPO, TF via PEG    LABS:                          8.3    5.77  )-----------( 59       ( 12 Oct 2020 06:54 )             23.9     10-12    137  |  97  |  11  ----------------------------<  194<H>  3.7   |  36<H>  |  0.31<L>    Ca    8.0<L>      12 Oct 2020 06:54  Mg     1.8     10-11    TPro  5.1<L>  /  Alb  1.8<L>  /  TBili  0.8  /  DBili  x   /  AST  20  /  ALT  26  /  AlkPhos  125<H>  10-11        RADIOLOGY & ADDITIONAL STUDIES: reviewed    ADVANCE DIRECTIVES: HCP and MOL[facility] forms previously completed as per patient's wishes: CPR / long term ventilation / long term feeding tube / trial IV fluids / use abx / NO LIMITATION on medical interventions/ send to hospital.

## 2020-10-12 NOTE — PROGRESS NOTE ADULT - PROBLEM SELECTOR PLAN 1
Secondary to end stage COPD and lung cancer.  Continue mechanical ventilation. Not a candidate for weaning,  Continue prednisone with slow taper. One more day of 20mg followed by 10mg daily,  Continue bronchodilators and ICS.

## 2020-10-12 NOTE — PROGRESS NOTE ADULT - SUBJECTIVE AND OBJECTIVE BOX
on trach and vent  arousable but lethargic    MEDICATIONS  (STANDING):  ALBUTerol    90 MICROgram(s) HFA Inhaler 2 Puff(s) Inhalation every 6 hours  atorvastatin 40 milliGRAM(s) Oral at bedtime  carvedilol 6.25 milliGRAM(s) Oral every 12 hours  chlorhexidine 0.12% Liquid 15 milliLiter(s) Oral Mucosa every 12 hours  chlorhexidine 2% Cloths 1 Application(s) Topical <User Schedule>  dextrose 5%. 1000 milliLiter(s) (50 mL/Hr) IV Continuous <Continuous>  dextrose 50% Injectable 12.5 Gram(s) IV Push once  dextrose 50% Injectable 25 Gram(s) IV Push once  dextrose 50% Injectable 25 Gram(s) IV Push once  ergocalciferol 26582 Unit(s) Oral <User Schedule>  gabapentin 600 milliGRAM(s) Oral every 8 hours  influenza   Vaccine 0.5 milliLiter(s) IntraMuscular once  insulin glargine Injectable (LANTUS) 10 Unit(s) SubCutaneous at bedtime  insulin lispro (HumaLOG) corrective regimen sliding scale   SubCutaneous every 6 hours  insulin lispro Injectable (HumaLOG) 3 Unit(s) SubCutaneous every 6 hours  levoFLOXacin IVPB 750 milliGRAM(s) IV Intermittent every 24 hours  levoFLOXacin IVPB      pantoprazole  Injectable 40 milliGRAM(s) IV Push daily  predniSONE   Tablet 20 milliGRAM(s) Oral daily    MEDICATIONS  (PRN):  acetaminophen    Suspension .. 650 milliGRAM(s) Oral every 4 hours PRN Mild Pain (1 - 3)  dextrose 40% Gel 15 Gram(s) Oral once PRN Blood Glucose LESS THAN 70 milliGRAM(s)/deciliter  glucagon  Injectable 1 milliGRAM(s) IntraMuscular once PRN Glucose LESS THAN 70 milligrams/deciliter  morphine  - Injectable 2 milliGRAM(s) IV Push every 4 hours PRN Severe Pain (7 - 10)  oxycodone    5 mG/acetaminophen 325 mG 1 Tablet(s) Oral every 4 hours PRN Moderate Pain (4 - 6)  sodium chloride 0.9% lock flush 10 milliLiter(s) IV Push every 1 hour PRN Pre/post blood products, medications, blood draw, and to maintain line patency      Allergies    fish (Angioedema)  penicillins (Rash)    Intolerances        Vital Signs Last 24 Hrs  T(C): 36.7 (12 Oct 2020 11:27), Max: 37.3 (11 Oct 2020 20:10)  T(F): 98.1 (12 Oct 2020 11:27), Max: 99.1 (11 Oct 2020 20:10)  HR: 104 (12 Oct 2020 11:27) (102 - 123)  BP: 104/60 (12 Oct 2020 11:27) (104/60 - 152/78)  BP(mean): --  RR: 16 (12 Oct 2020 11:27) (16 - 21)  SpO2: 100% (12 Oct 2020 11:27) (99% - 100%)    PHYSICAL EXAM  General: adult in NAD  HEENT: clear oropharynx, anicteric sclera, pink conjunctiva  Neck: supple  CV: normal S1/S2 with no murmur rubs or gallops  Lungs: positive air movement b/l ant lungs,clear to auscultation, no wheezes, no rales  Abdomen: soft non-tender non-distended, no hepatosplenomegaly  Ext: no clubbing cyanosis or edema  Skin: no rashes and no petechiae  Neuro: alert and oriented X 4, no focal deficits  LABS:                          8.3    5.77  )-----------( 59       ( 12 Oct 2020 06:54 )             23.9         Mean Cell Volume : 87.5 fl  Mean Cell Hemoglobin : 30.4 pg  Mean Cell Hemoglobin Concentration : 34.7 gm/dL  Auto Neutrophil # : x  Auto Lymphocyte # : x  Auto Monocyte # : x  Auto Eosinophil # : x  Auto Basophil # : x  Auto Neutrophil % : x  Auto Lymphocyte % : x  Auto Monocyte % : x  Auto Eosinophil % : x  Auto Basophil % : x    Serial CBC  Hematocrit 23.9  Hemoglobin 8.3  Plat 59  RBC 2.73  WBC 5.77  Serial CBC  Hematocrit 26.6  Hemoglobin 9.1  Plat 33  RBC 3.02  WBC 4.70  Serial CBC  Hematocrit 28.5  Hemoglobin 10.2  Plat 37  RBC 3.35  WBC 4.33  Serial CBC  Hematocrit 27.4  Hemoglobin 9.5  Plat 37  RBC 3.18  WBC 3.30  Serial CBC  Hematocrit 24.3  Hemoglobin 8.4  Plat 46  RBC 2.83  WBC 2.59  Serial CBC  Hematocrit 23.8  Hemoglobin 8.1  Plat 56  RBC 2.73  WBC 3.12    10-12    137  |  97  |  11  ----------------------------<  194<H>  3.7   |  36<H>  |  0.31<L>    Ca    8.0<L>      12 Oct 2020 06:54  Mg     1.8     10-11    TPro  5.1<L>  /  Alb  1.8<L>  /  TBili  0.8  /  DBili  x   /  AST  20  /  ALT  26  /  AlkPhos  125<H>  10-11          Reticulocyte Percent: 1.6 % (10-07 @ 21:03)            BLOOD SMEAR INTERPRETATION:       RADIOLOGY & ADDITIONAL STUDIES:

## 2020-10-12 NOTE — PROGRESS NOTE ADULT - PROBLEM SELECTOR PLAN 1
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 Patient utilized 5L O2 at baseline 24/7 with increasing weakness/dyspnea at rest. Patient intubated x 2 during this hospitalization. s/p trach/peg 10/8. Not a candidate for weaning. Continues IV abx, prednisone, albuterol. Overall, poor prognosis. For VY. Patient previously identified daughter/Gavi as HCP; MOLST previously completed as per patient's wishes: FULL CODE.

## 2020-10-12 NOTE — PROGRESS NOTE ADULT - ASSESSMENT
COPD with acute asthmatic bronchitis/ RLL pneumonia with  acute  Respiratory Failure with Trach and PEG   Recurrent Lung cancer with Bone Mets   IDDM   Htn with MR   Thrombocytopenia  and Anemia   Obesity with CRIS   serratia  bacteremia     Plan-  IV levaquin x 2 weeks and then d/c  /CMV12/ 35% with Peep5 cm alternating with cpap day time  Venodynes   FS coverage   D/C to vent facility

## 2020-10-12 NOTE — PROGRESS NOTE ADULT - PROBLEM SELECTOR PLAN 10
DVT and GI prophylaxis DVT and GI prophylaxis.  Continue mechanical ventilation. Not a candidate for weaning.  Will need Vent facility.  Overall prognosis is poor.

## 2020-10-12 NOTE — PROGRESS NOTE ADULT - PROBLEM SELECTOR PROBLEM 10
Prophylactic measure
Prophylactic measure
Advance care planning
Prophylactic measure

## 2020-10-12 NOTE — PROGRESS NOTE ADULT - ASSESSMENT
64 yo obese F with hx of 80 pack year tobacco use, HTN, DM2 on insulin, COPD on 5L (frequent admissions), R lung cancer s/p resection (2017) on chemo q 3 weeks with concern for mets to spine and compression fractures, recent admission for COPD exacerbation earlier in August who on 9/19/2020 presented to ED with c/o SOB, hypoxia and increased sputum production x 1 day. Admitted to ICU x twice for hypoxemic respiratory distress requiring intubation (9/21 and 10/5).   10/8  Tracheostomy and Peg placement  10/9 Transferred back to SCU

## 2020-10-12 NOTE — PROGRESS NOTE ADULT - ASSESSMENT
· Assessment	  65 year old lady with severe COPD and lung ca with mets to bones which causing compression Fx.  She had one RT for that.  she was admitted for dyspnea and hypoxia.    Problem/Recommendation - 1:  Problem: Lung cancer. Recommendation: with mets to bones  on chemo keytruda, alimta and carboplatin  she had one RT to spine.  the tumor markers have been trending down with chemo    Problem/Recommendation - 2:  ·  Problem: COPD (chronic obstructive pulmonary disease).  Recommendation: required freq steroids.     Problem/Recommendation - 3:  ·  Problem: Acute on chronic respiratory failure with hypoxia.  Recommendation: ?aspiration causing resp failure requiring intubation  she has pain at throat for a while and has difficulty swallowing, this got better  4?strider   intubation again  trach and peg are done  will discuss with family

## 2020-10-12 NOTE — PROGRESS NOTE ADULT - ATTENDING COMMENTS
Problem/Plan - 1:  ·  Problem: Acute on chronic respiratory failure with hypoxia and hypercapnia.  Plan: Secondary to end stage COPD and lung cancer.  Continue mechanical ventilation. Not a candidate for weaning,  Continue prednisone with slow taper. One more day of 20mg followed by 10mg daily,  Continue bronchodilators and ICS.   -s/p trach and peg  -poor candidate for liberation from vent   -for placement to vent facility     Problem/Plan - 2:  ·  Problem: Sepsis.  Plan: Continue antibiotics as per ID  Blood cultures serratia marcescens. Repeat cultures on 10/8 Negative  Will need levaquin thru 10/22.      Problem/Plan - 3:  ·  Problem: VRE bacteremia.  Plan: Antibiotics completed for VRE.

## 2020-10-12 NOTE — PROGRESS NOTE ADULT - PROBLEM SELECTOR PLAN 4
Continue mechanical ventilation. Not a candidate for weaning.  Continue bronchodilators and ICS  Continue Prednisone as above.

## 2020-10-12 NOTE — PROGRESS NOTE ADULT - PROBLEM SELECTOR PLAN 4
Patient previously identified daughter/Gavi as primary HCP and partner/Williams as alternate agent. MOLST form previously completed as per patient's wishes: CPR / long term ventilation / long term feeding tube / trial IV fluids / use abx / NO LIMITATION on medical interventions. Overall, patient with poor prognosis. For VY.

## 2020-10-12 NOTE — PROGRESS NOTE ADULT - SUBJECTIVE AND OBJECTIVE BOX
PULMONARY  progress note    BARB COLÓN  MRN-[id number]    Patient is a 65y old  Female who presents with a chief complaint of shortness of breath (11 Oct 2020 11:15)  Pt on CMV16//40% with PP 27      MEDICATIONS  (STANDING):  ALBUTerol    90 MICROgram(s) HFA Inhaler 2 Puff(s) Inhalation every 6 hours  atorvastatin 40 milliGRAM(s) Oral at bedtime  carvedilol 6.25 milliGRAM(s) Oral every 12 hours  chlorhexidine 0.12% Liquid 15 milliLiter(s) Oral Mucosa every 12 hours  chlorhexidine 2% Cloths 1 Application(s) Topical <User Schedule>  dextrose 50% Injectable 25 Gram(s) IV Push once  ergocalciferol 60097 Unit(s) Oral <User Schedule>  gabapentin 600 milliGRAM(s) Oral every 8 hours  influenza   Vaccine 0.5 milliLiter(s) IntraMuscular once  insulin glargine Injectable (LANTUS) 10 Unit(s) SubCutaneous at bedtime  insulin lispro (HumaLOG) corrective regimen sliding scale   SubCutaneous every 6 hours  insulin lispro Injectable (HumaLOG) 4 Unit(s) SubCutaneous every 6 hours  levoFLOXacin IVPB 750 milliGRAM(s) IV Intermittent every 24 hours  levoFLOXacin IVPB      pantoprazole  Injectable 40 milliGRAM(s) IV Push daily  predniSONE   Tablet 20 milliGRAM(s) Oral daily      MEDICATIONS  (PRN):  acetaminophen    Suspension .. 650 milliGRAM(s) Oral every 4 hours PRN Mild Pain (1 - 3)  dextrose 40% Gel 15 Gram(s) Oral once PRN Blood Glucose LESS THAN 70 milliGRAM(s)/deciliter  glucagon  Injectable 1 milliGRAM(s) IntraMuscular once PRN Glucose LESS THAN 70 milligrams/deciliter  morphine  - Injectable 2 milliGRAM(s) IV Push every 4 hours PRN Severe Pain (7 - 10)  oxycodone    5 mG/acetaminophen 325 mG 1 Tablet(s) Oral every 4 hours PRN Moderate Pain (4 - 6)  sodium chloride 0.9% lock flush 10 milliLiter(s) IV Push every 1 hour PRN Pre/post blood products, medications, blood draw, and to maintain line patency      Allergies    fish (Angioedema)  penicillins (Rash)            PAST MEDICAL & SURGICAL HISTORY:  Malignant neoplasm of unspecified part of right bronchus or lung    HTN (hypertension)    DM (diabetes mellitus)    COPD (chronic obstructive pulmonary disease)    Lung cancer    Morbid obesity    GERD (gastroesophageal reflux disease)    Deviated septum    Prolapsed uterus    ITP (idiopathic thrombocytopenic purpura)    Emphysema/COPD    History of cholecystectomy    S/P lobectomy of lung  right 2017    History of tonsillectomy             REVIEW OF SYSTEMS:  CONSTITUTIONAL: No fever, weight loss  but  fatigue+   EYES: No eye pain, visual disturbances, or discharge  ENT:  No difficulty hearing, tinnitus, vertigo; No sinus or throat pain  NECK: No pain or stiffness or nodes  RESPIRATORY:  cough--   wheezing--   chills--   hemoptysis--  Shortness of Breath--  CARDIOVASCULAR: No chest pain, palpitations, passing out, dizziness, or leg swelling  GASTROINTESTINAL: No abdominal or epigastric pain. No nausea, vomiting, or hematemesis; No diarrhea or constipation. No melena or hematochezia.  GENITOURINARY: No dysuria, frequency, hematuria, or incontinence  NEUROLOGICAL: No headaches, memory loss, loss of strength, numbness, or tremors  SKIN: No itching, burning, rashes, or lesions   HEME/LYMPH: No easy bruising, or bleeding gums  ALLERGY AND IMMUNOLOGIC: No hives or eczema        Vital Signs Last 24 Hrs  T(C): 36.8 (12 Oct 2020 05:14), Max: 37.3 (11 Oct 2020 20:10)  T(F): 98.3 (12 Oct 2020 05:14), Max: 99.1 (11 Oct 2020 20:10)  HR: 123 (12 Oct 2020 05:14) (102 - 123)  BP: 135/61 (12 Oct 2020 05:14) (123/71 - 152/78)  BP(mean): --  RR: 16 (12 Oct 2020 05:14) (16 - 21)  SpO2: 99% (12 Oct 2020 05:14) (99% - 100%)  I&O's Detail    11 Oct 2020 07:01  -  12 Oct 2020 07:00  --------------------------------------------------------  IN:  Total IN: 0 mL    OUT:    Indwelling Catheter - Urethral (mL): 1175 mL  Total OUT: 1175 mL    Total NET: -1175 mL          PHYSICAL EXAMINATION:    GENERAL: The patient is a well-developed,  obese H/F on vent in no apparent distress.   SKIN no rash ecchymoses or bruises  HEENT: Head is normocephalic and atraumatic  SHAMA , Extraocular muscles are intact. Mucous membranes  moist.   Neck supple ,No LN felt JVP not increased  Thyroid not enlarged, Trach in place  Cardiovascular:  S1 S2 heard, RSR, No JVD , systolic  murmur at apex, No gallop or rub  Respiratory: Chest wall symmetrical with good air entry ,Percussion note normal,    Lungs vesicular breathing with  basilar  rales , no   wheeze	  ABDOMEN:  Soft, Non-tender, obese, G/T in place, no hepatomegaly or splenomegaly BS positive	  Extremities: Normal range of motion, No clubbing, cyanosis or edema  Vascular: Peripheral pulses palpable 2+ bilaterally  CNS:  Alert and oriented x 3   Cranial nerves intact  sensory intact  motor power5/5  dtr 2+   Babinski neg    LABS:                        8.3    5.77  )-----------( x        ( 12 Oct 2020 06:54 )             23.9     10-12    137  |  97  |  11  ----------------------------<  194<H>  3.7   |  36<H>  |  0.31<L>    Ca    8.0<L>      12 Oct 2020 06:54  Mg     1.8     10-11    TPro  5.1<L>  /  Alb  1.8<L>  /  TBili  0.8  /  DBili  x   /  AST  20  /  ALT  26  /  AlkPhos  125<H>  10-11                       TSH      MICROBIOLOGY:    Culture - Acid Fast - Bronchial w/Smear (collected 10-08-20 @ 22:38)  Source: Bronch Wash Other, bronchial  Preliminary Report (10-10-20 @ 15:04):    Culture is being performed.    Culture - Fungal, Bronchial (collected 10-08-20 @ 22:37)  Source: .Bronchial Other, bronchial  Preliminary Report (10-09-20 @ 08:30):    Testing in progress    Culture - Bronchial (collected 10-08-20 @ 22:37)  Source: .Bronchial bronchial  Gram Stain (10-09-20 @ 07:53):    Moderate polymorphonuclear leukocytes seen per low power field    No Squamous epithelial cells seen per low power field    No organisms seen per oil power field  Final Report (10-10-20 @ 17:55):    Normal Respiratory Shannan present    Culture - Blood (collected 10-08-20 @ 06:59)  Source: .Blood Blood  Preliminary Report (10-09-20 @ 07:02):    No growth to date.    Culture - Blood (collected 10-08-20 @ 06:59)  Source: .Blood Blood  Preliminary Report (10-09-20 @ 07:02):    No growth to date.    Culture - Blood (collected 10-06-20 @ 10:10)  Source: .Blood Blood  Gram Stain (10-08-20 @ 12:16):    Growth in anaerobic bottle: Gram Negative Rods    Growth in aerobic bottle: Gram Negative Rods  Final Report (10-09-20 @ 08:44):    Growth in aerobic and anaerobic bottles: Serratia marcescens    "Due to technical problems, Proteus sp. will Not be reported as part of    the BCID panel until further notice"    ***Blood Panel PCR results on this specimen are available    approximately 3 hours after the Gram stain result.***    Gram stain, PCR, and/or culture results may not always    correspond due to difference in methodologies.    ************************************************************    This PCR assay was performed using [employer].    The following targets are tested for: Enterococcus,    vancomycin resistant enterococci, Listeria monocytogenes,    coagulase negative staphylococci, S. aureus,    methicillin resistant S. aureus, Streptococcus agalactiae    (Group B), S. pneumoniae,S. pyogenes (Group A),    Acinetobacter baumannii, Enterobacter cloacae, E. coli,    Klebsiella oxytoca, K. pneumoniae, Proteus sp.,    Serratia marcescens, Haemophilus influenzae,    Neisseria meningitidis, Pseudomonas aeruginosa, Candida    albicans, C. glabrata, C krusei, C parapsilosis,    C. tropicalis and the KPC resistance gene.    "For positive Serratia,  results cross-reactivity has been occasionally    observed with some species of Pseudomonas and Pantoea"  Organism: Blood Culture PCR  Serratia marcescens (10-09-20 @ 08:44)  Organism: Serratia marcescens (10-09-20 @ 08:44)      -  Amikacin: S <=16      -  Ampicillin: R <=8 These ampicillin results predict results for amoxicillin      -  Ampicillin/Sulbactam: R <=4/2 Enterobacter, Citrobacter, and Serratia may develop resistance during prolonged therapy (3-4 days)      -  Aztreonam: S <=4      -  Cefazolin: R >16 Enterobacter, Citrobacter, and Serratia may develop resistance during prolonged therapy (3-4 days)      -  Cefepime: S <=2      -  Cefoxitin: R 16      -  Ceftriaxone: S <=1 Enterobacter, Citrobacter, and Serratia may develop resistance during prolonged therapy      -  Ciprofloxacin: S <=0.25      -  Ertapenem: S <=0.5      -  Gentamicin: S <=2      -  Levofloxacin: S <=0.5      -  Meropenem: S <=1      -  Piperacillin/Tazobactam: S <=8      -  Tobramycin: S 4      -  Trimethoprim/Sulfamethoxazole: S <=0.5/9.5      Method Type: DALE  Organism: Blood Culture PCR (10-09-20 @ 08:44)      -  Serratia marcescens: Detec      Method Type: PCR    Culture - Blood (collected 10-06-20 @ 10:10)  Source: .Blood Blood  Gram Stain (10-07-20 @ 15:14):    Growth in aerobic bottle: Gram Negative Rods  Final Report (10-08-20 @ 14:28):    Growth in aerobic bottle: Serratia marcescens    See previous culture [id number]

## 2020-10-13 LAB
ALBUMIN SERPL ELPH-MCNC: 2 G/DL — LOW (ref 3.5–5)
ALP SERPL-CCNC: 140 U/L — HIGH (ref 40–120)
ALT FLD-CCNC: 26 U/L DA — SIGNIFICANT CHANGE UP (ref 10–60)
ANION GAP SERPL CALC-SCNC: 4 MMOL/L — LOW (ref 5–17)
AST SERPL-CCNC: 31 U/L — SIGNIFICANT CHANGE UP (ref 10–40)
BILIRUB SERPL-MCNC: 0.7 MG/DL — SIGNIFICANT CHANGE UP (ref 0.2–1.2)
BUN SERPL-MCNC: 10 MG/DL — SIGNIFICANT CHANGE UP (ref 7–18)
CALCIUM SERPL-MCNC: 8.1 MG/DL — LOW (ref 8.4–10.5)
CHLORIDE SERPL-SCNC: 98 MMOL/L — SIGNIFICANT CHANGE UP (ref 96–108)
CO2 SERPL-SCNC: 35 MMOL/L — HIGH (ref 22–31)
CREAT SERPL-MCNC: 0.31 MG/DL — LOW (ref 0.5–1.3)
CULTURE RESULTS: SIGNIFICANT CHANGE UP
CULTURE RESULTS: SIGNIFICANT CHANGE UP
GLUCOSE BLDC GLUCOMTR-MCNC: 105 MG/DL — HIGH (ref 70–99)
GLUCOSE BLDC GLUCOMTR-MCNC: 113 MG/DL — HIGH (ref 70–99)
GLUCOSE BLDC GLUCOMTR-MCNC: 141 MG/DL — HIGH (ref 70–99)
GLUCOSE BLDC GLUCOMTR-MCNC: 216 MG/DL — HIGH (ref 70–99)
GLUCOSE BLDC GLUCOMTR-MCNC: 237 MG/DL — HIGH (ref 70–99)
GLUCOSE SERPL-MCNC: 122 MG/DL — HIGH (ref 70–99)
HCT VFR BLD CALC: 27.3 % — LOW (ref 34.5–45)
HGB BLD-MCNC: 9 G/DL — LOW (ref 11.5–15.5)
MAGNESIUM SERPL-MCNC: 1.8 MG/DL — SIGNIFICANT CHANGE UP (ref 1.6–2.6)
MCHC RBC-ENTMCNC: 29.5 PG — SIGNIFICANT CHANGE UP (ref 27–34)
MCHC RBC-ENTMCNC: 33 GM/DL — SIGNIFICANT CHANGE UP (ref 32–36)
MCV RBC AUTO: 89.5 FL — SIGNIFICANT CHANGE UP (ref 80–100)
NRBC # BLD: 0 /100 WBCS — SIGNIFICANT CHANGE UP (ref 0–0)
PHOSPHATE SERPL-MCNC: 3.2 MG/DL — SIGNIFICANT CHANGE UP (ref 2.5–4.5)
PLATELET # BLD AUTO: 98 K/UL — LOW (ref 150–400)
POTASSIUM SERPL-MCNC: 3.6 MMOL/L — SIGNIFICANT CHANGE UP (ref 3.5–5.3)
POTASSIUM SERPL-SCNC: 3.6 MMOL/L — SIGNIFICANT CHANGE UP (ref 3.5–5.3)
PROT SERPL-MCNC: 5.6 G/DL — LOW (ref 6–8.3)
RBC # BLD: 3.05 M/UL — LOW (ref 3.8–5.2)
RBC # FLD: 16.1 % — HIGH (ref 10.3–14.5)
SODIUM SERPL-SCNC: 137 MMOL/L — SIGNIFICANT CHANGE UP (ref 135–145)
SPECIMEN SOURCE: SIGNIFICANT CHANGE UP
SPECIMEN SOURCE: SIGNIFICANT CHANGE UP
WBC # BLD: 8.74 K/UL — SIGNIFICANT CHANGE UP (ref 3.8–10.5)
WBC # FLD AUTO: 8.74 K/UL — SIGNIFICANT CHANGE UP (ref 3.8–10.5)

## 2020-10-13 RX ORDER — ONDANSETRON 8 MG/1
4 TABLET, FILM COATED ORAL ONCE
Refills: 0 | Status: COMPLETED | OUTPATIENT
Start: 2020-10-13 | End: 2020-10-13

## 2020-10-13 RX ADMIN — ATORVASTATIN CALCIUM 40 MILLIGRAM(S): 80 TABLET, FILM COATED ORAL at 21:59

## 2020-10-13 RX ADMIN — GABAPENTIN 600 MILLIGRAM(S): 400 CAPSULE ORAL at 06:31

## 2020-10-13 RX ADMIN — ALBUTEROL 2 PUFF(S): 90 AEROSOL, METERED ORAL at 15:37

## 2020-10-13 RX ADMIN — CARVEDILOL PHOSPHATE 6.25 MILLIGRAM(S): 80 CAPSULE, EXTENDED RELEASE ORAL at 06:32

## 2020-10-13 RX ADMIN — MORPHINE SULFATE 2 MILLIGRAM(S): 50 CAPSULE, EXTENDED RELEASE ORAL at 22:17

## 2020-10-13 RX ADMIN — ALBUTEROL 2 PUFF(S): 90 AEROSOL, METERED ORAL at 20:21

## 2020-10-13 RX ADMIN — OXYCODONE AND ACETAMINOPHEN 1 TABLET(S): 5; 325 TABLET ORAL at 10:35

## 2020-10-13 RX ADMIN — MORPHINE SULFATE 2 MILLIGRAM(S): 50 CAPSULE, EXTENDED RELEASE ORAL at 22:47

## 2020-10-13 RX ADMIN — MORPHINE SULFATE 2 MILLIGRAM(S): 50 CAPSULE, EXTENDED RELEASE ORAL at 04:45

## 2020-10-13 RX ADMIN — MORPHINE SULFATE 2 MILLIGRAM(S): 50 CAPSULE, EXTENDED RELEASE ORAL at 06:09

## 2020-10-13 RX ADMIN — GABAPENTIN 600 MILLIGRAM(S): 400 CAPSULE ORAL at 21:59

## 2020-10-13 RX ADMIN — ALBUTEROL 2 PUFF(S): 90 AEROSOL, METERED ORAL at 02:21

## 2020-10-13 RX ADMIN — ALBUTEROL 2 PUFF(S): 90 AEROSOL, METERED ORAL at 09:40

## 2020-10-13 RX ADMIN — Medication 3 UNIT(S): at 11:55

## 2020-10-13 RX ADMIN — Medication 20 MILLIGRAM(S): at 06:31

## 2020-10-13 RX ADMIN — Medication 4: at 18:10

## 2020-10-13 RX ADMIN — OXYCODONE AND ACETAMINOPHEN 1 TABLET(S): 5; 325 TABLET ORAL at 10:36

## 2020-10-13 RX ADMIN — OXYCODONE AND ACETAMINOPHEN 1 TABLET(S): 5; 325 TABLET ORAL at 19:03

## 2020-10-13 RX ADMIN — Medication 3 UNIT(S): at 06:28

## 2020-10-13 RX ADMIN — GABAPENTIN 600 MILLIGRAM(S): 400 CAPSULE ORAL at 13:59

## 2020-10-13 RX ADMIN — PANTOPRAZOLE SODIUM 40 MILLIGRAM(S): 20 TABLET, DELAYED RELEASE ORAL at 11:57

## 2020-10-13 RX ADMIN — Medication 4: at 11:55

## 2020-10-13 RX ADMIN — MORPHINE SULFATE 2 MILLIGRAM(S): 50 CAPSULE, EXTENDED RELEASE ORAL at 00:21

## 2020-10-13 RX ADMIN — Medication 3 UNIT(S): at 18:10

## 2020-10-13 RX ADMIN — OXYCODONE AND ACETAMINOPHEN 1 TABLET(S): 5; 325 TABLET ORAL at 18:15

## 2020-10-13 RX ADMIN — CARVEDILOL PHOSPHATE 6.25 MILLIGRAM(S): 80 CAPSULE, EXTENDED RELEASE ORAL at 18:11

## 2020-10-13 RX ADMIN — CHLORHEXIDINE GLUCONATE 15 MILLILITER(S): 213 SOLUTION TOPICAL at 18:10

## 2020-10-13 RX ADMIN — ONDANSETRON 4 MILLIGRAM(S): 8 TABLET, FILM COATED ORAL at 22:17

## 2020-10-13 RX ADMIN — ERGOCALCIFEROL 50000 UNIT(S): 1.25 CAPSULE ORAL at 06:33

## 2020-10-13 RX ADMIN — CHLORHEXIDINE GLUCONATE 1 APPLICATION(S): 213 SOLUTION TOPICAL at 06:30

## 2020-10-13 RX ADMIN — CHLORHEXIDINE GLUCONATE 15 MILLILITER(S): 213 SOLUTION TOPICAL at 06:33

## 2020-10-13 NOTE — PROGRESS NOTE ADULT - ATTENDING COMMENTS
-pat accepted to vent facility  -family refused  -continue care  -pat is a poor candidate for liberation and remained liberated from vent

## 2020-10-13 NOTE — CHART NOTE - NSCHARTNOTEFT_GEN_A_CORE
EVENT:   - Brief HPI: 64 yo obese F with hx of 80 pack year tobacco use, HTN, DM2 on insulin, COPD on 5L (frequent admissions), R lung cancer s/p resection (2017) on chemo q 3 weeks with concern for mets to spine and compression fractures, recent admission for COPD exacerbation earlier in August who on 9/19/2020 presented to ED with c/o SOB, hypoxia and increased sputum production x 1 day. Admitted to ICU x twice for hypoxemic respiratory distress requiring intubation (9/21 and 10/5), 10/8  Tracheostomy and Peg placement, 10/9 Transferred back to SCU. Pt has PEG with continue with Glucerna 1.2   40ml per hour X 24 hours daily.    - Received telephone call from RN that pt is c/o of nausea. Denied any other symptoms.      OBJECTIVE:  Vital Signs Last 24 Hrs  T(C): 36.6 (13 Oct 2020 20:43), Max: 36.9 (13 Oct 2020 05:00)  T(F): 97.9 (13 Oct 2020 20:43), Max: 98.4 (13 Oct 2020 05:00)  HR: 99 (13 Oct 2020 20:43) (99 - 112)  BP: 129/72 (13 Oct 2020 20:43) (115/63 - 132/76)  BP(mean): --  RR: 16 (13 Oct 2020 20:43) (16 - 22)  SpO2: 98% (13 Oct 2020 20:43) (95% - 100%)    FOCUSED PHYSICAL EXAM:  Neuro: awake, alert, oriented x 3. No neuro deficit  Cardiovascular: Pulses +2 B/L in lower and upper extremities, HR regular, BP stable, No edema.  Respiratory: Respirations regular, unlabored, breath sounds clear B/L.   GI: Abdomen soft, non-tender, positive bowel sounds.  : no bladder distention noted. No complaints at this time.  Skin: Dry, intact, no bruising, no diaphoresis.    LABS:                        9.0    8.74  )-----------( 98       ( 13 Oct 2020 06:46 )             27.3     10-13    137  |  98  |  10  ----------------------------<  122<H>  3.6   |  35<H>  |  0.31<L>    Ca    8.1<L>      13 Oct 2020 06:46  Phos  3.2     10-13  Mg     1.8     10-13    TPro  5.6<L>  /  Alb  2.0<L>  /  TBili  0.7  /  DBili  x   /  AST  31  /  ALT  26  /  AlkPhos  140<H>  10-13      EKG:   IMAGING:    ASSESSMENT/Problem: Nausea probably 2/2 to Tube Feeding  x 24 hours???      PLAN:   1. Zofran 4mg, IVP x 1 dose ordered  2. Hold tube feeding until 6AM  3.Monitor response to zofran  4.Cont current care/treatment

## 2020-10-13 NOTE — CHART NOTE - NSCHARTNOTESELECT_GEN_ALL_CORE
Malnutrition Notification
Event Note
Event Note/Palliative Care 
Event Note/Palliative Care 
Nutrition Services
Off Service Note
Palliative Care /Event Note
Transfer Note
Transfer Note/ICU
Transfer Note/ICU Downgrade

## 2020-10-13 NOTE — PROGRESS NOTE ADULT - SUBJECTIVE AND OBJECTIVE BOX
BARB [facility]N    SCU progress note    INTERVAL HPI/OVERNIGHT EVENTS: ***No overnight events    DNR [ ]   DNI  [  ]   FULL CODE    Covid - 19 PCR: Negative 10/6    The 4Ms    What Matters Most: see GOC  Age appropriate Medications/Screen for High Risk Medication: Yes  Mentation: see CAM below  Mobility: defer to physical exam    The Confusion Assessment Method (CAM) Diagnostic Algorithm     1: Acute Onset or Fluctuating Course  - Is there evidence of an acute change in mental status from the patient’s baseline? Did the (abnormal) behavior  fluctuate during the day, that is, tend to come and go, or increase and decrease in severity?  [ ] YES [x ] NO     2: Inattention  - Did the patient have difficulty focusing attention, being easily distractible, or having difficulty keeping track of what was being said?  [ ] YES [x ] NO     3: Disorganized thinking  -Was the patient’s thinking disorganized or incoherent, such as rambling or irrelevant conversation, unclear or illogical flow of ideas, or unpredictable switching from subject to subject?  [ ] YES [x ] NO    4: Altered Level of consciousness?  [ ] YES [x ] NO    The diagnosis of delirium by CAM requires the presence of features 1 and 2 and either 3 or 4.    PRESSORS: [ ] YES [x ] NO  levoFLOXacin IVPB 750 milliGRAM(s) IV Intermittent every 24 hours  levoFLOXacin IVPB        Cardiovascular:  Heart Failure  Acute   Acute on Chronic  Chronic       carvedilol 6.25 milliGRAM(s) Oral every 12 hours    Pulmonary:  ALBUTerol    90 MICROgram(s) HFA Inhaler 2 Puff(s) Inhalation every 6 hours    Hematalogic:    Other:  acetaminophen    Suspension .. 650 milliGRAM(s) Oral every 4 hours PRN  atorvastatin 40 milliGRAM(s) Oral at bedtime  chlorhexidine 0.12% Liquid 15 milliLiter(s) Oral Mucosa every 12 hours  chlorhexidine 2% Cloths 1 Application(s) Topical <User Schedule>  dextrose 40% Gel 15 Gram(s) Oral once PRN  dextrose 5%. 1000 milliLiter(s) IV Continuous <Continuous>  dextrose 50% Injectable 12.5 Gram(s) IV Push once  dextrose 50% Injectable 25 Gram(s) IV Push once  dextrose 50% Injectable 25 Gram(s) IV Push once  ergocalciferol 22128 Unit(s) Oral <User Schedule>  gabapentin 600 milliGRAM(s) Oral every 8 hours  glucagon  Injectable 1 milliGRAM(s) IntraMuscular once PRN  influenza   Vaccine 0.5 milliLiter(s) IntraMuscular once  insulin glargine Injectable (LANTUS) 10 Unit(s) SubCutaneous at bedtime  insulin lispro (HumaLOG) corrective regimen sliding scale   SubCutaneous every 6 hours  insulin lispro Injectable (HumaLOG) 3 Unit(s) SubCutaneous every 6 hours  morphine  - Injectable 2 milliGRAM(s) IV Push every 4 hours PRN  oxycodone    5 mG/acetaminophen 325 mG 1 Tablet(s) Oral every 4 hours PRN  pantoprazole  Injectable 40 milliGRAM(s) IV Push daily  predniSONE   Tablet 20 milliGRAM(s) Oral daily  sodium chloride 0.9% lock flush 10 milliLiter(s) IV Push every 1 hour PRN    acetaminophen    Suspension .. 650 milliGRAM(s) Oral every 4 hours PRN  ALBUTerol    90 MICROgram(s) HFA Inhaler 2 Puff(s) Inhalation every 6 hours  atorvastatin 40 milliGRAM(s) Oral at bedtime  carvedilol 6.25 milliGRAM(s) Oral every 12 hours  chlorhexidine 0.12% Liquid 15 milliLiter(s) Oral Mucosa every 12 hours  chlorhexidine 2% Cloths 1 Application(s) Topical <User Schedule>  dextrose 40% Gel 15 Gram(s) Oral once PRN  dextrose 5%. 1000 milliLiter(s) IV Continuous <Continuous>  dextrose 50% Injectable 12.5 Gram(s) IV Push once  dextrose 50% Injectable 25 Gram(s) IV Push once  dextrose 50% Injectable 25 Gram(s) IV Push once  ergocalciferol 32784 Unit(s) Oral <User Schedule>  gabapentin 600 milliGRAM(s) Oral every 8 hours  glucagon  Injectable 1 milliGRAM(s) IntraMuscular once PRN  influenza   Vaccine 0.5 milliLiter(s) IntraMuscular once  insulin glargine Injectable (LANTUS) 10 Unit(s) SubCutaneous at bedtime  insulin lispro (HumaLOG) corrective regimen sliding scale   SubCutaneous every 6 hours  insulin lispro Injectable (HumaLOG) 3 Unit(s) SubCutaneous every 6 hours  levoFLOXacin IVPB 750 milliGRAM(s) IV Intermittent every 24 hours  levoFLOXacin IVPB      morphine  - Injectable 2 milliGRAM(s) IV Push every 4 hours PRN  oxycodone    5 mG/acetaminophen 325 mG 1 Tablet(s) Oral every 4 hours PRN  pantoprazole  Injectable 40 milliGRAM(s) IV Push daily  predniSONE   Tablet 20 milliGRAM(s) Oral daily  sodium chloride 0.9% lock flush 10 milliLiter(s) IV Push every 1 hour PRN    Drug Dosing Weight  Height (cm): 154.9 (19 Sep 2020 08:00)  Weight (kg): 84.8 (21 Sep 2020 09:15)  BMI (kg/m2): 35.3 (21 Sep 2020 09:15)  BSA (m2): 1.84 (21 Sep 2020 09:15)    CENTRAL LINE: [ ] YES [x ] NO  LOCATION:   DATE INSERTED:  REMOVE: [ ] YES [ ] NO  EXPLAIN:    ROBLEDO: [ ] YES [ ] NO    DATE INSERTED:  REMOVE:  [ ] YES [ ] NO  EXPLAIN:    PAST MEDICAL & SURGICAL HISTORY:  Malignant neoplasm of unspecified part of right bronchus or lung    HTN (hypertension)    DM (diabetes mellitus)    COPD (chronic obstructive pulmonary disease)    Lung cancer    Morbid obesity    GERD (gastroesophageal reflux disease)    Deviated septum    Prolapsed uterus    ITP (idiopathic thrombocytopenic purpura)    Emphysema/COPD    History of cholecystectomy    S/P lobectomy of lung  right 2017    History of tonsillectomy                10-12 @ 07:01  -  10-13 @ 07:00  --------------------------------------------------------  IN: 0 mL / OUT: 1340 mL / NET: -1340 mL        Mode: AC/ CMV (Assist Control/ Continuous Mandatory Ventilation)  RR (machine): 16  TV (machine): 400  FiO2: 40  PEEP: 5  ITime: 1  MAP: 11  PIP: 26      PHYSICAL EXAM:    GENERAL: NAD, obese  HEAD:  Atraumatic, Normocephalic  EYES: EOMI, PERRLA, conjunctiva and sclera clear  ENMT: No tonsillar erythema, exudates  NECK: Supple, No JVD, tracheostomy  NERVOUS SYSTEM:  Alert & Oriented X3, Moving all extremities. Following commands.  CHEST/LUNG: Diminished breath sounds bilateral bases  HEART: Regular rate and rhythm; No murmurs, rubs, or gallops  ABDOMEN: Soft, Obese, Nontender, Nondistended; Bowel sounds present; Peg intact  EXTREMITIES:  2+ Peripheral Pulses, No clubbing, cyanosis, or edema  LYMPH: No lymphadenopathy noted  SKIN: No rashes or lesions      LABS:  CBC Full  -  ( 13 Oct 2020 06:46 )  WBC Count : 8.74 K/uL  RBC Count : 3.05 M/uL  Hemoglobin : 9.0 g/dL  Hematocrit : 27.3 %  Platelet Count - Automated : 98 K/uL  Mean Cell Volume : 89.5 fl  Mean Cell Hemoglobin : 29.5 pg  Mean Cell Hemoglobin Concentration : 33.0 gm/dL  Auto Neutrophil # : x  Auto Lymphocyte # : x  Auto Monocyte # : x  Auto Eosinophil # : x  Auto Basophil # : x  Auto Neutrophil % : x  Auto Lymphocyte % : x  Auto Monocyte % : x  Auto Eosinophil % : x  Auto Basophil % : x    10-13    137  |  98  |  10  ----------------------------<  122<H>  3.6   |  35<H>  |  0.31<L>    Ca    8.1<L>      13 Oct 2020 06:46  Phos  3.2     10-13  Mg     1.8     10-13    TPro  5.6<L>  /  Alb  2.0<L>  /  TBili  0.7  /  DBili  x   /  AST  31  /  ALT  26  /  AlkPhos  140<H>  10-13              [  ]  DVT Prophylaxis  [  ]  Nutrition, Brand, Rate         Goal Rate        Abnormal Nutritional Status -  Malnutrition   Cachexia      Morbid Obesity BMI >/=40    RADIOLOGY & ADDITIONAL STUDIES:  ***  < from: Xray Chest 1 View- PORTABLE-Routine (Xray Chest 1 View- PORTABLE-Routine .) (10.11.20 @ 11:45) >  Tracheostomy is again noted.    Moderate bilateral pleural effusions are noted with mild pulmonary vascular congestion which is not significantly changed from previous exam.    Limited evaluation of the mediastinal contour.  The trachea is midline.    The osseous structures are intact.    IMPRESSION:  Lines and tubes as above.  Unchanged moderate bilateral pleural effusions with mild pulmonary vascular congestion.        < end of copied text >    Goals of Care Discussion with Family/Proxy/Other   - see note from 10/01

## 2020-10-13 NOTE — PROGRESS NOTE ADULT - ASSESSMENT
66 yo obese F with hx of 80 pack year tobacco use, HTN, DM2 on insulin, COPD on 5L (frequent admissions), R lung cancer s/p resection (2017) on chemo q 3 weeks with concern for mets to spine and compression fractures, recent admission for COPD exacerbation earlier in August who on 9/19/2020 presented to ED with c/o SOB, hypoxia and increased sputum production x 1 day. Admitted to ICU x twice for hypoxemic respiratory distress requiring intubation (9/21 and 10/5).   10/8  Tracheostomy and Peg placement  10/9 Transferred back to SCU

## 2020-10-13 NOTE — PROGRESS NOTE ADULT - PROBLEM SELECTOR PLAN 1
Secondary to end stage COPD and lung cancer.  Continue mechanical ventilation.  Not a candidate for weaning.  Continue bronchodilators and ICS  Continue prednisone with slow taper. Start 10mg daily tomorrow.

## 2020-10-13 NOTE — PROGRESS NOTE ADULT - PROBLEM SELECTOR PLAN 6
Secondary to chronic disease and chemo  H&H low but stable.  Continue to monitor.  Platelet count slowly increasing. Continue to monitor.

## 2020-10-13 NOTE — PROGRESS NOTE ADULT - SUBJECTIVE AND OBJECTIVE BOX
Interval Events:  pt seen at 9am in nad    Allergies    fish (Angioedema)  penicillins (Rash)    Intolerances      Endocrine/Metabolic Medications:  atorvastatin 40 milliGRAM(s) Oral at bedtime  dextrose 40% Gel 15 Gram(s) Oral once PRN  dextrose 50% Injectable 12.5 Gram(s) IV Push once  dextrose 50% Injectable 25 Gram(s) IV Push once  dextrose 50% Injectable 25 Gram(s) IV Push once  glucagon  Injectable 1 milliGRAM(s) IntraMuscular once PRN  insulin glargine Injectable (LANTUS) 10 Unit(s) SubCutaneous at bedtime  insulin lispro (HumaLOG) corrective regimen sliding scale   SubCutaneous every 6 hours  insulin lispro Injectable (HumaLOG) 3 Unit(s) SubCutaneous every 6 hours  predniSONE   Tablet 20 milliGRAM(s) Oral daily      Vital Signs Last 24 Hrs  T(C): 36.8 (13 Oct 2020 13:22), Max: 36.9 (13 Oct 2020 05:00)  T(F): 98.2 (13 Oct 2020 13:22), Max: 98.4 (13 Oct 2020 05:00)  HR: 105 (13 Oct 2020 13:22) (100 - 112)  BP: 131/68 (13 Oct 2020 13:22) (115/63 - 132/76)  BP(mean): --  RR: 22 (13 Oct 2020 13:22) (20 - 22)  SpO2: 100% (13 Oct 2020 13:22) (95% - 100%)      PHYSICAL EXAM  All physical exam findings normal, except those marked:  General:	Alert, active, cooperative, NAD, well hydrated  .		[] Abnormal:  Neck		Normal: supple, no cervical adenopathy, no palpable thyroid  .		[] Abnormal:  Cardiovascular	Normal: regular rate, normal S1, S2, no murmurs  .		[] Abnormal:  Respiratory	Normal: no chest wall deformity, normal respiratory pattern, CTA B/L  .		[] Abnormal:  Abdominal	Normal: soft, ND, NT, bowel sounds present, no masses, no organomegaly  .		[] Abnormal:  		Normal normal genitalia, testes descended, circumcised/uncircumcised  .		Dejon stage:			Breast dejon:  .		Menstrual history:  .		[] Abnormal:  Extremities	Normal: FROM x4  .		[] Abnormal:  Skin		Normal: intact and not indurated, no rash, no acanthosis nigricans  .		[] Abnormal:  Neurologic	Normal: grossly intact  .		[] Abnormal:    LABS                        9.0    8.74  )-----------( 98       ( 13 Oct 2020 06:46 )             27.3                               137    |  98     |  10                  Calcium: 8.1   / iCa: x      (10-13 @ 06:46)    ----------------------------<  122       Magnesium: 1.8                              3.6     |  35     |  0.31             Phosphorous: 3.2      TPro  5.6    /  Alb  2.0    /  TBili  0.7    /  DBili  x      /  AST  31     /  ALT  26     /  AlkPhos  140    13 Oct 2020 06:46    CAPILLARY BLOOD GLUCOSE      POCT Blood Glucose.: 216 mg/dL (13 Oct 2020 17:57)  POCT Blood Glucose.: 237 mg/dL (13 Oct 2020 11:17)  POCT Blood Glucose.: 105 mg/dL (13 Oct 2020 05:29)  POCT Blood Glucose.: 141 mg/dL (12 Oct 2020 23:46)  POCT Blood Glucose.: 143 mg/dL (12 Oct 2020 22:00)        Assesment/plan        Assesment/plan    64 yo female with multiple comorbidities including h/o DM type 2, HTN, COPD on o2, lung ca on right sided s/p resection on chemo admitted with shortness of breath  was intubated/extubated for acute resp failure this admit, now s/p trach and peg on steroids with uncontrolled hyperglycemia. home regimen: basaglar 60 units daily, novolog 15 units pre meals      DM type 2  uncontrolled- better controlled now   complicated by high dose steroid use, acute on chronic resp failure  cont with lantus 10 and change  humalog to 3 q6   On tube feeds- 40cc goal rate now   fsg q6 hr  aim fsg 140-180    COPD exacerbation- s/p trach  on tapering doses of prednisone 10mg qd   f/u pulm recs

## 2020-10-13 NOTE — PROGRESS NOTE ADULT - PROBLEM SELECTOR PLAN 8
Venodyne compression sleeves for DVT prophylaxis.  Discharge planning. Will need vent facility.  Continue mechanical ventilation. Not a candidate for weaning.  Overall prognosis is poor.  Remains a FULL CODE as per wishes.

## 2020-10-13 NOTE — PROGRESS NOTE ADULT - ASSESSMENT
· Assessment	  65 year old lady with severe COPD and lung ca with mets to bones which causing compression Fx.  She had one RT for that.  she was admitted for dyspnea and hypoxia.    Problem/Recommendation - 1:  Problem: Lung cancer. Recommendation: with mets to bones  on chemo keytruda, alimta and carboplatin  she had one RT to spine.  the tumor markers have been trending down with chemo  she has pain at throat for a while and has difficulty swallowing, this got better  4?strider   intubation again  trach and peg are done  will discuss with family  Problem/Recommendation - 2:  ·  Problem: COPD (chronic obstructive pulmonary disease).  Recommendation: required freq steroids.     Problem/Recommendation - 3:  ·  Problem: Acute on chronic respiratory failure with hypoxia.  Recommendation: ?aspiration causing resp failure requiring intubation  now on trach and peg feeding  she is alert and requests chemo

## 2020-10-13 NOTE — CHART NOTE - NSCHARTNOTEFT_GEN_A_CORE
Assessment:   65yFemalePatient is a 65y old  Female who presents with a chief complaint of shortness of breath (13 Oct 2020 09:57).  Pt visited. Pt DG from ICU to 4 N on 10/09.  peg TF  Glucerna 1.2 infusing @ 40 ml/hr x 24 hr Providing  960 ml,1152 calories, Protein 58 gms, free water 773 ml/day. TF tolerated.   Pt with Lung ca W Mets to bone. Plan is for Rehab /LTC. prognosis is  Poor. Pt is on Vent Via Trach. Pt is able to Talk with Low voice.     Factors impacting intake: [ ] none [ ] nausea  [ ] vomiting [ ] diarrhea [ ] constipation  [ ]chewing problems [ x] swallowing issues  [ ] other:     Diet Prescription: Diet, NPO with Tube Feed:   Tube Feeding Modality: Nasogastric  Glucerna 1.2 Inocente  Total Volume for 24 Hours (mL): 960  Continuous  Starting Tube Feed Rate {mL per Hour}: 10  Increase Tube Feed Rate by (mL): 10     Every hour  Until Goal Tube Feed Rate (mL per Hour): 40  Tube Feed Duration (in Hours): 24  Tube Feed Start Time: 06:00 (10-10-20 @ 09:43)    Intake: NPO w TF     Current Weight:   % Weight Change Bed scale 200 lb with out scd Device.     Pertinent Medications: MEDICATIONS  (STANDING):  ALBUTerol    90 MICROgram(s) HFA Inhaler 2 Puff(s) Inhalation every 6 hours  atorvastatin 40 milliGRAM(s) Oral at bedtime  carvedilol 6.25 milliGRAM(s) Oral every 12 hours  chlorhexidine 0.12% Liquid 15 milliLiter(s) Oral Mucosa every 12 hours  chlorhexidine 2% Cloths 1 Application(s) Topical <User Schedule>  dextrose 5%. 1000 milliLiter(s) (50 mL/Hr) IV Continuous <Continuous>  dextrose 50% Injectable 12.5 Gram(s) IV Push once  dextrose 50% Injectable 25 Gram(s) IV Push once  dextrose 50% Injectable 25 Gram(s) IV Push once  ergocalciferol 29864 Unit(s) Oral <User Schedule>  gabapentin 600 milliGRAM(s) Oral every 8 hours  influenza   Vaccine 0.5 milliLiter(s) IntraMuscular once  insulin glargine Injectable (LANTUS) 10 Unit(s) SubCutaneous at bedtime  insulin lispro (HumaLOG) corrective regimen sliding scale   SubCutaneous every 6 hours  insulin lispro Injectable (HumaLOG) 3 Unit(s) SubCutaneous every 6 hours  levoFLOXacin IVPB 750 milliGRAM(s) IV Intermittent every 24 hours  levoFLOXacin IVPB      pantoprazole  Injectable 40 milliGRAM(s) IV Push daily  predniSONE   Tablet 20 milliGRAM(s) Oral daily    MEDICATIONS  (PRN):  acetaminophen    Suspension .. 650 milliGRAM(s) Oral every 4 hours PRN Mild Pain (1 - 3)  dextrose 40% Gel 15 Gram(s) Oral once PRN Blood Glucose LESS THAN 70 milliGRAM(s)/deciliter  glucagon  Injectable 1 milliGRAM(s) IntraMuscular once PRN Glucose LESS THAN 70 milligrams/deciliter  morphine  - Injectable 2 milliGRAM(s) IV Push every 4 hours PRN Severe Pain (7 - 10)  oxycodone    5 mG/acetaminophen 325 mG 1 Tablet(s) Oral every 4 hours PRN Moderate Pain (4 - 6)  sodium chloride 0.9% lock flush 10 milliLiter(s) IV Push every 1 hour PRN Pre/post blood products, medications, blood draw, and to maintain line patency    Pertinent Labs: 10-13 Na137 mmol/L Glu 122 mg/dL<H> K+ 3.6 mmol/L Cr  0.31 mg/dL<L> BUN 10 mg/dL 10-13 Phos 3.2 mg/dL 10-13 Alb 2.0 g/dL<L> 09-20 Chol 159 mg/dL LDL 44 mg/dL HDL 94 mg/dL Trig 105 mg/dL     CAPILLARY BLOOD GLUCOSE      POCT Blood Glucose.: 237 mg/dL (13 Oct 2020 11:17)  POCT Blood Glucose.: 105 mg/dL (13 Oct 2020 05:29)  POCT Blood Glucose.: 141 mg/dL (12 Oct 2020 23:46)  POCT Blood Glucose.: 143 mg/dL (12 Oct 2020 22:00)  POCT Blood Glucose.: 196 mg/dL (12 Oct 2020 17:01)    Skin: Stage 2 @ Left Buttocks.    Estimated Needs:   [ ] no change since previous assessment  [ ] recalculated:     Previous Nutrition Diagnosis:   [ ] Inadequate Energy Intake [ ]Inadequate Oral Intake [ ] Excessive Energy Intake   [ ] Underweight [ ] Increased Nutrient Needs [ ] Overweight/Obesity   [ ] Altered GI Function [ ] Unintended Weight Loss [ ] Food & Nutrition Related Knowledge Deficit [ x] Malnutrition Moderate     Nutrition Diagnosis is [x ] ongoing  [ ] resolved [ ] not applicable     New Nutrition Diagnosis: [ ] not applicable       Interventions:   Recommend  [ ] Change Diet To:  [ ] Nutrition Supplement  [ x] Nutrition Support Glucerna 1.2 initial Goal rate @ 50 ml/hr x 24 he as tolerated to  provide 1200 ml , 1440 calories, Protein 72 gms, free water 966 ml/day.  (x) D/W NP    [ x] Other:  May consider Prosource 1 PKT BID VIa TF to provide additional 30 gms od Protein, 120 calories.     Monitoring and Evaluation:   [ ] PO intake [ x ] Tolerance to diet prescription [ x ] weights [ x ] labs[ x ] follow up per protocol  [ ] other:

## 2020-10-13 NOTE — PROGRESS NOTE ADULT - ASSESSMENT
COPD with acute asthmatic bronchitis/ RLL pneumonia with  acute  Respiratory Failure with Trach and PEG   Recurrent Lung cancer with Bone Mets   IDDM   Htn with MR   Thrombocytopenia  and Anemia   Obesity with CRIS   serratia  bacteremia     Plan-  IV/ po  levaquin x 2 weeks total and then d/c  /CMV12/ 35% with Peep5 cm alternating with cpap day time  Venodynes   FS coverage   D/C to vent facility

## 2020-10-13 NOTE — PROGRESS NOTE ADULT - PROBLEM SELECTOR PLAN 5
S/P 1 dose of radiation to spine.    Was on chemo before admission. Not a candidate for any more chemo  Continue pain management.

## 2020-10-13 NOTE — PROGRESS NOTE ADULT - SUBJECTIVE AND OBJECTIVE BOX
alert  no fever   feel hungry  no sob    MEDICATIONS  (STANDING):  ALBUTerol    90 MICROgram(s) HFA Inhaler 2 Puff(s) Inhalation every 6 hours  atorvastatin 40 milliGRAM(s) Oral at bedtime  carvedilol 6.25 milliGRAM(s) Oral every 12 hours  chlorhexidine 0.12% Liquid 15 milliLiter(s) Oral Mucosa every 12 hours  chlorhexidine 2% Cloths 1 Application(s) Topical <User Schedule>  dextrose 5%. 1000 milliLiter(s) (50 mL/Hr) IV Continuous <Continuous>  dextrose 50% Injectable 12.5 Gram(s) IV Push once  dextrose 50% Injectable 25 Gram(s) IV Push once  dextrose 50% Injectable 25 Gram(s) IV Push once  ergocalciferol 56674 Unit(s) Oral <User Schedule>  gabapentin 600 milliGRAM(s) Oral every 8 hours  influenza   Vaccine 0.5 milliLiter(s) IntraMuscular once  insulin glargine Injectable (LANTUS) 10 Unit(s) SubCutaneous at bedtime  insulin lispro (HumaLOG) corrective regimen sliding scale   SubCutaneous every 6 hours  insulin lispro Injectable (HumaLOG) 3 Unit(s) SubCutaneous every 6 hours  levoFLOXacin IVPB 750 milliGRAM(s) IV Intermittent every 24 hours  levoFLOXacin IVPB      pantoprazole  Injectable 40 milliGRAM(s) IV Push daily  predniSONE   Tablet 20 milliGRAM(s) Oral daily    MEDICATIONS  (PRN):  acetaminophen    Suspension .. 650 milliGRAM(s) Oral every 4 hours PRN Mild Pain (1 - 3)  dextrose 40% Gel 15 Gram(s) Oral once PRN Blood Glucose LESS THAN 70 milliGRAM(s)/deciliter  glucagon  Injectable 1 milliGRAM(s) IntraMuscular once PRN Glucose LESS THAN 70 milligrams/deciliter  morphine  - Injectable 2 milliGRAM(s) IV Push every 4 hours PRN Severe Pain (7 - 10)  oxycodone    5 mG/acetaminophen 325 mG 1 Tablet(s) Oral every 4 hours PRN Moderate Pain (4 - 6)  sodium chloride 0.9% lock flush 10 milliLiter(s) IV Push every 1 hour PRN Pre/post blood products, medications, blood draw, and to maintain line patency      Allergies    fish (Angioedema)  penicillins (Rash)    Intolerances        Vital Signs Last 24 Hrs  T(C): 36.9 (13 Oct 2020 05:00), Max: 36.9 (13 Oct 2020 05:00)  T(F): 98.4 (13 Oct 2020 05:00), Max: 98.4 (13 Oct 2020 05:00)  HR: 112 (13 Oct 2020 05:00) (100 - 112)  BP: 115/63 (13 Oct 2020 05:00) (104/60 - 133/67)  BP(mean): --  RR: 20 (13 Oct 2020 05:00) (16 - 20)  SpO2: 95% (13 Oct 2020 05:00) (95% - 100%)    PHYSICAL EXAM  General: adult in NAD  HEENT: clear oropharynx, anicteric sclera, pink conjunctiva  Neck: supple  CV: normal S1/S2 with no murmur rubs or gallops  Lungs: positive air movement b/l ant lungs,clear to auscultation, no wheezes, no rales  Abdomen: soft non-tender non-distended, no hepatosplenomegaly  Ext: no clubbing cyanosis or edema  Skin: no rashes and no petechiae  Neuro: alert and oriented X 4, no focal deficits  LABS:                          9.0    8.74  )-----------( 98       ( 13 Oct 2020 06:46 )             27.3         Mean Cell Volume : 89.5 fl  Mean Cell Hemoglobin : 29.5 pg  Mean Cell Hemoglobin Concentration : 33.0 gm/dL  Auto Neutrophil # : x  Auto Lymphocyte # : x  Auto Monocyte # : x  Auto Eosinophil # : x  Auto Basophil # : x  Auto Neutrophil % : x  Auto Lymphocyte % : x  Auto Monocyte % : x  Auto Eosinophil % : x  Auto Basophil % : x    Serial CBC  Hematocrit 27.3  Hemoglobin 9.0  Plat 98  RBC 3.05  WBC 8.74  Serial CBC  Hematocrit 23.9  Hemoglobin 8.3  Plat 59  RBC 2.73  WBC 5.77  Serial CBC  Hematocrit 26.6  Hemoglobin 9.1  Plat 33  RBC 3.02  WBC 4.70  Serial CBC  Hematocrit 28.5  Hemoglobin 10.2  Plat 37  RBC 3.35  WBC 4.33  Serial CBC  Hematocrit 27.4  Hemoglobin 9.5  Plat 37  RBC 3.18  WBC 3.30    10-13    137  |  98  |  10  ----------------------------<  122<H>  3.6   |  35<H>  |  0.31<L>    Ca    8.1<L>      13 Oct 2020 06:46  Phos  3.2     10-13  Mg     1.8     10-13    TPro  5.6<L>  /  Alb  2.0<L>  /  TBili  0.7  /  DBili  x   /  AST  31  /  ALT  26  /  AlkPhos  140<H>  10-13          Reticulocyte Percent: 1.6 % (10-07 @ 21:03)            BLOOD SMEAR INTERPRETATION:       RADIOLOGY & ADDITIONAL STUDIES:

## 2020-10-13 NOTE — PROGRESS NOTE ADULT - SUBJECTIVE AND OBJECTIVE BOX
PULMONARY  progress note    BARB COLÓN  MRN-[id number]    Patient is a 65y old  Female who presents with a chief complaint of shortness of breath (13 Oct 2020 09:21)  Pt on vent CMV16//40% with PP28      MEDICATIONS  (STANDING):  ALBUTerol    90 MICROgram(s) HFA Inhaler 2 Puff(s) Inhalation every 6 hours  atorvastatin 40 milliGRAM(s) Oral at bedtime  carvedilol 6.25 milliGRAM(s) Oral every 12 hours  chlorhexidine 0.12% Liquid 15 milliLiter(s) Oral Mucosa every 12 hours  chlorhexidine 2% Cloths 1 Application(s) Topical <User Schedule>  dextrose 5%. 1000 milliLiter(s) (50 mL/Hr) IV Continuous <Continuous>  dextrose 50% Injectable 12.5 Gram(s) IV Push once  dextrose 50% Injectable 25 Gram(s) IV Push once  dextrose 50% Injectable 25 Gram(s) IV Push once  ergocalciferol 17578 Unit(s) Oral <User Schedule>  gabapentin 600 milliGRAM(s) Oral every 8 hours  influenza   Vaccine 0.5 milliLiter(s) IntraMuscular once  insulin glargine Injectable (LANTUS) 10 Unit(s) SubCutaneous at bedtime  insulin lispro (HumaLOG) corrective regimen sliding scale   SubCutaneous every 6 hours  insulin lispro Injectable (HumaLOG) 3 Unit(s) SubCutaneous every 6 hours  levoFLOXacin IVPB 750 milliGRAM(s) IV Intermittent every 24 hours  levoFLOXacin IVPB      pantoprazole  Injectable 40 milliGRAM(s) IV Push daily  predniSONE   Tablet 20 milliGRAM(s) Oral daily      MEDICATIONS  (PRN):  acetaminophen    Suspension .. 650 milliGRAM(s) Oral every 4 hours PRN Mild Pain (1 - 3)  dextrose 40% Gel 15 Gram(s) Oral once PRN Blood Glucose LESS THAN 70 milliGRAM(s)/deciliter  glucagon  Injectable 1 milliGRAM(s) IntraMuscular once PRN Glucose LESS THAN 70 milligrams/deciliter  morphine  - Injectable 2 milliGRAM(s) IV Push every 4 hours PRN Severe Pain (7 - 10)  oxycodone    5 mG/acetaminophen 325 mG 1 Tablet(s) Oral every 4 hours PRN Moderate Pain (4 - 6)  sodium chloride 0.9% lock flush 10 milliLiter(s) IV Push every 1 hour PRN Pre/post blood products, medications, blood draw, and to maintain line patency      Allergies    fish (Angioedema)  penicillins (Rash)            PAST MEDICAL & SURGICAL HISTORY:  Malignant neoplasm of unspecified part of right bronchus or lung    HTN (hypertension)    DM (diabetes mellitus)    COPD (chronic obstructive pulmonary disease)    Lung cancer    Morbid obesity    GERD (gastroesophageal reflux disease)    Deviated septum    Prolapsed uterus    ITP (idiopathic thrombocytopenic purpura)    Emphysema/COPD    History of cholecystectomy    S/P lobectomy of lung  right 2017    History of tonsillectomy             REVIEW OF SYSTEMS:  CONSTITUTIONAL: No fever, weight loss, or fatigue   EYES: No eye pain, visual disturbances, or discharge  ENT:  No difficulty hearing, tinnitus, vertigo; No sinus or throat pain  NECK: No pain or stiffness or nodes  RESPIRATORY:  cough--   wheezing--   chills--   hemoptysis--  Shortness of Breath--  CARDIOVASCULAR: No chest pain, palpitations, passing out, dizziness, or leg swelling  GASTROINTESTINAL: No abdominal or epigastric pain. No nausea, vomiting, or hematemesis; No diarrhea or constipation. No melena or hematochezia.  GENITOURINARY: No dysuria, frequency, hematuria, or incontinence  NEUROLOGICAL: No headaches, memory loss, loss of strength, numbness, or tremors  SKIN: No itching, burning, rashes, or lesions   LYMPH Nodes: No enlarged glands  ENDOCRINE: No heat or cold intolerance; No hair loss  MUSCULOSKELETAL: No joint pain or swelling; No muscle, back, or extremity pain  PSYCHIATRIC: No depression, anxiety, mood swings, or difficulty sleeping  HEME/LYMPH: No easy bruising, or bleeding gums  ALLERGY AND IMMUNOLOGIC: No hives or eczema    Vital Signs Last 24 Hrs  T(C): 36.9 (13 Oct 2020 05:00), Max: 36.9 (13 Oct 2020 05:00)  T(F): 98.4 (13 Oct 2020 05:00), Max: 98.4 (13 Oct 2020 05:00)  HR: 112 (13 Oct 2020 05:00) (100 - 112)  BP: 115/63 (13 Oct 2020 05:00) (104/60 - 133/67)  BP(mean): --  RR: 20 (13 Oct 2020 05:00) (16 - 20)  SpO2: 95% (13 Oct 2020 05:00) (95% - 100%)  I&O's Detail    12 Oct 2020 07:01  -  13 Oct 2020 07:00  --------------------------------------------------------  IN:  Total IN: 0 mL    OUT:    Indwelling Catheter - Urethral (mL): 1340 mL  Total OUT: 1340 mL    Total NET: -1340 mL          PHYSICAL EXAMINATION:    GENERAL: The patient is a well-developed, well-nourished in no apparent distress on vent  SKIN no rash ecchymoses or bruises  HEENT: Head is normocephalic and atraumatic  SHAMA , Extraocular muscles are intact. Mucous membranes  moist.   Neck supple ,No LN felt JVP not increased  Thyroid not enlarged, Trach in place  Cardiovascular:  S1 S2 heard, RSR, No JVD , systolic  murmur at apex, No gallop or rub  Respiratory: Chest wall symmetrical with good air entry ,Percussion note normal,    Lungs vesicular breathing with  rt basilar  rales , no  wheeze	  ABDOMEN:  Soft, Non-tender, obese, G/T, no hepatomegaly or splenomegaly BS positive	  Extremities: Normal range of motion, No clubbing, cyanosis or edema  Vascular: Peripheral pulses palpable 2+ bilaterally  CNS:  Alert and oriented x3   Cranial nerves intact  sensory intact  motor power5/5  dtr 2+   Babinski neg    LABS:                        9.0    8.74  )-----------( 98       ( 13 Oct 2020 06:46 )             27.3     10-13    137  |  98  |  10  ----------------------------<  122<H>  3.6   |  35<H>  |  0.31<L>    Ca    8.1<L>      13 Oct 2020 06:46  Phos  3.2     10-13  Mg     1.8     10-13    TPro  5.6<L>  /  Alb  2.0<L>  /  TBili  0.7  /  DBili  x   /  AST  31  /  ALT  26  /  AlkPhos  140<H>  10-13                       TSH      MICROBIOLOGY:    Culture - Acid Fast - Bronchial w/Smear (collected 10-08-20 @ 22:38)  Source: Bronch Wash Other, bronchial  Preliminary Report (10-10-20 @ 15:04):    Culture is being performed.    Culture - Fungal, Bronchial (collected 10-08-20 @ 22:37)  Source: .Bronchial Other, bronchial  Preliminary Report (10-09-20 @ 08:30):    Testing in progress    Culture - Bronchial (collected 10-08-20 @ 22:37)  Source: .Bronchial bronchial  Gram Stain (10-09-20 @ 07:53):    Moderate polymorphonuclear leukocytes seen per low power field    No Squamous epithelial cells seen per low power field    No organisms seen per oil power field  Final Report (10-10-20 @ 17:55):    Normal Respiratory Shannan present    Culture - Blood (collected 10-08-20 @ 06:59)  Source: .Blood Blood  Final Report (10-13-20 @ 07:01):    No Growth Final    Culture - Blood (collected 10-08-20 @ 06:59)  Source: .Blood Blood  Final Report (10-13-20 @ 07:01):    No Growth Final    Culture - Blood (collected 10-06-20 @ 10:10)  Source: .Blood Blood  Gram Stain (10-08-20 @ 12:16):    Growth in anaerobic bottle: Gram Negative Rods    Growth in aerobic bottle: Gram Negative Rods  Final Report (10-09-20 @ 08:44):    Growth in aerobic and anaerobic bottles: Serratia marcescens    "Due to technical problems, Proteus sp. will Not be reported as part of    the BCID panel until further notice"    ***Blood Panel PCR results on this specimen are available    approximately 3 hours after the Gram stain result.***    Gram stain, PCR, and/or culture results may not always    correspond due to difference in methodologies.    ************************************************************    This PCR assay was performed using [employer].    The following targets are tested for: Enterococcus,    vancomycin resistant enterococci, Listeria monocytogenes,    coagulase negative staphylococci, S. aureus,    methicillin resistant S. aureus, Streptococcus agalactiae    (Group B), S. pneumoniae,S. pyogenes (Group A),    Acinetobacter baumannii, Enterobacter cloacae, E. coli,    Klebsiella oxytoca, K. pneumoniae, Proteus sp.,    Serratia marcescens, Haemophilus influenzae,    Neisseria meningitidis, Pseudomonas aeruginosa, Candida    albicans, C. glabrata, C krusei, C parapsilosis,    C. tropicalis and the KPC resistance gene.    "For positive Serratia,  results cross-reactivity has been occasionally    observed with some species of Pseudomonas and Pantoea"  Organism: Blood Culture PCR  Serratia marcescens (10-09-20 @ 08:44)  Organism: Serratia marcescens (10-09-20 @ 08:44)      -  Amikacin: S <=16      -  Ampicillin: R <=8 These ampicillin results predict results for amoxicillin      -  Ampicillin/Sulbactam: R <=4/2 Enterobacter, Citrobacter, and Serratia may develop resistance during prolonged therapy (3-4 days)      -  Aztreonam: S <=4      -  Cefazolin: R >16 Enterobacter, Citrobacter, and Serratia may develop resistance during prolonged therapy (3-4 days)      -  Cefepime: S <=2      -  Cefoxitin: R 16      -  Ceftriaxone: S <=1 Enterobacter, Citrobacter, and Serratia may develop resistance during prolonged therapy      -  Ciprofloxacin: S <=0.25      -  Ertapenem: S <=0.5      -  Gentamicin: S <=2      -  Levofloxacin: S <=0.5      -  Meropenem: S <=1      -  Piperacillin/Tazobactam: S <=8      -  Tobramycin: S 4      -  Trimethoprim/Sulfamethoxazole: S <=0.5/9.5      Method Type: DALE  Organism: Blood Culture PCR (10-09-20 @ 08:44)      -  Serratia marcescens: Detec      Method Type: PCR    Culture - Blood (collected 10-06-20 @ 10:10)  Source: .Blood Blood  Gram Stain (10-07-20 @ 15:14):    Growth in aerobic bottle: Gram Negative Rods  Final Report (10-08-20 @ 14:28):    Growth in aerobic bottle: Serratia marcescens    See previous culture [id number]

## 2020-10-13 NOTE — PROGRESS NOTE ADULT - PROBLEM SELECTOR PLAN 3
End stage.  Continue mechanical ventilation.  Not a candidate for weaning.  Continue bronchodilators and ICS  Prednisone 10mg daily starting tomorrow.

## 2020-10-13 NOTE — PROGRESS NOTE ADULT - PROBLEM SELECTOR PLAN 2
Continue antibiotics as per ID  Blood cultures serratia marcescens. Repeat cultures on 10/8 Negative  Will need levaquin thru 10/22.

## 2020-10-14 LAB
ALBUMIN SERPL ELPH-MCNC: 1.9 G/DL — LOW (ref 3.5–5)
ALP SERPL-CCNC: 130 U/L — HIGH (ref 40–120)
ALT FLD-CCNC: 22 U/L DA — SIGNIFICANT CHANGE UP (ref 10–60)
ANION GAP SERPL CALC-SCNC: 2 MMOL/L — LOW (ref 5–17)
AST SERPL-CCNC: 19 U/L — SIGNIFICANT CHANGE UP (ref 10–40)
BILIRUB SERPL-MCNC: 0.8 MG/DL — SIGNIFICANT CHANGE UP (ref 0.2–1.2)
BUN SERPL-MCNC: 11 MG/DL — SIGNIFICANT CHANGE UP (ref 7–18)
CALCIUM SERPL-MCNC: 8.1 MG/DL — LOW (ref 8.4–10.5)
CHLORIDE SERPL-SCNC: 94 MMOL/L — LOW (ref 96–108)
CO2 SERPL-SCNC: 37 MMOL/L — HIGH (ref 22–31)
CREAT SERPL-MCNC: 0.28 MG/DL — LOW (ref 0.5–1.3)
GLUCOSE BLDC GLUCOMTR-MCNC: 124 MG/DL — HIGH (ref 70–99)
GLUCOSE BLDC GLUCOMTR-MCNC: 157 MG/DL — HIGH (ref 70–99)
GLUCOSE BLDC GLUCOMTR-MCNC: 226 MG/DL — HIGH (ref 70–99)
GLUCOSE BLDC GLUCOMTR-MCNC: 337 MG/DL — HIGH (ref 70–99)
GLUCOSE SERPL-MCNC: 140 MG/DL — HIGH (ref 70–99)
HCT VFR BLD CALC: 28.7 % — LOW (ref 34.5–45)
HGB BLD-MCNC: 9.1 G/DL — LOW (ref 11.5–15.5)
MAGNESIUM SERPL-MCNC: 1.9 MG/DL — SIGNIFICANT CHANGE UP (ref 1.6–2.6)
MCHC RBC-ENTMCNC: 28.9 PG — SIGNIFICANT CHANGE UP (ref 27–34)
MCHC RBC-ENTMCNC: 31.7 GM/DL — LOW (ref 32–36)
MCV RBC AUTO: 91.1 FL — SIGNIFICANT CHANGE UP (ref 80–100)
NRBC # BLD: 0 /100 WBCS — SIGNIFICANT CHANGE UP (ref 0–0)
PHOSPHATE SERPL-MCNC: 3.7 MG/DL — SIGNIFICANT CHANGE UP (ref 2.5–4.5)
PLATELET # BLD AUTO: 105 K/UL — LOW (ref 150–400)
POTASSIUM SERPL-MCNC: 3.4 MMOL/L — LOW (ref 3.5–5.3)
POTASSIUM SERPL-SCNC: 3.4 MMOL/L — LOW (ref 3.5–5.3)
PROT SERPL-MCNC: 5.4 G/DL — LOW (ref 6–8.3)
RBC # BLD: 3.15 M/UL — LOW (ref 3.8–5.2)
RBC # FLD: 16.4 % — HIGH (ref 10.3–14.5)
SARS-COV-2 RNA SPEC QL NAA+PROBE: SIGNIFICANT CHANGE UP
SODIUM SERPL-SCNC: 133 MMOL/L — LOW (ref 135–145)
WBC # BLD: 10.69 K/UL — HIGH (ref 3.8–10.5)
WBC # FLD AUTO: 10.69 K/UL — HIGH (ref 3.8–10.5)

## 2020-10-14 RX ORDER — POTASSIUM CHLORIDE 20 MEQ
20 PACKET (EA) ORAL ONCE
Refills: 0 | Status: COMPLETED | OUTPATIENT
Start: 2020-10-14 | End: 2020-10-14

## 2020-10-14 RX ADMIN — Medication 4: at 17:23

## 2020-10-14 RX ADMIN — CHLORHEXIDINE GLUCONATE 1 APPLICATION(S): 213 SOLUTION TOPICAL at 05:47

## 2020-10-14 RX ADMIN — Medication 3 UNIT(S): at 11:43

## 2020-10-14 RX ADMIN — GABAPENTIN 600 MILLIGRAM(S): 400 CAPSULE ORAL at 21:27

## 2020-10-14 RX ADMIN — ALBUTEROL 2 PUFF(S): 90 AEROSOL, METERED ORAL at 08:54

## 2020-10-14 RX ADMIN — Medication 650 MILLIGRAM(S): at 14:00

## 2020-10-14 RX ADMIN — Medication 8: at 11:42

## 2020-10-14 RX ADMIN — MORPHINE SULFATE 2 MILLIGRAM(S): 50 CAPSULE, EXTENDED RELEASE ORAL at 20:22

## 2020-10-14 RX ADMIN — ALBUTEROL 2 PUFF(S): 90 AEROSOL, METERED ORAL at 02:17

## 2020-10-14 RX ADMIN — CHLORHEXIDINE GLUCONATE 15 MILLILITER(S): 213 SOLUTION TOPICAL at 17:22

## 2020-10-14 RX ADMIN — ALBUTEROL 2 PUFF(S): 90 AEROSOL, METERED ORAL at 15:12

## 2020-10-14 RX ADMIN — Medication 20 MILLIGRAM(S): at 06:09

## 2020-10-14 RX ADMIN — MORPHINE SULFATE 2 MILLIGRAM(S): 50 CAPSULE, EXTENDED RELEASE ORAL at 20:30

## 2020-10-14 RX ADMIN — INSULIN GLARGINE 10 UNIT(S): 100 INJECTION, SOLUTION SUBCUTANEOUS at 21:28

## 2020-10-14 RX ADMIN — GABAPENTIN 600 MILLIGRAM(S): 400 CAPSULE ORAL at 06:09

## 2020-10-14 RX ADMIN — Medication 20 MILLIEQUIVALENT(S): at 17:22

## 2020-10-14 RX ADMIN — Medication 650 MILLIGRAM(S): at 13:31

## 2020-10-14 RX ADMIN — Medication 2: at 23:49

## 2020-10-14 RX ADMIN — OXYCODONE AND ACETAMINOPHEN 1 TABLET(S): 5; 325 TABLET ORAL at 10:30

## 2020-10-14 RX ADMIN — ATORVASTATIN CALCIUM 40 MILLIGRAM(S): 80 TABLET, FILM COATED ORAL at 21:27

## 2020-10-14 RX ADMIN — OXYCODONE AND ACETAMINOPHEN 1 TABLET(S): 5; 325 TABLET ORAL at 08:32

## 2020-10-14 RX ADMIN — Medication 3 UNIT(S): at 17:25

## 2020-10-14 RX ADMIN — GABAPENTIN 600 MILLIGRAM(S): 400 CAPSULE ORAL at 13:23

## 2020-10-14 RX ADMIN — CARVEDILOL PHOSPHATE 6.25 MILLIGRAM(S): 80 CAPSULE, EXTENDED RELEASE ORAL at 06:09

## 2020-10-14 RX ADMIN — ALBUTEROL 2 PUFF(S): 90 AEROSOL, METERED ORAL at 20:30

## 2020-10-14 RX ADMIN — PANTOPRAZOLE SODIUM 40 MILLIGRAM(S): 20 TABLET, DELAYED RELEASE ORAL at 11:02

## 2020-10-14 RX ADMIN — CARVEDILOL PHOSPHATE 6.25 MILLIGRAM(S): 80 CAPSULE, EXTENDED RELEASE ORAL at 17:23

## 2020-10-14 RX ADMIN — CHLORHEXIDINE GLUCONATE 15 MILLILITER(S): 213 SOLUTION TOPICAL at 05:47

## 2020-10-14 NOTE — PROGRESS NOTE ADULT - SUBJECTIVE AND OBJECTIVE BOX
condition same  feel hungry  on vent,     MEDICATIONS  (STANDING):  ALBUTerol    90 MICROgram(s) HFA Inhaler 2 Puff(s) Inhalation every 6 hours  atorvastatin 40 milliGRAM(s) Oral at bedtime  carvedilol 6.25 milliGRAM(s) Oral every 12 hours  chlorhexidine 0.12% Liquid 15 milliLiter(s) Oral Mucosa every 12 hours  chlorhexidine 2% Cloths 1 Application(s) Topical <User Schedule>  dextrose 5%. 1000 milliLiter(s) (50 mL/Hr) IV Continuous <Continuous>  dextrose 50% Injectable 12.5 Gram(s) IV Push once  dextrose 50% Injectable 25 Gram(s) IV Push once  dextrose 50% Injectable 25 Gram(s) IV Push once  ergocalciferol 67110 Unit(s) Oral <User Schedule>  gabapentin 600 milliGRAM(s) Oral every 8 hours  influenza   Vaccine 0.5 milliLiter(s) IntraMuscular once  insulin glargine Injectable (LANTUS) 10 Unit(s) SubCutaneous at bedtime  insulin lispro (HumaLOG) corrective regimen sliding scale   SubCutaneous every 6 hours  insulin lispro Injectable (HumaLOG) 3 Unit(s) SubCutaneous every 6 hours  levoFLOXacin IVPB 750 milliGRAM(s) IV Intermittent every 24 hours  levoFLOXacin IVPB      pantoprazole  Injectable 40 milliGRAM(s) IV Push daily  predniSONE   Tablet 10 milliGRAM(s) Oral daily    MEDICATIONS  (PRN):  acetaminophen    Suspension .. 650 milliGRAM(s) Oral every 4 hours PRN Mild Pain (1 - 3)  dextrose 40% Gel 15 Gram(s) Oral once PRN Blood Glucose LESS THAN 70 milliGRAM(s)/deciliter  glucagon  Injectable 1 milliGRAM(s) IntraMuscular once PRN Glucose LESS THAN 70 milligrams/deciliter  morphine  - Injectable 2 milliGRAM(s) IV Push every 4 hours PRN Severe Pain (7 - 10)  oxycodone    5 mG/acetaminophen 325 mG 1 Tablet(s) Oral every 4 hours PRN Moderate Pain (4 - 6)  sodium chloride 0.9% lock flush 10 milliLiter(s) IV Push every 1 hour PRN Pre/post blood products, medications, blood draw, and to maintain line patency      Allergies    fish (Angioedema)  penicillins (Rash)    Intolerances        Vital Signs Last 24 Hrs  T(C): 36.6 (14 Oct 2020 05:00), Max: 36.8 (13 Oct 2020 13:22)  T(F): 97.9 (14 Oct 2020 05:00), Max: 98.2 (13 Oct 2020 13:22)  HR: 106 (14 Oct 2020 05:00) (99 - 106)  BP: 121/60 (14 Oct 2020 05:00) (121/60 - 131/68)  BP(mean): --  RR: 18 (14 Oct 2020 05:00) (16 - 22)  SpO2: 98% (14 Oct 2020 05:00) (98% - 100%)    PHYSICAL EXAM  General: adult in NAD  HEENT: clear oropharynx, anicteric sclera, pink conjunctiva  Neck: supple  CV: normal S1/S2 with no murmur rubs or gallops  Lungs: positive air movement b/l ant lungs,clear to auscultation, no wheezes, no rales  Abdomen: soft non-tender non-distended, no hepatosplenomegaly  Ext: no clubbing cyanosis or edema  Skin: no rashes and no petechiae  Neuro: alert and oriented X 4, no focal deficits  LABS:                          9.1    10.69 )-----------( 105      ( 14 Oct 2020 06:20 )             28.7         Mean Cell Volume : 91.1 fl  Mean Cell Hemoglobin : 28.9 pg  Mean Cell Hemoglobin Concentration : 31.7 gm/dL  Auto Neutrophil # : x  Auto Lymphocyte # : x  Auto Monocyte # : x  Auto Eosinophil # : x  Auto Basophil # : x  Auto Neutrophil % : x  Auto Lymphocyte % : x  Auto Monocyte % : x  Auto Eosinophil % : x  Auto Basophil % : x    Serial CBC  Hematocrit 28.7  Hemoglobin 9.1  Plat 105  RBC 3.15  WBC 10.69  Serial CBC  Hematocrit 27.3  Hemoglobin 9.0  Plat 98  RBC 3.05  WBC 8.74  Serial CBC  Hematocrit 23.9  Hemoglobin 8.3  Plat 59  RBC 2.73  WBC 5.77  Serial CBC  Hematocrit 26.6  Hemoglobin 9.1  Plat 33  RBC 3.02  WBC 4.70    10-14    133<L>  |  94<L>  |  11  ----------------------------<  140<H>  3.4<L>   |  37<H>  |  0.28<L>    Ca    8.1<L>      14 Oct 2020 06:20  Phos  3.7     10-14  Mg     1.9     10-14    TPro  5.4<L>  /  Alb  1.9<L>  /  TBili  0.8  /  DBili  x   /  AST  19  /  ALT  22  /  AlkPhos  130<H>  10-14          Reticulocyte Percent: 1.6 % (10-07 @ 21:03)            BLOOD SMEAR INTERPRETATION:       RADIOLOGY & ADDITIONAL STUDIES:

## 2020-10-14 NOTE — PROGRESS NOTE ADULT - SUBJECTIVE AND OBJECTIVE BOX
PULMONARY  progress note    BARB COLÓN  [id number]    Patient is a 65y old  Female who presents with a chief complaint of shortness of breath (14 Oct 2020 09:56)  Feels well except hot, no fever or sob  On vent CMV16//40% with PEEP 5 cm with PP28      MEDICATIONS  (STANDING):  ALBUTerol    90 MICROgram(s) HFA Inhaler 2 Puff(s) Inhalation every 6 hours  atorvastatin 40 milliGRAM(s) Oral at bedtime  carvedilol 6.25 milliGRAM(s) Oral every 12 hours  chlorhexidine 0.12% Liquid 15 milliLiter(s) Oral Mucosa every 12 hours  chlorhexidine 2% Cloths 1 Application(s) Topical <User Schedule>  dextrose 5%. 1000 milliLiter(s) (50 mL/Hr) IV Continuous <Continuous>  dextrose 50% Injectable 12.5 Gram(s) IV Push once  dextrose 50% Injectable 25 Gram(s) IV Push once  dextrose 50% Injectable 25 Gram(s) IV Push once  ergocalciferol 81284 Unit(s) Oral <User Schedule>  gabapentin 600 milliGRAM(s) Oral every 8 hours  influenza   Vaccine 0.5 milliLiter(s) IntraMuscular once  insulin glargine Injectable (LANTUS) 10 Unit(s) SubCutaneous at bedtime  insulin lispro (HumaLOG) corrective regimen sliding scale   SubCutaneous every 6 hours  insulin lispro Injectable (HumaLOG) 3 Unit(s) SubCutaneous every 6 hours  levoFLOXacin IVPB 750 milliGRAM(s) IV Intermittent every 24 hours  levoFLOXacin IVPB      pantoprazole  Injectable 40 milliGRAM(s) IV Push daily  predniSONE   Tablet 10 milliGRAM(s) Oral daily        Allergies    fish (Angioedema)  penicillins (Rash)        PAST MEDICAL & SURGICAL HISTORY:  Malignant neoplasm of unspecified part of right bronchus or lung    HTN (hypertension)    DM (diabetes mellitus)    COPD (chronic obstructive pulmonary disease)    Lung cancer    Morbid obesity    GERD (gastroesophageal reflux disease)    Deviated septum    Prolapsed uterus    ITP (idiopathic thrombocytopenic purpura)    Emphysema/COPD    History of cholecystectomy    S/P lobectomy of lung  right 2017    History of tonsillectomy             REVIEW OF SYSTEMS:  CONSTITUTIONAL: No fever, weight loss, or fatigue   EYES: No eye pain, visual disturbances, or discharge  ENT:  No difficulty hearing, tinnitus, vertigo; No sinus or throat pain  NECK: No pain or stiffness or nodes, TT in place  RESPIRATORY:  cough --  wheezing --  chills--   hemoptysis--  Shortness of Breath--  CARDIOVASCULAR: No chest pain, palpitations, passing out, dizziness, or leg swelling  GASTROINTESTINAL: No abdominal or epigastric pain. No nausea, vomiting, or hematemesis; No diarrhea or constipation. No melena or hematochezia.  GENITOURINARY: No dysuria, frequency, hematuria, or incontinence  NEUROLOGICAL: No headaches, memory loss, loss of strength, numbness, or tremors  SKIN: No itching, burning, rashes, or lesions   LYMPH Nodes: No enlarged glands  ENDOCRINE: No heat or cold intolerance; No hair loss  HEME/LYMPH: No easy bruising, or bleeding gums  ALLERGY AND IMMUNOLOGIC: No hives or eczema    Vital Signs Last 24 Hrs  T(C): 36.6 (14 Oct 2020 05:00), Max: 36.8 (13 Oct 2020 13:22)  T(F): 97.9 (14 Oct 2020 05:00), Max: 98.2 (13 Oct 2020 13:22)  HR: 106 (14 Oct 2020 05:00) (99 - 106)  BP: 121/60 (14 Oct 2020 05:00) (121/60 - 132/76)  BP(mean): --  RR: 18 (14 Oct 2020 05:00) (16 - 22)  SpO2: 98% (14 Oct 2020 05:00) (97% - 100%)  I&O's Detail    13 Oct 2020 07:01  -  14 Oct 2020 07:00  --------------------------------------------------------  IN:  Total IN: 0 mL    OUT:    Indwelling Catheter - Urethral (mL): 750 mL  Total OUT: 750 mL    Total NET: -750 mL          PHYSICAL EXAMINATION:    GENERAL: The patient is a well-developed obese w/f on vent  in no apparent distress.   SKIN no rash ecchymoses or bruises  HEENT: Head is normocephalic and atraumatic  SHAMA , Extraocular muscles are intact. Mucous membranes  moist.   Neck supple ,No LN felt JVP not increased  Thyroid not enlarged, trach tube in place  Cardiovascular:  S1 S2 heard, RSR, No JVD , systolic  murmur at apex, No gallop or rub  Respiratory: Chest wall symmetrical with good air entry ,Percussion note normal,    Lungs vesicular breathing with  rt basilar  rales  or  wheeze	  ABDOMEN:  Soft, Non-tender, obese, G/T in place,  no hepatomegaly or splenomegaly BS positive	  Extremities: Normal range of motion, No clubbing, cyanosis or edema  Vascular: Peripheral pulses palpable 2+ bilaterally  CNS:  Alert and oriented x3   Cranial nerves intact  sensory intact  motor power5/5  dtr 2+   Babinski neg    LABS:                        9.1    10.69 )-----------( 105      ( 14 Oct 2020 06:20 )             28.7     10-14    133<L>  |  94<L>  |  11  ----------------------------<  140<H>  3.4<L>   |  37<H>  |  0.28<L>    Ca    8.1<L>      14 Oct 2020 06:20  Phos  3.7     10-14  Mg     1.9     10-14    TPro  5.4<L>  /  Alb  1.9<L>  /  TBili  0.8  /  DBili  x   /  AST  19  /  ALT  22  /  AlkPhos  130<H>  10-14            MICROBIOLOGY:    Culture - Acid Fast - Bronchial w/Smear (collected 10-08-20 @ 22:38)  Source: Bronch Wash Other, bronchial  Preliminary Report (10-10-20 @ 15:04):    Culture is being performed.    Culture - Fungal, Bronchial (collected 10-08-20 @ 22:37)  Source: .Bronchial Other, bronchial  Preliminary Report (10-09-20 @ 08:30):    Testing in progress    Culture - Bronchial (collected 10-08-20 @ 22:37)  Source: .Bronchial  Gram Stain (10-09-20 @ 07:53):    Moderate polymorphonuclear leukocytes seen per low power field    No Squamous epithelial cells seen per low power field    No organisms seen per oil power field  Final Report (10-10-20 @ 17:55):    Normal Respiratory Shannan present    Culture - Blood (collected 10-08-20 @ 06:59)  Source: .Blood  Final Report (10-13-20 @ 07:01):    No Growth Final    Culture - Blood (collected 10-08-20 @ 06:59)  Source: .Blood   Final Report (10-13-20 @ 07:01):    No Growth Final

## 2020-10-14 NOTE — PROGRESS NOTE ADULT - PROBLEM SELECTOR PLAN 8
Venodyne compression sleeves for DVT prophylaxis.  Discharge planning. Will need vent facility.  Continue mechanical ventilation. Not a candidate for weaning.  Overall prognosis is poor.  Remains a FULL CODE as per wishes.   Antibiotics thru 10/22.

## 2020-10-14 NOTE — PROGRESS NOTE ADULT - SUBJECTIVE AND OBJECTIVE BOX
Interval Events:  pt in nad  tube feeds held overnight due to vomiting    Allergies    fish (Angioedema)  penicillins (Rash)    Intolerances      Endocrine/Metabolic Medications:  atorvastatin 40 milliGRAM(s) Oral at bedtime  dextrose 40% Gel 15 Gram(s) Oral once PRN  dextrose 50% Injectable 12.5 Gram(s) IV Push once  dextrose 50% Injectable 25 Gram(s) IV Push once  dextrose 50% Injectable 25 Gram(s) IV Push once  glucagon  Injectable 1 milliGRAM(s) IntraMuscular once PRN  insulin glargine Injectable (LANTUS) 10 Unit(s) SubCutaneous at bedtime  insulin lispro (HumaLOG) corrective regimen sliding scale   SubCutaneous every 6 hours  insulin lispro Injectable (HumaLOG) 3 Unit(s) SubCutaneous every 6 hours  predniSONE   Tablet 10 milliGRAM(s) Oral daily      Vital Signs Last 24 Hrs  T(C): 36.6 (14 Oct 2020 05:00), Max: 36.8 (13 Oct 2020 13:22)  T(F): 97.9 (14 Oct 2020 05:00), Max: 98.2 (13 Oct 2020 13:22)  HR: 106 (14 Oct 2020 05:00) (99 - 106)  BP: 121/60 (14 Oct 2020 05:00) (121/60 - 132/76)  BP(mean): --  RR: 18 (14 Oct 2020 05:00) (16 - 22)  SpO2: 98% (14 Oct 2020 05:00) (97% - 100%)      PHYSICAL EXAM  All physical exam findings normal, except those marked:  General:	Alert, active, cooperative, NAD, well hydrated  .		[] Abnormal:  Neck		Normal: supple, no cervical adenopathy, no palpable thyroid  .		[] Abnormal:  Cardiovascular	Normal: regular rate, normal S1, S2, no murmurs  .		[] Abnormal:  Respiratory	Normal: no chest wall deformity, normal respiratory pattern, CTA B/L  .		[] Abnormal:  Abdominal	Normal: soft, ND, NT, bowel sounds present, no masses, no organomegaly  .		[] Abnormal:  		Normal normal genitalia, testes descended, circumcised/uncircumcised  .		Dejon stage:			Breast dejon:  .		Menstrual history:  .		[] Abnormal:  Extremities	Normal: FROM x4  .		[] Abnormal:  Skin		Normal: intact and not indurated, no rash, no acanthosis nigricans  .		[] Abnormal:  Neurologic	Normal: grossly intact  .		[] Abnormal:    LABS                        9.1    10.69 )-----------( 105      ( 14 Oct 2020 06:20 )             28.7                               133    |  94     |  11                  Calcium: 8.1   / iCa: x      (10-14 @ 06:20)    ----------------------------<  140       Magnesium: 1.9                              3.4     |  37     |  0.28             Phosphorous: 3.7      TPro  5.4    /  Alb  1.9    /  TBili  0.8    /  DBili  x      /  AST  19     /  ALT  22     /  AlkPhos  130    14 Oct 2020 06:20    CAPILLARY BLOOD GLUCOSE      POCT Blood Glucose.: 124 mg/dL (14 Oct 2020 05:39)  POCT Blood Glucose.: 113 mg/dL (13 Oct 2020 23:25)  POCT Blood Glucose.: 141 mg/dL (13 Oct 2020 21:09)  POCT Blood Glucose.: 216 mg/dL (13 Oct 2020 17:57)  POCT Blood Glucose.: 237 mg/dL (13 Oct 2020 11:17)        Assesment/plan    64 yo female with multiple comorbidities including h/o DM type 2, HTN, COPD on o2, lung ca on right sided s/p resection on chemo admitted with shortness of breath  was intubated/extubated for acute resp failure this admit, now s/p trach and peg on steroids with uncontrolled hyperglycemia. home regimen: basaglar 60 units daily, novolog 15 units pre meals      DM type 2  uncontrolled- better controlled now   complicated by high dose steroid use, acute on chronic resp failure  cont with lantus 10 and change  humalog to 3 q6 ( hold if tube feeds held)   On tube feeds- 40cc goal rate now   fsg q6 hr  aim fsg 140-180    COPD exacerbation- s/p trach  on tapering doses of prednisone 10mg qd   f/u pulm recs

## 2020-10-14 NOTE — PROGRESS NOTE ADULT - ASSESSMENT
· Assessment	  65 year old lady with severe COPD and lung ca with mets to bones which causing compression Fx.  She had one RT for that.  she was admitted for dyspnea and hypoxia.    Problem/Recommendation - 1:  Problem: Lung cancer. Recommendation: with mets to bones  on chemo keytruda, alimta and carboplatin  she had one RT to spine.  the tumor markers have been trending down with chemo  she has pain at throat for a while and has difficulty swallowing, this got better  4?strider   intubation again  trach and peg are done  will discuss with family  Problem/Recommendation - 2:  ·  Problem: COPD (chronic obstructive pulmonary disease).  Recommendation: required freq steroids.     Problem/Recommendation - 3:  ·  Problem: Acute on chronic respiratory failure with hypoxia.  Recommendation: ?aspiration causing resp failure requiring intubation  now on trach and peg feeding  she is alert and requests chemo  discussed with her that, there is no way to do chemo while she is on vent  Lung cancer is not the reason that caused resp failure.  IO can do chemo when she is off the vent.

## 2020-10-14 NOTE — PROGRESS NOTE ADULT - PROBLEM SELECTOR PLAN 3
End stage.  Continue mechanical ventilation.  Not a candidate for weaning.  Continue bronchodilators and ICS  Prednisone 10mg daily

## 2020-10-14 NOTE — PROGRESS NOTE ADULT - PROBLEM SELECTOR PLAN 1
Secondary to end stage COPD and lung cancer.  Continue mechanical ventilation.  Not a candidate for weaning.  Continue bronchodilators and ICS  Continue prednisone with slow taper.

## 2020-10-14 NOTE — PROGRESS NOTE ADULT - ASSESSMENT
COPD with acute asthmatic bronchitis/ RLL pneumonia with  acute  Respiratory Failure with Trach and PEG   Recurrent Lung cancer with Bone Mets   IDDM   Htn with MR   Thrombocytopenia-( improving  ) and Anemia   Obesity with CRIS   serratia  bacteremia     Plan-  po  levaquin x 2 weeks total and then d/c  /CMV12/ 35% with Peep5 cm alternating with cpap day time  Continue present medications  Venodynes   FS coverage   D/C to vent facility

## 2020-10-15 ENCOUNTER — TRANSCRIPTION ENCOUNTER (OUTPATIENT)
Age: 65
End: 2020-10-15

## 2020-10-15 VITALS
OXYGEN SATURATION: 100 % | RESPIRATION RATE: 16 BRPM | SYSTOLIC BLOOD PRESSURE: 124 MMHG | DIASTOLIC BLOOD PRESSURE: 72 MMHG | HEART RATE: 104 BPM

## 2020-10-15 LAB
ALBUMIN SERPL ELPH-MCNC: 1.9 G/DL — LOW (ref 3.5–5)
ALP SERPL-CCNC: 142 U/L — HIGH (ref 40–120)
ALT FLD-CCNC: 22 U/L DA — SIGNIFICANT CHANGE UP (ref 10–60)
ANION GAP SERPL CALC-SCNC: 6 MMOL/L — SIGNIFICANT CHANGE UP (ref 5–17)
AST SERPL-CCNC: 19 U/L — SIGNIFICANT CHANGE UP (ref 10–40)
BILIRUB SERPL-MCNC: 0.7 MG/DL — SIGNIFICANT CHANGE UP (ref 0.2–1.2)
BUN SERPL-MCNC: 13 MG/DL — SIGNIFICANT CHANGE UP (ref 7–18)
CALCIUM SERPL-MCNC: 8.3 MG/DL — LOW (ref 8.4–10.5)
CHLORIDE SERPL-SCNC: 95 MMOL/L — LOW (ref 96–108)
CO2 SERPL-SCNC: 36 MMOL/L — HIGH (ref 22–31)
CREAT SERPL-MCNC: 0.42 MG/DL — LOW (ref 0.5–1.3)
GLUCOSE BLDC GLUCOMTR-MCNC: 152 MG/DL — HIGH (ref 70–99)
GLUCOSE BLDC GLUCOMTR-MCNC: 174 MG/DL — HIGH (ref 70–99)
GLUCOSE BLDC GLUCOMTR-MCNC: 183 MG/DL — HIGH (ref 70–99)
GLUCOSE SERPL-MCNC: 205 MG/DL — HIGH (ref 70–99)
HCT VFR BLD CALC: 27.1 % — LOW (ref 34.5–45)
HGB BLD-MCNC: 8.7 G/DL — LOW (ref 11.5–15.5)
MAGNESIUM SERPL-MCNC: 2.1 MG/DL — SIGNIFICANT CHANGE UP (ref 1.6–2.6)
MCHC RBC-ENTMCNC: 29.4 PG — SIGNIFICANT CHANGE UP (ref 27–34)
MCHC RBC-ENTMCNC: 32.1 GM/DL — SIGNIFICANT CHANGE UP (ref 32–36)
MCV RBC AUTO: 91.6 FL — SIGNIFICANT CHANGE UP (ref 80–100)
NRBC # BLD: 0 /100 WBCS — SIGNIFICANT CHANGE UP (ref 0–0)
PHOSPHATE SERPL-MCNC: 2.7 MG/DL — SIGNIFICANT CHANGE UP (ref 2.5–4.5)
PLATELET # BLD AUTO: 129 K/UL — LOW (ref 150–400)
POTASSIUM SERPL-MCNC: 3.5 MMOL/L — SIGNIFICANT CHANGE UP (ref 3.5–5.3)
POTASSIUM SERPL-SCNC: 3.5 MMOL/L — SIGNIFICANT CHANGE UP (ref 3.5–5.3)
PROT SERPL-MCNC: 5.2 G/DL — LOW (ref 6–8.3)
RBC # BLD: 2.96 M/UL — LOW (ref 3.8–5.2)
RBC # FLD: 16.5 % — HIGH (ref 10.3–14.5)
SODIUM SERPL-SCNC: 137 MMOL/L — SIGNIFICANT CHANGE UP (ref 135–145)
WBC # BLD: 11.52 K/UL — HIGH (ref 3.8–10.5)
WBC # FLD AUTO: 11.52 K/UL — HIGH (ref 3.8–10.5)

## 2020-10-15 PROCEDURE — 93010 ELECTROCARDIOGRAM REPORT: CPT

## 2020-10-15 RX ORDER — TIOTROPIUM BROMIDE 18 UG/1
2 CAPSULE ORAL; RESPIRATORY (INHALATION)
Qty: 0 | Refills: 0 | DISCHARGE

## 2020-10-15 RX ORDER — CARVEDILOL PHOSPHATE 80 MG/1
1 CAPSULE, EXTENDED RELEASE ORAL
Qty: 0 | Refills: 0 | DISCHARGE

## 2020-10-15 RX ORDER — AMLODIPINE BESYLATE 2.5 MG/1
1 TABLET ORAL
Qty: 0 | Refills: 0 | DISCHARGE

## 2020-10-15 RX ORDER — INSULIN LISPRO 100/ML
1 VIAL (ML) SUBCUTANEOUS
Qty: 1 | Refills: 0
Start: 2020-10-15

## 2020-10-15 RX ORDER — GABAPENTIN 400 MG/1
1 CAPSULE ORAL
Qty: 90 | Refills: 0
Start: 2020-10-15 | End: 2020-11-13

## 2020-10-15 RX ORDER — CARVEDILOL PHOSPHATE 80 MG/1
1 CAPSULE, EXTENDED RELEASE ORAL
Qty: 0 | Refills: 0 | DISCHARGE
Start: 2020-10-15

## 2020-10-15 RX ORDER — OMEPRAZOLE 10 MG/1
1 CAPSULE, DELAYED RELEASE ORAL
Qty: 0 | Refills: 0 | DISCHARGE

## 2020-10-15 RX ORDER — INSULIN GLARGINE 100 [IU]/ML
60 INJECTION, SOLUTION SUBCUTANEOUS
Qty: 0 | Refills: 0 | DISCHARGE

## 2020-10-15 RX ORDER — FOLIC ACID 0.8 MG
1 TABLET ORAL
Qty: 0 | Refills: 0 | DISCHARGE

## 2020-10-15 RX ORDER — INSULIN LISPRO 100/ML
3 VIAL (ML) SUBCUTANEOUS
Qty: 1 | Refills: 0
Start: 2020-10-15 | End: 2020-11-13

## 2020-10-15 RX ORDER — ERGOCALCIFEROL 1.25 MG/1
1 CAPSULE ORAL
Qty: 0 | Refills: 0 | DISCHARGE

## 2020-10-15 RX ORDER — ATORVASTATIN CALCIUM 80 MG/1
1 TABLET, FILM COATED ORAL
Qty: 0 | Refills: 0 | DISCHARGE

## 2020-10-15 RX ORDER — CARVEDILOL PHOSPHATE 80 MG/1
12.5 CAPSULE, EXTENDED RELEASE ORAL EVERY 12 HOURS
Refills: 0 | Status: DISCONTINUED | OUTPATIENT
Start: 2020-10-15 | End: 2020-10-15

## 2020-10-15 RX ORDER — POLYETHYLENE GLYCOL 3350 17 G/17G
17 POWDER, FOR SOLUTION ORAL
Qty: 476 | Refills: 0
Start: 2020-10-15 | End: 2020-10-28

## 2020-10-15 RX ORDER — CIPROFLOXACIN LACTATE 400MG/40ML
1 VIAL (ML) INTRAVENOUS
Qty: 7 | Refills: 0
Start: 2020-10-15 | End: 2020-10-21

## 2020-10-15 RX ADMIN — Medication 2: at 17:08

## 2020-10-15 RX ADMIN — Medication 3 UNIT(S): at 11:41

## 2020-10-15 RX ADMIN — Medication 3 UNIT(S): at 06:00

## 2020-10-15 RX ADMIN — Medication 650 MILLIGRAM(S): at 04:00

## 2020-10-15 RX ADMIN — CHLORHEXIDINE GLUCONATE 1 APPLICATION(S): 213 SOLUTION TOPICAL at 06:00

## 2020-10-15 RX ADMIN — GABAPENTIN 600 MILLIGRAM(S): 400 CAPSULE ORAL at 05:57

## 2020-10-15 RX ADMIN — ALBUTEROL 2 PUFF(S): 90 AEROSOL, METERED ORAL at 09:36

## 2020-10-15 RX ADMIN — CHLORHEXIDINE GLUCONATE 15 MILLILITER(S): 213 SOLUTION TOPICAL at 17:09

## 2020-10-15 RX ADMIN — Medication 2: at 05:59

## 2020-10-15 RX ADMIN — CARVEDILOL PHOSPHATE 12.5 MILLIGRAM(S): 80 CAPSULE, EXTENDED RELEASE ORAL at 17:08

## 2020-10-15 RX ADMIN — Medication 650 MILLIGRAM(S): at 03:16

## 2020-10-15 RX ADMIN — Medication 3 UNIT(S): at 17:08

## 2020-10-15 RX ADMIN — PANTOPRAZOLE SODIUM 40 MILLIGRAM(S): 20 TABLET, DELAYED RELEASE ORAL at 11:41

## 2020-10-15 RX ADMIN — CHLORHEXIDINE GLUCONATE 15 MILLILITER(S): 213 SOLUTION TOPICAL at 05:58

## 2020-10-15 RX ADMIN — CARVEDILOL PHOSPHATE 6.25 MILLIGRAM(S): 80 CAPSULE, EXTENDED RELEASE ORAL at 05:57

## 2020-10-15 RX ADMIN — ALBUTEROL 2 PUFF(S): 90 AEROSOL, METERED ORAL at 04:22

## 2020-10-15 RX ADMIN — ALBUTEROL 2 PUFF(S): 90 AEROSOL, METERED ORAL at 15:47

## 2020-10-15 RX ADMIN — Medication 2: at 11:41

## 2020-10-15 RX ADMIN — MORPHINE SULFATE 2 MILLIGRAM(S): 50 CAPSULE, EXTENDED RELEASE ORAL at 06:06

## 2020-10-15 RX ADMIN — Medication 10 MILLIGRAM(S): at 06:00

## 2020-10-15 RX ADMIN — GABAPENTIN 600 MILLIGRAM(S): 400 CAPSULE ORAL at 14:47

## 2020-10-15 NOTE — PROGRESS NOTE ADULT - SUBJECTIVE AND OBJECTIVE BOX
PULMONARY  progress note    BARB COLÓN  MRN-[id number]    Patient is a 65y old  Female who presents with a chief complaint of shortness of breath (15 Oct 2020 10:24)  Pt on vent in chair c/o retrosternal non radiating chest discomfort      MEDICATIONS  (STANDING):  ALBUTerol    90 MICROgram(s) HFA Inhaler 2 Puff(s) Inhalation every 6 hours  atorvastatin 40 milliGRAM(s) Oral at bedtime  carvedilol 6.25 milliGRAM(s) Oral every 12 hours  chlorhexidine 0.12% Liquid 15 milliLiter(s) Oral Mucosa every 12 hours  chlorhexidine 2% Cloths 1 Application(s) Topical <User Schedule>  dextrose 5%. 1000 milliLiter(s) (50 mL/Hr) IV Continuous <Continuous>  dextrose 50% Injectable 12.5 Gram(s) IV Push once  dextrose 50% Injectable 25 Gram(s) IV Push once  dextrose 50% Injectable 25 Gram(s) IV Push once  ergocalciferol 59870 Unit(s) Oral <User Schedule>  gabapentin 600 milliGRAM(s) Oral every 8 hours  influenza   Vaccine 0.5 milliLiter(s) IntraMuscular once  insulin glargine Injectable (LANTUS) 10 Unit(s) SubCutaneous at bedtime  insulin lispro (HumaLOG) corrective regimen sliding scale   SubCutaneous every 6 hours  insulin lispro Injectable (HumaLOG) 3 Unit(s) SubCutaneous every 6 hours  levoFLOXacin IVPB 750 milliGRAM(s) IV Intermittent every 24 hours  levoFLOXacin IVPB      pantoprazole  Injectable 40 milliGRAM(s) IV Push daily  predniSONE   Tablet 10 milliGRAM(s) Oral daily      MEDICATIONS  (PRN):  acetaminophen    Suspension .. 650 milliGRAM(s) Oral every 4 hours PRN Mild Pain (1 - 3)  dextrose 40% Gel 15 Gram(s) Oral once PRN Blood Glucose LESS THAN 70 milliGRAM(s)/deciliter  glucagon  Injectable 1 milliGRAM(s) IntraMuscular once PRN Glucose LESS THAN 70 milligrams/deciliter  morphine  - Injectable 2 milliGRAM(s) IV Push every 4 hours PRN Severe Pain (7 - 10)  oxycodone    5 mG/acetaminophen 325 mG 1 Tablet(s) Oral every 4 hours PRN Moderate Pain (4 - 6)  sodium chloride 0.9% lock flush 10 milliLiter(s) IV Push every 1 hour PRN Pre/post blood products, medications, blood draw, and to maintain line patency      Allergies    fish (Angioedema)  penicillins (Rash)          PAST MEDICAL & SURGICAL HISTORY:  Malignant neoplasm of unspecified part of right bronchus or lung    HTN (hypertension)    DM (diabetes mellitus)    COPD (chronic obstructive pulmonary disease)    Lung cancer    Morbid obesity    GERD (gastroesophageal reflux disease)    Deviated septum    Prolapsed uterus    ITP (idiopathic thrombocytopenic purpura)    Emphysema/COPD    History of cholecystectomy    S/P lobectomy of lung  right 2017    History of tonsillectomy             REVIEW OF SYSTEMS:  CONSTITUTIONAL: No fever, weight loss, or fatigue   EYES: No eye pain, visual disturbances, or discharge  ENT:  No difficulty hearing, tinnitus, vertigo; No sinus or throat pain  NECK: No pain or stiffness or nodes  RESPIRATORY:  cough--   wheezing--   chills --  hemoptysis--  Shortness of Breath--  CARDIOVASCULAR:  chest pain+, no palpitations, passing out, dizziness, or leg swelling  GASTROINTESTINAL: No abdominal or epigastric pain. No nausea, vomiting, or hematemesis; No diarrhea or constipation. No melena or hematochezia.  GENITOURINARY: No dysuria, frequency, hematuria, or incontinence  NEUROLOGICAL: No headaches, memory loss, loss of strength, numbness, or tremors  SKIN: No itching, burning, rashes, or lesions   LYMPH Nodes: No enlarged glands  ENDOCRINE: No heat or cold intolerance; No hair loss  ALLERGY AND IMMUNOLOGIC: No hives or eczema    Vital Signs Last 24 Hrs  T(C): 36.6 (15 Oct 2020 05:00), Max: 36.6 (14 Oct 2020 12:44)  T(F): 97.9 (15 Oct 2020 05:00), Max: 97.9 (14 Oct 2020 12:44)  HR: 108 (15 Oct 2020 09:55) (87 - 108)  BP: 138/76 (15 Oct 2020 09:55) (107/80 - 138/76)  BP(mean): --  RR: 17 (15 Oct 2020 05:00) (16 - 19)  SpO2: 100% (15 Oct 2020 09:55) (97% - 100%)  I&O's Detail    14 Oct 2020 07:01  -  15 Oct 2020 07:00  --------------------------------------------------------  IN:    IV PiggyBack: 150 mL  Total IN: 150 mL    OUT:    Indwelling Catheter - Urethral (mL): 1000 mL  Total OUT: 1000 mL    Total NET: -850 mL          PHYSICAL EXAMINATION:    GENERAL: The patient is a well-developed, intubated onCMV16//40% with PP23.8 in no apparent distress.   SKIN no rash ecchymoses or bruises  HEENT: Head is normocephalic and atraumatic  SHAMA , Extraocular muscles are intact. Mucous membranes  moist.   Neck supple ,No LN felt JVP not increased  Thyroid not enlarged, TT in place  Cardiovascular:  S1 S2 heard, RSR, No JVD , systolic  murmur at apex, No gallop or rub  Respiratory: Chest wall symmetrical with good air entry ,Percussion note normal,    Lungs vesicular breathing with  rt basal  rales , no   wheeze	  ABDOMEN:  Soft, Non-tender, obese, G/T in place no hepatomegaly or splenomegaly BS positive	  Extremities: Normal range of motion, No clubbing, cyanosis or edema  Vascular: Peripheral pulses palpable 2+ bilaterally  CNS:  Alert and oriented x3   Cranial nerves intact  sensory intact  motor power5/5  dtr 2+   Babinski neg    LABS:                        8.7    11.52 )-----------( 129      ( 15 Oct 2020 06:36 )             27.1     10-15    137  |  95<L>  |  13  ----------------------------<  205<H>  3.5   |  36<H>  |  0.42<L>    Ca    8.3<L>      15 Oct 2020 06:36  Phos  2.7     10-15  Mg     2.1     10-15    TPro  5.2<L>  /  Alb  1.9<L>  /  TBili  0.7  /  DBili  x   /  AST  19  /  ALT  22  /  AlkPhos  142<H>  10-15                    MICROBIOLOGY:    Culture - Acid Fast - Bronchial w/Smear (collected 10-08-20 @ 22:38)  Source: Bronch Wash Other, bronchial  Preliminary Report (10-10-20 @ 15:04):    Culture is being performed.    Culture - Fungal, Bronchial (collected 10-08-20 @ 22:37)  Source: .Bronchial Other, bronchial  Preliminary Report (10-09-20 @ 08:30):    Testing in progress    Culture - Bronchial (collected 10-08-20 @ 22:37)  Source: .Bronchial bronchial  Gram Stain (10-09-20 @ 07:53):    Moderate polymorphonuclear leukocytes seen per low power field    No Squamous epithelial cells seen per low power field    No organisms seen per oil power field  Final Report (10-10-20 @ 17:55):    Normal Respiratory Shannan present

## 2020-10-15 NOTE — PROGRESS NOTE ADULT - PROVIDER SPECIALTY LIST ADULT
Critical Care
Endocrinology
Heme/Onc
Infectious Disease
Internal Medicine
Internal Medicine
Palliative Care
Pulmonology
Thoracic Surgery
Critical Care
Endocrinology
Heme/Onc
Heme/Onc
Pulmonology
Pulmonology
Critical Care

## 2020-10-15 NOTE — DISCHARGE NOTE NURSING/CASE MANAGEMENT/SOCIAL WORK - PATIENT PORTAL LINK FT
You can access the FollowMyHealth Patient Portal offered by [facility] by registering at the following website: [email or web address]. By joining [facility]’s FollowMyHealth portal, you will also be able to view your health information using other applications (apps) compatible with our system.

## 2020-10-15 NOTE — PROGRESS NOTE ADULT - PROBLEM SELECTOR PLAN 2
End stage.  Continue mechanical ventilation.  Not a candidate for weaning.  Continue bronchodilators and ICS  Prednisone 10mg daily.

## 2020-10-15 NOTE — PROGRESS NOTE ADULT - SUBJECTIVE AND OBJECTIVE BOX
Interval Events:  pt sitting in the chair   in nad    Allergies    fish (Angioedema)  penicillins (Rash)    Intolerances      Endocrine/Metabolic Medications:  atorvastatin 40 milliGRAM(s) Oral at bedtime  dextrose 40% Gel 15 Gram(s) Oral once PRN  dextrose 50% Injectable 12.5 Gram(s) IV Push once  dextrose 50% Injectable 25 Gram(s) IV Push once  dextrose 50% Injectable 25 Gram(s) IV Push once  glucagon  Injectable 1 milliGRAM(s) IntraMuscular once PRN  insulin glargine Injectable (LANTUS) 10 Unit(s) SubCutaneous at bedtime  insulin lispro (HumaLOG) corrective regimen sliding scale   SubCutaneous every 6 hours  insulin lispro Injectable (HumaLOG) 3 Unit(s) SubCutaneous every 6 hours  predniSONE   Tablet 10 milliGRAM(s) Oral daily      Vital Signs Last 24 Hrs  T(C): 36.6 (15 Oct 2020 05:00), Max: 36.6 (14 Oct 2020 12:44)  T(F): 97.9 (15 Oct 2020 05:00), Max: 97.9 (14 Oct 2020 12:44)  HR: 108 (15 Oct 2020 09:55) (87 - 108)  BP: 138/76 (15 Oct 2020 09:55) (107/80 - 138/76)  BP(mean): --  RR: 17 (15 Oct 2020 05:00) (16 - 19)  SpO2: 100% (15 Oct 2020 09:55) (97% - 100%)      PHYSICAL EXAM  All physical exam findings normal, except those marked:  General:	Alert, active, cooperative, NAD, well hydrated  .		[] Abnormal:  Neck		Normal: supple, no cervical adenopathy, no palpable thyroid  .		[] Abnormal:  Cardiovascular	Normal: regular rate, normal S1, S2, no murmurs  .		[] Abnormal:  Respiratory	Normal: no chest wall deformity, normal respiratory pattern, CTA B/L  .		[] Abnormal:  Abdominal	Normal: soft, ND, NT, bowel sounds present, no masses, no organomegaly  .		[] Abnormal:  		Normal normal genitalia, testes descended, circumcised/uncircumcised  .		Dejon stage:			Breast dejon:  .		Menstrual history:  .		[] Abnormal:  Extremities	Normal: FROM x4  .		[] Abnormal:  Skin		Normal: intact and not indurated, no rash, no acanthosis nigricans  .		[] Abnormal:  Neurologic	Normal: grossly intact  .		[] Abnormal:    LABS                        8.7    11.52 )-----------( 129      ( 15 Oct 2020 06:36 )             27.1                               137    |  95     |  13                  Calcium: 8.3   / iCa: x      (10-15 @ 06:36)    ----------------------------<  205       Magnesium: 2.1                              3.5     |  36     |  0.42             Phosphorous: 2.7      TPro  5.2    /  Alb  1.9    /  TBili  0.7    /  DBili  x      /  AST  19     /  ALT  22     /  AlkPhos  142    15 Oct 2020 06:36    CAPILLARY BLOOD GLUCOSE      POCT Blood Glucose.: 152 mg/dL (15 Oct 2020 05:56)  POCT Blood Glucose.: 157 mg/dL (14 Oct 2020 23:46)  POCT Blood Glucose.: 226 mg/dL (14 Oct 2020 16:46)  POCT Blood Glucose.: 337 mg/dL (14 Oct 2020 11:34)        Assesment/plan    64 yo female with multiple comorbidities including h/o DM type 2, HTN, COPD on o2, lung ca on right sided s/p resection on chemo admitted with shortness of breath  was intubated/extubated for acute resp failure this admit, now s/p trach and peg on steroids with uncontrolled hyperglycemia. home regimen: basaglar 60 units daily, novolog 15 units pre meals      DM type 2  uncontrolled- better controlled now   complicated by high dose steroid use, acute on chronic resp failure  cont with lantus 10 and  humalog to 3 q6 ( hold if tube feeds held)   On tube feeds- 40cc goal rate now   fsg q6 hr  aim fsg 140-180    COPD exacerbation- s/p trach  on tapering doses of prednisone 10mg qd   f/u pulm recs

## 2020-10-15 NOTE — PROGRESS NOTE ADULT - ASSESSMENT
COPD with acute asthmatic bronchitis/ RLL pneumonia with  acute  Respiratory Failure with Trach and PEG   Recurrent Lung cancer with Bone Mets   IDDM   Htn with MR   Thrombocytopenia-( improving  ) and Anemia   Obesity with CRIS   serratia  bacteremia     Plan-  po  levaquin x 2 weeks total and then d/c  /CMV12/ 35% with Peep5 cm alternating with cpap day time  Continue present medications  Venodynes   FS coverage  EKG

## 2020-10-15 NOTE — PROGRESS NOTE ADULT - ASSESSMENT
· Assessment	  65 year old lady with severe COPD and lung ca with mets to bones which causing compression Fx.  She had one RT for that.  she was admitted for dyspnea and hypoxia.    Problem/Recommendation - 1:  Problem: Lung cancer. Recommendation: with mets to bones  on chemo keytruda, alimta and carboplatin  she had one RT to spine.  the tumor markers have been trending down with chemo  she has pain at throat for a while and has difficulty swallowing, this got better  4?strider   intubation again  trach and peg are done  will discuss with family  Problem/Recommendation - 2:  ·  Problem: COPD (chronic obstructive pulmonary disease).  Recommendation: required freq steroids.     Problem/Recommendation - 3:  ·  Problem: Acute on chronic respiratory failure with hypoxia.  Recommendation: ?aspiration causing resp failure requiring intubation  now on trach and peg feeding  she is alert and requests chemo  discussed with her that, there is no way to do chemo while she is on vent  Lung cancer is not the reason that caused resp failur  but pulmonary does not think she is weanable.

## 2020-10-15 NOTE — PROGRESS NOTE ADULT - NSTELEHEALTH_GEN_ALL_CORE
No

## 2020-10-15 NOTE — PROGRESS NOTE ADULT - SUBJECTIVE AND OBJECTIVE BOX
BARB COLÓN    SCU progress note    INTERVAL HPI/OVERNIGHT EVENTS: ***No overnight events. OOB to chair with PT this morning.     DNR [ ]   DNI  [  ]   FULL CODE    Covid - 19 PCR: Negative 10/13    The 4Ms    What Matters Most: see GO  Age appropriate Medications/Screen for High Risk Medication: Yes  Mentation: see CAM below  Mobility: defer to physical exam    The Confusion Assessment Method (CAM) Diagnostic Algorithm     1: Acute Onset or Fluctuating Course  - Is there evidence of an acute change in mental status from the patient’s baseline? Did the (abnormal) behavior  fluctuate during the day, that is, tend to come and go, or increase and decrease in severity?  [ ] YES [x ] NO     2: Inattention  - Did the patient have difficulty focusing attention, being easily distractible, or having difficulty keeping track of what was being said?  [ ] YES [x ] NO     3: Disorganized thinking  -Was the patient’s thinking disorganized or incoherent, such as rambling or irrelevant conversation, unclear or illogical flow of ideas, or unpredictable switching from subject to subject?  [ ] YES [x ] NO    4: Altered Level of consciousness?  [ ] YES [x ] NO    The diagnosis of delirium by CAM requires the presence of features 1 and 2 and either 3 or 4.    PRESSORS: [ ] YES [x ] NO  levoFLOXacin IVPB 750 milliGRAM(s) IV Intermittent every 24 hours  levoFLOXacin IVPB        Cardiovascular:  Heart Failure  Acute   Acute on Chronic  Chronic       carvedilol 12.5 milliGRAM(s) Oral every 12 hours    Pulmonary:  ALBUTerol    90 MICROgram(s) HFA Inhaler 2 Puff(s) Inhalation every 6 hours    Hematalogic:    Other:  acetaminophen    Suspension .. 650 milliGRAM(s) Oral every 4 hours PRN  atorvastatin 40 milliGRAM(s) Oral at bedtime  chlorhexidine 0.12% Liquid 15 milliLiter(s) Oral Mucosa every 12 hours  chlorhexidine 2% Cloths 1 Application(s) Topical <User Schedule>  dextrose 40% Gel 15 Gram(s) Oral once PRN  dextrose 5%. 1000 milliLiter(s) IV Continuous <Continuous>  dextrose 50% Injectable 12.5 Gram(s) IV Push once  dextrose 50% Injectable 25 Gram(s) IV Push once  dextrose 50% Injectable 25 Gram(s) IV Push once  ergocalciferol 76220 Unit(s) Oral <User Schedule>  gabapentin 600 milliGRAM(s) Oral every 8 hours  glucagon  Injectable 1 milliGRAM(s) IntraMuscular once PRN  influenza   Vaccine 0.5 milliLiter(s) IntraMuscular once  insulin glargine Injectable (LANTUS) 10 Unit(s) SubCutaneous at bedtime  insulin lispro (HumaLOG) corrective regimen sliding scale   SubCutaneous every 6 hours  insulin lispro Injectable (HumaLOG) 3 Unit(s) SubCutaneous every 6 hours  morphine  - Injectable 2 milliGRAM(s) IV Push every 4 hours PRN  oxycodone    5 mG/acetaminophen 325 mG 1 Tablet(s) Oral every 4 hours PRN  pantoprazole  Injectable 40 milliGRAM(s) IV Push daily  predniSONE   Tablet 10 milliGRAM(s) Oral daily  sodium chloride 0.9% lock flush 10 milliLiter(s) IV Push every 1 hour PRN    acetaminophen    Suspension .. 650 milliGRAM(s) Oral every 4 hours PRN  ALBUTerol    90 MICROgram(s) HFA Inhaler 2 Puff(s) Inhalation every 6 hours  atorvastatin 40 milliGRAM(s) Oral at bedtime  carvedilol 12.5 milliGRAM(s) Oral every 12 hours  chlorhexidine 0.12% Liquid 15 milliLiter(s) Oral Mucosa every 12 hours  chlorhexidine 2% Cloths 1 Application(s) Topical <User Schedule>  dextrose 40% Gel 15 Gram(s) Oral once PRN  dextrose 5%. 1000 milliLiter(s) IV Continuous <Continuous>  dextrose 50% Injectable 12.5 Gram(s) IV Push once  dextrose 50% Injectable 25 Gram(s) IV Push once  dextrose 50% Injectable 25 Gram(s) IV Push once  ergocalciferol 21450 Unit(s) Oral <User Schedule>  gabapentin 600 milliGRAM(s) Oral every 8 hours  glucagon  Injectable 1 milliGRAM(s) IntraMuscular once PRN  influenza   Vaccine 0.5 milliLiter(s) IntraMuscular once  insulin glargine Injectable (LANTUS) 10 Unit(s) SubCutaneous at bedtime  insulin lispro (HumaLOG) corrective regimen sliding scale   SubCutaneous every 6 hours  insulin lispro Injectable (HumaLOG) 3 Unit(s) SubCutaneous every 6 hours  levoFLOXacin IVPB 750 milliGRAM(s) IV Intermittent every 24 hours  levoFLOXacin IVPB      morphine  - Injectable 2 milliGRAM(s) IV Push every 4 hours PRN  oxycodone    5 mG/acetaminophen 325 mG 1 Tablet(s) Oral every 4 hours PRN  pantoprazole  Injectable 40 milliGRAM(s) IV Push daily  predniSONE   Tablet 10 milliGRAM(s) Oral daily  sodium chloride 0.9% lock flush 10 milliLiter(s) IV Push every 1 hour PRN    Drug Dosing Weight  Height (cm): 154.9 (19 Sep 2020 08:00)  Weight (kg): 84.8 (21 Sep 2020 09:15)  BMI (kg/m2): 35.3 (21 Sep 2020 09:15)  BSA (m2): 1.84 (21 Sep 2020 09:15)    CENTRAL LINE: [ ] YES [x ] NO  LOCATION:   DATE INSERTED:  REMOVE: [ ] YES [ ] NO  EXPLAIN:    ROBLEDO: [x ] YES [ ] NO    DATE INSERTED:  REMOVE:  [x ] YES [ ] NO  EXPLAIN:  TOV ordered    PAST MEDICAL & SURGICAL HISTORY:  Malignant neoplasm of unspecified part of right bronchus or lung    HTN (hypertension)    DM (diabetes mellitus)    COPD (chronic obstructive pulmonary disease)    Lung cancer    Morbid obesity    GERD (gastroesophageal reflux disease)    Deviated septum    Prolapsed uterus    ITP (idiopathic thrombocytopenic purpura)    Emphysema/COPD    History of cholecystectomy    S/P lobectomy of lung  right 2017    History of tonsillectomy                10-14 @ 07:01  -  10-15 @ 07:00  --------------------------------------------------------  IN: 150 mL / OUT: 1000 mL / NET: -850 mL        Mode: AC/ CMV (Assist Control/ Continuous Mandatory Ventilation)  RR (machine): 16  TV (machine): 400  FiO2: 40  PEEP: 5  ITime: 1  MAP: 8  PIP: 21      PHYSICAL EXAM:    GENERAL: NAD, well-groomed, well-developed  HEAD:  Atraumatic, Normocephalic  EYES: EOMI, PERRLA, conjunctiva and sclera clear  ENMT: No tonsillar erythema, exudates  NECK: Supple, No JVD, tracheostomy intact  NERVOUS SYSTEM:  Alert & Oriented X3, Follows commands, moving all extremities.  CHEST/LUNG: Diminished breath sounds bilateral bases  HEART: Regular rate and rhythm; No murmurs, rubs, or gallops  ABDOMEN: Soft, Nontender, Nondistended; Bowel sounds present; Peg intact  EXTREMITIES:  2+ Peripheral Pulses, No clubbing, cyanosis, or edema  LYMPH: No lymphadenopathy noted  SKIN: No rashes or lesions      LABS:  CBC Full  -  ( 15 Oct 2020 06:36 )  WBC Count : 11.52 K/uL  RBC Count : 2.96 M/uL  Hemoglobin : 8.7 g/dL  Hematocrit : 27.1 %  Platelet Count - Automated : 129 K/uL  Mean Cell Volume : 91.6 fl  Mean Cell Hemoglobin : 29.4 pg  Mean Cell Hemoglobin Concentration : 32.1 gm/dL  Auto Neutrophil # : x  Auto Lymphocyte # : x  Auto Monocyte # : x  Auto Eosinophil # : x  Auto Basophil # : x  Auto Neutrophil % : x  Auto Lymphocyte % : x  Auto Monocyte % : x  Auto Eosinophil % : x  Auto Basophil % : x    10-15    137  |  95<L>  |  13  ----------------------------<  205<H>  3.5   |  36<H>  |  0.42<L>    Ca    8.3<L>      15 Oct 2020 06:36  Phos  2.7     10-15  Mg     2.1     10-15    TPro  5.2<L>  /  Alb  1.9<L>  /  TBili  0.7  /  DBili  x   /  AST  19  /  ALT  22  /  AlkPhos  142<H>  10-15              [  ]  DVT Prophylaxis  [  ]  Nutrition, Brand, Rate         Goal Rate        Abnormal Nutritional Status -  Malnutrition   Cachexia      Morbid Obesity BMI >/=40    RADIOLOGY & ADDITIONAL STUDIES:  ***  < from: Xray Chest 1 View- PORTABLE-Routine (Xray Chest 1 View- PORTABLE-Routine .) (10.11.20 @ 11:45) >  Tracheostomy is again noted.    Moderate bilateral pleural effusions are noted with mild pulmonary vascular congestion which is not significantly changed from previous exam.    Limited evaluation of the mediastinal contour.  The trachea is midline.    The osseous structures are intact.    IMPRESSION:  Lines and tubes as above.  Unchanged moderate bilateral pleural effusions with mild pulmonary vascular congestion.        < end of copied text >    Goals of Care Discussion with Family/Proxy/Other   - see note from 10/01

## 2020-10-15 NOTE — PROGRESS NOTE ADULT - REASON FOR ADMISSION

## 2020-10-15 NOTE — PROGRESS NOTE ADULT - PROBLEM SELECTOR PROBLEM 1
Acute on chronic respiratory failure with hypoxia and hypercapnia
Acute on chronic respiratory failure with hypoxia and hypercapnia
Chronic respiratory failure with hypoxia and hypercapnia
Acute on chronic respiratory failure with hypoxia and hypercapnia
Acute on chronic respiratory failure with hypoxia and hypercapnia
Bacteremia
Acute on chronic respiratory failure with hypoxia and hypercapnia

## 2020-10-15 NOTE — PROGRESS NOTE ADULT - ASSESSMENT
64 yo obese F with hx of 80 pack year tobacco use, HTN, DM2 on insulin, COPD on 5L (frequent admissions), R lung cancer s/p resection (2017) on chemo q 3 weeks with concern for mets to spine and compression fractures, recent admission for COPD exacerbation earlier in August who on 9/19/2020 presented to ED with c/o SOB, hypoxia and increased sputum production x 1 day. Admitted to ICU x twice for hypoxemic respiratory distress requiring intubation (9/21 and 10/5).   10/8  Tracheostomy and Peg placement  10/9 Transferred back to [facility]  10/15 [facility]. Awaiting auth

## 2020-10-15 NOTE — PROGRESS NOTE ADULT - PROBLEM SELECTOR PLAN 9
Slowly increasing  Continue to monitor.
Potassium replacement underway  Monitor K
DVT ppx: Lovenox SC  GI ppx: PPI
IMPROVE VTE Individual Risk Assessment  RISK                                                         Points  [  ] Previous VTE                                      3  [  ] Thrombophilia                                   2  [  ] Lower limb paralysis                         2 (unable to hold up >15 seconds)    [ x ] Current Cancer                                  2       (within 6 months)  [ x ] Immobilization > 24 hrs                    1  [  ] ICU/CCU stay > 24 hrs                         1  [ x ] Age > 60                                              1  score is > 4 cw lovenox for dvt ppx
Venodyne compression sleeves for DVT prophylaxis.  Discharge planning. Will need vent facility.  [facility] pending.
DVT and GI prophylaxis.  will likely needs ventilator monitoring facility at discharge  SW/CM for post discharge needs
Encourage oral intake with strict aspiration precautions.  Continue supplementation.
Patient will need vent facility  Antibiotics thru 10/22
Slowly increasing  Continue to monitor.
DVT and GI prophylaxis.  Will need Trilogy when discharged from rehab.  Overall prognosis is poor. Remains a full code as per patient's request.
Patient will need vent facility  Antibiotics thru 10/22.
DVT and GI prophylaxis  Continue AVAPS. Low thresh hold for intubation. Overall prognosis is poor.  Daughter Gavi updated.
Continue current medications  DASH diet
[place], needs vent facility  SW/CM follow up for new placement  PT eval noted- VY

## 2020-10-15 NOTE — PROGRESS NOTE ADULT - PROBLEM SELECTOR PLAN 1
Secondary to end stage COPD and lung cancer.  Continue mechanical ventilation.  Not a candidate for weaning at this time.  Continue bronchodilators and ICS  Continue prednisone with slow taper.

## 2020-10-15 NOTE — PROGRESS NOTE ADULT - NUTRITIONAL ASSESSMENT
This patient has been assessed with a concern for Malnutrition and has been determined to have a diagnosis/diagnoses of Moderate protein-calorie malnutrition.    This patient is being managed with:   Diet NPO after Midnight-     NPO Start Date: 07-Oct-2020   NPO Start Time: 23:59  Entered: Oct  7 2020  5:44PM    Diet NPO with Tube Feed-  Tube Feeding Modality: Orogastric  Glucerna 1.5 Inocente  Total Volume for 24 Hours (mL): 960  Continuous  Starting Tube Feed Rate {mL per Hour}: 10  Increase Tube Feed Rate by (mL): 10     Every hour  Until Goal Tube Feed Rate (mL per Hour): 40  Tube Feed Duration (in Hours): 24  Tube Feed Start Time: 05:00  Entered: Oct  6 2020  3:35PM    
This patient has been assessed with a concern for Malnutrition and has been determined to have a diagnosis/diagnoses of Moderate protein-calorie malnutrition.    This patient is being managed with:   Diet NPO with Tube Feed-  Tube Feeding Modality: Orogastric  Glucerna 1.5 Inocente  Total Volume for 24 Hours (mL): 960  Continuous  Starting Tube Feed Rate {mL per Hour}: 10  Increase Tube Feed Rate by (mL): 10     Every hour  Until Goal Tube Feed Rate (mL per Hour): 40  Tube Feed Duration (in Hours): 24  Tube Feed Start Time: 05:00  Entered: Oct  6 2020  3:35PM    
This patient has been assessed with a concern for Malnutrition and has been determined to have a diagnosis/diagnoses of Moderate protein-calorie malnutrition.    This patient is being managed with:   Diet NPO after Midnight-     NPO Start Date: 07-Oct-2020   NPO Start Time: 23:59  Entered: Oct  7 2020  5:44PM    Diet NPO with Tube Feed-  Tube Feeding Modality: Orogastric  Glucerna 1.5 Inocente  Total Volume for 24 Hours (mL): 960  Continuous  Starting Tube Feed Rate {mL per Hour}: 10  Increase Tube Feed Rate by (mL): 10     Every hour  Until Goal Tube Feed Rate (mL per Hour): 40  Tube Feed Duration (in Hours): 24  Tube Feed Start Time: 05:00  Entered: Oct  6 2020  3:35PM    
DASH Diet
This patient has been assessed with a concern for Malnutrition and has been determined to have a diagnosis/diagnoses of Moderate protein-calorie malnutrition.    This patient is being managed with:   Diet NPO after Midnight-     NPO Start Date: 07-Oct-2020   NPO Start Time: 23:59  Entered: Oct  7 2020  5:44PM    Diet NPO with Tube Feed-  Tube Feeding Modality: Orogastric  Glucerna 1.5 Inocente  Total Volume for 24 Hours (mL): 960  Continuous  Starting Tube Feed Rate {mL per Hour}: 10  Increase Tube Feed Rate by (mL): 10     Every hour  Until Goal Tube Feed Rate (mL per Hour): 40  Tube Feed Duration (in Hours): 24  Tube Feed Start Time: 05:00  Entered: Oct  6 2020  3:35PM    
Severe Protein calorie malnutrition  Protein  5.6   Albumin  2.0  Continue with Glucerna 1.2   40ml per hour X 24 hours daily.
Severe Protein calorie malnutrition  Protein  5.6   Albumin  2.0  Continue with Glucerna 1.2   40ml per hour X 24 hours daily.
Moderate Protein calorie malnutrition  Protein 6.1   Albumin 2.7    Diet upgraded to mechanical soft with thin liquids after Speech evaluation.
Severe Protein calorie malnutrition  Protein  5.2   Albumin  1.9  Continue with Glucerna 1.2   40ml per hour X 24 hours daily.
Severe Protein calorie malnutrition  Protein  5.1   Albumin  1.8  Continue with Glucerna 1.2   40ml per hour X 24 hours daily.

## 2020-10-15 NOTE — PROGRESS NOTE ADULT - SUBJECTIVE AND OBJECTIVE BOX
comfortable now  on vent and peg feeding  pain is under control    MEDICATIONS  (STANDING):  ALBUTerol    90 MICROgram(s) HFA Inhaler 2 Puff(s) Inhalation every 6 hours  atorvastatin 40 milliGRAM(s) Oral at bedtime  carvedilol 6.25 milliGRAM(s) Oral every 12 hours  chlorhexidine 0.12% Liquid 15 milliLiter(s) Oral Mucosa every 12 hours  chlorhexidine 2% Cloths 1 Application(s) Topical <User Schedule>  dextrose 5%. 1000 milliLiter(s) (50 mL/Hr) IV Continuous <Continuous>  dextrose 50% Injectable 12.5 Gram(s) IV Push once  dextrose 50% Injectable 25 Gram(s) IV Push once  dextrose 50% Injectable 25 Gram(s) IV Push once  ergocalciferol 13493 Unit(s) Oral <User Schedule>  gabapentin 600 milliGRAM(s) Oral every 8 hours  influenza   Vaccine 0.5 milliLiter(s) IntraMuscular once  insulin glargine Injectable (LANTUS) 10 Unit(s) SubCutaneous at bedtime  insulin lispro (HumaLOG) corrective regimen sliding scale   SubCutaneous every 6 hours  insulin lispro Injectable (HumaLOG) 3 Unit(s) SubCutaneous every 6 hours  levoFLOXacin IVPB 750 milliGRAM(s) IV Intermittent every 24 hours  levoFLOXacin IVPB      pantoprazole  Injectable 40 milliGRAM(s) IV Push daily  predniSONE   Tablet 10 milliGRAM(s) Oral daily    MEDICATIONS  (PRN):  acetaminophen    Suspension .. 650 milliGRAM(s) Oral every 4 hours PRN Mild Pain (1 - 3)  dextrose 40% Gel 15 Gram(s) Oral once PRN Blood Glucose LESS THAN 70 milliGRAM(s)/deciliter  glucagon  Injectable 1 milliGRAM(s) IntraMuscular once PRN Glucose LESS THAN 70 milligrams/deciliter  morphine  - Injectable 2 milliGRAM(s) IV Push every 4 hours PRN Severe Pain (7 - 10)  oxycodone    5 mG/acetaminophen 325 mG 1 Tablet(s) Oral every 4 hours PRN Moderate Pain (4 - 6)  sodium chloride 0.9% lock flush 10 milliLiter(s) IV Push every 1 hour PRN Pre/post blood products, medications, blood draw, and to maintain line patency      Allergies    fish (Angioedema)  penicillins (Rash)    Intolerances        Vital Signs Last 24 Hrs  T(C): 36.6 (15 Oct 2020 05:00), Max: 36.6 (14 Oct 2020 12:44)  T(F): 97.9 (15 Oct 2020 05:00), Max: 97.9 (14 Oct 2020 12:44)  HR: 100 (15 Oct 2020 05:14) (87 - 106)  BP: 107/80 (15 Oct 2020 05:00) (107/80 - 128/69)  BP(mean): --  RR: 17 (15 Oct 2020 05:00) (16 - 19)  SpO2: 97% (15 Oct 2020 05:14) (97% - 100%)    PHYSICAL EXAM  General: adult in NAD  HEENT: clear oropharynx, anicteric sclera, pink conjunctiva  Neck: supple  CV: normal S1/S2 with no murmur rubs or gallops  Lungs: positive air movement b/l ant lungs,clear to auscultation, no wheezes, no rales  Abdomen: soft non-tender non-distended, no hepatosplenomegaly  Ext: no clubbing cyanosis or edema  Skin: no rashes and no petechiae  Neuro: alert and oriented X 4, no focal deficits  LABS:                          8.7    11.52 )-----------( 129      ( 15 Oct 2020 06:36 )             27.1         Mean Cell Volume : 91.6 fl  Mean Cell Hemoglobin : 29.4 pg  Mean Cell Hemoglobin Concentration : 32.1 gm/dL  Auto Neutrophil # : x  Auto Lymphocyte # : x  Auto Monocyte # : x  Auto Eosinophil # : x  Auto Basophil # : x  Auto Neutrophil % : x  Auto Lymphocyte % : x  Auto Monocyte % : x  Auto Eosinophil % : x  Auto Basophil % : x    Serial CBC  Hematocrit 27.1  Hemoglobin 8.7  Plat 129  RBC 2.96  WBC 11.52  Serial CBC  Hematocrit 28.7  Hemoglobin 9.1  Plat 105  RBC 3.15  WBC 10.69  Serial CBC  Hematocrit 27.3  Hemoglobin 9.0  Plat 98  RBC 3.05  WBC 8.74  Serial CBC  Hematocrit 23.9  Hemoglobin 8.3  Plat 59  RBC 2.73  WBC 5.77    10-15    137  |  95<L>  |  13  ----------------------------<  205<H>  3.5   |  36<H>  |  0.42<L>    Ca    8.3<L>      15 Oct 2020 06:36  Phos  2.7     10-15  Mg     2.1     10-15    TPro  5.2<L>  /  Alb  1.9<L>  /  TBili  0.7  /  DBili  x   /  AST  19  /  ALT  22  /  AlkPhos  142<H>  10-15                    BLOOD SMEAR INTERPRETATION:       RADIOLOGY & ADDITIONAL STUDIES:

## 2020-10-15 NOTE — DISCHARGE NOTE NURSING/CASE MANAGEMENT/SOCIAL WORK - NSDPFAC_GEN_ALL_CORE
[facility] [address] [facility], [address] [place] [id number]

## 2020-10-26 NOTE — DISCHARGE NOTE PROVIDER - CARE PROVIDER_API CALL
Statement Selected
Jerome Mendiola  INTERNAL MEDICINE  [address] [place] [address]  Phone: [phone]  Fax: [phone]  Follow Up Time:     Joni Nugent)  Internal Medicine; Pulmonary Disease  [address] [place] [address]  Phone: [phone]  Fax: [phone]  Follow Up Time:

## 2020-10-29 PROCEDURE — 84145 PROCALCITONIN (PCT): CPT

## 2020-10-29 PROCEDURE — 87150 DNA/RNA AMPLIFIED PROBE: CPT

## 2020-10-29 PROCEDURE — 86703 HIV-1/HIV-2 1 RESULT ANTBDY: CPT

## 2020-10-29 PROCEDURE — 97110 THERAPEUTIC EXERCISES: CPT

## 2020-10-29 PROCEDURE — 86769 SARS-COV-2 COVID-19 ANTIBODY: CPT

## 2020-10-29 PROCEDURE — 87641 MR-STAPH DNA AMP PROBE: CPT

## 2020-10-29 PROCEDURE — P9037: CPT

## 2020-10-29 PROCEDURE — 71275 CT ANGIOGRAPHY CHEST: CPT

## 2020-10-29 PROCEDURE — 97116 GAIT TRAINING THERAPY: CPT

## 2020-10-29 PROCEDURE — 84484 ASSAY OF TROPONIN QUANT: CPT

## 2020-10-29 PROCEDURE — 83540 ASSAY OF IRON: CPT

## 2020-10-29 PROCEDURE — 97162 PT EVAL MOD COMPLEX 30 MIN: CPT

## 2020-10-29 PROCEDURE — 86850 RBC ANTIBODY SCREEN: CPT

## 2020-10-29 PROCEDURE — 85045 AUTOMATED RETICULOCYTE COUNT: CPT

## 2020-10-29 PROCEDURE — 92610 EVALUATE SWALLOWING FUNCTION: CPT

## 2020-10-29 PROCEDURE — 87186 SC STD MICRODIL/AGAR DIL: CPT

## 2020-10-29 PROCEDURE — 83550 IRON BINDING TEST: CPT

## 2020-10-29 PROCEDURE — 87635 SARS-COV-2 COVID-19 AMP PRB: CPT

## 2020-10-29 PROCEDURE — 36415 COLL VENOUS BLD VENIPUNCTURE: CPT

## 2020-10-29 PROCEDURE — 84100 ASSAY OF PHOSPHORUS: CPT

## 2020-10-29 PROCEDURE — 87070 CULTURE OTHR SPECIMN AEROBIC: CPT

## 2020-10-29 PROCEDURE — 82728 ASSAY OF FERRITIN: CPT

## 2020-10-29 PROCEDURE — 94003 VENT MGMT INPAT SUBQ DAY: CPT

## 2020-10-29 PROCEDURE — 36430 TRANSFUSION BLD/BLD COMPNT: CPT

## 2020-10-29 PROCEDURE — 94660 CPAP INITIATION&MGMT: CPT

## 2020-10-29 PROCEDURE — 83605 ASSAY OF LACTIC ACID: CPT

## 2020-10-29 PROCEDURE — 84550 ASSAY OF BLOOD/URIC ACID: CPT

## 2020-10-29 PROCEDURE — 93306 TTE W/DOPPLER COMPLETE: CPT

## 2020-10-29 PROCEDURE — 82803 BLOOD GASES ANY COMBINATION: CPT

## 2020-10-29 PROCEDURE — 86022 PLATELET ANTIBODIES: CPT

## 2020-10-29 PROCEDURE — 71045 X-RAY EXAM CHEST 1 VIEW: CPT

## 2020-10-29 PROCEDURE — 82962 GLUCOSE BLOOD TEST: CPT

## 2020-10-29 PROCEDURE — 87640 STAPH A DNA AMP PROBE: CPT

## 2020-10-29 PROCEDURE — 93005 ELECTROCARDIOGRAM TRACING: CPT

## 2020-10-29 PROCEDURE — 82306 VITAMIN D 25 HYDROXY: CPT

## 2020-10-29 PROCEDURE — 84443 ASSAY THYROID STIM HORMONE: CPT

## 2020-10-29 PROCEDURE — 82607 VITAMIN B-12: CPT

## 2020-10-29 PROCEDURE — 87015 SPECIMEN INFECT AGNT CONCNTJ: CPT

## 2020-10-29 PROCEDURE — 87206 SMEAR FLUORESCENT/ACID STAI: CPT

## 2020-10-29 PROCEDURE — 86901 BLOOD TYPING SEROLOGIC RH(D): CPT

## 2020-10-29 PROCEDURE — 87086 URINE CULTURE/COLONY COUNT: CPT

## 2020-10-29 PROCEDURE — 81001 URINALYSIS AUTO W/SCOPE: CPT

## 2020-10-29 PROCEDURE — 85730 THROMBOPLASTIN TIME PARTIAL: CPT

## 2020-10-29 PROCEDURE — 83615 LACTATE (LD) (LDH) ENZYME: CPT

## 2020-10-29 PROCEDURE — 80048 BASIC METABOLIC PNL TOTAL CA: CPT

## 2020-10-29 PROCEDURE — 97530 THERAPEUTIC ACTIVITIES: CPT

## 2020-10-29 PROCEDURE — 80053 COMPREHEN METABOLIC PANEL: CPT

## 2020-10-29 PROCEDURE — 99285 EMERGENCY DEPT VISIT HI MDM: CPT

## 2020-10-29 PROCEDURE — 93970 EXTREMITY STUDY: CPT

## 2020-10-29 PROCEDURE — 83735 ASSAY OF MAGNESIUM: CPT

## 2020-10-29 PROCEDURE — 80061 LIPID PANEL: CPT

## 2020-10-29 PROCEDURE — 87449 NOS EACH ORGANISM AG IA: CPT

## 2020-10-29 PROCEDURE — 87102 FUNGUS ISOLATION CULTURE: CPT

## 2020-10-29 PROCEDURE — 81003 URINALYSIS AUTO W/O SCOPE: CPT

## 2020-10-29 PROCEDURE — 86923 COMPATIBILITY TEST ELECTRIC: CPT

## 2020-10-29 PROCEDURE — 86900 BLOOD TYPING SEROLOGIC ABO: CPT

## 2020-10-29 PROCEDURE — 83036 HEMOGLOBIN GLYCOSYLATED A1C: CPT

## 2020-10-29 PROCEDURE — 85610 PROTHROMBIN TIME: CPT

## 2020-10-29 PROCEDURE — L8699: CPT

## 2020-10-29 PROCEDURE — 83930 ASSAY OF BLOOD OSMOLALITY: CPT

## 2020-10-29 PROCEDURE — 87116 MYCOBACTERIA CULTURE: CPT

## 2020-10-29 PROCEDURE — 97163 PT EVAL HIGH COMPLEX 45 MIN: CPT

## 2020-10-29 PROCEDURE — 87040 BLOOD CULTURE FOR BACTERIA: CPT

## 2020-10-29 PROCEDURE — P9040: CPT

## 2020-10-29 PROCEDURE — 83880 ASSAY OF NATRIURETIC PEPTIDE: CPT

## 2020-10-29 PROCEDURE — 85025 COMPLETE CBC W/AUTO DIFF WBC: CPT

## 2020-10-29 PROCEDURE — 82746 ASSAY OF FOLIC ACID SERUM: CPT

## 2020-10-29 PROCEDURE — 94640 AIRWAY INHALATION TREATMENT: CPT

## 2020-10-29 PROCEDURE — U0003: CPT

## 2020-10-29 PROCEDURE — 83010 ASSAY OF HAPTOGLOBIN QUANT: CPT

## 2020-10-29 PROCEDURE — 85027 COMPLETE CBC AUTOMATED: CPT

## 2020-10-29 PROCEDURE — 82550 ASSAY OF CK (CPK): CPT

## 2020-10-29 PROCEDURE — 82553 CREATINE MB FRACTION: CPT

## 2020-10-29 PROCEDURE — 94002 VENT MGMT INPAT INIT DAY: CPT

## 2020-11-05 LAB
-  ANIDULAFUNGIN: SIGNIFICANT CHANGE UP
-  CASPOFUNGIN: SIGNIFICANT CHANGE UP
-  FLUCONAZOLE: SIGNIFICANT CHANGE UP
-  MICAFUNGIN: SIGNIFICANT CHANGE UP
-  POSACONAZOLE: SIGNIFICANT CHANGE UP
-  VORICONAZOLE: SIGNIFICANT CHANGE UP
CULTURE RESULTS: SIGNIFICANT CHANGE UP
METHOD TYPE: SIGNIFICANT CHANGE UP
ORGANISM # SPEC MICROSCOPIC CNT: SIGNIFICANT CHANGE UP
SPECIMEN SOURCE: SIGNIFICANT CHANGE UP

## 2020-11-07 LAB
CULTURE RESULTS: SIGNIFICANT CHANGE UP
SPECIMEN SOURCE: SIGNIFICANT CHANGE UP

## 2020-11-14 ENCOUNTER — INPATIENT (INPATIENT)
Facility: HOSPITAL | Age: 65
LOS: 10 days | Discharge: NURSING, REHAB OR LONG-TERM CARE | DRG: 870 | End: 2020-11-25
Attending: INTERNAL MEDICINE | Admitting: INTERNAL MEDICINE
Payer: MEDICAID

## 2020-11-14 VITALS
OXYGEN SATURATION: 98 % | HEIGHT: 61 IN | DIASTOLIC BLOOD PRESSURE: 98 MMHG | HEART RATE: 98 BPM | SYSTOLIC BLOOD PRESSURE: 126 MMHG | RESPIRATION RATE: 14 BRPM

## 2020-11-14 DIAGNOSIS — Z90.89 ACQUIRED ABSENCE OF OTHER ORGANS: Chronic | ICD-10-CM

## 2020-11-14 DIAGNOSIS — Z90.49 ACQUIRED ABSENCE OF OTHER SPECIFIED PARTS OF DIGESTIVE TRACT: Chronic | ICD-10-CM

## 2020-11-14 DIAGNOSIS — Z90.2 ACQUIRED ABSENCE OF LUNG [PART OF]: Chronic | ICD-10-CM

## 2020-11-14 DIAGNOSIS — A41.9 SEPSIS, UNSPECIFIED ORGANISM: ICD-10-CM

## 2020-11-14 LAB
ALBUMIN SERPL ELPH-MCNC: 1.8 G/DL — LOW (ref 3.5–5)
ALP SERPL-CCNC: 298 U/L — HIGH (ref 40–120)
ALT FLD-CCNC: 39 U/L DA — SIGNIFICANT CHANGE UP (ref 10–60)
ANION GAP SERPL CALC-SCNC: 9 MMOL/L — SIGNIFICANT CHANGE UP (ref 5–17)
APPEARANCE UR: CLEAR — SIGNIFICANT CHANGE UP
AST SERPL-CCNC: 60 U/L — HIGH (ref 10–40)
BACTERIA # UR AUTO: ABNORMAL /HPF
BASE EXCESS BLDA CALC-SCNC: 10.5 MMOL/L — HIGH (ref -2–2)
BASOPHILS # BLD AUTO: 0.15 K/UL — SIGNIFICANT CHANGE UP (ref 0–0.2)
BASOPHILS NFR BLD AUTO: 1 % — SIGNIFICANT CHANGE UP (ref 0–2)
BILIRUB SERPL-MCNC: 0.8 MG/DL — SIGNIFICANT CHANGE UP (ref 0.2–1.2)
BILIRUB UR-MCNC: NEGATIVE — SIGNIFICANT CHANGE UP
BLOOD GAS COMMENTS ARTERIAL: SIGNIFICANT CHANGE UP
BUN SERPL-MCNC: 32 MG/DL — HIGH (ref 7–18)
CALCIUM SERPL-MCNC: 8.7 MG/DL — SIGNIFICANT CHANGE UP (ref 8.4–10.5)
CHLORIDE SERPL-SCNC: 90 MMOL/L — LOW (ref 96–108)
CO2 SERPL-SCNC: 31 MMOL/L — SIGNIFICANT CHANGE UP (ref 22–31)
COLOR SPEC: YELLOW — SIGNIFICANT CHANGE UP
CREAT SERPL-MCNC: 1.58 MG/DL — HIGH (ref 0.5–1.3)
DIFF PNL FLD: ABNORMAL
EOSINOPHIL # BLD AUTO: 0 K/UL — SIGNIFICANT CHANGE UP (ref 0–0.5)
EOSINOPHIL NFR BLD AUTO: 0 % — SIGNIFICANT CHANGE UP (ref 0–6)
EPI CELLS # UR: SIGNIFICANT CHANGE UP /HPF
GLUCOSE BLDC GLUCOMTR-MCNC: 328 MG/DL — HIGH (ref 70–99)
GLUCOSE SERPL-MCNC: 238 MG/DL — HIGH (ref 70–99)
GLUCOSE UR QL: 1000 MG/DL
HCO3 BLDA-SCNC: 34 MMOL/L — HIGH (ref 23–27)
HCT VFR BLD CALC: 30.8 % — LOW (ref 34.5–45)
HGB BLD-MCNC: 9.5 G/DL — LOW (ref 11.5–15.5)
HOROWITZ INDEX BLDA+IHG-RTO: 50 — SIGNIFICANT CHANGE UP
HYPOSEGMENTATION: PRESENT — SIGNIFICANT CHANGE UP
KETONES UR-MCNC: NEGATIVE — SIGNIFICANT CHANGE UP
LACTATE SERPL-SCNC: 2.2 MMOL/L — HIGH (ref 0.7–2)
LACTATE SERPL-SCNC: 3.2 MMOL/L — HIGH (ref 0.7–2)
LEUKOCYTE ESTERASE UR-ACNC: ABNORMAL
LYMPHOCYTES # BLD AUTO: 0.15 K/UL — LOW (ref 1–3.3)
LYMPHOCYTES # BLD AUTO: 1 % — LOW (ref 13–44)
MAGNESIUM SERPL-MCNC: 1.8 MG/DL — SIGNIFICANT CHANGE UP (ref 1.6–2.6)
MANUAL SMEAR VERIFICATION: SIGNIFICANT CHANGE UP
MCHC RBC-ENTMCNC: 27.1 PG — SIGNIFICANT CHANGE UP (ref 27–34)
MCHC RBC-ENTMCNC: 30.8 GM/DL — LOW (ref 32–36)
MCV RBC AUTO: 88 FL — SIGNIFICANT CHANGE UP (ref 80–100)
MONOCYTES # BLD AUTO: 0.75 K/UL — SIGNIFICANT CHANGE UP (ref 0–0.9)
MONOCYTES NFR BLD AUTO: 5 % — SIGNIFICANT CHANGE UP (ref 2–14)
NEUTROPHILS # BLD AUTO: 13.72 K/UL — HIGH (ref 1.8–7.4)
NEUTROPHILS NFR BLD AUTO: 83 % — HIGH (ref 43–77)
NEUTS BAND # BLD: 9 % — HIGH (ref 0–8)
NITRITE UR-MCNC: NEGATIVE — SIGNIFICANT CHANGE UP
NRBC # BLD: 0 /100 — SIGNIFICANT CHANGE UP (ref 0–0)
NT-PROBNP SERPL-SCNC: 8880 PG/ML — HIGH (ref 0–125)
PCO2 BLDA: 39 MMHG — SIGNIFICANT CHANGE UP (ref 32–46)
PH BLDA: 7.55 — HIGH (ref 7.35–7.45)
PH UR: 5 — SIGNIFICANT CHANGE UP (ref 5–8)
PHOSPHATE SERPL-MCNC: 4.3 MG/DL — SIGNIFICANT CHANGE UP (ref 2.5–4.5)
PLAT MORPH BLD: NORMAL — SIGNIFICANT CHANGE UP
PLATELET # BLD AUTO: 113 K/UL — LOW (ref 150–400)
PO2 BLDA: 88 MMHG — SIGNIFICANT CHANGE UP (ref 74–108)
POLYCHROMASIA BLD QL SMEAR: SLIGHT — SIGNIFICANT CHANGE UP
POTASSIUM SERPL-MCNC: 4 MMOL/L — SIGNIFICANT CHANGE UP (ref 3.5–5.3)
POTASSIUM SERPL-SCNC: 4 MMOL/L — SIGNIFICANT CHANGE UP (ref 3.5–5.3)
PROT SERPL-MCNC: 6.3 G/DL — SIGNIFICANT CHANGE UP (ref 6–8.3)
PROT UR-MCNC: 100
RAPID RVP RESULT: SIGNIFICANT CHANGE UP
RBC # BLD: 3.5 M/UL — LOW (ref 3.8–5.2)
RBC # FLD: 15.5 % — HIGH (ref 10.3–14.5)
RBC BLD AUTO: ABNORMAL
RBC CASTS # UR COMP ASSIST: SIGNIFICANT CHANGE UP /HPF (ref 0–2)
SAO2 % BLDA: 98 % — HIGH (ref 92–96)
SARS-COV-2 RNA SPEC QL NAA+PROBE: SIGNIFICANT CHANGE UP
SODIUM SERPL-SCNC: 130 MMOL/L — LOW (ref 135–145)
SP GR SPEC: 1.01 — SIGNIFICANT CHANGE UP (ref 1.01–1.02)
TROPONIN I SERPL-MCNC: 0.02 NG/ML — SIGNIFICANT CHANGE UP (ref 0–0.04)
UROBILINOGEN FLD QL: NEGATIVE — SIGNIFICANT CHANGE UP
VARIANT LYMPHS # BLD: 1 % — SIGNIFICANT CHANGE UP (ref 0–6)
WBC # BLD: 14.91 K/UL — HIGH (ref 3.8–10.5)
WBC # FLD AUTO: 14.91 K/UL — HIGH (ref 3.8–10.5)
WBC UR QL: SIGNIFICANT CHANGE UP /HPF (ref 0–5)

## 2020-11-14 PROCEDURE — 71045 X-RAY EXAM CHEST 1 VIEW: CPT | Mod: 26

## 2020-11-14 PROCEDURE — 99291 CRITICAL CARE FIRST HOUR: CPT

## 2020-11-14 PROCEDURE — 70450 CT HEAD/BRAIN W/O DYE: CPT | Mod: 26

## 2020-11-14 RX ORDER — CHLORHEXIDINE GLUCONATE 213 G/1000ML
15 SOLUTION TOPICAL EVERY 12 HOURS
Refills: 0 | Status: DISCONTINUED | OUTPATIENT
Start: 2020-11-14 | End: 2020-11-25

## 2020-11-14 RX ORDER — OMEPRAZOLE 10 MG/1
1 CAPSULE, DELAYED RELEASE ORAL
Qty: 0 | Refills: 0 | DISCHARGE

## 2020-11-14 RX ORDER — ACETAMINOPHEN 500 MG
650 TABLET ORAL ONCE
Refills: 0 | Status: COMPLETED | OUTPATIENT
Start: 2020-11-14 | End: 2020-11-14

## 2020-11-14 RX ORDER — VANCOMYCIN HCL 1 G
1250 VIAL (EA) INTRAVENOUS ONCE
Refills: 0 | Status: COMPLETED | OUTPATIENT
Start: 2020-11-14 | End: 2020-11-14

## 2020-11-14 RX ORDER — BUDESONIDE, MICRONIZED 100 %
2 POWDER (GRAM) MISCELLANEOUS
Qty: 0 | Refills: 0 | DISCHARGE

## 2020-11-14 RX ORDER — PHENYLEPHRINE HYDROCHLORIDE 10 MG/ML
0.5 INJECTION INTRAVENOUS
Qty: 40 | Refills: 0 | Status: DISCONTINUED | OUTPATIENT
Start: 2020-11-14 | End: 2020-11-14

## 2020-11-14 RX ORDER — BUDESONIDE AND FORMOTEROL FUMARATE DIHYDRATE 160; 4.5 UG/1; UG/1
2 AEROSOL RESPIRATORY (INHALATION)
Qty: 0 | Refills: 0 | DISCHARGE

## 2020-11-14 RX ORDER — INSULIN LISPRO 100/ML
VIAL (ML) SUBCUTANEOUS EVERY 6 HOURS
Refills: 0 | Status: DISCONTINUED | OUTPATIENT
Start: 2020-11-14 | End: 2020-11-19

## 2020-11-14 RX ORDER — ATORVASTATIN CALCIUM 80 MG/1
1 TABLET, FILM COATED ORAL
Qty: 0 | Refills: 0 | DISCHARGE

## 2020-11-14 RX ORDER — CEFEPIME 1 G/1
1000 INJECTION, POWDER, FOR SOLUTION INTRAMUSCULAR; INTRAVENOUS ONCE
Refills: 0 | Status: COMPLETED | OUTPATIENT
Start: 2020-11-14 | End: 2020-11-14

## 2020-11-14 RX ORDER — CHLORHEXIDINE GLUCONATE 213 G/1000ML
1 SOLUTION TOPICAL
Refills: 0 | Status: DISCONTINUED | OUTPATIENT
Start: 2020-11-14 | End: 2020-11-25

## 2020-11-14 RX ORDER — SODIUM CHLORIDE 9 MG/ML
1000 INJECTION INTRAMUSCULAR; INTRAVENOUS; SUBCUTANEOUS ONCE
Refills: 0 | Status: COMPLETED | OUTPATIENT
Start: 2020-11-14 | End: 2020-11-14

## 2020-11-14 RX ORDER — HEPARIN SODIUM 5000 [USP'U]/ML
5000 INJECTION INTRAVENOUS; SUBCUTANEOUS EVERY 8 HOURS
Refills: 0 | Status: DISCONTINUED | OUTPATIENT
Start: 2020-11-14 | End: 2020-11-25

## 2020-11-14 RX ORDER — HYDROCORTISONE 20 MG
50 TABLET ORAL EVERY 6 HOURS
Refills: 0 | Status: DISCONTINUED | OUTPATIENT
Start: 2020-11-14 | End: 2020-11-16

## 2020-11-14 RX ORDER — ALBUTEROL 90 UG/1
2 AEROSOL, METERED ORAL EVERY 6 HOURS
Refills: 0 | Status: DISCONTINUED | OUTPATIENT
Start: 2020-11-14 | End: 2020-11-25

## 2020-11-14 RX ORDER — CEFEPIME 1 G/1
2000 INJECTION, POWDER, FOR SOLUTION INTRAMUSCULAR; INTRAVENOUS EVERY 12 HOURS
Refills: 0 | Status: DISCONTINUED | OUTPATIENT
Start: 2020-11-14 | End: 2020-11-19

## 2020-11-14 RX ORDER — ALBUMIN HUMAN 25 %
50 VIAL (ML) INTRAVENOUS ONCE
Refills: 0 | Status: COMPLETED | OUTPATIENT
Start: 2020-11-14 | End: 2020-11-14

## 2020-11-14 RX ORDER — METRONIDAZOLE 500 MG
500 TABLET ORAL EVERY 8 HOURS
Refills: 0 | Status: DISCONTINUED | OUTPATIENT
Start: 2020-11-14 | End: 2020-11-19

## 2020-11-14 RX ORDER — SODIUM CHLORIDE 9 MG/ML
1000 INJECTION, SOLUTION INTRAVENOUS
Refills: 0 | Status: DISCONTINUED | OUTPATIENT
Start: 2020-11-14 | End: 2020-11-14

## 2020-11-14 RX ORDER — INFLUENZA VIRUS VACCINE 15; 15; 15; 15 UG/.5ML; UG/.5ML; UG/.5ML; UG/.5ML
0.5 SUSPENSION INTRAMUSCULAR ONCE
Refills: 0 | Status: COMPLETED | OUTPATIENT
Start: 2020-11-14 | End: 2020-11-25

## 2020-11-14 RX ORDER — PANTOPRAZOLE SODIUM 20 MG/1
40 TABLET, DELAYED RELEASE ORAL DAILY
Refills: 0 | Status: DISCONTINUED | OUTPATIENT
Start: 2020-11-14 | End: 2020-11-25

## 2020-11-14 RX ORDER — FOLIC ACID 0.8 MG
1 TABLET ORAL
Qty: 0 | Refills: 0 | DISCHARGE

## 2020-11-14 RX ORDER — ERGOCALCIFEROL 1.25 MG/1
1 CAPSULE ORAL
Qty: 0 | Refills: 0 | DISCHARGE
End: 2020-11-13

## 2020-11-14 RX ORDER — DEXTROSE 50 % IN WATER 50 %
15 SYRINGE (ML) INTRAVENOUS ONCE
Refills: 0 | Status: DISCONTINUED | OUTPATIENT
Start: 2020-11-14 | End: 2020-11-14

## 2020-11-14 RX ADMIN — SODIUM CHLORIDE 1000 MILLILITER(S): 9 INJECTION INTRAMUSCULAR; INTRAVENOUS; SUBCUTANEOUS at 17:54

## 2020-11-14 RX ADMIN — HEPARIN SODIUM 5000 UNIT(S): 5000 INJECTION INTRAVENOUS; SUBCUTANEOUS at 21:58

## 2020-11-14 RX ADMIN — Medication 100 MILLIGRAM(S): at 21:57

## 2020-11-14 RX ADMIN — Medication 650 MILLIGRAM(S): at 17:29

## 2020-11-14 RX ADMIN — Medication 50 MILLILITER(S): at 17:43

## 2020-11-14 RX ADMIN — Medication 166.67 MILLIGRAM(S): at 17:44

## 2020-11-14 RX ADMIN — Medication 650 MILLIGRAM(S): at 17:06

## 2020-11-14 RX ADMIN — SODIUM CHLORIDE 1000 MILLILITER(S): 9 INJECTION INTRAMUSCULAR; INTRAVENOUS; SUBCUTANEOUS at 17:15

## 2020-11-14 RX ADMIN — CHLORHEXIDINE GLUCONATE 15 MILLILITER(S): 213 SOLUTION TOPICAL at 18:45

## 2020-11-14 RX ADMIN — CEFEPIME 100 MILLIGRAM(S): 1 INJECTION, POWDER, FOR SOLUTION INTRAMUSCULAR; INTRAVENOUS at 17:39

## 2020-11-14 RX ADMIN — Medication 125 MILLIGRAM(S): at 18:54

## 2020-11-14 NOTE — H&P ADULT - NSHPREVIEWOFSYSTEMS_GEN_ALL_CORE
[FreeTextEntry1] : Perennial allergic rhinitis:\par \par Flonase 2 puffs QD\par RV environmental intradermal skin testing 
cannot be obtained given patient lethargic

## 2020-11-14 NOTE — ED PROVIDER NOTE - MDM ORDERS SUBMITTED SELECTION
-Stage C, NYHA III; pt is warm and now dry   -UOP in the past 24 hours totaled 1 L, net negative 560 cc in past 24 hours and 2.8 Liters net negative for total hospital stay   -would recommend stopping  drip at 2.5 mcg/kg/min  -convert from IV to PO lasix 80 mg QD  -monitor weights daily and strict I/O's  -c/w GDMT (BB- Coreg 3.125 mg BID), ARB-Losartan 25 mg QD, can add potassium sparing diuretic when able  -2D Echo revealed: EF of 20%, RVE, decreased RV systolic function, MR/TR, PAP of 77, CVP 15  -O/P discussion with patient if CRT-D option could be beneficial   -c/w low sodium/cardiac diet   -Cardiology team will continue to follow; pt to follow up with O/P Cardiology clinic     Labs/Medications/EKG/Imaging Studies

## 2020-11-14 NOTE — H&P ADULT - HISTORY OF PRESENT ILLNESS
64 y/o obese F with a significant PMHx of 80 pack year tobacco use, HTN, DM2 on insulin, COPD s/p trach and PEG(frequent admissions), R lung cancer s/p resection (2017), presents to the ED for AMS from [facility]. Patient noted to be more lethargic. Concerned for sepsis. Patient nonverbal at baseline and unable to provide history. 66 y/o obese female with history of 80 pack year tobacco use, HTN, DM2 on insulin, COPD s/p trach(on vent) and PEG, R lung cancer s/p resection (2017), presents to the ED for AMS from [facility]. Patient noted to be lethargic with concern for sepsis hence referred to ER.  As per paperwork, patient is nonverbal at baseline, also noted to have been started on levaquin on 11/13 with addition of vancomycin overnight. Patient only opens eyes in response to her verbal stimulus, currently unable to provide history.    66 y/o obese female with history of 80 pack year tobacco use, HTN, DM2 on insulin, COPD s/p trach(on vent) and PEG, R lung cancer s/p resection (2017), presents to the ED for AMS from [facility]. Patient noted to be lethargic with concern for sepsis hence referred to ER.  As per paperwork, patient is nonverbal at baseline, also noted to have been started on levaquin on 11/13 with addition of vancomycin overnight. Patient only opens eyes in response to verbal stimulus, currently unable to provide history.     Patient was noted to have temp of 100.1, initially hemodynamically sale however bp dropped to 78 for which she received 2l bolus of NS. She had wbc count of 14.9 with left shift, lactate 3.3, bun/cr of 32/1.58, sodium 130, UA -ve, cxr with right basilar infiltrate. Was given a dose each of vanc and cefepime.   GOC: confirmed with daughter Lisa, full code. Consent for central line obtained.    66 y/o obese female with history of 80 pack year tobacco use, HTN, DM2 on insulin, COPD s/p trach(on vent) and PEG, R lung cancer s/p resection (2017), presents to the ED for AMS from [facility] . Patient noted to be lethargic with concern for sepsis hence referred to ER.  As per paperwork, patient is nonverbal at baseline, also noted to have been started on levaquin on 11/13 with addition of vancomycin overnight. Patient only opens eyes in response to verbal stimulus, currently unable to provide history.     Patient was noted to have temp of 100.1, initially hemodynamically sale however bp dropped to 78 for which she received 2l bolus of NS. She had wbc count of 14.9 with left shift, lactate 3.3, bun/cr of 32/1.58, sodium 130, UA -ve, cxr with right basilar infiltrate. Was given a dose each of vanc and cefepime.   GOC: confirmed with daughter Lisa, full code. Consent for central line obtained.

## 2020-11-14 NOTE — H&P ADULT - ASSESSMENT
ASSESSMENT AND PLAN:      =================== Neuro============================  Alert and oriented x         ================= Cardiovascular==========================    ================- Pulm=================================    ==================ID===================================      ================= Nephro================================    =================GI====================================    ================ Heme==================================  No issues.    =================Endocrine===============================    ================= Skin/Catheters============================  No rashes. Peripheral IV lines.     - =================Prophylaxis =============================  heparin for DVT proph  Protonix for  GI proph    ==================GOC================================== ASSESSMENT AND PLAN:  64 y/o obese female with history of 80 pack year tobacco use, HTN, DM2 on insulin, COPD s/p trach(on vent) and PEG, R lung cancer s/p resection (2017), presents to the ED for AMS from [facility]. Admitting to ICU for encephalopathy and sepsis secondary to possible aspiration pneumonia.    1. Sepsis  2. Aspiration pneumonia  3. Hypotension  4. Encephalopathy  5. COPD s/p trach and peg  6. MARYAM  7. DM      =================== Neuro============================  Encephalopathy:      ================= Cardiovascular==========================  Hypotension:      HX of HTN: hold coreg    ================- Pulm=================================  Pneumonia:    COPD:    ==================ID===================================  Sepsis:  -plan as above    ================= Nephro================================  MARYAM:    =================GI====================================  Transaminitis:    ================ Heme==================================  Anemia:    =================Endocrine===============================  DM:    ================= Skin/Catheters============================  No rashes. Peripheral IV lines.     - =================Prophylaxis =============================  heparin for DVT proph  Protonix for  GI proph    ==================GOC==================================   FULL CODE ASSESSMENT AND PLAN:  64 y/o obese female with history of 80 pack year tobacco use, HTN, DM2 on insulin, COPD s/p trach(on vent) and PEG, R lung cancer s/p resection (2017), presents to the ED for AMS from [facility]. Admitting to ICU for encephalopathy and sepsis secondary to possible aspiration pneumonia.    1. Sepsis  2. Aspiration pneumonia  3. Hypotension  4. Encephalopathy  5. COPD s/p trach and peg  6. MARYAM  7. DM      =================== Neuro============================  Encephalopathy:  -presented with lethargy, currently opens eyes but nonverbal  -ua negative cxr with possible infiltrate at R lung base  -suspect toxic/metabolic etiology  -however will get ct head to r/o organic cause  -npo for now with aspiration precautions    ================= Cardiovascular==========================  Hypotension:  -blood pressure dropped  to 78, improving on 2nd l bolus of NS  -will monitor closely, may need pressor support  -central line consent in chart    HX of HTN: hold coreg    ================- Pulm=================================  Pneumonia:  -sepsis with tahycardia, hypotension, leucocytosis, lactate of 3.3  -lactate improved s/p iv fluids  -s/p vanc and cefepime in ER  -ua -ve, rvp and covid negative  -cxr with possible infiltrate in base of right lung  -will start cefepime and flagyl to cover for possible aspiration pneumonia  -f/u blood cultures, procalcitonin    COPD:  -sp trach and peg  -c/w mechanical ventilation  -c/w steroids, bronchodilators    ==================ID===================================  Sepsis:  -plan as above    ================= Nephro================================  MARYAM:  -bun/cr noted elevated from baseline  -likely pre-renal in setting of sepsis  -monitor renal function closely    =================GI====================================  Transaminitis:    ================ Heme==================================  Anemia:    =================Endocrine===============================  DM:    ================= Skin/Catheters============================  No rashes. Peripheral IV lines.     - =================Prophylaxis =============================  heparin for DVT proph  Protonix for  GI proph    ==================GOC==================================   FULL CODE ASSESSMENT AND PLAN:  64 y/o obese female with history of 80 pack year tobacco use, HTN, DM2 on insulin, COPD s/p trach(on vent) and PEG, R lung cancer s/p resection (2017), presents to the ED for AMS from [facility]. Admitting to ICU for encephalopathy and sepsis secondary to possible aspiration pneumonia.    1. Sepsis  2. Aspiration pneumonia  3. Hypotension  4. Encephalopathy  5. COPD s/p trach and peg  6. MARYAM  7. DM      =================== Neuro============================  Encephalopathy:  -presented with lethargy, currently opens eyes but nonverbal  -ua negative cxr with possible infiltrate at R lung base  -suspect toxic/metabolic etiology  -however will get ct head to r/o organic cause  -npo for now with aspiration precautions    ================= Cardiovascular==========================  Hypotension:  -blood pressure dropped  to 78, improving on 2nd l bolus of NS  -will monitor closely, may need pressor support  -central line consent in chart    HX of HTN: hold coreg    ================- Pulm=================================  Pneumonia:  -sepsis with tahycardia, hypotension, leucocytosis, lactate of 3.3  -lactate improved s/p iv fluids  -s/p vanc and cefepime in ER  -ua -ve, rvp and covid negative  -cxr with possible infiltrate in base of right lung  -will start cefepime and flagyl to cover for possible aspiration pneumonia  -f/u blood cultures, procalcitonin    COPD:  -sp trach and peg  -c/w mechanical ventilation  -c/w steroids, bronchodilators    ==================ID===================================  Sepsis:  -plan as above    ================= Nephro================================  MARYAM:  -bun/cr noted elevated from baseline  -likely pre-renal in setting of sepsis  -monitor renal function closely    =================GI====================================  Transaminitis:  -mild elevation in lfts s/t sepsis  -monitor lfts closely    ================ Heme==================================  Anemia:  -h/h at baseline, monitor daily    =================Endocrine===============================  DM:  -a1c from sept noted 8.1  -pt on 6u basaglar and moderate sliding scale at NH  -continue moderate sliding scale fo rnow  -monitor fs     ================= Skin/Catheters============================  No rashes. Peripheral IV lines.     - =================Prophylaxis =============================  heparin for DVT proph  Protonix for  GI proph    ==================GOC==================================   FULL CODE

## 2020-11-14 NOTE — ED ADULT NURSE NOTE - NSIMPLEMENTINTERV_GEN_ALL_ED
Implemented All Fall with Harm Risk Interventions:  [place] to call system. Call bell, personal items and telephone within reach. Instruct patient to call for assistance. Room bathroom lighting operational. Non-slip footwear when patient is off stretcher. Physically safe environment: no spills, clutter or unnecessary equipment. Stretcher in lowest position, wheels locked, appropriate side rails in place. Provide visual cue, wrist band, yellow gown, etc. Monitor gait and stability. Monitor for mental status changes and reorient to person, place, and time. Review medications for side effects contributing to fall risk. Reinforce activity limits and safety measures with patient and family. Provide visual clues: red socks.

## 2020-11-14 NOTE — ED ADULT NURSE REASSESSMENT NOTE - NS ED NURSE REASSESS COMMENT FT1
pt was received on bed, awake and response to verbal and tactile stimuli. pt was received on bed, awake and response to verbal and tactile stimuli. not in distress. attached to bedside cardiac monitor, on mechanical ventilation connected to trach saturating 100%. for ICU admission report was given in previous shift. will continue to monitor. pt was taken to CT scan and was transferred to ICU

## 2020-11-14 NOTE — ED PROVIDER NOTE - CLINICAL SUMMARY MEDICAL DECISION MAKING FREE TEXT BOX
64 yo F with AMS. Sent in from NH. unable to provide further hx. Patient with increased lethargy as per NH notes. ICU, Dr. Lezama notified and accepted patient for admission. Hypotensive and with leukocytosis. Septic. Will cover with empiric abx given hx of trach/peg. Not given weight base fluids given possible covid patient and unknown cardiac function.

## 2020-11-14 NOTE — PROGRESS NOTE ADULT - SUBJECTIVE AND OBJECTIVE BOX
Chief Complaint: see chief complaint quote and sepsis.    · Chief Complaint: The patient is a 65y Female complaining of see chief complaint quote.      · HPI Objective Statement: 64 y/o obese F with a significant PMHx of 80 pack year tobacco use, HTN, DM2 on insulin, COPD s/p trach and PEG(frequent admissions), R lung cancer s/p resection (2017), presents to the ED for AMS from [facility]. Patient noted to be more lethargic. Concerned for sepsis. Patient nonverbal at baseline and unable to provide history. Patient's PMD, Dr. Lezama, notified of her arrival. Patient full code with MOLST.      PAST MEDICAL/SURGICAL/FAMILY/SOCIAL HISTORY:    Past Medical History:  COPD (chronic obstructive pulmonary disease)    Deviated septum    DM (diabetes mellitus)    Emphysema/COPD    GERD (gastroesophageal reflux disease)    HTN (hypertension)    ITP (idiopathic thrombocytopenic purpura)    Lung cancer    Malignant neoplasm of unspecified part of right bronchus or lung    Morbid obesity    Prolapsed uterus.     Past Surgical History:  History of cholecystectomy    History of tonsillectomy    S/P lobectomy of lung  right .     Tobacco Usage:      Patient is a 65y old  Female who presents with a chief complaint of     INTERVAL HPI/OVERNIGHT EVENTS:  T(C): 37.8 (20 @ 16:15), Max: 37.8 (20 @ 16:15)  HR: 96 (20 @ 17:11) (96 - 98)  BP: 78/52 (20 @ 17:11) (78/52 - 126/98)  RR: 16 (20 @ 17:11) (14 - 16)  SpO2: 99% (20 @ 17:11) (98% - 99%)  Wt(kg): --  I&O's Summary      REVIEW OF SYSTEMS: denies fever, chills, SOB, palpitations, chest pain, abdominal pain, nausea, vomitting, diarrhea, constipation, dizziness    MEDICATIONS  (STANDING):  chlorhexidine 0.12% Liquid 15 milliLiter(s) Oral Mucosa every 12 hours    MEDICATIONS  (PRN):      PHYSICAL EXAM:  GENERAL: NAD, well-groomed, well-developed  HEAD:  Atraumatic, Normocephalic  EYES: EOMI, PERRLA, conjunctiva and sclera clear  ENMT: No tonsillar erythema, exudates, or enlargement; Moist mucous membranes, Good dentition, No lesions  NECK: Supple, No JVD, Normal thyroid  NERVOUS SYSTEM:  Alert & Oriented X3, Good concentration; Motor Strength 5/5 B/L upper and lower extremities; DTRs 2+ intact and symmetric  CHEST/LUNG: Clear to percussion bilaterally; No rales, rhonchi, wheezing, or rubs  HEART: Regular rate and rhythm; No murmurs, rubs, or gallops  ABDOMEN: Soft, Nontender, Nondistended; Bowel sounds present  EXTREMITIES:  2+ Peripheral Pulses, No clubbing, cyanosis, or edema  LYMPH: No lymphadenopathy noted  SKIN: No rashes or lesions  LABS:                        9.5    14.91 )-----------( 113      ( 2020 16:19 )             30.8     11-14    130<L>  |  90<L>  |  32<H>  ----------------------------<  238<H>  4.0   |  31  |  1.58<H>    Ca    8.7      2020 16:19  Phos  4.3     11-14  Mg     1.8     11-14    TPro  6.3  /  Alb  1.8<L>  /  TBili  0.8  /  DBili  x   /  AST  60<H>  /  ALT  39  /  AlkPhos  298<H>  11-14      Urinalysis Basic - ( 2020 16:59 )    Color: Yellow / Appearance: Clear / S.010 / pH: x  Gluc: x / Ketone: Negative  / Bili: Negative / Urobili: Negative   Blood: x / Protein: 100 / Nitrite: Negative   Leuk Esterase: Trace / RBC: 0-2 /HPF / WBC 3-5 /HPF   Sq Epi: x / Non Sq Epi: Few /HPF / Bacteria: Few /HPF      CAPILLARY BLOOD GLUCOSE      POCT Blood Glucose.: 243 mg/dL (2020 15:57)      ABG - ( 2020 16:15 )  pH, Arterial: 7.55  pH, Blood: x     /  pCO2: 39    /  pO2: 88    / HCO3: 34    / Base Excess: 10.5  /  SaO2: 98                Urinalysis Basic - ( 2020 16:59 )    Color: Yellow / Appearance: Clear / S.010 / pH: x  Gluc: x / Ketone: Negative  / Bili: Negative / Urobili: Negative   Blood: x / Protein: 100 / Nitrite: Negative   Leuk Esterase: Trace / RBC: 0-2 /HPF / WBC 3-5 /HPF   Sq Epi: x / Non Sq Epi: Few /HPF / Bacteria: Few /HPF

## 2020-11-14 NOTE — PROGRESS NOTE ADULT - ASSESSMENT
seen and examiend  has multiple medicsal issues  dm, copd trach vent , obesity. lung ca  brought in second to altered mental status / lithargy .  awake not in any distress on trach vent   vssnoted 100.1 temp hypotension    lungs clear.  heart ok abd soft bsnml, peg in place  labs  wbc 14  creat  ua neg  cxr pending   iv fluids  cxr, blood  urine cxs iv antibx broad spectrum  icu called

## 2020-11-14 NOTE — H&P ADULT - NSHPPHYSICALEXAM_GEN_ALL_CORE
ICU Vital Signs Last 24 Hrs  T(C): 37.8 (14 Nov 2020 16:15), Max: 37.8 (14 Nov 2020 16:15)  T(F): 100.1 (14 Nov 2020 16:15), Max: 100.1 (14 Nov 2020 16:15)  HR: 95 (14 Nov 2020 18:20) (95 - 98)  BP: 88/51 (14 Nov 2020 18:20) (75/53 - 126/98)  RR: 16 (14 Nov 2020 17:11) (14 - 16)  SpO2: 99% (14 Nov 2020 17:11) (98% - 99%)      PHYSICAL EXAM:  GENERAL: well-developed, obese female with trach, on vent  HEAD:  Atraumatic, Normocephalic  EYES: EOMI, PERRLA, conjunctiva and sclera clear  NECK: Supple, No JVD, trach +  CHEST/LUNG: BLAE+, decreased breath sounds at bases, no wheezes  HEART: Regular rate and rhythm; No murmurs, rubs, or gallops  ABDOMEN: Soft, Nontender, Nondistended; Bowel sounds present; peg site clean  EXTREMITIES:, No clubbing, cyanosis; 1+ bilateral pitting edema  NEUROLOGY: opens eyes, nonverbal; cannot participate in neuro exam  SKIN: No rashes or lesions

## 2020-11-14 NOTE — PATIENT PROFILE ADULT - OVER THE PAST TWO WEEKS, HAVE YOU FELT LITTLE INTEREST OR PLEASURE IN DOING THINGS?
zolpidem (AMBIEN) 10 MG tablet      Last Written Prescription Date:  6/21/2017  Last Fill Quantity: 30 tablet,   # refills: 1  Last Office Visit with [facility] or [facility] prescribing provider: 5/20/2017  Future Office visit:       Routing refill request to provider for review/approval because:  Drug not on the [facility], [facility] or [facility] refill protocol or controlled substance     no

## 2020-11-14 NOTE — H&P ADULT - ATTENDING COMMENTS
IMP: This is a 65 yr old  obese female with history of 80 pack year tobacco use, HTN, DM2 on insulin, COPD s/p trach(on vent) and PEG, R lung cancer s/p resection (2017), presents to the ED for AMS from [facility] at [facility]. Admitting to ICU for encephalopathy and sepsis secondary to possible aspiration pneumonia.    -  Sepsis  - Resp failure  s/p trach  - Aspiration pneumonia  - Hypotension  - Encephalopathy  - COPD s/p trach and peg  - MARYAM  - DM uncontrol       Plan   -admit to icu   -continue antibx  -f/u cultures  -continue vent support  -adjust vent as per ABG  -hemodynamic support  -blood sugar control   -dvt/gi prophy  PEG feed

## 2020-11-14 NOTE — ED PROVIDER NOTE - OBJECTIVE STATEMENT
64 y/o obese F with a significant PMHx of 80 pack year tobacco use, HTN, DM2 on insulin, COPD s/p trach and PEG(frequent admissions), R lung cancer s/p resection (2017), presents to the ED for AMS from [facility] at [facility]. Patient noted to be more lethargic. Concerned for sepsis. Patient nonverbal at baseline and unable to provide history. Patient's PMD, Dr. Lezama, notified of her arrival. Patient full code with JESSICA.

## 2020-11-14 NOTE — ED ADULT NURSE NOTE - OBJECTIVE STATEMENT
BIB EMS from [place] at [facility] for AMS , on arrival noted lethargic, code sepsis initiated Patient bed bound and nonverbal at baseline. on vent via tracheostomy, peg tube to abd wall noted.

## 2020-11-15 LAB
ALBUMIN SERPL ELPH-MCNC: 1.9 G/DL — LOW (ref 3.5–5)
ALP SERPL-CCNC: 234 U/L — HIGH (ref 40–120)
ALT FLD-CCNC: 37 U/L DA — SIGNIFICANT CHANGE UP (ref 10–60)
ANION GAP SERPL CALC-SCNC: 8 MMOL/L — SIGNIFICANT CHANGE UP (ref 5–17)
APTT BLD: 39.9 SEC — HIGH (ref 27.5–35.5)
AST SERPL-CCNC: 53 U/L — HIGH (ref 10–40)
BASE EXCESS BLDA CALC-SCNC: 11 MMOL/L — HIGH (ref -2–2)
BASOPHILS # BLD AUTO: 0.05 K/UL — SIGNIFICANT CHANGE UP (ref 0–0.2)
BASOPHILS NFR BLD AUTO: 0.4 % — SIGNIFICANT CHANGE UP (ref 0–2)
BILIRUB SERPL-MCNC: 0.8 MG/DL — SIGNIFICANT CHANGE UP (ref 0.2–1.2)
BLOOD GAS COMMENTS ARTERIAL: SIGNIFICANT CHANGE UP
BUN SERPL-MCNC: 31 MG/DL — HIGH (ref 7–18)
CALCIUM SERPL-MCNC: 8.3 MG/DL — LOW (ref 8.4–10.5)
CHLORIDE SERPL-SCNC: 94 MMOL/L — LOW (ref 96–108)
CO2 SERPL-SCNC: 32 MMOL/L — HIGH (ref 22–31)
CREAT SERPL-MCNC: 1.05 MG/DL — SIGNIFICANT CHANGE UP (ref 0.5–1.3)
CULTURE RESULTS: SIGNIFICANT CHANGE UP
EOSINOPHIL # BLD AUTO: 0.08 K/UL — SIGNIFICANT CHANGE UP (ref 0–0.5)
EOSINOPHIL NFR BLD AUTO: 0.6 % — SIGNIFICANT CHANGE UP (ref 0–6)
GLUCOSE BLDC GLUCOMTR-MCNC: 277 MG/DL — HIGH (ref 70–99)
GLUCOSE BLDC GLUCOMTR-MCNC: 300 MG/DL — HIGH (ref 70–99)
GLUCOSE BLDC GLUCOMTR-MCNC: 376 MG/DL — HIGH (ref 70–99)
GLUCOSE BLDC GLUCOMTR-MCNC: 426 MG/DL — HIGH (ref 70–99)
GLUCOSE SERPL-MCNC: 270 MG/DL — HIGH (ref 70–99)
GRAM STN FLD: SIGNIFICANT CHANGE UP
HCO3 BLDA-SCNC: 35 MMOL/L — HIGH (ref 23–27)
HCT VFR BLD CALC: 26.2 % — LOW (ref 34.5–45)
HGB BLD-MCNC: 8.1 G/DL — LOW (ref 11.5–15.5)
HOROWITZ INDEX BLDA+IHG-RTO: 50 — SIGNIFICANT CHANGE UP
IMM GRANULOCYTES NFR BLD AUTO: 1 % — SIGNIFICANT CHANGE UP (ref 0–1.5)
INR BLD: 1.37 RATIO — HIGH (ref 0.88–1.16)
LACTATE SERPL-SCNC: 2 MMOL/L — SIGNIFICANT CHANGE UP (ref 0.7–2)
LYMPHOCYTES # BLD AUTO: 0.41 K/UL — LOW (ref 1–3.3)
LYMPHOCYTES # BLD AUTO: 3.3 % — LOW (ref 13–44)
MAGNESIUM SERPL-MCNC: 2.1 MG/DL — SIGNIFICANT CHANGE UP (ref 1.6–2.6)
MCHC RBC-ENTMCNC: 27.1 PG — SIGNIFICANT CHANGE UP (ref 27–34)
MCHC RBC-ENTMCNC: 30.9 GM/DL — LOW (ref 32–36)
MCV RBC AUTO: 87.6 FL — SIGNIFICANT CHANGE UP (ref 80–100)
METHOD TYPE: SIGNIFICANT CHANGE UP
MONOCYTES # BLD AUTO: 0.35 K/UL — SIGNIFICANT CHANGE UP (ref 0–0.9)
MONOCYTES NFR BLD AUTO: 2.8 % — SIGNIFICANT CHANGE UP (ref 2–14)
MRSA SPEC QL CULT: SIGNIFICANT CHANGE UP
NEUTROPHILS # BLD AUTO: 11.38 K/UL — HIGH (ref 1.8–7.4)
NEUTROPHILS NFR BLD AUTO: 91.9 % — HIGH (ref 43–77)
NRBC # BLD: 0 /100 WBCS — SIGNIFICANT CHANGE UP (ref 0–0)
PCO2 BLDA: 44 MMHG — SIGNIFICANT CHANGE UP (ref 32–46)
PH BLDA: 7.51 — HIGH (ref 7.35–7.45)
PHOSPHATE SERPL-MCNC: 4.5 MG/DL — SIGNIFICANT CHANGE UP (ref 2.5–4.5)
PLATELET # BLD AUTO: 91 K/UL — LOW (ref 150–400)
PO2 BLDA: 103 MMHG — SIGNIFICANT CHANGE UP (ref 74–108)
POTASSIUM SERPL-MCNC: 3.6 MMOL/L — SIGNIFICANT CHANGE UP (ref 3.5–5.3)
POTASSIUM SERPL-SCNC: 3.6 MMOL/L — SIGNIFICANT CHANGE UP (ref 3.5–5.3)
PROT SERPL-MCNC: 6.2 G/DL — SIGNIFICANT CHANGE UP (ref 6–8.3)
PROTHROM AB SERPL-ACNC: 16.1 SEC — HIGH (ref 10.6–13.6)
RBC # BLD: 2.99 M/UL — LOW (ref 3.8–5.2)
RBC # FLD: 15.5 % — HIGH (ref 10.3–14.5)
SAO2 % BLDA: 99 % — HIGH (ref 92–96)
SARS-COV-2 RNA SPEC QL NAA+PROBE: SIGNIFICANT CHANGE UP
SODIUM SERPL-SCNC: 134 MMOL/L — LOW (ref 135–145)
SPECIMEN SOURCE: SIGNIFICANT CHANGE UP
WBC # BLD: 12.4 K/UL — HIGH (ref 3.8–10.5)
WBC # FLD AUTO: 12.4 K/UL — HIGH (ref 3.8–10.5)

## 2020-11-15 PROCEDURE — 71045 X-RAY EXAM CHEST 1 VIEW: CPT | Mod: 26

## 2020-11-15 RX ORDER — ACETAMINOPHEN 500 MG
1000 TABLET ORAL ONCE
Refills: 0 | Status: COMPLETED | OUTPATIENT
Start: 2020-11-15 | End: 2020-11-15

## 2020-11-15 RX ORDER — INSULIN GLARGINE 100 [IU]/ML
16 INJECTION, SOLUTION SUBCUTANEOUS ONCE
Refills: 0 | Status: DISCONTINUED | OUTPATIENT
Start: 2020-11-15 | End: 2020-11-15

## 2020-11-15 RX ORDER — VANCOMYCIN HCL 1 G
VIAL (EA) INTRAVENOUS
Refills: 0 | Status: DISCONTINUED | OUTPATIENT
Start: 2020-11-15 | End: 2020-11-19

## 2020-11-15 RX ORDER — VANCOMYCIN HCL 1 G
1250 VIAL (EA) INTRAVENOUS EVERY 12 HOURS
Refills: 0 | Status: DISCONTINUED | OUTPATIENT
Start: 2020-11-15 | End: 2020-11-19

## 2020-11-15 RX ORDER — VANCOMYCIN HCL 1 G
1250 VIAL (EA) INTRAVENOUS ONCE
Refills: 0 | Status: COMPLETED | OUTPATIENT
Start: 2020-11-15 | End: 2020-11-15

## 2020-11-15 RX ORDER — INSULIN LISPRO 100/ML
8 VIAL (ML) SUBCUTANEOUS EVERY 8 HOURS
Refills: 0 | Status: DISCONTINUED | OUTPATIENT
Start: 2020-11-15 | End: 2020-11-16

## 2020-11-15 RX ORDER — INSULIN GLARGINE 100 [IU]/ML
16 INJECTION, SOLUTION SUBCUTANEOUS EVERY MORNING
Refills: 0 | Status: DISCONTINUED | OUTPATIENT
Start: 2020-11-15 | End: 2020-11-16

## 2020-11-15 RX ADMIN — CHLORHEXIDINE GLUCONATE 15 MILLILITER(S): 213 SOLUTION TOPICAL at 05:05

## 2020-11-15 RX ADMIN — Medication 12: at 22:02

## 2020-11-15 RX ADMIN — Medication 166.67 MILLIGRAM(S): at 12:54

## 2020-11-15 RX ADMIN — Medication 100 MILLIGRAM(S): at 05:10

## 2020-11-15 RX ADMIN — CEFEPIME 100 MILLIGRAM(S): 1 INJECTION, POWDER, FOR SOLUTION INTRAMUSCULAR; INTRAVENOUS at 17:45

## 2020-11-15 RX ADMIN — Medication 8 UNIT(S): at 22:02

## 2020-11-15 RX ADMIN — Medication 6: at 13:01

## 2020-11-15 RX ADMIN — Medication 8: at 00:00

## 2020-11-15 RX ADMIN — Medication 50 MILLIGRAM(S): at 14:21

## 2020-11-15 RX ADMIN — HEPARIN SODIUM 5000 UNIT(S): 5000 INJECTION INTRAVENOUS; SUBCUTANEOUS at 05:11

## 2020-11-15 RX ADMIN — Medication 50 MILLIGRAM(S): at 22:01

## 2020-11-15 RX ADMIN — CHLORHEXIDINE GLUCONATE 1 APPLICATION(S): 213 SOLUTION TOPICAL at 05:05

## 2020-11-15 RX ADMIN — Medication 50 MILLIGRAM(S): at 00:01

## 2020-11-15 RX ADMIN — PANTOPRAZOLE SODIUM 40 MILLIGRAM(S): 20 TABLET, DELAYED RELEASE ORAL at 13:02

## 2020-11-15 RX ADMIN — Medication 50 MILLIGRAM(S): at 17:37

## 2020-11-15 RX ADMIN — Medication 10: at 17:36

## 2020-11-15 RX ADMIN — Medication 100 MILLIGRAM(S): at 22:01

## 2020-11-15 RX ADMIN — Medication 6: at 05:43

## 2020-11-15 RX ADMIN — HEPARIN SODIUM 5000 UNIT(S): 5000 INJECTION INTRAVENOUS; SUBCUTANEOUS at 13:09

## 2020-11-15 RX ADMIN — Medication 166.67 MILLIGRAM(S): at 17:37

## 2020-11-15 RX ADMIN — INSULIN GLARGINE 16 UNIT(S): 100 INJECTION, SOLUTION SUBCUTANEOUS at 22:02

## 2020-11-15 RX ADMIN — Medication 1000 MILLIGRAM(S): at 17:35

## 2020-11-15 RX ADMIN — HEPARIN SODIUM 5000 UNIT(S): 5000 INJECTION INTRAVENOUS; SUBCUTANEOUS at 22:01

## 2020-11-15 RX ADMIN — CHLORHEXIDINE GLUCONATE 15 MILLILITER(S): 213 SOLUTION TOPICAL at 17:36

## 2020-11-15 RX ADMIN — Medication 400 MILLIGRAM(S): at 15:45

## 2020-11-15 RX ADMIN — Medication 50 MILLIGRAM(S): at 05:10

## 2020-11-15 RX ADMIN — Medication 100 MILLIGRAM(S): at 13:20

## 2020-11-15 RX ADMIN — CEFEPIME 100 MILLIGRAM(S): 1 INJECTION, POWDER, FOR SOLUTION INTRAMUSCULAR; INTRAVENOUS at 05:10

## 2020-11-15 NOTE — PROGRESS NOTE ADULT - SUBJECTIVE AND OBJECTIVE BOX
INTERVAL HPI/OVERNIGHT EVENTS: Patient admitted to ICu for encephalopathy and aspiration pneumonia. Patient blood pressure was stablle over night.    PRESSORS: [ ] YES [ ] NO  WHICH:    ANTIBIOTICS:                      Antimicrobial:  cefepime   IVPB 2000 milliGRAM(s) IV Intermittent every 12 hours  metroNIDAZOLE  IVPB 500 milliGRAM(s) IV Intermittent every 8 hours    Cardiovascular:    Pulmonary:  ALBUTerol    90 MICROgram(s) HFA Inhaler 2 Puff(s) Inhalation every 6 hours PRN    Hematalogic:  heparin   Injectable 5000 Unit(s) SubCutaneous every 8 hours    Other:  chlorhexidine 0.12% Liquid 15 milliLiter(s) Oral Mucosa every 12 hours  chlorhexidine 2% Cloths 1 Application(s) Topical <User Schedule>  hydrocortisone sodium succinate Injectable 50 milliGRAM(s) IV Push every 6 hours  influenza   Vaccine 0.5 milliLiter(s) IntraMuscular once  insulin lispro (ADMELOG) corrective regimen sliding scale   SubCutaneous every 6 hours  pantoprazole  Injectable 40 milliGRAM(s) IV Push daily    ALBUTerol    90 MICROgram(s) HFA Inhaler 2 Puff(s) Inhalation every 6 hours PRN  cefepime   IVPB 2000 milliGRAM(s) IV Intermittent every 12 hours  chlorhexidine 0.12% Liquid 15 milliLiter(s) Oral Mucosa every 12 hours  chlorhexidine 2% Cloths 1 Application(s) Topical <User Schedule>  heparin   Injectable 5000 Unit(s) SubCutaneous every 8 hours  hydrocortisone sodium succinate Injectable 50 milliGRAM(s) IV Push every 6 hours  influenza   Vaccine 0.5 milliLiter(s) IntraMuscular once  insulin lispro (ADMELOG) corrective regimen sliding scale   SubCutaneous every 6 hours  metroNIDAZOLE  IVPB 500 milliGRAM(s) IV Intermittent every 8 hours  pantoprazole  Injectable 40 milliGRAM(s) IV Push daily    Drug Dosing Weight  Height (cm): 154 (2020 20:16)  Weight (kg): 89.9 (2020 20:16)  BMI (kg/m2): 37.9 (2020 20:16)  BSA (m2): 1.87 (2020 20:16)    CENTRAL LINE: [ ] YES [x ] NO  LOCATION:   DATE INSERTED:  REMOVE: [ ] YES [ ] NO  EXPLAIN:    ROBLEDO: [x ] YES [ ] NO    DATE INSERTED:  REMOVE:  [ ] YES [ ] NO  EXPLAIN:    A-LINE:  [ ] YES [x ] NO  LOCATION:   DATE INSERTED:  REMOVE:  [ ] YES [ ] NO  EXPLAIN:    PMH -reviewed admission note, no change since admission    ICU Vital Signs Last 24 Hrs  T(C): 37.4 (2020 20:16), Max: 37.8 (2020 16:15)  T(F): 99.4 (2020 20:16), Max: 100.1 (2020 16:15)  HR: 95 (2020 23:00) (85 - 99)  BP: 99/54 (2020 23:00) (75/52 - 126/98)  BP(mean): 69 (2020 23:00) (67 - 73)  ABP: --  ABP(mean): --  RR: 22 (2020 23:00) (14 - 156)  SpO2: 97% (2020 23:00) (95% - 100%)      ABG - ( 2020 16:15 )  pH, Arterial: 7.55  pH, Blood: x     /  pCO2: 39    /  pO2: 88    / HCO3: 34    / Base Excess: 10.5  /  SaO2: 98                        Mode: AC/ CMV (Assist Control/ Continuous Mandatory Ventilation)  RR (machine): 14  TV (machine): 400  FiO2: 50  PEEP: 5  ITime: 1  MAP: 8  PIP: 29      PHYSICAL EXAM:    GENERAL: Obese female trach to vent.  HEAD:  Atraumatic, Normocephalic  EYES: EOMI, PERRLA, conjunctiva and sclera clear  ENMT: No tonsillar erythema, exudates, or enlargement; Moist mucous membranes, Good dentition, No lesions  NECK: Supple, normal appearance, No JVD; Normal thyroid; Trachea midline  NERVOUS SYSTEM:  non verbal. opens eyes.  CHEST/LUNG: No chest deformity; Normal percussion bilaterally; No rales, rhonchi, wheezing   HEART: Regular rate and rhythm; No murmurs, rubs, or gallops  ABDOMEN: Soft, Nontender, Nondistended; Bowel sounds present  EXTREMITIES:  2+ Peripheral Pulses, No clubbing, cyanosis, or edema  LYMPH: No lymphadenopathy noted  SKIN: No rashes or lesions; Good capillary refill      LABS:  CBC Full  -  ( 2020 16:19 )  WBC Count : 14.91 K/uL  RBC Count : 3.50 M/uL  Hemoglobin : 9.5 g/dL  Hematocrit : 30.8 %  Platelet Count - Automated : 113 K/uL  Mean Cell Volume : 88.0 fl  Mean Cell Hemoglobin : 27.1 pg  Mean Cell Hemoglobin Concentration : 30.8 gm/dL  Auto Neutrophil # : 13.72 K/uL  Auto Lymphocyte # : 0.15 K/uL  Auto Monocyte # : 0.75 K/uL  Auto Eosinophil # : 0.00 K/uL  Auto Basophil # : 0.15 K/uL  Auto Neutrophil % : 83.0 %  Auto Lymphocyte % : 1.0 %  Auto Monocyte % : 5.0 %  Auto Eosinophil % : 0.0 %  Auto Basophil % : 1.0 %        130<L>  |  90<L>  |  32<H>  ----------------------------<  238<H>  4.0   |  31  |  1.58<H>    Ca    8.7      2020 16:19  Phos  4.3       Mg     1.8         TPro  6.3  /  Alb  1.8<L>  /  TBili  0.8  /  DBili  x   /  AST  60<H>  /  ALT  39  /  AlkPhos  298<H>        Urinalysis Basic - ( 2020 16:59 )    Color: Yellow / Appearance: Clear / S.010 / pH: x  Gluc: x / Ketone: Negative  / Bili: Negative / Urobili: Negative   Blood: x / Protein: 100 / Nitrite: Negative   Leuk Esterase: Trace / RBC: 0-2 /HPF / WBC 3-5 /HPF   Sq Epi: x / Non Sq Epi: Few /HPF / Bacteria: Few /HPF          RADIOLOGY & ADDITIONAL STUDIES REVIEWED:  ***    GOALS OF CARE DISCUSSION WITH PATIENT/FAMILY/PROXY:    CRITICAL CARE TIME SPENT: 35 minutes

## 2020-11-15 NOTE — PROGRESS NOTE ADULT - ASSESSMENT
_________________________________________________________________________________________  ========>>  M E D I C A L   A T T E N D I N G    F O L L O W  U P  N O T E  <<=========  -----------------------------------------------------------------------------------------------------    - Patient evaluated by me, chart reviewed.  (covering today for Dr Frederick)   - Patient remains in ICU, trached, vented, appear stable     ==================>> REVIEW OF SYSTEM <<=================    unable to obtain     ==================>> PHYSICAL EXAM <<=================    GEN: awake, somewhat responsive , NAD , comfortable  HEENT: NCAT, PERRL  Neck: supple , lines and tubes in place   CVS: S1S2 , regular , No M/R/G appreciated  PULM: scattered rhonchi, limited   ABD.: soft. non tender, non distended  Extrem: intact pulses , no signif edema   PSYCH : a bit anxious      ==================>> MEDICATIONS <<====================    MEDICATIONS  (STANDING):  cefepime   IVPB 2000 milliGRAM(s) IV Intermittent every 12 hours  chlorhexidine 0.12% Liquid 15 milliLiter(s) Oral Mucosa every 12 hours  chlorhexidine 2% Cloths 1 Application(s) Topical <User Schedule>  heparin   Injectable 5000 Unit(s) SubCutaneous every 8 hours  hydrocortisone sodium succinate Injectable 50 milliGRAM(s) IV Push every 6 hours  influenza   Vaccine 0.5 milliLiter(s) IntraMuscular once  insulin glargine Injectable (LANTUS) 16 Unit(s) SubCutaneous once  insulin glargine Injectable (LANTUS) 16 Unit(s) SubCutaneous every morning  insulin lispro (ADMELOG) corrective regimen sliding scale   SubCutaneous every 6 hours  insulin lispro Injectable (ADMELOG) 8 Unit(s) SubCutaneous every 8 hours  metroNIDAZOLE  IVPB 500 milliGRAM(s) IV Intermittent every 8 hours  pantoprazole  Injectable 40 milliGRAM(s) IV Push daily  vancomycin  IVPB      vancomycin  IVPB 1250 milliGRAM(s) IV Intermittent every 12 hours    MEDICATIONS  (PRN):  ALBUTerol    90 MICROgram(s) HFA Inhaler 2 Puff(s) Inhalation every 6 hours PRN Shortness of Breath    ___________  Active diet:  Diet, NPO with Tube Feed:   Tube Feeding Modality: Gastrostomy  Glucerna 1.5 Inocente  Total Volume for 24 Hours (mL): 960  Continuous  Starting Tube Feed Rate mL per Hour: 10  Increase Tube Feed Rate by (mL): 10  Until Goal Tube Feed Rate (mL per Hour): 40  Tube Feed Duration (in Hours): 24  Tube Feed Start Time: 22:00  ___________________    ==================>> VITAL SIGNS <<==================    T(C): 36.2 (11-15-20 @ 13:00), Max: 37.4 (20 @ 20:16)  HR: 87 (11-15-20 @ 16:00) (78 - 99)  BP: 114/70 (11-15-20 @ 16:00) (75/52 - 127/78)  BP(mean): 83 (11-15-20 @ 16:00)  RR: 22 (11-15-20 @ 16:00) (16 - 156)  SpO2: 100% (11-15-20 @ 16:00) (94% - 100%)     POCT Blood Glucose.: 376 mg/dL (15 Nov 2020 17:20)  POCT Blood Glucose.: 300 mg/dL (15 Nov 2020 12:41)  POCT Blood Glucose.: 277 mg/dL (15 Nov 2020 05:40)  POCT Blood Glucose.: 328 mg/dL (2020 23:34)    I&O's Summary    2020 07:01  -  15 Nov 2020 07:00  --------------------------------------------------------  IN: 0 mL / OUT: 1760 mL / NET: -1760 mL     ==================>> LAB AND IMAGING <<==================                        8.1    12.40 )-----------( 91       ( 15 Nov 2020 06:44 )             26.2        Hemoglobin:   8.1 <<==,  9.5 <<==    11-15    134<L>  |  94<L>  |  31<H>  ----------------------------<  270<H>  3.6   |  32<H>  |  1.05    Ca    8.3<L>      15 Nov 2020 06:44  Phos  4.5     11-15  Mg     2.1     11-15    TPro  6.2  /  Alb  1.9<L>  /  TBili  0.8  /  DBili  x   /  AST  53<H>  /  ALT  37  /  AlkPhos  234<H>  11-15    PT/INR - ( 15 Nov 2020 10:47 )   PT: 16.1 sec;   INR: 1.37 ratio    PTT - ( 15 Nov 2020 10:47 )  PTT:39.9 sec          ABG - ( 15 Nov 2020 05:04 )    pH, Arterial: 7.51  pH, Blood: x     /  pCO2: 44    /  pO2: 103   / HCO3: 35    / Base Excess: 11.0  /  SaO2: 99        Urinalysis Basic - ( 2020 16:59 )  _______________________  C U L T U R E S :    Culture - Urine (collected 2020 22:25)  Source: .Urine Clean Catch (Midstream)  Final Report (15 Nov 2020 17:32):    <10,000 CFU/mL Normal Urogenital Shannan    Culture - Blood (collected 2020 20:41)  Source: .Blood Blood-Peripheral  Gram Stain (15 Nov 2020 11:25):    Growth in aerobic bottle: Gram Positive Cocci in Clusters    Growth in anaerobic bottle: Gram Positive Cocci in Clusters  Preliminary Report (15 Nov 2020 11:25):    Growth in aerobic bottle: Gram Positive Cocci in Clusters    Growth in anaerobic bottle: Gram Positive Cocci in Clusters    "Due to technical problems, Proteus sp. will Not be reported as part of    the BCID panel until further notice" ***Blood Panel PCRresults on this    specimen are available    approximately 3 hours after the Gram stain result.***    Gram stain, PCR, and/or culture results may not always    correspond due to difference in methodologies.    ************************************************************    This PCR assay was performed using [employer].    The following targets are tested for: Enterococcus,    vancomycin resistant enterococci, Listeria monocytogenes,    coagulase negative staphylococci, S. aureus,    methicillin resistant S. aureus, Streptococcus agalactiae    (Group B), S. pneumoniae, S. pyogenes (Group A),    Acinetobacter baumannii, Enterobacter cloacae, E. coli,    Klebsiella oxytoca, K. pneumoniae, Proteus sp.,    Serratia marcescens, Haemophilus influenzae,    Neisseria meningitidis, Pseudomonas aeruginosa, Candida    albicans, C. glabrata, C krusei, C parapsilosis,    C. tropicalis and the KPC resistance gene.  Organism: Blood Culture PCR (15 Nov 2020 10:28)  Organism: Blood Culture PCR (15 Nov 2020 10:28)    Sensitivities:      -  Methicillin resistant Staphylococcus aureus (MRSA): Detec      Method Type: PCR    Culture - Blood (collected 2020 20:41)  Source: .Blood Blood-Peripheral  Gram Stain (15 Nov 2020 11:39):    Growth in aerobic bottle: Gram Positive Cocci in Clusters    Growth in anaerobic bottle: Gram Positive Cocci in Clusters  Preliminary Report (15 Nov 2020 11:39):    Growth in aerobic bottle: Gram Positive Cocci in Clusters    Growth in anaerobic bottle: Gram Positive Cocci in Clusters      COVID-19 IgG Antibody Index: <3.80 (20 @ 23:45)    SARS-CoV-2: NotDetec (20 @ 16:59)    Color: Yellow / Appearance: Clear / S.010 / pH: x  Gluc: x / Ketone: Negative  / Bili: Negative / Urobili: Negative   Blood: x / Protein: 100 / Nitrite: Negative   Leuk Esterase: Trace / RBC: 0-2 /HPF / WBC 3-5 /HPF   Sq Epi: x / Non Sq Epi: Few /HPF / Bacteria: Few /HPF    TSH:      1.59   (20)           HgA1C:     (20)      8.1,     (20)      8.6    ___________________________________________________________________________________  ===============>>  A S S E S S M E N T   A N D   P L A N <<===============  ------------------------------------------------------------------------------------------      Sepsis    bacteremia   - Resp failure  s/p trach  - Aspiration pneumonia  - Hypotension  - Encephalopathy  - COPD s/p trach and peg  - MARYAM  - DM      ===================>>>    -icu care and mgmt appreciated   -continue antibx / ID eval   -f/u cultures / festivities of bacteremia   -continue vent support / pulm toileting   -adjust vent as per ABG  -hemodynamic support as needed   -blood sugar control   -dvt/gi prophylaxis   PEG feed .     __________________________  H. RAYMOND Mendez.  (covering today for Dr Frederick)   Pager: [phone]

## 2020-11-15 NOTE — CONSULT NOTE ADULT - SUBJECTIVE AND OBJECTIVE BOX
Patient is a 65y old  Female who presents with a chief complaint of lethargy (15 Nov 2020 00:09)      HPI:  64 y/o obese female with history of 80 pack year tobacco use, HTN, DM2 on insulin, COPD s/p trach(on vent) and PEG, R lung cancer s/p resection (2017), presents to the ED for AMS from [facility] . Patient noted to be lethargic with concern for sepsis hence referred to ER.  As per paperwork, patient is nonverbal at baseline, also noted to have been started on levaquin on  with addition of vancomycin overnight. Patient only opens eyes in response to verbal stimulus, currently unable to provide history.     Patient was noted to have temp of 100.1, initially hemodynamically sale however bp dropped to 78 for which she received 2l bolus of NS. She had wbc count of 14.9 with left shift, lactate 3.3, bun/cr of 32/1.58, sodium 130, UA -ve, cxr with right basilar infiltrate. Was given a dose each of vanc and cefepime.   GOC: confirmed with daughter Lisa, full code. Consent for central line obtained.   endocrinology consulted for glycemic management  on TF continuous, high dose steroid use at present  poc glucose uncontrolled      PAST MEDICAL & SURGICAL HISTORY:  Malignant neoplasm of unspecified part of right bronchus or lung    HTN (hypertension)    DM (diabetes mellitus)    COPD (chronic obstructive pulmonary disease)    Lung cancer    Morbid obesity    GERD (gastroesophageal reflux disease)    Deviated septum    Prolapsed uterus    ITP (idiopathic thrombocytopenic purpura)    Emphysema/COPD    History of cholecystectomy    S/P lobectomy of lung  right 2017    History of tonsillectomy           MEDICATIONS  (STANDING):  cefepime   IVPB 2000 milliGRAM(s) IV Intermittent every 12 hours  chlorhexidine 0.12% Liquid 15 milliLiter(s) Oral Mucosa every 12 hours  chlorhexidine 2% Cloths 1 Application(s) Topical <User Schedule>  heparin   Injectable 5000 Unit(s) SubCutaneous every 8 hours  hydrocortisone sodium succinate Injectable 50 milliGRAM(s) IV Push every 6 hours  influenza   Vaccine 0.5 milliLiter(s) IntraMuscular once  insulin lispro (ADMELOG) corrective regimen sliding scale   SubCutaneous every 6 hours  metroNIDAZOLE  IVPB 500 milliGRAM(s) IV Intermittent every 8 hours  pantoprazole  Injectable 40 milliGRAM(s) IV Push daily  vancomycin  IVPB      vancomycin  IVPB 1250 milliGRAM(s) IV Intermittent every 12 hours    MEDICATIONS  (PRN):  ALBUTerol    90 MICROgram(s) HFA Inhaler 2 Puff(s) Inhalation every 6 hours PRN Shortness of Breath      FAMILY HISTORY:  FH: lung cancer    Family history of stroke    Family history of lung cancer    Diabetes mellitus        SOCIAL HISTORY:      REVIEW OF SYSTEMS:  unable to obtain  s/p trach on vent      Vital Signs Last 24 Hrs  T(C): 36.2 (15 Nov 2020 13:00), Max: 37.8 (2020 16:15)  T(F): 97.2 (15 Nov 2020 13:00), Max: 100.1 (2020 16:15)  HR: 87 (15 Nov 2020 14:00) (78 - 99)  BP: 118/70 (15 Nov 2020 14:00) (75/52 - 127/78)  BP(mean): 84 (15 Nov 2020 14:00) (67 - 94)  RR: 26 (15 Nov 2020 14:00) (14 - 156)  SpO2: 97% (15 Nov 2020 14:00) (94% - 100%)      Constitutional:    NC/AT:    HEENT:    Neck:  No JVD  Respiratory:  reduced breath sounds b/l bases    Cardiovascular:  RR     Gastrointestinal: Soft     Extremities: without cyanosis    Neurological:  non focal        LABS:                        8.1    12.40 )-----------( 91       ( 15 Nov 2020 06:44 )             26.2     11-15    134<L>  |  94<L>  |  31<H>  ----------------------------<  270<H>  3.6   |  32<H>  |  1.05    Ca    8.3<L>      15 Nov 2020 06:44  Phos  4.5     11-15  Mg     2.1     11-15    TPro  6.2  /  Alb  1.9<L>  /  TBili  0.8  /  DBili  x   /  AST  53<H>  /  ALT  37  /  AlkPhos  234<H>  11-15    CARDIAC MARKERS ( 2020 16:19 )  0.020 ng/mL / x     / x     / x     / x          PT/INR - ( 15 Nov 2020 10:47 )   PT: 16.1 sec;   INR: 1.37 ratio         PTT - ( 15 Nov 2020 10:47 )  PTT:39.9 sec  Urinalysis Basic - ( 2020 16:59 )    Color: Yellow / Appearance: Clear / S.010 / pH: x  Gluc: x / Ketone: Negative  / Bili: Negative / Urobili: Negative   Blood: x / Protein: 100 / Nitrite: Negative   Leuk Esterase: Trace / RBC: 0-2 /HPF / WBC 3-5 /HPF   Sq Epi: x / Non Sq Epi: Few /HPF / Bacteria: Few /HPF      CAPILLARY BLOOD GLUCOSE      POCT Blood Glucose.: 300 mg/dL (15 Nov 2020 12:41)  POCT Blood Glucose.: 277 mg/dL (15 Nov 2020 05:40)  POCT Blood Glucose.: 328 mg/dL (2020 23:34)  POCT Blood Glucose.: 243 mg/dL (2020 15:57)      RADIOLOGY & ADDITIONAL STUDIES:

## 2020-11-15 NOTE — PROGRESS NOTE ADULT - ASSESSMENT
ASSESSMENT AND PLAN:  64 y/o obese female with history of 80 pack year tobacco use, HTN, DM2 on insulin, COPD s/p trach(on vent) and PEG, R lung cancer s/p resection (2017), presents to the ED for [facility]. Admitting to ICU for encephalopathy and sepsis secondary to possible aspiration pneumonia.    1. Sepsis  2. Aspiration pneumonia  3. Hypotension  4. Encephalopathy  5. COPD s/p trach and peg  6. MARYAM  7. DM      =================== Neuro============================  Encephalopathy:  -presented with lethargy, currently opens eyes but nonverbal  -ua negative cxr with possible infiltrate at R lung base  -suspect toxic/metabolic etiology  -CT head showed No intracranial hemorrhage, mass effect or depressed skull fracture.  Fluid opacification of the bilateral mastoid air cells. Correlate clinically for bilateral mastoiditis.  No contraindication to lumbar puncture.      -npo for now with aspiration precautions    ================= Cardiovascular==========================  Hypotension:  -blood pressure dropped  to 78, improving on 2nd l bolus of NS  -will monitor closely, may need pressor support  -central line consent in chart    HX of HTN: hold coreg    ================- Pulm=================================  Pneumonia:  -sepsis with tahycardia, hypotension, leucocytosis, lactate of 3.3  -lactate improved s/p iv fluids  -s/p vanc and cefepime in ER  -ua -ve, rvp and covid negative  -cxr with possible infiltrate in base of right lung  -Patient on cefepime and flagyl to cover for possible aspiration pneumonia  -f/u blood cultures, procalcitonin    COPD:  -sp trach and peg  -c/w mechanical ventilation  -c/w steroids, bronchodilators    ==================ID===================================  Sepsis:  -Patients lactate improved to 2.2.  will continue with cefepime and flagyl.  f/u blood and urine Cx    ================= Nephro================================  MARYAM:  -bun/cr noted elevated from baseline  -likely pre-renal in setting of sepsis  -monitor renal function closely  follow BMP    =================GI====================================  Transaminitis:  -mild elevation in lfts s/t sepsis  -monitor lfts closely    ================ Heme==================================  Anemia:  -h/h at baseline, monitor daily    =================Endocrine===============================  DM:  -a1c from sept noted 8.1  -pt on 6u basaglar and moderate sliding scale at NH  -continue moderate sliding scale fo rnow  -monitor fs     ================= Skin/Catheters============================  No rashes. Peripheral IV lines.     - =================Prophylaxis =============================  heparin for DVT proph  Protonix for  GI proph    ==================GOC==================================   FULL CODE

## 2020-11-15 NOTE — PROGRESS NOTE ADULT - ATTENDING COMMENTS
IMP: This is a 65 yr old  obese female with history of 80 pack year tobacco use, HTN, DM2 on insulin, COPD s/p trach(on vent) and PEG, R lung cancer s/p resection (2017), presents to the ED for A[facility]. Admitting to ICU for encephalopathy and sepsis secondary to possible aspiration pneumonia.    -  Sepsis  - Resp failure  s/p trach  - Aspiration pneumonia  - Hypotension  - Encephalopathy  - COPD s/p trach and peg  - MARYAM  - DM uncontrol       Plan   -continue antibx  -f/u cultures  -Covid PCR neg , biomarker pending   -continue isolatoion until biomarkers ar known   -continue vent support  -adjust vent as per ABG  -hemodynamic support  -blood sugar control   -dvt/gi prophy  PEG feed . IMP: This is a 65 yr old  obese female with history of 80 pack year tobacco use, HTN, DM2 on insulin, COPD s/p trach(on vent) and PEG, R lung cancer s/p resection (2017), presents to the ED for AMS from [facility]. Admitting to ICU for encephalopathy and sepsis secondary to possible aspiration pneumonia.    -  Sepsis/ bacteria   - Resp failure  s/p trach  - Aspiration pneumonia  - Hypotension  - Encephalopathy  - COPD s/p trach and peg  - MARYAM  - DM uncontrol       Plan   -continue antibx  -monitor vanc level  -f/u cultures  -Covid PCR neg , biomarker pending   -continue isolatoion until biomarkers ar known   -continue vent support  -adjust vent as per ABG  -hemodynamic support  -blood sugar control   -dvt/gi prophy  PEG feed .

## 2020-11-16 LAB
A1C WITH ESTIMATED AVERAGE GLUCOSE RESULT: 6.6 % — HIGH (ref 4–5.6)
ALBUMIN SERPL ELPH-MCNC: 1.1 G/DL — LOW (ref 3.5–5)
ALP SERPL-CCNC: 117 U/L — SIGNIFICANT CHANGE UP (ref 40–120)
ALT FLD-CCNC: 21 U/L DA — SIGNIFICANT CHANGE UP (ref 10–60)
ANION GAP SERPL CALC-SCNC: 6 MMOL/L — SIGNIFICANT CHANGE UP (ref 5–17)
AST SERPL-CCNC: 28 U/L — SIGNIFICANT CHANGE UP (ref 10–40)
BASOPHILS # BLD AUTO: 0.02 K/UL — SIGNIFICANT CHANGE UP (ref 0–0.2)
BASOPHILS NFR BLD AUTO: 0.2 % — SIGNIFICANT CHANGE UP (ref 0–2)
BILIRUB SERPL-MCNC: 0.5 MG/DL — SIGNIFICANT CHANGE UP (ref 0.2–1.2)
BUN SERPL-MCNC: 38 MG/DL — HIGH (ref 7–18)
CALCIUM SERPL-MCNC: 7.2 MG/DL — LOW (ref 8.4–10.5)
CHLORIDE SERPL-SCNC: 98 MMOL/L — SIGNIFICANT CHANGE UP (ref 96–108)
CHOLEST SERPL-MCNC: <50 MG/DL — SIGNIFICANT CHANGE UP
CO2 SERPL-SCNC: 33 MMOL/L — HIGH (ref 22–31)
CREAT SERPL-MCNC: 0.76 MG/DL — SIGNIFICANT CHANGE UP (ref 0.5–1.3)
EOSINOPHIL # BLD AUTO: 0.01 K/UL — SIGNIFICANT CHANGE UP (ref 0–0.5)
EOSINOPHIL NFR BLD AUTO: 0.1 % — SIGNIFICANT CHANGE UP (ref 0–6)
ESTIMATED AVERAGE GLUCOSE: 143 MG/DL — HIGH (ref 68–114)
GLUCOSE BLDC GLUCOMTR-MCNC: 234 MG/DL — HIGH (ref 70–99)
GLUCOSE BLDC GLUCOMTR-MCNC: 311 MG/DL — HIGH (ref 70–99)
GLUCOSE BLDC GLUCOMTR-MCNC: 326 MG/DL — HIGH (ref 70–99)
GLUCOSE SERPL-MCNC: 308 MG/DL — HIGH (ref 70–99)
HCT VFR BLD CALC: 26.5 % — LOW (ref 34.5–45)
HDLC SERPL-MCNC: 12 MG/DL — LOW
HGB BLD-MCNC: 8.3 G/DL — LOW (ref 11.5–15.5)
IMM GRANULOCYTES NFR BLD AUTO: 3.5 % — HIGH (ref 0–1.5)
LIPID PNL WITH DIRECT LDL SERPL: <18 MG/DL — SIGNIFICANT CHANGE UP
LYMPHOCYTES # BLD AUTO: 0.44 K/UL — LOW (ref 1–3.3)
LYMPHOCYTES # BLD AUTO: 4 % — LOW (ref 13–44)
MAGNESIUM SERPL-MCNC: 2.3 MG/DL — SIGNIFICANT CHANGE UP (ref 1.6–2.6)
MCHC RBC-ENTMCNC: 27.4 PG — SIGNIFICANT CHANGE UP (ref 27–34)
MCHC RBC-ENTMCNC: 31.3 GM/DL — LOW (ref 32–36)
MCV RBC AUTO: 87.5 FL — SIGNIFICANT CHANGE UP (ref 80–100)
MONOCYTES # BLD AUTO: 0.62 K/UL — SIGNIFICANT CHANGE UP (ref 0–0.9)
MONOCYTES NFR BLD AUTO: 5.6 % — SIGNIFICANT CHANGE UP (ref 2–14)
NEUTROPHILS # BLD AUTO: 9.53 K/UL — HIGH (ref 1.8–7.4)
NEUTROPHILS NFR BLD AUTO: 86.6 % — HIGH (ref 43–77)
NON HDL CHOLESTEROL: <38 MG/DL — SIGNIFICANT CHANGE UP
NRBC # BLD: 0 /100 WBCS — SIGNIFICANT CHANGE UP (ref 0–0)
PHOSPHATE SERPL-MCNC: 2.2 MG/DL — LOW (ref 2.5–4.5)
PLATELET # BLD AUTO: 70 K/UL — LOW (ref 150–400)
POTASSIUM SERPL-MCNC: 3.7 MMOL/L — SIGNIFICANT CHANGE UP (ref 3.5–5.3)
POTASSIUM SERPL-SCNC: 3.7 MMOL/L — SIGNIFICANT CHANGE UP (ref 3.5–5.3)
PROCALCITONIN SERPL-MCNC: 99.6 NG/ML — HIGH (ref 0.02–0.1)
PROT SERPL-MCNC: 3.9 G/DL — LOW (ref 6–8.3)
RBC # BLD: 3.03 M/UL — LOW (ref 3.8–5.2)
RBC # FLD: 15.2 % — HIGH (ref 10.3–14.5)
SARS-COV-2 IGG SERPL QL IA: NEGATIVE — SIGNIFICANT CHANGE UP
SARS-COV-2 IGM SERPL IA-ACNC: <0.1 INDEX — SIGNIFICANT CHANGE UP
SODIUM SERPL-SCNC: 137 MMOL/L — SIGNIFICANT CHANGE UP (ref 135–145)
TRIGL SERPL-MCNC: 100 MG/DL — SIGNIFICANT CHANGE UP
TSH SERPL-MCNC: 3.23 UU/ML — SIGNIFICANT CHANGE UP (ref 0.34–4.82)
VANCOMYCIN TROUGH SERPL-MCNC: 35.4 UG/ML — CRITICAL HIGH (ref 10–20)
WBC # BLD: 11.01 K/UL — HIGH (ref 3.8–10.5)
WBC # FLD AUTO: 11.01 K/UL — HIGH (ref 3.8–10.5)

## 2020-11-16 PROCEDURE — 70450 CT HEAD/BRAIN W/O DYE: CPT | Mod: 26

## 2020-11-16 RX ORDER — INSULIN GLARGINE 100 [IU]/ML
30 INJECTION, SOLUTION SUBCUTANEOUS EVERY MORNING
Refills: 0 | Status: DISCONTINUED | OUTPATIENT
Start: 2020-11-16 | End: 2020-11-18

## 2020-11-16 RX ORDER — ACETAMINOPHEN 500 MG
1000 TABLET ORAL ONCE
Refills: 0 | Status: COMPLETED | OUTPATIENT
Start: 2020-11-16 | End: 2020-11-16

## 2020-11-16 RX ORDER — INSULIN GLARGINE 100 [IU]/ML
16 INJECTION, SOLUTION SUBCUTANEOUS ONCE
Refills: 0 | Status: COMPLETED | OUTPATIENT
Start: 2020-11-16 | End: 2020-11-16

## 2020-11-16 RX ORDER — SODIUM,POTASSIUM PHOSPHATES 278-250MG
1 POWDER IN PACKET (EA) ORAL
Refills: 0 | Status: COMPLETED | OUTPATIENT
Start: 2020-11-16 | End: 2020-11-17

## 2020-11-16 RX ORDER — INSULIN LISPRO 100/ML
12 VIAL (ML) SUBCUTANEOUS EVERY 6 HOURS
Refills: 0 | Status: DISCONTINUED | OUTPATIENT
Start: 2020-11-16 | End: 2020-11-18

## 2020-11-16 RX ADMIN — Medication 8 UNIT(S): at 05:08

## 2020-11-16 RX ADMIN — CHLORHEXIDINE GLUCONATE 15 MILLILITER(S): 213 SOLUTION TOPICAL at 05:08

## 2020-11-16 RX ADMIN — HEPARIN SODIUM 5000 UNIT(S): 5000 INJECTION INTRAVENOUS; SUBCUTANEOUS at 21:36

## 2020-11-16 RX ADMIN — CEFEPIME 100 MILLIGRAM(S): 1 INJECTION, POWDER, FOR SOLUTION INTRAMUSCULAR; INTRAVENOUS at 05:09

## 2020-11-16 RX ADMIN — CHLORHEXIDINE GLUCONATE 1 APPLICATION(S): 213 SOLUTION TOPICAL at 05:08

## 2020-11-16 RX ADMIN — HEPARIN SODIUM 5000 UNIT(S): 5000 INJECTION INTRAVENOUS; SUBCUTANEOUS at 05:09

## 2020-11-16 RX ADMIN — Medication 100 MILLIGRAM(S): at 21:36

## 2020-11-16 RX ADMIN — Medication 166.67 MILLIGRAM(S): at 05:08

## 2020-11-16 RX ADMIN — Medication 1 TABLET(S): at 17:42

## 2020-11-16 RX ADMIN — CHLORHEXIDINE GLUCONATE 15 MILLILITER(S): 213 SOLUTION TOPICAL at 17:42

## 2020-11-16 RX ADMIN — Medication 1 TABLET(S): at 21:38

## 2020-11-16 RX ADMIN — Medication 400 MILLIGRAM(S): at 10:10

## 2020-11-16 RX ADMIN — Medication 50 MILLIGRAM(S): at 05:51

## 2020-11-16 RX ADMIN — Medication 1000 MILLIGRAM(S): at 10:26

## 2020-11-16 RX ADMIN — Medication 4: at 17:37

## 2020-11-16 RX ADMIN — Medication 8: at 05:07

## 2020-11-16 RX ADMIN — INSULIN GLARGINE 16 UNIT(S): 100 INJECTION, SOLUTION SUBCUTANEOUS at 14:10

## 2020-11-16 RX ADMIN — CEFEPIME 100 MILLIGRAM(S): 1 INJECTION, POWDER, FOR SOLUTION INTRAMUSCULAR; INTRAVENOUS at 17:42

## 2020-11-16 RX ADMIN — Medication 40 MILLIGRAM(S): at 11:52

## 2020-11-16 RX ADMIN — Medication 12 UNIT(S): at 17:37

## 2020-11-16 RX ADMIN — Medication 8: at 11:50

## 2020-11-16 RX ADMIN — INSULIN GLARGINE 16 UNIT(S): 100 INJECTION, SOLUTION SUBCUTANEOUS at 09:14

## 2020-11-16 RX ADMIN — HEPARIN SODIUM 5000 UNIT(S): 5000 INJECTION INTRAVENOUS; SUBCUTANEOUS at 14:16

## 2020-11-16 RX ADMIN — Medication 100 MILLIGRAM(S): at 05:09

## 2020-11-16 RX ADMIN — Medication 100 MILLIGRAM(S): at 14:16

## 2020-11-16 RX ADMIN — PANTOPRAZOLE SODIUM 40 MILLIGRAM(S): 20 TABLET, DELAYED RELEASE ORAL at 11:52

## 2020-11-16 NOTE — PHYSICAL THERAPY INITIAL EVALUATION ADULT - PLANNED THERAPY INTERVENTIONS, PT EVAL
neuromuscular re-education/postural re-education/stretching/gait training/strengthening/transfer training/balance training/bed mobility training/ROM

## 2020-11-16 NOTE — PHYSICAL THERAPY INITIAL EVALUATION ADULT - PRECAUTIONS/LIMITATIONS, REHAB EVAL
oxygen therapy device and L/min/aspiration precautions/tracheostomy collar on ventilator/fall precautions

## 2020-11-16 NOTE — PHYSICAL THERAPY INITIAL EVALUATION ADULT - MANUAL MUSCLE TESTING RESULTS, REHAB EVAL
R elbow 2-/5 R shoulder/wrist 0/5, R  minimal strength. L shoulder 3-/5, L elbow/wrist/ 3/5. RLE 2-/5 except R ankle 0/5. L hip flex 2-/5, L ankle 3-/5

## 2020-11-16 NOTE — PHYSICAL THERAPY INITIAL EVALUATION ADULT - LEVEL OF INDEPENDENCE: SIT/SUPINE, REHAB EVAL
pt c/o generalized pain at this time will perform follow up assessment at later time/unable to perform

## 2020-11-16 NOTE — PHYSICAL THERAPY INITIAL EVALUATION ADULT - IMPAIRMENTS FOUND, PT EVAL
arousal, attention, and cognition/ROM/aerobic capacity/endurance/gross motor/gait, locomotion, and balance/ventilation and respiration/gas exchange/muscle strength

## 2020-11-16 NOTE — PROGRESS NOTE ADULT - SUBJECTIVE AND OBJECTIVE BOX
HPI: 65F PMH obesity, COPD s/p trach and PEG (80 pack year hx, nonverbal at baseline), R lung cancer s/p resection (2017), HTN, and IDDM2 presented from [facility] with lethargy and AMS concerning for sepsis, started on Levaquin  and vancomycin  by VY. Found to have right basilar infiltrate on CXR, given x1 dose each vanc and cefepime, admitted to ICU for management of encephalopathy and sepsis 2/2 aspiration PNA.    INTERVAL HPI/OVERNIGHT EVENTS: NAEON. SBPs [id number] and RR 22 overnight, VS otherwise wnl. Decreased FiO2 for vent settings 14/400/40/5, awaiting ABG this am. Tapering steroids from Solu-cortef 50mg q6 to daily prednisone 40mg. Yessica enamorado, will trial Primafit. Platelets low to 70, will continue to monitor.     ICU Vital Signs Last 24 Hrs  T(C): 37.1 (2020 06:00), Max: 37.2 (15 Nov 2020 13:00)  T(F): 98.8 (2020 06:00), Max: 99 (15 Nov 2020 13:00)  HR: 84 (2020 12:00) (76 - 95)  BP: 107/56 (2020 12:00) (96/59 - 127/78)  BP(mean): 72 (2020 12:00) (67 - 94)  ABP: --  ABP(mean): --  RR: 26 (2020 12:00) (15 - 34)  SpO2: 94% (2020 12:00) (94% - 100%)    I&O's Summary    15 Nov 2020 07:  -  2020 07:00  --------------------------------------------------------  IN: 1210 mL / OUT: 595 mL / NET: 615 mL    2020 07:01  -  2020 12:18  --------------------------------------------------------  IN: 470 mL / OUT: 150 mL / NET: 320 mL      Mode: AC/ CMV (Assist Control/ Continuous Mandatory Ventilation)  RR (machine): 14  TV (machine): 400  FiO2: 40  PEEP: 5  ITime: 1  MAP: 10  PIP: 24      LABS:                        8.3    11.01 )-----------( 70       ( 2020 05:48 )             26.5     11-16    137  |  98  |  38<H>  ----------------------------<  308<H>  3.7   |  33<H>  |  0.76    Ca    7.2<L>      2020 05:48  Phos  2.2       Mg     2.3     16    TPro  3.9<L>  /  Alb  1.1<L>  /  TBili  0.5  /  DBili  x   /  AST  28  /  ALT  21  /  AlkPhos  117  11-16    PT/INR - ( 15 Nov 2020 10:47 )   PT: 16.1 sec;   INR: 1.37 ratio         PTT - ( 15 Nov 2020 10:47 )  PTT:39.9 sec  Urinalysis Basic - ( 2020 16:59 )    Color: Yellow / Appearance: Clear / S.010 / pH: x  Gluc: x / Ketone: Negative  / Bili: Negative / Urobili: Negative   Blood: x / Protein: 100 / Nitrite: Negative   Leuk Esterase: Trace / RBC: 0-2 /HPF / WBC 3-5 /HPF   Sq Epi: x / Non Sq Epi: Few /HPF / Bacteria: Few /HPF      CAPILLARY BLOOD GLUCOSE      POCT Blood Glucose.: 311 mg/dL (2020 09:13)  POCT Blood Glucose.: 326 mg/dL (2020 04:43)  POCT Blood Glucose.: 426 mg/dL (15 Nov 2020 21:57)  POCT Blood Glucose.: 376 mg/dL (15 Nov 2020 17:20)  POCT Blood Glucose.: 300 mg/dL (15 Nov 2020 12:41)    ABG - ( 15 Nov 2020 05:04 )  pH, Arterial: 7.51  pH, Blood: x     /  pCO2: 44    /  pO2: 103   / HCO3: 35    / Base Excess: 11.0  /  SaO2: 99                  RADIOLOGY & ADDITIONAL TESTS:    Consultant(s) Notes Reviewed:  [x ] YES  [ ] NO    MEDICATIONS  (STANDING):  cefepime   IVPB 2000 milliGRAM(s) IV Intermittent every 12 hours  chlorhexidine 0.12% Liquid 15 milliLiter(s) Oral Mucosa every 12 hours  chlorhexidine 2% Cloths 1 Application(s) Topical <User Schedule>  heparin   Injectable 5000 Unit(s) SubCutaneous every 8 hours  influenza   Vaccine 0.5 milliLiter(s) IntraMuscular once  insulin glargine Injectable (LANTUS) 16 Unit(s) SubCutaneous every morning  insulin lispro (ADMELOG) corrective regimen sliding scale   SubCutaneous every 6 hours  insulin lispro Injectable (ADMELOG) 8 Unit(s) SubCutaneous every 8 hours  metroNIDAZOLE  IVPB 500 milliGRAM(s) IV Intermittent every 8 hours  pantoprazole  Injectable 40 milliGRAM(s) IV Push daily  potassium phosphate / sodium phosphate Tablet (K-PHOS No. 2) 1 Tablet(s) Oral four times a day with meals  predniSONE   Tablet 40 milliGRAM(s) Oral daily  vancomycin  IVPB      vancomycin  IVPB 1250 milliGRAM(s) IV Intermittent every 12 hours    MEDICATIONS  (PRN):  ALBUTerol    90 MICROgram(s) HFA Inhaler 2 Puff(s) Inhalation every 6 hours PRN Shortness of Breath    ROS:  GENERAL: feels improved  HEENT: no HA  Resp: improved WOB, no cough  CV: no CP, no palpitations  GI: no abdominal pain, no n/v/d/c  Skin: no rashes  all other systems reviewed and are negative       PHYSICAL EXAM:  GENERAL: well built, well nourished  HEAD:  Atraumatic, Normocephalic  EYES: EOMI, PERRLA, conjunctiva and sclera clear  ENT: Moist mucous membranes  NECK: Supple  NERVOUS SYSTEM:  Alert & Oriented X3  CHEST/LUNG: B/L good air entry; coarse breath sounds posteriorly right upper lobe  HEART: S1S2 normal, RRR  ABDOMEN: Soft, Nontender, Nondistended; Bowel sounds present  EXTREMITIES:  2+ Peripheral Pulses no edema  PSYCH: appropriate mood and affect  SKIN: No rashes or lesions    Care Discussed with Consultants/Other Providers [ x] YES  [ ] NO HPI: 65F PMH obesity, COPD s/p trach and PEG (80 pack year hx, nonverbal at baseline), R lung cancer s/p resection (2017), HTN, and IDDM2 presented from [facility] with lethargy and AMS concerning for sepsis, started on Levaquin  and vancomycin  by VY. Found to have right basilar infiltrate on CXR, given x1 dose each vanc and cefepime, admitted to ICU for management of encephalopathy and sepsis 2/2 aspiration PNA.    INTERVAL HPI/OVERNIGHT EVENTS: NAEON. SBPs [id number] and RR 22 overnight, VS otherwise wnl. Decreased FiO2 for vent settings 14/400/40/5, awaiting ABG this am. Tapering steroids from Solu-cortef 50mg q6 to daily prednisone 40mg. Yessica enamorado, will trial Primafit. Platelets low to 70, will continue to monitor.     ICU Vital Signs Last 24 Hrs  T(C): 37.1 (2020 06:00), Max: 37.2 (15 Nov 2020 13:00)  T(F): 98.8 (2020 06:00), Max: 99 (15 Nov 2020 13:00)  HR: 84 (2020 12:00) (76 - 95)  BP: 107/56 (2020 12:00) (96/59 - 127/78)  BP(mean): 72 (2020 12:00) (67 - 94)  ABP: --  ABP(mean): --  RR: 26 (2020 12:00) (15 - 34)  SpO2: 94% (2020 12:00) (94% - 100%)    I&O's Summary    15 Nov 2020 07:  -  2020 07:00  --------------------------------------------------------  IN: 1210 mL / OUT: 595 mL / NET: 615 mL    2020 07:01  -  2020 12:18  --------------------------------------------------------  IN: 470 mL / OUT: 150 mL / NET: 320 mL      Mode: AC/ CMV (Assist Control/ Continuous Mandatory Ventilation)  RR (machine): 14  TV (machine): 400  FiO2: 40  PEEP: 5  ITime: 1  MAP: 10  PIP: 24      LABS:                        8.3    11.01 )-----------( 70       ( 2020 05:48 )             26.5     11-16    137  |  98  |  38<H>  ----------------------------<  308<H>  3.7   |  33<H>  |  0.76    Ca    7.2<L>      2020 05:48  Phos  2.2       Mg     2.3     16    TPro  3.9<L>  /  Alb  1.1<L>  /  TBili  0.5  /  DBili  x   /  AST  28  /  ALT  21  /  AlkPhos  117  11-16    PT/INR - ( 15 Nov 2020 10:47 )   PT: 16.1 sec;   INR: 1.37 ratio         PTT - ( 15 Nov 2020 10:47 )  PTT:39.9 sec  Urinalysis Basic - ( 2020 16:59 )    Color: Yellow / Appearance: Clear / S.010 / pH: x  Gluc: x / Ketone: Negative  / Bili: Negative / Urobili: Negative   Blood: x / Protein: 100 / Nitrite: Negative   Leuk Esterase: Trace / RBC: 0-2 /HPF / WBC 3-5 /HPF   Sq Epi: x / Non Sq Epi: Few /HPF / Bacteria: Few /HPF      CAPILLARY BLOOD GLUCOSE      POCT Blood Glucose.: 311 mg/dL (2020 09:13)  POCT Blood Glucose.: 326 mg/dL (2020 04:43)  POCT Blood Glucose.: 426 mg/dL (15 Nov 2020 21:57)  POCT Blood Glucose.: 376 mg/dL (15 Nov 2020 17:20)  POCT Blood Glucose.: 300 mg/dL (15 Nov 2020 12:41)    ABG - ( 15 Nov 2020 05:04 )  pH, Arterial: 7.51  pH, Blood: x     /  pCO2: 44    /  pO2: 103   / HCO3: 35    / Base Excess: 11.0  /  SaO2: 99                  RADIOLOGY & ADDITIONAL TESTS:    Consultant(s) Notes Reviewed:  [x ] YES  [ ] NO    MEDICATIONS  (STANDING):  cefepime   IVPB 2000 milliGRAM(s) IV Intermittent every 12 hours  chlorhexidine 0.12% Liquid 15 milliLiter(s) Oral Mucosa every 12 hours  chlorhexidine 2% Cloths 1 Application(s) Topical <User Schedule>  heparin   Injectable 5000 Unit(s) SubCutaneous every 8 hours  influenza   Vaccine 0.5 milliLiter(s) IntraMuscular once  insulin glargine Injectable (LANTUS) 16 Unit(s) SubCutaneous every morning  insulin lispro (ADMELOG) corrective regimen sliding scale   SubCutaneous every 6 hours  insulin lispro Injectable (ADMELOG) 8 Unit(s) SubCutaneous every 8 hours  metroNIDAZOLE  IVPB 500 milliGRAM(s) IV Intermittent every 8 hours  pantoprazole  Injectable 40 milliGRAM(s) IV Push daily  potassium phosphate / sodium phosphate Tablet (K-PHOS No. 2) 1 Tablet(s) Oral four times a day with meals  predniSONE   Tablet 40 milliGRAM(s) Oral daily  vancomycin  IVPB      vancomycin  IVPB 1250 milliGRAM(s) IV Intermittent every 12 hours    MEDICATIONS  (PRN):  ALBUTerol    90 MICROgram(s) HFA Inhaler 2 Puff(s) Inhalation every 6 hours PRN Shortness of Breath    ROS: limited by patient condition, endorsed generalized pain that later resolved       PHYSICAL EXAM:  GENERAL: obese, +trach  HEAD:  Atraumatic, Normocephalic  EYES: EOMI, PERRLA, conjunctiva and sclera clear  ENT: Moist mucous membranes  NECK: Supple  NERVOUS SYSTEM:  Alert & Oriented X3  CHEST/LUNG: B/L good air entry  HEART: S1S2 normal, RRR  ABDOMEN: obese, distended, firm; Bowel sounds faint but present  EXTREMITIES:  2+ Peripheral Pulses  PSYCH: appropriate mood and affect  SKIN: No rashes or lesions    Care Discussed with Consultants/Other Providers [ x] YES  [ ] NO HPI: 65F PMH obesity, COPD s/p trach and PEG (80 pack year hx, nonverbal at baseline), R lung cancer s/p resection (2017), HTN, and IDDM2 presented from [facility] with lethargy and AMS concerning for sepsis, started on Levaquin  and vancomycin  by VY. Found to have right basilar infiltrate on CXR, given x1 dose each vanc and cefepime, admitted to ICU for management of encephalopathy and sepsis 2/2 aspiration PNA.    INTERVAL HPI/OVERNIGHT EVENTS: NAEON. SBPs [id number] and RR 22 overnight, VS otherwise wnl. Decreased FiO2 for vent settings 14/400/40/5, awaiting ABG this am. Tapering steroids from Solu-cortef 50mg q6 to daily prednisone 40mg. Yessica enamorado, will trial Primafit. Platelets low to 70, will continue to monitor. Will obtain van trough this pm and repeat BCx tomorrow.     ICU Vital Signs Last 24 Hrs  T(C): 37.1 (2020 06:00), Max: 37.2 (15 Nov 2020 13:00)  T(F): 98.8 (2020 06:00), Max: 99 (15 Nov 2020 13:00)  HR: 84 (2020 12:00) (76 - 95)  BP: 107/56 (2020 12:00) (96/59 - 127/78)  BP(mean): 72 (2020 12:00) (67 - 94)  ABP: --  ABP(mean): --  RR: 26 (2020 12:00) (15 - 34)  SpO2: 94% (2020 12:00) (94% - 100%)    I&O's Summary    15 Nov 2020 07:01  -  2020 07:00  --------------------------------------------------------  IN: 1210 mL / OUT: 595 mL / NET: 615 mL    2020 07:01  -  2020 12:18  --------------------------------------------------------  IN: 470 mL / OUT: 150 mL / NET: 320 mL      Mode: AC/ CMV (Assist Control/ Continuous Mandatory Ventilation)  RR (machine): 14  TV (machine): 400  FiO2: 40  PEEP: 5  ITime: 1  MAP: 10  PIP: 24      LABS:                        8.3    11.01 )-----------( 70       ( 2020 05:48 )             26.5     11-16    137  |  98  |  38<H>  ----------------------------<  308<H>  3.7   |  33<H>  |  0.76    Ca    7.2<L>      2020 05:48  Phos  2.2     11-16  Mg     2.3     11-16    TPro  3.9<L>  /  Alb  1.1<L>  /  TBili  0.5  /  DBili  x   /  AST  28  /  ALT  21  /  AlkPhos  117  11-16    PT/INR - ( 15 Nov 2020 10:47 )   PT: 16.1 sec;   INR: 1.37 ratio         PTT - ( 15 Nov 2020 10:47 )  PTT:39.9 sec  Urinalysis Basic - ( 2020 16:59 )    Color: Yellow / Appearance: Clear / S.010 / pH: x  Gluc: x / Ketone: Negative  / Bili: Negative / Urobili: Negative   Blood: x / Protein: 100 / Nitrite: Negative   Leuk Esterase: Trace / RBC: 0-2 /HPF / WBC 3-5 /HPF   Sq Epi: x / Non Sq Epi: Few /HPF / Bacteria: Few /HPF      CAPILLARY BLOOD GLUCOSE      POCT Blood Glucose.: 311 mg/dL (2020 09:13)  POCT Blood Glucose.: 326 mg/dL (2020 04:43)  POCT Blood Glucose.: 426 mg/dL (15 Nov 2020 21:57)  POCT Blood Glucose.: 376 mg/dL (15 Nov 2020 17:20)  POCT Blood Glucose.: 300 mg/dL (15 Nov 2020 12:41)    ABG - ( 15 Nov 2020 05:04 )  pH, Arterial: 7.51  pH, Blood: x     /  pCO2: 44    /  pO2: 103   / HCO3: 35    / Base Excess: 11.0  /  SaO2: 99                  RADIOLOGY & ADDITIONAL TESTS:    Consultant(s) Notes Reviewed:  [x ] YES  [ ] NO    MEDICATIONS  (STANDING):  cefepime   IVPB 2000 milliGRAM(s) IV Intermittent every 12 hours  chlorhexidine 0.12% Liquid 15 milliLiter(s) Oral Mucosa every 12 hours  chlorhexidine 2% Cloths 1 Application(s) Topical <User Schedule>  heparin   Injectable 5000 Unit(s) SubCutaneous every 8 hours  influenza   Vaccine 0.5 milliLiter(s) IntraMuscular once  insulin glargine Injectable (LANTUS) 16 Unit(s) SubCutaneous every morning  insulin lispro (ADMELOG) corrective regimen sliding scale   SubCutaneous every 6 hours  insulin lispro Injectable (ADMELOG) 8 Unit(s) SubCutaneous every 8 hours  metroNIDAZOLE  IVPB 500 milliGRAM(s) IV Intermittent every 8 hours  pantoprazole  Injectable 40 milliGRAM(s) IV Push daily  potassium phosphate / sodium phosphate Tablet (K-PHOS No. 2) 1 Tablet(s) Oral four times a day with meals  predniSONE   Tablet 40 milliGRAM(s) Oral daily  vancomycin  IVPB      vancomycin  IVPB 1250 milliGRAM(s) IV Intermittent every 12 hours    MEDICATIONS  (PRN):  ALBUTerol    90 MICROgram(s) HFA Inhaler 2 Puff(s) Inhalation every 6 hours PRN Shortness of Breath    ROS: limited by patient condition, endorsed generalized pain that later resolved       PHYSICAL EXAM:  GENERAL: obese, +trach  HEAD:  Atraumatic, Normocephalic  EYES: EOMI, PERRLA, conjunctiva and sclera clear  ENT: Moist mucous membranes  NECK: Supple  NERVOUS SYSTEM:  Alert & Oriented X3  CHEST/LUNG: B/L good air entry  HEART: S1S2 normal, RRR  ABDOMEN: obese, distended, firm; Bowel sounds faint but present  EXTREMITIES:  2+ Peripheral Pulses  PSYCH: appropriate mood and affect  SKIN: No rashes or lesions    Care Discussed with Consultants/Other Providers [ x] YES  [ ] NO

## 2020-11-16 NOTE — DIETITIAN INITIAL EVALUATION ADULT. - PERTINENT LABORATORY DATA
11-16 Na137 mmol/L Glu 308 mg/dL<H> K+ 3.7 mmol/L Cr  0.76 mg/dL BUN 38 mg/dL<H>   11-16 Phos 2.2 mg/dL<L>   11-16 Alb 1.1 g/dL<L>     11-16 Chol <50 mg/dL LDL --    HDL 12 mg/dL<L> Trig 100 mg/dL    11-16-20 @ 10:54 HgbA1C 6.6

## 2020-11-16 NOTE — PHYSICAL THERAPY INITIAL EVALUATION ADULT - CRITERIA FOR SKILLED THERAPEUTIC INTERVENTIONS
risk reduction/prevention/rehab potential/functional limitations in following categories/predicted duration of therapy intervention/impairments found/therapy frequency

## 2020-11-16 NOTE — PROGRESS NOTE ADULT - ASSESSMENT
seen and examined vstsable in icu   physical done  on trach vent not in any distress lungs clear heart abd ok peg in palce   labs noted   copd excerabration gram pos   asp pneumonia  on iv antibxs  broncho dilaters  dvt pro  poor prognosis

## 2020-11-16 NOTE — PROGRESS NOTE ADULT - ATTENDING COMMENTS
65 yr old  obese female with history of 80 pack year tobacco use, HTN, DM2 on insulin, COPD s/p trach(on vent) and PEG, R lung cancer s/p resection (2017), presents to the ED for AMS from [facility]. Admitting to ICU for encephalopathy and sepsis secondary to possible aspiration pneumonia.    Assessment:  1. Sepsis  2. MRSA bacteremia   3. Chronic Resp failure  s/p trach  4. Health care associated pneumonia  5. Encephalopathy  6 COPD s/p trach and peg  7. MARYAM  8. DM uncontrol     Plan   -continue antibx  -Obtain sputum cultures  -monitor vanc level  -f/u cultures  -Repeat cultures in 48 hours  -Covid PCR neg  -continue vent support  -adjust vent as per ABG  -hemodynamic support  -blood sugar control with sliding scale coverage  -Change steroids to prednisone  -Tube feedings  -dvt/gi prophy  -D/c eli, place primafit

## 2020-11-16 NOTE — PHYSICAL THERAPY INITIAL EVALUATION ADULT - ACTIVE RANGE OF MOTION EXAMINATION, REHAB EVAL
RUE 5 dgs elbow flex, unable to move shoulder and wrist. L shoulder flex/abd 80 dgs, elbow WNL. R hip flex 10 dgs, pt can perform R quad set, ankle ROM 0 dgs. L hip flex 30 dgs, pt can perform quad set, L ankle DF to neutral

## 2020-11-16 NOTE — PHYSICAL THERAPY INITIAL EVALUATION ADULT - FOLLOWS COMMANDS/ANSWERS QUESTIONS, REHAB EVAL
100% of the time/pt is non verbal at baseline; however can point to communication board and mouth words to make needs known and answer questions

## 2020-11-16 NOTE — PHYSICAL THERAPY INITIAL EVALUATION ADULT - LEVEL OF INDEPENDENCE, REHAB EVAL
unable to perform/pt c/o generalized pain at this time will perform follow up assessment at later time

## 2020-11-16 NOTE — PROGRESS NOTE ADULT - SUBJECTIVE AND OBJECTIVE BOX
HPI:  64 y/o obese female with history of 80 pack year tobacco use, HTN, DM2 on insulin, COPD s/p trach(on vent) and PEG, R lung cancer s/p resection (2017), presents to the ED for AMS from [facility] . Patient noted to be lethargic with concern for sepsis hence referred to ER.  As per paperwork, patient is nonverbal at baseline, also noted to have been started on levaquin on  with addition of vancomycin overnight. Patient only opens eyes in response to verbal stimulus, currently unable to provide history.     Patient was noted to have temp of 100.1, initially hemodynamically sale however bp dropped to 78 for which she received 2l bolus of NS. She had wbc count of 14.9 with left shift, lactate 3.3, bun/cr of 32/1.58, sodium 130, UA -ve, cxr with right basilar infiltrate. Was given a dose each of vanc and cefepime.   GOC: confirmed with daughter Lisa, full code. Consent for central line obtained.    (2020 18:24)      Patient is a 65y old  Female who presents with a chief complaint of lethargy (2020 12:17)      INTERVAL HPI/OVERNIGHT EVENTS:  T(C): 37.1 (20 @ 06:00), Max: 37.1 (11-15-20 @ 17:00)  HR: 82 (20 @ 12:24) (76 - 95)  BP: 107/56 (20 @ 12:00) (96/59 - 121/71)  RR: 26 (20 @ 12:00) (15 - 34)  SpO2: 95% (20 @ 12:24) (94% - 100%)  Wt(kg): --  I&O's Summary    15 Nov 2020 07:  -  2020 07:00  --------------------------------------------------------  IN: 1210 mL / OUT: 595 mL / NET: 615 mL    2020 07:  -  2020 13:42  --------------------------------------------------------  IN: 470 mL / OUT: 150 mL / NET: 320 mL        REVIEW OF SYSTEMS: denies fever, chills, SOB, palpitations, chest pain, abdominal pain, nausea, vomitting, diarrhea, constipation, dizziness    MEDICATIONS  (STANDING):  cefepime   IVPB 2000 milliGRAM(s) IV Intermittent every 12 hours  chlorhexidine 0.12% Liquid 15 milliLiter(s) Oral Mucosa every 12 hours  chlorhexidine 2% Cloths 1 Application(s) Topical <User Schedule>  heparin   Injectable 5000 Unit(s) SubCutaneous every 8 hours  influenza   Vaccine 0.5 milliLiter(s) IntraMuscular once  insulin glargine Injectable (LANTUS) 30 Unit(s) SubCutaneous every morning  insulin glargine Injectable (LANTUS) 16 Unit(s) SubCutaneous once  insulin lispro (ADMELOG) corrective regimen sliding scale   SubCutaneous every 6 hours  insulin lispro Injectable (ADMELOG) 12 Unit(s) SubCutaneous every 6 hours  metroNIDAZOLE  IVPB 500 milliGRAM(s) IV Intermittent every 8 hours  pantoprazole  Injectable 40 milliGRAM(s) IV Push daily  potassium phosphate / sodium phosphate Tablet (K-PHOS No. 2) 1 Tablet(s) Oral four times a day with meals  predniSONE   Tablet 40 milliGRAM(s) Oral daily  vancomycin  IVPB      vancomycin  IVPB 1250 milliGRAM(s) IV Intermittent every 12 hours    MEDICATIONS  (PRN):  ALBUTerol    90 MICROgram(s) HFA Inhaler 2 Puff(s) Inhalation every 6 hours PRN Shortness of Breath      PHYSICAL EXAM:  GENERAL: NAD, well-groomed, well-developed  HEAD:  Atraumatic, Normocephalic  EYES: EOMI, PERRLA, conjunctiva and sclera clear  ENMT: No tonsillar erythema, exudates, or enlargement; Moist mucous membranes, Good dentition, No lesions  NECK: Supple, No JVD, Normal thyroid  NERVOUS SYSTEM:  Alert & Oriented X3, Good concentration; Motor Strength 5/5 B/L upper and lower extremities; DTRs 2+ intact and symmetric  CHEST/LUNG: Clear to percussion bilaterally; No rales, rhonchi, wheezing, or rubs  HEART: Regular rate and rhythm; No murmurs, rubs, or gallops  ABDOMEN: Soft, Nontender, Nondistended; Bowel sounds present  EXTREMITIES:  2+ Peripheral Pulses, No clubbing, cyanosis, or edema  LYMPH: No lymphadenopathy noted  SKIN: No rashes or lesions  LABS:                        8.3    11.01 )-----------( 70       ( 2020 05:48 )             26.5     11-16    137  |  98  |  38<H>  ----------------------------<  308<H>  3.7   |  33<H>  |  0.76    Ca    7.2<L>      2020 05:48  Phos  2.2       Mg     2.3     16    TPro  3.9<L>  /  Alb  1.1<L>  /  TBili  0.5  /  DBili  x   /  AST  28  /  ALT  21  /  AlkPhos  117  11-16    PT/INR - ( 15 Nov 2020 10:47 )   PT: 16.1 sec;   INR: 1.37 ratio         PTT - ( 15 Nov 2020 10:47 )  PTT:39.9 sec  Urinalysis Basic - ( 2020 16:59 )    Color: Yellow / Appearance: Clear / S.010 / pH: x  Gluc: x / Ketone: Negative  / Bili: Negative / Urobili: Negative   Blood: x / Protein: 100 / Nitrite: Negative   Leuk Esterase: Trace / RBC: 0-2 /HPF / WBC 3-5 /HPF   Sq Epi: x / Non Sq Epi: Few /HPF / Bacteria: Few /HPF      CAPILLARY BLOOD GLUCOSE      POCT Blood Glucose.: 311 mg/dL (2020 09:13)  POCT Blood Glucose.: 326 mg/dL (2020 04:43)  POCT Blood Glucose.: 426 mg/dL (15 Nov 2020 21:57)  POCT Blood Glucose.: 376 mg/dL (15 Nov 2020 17:20)      ABG - ( 15 Nov 2020 05:04 )  pH, Arterial: 7.51  pH, Blood: x     /  pCO2: 44    /  pO2: 103   / HCO3: 35    / Base Excess: 11.0  /  SaO2: 99                Urinalysis Basic - ( 2020 16:59 )    Color: Yellow / Appearance: Clear / S.010 / pH: x  Gluc: x / Ketone: Negative  / Bili: Negative / Urobili: Negative   Blood: x / Protein: 100 / Nitrite: Negative   Leuk Esterase: Trace / RBC: 0-2 /HPF / WBC 3-5 /HPF   Sq Epi: x / Non Sq Epi: Few /HPF / Bacteria: Few /HPF

## 2020-11-16 NOTE — PROGRESS NOTE ADULT - ASSESSMENT
Assessment: 65F PMH obesity, COPD s/p trach and PEG (80 pack year hx, nonverbal at baseline), R lung cancer s/p resection (2017), HTN, and IDDM2 presented from [facility] with lethargy and AMS concerning for sepsis, found to have right basilar infiltrate on CXR, admitted to ICU for management of encephalopathy and sepsis 2/2 aspiration PNA.    1. Sepsis  2. Aspiration pneumonia  3. Hypotension  4. Encephalopathy  5. COPD s/p trach and peg  6. MARYAM  7. DM    Plan:    Neuro: #encephalopathy:  -patient nonverbal but A&Ox3 at baseline, presented with lethargy/AMS, now resolved/returned to baseline  -likely 2/2 aspiration PNA as described below  -CTH unremarkable    CV: #hypotension:  -low SBP in 70s in ED, improved s/p 1L NS and 50mL albumin  -continuing in low 100s today, monitor and consider pressor support if worsens  -central line consent obtained from daughter, in chart    #PMH HTN:   -hold home coreg    Pulm: #PNA:  -on presentation tachycardic, SBPs 70s, WBC 14.9, lactate 3.3  -given IVF and x1 dose each vanc and cefepime in ED  -this am WBC 11.01, 11/15 lactate 2  -possible right pulm infiltrate on initial CXR, likely aspiration PNA  -worsening b/l pulm effusions on CXR 11/15  -RVP and COVID negative  -BCx MRSA, sputum with moderate GP cocci and rare GNR  -fu procalcitonin  -continue cefepime, flagyl, and vanc (11/14 day 1)    #COPD:  -s/p trach and peg (prior to presentation)  -continue mechanical ventilation, FiO2 lowered to 40% today  -taper steroids: dc solu-cortef, start daily prednisone  -continue albuterol  -most recent ABG 7.51/44/103/35, will repeat this am    ID: #sepsis:  -sepsis history and management as above  -UA negative, UCx normal urogenital sundar    Nephro: #MARYAM:  -BUN/Cr 32/1.58 on admission, increased from baseline, likely pre-renal in setting of sepsis  -given IVF as above  -today 38/0.76, resolved  -monitor BMP  -repleted phosphorus this am    GI: #transaminitis:  -LFTs mildly elevated to 298, 60/39, likely 2/2 sepsis  -resolved today    #diet:  -NPO with TF Glucerna 40cc/hr    Heme: #anemia:  -Hb 9.5 on admission, baseline  -Hb 8.3 today, monitor    Endocrine: #DM:  -a1c from sept noted 8.1  -lipid panel notable for low HDL  -hold home 6u Basaglar 6U and moderate SS  -gave additional x1 dose 16U Lantus this am  -increased Lantus from 16U to 30U Qam starting tomorrow  -increased Lispro from 8U q8 to 12U q6 starting tomorrow  -FS q6h    Skin/Catheters: #no issues  -peripheral IV lines in place    PPx:  -heparin for DVT   -Protonix for GI     GOC:  -FULL CODE   Assessment: 65F PMH obesity, COPD s/p trach and PEG (80 pack year hx, nonverbal at baseline), R lung cancer s/p resection (2017), HTN, and IDDM2 presented from [facility] with lethargy and AMS concerning for sepsis, found to have right basilar infiltrate on CXR, admitted to ICU for management of encephalopathy and sepsis 2/2 aspiration PNA.    1. Sepsis  2. Aspiration pneumonia  3. Hypotension  4. Encephalopathy  5. COPD s/p trach and peg  6. MARYAM  7. DM    Plan:    Neuro: #encephalopathy:  -patient nonverbal but A&Ox3 at baseline, presented with lethargy/AMS, now resolved/returned to baseline  -likely 2/2 aspiration PNA as described below  -CTH unremarkable    CV: #hypotension:  -low SBP in 70s in ED, improved s/p 1L NS and 50mL albumin  -continuing in low 100s today, monitor and consider pressor support if worsens  -central line consent obtained from daughter, in chart    #PMH HTN:   -hold home coreg    Pulm: #PNA:  -on presentation tachycardic, SBPs 70s, WBC 14.9, lactate 3.3  -given IVF and x1 dose each vanc and cefepime in ED  -this am WBC 11.01, 11/15 lactate 2  -possible right pulm infiltrate on initial CXR, likely aspiration PNA  -worsening b/l pulm effusions on CXR 11/15  -RVP and COVID negative  -BCx MRSA, sputum with moderate GP cocci and rare GNR  -fu procalcitonin  -continue cefepime, flagyl, and vanc (11/14 day 1)    #COPD:  -s/p trach and peg (prior to presentation)  -continue mechanical ventilation, FiO2 lowered to 40% today  -taper steroids: dc solu-cortef, start daily prednisone  -continue albuterol  -most recent ABG 7.51/44/103/35, will repeat this am    ID: #sepsis:  -sepsis history and management as above  -repeat BCx in am  -vanc trough 4pm today  -UA negative, UCx normal urogenital sundar    Nephro: #MARYAM:  -BUN/Cr 32/1.58 on admission, increased from baseline, likely pre-renal in setting of sepsis  -given IVF as above  -today 38/0.76, resolved  -monitor BMP  -repleted phosphorus this am    GI: #transaminitis:  -LFTs mildly elevated to 298, 60/39, likely 2/2 sepsis  -resolved today    #diet:  -NPO with TF Glucerna 40cc/hr    Heme: #anemia:  -Hb 9.5 on admission, baseline  -Hb 8.3 today, monitor    Endocrine: #DM:  -a1c from sept noted 8.1  -lipid panel notable for low HDL  -hold home 6u Basaglar 6U and moderate SS  -gave additional x1 dose 16U Lantus this am  -increased Lantus from 16U to 30U Qam starting tomorrow  -increased Lispro from 8U q8 to 12U q6 starting tomorrow  -FS q6h    Skin/Catheters: #no issues  -peripheral IV lines in place    PPx:  -heparin for DVT   -Protonix for GI     GOC:  -FULL CODE

## 2020-11-16 NOTE — CONSULT NOTE ADULT - SUBJECTIVE AND OBJECTIVE BOX
Time of visit:        HPI:  66 y/o obese female with history of 80 pack year tobacco use, HTN, DM2 on insulin, COPD s/p trach(on vent) and PEG, R lung cancer s/p resection (2017), presents to the ED for AMS from [facility] . Patient noted to be lethargic with concern for sepsis hence referred to ER.  As per paperwork, patient is nonverbal at baseline, also noted to have been started on levaquin on 11/13 with addition of vancomycin overnight. Patient only opens eyes in response to verbal stimulus, currently unable to provide history.     Patient was noted to have temp of 100.1, initially hemodynamically sale however bp dropped to 78 for which she received 2l bolus of NS. She had wbc count of 14.9 with left shift, lactate 3.3, bun/cr of 32/1.58, sodium 130, UA -ve, cxr with right basilar infiltrate. Was given a dose each of vanc and cefepime.   GOC: confirmed with daughter Lisa, full code. Consent for central line obtained.    (14 Nov 2020 18:24)   Patient seen and examined.     PAST MEDICAL & SURGICAL HISTORY:  Malignant neoplasm of unspecified part of right bronchus or lung    HTN (hypertension)    DM (diabetes mellitus)    COPD (chronic obstructive pulmonary disease)    Lung cancer    Morbid obesity    GERD (gastroesophageal reflux disease)    Deviated septum    Prolapsed uterus    ITP (idiopathic thrombocytopenic purpura)    Emphysema/COPD    History of cholecystectomy    S/P lobectomy of lung  right 2017    History of tonsillectomy        Allergies    fish (Angioedema)  penicillins (Rash)    Intolerances        MEDICATIONS  (STANDING):  cefepime   IVPB 2000 milliGRAM(s) IV Intermittent every 12 hours  chlorhexidine 0.12% Liquid 15 milliLiter(s) Oral Mucosa every 12 hours  chlorhexidine 2% Cloths 1 Application(s) Topical <User Schedule>  heparin   Injectable 5000 Unit(s) SubCutaneous every 8 hours  influenza   Vaccine 0.5 milliLiter(s) IntraMuscular once  insulin glargine Injectable (LANTUS) 30 Unit(s) SubCutaneous every morning  insulin lispro (ADMELOG) corrective regimen sliding scale   SubCutaneous every 6 hours  insulin lispro Injectable (ADMELOG) 12 Unit(s) SubCutaneous every 6 hours  metroNIDAZOLE  IVPB 500 milliGRAM(s) IV Intermittent every 8 hours  pantoprazole  Injectable 40 milliGRAM(s) IV Push daily  potassium phosphate / sodium phosphate Tablet (K-PHOS No. 2) 1 Tablet(s) Oral four times a day with meals  predniSONE   Tablet 40 milliGRAM(s) Oral daily  vancomycin  IVPB      vancomycin  IVPB 1250 milliGRAM(s) IV Intermittent every 12 hours      MEDICATIONS  (PRN):  ALBUTerol    90 MICROgram(s) HFA Inhaler 2 Puff(s) Inhalation every 6 hours PRN Shortness of Breath   Medications up to date at time of exam.    Medications up to date at time of exam.    FAMILY HISTORY:  FH: lung cancer    Family history of stroke    Family history of lung cancer    Diabetes mellitus        SOCIAL HISTORY  Smoking History: [   ] smoking/smoke exposure, [  x ] former smoker  Living Condition: [   ] apartment, [   ] private house  Work History:   Travel History: denies recent travel  Illicit Substance Use: denies  Alcohol Use: denies    REVIEW OF SYSTEMS: on vent via trach    CONSTITUTIONAL:  denies fevers, chills, sweats, weight loss    HEENT:  denies diplopia or blurred vision, sore throat or runny nose.    CARDIOVASCULAR:  denies pressure, squeezing, tightness, or heaviness about the chest; no palpitations.    RESPIRATORY:  denies SOB, cough, OLIVER, wheezing.    GASTROINTESTINAL:  denies abdominal pain, nausea, vomiting or diarrhea.    GENITOURINARY: denies dysuria, frequency or urgency.    NEUROLOGIC:  denies numbness, tingling, seizures or weakness.    PSYCHIATRIC:  denies disorder of thought or mood.    MSK: denies swelling, redness      PHYSICAL EXAMINATION:    GENERAL: The patient is a well-developed, well-nourished, in no apparent distress.     Vital Signs Last 24 Hrs  T(C): 37.1 (16 Nov 2020 15:00), Max: 37.1 (16 Nov 2020 02:18)  T(F): 98.8 (16 Nov 2020 15:00), Max: 98.8 (16 Nov 2020 02:18)  HR: 88 (16 Nov 2020 18:00) (76 - 89)  BP: 122/72 (16 Nov 2020 18:00) (96/59 - 122/72)  BP(mean): 88 (16 Nov 2020 18:00) (67 - 88)  RR: 29 (16 Nov 2020 18:00) (17 - 29)  SpO2: 93% (16 Nov 2020 16:24) (92% - 100%)  Mode: AC/ CMV (Assist Control/ Continuous Mandatory Ventilation)  RR (machine): 14  TV (machine): 400  FiO2: 40  PEEP: 5  ITime: 1  MAP: 11  PIP: 26   (if applicable)    Chest Tube (if applicable)    HEENT: Head is normocephalic and atraumatic. Extraocular muscles are intact. Mucous membranes are moist.     NECK: Supple, no palpable adenopathy. + trach    LUNGS: Clear to auscultation, no wheezing, rales, or rhonchi.    HEART: Regular rate and rhythm without murmur.    ABDOMEN: Soft, nontender, and nondistended.  No hepatosplenomegaly is noted. + PEG    RENAL: No difficulty voiding, no pelvic pain    EXTREMITIES: Without any cyanosis, clubbing, rash, lesions or edema.    NEUROLOGIC: sedated     SKIN: Warm, dry, good turgor.      LABS:                        8.3    11.01 )-----------( 70       ( 16 Nov 2020 05:48 )             26.5     11-16    137  |  98  |  38<H>  ----------------------------<  308<H>  3.7   |  33<H>  |  0.76    Ca    7.2<L>      16 Nov 2020 05:48  Phos  2.2     11-16  Mg     2.3     11-16    TPro  3.9<L>  /  Alb  1.1<L>  /  TBili  0.5  /  DBili  x   /  AST  28  /  ALT  21  /  AlkPhos  117  11-16    PT/INR - ( 15 Nov 2020 10:47 )   PT: 16.1 sec;   INR: 1.37 ratio         PTT - ( 15 Nov 2020 10:47 )  PTT:39.9 sec    ABG - ( 15 Nov 2020 05:04 )  pH, Arterial: 7.51  pH, Blood: x     /  pCO2: 44    /  pO2: 103   / HCO3: 35    / Base Excess: 11.0  /  SaO2: 99                      Serum Pro-Brain Natriuretic Peptide: 8880 pg/mL (11-14-20 @ 16:19)      Procalcitonin, Serum: 99.60 ng/mL (11-16-20 @ 13:48)      MICROBIOLOGY: (if applicable)    RADIOLOGY & ADDITIONAL STUDIES:  EKG:   CXR:  < from: Xray Chest 1 View- PORTABLE-Routine (11.15.20 @ 09:34) >    EXAM:  XR CHEST PORTABLE ROUTINE 1V                            PROCEDURE DATE:  11/15/2020          INTERPRETATION:  Clinical Information: Sepsis    Technique: AP chest image.    Comparison: 11/14/2020    Findings/  Impression: Cardiomegaly. Small bilateral pleural effusions. Status post tracheostomy            CAROLYN MILLAN MD; Attending Interventional Radiologist  This document has been electronically signed. Nov 15 2020  2:18PM    < end of copied text >  ECHO:    IMPRESSION: 65y Female PAST MEDICAL & SURGICAL HISTORY:  Malignant neoplasm of unspecified part of right bronchus or lung    HTN (hypertension)    DM (diabetes mellitus)    COPD (chronic obstructive pulmonary disease)    Lung cancer    Morbid obesity    GERD (gastroesophageal reflux disease)    Deviated septum    Prolapsed uterus    ITP (idiopathic thrombocytopenic purpura)    Emphysema/COPD    History of cholecystectomy    S/P lobectomy of lung  right 2017    History of tonsillectomy     p/w           IMP: This is a 65 yr old  obese female with history of 80 pack year tobacco use, HTN, DM2 on insulin, COPD s/p trach(on vent) and PEG, R lung cancer s/p resection (2017), presents to the ED for AMS from [facility]. Admitting to ICU for encephalopathy and sepsis secondary to possible aspiration pneumonia.    -  Sepsis/ bacteria   - Resp failure  s/p trach  - Aspiration pneumonia  - Hypotension  - Encephalopathy  - COPD s/p trach and peg  - MARYAM  - DM uncontrol       Plan   -continue antibx  -very high procalcitonin   -monitor vanc level  -f/u cultures  -continue vent support  -adjust vent as per ABG  -hemodynamic support  -blood sugar control   -dvt/gi prophy  -PEG feed . d/c with icu team

## 2020-11-16 NOTE — PROGRESS NOTE ADULT - SUBJECTIVE AND OBJECTIVE BOX
Interval Events:    hyperglycemic   requiring significant additional sliding scale  on TF  on steroids- prednisone    Allergies    fish (Angioedema)  penicillins (Rash)    Intolerances      Endocrine/Metabolic Medications:  insulin glargine Injectable (LANTUS) 16 Unit(s) SubCutaneous every morning  insulin lispro (ADMELOG) corrective regimen sliding scale   SubCutaneous every 6 hours  insulin lispro Injectable (ADMELOG) 8 Unit(s) SubCutaneous every 8 hours  predniSONE   Tablet 40 milliGRAM(s) Oral daily      Vital Signs Last 24 Hrs  T(C): 37.1 (16 Nov 2020 06:00), Max: 37.2 (15 Nov 2020 13:00)  T(F): 98.8 (16 Nov 2020 06:00), Max: 99 (15 Nov 2020 13:00)  HR: 81 (16 Nov 2020 10:00) (76 - 95)  BP: 107/58 (16 Nov 2020 10:00) (96/59 - 127/78)  BP(mean): 74 (16 Nov 2020 10:00) (67 - 94)  RR: 20 (16 Nov 2020 10:00) (15 - 34)  SpO2: 98% (16 Nov 2020 10:00) (95% - 100%)      PHYSICAL EXAM  Constitutional:  s/p trach on vent  NC/AT:    HEENT:    Neck:  No JVD  Respiratory:  reduced breath sounds b/l bases    Cardiovascular:  RR     Gastrointestinal: Soft     Extremities: without cyanosis    Neurological:  non focal    LABS                        8.3    11.01 )-----------( 70       ( 16 Nov 2020 05:48 )             26.5                               137    |  98     |  38                  Calcium: 7.2   / iCa: x      (11-16 @ 05:48)    ----------------------------<  308       Magnesium: 2.3                              3.7     |  33     |  0.76             Phosphorous: 2.2      TPro  3.9    /  Alb  1.1    /  TBili  0.5    /  DBili  x      /  AST  28     /  ALT  21     /  AlkPhos  117    16 Nov 2020 05:48    CAPILLARY BLOOD GLUCOSE      POCT Blood Glucose.: 311 mg/dL (16 Nov 2020 09:13)  POCT Blood Glucose.: 326 mg/dL (16 Nov 2020 04:43)  POCT Blood Glucose.: 426 mg/dL (15 Nov 2020 21:57)  POCT Blood Glucose.: 376 mg/dL (15 Nov 2020 17:20)  POCT Blood Glucose.: 300 mg/dL (15 Nov 2020 12:41)        Assesment/plan        66 yo female with multiple comorbidities including h/o DM type 2, HTN, COPD s/p trach on vent, lung ca on right sided s/p resection on chemo admitted with lethargy    endocrinology consulted for glycemic management    DM type 2  uncontrolled  complicated by high dose steroid use, acute on chronic resp failure  s/p trach    recommendations:  methylpred changed to prednisone  s/p trach/PEG  remains hyperglycemic  requiring significant additional sliding scale    - additional lantus 16 units x 1 now 11/16  - increase lantus to 30 units daily in am from tomorrow 11/17  - increase insulin lispro to 12 units q6h  - continue moderate dose ss  - reassess based on requirements  - goal inpatient glucose range: 140-180mg/dl    COPD exacerbation  acute on chronic hypoxic resp failure  s/p trach  on standing steroids  pulmonary on board  s/p trach/PEG  on cefepime, flagyl    sepsis  on cefepime, flagyl  optimize glycemic regimen    Discussed with primary team   Please call  Endocrine- Dr Katlyn Garcia as needed    Time spent evaluating patient including coordination of care 35mins.

## 2020-11-17 DIAGNOSIS — J96.11 CHRONIC RESPIRATORY FAILURE WITH HYPOXIA: ICD-10-CM

## 2020-11-17 DIAGNOSIS — C34.90 MALIGNANT NEOPLASM OF UNSPECIFIED PART OF UNSPECIFIED BRONCHUS OR LUNG: ICD-10-CM

## 2020-11-17 DIAGNOSIS — Z51.5 ENCOUNTER FOR PALLIATIVE CARE: ICD-10-CM

## 2020-11-17 DIAGNOSIS — R53.81 OTHER MALAISE: ICD-10-CM

## 2020-11-17 LAB
-  AMPICILLIN/SULBACTAM: SIGNIFICANT CHANGE UP
-  CEFAZOLIN: SIGNIFICANT CHANGE UP
-  CLINDAMYCIN: SIGNIFICANT CHANGE UP
-  DAPTOMYCIN: SIGNIFICANT CHANGE UP
-  ERYTHROMYCIN: SIGNIFICANT CHANGE UP
-  GENTAMICIN: SIGNIFICANT CHANGE UP
-  IMIPENEM: SIGNIFICANT CHANGE UP
-  LINEZOLID: SIGNIFICANT CHANGE UP
-  OXACILLIN: SIGNIFICANT CHANGE UP
-  PENICILLIN: SIGNIFICANT CHANGE UP
-  PIPERACILLIN/TAZOBACTAM: SIGNIFICANT CHANGE UP
-  RIFAMPIN: SIGNIFICANT CHANGE UP
-  TETRACYCLINE: SIGNIFICANT CHANGE UP
-  TRIMETHOPRIM/SULFAMETHOXAZOLE: SIGNIFICANT CHANGE UP
-  VANCOMYCIN: SIGNIFICANT CHANGE UP
ALBUMIN SERPL ELPH-MCNC: 1.8 G/DL — LOW (ref 3.5–5)
ALP SERPL-CCNC: 187 U/L — HIGH (ref 40–120)
ALT FLD-CCNC: 28 U/L DA — SIGNIFICANT CHANGE UP (ref 10–60)
ANION GAP SERPL CALC-SCNC: 8 MMOL/L — SIGNIFICANT CHANGE UP (ref 5–17)
ANION GAP SERPL CALC-SCNC: 9 MMOL/L — SIGNIFICANT CHANGE UP (ref 5–17)
AST SERPL-CCNC: 23 U/L — SIGNIFICANT CHANGE UP (ref 10–40)
BASE EXCESS BLDA CALC-SCNC: 7.9 MMOL/L — HIGH (ref -2–2)
BASE EXCESS BLDA CALC-SCNC: 9.7 MMOL/L — HIGH (ref -2–2)
BILIRUB SERPL-MCNC: 0.4 MG/DL — SIGNIFICANT CHANGE UP (ref 0.2–1.2)
BLOOD GAS COMMENTS ARTERIAL: SIGNIFICANT CHANGE UP
BLOOD GAS COMMENTS ARTERIAL: SIGNIFICANT CHANGE UP
BUN SERPL-MCNC: 27 MG/DL — HIGH (ref 7–18)
BUN SERPL-MCNC: 35 MG/DL — HIGH (ref 7–18)
CALCIUM SERPL-MCNC: 7.6 MG/DL — LOW (ref 8.4–10.5)
CALCIUM SERPL-MCNC: 7.8 MG/DL — LOW (ref 8.4–10.5)
CHLORIDE SERPL-SCNC: 100 MMOL/L — SIGNIFICANT CHANGE UP (ref 96–108)
CHLORIDE SERPL-SCNC: 101 MMOL/L — SIGNIFICANT CHANGE UP (ref 96–108)
CO2 SERPL-SCNC: 31 MMOL/L — SIGNIFICANT CHANGE UP (ref 22–31)
CO2 SERPL-SCNC: 31 MMOL/L — SIGNIFICANT CHANGE UP (ref 22–31)
CREAT SERPL-MCNC: 0.49 MG/DL — LOW (ref 0.5–1.3)
CREAT SERPL-MCNC: 0.67 MG/DL — SIGNIFICANT CHANGE UP (ref 0.5–1.3)
CULTURE RESULTS: SIGNIFICANT CHANGE UP
CULTURE RESULTS: SIGNIFICANT CHANGE UP
GLUCOSE BLDC GLUCOMTR-MCNC: 132 MG/DL — HIGH (ref 70–99)
GLUCOSE BLDC GLUCOMTR-MCNC: 148 MG/DL — HIGH (ref 70–99)
GLUCOSE BLDC GLUCOMTR-MCNC: 200 MG/DL — HIGH (ref 70–99)
GLUCOSE BLDC GLUCOMTR-MCNC: 207 MG/DL — HIGH (ref 70–99)
GLUCOSE BLDC GLUCOMTR-MCNC: 232 MG/DL — HIGH (ref 70–99)
GLUCOSE SERPL-MCNC: 163 MG/DL — HIGH (ref 70–99)
GLUCOSE SERPL-MCNC: 207 MG/DL — HIGH (ref 70–99)
HCO3 BLDA-SCNC: 31 MMOL/L — HIGH (ref 23–27)
HCO3 BLDA-SCNC: 32 MMOL/L — HIGH (ref 23–27)
HCT VFR BLD CALC: 28.7 % — LOW (ref 34.5–45)
HGB BLD-MCNC: 8.9 G/DL — LOW (ref 11.5–15.5)
HOROWITZ INDEX BLDA+IHG-RTO: 40 — SIGNIFICANT CHANGE UP
HOROWITZ INDEX BLDA+IHG-RTO: 40 — SIGNIFICANT CHANGE UP
MAGNESIUM SERPL-MCNC: 2.4 MG/DL — SIGNIFICANT CHANGE UP (ref 1.6–2.6)
MCHC RBC-ENTMCNC: 27.1 PG — SIGNIFICANT CHANGE UP (ref 27–34)
MCHC RBC-ENTMCNC: 31 GM/DL — LOW (ref 32–36)
MCV RBC AUTO: 87.5 FL — SIGNIFICANT CHANGE UP (ref 80–100)
METHOD TYPE: SIGNIFICANT CHANGE UP
METHOD TYPE: SIGNIFICANT CHANGE UP
NRBC # BLD: 0 /100 WBCS — SIGNIFICANT CHANGE UP (ref 0–0)
ORGANISM # SPEC MICROSCOPIC CNT: SIGNIFICANT CHANGE UP
PCO2 BLDA: 34 MMHG — SIGNIFICANT CHANGE UP (ref 32–46)
PCO2 BLDA: 36 MMHG — SIGNIFICANT CHANGE UP (ref 32–46)
PH BLDA: 7.54 — HIGH (ref 7.35–7.45)
PH BLDA: 7.58 — HIGH (ref 7.35–7.45)
PHOSPHATE SERPL-MCNC: 1.6 MG/DL — LOW (ref 2.5–4.5)
PLATELET # BLD AUTO: 91 K/UL — LOW (ref 150–400)
PO2 BLDA: 126 MMHG — HIGH (ref 74–108)
PO2 BLDA: 90 MMHG — SIGNIFICANT CHANGE UP (ref 74–108)
POTASSIUM SERPL-MCNC: 2.9 MMOL/L — CRITICAL LOW (ref 3.5–5.3)
POTASSIUM SERPL-MCNC: 4 MMOL/L — SIGNIFICANT CHANGE UP (ref 3.5–5.3)
POTASSIUM SERPL-SCNC: 2.9 MMOL/L — CRITICAL LOW (ref 3.5–5.3)
POTASSIUM SERPL-SCNC: 4 MMOL/L — SIGNIFICANT CHANGE UP (ref 3.5–5.3)
PROT SERPL-MCNC: 6 G/DL — SIGNIFICANT CHANGE UP (ref 6–8.3)
RBC # BLD: 3.28 M/UL — LOW (ref 3.8–5.2)
RBC # FLD: 15.1 % — HIGH (ref 10.3–14.5)
SAO2 % BLDA: 98 % — HIGH (ref 92–96)
SAO2 % BLDA: 99 % — HIGH (ref 92–96)
SODIUM SERPL-SCNC: 140 MMOL/L — SIGNIFICANT CHANGE UP (ref 135–145)
SODIUM SERPL-SCNC: 140 MMOL/L — SIGNIFICANT CHANGE UP (ref 135–145)
SPECIMEN SOURCE: SIGNIFICANT CHANGE UP
SPECIMEN SOURCE: SIGNIFICANT CHANGE UP
VANCOMYCIN TROUGH SERPL-MCNC: 20.8 UG/ML — HIGH (ref 10–20)
WBC # BLD: 15.24 K/UL — HIGH (ref 3.8–10.5)
WBC # FLD AUTO: 15.24 K/UL — HIGH (ref 3.8–10.5)

## 2020-11-17 PROCEDURE — 71045 X-RAY EXAM CHEST 1 VIEW: CPT | Mod: 26

## 2020-11-17 PROCEDURE — 99497 ADVNCD CARE PLAN 30 MIN: CPT | Mod: 25

## 2020-11-17 PROCEDURE — 99223 1ST HOSP IP/OBS HIGH 75: CPT

## 2020-11-17 RX ORDER — POTASSIUM CHLORIDE 20 MEQ
40 PACKET (EA) ORAL THREE TIMES A DAY
Refills: 0 | Status: DISCONTINUED | OUTPATIENT
Start: 2020-11-17 | End: 2020-11-17

## 2020-11-17 RX ORDER — POTASSIUM CHLORIDE 20 MEQ
40 PACKET (EA) ORAL EVERY 4 HOURS
Refills: 0 | Status: DISCONTINUED | OUTPATIENT
Start: 2020-11-17 | End: 2020-11-17

## 2020-11-17 RX ORDER — VANCOMYCIN HCL 1 G
1250 VIAL (EA) INTRAVENOUS ONCE
Refills: 0 | Status: COMPLETED | OUTPATIENT
Start: 2020-11-17 | End: 2020-11-17

## 2020-11-17 RX ORDER — POTASSIUM CHLORIDE 20 MEQ
40 PACKET (EA) ORAL EVERY 4 HOURS
Refills: 0 | Status: COMPLETED | OUTPATIENT
Start: 2020-11-17 | End: 2020-11-17

## 2020-11-17 RX ORDER — SODIUM,POTASSIUM PHOSPHATES 278-250MG
1 POWDER IN PACKET (EA) ORAL
Refills: 0 | Status: DISCONTINUED | OUTPATIENT
Start: 2020-11-17 | End: 2020-11-17

## 2020-11-17 RX ORDER — SODIUM,POTASSIUM PHOSPHATES 278-250MG
1 POWDER IN PACKET (EA) ORAL
Refills: 0 | Status: COMPLETED | OUTPATIENT
Start: 2020-11-17 | End: 2020-11-18

## 2020-11-17 RX ADMIN — Medication 40 MILLIGRAM(S): at 05:00

## 2020-11-17 RX ADMIN — Medication 100 MILLIGRAM(S): at 05:03

## 2020-11-17 RX ADMIN — Medication 12 UNIT(S): at 00:56

## 2020-11-17 RX ADMIN — Medication 250 MILLIGRAM(S): at 12:49

## 2020-11-17 RX ADMIN — PANTOPRAZOLE SODIUM 40 MILLIGRAM(S): 20 TABLET, DELAYED RELEASE ORAL at 11:07

## 2020-11-17 RX ADMIN — CEFEPIME 100 MILLIGRAM(S): 1 INJECTION, POWDER, FOR SOLUTION INTRAMUSCULAR; INTRAVENOUS at 05:01

## 2020-11-17 RX ADMIN — Medication 100 MILLIGRAM(S): at 14:58

## 2020-11-17 RX ADMIN — Medication 12 UNIT(S): at 17:23

## 2020-11-17 RX ADMIN — Medication 12 UNIT(S): at 11:55

## 2020-11-17 RX ADMIN — Medication 12 UNIT(S): at 05:19

## 2020-11-17 RX ADMIN — CHLORHEXIDINE GLUCONATE 15 MILLILITER(S): 213 SOLUTION TOPICAL at 17:14

## 2020-11-17 RX ADMIN — Medication 1 PACKET(S): at 12:05

## 2020-11-17 RX ADMIN — Medication 1 PACKET(S): at 17:16

## 2020-11-17 RX ADMIN — Medication 4: at 00:57

## 2020-11-17 RX ADMIN — Medication 1 TABLET(S): at 08:13

## 2020-11-17 RX ADMIN — Medication 100 MILLIGRAM(S): at 22:28

## 2020-11-17 RX ADMIN — CEFEPIME 100 MILLIGRAM(S): 1 INJECTION, POWDER, FOR SOLUTION INTRAMUSCULAR; INTRAVENOUS at 17:14

## 2020-11-17 RX ADMIN — Medication 40 MILLIEQUIVALENT(S): at 11:08

## 2020-11-17 RX ADMIN — Medication 4: at 11:54

## 2020-11-17 RX ADMIN — CHLORHEXIDINE GLUCONATE 15 MILLILITER(S): 213 SOLUTION TOPICAL at 05:01

## 2020-11-17 RX ADMIN — HEPARIN SODIUM 5000 UNIT(S): 5000 INJECTION INTRAVENOUS; SUBCUTANEOUS at 14:58

## 2020-11-17 RX ADMIN — HEPARIN SODIUM 5000 UNIT(S): 5000 INJECTION INTRAVENOUS; SUBCUTANEOUS at 05:03

## 2020-11-17 RX ADMIN — Medication 2: at 17:22

## 2020-11-17 RX ADMIN — Medication 40 MILLIEQUIVALENT(S): at 14:58

## 2020-11-17 RX ADMIN — HEPARIN SODIUM 5000 UNIT(S): 5000 INJECTION INTRAVENOUS; SUBCUTANEOUS at 22:28

## 2020-11-17 RX ADMIN — CHLORHEXIDINE GLUCONATE 1 APPLICATION(S): 213 SOLUTION TOPICAL at 05:03

## 2020-11-17 NOTE — PROGRESS NOTE ADULT - SUBJECTIVE AND OBJECTIVE BOX
HPI:  66 y/o obese female with history of 80 pack year tobacco use, HTN, DM2 on insulin, COPD s/p trach(on vent) and PEG, R lung cancer s/p resection (2017), presents to the ED for AMS from [facility] . Patient noted to be lethargic with concern for sepsis hence referred to ER.  As per paperwork, patient is nonverbal at baseline, also noted to have been started on levaquin on 11/13 with addition of vancomycin overnight. Patient only opens eyes in response to verbal stimulus, currently unable to provide history.     Patient was noted to have temp of 100.1, initially hemodynamically sale however bp dropped to 78 for which she received 2l bolus of NS. She had wbc count of 14.9 with left shift, lactate 3.3, bun/cr of 32/1.58, sodium 130, UA -ve, cxr with right basilar infiltrate. Was given a dose each of vanc and cefepime.   GOC: confirmed with daughter Lisa, full code. Consent for central line obtained.    (14 Nov 2020 18:24)      Patient is a 65y old  Female who presents with a chief complaint of lethargy (17 Nov 2020 08:30)      INTERVAL HPI/OVERNIGHT EVENTS:  T(C): 37.1 (11-17-20 @ 08:00), Max: 37.1 (11-16-20 @ 15:00)  HR: 85 (11-17-20 @ 12:03) (79 - 93)  BP: 141/69 (11-17-20 @ 12:00) (107/55 - 141/69)  RR: 21 (11-17-20 @ 12:00) (17 - 29)  SpO2: 97% (11-17-20 @ 12:03) (92% - 99%)  Wt(kg): --  I&O's Summary    16 Nov 2020 07:01  -  17 Nov 2020 07:00  --------------------------------------------------------  IN: 1790 mL / OUT: 800 mL / NET: 990 mL    17 Nov 2020 07:01  -  17 Nov 2020 13:10  --------------------------------------------------------  IN: 220 mL / OUT: 300 mL / NET: -80 mL        REVIEW OF SYSTEMS: denies fever, chills, SOB, palpitations, chest pain, abdominal pain, nausea, vomitting, diarrhea, constipation, dizziness    MEDICATIONS  (STANDING):  cefepime   IVPB 2000 milliGRAM(s) IV Intermittent every 12 hours  chlorhexidine 0.12% Liquid 15 milliLiter(s) Oral Mucosa every 12 hours  chlorhexidine 2% Cloths 1 Application(s) Topical <User Schedule>  heparin   Injectable 5000 Unit(s) SubCutaneous every 8 hours  influenza   Vaccine 0.5 milliLiter(s) IntraMuscular once  insulin glargine Injectable (LANTUS) 30 Unit(s) SubCutaneous every morning  insulin lispro (ADMELOG) corrective regimen sliding scale   SubCutaneous every 6 hours  insulin lispro Injectable (ADMELOG) 12 Unit(s) SubCutaneous every 6 hours  metroNIDAZOLE  IVPB 500 milliGRAM(s) IV Intermittent every 8 hours  pantoprazole  Injectable 40 milliGRAM(s) IV Push daily  potassium chloride   Powder 40 milliEquivalent(s) Enteral Tube every 4 hours  potassium phosphate / sodium phosphate Powder (PHOS-NaK) 1 Packet(s) Oral four times a day  predniSONE   Tablet 40 milliGRAM(s) Oral daily  vancomycin  IVPB      vancomycin  IVPB 1250 milliGRAM(s) IV Intermittent every 12 hours    MEDICATIONS  (PRN):  ALBUTerol    90 MICROgram(s) HFA Inhaler 2 Puff(s) Inhalation every 6 hours PRN Shortness of Breath      PHYSICAL EXAM:  GENERAL: NAD, well-groomed, well-developed  HEAD:  Atraumatic, Normocephalic  EYES: EOMI, PERRLA, conjunctiva and sclera clear  ENMT: No tonsillar erythema, exudates, or enlargement; Moist mucous membranes, Good dentition, No lesions  NECK: Supple, No JVD, Normal thyroid  NERVOUS SYSTEM:  Alert & Oriented X3, Good concentration; Motor Strength 5/5 B/L upper and lower extremities; DTRs 2+ intact and symmetric  CHEST/LUNG: Clear to percussion bilaterally; No rales, rhonchi, wheezing, or rubs  HEART: Regular rate and rhythm; No murmurs, rubs, or gallops  ABDOMEN: Soft, Nontender, Nondistended; Bowel sounds present  EXTREMITIES:  2+ Peripheral Pulses, No clubbing, cyanosis, or edema  LYMPH: No lymphadenopathy noted  SKIN: No rashes or lesions  LABS:                        8.9    15.24 )-----------( 91       ( 17 Nov 2020 04:17 )             28.7     11-17    140  |  100  |  35<H>  ----------------------------<  163<H>  2.9<LL>   |  31  |  0.67    Ca    7.6<L>      17 Nov 2020 04:17  Phos  1.6     11-17  Mg     2.4     11-17    TPro  6.0  /  Alb  1.8<L>  /  TBili  0.4  /  DBili  x   /  AST  23  /  ALT  28  /  AlkPhos  187<H>  11-17        CAPILLARY BLOOD GLUCOSE      POCT Blood Glucose.: 207 mg/dL (17 Nov 2020 11:26)  POCT Blood Glucose.: 132 mg/dL (17 Nov 2020 08:10)  POCT Blood Glucose.: 148 mg/dL (17 Nov 2020 05:15)  POCT Blood Glucose.: 232 mg/dL (17 Nov 2020 00:52)  POCT Blood Glucose.: 234 mg/dL (16 Nov 2020 17:17)      ABG - ( 17 Nov 2020 09:36 )  pH, Arterial: 7.58  pH, Blood: x     /  pCO2: 34    /  pO2: 126   / HCO3: 32    / Base Excess: 9.7   /  SaO2: 99

## 2020-11-17 NOTE — CONSULT NOTE ADULT - PROBLEM SELECTOR RECOMMENDATION 9
2/2 End stage COPD, metastatic lung Ca to bone; now with PNA, MRSA bacteremia. Patient s/p trach/peg 10/8. Not a candidate for weaning. Continues IV abx, prednisone, albuterol. Overall, poor prognosis. PT recommending VY. Patient previously identified daughter/Gavi as HCP; MOLST previously completed as per patient's wishes: FULL CODE. 2/2 End stage COPD, metastatic lung Ca to bone; now with PNA, MRSA bacteremia. Patient s/p trach/peg 10/8. Not a candidate for weaning. Continues IV abx, prednisone, albuterol. Overall, poor prognosis. PT recommending VY. Patient previously identified daughter/Gavi as HCP; MOLST: FULL CODE.

## 2020-11-17 NOTE — CONSULT NOTE ADULT - CONVERSATION DETAILS
11/17/20: Met with patient at bedside. Patient awake, alert - communicating with gestures and writing. When asked if patient wishes to remain a FULL CODE - she nods her head 'yes.' When written, "Do you want to go back to [facility] on discharge, YES or NO' - patient points to 'yes' and nods head 'yes.' 11/17/20: Met with patient at bedside. Patient awake, alert - communicating with gestures and writing. When asked if patient wishes to remain a FULL CODE - she nods her head 'yes.' When written, "Do you want to go back to [facility] on discharge, YES or NO' - patient points to 'yes' and nods head 'yes.' HCP and copy of old MOLST on file.  New MOLST completed confirming patient's previously stated wishes: CPR / long term ventilation / long term feeding tube / trial IV fluids / use abx / NO LIMITATION on medical interventions/ send to hospital.

## 2020-11-17 NOTE — PROGRESS NOTE ADULT - SUBJECTIVE AND OBJECTIVE BOX
HPI: 65F PMH obesity, COPD s/p trach and PEG (80 pack year hx, nonverbal at baseline), R lung cancer s/p resection (2017), HTN, and IDDM2 presented from [facility] with lethargy and AMS concerning for sepsis, started on Levaquin 11/13 and vancomycin 11/14 by [facility]. Found to have right basilar infiltrate on CXR, given x1 dose each vanc and cefepime, admitted to ICU for management of encephalopathy and sepsis 2/2 aspiration PNA.    INTERVAL HPI/OVERNIGHT EVENTS: NAEON. Yesterday pm daughter noticed weakness in patient's right arm, not noticed on previous visit 1 month ago. CTH obtained, no acute findings. Per chart review weakness has been present throughout this admission. Will consult neuro today for further recs. RR mid-high 20s overnight with sats mid-high 90s. Vent 14/400/40/5. Awaiting ABG. Repeat BCx sent.     ICU Vital Signs Last 24 Hrs  T(C): 37 (17 Nov 2020 03:00), Max: 37.1 (16 Nov 2020 15:00)  T(F): 98.6 (17 Nov 2020 03:00), Max: 98.8 (16 Nov 2020 15:00)  HR: 81 (17 Nov 2020 08:11) (79 - 93)  BP: 127/70 (17 Nov 2020 07:00) (99/53 - 132/73)  BP(mean): 88 (17 Nov 2020 07:00) (67 - 91)  ABP: --  ABP(mean): --  RR: 17 (17 Nov 2020 07:00) (17 - 29)  SpO2: 97% (17 Nov 2020 08:11) (92% - 100%)    I&O's Summary    16 Nov 2020 07:01  -  17 Nov 2020 07:00  --------------------------------------------------------  IN: 1750 mL / OUT: 800 mL / NET: 950 mL      Mode: AC/ CMV (Assist Control/ Continuous Mandatory Ventilation)  RR (machine): 14  TV (machine): 400  FiO2: 40  PEEP: 5  ITime: 1  MAP: 13  PIP: 29      LABS:                        8.9    15.24 )-----------( 91       ( 17 Nov 2020 04:17 )             28.7     11-17    140  |  100  |  35<H>  ----------------------------<  163<H>  2.9<LL>   |  31  |  0.67    Ca    7.6<L>      17 Nov 2020 04:17  Phos  1.6     11-17  Mg     2.4     11-17    TPro  6.0  /  Alb  1.8<L>  /  TBili  0.4  /  DBili  x   /  AST  23  /  ALT  28  /  AlkPhos  187<H>  11-17    PT/INR - ( 15 Nov 2020 10:47 )   PT: 16.1 sec;   INR: 1.37 ratio         PTT - ( 15 Nov 2020 10:47 )  PTT:39.9 sec    CAPILLARY BLOOD GLUCOSE      POCT Blood Glucose.: 132 mg/dL (17 Nov 2020 08:10)  POCT Blood Glucose.: 148 mg/dL (17 Nov 2020 05:15)  POCT Blood Glucose.: 232 mg/dL (17 Nov 2020 00:52)  POCT Blood Glucose.: 234 mg/dL (16 Nov 2020 17:17)  POCT Blood Glucose.: 311 mg/dL (16 Nov 2020 09:13)        RADIOLOGY & ADDITIONAL TESTS:    Consultant(s) Notes Reviewed:  [x ] YES  [ ] NO    MEDICATIONS  (STANDING):  cefepime   IVPB 2000 milliGRAM(s) IV Intermittent every 12 hours  chlorhexidine 0.12% Liquid 15 milliLiter(s) Oral Mucosa every 12 hours  chlorhexidine 2% Cloths 1 Application(s) Topical <User Schedule>  heparin   Injectable 5000 Unit(s) SubCutaneous every 8 hours  influenza   Vaccine 0.5 milliLiter(s) IntraMuscular once  insulin glargine Injectable (LANTUS) 30 Unit(s) SubCutaneous every morning  insulin lispro (ADMELOG) corrective regimen sliding scale   SubCutaneous every 6 hours  insulin lispro Injectable (ADMELOG) 12 Unit(s) SubCutaneous every 6 hours  metroNIDAZOLE  IVPB 500 milliGRAM(s) IV Intermittent every 8 hours  pantoprazole  Injectable 40 milliGRAM(s) IV Push daily  potassium chloride    Tablet ER 40 milliEquivalent(s) Oral every 4 hours  potassium phosphate / sodium phosphate Tablet (K-PHOS No. 2) 1 Tablet(s) Oral four times a day with meals  predniSONE   Tablet 40 milliGRAM(s) Oral daily  vancomycin  IVPB      vancomycin  IVPB 1250 milliGRAM(s) IV Intermittent every 12 hours    MEDICATIONS  (PRN):  ALBUTerol    90 MICROgram(s) HFA Inhaler 2 Puff(s) Inhalation every 6 hours PRN Shortness of Breath      PHYSICAL EXAM:  GENERAL: well built, well nourished  HEAD:  Atraumatic, Normocephalic  EYES: EOMI, PERRLA, conjunctiva and sclera clear  ENT: No tonsillar erythema, exudates, or enlargement; Moist mucous membranes, Good dentition, No lesions  NECK: Supple, No JVD, Normal thyroid, no enlarged nodes  NERVOUS SYSTEM:  Alert & Oriented X3, Good concentration; Motor Strength 5/5 B/L upper and lower extremities; DTRs 2+ intact and symmetric, sensory intact  CHEST/LUNG: B/L good air entry; No rales, rhonchi, or wheezing  HEART: S1S2 normal, no S3, Regular rate and rhythm; No murmurs, rubs, or gallops  ABDOMEN: Soft, Nontender, Nondistended; Bowel sounds present  EXTREMITIES:  2+ Peripheral Pulses, No clubbing, cyanosis, or edema  LYMPH: No lymphadenopathy noted  SKIN: No rashes or lesions    Care Discussed with Consultants/Other Providers [ x] YES  [ ] NO HPI: 65F PMH obesity, COPD s/p trach and PEG (80 pack year hx, nonverbal at baseline), R lung cancer s/p resection (2017), HTN, and IDDM2 presented from [facility] with lethargy and AMS concerning for sepsis, started on Levaquin 11/13 and vancomycin 11/14 by VY. Found to have right basilar infiltrate on CXR, given x1 dose each vanc and cefepime, admitted to ICU for management of encephalopathy and sepsis 2/2 aspiration PNA.    INTERVAL HPI/OVERNIGHT EVENTS: NAEON. Yesterday pm daughter noticed weakness in patient's right arm, not noticed on previous visit 1 month ago. CTH obtained, no acute findings. Per chart review weakness has been present throughout this admission. RR mid-high 20s overnight with sats mid-high 90s. Vent 14/400/40/5. Awaiting ABG. Repeat BCx sent. Vanc trough 35.4, per pharmacy 2/2 third dose being given too soon, will continue 1250mg q12h.     ICU Vital Signs Last 24 Hrs  T(C): 37 (17 Nov 2020 03:00), Max: 37.1 (16 Nov 2020 15:00)  T(F): 98.6 (17 Nov 2020 03:00), Max: 98.8 (16 Nov 2020 15:00)  HR: 81 (17 Nov 2020 08:11) (79 - 93)  BP: 127/70 (17 Nov 2020 07:00) (99/53 - 132/73)  BP(mean): 88 (17 Nov 2020 07:00) (67 - 91)  ABP: --  ABP(mean): --  RR: 17 (17 Nov 2020 07:00) (17 - 29)  SpO2: 97% (17 Nov 2020 08:11) (92% - 100%)    I&O's Summary    16 Nov 2020 07:01  -  17 Nov 2020 07:00  --------------------------------------------------------  IN: 1750 mL / OUT: 800 mL / NET: 950 mL      Mode: AC/ CMV (Assist Control/ Continuous Mandatory Ventilation)  RR (machine): 14  TV (machine): 400  FiO2: 40  PEEP: 5  ITime: 1  MAP: 13  PIP: 29      LABS:                        8.9    15.24 )-----------( 91       ( 17 Nov 2020 04:17 )             28.7     11-17    140  |  100  |  35<H>  ----------------------------<  163<H>  2.9<LL>   |  31  |  0.67    Ca    7.6<L>      17 Nov 2020 04:17  Phos  1.6     11-17  Mg     2.4     11-17    TPro  6.0  /  Alb  1.8<L>  /  TBili  0.4  /  DBili  x   /  AST  23  /  ALT  28  /  AlkPhos  187<H>  11-17    PT/INR - ( 15 Nov 2020 10:47 )   PT: 16.1 sec;   INR: 1.37 ratio         PTT - ( 15 Nov 2020 10:47 )  PTT:39.9 sec    CAPILLARY BLOOD GLUCOSE      POCT Blood Glucose.: 132 mg/dL (17 Nov 2020 08:10)  POCT Blood Glucose.: 148 mg/dL (17 Nov 2020 05:15)  POCT Blood Glucose.: 232 mg/dL (17 Nov 2020 00:52)  POCT Blood Glucose.: 234 mg/dL (16 Nov 2020 17:17)  POCT Blood Glucose.: 311 mg/dL (16 Nov 2020 09:13)        RADIOLOGY & ADDITIONAL TESTS:    Consultant(s) Notes Reviewed:  [x ] YES  [ ] NO    MEDICATIONS  (STANDING):  cefepime   IVPB 2000 milliGRAM(s) IV Intermittent every 12 hours  chlorhexidine 0.12% Liquid 15 milliLiter(s) Oral Mucosa every 12 hours  chlorhexidine 2% Cloths 1 Application(s) Topical <User Schedule>  heparin   Injectable 5000 Unit(s) SubCutaneous every 8 hours  influenza   Vaccine 0.5 milliLiter(s) IntraMuscular once  insulin glargine Injectable (LANTUS) 30 Unit(s) SubCutaneous every morning  insulin lispro (ADMELOG) corrective regimen sliding scale   SubCutaneous every 6 hours  insulin lispro Injectable (ADMELOG) 12 Unit(s) SubCutaneous every 6 hours  metroNIDAZOLE  IVPB 500 milliGRAM(s) IV Intermittent every 8 hours  pantoprazole  Injectable 40 milliGRAM(s) IV Push daily  potassium chloride    Tablet ER 40 milliEquivalent(s) Oral every 4 hours  potassium phosphate / sodium phosphate Tablet (K-PHOS No. 2) 1 Tablet(s) Oral four times a day with meals  predniSONE   Tablet 40 milliGRAM(s) Oral daily  vancomycin  IVPB      vancomycin  IVPB 1250 milliGRAM(s) IV Intermittent every 12 hours    MEDICATIONS  (PRN):  ALBUTerol    90 MICROgram(s) HFA Inhaler 2 Puff(s) Inhalation every 6 hours PRN Shortness of Breath      PHYSICAL EXAM:  GENERAL: well built, well nourished  HEAD:  Atraumatic, Normocephalic  EYES: EOMI, PERRLA, conjunctiva and sclera clear  ENT: No tonsillar erythema, exudates, or enlargement; Moist mucous membranes, Good dentition, No lesions  NECK: Supple, No JVD, Normal thyroid, no enlarged nodes  NERVOUS SYSTEM:  Alert & Oriented X3, Good concentration; Motor Strength 5/5 B/L upper and lower extremities; DTRs 2+ intact and symmetric, sensory intact  CHEST/LUNG: B/L good air entry; No rales, rhonchi, or wheezing  HEART: S1S2 normal, no S3, Regular rate and rhythm; No murmurs, rubs, or gallops  ABDOMEN: Soft, Nontender, Nondistended; Bowel sounds present  EXTREMITIES:  2+ Peripheral Pulses, No clubbing, cyanosis, or edema  LYMPH: No lymphadenopathy noted  SKIN: No rashes or lesions    Care Discussed with Consultants/Other Providers [ x] YES  [ ] NO HPI: 65F PMH obesity, COPD s/p trach and PEG (80 pack year hx, nonverbal at baseline), R lung cancer s/p resection (2017), HTN, and IDDM2 presented from [facility] with lethargy and AMS concerning for sepsis, started on Levaquin 11/13 and vancomycin 11/14 by [facility]. Found to have right basilar infiltrate on CXR, given x1 dose each vanc and cefepime, admitted to ICU for management of encephalopathy and sepsis 2/2 aspiration PNA.    INTERVAL HPI/OVERNIGHT EVENTS: NAEON. Yesterday pm daughter noticed weakness in patient's right arm, not noticed on previous visit 1 month ago. CTH obtained, no acute findings. Per chart review weakness has been present throughout this admission. RR mid-high 20s overnight with sats mid-high 90s. Vent 14/400/40/5. ABG 7.58/34/126/32, decreased TV to 325mL and will repeat. Repeat BCx sent. Vanc trough 35.4, per pharmacy 2/2 third dose being given too soon, will continue 1250mg q12h in addition to cefepime and Flagyl (day 4).     ICU Vital Signs Last 24 Hrs  T(C): 37 (17 Nov 2020 03:00), Max: 37.1 (16 Nov 2020 15:00)  T(F): 98.6 (17 Nov 2020 03:00), Max: 98.8 (16 Nov 2020 15:00)  HR: 81 (17 Nov 2020 08:11) (79 - 93)  BP: 127/70 (17 Nov 2020 07:00) (99/53 - 132/73)  BP(mean): 88 (17 Nov 2020 07:00) (67 - 91)  ABP: --  ABP(mean): --  RR: 17 (17 Nov 2020 07:00) (17 - 29)  SpO2: 97% (17 Nov 2020 08:11) (92% - 100%)    I&O's Summary    16 Nov 2020 07:01  -  17 Nov 2020 07:00  --------------------------------------------------------  IN: 1750 mL / OUT: 800 mL / NET: 950 mL      Mode: AC/ CMV (Assist Control/ Continuous Mandatory Ventilation)  RR (machine): 14  TV (machine): 400  FiO2: 40  PEEP: 5  ITime: 1  MAP: 13  PIP: 29      LABS:                        8.9    15.24 )-----------( 91       ( 17 Nov 2020 04:17 )             28.7     11-17    140  |  100  |  35<H>  ----------------------------<  163<H>  2.9<LL>   |  31  |  0.67    Ca    7.6<L>      17 Nov 2020 04:17  Phos  1.6     11-17  Mg     2.4     11-17    TPro  6.0  /  Alb  1.8<L>  /  TBili  0.4  /  DBili  x   /  AST  23  /  ALT  28  /  AlkPhos  187<H>  11-17    PT/INR - ( 15 Nov 2020 10:47 )   PT: 16.1 sec;   INR: 1.37 ratio         PTT - ( 15 Nov 2020 10:47 )  PTT:39.9 sec    CAPILLARY BLOOD GLUCOSE      POCT Blood Glucose.: 132 mg/dL (17 Nov 2020 08:10)  POCT Blood Glucose.: 148 mg/dL (17 Nov 2020 05:15)  POCT Blood Glucose.: 232 mg/dL (17 Nov 2020 00:52)  POCT Blood Glucose.: 234 mg/dL (16 Nov 2020 17:17)  POCT Blood Glucose.: 311 mg/dL (16 Nov 2020 09:13)        RADIOLOGY & ADDITIONAL TESTS:    Consultant(s) Notes Reviewed:  [x ] YES  [ ] NO    MEDICATIONS  (STANDING):  cefepime   IVPB 2000 milliGRAM(s) IV Intermittent every 12 hours  chlorhexidine 0.12% Liquid 15 milliLiter(s) Oral Mucosa every 12 hours  chlorhexidine 2% Cloths 1 Application(s) Topical <User Schedule>  heparin   Injectable 5000 Unit(s) SubCutaneous every 8 hours  influenza   Vaccine 0.5 milliLiter(s) IntraMuscular once  insulin glargine Injectable (LANTUS) 30 Unit(s) SubCutaneous every morning  insulin lispro (ADMELOG) corrective regimen sliding scale   SubCutaneous every 6 hours  insulin lispro Injectable (ADMELOG) 12 Unit(s) SubCutaneous every 6 hours  metroNIDAZOLE  IVPB 500 milliGRAM(s) IV Intermittent every 8 hours  pantoprazole  Injectable 40 milliGRAM(s) IV Push daily  potassium chloride    Tablet ER 40 milliEquivalent(s) Oral every 4 hours  potassium phosphate / sodium phosphate Tablet (K-PHOS No. 2) 1 Tablet(s) Oral four times a day with meals  predniSONE   Tablet 40 milliGRAM(s) Oral daily  vancomycin  IVPB      vancomycin  IVPB 1250 milliGRAM(s) IV Intermittent every 12 hours    MEDICATIONS  (PRN):  ALBUTerol    90 MICROgram(s) HFA Inhaler 2 Puff(s) Inhalation every 6 hours PRN Shortness of Breath    ROS: limited 2/2 patient condition, indicates that she feels well overall    PHYSICAL EXAM:  GENERAL: obese, +trach, +PEG  HEAD:  Atraumatic, Normocephalic  EYES: EOMI, PERRLA, conjunctiva and sclera clear  ENT: moist mucous membranes  NECK: Supple  NERVOUS SYSTEM:  Alert & Oriented X3, right arm weakness noted, pulses intact  CHEST/LUNG: B/L good air entry; faintly coarse breath sounds b/l UL  HEART: S1S2 normal, RRR  ABDOMEN: Soft, Nontender, obese; Bowel sounds present  EXTREMITIES:  2+ Peripheral Pulses, b/l LE edema 2+  : cystocele noted  SKIN: bruising in previous IV sites    Care Discussed with Consultants/Other Providers [ x] YES  [ ] NO

## 2020-11-17 NOTE — CONSULT NOTE ADULT - PROBLEM SELECTOR RECOMMENDATION 4
See GOC section above. Patient previously identified daughter/Gavi as primary HCP and partner/Williams as alternate agent. MOLST form previously completed as per patient's wishes: CPR / long term ventilation / long term feeding tube / trial IV fluids / use abx / NO LIMITATION on medical interventions. Overall, patient with poor prognosis.

## 2020-11-17 NOTE — PROGRESS NOTE ADULT - SUBJECTIVE AND OBJECTIVE BOX
Interval Events:    receiving TF  on po prednisone  poc glucose improved    Allergies    fish (Angioedema)  penicillins (Rash)    Intolerances      Endocrine/Metabolic Medications:  insulin glargine Injectable (LANTUS) 30 Unit(s) SubCutaneous every morning  insulin lispro (ADMELOG) corrective regimen sliding scale   SubCutaneous every 6 hours  insulin lispro Injectable (ADMELOG) 12 Unit(s) SubCutaneous every 6 hours  predniSONE   Tablet 40 milliGRAM(s) Oral daily      Vital Signs Last 24 Hrs  T(C): 37 (17 Nov 2020 03:00), Max: 37.1 (16 Nov 2020 15:00)  T(F): 98.6 (17 Nov 2020 03:00), Max: 98.8 (16 Nov 2020 15:00)  HR: 81 (17 Nov 2020 08:11) (79 - 93)  BP: 127/70 (17 Nov 2020 07:00) (99/53 - 132/73)  BP(mean): 88 (17 Nov 2020 07:00) (67 - 91)  RR: 17 (17 Nov 2020 07:00) (17 - 29)  SpO2: 97% (17 Nov 2020 08:11) (92% - 100%)      PHYSICAL EXAM  Constitutional:  s/p trach on vent  NC/AT:    HEENT:    Neck:  No JVD  Respiratory:  reduced breath sounds b/l bases    Cardiovascular:  RR     Gastrointestinal: Soft     Extremities: without cyanosis    Neurological:  non focal    LABS                        8.9    15.24 )-----------( 91       ( 17 Nov 2020 04:17 )             28.7                               140    |  100    |  35                  Calcium: 7.6   / iCa: x      (11-17 @ 04:17)    ----------------------------<  163       Magnesium: 2.4                              2.9     |  31     |  0.67             Phosphorous: 1.6      TPro  6.0    /  Alb  1.8    /  TBili  0.4    /  DBili  x      /  AST  23     /  ALT  28     /  AlkPhos  187    17 Nov 2020 04:17    CAPILLARY BLOOD GLUCOSE      POCT Blood Glucose.: 132 mg/dL (17 Nov 2020 08:10)  POCT Blood Glucose.: 148 mg/dL (17 Nov 2020 05:15)  POCT Blood Glucose.: 232 mg/dL (17 Nov 2020 00:52)  POCT Blood Glucose.: 234 mg/dL (16 Nov 2020 17:17)  POCT Blood Glucose.: 311 mg/dL (16 Nov 2020 09:13)        Assesment/plan        64 yo female with multiple comorbidities including h/o DM type 2, HTN, COPD s/p trach on vent, lung ca on right sided s/p resection on chemo admitted with lethargy    endocrinology consulted for glycemic management    DM type 2  uncontrolled  complicated by high dose steroid use, acute on chronic resp failure  s/p trach    recommendations:  methylpred changed to prednisone  s/p trach/PEG  poc glucose improved    - continue lantus 30 units daily in am   - continue insulin lispro 12 units q6h, hold if TF are held  - continue moderate dose ss  - reassess based on requirements  - goal inpatient glucose range: 140-180mg/dl    COPD exacerbation  acute on chronic hypoxic resp failure  s/p trach  on standing steroids  pulmonary on board  s/p trach/PEG  on cefepime, flagyl    sepsis  on cefepime, flagyl  optimize glycemic regimen    Discussed with primary team   Please call  Endocrine- Dr Katlyn Garcia as needed    Time spent evaluating patient including coordination of care 35mins.

## 2020-11-17 NOTE — CONSULT NOTE ADULT - SUBJECTIVE AND OBJECTIVE BOX
HPI:  64 y/o obese female with history of 80 pack year tobacco use, HTN, DM2 on insulin, COPD s/p trach(on vent) and PEG, R lung cancer s/p resection (2017), presents to the ED for AMS from [facility] . Patient noted to be lethargic with concern for sepsis hence referred to ER.  As per paperwork, patient is nonverbal at baseline, also noted to have been started on levaquin on 11/13 with addition of vancomycin overnight. Patient only opens eyes in response to verbal stimulus, currently unable to provide history.     Patient was noted to have temp of 100.1, initially hemodynamically sale however bp dropped to 78 for which she received 2l bolus of NS. She had wbc count of 14.9 with left shift, lactate 3.3, bun/cr of 32/1.58, sodium 130, UA -ve, cxr with right basilar infiltrate. Was given a dose each of vanc and cefepime.   GOC: confirmed with daughter Lisa, full code. Consent for central line obtained.    (14 Nov 2020 18:24)      PAST MEDICAL & SURGICAL HISTORY:  Malignant neoplasm of unspecified part of right bronchus or lung    HTN (hypertension)    DM (diabetes mellitus)    COPD (chronic obstructive pulmonary disease)    Lung cancer    Morbid obesity    GERD (gastroesophageal reflux disease)    Deviated septum    Prolapsed uterus    ITP (idiopathic thrombocytopenic purpura)    Emphysema/COPD    History of cholecystectomy    S/P lobectomy of lung  right 2017    History of tonsillectomy        fish (Angioedema)  penicillins (Rash)      Meds:  ALBUTerol    90 MICROgram(s) HFA Inhaler 2 Puff(s) Inhalation every 6 hours PRN  cefepime   IVPB 2000 milliGRAM(s) IV Intermittent every 12 hours  chlorhexidine 0.12% Liquid 15 milliLiter(s) Oral Mucosa every 12 hours  chlorhexidine 2% Cloths 1 Application(s) Topical <User Schedule>  heparin   Injectable 5000 Unit(s) SubCutaneous every 8 hours  influenza   Vaccine 0.5 milliLiter(s) IntraMuscular once  insulin glargine Injectable (LANTUS) 30 Unit(s) SubCutaneous every morning  insulin lispro (ADMELOG) corrective regimen sliding scale   SubCutaneous every 6 hours  insulin lispro Injectable (ADMELOG) 12 Unit(s) SubCutaneous every 6 hours  metroNIDAZOLE  IVPB 500 milliGRAM(s) IV Intermittent every 8 hours  pantoprazole  Injectable 40 milliGRAM(s) IV Push daily  potassium phosphate / sodium phosphate Powder (PHOS-NaK) 1 Packet(s) Oral four times a day  predniSONE   Tablet 40 milliGRAM(s) Oral daily  vancomycin  IVPB      vancomycin  IVPB 1250 milliGRAM(s) IV Intermittent every 12 hours      SOCIAL HISTORY:  Smoker:  ex smoker  ETOH use:          FAMILY HISTORY:  FH: lung cancer    Family history of stroke    Family history of lung cancer    Diabetes mellitus        VITALS:  Vital Signs Last 24 Hrs  T(C): 36.8 (17 Nov 2020 15:46), Max: 37.1 (17 Nov 2020 08:00)  T(F): 98.3 (17 Nov 2020 15:46), Max: 98.8 (17 Nov 2020 08:00)  HR: 88 (17 Nov 2020 17:00) (79 - 93)  BP: 133/81 (17 Nov 2020 17:00) (110/68 - 144/76)  BP(mean): 97 (17 Nov 2020 17:00) (78 - 97)  RR: 22 (17 Nov 2020 17:00) (17 - 29)  SpO2: 99% (17 Nov 2020 17:00) (94% - 100%)    LABS/DIAGNOSTIC TESTS:                          8.9    15.24 )-----------( 91       ( 17 Nov 2020 04:17 )             28.7     WBC Count: 15.24 K/uL (11-17 @ 04:17)  WBC Count: 11.01 K/uL (11-16 @ 05:48)  WBC Count: 12.40 K/uL (11-15 @ 06:44)      11-17    140  |  100  |  35<H>  ----------------------------<  163<H>  2.9<LL>   |  31  |  0.67    Ca    7.6<L>      17 Nov 2020 04:17  Phos  1.6     11-17  Mg     2.4     11-17    TPro  6.0  /  Alb  1.8<L>  /  TBili  0.4  /  DBili  x   /  AST  23  /  ALT  28  /  AlkPhos  187<H>  11-17          LIVER FUNCTIONS - ( 17 Nov 2020 04:17 )  Alb: 1.8 g/dL / Pro: 6.0 g/dL / ALK PHOS: 187 U/L / ALT: 28 U/L DA / AST: 23 U/L / GGT: x                 LACTATE:    ABG - ABG - ( 17 Nov 2020 14:43 )  pH, Arterial: 7.54  pH, Blood: x     /  pCO2: 36    /  pO2: 90    / HCO3: 31    / Base Excess: 7.9   /  SaO2: 98                  CULTURES:   .Sputum Sputum  11-15 @ 19:28   Moderate Acinetobacter baumannii/nosocomialis group  --    Few polymorphonuclear leukocytes per low power field  No Squamous epithelial cells per low power field  Moderate Gram positive cocci in pairs per oil power field  Rare Gram Negative Rods per oil power field      .Urine Clean Catch (Midstream)  11-14 @ 22:25   <10,000 CFU/mL Normal Urogenital Shannan  --  --      .Blood Blood-Peripheral  11-14 @ 20:41   Growth in aerobic and anaerobic bottles: Methicillin resistant  Staphylococcus aureus  See previous culture [id number]  --  Blood Culture PCR  Methicillin resistant Staphylococcus aureus        RADIOLOGY:< from: Xray Chest 1 View- PORTABLE-Routine (Xray Chest 1 View- PORTABLE-Routine in AM.) (11.17.20 @ 07:54) >  EXAM:  XR CHEST PORTABLE ROUTINE 1V                            PROCEDURE DATE:  11/17/2020          INTERPRETATION:  CLINICAL INDICATION: 65 years  Female with monitor PNA.    COMPARISON: 11/15/2020    The tracheostomy is unchanged.    AP view of the chest is rotated to the left and again demonstrates retrocardiac opacity consistent with effusion, atelectasis and/or infiltrate. Right lung scarring is reidentified. There is no right pleural effusion. There is no pneumothorax.    The heart, mediastinum and galina cannot be assessed due to rotation.    The pulmonary vasculature is normal.    Mild thoracic degenerative changes are present.    IMPRESSION:    Retrocardiac opacity consistent with effusion, atelectasis and/or infiltrate.    Right lung scarring.    Tracheostomy.            CATIA ANSARI MD; Attending Radiologist  This document has been electronically signed. Nov 17 2020  9:11AM    < end of copied text >        ROS  [  ] UNABLE TO ELICIT               HPI:  64 y/o obese female with history of 80 pack year tobacco use, HTN, DM2 on insulin, COPD s/p trach(on vent) and PEG, R lung cancer s/p resection (2017), presents to the ED for AMS from [facility] . Patient noted to be lethargic with concern for sepsis hence referred to ER.  As per paperwork, patient is nonverbal at baseline, also noted to have been started on levaquin on 11/13 with addition of vancomycin overnight. Patient only opens eyes in response to verbal stimulus, currently unable to provide history.   Patient was noted to have temp of 100.1, initially hemodynamically sale however bp dropped to 78 for which she received 2l bolus of NS. She had wbc count of 14.9 with left shift, lactate 3.3, bun/cr of 32/1.58, sodium 130, UA -ve, cxr with right basilar infiltrate. Was given a dose each of vancomycin  and cefepime.       History as above, pt who is currently in the ICU, she has a Tracheostomy and is connected to the vent but is awake and alert and answering some leading questions, She had a low grade fever on admission but none now, she was found to be positive for MRSA Bacteremia and has some thick purulent secretions around her trach. She acknowledges having a little coughing and even some sob despite being on the vent, she also acknowledges having some abdominal pain but no vomiting or diarrhea.          PAST MEDICAL & SURGICAL HISTORY:  Malignant neoplasm of unspecified part of right bronchus or lung    HTN (hypertension)    DM (diabetes mellitus)    COPD (chronic obstructive pulmonary disease)    Lung cancer    Morbid obesity    GERD (gastroesophageal reflux disease)    Deviated septum    Prolapsed uterus    ITP (idiopathic thrombocytopenic purpura)    Emphysema/COPD    History of cholecystectomy    S/P lobectomy of lung  right 2017    History of tonsillectomy        fish (Angioedema)  penicillins (Rash)      Meds:  ALBUTerol    90 MICROgram(s) HFA Inhaler 2 Puff(s) Inhalation every 6 hours PRN  cefepime   IVPB 2000 milliGRAM(s) IV Intermittent every 12 hours  chlorhexidine 0.12% Liquid 15 milliLiter(s) Oral Mucosa every 12 hours  chlorhexidine 2% Cloths 1 Application(s) Topical <User Schedule>  heparin   Injectable 5000 Unit(s) SubCutaneous every 8 hours  influenza   Vaccine 0.5 milliLiter(s) IntraMuscular once  insulin glargine Injectable (LANTUS) 30 Unit(s) SubCutaneous every morning  insulin lispro (ADMELOG) corrective regimen sliding scale   SubCutaneous every 6 hours  insulin lispro Injectable (ADMELOG) 12 Unit(s) SubCutaneous every 6 hours  metroNIDAZOLE  IVPB 500 milliGRAM(s) IV Intermittent every 8 hours  pantoprazole  Injectable 40 milliGRAM(s) IV Push daily  potassium phosphate / sodium phosphate Powder (PHOS-NaK) 1 Packet(s) Oral four times a day  predniSONE   Tablet 40 milliGRAM(s) Oral daily  vancomycin  IVPB      vancomycin  IVPB 1250 milliGRAM(s) IV Intermittent every 12 hours      SOCIAL HISTORY:  Smoker:  ex smoker  ETOH use:  no        FAMILY HISTORY:  FH: lung cancer    Family history of stroke    Family history of lung cancer    Diabetes mellitus        VITALS:  Vital Signs Last 24 Hrs  T(C): 36.8 (17 Nov 2020 15:46), Max: 37.1 (17 Nov 2020 08:00)  T(F): 98.3 (17 Nov 2020 15:46), Max: 98.8 (17 Nov 2020 08:00)  HR: 88 (17 Nov 2020 17:00) (79 - 93)  BP: 133/81 (17 Nov 2020 17:00) (110/68 - 144/76)  BP(mean): 97 (17 Nov 2020 17:00) (78 - 97)  RR: 22 (17 Nov 2020 17:00) (17 - 29)  SpO2: 99% (17 Nov 2020 17:00) (94% - 100%)    LABS/DIAGNOSTIC TESTS:                          8.9    15.24 )-----------( 91       ( 17 Nov 2020 04:17 )             28.7     WBC Count: 15.24 K/uL (11-17 @ 04:17)  WBC Count: 11.01 K/uL (11-16 @ 05:48)  WBC Count: 12.40 K/uL (11-15 @ 06:44)      11-17    140  |  100  |  35<H>  ----------------------------<  163<H>  2.9<LL>   |  31  |  0.67    Ca    7.6<L>      17 Nov 2020 04:17  Phos  1.6     11-17  Mg     2.4     11-17    TPro  6.0  /  Alb  1.8<L>  /  TBili  0.4  /  DBili  x   /  AST  23  /  ALT  28  /  AlkPhos  187<H>  11-17          LIVER FUNCTIONS - ( 17 Nov 2020 04:17 )  Alb: 1.8 g/dL / Pro: 6.0 g/dL / ALK PHOS: 187 U/L / ALT: 28 U/L DA / AST: 23 U/L / GGT: x                 LACTATE:    ABG - ABG - ( 17 Nov 2020 14:43 )  pH, Arterial: 7.54  pH, Blood: x     /  pCO2: 36    /  pO2: 90    / HCO3: 31    / Base Excess: 7.9   /  SaO2: 98                  CULTURES:   .Sputum Sputum  11-15 @ 19:28   Moderate Acinetobacter baumannii/nosocomialis group  --    Few polymorphonuclear leukocytes per low power field  No Squamous epithelial cells per low power field  Moderate Gram positive cocci in pairs per oil power field  Rare Gram Negative Rods per oil power field      .Urine Clean Catch (Midstream)  11-14 @ 22:25   <10,000 CFU/mL Normal Urogenital Shannan  --  --      .Blood Blood-Peripheral  11-14 @ 20:41   Growth in aerobic and anaerobic bottles: Methicillin resistant  Staphylococcus aureus  See previous culture [id number]  --  Blood Culture PCR  Methicillin resistant Staphylococcus aureus        RADIOLOGY:< from: Xray Chest 1 View- PORTABLE-Routine (Xray Chest 1 View- PORTABLE-Routine in AM.) (11.17.20 @ 07:54) >  EXAM:  XR CHEST PORTABLE ROUTINE 1V                            PROCEDURE DATE:  11/17/2020          INTERPRETATION:  CLINICAL INDICATION: 65 years  Female with monitor PNA.    COMPARISON: 11/15/2020    The tracheostomy is unchanged.    AP view of the chest is rotated to the left and again demonstrates retrocardiac opacity consistent with effusion, atelectasis and/or infiltrate. Right lung scarring is reidentified. There is no right pleural effusion. There is no pneumothorax.    The heart, mediastinum and galina cannot be assessed due to rotation.    The pulmonary vasculature is normal.    Mild thoracic degenerative changes are present.    IMPRESSION:    Retrocardiac opacity consistent with effusion, atelectasis and/or infiltrate.    Right lung scarring.    Tracheostomy.            CATIA ANSARI MD; Attending Radiologist  This document has been electronically signed. Nov 17 2020  9:11AM    < end of copied text >        ROS  [  ] UNABLE TO ELICIT               HPI:  64 y/o obese female with history of 80 pack year tobacco use, HTN, DM2 on insulin, COPD s/p trach(on vent) and PEG, R lung cancer s/p resection (2017), presents to the ED for AMS from [facility] . Patient noted to be lethargic with concern for sepsis hence referred to ER.  As per paperwork, patient is nonverbal at baseline, also noted to have been started on levaquin on 11/13 with addition of vancomycin overnight. Patient only opens eyes in response to verbal stimulus, currently unable to provide history.   Patient was noted to have temp of 100.1, initially hemodynamically sale however bp dropped to 78 for which she received 2l bolus of NS. She had wbc count of 14.9 with left shift, lactate 3.3, bun/cr of 32/1.58, sodium 130, UA -ve, cxr with right basilar infiltrate. Was given a dose each of vancomycin  and cefepime.       History as above, pt who is currently in the ICU, she has a Tracheostomy and is connected to the vent but is awake and alert and answering some leading questions, She had a low grade fever on admission but none now, she was found to be positive for MRSA Bacteremia and has some thick purulent secretions around her trach. She acknowledges having a little coughing and even some sob despite being on the vent, she also acknowledges having some abdominal pain but no vomiting or diarrhea.          PAST MEDICAL & SURGICAL HISTORY:  Malignant neoplasm of unspecified part of right bronchus or lung    HTN (hypertension)    DM (diabetes mellitus)    COPD (chronic obstructive pulmonary disease)    Lung cancer    Morbid obesity    GERD (gastroesophageal reflux disease)    Deviated septum    Prolapsed uterus    ITP (idiopathic thrombocytopenic purpura)    Emphysema/COPD    History of cholecystectomy    S/P lobectomy of lung  right 2017    History of tonsillectomy        fish (Angioedema)  penicillins (Rash)      Meds:  ALBUTerol    90 MICROgram(s) HFA Inhaler 2 Puff(s) Inhalation every 6 hours PRN  cefepime   IVPB 2000 milliGRAM(s) IV Intermittent every 12 hours  chlorhexidine 0.12% Liquid 15 milliLiter(s) Oral Mucosa every 12 hours  chlorhexidine 2% Cloths 1 Application(s) Topical <User Schedule>  heparin   Injectable 5000 Unit(s) SubCutaneous every 8 hours  influenza   Vaccine 0.5 milliLiter(s) IntraMuscular once  insulin glargine Injectable (LANTUS) 30 Unit(s) SubCutaneous every morning  insulin lispro (ADMELOG) corrective regimen sliding scale   SubCutaneous every 6 hours  insulin lispro Injectable (ADMELOG) 12 Unit(s) SubCutaneous every 6 hours  metroNIDAZOLE  IVPB 500 milliGRAM(s) IV Intermittent every 8 hours  pantoprazole  Injectable 40 milliGRAM(s) IV Push daily  potassium phosphate / sodium phosphate Powder (PHOS-NaK) 1 Packet(s) Oral four times a day  predniSONE   Tablet 40 milliGRAM(s) Oral daily  vancomycin  IVPB      vancomycin  IVPB 1250 milliGRAM(s) IV Intermittent every 12 hours      SOCIAL HISTORY:  Smoker:  ex smoker  ETOH use:  no        FAMILY HISTORY:  FH: lung cancer    Family history of stroke    Family history of lung cancer    Diabetes mellitus        VITALS:  Vital Signs Last 24 Hrs  T(C): 36.8 (17 Nov 2020 15:46), Max: 37.1 (17 Nov 2020 08:00)  T(F): 98.3 (17 Nov 2020 15:46), Max: 98.8 (17 Nov 2020 08:00)  HR: 88 (17 Nov 2020 17:00) (79 - 93)  BP: 133/81 (17 Nov 2020 17:00) (110/68 - 144/76)  BP(mean): 97 (17 Nov 2020 17:00) (78 - 97)  RR: 22 (17 Nov 2020 17:00) (17 - 29)  SpO2: 99% (17 Nov 2020 17:00) (94% - 100%)    LABS/DIAGNOSTIC TESTS:                          8.9    15.24 )-----------( 91       ( 17 Nov 2020 04:17 )             28.7     WBC Count: 15.24 K/uL (11-17 @ 04:17)  WBC Count: 11.01 K/uL (11-16 @ 05:48)  WBC Count: 12.40 K/uL (11-15 @ 06:44)      11-17    140  |  100  |  35<H>  ----------------------------<  163<H>  2.9<LL>   |  31  |  0.67    Ca    7.6<L>      17 Nov 2020 04:17  Phos  1.6     11-17  Mg     2.4     11-17    TPro  6.0  /  Alb  1.8<L>  /  TBili  0.4  /  DBili  x   /  AST  23  /  ALT  28  /  AlkPhos  187<H>  11-17          LIVER FUNCTIONS - ( 17 Nov 2020 04:17 )  Alb: 1.8 g/dL / Pro: 6.0 g/dL / ALK PHOS: 187 U/L / ALT: 28 U/L DA / AST: 23 U/L / GGT: x                 LACTATE:    ABG - ABG - ( 17 Nov 2020 14:43 )  pH, Arterial: 7.54  pH, Blood: x     /  pCO2: 36    /  pO2: 90    / HCO3: 31    / Base Excess: 7.9   /  SaO2: 98                  CULTURES:   .Sputum Sputum  11-15 @ 19:28   Moderate Acinetobacter baumannii/nosocomialis group  --    Few polymorphonuclear leukocytes per low power field  No Squamous epithelial cells per low power field  Moderate Gram positive cocci in pairs per oil power field  Rare Gram Negative Rods per oil power field      .Urine Clean Catch (Midstream)  11-14 @ 22:25   <10,000 CFU/mL Normal Urogenital Shannan  --  --      .Blood Blood-Peripheral  11-14 @ 20:41   Growth in aerobic and anaerobic bottles: Methicillin resistant  Staphylococcus aureus  See previous culture [id number]  --  Blood Culture PCR  Methicillin resistant Staphylococcus aureus        RADIOLOGY:< from: Xray Chest 1 View- PORTABLE-Routine (Xray Chest 1 View- PORTABLE-Routine in AM.) (11.17.20 @ 07:54) >  EXAM:  XR CHEST PORTABLE ROUTINE 1V                            PROCEDURE DATE:  11/17/2020          INTERPRETATION:  CLINICAL INDICATION: 65 years  Female with monitor PNA.    COMPARISON: 11/15/2020    The tracheostomy is unchanged.    AP view of the chest is rotated to the left and again demonstrates retrocardiac opacity consistent with effusion, atelectasis and/or infiltrate. Right lung scarring is reidentified. There is no right pleural effusion. There is no pneumothorax.    The heart, mediastinum and galina cannot be assessed due to rotation.    The pulmonary vasculature is normal.    Mild thoracic degenerative changes are present.    IMPRESSION:    Retrocardiac opacity consistent with effusion, atelectasis and/or infiltrate.    Right lung scarring.    Tracheostomy.            CATIA ANSARI MD; Attending Radiologist  This document has been electronically signed. Nov 17 2020  9:11AM    < end of copied text >        ROS  [  ] UNABLE TO ELICIT, can answering leading questions as mentioned above

## 2020-11-17 NOTE — CONSULT NOTE ADULT - PROBLEM SELECTOR RECOMMENDATION 2
ECO. Patient with primary R lung Ca with bone mets; comorbid end stage COPD. On previous admission, CT indicated osseous metastasis and associated compression fractures and rib fractures unchanged. Patient s/p trach/peg 10/8. Overall, poor prognosis. Full code.

## 2020-11-17 NOTE — PROGRESS NOTE ADULT - SUBJECTIVE AND OBJECTIVE BOX
Time of Visit:  Patient seen and examined.     MEDICATIONS  (STANDING):  cefepime   IVPB 2000 milliGRAM(s) IV Intermittent every 12 hours  chlorhexidine 0.12% Liquid 15 milliLiter(s) Oral Mucosa every 12 hours  chlorhexidine 2% Cloths 1 Application(s) Topical <User Schedule>  heparin   Injectable 5000 Unit(s) SubCutaneous every 8 hours  influenza   Vaccine 0.5 milliLiter(s) IntraMuscular once  insulin glargine Injectable (LANTUS) 30 Unit(s) SubCutaneous every morning  insulin lispro (ADMELOG) corrective regimen sliding scale   SubCutaneous every 6 hours  insulin lispro Injectable (ADMELOG) 12 Unit(s) SubCutaneous every 6 hours  metroNIDAZOLE  IVPB 500 milliGRAM(s) IV Intermittent every 8 hours  pantoprazole  Injectable 40 milliGRAM(s) IV Push daily  potassium phosphate / sodium phosphate Powder (PHOS-NaK) 1 Packet(s) Oral four times a day  predniSONE   Tablet 40 milliGRAM(s) Oral daily  vancomycin  IVPB      vancomycin  IVPB 1250 milliGRAM(s) IV Intermittent every 12 hours      MEDICATIONS  (PRN):  ALBUTerol    90 MICROgram(s) HFA Inhaler 2 Puff(s) Inhalation every 6 hours PRN Shortness of Breath       Medications up to date at time of exam.      PHYSICAL EXAMINATION:  Patient has no new complaints.  GENERAL: The patient is a well-developed, well-nourished, in no apparent distress.     Vital Signs Last 24 Hrs  T(C): 36.8 (17 Nov 2020 15:46), Max: 37.1 (17 Nov 2020 08:00)  T(F): 98.3 (17 Nov 2020 15:46), Max: 98.8 (17 Nov 2020 08:00)  HR: 87 (17 Nov 2020 20:19) (79 - 93)  BP: 139/78 (17 Nov 2020 19:00) (120/63 - 144/76)  BP(mean): 97 (17 Nov 2020 19:00) (78 - 97)  RR: 15 (17 Nov 2020 19:00) (15 - 26)  SpO2: 100% (17 Nov 2020 20:19) (94% - 100%)  Mode: CPAP with PS  FiO2: 40  PEEP: 5  PS: 10  ITime: 1  MAP: 8.8  PIP: 16   (if applicable)    Chest Tube (if applicable)    HEENT: Head is normocephalic and atraumatic. Extraocular muscles are intact. Mucous membranes are moist.     NECK: Supple, no palpable adenopathy. + trach    LUNGS: Clear to auscultation, no wheezing, rales, or rhonchi.    HEART: Regular rate and rhythm without murmur.    ABDOMEN: Soft, nontender, and nondistended.  No hepatosplenomegaly is noted. +PEG    : No painful voiding, no pelvic pain    EXTREMITIES: Without any cyanosis, clubbing, rash, lesions or edema.    NEUROLOGIC: Awake, alert    SKIN: Warm, dry, good turgor.      LABS:                        8.9    15.24 )-----------( 91       ( 17 Nov 2020 04:17 )             28.7     11-17    140  |  101  |  27<H>  ----------------------------<  207<H>  4.0   |  31  |  0.49<L>    Ca    7.8<L>      17 Nov 2020 18:52  Phos  1.6     11-17  Mg     2.4     11-17    TPro  6.0  /  Alb  1.8<L>  /  TBili  0.4  /  DBili  x   /  AST  23  /  ALT  28  /  AlkPhos  187<H>  11-17        ABG - ( 17 Nov 2020 14:43 )  pH, Arterial: 7.54  pH, Blood: x     /  pCO2: 36    /  pO2: 90    / HCO3: 31    / Base Excess: 7.9   /  SaO2: 98                          Procalcitonin, Serum: 99.60 ng/mL (11-16-20 @ 13:48)      MICROBIOLOGY: (if applicable)    RADIOLOGY & ADDITIONAL STUDIES:  EKG:   CXR:  ECHO:    IMPRESSION: 65y Female PAST MEDICAL & SURGICAL HISTORY:  Malignant neoplasm of unspecified part of right bronchus or lung    HTN (hypertension)    DM (diabetes mellitus)    COPD (chronic obstructive pulmonary disease)    Lung cancer    Morbid obesity    GERD (gastroesophageal reflux disease)    Deviated septum    Prolapsed uterus    ITP (idiopathic thrombocytopenic purpura)    Emphysema/COPD    History of cholecystectomy    S/P lobectomy of lung  right 2017    History of tonsillectomy     p/w         IMP: This is a 65 yr old  obese female with history of 80 pack year tobacco use, HTN, DM2 on insulin, COPD s/p trach(on vent) and PEG, R lung cancer s/p resection (2017), presents to the ED for [facility]. Admitting to ICU for encephalopathy and sepsis secondary to possible aspiration pneumonia.    -  Sepsis/ bacteria   - Resp failure  s/p trach  - Aspiration pneumonia  - Hypotension  - Encephalopathy  - COPD s/p trach and peg  - MARYAM  - DM uncontrol       Plan   -continue antibx  -very high procalcitonin   -monitor vanc level  -f/u cultures  -continue vent support  -adjust vent as per ABG  -hemodynamic support  -blood sugar control   -dvt/gi prophy  -PEG feed .  - d/c with icu team

## 2020-11-17 NOTE — CONSULT NOTE ADULT - SUBJECTIVE AND OBJECTIVE BOX
HPI:  66 y/o obese female with history of 80 pack year tobacco use, HTN, DM2 on insulin, COPD s/p trach(on vent) and PEG, R lung cancer s/p resection (2017), presents to the ED for AMS from [facility] . Patient noted to be lethargic with concern for sepsis hence referred to ER.  As per paperwork, patient is nonverbal at baseline, also noted to have been started on levaquin on 11/13 with addition of vancomycin overnight. Patient only opens eyes in response to verbal stimulus, currently unable to provide history.     Patient was noted to have temp of 100.1, initially hemodynamically sale however bp dropped to 78 for which she received 2l bolus of NS. She had wbc count of 14.9 with left shift, lactate 3.3, bun/cr of 32/1.58, sodium 130, UA -ve, cxr with right basilar infiltrate. Was given a dose each of vanc and cefepime.   GOC: confirmed with daughter Lisa, full code. Consent for central line obtained.    (14 Nov 2020 18:24)      PAST MEDICAL & SURGICAL HISTORY:  Malignant neoplasm of unspecified part of right bronchus or lung    HTN (hypertension)    DM (diabetes mellitus)    COPD (chronic obstructive pulmonary disease)    Lung cancer    Morbid obesity    GERD (gastroesophageal reflux disease)    Deviated septum    Prolapsed uterus    ITP (idiopathic thrombocytopenic purpura)    Emphysema/COPD    History of cholecystectomy    S/P lobectomy of lung  right 2017    History of tonsillectomy        SOCIAL HISTORY:    Admitted from:  home assisted living [facility]   Substance abuse history:              Tobacco hx:                  Alcohol hx:              Home Opioid hx:  Gnosticism:                                    Preferred Language:    Surrogate/HCP/Guardian:            Phone#:    FAMILY HISTORY:  FH: lung cancer    Family history of stroke    Family history of lung cancer    Diabetes mellitus      Baseline ADLs (prior to admission):    Allergies    fish (Angioedema)  penicillins (Rash)    Intolerances      Present Symptoms:   Dyspnea:   Nausea/Vomiting:   Anxiety:  Depressed   Fatigue:  Loss of appetite:   Pain:                                location:          Review of Systems: [All others negative or Unable to obtain due to poor mentation]    MEDICATIONS  (STANDING):  cefepime   IVPB 2000 milliGRAM(s) IV Intermittent every 12 hours  chlorhexidine 0.12% Liquid 15 milliLiter(s) Oral Mucosa every 12 hours  chlorhexidine 2% Cloths 1 Application(s) Topical <User Schedule>  heparin   Injectable 5000 Unit(s) SubCutaneous every 8 hours  influenza   Vaccine 0.5 milliLiter(s) IntraMuscular once  insulin glargine Injectable (LANTUS) 30 Unit(s) SubCutaneous every morning  insulin lispro (ADMELOG) corrective regimen sliding scale   SubCutaneous every 6 hours  insulin lispro Injectable (ADMELOG) 12 Unit(s) SubCutaneous every 6 hours  metroNIDAZOLE  IVPB 500 milliGRAM(s) IV Intermittent every 8 hours  pantoprazole  Injectable 40 milliGRAM(s) IV Push daily  potassium phosphate / sodium phosphate Powder (PHOS-NaK) 1 Packet(s) Oral four times a day  predniSONE   Tablet 40 milliGRAM(s) Oral daily  vancomycin  IVPB      vancomycin  IVPB 1250 milliGRAM(s) IV Intermittent every 12 hours    MEDICATIONS  (PRN):  ALBUTerol    90 MICROgram(s) HFA Inhaler 2 Puff(s) Inhalation every 6 hours PRN Shortness of Breath      PHYSICAL EXAM:    Vital Signs Last 24 Hrs  T(C): 37.1 (17 Nov 2020 08:00), Max: 37.1 (17 Nov 2020 08:00)  T(F): 98.8 (17 Nov 2020 08:00), Max: 98.8 (17 Nov 2020 08:00)  HR: 89 (17 Nov 2020 13:00) (79 - 93)  BP: 144/76 (17 Nov 2020 13:00) (110/68 - 144/76)  BP(mean): 97 (17 Nov 2020 13:00) (78 - 97)  RR: 21 (17 Nov 2020 13:00) (17 - 29)  SpO2: 97% (17 Nov 2020 13:00) (93% - 99%)    General: alert  oriented x ____    [lethargic distressed cachexia  nonverbal  unarousable verbal]  Karnofsky Performance Score/Palliative Performance Status Version2:     %    HEENT: normal  dry mouth  ET tube/trach oral lesions:  Lungs: comfortable tachypnea/labored breathing  excessive secretions  CV: normal  tachycardia  GI: normal  distended  tender  incontinent               PEG/NG/OG tube  constipation  last BM:   : normal  incontinent  oliguria/anuria  benitez  Musculoskeletal: normal  weakness  edema             ambulatory  bedbound/wheelchair bound  Skin: normal  pressure ulcers: stage: edema: other:  Neuro: no deficits cognitive impairment dsyphagia/dysarthria paresis: other:  Oral intake ability: unable/only mouth care [minimal moderate full capability]  Diet: [NPO]    LABS:                        8.9    15.24 )-----------( 91       ( 17 Nov 2020 04:17 )             28.7     11-17    140  |  100  |  35<H>  ----------------------------<  163<H>  2.9<LL>   |  31  |  0.67    Ca    7.6<L>      17 Nov 2020 04:17  Phos  1.6     11-17  Mg     2.4     11-17    TPro  6.0  /  Alb  1.8<L>  /  TBili  0.4  /  DBili  x   /  AST  23  /  ALT  28  /  AlkPhos  187<H>  11-17        RADIOLOGY & ADDITIONAL STUDIES:    ADVANCE DIRECTIVES:   Advanced Care Planning discussion total time spent: HPI:  64 y/o obese female with history of 80 pack year tobacco use, HTN, DM2 on insulin, COPD s/p trach(on vent) and PEG, R lung cancer s/p resection (2017), presents to the ED for AMS from [facility] . Patient noted to be lethargic with concern for sepsis hence referred to ER.  As per paperwork, patient is nonverbal at baseline, also noted to have been started on levaquin on 11/13 with addition of vancomycin overnight. Patient only opens eyes in response to verbal stimulus, currently unable to provide history.     Patient was noted to have temp of 100.1, initially hemodynamically sale however bp dropped to 78 for which she received 2l bolus of NS. She had wbc count of 14.9 with left shift, lactate 3.3, bun/cr of 32/1.58, sodium 130, UA -ve, cxr with right basilar infiltrate. Was given a dose each of vanc and cefepime.   GOC: confirmed with daughter Lisa, full code. Consent for central line obtained.    (14 Nov 2020 18:24)      PAST MEDICAL & SURGICAL HISTORY:  Malignant neoplasm of unspecified part of right bronchus or lung  HTN (hypertension)  DM (diabetes mellitus)  COPD (chronic obstructive pulmonary disease)  Lung cancer  Morbid obesity  GERD (gastroesophageal reflux disease)  Deviated septum  Prolapsed uterus  ITP (idiopathic thrombocytopenic purpura)  Emphysema/COPD  History of cholecystectomy  S/P lobectomy of lung  right 2017  History of tonsillectomy      SOCIAL HISTORY:    Admitted from:  [place], has partner x 20+ years  Substance abuse history: none            Tobacco hx:    former heavy smoker (80 pack years)       Alcohol hx:  none    Home Opioid hx: Percocet PRN  Judaism:  Confucianist                              Preferred Language: English    Surrogate/HCP/Guardian: Gavi Colón (dtr): [phone]    FAMILY HISTORY:  FH: lung cancer  Family history of stroke  Family history of lung cancer  Diabetes mellitus    Baseline ADLs (prior to admission): Patient +trach/peg    Allergies:  fish (Angioedema)  penicillins (Rash)    Present Symptoms:   Dyspnea: none  Nausea/Vomiting: none  Pain:   none  Review of Systems: Patient using gestures to answer questions - no c/o at time of exam.      MEDICATIONS  (STANDING):  cefepime   IVPB 2000 milliGRAM(s) IV Intermittent every 12 hours  chlorhexidine 0.12% Liquid 15 milliLiter(s) Oral Mucosa every 12 hours  chlorhexidine 2% Cloths 1 Application(s) Topical <User Schedule>  heparin   Injectable 5000 Unit(s) SubCutaneous every 8 hours  influenza   Vaccine 0.5 milliLiter(s) IntraMuscular once  insulin glargine Injectable (LANTUS) 30 Unit(s) SubCutaneous every morning  insulin lispro (ADMELOG) corrective regimen sliding scale   SubCutaneous every 6 hours  insulin lispro Injectable (ADMELOG) 12 Unit(s) SubCutaneous every 6 hours  metroNIDAZOLE  IVPB 500 milliGRAM(s) IV Intermittent every 8 hours  pantoprazole  Injectable 40 milliGRAM(s) IV Push daily  potassium phosphate / sodium phosphate Powder (PHOS-NaK) 1 Packet(s) Oral four times a day  predniSONE   Tablet 40 milliGRAM(s) Oral daily  vancomycin  IVPB      vancomycin  IVPB 1250 milliGRAM(s) IV Intermittent every 12 hours    MEDICATIONS  (PRN):  ALBUTerol    90 MICROgram(s) HFA Inhaler 2 Puff(s) Inhalation every 6 hours PRN Shortness of Breath      PHYSICAL EXAM:    Vital Signs Last 24 Hrs  T(C): 37.1 (17 Nov 2020 08:00), Max: 37.1 (17 Nov 2020 08:00)  T(F): 98.8 (17 Nov 2020 08:00), Max: 98.8 (17 Nov 2020 08:00)  HR: 89 (17 Nov 2020 13:00) (79 - 93)  BP: 144/76 (17 Nov 2020 13:00) (110/68 - 144/76)  BP(mean): 97 (17 Nov 2020 13:00) (78 - 97)  RR: 21 (17 Nov 2020 13:00) (17 - 29)  SpO2: 97% (17 Nov 2020 13:00) (93% - 99%)    General: alert  oriented, uses gestures and writing, follows commands, +trach/peg. No signs of distress.    Karnofsky Performance Score/Palliative Performance Status Version2:     20%    HEENT: dry lips, + trach  Lungs: comfortable, + trach. No signs of distress.   CV: S1S2, RRR  GI: abdomen nontender, incontinent, +PEG  : primafit  Musculoskeletal: patient follows commands, dependent on ADLs  Skin:  b/l LE edema 2+, +ecchymosis  Neuro: patient awake, oriented, communicates with writing/gestures, follows commands   Oral intake ability: unable/only mouth care   Diet: NPO; + PEG    LABS:                        8.9    15.24 )-----------( 91       ( 17 Nov 2020 04:17 )             28.7     11-17    140  |  100  |  35<H>  ----------------------------<  163<H>  2.9<LL>   |  31  |  0.67    Ca    7.6<L>      17 Nov 2020 04:17  Phos  1.6     11-17  Mg     2.4     11-17    TPro  6.0  /  Alb  1.8<L>  /  TBili  0.4  /  DBili  x   /  AST  23  /  ALT  28  /  AlkPhos  187<H>  11-17    RADIOLOGY & ADDITIONAL STUDIES:  < from: CT Head No Cont (11.16.20 @ 18:00) >  EXAM:  CT BRAIN                        PROCEDURE DATE:  11/16/2020    INTERPRETATION:  Exam Date: 11/16/2020 6:00 PM  CT head without IV contrast  CLINICAL INFORMATION:  ams ADMDIAG1: A41.9 SEPSIS, UNSPECIFIED ORGANISM/  TECHNIQUE: Contiguous axial sections were obtained through the head.   This scan was performed using automatic exposure control (radiation dose reduction software) to obtain a diagnostic image quality scan with patient dose as low as reasonably achievable.  COMPARISON: CT head 11/14/2020  FINDINGS:  There is no evidence of intraparenchymal or extraaxial hemorrhage.   There is no CT evidence of large vessel acute infarct. Small chronic lacunar infarct in the right corona radiata is present. No mass effect is found in the brain.  No evidence of midline shift or herniation pattern. Small focus of hypoattenuation in the left infra ganglionic substance, likely reflecting a perivascular space.  The ventricles, sulci and basal cisterns appear unremarkable.  Visualized paranasal sinuses are clear.    IMPRESSION:  No acute intracranial findings. Chronic changes as above.  < end of copied text >    < from: Xray Chest 1 View- PORTABLE-Routine (Xray Chest 1 View- PORTABLE-Routine in AM.) (11.17.20 @ 07:54) >  EXAM:  XR CHEST PORTABLE ROUTINE 1V                        PROCEDURE DATE:  11/17/2020    INTERPRETATION:  CLINICAL INDICATION: 65 years  Female with monitor PNA.  COMPARISON: 11/15/2020  The tracheostomy is unchanged.  AP view of the chest is rotated to the left and again demonstrates retrocardiac opacity consistent with effusion, atelectasis and/or infiltrate. Right lung scarring is reidentified. There is no right pleural effusion. There is no pneumothorax.  The heart, mediastinum and galina cannot be assessed due to rotation.  The pulmonary vasculature is normal.  Mild thoracic degenerative changes are present.    IMPRESSION:  Retrocardiac opacity consistent with effusion, atelectasis and/or infiltrate.  Right lung scarring.  Tracheostomy.  < end of copied text >    < from: Transthoracic Echocardiogram (11.16.20 @ 07:47) >  Patient name: BARB COLÓN  YOB: 1955   Age: 65 (F)   MR#: [id number]  Study Date: 11/16/2020  Location: [place] [id number]: Fracisco Davis FILIPPO  Study quality: Technically limited  Referring Physician:  DANITA CHRISTENSEN MD  Blood Pressure: 102/58 mmHg  Height: 154 cm  Weight: 90 kg  BSA: 1.9 m2  ------------------------------------------------------------------------    PROCEDURE: Transthoracic echocardiogram with 2-D, M-Mode  and complete spectral and color flow Doppler.  INDICATION: Abnormal electrocardiogram [ECG] [EKG]  (R94.31), Bacteremia (R78.81)  HISTORY:  ------------------------------------------------------------------------  OBSERVATIONS:  Mitral Valve: Mitral annular calcification. Mild mitral  regurgitation.  AorticRoot: Normal aortic root.  Aortic Valve: Probably trileaflet aortic valve is not well  seen.  Left Atrium: Normal left atrium.  Left Ventricle: Normal Left Ventricular Systolic Function,  (EF = 50-55%) Normal left ventricular internal dimensions  andwall thicknesses. Grade II diastolic dysfunction.  Right Heart: Normal right atrium. Normal right ventricular  size and function. Normal tricuspid valve. Normal pulmonic  valve.  Pericardium/PleuraNormal pericardium with no pericardial  effusion.  Hemodynamic: RV systolic pressure is normal at  33 mm Hg.  ------------------------------------------------------------------------  CONCLUSIONS:  1. Mitral annular calcification. Mild mitral regurgitation.  2. Probably trileaflet aortic valve is not well seen.  3. Normal aortic root.  4. Normal left atrium.  5. Normal left ventricular internal dimensions and wall  thicknesses.  6. Normal Left Ventricular Systolic Function,  (EF =  50-55%)  7. Grade II diastolic dysfunction.  8. Normal right atrium.  9. Normal right ventricular size and function.  10. RV systolic pressure is normal at  33 mm Hg.  11. Normal tricuspid valve.  12. Normal pulmonic valve.  13. Normal pericardium with no pericardial effusion.    < end of copied text >        ADVANCE DIRECTIVES: ADVANCE DIRECTIVES: HCP and MOLST forms previously completed as per patient's wishes: CPR / long term ventilation / long term feeding tube / trial IV fluids / use abx / NO LIMITATION on medical interventions/ send to hospital.    HPI:  66 y/o obese female with history of 80 pack year tobacco use, HTN, DM2 on insulin, COPD s/p trach(on vent) and PEG, R lung cancer s/p resection (2017), presents to the ED for AMS from [facility] . Patient noted to be lethargic with concern for sepsis hence referred to ER.  As per paperwork, patient is nonverbal at baseline, also noted to have been started on levaquin on 11/13 with addition of vancomycin overnight. Patient only opens eyes in response to verbal stimulus, currently unable to provide history.     Patient was noted to have temp of 100.1, initially hemodynamically sale however bp dropped to 78 for which she received 2l bolus of NS. She had wbc count of 14.9 with left shift, lactate 3.3, bun/cr of 32/1.58, sodium 130, UA -ve, cxr with right basilar infiltrate. Was given a dose each of vanc and cefepime.   GOC: confirmed with daughter Lisa, full code. Consent for central line obtained.    (14 Nov 2020 18:24)      PAST MEDICAL & SURGICAL HISTORY:  Malignant neoplasm of unspecified part of right bronchus or lung  HTN (hypertension)  DM (diabetes mellitus)  COPD (chronic obstructive pulmonary disease)  Lung cancer  Morbid obesity  GERD (gastroesophageal reflux disease)  Deviated septum  Prolapsed uterus  ITP (idiopathic thrombocytopenic purpura)  Emphysema/COPD  History of cholecystectomy  S/P lobectomy of lung  right 2017  History of tonsillectomy      SOCIAL HISTORY:    Admitted from:  [place], has partner x 20+ years  Substance abuse history: none            Tobacco hx:    former heavy smoker (80 pack years)       Alcohol hx:  none    Home Opioid hx: Percocet PRN  Worship:  Religious                              Preferred Language: English    Surrogate/HCP/Guardian: Gavi Olson (dtr/HCP): [phone]  Williams Coreas (partner, alternate HCP): [phone]    FAMILY HISTORY:  FH: lung cancer  Family history of stroke  Family history of lung cancer  Diabetes mellitus    Baseline ADLs (prior to admission): Patient +trach/peg    Allergies:  fish (Angioedema)  penicillins (Rash)    Present Symptoms:   Dyspnea: none  Nausea/Vomiting: none  Pain:   none  Review of Systems: Patient using gestures to answer questions - no c/o at time of exam.      MEDICATIONS  (STANDING):  cefepime   IVPB 2000 milliGRAM(s) IV Intermittent every 12 hours  chlorhexidine 0.12% Liquid 15 milliLiter(s) Oral Mucosa every 12 hours  chlorhexidine 2% Cloths 1 Application(s) Topical <User Schedule>  heparin   Injectable 5000 Unit(s) SubCutaneous every 8 hours  influenza   Vaccine 0.5 milliLiter(s) IntraMuscular once  insulin glargine Injectable (LANTUS) 30 Unit(s) SubCutaneous every morning  insulin lispro (ADMELOG) corrective regimen sliding scale   SubCutaneous every 6 hours  insulin lispro Injectable (ADMELOG) 12 Unit(s) SubCutaneous every 6 hours  metroNIDAZOLE  IVPB 500 milliGRAM(s) IV Intermittent every 8 hours  pantoprazole  Injectable 40 milliGRAM(s) IV Push daily  potassium phosphate / sodium phosphate Powder (PHOS-NaK) 1 Packet(s) Oral four times a day  predniSONE   Tablet 40 milliGRAM(s) Oral daily  vancomycin  IVPB      vancomycin  IVPB 1250 milliGRAM(s) IV Intermittent every 12 hours    MEDICATIONS  (PRN):  ALBUTerol    90 MICROgram(s) HFA Inhaler 2 Puff(s) Inhalation every 6 hours PRN Shortness of Breath      PHYSICAL EXAM:    Vital Signs Last 24 Hrs  T(C): 37.1 (17 Nov 2020 08:00), Max: 37.1 (17 Nov 2020 08:00)  T(F): 98.8 (17 Nov 2020 08:00), Max: 98.8 (17 Nov 2020 08:00)  HR: 89 (17 Nov 2020 13:00) (79 - 93)  BP: 144/76 (17 Nov 2020 13:00) (110/68 - 144/76)  BP(mean): 97 (17 Nov 2020 13:00) (78 - 97)  RR: 21 (17 Nov 2020 13:00) (17 - 29)  SpO2: 97% (17 Nov 2020 13:00) (93% - 99%)    General: alert  oriented, uses gestures and writing, follows commands, +trach/peg. No signs of distress.    Karnofsky Performance Score/Palliative Performance Status Version2:     20%    HEENT: dry lips, + trach  Lungs: comfortable, + trach. No signs of distress.   CV: S1S2, RRR  GI: abdomen nontender, incontinent, +PEG  : primafit  Musculoskeletal: patient follows commands, dependent on ADLs  Skin:  b/l LE edema 2+, +ecchymosis  Neuro: patient awake, oriented, communicates with writing/gestures, follows commands   Oral intake ability: unable/only mouth care   Diet: NPO; + PEG    LABS:                        8.9    15.24 )-----------( 91       ( 17 Nov 2020 04:17 )             28.7     11-17    140  |  100  |  35<H>  ----------------------------<  163<H>  2.9<LL>   |  31  |  0.67    Ca    7.6<L>      17 Nov 2020 04:17  Phos  1.6     11-17  Mg     2.4     11-17    TPro  6.0  /  Alb  1.8<L>  /  TBili  0.4  /  DBili  x   /  AST  23  /  ALT  28  /  AlkPhos  187<H>  11-17    RADIOLOGY & ADDITIONAL STUDIES:  < from: CT Head No Cont (11.16.20 @ 18:00) >  EXAM:  CT BRAIN                        PROCEDURE DATE:  11/16/2020    INTERPRETATION:  Exam Date: 11/16/2020 6:00 PM  CT head without IV contrast  CLINICAL INFORMATION:  [facility] ADMDIAG1: A41.9 SEPSIS, UNSPECIFIED ORGANISM/  TECHNIQUE: Contiguous axial sections were obtained through the head.   This scan was performed using automatic exposure control (radiation dose reduction software) to obtain a diagnostic image quality scan with patient dose as low as reasonably achievable.  COMPARISON: CT head 11/14/2020  FINDINGS:  There is no evidence of intraparenchymal or extraaxial hemorrhage.   There is no CT evidence of large vessel acute infarct. Small chronic lacunar infarct in the right corona radiata is present. No mass effect is found in the brain.  No evidence of midline shift or herniation pattern. Small focus of hypoattenuation in the left infra ganglionic substance, likely reflecting a perivascular space.  The ventricles, sulci and basal cisterns appear unremarkable.  Visualized paranasal sinuses are clear.    IMPRESSION:  No acute intracranial findings. Chronic changes as above.  < end of copied text >    < from: Xray Chest 1 View- PORTABLE-Routine (Xray Chest 1 View- PORTABLE-Routine in AM.) (11.17.20 @ 07:54) >  EXAM:  XR CHEST PORTABLE ROUTINE 1V                        PROCEDURE DATE:  11/17/2020    INTERPRETATION:  CLINICAL INDICATION: 65 years  Female with monitor PNA.  COMPARISON: 11/15/2020  The tracheostomy is unchanged.  AP view of the chest is rotated to the left and again demonstrates retrocardiac opacity consistent with effusion, atelectasis and/or infiltrate. Right lung scarring is reidentified. There is no right pleural effusion. There is no pneumothorax.  The heart, mediastinum and galina cannot be assessed due to rotation.  The pulmonary vasculature is normal.  Mild thoracic degenerative changes are present.    IMPRESSION:  Retrocardiac opacity consistent with effusion, atelectasis and/or infiltrate.  Right lung scarring.  Tracheostomy.  < end of copied text >    < from: Transthoracic Echocardiogram (11.16.20 @ 07:47) >  Patient name: BARB OLSON  YOB: 1955   Age: 65 (F)   MR#: [id number]  Study Date: 11/16/2020  Location: [place] [id number]: Fracisco Davis RDCS  Study quality: Technically limited  Referring Physician:  DANITA CHRISTENSEN MD  Blood Pressure: 102/58 mmHg  Height: 154 cm  Weight: 90 kg  BSA: 1.9 m2  ------------------------------------------------------------------------    PROCEDURE: Transthoracic echocardiogram with 2-D, M-Mode  and complete spectral and color flow Doppler.  INDICATION: Abnormal electrocardiogram [ECG] [EKG]  (R94.31), Bacteremia (R78.81)  HISTORY:  ------------------------------------------------------------------------  OBSERVATIONS:  Mitral Valve: Mitral annular calcification. Mild mitral  regurgitation.  AorticRoot: Normal aortic root.  Aortic Valve: Probably trileaflet aortic valve is not well  seen.  Left Atrium: Normal left atrium.  Left Ventricle: Normal Left Ventricular Systolic Function,  (EF = 50-55%) Normal left ventricular internal dimensions  andwall thicknesses. Grade II diastolic dysfunction.  Right Heart: Normal right atrium. Normal right ventricular  size and function. Normal tricuspid valve. Normal pulmonic  valve.  Pericardium/PleuraNormal pericardium with no pericardial  effusion.  Hemodynamic: RV systolic pressure is normal at  33 mm Hg.  ------------------------------------------------------------------------  CONCLUSIONS:  1. Mitral annular calcification. Mild mitral regurgitation.  2. Probably trileaflet aortic valve is not well seen.  3. Normal aortic root.  4. Normal left atrium.  5. Normal left ventricular internal dimensions and wall  thicknesses.  6. Normal Left Ventricular Systolic Function,  (EF =  50-55%)  7. Grade II diastolic dysfunction.  8. Normal right atrium.  9. Normal right ventricular size and function.  10. RV systolic pressure is normal at  33 mm Hg.  11. Normal tricuspid valve.  12. Normal pulmonic valve.  13. Normal pericardium with no pericardial effusion.    < end of copied text >        ADVANCE DIRECTIVES: ADVANCE DIRECTIVES: HCP and copy of old MOLST on file. New MOLST completed confirming patient's previous wishes: CPR / long term ventilation / long term feeding tube / trial IV fluids / use abx / NO LIMITATION on medical interventions/ send to hospital.

## 2020-11-17 NOTE — PROGRESS NOTE ADULT - ATTENDING COMMENTS
65 yr old  obese female with history of 80 pack year tobacco use, HTN, DM2 on insulin, COPD s/p trach(on vent) and PEG, R lung cancer s/p resection (2017), presents to the ED for A[facility] from [facility]. Admitting to ICU for encephalopathy and sepsis secondary to possible aspiration pneumonia.    Assessment:  1. Sepsis  2. MRSA bacteremia   3. Chronic Resp failure  s/p trach  4. Health care associated pneumonia  5. Encephalopathy  6 COPD s/p trach and peg  7. MARYAM  8. DM uncontrol     Plan   -continue antibx  -Obtain sputum cultures  - ID consult  -monitor vanc level  -f/u cultures  -Repeat cultures in 48 hours  -Covid PCR neg  -continue vent support, chornic vent support  -adjust vent as per ABG  -hemodynamic support  -blood sugar control with sliding scale coverage  -Cont prednisone   -Tube feedings  -dvt/gi prophylaxis

## 2020-11-17 NOTE — PROGRESS NOTE ADULT - ASSESSMENT
seen and examined on bed trach vent awake alert  not in any distress  vsstable afebrile physical done  lungs clear  abd soft bs nml non tender, no hsplenomegaly  labs wbc 15  hgb8.9 to 8.3  k 2.9 ct head neg  cxr ? infiltrate  blood cxs  pos for MRSA  sputum acitenobacter  a/p supplement K  mrsa  bood, aciteno in sputum on vanco. cefepime  ID to f/u  poor prognosis

## 2020-11-17 NOTE — PHARMACOTHERAPY INTERVENTION NOTE - COMMENTS
During Critical Care rounds, it was recommended to get an ID Consult because the patient is currently on cefepime, vancomycin, and metronidazole.

## 2020-11-17 NOTE — PROGRESS NOTE ADULT - ASSESSMENT
Assessment: 65F PMH obesity, COPD s/p trach and PEG (80 pack year hx, nonverbal at baseline), R lung cancer s/p resection (2017), HTN, and IDDM2 presented from [facility] with lethargy and AMS concerning for sepsis, found to have right basilar infiltrate on CXR, admitted to ICU for management of encephalopathy and sepsis 2/2 aspiration PNA.    1. Sepsis  2. Aspiration pneumonia  3. Hypotension  4. Encephalopathy  5. COPD s/p trach and peg  6. MARYAM  7. DM    Plan:    Neuro: #encephalopathy:  -patient nonverbal but A&Ox3 at baseline, presented with lethargy/AMS, now resolved/returned to baseline  -likely 2/2 aspiration PNA as described below  -CTH unremarkable  -noted to have right arm weakness by daughter yesterday, not present at daughter's previous visit x1 month ago  -repeated CTH, remained unremarkable  -further review of chart reveals weakness present  throughout this admission, no further investigation needed    CVS: #hypotension:  -low SBP in 70s in ED, improved s/p 1L NS and 50mL albumin  -improving, 120s overnight/today, monitor  -central line consent obtained from daughter, in chart    #PMH HTN:   -hold home coreg    Pulm: #PNA:  -on presentation tachycardic, SBPs 70s, WBC 14.9, lactate 3.3  -given IVF and x1 dose each vanc and cefepime in ED  -this am WBC 15, 11/15 lactate 2  -possible right pulm infiltrate on initial CXR, likely aspiration PNA  -worsening b/l pulm effusions on CXR 11/15  -repeat CXR 11/17 with improved effusions, possible retrocardiac infiltrate/effusion, and right-sided scarring  -RVP and COVID negative  -BCx MRSA, sputum with moderate GP cocci and rare GNR  -procalcitonin 99.6  -continue cefepime, flagyl, and vanc (11/14 day 1)    #COPD:  -s/p trach and peg (prior to presentation)  -continue mechanical ventilation  -continue daily prednisone  -continue albuterol  -most recent ABG 7.58/34/126/32, TV decreased to 325mL, will repeat in 1 hour    ID: #sepsis:  -sepsis history and management as above  -repeat BCx this am  -vanc trough 35.6 yesterday, 20.8 this am, per pharmacist do not need to hold vanc given that the latter two doses were given too close together, will continue 1250mg BID  -ID Dr. Corcoran consulted  -UA negative, UCx normal urogenital sundar    Nephro: #MARYAM:  -BUN/Cr 32/1.58 on admission, increased from baseline, likely pre-renal in setting of sepsis  -given IVF as above, resolved  -monitor BMP  -repleted K and P this am    GI: #transaminitis:  -LFTs mildly elevated to 298, 60/39, likely 2/2 sepsis  - today, otherwise wnl    #diet:  -NPO with TF Glucerna 40cc/hr    Heme: #anemia:  -Hb 9.5 on admission, baseline  -Hb 8.9 today, monitor    Endocrine: #DM:  -a1c from sept noted 8.1  -lipid panel notable for low HDL  -hold home 6u Basaglar 6U and moderate SS  -start 30U Lantus Qam today  -start 12U q6 today  -FS q6h  -endo Dr. Garcia following    Skin/Catheters: #no issues  -peripheral IV lines in place    PPx:  -heparin for DVT   -Protonix for GI     GOC:  -FULL CODE   Assessment: 65F PMH obesity, COPD s/p trach and PEG (80 pack year hx, nonverbal at baseline), R lung cancer s/p resection (2017), HTN, and IDDM2 presented from [facility] with lethargy and AMS concerning for sepsis, found to have right basilar infiltrate on CXR, admitted to ICU for management of encephalopathy and sepsis 2/2 aspiration PNA.    1. Sepsis  2. Aspiration pneumonia  3. Hypotension  4. Encephalopathy  5. COPD s/p trach and peg  6. MARYAM  7. DM    Plan:    Neuro: #encephalopathy:  -patient nonverbal but A&Ox3 at baseline, presented with lethargy/AMS, now resolved/returned to baseline  -likely 2/2 aspiration PNA as described below  -CTH unremarkable  -noted to have right arm weakness by daughter yesterday, not present at daughter's previous visit x1 month ago  -repeated CTH, remained unremarkable  -further review of chart reveals weakness present  throughout this admission, no further investigation needed    CVS: #hypotension:  -low SBP in 70s in ED, improved s/p 1L NS and 50mL albumin  -improving, 120s overnight/today, monitor  -central line consent obtained from daughter, in chart  -TTE EF 50-55%, G2DD    #PMH HTN:   -hold home coreg    Pulm: #PNA:  -on presentation tachycardic, SBPs 70s, WBC 14.9, lactate 3.3  -given IVF and x1 dose each vanc and cefepime in ED  -this am WBC 15, 11/15 lactate 2  -possible right pulm infiltrate on initial CXR, likely aspiration PNA  -worsening b/l pulm effusions on CXR 11/15  -repeat CXR 11/17 with improved effusions, possible retrocardiac infiltrate/effusion, and right-sided scarring  -RVP and COVID negative  -BCx MRSA, sputum with moderate GP cocci and rare GNR  -procalcitonin 99.6  -continue cefepime, flagyl, and vanc (11/14 day 1)    #COPD:  -s/p trach and peg (prior to presentation)  -continue mechanical ventilation  -continue daily prednisone  -continue albuterol  -most recent ABG 7.58/34/126/32, TV decreased to 325mL, will repeat in 1 hour    ID: #sepsis:  -sepsis history and management as above  -repeat BCx this am  -vanc trough 35.6 yesterday, 20.8 this am, per pharmacist do not need to hold vanc given that the latter two doses were given too close together, will continue 1250mg BID  -ID Dr. Corcoran consulted  -UA negative, UCx normal urogenital sundar    Nephro: #MARYAM:  -BUN/Cr 32/1.58 on admission, increased from baseline, likely pre-renal in setting of sepsis  -given IVF as above, resolved  -monitor BMP  -repleted K and P this am    GI: #transaminitis:  -LFTs mildly elevated to 298, 60/39, likely 2/2 sepsis  - today, otherwise wnl    #diet:  -NPO with TF Glucerna 40cc/hr    Heme: #anemia:  -Hb 9.5 on admission, baseline  -Hb 8.9 today, monitor    Endocrine: #DM:  -a1c from sept noted 8.1  -lipid panel notable for low HDL  -hold home 6u Basaglar 6U and moderate SS  -start 30U Lantus Qam today  -start 12U q6 today  -FS q6h  -endo Dr. Garcia following    Skin/Catheters: #no issues  -peripheral IV lines in place    PPx:  -heparin for DVT   -Protonix for GI     GOC:  -FULL CODE

## 2020-11-17 NOTE — CONSULT NOTE ADULT - RS GEN PE MLT RESP DETAILS PC
no rhonchi/good air movement/no wheezes/respirations non-labored/no rales/airway patent/clear to auscultation bilaterally

## 2020-11-17 NOTE — CONSULT NOTE ADULT - ASSESSMENT
66 yo female with multiple comorbidities including h/o DM type 2, HTN, COPD s/p trach on vent, lung ca on right sided s/p resection on chemo admitted with lethargy    endocrinology consulted for glycemic management    DM type 2  uncontrolled  complicated by high dose steroid use, acute on chronic resp failure  s/p trach    recommendations:  on high dose methylpred  s/p trach/PEG    - start insulin lantus 16 units daily, first dose now 11/15 then daily in am  - start insulin lispro 8 units q6h  - continue moderate dose ss  - reassess based on requirements  - goal inpatient glucose range: 140-180mg/dl    COPD exacerbation  acute on chronic hypoxic resp failure  s/p trach  on standing steroids  pulmonary on board  s/p trach/PEG  on cefepime, flagyl    sepsis  on cefepime, flagyl  optimize glycemic regimen    Discussed with primary team   Please call  Endocrine- Dr Katlyn Garcia as needed    Time spent evaluating patient including coordination of care 35mins.  
MRSA Bacteremia - ?source  Leukocytosis - increasing  Fever - low grade    Plan - Cont Vancomycin 1250 mgs iv q12hrs  given no evidence of active pneumonia at this time would consider stopping Maxipime and flagyl also.  Repeat blood cultures in 2 days.

## 2020-11-17 NOTE — PHARMACOTHERAPY INTERVENTION NOTE - COMMENTS
During Critical Care rounds, it was recommended that Vancomycin be restarted because the trough of 35.4 was elevated due to the 3rd dose being given too soon.

## 2020-11-17 NOTE — CONSULT NOTE ADULT - GASTROINTESTINAL DETAILS
no masses palpable/soft/no guarding/no distention/no rebound tenderness/no rigidity/no organomegaly/bowel sounds normal

## 2020-11-17 NOTE — CONSULT NOTE ADULT - ATTENDING COMMENTS
65 y obese F hx heavy tobacco use, COPD s/p trach/PEG 10/8, metastatic lung CA, debility, under ICU care for septic shock 2/2 PNA, MRSA bacteremia. Clinically improving, alert, NAD. Patient confirms daughter as HCP, partner as alternate. Wishes to remain FULL CODE at this time. To [facility] once medically stable.

## 2020-11-17 NOTE — CONSULT NOTE ADULT - PROBLEM SELECTOR RECOMMENDATION 3
2/2 ES COPD, metastatic lung Ca. Patient s/p trach/peg 10/8 - not a candidate for weaning. Patient dependent on ADLs; PT recommending VY. Patient requires 24 hr care.

## 2020-11-18 LAB
-  AMIKACIN: SIGNIFICANT CHANGE UP
-  AMIKACIN: SIGNIFICANT CHANGE UP
-  AMOXICILLIN/CLAVULANIC ACID: SIGNIFICANT CHANGE UP
-  AMPICILLIN/SULBACTAM: SIGNIFICANT CHANGE UP
-  AMPICILLIN/SULBACTAM: SIGNIFICANT CHANGE UP
-  AMPICILLIN: SIGNIFICANT CHANGE UP
-  AZTREONAM: SIGNIFICANT CHANGE UP
-  CEFAZOLIN: SIGNIFICANT CHANGE UP
-  CEFEPIME: SIGNIFICANT CHANGE UP
-  CEFEPIME: SIGNIFICANT CHANGE UP
-  CEFOXITIN: SIGNIFICANT CHANGE UP
-  CEFTAZIDIME/AVIBACTAM: SIGNIFICANT CHANGE UP
-  CEFTAZIDIME: SIGNIFICANT CHANGE UP
-  CEFTOLOZANE/TAZOBACTAM: SIGNIFICANT CHANGE UP
-  CEFTRIAXONE: SIGNIFICANT CHANGE UP
-  CIPROFLOXACIN: SIGNIFICANT CHANGE UP
-  CIPROFLOXACIN: SIGNIFICANT CHANGE UP
-  ERTAPENEM: SIGNIFICANT CHANGE UP
-  GENTAMICIN: SIGNIFICANT CHANGE UP
-  GENTAMICIN: SIGNIFICANT CHANGE UP
-  IMIPENEM: SIGNIFICANT CHANGE UP
-  LEVOFLOXACIN: SIGNIFICANT CHANGE UP
-  LEVOFLOXACIN: SIGNIFICANT CHANGE UP
-  MEROPENEM: SIGNIFICANT CHANGE UP
-  MEROPENEM: SIGNIFICANT CHANGE UP
-  PIPERACILLIN/TAZOBACTAM: SIGNIFICANT CHANGE UP
-  TOBRAMYCIN: SIGNIFICANT CHANGE UP
-  TOBRAMYCIN: SIGNIFICANT CHANGE UP
-  TRIMETHOPRIM/SULFAMETHOXAZOLE: SIGNIFICANT CHANGE UP
-  TRIMETHOPRIM/SULFAMETHOXAZOLE: SIGNIFICANT CHANGE UP
ALBUMIN SERPL ELPH-MCNC: 1.9 G/DL — LOW (ref 3.5–5)
ALP SERPL-CCNC: 176 U/L — HIGH (ref 40–120)
ALT FLD-CCNC: 28 U/L DA — SIGNIFICANT CHANGE UP (ref 10–60)
ANION GAP SERPL CALC-SCNC: 7 MMOL/L — SIGNIFICANT CHANGE UP (ref 5–17)
ANION GAP SERPL CALC-SCNC: 9 MMOL/L — SIGNIFICANT CHANGE UP (ref 5–17)
AST SERPL-CCNC: 23 U/L — SIGNIFICANT CHANGE UP (ref 10–40)
BASE EXCESS BLDA CALC-SCNC: 10 MMOL/L — HIGH (ref -2–2)
BILIRUB SERPL-MCNC: 0.5 MG/DL — SIGNIFICANT CHANGE UP (ref 0.2–1.2)
BLOOD GAS COMMENTS ARTERIAL: SIGNIFICANT CHANGE UP
BUN SERPL-MCNC: 14 MG/DL — SIGNIFICANT CHANGE UP (ref 7–18)
BUN SERPL-MCNC: 21 MG/DL — HIGH (ref 7–18)
CALCIUM SERPL-MCNC: 7.8 MG/DL — LOW (ref 8.4–10.5)
CALCIUM SERPL-MCNC: 7.9 MG/DL — LOW (ref 8.4–10.5)
CHLORIDE SERPL-SCNC: 101 MMOL/L — SIGNIFICANT CHANGE UP (ref 96–108)
CHLORIDE SERPL-SCNC: 102 MMOL/L — SIGNIFICANT CHANGE UP (ref 96–108)
CO2 SERPL-SCNC: 29 MMOL/L — SIGNIFICANT CHANGE UP (ref 22–31)
CO2 SERPL-SCNC: 31 MMOL/L — SIGNIFICANT CHANGE UP (ref 22–31)
CREAT SERPL-MCNC: 0.36 MG/DL — LOW (ref 0.5–1.3)
CREAT SERPL-MCNC: 0.5 MG/DL — SIGNIFICANT CHANGE UP (ref 0.5–1.3)
CULTURE RESULTS: SIGNIFICANT CHANGE UP
GLUCOSE BLDC GLUCOMTR-MCNC: 101 MG/DL — HIGH (ref 70–99)
GLUCOSE BLDC GLUCOMTR-MCNC: 105 MG/DL — HIGH (ref 70–99)
GLUCOSE BLDC GLUCOMTR-MCNC: 130 MG/DL — HIGH (ref 70–99)
GLUCOSE BLDC GLUCOMTR-MCNC: 176 MG/DL — HIGH (ref 70–99)
GLUCOSE BLDC GLUCOMTR-MCNC: 63 MG/DL — LOW (ref 70–99)
GLUCOSE BLDC GLUCOMTR-MCNC: 89 MG/DL — SIGNIFICANT CHANGE UP (ref 70–99)
GLUCOSE SERPL-MCNC: 108 MG/DL — HIGH (ref 70–99)
GLUCOSE SERPL-MCNC: 69 MG/DL — LOW (ref 70–99)
GRAM STN FLD: SIGNIFICANT CHANGE UP
HCO3 BLDA-SCNC: 34 MMOL/L — HIGH (ref 23–27)
HCT VFR BLD CALC: 33.8 % — LOW (ref 34.5–45)
HGB BLD-MCNC: 10.3 G/DL — LOW (ref 11.5–15.5)
HOROWITZ INDEX BLDA+IHG-RTO: 40 — SIGNIFICANT CHANGE UP
MAGNESIUM SERPL-MCNC: 2.2 MG/DL — SIGNIFICANT CHANGE UP (ref 1.6–2.6)
MCHC RBC-ENTMCNC: 26.8 PG — LOW (ref 27–34)
MCHC RBC-ENTMCNC: 30.5 GM/DL — LOW (ref 32–36)
MCV RBC AUTO: 88 FL — SIGNIFICANT CHANGE UP (ref 80–100)
METHOD TYPE: SIGNIFICANT CHANGE UP
METHOD TYPE: SIGNIFICANT CHANGE UP
NRBC # BLD: 0 /100 WBCS — SIGNIFICANT CHANGE UP (ref 0–0)
ORGANISM # SPEC MICROSCOPIC CNT: SIGNIFICANT CHANGE UP
PCO2 BLDA: 40 MMHG — SIGNIFICANT CHANGE UP (ref 32–46)
PH BLDA: 7.53 — HIGH (ref 7.35–7.45)
PHOSPHATE SERPL-MCNC: 2.1 MG/DL — LOW (ref 2.5–4.5)
PHOSPHATE SERPL-MCNC: 3.2 MG/DL — SIGNIFICANT CHANGE UP (ref 2.5–4.5)
PLATELET # BLD AUTO: 153 K/UL — SIGNIFICANT CHANGE UP (ref 150–400)
PO2 BLDA: 73 MMHG — LOW (ref 74–108)
POTASSIUM SERPL-MCNC: 3.4 MMOL/L — LOW (ref 3.5–5.3)
POTASSIUM SERPL-MCNC: 3.9 MMOL/L — SIGNIFICANT CHANGE UP (ref 3.5–5.3)
POTASSIUM SERPL-SCNC: 3.4 MMOL/L — LOW (ref 3.5–5.3)
POTASSIUM SERPL-SCNC: 3.9 MMOL/L — SIGNIFICANT CHANGE UP (ref 3.5–5.3)
PROT SERPL-MCNC: 6.1 G/DL — SIGNIFICANT CHANGE UP (ref 6–8.3)
RBC # BLD: 3.84 M/UL — SIGNIFICANT CHANGE UP (ref 3.8–5.2)
RBC # FLD: 15.5 % — HIGH (ref 10.3–14.5)
SAO2 % BLDA: 95 % — SIGNIFICANT CHANGE UP (ref 92–96)
SODIUM SERPL-SCNC: 139 MMOL/L — SIGNIFICANT CHANGE UP (ref 135–145)
SODIUM SERPL-SCNC: 140 MMOL/L — SIGNIFICANT CHANGE UP (ref 135–145)
SPECIMEN SOURCE: SIGNIFICANT CHANGE UP
SPECIMEN SOURCE: SIGNIFICANT CHANGE UP
WBC # BLD: 16.33 K/UL — HIGH (ref 3.8–10.5)
WBC # FLD AUTO: 16.33 K/UL — HIGH (ref 3.8–10.5)

## 2020-11-18 PROCEDURE — 71045 X-RAY EXAM CHEST 1 VIEW: CPT | Mod: 26

## 2020-11-18 RX ORDER — LISINOPRIL 2.5 MG/1
5 TABLET ORAL DAILY
Refills: 0 | Status: DISCONTINUED | OUTPATIENT
Start: 2020-11-18 | End: 2020-11-25

## 2020-11-18 RX ORDER — POTASSIUM PHOSPHATE, MONOBASIC POTASSIUM PHOSPHATE, DIBASIC 236; 224 MG/ML; MG/ML
15 INJECTION, SOLUTION INTRAVENOUS ONCE
Refills: 0 | Status: COMPLETED | OUTPATIENT
Start: 2020-11-18 | End: 2020-11-18

## 2020-11-18 RX ORDER — POTASSIUM CHLORIDE 20 MEQ
40 PACKET (EA) ORAL ONCE
Refills: 0 | Status: COMPLETED | OUTPATIENT
Start: 2020-11-18 | End: 2020-11-18

## 2020-11-18 RX ORDER — INSULIN LISPRO 100/ML
8 VIAL (ML) SUBCUTANEOUS EVERY 6 HOURS
Refills: 0 | Status: DISCONTINUED | OUTPATIENT
Start: 2020-11-18 | End: 2020-11-19

## 2020-11-18 RX ORDER — INSULIN GLARGINE 100 [IU]/ML
10 INJECTION, SOLUTION SUBCUTANEOUS EVERY MORNING
Refills: 0 | Status: DISCONTINUED | OUTPATIENT
Start: 2020-11-18 | End: 2020-11-21

## 2020-11-18 RX ORDER — CARVEDILOL PHOSPHATE 80 MG/1
6.25 CAPSULE, EXTENDED RELEASE ORAL EVERY 12 HOURS
Refills: 0 | Status: DISCONTINUED | OUTPATIENT
Start: 2020-11-18 | End: 2020-11-25

## 2020-11-18 RX ADMIN — Medication 166.67 MILLIGRAM(S): at 17:01

## 2020-11-18 RX ADMIN — Medication 12 UNIT(S): at 11:01

## 2020-11-18 RX ADMIN — CHLORHEXIDINE GLUCONATE 15 MILLILITER(S): 213 SOLUTION TOPICAL at 05:29

## 2020-11-18 RX ADMIN — Medication 100 MILLIGRAM(S): at 13:03

## 2020-11-18 RX ADMIN — Medication 100 MILLIGRAM(S): at 05:28

## 2020-11-18 RX ADMIN — Medication 40 MILLIEQUIVALENT(S): at 07:34

## 2020-11-18 RX ADMIN — POTASSIUM PHOSPHATE, MONOBASIC POTASSIUM PHOSPHATE, DIBASIC 83.33 MILLIMOLE(S): 236; 224 INJECTION, SOLUTION INTRAVENOUS at 08:11

## 2020-11-18 RX ADMIN — CEFEPIME 100 MILLIGRAM(S): 1 INJECTION, POWDER, FOR SOLUTION INTRAMUSCULAR; INTRAVENOUS at 17:01

## 2020-11-18 RX ADMIN — CARVEDILOL PHOSPHATE 6.25 MILLIGRAM(S): 80 CAPSULE, EXTENDED RELEASE ORAL at 17:00

## 2020-11-18 RX ADMIN — LISINOPRIL 5 MILLIGRAM(S): 2.5 TABLET ORAL at 10:06

## 2020-11-18 RX ADMIN — Medication 12 UNIT(S): at 00:12

## 2020-11-18 RX ADMIN — HEPARIN SODIUM 5000 UNIT(S): 5000 INJECTION INTRAVENOUS; SUBCUTANEOUS at 22:03

## 2020-11-18 RX ADMIN — CHLORHEXIDINE GLUCONATE 1 APPLICATION(S): 213 SOLUTION TOPICAL at 06:23

## 2020-11-18 RX ADMIN — Medication 166.67 MILLIGRAM(S): at 00:15

## 2020-11-18 RX ADMIN — Medication 1 PACKET(S): at 00:15

## 2020-11-18 RX ADMIN — Medication 166.67 MILLIGRAM(S): at 08:15

## 2020-11-18 RX ADMIN — CEFEPIME 100 MILLIGRAM(S): 1 INJECTION, POWDER, FOR SOLUTION INTRAMUSCULAR; INTRAVENOUS at 05:34

## 2020-11-18 RX ADMIN — Medication 2: at 11:02

## 2020-11-18 RX ADMIN — Medication 1 PACKET(S): at 05:31

## 2020-11-18 RX ADMIN — Medication 100 MILLIGRAM(S): at 22:02

## 2020-11-18 RX ADMIN — CHLORHEXIDINE GLUCONATE 15 MILLILITER(S): 213 SOLUTION TOPICAL at 17:00

## 2020-11-18 RX ADMIN — Medication 40 MILLIGRAM(S): at 05:29

## 2020-11-18 RX ADMIN — PANTOPRAZOLE SODIUM 40 MILLIGRAM(S): 20 TABLET, DELAYED RELEASE ORAL at 11:01

## 2020-11-18 RX ADMIN — HEPARIN SODIUM 5000 UNIT(S): 5000 INJECTION INTRAVENOUS; SUBCUTANEOUS at 05:30

## 2020-11-18 RX ADMIN — Medication 12 UNIT(S): at 06:23

## 2020-11-18 RX ADMIN — HEPARIN SODIUM 5000 UNIT(S): 5000 INJECTION INTRAVENOUS; SUBCUTANEOUS at 13:04

## 2020-11-18 NOTE — PROGRESS NOTE ADULT - SUBJECTIVE AND OBJECTIVE BOX
HPI:  64 y/o obese female with history of 80 pack year tobacco use, HTN, DM2 on insulin, COPD s/p trach(on vent) and PEG, R lung cancer s/p resection (2017), presents to the ED for AMS from [facility] . Patient noted to be lethargic with concern for sepsis hence referred to ER.  As per paperwork, patient is nonverbal at baseline, also noted to have been started on levaquin on 11/13 with addition of vancomycin overnight. Patient only opens eyes in response to verbal stimulus, currently unable to provide history.     Patient was noted to have temp of 100.1, initially hemodynamically sale however bp dropped to 78 for which she received 2l bolus of NS. She had wbc count of 14.9 with left shift, lactate 3.3, bun/cr of 32/1.58, sodium 130, UA -ve, cxr with right basilar infiltrate. Was given a dose each of vanc and cefepime.   GOC: confirmed with daughter Lisa, full code. Consent for central line obtained.    (14 Nov 2020 18:24)      Patient is a 65y old  Female who presents with a chief complaint of lethargy (18 Nov 2020 12:28)      INTERVAL HPI/OVERNIGHT EVENTS:  T(C): 36.6 (11-18-20 @ 09:00), Max: 36.8 (11-17-20 @ 15:46)  HR: 104 (11-18-20 @ 12:00) (82 - 110)  BP: 142/90 (11-18-20 @ 12:00) (125/68 - 166/93)  RR: 28 (11-18-20 @ 12:00) (15 - 33)  SpO2: 96% (11-18-20 @ 12:00) (92% - 100%)  Wt(kg): --  I&O's Summary    17 Nov 2020 07:01  -  18 Nov 2020 07:00  --------------------------------------------------------  IN: 1160 mL / OUT: 1100 mL / NET: 60 mL    18 Nov 2020 07:01  -  18 Nov 2020 13:24  --------------------------------------------------------  IN: 583.2 mL / OUT: 250 mL / NET: 333.2 mL        REVIEW OF SYSTEMS: denies fever, chills, SOB, palpitations, chest pain, abdominal pain, nausea, vomitting, diarrhea, constipation, dizziness    MEDICATIONS  (STANDING):  carvedilol 6.25 milliGRAM(s) Oral every 12 hours  cefepime   IVPB 2000 milliGRAM(s) IV Intermittent every 12 hours  chlorhexidine 0.12% Liquid 15 milliLiter(s) Oral Mucosa every 12 hours  chlorhexidine 2% Cloths 1 Application(s) Topical <User Schedule>  heparin   Injectable 5000 Unit(s) SubCutaneous every 8 hours  influenza   Vaccine 0.5 milliLiter(s) IntraMuscular once  insulin glargine Injectable (LANTUS) 10 Unit(s) SubCutaneous every morning  insulin lispro (ADMELOG) corrective regimen sliding scale   SubCutaneous every 6 hours  insulin lispro Injectable (ADMELOG) 8 Unit(s) SubCutaneous every 6 hours  lisinopril 5 milliGRAM(s) Oral daily  metroNIDAZOLE  IVPB 500 milliGRAM(s) IV Intermittent every 8 hours  pantoprazole  Injectable 40 milliGRAM(s) IV Push daily  vancomycin  IVPB      vancomycin  IVPB 1250 milliGRAM(s) IV Intermittent every 12 hours    MEDICATIONS  (PRN):  ALBUTerol    90 MICROgram(s) HFA Inhaler 2 Puff(s) Inhalation every 6 hours PRN Shortness of Breath      PHYSICAL EXAM:  GENERAL: NAD, well-groomed, well-developed  HEAD:  Atraumatic, Normocephalic  EYES: EOMI, PERRLA, conjunctiva and sclera clear  ENMT: No tonsillar erythema, exudates, or enlargement; Moist mucous membranes, Good dentition, No lesions  NECK: Supple, No JVD, Normal thyroid  NERVOUS SYSTEM:  Alert & Oriented X3, Good concentration; Motor Strength 5/5 B/L upper and lower extremities; DTRs 2+ intact and symmetric  CHEST/LUNG: Clear to percussion bilaterally; No rales, rhonchi, wheezing, or rubs  HEART: Regular rate and rhythm; No murmurs, rubs, or gallops  ABDOMEN: Soft, Nontender, Nondistended; Bowel sounds present  EXTREMITIES:  2+ Peripheral Pulses, No clubbing, cyanosis, or edema  LYMPH: No lymphadenopathy noted  SKIN: No rashes or lesions  LABS:                        10.3   16.33 )-----------( 153      ( 18 Nov 2020 04:15 )             33.8     11-18    140  |  102  |  21<H>  ----------------------------<  108<H>  3.4<L>   |  29  |  0.50    Ca    7.9<L>      18 Nov 2020 04:15  Phos  2.1     11-18  Mg     2.2     11-18    TPro  6.1  /  Alb  1.9<L>  /  TBili  0.5  /  DBili  x   /  AST  23  /  ALT  28  /  AlkPhos  176<H>  11-18        CAPILLARY BLOOD GLUCOSE      POCT Blood Glucose.: 176 mg/dL (18 Nov 2020 10:58)  POCT Blood Glucose.: 101 mg/dL (18 Nov 2020 06:19)  POCT Blood Glucose.: 130 mg/dL (18 Nov 2020 00:05)  POCT Blood Glucose.: 200 mg/dL (17 Nov 2020 17:08)      ABG - ( 18 Nov 2020 03:43 )  pH, Arterial: 7.53  pH, Blood: x     /  pCO2: 40    /  pO2: 73    / HCO3: 34    / Base Excess: 10.0  /  SaO2: 95

## 2020-11-18 NOTE — PROGRESS NOTE ADULT - ASSESSMENT
seen and examined vsstable afebrile physical unchanged  awake comfortable on bed  holding hands   on trach vent comfortable  labs noted wbc high  K 3.4  a/p MRSA bacterimia / asp pneumonia . sputum acitenobacter  on vanco iv and flagyl  repeat blood cxs  echo  elevated wbc but no fever  on tapering dose of prednisone

## 2020-11-18 NOTE — PROGRESS NOTE ADULT - ATTENDING COMMENTS
65 yr old  obese female with history of 80 pack year tobacco use, HTN, DM2 on insulin, COPD s/p trach(on vent) and PEG, R lung cancer s/p resection (2017), presents to the ED for AMS from [facility]. Admitting to ICU for encephalopathy and sepsis secondary to possible aspiration pneumonia.    Assessment:  1. Sepsis  2. MRSA bacteremia   3. Chronic Resp failure  s/p trach  4. Health care associated pneumonia  5. Encephalopathy  6 COPD s/p trach and peg  7. MARYAM  8. DM uncontrol     Plan   -continue antibx  -ID follow up, as growing MRSA in blood, Acinetobacter in sputum  -monitor vanc level  -Covid PCR neg  -continue vent support, chornic vent support  -adjust vent as per ABG  -hemodynamic support  -blood sugar control with sliding scale coverage  -Cont prednisone, titrate down  -BP control  -Tube feedings  -dvt/gi prophylaxis  -Transfer to SCU

## 2020-11-18 NOTE — CHART NOTE - NSCHARTNOTEFT_GEN_A_CORE
<Hospital course>    64 y/o obese female with history of 80 pack year tobacco use, HTN, DM2 on insulin, COPD s/p trach(on vent) and PEG, R lung cancer s/p resection (2017), presents to the ED for AMS from [facility] . Patient noted to be lethargic with concern for sepsis hence referred to ER.  As per paperwork, patient is non-verbal at baseline, also noted to have been started on levaquin on 11/13 with addition of vancomycin overnight. Patient only opens eyes in response to verbal stimulus, currently unable to provide history.   Patient was noted to have temp of 100.1, initially hemodynamically stable however bp dropped to 78 for which she received 2l bolus of NS. She had wbc count of 14.9 with left shift, lactate 3.3, bun/cr of 32/1.58, sodium 130, UA -ve, cxr with right basilar infiltrate. Was given a dose each of vanc and cefepime. UA negative.  Admitted to ICU for encephalopathy and sepsis secondary to possible aspiration pneumonia.    <ICU course>  Pt was started on cefepime and flagyl from 11/14. Pt was placed on vent through trach.   CT head showed No intracranial hemorrhage, mass effect or depressed skull fracture.  Fluid opacification of the bilateral mastoid air cells. Correlate clinically for bilateral mastoiditis.  ID Dr. Corcoran and endo Dr. Garcia was consulted.  Blood cx came back positive for MRSA, vanco was added from 11/15. Repeat blood cx on 11/17 is positive for Gram positive cocci in clusters. Sputum cx is positive for acinetobacter baumannii/ nosocom group (carbapenem resistant).  Procalcitonin was noted 99.60 on 11/16.  Pt was doing well on SBT during the day, and placed on vent during the night.    Patient is stable for downgrade to floor under care of ICU attending (Dr. Avila)  for further management , covering NP----- was informed.    <Problem/Plan>    Neuro: #encephalopathy:  -patient nonverbal but A&Ox3 at baseline, presented with lethargy/AMS, now resolved/returned to baseline  -likely 2/2 aspiration PNA as described below  -CTH unremarkable x2  -right arm weakness unchanged since presentation, no further investigation needed    CVS: #hypotension/PMH HTN:  -low SBP in 70s in ED, improved s/p 1L NS and 50mL albumin  -resolved, BPs increasing to 160s, likely reflective of patient's HTN  -restarted half dose home Coreg, added 5mg lisinopril given PMH DM, HTN  -central line consent obtained from daughter, in chart  -TTE EF 50-55%, G2DD    Pulm: #PNA:  -on presentation tachycardic, SBPs 70s, WBC 14.9, lactate 3.3  -given IVF and x1 dose each vanc and cefepime in ED  -this am WBC 16.3, 11/15 lactate 2  -possible right pulm infiltrate on initial CXR, likely aspiration PNA  -repeat CXR 11/18 with worsening effusions  -infusions may be 2/2 PEEP, Dr. Parks to perform POCUS to assess further   -RVP and COVID negative  -BCx 11/14 +MRSA, sputum 11/15 +A. baumanii (carbapenem and Zosyn resistant)  -repeat BCx 11/17 with GP cocci  -procalcitonin 99.6  -continue cefepime, flagyl, and vanc (11/14 day 1), awaiting ID recs  -will consider JUSTIN to identify infection source, awaiting ID recs    #COPD:  -s/p trach and peg (prior to presentation)  -continue SBT during the day with mechanical ventilation at night/as needed  -begin prednisone taper today  -continue albuterol  -most recent ABG 7.53/40/73/34    ID: #sepsis:  -sepsis history and management as above  -van trough 11pm today prior to 4th dose (since restarting 11/17)  -ID Dr. Corcoran consulted, awaiting recs  -UA negative, UCx normal urogenital sundar    Nephro: #MARYAM:  -BUN/Cr 32/1.58 on admission, increased from baseline, likely pre-renal in setting of sepsis  -given IVF as above, resolved  -monitor BMP  -repleted K and P this am, repeat BMP this pm    GI: #transaminitis:  -LFTs mildly elevated to 298, 60/39, likely 2/2 sepsis  - today, otherwise wnl    #diet:  -NPO with TF Glucerna 40cc/hr    Heme: #anemia:  -Hb 9.5 on admission, baseline  -Hb 10.3 today, monitor    Endocrine: #DM:  -a1c from sept noted 8.1  -lipid panel notable for low HDL  -hold home 6u Basaglar 6U and moderate SS  -decrease from 30U to 10U Lantus Qam (first dose tomorrow)  -decrease from 12U q6 to 8U q6 lispro today  -FS q6h  -endo Dr. Garcia following    Skin/Catheters: #no issues  -peripheral IV lines in place    PPx:  -heparin for DVT   -Protonix for GI     GOC:  -FULL CODE  -medically stable for downgrade to AI      <Things to follow up>  - f/u Vanco trough @11pm 11/18 (Goal 15-20), now on 1250mg BID  - Please place on vent during the night, SBT during the day  - Please send repeat blood cx on 11/19 AM  - f/u ID Dr. Corcoran, possibly stop cefepime and flagyl soon <Hospital course>    66 y/o obese female with history of 80 pack year tobacco use, HTN, DM2 on insulin, COPD s/p trach(on vent) and PEG, R lung cancer s/p resection (2017), presents to the ED for AMS from [facility] . Patient noted to be lethargic with concern for sepsis hence referred to ER.  As per paperwork, patient is non-verbal at baseline, also noted to have been started on levaquin on 11/13 with addition of vancomycin overnight. Patient only opens eyes in response to verbal stimulus, currently unable to provide history.   Patient was noted to have temp of 100.1, initially hemodynamically stable however bp dropped to 78 for which she received 2l bolus of NS. She had wbc count of 14.9 with left shift, lactate 3.3, bun/cr of 32/1.58, sodium 130, UA -ve, cxr with right basilar infiltrate. Was given a dose each of vanc and cefepime. UA negative.  Admitted to ICU for encephalopathy and sepsis secondary to possible aspiration pneumonia.    <ICU course>  Pt was started on cefepime and flagyl from 11/14. Pt was placed on vent through trach.   CT head showed No intracranial hemorrhage, mass effect or depressed skull fracture.  Fluid opacification of the bilateral mastoid air cells. Correlate clinically for bilateral mastoiditis.  ID Dr. Corcoran and endo Dr. Garcia was consulted.  Blood cx came back positive for MRSA, vanco was added from 11/15. Repeat blood cx on 11/17 is positive for Gram positive cocci in clusters. Sputum cx is positive for acinetobacter baumannii/ nosocom group (carbapenem resistant).  Transthoracic Echocardiogram on 11/16 showed Normal Left Ventricular Systolic Function,  (EF =50-55%), Grade II diastolic dysfunction. Probably trileaflet aortic valve is not well seen.  Procalcitonin was noted 99.60 on 11/16.  Pt was doing well on SBT during the day, and placed on vent during the night.    Patient is stable for downgrade to floor under care of ICU attending (Dr. Avila)  for further management , covering NP----- was informed.    <Problem/Plan>    Neuro: #encephalopathy:  -patient nonverbal but A&Ox3 at baseline, presented with lethargy/AMS, now resolved/returned to baseline  -likely 2/2 aspiration PNA as described below  -CTH unremarkable x2  -right arm weakness unchanged since presentation, no further investigation needed    CVS: #hypotension/PMH HTN:  -low SBP in 70s in ED, improved s/p 1L NS and 50mL albumin  -resolved, BPs increasing to 160s, likely reflective of patient's HTN  -restarted half dose home Coreg, added 5mg lisinopril given PMH DM, HTN  -central line consent obtained from daughter, in chart  -TTE EF 50-55%, G2DD    Pulm: #PNA:  -on presentation tachycardic, SBPs 70s, WBC 14.9, lactate 3.3  -given IVF and x1 dose each vanc and cefepime in ED  -this am WBC 16.3, 11/15 lactate 2  -possible right pulm infiltrate on initial CXR, likely aspiration PNA  -repeat CXR 11/18 with worsening effusions  -infusions may be 2/2 PEEP, Dr. Parks to perform POCUS to assess further   -RVP and COVID negative  -BCx 11/14 +MRSA, sputum 11/15 +A. baumanii (carbapenem and Zosyn resistant)  -repeat BCx 11/17 with GP cocci  -procalcitonin 99.6  -continue cefepime, flagyl, and vanc (11/14 day 1), awaiting ID recs  -will consider JUSTIN to identify infection source, awaiting ID recs    #COPD:  -s/p trach and peg (prior to presentation)  -continue SBT during the day with mechanical ventilation at night/as needed  -begin prednisone taper today  -continue albuterol  -most recent ABG 7.53/40/73/34    ID: #sepsis:  -sepsis history and management as above  -van trough 11pm today prior to 4th dose (since restarting 11/17)  -ID Dr. Corcoran consulted, awaiting recs  -UA negative, UCx normal urogenital sundar    Nephro: #MARYAM:  -BUN/Cr 32/1.58 on admission, increased from baseline, likely pre-renal in setting of sepsis  -given IVF as above, resolved  -monitor BMP  -repleted K and P this am, repeat BMP this pm    GI: #transaminitis:  -LFTs mildly elevated to 298, 60/39, likely 2/2 sepsis  - today, otherwise wnl    #diet:  -NPO with TF Glucerna 40cc/hr    Heme: #anemia:  -Hb 9.5 on admission, baseline  -Hb 10.3 today, monitor    Endocrine: #DM:  -a1c from sept noted 8.1  -lipid panel notable for low HDL  -hold home 6u Basaglar 6U and moderate SS  -decrease from 30U to 10U Lantus Qam (first dose tomorrow)  -decrease from 12U q6 to 8U q6 lispro today  -FS q6h  -endo Dr. Garcia following    Skin/Catheters: #no issues  -peripheral IV lines in place    PPx:  -heparin for DVT   -Protonix for GI     GOC:  -FULL CODE  -medically stable for downgrade to AI      <Things to follow up>  - f/u Vanco trough @11pm 11/18 (Goal 15-20), now on 1250mg BID  - Please place on vent during the night, SBT during the day  - Please send repeat blood cx on 11/19 AM  - f/u ID Dr. Corcoran, possibly stop cefepime and flagyl soon <Hospital course>    66 y/o obese female with history of 80 pack year tobacco use, HTN, DM2 on insulin, COPD s/p trach(on vent) and PEG, R lung cancer s/p resection (2017), presents to the ED for AMS from [facility] . Patient noted to be lethargic with concern for sepsis hence referred to ER.  As per paperwork, patient is non-verbal at baseline, also noted to have been started on levaquin on 11/13 with addition of vancomycin overnight. Patient only opens eyes in response to verbal stimulus, currently unable to provide history.   Patient was noted to have temp of 100.1, initially hemodynamically stable however bp dropped to 78 for which she received 2l bolus of NS. She had wbc count of 14.9 with left shift, lactate 3.3, bun/cr of 32/1.58, sodium 130, UA -ve, cxr with right basilar infiltrate. Was given a dose each of vanc and cefepime. UA negative.  Admitted to ICU for encephalopathy and sepsis secondary to possible aspiration pneumonia.    <ICU course>  Pt was started on cefepime and flagyl from 11/14. Pt was placed on vent through trach.   CT head showed No intracranial hemorrhage, mass effect or depressed skull fracture.  Fluid opacification of the bilateral mastoid air cells. Correlate clinically for bilateral mastoiditis.  ID Dr. Corcoran and endo Dr. Garcia was consulted.  Blood cx came back positive for MRSA, vanco was added from 11/15. Repeat blood cx on 11/17 is positive for Gram positive cocci in clusters. Sputum cx is positive for acinetobacter baumannii/ nosocom group (carbapenem resistant).  Transthoracic Echocardiogram on 11/16 showed Normal Left Ventricular Systolic Function,  (EF =50-55%), Grade II diastolic dysfunction. Probably trileaflet aortic valve is not well seen.  Procalcitonin was noted 99.60 on 11/16.  Pt was doing well on SBT during the day, and placed on vent during the night. (vent setting now is RR14/ /40%/PEEP5   Patient is stable for downgrade to floor under care of ICU attending (Dr. Avila)  for further management , covering NP----- was informed.    <Problem/Plan>    Neuro: #encephalopathy:  -patient nonverbal but A&Ox3 at baseline, presented with lethargy/AMS, now resolved/returned to baseline  -likely 2/2 aspiration PNA as described below  -CTH unremarkable x2  -right arm weakness unchanged since presentation, no further investigation needed    CVS: #hypotension/PMH HTN:  -low SBP in 70s in ED, improved s/p 1L NS and 50mL albumin  -resolved, BPs increasing to 160s, likely reflective of patient's HTN  -restarted half dose home Coreg, added 5mg lisinopril given PMH DM, HTN  -central line consent obtained from daughter, in chart  -TTE EF 50-55%, G2DD    Pulm: #PNA:  -on presentation tachycardic, SBPs 70s, WBC 14.9, lactate 3.3  -given IVF and x1 dose each vanc and cefepime in ED  -this am WBC 16.3, 11/15 lactate 2  -possible right pulm infiltrate on initial CXR, likely aspiration PNA  -repeat CXR 11/18 with worsening effusions  -infusions may be 2/2 PEEP, Dr. Parks to perform POCUS to assess further   -RVP and COVID negative  -BCx 11/14 +MRSA, sputum 11/15 +A. baumanii (carbapenem and Zosyn resistant)  -repeat BCx 11/17 with GP cocci  -procalcitonin 99.6  -continue cefepime, flagyl, and vanc (11/14 day 1), awaiting ID recs  -will consider JUSTIN to identify infection source, awaiting ID recs    #COPD:  -s/p trach and peg (prior to presentation)  -continue SBT during the day with mechanical ventilation at night/as needed  -begin prednisone taper today  -continue albuterol  -most recent ABG 7.53/40/73/34    ID: #sepsis:  -sepsis history and management as above  -van trough 11pm today prior to 4th dose (since restarting 11/17)  -ID Dr. Corcoran consulted, awaiting recs  -UA negative, UCx normal urogenital sundar    Nephro: #MARYAM:  -BUN/Cr 32/1.58 on admission, increased from baseline, likely pre-renal in setting of sepsis  -given IVF as above, resolved  -monitor BMP  -repleted K and P this am, repeat BMP this pm    GI: #transaminitis:  -LFTs mildly elevated to 298, 60/39, likely 2/2 sepsis  - today, otherwise wnl    #diet:  -NPO with TF Glucerna 40cc/hr    Heme: #anemia:  -Hb 9.5 on admission, baseline  -Hb 10.3 today, monitor    Endocrine: #DM:  -a1c from sept noted 8.1  -lipid panel notable for low HDL  -hold home 6u Basaglar 6U and moderate SS  -decrease from 30U to 10U Lantus Qam (first dose tomorrow)  -decrease from 12U q6 to 8U q6 lispro today  -FS q6h  -endo Dr. Garcia following    Skin/Catheters: #no issues  -peripheral IV lines in place    PPx:  -heparin for DVT   -Protonix for GI     GOC:  -FULL CODE  -medically stable for downgrade to AI      <Things to follow up>  - f/u Vanco trough @11pm 11/18 (Goal 15-20), now on 1250mg BID  - Please place on vent during the night, SBT during the day  - Please send repeat blood cx on 11/19 AM  - f/u ID Dr. Corcoran, possibly stop cefepime and flagyl soon <Hospital course>    66 y/o obese female with history of 80 pack year tobacco use, HTN, DM2 on insulin, COPD s/p trach(on vent) and PEG, R lung cancer s/p resection (2017), presents to the ED for AMS from [facility] . Patient noted to be lethargic with concern for sepsis hence referred to ER.  As per paperwork, patient is non-verbal at baseline, also noted to have been started on levaquin on 11/13 with addition of vancomycin overnight. Patient only opens eyes in response to verbal stimulus, currently unable to provide history.   Patient was noted to have temp of 100.1, initially hemodynamically stable however bp dropped to 78 for which she received 2l bolus of NS. She had wbc count of 14.9 with left shift, lactate 3.3, bun/cr of 32/1.58, sodium 130, UA -ve, cxr with right basilar infiltrate. Was given a dose each of vanc and cefepime. UA negative.  Admitted to ICU for encephalopathy and sepsis secondary to possible aspiration pneumonia.    <ICU course>  Pt was started on cefepime and flagyl from 11/14. Pt was placed on vent through trach.   CT head showed No intracranial hemorrhage, mass effect or depressed skull fracture.  Fluid opacification of the bilateral mastoid air cells. Correlate clinically for bilateral mastoiditis.  ID Dr. Corcoran and endo Dr. Garcia was consulted.  Blood cx came back positive for MRSA, vanco was added from 11/15. Repeat blood cx on 11/17 is positive for Gram positive cocci in clusters. Sputum cx is positive for acinetobacter baumannii/ nosocom group (carbapenem resistant).  Transthoracic Echocardiogram on 11/16 showed Normal Left Ventricular Systolic Function,  (EF =50-55%), Grade II diastolic dysfunction. Probably trileaflet aortic valve is not well seen.  Procalcitonin was noted 99.60 on 11/16.  Pt was doing well on SBT during the day, and placed on vent during the night. (vent setting now is RR14/ /40%/PEEP5   Patient is stable for downgrade to floor under care of ICU attending (Dr. Avila)  for further management , covering NP Ronyat ([phone]) was informed.    <Problem/Plan>    Neuro: #encephalopathy:  -patient nonverbal but A&Ox3 at baseline, presented with lethargy/AMS, now resolved/returned to baseline  -likely 2/2 aspiration PNA as described below  -CTH unremarkable x2  -right arm weakness unchanged since presentation, no further investigation needed    CVS: #hypotension/PMH HTN:  -low SBP in 70s in ED, improved s/p 1L NS and 50mL albumin  -resolved, BPs increasing to 160s, likely reflective of patient's HTN  -restarted half dose home Coreg, added 5mg lisinopril given PMH DM, HTN  -central line consent obtained from daughter, in chart  -TTE EF 50-55%, G2DD    Pulm: #PNA:  -on presentation tachycardic, SBPs 70s, WBC 14.9, lactate 3.3  -given IVF and x1 dose each vanc and cefepime in ED  -this am WBC 16.3, 11/15 lactate 2  -possible right pulm infiltrate on initial CXR, likely aspiration PNA  -repeat CXR 11/18 with worsening effusions  -infusions may be 2/2 PEEP, Dr. Parks to perform POCUS to assess further   -RVP and COVID negative  -BCx 11/14 +MRSA, sputum 11/15 +A. baumanii (carbapenem and Zosyn resistant)  -repeat BCx 11/17 with GP cocci  -procalcitonin 99.6  -continue cefepime, flagyl, and vanc (11/14 day 1), awaiting ID recs  -will consider JUSTIN to identify infection source, awaiting ID recs    #COPD:  -s/p trach and peg (prior to presentation)  -continue SBT during the day with mechanical ventilation at night/as needed  -begin prednisone taper today  -continue albuterol  -most recent ABG 7.53/40/73/34    ID: #sepsis:  -sepsis history and management as above  -van trough 11pm today prior to 4th dose (since restarting 11/17)  -ID Dr. Corcoran consulted, awaiting recs  -UA negative, UCx normal urogenital sundar    Nephro: #MARYAM:  -BUN/Cr 32/1.58 on admission, increased from baseline, likely pre-renal in setting of sepsis  -given IVF as above, resolved  -monitor BMP  -repleted K and P this am, repeat BMP this pm    GI: #transaminitis:  -LFTs mildly elevated to 298, 60/39, likely 2/2 sepsis  - today, otherwise wnl    #diet:  -NPO with TF Glucerna 40cc/hr    Heme: #anemia:  -Hb 9.5 on admission, baseline  -Hb 10.3 today, monitor    Endocrine: #DM:  -a1c from sept noted 8.1  -lipid panel notable for low HDL  -hold home 6u Basaglar 6U and moderate SS  -decrease from 30U to 10U Lantus Qam (first dose tomorrow)  -decrease from 12U q6 to 8U q6 lispro today  -FS q6h  -endo Dr. Garcia following    Skin/Catheters: #no issues  -peripheral IV lines in place    PPx:  -heparin for DVT   -Protonix for GI     GOC:  -FULL CODE  -medically stable for downgrade to AI      <Things to follow up>  - f/u Vanco trough @5am 11/19 (Goal 15-20), now on 1250mg BID  - Please place on vent during the night, SBT during the day  - Please send repeat blood cx on 11/19 AM  - f/u ID Dr. Corcoran, possibly stop cefepime and flagyl soon

## 2020-11-18 NOTE — PROGRESS NOTE ADULT - SUBJECTIVE AND OBJECTIVE BOX
HPI: 65F PMH obesity, COPD s/p trach and PEG (80 pack year hx, nonverbal at baseline), R lung cancer s/p resection (2017), HTN, and IDDM2 presented from [facility] with lethargy and AMS concerning for sepsis, started on Levaquin 11/13 and vancomycin 11/14 by VY. Found to have right basilar infiltrate on CXR, given x1 dose each vanc and cefepime, admitted to ICU for management of encephalopathy and sepsis 2/2 aspiration PNA.    INTERVAL HPI/OVERNIGHT EVENTS: NAEON. Tachy to low 100s, SBPs increasing to 160s this am. RR high 20s with SpO2 mid-90s. SBT with trach collar yesterday, did well, placed back on vent 14/400/40/5 at 11:30pm for sleeping, will trial again today. ABG this am 7.53/40/73/34. Patient reports feeling well, denies pain or new complaints. BCx with GP cocci, continuing on cefepime, Flagyl, and vanc (day 1 11/14) and awaiting ID recs, vanc trough 11pm. Started 5mg lisinopril and half of home Coreg dose. Will begin tapering steroids today. Medically stable for downgrade to AI.     ICU Vital Signs Last 24 Hrs  T(C): 36.6 (18 Nov 2020 09:00), Max: 36.8 (17 Nov 2020 15:46)  T(F): 97.8 (18 Nov 2020 09:00), Max: 98.3 (17 Nov 2020 15:46)  HR: 104 (18 Nov 2020 12:00) (82 - 110)  BP: 142/90 (18 Nov 2020 12:00) (125/68 - 166/93)  BP(mean): 106 (18 Nov 2020 12:00) (86 - 115)  ABP: --  ABP(mean): --  RR: 28 (18 Nov 2020 12:00) (15 - 33)  SpO2: 96% (18 Nov 2020 12:00) (92% - 100%)    I&O's Summary    17 Nov 2020 07:01  -  18 Nov 2020 07:00  --------------------------------------------------------  IN: 1160 mL / OUT: 1100 mL / NET: 60 mL    18 Nov 2020 07:01  -  18 Nov 2020 12:52  --------------------------------------------------------  IN: 583.2 mL / OUT: 250 mL / NET: 333.2 mL      Mode: CPAP with PS  FiO2: 40  PEEP: 5  PS: 5  ITime: 1  MAP: 7  PIP: 11      LABS:                        10.3   16.33 )-----------( 153      ( 18 Nov 2020 04:15 )             33.8     11-18    140  |  102  |  21<H>  ----------------------------<  108<H>  3.4<L>   |  29  |  0.50    Ca    7.9<L>      18 Nov 2020 04:15  Phos  2.1     11-18  Mg     2.2     11-18    TPro  6.1  /  Alb  1.9<L>  /  TBili  0.5  /  DBili  x   /  AST  23  /  ALT  28  /  AlkPhos  176<H>  11-18        CAPILLARY BLOOD GLUCOSE      POCT Blood Glucose.: 176 mg/dL (18 Nov 2020 10:58)  POCT Blood Glucose.: 101 mg/dL (18 Nov 2020 06:19)  POCT Blood Glucose.: 130 mg/dL (18 Nov 2020 00:05)  POCT Blood Glucose.: 200 mg/dL (17 Nov 2020 17:08)    ABG - ( 18 Nov 2020 03:43 )  pH, Arterial: 7.53  pH, Blood: x     /  pCO2: 40    /  pO2: 73    / HCO3: 34    / Base Excess: 10.0  /  SaO2: 95                  RADIOLOGY & ADDITIONAL TESTS:    Consultant(s) Notes Reviewed:  [x ] YES  [ ] NO    MEDICATIONS  (STANDING):  cefepime   IVPB 2000 milliGRAM(s) IV Intermittent every 12 hours  chlorhexidine 0.12% Liquid 15 milliLiter(s) Oral Mucosa every 12 hours  chlorhexidine 2% Cloths 1 Application(s) Topical <User Schedule>  heparin   Injectable 5000 Unit(s) SubCutaneous every 8 hours  influenza   Vaccine 0.5 milliLiter(s) IntraMuscular once  insulin glargine Injectable (LANTUS) 30 Unit(s) SubCutaneous every morning  insulin lispro (ADMELOG) corrective regimen sliding scale   SubCutaneous every 6 hours  insulin lispro Injectable (ADMELOG) 12 Unit(s) SubCutaneous every 6 hours  lisinopril 5 milliGRAM(s) Oral daily  metroNIDAZOLE  IVPB 500 milliGRAM(s) IV Intermittent every 8 hours  pantoprazole  Injectable 40 milliGRAM(s) IV Push daily  vancomycin  IVPB      vancomycin  IVPB 1250 milliGRAM(s) IV Intermittent every 12 hours    MEDICATIONS  (PRN):  ALBUTerol    90 MICROgram(s) HFA Inhaler 2 Puff(s) Inhalation every 6 hours PRN Shortness of Breath    ROS: limited 2/2 patient condition, indicates that she feels well overall, denies pain    PHYSICAL EXAM:  GENERAL: obese, +trach, +PEG  HEAD:  Atraumatic, Normocephalic  EYES: EOMI, PERRLA, conjunctiva and sclera clear  ENT: moist mucous membranes  NECK: Supple  NERVOUS SYSTEM:  Alert & Oriented X3, right arm weakness noted, pulses intact  CHEST/LUNG: B/L good air entry; faintly coarse breath sounds b/l UL  HEART: S1S2 normal, RRR  ABDOMEN: Soft, Nontender, obese; Bowel sounds present  EXTREMITIES:  2+ Peripheral Pulses, b/l LE edema 2+  : cystocele noted  SKIN: bruising in previous IV sites, no breakdown noted on back or feet    Care Discussed with Consultants/Other Providers [ x] YES  [ ] NO

## 2020-11-18 NOTE — PROGRESS NOTE ADULT - SUBJECTIVE AND OBJECTIVE BOX
Interval Events:    on continuous TF  poc glucose intermittently elevated  receiving bolus  not receiving bolus    Allergies    fish (Angioedema)  penicillins (Rash)    Intolerances      Endocrine/Metabolic Medications:  insulin glargine Injectable (LANTUS) 30 Unit(s) SubCutaneous every morning  insulin lispro (ADMELOG) corrective regimen sliding scale   SubCutaneous every 6 hours  insulin lispro Injectable (ADMELOG) 12 Unit(s) SubCutaneous every 6 hours      Vital Signs Last 24 Hrs  T(C): 36.6 (18 Nov 2020 09:00), Max: 36.8 (17 Nov 2020 15:46)  T(F): 97.8 (18 Nov 2020 09:00), Max: 98.3 (17 Nov 2020 15:46)  HR: 103 (18 Nov 2020 11:00) (82 - 110)  BP: 154/84 (18 Nov 2020 11:00) (125/68 - 166/93)  BP(mean): 104 (18 Nov 2020 11:00) (86 - 115)  RR: 30 (18 Nov 2020 11:00) (15 - 33)  SpO2: 94% (18 Nov 2020 11:00) (92% - 100%)      PHYSICAL EXAM  Constitutional:  s/p trach on vent  NC/AT:    HEENT:    Neck:  No JVD  Respiratory:  reduced breath sounds b/l bases    Cardiovascular:  RR     Gastrointestinal: Soft     Extremities: without cyanosis    Neurological:  non focal  LABS                        10.3   16.33 )-----------( 153      ( 18 Nov 2020 04:15 )             33.8                               140    |  102    |  21                  Calcium: 7.9   / iCa: x      (11-18 @ 04:15)    ----------------------------<  108       Magnesium: 2.2                              3.4     |  29     |  0.50             Phosphorous: 2.1      TPro  6.1    /  Alb  1.9    /  TBili  0.5    /  DBili  x      /  AST  23     /  ALT  28     /  AlkPhos  176    18 Nov 2020 04:15    CAPILLARY BLOOD GLUCOSE      POCT Blood Glucose.: 176 mg/dL (18 Nov 2020 10:58)  POCT Blood Glucose.: 101 mg/dL (18 Nov 2020 06:19)  POCT Blood Glucose.: 130 mg/dL (18 Nov 2020 00:05)  POCT Blood Glucose.: 200 mg/dL (17 Nov 2020 17:08)        Assesment/plan          66 yo female with multiple comorbidities including h/o DM type 2, HTN, COPD s/p trach on vent, lung ca on right sided s/p resection on chemo admitted with lethargy    endocrinology consulted for glycemic management    DM type 2  uncontrolled  complicated by high dose steroid use, acute on chronic resp failure  s/p trach    recommendations:  s/p trach/PEG  poc glucose improved  receiving bolus, not requiring basal  lower doses:    - reduce lantus to 10 units daily in am   - reduce insulin lispro to 8 units q6h, hold if TF are held  - continue moderate dose ss  - reassess based on requirements  - goal inpatient glucose range: 140-180mg/dl    COPD exacerbation  acute on chronic hypoxic resp failure  s/p trach  on standing steroids  pulmonary on board  s/p trach/PEG  on cefepime, flagyl    sepsis  on cefepime, flagyl  optimize glycemic regimen    Discussed with primary team   Please call  Endocrine- Dr Katlyn Garcia as needed    Time spent evaluating patient including coordination of care 35mins.

## 2020-11-18 NOTE — PROGRESS NOTE ADULT - ASSESSMENT
Assessment: 65F PMH obesity, COPD s/p trach and PEG (80 pack year hx, nonverbal at baseline), R lung cancer s/p resection (2017), HTN, and IDDM2 presented from [facility] with lethargy and AMS concerning for sepsis, found to have right basilar infiltrate on CXR, admitted to ICU for management of encephalopathy and sepsis 2/2 aspiration PNA.    1. Sepsis  2. Aspiration pneumonia  3. Hypotension  4. Encephalopathy  5. COPD s/p trach and peg  6. MARYAM  7. DM    Plan:    Neuro: #encephalopathy:  -patient nonverbal but A&Ox3 at baseline, presented with lethargy/AMS, now resolved/returned to baseline  -likely 2/2 aspiration PNA as described below  -CTH unremarkable x2  -right arm weakness unchanged since presentation, no further investigation needed    CVS: #hypotension/PMH HTN:  -low SBP in 70s in ED, improved s/p 1L NS and 50mL albumin  -resolved, BPs increasing to 160s, likely reflective of patient's HTN  -restarted half dose home Coreg, added 5mg lisinopril given PMH DM, HTN  -central line consent obtained from daughter, in chart  -TTE EF 50-55%, G2DD    Pulm: #PNA:  -on presentation tachycardic, SBPs 70s, WBC 14.9, lactate 3.3  -given IVF and x1 dose each vanc and cefepime in ED  -this am WBC 16.3, 11/15 lactate 2  -possible right pulm infiltrate on initial CXR, likely aspiration PNA  -repeat CXR 11/18 with worsening effusions  -infusions may be 2/2 PEEP, Dr. Parks to perform POCUS to assess further   -RVP and COVID negative  -BCx 11/14 +MRSA, sputum 11/15 +A. baumanii (carbapenem and Zosyn resistant)  -repeat BCx 11/17 with GP cocci  -procalcitonin 99.6  -continue cefepime, flagyl, and vanc (11/14 day 1), awaiting ID recs  -will consider JUSTIN to identify infection source, awaiting ID recs    #COPD:  -s/p trach and peg (prior to presentation)  -continue SBT during the day with mechanical ventilation at night/as needed  -begin prednisone taper today  -continue albuterol  -most recent ABG 7.53/40/73/34    ID: #sepsis:  -sepsis history and management as above  -van trough 11pm today prior to 4th dose (since restarting 11/17)  -ID Dr. Corcoran consulted, awaiting recs  -UA negative, UCx normal urogenital sundar    Nephro: #MARYAM:  -BUN/Cr 32/1.58 on admission, increased from baseline, likely pre-renal in setting of sepsis  -given IVF as above, resolved  -monitor BMP  -repleted K and P this am, repeat BMP this pm    GI: #transaminitis:  -LFTs mildly elevated to 298, 60/39, likely 2/2 sepsis  - today, otherwise wnl    #diet:  -NPO with TF Glucerna 40cc/hr    Heme: #anemia:  -Hb 9.5 on admission, baseline  -Hb 10.3 today, monitor    Endocrine: #DM:  -a1c from sept noted 8.1  -lipid panel notable for low HDL  -hold home 6u Basaglar 6U and moderate SS  -decrease from 30U to 10U Lantus Qam (first dose tomorrow)  -decrease from 12U q6 to 8U q6 lispro today  -FS q6h  -endo Dr. Garcia following    Skin/Catheters: #no issues  -peripheral IV lines in place    PPx:  -heparin for DVT   -Protonix for GI     GOC:  -FULL CODE  -medically stable for downgrade to AI

## 2020-11-18 NOTE — PROGRESS NOTE ADULT - SUBJECTIVE AND OBJECTIVE BOX
Time of Visit:  Patient seen and examined. pat is laying in bed on vent support    MEDICATIONS  (STANDING):  carvedilol 6.25 milliGRAM(s) Oral every 12 hours  cefepime   IVPB 2000 milliGRAM(s) IV Intermittent every 12 hours  chlorhexidine 0.12% Liquid 15 milliLiter(s) Oral Mucosa every 12 hours  chlorhexidine 2% Cloths 1 Application(s) Topical <User Schedule>  heparin   Injectable 5000 Unit(s) SubCutaneous every 8 hours  influenza   Vaccine 0.5 milliLiter(s) IntraMuscular once  insulin glargine Injectable (LANTUS) 10 Unit(s) SubCutaneous every morning  insulin lispro (ADMELOG) corrective regimen sliding scale   SubCutaneous every 6 hours  insulin lispro Injectable (ADMELOG) 8 Unit(s) SubCutaneous every 6 hours  lisinopril 5 milliGRAM(s) Oral daily  metroNIDAZOLE  IVPB 500 milliGRAM(s) IV Intermittent every 8 hours  pantoprazole  Injectable 40 milliGRAM(s) IV Push daily  vancomycin  IVPB      vancomycin  IVPB 1250 milliGRAM(s) IV Intermittent every 12 hours      MEDICATIONS  (PRN):  ALBUTerol    90 MICROgram(s) HFA Inhaler 2 Puff(s) Inhalation every 6 hours PRN Shortness of Breath       Medications up to date at time of exam.      PHYSICAL EXAMINATION:  Patient has no new complaints.  GENERAL: The patient is a well-developed, well-nourished, in no apparent distress.     Vital Signs Last 24 Hrs  T(C): 36.7 (18 Nov 2020 16:23), Max: 36.7 (17 Nov 2020 21:20)  T(F): 98 (18 Nov 2020 16:23), Max: 98 (17 Nov 2020 21:20)  HR: 98 (18 Nov 2020 18:00) (87 - 110)  BP: 146/84 (18 Nov 2020 18:00) (132/75 - 166/93)  BP(mean): 101 (18 Nov 2020 18:00) (89 - 115)  RR: 24 (18 Nov 2020 18:00) (17 - 33)  SpO2: 97% (18 Nov 2020 18:00) (91% - 100%)  Mode: AC/ CMV (Assist Control/ Continuous Mandatory Ventilation)  RR (machine): 14  TV (machine): 400  FiO2: 40  PEEP: 5  ITime: 1  MAP: 10  PIP: 19   (if applicable)    Chest Tube (if applicable)    HEENT: Head is normocephalic and atraumatic. Extraocular muscles are intact. Mucous membranes are moist.     NECK: Supple, no palpable adenopathy. + trach    LUNGS: Clear to auscultation, no wheezing, rales, or rhonchi.    HEART: Regular rate and rhythm without murmur.    ABDOMEN: Soft, nontender, and nondistended.  No hepatosplenomegaly is noted.    : No painful voiding, no pelvic pain    EXTREMITIES: Without any cyanosis, clubbing, rash, lesions or edema.    NEUROLOGIC: Awake, alert, oriented, grossly intact    SKIN: Warm, dry, good turgor.      LABS:                        10.3   16.33 )-----------( 153      ( 18 Nov 2020 04:15 )             33.8     11-18    139  |  101  |  14  ----------------------------<  69<L>  3.9   |  31  |  0.36<L>    Ca    7.8<L>      18 Nov 2020 16:17  Phos  3.2     11-18  Mg     2.2     11-18    TPro  6.1  /  Alb  1.9<L>  /  TBili  0.5  /  DBili  x   /  AST  23  /  ALT  28  /  AlkPhos  176<H>  11-18        ABG - ( 18 Nov 2020 03:43 )  pH, Arterial: 7.53  pH, Blood: x     /  pCO2: 40    /  pO2: 73    / HCO3: 34    / Base Excess: 10.0  /  SaO2: 95                          Procalcitonin, Serum: 99.60 ng/mL (11-16-20 @ 13:48)      MICROBIOLOGY: (if applicable)    RADIOLOGY & ADDITIONAL STUDIES:  EKG:   CXR:  < from: Xray Chest 1 View- PORTABLE-Routine (Xray Chest 1 View- PORTABLE-Routine in AM.) (11.18.20 @ 08:53) >    EXAM:  XR CHEST PORTABLE ROUTINE 1V                            PROCEDURE DATE:  11/18/2020          INTERPRETATION:  CLINICAL INDICATION: 65 years  Female with ett and pna.    COMPARISON: 11/17/2012    The patient's chin obscures the superior mediastinum and lung apices.    Tracheostomy is unchanged.    AP view of the chest demonstrates bilateral pleural effusions. Cannot exclude underlying infiltrate or atelectasis.There is no pneumothorax.    The heart is normal in size. The mediastinum cannot be assessed. There is no hilar mass.    The pulmonary vasculature is normal.    Mild thoracic degenerative changes are present.    IMPRESSION:    Bilateral pleural effusions. Cannot exclude underlying infiltrate or atelectasis.    Tracheostomy.            CATIA ANSARI MD; Attending Radiologist  This document has been electronically signed. Nov 18 2020  9:07AM    < end of copied text >  ECHO:    IMPRESSION: 65y Female PAST MEDICAL & SURGICAL HISTORY:  Malignant neoplasm of unspecified part of right bronchus or lung    HTN (hypertension)    DM (diabetes mellitus)    COPD (chronic obstructive pulmonary disease)    Lung cancer    Morbid obesity    GERD (gastroesophageal reflux disease)    Deviated septum    Prolapsed uterus    ITP (idiopathic thrombocytopenic purpura)    Emphysema/COPD    History of cholecystectomy    S/P lobectomy of lung  right 2017    History of tonsillectomy     p/w         IMP: This is a 65 yr old  obese female with history of 80 pack year tobacco use, HTN, DM2 on insulin, COPD s/p trach(on vent) and PEG, R lung cancer s/p resection (2017), presents to the ED for [facility]. Admitting to ICU for encephalopathy and sepsis secondary to possible aspiration pneumonia.    -  Sepsis/ bacteria   - Resp failure  s/p trach  - Aspiration pneumonia  - Hypotension  - Encephalopathy  - COPD s/p trach and peg  - MARYAM  - DM uncontrol       Plan   -continue antibx  -very high procalcitonin   -monitor vanc level  -f/u cultures  -continue vent support  -adjust vent as per ABG  -hemodynamic support  -blood sugar control   -dvt/gi prophy  -PEG feed .  - d/c with icu team

## 2020-11-19 DIAGNOSIS — G93.40 ENCEPHALOPATHY, UNSPECIFIED: ICD-10-CM

## 2020-11-19 DIAGNOSIS — E66.01 MORBID (SEVERE) OBESITY DUE TO EXCESS CALORIES: ICD-10-CM

## 2020-11-19 DIAGNOSIS — J69.0 PNEUMONITIS DUE TO INHALATION OF FOOD AND VOMIT: ICD-10-CM

## 2020-11-19 DIAGNOSIS — J44.9 CHRONIC OBSTRUCTIVE PULMONARY DISEASE, UNSPECIFIED: ICD-10-CM

## 2020-11-19 DIAGNOSIS — A41.9 SEPSIS, UNSPECIFIED ORGANISM: ICD-10-CM

## 2020-11-19 DIAGNOSIS — E11.9 TYPE 2 DIABETES MELLITUS WITHOUT COMPLICATIONS: ICD-10-CM

## 2020-11-19 DIAGNOSIS — I10 ESSENTIAL (PRIMARY) HYPERTENSION: ICD-10-CM

## 2020-11-19 DIAGNOSIS — D64.9 ANEMIA, UNSPECIFIED: ICD-10-CM

## 2020-11-19 DIAGNOSIS — R78.81 BACTEREMIA: ICD-10-CM

## 2020-11-19 DIAGNOSIS — Z29.9 ENCOUNTER FOR PROPHYLACTIC MEASURES, UNSPECIFIED: ICD-10-CM

## 2020-11-19 LAB
ALBUMIN SERPL ELPH-MCNC: 2 G/DL — LOW (ref 3.5–5)
ALP SERPL-CCNC: 158 U/L — HIGH (ref 40–120)
ALT FLD-CCNC: 24 U/L DA — SIGNIFICANT CHANGE UP (ref 10–60)
ANION GAP SERPL CALC-SCNC: 8 MMOL/L — SIGNIFICANT CHANGE UP (ref 5–17)
AST SERPL-CCNC: 21 U/L — SIGNIFICANT CHANGE UP (ref 10–40)
BILIRUB SERPL-MCNC: 0.6 MG/DL — SIGNIFICANT CHANGE UP (ref 0.2–1.2)
BUN SERPL-MCNC: 10 MG/DL — SIGNIFICANT CHANGE UP (ref 7–18)
CALCIUM SERPL-MCNC: 8.1 MG/DL — LOW (ref 8.4–10.5)
CHLORIDE SERPL-SCNC: 99 MMOL/L — SIGNIFICANT CHANGE UP (ref 96–108)
CO2 SERPL-SCNC: 30 MMOL/L — SIGNIFICANT CHANGE UP (ref 22–31)
CREAT SERPL-MCNC: 0.33 MG/DL — LOW (ref 0.5–1.3)
CULTURE RESULTS: SIGNIFICANT CHANGE UP
GLUCOSE BLDC GLUCOMTR-MCNC: 103 MG/DL — HIGH (ref 70–99)
GLUCOSE BLDC GLUCOMTR-MCNC: 128 MG/DL — HIGH (ref 70–99)
GLUCOSE BLDC GLUCOMTR-MCNC: 161 MG/DL — HIGH (ref 70–99)
GLUCOSE BLDC GLUCOMTR-MCNC: 164 MG/DL — HIGH (ref 70–99)
GLUCOSE BLDC GLUCOMTR-MCNC: 169 MG/DL — HIGH (ref 70–99)
GLUCOSE SERPL-MCNC: 123 MG/DL — HIGH (ref 70–99)
HCT VFR BLD CALC: 35.6 % — SIGNIFICANT CHANGE UP (ref 34.5–45)
HGB BLD-MCNC: 10.8 G/DL — LOW (ref 11.5–15.5)
LACTATE SERPL-SCNC: 1.1 MMOL/L — SIGNIFICANT CHANGE UP (ref 0.7–2)
MAGNESIUM SERPL-MCNC: 1.9 MG/DL — SIGNIFICANT CHANGE UP (ref 1.6–2.6)
MCHC RBC-ENTMCNC: 26.5 PG — LOW (ref 27–34)
MCHC RBC-ENTMCNC: 30.3 GM/DL — LOW (ref 32–36)
MCV RBC AUTO: 87.5 FL — SIGNIFICANT CHANGE UP (ref 80–100)
NRBC # BLD: 0 /100 WBCS — SIGNIFICANT CHANGE UP (ref 0–0)
PHOSPHATE SERPL-MCNC: 3.4 MG/DL — SIGNIFICANT CHANGE UP (ref 2.5–4.5)
PLATELET # BLD AUTO: 175 K/UL — SIGNIFICANT CHANGE UP (ref 150–400)
POTASSIUM SERPL-MCNC: 3.5 MMOL/L — SIGNIFICANT CHANGE UP (ref 3.5–5.3)
POTASSIUM SERPL-SCNC: 3.5 MMOL/L — SIGNIFICANT CHANGE UP (ref 3.5–5.3)
PROT SERPL-MCNC: 6.5 G/DL — SIGNIFICANT CHANGE UP (ref 6–8.3)
RBC # BLD: 4.07 M/UL — SIGNIFICANT CHANGE UP (ref 3.8–5.2)
RBC # FLD: 16 % — HIGH (ref 10.3–14.5)
SODIUM SERPL-SCNC: 137 MMOL/L — SIGNIFICANT CHANGE UP (ref 135–145)
SPECIMEN SOURCE: SIGNIFICANT CHANGE UP
SPECIMEN SOURCE: SIGNIFICANT CHANGE UP
VANCOMYCIN TROUGH SERPL-MCNC: 15.8 UG/ML — SIGNIFICANT CHANGE UP (ref 10–20)
VANCOMYCIN TROUGH SERPL-MCNC: 23.8 UG/ML — HIGH (ref 10–20)
WBC # BLD: 14.22 K/UL — HIGH (ref 3.8–10.5)
WBC # FLD AUTO: 14.22 K/UL — HIGH (ref 3.8–10.5)

## 2020-11-19 PROCEDURE — 71045 X-RAY EXAM CHEST 1 VIEW: CPT | Mod: 26

## 2020-11-19 RX ORDER — ACETAMINOPHEN 500 MG
650 TABLET ORAL EVERY 6 HOURS
Refills: 0 | Status: DISCONTINUED | OUTPATIENT
Start: 2020-11-19 | End: 2020-11-25

## 2020-11-19 RX ORDER — INSULIN LISPRO 100/ML
VIAL (ML) SUBCUTANEOUS EVERY 6 HOURS
Refills: 0 | Status: DISCONTINUED | OUTPATIENT
Start: 2020-11-19 | End: 2020-11-25

## 2020-11-19 RX ORDER — INSULIN LISPRO 100/ML
4 VIAL (ML) SUBCUTANEOUS EVERY 6 HOURS
Refills: 0 | Status: DISCONTINUED | OUTPATIENT
Start: 2020-11-19 | End: 2020-11-23

## 2020-11-19 RX ORDER — VANCOMYCIN HCL 1 G
1000 VIAL (EA) INTRAVENOUS EVERY 12 HOURS
Refills: 0 | Status: DISCONTINUED | OUTPATIENT
Start: 2020-11-19 | End: 2020-11-25

## 2020-11-19 RX ADMIN — CHLORHEXIDINE GLUCONATE 15 MILLILITER(S): 213 SOLUTION TOPICAL at 18:30

## 2020-11-19 RX ADMIN — Medication 1: at 17:31

## 2020-11-19 RX ADMIN — CHLORHEXIDINE GLUCONATE 1 APPLICATION(S): 213 SOLUTION TOPICAL at 05:01

## 2020-11-19 RX ADMIN — CHLORHEXIDINE GLUCONATE 15 MILLILITER(S): 213 SOLUTION TOPICAL at 05:01

## 2020-11-19 RX ADMIN — Medication 4 UNIT(S): at 17:30

## 2020-11-19 RX ADMIN — Medication 650 MILLIGRAM(S): at 12:49

## 2020-11-19 RX ADMIN — HEPARIN SODIUM 5000 UNIT(S): 5000 INJECTION INTRAVENOUS; SUBCUTANEOUS at 14:52

## 2020-11-19 RX ADMIN — INSULIN GLARGINE 10 UNIT(S): 100 INJECTION, SOLUTION SUBCUTANEOUS at 08:38

## 2020-11-19 RX ADMIN — Medication 250 MILLIGRAM(S): at 18:30

## 2020-11-19 RX ADMIN — HEPARIN SODIUM 5000 UNIT(S): 5000 INJECTION INTRAVENOUS; SUBCUTANEOUS at 05:02

## 2020-11-19 RX ADMIN — Medication 20 MILLIGRAM(S): at 05:00

## 2020-11-19 RX ADMIN — Medication 100 MILLIGRAM(S): at 05:02

## 2020-11-19 RX ADMIN — CARVEDILOL PHOSPHATE 6.25 MILLIGRAM(S): 80 CAPSULE, EXTENDED RELEASE ORAL at 05:01

## 2020-11-19 RX ADMIN — Medication 1: at 12:18

## 2020-11-19 RX ADMIN — LISINOPRIL 5 MILLIGRAM(S): 2.5 TABLET ORAL at 05:00

## 2020-11-19 RX ADMIN — CEFEPIME 100 MILLIGRAM(S): 1 INJECTION, POWDER, FOR SOLUTION INTRAMUSCULAR; INTRAVENOUS at 05:01

## 2020-11-19 RX ADMIN — PANTOPRAZOLE SODIUM 40 MILLIGRAM(S): 20 TABLET, DELAYED RELEASE ORAL at 12:18

## 2020-11-19 RX ADMIN — HEPARIN SODIUM 5000 UNIT(S): 5000 INJECTION INTRAVENOUS; SUBCUTANEOUS at 21:56

## 2020-11-19 RX ADMIN — Medication 8 UNIT(S): at 06:19

## 2020-11-19 RX ADMIN — Medication 2: at 06:19

## 2020-11-19 RX ADMIN — Medication 4 UNIT(S): at 12:18

## 2020-11-19 RX ADMIN — Medication 650 MILLIGRAM(S): at 13:45

## 2020-11-19 RX ADMIN — CARVEDILOL PHOSPHATE 6.25 MILLIGRAM(S): 80 CAPSULE, EXTENDED RELEASE ORAL at 18:30

## 2020-11-19 NOTE — PROGRESS NOTE ADULT - PROBLEM SELECTOR PLAN 4
Spontaneous breathing trial during the day  Then back to AC  during night.  c/w Prednisone  c/w bronchodilator  Monitor oxygenation Spontaneous breathing trial during the day (PS 10/FiO2 40%/PEEP 5)  Then back to AC  during night.  c/w Prednisone  c/w bronchodilator  Monitor oxygenation

## 2020-11-19 NOTE — PROGRESS NOTE ADULT - SUBJECTIVE AND OBJECTIVE BOX
Interval Events:    on low dose prednisone  basal/bolus reduced yesterday  poc glucose remains tightly controlled    Allergies    fish (Angioedema)  penicillins (Rash)    Intolerances      Endocrine/Metabolic Medications:  insulin glargine Injectable (LANTUS) 10 Unit(s) SubCutaneous every morning  insulin lispro (ADMELOG) corrective regimen sliding scale   SubCutaneous every 6 hours  insulin lispro Injectable (ADMELOG) 8 Unit(s) SubCutaneous every 6 hours  predniSONE   Tablet 20 milliGRAM(s) Oral daily      Vital Signs Last 24 Hrs  T(C): 37.4 (19 Nov 2020 05:20), Max: 37.4 (19 Nov 2020 05:20)  T(F): 99.4 (19 Nov 2020 05:20), Max: 99.4 (19 Nov 2020 05:20)  HR: 100 (19 Nov 2020 08:14) (93 - 123)  BP: 139/76 (19 Nov 2020 05:20) (129/62 - 158/98)  BP(mean): 98 (18 Nov 2020 20:00) (91 - 110)  RR: 18 (19 Nov 2020 05:20) (18 - 33)  SpO2: 98% (19 Nov 2020 08:14) (91% - 99%)      PHYSICAL EXAM  Constitutional:  s/p trach on vent  NC/AT:    HEENT:    Neck:  No JVD  Respiratory:  reduced breath sounds b/l bases    Cardiovascular:  RR     Gastrointestinal: Soft     Extremities: without cyanosis    Neurological:  non focal      LABS                        10.8   14.22 )-----------( 175      ( 19 Nov 2020 04:59 )             35.6                               137    |  99     |  10                  Calcium: 8.1   / iCa: x      (11-19 @ 04:59)    ----------------------------<  123       Magnesium: 1.9                              3.5     |  30     |  0.33             Phosphorous: 3.4      TPro  6.5    /  Alb  2.0    /  TBili  0.6    /  DBili  x      /  AST  21     /  ALT  24     /  AlkPhos  158    19 Nov 2020 04:59    CAPILLARY BLOOD GLUCOSE      POCT Blood Glucose.: 103 mg/dL (19 Nov 2020 08:06)  POCT Blood Glucose.: 161 mg/dL (19 Nov 2020 06:10)  POCT Blood Glucose.: 89 mg/dL (18 Nov 2020 23:47)  POCT Blood Glucose.: 105 mg/dL (18 Nov 2020 19:56)  POCT Blood Glucose.: 63 mg/dL (18 Nov 2020 16:32)  POCT Blood Glucose.: 176 mg/dL (18 Nov 2020 10:58)        Assesment/plan        66 yo female with multiple comorbidities including h/o DM type 2, HTN, COPD s/p trach on vent, lung ca on right sided s/p resection on chemo admitted with lethargy    endocrinology consulted for glycemic management    DM type 2  uncontrolled  complicated by high dose steroid use, acute on chronic resp failure  s/p trach    recommendations:  s/p trach/PEG  poc glucose tightly controlled  reduce basal/bolus  lower doses:    - reduce lantus to 10 units daily in am   - reduce insulin lispro to 4 units q6h, hold if TF are held  -reduce sliding scale to low dose sliding scale  - reassess based on requirements  - goal inpatient glucose range: 140-180mg/dl    COPD exacerbation  acute on chronic hypoxic resp failure  s/p trach  on standing steroids  pulmonary on board  s/p trach/PEG  on cefepime, flagyl    sepsis  on cefepime, flagyl  optimize glycemic regimen    Discussed with primary team   Please call  Endocrine- Dr Katlyn Garcia as needed       Interval Events:    on low dose prednisone  basal/bolus reduced yesterday  poc glucose remains tightly controlled    Allergies    fish (Angioedema)  penicillins (Rash)    Intolerances      Endocrine/Metabolic Medications:  insulin glargine Injectable (LANTUS) 10 Unit(s) SubCutaneous every morning  insulin lispro (ADMELOG) corrective regimen sliding scale   SubCutaneous every 6 hours  insulin lispro Injectable (ADMELOG) 8 Unit(s) SubCutaneous every 6 hours  predniSONE   Tablet 20 milliGRAM(s) Oral daily      Vital Signs Last 24 Hrs  T(C): 37.4 (19 Nov 2020 05:20), Max: 37.4 (19 Nov 2020 05:20)  T(F): 99.4 (19 Nov 2020 05:20), Max: 99.4 (19 Nov 2020 05:20)  HR: 100 (19 Nov 2020 08:14) (93 - 123)  BP: 139/76 (19 Nov 2020 05:20) (129/62 - 158/98)  BP(mean): 98 (18 Nov 2020 20:00) (91 - 110)  RR: 18 (19 Nov 2020 05:20) (18 - 33)  SpO2: 98% (19 Nov 2020 08:14) (91% - 99%)      PHYSICAL EXAM  Constitutional:  s/p trach on vent  NC/AT:    HEENT:    Neck:  No JVD  Respiratory:  reduced breath sounds b/l bases    Cardiovascular:  RR     Gastrointestinal: Soft     Extremities: without cyanosis    Neurological:  non focal      LABS                        10.8   14.22 )-----------( 175      ( 19 Nov 2020 04:59 )             35.6                               137    |  99     |  10                  Calcium: 8.1   / iCa: x      (11-19 @ 04:59)    ----------------------------<  123       Magnesium: 1.9                              3.5     |  30     |  0.33             Phosphorous: 3.4      TPro  6.5    /  Alb  2.0    /  TBili  0.6    /  DBili  x      /  AST  21     /  ALT  24     /  AlkPhos  158    19 Nov 2020 04:59    CAPILLARY BLOOD GLUCOSE      POCT Blood Glucose.: 103 mg/dL (19 Nov 2020 08:06)  POCT Blood Glucose.: 161 mg/dL (19 Nov 2020 06:10)  POCT Blood Glucose.: 89 mg/dL (18 Nov 2020 23:47)  POCT Blood Glucose.: 105 mg/dL (18 Nov 2020 19:56)  POCT Blood Glucose.: 63 mg/dL (18 Nov 2020 16:32)  POCT Blood Glucose.: 176 mg/dL (18 Nov 2020 10:58)        Assesment/plan        64 yo female with multiple comorbidities including h/o DM type 2, HTN, COPD s/p trach on vent, lung ca on right sided s/p resection on chemo admitted with lethargy    endocrinology consulted for glycemic management    DM type 2  uncontrolled  complicated by high dose steroid use, acute on chronic resp failure  s/p trach    recommendations:  s/p trach/PEG  poc glucose tightly controlled  reduce basal/bolus  lower doses:    -continue lantus 10 units daily in am   - reduce insulin lispro to 4 units q6h, hold if TF are held  -reduce sliding scale to low dose sliding scale  - reassess based on requirements  - goal inpatient glucose range: 140-180mg/dl    COPD exacerbation  acute on chronic hypoxic resp failure  s/p trach  on standing steroids  pulmonary on board  s/p trach/PEG  on cefepime, flagyl    sepsis  on cefepime, flagyl  optimize glycemic regimen    Discussed with primary team   Please call  Endocrine- Dr Katlyn Garcia as needed

## 2020-11-19 NOTE — PROGRESS NOTE ADULT - SUBJECTIVE AND OBJECTIVE BOX
HPI:  66 y/o obese female with history of 80 pack year tobacco use, HTN, DM2 on insulin, COPD s/p trach(on vent) and PEG, R lung cancer s/p resection (2017), presents to the ED for AMS from [facility] . Patient noted to be lethargic with concern for sepsis hence referred to ER.  As per paperwork, patient is nonverbal at baseline, also noted to have been started on levaquin on 11/13 with addition of vancomycin overnight. Patient only opens eyes in response to verbal stimulus, currently unable to provide history.     Patient was noted to have temp of 100.1, initially hemodynamically sale however bp dropped to 78 for which she received 2l bolus of NS. She had wbc count of 14.9 with left shift, lactate 3.3, bun/cr of 32/1.58, sodium 130, UA -ve, cxr with right basilar infiltrate. Was given a dose each of vanc and cefepime.   GOC: confirmed with daughter Lisa, full code. Consent for central line obtained.    (14 Nov 2020 18:24)      Patient is a 65y old  Female who presents with a chief complaint of lethargy (19 Nov 2020 09:22)      INTERVAL HPI/OVERNIGHT EVENTS:  T(C): 37.4 (11-19-20 @ 05:20), Max: 37.4 (11-19-20 @ 05:20)  HR: 105 (11-19-20 @ 12:10) (93 - 123)  BP: 124/61 (11-19-20 @ 12:00) (120/58 - 146/84)  RR: 18 (11-19-20 @ 05:20) (18 - 29)  SpO2: 99% (11-19-20 @ 12:10) (95% - 99%)  Wt(kg): --  I&O's Summary    18 Nov 2020 07:01  -  19 Nov 2020 07:00  --------------------------------------------------------  IN: 1269.8 mL / OUT: 1125 mL / NET: 144.8 mL        REVIEW OF SYSTEMS: denies fever, chills, SOB, palpitations, chest pain, abdominal pain, nausea, vomitting, diarrhea, constipation, dizziness    MEDICATIONS  (STANDING):  carvedilol 6.25 milliGRAM(s) Oral every 12 hours  chlorhexidine 0.12% Liquid 15 milliLiter(s) Oral Mucosa every 12 hours  chlorhexidine 2% Cloths 1 Application(s) Topical <User Schedule>  heparin   Injectable 5000 Unit(s) SubCutaneous every 8 hours  influenza   Vaccine 0.5 milliLiter(s) IntraMuscular once  insulin glargine Injectable (LANTUS) 10 Unit(s) SubCutaneous every morning  insulin lispro (ADMELOG) corrective regimen sliding scale   SubCutaneous every 6 hours  insulin lispro Injectable (ADMELOG) 4 Unit(s) SubCutaneous every 6 hours  lisinopril 5 milliGRAM(s) Oral daily  pantoprazole  Injectable 40 milliGRAM(s) IV Push daily  predniSONE   Tablet 20 milliGRAM(s) Oral daily  vancomycin  IVPB      vancomycin  IVPB 1250 milliGRAM(s) IV Intermittent every 12 hours    MEDICATIONS  (PRN):  acetaminophen    Suspension .. 650 milliGRAM(s) Oral every 6 hours PRN Moderate Pain (4 - 6)  ALBUTerol    90 MICROgram(s) HFA Inhaler 2 Puff(s) Inhalation every 6 hours PRN Shortness of Breath      PHYSICAL EXAM:  GENERAL: NAD, well-groomed, well-developed  HEAD:  Atraumatic, Normocephalic  EYES: EOMI, PERRLA, conjunctiva and sclera clear  ENMT: No tonsillar erythema, exudates, or enlargement; Moist mucous membranes, Good dentition, No lesions  NECK: Supple, No JVD, Normal thyroid  NERVOUS SYSTEM:  Alert & Oriented X3, Good concentration; Motor Strength 5/5 B/L upper and lower extremities; DTRs 2+ intact and symmetric  CHEST/LUNG: Clear to percussion bilaterally; No rales, rhonchi, wheezing, or rubs  HEART: Regular rate and rhythm; No murmurs, rubs, or gallops  ABDOMEN: Soft, Nontender, Nondistended; Bowel sounds present  EXTREMITIES:  2+ Peripheral Pulses, No clubbing, cyanosis, or edema  LYMPH: No lymphadenopathy noted  SKIN: No rashes or lesions  LABS:                        10.8   14.22 )-----------( 175      ( 19 Nov 2020 04:59 )             35.6     11-19    137  |  99  |  10  ----------------------------<  123<H>  3.5   |  30  |  0.33<L>    Ca    8.1<L>      19 Nov 2020 04:59  Phos  3.4     11-19  Mg     1.9     11-19    TPro  6.5  /  Alb  2.0<L>  /  TBili  0.6  /  DBili  x   /  AST  21  /  ALT  24  /  AlkPhos  158<H>  11-19        CAPILLARY BLOOD GLUCOSE      POCT Blood Glucose.: 169 mg/dL (19 Nov 2020 12:15)  POCT Blood Glucose.: 103 mg/dL (19 Nov 2020 08:06)  POCT Blood Glucose.: 161 mg/dL (19 Nov 2020 06:10)  POCT Blood Glucose.: 89 mg/dL (18 Nov 2020 23:47)  POCT Blood Glucose.: 105 mg/dL (18 Nov 2020 19:56)  POCT Blood Glucose.: 63 mg/dL (18 Nov 2020 16:32)      ABG - ( 18 Nov 2020 03:43 )  pH, Arterial: 7.53  pH, Blood: x     /  pCO2: 40    /  pO2: 73    / HCO3: 34    / Base Excess: 10.0  /  SaO2: 95

## 2020-11-19 NOTE — PROGRESS NOTE ADULT - SUBJECTIVE AND OBJECTIVE BOX
BARB COLÓN    SCU progress note    INTERVAL HPI/OVERNIGHT EVENTS: ***    DNR [ ]   DNI  [  ] Full code    Covid - 19 PCR: not detected 11/15    The 4Ms    What Matters Most: see GOC  Age appropriate Medications/Screen for High Risk Medication: Yes  Mentation: see CAM below  Mobility: defer to physical exam    The Confusion Assessment Method (CAM) Diagnostic Algorithm     1: Acute Onset or Fluctuating Course  - Is there evidence of an acute change in mental status from the patient’s baseline? Did the (abnormal) behavior  fluctuate during the day, that is, tend to come and go, or increase and decrease in severity?  [ ] YES [x ] NO     2: Inattention  - Did the patient have difficulty focusing attention, being easily distractible, or having difficulty keeping track of what was being said?  [ ] YES [x ] NO     3: Disorganized thinking  -Was the patient’s thinking disorganized or incoherent, such as rambling or irrelevant conversation, unclear or illogical flow of ideas, or unpredictable switching from subject to subject?  [ ] YES [ ] NO Unable to assess (x)     4: Altered Level of consciousness?  [ ] YES [x ] NO    The diagnosis of delirium by CAM requires the presence of features 1 and 2 and either 3 or 4.    PRESSORS: [ ] YES [x ] NO  cefepime   IVPB 2000 milliGRAM(s) IV Intermittent every 12 hours  metroNIDAZOLE  IVPB 500 milliGRAM(s) IV Intermittent every 8 hours  vancomycin  IVPB      vancomycin  IVPB 1250 milliGRAM(s) IV Intermittent every 12 hours    Cardiovascular:  Heart Failure  Acute   Acute on Chronic  Chronic       carvedilol 6.25 milliGRAM(s) Oral every 12 hours  lisinopril 5 milliGRAM(s) Oral daily    Pulmonary:  ALBUTerol    90 MICROgram(s) HFA Inhaler 2 Puff(s) Inhalation every 6 hours PRN    Hematalogic:  heparin   Injectable 5000 Unit(s) SubCutaneous every 8 hours    Other:  chlorhexidine 0.12% Liquid 15 milliLiter(s) Oral Mucosa every 12 hours  chlorhexidine 2% Cloths 1 Application(s) Topical <User Schedule>  influenza   Vaccine 0.5 milliLiter(s) IntraMuscular once  insulin glargine Injectable (LANTUS) 10 Unit(s) SubCutaneous every morning  insulin lispro (ADMELOG) corrective regimen sliding scale   SubCutaneous every 6 hours  insulin lispro Injectable (ADMELOG) 8 Unit(s) SubCutaneous every 6 hours  pantoprazole  Injectable 40 milliGRAM(s) IV Push daily  predniSONE   Tablet 20 milliGRAM(s) Oral daily    ALBUTerol    90 MICROgram(s) HFA Inhaler 2 Puff(s) Inhalation every 6 hours PRN  carvedilol 6.25 milliGRAM(s) Oral every 12 hours  cefepime   IVPB 2000 milliGRAM(s) IV Intermittent every 12 hours  chlorhexidine 0.12% Liquid 15 milliLiter(s) Oral Mucosa every 12 hours  chlorhexidine 2% Cloths 1 Application(s) Topical <User Schedule>  heparin   Injectable 5000 Unit(s) SubCutaneous every 8 hours  influenza   Vaccine 0.5 milliLiter(s) IntraMuscular once  insulin glargine Injectable (LANTUS) 10 Unit(s) SubCutaneous every morning  insulin lispro (ADMELOG) corrective regimen sliding scale   SubCutaneous every 6 hours  insulin lispro Injectable (ADMELOG) 8 Unit(s) SubCutaneous every 6 hours  lisinopril 5 milliGRAM(s) Oral daily  metroNIDAZOLE  IVPB 500 milliGRAM(s) IV Intermittent every 8 hours  pantoprazole  Injectable 40 milliGRAM(s) IV Push daily  predniSONE   Tablet 20 milliGRAM(s) Oral daily  vancomycin  IVPB      vancomycin  IVPB 1250 milliGRAM(s) IV Intermittent every 12 hours    Drug Dosing Weight  Height (cm): 154 (14 Nov 2020 20:16)  Weight (kg): 89.9 (14 Nov 2020 20:16)  BMI (kg/m2): 37.9 (14 Nov 2020 20:16)  BSA (m2): 1.87 (14 Nov 2020 20:16)    CENTRAL LINE: [ ] YES [x ] NO  LOCATION:   DATE INSERTED:  REMOVE: [ ] YES [ ] NO  EXPLAIN:    ROBLEDO: [ ] YES [x ] NO    DATE INSERTED:  REMOVE:  [ ] YES [ ] NO  EXPLAIN:    PAST MEDICAL & SURGICAL HISTORY:  Malignant neoplasm of unspecified part of right bronchus or lung    HTN (hypertension)    DM (diabetes mellitus)    COPD (chronic obstructive pulmonary disease)    Lung cancer    Morbid obesity    GERD (gastroesophageal reflux disease)    Deviated septum    Prolapsed uterus    ITP (idiopathic thrombocytopenic purpura)    Emphysema/COPD    History of cholecystectomy    S/P lobectomy of lung  right 2017    History of tonsillectomy          ABG - ( 18 Nov 2020 03:43 )  pH, Arterial: 7.53  pH, Blood: x     /  pCO2: 40    /  pO2: 73    / HCO3: 34    / Base Excess: 10.0  /  SaO2: 95                    11-18 @ 07:01  -  11-19 @ 07:00  --------------------------------------------------------  IN: 1269.8 mL / OUT: 1125 mL / NET: 144.8 mL        Mode: AC/ CMV (Assist Control/ Continuous Mandatory Ventilation)  RR (machine): 14  TV (machine): 400  FiO2: 40  PEEP: 5  ITime: 1  MAP: 8.6  PIP: 19      PHYSICAL EXAM:    GENERAL: NAD, well-groomed, well-developed  HEAD:  Atraumatic, Normocephalic  EYES: EOMI, PERRLA, conjunctiva and sclera clear  ENMT: No tonsillar erythema, exudates, or enlargement; Moist mucous membranes,NECK: Supple, No JVD, Normal thyroid  NERVOUS SYSTEM:  Alert & Oriented X1 motor strength 3-4/5 B/L upper, 2-3/5 bilat  lower extremities;  CHEST/LUNG:Trach to vent.  Few rhonchi upper airway,  decreased at bases.  HEART: Regular rate and rhythm; No murmurs, rubs, or gallops  ABDOMEN: Soft, Nontender, Nondistended; Bowel sounds present. PEG tube intact.   EXTREMITIES: BLE edema 1+, no calf tenderness,  2+ Peripheral Pulses.     SKIN: Warm dry    LABS:  CBC Full  -  ( 19 Nov 2020 04:59 )  WBC Count : 14.22 K/uL  RBC Count : 4.07 M/uL  Hemoglobin : 10.8 g/dL  Hematocrit : 35.6 %  Platelet Count - Automated : 175 K/uL  Mean Cell Volume : 87.5 fl  Mean Cell Hemoglobin : 26.5 pg  Mean Cell Hemoglobin Concentration : 30.3 gm/dL  Auto Neutrophil # : x  Auto Lymphocyte # : x  Auto Monocyte # : x  Auto Eosinophil # : x  Auto Basophil # : x  Auto Neutrophil % : x  Auto Lymphocyte % : x  Auto Monocyte % : x  Auto Eosinophil % : x  Auto Basophil % : x    11-19    137  |  99  |  10  ----------------------------<  123<H>  3.5   |  30  |  0.33<L>    Ca    8.1<L>      19 Nov 2020 04:59  Phos  3.4     11-19  Mg     1.9     11-19    TPro  6.5  /  Alb  2.0<L>  /  TBili  0.6  /  DBili  x   /  AST  21  /  ALT  24  /  AlkPhos  158<H>  11-19              [  ]  DVT Prophylaxis  [  ]  Nutrition, Brand, Rate         Goal Rate        Abnormal Nutritional Status -  Malnutrition   Cachexia      Morbid Obesity BMI >/=40    RADIOLOGY & ADDITIONAL STUDIES:  ***    Goals of Care Discussion with Family/Proxy/Other   - see note from/family meeting set up for...     BARB COLÓN    SCU progress note    INTERVAL HPI/OVERNIGHT EVENTS: ***    DNR [ ]   DNI  [  ] Full code    Covid - 19 PCR: not detected 11/15    The 4Ms    What Matters Most: see GOC  Age appropriate Medications/Screen for High Risk Medication: Yes  Mentation: see CAM below  Mobility: defer to physical exam    The Confusion Assessment Method (CAM) Diagnostic Algorithm     1: Acute Onset or Fluctuating Course  - Is there evidence of an acute change in mental status from the patient’s baseline? Did the (abnormal) behavior  fluctuate during the day, that is, tend to come and go, or increase and decrease in severity?  [ ] YES [x ] NO     2: Inattention  - Did the patient have difficulty focusing attention, being easily distractible, or having difficulty keeping track of what was being said?  [ ] YES [x ] NO     3: Disorganized thinking  -Was the patient’s thinking disorganized or incoherent, such as rambling or irrelevant conversation, unclear or illogical flow of ideas, or unpredictable switching from subject to subject?  [ ] YES [ ] NO Unable to assess (x)     4: Altered Level of consciousness?  [ ] YES [x ] NO    The diagnosis of delirium by CAM requires the presence of features 1 and 2 and either 3 or 4.    PRESSORS: [ ] YES [x ] NO  cefepime   IVPB 2000 milliGRAM(s) IV Intermittent every 12 hours  metroNIDAZOLE  IVPB 500 milliGRAM(s) IV Intermittent every 8 hours  vancomycin  IVPB      vancomycin  IVPB 1250 milliGRAM(s) IV Intermittent every 12 hours    Cardiovascular:  Heart Failure  Acute   Acute on Chronic  Chronic       carvedilol 6.25 milliGRAM(s) Oral every 12 hours  lisinopril 5 milliGRAM(s) Oral daily    Pulmonary:  ALBUTerol    90 MICROgram(s) HFA Inhaler 2 Puff(s) Inhalation every 6 hours PRN    Hematalogic:  heparin   Injectable 5000 Unit(s) SubCutaneous every 8 hours    Other:  chlorhexidine 0.12% Liquid 15 milliLiter(s) Oral Mucosa every 12 hours  chlorhexidine 2% Cloths 1 Application(s) Topical <User Schedule>  influenza   Vaccine 0.5 milliLiter(s) IntraMuscular once  insulin glargine Injectable (LANTUS) 10 Unit(s) SubCutaneous every morning  insulin lispro (ADMELOG) corrective regimen sliding scale   SubCutaneous every 6 hours  insulin lispro Injectable (ADMELOG) 8 Unit(s) SubCutaneous every 6 hours  pantoprazole  Injectable 40 milliGRAM(s) IV Push daily  predniSONE   Tablet 20 milliGRAM(s) Oral daily    ALBUTerol    90 MICROgram(s) HFA Inhaler 2 Puff(s) Inhalation every 6 hours PRN  carvedilol 6.25 milliGRAM(s) Oral every 12 hours  cefepime   IVPB 2000 milliGRAM(s) IV Intermittent every 12 hours  chlorhexidine 0.12% Liquid 15 milliLiter(s) Oral Mucosa every 12 hours  chlorhexidine 2% Cloths 1 Application(s) Topical <User Schedule>  heparin   Injectable 5000 Unit(s) SubCutaneous every 8 hours  influenza   Vaccine 0.5 milliLiter(s) IntraMuscular once  insulin glargine Injectable (LANTUS) 10 Unit(s) SubCutaneous every morning  insulin lispro (ADMELOG) corrective regimen sliding scale   SubCutaneous every 6 hours  insulin lispro Injectable (ADMELOG) 8 Unit(s) SubCutaneous every 6 hours  lisinopril 5 milliGRAM(s) Oral daily  metroNIDAZOLE  IVPB 500 milliGRAM(s) IV Intermittent every 8 hours  pantoprazole  Injectable 40 milliGRAM(s) IV Push daily  predniSONE   Tablet 20 milliGRAM(s) Oral daily  vancomycin  IVPB      vancomycin  IVPB 1250 milliGRAM(s) IV Intermittent every 12 hours    Drug Dosing Weight  Height (cm): 154 (14 Nov 2020 20:16)  Weight (kg): 89.9 (14 Nov 2020 20:16)  BMI (kg/m2): 37.9 (14 Nov 2020 20:16)  BSA (m2): 1.87 (14 Nov 2020 20:16)    CENTRAL LINE: [ ] YES [x ] NO  LOCATION:   DATE INSERTED:  REMOVE: [ ] YES [ ] NO  EXPLAIN:    ROBLEDO: [ ] YES [x ] NO    DATE INSERTED:  REMOVE:  [ ] YES [ ] NO  EXPLAIN:    PAST MEDICAL & SURGICAL HISTORY:  Malignant neoplasm of unspecified part of right bronchus or lung    HTN (hypertension)    DM (diabetes mellitus)    COPD (chronic obstructive pulmonary disease)    Lung cancer    Morbid obesity    GERD (gastroesophageal reflux disease)    Deviated septum    Prolapsed uterus    ITP (idiopathic thrombocytopenic purpura)    Emphysema/COPD    History of cholecystectomy    S/P lobectomy of lung  right 2017    History of tonsillectomy          ABG - ( 18 Nov 2020 03:43 )  pH, Arterial: 7.53  pH, Blood: x     /  pCO2: 40    /  pO2: 73    / HCO3: 34    / Base Excess: 10.0  /  SaO2: 95                    11-18 @ 07:01  -  11-19 @ 07:00  --------------------------------------------------------  IN: 1269.8 mL / OUT: 1125 mL / NET: 144.8 mL        Mode: AC/ CMV (Assist Control/ Continuous Mandatory Ventilation)  RR (machine): 14  TV (machine): 400  FiO2: 40  PEEP: 5  ITime: 1  MAP: 8.6  PIP: 19      PHYSICAL EXAM:    GENERAL: NAD, well-groomed, well-developed  HEAD:  Atraumatic, Normocephalic  EYES: EOMI, PERRLA, conjunctiva and sclera clear  ENMT: No tonsillar erythema, exudates, or enlargement; Moist mucous membranes,NECK: Supple, No JVD, Normal thyroid  NERVOUS SYSTEM:  Alert & Oriented X3 motor strength 3-4/5 B/L upper, 2-3/5 bilat  lower extremities;  CHEST/LUNG:Trach to vent.  Few rhonchi upper airway,  decreased at bases.  HEART: Regular rate and rhythm; No murmurs, rubs, or gallops  ABDOMEN: Soft, Nontender, Nondistended; Bowel sounds present. PEG tube intact.   EXTREMITIES: BLE edema 1+, no calf tenderness,  2+ Peripheral Pulses.     SKIN: Warm dry    LABS:  CBC Full  -  ( 19 Nov 2020 04:59 )  WBC Count : 14.22 K/uL  RBC Count : 4.07 M/uL  Hemoglobin : 10.8 g/dL  Hematocrit : 35.6 %  Platelet Count - Automated : 175 K/uL  Mean Cell Volume : 87.5 fl  Mean Cell Hemoglobin : 26.5 pg  Mean Cell Hemoglobin Concentration : 30.3 gm/dL  Auto Neutrophil # : x  Auto Lymphocyte # : x  Auto Monocyte # : x  Auto Eosinophil # : x  Auto Basophil # : x  Auto Neutrophil % : x  Auto Lymphocyte % : x  Auto Monocyte % : x  Auto Eosinophil % : x  Auto Basophil % : x    11-19    137  |  99  |  10  ----------------------------<  123<H>  3.5   |  30  |  0.33<L>    Ca    8.1<L>      19 Nov 2020 04:59  Phos  3.4     11-19  Mg     1.9     11-19    TPro  6.5  /  Alb  2.0<L>  /  TBili  0.6  /  DBili  x   /  AST  21  /  ALT  24  /  AlkPhos  158<H>  11-19              [  ]  DVT Prophylaxis  [  ]  Nutrition, Brand, Rate         Goal Rate        Abnormal Nutritional Status -  Malnutrition   Cachexia      Morbid Obesity BMI >/=40    RADIOLOGY & ADDITIONAL STUDIES:  ***    Goals of Care Discussion with Family/Proxy/Other   - see note from/family meeting set up for...     BARB COLÓN    SCU progress note    INTERVAL HPI/OVERNIGHT EVENTS: ***    DNR [ ]   DNI  [  ] Full code    Covid - 19 PCR: not detected 11/15    The 4Ms    What Matters Most: see GOC  Age appropriate Medications/Screen for High Risk Medication: Yes  Mentation: see CAM below  Mobility: defer to physical exam    The Confusion Assessment Method (CAM) Diagnostic Algorithm     1: Acute Onset or Fluctuating Course  - Is there evidence of an acute change in mental status from the patient’s baseline? Did the (abnormal) behavior  fluctuate during the day, that is, tend to come and go, or increase and decrease in severity?  [ ] YES [x ] NO     2: Inattention  - Did the patient have difficulty focusing attention, being easily distractible, or having difficulty keeping track of what was being said?  [ ] YES [x ] NO     3: Disorganized thinking  -Was the patient’s thinking disorganized or incoherent, such as rambling or irrelevant conversation, unclear or illogical flow of ideas, or unpredictable switching from subject to subject?  [ ] YES [ ] NO Unable to assess (x)     4: Altered Level of consciousness?  [ ] YES [x ] NO    The diagnosis of delirium by CAM requires the presence of features 1 and 2 and either 3 or 4.    PRESSORS: [ ] YES [x ] NO  cefepime   IVPB 2000 milliGRAM(s) IV Intermittent every 12 hours  metroNIDAZOLE  IVPB 500 milliGRAM(s) IV Intermittent every 8 hours  vancomycin  IVPB      vancomycin  IVPB 1250 milliGRAM(s) IV Intermittent every 12 hours    Cardiovascular:  Heart Failure  Acute   Acute on Chronic  Chronic       carvedilol 6.25 milliGRAM(s) Oral every 12 hours  lisinopril 5 milliGRAM(s) Oral daily    Pulmonary:  ALBUTerol    90 MICROgram(s) HFA Inhaler 2 Puff(s) Inhalation every 6 hours PRN    Hematalogic:  heparin   Injectable 5000 Unit(s) SubCutaneous every 8 hours    Other:  chlorhexidine 0.12% Liquid 15 milliLiter(s) Oral Mucosa every 12 hours  chlorhexidine 2% Cloths 1 Application(s) Topical <User Schedule>  influenza   Vaccine 0.5 milliLiter(s) IntraMuscular once  insulin glargine Injectable (LANTUS) 10 Unit(s) SubCutaneous every morning  insulin lispro (ADMELOG) corrective regimen sliding scale   SubCutaneous every 6 hours  insulin lispro Injectable (ADMELOG) 8 Unit(s) SubCutaneous every 6 hours  pantoprazole  Injectable 40 milliGRAM(s) IV Push daily  predniSONE   Tablet 20 milliGRAM(s) Oral daily    ALBUTerol    90 MICROgram(s) HFA Inhaler 2 Puff(s) Inhalation every 6 hours PRN  carvedilol 6.25 milliGRAM(s) Oral every 12 hours  cefepime   IVPB 2000 milliGRAM(s) IV Intermittent every 12 hours  chlorhexidine 0.12% Liquid 15 milliLiter(s) Oral Mucosa every 12 hours  chlorhexidine 2% Cloths 1 Application(s) Topical <User Schedule>  heparin   Injectable 5000 Unit(s) SubCutaneous every 8 hours  influenza   Vaccine 0.5 milliLiter(s) IntraMuscular once  insulin glargine Injectable (LANTUS) 10 Unit(s) SubCutaneous every morning  insulin lispro (ADMELOG) corrective regimen sliding scale   SubCutaneous every 6 hours  insulin lispro Injectable (ADMELOG) 8 Unit(s) SubCutaneous every 6 hours  lisinopril 5 milliGRAM(s) Oral daily  metroNIDAZOLE  IVPB 500 milliGRAM(s) IV Intermittent every 8 hours  pantoprazole  Injectable 40 milliGRAM(s) IV Push daily  predniSONE   Tablet 20 milliGRAM(s) Oral daily  vancomycin  IVPB      vancomycin  IVPB 1250 milliGRAM(s) IV Intermittent every 12 hours    Drug Dosing Weight  Height (cm): 154 (14 Nov 2020 20:16)  Weight (kg): 89.9 (14 Nov 2020 20:16)  BMI (kg/m2): 37.9 (14 Nov 2020 20:16)  BSA (m2): 1.87 (14 Nov 2020 20:16)    CENTRAL LINE: [ ] YES [x ] NO  LOCATION:   DATE INSERTED:  REMOVE: [ ] YES [ ] NO  EXPLAIN:    ROBLEDO: [ ] YES [x ] NO    DATE INSERTED:  REMOVE:  [ ] YES [ ] NO  EXPLAIN:    PAST MEDICAL & SURGICAL HISTORY:  Malignant neoplasm of unspecified part of right bronchus or lung    HTN (hypertension)    DM (diabetes mellitus)    COPD (chronic obstructive pulmonary disease)    Lung cancer    Morbid obesity    GERD (gastroesophageal reflux disease)    Deviated septum    Prolapsed uterus    ITP (idiopathic thrombocytopenic purpura)    Emphysema/COPD    History of cholecystectomy    S/P lobectomy of lung  right 2017    History of tonsillectomy          ABG - ( 18 Nov 2020 03:43 )  pH, Arterial: 7.53  pH, Blood: x     /  pCO2: 40    /  pO2: 73    / HCO3: 34    / Base Excess: 10.0  /  SaO2: 95                    11-18 @ 07:01  -  11-19 @ 07:00  --------------------------------------------------------  IN: 1269.8 mL / OUT: 1125 mL / NET: 144.8 mL        Mode: AC/ CMV (Assist Control/ Continuous Mandatory Ventilation)  RR (machine): 14  TV (machine): 400  FiO2: 40  PEEP: 5  ITime: 1  MAP: 8.6  PIP: 19      PHYSICAL EXAM:    GENERAL: NAD, well-groomed, well-developed  HEAD:  Atraumatic, Normocephalic  EYES: EOMI, PERRLA, conjunctiva and sclera clear  ENMT: No tonsillar erythema, exudates, or enlargement; Moist mucous membranes,NECK: Supple, No JVD, Normal thyroid  NERVOUS SYSTEM:  Alert & Oriented X3 motor strength 3-4/5 B/L upper, 2-3/5 bilat  lower extremities;  CHEST/LUNG:Trach to vent.  Few rhonchi upper airway,  decreased at bases.  HEART: Regular rate and rhythm; No murmurs, rubs, or gallops  ABDOMEN: Soft, Nontender, Nondistended; Bowel sounds present. PEG tube intact.   EXTREMITIES: BLE edema 1+, no calf tenderness,  2+ Peripheral Pulses.     SKIN: Warm dry    LABS:  CBC Full  -  ( 19 Nov 2020 04:59 )  WBC Count : 14.22 K/uL  RBC Count : 4.07 M/uL  Hemoglobin : 10.8 g/dL  Hematocrit : 35.6 %  Platelet Count - Automated : 175 K/uL  Mean Cell Volume : 87.5 fl  Mean Cell Hemoglobin : 26.5 pg  Mean Cell Hemoglobin Concentration : 30.3 gm/dL  Auto Neutrophil # : x  Auto Lymphocyte # : x  Auto Monocyte # : x  Auto Eosinophil # : x  Auto Basophil # : x  Auto Neutrophil % : x  Auto Lymphocyte % : x  Auto Monocyte % : x  Auto Eosinophil % : x  Auto Basophil % : x    11-19    137  |  99  |  10  ----------------------------<  123<H>  3.5   |  30  |  0.33<L>    Ca    8.1<L>      19 Nov 2020 04:59  Phos  3.4     11-19  Mg     1.9     11-19    TPro  6.5  /  Alb  2.0<L>  /  TBili  0.6  /  DBili  x   /  AST  21  /  ALT  24  /  AlkPhos  158<H>  11-19              [  ]  DVT Prophylaxis  [  ]  Nutrition, Brand, Rate         Goal Rate        Abnormal Nutritional Status -  Malnutrition   Cachexia      Morbid Obesity BMI >/=40    RADIOLOGY & ADDITIONAL STUDIES:  ***      < from: Xray Chest 1 View- PORTABLE-Routine (Xray Chest 1 View- PORTABLE-Routine in AM.) (11.19.20 @ 11:04) >  IMPRESSION:  Since prior study, mild increase in bilateral pleural effusions.    < end of copied text >  < from: Xray Chest 1 View- PORTABLE-Routine (Xray Chest 1 View- PORTABLE-Routine in AM.) (11.17.20 @ 07:54) >  Retrocardiac opacity consistent with effusion, atelectasis and/or infiltrate.    Right lung scarring.    Tracheostomy.    < end of copied text >    Goals of Care Discussion with Family/Proxy/Other   - see note from/family meeting set up for...

## 2020-11-19 NOTE — PROGRESS NOTE ADULT - PROBLEM SELECTOR PLAN 2
likely due to sepsis vs acute respiratory failure with hypoxia vs metabolic  Head CT unremarkable  Pt improved, back to baseline  Sepsis  improved. C/w plan  per above  BUN/Creat improved  Monitor oxygenation likely due to sepsis vs acute respiratory failure with hypoxia vs metabolic  Head CT unremarkable  Pt improved, back to baseline  Sepsis  improved. C/w plan  per above  BUN/Creat improved  Maintaining adequate oxygenation   Monitor oxygenation    Monitor oxygenation

## 2020-11-19 NOTE — PROGRESS NOTE ADULT - PROBLEM SELECTOR PLAN 1
likely due to aspiration pneumonia vs bacteremia  CXR results as above  BC growing MRSA . Repeat BC on 11/17 growing MRSA.  Sputum growing Acinetobacter  No evidence of active pneumonia at this time. Maxipime and Flagyl stopped.   afebrile, leukocytosis downtrending  c/w IV Vanco   Monitor Vanco trough (23.8 today . Hence IV Vanco held)  F/u Vanco trough in am.   ID Dr. Corcoran following likely due to aspiration pneumonia vs bacteremia  CXR results as above  BC growing MRSA . Repeat BC on 11/17 growing MRSA.  Sputum growing Acinetobacter  No evidence of active pneumonia at this time. Maxipime and Flagyl stopped.   afebrile, leukocytosis downtrending  c/w IV Vanco   Monitor Vanco trough (23.8 today. Vanco reduced to 1gm q12h. )  F/u Vanco trough prior to next scheduled dose.   ID Dr. Corcoran following

## 2020-11-19 NOTE — PROGRESS NOTE ADULT - ASSESSMENT
seen and examined vsstable comfortable on bed  no t in any distress  labs noted  wbc 14    a/p copd, lung ca on trach vent is stable respiratory staus wise  has MRSA bacterimia on vanco IFV  Repeat blood cxs  Vanco peak and trough  ID to f/u

## 2020-11-19 NOTE — PROGRESS NOTE ADULT - ATTENDING COMMENTS
65 yr old  obese female with history of 80 pack year tobacco use, HTN, DM2 on insulin, COPD s/p trach(on vent) and PEG, R lung cancer s/p resection (2017), presents to the ED for AMS from [facility]. Admitting to ICU for encephalopathy and sepsis secondary to possible aspiration pneumonia.    Assessment:  1. Sepsis  2. MRSA bacteremia   3. Chronic Resp failure  s/p trach  4. Health care associated pneumonia  5. Encephalopathy  6 COPD s/p trach and peg  7. MARYAM  8. DM uncontrol     Plan   -continue antibx  -ID follow up, as growing MRSA in blood, Acinetobacter in sputum likely colonized  -monitor vanc level  -Covid PCR neg  -Daily CPAP trial during AM  -hemodynamic support  -blood sugar control with sliding scale coverage  -Cont prednisone, titrate down  -BP control  -Tube feedings  -dvt/gi prophylaxis

## 2020-11-19 NOTE — PROGRESS NOTE ADULT - ASSESSMENT
65F PMH obesity, COPD s/p trach and PEG (80 pack year hx, nonverbal at baseline), R lung cancer s/p resection (2017), HTN, and IDDM2 presented from [facility] with lethargy and AMS concerning for sepsis, started on Levaquin 11/13 and vancomycin 11/14 by VY. Found to have right basilar infiltrate on CXR, given x1 dose each vanc and cefepime, admitted to ICU for management of encephalopathy and sepsis 2/2 aspiration PNA.    Hospital course complicated by tachycardia and uncontrolled HTN. for which Lisinopril initiated and coreg resumed at half dose.  SBT with trach collar 11/17. Placed back on vent 14/400/40/5 at HS.  ABG on 11/18 7.53/40/73/34.plaints. BCx with GP cocci, continuing on cefepime, Flagyl, and vanc (day 1 11/14) and awaiting ID recs, Downgraded to AI on 11/18

## 2020-11-20 ENCOUNTER — TRANSCRIPTION ENCOUNTER (OUTPATIENT)
Age: 65
End: 2020-11-20

## 2020-11-20 LAB
ANION GAP SERPL CALC-SCNC: 7 MMOL/L — SIGNIFICANT CHANGE UP (ref 5–17)
BUN SERPL-MCNC: 11 MG/DL — SIGNIFICANT CHANGE UP (ref 7–18)
CALCIUM SERPL-MCNC: 8.3 MG/DL — LOW (ref 8.4–10.5)
CHLORIDE SERPL-SCNC: 99 MMOL/L — SIGNIFICANT CHANGE UP (ref 96–108)
CO2 SERPL-SCNC: 33 MMOL/L — HIGH (ref 22–31)
CREAT SERPL-MCNC: 0.36 MG/DL — LOW (ref 0.5–1.3)
GLUCOSE BLDC GLUCOMTR-MCNC: 116 MG/DL — HIGH (ref 70–99)
GLUCOSE BLDC GLUCOMTR-MCNC: 123 MG/DL — HIGH (ref 70–99)
GLUCOSE BLDC GLUCOMTR-MCNC: 153 MG/DL — HIGH (ref 70–99)
GLUCOSE BLDC GLUCOMTR-MCNC: 157 MG/DL — HIGH (ref 70–99)
GLUCOSE BLDC GLUCOMTR-MCNC: 209 MG/DL — HIGH (ref 70–99)
GLUCOSE SERPL-MCNC: 135 MG/DL — HIGH (ref 70–99)
HCT VFR BLD CALC: 33.5 % — LOW (ref 34.5–45)
HGB BLD-MCNC: 10.1 G/DL — LOW (ref 11.5–15.5)
MAGNESIUM SERPL-MCNC: 2 MG/DL — SIGNIFICANT CHANGE UP (ref 1.6–2.6)
MCHC RBC-ENTMCNC: 27.2 PG — SIGNIFICANT CHANGE UP (ref 27–34)
MCHC RBC-ENTMCNC: 30.1 GM/DL — LOW (ref 32–36)
MCV RBC AUTO: 90.3 FL — SIGNIFICANT CHANGE UP (ref 80–100)
NRBC # BLD: 0 /100 WBCS — SIGNIFICANT CHANGE UP (ref 0–0)
PHOSPHATE SERPL-MCNC: 2.6 MG/DL — SIGNIFICANT CHANGE UP (ref 2.5–4.5)
PLATELET # BLD AUTO: 158 K/UL — SIGNIFICANT CHANGE UP (ref 150–400)
POTASSIUM SERPL-MCNC: 3.2 MMOL/L — LOW (ref 3.5–5.3)
POTASSIUM SERPL-SCNC: 3.2 MMOL/L — LOW (ref 3.5–5.3)
RBC # BLD: 3.71 M/UL — LOW (ref 3.8–5.2)
RBC # FLD: 16.5 % — HIGH (ref 10.3–14.5)
SODIUM SERPL-SCNC: 139 MMOL/L — SIGNIFICANT CHANGE UP (ref 135–145)
WBC # BLD: 9.62 K/UL — SIGNIFICANT CHANGE UP (ref 3.8–10.5)
WBC # FLD AUTO: 9.62 K/UL — SIGNIFICANT CHANGE UP (ref 3.8–10.5)

## 2020-11-20 RX ORDER — POTASSIUM CHLORIDE 20 MEQ
40 PACKET (EA) ORAL ONCE
Refills: 0 | Status: COMPLETED | OUTPATIENT
Start: 2020-11-20 | End: 2020-11-20

## 2020-11-20 RX ORDER — POTASSIUM CHLORIDE 20 MEQ
10 PACKET (EA) ORAL ONCE
Refills: 0 | Status: COMPLETED | OUTPATIENT
Start: 2020-11-20 | End: 2020-11-20

## 2020-11-20 RX ADMIN — CARVEDILOL PHOSPHATE 6.25 MILLIGRAM(S): 80 CAPSULE, EXTENDED RELEASE ORAL at 05:05

## 2020-11-20 RX ADMIN — PANTOPRAZOLE SODIUM 40 MILLIGRAM(S): 20 TABLET, DELAYED RELEASE ORAL at 11:29

## 2020-11-20 RX ADMIN — INSULIN GLARGINE 10 UNIT(S): 100 INJECTION, SOLUTION SUBCUTANEOUS at 08:29

## 2020-11-20 RX ADMIN — Medication 1: at 06:12

## 2020-11-20 RX ADMIN — Medication 4 UNIT(S): at 11:48

## 2020-11-20 RX ADMIN — Medication 2: at 11:49

## 2020-11-20 RX ADMIN — Medication 20 MILLIGRAM(S): at 05:05

## 2020-11-20 RX ADMIN — LISINOPRIL 5 MILLIGRAM(S): 2.5 TABLET ORAL at 05:05

## 2020-11-20 RX ADMIN — CARVEDILOL PHOSPHATE 6.25 MILLIGRAM(S): 80 CAPSULE, EXTENDED RELEASE ORAL at 17:15

## 2020-11-20 RX ADMIN — Medication 4 UNIT(S): at 17:15

## 2020-11-20 RX ADMIN — Medication 4 UNIT(S): at 06:12

## 2020-11-20 RX ADMIN — Medication 4 UNIT(S): at 23:59

## 2020-11-20 RX ADMIN — Medication 250 MILLIGRAM(S): at 05:06

## 2020-11-20 RX ADMIN — HEPARIN SODIUM 5000 UNIT(S): 5000 INJECTION INTRAVENOUS; SUBCUTANEOUS at 05:06

## 2020-11-20 RX ADMIN — CHLORHEXIDINE GLUCONATE 1 APPLICATION(S): 213 SOLUTION TOPICAL at 05:07

## 2020-11-20 RX ADMIN — HEPARIN SODIUM 5000 UNIT(S): 5000 INJECTION INTRAVENOUS; SUBCUTANEOUS at 21:55

## 2020-11-20 RX ADMIN — CHLORHEXIDINE GLUCONATE 15 MILLILITER(S): 213 SOLUTION TOPICAL at 05:05

## 2020-11-20 RX ADMIN — CHLORHEXIDINE GLUCONATE 15 MILLILITER(S): 213 SOLUTION TOPICAL at 17:13

## 2020-11-20 RX ADMIN — Medication 100 MILLIEQUIVALENT(S): at 11:29

## 2020-11-20 RX ADMIN — Medication 40 MILLIEQUIVALENT(S): at 12:31

## 2020-11-20 RX ADMIN — Medication 250 MILLIGRAM(S): at 17:15

## 2020-11-20 NOTE — PROGRESS NOTE ADULT - PROBLEM SELECTOR PLAN 10
DVT ppx: heparin SC  GI ppx: PPI  PICC in IR on Monday for IV Vanco x 28 days (thru Dec 16th). DVT ppx: heparin SC  GI ppx: PPI  Elevate RUE .   PICC in IR on Monday for IV Vanco x 28 days (thru Dec 16th).

## 2020-11-20 NOTE — PROGRESS NOTE ADULT - PROBLEM SELECTOR PLAN 4
Spontaneous breathing trial during the day (PS 10/FiO2 40%/PEEP 5) as tolerated.   Then back to AC  during night.  c/w Prednisone, tapered to 10mg daily  c/w bronchodilator  Monitor oxygenation

## 2020-11-20 NOTE — PROGRESS NOTE ADULT - PROBLEM SELECTOR PLAN 1
likely due to aspiration pneumonia vs bacteremia  CXR results as above  BC growing MRSA . Repeat BC on 11/17 growing MRSA.  Sputum growing Acinetobacter  No evidence of active pneumonia at this time. Maxipime and Flagyl stopped.   afebrile, leukocytosis resolved  BC from 11/19 NGTD .   c/w IV Vanco for 28 days from 11/19 (end date : 12/16)  PICC in IR on Monday.   Monitor Vanco trough   ID Dr. Corcoran following

## 2020-11-20 NOTE — DISCHARGE NOTE PROVIDER - HOSPITAL COURSE
65F PMH obesity, COPD s/p trach and PEG (80 pack year hx, nonverbal at baseline), R lung cancer s/p resection (2017), HTN, and IDDM2 presented from [facility] with lethargy and AMS concerning for sepsis, started on Levaquin 11/13 and vancomycin 11/14 by VY. Found to have right basilar infiltrate on CXR, given x1 dose each vanc and cefepime, admitted to ICU for management of encephalopathy and sepsis 2/2 aspiration PNA.    Hospital course complicated by tachycardia and uncontrolled HTN. for which Lisinopril initiated and coreg resumed at half dose.  SBT with trach collar 11/17. Placed back on vent 14/400/40/5 at HS.  ABG on 11/18 7.53/40/73/34.plaints. BCx with GP cocci, continuing on cefepime, Flagyl, and vanc (day 1 11/14) and awaiting ID recs, Downgraded to AI on 11/18. REpeat BC on 11/19 NGTD . ID recommends IV Vanco x 28 days from last negative BC, therefore will need PICC, planned for Monday.    ===============INCOMPLETE ====================   65F PMH obesity, COPD s/p trach and PEG (80 pack year hx, nonverbal at baseline), R lung cancer s/p resection (2017), HTN, and IDDM2 presented from [facility] with lethargy and AMS concerning for sepsis, started on Levaquin 11/13 and vancomycin 11/14 by VY. Found to have right basilar infiltrate on CXR, given x1 dose each vanc and cefepime, admitted to ICU for management of encephalopathy and sepsis 2/2 aspiration PNA.    Hospital course complicated by tachycardia and uncontrolled HTN. for which Lisinopril initiated and coreg resumed at half dose.  SBT with trach collar 11/17. Placed back on vent 14/400/40/5 at HS.  ABG on 11/18 7.53/40/73/34.plaints. BCx with GP cocci, continuing on cefepime, Flagyl, and vanc (day 1 11/14) and awaiting ID recs, Downgraded to AI on 11/18. REpeat BC on 11/19 NGTD . ID recommends IV Vanco x 28 days  (thru Dec 16th) from last negative BC, therefore will need PICC. PICC placed in LUE in IR.    Hospital course complicated by fevers. UA negative for UTI, CXR shows no consolidation. Pt developed diarrhea. Stool negative for CDiff.     ===============INCOMPLETE ====================   65F PMH obesity, COPD s/p trach and PEG (80 pack year hx, nonverbal at baseline), R lung cancer s/p resection (2017), HTN, and IDDM2 presented from [facility] with lethargy and AMS concerning for sepsis, started on Levaquin 11/13 and vancomycin 11/14 by VY. Found to have right basilar infiltrate on CXR, given x1 dose each vanc and cefepime, admitted to ICU for management of encephalopathy and sepsis 2/2 aspiration PNA.    Hospital course complicated by tachycardia and uncontrolled HTN. for which Lisinopril initiated and coreg resumed at half dose.  SBT with trach collar 11/17. Placed back on vent 14/400/40/5 at HS.  ABG on 11/18 7.53/40/73/34.plaints. BCx with GP cocci, continuing on cefepime, Flagyl, and vanc (day 1 11/14) and awaiting ID recs, Downgraded to AI on 11/18. REpeat BC on 11/19 NGTD . ID recommends IV Vanco x 28 days  (thru Dec 16th) from last negative BC, therefore will need PICC. PICC placed in LUE in IR.    Hospital course complicated by fevers. UA negative for UTI, CXR shows no consolidation. Pt developed diarrhea. Stool negative for CDiff.     Pt is cleared by attending for discharge today to [facility]. Vanco trough elevated at 25.5 for which Vanco is reduced to 750mg BID (from 1 gm BID), resume tomorrow. Monitor Vanco trough before 4th dose, weekly CBC and BMP.   This is brief summary of patient's hospitalization. Refer to medical record for complete details of hospital course.

## 2020-11-20 NOTE — PROGRESS NOTE ADULT - SUBJECTIVE AND OBJECTIVE BOX
BARB COLNÓ    SCU progress note    INTERVAL HPI/OVERNIGHT EVENTS: ***    DNR [ ]   DNI  [  ] Full code    Covid - 19 PCR: not detected 11/15    The 4Ms    What Matters Most: see GOC  Age appropriate Medications/Screen for High Risk Medication: Yes  Mentation: see CAM below  Mobility: defer to physical exam    The Confusion Assessment Method (CAM) Diagnostic Algorithm     1: Acute Onset or Fluctuating Course  - Is there evidence of an acute change in mental status from the patient’s baseline? Did the (abnormal) behavior  fluctuate during the day, that is, tend to come and go, or increase and decrease in severity?  [ ] YES [x ] NO     2: Inattention  - Did the patient have difficulty focusing attention, being easily distractible, or having difficulty keeping track of what was being said?  [ ] YES [ x] NO     3: Disorganized thinking  -Was the patient’s thinking disorganized or incoherent, such as rambling or irrelevant conversation, unclear or illogical flow of ideas, or unpredictable switching from subject to subject?  [ ] YES [x ] NO    4: Altered Level of consciousness?  [ ] YES [x ] NO    The diagnosis of delirium by CAM requires the presence of features 1 and 2 and either 3 or 4.    PRESSORS: [ ] YES [x ] NO  vancomycin  IVPB 1000 milliGRAM(s) IV Intermittent every 12 hours    Cardiovascular:  Heart Failure  Acute   Acute on Chronic  Chronic       carvedilol 6.25 milliGRAM(s) Oral every 12 hours  lisinopril 5 milliGRAM(s) Oral daily    Pulmonary:  ALBUTerol    90 MICROgram(s) HFA Inhaler 2 Puff(s) Inhalation every 6 hours PRN    Hematalogic:  heparin   Injectable 5000 Unit(s) SubCutaneous every 8 hours    Other:  acetaminophen    Suspension .. 650 milliGRAM(s) Oral every 6 hours PRN  chlorhexidine 0.12% Liquid 15 milliLiter(s) Oral Mucosa every 12 hours  chlorhexidine 2% Cloths 1 Application(s) Topical <User Schedule>  influenza   Vaccine 0.5 milliLiter(s) IntraMuscular once  insulin glargine Injectable (LANTUS) 10 Unit(s) SubCutaneous every morning  insulin lispro (ADMELOG) corrective regimen sliding scale   SubCutaneous every 6 hours  insulin lispro Injectable (ADMELOG) 4 Unit(s) SubCutaneous every 6 hours  pantoprazole  Injectable 40 milliGRAM(s) IV Push daily  potassium chloride   Powder 40 milliEquivalent(s) Oral once  potassium chloride  10 mEq/100 mL IVPB 10 milliEquivalent(s) IV Intermittent once    acetaminophen    Suspension .. 650 milliGRAM(s) Oral every 6 hours PRN  ALBUTerol    90 MICROgram(s) HFA Inhaler 2 Puff(s) Inhalation every 6 hours PRN  carvedilol 6.25 milliGRAM(s) Oral every 12 hours  chlorhexidine 0.12% Liquid 15 milliLiter(s) Oral Mucosa every 12 hours  chlorhexidine 2% Cloths 1 Application(s) Topical <User Schedule>  heparin   Injectable 5000 Unit(s) SubCutaneous every 8 hours  influenza   Vaccine 0.5 milliLiter(s) IntraMuscular once  insulin glargine Injectable (LANTUS) 10 Unit(s) SubCutaneous every morning  insulin lispro (ADMELOG) corrective regimen sliding scale   SubCutaneous every 6 hours  insulin lispro Injectable (ADMELOG) 4 Unit(s) SubCutaneous every 6 hours  lisinopril 5 milliGRAM(s) Oral daily  pantoprazole  Injectable 40 milliGRAM(s) IV Push daily  potassium chloride   Powder 40 milliEquivalent(s) Oral once  potassium chloride  10 mEq/100 mL IVPB 10 milliEquivalent(s) IV Intermittent once  vancomycin  IVPB 1000 milliGRAM(s) IV Intermittent every 12 hours    Drug Dosing Weight  Height (cm): 154 (14 Nov 2020 20:16)  Weight (kg): 89.9 (14 Nov 2020 20:16)  BMI (kg/m2): 37.9 (14 Nov 2020 20:16)  BSA (m2): 1.87 (14 Nov 2020 20:16)    CENTRAL LINE: [ ] YES [x ] NO  LOCATION:   DATE INSERTED:  REMOVE: [ ] YES [ ] NO  EXPLAIN:    ROBLEDO: [ ] YES [ x] NO    DATE INSERTED:  REMOVE:  [ ] YES [ ] NO  EXPLAIN:    PAST MEDICAL & SURGICAL HISTORY:  Malignant neoplasm of unspecified part of right bronchus or lung    HTN (hypertension)    DM (diabetes mellitus)    COPD (chronic obstructive pulmonary disease)    Lung cancer    Morbid obesity    GERD (gastroesophageal reflux disease)    Deviated septum    Prolapsed uterus    ITP (idiopathic thrombocytopenic purpura)    Emphysema/COPD    History of cholecystectomy    S/P lobectomy of lung  right 2017    History of tonsillectomy                  Mode: AC/ CMV (Assist Control/ Continuous Mandatory Ventilation)  RR (machine): 14  TV (machine): 400  FiO2: 40  PEEP: 5  ITime: 1  MAP: 10  PIP: 25      PHYSICAL EXAM:    GENERAL: NAD, well-groomed, well-developed  HEAD:  Atraumatic, Normocephalic  EYES: EOMI, PERRLA, conjunctiva and sclera clear  ENMT: No tonsillar erythema, exudates, or enlargement; Moist mucous membranes, Good dentition, No lesions  NECK: Supple, No JVD, Trach   NERVOUS SYSTEM:  Alert & Oriented X3, follows commands,  Motor Strength 4/5 B/L upper , 3-4/5 BLE ,   CHEST/LUNG: Trach to vent . Clear upper lobes, decreased at bases. ; No rales, rhonchi, wheezing, or rubs  HEART: Regular rate and rhythm; No murmurs, rubs, or gallops  ABDOMEN: Soft, Nontender, Nondistended; Bowel sounds present. PEG tube intact.   EXTREMITIES: RUE edematous 1+   LYMPH: No lymphadenopathy noted  SKIN: warm dry      LABS:  CBC Full  -  ( 20 Nov 2020 07:05 )  WBC Count : 9.62 K/uL  RBC Count : 3.71 M/uL  Hemoglobin : 10.1 g/dL  Hematocrit : 33.5 %  Platelet Count - Automated : 158 K/uL  Mean Cell Volume : 90.3 fl  Mean Cell Hemoglobin : 27.2 pg  Mean Cell Hemoglobin Concentration : 30.1 gm/dL  Auto Neutrophil # : x  Auto Lymphocyte # : x  Auto Monocyte # : x  Auto Eosinophil # : x  Auto Basophil # : x  Auto Neutrophil % : x  Auto Lymphocyte % : x  Auto Monocyte % : x  Auto Eosinophil % : x  Auto Basophil % : x    11-20    139  |  99  |  11  ----------------------------<  135<H>  3.2<L>   |  33<H>  |  0.36<L>    Ca    8.3<L>      20 Nov 2020 07:05  Phos  2.6     11-20  Mg     2.0     11-20    TPro  6.5  /  Alb  2.0<L>  /  TBili  0.6  /  DBili  x   /  AST  21  /  ALT  24  /  AlkPhos  158<H>  11-19              [  ]  DVT Prophylaxis  [  ]  Nutrition, Brand, Rate         Goal Rate        Abnormal Nutritional Status -  Malnutrition   Cachexia      Morbid Obesity BMI >/=40    RADIOLOGY & ADDITIONAL STUDIES:  ***    Goals of Care Discussion with Family/Proxy/Other   - see note from/family meeting set up for...     BARB COLÓN    SCU progress note    INTERVAL HPI/OVERNIGHT EVENTS: ***Pt seen and examined earlier  this morning. Mouths words and reports " feeling good, breathing good. " Denies SOB, chest pain, (+) nausea, no vomiting, no abdominal discomfort.   Resp Therapist reports pt tolerated 1 hr of SBT , with c/o SOB and tachypnea. Pt now back on AC mode.     DNR [ ]   DNI  [  ] Full code    Covid - 19 PCR: not detected 11/15    The 4Ms    What Matters Most: see GOC  Age appropriate Medications/Screen for High Risk Medication: Yes  Mentation: see CAM below  Mobility: defer to physical exam    The Confusion Assessment Method (CAM) Diagnostic Algorithm     1: Acute Onset or Fluctuating Course  - Is there evidence of an acute change in mental status from the patient’s baseline? Did the (abnormal) behavior  fluctuate during the day, that is, tend to come and go, or increase and decrease in severity?  [ ] YES [x ] NO     2: Inattention  - Did the patient have difficulty focusing attention, being easily distractible, or having difficulty keeping track of what was being said?  [ ] YES [ x] NO     3: Disorganized thinking  -Was the patient’s thinking disorganized or incoherent, such as rambling or irrelevant conversation, unclear or illogical flow of ideas, or unpredictable switching from subject to subject?  [ ] YES [x ] NO    4: Altered Level of consciousness?  [ ] YES [x ] NO    The diagnosis of delirium by CAM requires the presence of features 1 and 2 and either 3 or 4.    PRESSORS: [ ] YES [x ] NO  vancomycin  IVPB 1000 milliGRAM(s) IV Intermittent every 12 hours    Cardiovascular:  Heart Failure  Acute   Acute on Chronic  Chronic       carvedilol 6.25 milliGRAM(s) Oral every 12 hours  lisinopril 5 milliGRAM(s) Oral daily    Pulmonary:  ALBUTerol    90 MICROgram(s) HFA Inhaler 2 Puff(s) Inhalation every 6 hours PRN    Hematalogic:  heparin   Injectable 5000 Unit(s) SubCutaneous every 8 hours    Other:  acetaminophen    Suspension .. 650 milliGRAM(s) Oral every 6 hours PRN  chlorhexidine 0.12% Liquid 15 milliLiter(s) Oral Mucosa every 12 hours  chlorhexidine 2% Cloths 1 Application(s) Topical <User Schedule>  influenza   Vaccine 0.5 milliLiter(s) IntraMuscular once  insulin glargine Injectable (LANTUS) 10 Unit(s) SubCutaneous every morning  insulin lispro (ADMELOG) corrective regimen sliding scale   SubCutaneous every 6 hours  insulin lispro Injectable (ADMELOG) 4 Unit(s) SubCutaneous every 6 hours  pantoprazole  Injectable 40 milliGRAM(s) IV Push daily  potassium chloride   Powder 40 milliEquivalent(s) Oral once  potassium chloride  10 mEq/100 mL IVPB 10 milliEquivalent(s) IV Intermittent once    acetaminophen    Suspension .. 650 milliGRAM(s) Oral every 6 hours PRN  ALBUTerol    90 MICROgram(s) HFA Inhaler 2 Puff(s) Inhalation every 6 hours PRN  carvedilol 6.25 milliGRAM(s) Oral every 12 hours  chlorhexidine 0.12% Liquid 15 milliLiter(s) Oral Mucosa every 12 hours  chlorhexidine 2% Cloths 1 Application(s) Topical <User Schedule>  heparin   Injectable 5000 Unit(s) SubCutaneous every 8 hours  influenza   Vaccine 0.5 milliLiter(s) IntraMuscular once  insulin glargine Injectable (LANTUS) 10 Unit(s) SubCutaneous every morning  insulin lispro (ADMELOG) corrective regimen sliding scale   SubCutaneous every 6 hours  insulin lispro Injectable (ADMELOG) 4 Unit(s) SubCutaneous every 6 hours  lisinopril 5 milliGRAM(s) Oral daily  pantoprazole  Injectable 40 milliGRAM(s) IV Push daily  potassium chloride   Powder 40 milliEquivalent(s) Oral once  potassium chloride  10 mEq/100 mL IVPB 10 milliEquivalent(s) IV Intermittent once  vancomycin  IVPB 1000 milliGRAM(s) IV Intermittent every 12 hours    Drug Dosing Weight  Height (cm): 154 (14 Nov 2020 20:16)  Weight (kg): 89.9 (14 Nov 2020 20:16)  BMI (kg/m2): 37.9 (14 Nov 2020 20:16)  BSA (m2): 1.87 (14 Nov 2020 20:16)    CENTRAL LINE: [ ] YES [x ] NO  LOCATION:   DATE INSERTED:  REMOVE: [ ] YES [ ] NO  EXPLAIN:    ROBLEDO: [ ] YES [ x] NO    DATE INSERTED:  REMOVE:  [ ] YES [ ] NO  EXPLAIN:    PAST MEDICAL & SURGICAL HISTORY:  Malignant neoplasm of unspecified part of right bronchus or lung    HTN (hypertension)    DM (diabetes mellitus)    COPD (chronic obstructive pulmonary disease)    Lung cancer    Morbid obesity    GERD (gastroesophageal reflux disease)    Deviated septum    Prolapsed uterus    ITP (idiopathic thrombocytopenic purpura)    Emphysema/COPD    History of cholecystectomy    S/P lobectomy of lung  right 2017    History of tonsillectomy                  Mode: AC/ CMV (Assist Control/ Continuous Mandatory Ventilation)  RR (machine): 14  TV (machine): 400  FiO2: 40  PEEP: 5  ITime: 1  MAP: 10  PIP: 25      PHYSICAL EXAM:    GENERAL: NAD, well-groomed, well-developed  HEAD:  Atraumatic, Normocephalic  EYES: EOMI, PERRLA, conjunctiva and sclera clear  ENMT: No tonsillar erythema, exudates, or enlargement; Moist mucous membranes, Good dentition, No lesions  NECK: Supple, No JVD, Trach   NERVOUS SYSTEM:  Alert & Oriented X3, follows commands,  Motor Strength 4/5 B/L upper , 3-4/5 BLE ,   CHEST/LUNG: Trach to vent . Clear upper lobes, decreased at bases. ; No rales, rhonchi, wheezing, or rubs  HEART: Regular rate and rhythm; No murmurs, rubs, or gallops  ABDOMEN: Soft, Nontender, Nondistended; Bowel sounds present. PEG tube intact.   EXTREMITIES: RUE edematous 1+   LYMPH: No lymphadenopathy noted  SKIN: warm dry      LABS:  CBC Full  -  ( 20 Nov 2020 07:05 )  WBC Count : 9.62 K/uL  RBC Count : 3.71 M/uL  Hemoglobin : 10.1 g/dL  Hematocrit : 33.5 %  Platelet Count - Automated : 158 K/uL  Mean Cell Volume : 90.3 fl  Mean Cell Hemoglobin : 27.2 pg  Mean Cell Hemoglobin Concentration : 30.1 gm/dL  Auto Neutrophil # : x  Auto Lymphocyte # : x  Auto Monocyte # : x  Auto Eosinophil # : x  Auto Basophil # : x  Auto Neutrophil % : x  Auto Lymphocyte % : x  Auto Monocyte % : x  Auto Eosinophil % : x  Auto Basophil % : x    11-20    139  |  99  |  11  ----------------------------<  135<H>  3.2<L>   |  33<H>  |  0.36<L>    Ca    8.3<L>      20 Nov 2020 07:05  Phos  2.6     11-20  Mg     2.0     11-20    TPro  6.5  /  Alb  2.0<L>  /  TBili  0.6  /  DBili  x   /  AST  21  /  ALT  24  /  AlkPhos  158<H>  11-19              [  ]  DVT Prophylaxis  [  ]  Nutrition, Brand, Rate         Goal Rate        Abnormal Nutritional Status -  Malnutrition   Cachexia      Morbid Obesity BMI >/=40    RADIOLOGY & ADDITIONAL STUDIES:  ***    Goals of Care Discussion with Family/Proxy/Other   - see note from/family meeting set up for...

## 2020-11-20 NOTE — PROGRESS NOTE ADULT - SUBJECTIVE AND OBJECTIVE BOX
Interval Events:    receiving TF  poc glucose improved  bolus reduced yesterday    Allergies    fish (Angioedema)  penicillins (Rash)    Intolerances      Endocrine/Metabolic Medications:  insulin glargine Injectable (LANTUS) 10 Unit(s) SubCutaneous every morning  insulin lispro (ADMELOG) corrective regimen sliding scale   SubCutaneous every 6 hours  insulin lispro Injectable (ADMELOG) 4 Unit(s) SubCutaneous every 6 hours      Vital Signs Last 24 Hrs  T(C): 36.8 (20 Nov 2020 11:46), Max: 37.4 (20 Nov 2020 05:05)  T(F): 98.2 (20 Nov 2020 11:46), Max: 99.3 (20 Nov 2020 05:05)  HR: 98 (20 Nov 2020 11:46) (83 - 116)  BP: 125/65 (20 Nov 2020 11:46) (107/59 - 130/61)  BP(mean): --  RR: 18 (20 Nov 2020 11:46) (18 - 24)  SpO2: 98% (20 Nov 2020 11:46) (98% - 100%)      PHYSICAL EXAM  Constitutional:  s/p trach on vent  NC/AT:    HEENT:    Neck:  No JVD  Respiratory:  reduced breath sounds b/l bases    Cardiovascular:  RR     Gastrointestinal: Soft     Extremities: without cyanosis    Neurological:  non focal    LABS                        10.1   9.62  )-----------( 158      ( 20 Nov 2020 07:05 )             33.5                               139    |  99     |  11                  Calcium: 8.3   / iCa: x      (11-20 @ 07:05)    ----------------------------<  135       Magnesium: 2.0                              3.2     |  33     |  0.36             Phosphorous: 2.6        CAPILLARY BLOOD GLUCOSE      POCT Blood Glucose.: 209 mg/dL (20 Nov 2020 11:27)  POCT Blood Glucose.: 157 mg/dL (20 Nov 2020 07:49)  POCT Blood Glucose.: 153 mg/dL (20 Nov 2020 05:53)  POCT Blood Glucose.: 128 mg/dL (19 Nov 2020 23:37)  POCT Blood Glucose.: 164 mg/dL (19 Nov 2020 17:04)  POCT Blood Glucose.: 169 mg/dL (19 Nov 2020 12:15)        Assesment/plan      66 yo female with multiple comorbidities including h/o DM type 2, HTN, COPD s/p trach on vent, lung ca on right sided s/p resection on chemo admitted with lethargy    endocrinology consulted for glycemic management    DM type 2  uncontrolled  complicated by high dose steroid use, acute on chronic resp failure  s/p trach    recommendations:  s/p trach/PEG  poc glucose tightly controlled >> improved  bolus reduced yesterday  continue current regimen for today    - continue lantus 10 units daily in am   - continue insulin lispro to 4 units q6h, hold if TF are held  - continue low dose sliding scale  - reassess based on requirements  - goal inpatient glucose range: 140-180mg/dl    COPD exacerbation  acute on chronic hypoxic resp failure  s/p trach  on standing steroids- on reduced dose of prednisone 20mg daily  pulmonary on board  s/p trach/PEG  on cefepime, flagyl    sepsis  on cefepime, flagyl  optimize glycemic regimen    Discussed with primary team   Please call  Endocrine- Dr aKtlyn Garcia as needed

## 2020-11-20 NOTE — DISCHARGE NOTE PROVIDER - NSDCCPCAREPLAN_GEN_ALL_CORE_FT
PRINCIPAL DISCHARGE DIAGNOSIS  Diagnosis: Sepsis  Assessment and Plan of Treatment: due to MRSA bacteremia vs Aspiration Pneumonia  Continue IV antibiotic as prescribed .  Monitor weekly CBC , BMP, Vanco trough .  Maintain adequate hydration, nutrition.  Activity as tolerated.  Report to your doctor any changes  in your condition and or worsening symptoms.  Continue further treatment and management as per medical staff at next facility.      SECONDARY DISCHARGE DIAGNOSES  Diagnosis: Altered mental status  Assessment and Plan of Treatment:      PRINCIPAL DISCHARGE DIAGNOSIS  Diagnosis: Sepsis  Assessment and Plan of Treatment: due to MRSA bacteremia vs Aspiration Pneumonia  Continue IV antibiotic as prescribed .  Monitor Vanco trough before 4 th dose.   Monitor weekly CBC , BMP,  Maintain adequate hydration, nutrition.  Activity as tolerated.  Report to your doctor any changes  in your condition and or worsening symptoms.  Continue further treatment and management as per medical staff at [facility].      SECONDARY DISCHARGE DIAGNOSES  Diagnosis: Bacteremia  Assessment and Plan of Treatment: MRSA bacteremia   Continue IV antibiotic as prescribed .  Monitor Vanco trough before 4 th dose.   Monitor weekly CBC , BMP,  Maintain adequate hydration, nutrition.  Activity as tolerated.  Report to your doctor any changes  in your condition and or worsening symptoms.  Continue further treatment and management as per medical staff at [facility].    Diagnosis: Pneumonia, aspiration  Assessment and Plan of Treatment: Chest Xray from 11/23 shows no active pneumonia.  Maintain aspiration precautions.   Maintain adequate hydration, nutrition.  Activity as tolerated.  Report to your doctor any changes  in your condition and or worsening symptoms.  Continue further treatment and management as per medical staff at [facility].    Diagnosis: COPD (chronic obstructive pulmonary disease)  Assessment and Plan of Treatment: Continue with trach to vent at current settings : Mode: CPAP with PS  FiO2: 40, PEEP: 5, PS: 10,   Continue with inhalers, prednisone   Trach care   Suction as needed.   Monitor oxygenation  Report to your doctor any changes  in your condition and or worsening symptoms.  Continue further treatment and management as per medical staff at [facility].      Diagnosis: HTN (hypertension)  Assessment and Plan of Treatment: Low salt diet  Activity as tolerated.  Take all medication as prescribed.  Follow up with your medical doctor for routine blood pressure monitoring at your next visit.  Notify your doctor if you have any of the following symptoms:   Dizziness, Lightheadedness, Blurry vision, Headache, Chest pain, Shortness of breath      Diagnosis: Anemia  Assessment and Plan of Treatment: Symptoms to report, bleeding, palpitations, fatigue, pale skin, cold skin, dizziness. Take medications as ordered by PCP      Diagnosis: DM (diabetes mellitus)  Assessment and Plan of Treatment: HgA1C this admission 6.6  Make sure you get your HgA1c checked every three months.  If you take oral diabetes medications, check your blood glucose two times a day.  If you take insulin, check your blood glucose before meals and at bedtime.  It's important not to skip any meals.  Keep a log of your blood glucose results and always take it with you to your doctor appointments.  Keep a list of your current medications including injectables and over the counter medications and bring this medication list with you to all your doctor appointments.  If you have not seen your ophthalmologist this year call for appointment.  Check your feet daily for redness, sores, or openings. Do not self treat. If no improvement in two days call your primary care physician for an appointment.  Low blood sugar (hypoglycemia) is a blood sugar below 70mg/dl. Check your blood sugar if you feel signs/symptoms of hypoglycemia. If your blood sugar is below 70 take 15 grams of carbohydrates (ex 4 oz of apple juice, 3-4 glucose tablets, or 4-6 oz of regular soda) wait 15 minutes and repeat blood sugar to make sure it comes up above 70.  If your blood sugar is above 70 and you are due for a meal, have a meal.  If you are not due for a meal have a snack.  This snack helps keeps your blood sugar at a safe range.      Diagnosis: Altered mental status  Assessment and Plan of Treatment: due to sepsis, bacteremia.  Back to baseline.  Maintain adequate hydration, nutrition.  Activity as tolerated.  Fall precautions  Report to your doctor any changes  in your condition and or worsening symptoms.  Continue further treatment and management as per medical staff at next facility.      Diagnosis: Lung cancer metastatic to bone  Assessment and Plan of Treatment: Maintain adequate hydration, nutrition.  Activity as tolerated.  Report to your doctor any changes  in your condition and or worsening symptoms.  Continue further treatment and management as per medical staff at next facility.

## 2020-11-20 NOTE — PROGRESS NOTE ADULT - ATTENDING COMMENTS
Needs long term antibiotics  Plan for PICC placement on Monday  Did not tolerated CPAP today   Cont. tube feedings   DVT prophylaxis

## 2020-11-20 NOTE — PROGRESS NOTE ADULT - SUBJECTIVE AND OBJECTIVE BOX
seen and examined vsstable afebrile physical unchaged comfortabl thien bed awake alert  not in any distress,  denies CP or sob or palp.  labs noted K 3.2  a/p  MRSA bacterimia  on vanco afebrile doing ok  repeat Blood cxs if negative may be PIcc line if ID agree  copd excer , lung ca  ocnt same   frequent change in position high risk for DU

## 2020-11-20 NOTE — PROGRESS NOTE ADULT - ASSESSMENT
65F PMH obesity, COPD s/p trach and PEG (80 pack year hx, nonverbal at baseline), R lung cancer s/p resection (2017), HTN, and IDDM2 presented from [facility] with lethargy and AMS concerning for sepsis, started on Levaquin 11/13 and vancomycin 11/14 by VY. Found to have right basilar infiltrate on CXR, given x1 dose each vanc and cefepime, admitted to ICU for management of encephalopathy and sepsis 2/2 aspiration PNA.    Hospital course complicated by tachycardia and uncontrolled HTN. for which Lisinopril initiated and coreg resumed at half dose.  SBT with trach collar 11/17. Placed back on vent 14/400/40/5 at HS.  ABG on 11/18 7.53/40/73/34.plaints. BCx with GP cocci, continuing on cefepime, Flagyl, and vanc (day 1 11/14) and awaiting ID recs, Downgraded to AI on 11/18. REpeat BC on 11/19 NGTD . ID recommends IV Vanco x 28 days from last negative BC, therefore will need PICC.

## 2020-11-20 NOTE — DISCHARGE NOTE PROVIDER - NSDCMRMEDTOKEN_GEN_ALL_CORE_FT
Acidophilus oral capsule: 1 cap(s) orally 2 times a day  albuterol 2.5 mg/3 mL (0.083%) inhalation solution: 3 milliliter(s) inhaled every 6 hours, As Needed for SOB  ALPRAZolam 0.5 mg oral tablet: 0.5 tab(s) orally once a day  atorvastatin 40 mg oral tablet: 1 tab(s) by gastrostomy tube once a day  Basaglar KwikPen 100 units/mL subcutaneous solution: 6 unit(s) subcutaneous once a day (at bedtime)  carvedilol 12.5 mg oral tablet: 1 tab(s) by gastrostomy tube every 12 hours  ergocalciferol 8000 intl units/mL (200 mcg/mL) oral solution: 6.25 milliliter(s) by gastrostomy tube once a week  folic acid 1 mg oral tablet: 1 tab(s) orally once a day  furosemide 20 mg oral tablet: 1 tab(s) orally once a day  gabapentin 600 mg oral tablet: 1 tab(s) by gastrostomy tube every 8 hours   insulin lispro: 2 Unit(s) if Glucose 151 - 200  4 Unit(s) if Glucose 201 - 250  6 Unit(s) if Glucose 251 - 300  8 Unit(s) if Glucose 301 - 350  10 Unit(s) if Glucose 351 - 400  12 Unit(s) if Glucose Greater Than 400  Melatonin 10 mg oral capsule: 1 cap(s) orally once a day (at bedtime)  omeprazole 2 mg/mL oral suspension: 20 milliliter(s) orally once a day  oxycodone-acetaminophen 5 mg-325 mg oral tablet: 1 tab(s) by gastrostomy tube every 4 hours, As Needed - 6)  predniSONE 10 mg oral tablet: 1 tab(s) by gastrostomy tube once a day  Pulmicort Respules 0.5 mg/2 mL inhalation suspension: 2 milliliter(s) inhaled 2 times a day  Sodium Chloride 1 g oral tablet: 1 tab(s) orally 2 times a day  Vitamin C 500 mg oral tablet: 1 tab(s) orally once a day   acetaminophen 160 mg/5 mL oral suspension: 20.31 milliliter(s) orally every 6 hours, As needed, Moderate Pain (4 - 6)  Acidophilus oral capsule: 1 cap(s) by gastrostomy tube 2 times a day  Admelog 100 units/mL injectable solution: Monitor blood glucose every 6 hours and cover with Admelog per sliding scale.    1 Unit(s) if Glucose 151 - 200  2 Unit(s) if Glucose 201 - 250  3 Unit(s) if Glucose 251 - 300  4 Unit(s) if Glucose 301 - 350  5 Unit(s) if Glucose 351 - 400  6 Unit(s) if Glucose Greater Than 400         albuterol 90 mcg/inh inhalation aerosol: 2 puff(s) inhaled every 6 hours, As needed, Shortness of Breath  atorvastatin 40 mg oral tablet: 1 tab(s) by gastrostomy tube once a day  Basaglar KwikPen 100 units/mL subcutaneous solution: 10 unit(s) subcutaneous once a day (at bedtime)  carvedilol 6.25 mg oral tablet: 1 tab(s) by gastrostomy tube every 12 hours  ergocalciferol 2000 intl units (50 mcg) oral capsule: 1 dose(s) by gastrostomy tube once a day  folic acid 1 mg oral tablet: 1 tab(s) by gastrostomy tube once a day  insulin lispro 100 units/mL injectable solution: 6 unit(s) subcutaneous every 6 hours. Hold if NPO or blood glucose &lt; 100mg/dL   lisinopril 5 mg oral tablet: 1 tab(s) orally once a day  Lovenox 40 mg/0.4 mL injectable solution: 40 milligram(s) subcutaneous once a day  nystatin 100,000 units/g topical powder: 1 application topically 2 times a day  omeprazole 2 mg/mL oral suspension: 20 milliliter(s) by gastrostomy tube once a day  predniSONE 10 mg oral tablet: 1 tab(s) by gastrostomy tube once a day  Pulmicort Respules 0.5 mg/2 mL inhalation suspension: 2 milliliter(s) inhaled 2 times a day  vancomycin 750 mg intravenous injection: 750 milligram(s) intravenous every 12 hours starting tomorrow thru 12/16/2020  Monitor Vancomycin trough before 4th dose.   Monitor CBC and renal function weekly.   Vitamin C 500 mg oral tablet: 1 tab(s) by gastrostomy tube once a day

## 2020-11-20 NOTE — PROGRESS NOTE ADULT - PROBLEM SELECTOR PLAN 2
likely due to sepsis vs acute respiratory failure with hypoxia vs metabolic  Head CT unremarkable  Pt improved, back to baseline  Sepsis  improved. C/w plan  per above  BUN/Creat improved  Maintaining adequate oxygenation   Monitor oxygenation

## 2020-11-21 LAB
ANION GAP SERPL CALC-SCNC: 6 MMOL/L — SIGNIFICANT CHANGE UP (ref 5–17)
BUN SERPL-MCNC: 9 MG/DL — SIGNIFICANT CHANGE UP (ref 7–18)
CALCIUM SERPL-MCNC: 8.3 MG/DL — LOW (ref 8.4–10.5)
CHLORIDE SERPL-SCNC: 98 MMOL/L — SIGNIFICANT CHANGE UP (ref 96–108)
CO2 SERPL-SCNC: 30 MMOL/L — SIGNIFICANT CHANGE UP (ref 22–31)
CREAT SERPL-MCNC: 0.36 MG/DL — LOW (ref 0.5–1.3)
GLUCOSE BLDC GLUCOMTR-MCNC: 136 MG/DL — HIGH (ref 70–99)
GLUCOSE BLDC GLUCOMTR-MCNC: 141 MG/DL — HIGH (ref 70–99)
GLUCOSE BLDC GLUCOMTR-MCNC: 157 MG/DL — HIGH (ref 70–99)
GLUCOSE BLDC GLUCOMTR-MCNC: 230 MG/DL — HIGH (ref 70–99)
GLUCOSE SERPL-MCNC: 142 MG/DL — HIGH (ref 70–99)
HCT VFR BLD CALC: 31 % — LOW (ref 34.5–45)
HGB BLD-MCNC: 9.5 G/DL — LOW (ref 11.5–15.5)
MAGNESIUM SERPL-MCNC: 1.8 MG/DL — SIGNIFICANT CHANGE UP (ref 1.6–2.6)
MCHC RBC-ENTMCNC: 27.4 PG — SIGNIFICANT CHANGE UP (ref 27–34)
MCHC RBC-ENTMCNC: 30.6 GM/DL — LOW (ref 32–36)
MCV RBC AUTO: 89.3 FL — SIGNIFICANT CHANGE UP (ref 80–100)
NRBC # BLD: 0 /100 WBCS — SIGNIFICANT CHANGE UP (ref 0–0)
PHOSPHATE SERPL-MCNC: 2.7 MG/DL — SIGNIFICANT CHANGE UP (ref 2.5–4.5)
PLATELET # BLD AUTO: 171 K/UL — SIGNIFICANT CHANGE UP (ref 150–400)
POTASSIUM SERPL-MCNC: 3.8 MMOL/L — SIGNIFICANT CHANGE UP (ref 3.5–5.3)
POTASSIUM SERPL-SCNC: 3.8 MMOL/L — SIGNIFICANT CHANGE UP (ref 3.5–5.3)
RBC # BLD: 3.47 M/UL — LOW (ref 3.8–5.2)
RBC # FLD: 16.3 % — HIGH (ref 10.3–14.5)
SODIUM SERPL-SCNC: 134 MMOL/L — LOW (ref 135–145)
VANCOMYCIN TROUGH SERPL-MCNC: 16 UG/ML — SIGNIFICANT CHANGE UP (ref 10–20)
WBC # BLD: 10.33 K/UL — SIGNIFICANT CHANGE UP (ref 3.8–10.5)
WBC # FLD AUTO: 10.33 K/UL — SIGNIFICANT CHANGE UP (ref 3.8–10.5)

## 2020-11-21 RX ORDER — NYSTATIN CREAM 100000 [USP'U]/G
1 CREAM TOPICAL
Refills: 0 | Status: DISCONTINUED | OUTPATIENT
Start: 2020-11-21 | End: 2020-11-25

## 2020-11-21 RX ORDER — INSULIN GLARGINE 100 [IU]/ML
10 INJECTION, SOLUTION SUBCUTANEOUS
Refills: 0 | Status: DISCONTINUED | OUTPATIENT
Start: 2020-11-21 | End: 2020-11-25

## 2020-11-21 RX ADMIN — INSULIN GLARGINE 10 UNIT(S): 100 INJECTION, SOLUTION SUBCUTANEOUS at 08:03

## 2020-11-21 RX ADMIN — Medication 250 MILLIGRAM(S): at 17:36

## 2020-11-21 RX ADMIN — Medication 2: at 12:27

## 2020-11-21 RX ADMIN — PANTOPRAZOLE SODIUM 40 MILLIGRAM(S): 20 TABLET, DELAYED RELEASE ORAL at 12:28

## 2020-11-21 RX ADMIN — HEPARIN SODIUM 5000 UNIT(S): 5000 INJECTION INTRAVENOUS; SUBCUTANEOUS at 14:31

## 2020-11-21 RX ADMIN — HEPARIN SODIUM 5000 UNIT(S): 5000 INJECTION INTRAVENOUS; SUBCUTANEOUS at 06:25

## 2020-11-21 RX ADMIN — Medication 4 UNIT(S): at 06:27

## 2020-11-21 RX ADMIN — CHLORHEXIDINE GLUCONATE 1 APPLICATION(S): 213 SOLUTION TOPICAL at 06:28

## 2020-11-21 RX ADMIN — Medication 250 MILLIGRAM(S): at 07:56

## 2020-11-21 RX ADMIN — CHLORHEXIDINE GLUCONATE 15 MILLILITER(S): 213 SOLUTION TOPICAL at 06:25

## 2020-11-21 RX ADMIN — LISINOPRIL 5 MILLIGRAM(S): 2.5 TABLET ORAL at 06:25

## 2020-11-21 RX ADMIN — CHLORHEXIDINE GLUCONATE 15 MILLILITER(S): 213 SOLUTION TOPICAL at 17:36

## 2020-11-21 RX ADMIN — Medication 4 UNIT(S): at 17:36

## 2020-11-21 RX ADMIN — CARVEDILOL PHOSPHATE 6.25 MILLIGRAM(S): 80 CAPSULE, EXTENDED RELEASE ORAL at 17:36

## 2020-11-21 RX ADMIN — Medication 10 MILLIGRAM(S): at 06:25

## 2020-11-21 RX ADMIN — CARVEDILOL PHOSPHATE 6.25 MILLIGRAM(S): 80 CAPSULE, EXTENDED RELEASE ORAL at 06:25

## 2020-11-21 RX ADMIN — Medication 4 UNIT(S): at 12:27

## 2020-11-21 RX ADMIN — HEPARIN SODIUM 5000 UNIT(S): 5000 INJECTION INTRAVENOUS; SUBCUTANEOUS at 22:26

## 2020-11-21 NOTE — PROGRESS NOTE ADULT - SUBJECTIVE AND OBJECTIVE BOX
Time of Visit:  Patient seen and examined.     MEDICATIONS  (STANDING):  carvedilol 6.25 milliGRAM(s) Oral every 12 hours  chlorhexidine 0.12% Liquid 15 milliLiter(s) Oral Mucosa every 12 hours  chlorhexidine 2% Cloths 1 Application(s) Topical <User Schedule>  heparin   Injectable 5000 Unit(s) SubCutaneous every 8 hours  influenza   Vaccine 0.5 milliLiter(s) IntraMuscular once  insulin glargine Injectable (LANTUS) 10 Unit(s) SubCutaneous <User Schedule>  insulin lispro (ADMELOG) corrective regimen sliding scale   SubCutaneous every 6 hours  insulin lispro Injectable (ADMELOG) 4 Unit(s) SubCutaneous every 6 hours  lisinopril 5 milliGRAM(s) Oral daily  pantoprazole  Injectable 40 milliGRAM(s) IV Push daily  predniSONE   Tablet 10 milliGRAM(s) Oral daily  vancomycin  IVPB 1000 milliGRAM(s) IV Intermittent every 12 hours      MEDICATIONS  (PRN):  acetaminophen    Suspension .. 650 milliGRAM(s) Oral every 6 hours PRN Moderate Pain (4 - 6)  ALBUTerol    90 MICROgram(s) HFA Inhaler 2 Puff(s) Inhalation every 6 hours PRN Shortness of Breath       Medications up to date at time of exam.      PHYSICAL EXAMINATION:  Patient has no new complaints.  GENERAL: The patient is a well-developed, well-nourished, in no apparent distress.     Vital Signs Last 24 Hrs  T(C): 36.2 (21 Nov 2020 13:02), Max: 36.6 (21 Nov 2020 05:20)  T(F): 97.2 (21 Nov 2020 13:02), Max: 97.9 (21 Nov 2020 05:20)  HR: 109 (21 Nov 2020 13:15) (104 - 115)  BP: 127/61 (21 Nov 2020 13:02) (103/41 - 128/73)  BP(mean): --  RR: 18 (21 Nov 2020 13:02) (18 - 24)  SpO2: 98% (21 Nov 2020 13:15) (96% - 100%)  Mode: AC/ CMV (Assist Control/ Continuous Mandatory Ventilation)  RR (machine): 14  TV (machine): 400  FiO2: 40  PEEP: 5  ITime: 1  MAP: 12  PIP: 18   (if applicable)    Chest Tube (if applicable)    HEENT: Head is normocephalic and atraumatic. Extraocular muscles are intact. Mucous membranes are moist.     NECK: + trach    LUNGS: Clear to auscultation, no wheezing, rales, or rhonchi.    HEART: Regular rate and rhythm without murmur.    ABDOMEN: Soft, nontender, and nondistended.  No hepatosplenomegaly is noted. + PEG feed    : No painful voiding, no pelvic pain    EXTREMITIES: Without any cyanosis, clubbing, rash, lesions or edema.    NEUROLOGIC: Awake, alert,    SKIN: Warm, dry, good turgor.      LABS:                        9.5    10.33 )-----------( 171      ( 21 Nov 2020 07:00 )             31.0     11-21    134<L>  |  98  |  9   ----------------------------<  142<H>  3.8   |  30  |  0.36<L>    Ca    8.3<L>      21 Nov 2020 07:00  Phos  2.7     11-21  Mg     1.8     11-21                          MICROBIOLOGY: (if applicable)    RADIOLOGY & ADDITIONAL STUDIES:  EKG:   CXR:  ECHO:    IMPRESSION: 65y Female PAST MEDICAL & SURGICAL HISTORY:  Malignant neoplasm of unspecified part of right bronchus or lung    HTN (hypertension)    DM (diabetes mellitus)    COPD (chronic obstructive pulmonary disease)    Lung cancer    Morbid obesity    GERD (gastroesophageal reflux disease)    Deviated septum    Prolapsed uterus    ITP (idiopathic thrombocytopenic purpura)    Emphysema/COPD    History of cholecystectomy    S/P lobectomy of lung  right 2017    History of tonsillectomy     p/w           IMP: This is a 65 yr old  obese female with history of 80 pack year tobacco use, HTN, DM2 on insulin, COPD s/p trach(on vent) and PEG, R lung cancer s/p resection (2017), presents to the ED for [facility]. Admitting to ICU for encephalopathy and sepsis secondary to possible aspiration pneumonia.    - Sepsis/ bacteria   - Resp failure  s/p trach  - Aspiration pneumonia  - Hypotension  - Encephalopathy  - COPD s/p trach and peg  - MARYAM  - DM uncontrol       Plan   -continue antibx  -monitor vanc level  -f/u cultures  -continue vent support  -adjust vent as per ABG  -hemodynamic support  -blood sugar control   -dvt/gi prophy  -PEG feed .  -management per ICU team

## 2020-11-21 NOTE — PROGRESS NOTE ADULT - SUBJECTIVE AND OBJECTIVE BOX
BARB COLÓN    SCU progress note: chart reviewed, pt seen and examined, d/w rounding attending    INTERVAL HPI/OVERNIGHT EVENTS: no significant issues overnight, no complaints offered    FULL CODE  long term ventilation / long term feeding tube / trial IV fluids / use abx / NO LIMITATION on medical interventions.    Covid - 19 PCR: not detected 11/15/2020    The 4Ms    What Matters Most: see GOC  Age appropriate Medications/Screen for High Risk Medication: Yes  Mentation: see CAM below  Mobility: defer to physical exam    The Confusion Assessment Method (CAM) Diagnostic Algorithm     1: Acute Onset or Fluctuating Course  - Is there evidence of an acute change in mental status from the patient’s baseline? Did the (abnormal) behavior  fluctuate during the day, that is, tend to come and go, or increase and decrease in severity?  [ ] YES [x] NO     2: Inattention  - Did the patient have difficulty focusing attention, being easily distractible, or having difficulty keeping track of what was being said?  [ ] YES [ x] NO     3: Disorganized thinking  -Was the patient’s thinking disorganized or incoherent, such as rambling or irrelevant conversation, unclear or illogical flow of ideas, or unpredictable switching from subject to subject?  [ ] YES [x] NO    4: Altered Level of consciousness?  [ ] YES [x] NO    The diagnosis of delirium by CAM requires the presence of features 1 and 2 and either 3 or 4.    PRESSORS: [ ] YES [x ] NO    vancomycin  IVPB 1000 milliGRAM(s) IV Intermittent every 12 hours    Cardiovascular:  Heart Failure  Acute   Acute on Chronic  Chronic       carvedilol 6.25 milliGRAM(s) Oral every 12 hours  lisinopril 5 milliGRAM(s) Oral daily    Pulmonary:  ALBUTerol    90 MICROgram(s) HFA Inhaler 2 Puff(s) Inhalation every 6 hours PRN    Hematalogic:  heparin   Injectable 5000 Unit(s) SubCutaneous every 8 hours    Other:  acetaminophen    Suspension .. 650 milliGRAM(s) Oral every 6 hours PRN  chlorhexidine 0.12% Liquid 15 milliLiter(s) Oral Mucosa every 12 hours  chlorhexidine 2% Cloths 1 Application(s) Topical <User Schedule>  influenza   Vaccine 0.5 milliLiter(s) IntraMuscular once  insulin glargine Injectable (LANTUS) 10 Unit(s) SubCutaneous <User Schedule>  insulin lispro (ADMELOG) corrective regimen sliding scale   SubCutaneous every 6 hours  insulin lispro Injectable (ADMELOG) 4 Unit(s) SubCutaneous every 6 hours  pantoprazole  Injectable 40 milliGRAM(s) IV Push daily  predniSONE   Tablet 10 milliGRAM(s) Oral daily    acetaminophen    Suspension .. 650 milliGRAM(s) Oral every 6 hours PRN  ALBUTerol    90 MICROgram(s) HFA Inhaler 2 Puff(s) Inhalation every 6 hours PRN  carvedilol 6.25 milliGRAM(s) Oral every 12 hours  chlorhexidine 0.12% Liquid 15 milliLiter(s) Oral Mucosa every 12 hours  chlorhexidine 2% Cloths 1 Application(s) Topical <User Schedule>  heparin   Injectable 5000 Unit(s) SubCutaneous every 8 hours  influenza   Vaccine 0.5 milliLiter(s) IntraMuscular once  insulin glargine Injectable (LANTUS) 10 Unit(s) SubCutaneous <User Schedule>  insulin lispro (ADMELOG) corrective regimen sliding scale   SubCutaneous every 6 hours  insulin lispro Injectable (ADMELOG) 4 Unit(s) SubCutaneous every 6 hours  lisinopril 5 milliGRAM(s) Oral daily  pantoprazole  Injectable 40 milliGRAM(s) IV Push daily  predniSONE   Tablet 10 milliGRAM(s) Oral daily  vancomycin  IVPB 1000 milliGRAM(s) IV Intermittent every 12 hours    Drug Dosing Weight  Height (cm): 154 (14 Nov 2020 20:16)  Weight (kg): 89.9 (14 Nov 2020 20:16)  BMI (kg/m2): 37.9 (14 Nov 2020 20:16)  BSA (m2): 1.87 (14 Nov 2020 20:16)    CENTRAL LINE: [ ] YES [ ] NO  LOCATION:   DATE INSERTED:  REMOVE: [ ] YES [ ] NO  EXPLAIN:    ROBLEDO: [ ] YES [ ] NO    DATE INSERTED:  REMOVE:  [ ] YES [ ] NO  EXPLAIN:    PAST MEDICAL & SURGICAL HISTORY:  Malignant neoplasm of unspecified part of right bronchus or lung    HTN (hypertension)    DM (diabetes mellitus)    COPD (chronic obstructive pulmonary disease)    Lung cancer    Morbid obesity    GERD (gastroesophageal reflux disease)    Deviated septum    Prolapsed uterus    ITP (idiopathic thrombocytopenic purpura)    Emphysema/COPD    History of cholecystectomy    S/P lobectomy of lung  right 2017    History of tonsillectomy      11-20 @ 07:01  -  11-21 @ 07:00  --------------------------------------------------------  IN: 0 mL / OUT: 700 mL / NET: -700 mL        Mode: AC/ CMV (Assist Control/ Continuous Mandatory Ventilation)  RR (machine): 14  TV (machine): 400  FiO2: 40  PEEP: 5  ITime: 1  MAP: 12.2  PIP: 26      PHYSICAL EXAM:        LABS:  CBC Full  -  ( 21 Nov 2020 07:00 )  WBC Count : 10.33 K/uL  RBC Count : 3.47 M/uL  Hemoglobin : 9.5 g/dL  Hematocrit : 31.0 %  Platelet Count - Automated : 171 K/uL  Mean Cell Volume : 89.3 fl  Mean Cell Hemoglobin : 27.4 pg  Mean Cell Hemoglobin Concentration : 30.6 gm/dL  Auto Neutrophil # : x  Auto Lymphocyte # : x  Auto Monocyte # : x  Auto Eosinophil # : x  Auto Basophil # : x  Auto Neutrophil % : x  Auto Lymphocyte % : x  Auto Monocyte % : x  Auto Eosinophil % : x  Auto Basophil % : x    11-21    134<L>  |  98  |  9   ----------------------------<  142<H>  3.8   |  30  |  0.36<L>    Ca    8.3<L>      21 Nov 2020 07:00  Phos  2.7     11-21  Mg     1.8     11-21      [x]  DVT Prophylaxis: heparin    RADIOLOGY & ADDITIONAL STUDIES:    < from: Xray Chest 1 View- PORTABLE-Routine (Xray Chest 1 View- PORTABLE-Routine in AM.) (11.19.20 @ 11:04) >  FINDINGS:  Tracheostomy tube is unchanged.  The heart is normal in size.  Interval mild increase in bilateral pleural effusions.  Cannot exclude underlying infiltrate or atelectasis. There is no pneumothorax.  No acute bony finding.    IMPRESSION:  Since prior study, mild increase in bilateral pleural effusions. Cannot exclude underlying infiltrate or atelectasis.    < end of copied text >      Goals of Care Discussion: see palliative consult note for 11/17/2020

## 2020-11-21 NOTE — PROGRESS NOTE ADULT - ASSESSMENT
seen and examined vsstable afebrile physical unchanged  awake on bed  denies cp or sob  mary bd pain  labs noted  blood cxs neg   a/p MRSA bacterimia  on vanco  trough 16  blood cxs negative  needs ID follow up for IV antibx duration   wtach accuchecks   warch hgb   trach vent  pulm critical

## 2020-11-21 NOTE — PROGRESS NOTE ADULT - ATTENDING COMMENTS
Needs long term antibiotics  Plan for PICC placement on Monday  Cont. tube feedings   DVT prophylaxis

## 2020-11-21 NOTE — PROGRESS NOTE ADULT - PROBLEM SELECTOR PLAN 4
Spontaneous breathing trial during the day (PS 10/FiO2 40%/PEEP 5) as tolerated.   Then back to AC  during night.  tapered donw to Prednisone 10mg daily  con't bronchodilator  Monitor oxygenation

## 2020-11-21 NOTE — PROGRESS NOTE ADULT - ASSESSMENT
65F PMH obesity, COPD s/p trach and PEG (80 pack year hx, nonverbal at baseline), R lung cancer s/p resection (2017), HTN, and IDDM2 presented from [facility] with lethargy and AMS concerning for sepsis, started on Levaquin 11/13 and vancomycin 11/14 by VY. Found to have right basilar infiltrate on CXR, given x1 dose each vanc and cefepime, initially admitted for management of encephalopathy and sepsis 2/2 aspiration PNA. Pt initially admitted to ICU    Hospital course complicated by tachycardia and uncontrolled HTN. for which Lisinopril initiated and coreg resumed at half dose.  SBT with trach collar 11/17. Placed back on vent 14/400/40/5 at HS.  ABG on 11/18 7.53/40/73/34.plaints. BCx with GP cocci, continuing on cefepime, Flagyl, and vanc (day 1 11/14) Downgraded to AI on 11/18. REpeat BC on 11/19 NGTD . ID recommends IV Vanco x 28 days from last negative BC, therefore will need PICC.

## 2020-11-21 NOTE — PROGRESS NOTE ADULT - SUBJECTIVE AND OBJECTIVE BOX
HPI:  66 y/o obese female with history of 80 pack year tobacco use, HTN, DM2 on insulin, COPD s/p trach(on vent) and PEG, R lung cancer s/p resection (2017), presents to the ED for AMS from [facility] . Patient noted to be lethargic with concern for sepsis hence referred to ER.  As per paperwork, patient is nonverbal at baseline, also noted to have been started on levaquin on 11/13 with addition of vancomycin overnight. Patient only opens eyes in response to verbal stimulus, currently unable to provide history.     Patient was noted to have temp of 100.1, initially hemodynamically sale however bp dropped to 78 for which she received 2l bolus of NS. She had wbc count of 14.9 with left shift, lactate 3.3, bun/cr of 32/1.58, sodium 130, UA -ve, cxr with right basilar infiltrate. Was given a dose each of vanc and cefepime.   GOC: confirmed with daughter Lisa, full code. Consent for central line obtained.    (14 Nov 2020 18:24)      Patient is a 65y old  Female who presents with a chief complaint of lethargy (21 Nov 2020 14:48)      INTERVAL HPI/OVERNIGHT EVENTS:  T(C): 36.2 (11-21-20 @ 13:02), Max: 36.6 (11-21-20 @ 05:20)  HR: 109 (11-21-20 @ 13:15) (104 - 115)  BP: 127/61 (11-21-20 @ 13:02) (103/41 - 128/73)  RR: 18 (11-21-20 @ 13:02) (18 - 24)  SpO2: 98% (11-21-20 @ 13:15) (96% - 100%)  Wt(kg): --  I&O's Summary    20 Nov 2020 07:01  -  21 Nov 2020 07:00  --------------------------------------------------------  IN: 0 mL / OUT: 700 mL / NET: -700 mL        REVIEW OF SYSTEMS: denies fever, chills, SOB, palpitations, chest pain, abdominal pain, nausea, vomitting, diarrhea, constipation, dizziness    MEDICATIONS  (STANDING):  carvedilol 6.25 milliGRAM(s) Oral every 12 hours  chlorhexidine 0.12% Liquid 15 milliLiter(s) Oral Mucosa every 12 hours  chlorhexidine 2% Cloths 1 Application(s) Topical <User Schedule>  heparin   Injectable 5000 Unit(s) SubCutaneous every 8 hours  influenza   Vaccine 0.5 milliLiter(s) IntraMuscular once  insulin glargine Injectable (LANTUS) 10 Unit(s) SubCutaneous <User Schedule>  insulin lispro (ADMELOG) corrective regimen sliding scale   SubCutaneous every 6 hours  insulin lispro Injectable (ADMELOG) 4 Unit(s) SubCutaneous every 6 hours  lisinopril 5 milliGRAM(s) Oral daily  pantoprazole  Injectable 40 milliGRAM(s) IV Push daily  predniSONE   Tablet 10 milliGRAM(s) Oral daily  vancomycin  IVPB 1000 milliGRAM(s) IV Intermittent every 12 hours    MEDICATIONS  (PRN):  acetaminophen    Suspension .. 650 milliGRAM(s) Oral every 6 hours PRN Moderate Pain (4 - 6)  ALBUTerol    90 MICROgram(s) HFA Inhaler 2 Puff(s) Inhalation every 6 hours PRN Shortness of Breath      PHYSICAL EXAM:  GENERAL: NAD, well-groomed, well-developed  HEAD:  Atraumatic, Normocephalic  EYES: EOMI, PERRLA, conjunctiva and sclera clear  ENMT: No tonsillar erythema, exudates, or enlargement; Moist mucous membranes, Good dentition, No lesions  NECK: Supple, No JVD, Normal thyroid  NERVOUS SYSTEM:  Alert & Oriented X3, Good concentration; Motor Strength 5/5 B/L upper and lower extremities; DTRs 2+ intact and symmetric  CHEST/LUNG: Clear to percussion bilaterally; No rales, rhonchi, wheezing, or rubs  HEART: Regular rate and rhythm; No murmurs, rubs, or gallops  ABDOMEN: Soft, Nontender, Nondistended; Bowel sounds present  EXTREMITIES:  2+ Peripheral Pulses, No clubbing, cyanosis, or edema  LYMPH: No lymphadenopathy noted  SKIN: No rashes or lesions  LABS:                        9.5    10.33 )-----------( 171      ( 21 Nov 2020 07:00 )             31.0     11-21    134<L>  |  98  |  9   ----------------------------<  142<H>  3.8   |  30  |  0.36<L>    Ca    8.3<L>      21 Nov 2020 07:00  Phos  2.7     11-21  Mg     1.8     11-21          CAPILLARY BLOOD GLUCOSE      POCT Blood Glucose.: 230 mg/dL (21 Nov 2020 12:01)  POCT Blood Glucose.: 157 mg/dL (21 Nov 2020 07:52)  POCT Blood Glucose.: 141 mg/dL (21 Nov 2020 06:12)  POCT Blood Glucose.: 116 mg/dL (20 Nov 2020 23:52)  POCT Blood Glucose.: 123 mg/dL (20 Nov 2020 16:59)

## 2020-11-21 NOTE — PROGRESS NOTE ADULT - SUBJECTIVE AND OBJECTIVE BOX
Interval Events:    receiving lower dose of prednisone  poc glucose controlled  not requiring significant additional sliding scale    Allergies    fish (Angioedema)  penicillins (Rash)    Intolerances      Endocrine/Metabolic Medications:  insulin glargine Injectable (LANTUS) 10 Unit(s) SubCutaneous <User Schedule>  insulin lispro (ADMELOG) corrective regimen sliding scale   SubCutaneous every 6 hours  insulin lispro Injectable (ADMELOG) 4 Unit(s) SubCutaneous every 6 hours  predniSONE   Tablet 10 milliGRAM(s) Oral daily      Vital Signs Last 24 Hrs  T(C): 36.6 (21 Nov 2020 05:20), Max: 36.6 (21 Nov 2020 05:20)  T(F): 97.9 (21 Nov 2020 05:20), Max: 97.9 (21 Nov 2020 05:20)  HR: 107 (21 Nov 2020 10:40) (104 - 115)  BP: 128/73 (21 Nov 2020 05:20) (103/41 - 128/73)  BP(mean): --  RR: 19 (21 Nov 2020 05:20) (19 - 24)  SpO2: 99% (21 Nov 2020 10:40) (98% - 100%)      PHYSICAL EXAM  Constitutional:  s/p trach on vent  NC/AT:    HEENT:    Neck:  No JVD  Respiratory:  reduced breath sounds b/l bases    Cardiovascular:  RR     Gastrointestinal: Soft     Extremities: without cyanosis    Neurological:  non focal      LABS                        9.5    10.33 )-----------( 171      ( 21 Nov 2020 07:00 )             31.0                               134    |  98     |  9                   Calcium: 8.3   / iCa: x      (11-21 @ 07:00)    ----------------------------<  142       Magnesium: 1.8                              3.8     |  30     |  0.36             Phosphorous: 2.7        CAPILLARY BLOOD GLUCOSE      POCT Blood Glucose.: 157 mg/dL (21 Nov 2020 07:52)  POCT Blood Glucose.: 141 mg/dL (21 Nov 2020 06:12)  POCT Blood Glucose.: 116 mg/dL (20 Nov 2020 23:52)  POCT Blood Glucose.: 123 mg/dL (20 Nov 2020 16:59)        Assesment/plan      64 yo female with multiple comorbidities including h/o DM type 2, HTN, COPD s/p trach on vent, lung ca on right sided s/p resection on chemo admitted with lethargy    endocrinology consulted for glycemic management    DM type 2  uncontrolled  complicated by high dose steroid use, acute on chronic resp failure  s/p trach    recommendations:  s/p trach/PEG  poc glucose controlled  continue current regimen for today    - continue lantus 10 units daily in am   - continue insulin lispro to 4 units q6h, hold if TF are held  - continue low dose sliding scale  - reassess based on requirements  - goal inpatient glucose range: 140-180mg/dl    COPD exacerbation  acute on chronic hypoxic resp failure  s/p trach  on standing steroids- on reduced dose of prednisone 20mg daily  pulmonary on board  s/p trach/PEG  received vanco    sepsis  on cefepime, flagyl  optimize glycemic regimen    Discussed with primary team   Please call  Endocrine- Dr Katlyn Garcia as needed

## 2020-11-21 NOTE — PROGRESS NOTE ADULT - PROVIDER SPECIALTY LIST ADULT
Pt has been scheduled for colon on 1/29 and EGD on 2/19. Reviewed mg citrate prep. Pt is aware I will be mailing instructions and confirmation letter.    Pulmonology

## 2020-11-21 NOTE — PROGRESS NOTE ADULT - PROBLEM SELECTOR PLAN 10
DVT ppx: heparin SC  GI ppx: PPI  Elevate RUE .   PICC in IR on Monday for IV Vanco x 28 days (thru Dec 16th).

## 2020-11-21 NOTE — PROGRESS NOTE ADULT - PROBLEM SELECTOR PLAN 1
likely due to aspiration pneumonia vs bacteremia  CXR results as above  BC growing MRSA . Repeat BC on 11/17 growing MRSA. but 11/19 NGTD  Sputum growing Acinetobacter  No evidence of active pneumonia at this time. Maxipime and Flagyl stopped.   bacteremia   afebrile, leukocytosis resolved  will need IV Vanco for 28 days from 11/19 (end date : 12/16)  plan for PICC in IR on Monday 11/23   Monitor Vanco trough   ID Dr. Corcoran following

## 2020-11-22 LAB
ANION GAP SERPL CALC-SCNC: 9 MMOL/L — SIGNIFICANT CHANGE UP (ref 5–17)
BUN SERPL-MCNC: 9 MG/DL — SIGNIFICANT CHANGE UP (ref 7–18)
CALCIUM SERPL-MCNC: 8 MG/DL — LOW (ref 8.4–10.5)
CHLORIDE SERPL-SCNC: 96 MMOL/L — SIGNIFICANT CHANGE UP (ref 96–108)
CO2 SERPL-SCNC: 28 MMOL/L — SIGNIFICANT CHANGE UP (ref 22–31)
CREAT SERPL-MCNC: 0.49 MG/DL — LOW (ref 0.5–1.3)
GLUCOSE BLDC GLUCOMTR-MCNC: 168 MG/DL — HIGH (ref 70–99)
GLUCOSE BLDC GLUCOMTR-MCNC: 168 MG/DL — HIGH (ref 70–99)
GLUCOSE BLDC GLUCOMTR-MCNC: 177 MG/DL — HIGH (ref 70–99)
GLUCOSE BLDC GLUCOMTR-MCNC: 307 MG/DL — HIGH (ref 70–99)
GLUCOSE SERPL-MCNC: 182 MG/DL — HIGH (ref 70–99)
HCT VFR BLD CALC: 29.3 % — LOW (ref 34.5–45)
HGB BLD-MCNC: 8.9 G/DL — LOW (ref 11.5–15.5)
MAGNESIUM SERPL-MCNC: 1.8 MG/DL — SIGNIFICANT CHANGE UP (ref 1.6–2.6)
MCHC RBC-ENTMCNC: 27 PG — SIGNIFICANT CHANGE UP (ref 27–34)
MCHC RBC-ENTMCNC: 30.4 GM/DL — LOW (ref 32–36)
MCV RBC AUTO: 88.8 FL — SIGNIFICANT CHANGE UP (ref 80–100)
NRBC # BLD: 0 /100 WBCS — SIGNIFICANT CHANGE UP (ref 0–0)
PHOSPHATE SERPL-MCNC: 3.4 MG/DL — SIGNIFICANT CHANGE UP (ref 2.5–4.5)
PLATELET # BLD AUTO: 184 K/UL — SIGNIFICANT CHANGE UP (ref 150–400)
POTASSIUM SERPL-MCNC: 4.1 MMOL/L — SIGNIFICANT CHANGE UP (ref 3.5–5.3)
POTASSIUM SERPL-SCNC: 4.1 MMOL/L — SIGNIFICANT CHANGE UP (ref 3.5–5.3)
RBC # BLD: 3.3 M/UL — LOW (ref 3.8–5.2)
RBC # FLD: 16.5 % — HIGH (ref 10.3–14.5)
SARS-COV-2 RNA SPEC QL NAA+PROBE: SIGNIFICANT CHANGE UP
SODIUM SERPL-SCNC: 133 MMOL/L — LOW (ref 135–145)
VANCOMYCIN TROUGH SERPL-MCNC: 13.5 UG/ML — SIGNIFICANT CHANGE UP (ref 10–20)
WBC # BLD: 11.95 K/UL — HIGH (ref 3.8–10.5)
WBC # FLD AUTO: 11.95 K/UL — HIGH (ref 3.8–10.5)

## 2020-11-22 RX ADMIN — CARVEDILOL PHOSPHATE 6.25 MILLIGRAM(S): 80 CAPSULE, EXTENDED RELEASE ORAL at 05:31

## 2020-11-22 RX ADMIN — Medication 1: at 17:31

## 2020-11-22 RX ADMIN — Medication 10 MILLIGRAM(S): at 05:31

## 2020-11-22 RX ADMIN — Medication 4 UNIT(S): at 17:30

## 2020-11-22 RX ADMIN — Medication 4 UNIT(S): at 00:09

## 2020-11-22 RX ADMIN — LISINOPRIL 5 MILLIGRAM(S): 2.5 TABLET ORAL at 05:31

## 2020-11-22 RX ADMIN — Medication 4: at 12:05

## 2020-11-22 RX ADMIN — Medication 1: at 05:12

## 2020-11-22 RX ADMIN — Medication 250 MILLIGRAM(S): at 05:31

## 2020-11-22 RX ADMIN — CHLORHEXIDINE GLUCONATE 1 APPLICATION(S): 213 SOLUTION TOPICAL at 05:31

## 2020-11-22 RX ADMIN — HEPARIN SODIUM 5000 UNIT(S): 5000 INJECTION INTRAVENOUS; SUBCUTANEOUS at 05:31

## 2020-11-22 RX ADMIN — PANTOPRAZOLE SODIUM 40 MILLIGRAM(S): 20 TABLET, DELAYED RELEASE ORAL at 12:06

## 2020-11-22 RX ADMIN — Medication 4 UNIT(S): at 05:13

## 2020-11-22 RX ADMIN — NYSTATIN CREAM 1 APPLICATION(S): 100000 CREAM TOPICAL at 18:23

## 2020-11-22 RX ADMIN — CHLORHEXIDINE GLUCONATE 15 MILLILITER(S): 213 SOLUTION TOPICAL at 18:24

## 2020-11-22 RX ADMIN — Medication 4 UNIT(S): at 12:05

## 2020-11-22 RX ADMIN — NYSTATIN CREAM 1 APPLICATION(S): 100000 CREAM TOPICAL at 05:32

## 2020-11-22 RX ADMIN — Medication 1: at 00:09

## 2020-11-22 RX ADMIN — Medication 250 MILLIGRAM(S): at 17:30

## 2020-11-22 RX ADMIN — HEPARIN SODIUM 5000 UNIT(S): 5000 INJECTION INTRAVENOUS; SUBCUTANEOUS at 21:46

## 2020-11-22 RX ADMIN — HEPARIN SODIUM 5000 UNIT(S): 5000 INJECTION INTRAVENOUS; SUBCUTANEOUS at 14:14

## 2020-11-22 RX ADMIN — INSULIN GLARGINE 10 UNIT(S): 100 INJECTION, SOLUTION SUBCUTANEOUS at 05:12

## 2020-11-22 RX ADMIN — CHLORHEXIDINE GLUCONATE 15 MILLILITER(S): 213 SOLUTION TOPICAL at 05:31

## 2020-11-22 RX ADMIN — CARVEDILOL PHOSPHATE 6.25 MILLIGRAM(S): 80 CAPSULE, EXTENDED RELEASE ORAL at 17:31

## 2020-11-22 NOTE — PROGRESS NOTE ADULT - PROBLEM SELECTOR PLAN 4
Spontaneous breathing trial during the day (PS 10/FiO2 40%/PEEP 5) as tolerated.   Then back to AC  during night.  tapered down to Prednisone 10mg daily  con't bronchodilator  Monitor oxygenation

## 2020-11-22 NOTE — PROGRESS NOTE ADULT - ATTENDING COMMENTS
Problem/Plan - 1:  ·  Problem: Sepsis.  Plan: likely due to aspiration pneumonia vs bacteremia  CXR results as above  BC growing MRSA . Repeat BC on 11/17 growing MRSA. but 11/19 NGTD  Sputum growing Acinetobacter  No evidence of active pneumonia at this time. Maxipime and Flagyl stopped.   bacteremia   afebrile, leukocytosis resolved  will need IV Vanco for 28 days from 11/19 (end date : 12/16)  plan for PICC in IR on Monday 11/23   Monitor Vanco trough   ID Dr. Corcoran following.

## 2020-11-22 NOTE — PROGRESS NOTE ADULT - SUBJECTIVE AND OBJECTIVE BOX
BARB COLÓN    SCU progress note    INTERVAL HPI/OVERNIGHT EVENTS: ***no significant issues overnight, no complaints offered    DNR [ ]   DNI  [  ]  FULL CODE  long term ventilation / long term feeding tube / trial IV fluids / use abx / NO LIMITATION on medical interventions.    Covid - 19 PCR: not detected 11/22/2020    The 4Ms    What Matters Most: see GOC  Age appropriate Medications/Screen for High Risk Medication: Yes  Mentation: see CAM below  Mobility: defer to physical exam    The Confusion Assessment Method (CAM) Diagnostic Algorithm     1: Acute Onset or Fluctuating Course  - Is there evidence of an acute change in mental status from the patient’s baseline? Did the (abnormal) behavior  fluctuate during the day, that is, tend to come and go, or increase and decrease in severity?  [ ] YES [x ] NO     2: Inattention  - Did the patient have difficulty focusing attention, being easily distractible, or having difficulty keeping track of what was being said?  [ ] YES [x ] NO     3: Disorganized thinking  -Was the patient’s thinking disorganized or incoherent, such as rambling or irrelevant conversation, unclear or illogical flow of ideas, or unpredictable switching from subject to subject?  [ ] YES [x ] NO    4: Altered Level of consciousness?  [ ] YES [x ] NO    The diagnosis of delirium by CAM requires the presence of features 1 and 2 and either 3 or 4.    PRESSORS: [ ] YES [ x] NO  vancomycin  IVPB 1000 milliGRAM(s) IV Intermittent every 12 hours    Cardiovascular:  Heart Failure  Acute   Acute on Chronic  Chronic       carvedilol 6.25 milliGRAM(s) Oral every 12 hours  lisinopril 5 milliGRAM(s) Oral daily    Pulmonary:  ALBUTerol    90 MICROgram(s) HFA Inhaler 2 Puff(s) Inhalation every 6 hours PRN    Hematalogic:  heparin   Injectable 5000 Unit(s) SubCutaneous every 8 hours    Other:  acetaminophen    Suspension .. 650 milliGRAM(s) Oral every 6 hours PRN  chlorhexidine 0.12% Liquid 15 milliLiter(s) Oral Mucosa every 12 hours  chlorhexidine 2% Cloths 1 Application(s) Topical <User Schedule>  influenza   Vaccine 0.5 milliLiter(s) IntraMuscular once  insulin glargine Injectable (LANTUS) 10 Unit(s) SubCutaneous <User Schedule>  insulin lispro (ADMELOG) corrective regimen sliding scale   SubCutaneous every 6 hours  insulin lispro Injectable (ADMELOG) 4 Unit(s) SubCutaneous every 6 hours  nystatin Powder 1 Application(s) Topical two times a day  pantoprazole  Injectable 40 milliGRAM(s) IV Push daily  predniSONE   Tablet 10 milliGRAM(s) Oral daily    acetaminophen    Suspension .. 650 milliGRAM(s) Oral every 6 hours PRN  ALBUTerol    90 MICROgram(s) HFA Inhaler 2 Puff(s) Inhalation every 6 hours PRN  carvedilol 6.25 milliGRAM(s) Oral every 12 hours  chlorhexidine 0.12% Liquid 15 milliLiter(s) Oral Mucosa every 12 hours  chlorhexidine 2% Cloths 1 Application(s) Topical <User Schedule>  heparin   Injectable 5000 Unit(s) SubCutaneous every 8 hours  influenza   Vaccine 0.5 milliLiter(s) IntraMuscular once  insulin glargine Injectable (LANTUS) 10 Unit(s) SubCutaneous <User Schedule>  insulin lispro (ADMELOG) corrective regimen sliding scale   SubCutaneous every 6 hours  insulin lispro Injectable (ADMELOG) 4 Unit(s) SubCutaneous every 6 hours  lisinopril 5 milliGRAM(s) Oral daily  nystatin Powder 1 Application(s) Topical two times a day  pantoprazole  Injectable 40 milliGRAM(s) IV Push daily  predniSONE   Tablet 10 milliGRAM(s) Oral daily  vancomycin  IVPB 1000 milliGRAM(s) IV Intermittent every 12 hours    Drug Dosing Weight  Height (cm): 154 (14 Nov 2020 20:16)  Weight (kg): 89.9 (14 Nov 2020 20:16)  BMI (kg/m2): 37.9 (14 Nov 2020 20:16)  BSA (m2): 1.87 (14 Nov 2020 20:16)    CENTRAL LINE: [ ] YES [ ] NO  LOCATION:   DATE INSERTED:  REMOVE: [ ] YES [ ] NO  EXPLAIN:    ROBLEDO: [ ] YES [ ] NO    DATE INSERTED:  REMOVE:  [ ] YES [ ] NO  EXPLAIN:    PAST MEDICAL & SURGICAL HISTORY:  Malignant neoplasm of unspecified part of right bronchus or lung    HTN (hypertension)    DM (diabetes mellitus)    COPD (chronic obstructive pulmonary disease)    Lung cancer    Morbid obesity    GERD (gastroesophageal reflux disease)    Deviated septum    Prolapsed uterus    ITP (idiopathic thrombocytopenic purpura)    Emphysema/COPD    History of cholecystectomy    S/P lobectomy of lung  right 2017    History of tonsillectomy                  Mode: AC/ CMV (Assist Control/ Continuous Mandatory Ventilation)  RR (machine): 14  TV (machine): 400  FiO2: 40  PEEP: 5  ITime: 1  MAP: 11  PIP: 23      PHYSICAL EXAM:    GENERAL: NAD, well-groomed, well-developed  HEAD:  Atraumatic, Normocephalic  EYES: EOMI, PERRLA, conjunctiva and sclera clear  ENMT: No tonsillar erythema, exudates, or enlargement; Moist mucous membranes, Good dentition, No lesions  NECK: Supple, No JVD, Normal thyroid  NERVOUS SYSTEM:  Alert & Oriented X3, Good concentration; Motor Strength 5/5 B/L upper and lower extremities; DTRs 2+ intact and symmetric  CHEST/LUNG: Clear to percussion bilaterally; No rales, rhonchi, wheezing, or rubs  HEART: Regular rate and rhythm; No murmurs, rubs, or gallops  ABDOMEN: Soft, Nontender, Nondistended; Bowel sounds present  EXTREMITIES:  2+ Peripheral Pulses, No clubbing, cyanosis, or edema  LYMPH: No lymphadenopathy noted  SKIN: No rashes or lesions      LABS:  CBC Full  -  ( 22 Nov 2020 07:33 )  WBC Count : 11.95 K/uL  RBC Count : 3.30 M/uL  Hemoglobin : 8.9 g/dL  Hematocrit : 29.3 %  Platelet Count - Automated : 184 K/uL  Mean Cell Volume : 88.8 fl  Mean Cell Hemoglobin : 27.0 pg  Mean Cell Hemoglobin Concentration : 30.4 gm/dL  Auto Neutrophil # : x  Auto Lymphocyte # : x  Auto Monocyte # : x  Auto Eosinophil # : x  Auto Basophil # : x  Auto Neutrophil % : x  Auto Lymphocyte % : x  Auto Monocyte % : x  Auto Eosinophil % : x  Auto Basophil % : x    11-21    134<L>  |  98  |  9   ----------------------------<  142<H>  3.8   |  30  |  0.36<L>    Ca    8.3<L>      21 Nov 2020 07:00  Phos  2.7     11-21  Mg     1.8     11-21                [x ]  DVT Prophylaxis    Goals of Care Discussion with Family/Proxy/Other   -see palliative consult note for 11/17/2020

## 2020-11-22 NOTE — PROGRESS NOTE ADULT - ASSESSMENT
_________________________________________________________________________________________  ========>>  M E D I C A L   A T T E N D I N G    F O L L O W  U P  N O T E  <<=========  -----------------------------------------------------------------------------------------------------    - Patient seen and examined by me earlier today.  (covering for Dr Frederick today)   - In summary,  BARB COLÓN is a 65y year old woman with resp failure   - Patient today overall doing ok, comfortable, anxious at times per RN, otherwise doing ok, tolerating feeds     on CPAP trial now     ==================>> REVIEW OF SYSTEM <<=================    limited ROS as pt trached     ==================>> PHYSICAL EXAM <<=================    GEN: Alert and awake, responsive, NAD , comfortable, calm  HEENT: NCAT, PERRL, MMM, hearing intact  Neck:   tracheostomy in place   CVS: S1S2 , regular , No M/R/G appreciated  PULM: CTA B/L anteriorly, limited exam   ABD.: soft. non tender, non distended,  PEG in place   Extrem: intact pulses ++ US B/L  edema   PSYCH : normal mood,  not anxious      ==================>> MEDICATIONS <<====================    MEDICATIONS  (STANDING):  carvedilol 6.25 milliGRAM(s) Oral every 12 hours  chlorhexidine 0.12% Liquid 15 milliLiter(s) Oral Mucosa every 12 hours  chlorhexidine 2% Cloths 1 Application(s) Topical <User Schedule>  heparin   Injectable 5000 Unit(s) SubCutaneous every 8 hours  influenza   Vaccine 0.5 milliLiter(s) IntraMuscular once  insulin glargine Injectable (LANTUS) 10 Unit(s) SubCutaneous <User Schedule>  insulin lispro (ADMELOG) corrective regimen sliding scale   SubCutaneous every 6 hours  insulin lispro Injectable (ADMELOG) 4 Unit(s) SubCutaneous every 6 hours  lisinopril 5 milliGRAM(s) Oral daily  nystatin Powder 1 Application(s) Topical two times a day  pantoprazole  Injectable 40 milliGRAM(s) IV Push daily  predniSONE   Tablet 10 milliGRAM(s) Oral daily  vancomycin  IVPB 1000 milliGRAM(s) IV Intermittent every 12 hours    MEDICATIONS  (PRN):  acetaminophen    Suspension .. 650 milliGRAM(s) Oral every 6 hours PRN Moderate Pain (4 - 6)  ALBUTerol    90 MICROgram(s) HFA Inhaler 2 Puff(s) Inhalation every 6 hours PRN Shortness of Breath    ___________  Active diet:  Diet, NPO with Tube Feed:   Tube Feeding Modality: Gastrostomy  Glucerna 1.5 Inocente  Total Volume for 24 Hours (mL): 960  Continuous  Starting Tube Feed Rate mL per Hour: 10  Increase Tube Feed Rate by (mL): 10  Until Goal Tube Feed Rate (mL per Hour): 40  Tube Feed Duration (in Hours): 24  Tube Feed Start Time: 22:00  ___________________    ==================>> VITAL SIGNS <<==================    T(C): 36.6 (11-22-20 @ 04:22), Max: 36.8 (11-21-20 @ 22:05)  HR: 109 (11-22-20 @ 09:25) (109 - 115)  BP: 117/67 (11-22-20 @ 04:22) (117/67 - 134/68)  RR: 22 (11-22-20 @ 04:22) (18 - 24)  SpO2: 100% (11-22-20 @ 09:25) (96% - 100%)     POCT Blood Glucose.: 177 mg/dL (22 Nov 2020 05:01)  POCT Blood Glucose.: 168 mg/dL (22 Nov 2020 00:05)  POCT Blood Glucose.: 136 mg/dL (21 Nov 2020 17:07)  POCT Blood Glucose.: 230 mg/dL (21 Nov 2020 12:01)     ==================>> LAB AND IMAGING <<==================                        8.9    11.95 )-----------( 184      ( 22 Nov 2020 07:33 )             29.3        11-22    133<L>  |  96  |  9   ----------------------------<  182<H>  4.1   |  28  |  0.49<L>    Sodium:   133  <==, 134  <==, 139  <==, 137  <==, 139  <==, 140  <==, 140  <==    Ca    8.0<L>      22 Nov 2020 07:33  Phos  3.4     11-22  Mg     1.8     11-22               TSH:      3.23   (11-16-20)           Lipid profile:  (11-16-20)     Total: <50     LDL  : (p)     HDL  :12     TG   :100     HgA1C:     (08-02-20)      8.6    _______________________  C U L T U R E S :    Culture - Blood (collected 19 Nov 2020 10:19)  Source: .Blood Blood-Peripheral  Preliminary Report (20 Nov 2020 11:01):    No growth to date.    Culture - Blood (collected 19 Nov 2020 10:19)  Source: .Blood Blood-Peripheral  Preliminary Report (20 Nov 2020 11:01):    No growth to date.    Culture - Blood (collected 17 Nov 2020 14:25)  Source: .Blood Blood-Peripheral  Gram Stain (18 Nov 2020 17:43):    Growth in aerobic bottle: Gram Positive Cocci in Clusters    Growth in anaerobic bottle: Gram Positive Cocci in Clusters  Final Report (19 Nov 2020 17:19):    Growth in aerobic and anaerobic bottles: Methicillin resistant    Staphylococcus aureus    See previous culture [id number]    Culture - Blood (collected 17 Nov 2020 09:31)  Source: .Blood Blood-Peripheral  Gram Stain (18 Nov 2020 12:31):    Growth in anaerobic bottle: Gram Positive Cocci in Clusters    Growth in aerobic bottle: Gram Positive Cocci in Clusters  Final Report (19 Nov 2020 10:53):    Growth in aerobic and anaerobic bottles: Methicillin resistant    Staphylococcus aureus    See previous culture [id number]    Culture - Sputum (collected 15 Nov 2020 19:28)  Source: .Sputum Sputum  Gram Stain (15 Nov 2020 22:16):    Few polymorphonuclear leukocytes per low power field    No Squamous epithelial cells per low power field    Moderate Gram positive cocci in pairs per oil power field    Rare Gram Negative Rods per oil power field  Final Report (18 Nov 2020 22:24):    Moderate Acinetobacter baumannii/nosocom group (Carbapenem Resistant)    Moderate Klebsiella pneumoniae (Carbapenem Resistant)    Normal Respiratory Shannan present  Organism: Acinetobacter baumannii/nosocom group (Carbapenem Resistant)  Klepne MDRO (18 Nov 2020 22:24)  Organism: Klepne MDRO (18 Nov 2020 22:24)    Sensitivities:      -  Amikacin: I 32      -  Amoxicillin/Clavulanic Acid: R >16/8      -  Ampicillin: R >16 These ampicillin results predict results for amoxicillin      -  Ampicillin/Sulbactam: R >16/8 Enterobacter, Citrobacter, and Serratia may develop resistance during prolonged therapy (3-4 days)      -  Aztreonam: R >16      -  Cefazolin: R >16 Enterobacter, Citrobacter, and Serratia may develop resistance during prolonged therapy (3-4 days)      -  Cefepime: R 16      -  Cefoxitin: R >16      -  Ceftazidime/Avibactam: S <=4      -  Ceftolozane/tazobactam: R >8      -  Ceftriaxone: R >32 Enterobacter, Citrobacter, and Serratia may develop resistance during prolonged therapy      -  Ciprofloxacin: R >2      -  Ertapenem: R >1      -  Gentamicin: S <=2      -  Imipenem: R >8      -  Levofloxacin: R >4      -  Meropenem: R >8      -  Piperacillin/Tazobactam: R >64      -  Tobramycin: R >8      -  Trimethoprim/Sulfamethoxazole: R >2/38      Method Type: DALE  Organism: Acinetobacter baumannii/nosocom group (Carbapenem Resistant) (18 Nov 2020 22:24)    Sensitivities:      -  Imipenem: R      -  Piperacillin/Tazobactam: R      Method Type: KB  Organism: Acinetobacter baumannii/nosocom group (Carbapenem Resistant) (18 Nov 2020 22:24)    Sensitivities:      -  Amikacin: S <=16      -  Ampicillin/Sulbactam: S 8/4      -  Cefepime: R >16      -  Ceftazidime: I 16      -  Ciprofloxacin: R >2      -  Gentamicin: R >8      -  Levofloxacin: R >4      -  Meropenem: R >8      -  Tobramycin: I 8      -  Trimethoprim/Sulfamethoxazole: R >2/38      Method Type: DALE    Culture - Urine (collected 14 Nov 2020 22:25)  Source: .Urine Clean Catch (Midstream)  Final Report (15 Nov 2020 17:32):    <10,000 CFU/mL Normal Urogenital Shannan    Culture - Blood (collected 14 Nov 2020 20:41)  Source: .Blood Blood-Peripheral  Gram Stain (15 Nov 2020 11:25):    Growth in aerobic bottle: Gram Positive Cocci in Clusters    Growth in anaerobic bottle: Gram Positive Cocci in Clusters  Final Report (17 Nov 2020 08:16):    Growth in aerobic and anaerobic bottles: Methicillin resistant    Staphylococcus aureus    "Due to technical problems, Proteus sp. will Not be reported as part of    the BCID panel until further notice" ***Blood Panel PCR results on this    specimen are available    approximately 3 hours after the Gram stain result.***    Gram stain, PCR, and/or culture results may not always    correspond due to difference in methodologies.    ************************************************************    This PCR assay was performed using [employer].    The following targets are tested for: Enterococcus,    vancomycin resistant enterococci, Listeria monocytogenes,    coagulase negative staphylococci, S. aureus,    methicillin resistant S. aureus, Streptococcus agalactiae    (Group B), S. pneumoniae, S. pyogenes (Group A),    Acinetobacter baumannii, Enterobacter cloacae, E. coli,    Klebsiella oxytoca, K. pneumoniae, Proteus sp.,    Serratia marcescens, Haemophilus influenzae,    Neisseria meningitidis, Pseudomonas aeruginosa, Candida    albicans, C. glabrata, C krusei, C parapsilosis,    C. tropicalis and the KPC resistance gene.  Organism: Blood Culture PCR  Methicillin resistant Staphylococcus aureus (17 Nov 2020 08:16)  Organism: Methicillin resistant Staphylococcus aureus (17 Nov 2020 08:16)    Sensitivities:      -  Ampicillin/Sulbactam: R <=8/4      -  Cefazolin: R <=4      -  Clindamycin: S <=0.25      -  Daptomycin: S 0.5      -  Erythromycin: R >4      -  Gentamicin: R >8 Should not be used as monotherapy      -  Linezolid: S 2      -  Oxacillin: R >2      -  Penicillin: R >8      -  RIF- Rifampin: S <=1 Should not be used as monotherapy      -  Tetra/Doxy: S <=1      -  Trimethoprim/Sulfamethoxazole: S <=0.5/9.5      -  Vancomycin: S 2      Method Type: DALE  Organism: Blood Culture PCR (17 Nov 2020 08:16)    Sensitivities:      -  Methicillin resistant Staphylococcus aureus (MRSA): Detec      Method Type: PCR      COVID-19 IgG Antibody Index: <0.10 (11-16-20 @ 13:29)    COVID-19 PCR: NotDetec (11-22-20 @ 04:58)  COVID-19 PCR: NotDetec (11-15-20 @ 19:22)  SARS-CoV-2: NotDetec (11-14-20 @ 16:59)    ___________________________________________________________________________________  ===============>>  A S S E S S M E N T   A N D   P L A N <<===============  ------------------------------------------------------------------------------------------    Patient seen, examined by me, chart reviewed, covering today  pt with multiple medical problems including respiratory failure now on vent, bacteremia,  under control..     pt overall seems to be dong ok  continue current medical therapy  monitor hyponatremia as worsening slowly  continue tube feeds  weaning protocol. as ongoing  PT / ROM  supportive care  Continue Current medications otherwise and monitor.  -GI/DVT Prophylaxis per protocol.    --------------------------------------------  Case discussed with RN  ___________________________  TERESA Mendez D.O.   (covering for Dr Frederick today)   Pager: [phone]

## 2020-11-23 LAB
ANION GAP SERPL CALC-SCNC: 5 MMOL/L — SIGNIFICANT CHANGE UP (ref 5–17)
APPEARANCE UR: CLEAR — SIGNIFICANT CHANGE UP
BACTERIA # UR AUTO: ABNORMAL /HPF
BILIRUB UR-MCNC: NEGATIVE — SIGNIFICANT CHANGE UP
BLD GP AB SCN SERPL QL: SIGNIFICANT CHANGE UP
BUN SERPL-MCNC: 9 MG/DL — SIGNIFICANT CHANGE UP (ref 7–18)
CALCIUM SERPL-MCNC: 8.3 MG/DL — LOW (ref 8.4–10.5)
CHLORIDE SERPL-SCNC: 97 MMOL/L — SIGNIFICANT CHANGE UP (ref 96–108)
CO2 SERPL-SCNC: 33 MMOL/L — HIGH (ref 22–31)
COLOR SPEC: YELLOW — SIGNIFICANT CHANGE UP
CREAT SERPL-MCNC: 0.34 MG/DL — LOW (ref 0.5–1.3)
DIFF PNL FLD: NEGATIVE — SIGNIFICANT CHANGE UP
EPI CELLS # UR: ABNORMAL /HPF
GLUCOSE BLDC GLUCOMTR-MCNC: 143 MG/DL — HIGH (ref 70–99)
GLUCOSE BLDC GLUCOMTR-MCNC: 164 MG/DL — HIGH (ref 70–99)
GLUCOSE BLDC GLUCOMTR-MCNC: 177 MG/DL — HIGH (ref 70–99)
GLUCOSE BLDC GLUCOMTR-MCNC: 231 MG/DL — HIGH (ref 70–99)
GLUCOSE BLDC GLUCOMTR-MCNC: 243 MG/DL — HIGH (ref 70–99)
GLUCOSE BLDC GLUCOMTR-MCNC: 297 MG/DL — HIGH (ref 70–99)
GLUCOSE SERPL-MCNC: 214 MG/DL — HIGH (ref 70–99)
GLUCOSE UR QL: 250
HCT VFR BLD CALC: 28.8 % — LOW (ref 34.5–45)
HGB BLD-MCNC: 8.8 G/DL — LOW (ref 11.5–15.5)
INR BLD: 1.28 RATIO — HIGH (ref 0.88–1.16)
KETONES UR-MCNC: NEGATIVE — SIGNIFICANT CHANGE UP
LEUKOCYTE ESTERASE UR-ACNC: NEGATIVE — SIGNIFICANT CHANGE UP
MAGNESIUM SERPL-MCNC: 1.9 MG/DL — SIGNIFICANT CHANGE UP (ref 1.6–2.6)
MCHC RBC-ENTMCNC: 27.2 PG — SIGNIFICANT CHANGE UP (ref 27–34)
MCHC RBC-ENTMCNC: 30.6 GM/DL — LOW (ref 32–36)
MCV RBC AUTO: 88.9 FL — SIGNIFICANT CHANGE UP (ref 80–100)
NITRITE UR-MCNC: NEGATIVE — SIGNIFICANT CHANGE UP
NRBC # BLD: 0 /100 WBCS — SIGNIFICANT CHANGE UP (ref 0–0)
PH UR: 8 — SIGNIFICANT CHANGE UP (ref 5–8)
PHOSPHATE SERPL-MCNC: 3.5 MG/DL — SIGNIFICANT CHANGE UP (ref 2.5–4.5)
PLATELET # BLD AUTO: 167 K/UL — SIGNIFICANT CHANGE UP (ref 150–400)
POTASSIUM SERPL-MCNC: 3.9 MMOL/L — SIGNIFICANT CHANGE UP (ref 3.5–5.3)
POTASSIUM SERPL-SCNC: 3.9 MMOL/L — SIGNIFICANT CHANGE UP (ref 3.5–5.3)
PROT UR-MCNC: 30 MG/DL
PROTHROM AB SERPL-ACNC: 15.1 SEC — HIGH (ref 10.6–13.6)
RBC # BLD: 3.24 M/UL — LOW (ref 3.8–5.2)
RBC # FLD: 16.2 % — HIGH (ref 10.3–14.5)
RBC CASTS # UR COMP ASSIST: SIGNIFICANT CHANGE UP /HPF (ref 0–2)
SODIUM SERPL-SCNC: 135 MMOL/L — SIGNIFICANT CHANGE UP (ref 135–145)
SP GR SPEC: 1.01 — SIGNIFICANT CHANGE UP (ref 1.01–1.02)
UROBILINOGEN FLD QL: NEGATIVE — SIGNIFICANT CHANGE UP
VANCOMYCIN TROUGH SERPL-MCNC: 15.7 UG/ML — SIGNIFICANT CHANGE UP (ref 10–20)
WBC # BLD: 9.47 K/UL — SIGNIFICANT CHANGE UP (ref 3.8–10.5)
WBC # FLD AUTO: 9.47 K/UL — SIGNIFICANT CHANGE UP (ref 3.8–10.5)
WBC UR QL: SIGNIFICANT CHANGE UP /HPF (ref 0–5)

## 2020-11-23 PROCEDURE — 71045 X-RAY EXAM CHEST 1 VIEW: CPT | Mod: 26

## 2020-11-23 RX ORDER — INSULIN LISPRO 100/ML
6 VIAL (ML) SUBCUTANEOUS EVERY 6 HOURS
Refills: 0 | Status: DISCONTINUED | OUTPATIENT
Start: 2020-11-23 | End: 2020-11-25

## 2020-11-23 RX ADMIN — Medication 4 UNIT(S): at 08:44

## 2020-11-23 RX ADMIN — HEPARIN SODIUM 5000 UNIT(S): 5000 INJECTION INTRAVENOUS; SUBCUTANEOUS at 16:03

## 2020-11-23 RX ADMIN — Medication 3: at 08:44

## 2020-11-23 RX ADMIN — Medication 250 MILLIGRAM(S): at 05:22

## 2020-11-23 RX ADMIN — Medication 2: at 12:07

## 2020-11-23 RX ADMIN — CHLORHEXIDINE GLUCONATE 1 APPLICATION(S): 213 SOLUTION TOPICAL at 05:11

## 2020-11-23 RX ADMIN — Medication 650 MILLIGRAM(S): at 03:39

## 2020-11-23 RX ADMIN — INSULIN GLARGINE 10 UNIT(S): 100 INJECTION, SOLUTION SUBCUTANEOUS at 08:42

## 2020-11-23 RX ADMIN — NYSTATIN CREAM 1 APPLICATION(S): 100000 CREAM TOPICAL at 05:11

## 2020-11-23 RX ADMIN — CARVEDILOL PHOSPHATE 6.25 MILLIGRAM(S): 80 CAPSULE, EXTENDED RELEASE ORAL at 17:20

## 2020-11-23 RX ADMIN — Medication 250 MILLIGRAM(S): at 19:51

## 2020-11-23 RX ADMIN — Medication 650 MILLIGRAM(S): at 02:02

## 2020-11-23 RX ADMIN — HEPARIN SODIUM 5000 UNIT(S): 5000 INJECTION INTRAVENOUS; SUBCUTANEOUS at 21:02

## 2020-11-23 RX ADMIN — Medication 650 MILLIGRAM(S): at 18:58

## 2020-11-23 RX ADMIN — Medication 4 UNIT(S): at 12:07

## 2020-11-23 RX ADMIN — PANTOPRAZOLE SODIUM 40 MILLIGRAM(S): 20 TABLET, DELAYED RELEASE ORAL at 12:08

## 2020-11-23 RX ADMIN — CHLORHEXIDINE GLUCONATE 15 MILLILITER(S): 213 SOLUTION TOPICAL at 17:20

## 2020-11-23 RX ADMIN — LISINOPRIL 5 MILLIGRAM(S): 2.5 TABLET ORAL at 06:03

## 2020-11-23 RX ADMIN — Medication 1: at 00:13

## 2020-11-23 RX ADMIN — CARVEDILOL PHOSPHATE 6.25 MILLIGRAM(S): 80 CAPSULE, EXTENDED RELEASE ORAL at 06:03

## 2020-11-23 RX ADMIN — Medication 650 MILLIGRAM(S): at 17:20

## 2020-11-23 RX ADMIN — Medication 6 UNIT(S): at 17:39

## 2020-11-23 RX ADMIN — NYSTATIN CREAM 1 APPLICATION(S): 100000 CREAM TOPICAL at 17:39

## 2020-11-23 RX ADMIN — Medication 1: at 17:38

## 2020-11-23 RX ADMIN — Medication 10 MILLIGRAM(S): at 06:03

## 2020-11-23 NOTE — CHART NOTE - NSCHARTNOTEFT_GEN_A_CORE
Assessment:   65yFemalePatient is a 65y old  Female who presents with a chief complaint of lethargy (23 Nov 2020 12:47)      Factors impacting intake: [ ] none [ ] nausea  [ ] vomiting [ ] diarrhea [ ] constipation  [ ]chewing problems [ ] swallowing issues  [x ] other: patient is currently receiving glucerna 1.5 at 40ml/hx24 and having diarrhea per RN. Tube feeding provides: (960ml, 1440kcal, 79g protein, 728ml free water) which meets needs. Patient remains On trach to vent. provide tube feeding of glucerna 1.5 at 40ml/hx24 (960ml, 1440kcal, 79g protein, 728ml free water) as previously recommended. MD to monitor/assess fluid status as needed.     Diet Presciption: Diet, NPO with Tube Feed:   Tube Feeding Modality: Gastrostomy  Glucerna 1.5 Inocente  Total Volume for 24 Hours (mL): 960  Continuous  Starting Tube Feed Rate {mL per Hour}: 10  Increase Tube Feed Rate by (mL): 10  Until Goal Tube Feed Rate (mL per Hour): 40  Tube Feed Duration (in Hours): 24  Tube Feed Start Time: 22:00 (11-14-20 @ 21:13)    Intake: Meeting needs with tube feeding     Current Weight: 92.8kg(11/18)  % Weight change: patient with 1% wt gain in 3 days     Pertinent Medications: MEDICATIONS  (STANDING):  carvedilol 6.25 milliGRAM(s) Oral every 12 hours  chlorhexidine 0.12% Liquid 15 milliLiter(s) Oral Mucosa every 12 hours  chlorhexidine 2% Cloths 1 Application(s) Topical <User Schedule>  heparin   Injectable 5000 Unit(s) SubCutaneous every 8 hours  influenza   Vaccine 0.5 milliLiter(s) IntraMuscular once  insulin glargine Injectable (LANTUS) 10 Unit(s) SubCutaneous <User Schedule>  insulin lispro (ADMELOG) corrective regimen sliding scale   SubCutaneous every 6 hours  insulin lispro Injectable (ADMELOG) 6 Unit(s) SubCutaneous every 6 hours  lisinopril 5 milliGRAM(s) Oral daily  nystatin Powder 1 Application(s) Topical two times a day  pantoprazole  Injectable 40 milliGRAM(s) IV Push daily  predniSONE   Tablet 10 milliGRAM(s) Oral daily  vancomycin  IVPB 1000 milliGRAM(s) IV Intermittent every 12 hours    MEDICATIONS  (PRN):  acetaminophen    Suspension .. 650 milliGRAM(s) Oral every 6 hours PRN Moderate Pain (4 - 6)  ALBUTerol    90 MICROgram(s) HFA Inhaler 2 Puff(s) Inhalation every 6 hours PRN Shortness of Breath    Pertinent Labs: 11-23 Na135 mmol/L Glu 214 mg/dL<H> K+ 3.9 mmol/L Cr  0.34 mg/dL<L> BUN 9 mg/dL 11-23 Phos 3.5 mg/dL 11-19 Alb 2.0 g/dL<L> 11-16 Chol <50 mg/dL LDL --    HDL 12 mg/dL<L> Trig 100 mg/dL     CAPILLARY BLOOD GLUCOSE      POCT Blood Glucose.: 243 mg/dL (23 Nov 2020 11:32)  POCT Blood Glucose.: 297 mg/dL (23 Nov 2020 08:27)  POCT Blood Glucose.: 231 mg/dL (23 Nov 2020 05:08)  POCT Blood Glucose.: 177 mg/dL (23 Nov 2020 00:07)  POCT Blood Glucose.: 168 mg/dL (22 Nov 2020 17:13)    Skin: 2 pressure ulcers completed    Estimated Needs:   [x ] no change since previous assessment  [ ] recalculated:     Previous Nutrition Diagnosis:   [ ] Inadequate Energy Intake [ ]Inadequate Oral Intake [ ] Excessive Energy Intake   [ ] Underweight [ ] Increased Nutrient Needs [ ] Overweight/Obesity   [ ] Altered GI Function [ ] Unintended Weight Loss [ ] Food & Nutrition Related Knowledge Deficit [x ] Malnutrition , severe     Nutrition Diagnosis is [ x] ongoing  [ ] resolved [ ] not applicable     New Nutrition Diagnosis: [ ] not applicable       Interventions:   Recommend  [ ] Change Diet To:  [ ] Nutrition Supplement  [x ] Nutrition Support: provide tube feeding of glucerna 1.5 at 40ml/hx24 (960ml, 1440kcal, 79g protein, 728ml free water) as previously recommended. MD to monitor/assess fluid status as needed.   Patient will tolerate TF with no Nausea, vomiting diarrhea or abdominal distension   [ ] Other:     Monitoring and Evaluation:   [ ] PO intake [ x ] Tolerance to diet prescription [ x ] weights [ x ] labs[ x ] follow up per protocol  [ ] other: Assessment:   65yFemalePatient is a 65y old  Female who presents with a chief complaint of lethargy (23 Nov 2020 12:47)      Factors impacting intake: [ ] none [ ] nausea  [ ] vomiting [ ] diarrhea [ ] constipation  [ ]chewing problems [ ] swallowing issues  [x ] other: patient is currently receiving glucerna 1.5 at 40ml/hx24 and having diarrhea per RN. Tube feeding provides: (960ml, 1440kcal, 79g protein, 728ml free water) which meets needs. Patient remains On trach to vent. provide tube feeding of glucerna 1.5 at 40ml/hx24 (960ml, 1440kcal, 79g protein, 728ml free water) as previously recommended. MD to monitor/assess fluid status as needed.     Diet Presciption: Diet, NPO with Tube Feed:   Tube Feeding Modality: Gastrostomy  Glucerna 1.5 Inocente  Total Volume for 24 Hours (mL): 960  Continuous  Starting Tube Feed Rate {mL per Hour}: 10  Increase Tube Feed Rate by (mL): 10  Until Goal Tube Feed Rate (mL per Hour): 40  Tube Feed Duration (in Hours): 24  Tube Feed Start Time: 22:00 (11-14-20 @ 21:13)    Intake: Meeting needs with tube feeding     Current Weight: 92.8kg(11/18)  % Weight change: patient with 1% wt gain in 3 days     Pertinent Medications: MEDICATIONS  (STANDING):  carvedilol 6.25 milliGRAM(s) Oral every 12 hours  chlorhexidine 0.12% Liquid 15 milliLiter(s) Oral Mucosa every 12 hours  chlorhexidine 2% Cloths 1 Application(s) Topical <User Schedule>  heparin   Injectable 5000 Unit(s) SubCutaneous every 8 hours  influenza   Vaccine 0.5 milliLiter(s) IntraMuscular once  insulin glargine Injectable (LANTUS) 10 Unit(s) SubCutaneous <User Schedule>  insulin lispro (ADMELOG) corrective regimen sliding scale   SubCutaneous every 6 hours  insulin lispro Injectable (ADMELOG) 6 Unit(s) SubCutaneous every 6 hours  lisinopril 5 milliGRAM(s) Oral daily  nystatin Powder 1 Application(s) Topical two times a day  pantoprazole  Injectable 40 milliGRAM(s) IV Push daily  predniSONE   Tablet 10 milliGRAM(s) Oral daily  vancomycin  IVPB 1000 milliGRAM(s) IV Intermittent every 12 hours    MEDICATIONS  (PRN):  acetaminophen    Suspension .. 650 milliGRAM(s) Oral every 6 hours PRN Moderate Pain (4 - 6)  ALBUTerol    90 MICROgram(s) HFA Inhaler 2 Puff(s) Inhalation every 6 hours PRN Shortness of Breath    Pertinent Labs: 11-23 Na135 mmol/L Glu 214 mg/dL<H> K+ 3.9 mmol/L Cr  0.34 mg/dL<L> BUN 9 mg/dL 11-23 Phos 3.5 mg/dL 11-19 Alb 2.0 g/dL<L> 11-16 Chol <50 mg/dL LDL --    HDL 12 mg/dL<L> Trig 100 mg/dL     CAPILLARY BLOOD GLUCOSE      POCT Blood Glucose.: 243 mg/dL (23 Nov 2020 11:32)  POCT Blood Glucose.: 297 mg/dL (23 Nov 2020 08:27)  POCT Blood Glucose.: 231 mg/dL (23 Nov 2020 05:08)  POCT Blood Glucose.: 177 mg/dL (23 Nov 2020 00:07)  POCT Blood Glucose.: 168 mg/dL (22 Nov 2020 17:13)    Skin: 2 pressure ulcers completed    Estimated Needs:   [x ] no change since previous assessment  [ ] recalculated:     Previous Nutrition Diagnosis:   [ ] Inadequate Energy Intake [ ]Inadequate Oral Intake [ ] Excessive Energy Intake   [ ] Underweight [ ] Increased Nutrient Needs [ ] Overweight/Obesity   [ ] Altered GI Function [ ] Unintended Weight Loss [ ] Food & Nutrition Related Knowledge Deficit [ ] Malnutrition  [x] No nutrition diagnosis     Nutrition Diagnosis is [ ] ongoing  [ ] resolved [ x] not applicable     New Nutrition Diagnosis: [ ] not applicable [x] overweight/obesity       Interventions:   Recommend  [ ] Change Diet To:  [ ] Nutrition Supplement  [x ] Nutrition Support: provide tube feeding of glucerna 1.5 at 40ml/hx24 (960ml, 1440kcal, 79g protein, 728ml free water) as previously recommended. MD to monitor/assess fluid status as needed.   Patient will tolerate TF with no Nausea, vomiting diarrhea or abdominal distension   [ ] Other:     Monitoring and Evaluation:   [ ] PO intake [ x ] Tolerance to diet prescription [ x ] weights [ x ] labs[ x ] follow up per protocol  [ ] other:

## 2020-11-23 NOTE — PROGRESS NOTE ADULT - ASSESSMENT
MRSA Bacteremia - ?source  Leukocytosis - normalized  S/p low grade fever   ·	cont Vancomycin 1gm iv q12hrs until 12/16/20  ·	will need PICC line insertion

## 2020-11-23 NOTE — PROGRESS NOTE ADULT - SUBJECTIVE AND OBJECTIVE BOX
Interval Events:    receiving TF  on prednisone 10mg daily  intermittent hyperglycemia    Allergies    fish (Angioedema)  penicillins (Rash)    Intolerances      Endocrine/Metabolic Medications:  insulin glargine Injectable (LANTUS) 10 Unit(s) SubCutaneous <User Schedule>  insulin lispro (ADMELOG) corrective regimen sliding scale   SubCutaneous every 6 hours  insulin lispro Injectable (ADMELOG) 4 Unit(s) SubCutaneous every 6 hours  predniSONE   Tablet 10 milliGRAM(s) Oral daily      Vital Signs Last 24 Hrs  T(C): 36.3 (23 Nov 2020 05:41), Max: 36.7 (22 Nov 2020 21:40)  T(F): 97.4 (23 Nov 2020 05:41), Max: 98.1 (22 Nov 2020 21:40)  HR: 98 (23 Nov 2020 08:15) (98 - 110)  BP: 127/62 (23 Nov 2020 05:41) (110/67 - 127/62)  BP(mean): --  RR: 22 (23 Nov 2020 05:41) (18 - 24)  SpO2: 99% (23 Nov 2020 08:15) (99% - 100%)      PHYSICAL EXAM  Constitutional:  s/p trach on vent  NC/AT:    HEENT:    Neck:  No JVD  Respiratory:  reduced breath sounds b/l bases    Cardiovascular:  RR     Gastrointestinal: Soft     Extremities: without cyanosis    Neurological:  non focal    LABS                        8.8    9.47  )-----------( 167      ( 23 Nov 2020 08:18 )             28.8                               135    |  97     |  9                   Calcium: 8.3   / iCa: x      (11-23 @ 08:19)    ----------------------------<  214       Magnesium: 1.9                              3.9     |  33     |  0.34             Phosphorous: 3.5        CAPILLARY BLOOD GLUCOSE      POCT Blood Glucose.: 297 mg/dL (23 Nov 2020 08:27)  POCT Blood Glucose.: 231 mg/dL (23 Nov 2020 05:08)  POCT Blood Glucose.: 177 mg/dL (23 Nov 2020 00:07)  POCT Blood Glucose.: 168 mg/dL (22 Nov 2020 17:13)  POCT Blood Glucose.: 307 mg/dL (22 Nov 2020 11:52)        Assesment/plan        64 yo female with multiple comorbidities including h/o DM type 2, HTN, COPD s/p trach on vent, lung ca on right sided s/p resection on chemo admitted with lethargy    endocrinology consulted for glycemic management    DM type 2  uncontrolled  complicated by high dose steroid use, acute on chronic resp failure  s/p trach    recommendations:  s/p trach/PEG  poc glucose intermittently elevated    - continue lantus 10 units daily in am   - increase insulin lispro to 6 units q6h, hold if TF are held  - continue low dose sliding scale  - reassess based on requirements  - goal inpatient glucose range: 140-180mg/dl    COPD exacerbation  acute on chronic hypoxic resp failure  s/p trach  on standing steroids- on reduced dose of prednisone 20mg daily  pulmonary on board  s/p trach/PEG  receiving vanco    sepsis  was on cefepime, flagyl  on vancomycin  optimize glycemic regimen    Discussed with patient and primary team   Please call  Endocrine- Dr Katlyn Garcia as needed   details… Soft, non-tender, no hepatosplenomegaly, normal bowel sounds

## 2020-11-23 NOTE — PROGRESS NOTE ADULT - SUBJECTIVE AND OBJECTIVE BOX
65y Female is under our care for MRSA bacteremia and leukocytosis. Patient is arousable, but not able to give much subjective feedback. Has not had any recurrent fevers and wbc count has normalized. Will need PICC line placement.     REVIEW OF SYSTEMS:  [ X ] Not able to illicit    MEDS:  vancomycin  IVPB 1000 milliGRAM(s) IV Intermittent every 12 hours    ALLERGIES: Allergies    fish (Angioedema)  penicillins (Rash)    Intolerances      VITALS:  T(C): 36.2 (11-21-20 @ 13:02), Max: 36.6 (11-21-20 @ 05:20)  HR: 109 (11-21-20 @ 13:15) (104 - 115)  BP: 127/61 (11-21-20 @ 13:02) (103/41 - 128/73)  RR: 18 (11-21-20 @ 13:02) (18 - 24)  SpO2: 98% (11-21-20 @ 13:15) (96% - 100%)  Wt(kg): --      PHYSICAL EXAM:  HEENT: n/a  Neck: supple no LN's +vent via vent  Respiratory: bilateral rhonchi no rales  Cardiovascular: S1 S2 reg no murmurs  Gastrointestinal: +BS with soft, nondistended abdomen; nontender  +peg  Extremities: no edema  Skin: bilateral chronic venous stasis of legs  Ortho: n/a  Neuro: awake and somewhat alert      LABS/DIAGNOSTIC TESTS:                        8.8    9.47  )-----------( 167      ( 23 Nov 2020 08:18 )             28.8     WBC Count: 9.47 K/uL (11-23 @ 08:18)  WBC Count: 11.95 K/uL (11-22 @ 07:33)  WBC Count: 10.33 K/uL (11-21 @ 07:00)  WBC Count: 9.62 K/uL (11-20 @ 07:05)  WBC Count: 14.22 K/uL (11-19 @ 04:59)    11-23    135  |  97  |  9   ----------------------------<  214<H>  3.9   |  33<H>  |  0.34<L>    Ca    8.3<L>      23 Nov 2020 08:19  Phos  3.5     11-23  Mg     1.9     11-23        CULTURES:   .Blood Blood-Peripheral  11-19 @ 10:19   No growth to date.  --  --      .Blood Blood-Peripheral  11-17 @ 14:25   Growth in aerobic and anaerobic bottles: Methicillin resistant  Staphylococcus aureus  See previous culture [id number]  --    Growth in aerobic bottle: Gram Positive Cocci in Clusters  Growth in anaerobic bottle: Gram Positive Cocci in Clusters      .Blood Blood-Peripheral  11-17 @ 09:31   Growth in aerobic and anaerobic bottles: Methicillin resistant  Staphylococcus aureus  See previous culture [id number]  --    Growth in anaerobic bottle: Gram Positive Cocci in Clusters  Growth in aerobic bottle: Gram Positive Cocci in Clusters      .Sputum Sputum  11-15 @ 19:28   Moderate Acinetobacter baumannii/nosocom group (Carbapenem Resistant)  Moderate Klebsiella pneumoniae (Carbapenem Resistant)  Normal Respiratory Shannan present  --  Acinetobacter baumannii/nosocom group (Carbapenem Resistant)  Klepne MDRO      .Urine Clean Catch (Midstream)  11-14 @ 22:25   <10,000 CFU/mL Normal Urogenital Shannan  --  --      .Blood Blood-Peripheral  11-14 @ 20:41   Growth in aerobic and anaerobic bottles: Methicillin resistant  Staphylococcus aureus  See previous culture [id number]  --  Blood Culture PCR  Methicillin resistant Staphylococcus aureus        RADIOLOGY:  no new studies

## 2020-11-23 NOTE — PROGRESS NOTE ADULT - SUBJECTIVE AND OBJECTIVE BOX
BARB COLÓN    SCU progress note    INTERVAL HPI/OVERNIGHT EVENTS: ***Pt seen and examined this morning. Pt reports SOB , no chest pain, no fever, no abdominal discomfort. SOB improved after trach suctioned of moderate amt thick auf-white sputum.       DNR [ ]   DNI  [  ] Full code    Covid - 19 PCR: not detected 11/22    The 4Ms    What Matters Most: see GOC  Age appropriate Medications/Screen for High Risk Medication: Yes  Mentation: see CAM below  Mobility: defer to physical exam    The Confusion Assessment Method (CAM) Diagnostic Algorithm     1: Acute Onset or Fluctuating Course  - Is there evidence of an acute change in mental status from the patient’s baseline? Did the (abnormal) behavior  fluctuate during the day, that is, tend to come and go, or increase and decrease in severity?  [ ] YES [ x] NO     2: Inattention  - Did the patient have difficulty focusing attention, being easily distractible, or having difficulty keeping track of what was being said?  [ ] YES [x ] NO     3: Disorganized thinking  -Was the patient’s thinking disorganized or incoherent, such as rambling or irrelevant conversation, unclear or illogical flow of ideas, or unpredictable switching from subject to subject?  [ ] YES [ ] NO    4: Altered Level of consciousness?  [ ] YES [x ] NO    The diagnosis of delirium by CAM requires the presence of features 1 and 2 and either 3 or 4.    PRESSORS: [ ] YES [ x] NO  vancomycin  IVPB 1000 milliGRAM(s) IV Intermittent every 12 hours    Cardiovascular:   Echo 11/16/2020 Normal Left Ventricular Systolic Function,  (EF =  50-55%).  Grade II diastolic dysfunction.      carvedilol 6.25 milliGRAM(s) Oral every 12 hours  lisinopril 5 milliGRAM(s) Oral daily    Pulmonary:  ALBUTerol    90 MICROgram(s) HFA Inhaler 2 Puff(s) Inhalation every 6 hours PRN    Hematalogic:  heparin   Injectable 5000 Unit(s) SubCutaneous every 8 hours    Other:  acetaminophen    Suspension .. 650 milliGRAM(s) Oral every 6 hours PRN  chlorhexidine 0.12% Liquid 15 milliLiter(s) Oral Mucosa every 12 hours  chlorhexidine 2% Cloths 1 Application(s) Topical <User Schedule>  influenza   Vaccine 0.5 milliLiter(s) IntraMuscular once  insulin glargine Injectable (LANTUS) 10 Unit(s) SubCutaneous <User Schedule>  insulin lispro (ADMELOG) corrective regimen sliding scale   SubCutaneous every 6 hours  insulin lispro Injectable (ADMELOG) 4 Unit(s) SubCutaneous every 6 hours  nystatin Powder 1 Application(s) Topical two times a day  pantoprazole  Injectable 40 milliGRAM(s) IV Push daily  predniSONE   Tablet 10 milliGRAM(s) Oral daily    acetaminophen    Suspension .. 650 milliGRAM(s) Oral every 6 hours PRN  ALBUTerol    90 MICROgram(s) HFA Inhaler 2 Puff(s) Inhalation every 6 hours PRN  carvedilol 6.25 milliGRAM(s) Oral every 12 hours  chlorhexidine 0.12% Liquid 15 milliLiter(s) Oral Mucosa every 12 hours  chlorhexidine 2% Cloths 1 Application(s) Topical <User Schedule>  heparin   Injectable 5000 Unit(s) SubCutaneous every 8 hours  influenza   Vaccine 0.5 milliLiter(s) IntraMuscular once  insulin glargine Injectable (LANTUS) 10 Unit(s) SubCutaneous <User Schedule>  insulin lispro (ADMELOG) corrective regimen sliding scale   SubCutaneous every 6 hours  insulin lispro Injectable (ADMELOG) 4 Unit(s) SubCutaneous every 6 hours  lisinopril 5 milliGRAM(s) Oral daily  nystatin Powder 1 Application(s) Topical two times a day  pantoprazole  Injectable 40 milliGRAM(s) IV Push daily  predniSONE   Tablet 10 milliGRAM(s) Oral daily  vancomycin  IVPB 1000 milliGRAM(s) IV Intermittent every 12 hours    Drug Dosing Weight  Height (cm): 154 (14 Nov 2020 20:16)  Weight (kg): 89.9 (14 Nov 2020 20:16)  BMI (kg/m2): 37.9 (14 Nov 2020 20:16)  BSA (m2): 1.87 (14 Nov 2020 20:16)    CENTRAL LINE: [ ] YES [x ] NO  LOCATION:   DATE INSERTED:  REMOVE: [ ] YES [ ] NO  EXPLAIN:    ROBLEDO: [ ] YES [x ] NO    DATE INSERTED:  REMOVE:  [ ] YES [ ] NO  EXPLAIN:    PAST MEDICAL & SURGICAL HISTORY:  Malignant neoplasm of unspecified part of right bronchus or lung    HTN (hypertension)    DM (diabetes mellitus)    COPD (chronic obstructive pulmonary disease)    Lung cancer    Morbid obesity    GERD (gastroesophageal reflux disease)    Deviated septum    Prolapsed uterus    ITP (idiopathic thrombocytopenic purpura)    Emphysema/COPD    History of cholecystectomy    S/P lobectomy of lung  right 2017    History of tonsillectomy                  Mode: AC/ CMV (Assist Control/ Continuous Mandatory Ventilation)  RR (machine): 14  TV (machine): 400  FiO2: 0.4  PEEP: 5  ITime: 1  MAP: 11.6  PIP: 32      PHYSICAL EXAM:    GENERAL: anxious about SOB which improved after suctioning.   HEAD:  Atraumatic, Normocephalic  EYES: EOMI, PERRLA, conjunctiva and sclera clear  ENMT: No tonsillar erythema, exudates, or enlargement; Moist mucous membranes, , No lesions  NECK: Supple, No JVD, Normal thyroid. Trach   NERVOUS SYSTEM:  Alert & Oriented X3, Follows commands. Motor Strength 4-5/5 BUE , 3/5 BLE  .  CHEST/LUNG: Trach to vent . Few rhonchi clear after suctioned. Decreased at bases. HEART: Regular rate and rhythm; No murmurs, rubs, or gallops  ABDOMEN: Soft, Nontender, Nondistended; Bowel sounds present. PEG tube intact.   EXTREMITIES:  Right hand less swollen. (+) weeping serous. 2+ Peripheral Pulses,  LYMPH: No lymphadenopathy noted  SKIN: warm dry.       LABS:  CBC Full  -  ( 23 Nov 2020 08:18 )  WBC Count : 9.47 K/uL  RBC Count : 3.24 M/uL  Hemoglobin : 8.8 g/dL  Hematocrit : 28.8 %  Platelet Count - Automated : 167 K/uL  Mean Cell Volume : 88.9 fl  Mean Cell Hemoglobin : 27.2 pg  Mean Cell Hemoglobin Concentration : 30.6 gm/dL  Auto Neutrophil # : x  Auto Lymphocyte # : x  Auto Monocyte # : x  Auto Eosinophil # : x  Auto Basophil # : x  Auto Neutrophil % : x  Auto Lymphocyte % : x  Auto Monocyte % : x  Auto Eosinophil % : x  Auto Basophil % : x    11-23    135  |  97  |  9   ----------------------------<  214<H>  3.9   |  33<H>  |  0.34<L>    Ca    8.3<L>      23 Nov 2020 08:19  Phos  3.5     11-23  Mg     1.9     11-23      PT/INR - ( 23 Nov 2020 08:18 )   PT: 15.1 sec;   INR: 1.28 ratio                   [  ]  DVT Prophylaxis  [  ]  Nutrition, Brand, Rate         Goal Rate        Abnormal Nutritional Status -  Malnutrition        Morbid Obesity BMI >/=40    RADIOLOGY & ADDITIONAL STUDIES:  ***    Goals of Care Discussion with Family/Proxy/Other   - see note from/family meeting set up for...     BARB COLÓN    SCU progress note    INTERVAL HPI/OVERNIGHT EVENTS: ***Pt seen and examined this morning. Pt reports SOB , no chest pain, no fever, no abdominal discomfort. SOB improved after trach suctioned of moderate amt thick auf-white sputum.       DNR [ ]   DNI  [  ] Full code    Covid - 19 PCR: not detected     The 4Ms    What Matters Most: see GOC  Age appropriate Medications/Screen for High Risk Medication: Yes  Mentation: see CAM below  Mobility: defer to physical exam    The Confusion Assessment Method (CAM) Diagnostic Algorithm     1: Acute Onset or Fluctuating Course  - Is there evidence of an acute change in mental status from the patient’s baseline? Did the (abnormal) behavior  fluctuate during the day, that is, tend to come and go, or increase and decrease in severity?  [ ] YES [ x] NO     2: Inattention  - Did the patient have difficulty focusing attention, being easily distractible, or having difficulty keeping track of what was being said?  [ ] YES [x ] NO     3: Disorganized thinking  -Was the patient’s thinking disorganized or incoherent, such as rambling or irrelevant conversation, unclear or illogical flow of ideas, or unpredictable switching from subject to subject?  [ ] YES [ ] NO    4: Altered Level of consciousness?  [ ] YES [x ] NO    The diagnosis of delirium by CAM requires the presence of features 1 and 2 and either 3 or 4.    PRESSORS: [ ] YES [ x] NO  vancomycin  IVPB 1000 milliGRAM(s) IV Intermittent every 12 hours    Cardiovascular:   Echo 2020 Normal Left Ventricular Systolic Function,  (EF =  50-55%).  Grade II diastolic dysfunction.      carvedilol 6.25 milliGRAM(s) Oral every 12 hours  lisinopril 5 milliGRAM(s) Oral daily    Pulmonary:  ALBUTerol    90 MICROgram(s) HFA Inhaler 2 Puff(s) Inhalation every 6 hours PRN    Hematalogic:  heparin   Injectable 5000 Unit(s) SubCutaneous every 8 hours    Other:  acetaminophen    Suspension .. 650 milliGRAM(s) Oral every 6 hours PRN  chlorhexidine 0.12% Liquid 15 milliLiter(s) Oral Mucosa every 12 hours  chlorhexidine 2% Cloths 1 Application(s) Topical <User Schedule>  influenza   Vaccine 0.5 milliLiter(s) IntraMuscular once  insulin glargine Injectable (LANTUS) 10 Unit(s) SubCutaneous <User Schedule>  insulin lispro (ADMELOG) corrective regimen sliding scale   SubCutaneous every 6 hours  insulin lispro Injectable (ADMELOG) 4 Unit(s) SubCutaneous every 6 hours  nystatin Powder 1 Application(s) Topical two times a day  pantoprazole  Injectable 40 milliGRAM(s) IV Push daily  predniSONE   Tablet 10 milliGRAM(s) Oral daily    acetaminophen    Suspension .. 650 milliGRAM(s) Oral every 6 hours PRN  ALBUTerol    90 MICROgram(s) HFA Inhaler 2 Puff(s) Inhalation every 6 hours PRN  carvedilol 6.25 milliGRAM(s) Oral every 12 hours  chlorhexidine 0.12% Liquid 15 milliLiter(s) Oral Mucosa every 12 hours  chlorhexidine 2% Cloths 1 Application(s) Topical <User Schedule>  heparin   Injectable 5000 Unit(s) SubCutaneous every 8 hours  influenza   Vaccine 0.5 milliLiter(s) IntraMuscular once  insulin glargine Injectable (LANTUS) 10 Unit(s) SubCutaneous <User Schedule>  insulin lispro (ADMELOG) corrective regimen sliding scale   SubCutaneous every 6 hours  insulin lispro Injectable (ADMELOG) 4 Unit(s) SubCutaneous every 6 hours  lisinopril 5 milliGRAM(s) Oral daily  nystatin Powder 1 Application(s) Topical two times a day  pantoprazole  Injectable 40 milliGRAM(s) IV Push daily  predniSONE   Tablet 10 milliGRAM(s) Oral daily  vancomycin  IVPB 1000 milliGRAM(s) IV Intermittent every 12 hours    Drug Dosing Weight  Height (cm): 154 (2020 20:16)  Weight (kg): 89.9 (2020 20:16)  BMI (kg/m2): 37.9 (2020 20:16)  BSA (m2): 1.87 (2020 20:16)    CENTRAL LINE: [ ] YES [x ] NO  LOCATION:   DATE INSERTED:  REMOVE: [ ] YES [ ] NO  EXPLAIN:    ROBLEDO: [ ] YES [x ] NO    DATE INSERTED:  REMOVE:  [ ] YES [ ] NO  EXPLAIN:    PAST MEDICAL & SURGICAL HISTORY:  Malignant neoplasm of unspecified part of right bronchus or lung    HTN (hypertension)    DM (diabetes mellitus)    COPD (chronic obstructive pulmonary disease)    Lung cancer    Morbid obesity    GERD (gastroesophageal reflux disease)    Deviated septum    Prolapsed uterus    ITP (idiopathic thrombocytopenic purpura)    Emphysema/COPD    History of cholecystectomy    S/P lobectomy of lung  right 2017    History of tonsillectomy                  Mode: AC/ CMV (Assist Control/ Continuous Mandatory Ventilation)  RR (machine): 14  TV (machine): 400  FiO2: 0.4  PEEP: 5  ITime: 1  MAP: 11.6  PIP: 32      PHYSICAL EXAM:    GENERAL: anxious about SOB which improved after suctioning.   HEAD:  Atraumatic, Normocephalic  EYES: EOMI, PERRLA, conjunctiva and sclera clear  ENMT: No tonsillar erythema, exudates, or enlargement; Moist mucous membranes, , No lesions  NECK: Supple, No JVD, Normal thyroid. Trach   NERVOUS SYSTEM:  Alert & Oriented X3, Follows commands. Motor Strength 4-5/5 BUE , 3/5 BLE  .  CHEST/LUNG: Trach to vent . Few rhonchi clear after suctioned. Decreased at bases. HEART: Regular rate and rhythm; No murmurs, rubs, or gallops  ABDOMEN: Soft, Nontender, Nondistended; Bowel sounds present. PEG tube intact.   EXTREMITIES:  Right hand less swollen. (+) weeping serous. 2+ Peripheral Pulses,  LYMPH: No lymphadenopathy noted  SKIN: warm dry.       LABS:  CBC Full  -  ( 2020 08:18 )  WBC Count : 9.47 K/uL  RBC Count : 3.24 M/uL  Hemoglobin : 8.8 g/dL  Hematocrit : 28.8 %  Platelet Count - Automated : 167 K/uL  Mean Cell Volume : 88.9 fl  Mean Cell Hemoglobin : 27.2 pg  Mean Cell Hemoglobin Concentration : 30.6 gm/dL  Auto Neutrophil # : x  Auto Lymphocyte # : x  Auto Monocyte # : x  Auto Eosinophil # : x  Auto Basophil # : x  Auto Neutrophil % : x  Auto Lymphocyte % : x  Auto Monocyte % : x  Auto Eosinophil % : x  Auto Basophil % : x        135  |  97  |  9   ----------------------------<  214<H>  3.9   |  33<H>  |  0.34<L>    Ca    8.3<L>      2020 08:19  Phos  3.5     -  Mg     1.9     -      PT/INR - ( 2020 08:18 )   PT: 15.1 sec;   INR: 1.28 ratio                   [  ]  DVT Prophylaxis  [  ]  Nutrition, Brand, Rate         Goal Rate        Abnormal Nutritional Status -  Malnutrition        Morbid Obesity BMI >/=40    RADIOLOGY & ADDITIONAL STUDIES:  ***    Goals of Care Discussion with Family/Proxy/Other   - see note from/family meetin/17 BARB COLÓN    SCU progress note    INTERVAL HPI/OVERNIGHT EVENTS: ***Pt seen and examined this morning. Pt reports SOB , no chest pain, no fever, no abdominal discomfort. SOB improved after trach suctioned of moderate amt thick auf-white sputum.   Pt developed fever this evening, Tmax 101.5F , Tachycardia 128. (+) loose BM , >3 today. Will obtain UA, CXR, and stool for CDiff. Last BC NGTD on       DNR [ ]   DNI  [  ] Full code    Covid - 19 PCR: not detected     The 4Ms    What Matters Most: see GOC  Age appropriate Medications/Screen for High Risk Medication: Yes  Mentation: see CAM below  Mobility: defer to physical exam    The Confusion Assessment Method (CAM) Diagnostic Algorithm     1: Acute Onset or Fluctuating Course  - Is there evidence of an acute change in mental status from the patient’s baseline? Did the (abnormal) behavior  fluctuate during the day, that is, tend to come and go, or increase and decrease in severity?  [ ] YES [ x] NO     2: Inattention  - Did the patient have difficulty focusing attention, being easily distractible, or having difficulty keeping track of what was being said?  [ ] YES [x ] NO     3: Disorganized thinking  -Was the patient’s thinking disorganized or incoherent, such as rambling or irrelevant conversation, unclear or illogical flow of ideas, or unpredictable switching from subject to subject?  [ ] YES [ ] NO    4: Altered Level of consciousness?  [ ] YES [x ] NO    The diagnosis of delirium by CAM requires the presence of features 1 and 2 and either 3 or 4.    PRESSORS: [ ] YES [ x] NO  vancomycin  IVPB 1000 milliGRAM(s) IV Intermittent every 12 hours    Cardiovascular:   Echo 2020 Normal Left Ventricular Systolic Function,  (EF =  50-55%).  Grade II diastolic dysfunction.      carvedilol 6.25 milliGRAM(s) Oral every 12 hours  lisinopril 5 milliGRAM(s) Oral daily    Pulmonary:  ALBUTerol    90 MICROgram(s) HFA Inhaler 2 Puff(s) Inhalation every 6 hours PRN    Hematalogic:  heparin   Injectable 5000 Unit(s) SubCutaneous every 8 hours    Other:  acetaminophen    Suspension .. 650 milliGRAM(s) Oral every 6 hours PRN  chlorhexidine 0.12% Liquid 15 milliLiter(s) Oral Mucosa every 12 hours  chlorhexidine 2% Cloths 1 Application(s) Topical <User Schedule>  influenza   Vaccine 0.5 milliLiter(s) IntraMuscular once  insulin glargine Injectable (LANTUS) 10 Unit(s) SubCutaneous <User Schedule>  insulin lispro (ADMELOG) corrective regimen sliding scale   SubCutaneous every 6 hours  insulin lispro Injectable (ADMELOG) 4 Unit(s) SubCutaneous every 6 hours  nystatin Powder 1 Application(s) Topical two times a day  pantoprazole  Injectable 40 milliGRAM(s) IV Push daily  predniSONE   Tablet 10 milliGRAM(s) Oral daily    acetaminophen    Suspension .. 650 milliGRAM(s) Oral every 6 hours PRN  ALBUTerol    90 MICROgram(s) HFA Inhaler 2 Puff(s) Inhalation every 6 hours PRN  carvedilol 6.25 milliGRAM(s) Oral every 12 hours  chlorhexidine 0.12% Liquid 15 milliLiter(s) Oral Mucosa every 12 hours  chlorhexidine 2% Cloths 1 Application(s) Topical <User Schedule>  heparin   Injectable 5000 Unit(s) SubCutaneous every 8 hours  influenza   Vaccine 0.5 milliLiter(s) IntraMuscular once  insulin glargine Injectable (LANTUS) 10 Unit(s) SubCutaneous <User Schedule>  insulin lispro (ADMELOG) corrective regimen sliding scale   SubCutaneous every 6 hours  insulin lispro Injectable (ADMELOG) 4 Unit(s) SubCutaneous every 6 hours  lisinopril 5 milliGRAM(s) Oral daily  nystatin Powder 1 Application(s) Topical two times a day  pantoprazole  Injectable 40 milliGRAM(s) IV Push daily  predniSONE   Tablet 10 milliGRAM(s) Oral daily  vancomycin  IVPB 1000 milliGRAM(s) IV Intermittent every 12 hours    Drug Dosing Weight  Height (cm): 154 (2020 20:16)  Weight (kg): 89.9 (2020 20:16)  BMI (kg/m2): 37.9 (2020 20:16)  BSA (m2): 1.87 (2020 20:16)    CENTRAL LINE: [ ] YES [x ] NO  LOCATION:   DATE INSERTED:  REMOVE: [ ] YES [ ] NO  EXPLAIN:    ROBLEDO: [ ] YES [x ] NO    DATE INSERTED:  REMOVE:  [ ] YES [ ] NO  EXPLAIN:    PAST MEDICAL & SURGICAL HISTORY:  Malignant neoplasm of unspecified part of right bronchus or lung    HTN (hypertension)    DM (diabetes mellitus)    COPD (chronic obstructive pulmonary disease)    Lung cancer    Morbid obesity    GERD (gastroesophageal reflux disease)    Deviated septum    Prolapsed uterus    ITP (idiopathic thrombocytopenic purpura)    Emphysema/COPD    History of cholecystectomy    S/P lobectomy of lung  right 2017    History of tonsillectomy                  Mode: AC/ CMV (Assist Control/ Continuous Mandatory Ventilation)  RR (machine): 14  TV (machine): 400  FiO2: 0.4  PEEP: 5  ITime: 1  MAP: 11.6  PIP: 32      PHYSICAL EXAM:    GENERAL: anxious about SOB which improved after suctioning.   HEAD:  Atraumatic, Normocephalic  EYES: EOMI, PERRLA, conjunctiva and sclera clear  ENMT: No tonsillar erythema, exudates, or enlargement; Moist mucous membranes, , No lesions  NECK: Supple, No JVD, Normal thyroid. Trach   NERVOUS SYSTEM:  Alert & Oriented X3, Follows commands. Motor Strength 4-5/5 BUE , 3/5 BLE  .  CHEST/LUNG: Trach to vent . Few rhonchi clear after suctioned. Decreased at bases. HEART: Regular rate and rhythm; No murmurs, rubs, or gallops  ABDOMEN: Soft, Nontender, Nondistended; Bowel sounds present. PEG tube intact.   EXTREMITIES:  Right hand less swollen. (+) weeping serous. 2+ Peripheral Pulses,  LYMPH: No lymphadenopathy noted  SKIN: warm dry.       LABS:  CBC Full  -  ( 2020 08:18 )  WBC Count : 9.47 K/uL  RBC Count : 3.24 M/uL  Hemoglobin : 8.8 g/dL  Hematocrit : 28.8 %  Platelet Count - Automated : 167 K/uL  Mean Cell Volume : 88.9 fl  Mean Cell Hemoglobin : 27.2 pg  Mean Cell Hemoglobin Concentration : 30.6 gm/dL  Auto Neutrophil # : x  Auto Lymphocyte # : x  Auto Monocyte # : x  Auto Eosinophil # : x  Auto Basophil # : x  Auto Neutrophil % : x  Auto Lymphocyte % : x  Auto Monocyte % : x  Auto Eosinophil % : x  Auto Basophil % : x        135  |  97  |  9   ----------------------------<  214<H>  3.9   |  33<H>  |  0.34<L>    Ca    8.3<L>      2020 08:19  Phos  3.5     -  Mg     1.9     11-      PT/INR - ( 2020 08:18 )   PT: 15.1 sec;   INR: 1.28 ratio                   [  ]  DVT Prophylaxis  [  ]  Nutrition, Brand, Rate         Goal Rate        Abnormal Nutritional Status -  Malnutrition        Morbid Obesity BMI >/=40    RADIOLOGY & ADDITIONAL STUDIES:  ***    Goals of Care Discussion with Family/Proxy/Other   - see note from/family meetin/17

## 2020-11-23 NOTE — PROGRESS NOTE ADULT - ASSESSMENT
seen and examined vsstable afebrile physical unchanged  awake on bed  denies cp or sob  mary bd pain  labs noted  blood cxs neg   a/p MRSA bacterimia  on vanco  trough 16  blood cxs negative  needs ID follow up for IV antibx duration   wtach accuchecks   [facility]   trach vent  pulm critical seen and examined vsstable afebrile physical unchanged  awake on bed  denies cp or sob  mary bd pain  labs noted repeat  blood cxs neg   on vanco  a/p MRSA bacterimia  on vanco  trough 16  blood cxs negative  needs ID follow up for IV antibx duration  needs pickline  wtach accuchecks   watch hgb   trach vent  pulm critical

## 2020-11-23 NOTE — PROGRESS NOTE ADULT - PROBLEM SELECTOR PLAN 1
likely due to aspiration pneumonia vs bacteremia  CXR results as above  BC growing MRSA . Repeat BC on 11/17 growing MRSA. but 11/19 NGTD  Sputum growing Acinetobacter  No evidence of active pneumonia at this time. Maxipime and Flagyl stopped.   bacteremia   afebrile, leukocytosis resolved  will need IV Vanco for 28 days from 11/19 (end date : 12/16)  plan for PICC in IR today. (IR aware).   Monitor Vanco trough   ID Dr. Corcoran following likely due to aspiration pneumonia vs bacteremia  CXR results as above  BC growing MRSA . Repeat BC on 11/17 growing MRSA. but 11/19 NGTD  Sputum growing Acinetobacter  No evidence of active pneumonia at this time. Maxipime and Flagyl stopped.   bacteremia   afebrile, leukocytosis resolved  will need IV Vanco for 28 days from 11/19 (end date : 12/16)  plan for PICC in IR today (IR aware: plan to do today or tomorrow).   Monitor Vanco trough   ID Dr. Corcoran following

## 2020-11-23 NOTE — PROGRESS NOTE ADULT - ATTENDING COMMENTS
-continue antibx  -for PICC placement   -discharge to vent facility after PICC placement   -continue vent support  -daily SBT

## 2020-11-24 LAB
ALBUMIN SERPL ELPH-MCNC: 2 G/DL — LOW (ref 3.5–5)
ALP SERPL-CCNC: 111 U/L — SIGNIFICANT CHANGE UP (ref 40–120)
ALT FLD-CCNC: 15 U/L DA — SIGNIFICANT CHANGE UP (ref 10–60)
ANION GAP SERPL CALC-SCNC: 7 MMOL/L — SIGNIFICANT CHANGE UP (ref 5–17)
AST SERPL-CCNC: 15 U/L — SIGNIFICANT CHANGE UP (ref 10–40)
BILIRUB SERPL-MCNC: 0.7 MG/DL — SIGNIFICANT CHANGE UP (ref 0.2–1.2)
BUN SERPL-MCNC: 8 MG/DL — SIGNIFICANT CHANGE UP (ref 7–18)
C DIFF BY PCR RESULT: SIGNIFICANT CHANGE UP
C DIFF TOX GENS STL QL NAA+PROBE: SIGNIFICANT CHANGE UP
CALCIUM SERPL-MCNC: 8.6 MG/DL — SIGNIFICANT CHANGE UP (ref 8.4–10.5)
CHLORIDE SERPL-SCNC: 96 MMOL/L — SIGNIFICANT CHANGE UP (ref 96–108)
CO2 SERPL-SCNC: 29 MMOL/L — SIGNIFICANT CHANGE UP (ref 22–31)
CREAT SERPL-MCNC: 0.33 MG/DL — LOW (ref 0.5–1.3)
CULTURE RESULTS: SIGNIFICANT CHANGE UP
CULTURE RESULTS: SIGNIFICANT CHANGE UP
GLUCOSE BLDC GLUCOMTR-MCNC: 145 MG/DL — HIGH (ref 70–99)
GLUCOSE BLDC GLUCOMTR-MCNC: 160 MG/DL — HIGH (ref 70–99)
GLUCOSE BLDC GLUCOMTR-MCNC: 176 MG/DL — HIGH (ref 70–99)
GLUCOSE BLDC GLUCOMTR-MCNC: 249 MG/DL — HIGH (ref 70–99)
GLUCOSE SERPL-MCNC: 149 MG/DL — HIGH (ref 70–99)
HCT VFR BLD CALC: 32.7 % — LOW (ref 34.5–45)
HGB BLD-MCNC: 9.9 G/DL — LOW (ref 11.5–15.5)
MAGNESIUM SERPL-MCNC: 1.7 MG/DL — SIGNIFICANT CHANGE UP (ref 1.6–2.6)
MCHC RBC-ENTMCNC: 26.5 PG — LOW (ref 27–34)
MCHC RBC-ENTMCNC: 30.3 GM/DL — LOW (ref 32–36)
MCV RBC AUTO: 87.4 FL — SIGNIFICANT CHANGE UP (ref 80–100)
MRSA PCR RESULT.: DETECTED
NRBC # BLD: 0 /100 WBCS — SIGNIFICANT CHANGE UP (ref 0–0)
PHOSPHATE SERPL-MCNC: 3.8 MG/DL — SIGNIFICANT CHANGE UP (ref 2.5–4.5)
PLATELET # BLD AUTO: 181 K/UL — SIGNIFICANT CHANGE UP (ref 150–400)
POTASSIUM SERPL-MCNC: 3.8 MMOL/L — SIGNIFICANT CHANGE UP (ref 3.5–5.3)
POTASSIUM SERPL-SCNC: 3.8 MMOL/L — SIGNIFICANT CHANGE UP (ref 3.5–5.3)
PROT SERPL-MCNC: 7.3 G/DL — SIGNIFICANT CHANGE UP (ref 6–8.3)
RBC # BLD: 3.74 M/UL — LOW (ref 3.8–5.2)
RBC # FLD: 16.3 % — HIGH (ref 10.3–14.5)
S AUREUS DNA NOSE QL NAA+PROBE: DETECTED
SODIUM SERPL-SCNC: 132 MMOL/L — LOW (ref 135–145)
SPECIMEN SOURCE: SIGNIFICANT CHANGE UP
SPECIMEN SOURCE: SIGNIFICANT CHANGE UP
VANCOMYCIN TROUGH SERPL-MCNC: 18.6 UG/ML — SIGNIFICANT CHANGE UP (ref 10–20)
WBC # BLD: 10.05 K/UL — SIGNIFICANT CHANGE UP (ref 3.8–10.5)
WBC # FLD AUTO: 10.05 K/UL — SIGNIFICANT CHANGE UP (ref 3.8–10.5)

## 2020-11-24 PROCEDURE — 71045 X-RAY EXAM CHEST 1 VIEW: CPT | Mod: 26

## 2020-11-24 PROCEDURE — 76937 US GUIDE VASCULAR ACCESS: CPT | Mod: 26

## 2020-11-24 PROCEDURE — 36573 INSJ PICC RS&I 5 YR+: CPT

## 2020-11-24 RX ORDER — CHLORHEXIDINE GLUCONATE 213 G/1000ML
1 SOLUTION TOPICAL
Refills: 0 | Status: DISCONTINUED | OUTPATIENT
Start: 2020-11-24 | End: 2020-11-25

## 2020-11-24 RX ORDER — SODIUM CHLORIDE 9 MG/ML
10 INJECTION INTRAMUSCULAR; INTRAVENOUS; SUBCUTANEOUS
Refills: 0 | Status: DISCONTINUED | OUTPATIENT
Start: 2020-11-24 | End: 2020-11-25

## 2020-11-24 RX ORDER — MUPIROCIN 20 MG/G
1 OINTMENT TOPICAL
Refills: 0 | Status: DISCONTINUED | OUTPATIENT
Start: 2020-11-24 | End: 2020-11-24

## 2020-11-24 RX ORDER — MUPIROCIN 20 MG/G
1 OINTMENT TOPICAL
Refills: 0 | Status: DISCONTINUED | OUTPATIENT
Start: 2020-11-24 | End: 2020-11-25

## 2020-11-24 RX ADMIN — Medication 10 MILLIGRAM(S): at 05:30

## 2020-11-24 RX ADMIN — MUPIROCIN 1 APPLICATION(S): 20 OINTMENT TOPICAL at 17:19

## 2020-11-24 RX ADMIN — Medication 1: at 05:41

## 2020-11-24 RX ADMIN — LISINOPRIL 5 MILLIGRAM(S): 2.5 TABLET ORAL at 05:30

## 2020-11-24 RX ADMIN — CHLORHEXIDINE GLUCONATE 1 APPLICATION(S): 213 SOLUTION TOPICAL at 12:34

## 2020-11-24 RX ADMIN — HEPARIN SODIUM 5000 UNIT(S): 5000 INJECTION INTRAVENOUS; SUBCUTANEOUS at 21:26

## 2020-11-24 RX ADMIN — Medication 650 MILLIGRAM(S): at 10:20

## 2020-11-24 RX ADMIN — Medication 6 UNIT(S): at 12:33

## 2020-11-24 RX ADMIN — NYSTATIN CREAM 1 APPLICATION(S): 100000 CREAM TOPICAL at 17:18

## 2020-11-24 RX ADMIN — PANTOPRAZOLE SODIUM 40 MILLIGRAM(S): 20 TABLET, DELAYED RELEASE ORAL at 12:33

## 2020-11-24 RX ADMIN — Medication 650 MILLIGRAM(S): at 09:26

## 2020-11-24 RX ADMIN — CARVEDILOL PHOSPHATE 6.25 MILLIGRAM(S): 80 CAPSULE, EXTENDED RELEASE ORAL at 05:30

## 2020-11-24 RX ADMIN — CARVEDILOL PHOSPHATE 6.25 MILLIGRAM(S): 80 CAPSULE, EXTENDED RELEASE ORAL at 17:19

## 2020-11-24 RX ADMIN — Medication 250 MILLIGRAM(S): at 17:19

## 2020-11-24 RX ADMIN — NYSTATIN CREAM 1 APPLICATION(S): 100000 CREAM TOPICAL at 05:31

## 2020-11-24 RX ADMIN — CHLORHEXIDINE GLUCONATE 15 MILLILITER(S): 213 SOLUTION TOPICAL at 05:31

## 2020-11-24 RX ADMIN — Medication 6 UNIT(S): at 17:31

## 2020-11-24 RX ADMIN — Medication 250 MILLIGRAM(S): at 05:16

## 2020-11-24 RX ADMIN — Medication 2: at 12:33

## 2020-11-24 RX ADMIN — CHLORHEXIDINE GLUCONATE 1 APPLICATION(S): 213 SOLUTION TOPICAL at 05:29

## 2020-11-24 RX ADMIN — HEPARIN SODIUM 5000 UNIT(S): 5000 INJECTION INTRAVENOUS; SUBCUTANEOUS at 13:13

## 2020-11-24 RX ADMIN — CHLORHEXIDINE GLUCONATE 15 MILLILITER(S): 213 SOLUTION TOPICAL at 17:18

## 2020-11-24 RX ADMIN — INSULIN GLARGINE 10 UNIT(S): 100 INJECTION, SOLUTION SUBCUTANEOUS at 05:41

## 2020-11-24 RX ADMIN — Medication 6 UNIT(S): at 05:41

## 2020-11-24 NOTE — PROGRESS NOTE ADULT - ASSESSMENT
seen and examined alert awake vsstable afebrile physical unchanged  awake on bed  denies cp or sob  no  abd pain  pt got picc line  labs noted repeat  blood cxs neg   on vanco  a/p MRSA bacterimia  on vanco  trough 16  blood cxs negative  needs ID follow up for IV antibx duration  needs pickline  wtach accuchecks   watch hgb  vanco triugh 18  trach vent  pulm critical    i d/w for d/c palnning to vent facility if AI team,  covid testing

## 2020-11-24 NOTE — PROGRESS NOTE ADULT - SUBJECTIVE AND OBJECTIVE BOX
65y Female is under our care for MRSA bacteremia and leukocytosis. Patient had fever spike of 101.5F last night, no clear reason as to why. Has not had any recurrent fevers today so far. Underwent PICC line insertion today. Patient nods her head to no complaints today.      REVIEW OF SYSTEMS:  [ X ] Not able to illicit    MEDS:  vancomycin  IVPB 1000 milliGRAM(s) IV Intermittent every 12 hours    ALLERGIES: Allergies    fish (Angioedema)  penicillins (Rash)    Intolerances      VITALS:  Vital Signs Last 24 Hrs  T(C): 36.3 (24 Nov 2020 11:42), Max: 38.6 (23 Nov 2020 17:12)  T(F): 97.4 (24 Nov 2020 11:42), Max: 101.5 (23 Nov 2020 17:12)  HR: 105 (24 Nov 2020 11:42) (94 - 128)  BP: 122/60 (24 Nov 2020 11:42) (108/64 - 146/78)  BP(mean): --  RR: 18 (24 Nov 2020 11:42) (18 - 22)  SpO2: 100% (24 Nov 2020 11:42) (98% - 100%)      PHYSICAL EXAM:  HEENT: n/a  Neck: supple no LN's +vent via vent  Respiratory: bilateral rhonchi no rales  Cardiovascular: S1 S2 reg no murmurs  Gastrointestinal: +BS with soft, nondistended abdomen; nontender  +peg  Extremities: no edema, left arm PICC line  Skin: bilateral chronic venous stasis of legs  Ortho: n/a  Neuro: awake and somewhat       LABS/DIAGNOSTIC TESTS:                          9.9    10.05 )-----------( 181      ( 24 Nov 2020 06:17 )             32.7   WBC Count: 10.05 K/uL (11-24 @ 06:17)  WBC Count: 9.47 K/uL (11-23 @ 08:18)  WBC Count: 11.95 K/uL (11-22 @ 07:33)  WBC Count: 10.33 K/uL (11-21 @ 07:00)  WBC Count: 9.62 K/uL (11-20 @ 07:05)    11-24    132<L>  |  96  |  8   ----------------------------<  149<H>  3.8   |  29  |  0.33<L>    Ca    8.6      24 Nov 2020 06:17  Phos  3.8     11-24  Mg     1.7     11-24    TPro  7.3  /  Alb  2.0<L>  /  TBili  0.7  /  DBili  x   /  AST  15  /  ALT  15  /  AlkPhos  111  11-24        CULTURES:   .Blood Blood-Peripheral  11-19 @ 10:19   No growth to date.  --  --      .Blood Blood-Peripheral  11-17 @ 14:25   Growth in aerobic and anaerobic bottles: Methicillin resistant  Staphylococcus aureus  See previous culture [id number]  --    Growth in aerobic bottle: Gram Positive Cocci in Clusters  Growth in anaerobic bottle: Gram Positive Cocci in Clusters      .Blood Blood-Peripheral  11-17 @ 09:31   Growth in aerobic and anaerobic bottles: Methicillin resistant  Staphylococcus aureus  See previous culture [id number]  --    Growth in anaerobic bottle: Gram Positive Cocci in Clusters  Growth in aerobic bottle: Gram Positive Cocci in Clusters      .Sputum Sputum  11-15 @ 19:28   Moderate Acinetobacter baumannii/nosocom group (Carbapenem Resistant)  Moderate Klebsiella pneumoniae (Carbapenem Resistant)  Normal Respiratory Shannan present  --  Acinetobacter baumannii/nosocom group (Carbapenem Resistant)  Klepne MDRO      .Urine Clean Catch (Midstream)  11-14 @ 22:25   <10,000 CFU/mL Normal Urogenital Shannan  --  --      .Blood Blood-Peripheral  11-14 @ 20:41   Growth in aerobic and anaerobic bottles: Methicillin resistant  Staphylococcus aureus  See previous culture [id number]  --  Blood Culture PCR  Methicillin resistant Staphylococcus aureus        RADIOLOGY:  no new studies

## 2020-11-24 NOTE — PROGRESS NOTE ADULT - ASSESSMENT
MRSA Bacteremia - ?source  Fever - none today so far  ·	cont Vancomycin 1gm iv q12hrs until 12/16/20  ·	will monitor temps for now

## 2020-11-24 NOTE — PROGRESS NOTE ADULT - SUBJECTIVE AND OBJECTIVE BOX
HPI:  64 y/o obese female with history of 80 pack year tobacco use, HTN, DM2 on insulin, COPD s/p trach(on vent) and PEG, R lung cancer s/p resection (2017), presents to the ED for AMS from [facility] . Patient noted to be lethargic with concern for sepsis hence referred to ER.  As per paperwork, patient is nonverbal at baseline, also noted to have been started on levaquin on 11/13 with addition of vancomycin overnight. Patient only opens eyes in response to verbal stimulus, currently unable to provide history.     Patient was noted to have temp of 100.1, initially hemodynamically sale however bp dropped to 78 for which she received 2l bolus of NS. She had wbc count of 14.9 with left shift, lactate 3.3, bun/cr of 32/1.58, sodium 130, UA -ve, cxr with right basilar infiltrate. Was given a dose each of vanc and cefepime.   GOC: confirmed with daughter Lisa, full code. Consent for central line obtained.    (14 Nov 2020 18:24)      Patient is a 65y old  Female who presents with a chief complaint of lethargy (21 Nov 2020 14:48)      INTERVAL HPI/OVERNIGHT EVENTS:  T(C): 36.2 (11-21-20 @ 13:02), Max: 36.6 (11-21-20 @ 05:20)  HR: 109 (11-21-20 @ 13:15) (104 - 115)  BP: 127/61 (11-21-20 @ 13:02) (103/41 - 128/73)  RR: 18 (11-21-20 @ 13:02) (18 - 24)  SpO2: 98% (11-21-20 @ 13:15) (96% - 100%)  Wt(kg): --  I&O's Summary    20 Nov 2020 07:01  -  21 Nov 2020 07:00  --------------------------------------------------------  IN: 0 mL / OUT: 700 mL / NET: -700 mL        REVIEW OF SYSTEMS: denies fever, chills, SOB, palpitations, chest pain, abdominal pain, nausea, vomitting, diarrhea, constipation, dizziness    MEDICATIONS  (STANDING):  carvedilol 6.25 milliGRAM(s) Oral every 12 hours  chlorhexidine 0.12% Liquid 15 milliLiter(s) Oral Mucosa every 12 hours  chlorhexidine 2% Cloths 1 Application(s) Topical <User Schedule>  heparin   Injectable 5000 Unit(s) SubCutaneous every 8 hours  influenza   Vaccine 0.5 milliLiter(s) IntraMuscular once  insulin glargine Injectable (LANTUS) 10 Unit(s) SubCutaneous <User Schedule>  insulin lispro (ADMELOG) corrective regimen sliding scale   SubCutaneous every 6 hours  insulin lispro Injectable (ADMELOG) 4 Unit(s) SubCutaneous every 6 hours  lisinopril 5 milliGRAM(s) Oral daily  pantoprazole  Injectable 40 milliGRAM(s) IV Push daily  predniSONE   Tablet 10 milliGRAM(s) Oral daily  vancomycin  IVPB 1000 milliGRAM(s) IV Intermittent every 12 hours    MEDICATIONS  (PRN):  acetaminophen    Suspension .. 650 milliGRAM(s) Oral every 6 hours PRN Moderate Pain (4 - 6)  ALBUTerol    90 MICROgram(s) HFA Inhaler 2 Puff(s) Inhalation every 6 hours PRN Shortness of Breath      PHYSICAL EXAM:  GENERAL: NAD, well-groomed, well-developed  HEAD:  Atraumatic, Normocephalic  EYES: EOMI, PERRLA, conjunctiva and sclera clear  ENMT: No tonsillar erythema, exudates, or enlargement; Moist mucous membranes, Good dentition, No lesions  NECK: Supple, No JVD, Normal thyroid  NERVOUS SYSTEM:  Alert & Oriented X3, Good concentration; Motor Strength 5/5 B/L upper and lower extremities; DTRs 2+ intact and symmetric  CHEST/LUNG: Clear to percussion bilaterally; No rales, rhonchi, wheezing, or rubs  HEART: Regular rate and rhythm; No murmurs, rubs, or gallops  ABDOMEN: Soft, Nontender, Nondistended; Bowel sounds present  EXTREMITIES:  2+ Peripheral Pulses, No clubbing, cyanosis, or edema  LYMPH: No lymphadenopathy noted  SKIN: No rashes or lesions  LABS:                        9.5    10.33 )-----------( 171      ( 21 Nov 2020 07:00 )             31.0     11-21    134<L>  |  98  |  9   ----------------------------<  142<H>  3.8   |  30  |  0.36<L>    Ca    8.3<L>      21 Nov 2020 07:00  Phos  2.7     11-21  Mg     1.8     11-21          CAPILLARY BLOOD GLUCOSE      POCT Blood Glucose.: 230 mg/dL (21 Nov 2020 12:01)  POCT Blood Glucose.: 157 mg/dL (21 Nov 2020 07:52)  POCT Blood Glucose.: 141 mg/dL (21 Nov 2020 06:12)  POCT Blood Glucose.: 116 mg/dL (20 Nov 2020 23:52)  POCT Blood Glucose.: 123 mg/dL (20 Nov 2020 16:59)

## 2020-11-24 NOTE — PROGRESS NOTE ADULT - PROBLEM SELECTOR PLAN 10
DVT ppx: heparin SC  GI ppx: PPI  Elevate RUE .   s/p PICC placement in IR today.   c/w   IV Vanco x 28 days (thru Dec 16th).  Pt is medically cleared for discharge to [facility] .   CM following.

## 2020-11-24 NOTE — PROGRESS NOTE ADULT - SUBJECTIVE AND OBJECTIVE BOX
Interval Events:    receiving TF  bolus increased yesterday  poc glucose improved    Allergies    fish (Angioedema)  penicillins (Rash)    Intolerances      Endocrine/Metabolic Medications:  insulin glargine Injectable (LANTUS) 10 Unit(s) SubCutaneous <User Schedule>  insulin lispro (ADMELOG) corrective regimen sliding scale   SubCutaneous every 6 hours  insulin lispro Injectable (ADMELOG) 6 Unit(s) SubCutaneous every 6 hours  predniSONE   Tablet 10 milliGRAM(s) Oral daily      Vital Signs Last 24 Hrs  T(C): 37.2 (24 Nov 2020 05:17), Max: 38.6 (23 Nov 2020 17:12)  T(F): 99 (24 Nov 2020 05:17), Max: 101.5 (23 Nov 2020 17:12)  HR: 110 (24 Nov 2020 05:17) (94 - 128)  BP: 142/66 (24 Nov 2020 05:17) (108/64 - 146/78)  BP(mean): --  RR: 18 (24 Nov 2020 05:17) (18 - 22)  SpO2: 100% (24 Nov 2020 05:17) (98% - 100%)      PHYSICAL EXAM  Constitutional:  s/p trach on vent  NC/AT:    HEENT:    Neck:  No JVD  Respiratory:  reduced breath sounds b/l bases    Cardiovascular:  RR     Gastrointestinal: Soft     Extremities: without cyanosis    Neurological:  non focal    LABS                        9.9    10.05 )-----------( 181      ( 24 Nov 2020 06:17 )             32.7                               132    |  96     |  8                   Calcium: 8.6   / iCa: x      (11-24 @ 06:17)    ----------------------------<  149       Magnesium: 1.7                              3.8     |  29     |  0.33             Phosphorous: 3.8      TPro  7.3    /  Alb  2.0    /  TBili  0.7    /  DBili  x      /  AST  15     /  ALT  15     /  AlkPhos  111    24 Nov 2020 06:17    CAPILLARY BLOOD GLUCOSE      POCT Blood Glucose.: 160 mg/dL (24 Nov 2020 05:25)  POCT Blood Glucose.: 143 mg/dL (23 Nov 2020 23:51)  POCT Blood Glucose.: 164 mg/dL (23 Nov 2020 17:10)  POCT Blood Glucose.: 243 mg/dL (23 Nov 2020 11:32)  POCT Blood Glucose.: 297 mg/dL (23 Nov 2020 08:27)        Assesment/plan          64 yo female with multiple comorbidities including h/o DM type 2, HTN, COPD s/p trach on vent, lung ca on right sided s/p resection on chemo admitted with lethargy    endocrinology consulted for glycemic management    DM type 2  uncontrolled  complicated by high dose steroid use, acute on chronic resp failure  s/p trach    recommendations:  s/p trach/PEG  poc glucose improved  bolus increased yesterday    - continue lantus 10 units daily in am   - continue insulin lispro 6 units q6h, hold if TF are held  - continue low dose sliding scale  - reassess based on requirements  - goal inpatient glucose range: 140-180mg/dl    COPD exacerbation  acute on chronic hypoxic resp failure  s/p trach  on standing steroids- on reduced dose of prednisone 20mg daily  pulmonary on board  s/p trach/PEG  receiving vanco    sepsis  was on cefepime, flagyl  on vancomycin  optimize glycemic regimen    Discussed with primary team   Please call  Endocrine- Dr Katlyn Garcia as needed

## 2020-11-24 NOTE — PROGRESS NOTE ADULT - PROBLEM SELECTOR PLAN 1
likely due to aspiration pneumonia vs bacteremia  CXR results as above  BC growing MRSA . Repeat BC on 11/17 growing MRSA. but 11/19 NGTD  Sputum growing Acinetobacter  No evidence of active pneumonia at this time. Maxipime and Flagyl stopped.   bacteremia   Febrile yesterday,  leukocytosis resolved  will need IV Vanco for 28 days from 11/19 (end date : 12/16)  s/p  PICC placement in E in IR today .   Monitor Vanco trough   ID Dr. Corcoran following

## 2020-11-25 ENCOUNTER — TRANSCRIPTION ENCOUNTER (OUTPATIENT)
Age: 65
End: 2020-11-25

## 2020-11-25 VITALS
HEART RATE: 107 BPM | TEMPERATURE: 99 F | OXYGEN SATURATION: 98 % | DIASTOLIC BLOOD PRESSURE: 65 MMHG | RESPIRATION RATE: 18 BRPM | SYSTOLIC BLOOD PRESSURE: 125 MMHG

## 2020-11-25 LAB
ALBUMIN SERPL ELPH-MCNC: 1.9 G/DL — LOW (ref 3.5–5)
ALP SERPL-CCNC: 115 U/L — SIGNIFICANT CHANGE UP (ref 40–120)
ALT FLD-CCNC: 15 U/L DA — SIGNIFICANT CHANGE UP (ref 10–60)
ANION GAP SERPL CALC-SCNC: 6 MMOL/L — SIGNIFICANT CHANGE UP (ref 5–17)
AST SERPL-CCNC: 15 U/L — SIGNIFICANT CHANGE UP (ref 10–40)
BILIRUB SERPL-MCNC: 0.5 MG/DL — SIGNIFICANT CHANGE UP (ref 0.2–1.2)
BUN SERPL-MCNC: 10 MG/DL — SIGNIFICANT CHANGE UP (ref 7–18)
CALCIUM SERPL-MCNC: 8.1 MG/DL — LOW (ref 8.4–10.5)
CHLORIDE SERPL-SCNC: 96 MMOL/L — SIGNIFICANT CHANGE UP (ref 96–108)
CO2 SERPL-SCNC: 33 MMOL/L — HIGH (ref 22–31)
CREAT SERPL-MCNC: 0.42 MG/DL — LOW (ref 0.5–1.3)
GLUCOSE BLDC GLUCOMTR-MCNC: 147 MG/DL — HIGH (ref 70–99)
GLUCOSE BLDC GLUCOMTR-MCNC: 147 MG/DL — HIGH (ref 70–99)
GLUCOSE BLDC GLUCOMTR-MCNC: 247 MG/DL — HIGH (ref 70–99)
GLUCOSE SERPL-MCNC: 125 MG/DL — HIGH (ref 70–99)
HCT VFR BLD CALC: 28 % — LOW (ref 34.5–45)
HGB BLD-MCNC: 8.7 G/DL — LOW (ref 11.5–15.5)
MAGNESIUM SERPL-MCNC: 1.7 MG/DL — SIGNIFICANT CHANGE UP (ref 1.6–2.6)
MCHC RBC-ENTMCNC: 27 PG — SIGNIFICANT CHANGE UP (ref 27–34)
MCHC RBC-ENTMCNC: 31.1 GM/DL — LOW (ref 32–36)
MCV RBC AUTO: 87 FL — SIGNIFICANT CHANGE UP (ref 80–100)
NRBC # BLD: 0 /100 WBCS — SIGNIFICANT CHANGE UP (ref 0–0)
PHOSPHATE SERPL-MCNC: 3.6 MG/DL — SIGNIFICANT CHANGE UP (ref 2.5–4.5)
PLATELET # BLD AUTO: 177 K/UL — SIGNIFICANT CHANGE UP (ref 150–400)
POTASSIUM SERPL-MCNC: 3.7 MMOL/L — SIGNIFICANT CHANGE UP (ref 3.5–5.3)
POTASSIUM SERPL-SCNC: 3.7 MMOL/L — SIGNIFICANT CHANGE UP (ref 3.5–5.3)
PROT SERPL-MCNC: 6.9 G/DL — SIGNIFICANT CHANGE UP (ref 6–8.3)
RBC # BLD: 3.22 M/UL — LOW (ref 3.8–5.2)
RBC # FLD: 16.2 % — HIGH (ref 10.3–14.5)
SODIUM SERPL-SCNC: 135 MMOL/L — SIGNIFICANT CHANGE UP (ref 135–145)
VANCOMYCIN TROUGH SERPL-MCNC: 25.5 UG/ML — CRITICAL HIGH (ref 10–20)
WBC # BLD: 9.62 K/UL — SIGNIFICANT CHANGE UP (ref 3.8–10.5)
WBC # FLD AUTO: 9.62 K/UL — SIGNIFICANT CHANGE UP (ref 3.8–10.5)

## 2020-11-25 PROCEDURE — 86850 RBC ANTIBODY SCREEN: CPT

## 2020-11-25 PROCEDURE — 94760 N-INVAS EAR/PLS OXIMETRY 1: CPT

## 2020-11-25 PROCEDURE — 80061 LIPID PANEL: CPT

## 2020-11-25 PROCEDURE — 87493 C DIFF AMPLIFIED PROBE: CPT

## 2020-11-25 PROCEDURE — C1751: CPT

## 2020-11-25 PROCEDURE — 82803 BLOOD GASES ANY COMBINATION: CPT

## 2020-11-25 PROCEDURE — 86901 BLOOD TYPING SEROLOGIC RH(D): CPT

## 2020-11-25 PROCEDURE — 71045 X-RAY EXAM CHEST 1 VIEW: CPT

## 2020-11-25 PROCEDURE — U0003: CPT

## 2020-11-25 PROCEDURE — 83880 ASSAY OF NATRIURETIC PEPTIDE: CPT

## 2020-11-25 PROCEDURE — 84443 ASSAY THYROID STIM HORMONE: CPT

## 2020-11-25 PROCEDURE — 99291 CRITICAL CARE FIRST HOUR: CPT | Mod: 25

## 2020-11-25 PROCEDURE — 87150 DNA/RNA AMPLIFIED PROBE: CPT

## 2020-11-25 PROCEDURE — 84484 ASSAY OF TROPONIN QUANT: CPT

## 2020-11-25 PROCEDURE — 87040 BLOOD CULTURE FOR BACTERIA: CPT

## 2020-11-25 PROCEDURE — 85027 COMPLETE CBC AUTOMATED: CPT

## 2020-11-25 PROCEDURE — 80053 COMPREHEN METABOLIC PANEL: CPT

## 2020-11-25 PROCEDURE — 87640 STAPH A DNA AMP PROBE: CPT

## 2020-11-25 PROCEDURE — 87086 URINE CULTURE/COLONY COUNT: CPT

## 2020-11-25 PROCEDURE — 87641 MR-STAPH DNA AMP PROBE: CPT

## 2020-11-25 PROCEDURE — 93005 ELECTROCARDIOGRAM TRACING: CPT

## 2020-11-25 PROCEDURE — 86900 BLOOD TYPING SEROLOGIC ABO: CPT

## 2020-11-25 PROCEDURE — 87635 SARS-COV-2 COVID-19 AMP PRB: CPT

## 2020-11-25 PROCEDURE — 83735 ASSAY OF MAGNESIUM: CPT

## 2020-11-25 PROCEDURE — C1769: CPT

## 2020-11-25 PROCEDURE — 85610 PROTHROMBIN TIME: CPT

## 2020-11-25 PROCEDURE — P9047: CPT

## 2020-11-25 PROCEDURE — 80048 BASIC METABOLIC PNL TOTAL CA: CPT

## 2020-11-25 PROCEDURE — 81001 URINALYSIS AUTO W/SCOPE: CPT

## 2020-11-25 PROCEDURE — 80202 ASSAY OF VANCOMYCIN: CPT

## 2020-11-25 PROCEDURE — 76937 US GUIDE VASCULAR ACCESS: CPT

## 2020-11-25 PROCEDURE — 90686 IIV4 VACC NO PRSV 0.5 ML IM: CPT

## 2020-11-25 PROCEDURE — 93306 TTE W/DOPPLER COMPLETE: CPT

## 2020-11-25 PROCEDURE — 97110 THERAPEUTIC EXERCISES: CPT

## 2020-11-25 PROCEDURE — 83605 ASSAY OF LACTIC ACID: CPT

## 2020-11-25 PROCEDURE — 0225U NFCT DS DNA&RNA 21 SARSCOV2: CPT

## 2020-11-25 PROCEDURE — 87184 SC STD DISK METHOD PER PLATE: CPT

## 2020-11-25 PROCEDURE — 94002 VENT MGMT INPAT INIT DAY: CPT

## 2020-11-25 PROCEDURE — 94003 VENT MGMT INPAT SUBQ DAY: CPT

## 2020-11-25 PROCEDURE — 36415 COLL VENOUS BLD VENIPUNCTURE: CPT

## 2020-11-25 PROCEDURE — 86769 SARS-COV-2 COVID-19 ANTIBODY: CPT

## 2020-11-25 PROCEDURE — 82962 GLUCOSE BLOOD TEST: CPT

## 2020-11-25 PROCEDURE — 70450 CT HEAD/BRAIN W/O DYE: CPT

## 2020-11-25 PROCEDURE — 87070 CULTURE OTHR SPECIMN AEROBIC: CPT

## 2020-11-25 PROCEDURE — 97530 THERAPEUTIC ACTIVITIES: CPT

## 2020-11-25 PROCEDURE — 97162 PT EVAL MOD COMPLEX 30 MIN: CPT

## 2020-11-25 PROCEDURE — 87186 SC STD MICRODIL/AGAR DIL: CPT

## 2020-11-25 PROCEDURE — 85730 THROMBOPLASTIN TIME PARTIAL: CPT

## 2020-11-25 PROCEDURE — 84100 ASSAY OF PHOSPHORUS: CPT

## 2020-11-25 PROCEDURE — 85025 COMPLETE CBC W/AUTO DIFF WBC: CPT

## 2020-11-25 PROCEDURE — 83036 HEMOGLOBIN GLYCOSYLATED A1C: CPT

## 2020-11-25 PROCEDURE — 84145 PROCALCITONIN (PCT): CPT

## 2020-11-25 RX ORDER — LISINOPRIL 2.5 MG/1
1 TABLET ORAL
Qty: 0 | Refills: 0 | DISCHARGE
Start: 2020-11-25

## 2020-11-25 RX ORDER — OMEPRAZOLE 10 MG/1
20 CAPSULE, DELAYED RELEASE ORAL
Qty: 0 | Refills: 0 | DISCHARGE

## 2020-11-25 RX ORDER — ACETAMINOPHEN 500 MG
20.31 TABLET ORAL
Qty: 0 | Refills: 0 | DISCHARGE
Start: 2020-11-25

## 2020-11-25 RX ORDER — INSULIN LISPRO 100/ML
0 VIAL (ML) SUBCUTANEOUS
Qty: 0 | Refills: 0 | DISCHARGE
Start: 2020-11-25

## 2020-11-25 RX ORDER — ENOXAPARIN SODIUM 100 MG/ML
40 INJECTION SUBCUTANEOUS
Qty: 0 | Refills: 0 | DISCHARGE

## 2020-11-25 RX ORDER — FOLIC ACID 0.8 MG
1 TABLET ORAL
Qty: 0 | Refills: 0 | DISCHARGE

## 2020-11-25 RX ORDER — ASCORBIC ACID 60 MG
1 TABLET,CHEWABLE ORAL
Qty: 0 | Refills: 0 | DISCHARGE

## 2020-11-25 RX ORDER — INSULIN GLARGINE 100 [IU]/ML
10 INJECTION, SOLUTION SUBCUTANEOUS
Qty: 0 | Refills: 0 | DISCHARGE

## 2020-11-25 RX ORDER — ALBUTEROL 90 UG/1
3 AEROSOL, METERED ORAL
Qty: 0 | Refills: 0 | DISCHARGE

## 2020-11-25 RX ORDER — VANCOMYCIN HCL 1 G
750 VIAL (EA) INTRAVENOUS EVERY 12 HOURS
Refills: 0 | Status: DISCONTINUED | OUTPATIENT
Start: 2020-11-25 | End: 2020-11-25

## 2020-11-25 RX ORDER — ERGOCALCIFEROL 1.25 MG/1
1 CAPSULE ORAL
Qty: 0 | Refills: 0 | DISCHARGE

## 2020-11-25 RX ORDER — LACTOBACILLUS ACIDOPHILUS 100MM CELL
1 CAPSULE ORAL
Qty: 0 | Refills: 0 | DISCHARGE

## 2020-11-25 RX ORDER — INSULIN GLARGINE 100 [IU]/ML
6 INJECTION, SOLUTION SUBCUTANEOUS
Qty: 0 | Refills: 0 | DISCHARGE

## 2020-11-25 RX ORDER — NYSTATIN CREAM 100000 [USP'U]/G
1 CREAM TOPICAL
Qty: 0 | Refills: 0 | DISCHARGE
Start: 2020-11-25

## 2020-11-25 RX ORDER — ALPRAZOLAM 0.25 MG
0.5 TABLET ORAL
Qty: 0 | Refills: 0 | DISCHARGE

## 2020-11-25 RX ORDER — LANOLIN ALCOHOL/MO/W.PET/CERES
1 CREAM (GRAM) TOPICAL
Qty: 0 | Refills: 0 | DISCHARGE

## 2020-11-25 RX ORDER — CARVEDILOL PHOSPHATE 80 MG/1
1 CAPSULE, EXTENDED RELEASE ORAL
Qty: 0 | Refills: 0 | DISCHARGE
Start: 2020-11-25

## 2020-11-25 RX ORDER — ERGOCALCIFEROL 1.25 MG/1
6.25 CAPSULE ORAL
Qty: 0 | Refills: 0 | DISCHARGE

## 2020-11-25 RX ORDER — SODIUM CHLORIDE 9 MG/ML
1 INJECTION INTRAMUSCULAR; INTRAVENOUS; SUBCUTANEOUS
Qty: 0 | Refills: 0 | DISCHARGE

## 2020-11-25 RX ORDER — INSULIN LISPRO 100/ML
6 VIAL (ML) SUBCUTANEOUS
Qty: 0 | Refills: 0 | DISCHARGE
Start: 2020-11-25

## 2020-11-25 RX ORDER — FUROSEMIDE 40 MG
1 TABLET ORAL
Qty: 0 | Refills: 0 | DISCHARGE

## 2020-11-25 RX ORDER — ALBUTEROL 90 UG/1
2 AEROSOL, METERED ORAL
Qty: 0 | Refills: 0 | DISCHARGE
Start: 2020-11-25

## 2020-11-25 RX ADMIN — Medication 10 MILLIGRAM(S): at 05:38

## 2020-11-25 RX ADMIN — Medication 6 UNIT(S): at 00:53

## 2020-11-25 RX ADMIN — CHLORHEXIDINE GLUCONATE 1 APPLICATION(S): 213 SOLUTION TOPICAL at 05:34

## 2020-11-25 RX ADMIN — CARVEDILOL PHOSPHATE 6.25 MILLIGRAM(S): 80 CAPSULE, EXTENDED RELEASE ORAL at 05:38

## 2020-11-25 RX ADMIN — HEPARIN SODIUM 5000 UNIT(S): 5000 INJECTION INTRAVENOUS; SUBCUTANEOUS at 05:34

## 2020-11-25 RX ADMIN — NYSTATIN CREAM 1 APPLICATION(S): 100000 CREAM TOPICAL at 05:33

## 2020-11-25 RX ADMIN — HEPARIN SODIUM 5000 UNIT(S): 5000 INJECTION INTRAVENOUS; SUBCUTANEOUS at 13:40

## 2020-11-25 RX ADMIN — MUPIROCIN 1 APPLICATION(S): 20 OINTMENT TOPICAL at 06:55

## 2020-11-25 RX ADMIN — PANTOPRAZOLE SODIUM 40 MILLIGRAM(S): 20 TABLET, DELAYED RELEASE ORAL at 11:30

## 2020-11-25 RX ADMIN — INSULIN GLARGINE 10 UNIT(S): 100 INJECTION, SOLUTION SUBCUTANEOUS at 05:39

## 2020-11-25 RX ADMIN — INFLUENZA VIRUS VACCINE 0.5 MILLILITER(S): 15; 15; 15; 15 SUSPENSION INTRAMUSCULAR at 15:01

## 2020-11-25 RX ADMIN — Medication 6 UNIT(S): at 05:40

## 2020-11-25 RX ADMIN — Medication 2: at 11:28

## 2020-11-25 RX ADMIN — LISINOPRIL 5 MILLIGRAM(S): 2.5 TABLET ORAL at 05:38

## 2020-11-25 RX ADMIN — CHLORHEXIDINE GLUCONATE 15 MILLILITER(S): 213 SOLUTION TOPICAL at 06:54

## 2020-11-25 RX ADMIN — Medication 6 UNIT(S): at 11:27

## 2020-11-25 NOTE — PROGRESS NOTE ADULT - SUBJECTIVE AND OBJECTIVE BOX
BARB COLÓN    SCU progress note    INTERVAL HPI/OVERNIGHT EVENTS: ***s/p PICC placement in LUE in IR yesterday.  Pt seen and examined this morning . Pt awake, when asked if any SOB, pt mouths :  "a little short of breath" . Pt denies chest discomfort, N/V/D, abdominal discomfort. " a little pain in left fingers." Pt currently on SBT x 30 minutes, RR 29-30    DNR [ ]   DNI  [  ]    Covid - 19 PCR:     The 4Ms    What Matters Most: see GOC  Age appropriate Medications/Screen for High Risk Medication: Yes  Mentation: see CAM below  Mobility: defer to physical exam    The Confusion Assessment Method (CAM) Diagnostic Algorithm     1: Acute Onset or Fluctuating Course  - Is there evidence of an acute change in mental status from the patient’s baseline? Did the (abnormal) behavior  fluctuate during the day, that is, tend to come and go, or increase and decrease in severity?  [ ] YES [ ] NO     2: Inattention  - Did the patient have difficulty focusing attention, being easily distractible, or having difficulty keeping track of what was being said?  [ ] YES [ ] NO     3: Disorganized thinking  -Was the patient’s thinking disorganized or incoherent, such as rambling or irrelevant conversation, unclear or illogical flow of ideas, or unpredictable switching from subject to subject?  [ ] YES [ ] NO    4: Altered Level of consciousness?  [ ] YES [ ] NO    The diagnosis of delirium by CAM requires the presence of features 1 and 2 and either 3 or 4.    PRESSORS: [ ] YES [ ] NO  vancomycin  IVPB 750 milliGRAM(s) IV Intermittent every 12 hours    Cardiovascular:  Heart Failure  Acute   Acute on Chronic  Chronic       carvedilol 6.25 milliGRAM(s) Oral every 12 hours  lisinopril 5 milliGRAM(s) Oral daily    Pulmonary:  ALBUTerol    90 MICROgram(s) HFA Inhaler 2 Puff(s) Inhalation every 6 hours PRN    Hematalogic:  heparin   Injectable 5000 Unit(s) SubCutaneous every 8 hours    Other:  acetaminophen    Suspension .. 650 milliGRAM(s) Oral every 6 hours PRN  chlorhexidine 0.12% Liquid 15 milliLiter(s) Oral Mucosa every 12 hours  chlorhexidine 2% Cloths 1 Application(s) Topical <User Schedule>  chlorhexidine 2% Cloths 1 Application(s) Topical <User Schedule>  influenza   Vaccine 0.5 milliLiter(s) IntraMuscular once  insulin glargine Injectable (LANTUS) 10 Unit(s) SubCutaneous <User Schedule>  insulin lispro (ADMELOG) corrective regimen sliding scale   SubCutaneous every 6 hours  insulin lispro Injectable (ADMELOG) 6 Unit(s) SubCutaneous every 6 hours  mupirocin 2% Ointment 1 Application(s) Both Nostrils two times a day  nystatin Powder 1 Application(s) Topical two times a day  pantoprazole  Injectable 40 milliGRAM(s) IV Push daily  predniSONE   Tablet 10 milliGRAM(s) Oral daily  sodium chloride 0.9% lock flush 10 milliLiter(s) IV Push every 1 hour PRN    acetaminophen    Suspension .. 650 milliGRAM(s) Oral every 6 hours PRN  ALBUTerol    90 MICROgram(s) HFA Inhaler 2 Puff(s) Inhalation every 6 hours PRN  carvedilol 6.25 milliGRAM(s) Oral every 12 hours  chlorhexidine 0.12% Liquid 15 milliLiter(s) Oral Mucosa every 12 hours  chlorhexidine 2% Cloths 1 Application(s) Topical <User Schedule>  chlorhexidine 2% Cloths 1 Application(s) Topical <User Schedule>  heparin   Injectable 5000 Unit(s) SubCutaneous every 8 hours  influenza   Vaccine 0.5 milliLiter(s) IntraMuscular once  insulin glargine Injectable (LANTUS) 10 Unit(s) SubCutaneous <User Schedule>  insulin lispro (ADMELOG) corrective regimen sliding scale   SubCutaneous every 6 hours  insulin lispro Injectable (ADMELOG) 6 Unit(s) SubCutaneous every 6 hours  lisinopril 5 milliGRAM(s) Oral daily  mupirocin 2% Ointment 1 Application(s) Both Nostrils two times a day  nystatin Powder 1 Application(s) Topical two times a day  pantoprazole  Injectable 40 milliGRAM(s) IV Push daily  predniSONE   Tablet 10 milliGRAM(s) Oral daily  sodium chloride 0.9% lock flush 10 milliLiter(s) IV Push every 1 hour PRN  vancomycin  IVPB 750 milliGRAM(s) IV Intermittent every 12 hours    Drug Dosing Weight  Height (cm): 154 (2020 20:16)  Weight (kg): 89.9 (2020 20:16)  BMI (kg/m2): 37.9 (2020 20:16)  BSA (m2): 1.87 (2020 20:16)    CENTRAL LINE: [ ] YES [ ] NO  LOCATION:   DATE INSERTED:  REMOVE: [ ] YES [ ] NO  EXPLAIN:    ROBLEDO: [ ] YES [ ] NO    DATE INSERTED:  REMOVE:  [ ] YES [ ] NO  EXPLAIN:    PAST MEDICAL & SURGICAL HISTORY:  Malignant neoplasm of unspecified part of right bronchus or lung    HTN (hypertension)    DM (diabetes mellitus)    COPD (chronic obstructive pulmonary disease)    Lung cancer    Morbid obesity    GERD (gastroesophageal reflux disease)    Deviated septum    Prolapsed uterus    ITP (idiopathic thrombocytopenic purpura)    Emphysema/COPD    History of cholecystectomy    S/P lobectomy of lung  right 2017    History of tonsillectomy                  Mode: CPAP with PS  FiO2: 40  PEEP: 5  PS: 10  MAP: 9  PIP: 17      PHYSICAL EXAM:    GENERAL: NAD, well-groomed, well-developed  HEAD:  Atraumatic, Normocephalic  EYES: EOMI, PERRLA, conjunctiva and sclera clear  ENMT: No tonsillar erythema, exudates, or enlargement; Moist mucous membranes, Good dentition, No lesions  NECK: Supple, No JVD, Normal thyroid, Trach  NERVOUS SYSTEM:  Alert & Oriented X3, Good concentration; Motor Strength 4/5 B/L upper, 3-4/5 BLE.   CHEST/LUNG: Trach to vent. Clear to percussion bilaterally; No rales, rhonchi, wheezing, or rubs  HEART: Regular rate and rhythm; No murmurs, rubs, or gallops  ABDOMEN: Soft, Nontender, Nondistended; Bowel sounds present, PEG tube intact.   EXTREMITIES:  2+ Peripheral Pulses, No clubbing, cyanosis, or edema  LYMPH: No lymphadenopathy noted  SKIN: No rashes or lesions      LABS:  CBC Full  -  ( 2020 04:37 )  WBC Count : 9.62 K/uL  RBC Count : 3.22 M/uL  Hemoglobin : 8.7 g/dL  Hematocrit : 28.0 %  Platelet Count - Automated : 177 K/uL  Mean Cell Volume : 87.0 fl  Mean Cell Hemoglobin : 27.0 pg  Mean Cell Hemoglobin Concentration : 31.1 gm/dL  Auto Neutrophil # : x  Auto Lymphocyte # : x  Auto Monocyte # : x  Auto Eosinophil # : x  Auto Basophil # : x  Auto Neutrophil % : x  Auto Lymphocyte % : x  Auto Monocyte % : x  Auto Eosinophil % : x  Auto Basophil % : x    11-25    135  |  96  |  10  ----------------------------<  125<H>  3.7   |  33<H>  |  0.42<L>    Ca    8.1<L>      2020 04:37  Phos  3.6       Mg     1.7         TPro  6.9  /  Alb  1.9<L>  /  TBili  0.5  /  DBili  x   /  AST  15  /  ALT  15  /  AlkPhos  115        Urinalysis Basic - ( 2020 19:06 )    Color: Yellow / Appearance: Clear / S.015 / pH: x  Gluc: x / Ketone: Negative  / Bili: Negative / Urobili: Negative   Blood: x / Protein: 30 mg/dL / Nitrite: Negative   Leuk Esterase: Negative / RBC: 0-2 /HPF / WBC 3-5 /HPF   Sq Epi: x / Non Sq Epi: Moderate /HPF / Bacteria: Trace /HPF            [  ]  DVT Prophylaxis  [  ]  Nutrition, Brand, Rate         Goal Rate        Abnormal Nutritional Status -  Malnutrition   Cachexia      Morbid Obesity BMI >/=40    RADIOLOGY & ADDITIONAL STUDIES:  ***    Goals of Care Discussion with Family/Proxy/Other   - see note from/family meeting set up for...     BARB COLÓN    SCU progress note    INTERVAL HPI/OVERNIGHT EVENTS: ***s/p PICC placement in LUE in IR yesterday.  Pt seen and examined this morning . Pt awake, when asked if any SOB, pt mouths :  "a little short of breath" . Pt denies chest discomfort, N/V/D, abdominal discomfort. " a little pain in left fingers." Pt currently on SBT x 30 minutes, RR 29-30, SpO2     DNR [ ]   DNI  [  ] Full code     Covid - 19 PCR: negative     The 4Ms    What Matters Most: see GOC  Age appropriate Medications/Screen for High Risk Medication: Yes  Mentation: see CAM below  Mobility: defer to physical exam    The Confusion Assessment Method (CAM) Diagnostic Algorithm     1: Acute Onset or Fluctuating Course  - Is there evidence of an acute change in mental status from the patient’s baseline? Did the (abnormal) behavior  fluctuate during the day, that is, tend to come and go, or increase and decrease in severity?  [ ] YES [ x] NO     2: Inattention  - Did the patient have difficulty focusing attention, being easily distractible, or having difficulty keeping track of what was being said?  [ ] YES [ x] NO     3: Disorganized thinking  -Was the patient’s thinking disorganized or incoherent, such as rambling or irrelevant conversation, unclear or illogical flow of ideas, or unpredictable switching from subject to subject?  [ ] YES [ x] NO    4: Altered Level of consciousness?  [ ] YES [ x] NO    The diagnosis of delirium by CAM requires the presence of features 1 and 2 and either 3 or 4.    PRESSORS: [ ] YES [ x] NO  vancomycin  IVPB 750 milliGRAM(s) IV Intermittent every 12 hours    Cardiovascular:   Echo :    . Normal Left Ventricular Systolic Function,  (EF =  50-55%). Grade II diastolic dysfunction.      carvedilol 6.25 milliGRAM(s) Oral every 12 hours  lisinopril 5 milliGRAM(s) Oral daily    Pulmonary:  ALBUTerol    90 MICROgram(s) HFA Inhaler 2 Puff(s) Inhalation every 6 hours PRN    Hematalogic:  heparin   Injectable 5000 Unit(s) SubCutaneous every 8 hours    Other:  acetaminophen    Suspension .. 650 milliGRAM(s) Oral every 6 hours PRN  chlorhexidine 0.12% Liquid 15 milliLiter(s) Oral Mucosa every 12 hours  chlorhexidine 2% Cloths 1 Application(s) Topical <User Schedule>  chlorhexidine 2% Cloths 1 Application(s) Topical <User Schedule>  influenza   Vaccine 0.5 milliLiter(s) IntraMuscular once  insulin glargine Injectable (LANTUS) 10 Unit(s) SubCutaneous <User Schedule>  insulin lispro (ADMELOG) corrective regimen sliding scale   SubCutaneous every 6 hours  insulin lispro Injectable (ADMELOG) 6 Unit(s) SubCutaneous every 6 hours  mupirocin 2% Ointment 1 Application(s) Both Nostrils two times a day  nystatin Powder 1 Application(s) Topical two times a day  pantoprazole  Injectable 40 milliGRAM(s) IV Push daily  predniSONE   Tablet 10 milliGRAM(s) Oral daily  sodium chloride 0.9% lock flush 10 milliLiter(s) IV Push every 1 hour PRN    acetaminophen    Suspension .. 650 milliGRAM(s) Oral every 6 hours PRN  ALBUTerol    90 MICROgram(s) HFA Inhaler 2 Puff(s) Inhalation every 6 hours PRN  carvedilol 6.25 milliGRAM(s) Oral every 12 hours  chlorhexidine 0.12% Liquid 15 milliLiter(s) Oral Mucosa every 12 hours  chlorhexidine 2% Cloths 1 Application(s) Topical <User Schedule>  chlorhexidine 2% Cloths 1 Application(s) Topical <User Schedule>  heparin   Injectable 5000 Unit(s) SubCutaneous every 8 hours  influenza   Vaccine 0.5 milliLiter(s) IntraMuscular once  insulin glargine Injectable (LANTUS) 10 Unit(s) SubCutaneous <User Schedule>  insulin lispro (ADMELOG) corrective regimen sliding scale   SubCutaneous every 6 hours  insulin lispro Injectable (ADMELOG) 6 Unit(s) SubCutaneous every 6 hours  lisinopril 5 milliGRAM(s) Oral daily  mupirocin 2% Ointment 1 Application(s) Both Nostrils two times a day  nystatin Powder 1 Application(s) Topical two times a day  pantoprazole  Injectable 40 milliGRAM(s) IV Push daily  predniSONE   Tablet 10 milliGRAM(s) Oral daily  sodium chloride 0.9% lock flush 10 milliLiter(s) IV Push every 1 hour PRN  vancomycin  IVPB 750 milliGRAM(s) IV Intermittent every 12 hours    Drug Dosing Weight  Height (cm): 154 (2020 20:16)  Weight (kg): 89.9 (2020 20:16)  BMI (kg/m2): 37.9 (2020 20:16)  BSA (m2): 1.87 (2020 20:16)    CENTRAL LINE: [x ] YES [ ] NO  LOCATION:   DATE INSERTED: 2020  REMOVE: [ ] YES [x ] NO  EXPLAIN:PICC for abx thru Dec 16th.     ROBLEDO: [ ] YES [x ] NO    DATE INSERTED:  REMOVE:  [ ] YES [ ] NO  EXPLAIN:    PAST MEDICAL & SURGICAL HISTORY:  Malignant neoplasm of unspecified part of right bronchus or lung    HTN (hypertension)    DM (diabetes mellitus)    COPD (chronic obstructive pulmonary disease)    Lung cancer    Morbid obesity    GERD (gastroesophageal reflux disease)    Deviated septum    Prolapsed uterus    ITP (idiopathic thrombocytopenic purpura)    Emphysema/COPD    History of cholecystectomy    S/P lobectomy of lung  right 2017    History of tonsillectomy                  Mode: CPAP with PS  FiO2: 40  PEEP: 5  PS: 10  MAP: 9  PIP: 17      PHYSICAL EXAM:    GENERAL: NAD, well-groomed, well-developed  HEAD:  Atraumatic, Normocephalic  EYES: EOMI, PERRLA, conjunctiva and sclera clear  ENMT: No tonsillar erythema, exudates, or enlargement; Moist mucous membranes, Good dentition, No lesions  NECK: Supple, No JVD, Normal thyroid, Trach  NERVOUS SYSTEM:  Alert & Oriented X3, Good concentration; Motor Strength 4/5 B/L upper, 3-4/5 BLE.   CHEST/LUNG: Trach to vent. Clear to percussion bilaterally; No rales, rhonchi, wheezing, or rubs  HEART: Regular rate and rhythm; No murmurs, rubs, or gallops  ABDOMEN: Soft, Nontender, Nondistended; Bowel sounds present, PEG tube intact.   EXTREMITIES:  2+ Peripheral Pulses, No clubbing, cyanosis, or edema  LYMPH: No lymphadenopathy noted  SKIN: No rashes or lesions      LABS:  CBC Full  -  ( 2020 04:37 )  WBC Count : 9.62 K/uL  RBC Count : 3.22 M/uL  Hemoglobin : 8.7 g/dL  Hematocrit : 28.0 %  Platelet Count - Automated : 177 K/uL  Mean Cell Volume : 87.0 fl  Mean Cell Hemoglobin : 27.0 pg  Mean Cell Hemoglobin Concentration : 31.1 gm/dL  Auto Neutrophil # : x  Auto Lymphocyte # : x  Auto Monocyte # : x  Auto Eosinophil # : x  Auto Basophil # : x  Auto Neutrophil % : x  Auto Lymphocyte % : x  Auto Monocyte % : x  Auto Eosinophil % : x  Auto Basophil % : x        135  |  96  |  10  ----------------------------<  125<H>  3.7   |  33<H>  |  0.42<L>    Ca    8.1<L>      2020 04:37  Phos  3.6       Mg     1.7         TPro  6.9  /  Alb  1.9<L>  /  TBili  0.5  /  DBili  x   /  AST  15  /  ALT  15  /  AlkPhos  115        Urinalysis Basic - ( 2020 19:06 )    Color: Yellow / Appearance: Clear / S.015 / pH: x  Gluc: x / Ketone: Negative  / Bili: Negative / Urobili: Negative   Blood: x / Protein: 30 mg/dL / Nitrite: Negative   Leuk Esterase: Negative / RBC: 0-2 /HPF / WBC 3-5 /HPF   Sq Epi: x / Non Sq Epi: Moderate /HPF / Bacteria: Trace /HPF            [  ]  DVT Prophylaxis  [  ]  Nutrition, Brand, Rate         Goal Rate        Abnormal Nutritional Status -  Malnutrition   Cachexia      Morbid Obesity BMI >/=40    RADIOLOGY & ADDITIONAL STUDIES:  ***    Goals of Care Discussion with Family/Proxy/Other   - see note from/family meetin/17     BARB COLÓN    SCU progress note    INTERVAL HPI/OVERNIGHT EVENTS: ***s/p PICC placement in LUE in IR yesterday.  Pt seen and examined this morning . Pt awake, when asked if any SOB, pt mouths :  "a little short of breath" . Pt denies chest discomfort, N/V/D, abdominal discomfort. " a little pain in left fingers." Pt currently on SBT x 30 minutes, RR 29-30, SpO2 97-99%    DNR [ ]   DNI  [  ] Full code     Covid - 19 PCR: negative     The 4Ms    What Matters Most: see GOC  Age appropriate Medications/Screen for High Risk Medication: Yes  Mentation: see CAM below  Mobility: defer to physical exam    The Confusion Assessment Method (CAM) Diagnostic Algorithm     1: Acute Onset or Fluctuating Course  - Is there evidence of an acute change in mental status from the patient’s baseline? Did the (abnormal) behavior  fluctuate during the day, that is, tend to come and go, or increase and decrease in severity?  [ ] YES [ x] NO     2: Inattention  - Did the patient have difficulty focusing attention, being easily distractible, or having difficulty keeping track of what was being said?  [ ] YES [ x] NO     3: Disorganized thinking  -Was the patient’s thinking disorganized or incoherent, such as rambling or irrelevant conversation, unclear or illogical flow of ideas, or unpredictable switching from subject to subject?  [ ] YES [ x] NO    4: Altered Level of consciousness?  [ ] YES [ x] NO    The diagnosis of delirium by CAM requires the presence of features 1 and 2 and either 3 or 4.    PRESSORS: [ ] YES [ x] NO  vancomycin  IVPB 750 milliGRAM(s) IV Intermittent every 12 hours    Cardiovascular:   Echo :    . Normal Left Ventricular Systolic Function,  (EF =  50-55%). Grade II diastolic dysfunction.      carvedilol 6.25 milliGRAM(s) Oral every 12 hours  lisinopril 5 milliGRAM(s) Oral daily    Pulmonary:  ALBUTerol    90 MICROgram(s) HFA Inhaler 2 Puff(s) Inhalation every 6 hours PRN    Hematalogic:  heparin   Injectable 5000 Unit(s) SubCutaneous every 8 hours    Other:  acetaminophen    Suspension .. 650 milliGRAM(s) Oral every 6 hours PRN  chlorhexidine 0.12% Liquid 15 milliLiter(s) Oral Mucosa every 12 hours  chlorhexidine 2% Cloths 1 Application(s) Topical <User Schedule>  chlorhexidine 2% Cloths 1 Application(s) Topical <User Schedule>  influenza   Vaccine 0.5 milliLiter(s) IntraMuscular once  insulin glargine Injectable (LANTUS) 10 Unit(s) SubCutaneous <User Schedule>  insulin lispro (ADMELOG) corrective regimen sliding scale   SubCutaneous every 6 hours  insulin lispro Injectable (ADMELOG) 6 Unit(s) SubCutaneous every 6 hours  mupirocin 2% Ointment 1 Application(s) Both Nostrils two times a day  nystatin Powder 1 Application(s) Topical two times a day  pantoprazole  Injectable 40 milliGRAM(s) IV Push daily  predniSONE   Tablet 10 milliGRAM(s) Oral daily  sodium chloride 0.9% lock flush 10 milliLiter(s) IV Push every 1 hour PRN    acetaminophen    Suspension .. 650 milliGRAM(s) Oral every 6 hours PRN  ALBUTerol    90 MICROgram(s) HFA Inhaler 2 Puff(s) Inhalation every 6 hours PRN  carvedilol 6.25 milliGRAM(s) Oral every 12 hours  chlorhexidine 0.12% Liquid 15 milliLiter(s) Oral Mucosa every 12 hours  chlorhexidine 2% Cloths 1 Application(s) Topical <User Schedule>  chlorhexidine 2% Cloths 1 Application(s) Topical <User Schedule>  heparin   Injectable 5000 Unit(s) SubCutaneous every 8 hours  influenza   Vaccine 0.5 milliLiter(s) IntraMuscular once  insulin glargine Injectable (LANTUS) 10 Unit(s) SubCutaneous <User Schedule>  insulin lispro (ADMELOG) corrective regimen sliding scale   SubCutaneous every 6 hours  insulin lispro Injectable (ADMELOG) 6 Unit(s) SubCutaneous every 6 hours  lisinopril 5 milliGRAM(s) Oral daily  mupirocin 2% Ointment 1 Application(s) Both Nostrils two times a day  nystatin Powder 1 Application(s) Topical two times a day  pantoprazole  Injectable 40 milliGRAM(s) IV Push daily  predniSONE   Tablet 10 milliGRAM(s) Oral daily  sodium chloride 0.9% lock flush 10 milliLiter(s) IV Push every 1 hour PRN  vancomycin  IVPB 750 milliGRAM(s) IV Intermittent every 12 hours    Drug Dosing Weight  Height (cm): 154 (2020 20:16)  Weight (kg): 89.9 (2020 20:16)  BMI (kg/m2): 37.9 (2020 20:16)  BSA (m2): 1.87 (2020 20:16)    CENTRAL LINE: [x ] YES [ ] NO  LOCATION:   DATE INSERTED: 2020  REMOVE: [ ] YES [x ] NO  EXPLAIN:PICC for abx thru Dec 16th.     ROBLEDO: [ ] YES [x ] NO    DATE INSERTED:  REMOVE:  [ ] YES [ ] NO  EXPLAIN:    PAST MEDICAL & SURGICAL HISTORY:  Malignant neoplasm of unspecified part of right bronchus or lung    HTN (hypertension)    DM (diabetes mellitus)    COPD (chronic obstructive pulmonary disease)    Lung cancer    Morbid obesity    GERD (gastroesophageal reflux disease)    Deviated septum    Prolapsed uterus    ITP (idiopathic thrombocytopenic purpura)    Emphysema/COPD    History of cholecystectomy    S/P lobectomy of lung  right 2017    History of tonsillectomy                  Mode: CPAP with PS  FiO2: 40  PEEP: 5  PS: 10  MAP: 9  PIP: 17      PHYSICAL EXAM:    GENERAL: NAD, well-groomed, well-developed  HEAD:  Atraumatic, Normocephalic  EYES: EOMI, PERRLA, conjunctiva and sclera clear  ENMT: No tonsillar erythema, exudates, or enlargement; Moist mucous membranes, Good dentition, No lesions  NECK: Supple, No JVD, Normal thyroid, Trach  NERVOUS SYSTEM:  Alert & Oriented X3, Good concentration; Motor Strength 4/5 B/L upper, 3-4/5 BLE.   CHEST/LUNG: Trach to vent. Clear to percussion bilaterally; No rales, rhonchi, wheezing, or rubs  HEART: Regular rate and rhythm; No murmurs, rubs, or gallops  ABDOMEN: Soft, Nontender, Nondistended; Bowel sounds present, PEG tube intact.   EXTREMITIES:  2+ Peripheral Pulses, No clubbing, cyanosis, or edema  LYMPH: No lymphadenopathy noted  SKIN: No rashes or lesions      LABS:  CBC Full  -  ( 2020 04:37 )  WBC Count : 9.62 K/uL  RBC Count : 3.22 M/uL  Hemoglobin : 8.7 g/dL  Hematocrit : 28.0 %  Platelet Count - Automated : 177 K/uL  Mean Cell Volume : 87.0 fl  Mean Cell Hemoglobin : 27.0 pg  Mean Cell Hemoglobin Concentration : 31.1 gm/dL  Auto Neutrophil # : x  Auto Lymphocyte # : x  Auto Monocyte # : x  Auto Eosinophil # : x  Auto Basophil # : x  Auto Neutrophil % : x  Auto Lymphocyte % : x  Auto Monocyte % : x  Auto Eosinophil % : x  Auto Basophil % : x        135  |  96  |  10  ----------------------------<  125<H>  3.7   |  33<H>  |  0.42<L>    Ca    8.1<L>      2020 04:37  Phos  3.6       Mg     1.7         TPro  6.9  /  Alb  1.9<L>  /  TBili  0.5  /  DBili  x   /  AST  15  /  ALT  15  /  AlkPhos  115        Urinalysis Basic - ( 2020 19:06 )    Color: Yellow / Appearance: Clear / S.015 / pH: x  Gluc: x / Ketone: Negative  / Bili: Negative / Urobili: Negative   Blood: x / Protein: 30 mg/dL / Nitrite: Negative   Leuk Esterase: Negative / RBC: 0-2 /HPF / WBC 3-5 /HPF   Sq Epi: x / Non Sq Epi: Moderate /HPF / Bacteria: Trace /HPF            [  ]  DVT Prophylaxis  [  ]  Nutrition, Brand, Rate         Goal Rate        Abnormal Nutritional Status -  Malnutrition   Cachexia      Morbid Obesity BMI >/=40    RADIOLOGY & ADDITIONAL STUDIES:  ***    Goals of Care Discussion with Family/Proxy/Other   - see note from/family meetin/17

## 2020-11-25 NOTE — PROGRESS NOTE ADULT - PROBLEM SELECTOR PLAN 10
DVT ppx: heparin SC  GI ppx: PPI  Elevate RUE .   s/p PICC placement in IR   c/w   IV Vanco x 28 days (thru Dec 16th).  Pt is medically cleared for discharge to [facility] .   CM following.

## 2020-11-25 NOTE — PROGRESS NOTE ADULT - ASSESSMENT
seen and examined alert awake vsstable afebrile physical unchanged  on trach vent alert awake following commands   awake on bed  denies cp or sob  no  abd pain  pt got picc line  labs noted repeat  blood cxs neg   on vanco  a/p MRSA bacterimia  on vanco  trough 25    blood cxs negative  needs ID follow up for IV antibx duration  needs pickline  wtach accuchecks   decrease dose of vanco  trach vent  pulm critical    i d/w for d/c palnning to vent facility if AI team,  covid testing  covid test in case if pt is accepted in [facility]

## 2020-11-25 NOTE — PROGRESS NOTE ADULT - PROBLEM SELECTOR PLAN 5
afebrile   CXR as above.  No evidence of pneumonia .  Cefepime and Flagyl discontinued.  Aspiration precautions

## 2020-11-25 NOTE — PROGRESS NOTE ADULT - NSHPATTENDINGPLANDISCUSS_GEN_ALL_CORE
36
Agree with above assessment and plan as transcribed.

## 2020-11-25 NOTE — DISCHARGE NOTE NURSING/CASE MANAGEMENT/SOCIAL WORK - PATIENT PORTAL LINK FT
You can access the FollowMyHealth Patient Portal offered by [facility] by registering at the following website: [email or web address] By joining [email or web address] FollowMyHealth portal, you will also be able to view your health information using other applications (apps) compatible with our system.

## 2020-11-25 NOTE — PROGRESS NOTE ADULT - PROBLEM SELECTOR PLAN 1
likely due to aspiration pneumonia vs bacteremia  CXR results as above  BC growing MRSA . Repeat BC on 11/17 growing MRSA. but 11/19 NGTD  Sputum growing Acinetobacter  No evidence of active pneumonia at this time. Maxipime and Flagyl stopped.   MRSA bacteremia   afebrile , no leukocytosis.   will need IV Vanco for 28 days from 11/19 (end date : 12/16)  s/p  PICC placement in LUE in IR   Monitor Vanco trough ---> 25.5 this am for which Vanco 1 gm held, and reduced to 750mg BID.   Monitor Vanco trough prior to this evening's dose.   ID Dr. Corcoran following

## 2020-11-25 NOTE — PROGRESS NOTE ADULT - SUBJECTIVE AND OBJECTIVE BOX
Interval Events:    on TF  on stable dose of prednisone 10mg daily  poc glucose mostly controlled  not requiring significant additional sliding scale      Allergies    fish (Angioedema)  penicillins (Rash)    Intolerances      Endocrine/Metabolic Medications:  insulin glargine Injectable (LANTUS) 10 Unit(s) SubCutaneous <User Schedule>  insulin lispro (ADMELOG) corrective regimen sliding scale   SubCutaneous every 6 hours  insulin lispro Injectable (ADMELOG) 6 Unit(s) SubCutaneous every 6 hours  predniSONE   Tablet 10 milliGRAM(s) Oral daily      Vital Signs Last 24 Hrs  T(C): 37.4 (25 Nov 2020 05:21), Max: 37.4 (25 Nov 2020 05:21)  T(F): 99.3 (25 Nov 2020 05:21), Max: 99.3 (25 Nov 2020 05:21)  HR: 119 (25 Nov 2020 05:21) (87 - 119)  BP: 137/60 (25 Nov 2020 05:21) (97/60 - 137/60)  BP(mean): --  RR: 20 (25 Nov 2020 05:21) (18 - 22)  SpO2: 98% (25 Nov 2020 05:21) (92% - 100%)      PHYSICAL EXAM  Constitutional:  s/p trach on vent  NC/AT:    HEENT:    Neck:  No JVD  Respiratory:  reduced breath sounds b/l bases    Cardiovascular:  RR     Gastrointestinal: Soft     Extremities: without cyanosis    Neurological:  non focal  LABS                        8.7    9.62  )-----------( 177      ( 25 Nov 2020 04:37 )             28.0                               135    |  96     |  10                  Calcium: 8.1   / iCa: x      (11-25 @ 04:37)    ----------------------------<  125       Magnesium: 1.7                              3.7     |  33     |  0.42             Phosphorous: 3.6      TPro  6.9    /  Alb  1.9    /  TBili  0.5    /  DBili  x      /  AST  15     /  ALT  15     /  AlkPhos  115    25 Nov 2020 04:37    CAPILLARY BLOOD GLUCOSE      POCT Blood Glucose.: 147 mg/dL (25 Nov 2020 05:33)  POCT Blood Glucose.: 147 mg/dL (25 Nov 2020 00:45)  POCT Blood Glucose.: 145 mg/dL (24 Nov 2020 17:12)  POCT Blood Glucose.: 249 mg/dL (24 Nov 2020 12:26)        Assesment/plan        64 yo female with multiple comorbidities including h/o DM type 2, HTN, COPD s/p trach on vent, lung ca on right sided s/p resection on chemo admitted with lethargy    endocrinology consulted for glycemic management    DM type 2  uncontrolled  complicated by high dose steroid use, acute on chronic resp failure  s/p trach    recommendations:  s/p trach/PEG  poc glucose improved  continue current regimen    - continue lantus 10 units daily in am   - continue insulin lispro 6 units q6h, hold if TF are held  - continue low dose sliding scale  - reassess based on requirements  - goal inpatient glucose range: 140-180mg/dl    COPD exacerbation  acute on chronic hypoxic resp failure  s/p trach  on standing steroids- on reduced dose of prednisone 20mg daily  pulmonary on board  s/p trach/PEG  receiving vanco    sepsis  was on cefepime, flagyl  on vancomycin  optimize glycemic regimen    Discussed with primary team   Please call  Endocrine- Dr Katlyn Garcia as needed

## 2020-11-25 NOTE — PROGRESS NOTE ADULT - PROBLEM SELECTOR PLAN 6
`A1C 6.6  Basal/bolus decreased   Monitor blood glucose q6hrs and cover with low corrective Admelog s/s   Endo Dr. Garcia following

## 2020-11-25 NOTE — PROGRESS NOTE ADULT - PROBLEM SELECTOR PROBLEM 5
Pneumonia, aspiration

## 2020-11-25 NOTE — PROGRESS NOTE ADULT - PROBLEM SELECTOR PLAN 3
as above # 1 plan  ID Dr. Corcoran following
BC from 11/15 and 11/17 growing MRSA  afebrile, leukocytosis resolved  Resume IV Vanco when trough less than 20.   Cefepime and Flagyl discontinued in setting of no evidence of pneumonia  BC NGTD on 11/19  Pt will need Vanco x 28 days from negative BC (therefore end date: 12/16) . PICC in IR on Monday  ID Dr. Corcoran following
as above # 1 plan  ID Dr. Corcoran following
as above # 1 plan  ID Dr. Corcoran following
BC from 11/15 and 11/17 growing MRSA  afebrile, leukocytosis downtrending  Resume IV Vanco when trough less than 20.   Cefepime and Flagyl discontinued in setting of no evidence of pneumonia  Repeat BC every 48 hrs until negative  f/u BC on 11/19  ID Dr. Corcoran following

## 2020-11-25 NOTE — PROGRESS NOTE ADULT - REASON FOR ADMISSION
lethargy

## 2020-11-25 NOTE — PROGRESS NOTE ADULT - PROBLEM SELECTOR PLAN 8
controlled   lisinopril, coreg
controlled  c/w lisinopril, coreg
controlled   lisinopril, coreg
controlled  c/w lisinopril, coreg

## 2020-11-25 NOTE — PROGRESS NOTE ADULT - PROBLEM SELECTOR PLAN 9
BMI 37.9  c/w PEG tube feeding

## 2020-11-25 NOTE — PROGRESS NOTE ADULT - ASSESSMENT
65F PMH obesity, COPD s/p trach and PEG (80 pack year hx, nonverbal at baseline), R lung cancer s/p resection (2017), HTN, and IDDM2 presented from [facility] with lethargy and AMS concerning for sepsis, started on Levaquin 11/13 and vancomycin 11/14 by VY. Found to have right basilar infiltrate on CXR, given x1 dose each vanc and cefepime, initially admitted for management of encephalopathy and sepsis 2/2 aspiration PNA. Pt initially admitted to ICU    Hospital course complicated by tachycardia and uncontrolled HTN. for which Lisinopril initiated and coreg resumed at half dose.  SBT with trach collar 11/17. Placed back on vent 14/400/40/5 at HS.  ABG on 11/18 7.53/40/73/34.plaints. BCx with GP cocci, continuing on cefepime, Flagyl, and vanc (day 1 11/14) Downgraded to AI on 11/18.   Repeat BC on 11/19 NGTD . ID recommends IV Vanco x 28 days from last negative BC, therefore will need PICC, which was placed in LUE in IR on 11/24. Pt developed fever 11/23. CXR on 11/23 does not show active Pneumonia. Stool for Cdiff negative. UA negative for UTI.

## 2020-11-25 NOTE — PROGRESS NOTE ADULT - ATTENDING COMMENTS
-s/p PICC placed 11/24 for iv antibx  -daughter wish for placement to another facility in  .. closer to their home  -continue vent support  -daily SBT

## 2020-11-25 NOTE — PROGRESS NOTE ADULT - PROBLEM SELECTOR PLAN 7
of chronic disease (COPD , lung cancer)   Hgb stable  No overt bleeding  Monitor CBC

## 2020-11-26 LAB — SARS-COV-2 RNA SPEC QL NAA+PROBE: SIGNIFICANT CHANGE UP

## 2020-11-26 RX ORDER — VANCOMYCIN HCL 1 G
750 VIAL (EA) INTRAVENOUS
Qty: 0 | Refills: 0 | DISCHARGE
Start: 2020-11-26

## 2020-11-28 LAB
CULTURE RESULTS: SIGNIFICANT CHANGE UP
SPECIMEN SOURCE: SIGNIFICANT CHANGE UP

## 2020-12-04 NOTE — PROGRESS NOTE ADULT - PROBLEM SELECTOR PLAN 7
- Diet: Regular (per patient's wish; accepts risk)  - DVT: lovenox  - Full Code Advancement Flap (Single) Text: Given the location of the defect, inherent tension at the surgical site, and the proximity to free margins an advancement flap was deemed most appropriate for wound reconstruction. The risks, benefits, and possible outcomes of this procedure were discussed along with the risks, benefits, and possible outcomes of other options for wound repair (including but not limited to: complex closure, other flaps, grafts, and second intention). The patient verbalized understanding and consent to the outlined procedure. The patient verbalized understanding that intraoperative conditions may necessitate a change in the outlined procedure resulting in modification of the original surgical plan. This decision may be made at the discretion of the surgeon, and is due to factors subject to change or that are difficult to predict preoperatively. Using a sterile surgical marker, an appropriate advancement flap was drawn incorporating the defect and placing the expected incisions within the relaxed skin tension lines where possible. The surgical site and surrounding skin were prepped and draped in sterile fashion.  Standing cones were removed from opposite ends of the defect within the appropriate surgical plane, repositioning as necessary based upon local tension, proximity to free margins/other anatomic structures, and position of the relaxed skin tension lines. The resulting defect was undermined widely and bilaterally in the appropriate surgical plane taking care to preserve local arteries, veins, nerves, and other structures of anatomic importance. This created a sliding flap capable of advancing across the defect to close the wound under minimal tension. Hemostasis was achieved and then the wound was sutured in layered fashion.

## 2021-03-19 NOTE — ED PROVIDER NOTE - NS_ATTENDINGSCRIBE_ED_ALL_ED
Risks/benefits discussed with patient/surrogate/Vaccine Information Sheet (VIS) provided-VIS date: 8/15/19
I personally performed the service described in the documentation recorded by the scribe in my presence, and it accurately and completely records my words and actions.

## 2021-04-06 NOTE — H&P PST ADULT - RESPIRATORY
Subjective: 57yFemale with a pmhx of M47.812/ 57964/ 27855/ 22853X2/ 83052/ 76555    ^M47.812/ 52338/ 02301/ 22853X2/ 89233/ 52865    Family history of malignant neoplasm of esophagus    FH: stroke    FH: aneurysm    Handoff    MEWS Score    HTN (hypertension)    Diabetes    Asthma    TRAVIS on CPAP    Arthritis    High cholesterol    GERD (gastroesophageal reflux disease)    History of peripheral arterial disease    Herniation of cervical intervertebral disc with radiculopathy    Cervical spondylosis with radiculopathy    Herniation of cervical intervertebral disc    Cervical spondylosis with radiculopathy    Anterior cervical discectomy with fusion    S/P trigger finger release    H/O carpal tunnel repair    H/O arthroscopy of shoulder    SysAdmin_VstLnk      Patient is a 57 year-old female POD #8 s/p C5-C7 ACDF. Patient was seen and examined at bedside on 4C. No acute overnight events. Patient is lying in bed and following all commands. She claims that her neck soreness seems to be resolving. Also admits that her swallowing is much improved and has been working a lot with PT. Also claims her pre-op symptoms are basically resolved. At present, she denies neck pain, weakness, paresthesias, headaches.     Allergies    ibuprofen (Urticaria)    Intolerances        Vital Signs Last 24 Hrs  T(C): 36.2 (06 Apr 2021 05:00), Max: 36.3 (05 Apr 2021 21:22)  T(F): 97.2 (06 Apr 2021 05:00), Max: 97.3 (05 Apr 2021 21:22)  HR: 75 (06 Apr 2021 05:00) (61 - 75)  BP: 187/79 (06 Apr 2021 05:00) (145/65 - 187/79)  BP(mean): --  RR: 18 (06 Apr 2021 05:00) (18 - 18)  SpO2: --      acetaminophen   Tablet .. 650 milliGRAM(s) Oral every 6 hours PRN  ALBUTerol    90 MICROgram(s) HFA Inhaler 1 Puff(s) Inhalation four times a day PRN  atorvastatin 40 milliGRAM(s) Oral at bedtime  benzocaine 20% Spray 1 Spray(s) Topical every 6 hours  dextrose 40% Gel 15 Gram(s) Oral once  dextrose 5%. 1000 milliLiter(s) IV Continuous <Continuous>  dextrose 5%. 1000 milliLiter(s) IV Continuous <Continuous>  dextrose 50% Injectable 25 Gram(s) IV Push once  dextrose 50% Injectable 12.5 Gram(s) IV Push once  dextrose 50% Injectable 25 Gram(s) IV Push once  enalapril 5 milliGRAM(s) Oral daily  gabapentin 300 milliGRAM(s) Oral three times a day  glucagon  Injectable 1 milliGRAM(s) IntraMuscular once  heparin   Injectable 5000 Unit(s) SubCutaneous every 8 hours  insulin glargine Injectable (LANTUS) 15 Unit(s) SubCutaneous at bedtime  insulin lispro (ADMELOG) corrective regimen sliding scale   SubCutaneous three times a day before meals  insulin lispro Injectable (ADMELOG) 5 Unit(s) SubCutaneous before breakfast  insulin lispro Injectable (ADMELOG) 5 Unit(s) SubCutaneous before lunch  insulin lispro Injectable (ADMELOG) 5 Unit(s) SubCutaneous before dinner  methocarbamol 500 milliGRAM(s) Oral four times a day  metoprolol tartrate 100 milliGRAM(s) Oral two times a day  ondansetron Injectable 4 milliGRAM(s) IV Push every 6 hours PRN  pantoprazole   Suspension 40 milliGRAM(s) Oral two times a day  senna 2 Tablet(s) Oral at bedtime  sodium chloride 0.65% Nasal 1 Spray(s) Both Nostrils two times a day PRN  sodium chloride 0.9%. 1000 milliLiter(s) IV Continuous <Continuous>        04-05-21 @ 07:01  -  04-06-21 @ 07:00  --------------------------------------------------------  IN: 900 mL / OUT: 1175 mL / NET: -275 mL        REVIEW OF SYSTEMS    [X] A ten-point review of systems was otherwise negative except as noted.  [ ] Due to altered mental status/intubation, subjective information were not able to be obtained from the patient. History was obtained, to the extent possible, from review of the chart and collateral sources of information.      Physical Exam:  General: Lying in bed, following all commands, NAD  AAOX3. Verbal function intact  Facial motions symmetric  EOMI  Motor: MAEx4, good bulk and tone  5/5 power in b/l UE's and LE's  Yeung's: Negative  Sensation: intact to touch in all extremities  Wound: Incision clean, dry, and intact without drainage, no swelling        CBC Full  -  ( 04 Apr 2021 12:26 )  WBC Count : 6.78 K/uL  RBC Count : 3.84 M/uL  Hemoglobin : 11.6 g/dL  Hematocrit : 34.7 %  Platelet Count - Automated : 356 K/uL  Mean Cell Volume : 90.4 fL  Mean Cell Hemoglobin : 30.2 pg  Mean Cell Hemoglobin Concentration : 33.4 g/dL  Auto Neutrophil # : x  Auto Lymphocyte # : x  Auto Monocyte # : x  Auto Eosinophil # : x  Auto Basophil # : x  Auto Neutrophil % : x  Auto Lymphocyte % : x  Auto Monocyte % : x  Auto Eosinophil % : x  Auto Basophil % : x                Assessment/Plan:   57 year-old female POD #8 s/p C5-C7 ACDF.  -PT/Rehab  -DVT ppx, SQH  -Pain management  -Encourage incentive spirometry use  -F/U SW for authorization; if authorization not obtained today, pt would like to be discharged to home tomorrow 4/7  -Script for RW given to SW  -discuss with attending  detailed exam

## 2021-06-10 NOTE — DISCHARGE NOTE PROVIDER - CARE PROVIDERS DIRECT ADDRESSES
----- Message from Jadyn Jones DO sent at 6/10/2021  7:23 AM CDT -----  Patient's INR is 1. I see she has a history of afib but it appears she is not currently treated. Is she on anticoagulation?    
----- Message from Jadyn Jones DO sent at 6/10/2021  8:46 AM CDT -----  UA done yesterday was ALLIE  
,[email or web address]

## 2021-07-13 NOTE — H&P PST ADULT - ALLERGY TYPES
Patient attended Phase 2 Cardiac Rehab Exercise Session. Further documentation will be scanned into the medical record upon discharge.  
outdoor environmental allergies/reactions to medicines

## 2021-07-21 NOTE — PROGRESS NOTE ADULT - PROBLEM SELECTOR PROBLEM 2
Will need to check with patient if she is requesting for refill. Is patient only taking 1 capsule BID?     Last office visit: 5-27-21  Next office visit: NONE     Disp Refills Start End    hydrochlorothiazide (MICROZIDE) 12.5 MG capsule 180 capsule 0 6/23/2021     Sig - Route: Take 1-2 capsules by mouth 2 times daily. - Oral      BP Readings from Last 2 Encounters:   06/28/21 132/82   05/27/21 (!) 158/80            Sepsis

## 2021-08-08 NOTE — ED ADULT NURSE NOTE - CHIEF COMPLAINT
The patient is a 65y Female complaining of shortness of breath.
The patient wishes to leave against medical advice.  I have discussed the risks, benefits and alternatives (including the possibility of worsening of disease, pain, permanent disability, and/or death) with the patient and his/her family (if available).  The patient voices understanding of these risks, benefits, and alternatives and still wishes to sign out against medical advice.  The patient is awake, alert, oriented x 3 and has demonstrated capacity to refuse/direct care.  I have advised the patient that they can and should return immediately should they develop any worse/different/additional symptoms, or if they change their mind and want to continue their care. Patient has full capacity and has verbalized understanding.  Given detailed returned precautions.       Patient does not wish to stay because she cannot get in touch with her 12 year old child who is home alone. Strict return precautions given and patient advised to come back if she can.

## 2021-11-30 NOTE — ASU PREOP CHECKLIST - INTERNAL PROSTHESES
no
Indication of gait -0/1,   Step Length and height -1/2,   Foot Clearance -2/2,   Step Symmetry - 0/1,  Step Continuity -1/1,   Path -1/ 2,   Trunk -1/2,   Walking Time -1/1,   Total Score - 7/12

## 2022-01-25 NOTE — PROGRESS NOTE ADULT - SUBJECTIVE AND OBJECTIVE BOX
Inpatient Progress Note    Patient Name: Laura rAgueta  YOB: 1952  MRN: [id number]    PCP: Unknown Pcp Outside [facility]  Referring Provider: No ref. provider found    Subjective/24 Hr events     Overnight, pt desatting on HFNC. ABG taken on 100% FiO2 resulted 7.55/42/57/37. Repeat CXRs with worsening R airspace opacities and effusion. Pt on lasix gtt only with 360 ml urine documented out overnight though pt also incontinent.     Patient seen and examined at bedside. Easily arousable and alert and oriented. Remains on HFNC at 100% FiO2/50L.  Pt denies any complaints at this time other than feeling tired. Denies fevers, chills, any pain including CP, SOB on HFNC, nausea/vomiting/diarrhea, abdominal pain.     Spoke with pt son MEGGAN Lai this morning regarding pt status. Pt to remain full code.       Objective     Vitals:    01/25/22 0755   BP: (!) 145/62   Pulse: 62   Resp: 16   Temp: 99 °F (37.2 °C)       Physical Exam  General: Pt is AOx 4 with appropriate answering of questions, emotionally labile  HEENT: normocephalic, atraumatic, EOMI, MMM   Neck: supple, nontender  CV: RRR, +S1 +S2, no murmurs/rubs/gallops.   Lungs: no crackles, rhonchi, wheezing, on HFNC  Abdomen: soft, mildly tender suprapubically, nondistended, BS present, tympanic to percussion. No rigidity, rebound or guarding.   Extremities: b/l MTAs, No edema, clubbing, or cyanosis. No tenderness to palpation of BL lower extremities   Neuro: CN 2-12 grossly intact   Skin: warm, dry, intact    Intake/Output Summary (Last 24 hours) at 1/25/2022 0917  Last data filed at 1/25/2022 0600  Gross per 24 hour   Intake 595 ml   Output 360 ml   Net 235 ml        Recent Labs     01/23/22  0635 01/24/22  0642   WBC 8.9 17.5*   RBC 3.62* 4.40   HGB 10.6* 12.5   HCT 33.0* 40.0    501*   MCV 91.2 90.9   MCH 29.3 28.4   MCHC 32.1 31.3*   NRBCRE 0 0       No results found    Recent Labs   Lab 01/25/22  0602 01/24/22  2137 01/24/22  1338   SODIUM 135 136  137   POTASSIUM 3.5 3.7 4.1   CHLORIDE 94* 96* 95*   CO2 33* 34* 35*   GLUCOSE 206* 177* 136*   BUN 48* 48* 48*   CREATININE 1.85* 1.88* 1.74*   CALCIUM 9.0 8.4 9.1       Recent Labs   Lab 01/24/22  1338   NTPROB 10,568*       No results found    Recent Labs   Lab 01/25/22  0432   RAPH 7.55*   RAPCO2 42   RAPO2 57*   RAHCO3 37*   RASAT 91*       Inpatient Medications  • metOLazone  2.5 mg Oral Once   • aztreonam  1 g Intravenous 3 times per day   • gabapentin  100 mg Oral 2 times per day   • tamsulosin  0.4 mg Oral Daily PC   • polyethylene glycol  17 g Oral Daily   • AMIODarone  200 mg Oral BID   • insulin lispro   Subcutaneous 4x Daily AC & HS   • apixaBAN  5 mg Oral 2 times per day   • sodium chloride (PF)  2 mL Intracatheter 2 times per day   • amLODIPine  10 mg Oral Daily   • atorvastatin  20 mg Oral Daily   • buPROPion XL  150 mg Oral Daily   • docusate sodium-sennosides  1 tablet Oral BID   • melatonin  3 mg Oral Nightly   • metoPROLOL succinate  12.5 mg Oral Daily   • lidocaine  1 patch Transdermal Daily     • furosemide (LASIX INJECT) 250 mg/125 mL in sodium chloride 0.9 % infusion 20 mg/hr (01/25/22 0101)     albuterol, ondansetron, traMADol, benzonatate, dextrose, dextrose, glucagon, dextrose, dextrose, sodium chloride, sodium chloride, polyethylene glycol     Assessment and Plan   Ms. Argueta is a 69F w/ extensive PMHx most notably, HFpEF (EF 60%, G2DD), CAD s/p CABG, Afib, HTN, DM2, PVD, s/p b/l TMA who's being managed for Afib w/ RVR, mood disorder and HFpEF exacerbation.    #Dyspnea  #HFpEF, G2 DD, EF 60%  #Hypertension  -Previous echocardiogram significant for grade 2 diastolic dysfunction, hypokinesis of mid anteroseptal and mid anterolateral myocardium, moderate mitral regurgitation, moderate atrial dilation of left atrium  -No signs or symptoms of active heart failure exacerbation on admission: No crackles on exam, no bilateral lower extremity edema, troponin within normal limits  - 1/09:  Interval Events:    planned for additional solumedrol doses  poc glucose elevated- mild- requiring additional sliding scale    Allergies    fish (Angioedema)  penicillins (Rash)    Intolerances      Endocrine/Metabolic Medications:  atorvastatin 40 milliGRAM(s) Oral at bedtime  dextrose 40% Gel 15 Gram(s) Oral once PRN  dextrose 50% Injectable 12.5 Gram(s) IV Push once  dextrose 50% Injectable 25 Gram(s) IV Push once  dextrose 50% Injectable 25 Gram(s) IV Push once  glucagon  Injectable 1 milliGRAM(s) IntraMuscular once PRN  insulin glargine Injectable (LANTUS) 20 Unit(s) SubCutaneous every morning  insulin lispro (HumaLOG) corrective regimen sliding scale   SubCutaneous three times a day before meals  insulin lispro Injectable (HumaLOG) 6 Unit(s) SubCutaneous three times a day before meals  methylPREDNISolone sodium succinate Injectable 40 milliGRAM(s) IV Push every 12 hours      Vital Signs Last 24 Hrs  T(C): 36.6 (05 Oct 2020 05:00), Max: 36.9 (04 Oct 2020 15:09)  T(F): 97.9 (05 Oct 2020 05:00), Max: 98.5 (04 Oct 2020 15:09)  HR: 103 (05 Oct 2020 10:15) (103 - 123)  BP: 141/75 (05 Oct 2020 05:00) (127/79 - 154/46)  BP(mean): --  RR: 30 (05 Oct 2020 05:00) (19 - 30)  SpO2: 100% (05 Oct 2020 10:15) (95% - 100%)      PHYSICAL EXAM  Constitutional:    NC/AT:    HEENT:    Neck:  No JVD    Respiratory:  reduced breath sounds b/l bases    Cardiovascular:  RR    Gastrointestinal: Soft    Extremities: without cyanosis  Neurological:  non focal    LABS                        7.4    3.78  )-----------( 56       ( 05 Oct 2020 08:28 )             22.1                               127    |  86     |  20                  Calcium: 8.2   / iCa: x      (10-05 @ 08:28)    ----------------------------<  168       Magnesium: 1.9                              3.8     |  33     |  0.44             Phosphorous: x          CAPILLARY BLOOD GLUCOSE      POCT Blood Glucose.: 217 mg/dL (05 Oct 2020 09:22)  POCT Blood Glucose.: 221 mg/dL (04 Oct 2020 21:27)  POCT Blood Glucose.: 203 mg/dL (04 Oct 2020 16:58)  POCT Blood Glucose.: 146 mg/dL (04 Oct 2020 11:56)        Assesment/plan        66 yo female with multiple comorbidities including h/o DM type 2, HTN, COPD on o2, lung ca on right sided s/p resection on chemo admitted with shortness of breath  was intubated/extubated for acute resp failure this admit, now extubated on general medical floor, standing high dose steroids with uncontrolled hyperglycemia    endocrinology consulted for glycemic management    DM type 2  uncontrolled  complicated by high dose steroid use, acute on chronic resp failure  home regimen:  basaglar 60 units daily  novolog 15 units pre meals    recommendations:  poc glucoses elevated- mild  however planned for additional solumedrol doses    since planned for additional solumedrol- recommend increase as follows:  - increase insulin lispro to 8 units pre meals  continue moderate dose sliding scale  - insulin lantus 26 units daily in am, first dose now  - reassess based on requirements  - goal inpatient glucose range: 140-180mg/dl    COPD exacerbation  acute on chronic hypoxic resp failure  s/p intubation/extubation current admit  on standing steroids  steroids increased  bipap   pulmonary on board    sepsis  VRE bacteremia  received zosyn  ID on board    Discussed with primary team   Please call  Endocrine- Dr Katlyn Garcia as needed   conversational dyspnea, on 3L O2 via nc. Diminished BS bilateral bases. Does have +1 pitting edema BLE. Hypertensive this am highest .   - 1/10: Still has conversational dyspnea, did get lasix 40 mg IV today with improvement of SOB and O2 requirements.  -1/20: Started having conversational dyspnea, CXR - Bibasilar airspace opacities, increased on the right. Unchanged left pleural effusion.   -Most likely due to fluid overload since off diuretics for a few days + fluids for ATN vs less likely infection as patient is afebrile and no leukocytosis.   - over subsequent days pt with worsening O2 requirements, evidence pulm vasc congestion on XR   Plan:  -Cardiology following, given evidence of pulmonary venous congestion and clinical JVP elevation cardiology started patient on Lasix gtt  - lasix gtt with significant urine output, continue.  - one time dose of metolazone added this morning as well   - Repeat CXR for further evaluation with evidence of R pleural effusion and worsening R airspace opacities   - although even with significant improvement of urine out still hypoxic requiring HFNC, pulm consulted. Initiated antibiotics and CT ordered for further evaluation   - continue Amlodipine 10 mg, hydrochlorothiazide 25mg qd, Metoprolol 12.5 mg QD  - BiPAP at night PRN  - Strict I&O  - Weigh daily    #ALYSSA  #Urinary Retention   - baseline seems to be around the 1.4-1.5s   - likely from aggressive diuresis   -Renal US earlier in admission - Unremarkable renal ultrasound  Plan  - Urology placed on consult, appreciate recs  - PVR and straight cath volumes <500, over-distension of bladder can worsen patient's retention  - Considering terry catheter if amenable and has continued PVR >300, will increase bladder scan frequency at this time.   - Flomax 0.4mg qd  - Switched to Gabapentin 100 mg BID    #Mood disorder   #Non-decisional    - documented extensively in focus note by Dr. Crawford 1/8 as well as Psychiatry on 1/10.  Patient has poor judgment and insight.    - Seroquel discontinued, as patient refused  Plan:  - Gabapentin 100mg Q12H  - Bupropion 150 mg daily   - Haldol 1 mg q6h prn agitation (patient has not been requiring this, caution as QTc is 445)    #Atrial fibrillation   -Presenting with RVR, tachycardia to 130, patient oscillating between HR   - TSH 2.4 wnl on admission  - Precipitating factor- likely stress and PNA   - S/p RAJAT and cardioversion on 1/7, bradycardia 50-70 throughout course of night post cardioversion.  - Currently HR controlled ventricular response  Plan:  - Continue Metoprolol 12.5 daily, Amiodarone 200mg BID, and Apixaban (Eliquis) 5 mg tablet twice daily    #Concern for DVT   - due to pain on ble mostly on the Left, mild swelling initially however improved post diuresis  - low likelihood, no edema appreciated and pt on eliquis    Plan  - BLE DVT scan     # CAP   - ceft, azithro x1 on admit   - HDS, no fever on admit however could be responsible for AF   - pt has had dry cough for one week, not productive   - can have resolving PNA since first AF episode on 12/31, if infection present, this could be precipitant  Plan:   -Completed Ceftriaxone 1g daily x3 days and 5 days of PO cefuroxime.   -Tessalon perles PRN     #Hyperlipidemia  -Atorvastatin 20 mg daily     #DMT2  #Diabetic Neuropathy  -A1c 1 year ago 8.2, 1/6 8.3  - 01/09/2022 BG reviewed past 24 hours ranging between 154-338. Patient eating > 70% of tray.  -Podiatry evaluated patient and did not require any surgical intervention. Patient will f/u OP w/ Dr. Guzman (DPM)  Plan:  - Continue Lantus 14 units daily, hold lantus if NPO or BG < 120  - MDSSI  - BG goal of 140-180 per NICE Sugar trial  - Medium carb diet, no concentrated sweets  - If hypoglycemic please manage using hypoglycemic protocol.  - Continue Gabapentin 100mg TID      #Hyperkalemia, resolved    #Anxiety v depression   - Continue Bupropion 150 mg tab XL QD  - patient refusing  seroquel    #Insomnia  -Melatonin 3mg QHS    #History of endocarditis, per patient report    FENA  Fluids: PO fluids  Electrolytes: Replete PRN  Nutrition: Medium Carb diet, no concentrated sweets  Activity: Advance to baseline    DVT prophylaxis: On Eliquis 5mg BID  GI prophylaxis: Not indicated    Dispo: [facility]    Code status: Full  Nondecisional, MEGGAN is her son Ming Argueta @ [phone]     Pt discussed with Dr Carlo Foss  Internal Medicine Resident  [employer]:

## 2022-03-15 NOTE — PROGRESS NOTE ADULT - SUBJECTIVE AND OBJECTIVE BOX
RTC if does not improve over next week or any increase in pain, blurred vision, swelling. BARB COLÓN    SCU progress note    INTERVAL HPI/OVERNIGHT EVENTS: ***    DNR [ ]   DNI  [  ] Full code    Covid - 19 PCR: not detected     The 4Ms    What Matters Most: see GOC  Age appropriate Medications/Screen for High Risk Medication: Yes  Mentation: see CAM below  Mobility: defer to physical exam    The Confusion Assessment Method (CAM) Diagnostic Algorithm     1: Acute Onset or Fluctuating Course  - Is there evidence of an acute change in mental status from the patient’s baseline? Did the (abnormal) behavior  fluctuate during the day, that is, tend to come and go, or increase and decrease in severity?  [ ] YES [x ] NO     2: Inattention  - Did the patient have difficulty focusing attention, being easily distractible, or having difficulty keeping track of what was being said?  [ ] YES [ x] NO     3: Disorganized thinking  -Was the patient’s thinking disorganized or incoherent, such as rambling or irrelevant conversation, unclear or illogical flow of ideas, or unpredictable switching from subject to subject?  [ ] YES [ x] NO    4: Altered Level of consciousness?  [ ] YES [ x] NO    The diagnosis of delirium by CAM requires the presence of features 1 and 2 and either 3 or 4.    PRESSORS: [ ] YES [x ] NO  vancomycin  IVPB 1000 milliGRAM(s) IV Intermittent every 12 hours    Cardiovascular:  Heart Failure  Acute   Acute on Chronic  Chronic       carvedilol 6.25 milliGRAM(s) Oral every 12 hours  lisinopril 5 milliGRAM(s) Oral daily    Pulmonary:  ALBUTerol    90 MICROgram(s) HFA Inhaler 2 Puff(s) Inhalation every 6 hours PRN    Hematalogic:  heparin   Injectable 5000 Unit(s) SubCutaneous every 8 hours    Other:  acetaminophen    Suspension .. 650 milliGRAM(s) Oral every 6 hours PRN  chlorhexidine 0.12% Liquid 15 milliLiter(s) Oral Mucosa every 12 hours  chlorhexidine 2% Cloths 1 Application(s) Topical <User Schedule>  chlorhexidine 2% Cloths 1 Application(s) Topical <User Schedule>  influenza   Vaccine 0.5 milliLiter(s) IntraMuscular once  insulin glargine Injectable (LANTUS) 10 Unit(s) SubCutaneous <User Schedule>  insulin lispro (ADMELOG) corrective regimen sliding scale   SubCutaneous every 6 hours  insulin lispro Injectable (ADMELOG) 6 Unit(s) SubCutaneous every 6 hours  mupirocin 2% Ointment 1 Application(s) Both Nostrils two times a day  nystatin Powder 1 Application(s) Topical two times a day  pantoprazole  Injectable 40 milliGRAM(s) IV Push daily  predniSONE   Tablet 10 milliGRAM(s) Oral daily  sodium chloride 0.9% lock flush 10 milliLiter(s) IV Push every 1 hour PRN    acetaminophen    Suspension .. 650 milliGRAM(s) Oral every 6 hours PRN  ALBUTerol    90 MICROgram(s) HFA Inhaler 2 Puff(s) Inhalation every 6 hours PRN  carvedilol 6.25 milliGRAM(s) Oral every 12 hours  chlorhexidine 0.12% Liquid 15 milliLiter(s) Oral Mucosa every 12 hours  chlorhexidine 2% Cloths 1 Application(s) Topical <User Schedule>  chlorhexidine 2% Cloths 1 Application(s) Topical <User Schedule>  heparin   Injectable 5000 Unit(s) SubCutaneous every 8 hours  influenza   Vaccine 0.5 milliLiter(s) IntraMuscular once  insulin glargine Injectable (LANTUS) 10 Unit(s) SubCutaneous <User Schedule>  insulin lispro (ADMELOG) corrective regimen sliding scale   SubCutaneous every 6 hours  insulin lispro Injectable (ADMELOG) 6 Unit(s) SubCutaneous every 6 hours  lisinopril 5 milliGRAM(s) Oral daily  mupirocin 2% Ointment 1 Application(s) Both Nostrils two times a day  nystatin Powder 1 Application(s) Topical two times a day  pantoprazole  Injectable 40 milliGRAM(s) IV Push daily  predniSONE   Tablet 10 milliGRAM(s) Oral daily  sodium chloride 0.9% lock flush 10 milliLiter(s) IV Push every 1 hour PRN  vancomycin  IVPB 1000 milliGRAM(s) IV Intermittent every 12 hours    Drug Dosing Weight  Height (cm): 154 (2020 20:16)  Weight (kg): 89.9 (2020 20:16)  BMI (kg/m2): 37.9 (2020 20:16)  BSA (m2): 1.87 (2020 20:16)    CENTRAL LINE: [ ] YES [x ] NO  LOCATION:   DATE INSERTED:  REMOVE: [ ] YES [ ] NO  EXPLAIN:    ROBLEDO: [ ] YES [x ] NO    DATE INSERTED:  REMOVE:  [ ] YES [ ] NO  EXPLAIN:    PAST MEDICAL & SURGICAL HISTORY:  Malignant neoplasm of unspecified part of right bronchus or lung    HTN (hypertension)    DM (diabetes mellitus)    COPD (chronic obstructive pulmonary disease)    Lung cancer    Morbid obesity    GERD (gastroesophageal reflux disease)    Deviated septum    Prolapsed uterus    ITP (idiopathic thrombocytopenic purpura)    Emphysema/COPD    History of cholecystectomy    S/P lobectomy of lung  right 2017    History of tonsillectomy                  Mode: AC/ CMV (Assist Control/ Continuous Mandatory Ventilation)  RR (machine): 14  TV (machine): 400  FiO2: 40  PEEP: 5  ITime: 1  MAP: 11  PIP: 29      PHYSICAL EXAM:    GENERAL: NAD,  HEAD:  Atraumatic, Normocephalic  EYES: EOMI, PERRLA, conjunctiva and sclera clear  ENMT: No tonsillar erythema, exudates, or enlargement; Moist mucous membranes,  No lesions  NECK: Supple, No JVD, Normal thyroid Trach  NERVOUS SYSTEM:  Alert & Oriented X3, Good concentration; Motor Strength 4/5 B/L upper , 3-4/5 BLE.   CHEST/LUNG:Trach to vent.  Clear upper lobes, decreased at bases.  No rales, rhonchi, wheezing, or rubs  HEART: Regular rate and rhythm; No murmurs, rubs, or gallops  ABDOMEN: Soft, Nontender, Nondistended; Bowel sounds present. PEG tube intact.   EXTREMITIES:  2+ Peripheral Pulses, No clubbing, cyanosis, or edema  LYMPH: No lymphadenopathy noted  SKIN: No rashes or lesions      LABS:  CBC Full  -  ( 2020 06:17 )  WBC Count : 10.05 K/uL  RBC Count : 3.74 M/uL  Hemoglobin : 9.9 g/dL  Hematocrit : 32.7 %  Platelet Count - Automated : 181 K/uL  Mean Cell Volume : 87.4 fl  Mean Cell Hemoglobin : 26.5 pg  Mean Cell Hemoglobin Concentration : 30.3 gm/dL  Auto Neutrophil # : x  Auto Lymphocyte # : x  Auto Monocyte # : x  Auto Eosinophil # : x  Auto Basophil # : x  Auto Neutrophil % : x  Auto Lymphocyte % : x  Auto Monocyte % : x  Auto Eosinophil % : x  Auto Basophil % : x    11-24    132<L>  |  96  |  8   ----------------------------<  149<H>  3.8   |  29  |  0.33<L>    Ca    8.6      2020 06:17  Phos  3.8       Mg     1.7         TPro  7.3  /  Alb  2.0<L>  /  TBili  0.7  /  DBili  x   /  AST  15  /  ALT  15  /  AlkPhos  111      PT/INR - ( 2020 08:18 )   PT: 15.1 sec;   INR: 1.28 ratio           Urinalysis Basic - ( 2020 19:06 )    Color: Yellow / Appearance: Clear / S.015 / pH: x  Gluc: x / Ketone: Negative  / Bili: Negative / Urobili: Negative   Blood: x / Protein: 30 mg/dL / Nitrite: Negative   Leuk Esterase: Negative / RBC: 0-2 /HPF / WBC 3-5 /HPF   Sq Epi: x / Non Sq Epi: Moderate /HPF / Bacteria: Trace /HPF            [  ]  DVT Prophylaxis  [  ]  Nutrition, Brand, Rate         Goal Rate        Abnormal Nutritional Status -  Malnutrition   Cachexia      Morbid Obesity BMI >/=40    RADIOLOGY & ADDITIONAL STUDIES:  ***    Goals of Care Discussion with Family/Proxy/Other   - see note from/family meeting set up for...     BARB COLÓN    SCU progress note    INTERVAL HPI/OVERNIGHT EVENTS: ***Febrile yesterday, Tmax 101.5 at ~ 5pm. UA neg for UTI . CXR shows Increasing right basilar discoid atelectasis.  Stool negative for CDiff.     Pt seen and examined this morning. Pt reports feeling better , breathing better.     DNR [ ]   DNI  [  ] Full code    Covid - 19 PCR: not detected     The 4Ms    What Matters Most: see GOC  Age appropriate Medications/Screen for High Risk Medication: Yes  Mentation: see CAM below  Mobility: defer to physical exam    The Confusion Assessment Method (CAM) Diagnostic Algorithm     1: Acute Onset or Fluctuating Course  - Is there evidence of an acute change in mental status from the patient’s baseline? Did the (abnormal) behavior  fluctuate during the day, that is, tend to come and go, or increase and decrease in severity?  [ ] YES [x ] NO     2: Inattention  - Did the patient have difficulty focusing attention, being easily distractible, or having difficulty keeping track of what was being said?  [ ] YES [ x] NO     3: Disorganized thinking  -Was the patient’s thinking disorganized or incoherent, such as rambling or irrelevant conversation, unclear or illogical flow of ideas, or unpredictable switching from subject to subject?  [ ] YES [ x] NO    4: Altered Level of consciousness?  [ ] YES [ x] NO    The diagnosis of delirium by CAM requires the presence of features 1 and 2 and either 3 or 4.    PRESSORS: [ ] YES [x ] NO  vancomycin  IVPB 1000 milliGRAM(s) IV Intermittent every 12 hours    Cardiovascular:  Echo :    . Normal Left Ventricular Systolic Function,  (EF =  50-55%). Grade II diastolic dysfunction.      carvedilol 6.25 milliGRAM(s) Oral every 12 hours  lisinopril 5 milliGRAM(s) Oral daily    Pulmonary:  ALBUTerol    90 MICROgram(s) HFA Inhaler 2 Puff(s) Inhalation every 6 hours PRN    Hematalogic:  heparin   Injectable 5000 Unit(s) SubCutaneous every 8 hours    Other:  acetaminophen    Suspension .. 650 milliGRAM(s) Oral every 6 hours PRN  chlorhexidine 0.12% Liquid 15 milliLiter(s) Oral Mucosa every 12 hours  chlorhexidine 2% Cloths 1 Application(s) Topical <User Schedule>  chlorhexidine 2% Cloths 1 Application(s) Topical <User Schedule>  influenza   Vaccine 0.5 milliLiter(s) IntraMuscular once  insulin glargine Injectable (LANTUS) 10 Unit(s) SubCutaneous <User Schedule>  insulin lispro (ADMELOG) corrective regimen sliding scale   SubCutaneous every 6 hours  insulin lispro Injectable (ADMELOG) 6 Unit(s) SubCutaneous every 6 hours  mupirocin 2% Ointment 1 Application(s) Both Nostrils two times a day  nystatin Powder 1 Application(s) Topical two times a day  pantoprazole  Injectable 40 milliGRAM(s) IV Push daily  predniSONE   Tablet 10 milliGRAM(s) Oral daily  sodium chloride 0.9% lock flush 10 milliLiter(s) IV Push every 1 hour PRN    acetaminophen    Suspension .. 650 milliGRAM(s) Oral every 6 hours PRN  ALBUTerol    90 MICROgram(s) HFA Inhaler 2 Puff(s) Inhalation every 6 hours PRN  carvedilol 6.25 milliGRAM(s) Oral every 12 hours  chlorhexidine 0.12% Liquid 15 milliLiter(s) Oral Mucosa every 12 hours  chlorhexidine 2% Cloths 1 Application(s) Topical <User Schedule>  chlorhexidine 2% Cloths 1 Application(s) Topical <User Schedule>  heparin   Injectable 5000 Unit(s) SubCutaneous every 8 hours  influenza   Vaccine 0.5 milliLiter(s) IntraMuscular once  insulin glargine Injectable (LANTUS) 10 Unit(s) SubCutaneous <User Schedule>  insulin lispro (ADMELOG) corrective regimen sliding scale   SubCutaneous every 6 hours  insulin lispro Injectable (ADMELOG) 6 Unit(s) SubCutaneous every 6 hours  lisinopril 5 milliGRAM(s) Oral daily  mupirocin 2% Ointment 1 Application(s) Both Nostrils two times a day  nystatin Powder 1 Application(s) Topical two times a day  pantoprazole  Injectable 40 milliGRAM(s) IV Push daily  predniSONE   Tablet 10 milliGRAM(s) Oral daily  sodium chloride 0.9% lock flush 10 milliLiter(s) IV Push every 1 hour PRN  vancomycin  IVPB 1000 milliGRAM(s) IV Intermittent every 12 hours    Drug Dosing Weight  Height (cm): 154 (2020 20:16)  Weight (kg): 89.9 (2020 20:16)  BMI (kg/m2): 37.9 (2020 20:16)  BSA (m2): 1.87 (2020 20:16)    CENTRAL LINE: [ ] YES [x ] NO  LOCATION:   DATE INSERTED:  REMOVE: [ ] YES [ ] NO  EXPLAIN:    ROBLEDO: [ ] YES [x ] NO    DATE INSERTED:  REMOVE:  [ ] YES [ ] NO  EXPLAIN:    PAST MEDICAL & SURGICAL HISTORY:  Malignant neoplasm of unspecified part of right bronchus or lung    HTN (hypertension)    DM (diabetes mellitus)    COPD (chronic obstructive pulmonary disease)    Lung cancer    Morbid obesity    GERD (gastroesophageal reflux disease)    Deviated septum    Prolapsed uterus    ITP (idiopathic thrombocytopenic purpura)    Emphysema/COPD    History of cholecystectomy    S/P lobectomy of lung  right 2017    History of tonsillectomy                  Mode: AC/ CMV (Assist Control/ Continuous Mandatory Ventilation)  RR (machine): 14  TV (machine): 400  FiO2: 40  PEEP: 5  ITime: 1  MAP: 11  PIP: 29      PHYSICAL EXAM:    GENERAL: NAD,  HEAD:  Atraumatic, Normocephalic  EYES: EOMI, PERRLA, conjunctiva and sclera clear  ENMT: No tonsillar erythema, exudates, or enlargement; Moist mucous membranes,  No lesions  NECK: Supple, No JVD, Normal thyroid Trach  NERVOUS SYSTEM:  Alert & Oriented X3, Good concentration; Motor Strength 4/5 B/L upper , 3-4/5 BLE.   CHEST/LUNG: Unlabored. Trach to vent.  Clear upper lobes, decreased at bases.  No rales, rhonchi, wheezing, or rubs  HEART: Regular rate and rhythm; No murmurs, rubs, or gallops  ABDOMEN: Soft, Nontender, Nondistended; Bowel sounds present. PEG tube intact.   EXTREMITIES:  2+ Peripheral Pulses, No clubbing, cyanosis, or edema  LYMPH: No lymphadenopathy noted  SKIN: No rashes or lesions      LABS:  CBC Full  -  ( 2020 06:17 )  WBC Count : 10.05 K/uL  RBC Count : 3.74 M/uL  Hemoglobin : 9.9 g/dL  Hematocrit : 32.7 %  Platelet Count - Automated : 181 K/uL  Mean Cell Volume : 87.4 fl  Mean Cell Hemoglobin : 26.5 pg  Mean Cell Hemoglobin Concentration : 30.3 gm/dL  Auto Neutrophil # : x  Auto Lymphocyte # : x  Auto Monocyte # : x  Auto Eosinophil # : x  Auto Basophil # : x  Auto Neutrophil % : x  Auto Lymphocyte % : x  Auto Monocyte % : x  Auto Eosinophil % : x  Auto Basophil % : x    1124    132<L>  |  96  |  8   ----------------------------<  149<H>  3.8   |  29  |  0.33<L>    Ca    8.6      2020 06:17  Phos  3.8       Mg     1.7         TPro  7.3  /  Alb  2.0<L>  /  TBili  0.7  /  DBili  x   /  AST  15  /  ALT  15  /  AlkPhos  111      PT/INR - ( 2020 08:18 )   PT: 15.1 sec;   INR: 1.28 ratio           Urinalysis Basic - ( 2020 19:06 )    Color: Yellow / Appearance: Clear / S.015 / pH: x  Gluc: x / Ketone: Negative  / Bili: Negative / Urobili: Negative   Blood: x / Protein: 30 mg/dL / Nitrite: Negative   Leuk Esterase: Negative / RBC: 0-2 /HPF / WBC 3-5 /HPF   Sq Epi: x / Non Sq Epi: Moderate /HPF / Bacteria: Trace /HPF            [  ]  DVT Prophylaxis  [  ]  Nutrition, Brand, Rate         Goal Rate        Abnormal Nutritional Status -  Malnutrition       Morbid Obesity BMI >/=40    RADIOLOGY & ADDITIONAL STUDIES:  ***    Goals of Care Discussion with Family/Proxy/Other   - see note from/family meeting : `

## 2022-04-25 NOTE — PROGRESS NOTE ADULT - PROBLEM SELECTOR PROBLEM 6
Called pt and left a VM to call me back.  
Called pt back and he said his referral request to see Dr Lin was denied but [facility]. He said he is going to try to get another referral out and get it approved.     [facility]      
Patient called stating he wanted to speak with someone about a referral, this is all of the information he gave me.     Thanks  
Discharge planning issues
Discharge planning issues
DM (diabetes mellitus)
DM (diabetes mellitus)

## 2022-06-29 NOTE — ED ADULT NURSE NOTE - BREATHING INTERVENTIONS
Oxygen Dupixent Pregnancy And Lactation Text: This medication likely crosses the placenta but the risk for the fetus is uncertain. This medication is excreted in breast milk.

## 2022-07-12 NOTE — PROGRESS NOTE ADULT - PROBLEM/PLAN-8
DISPLAY PLAN FREE TEXT
No

## 2022-09-25 NOTE — DATA REVIEWED
[FreeTextEntry1] : PET/CT on 2/24/2020:\par - increasing size 2.7 x 2cm SUV=6.2 subcarinal LN (previously 8mm)\par - a 1.7 x 1.2cm SUV= 6.8 LN interposed between the SVC and brachiocephalic artery (image 98, previously 5 x 3mm)\par - a cluster of LNs measuring up to 6mm w/ SUVmax 3.3 (images 84-90; individually measuring up to 2-3mm)\par - soft tissue density w/ SUVmax 6.2 w/in the bone marrow at the level of the surgical neck Rt humerus and focal uptake at the level of the Lt superior acetabulum in the pelvis, nonspecific, marrow infiltration vs early mets disease (images 67-73)\par - a bony callus formation likely related to a nondisplaced rib fx w/ SUVmax 3.2 (image 142)\par - Lt superior acetabulum/iliac bone w/ SUV=6.0 (image 222)
ERIC

## 2022-10-26 NOTE — BEHAVIORAL HEALTH ASSESSMENT NOTE - AFFECT CONGRUENCE
Congruent Topical Sulfur Applications Counseling: Topical Sulfur Counseling: Patient counseled that this medication may cause skin irritation or allergic reactions.  In the event of skin irritation, the patient was advised to reduce the amount of the drug applied or use it less frequently.   The patient verbalized understanding of the proper use and possible adverse effects of topical sulfur application.  All of the patient's questions and concerns were addressed.

## 2022-11-14 NOTE — ED PROVIDER NOTE - RESPIRATORY DISTRESS
No. ABRAHAM screening performed.  STOP BANG Legend: 0-2 = LOW Risk; 3-4 = INTERMEDIATE Risk; 5-8 = HIGH Risk
no

## 2023-05-17 NOTE — PROGRESS NOTE ADULT - ASSESSMENT
COPD with acute asthmatic bronchitis with  s/p  Respiratory Failure   Recurrent Lung cancer with Bone Mets   IDDM   Htn with MR   Thrombocytopenia sec to C/T  Obesity with CIRS   r/o esophageal candidiasis  GPC bacteremia VRE/ yeast    PLAN-po prednisone 4o mg qd x 4 days  D/C Solumedrol  IV Zyvox and Cancidas  Get PICC LINE   o2 n/c 2pm/ 24 hrs a day  Advance diet  OOB in chair/ BRP   D/C plan to rehab on IV antibiotics   Symbicort 160/4.5 2 puffs bid    Duoneb  by HFN rx qid prn   s/c lovenox  FS coverage   9 (severe pain)

## 2023-07-14 NOTE — ED ADULT NURSE NOTE - HEART RATE (BEATS/MIN)
It was a pleasure to see Yanci today!  -Her growth and development is great!  -Her exam is good  -She is just starting with breast tissue. I would expect about 2 years or so before periods  -Continue to get fluoride at dentist  -No vaccines  -Cholesterol screening  -Next visit in 1 year or sooner if concerns      Your Child's Health  7-8 Year-Old Visit      Yanci Crandall  July 14, 2023    Visit Vitals  /74 (BP Location: LUE - Left upper extremity, Patient Position: Sitting, Cuff Size: Small Adult)   Pulse 74   Temp 98.5 °F (36.9 °C) (Axillary)   Ht 4' 4.25\" (1.327 m)   Wt 40.2 kg (88 lb 11.8 oz)   BMI 22.85 kg/m²     Weight: 88.74 lbs      YOUR CHILD'S 7 and 8 YEAR-OLD VISITS      School / Development / Behavior   Children should be well-adjusted in their school setting this at age. Success in school depends on several skills--communication skills, cooperation, attention, cognitive ability. Problems in one area may affect a child’s overall school experience. It is very important to stay in touch with teachers in order to identify and address any problems as soon as they are recognized. To help your child learn well, be sure they are well rested and have a healthy breakfast every morning.    Children need to be in school if they are going to learn and advance. Missing school frequently will lead to a lot of problems for a child; this needs to be addressed if it is happening. If your child receives any extra services through an IEP, make sure the IEP is reviewed regularly and updated if needed.     With increasing age comes increasing opportunities for activities outside of school (sports, arts, scouting).  Being part of a peer group becomes more important to children as they are growing older. They may encounter friends with different values and beliefs. Discuss differences openly with your children to help them start to understand the diversity of our society. Always listen without interrupting. Your  children may still need reminding of the rules and expectations which you have for them, but they are developing more of a conscience and awareness of right and wrong which will affect how they view rules and social expectations. Discuss what consequences are for not following family rules--make sure they are reasonable and be consistent in enforcing them.     Children should have some definite responsibilities at this age. Simple household tasks like making their bed, setting the table, helping with meals or simple household chores should be an expectation. Tell your child that you notice and appreciate what they are doing so that they become more confident and ready to take on more responsibility as time goes by. Children are motivated to do well when their parents provide a lot of encouragement. Make sure to find time every day for just talking with your children (no TV, no phone, no music!). Be a good role model for them by always acting responsibly, keep promises, and being on time.     Ask your child if they feel safe at school. Bullying is common--it is difficult to know exactly how common it is, but most children probably experience some bullying during their school years. Bullying hurts everyone--the child who is bullied, children who witness it, and the person doing the bullying (those children are likely to develop long term behavior and self-esteem issues). Children should know to report to you and/or teachers if they are being teased or bullied or if they witness another child being bullied. Bullying in schools (or anywhere) should not be tolerated. Talk to teachers, administrators or guidance counselors at the school to help with this issue. Good online resources regarding bullying are StopBilleo.gov and the American Academy of Pediatrics \"HealthyChildren.org\" website (search for \"Bullying\"). These websites include guidelines that may be useful to both parents and children.      Health and Safety in  (and out of!) the Home  Smoking: Continue to protect your child from cigarette smoke; secondhand smoke increases their risk of heart and lung disease. Vapors from e-cigarettes may also be harmful, so do not use those in your home or around children. If you smoke and are ready to consider quitting, talk to your doctor. Nicotine replacement products can be very helpful in breaking this tough addiction. 1-800-QUIT-NOW is a national help line that can help you find resources; other resources can be found at cdc.gov.    Safety on the Internet: Just as you would not allow your child to go anywhere they want outside, they should not be allowed to “wander” the internet on their own. Keep the computer where you can observe your child’s use. Make sure you know how to check the internet history and do it regularly. If possible, set up a safety filter which will prevent your child from accessing inappropriate sites.      Dental Health: Your child should be brushing at least twice daily for 2 minutes at a time with a pea-sized amount of regular (fluoridated) toothpaste. Make flossing a regular part of their dental routine at this age. Most children need a parent’s help to make sure all of their back teeth are brushed well until they are ~8 years old. Hopefully they have seen a dentist by this age; look for a one now if you do not already have one. Let our office know if you use water from a private well; testing for fluoride content is recommended to determine if fluoride supplements are needed. Limiting candy, other sweets, juice and sticky/chewy foods remains important for their dental (and overall!) health.    Healthy Eating: Eat meals together as a family and talk during meals. (Leave the television off and do not allow phones or other electronics at the table.) For in between meals, keep nutritious choices around for snack times (fresh fruits and vegetables, string cheese, whole-grain crackers, yogurt, hard-boiled eggs,  nuts).School-age children need 3 servings of good sources of calcium daily; this can include lowfat (or skim) milk, yogurt, low fat cheese or foods which have been fortified with calcium.  They should also get 600 IU of vitamin D daily which (along with appropriate calcium intake) ensures good bone health. Most people cannot meet their vitamin D needs with their usual diet, so a multivitamin with iron supplement is a good way to get it. (A pure vitamin D supplement with 400 or 600 IU is also okay.)  Protect your child from the problems of overweight and obesity by teaching them that healthy choices are important, as are continuing to avoid unhealthy choices like greasy fast food, bagged snacks, sodas, sweetened drinks, juice, candy and sweets. Don’t keep these types of food in your home because your child will find them! If you give your child juice, give them no more than 6 to 8 ounces of 100% fruit juice daily. (Remember that eating fruit is a lot healthier than drinking it!) Also, don't allow snacking in front of the TV set--that is an unhealthy habit that is easier to prevent than change!    Healthy Activity: Set a goal of 60 minutes of physical activity every day--it can be all at once or broken up into shorter segments. Try to choose family activities as much as possible.  Do not overschedule your children. They may be tempted by lots of activities when their friends are participating in them, but they still need plenty of time for schoolwork, family time and some simple unstructured downtime.  Time sitting watching television, playing on the computer or using any form of electronic media is NOT physical activity. Set a time limit for media use each day (and enforce it!).  For suggestions on developing healthy media habits, go to the American Academy of Pediatrics \"HealthyChildren.org\" website and search for \"media use plan\".     Healthy Sleep: Children this age need 10 to 11 hours of sleep each night. Have a  regular bedtime routine that does not involve electronic media (including TV) because screen time before bedtime is known to cause sleep problems. Develop a quiet routine that involves reading together or reading in bed for a short time before sleep.    Children this age should not have access to their electronic devices in their bedroom at night. All electronic media use should be supervised.     Safety on the Road: Once your child weighs more than 40 pounds, they can start riding in a high-backed booster seat. They should ride properly secured in a booster seat in the back seat until they are 4 feet 9 inches tall. High-backed booster seats should be used if there are low seat backs or no head rests in your car; backless boosters can be used if your car has high seat backs and head rests.     Children (and their parents!) should wear properly fitted helmets when biking. Watch your children biking to determine how good their judgement is and set limits about where and when they can be biking based on what you see.     Safety in the Water: This is a good age for swimming lessons if your child is not yet a good swimmer. Even if they are comfortable swimming, they should always be supervised when swimming. They should always wear [employer] approved life jackets when on any sort of boat or watercraft. If you have access to a swimming pool where you live (in an apartment complex, neighborhood or your own yard), be sure it is fenced with a locked, self-closing, self-latching gate which prevents unsupervised access by children. Remember to use sunscreen with an SPF of 15 or higher when outside and reapply after time in the water.  Your child should avoid prolonged time in the sun between 11 AM and 3 PM and wear hats, sunglasses and sun protection clothing.    Personal Safety: A parent’s safety is just as important as a child’s safety. Violence is common in many people’s lives. If you do not feel safe in your home or  if a partner has ever hit, kicked, shoved or physically hurt you or your child, it is important for you to get help. Talk to your doctors or a . In [place], resources include [facility] (097-645-7594) and the [facility] (24 hour hotline is 526- 699-0629); the National Domestic Violence Hotline is 6-909-939-FDYM (9322).    Review with your child that certain body parts (the parts usually covered by a bathing suit) and behaviors are private. For safety purposes, make sure your child knows that they should never keep secrets from parents, and they should always report to you if any adult or older child shows any interest in their private parts (or if an older person discussed or shared their own private parts with a child). Tell them they should talk to you if any adult is doing or saying anything that makes them uncomfortable, especially if an adult is asking them to keep a secret.    Remind your child about he the importance of never opening the door to anyone they don’t know. Make sure your child always knows how to reach you and what to do in the case of a fire or other emergency. Teach your child about using “911”.     Guns and Firearms: Firearms in homes can pose a safety risk; if you need to keep a gun, be sure it is stored safely: locked, unloaded, with ammunition stored separately. Even the best-behaved children are curious--so make sure firearms are stored safely in your home and anywhere they visit. They are too young to be taught to safely handle a weapon. Teach them that if they ever see a gun, they should not touch it, they should leave the immediate area and they should tell an adult.    MEDICATION FOR FEVER OR PAIN:   Acetaminophen liquid (e.g., Tylenol or Tempra) may be given every four hours as needed for pain or fever.  Acetaminophen liquid is less concentrated than the infant dropper bottle type.  Be sure to check which product CONCENTRATION you are  using.    CHILDREN’S Tylenol/Acetaminophen  (160 MG/5 mL)    Child’s Weight:  Dose:  36 - 47 pounds:    240 mg (7.5 mL (1 1/2 Teaspoons))  48 - 59 pounds:    320 mg (10.0 mL (2 Teaspoons))  60 - 71 pounds:    400 mg (12.5 mL (2 1/2 Teaspoons))  Greater than 72 pounds:   480 mg (15.0 mL (3 Teaspoons))    CHILDREN’S Tylenol/Acetaminophen MELTAWAYS ( 80 MG tablets)    Child’s Weight:  Dose:  36 - 47 pounds:    240 mg (3 meltaway tablets)  48 - 59 pounds:    320 mg (4 meltaway tablets)  60 - 71 pounds:    400 mg (5 meltaway tablets)  Greater than 72 pounds:   480 mg (6 meltaway tablets)    Josemanuel (Jr) Tylenol/Acetaminophen MELTAWAYS (160 MG tablets)    Child’s Weight:  Dose:  36 - 47 pounds:    240 mg (1 1/2 meltaway tablets)  48 - 59 pounds:    320 mg (2 meltaway tablets)  60 - 71 pounds:    400 mg (2 1/2 meltaway tablets)  Greater than 72 pounds:   480 mg (3 meltaway tablets)    CHILDREN'S Ibuprofen liquid (e.g., Advil or Motrin) may be given every six hours as needed for pain or fever.    Child’s Weight:  Dose:  36 - 47 pounds:    150 mg (1 1/2 Teaspoons)  48 - 59 pounds:    200 mg (2 Teaspoons)  60 - 71 pounds:    250 mg (2 1/2 Teaspoons)  Greater than 72 pounds:   300 mg (3 Teaspoons)    NEXT VISIT:  IN 1 YEAR      Thank you for entrusting your care to [facility].    Also, check out “Children’s Health” on the [facility] Blog for updates on timely topics regarding children’s health!   97

## 2023-07-25 NOTE — PROGRESS NOTE ADULT - PROBLEM SELECTOR PLAN 3
2/2 ES COPD, metastatic lung Ca. Patient dependent 5L O2, with increasing debility/weakness and dyspnea reported at rest prior to hospitalization. Patient continues to use AVAPS intermitently, on nasal canula at time of exam. Plan is for VY. Skin normal color for race, warm, dry and intact. No evidence of rash.

## 2024-01-04 NOTE — PATIENT PROFILE ADULT - NSPROMUTANXFEARADDRESSFT_GEN_A_NUR
No alcoholic beverages, no driving or operating machinery, no making important decisions for 24 hours.   You may have a normal diet but should eat lightly day of surgery.  Drink plenty of fluids.  Urinate within 8 hours after surgery, if unable to urinate call your doctor    verbal communication

## 2024-01-16 NOTE — ED ADULT NURSE NOTE - NSFALLRSKASSESSTYPE_ED_ALL_ED
I received a message from Dr. Fiorella Terry while covering for Dr. Gustavo Baez, Regarding resuming bisphosphonate/Fosamax  Patient has been off of the [facility] since June 2023    I reach out to the patient by phone call and advised her to complete the DEXA scan prior to her office visit with Dr. Gustavo Baez on 2/20/2024 so further decisions can be made then.      Lázaro Ahmadi MD  Endocrinology, Diabetes and Metabolism   [facility] Initial (On Arrival)

## 2024-02-04 NOTE — SWALLOW BEDSIDE ASSESSMENT ADULT - MUCOSAL QUALITY
"Chester Riddle is a 50 y.o. female with history of heart murmur, palpitations, anxiety, HTN, HLD, scoliosis, plasma cell neoplasm, IgA Kappa MGUS, orthostatic hypotension, and newly diagnosed convulsive syncope transferred from [facility] with concerns for seizures. Per chart review, the patient had an episode of seizure-like activity while lying in bed. Her  described the episode as "full body jerking with LOC," which lasted approximately 3 minutes. She was brought to the ED via EMS and had another episode while in route, which resolved after 2mg ativan IV. She had no further episodes while in the ED, but remained altered with GCS 9. CTH at OSH snowed no acute abnormalities. She was noted to be tachycardic with HR in the 140s, which improved with fluid resuscitation of 2L. Labs were notable for WBC 15K, Lactic 7.9, and K 3.4. Sepsis was felt to be unlikely, as the patient was afebrile and had no infectious symptoms, but blood cultures were sent. UA and UDS were unremarkable. She was loaded with 3g Keppra IV, and the decision was made to transfer the patient to [facility] for further evaluation. Just prior to transfer, the patient's mentation was noted to have improved to GCS 14. The patient will be admitted to [facility] for close monitoring and a higher level of care.    Of note, the patient has a 2 year history of progressive neuropathy (soles of the feet bilaterally and fingertips), bilateral lower extremity weakness (uses walker to ambulate), and syncopal episodes, as well as nausea, vomiting, and decreased appetite resulting in poor oral intake and unintentional weight loss (50-60lbs). Workup has been extensive, including CSF studies, MRI, and EMG. She reported that her syncopal episodes occurred after transitioning from lying to sitting or sitting to standing. She would quickly regain consciousness and return to baseline within 2-4 minutes. She follows with [facility] neurology (Dr. Romo) as well as " neuroimmunology (Dr. Bowen). 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 She has an elevated SPEP and was referred to Hem/Onc for further evaluation. Bone marrow biopsy remarkable for a plasma cell malignancy (MGUS vs POEMs vs Light chain amyloidosis). PET-CT negative.    She was recently admitted to [facility] on 1/26/2024 for a similar episode with concerns for new onset seizure activity. Per chart review, the patient was found down in the bathroom exhibiting tonic-clonic activity. During that admission, neurology and heme/onc were consulted. 24h EEG was completed and was negative for seizures. Keppra was discontinued, and she was discharged home without AEDs. Vasculitis serum studies were sent, which showed no abnormalities. Oncology felt that the patient's symptoms were unlikely secondary to MGUS given the low burden of disease on bone marrow biopsy and negative PET. The patient underwent a muscle biopsy with Gen Surg (1/29/2024), the results of which are still pending.    Unremarkable

## 2024-02-09 NOTE — PROGRESS NOTE ADULT - SUBJECTIVE AND OBJECTIVE BOX
The discharge information and medication has been reviewed with the patient. The patient verbalized understanding. No further questions or concerns at this time. Patient with GCS of 15 and ambulatory at time of discharge.       BARB COLÓN    SCU progress note    INTERVAL HPI/OVERNIGHT EVENTS: ***S/P chest pain at 650 this morning. Chest pain 7/10 which resolved on its own. No radiation. +Nausea. ECG Sinus tachycardia unchanged.    DNR [ ]   DNI  [  ]    Covid - 19 PCR:     The 4Ms    What Matters Most: see GOC  Age appropriate Medications/Screen for High Risk Medication: Yes  Mentation: see CAM below  Mobility: defer to physical exam    The Confusion Assessment Method (CAM) Diagnostic Algorithm     1: Acute Onset or Fluctuating Course  - Is there evidence of an acute change in mental status from the patient’s baseline? Did the (abnormal) behavior  fluctuate during the day, that is, tend to come and go, or increase and decrease in severity?  [ ] YES [ ] NO     2: Inattention  - Did the patient have difficulty focusing attention, being easily distractible, or having difficulty keeping track of what was being said?  [ ] YES [ ] NO     3: Disorganized thinking  -Was the patient’s thinking disorganized or incoherent, such as rambling or irrelevant conversation, unclear or illogical flow of ideas, or unpredictable switching from subject to subject?  [ ] YES [ ] NO    4: Altered Level of consciousness?  [ ] YES [ ] NO    The diagnosis of delirium by CAM requires the presence of features 1 and 2 and either 3 or 4.    PRESSORS: [ ] YES [ ] NO  caspofungin IVPB      caspofungin IVPB 50 milliGRAM(s) IV Intermittent every 24 hours  linezolid  IVPB      linezolid  IVPB 600 milliGRAM(s) IV Intermittent every 12 hours    Cardiovascular:  Heart Failure  Acute   Acute on Chronic  Chronic       carvedilol 6.25 milliGRAM(s) Oral every 12 hours    Pulmonary:  ALBUTerol    90 MICROgram(s) HFA Inhaler 2 Puff(s) Inhalation every 4 hours  budesonide 160 MICROgram(s)/formoterol 4.5 MICROgram(s) Inhaler 2 Puff(s) Inhalation two times a day  guaiFENesin  milliGRAM(s) Oral every 12 hours  tiotropium 18 MICROgram(s) Capsule 1 Capsule(s) Inhalation daily    Hematalogic:  enoxaparin Injectable 40 milliGRAM(s) SubCutaneous daily    Other:  atorvastatin 40 milliGRAM(s) Oral at bedtime  chlorhexidine 2% Cloths 1 Application(s) Topical <User Schedule>  dextrose 40% Gel 15 Gram(s) Oral once PRN  dextrose 5%. 1000 milliLiter(s) IV Continuous <Continuous>  dextrose 50% Injectable 12.5 Gram(s) IV Push once  dextrose 50% Injectable 25 Gram(s) IV Push once  dextrose 50% Injectable 25 Gram(s) IV Push once  ergocalciferol 38750 Unit(s) Oral <User Schedule>  FIRST- Mouthwash  BLM 10 milliLiter(s) Swish and Swallow every 8 hours  folic acid 1 milliGRAM(s) Oral daily  gabapentin 600 milliGRAM(s) Oral every 8 hours  glucagon  Injectable 1 milliGRAM(s) IntraMuscular once PRN  influenza   Vaccine 0.5 milliLiter(s) IntraMuscular once  insulin glargine Injectable (LANTUS) 40 Unit(s) SubCutaneous every morning  insulin lispro (HumaLOG) corrective regimen sliding scale   SubCutaneous three times a day before meals  insulin lispro Injectable (HumaLOG) 10 Unit(s) SubCutaneous three times a day before meals  melatonin 3 milliGRAM(s) Oral at bedtime  nystatin Powder 1 Application(s) Topical two times a day  ondansetron Injectable 4 milliGRAM(s) IV Push every 6 hours PRN  oxycodone    5 mG/acetaminophen 325 mG 2 Tablet(s) Oral every 6 hours PRN  pantoprazole  Injectable 40 milliGRAM(s) IV Push daily  polyethylene glycol 3350 17 Gram(s) Oral two times a day  senna 2 Tablet(s) Oral at bedtime  sodium chloride 0.65% Nasal 1 Spray(s) Both Nostrils four times a day PRN    ALBUTerol    90 MICROgram(s) HFA Inhaler 2 Puff(s) Inhalation every 4 hours  atorvastatin 40 milliGRAM(s) Oral at bedtime  budesonide 160 MICROgram(s)/formoterol 4.5 MICROgram(s) Inhaler 2 Puff(s) Inhalation two times a day  carvedilol 6.25 milliGRAM(s) Oral every 12 hours  caspofungin IVPB      caspofungin IVPB 50 milliGRAM(s) IV Intermittent every 24 hours  chlorhexidine 2% Cloths 1 Application(s) Topical <User Schedule>  dextrose 40% Gel 15 Gram(s) Oral once PRN  dextrose 5%. 1000 milliLiter(s) IV Continuous <Continuous>  dextrose 50% Injectable 12.5 Gram(s) IV Push once  dextrose 50% Injectable 25 Gram(s) IV Push once  dextrose 50% Injectable 25 Gram(s) IV Push once  enoxaparin Injectable 40 milliGRAM(s) SubCutaneous daily  ergocalciferol 06942 Unit(s) Oral <User Schedule>  FIRST- Mouthwash  BLM 10 milliLiter(s) Swish and Swallow every 8 hours  folic acid 1 milliGRAM(s) Oral daily  gabapentin 600 milliGRAM(s) Oral every 8 hours  glucagon  Injectable 1 milliGRAM(s) IntraMuscular once PRN  guaiFENesin  milliGRAM(s) Oral every 12 hours  influenza   Vaccine 0.5 milliLiter(s) IntraMuscular once  insulin glargine Injectable (LANTUS) 40 Unit(s) SubCutaneous every morning  insulin lispro (HumaLOG) corrective regimen sliding scale   SubCutaneous three times a day before meals  insulin lispro Injectable (HumaLOG) 10 Unit(s) SubCutaneous three times a day before meals  linezolid  IVPB      linezolid  IVPB 600 milliGRAM(s) IV Intermittent every 12 hours  melatonin 3 milliGRAM(s) Oral at bedtime  nystatin Powder 1 Application(s) Topical two times a day  ondansetron Injectable 4 milliGRAM(s) IV Push every 6 hours PRN  oxycodone    5 mG/acetaminophen 325 mG 2 Tablet(s) Oral every 6 hours PRN  pantoprazole  Injectable 40 milliGRAM(s) IV Push daily  polyethylene glycol 3350 17 Gram(s) Oral two times a day  senna 2 Tablet(s) Oral at bedtime  sodium chloride 0.65% Nasal 1 Spray(s) Both Nostrils four times a day PRN  tiotropium 18 MICROgram(s) Capsule 1 Capsule(s) Inhalation daily    Drug Dosing Weight  Height (cm): 154.9 (19 Sep 2020 08:00)  Weight (kg): 84.8 (21 Sep 2020 09:15)  BMI (kg/m2): 35.3 (21 Sep 2020 09:15)  BSA (m2): 1.84 (21 Sep 2020 09:15)    CENTRAL LINE: [ ] YES [ ] NO  LOCATION:   DATE INSERTED:  REMOVE: [ ] YES [ ] NO  EXPLAIN:    ROBLEDO: [ ] YES [ ] NO    DATE INSERTED:  REMOVE:  [ ] YES [ ] NO  EXPLAIN:    PAST MEDICAL & SURGICAL HISTORY:  Malignant neoplasm of unspecified part of right bronchus or lung    HTN (hypertension)    DM (diabetes mellitus)    COPD (chronic obstructive pulmonary disease)    Lung cancer    Morbid obesity    GERD (gastroesophageal reflux disease)    Deviated septum    Prolapsed uterus    ITP (idiopathic thrombocytopenic purpura)    Emphysema/COPD    History of cholecystectomy    S/P lobectomy of lung  right 2017    History of tonsillectomy          ABG - ( 01 Oct 2020 06:16 )  pH, Arterial: 7.48  pH, Blood: x     /  pCO2: 51    /  pO2: 84    / HCO3: 38    / Base Excess: 12.5  /  SaO2: 96                          PHYSICAL EXAM:    GENERAL: NAD, well-groomed, well-developed  HEAD:  Atraumatic, Normocephalic  EYES: EOMI, PERRLA, conjunctiva and sclera clear  ENMT: No tonsillar erythema, exudates, or enlargement; Moist mucous membranes, Good dentition, No lesions  NECK: Supple, No JVD, Normal thyroid  NERVOUS SYSTEM:  Alert & Oriented X3, Good concentration; Motor Strength 5/5 B/L upper and lower extremities; DTRs 2+ intact and symmetric  CHEST/LUNG: Clear to percussion bilaterally; No rales, rhonchi, wheezing, or rubs  HEART: Regular rate and rhythm; No murmurs, rubs, or gallops  ABDOMEN: Soft, Nontender, Nondistended; Bowel sounds present  EXTREMITIES:  2+ Peripheral Pulses, No clubbing, cyanosis, or edema  LYMPH: No lymphadenopathy noted  SKIN: No rashes or lesions      LABS:  CBC Full  -  ( 02 Oct 2020 06:39 )  WBC Count : 10.73 K/uL  RBC Count : 3.42 M/uL  Hemoglobin : 10.5 g/dL  Hematocrit : 29.5 %  Platelet Count - Automated : 80 K/uL  Mean Cell Volume : 86.3 fl  Mean Cell Hemoglobin : 30.7 pg  Mean Cell Hemoglobin Concentration : 35.6 gm/dL  Auto Neutrophil # : x  Auto Lymphocyte # : x  Auto Monocyte # : x  Auto Eosinophil # : x  Auto Basophil # : x  Auto Neutrophil % : x  Auto Lymphocyte % : x  Auto Monocyte % : x  Auto Eosinophil % : x  Auto Basophil % : x    10-02    134<L>  |  93<L>  |  16  ----------------------------<  101<H>  3.5   |  37<H>  |  0.38<L>    Ca    8.9      02 Oct 2020 06:39  Phos  3.0     10-02  Mg     1.9     10-02    TPro  6.1  /  Alb  2.8<L>  /  TBili  0.9  /  DBili  x   /  AST  20  /  ALT  27  /  AlkPhos  125<H>  10-02              [  ]  DVT Prophylaxis  [  ]  Nutrition, Brand, Rate         Goal Rate        Abnormal Nutritional Status -  Malnutrition   Cachexia      Morbid Obesity BMI >/=40    RADIOLOGY & ADDITIONAL STUDIES:  ***    Goals of Care Discussion with Family/Proxy/Other   - see note from/family meeting set up for...     BARB COLÓN    SCU progress note    INTERVAL HPI/OVERNIGHT EVENTS: ***S/P chest pain at 650 this morning. Chest pain 7/10 which resolved on its own. No radiation. +Nausea. ECG Sinus tachycardia unchanged.    DNR [ ]   DNI  [  ]   FULL CODE    Covid - 19 PCR:     The 4Ms    What Matters Most: see GOC  Age appropriate Medications/Screen for High Risk Medication: Yes  Mentation: see CAM below  Mobility: defer to physical exam    The Confusion Assessment Method (CAM) Diagnostic Algorithm     1: Acute Onset or Fluctuating Course  - Is there evidence of an acute change in mental status from the patient’s baseline? Did the (abnormal) behavior  fluctuate during the day, that is, tend to come and go, or increase and decrease in severity?  [ ] YES [ x] NO     2: Inattention  - Did the patient have difficulty focusing attention, being easily distractible, or having difficulty keeping track of what was being said?  [ ] YES [x ] NO     3: Disorganized thinking  -Was the patient’s thinking disorganized or incoherent, such as rambling or irrelevant conversation, unclear or illogical flow of ideas, or unpredictable switching from subject to subject?  [ ] YES [x ] NO    4: Altered Level of consciousness?  [ ] YES [x ] NO    The diagnosis of delirium by CAM requires the presence of features 1 and 2 and either 3 or 4.    PRESSORS: [ ] YES [x ] NO  caspofungin IVPB      caspofungin IVPB 50 milliGRAM(s) IV Intermittent every 24 hours  linezolid  IVPB      linezolid  IVPB 600 milliGRAM(s) IV Intermittent every 12 hours    Cardiovascular:  Heart Failure  Acute   Acute on Chronic  Chronic       carvedilol 6.25 milliGRAM(s) Oral every 12 hours    Pulmonary:  ALBUTerol    90 MICROgram(s) HFA Inhaler 2 Puff(s) Inhalation every 4 hours  budesonide 160 MICROgram(s)/formoterol 4.5 MICROgram(s) Inhaler 2 Puff(s) Inhalation two times a day  guaiFENesin  milliGRAM(s) Oral every 12 hours  tiotropium 18 MICROgram(s) Capsule 1 Capsule(s) Inhalation daily    Hematalogic:  enoxaparin Injectable 40 milliGRAM(s) SubCutaneous daily    Other:  atorvastatin 40 milliGRAM(s) Oral at bedtime  chlorhexidine 2% Cloths 1 Application(s) Topical <User Schedule>  dextrose 40% Gel 15 Gram(s) Oral once PRN  dextrose 5%. 1000 milliLiter(s) IV Continuous <Continuous>  dextrose 50% Injectable 12.5 Gram(s) IV Push once  dextrose 50% Injectable 25 Gram(s) IV Push once  dextrose 50% Injectable 25 Gram(s) IV Push once  ergocalciferol 63609 Unit(s) Oral <User Schedule>  FIRST- Mouthwash  BLM 10 milliLiter(s) Swish and Swallow every 8 hours  folic acid 1 milliGRAM(s) Oral daily  gabapentin 600 milliGRAM(s) Oral every 8 hours  glucagon  Injectable 1 milliGRAM(s) IntraMuscular once PRN  influenza   Vaccine 0.5 milliLiter(s) IntraMuscular once  insulin glargine Injectable (LANTUS) 40 Unit(s) SubCutaneous every morning  insulin lispro (HumaLOG) corrective regimen sliding scale   SubCutaneous three times a day before meals  insulin lispro Injectable (HumaLOG) 10 Unit(s) SubCutaneous three times a day before meals  melatonin 3 milliGRAM(s) Oral at bedtime  nystatin Powder 1 Application(s) Topical two times a day  ondansetron Injectable 4 milliGRAM(s) IV Push every 6 hours PRN  oxycodone    5 mG/acetaminophen 325 mG 2 Tablet(s) Oral every 6 hours PRN  pantoprazole  Injectable 40 milliGRAM(s) IV Push daily  polyethylene glycol 3350 17 Gram(s) Oral two times a day  senna 2 Tablet(s) Oral at bedtime  sodium chloride 0.65% Nasal 1 Spray(s) Both Nostrils four times a day PRN    ALBUTerol    90 MICROgram(s) HFA Inhaler 2 Puff(s) Inhalation every 4 hours  atorvastatin 40 milliGRAM(s) Oral at bedtime  budesonide 160 MICROgram(s)/formoterol 4.5 MICROgram(s) Inhaler 2 Puff(s) Inhalation two times a day  carvedilol 6.25 milliGRAM(s) Oral every 12 hours  caspofungin IVPB      caspofungin IVPB 50 milliGRAM(s) IV Intermittent every 24 hours  chlorhexidine 2% Cloths 1 Application(s) Topical <User Schedule>  dextrose 40% Gel 15 Gram(s) Oral once PRN  dextrose 5%. 1000 milliLiter(s) IV Continuous <Continuous>  dextrose 50% Injectable 12.5 Gram(s) IV Push once  dextrose 50% Injectable 25 Gram(s) IV Push once  dextrose 50% Injectable 25 Gram(s) IV Push once  enoxaparin Injectable 40 milliGRAM(s) SubCutaneous daily  ergocalciferol 49962 Unit(s) Oral <User Schedule>  FIRST- Mouthwash  BLM 10 milliLiter(s) Swish and Swallow every 8 hours  folic acid 1 milliGRAM(s) Oral daily  gabapentin 600 milliGRAM(s) Oral every 8 hours  glucagon  Injectable 1 milliGRAM(s) IntraMuscular once PRN  guaiFENesin  milliGRAM(s) Oral every 12 hours  influenza   Vaccine 0.5 milliLiter(s) IntraMuscular once  insulin glargine Injectable (LANTUS) 40 Unit(s) SubCutaneous every morning  insulin lispro (HumaLOG) corrective regimen sliding scale   SubCutaneous three times a day before meals  insulin lispro Injectable (HumaLOG) 10 Unit(s) SubCutaneous three times a day before meals  linezolid  IVPB      linezolid  IVPB 600 milliGRAM(s) IV Intermittent every 12 hours  melatonin 3 milliGRAM(s) Oral at bedtime  nystatin Powder 1 Application(s) Topical two times a day  ondansetron Injectable 4 milliGRAM(s) IV Push every 6 hours PRN  oxycodone    5 mG/acetaminophen 325 mG 2 Tablet(s) Oral every 6 hours PRN  pantoprazole  Injectable 40 milliGRAM(s) IV Push daily  polyethylene glycol 3350 17 Gram(s) Oral two times a day  senna 2 Tablet(s) Oral at bedtime  sodium chloride 0.65% Nasal 1 Spray(s) Both Nostrils four times a day PRN  tiotropium 18 MICROgram(s) Capsule 1 Capsule(s) Inhalation daily    Drug Dosing Weight  Height (cm): 154.9 (19 Sep 2020 08:00)  Weight (kg): 84.8 (21 Sep 2020 09:15)  BMI (kg/m2): 35.3 (21 Sep 2020 09:15)  BSA (m2): 1.84 (21 Sep 2020 09:15)    CENTRAL LINE: [ ] YES [x ] NO  LOCATION:   DATE INSERTED:  REMOVE: [ ] YES [ ] NO  EXPLAIN:    ROBLEDO: [ ] YES [x ] NO    DATE INSERTED:  REMOVE:  [ ] YES [ ] NO  EXPLAIN:    PAST MEDICAL & SURGICAL HISTORY:  Malignant neoplasm of unspecified part of right bronchus or lung    HTN (hypertension)    DM (diabetes mellitus)    COPD (chronic obstructive pulmonary disease)    Lung cancer    Morbid obesity    GERD (gastroesophageal reflux disease)    Deviated septum    Prolapsed uterus    ITP (idiopathic thrombocytopenic purpura)    Emphysema/COPD    History of cholecystectomy    S/P lobectomy of lung  right 2017    History of tonsillectomy          ABG - ( 01 Oct 2020 06:16 )  pH, Arterial: 7.48  pH, Blood: x     /  pCO2: 51    /  pO2: 84    / HCO3: 38    / Base Excess: 12.5  /  SaO2: 96                          PHYSICAL EXAM:    GENERAL: NAD, well-groomed, well-developed  HEAD:  Atraumatic, Normocephalic  EYES: EOMI, PERRLA, conjunctiva and sclera clear  ENMT: No tonsillar erythema, exudates  NECK: Supple, No JVD  NERVOUS SYSTEM:  Alert & Oriented X3, Moving all extremities. Follows commands  CHEST/LUNG: Diminished breath sounds bilaterally with scattered rhonchi  HEART: Regular rate and rhythm; No murmurs, rubs, or gallops  ABDOMEN: Soft, Nontender, Nondistended; Bowel sounds present  EXTREMITIES:  2+ Peripheral Pulses, No clubbing, cyanosis, or edema  LYMPH: No lymphadenopathy noted  SKIN: Bilateral arms ecchymotic      LABS:  CBC Full  -  ( 02 Oct 2020 06:39 )  WBC Count : 10.73 K/uL  RBC Count : 3.42 M/uL  Hemoglobin : 10.5 g/dL  Hematocrit : 29.5 %  Platelet Count - Automated : 80 K/uL  Mean Cell Volume : 86.3 fl  Mean Cell Hemoglobin : 30.7 pg  Mean Cell Hemoglobin Concentration : 35.6 gm/dL  Auto Neutrophil # : x  Auto Lymphocyte # : x  Auto Monocyte # : x  Auto Eosinophil # : x  Auto Basophil # : x  Auto Neutrophil % : x  Auto Lymphocyte % : x  Auto Monocyte % : x  Auto Eosinophil % : x  Auto Basophil % : x    10-02    134<L>  |  93<L>  |  16  ----------------------------<  101<H>  3.5   |  37<H>  |  0.38<L>    Ca    8.9      02 Oct 2020 06:39  Phos  3.0     10-02  Mg     1.9     10-02    TPro  6.1  /  Alb  2.8<L>  /  TBili  0.9  /  DBili  x   /  AST  20  /  ALT  27  /  AlkPhos  125<H>  10-02              [  ]  DVT Prophylaxis  [  ]  Nutrition, Brand, Rate         Goal Rate        Abnormal Nutritional Status -  Malnutrition   Cachexia      Morbid Obesity BMI >/=40    RADIOLOGY & ADDITIONAL STUDIES:  ***  < from: Xray Chest 1 View- PORTABLE-Urgent (Xray Chest 1 View- PORTABLE-Urgent .) (09.30.20 @ 11:46) >  AP chest. Prior 9/23/2020.    Limited shallow inspiration. The patient's chin obscures portion left apex. No change heart mediastinum. Fibrotic atelectatic stranding at the lung bases. No definite acute infiltrate or significant pleural effusion.      < end of copied text >    Goals of Care Discussion with Family/Proxy/Other   - see note from 10/01     154.94

## 2024-03-07 NOTE — ED PROVIDER NOTE - PROGRESS NOTE DETAILS
What Type Of Note Output Would You Prefer (Optional)?: Standard Output How Severe Is Your Skin Lesion?: mild Has Your Skin Lesion Been Treated?: not been treated Is This A New Presentation, Or A Follow-Up?: Skin Lesion EKG - nsr, rate 94, anterior and inferior Q waves EKG - nsr, rate 94, anterior and inferior Q waves  labs - K 3.2  CXR - lungs clear; knee xray - no fracture  Pain controlled, pt ambulating in ED. Repeat oxygen sat 99% on 5 L NC (which is what pt uses at home). Will dc with ambulate home and PCP fu. Discussed indications for patient return to ED. Patient understood.

## 2024-04-24 NOTE — ED PROVIDER NOTE - GASTROINTESTINAL, MLM
Detail Level: Detailed
Detail Level: Simple
Detail Level: Zone
Sunscreen Recommendations: otc broad spectrum sunscreen minimum spf 30
Abdomen soft, non-tender, no guarding.

## 2024-05-10 NOTE — ED ADULT TRIAGE NOTE - HEIGHT IN CM
Called patient to schedule Colonoscopy after VV w/ Dr. Henry; L/V/M.    ----- Message from Katie Son MD sent at 5/8/2024 12:18 PM EDT -----  Regarding: Colon schedule  Please schedule for colonoscopy.    Needs bowel prep instructions         154.94

## 2024-05-14 NOTE — ED ADULT NURSE NOTE - TEMPLATE LIST FOR HEAD TO TOE ASSESSMENT
[FreeTextEntry1] :  Documented by Meir Correa acting as scribe for Dr. Pearson on 05/14/2024. All Medical record entries made by the Scribe were at my, Dr. Pearson, direction and personally dictated by me on 05/14/2024 . I have reviewed the chart and agree that the record accurately reflects my personal performance of the history, physical exam, assessment and plan. I have also personally directed, reviewed, and agreed with the discharge instructions. Abdominal Pain, N/V/D

## 2024-06-17 NOTE — ED ADULT NURSE NOTE - TEMPLATE LIST FOR HEAD TO TOE ASSESSMENT
Care Transitions Initial Follow Up Call    Outreach made within 2 business days of discharge: Yes    Patient: Gigi Mcneal Patient : 1957   MRN: [id number]  Reason for Admission: There are no discharge diagnoses documented for the most recent discharge.  Discharge Date: 24       Spoke with: Patient    Discharge department/facility: [facility]    TCM Interactive Patient Contact:  Was patient able to fill all prescriptions: Yes  Was patient instructed to bring all medications to the follow-up visit: Yes  Is patient taking all medications as directed in the discharge summary? Yes  Does patient understand their discharge instructions: Yes  Does patient have questions or concerns that need addressed prior to 7-14 day follow up office visit: yes - No    Scheduled appointment with PCP within 7-14 days    Follow Up  Future Appointments   Date Time Provider Department Center   2024  9:00 AM Afsaneh Fowler APRN - [facility] BS AMB   2024  1:00 PM Kamlesh Biggs MD [facility] BS AMB   2024  1:00 PM Candido Banuelos MD [facility] BS AMB   2024  9:00 AM Kamlesh Biggs MD [facility] BS AMB   2024 10:00 AM Butch Guaman MD [facility] BS AMB       Jessa Aguilar    
Back Pain

## 2024-08-12 NOTE — H&P ADULT - REASON FOR ADMISSION
Only take clonidine if BP is greater than or equal to 180/110. Check your blood pressure daily and write down your numbers, bring to your next visit.   
SOB

## 2024-08-14 NOTE — SWALLOW BEDSIDE ASSESSMENT ADULT - ASPIRATION PRECAUTIONS
L-theanine -- 200mg 2x/day    Natural calm magnesium citrate  (can cause diarrhea if take too much)    Magnesium glycinate 200mg 1 hr before bed  
yes
yes

## 2024-12-11 NOTE — ED ADULT NURSE NOTE - NS ED PATIENT SAFETY CONCERN
1.8 cm saccular aneurysm present  Repeat u/s 5/2025 - ordered   No symptoms   Smoking cessation advised  On BB.    No

## 2025-01-20 NOTE — DISCHARGE NOTE NURSING/CASE MANAGEMENT/SOCIAL WORK - [facility]_GEN_ALL_CORE
Problem: Adult Inpatient Plan of Care  Goal: Plan of Care Review  Outcome: Progressing  Goal: Patient-Specific Goal (Individualized)  Outcome: Progressing  Goal: Absence of Hospital-Acquired Illness or Injury  Outcome: Progressing  Intervention: Identify and Manage Fall Risk  Description: Perform standard risk assessment on admission using a validated tool or comprehensive approach appropriate to the patient; reassess fall risk frequently, with change in status or transfer to another level of care.  Communicate risk to interprofessional healthcare team; ensure fall risk visible cue.  Determine need for increased observation, equipment and environmental modification, as well as use of supportive, nonskid footwear.  Adjust safety measures to individual needs and identified risk factors.  Reinforce the importance of active participation with fall risk prevention, safety, and physical activity with the patient and family.  Perform regular intentional rounding to assess need for position change, pain assessment and personal needs, including assistance with toileting.  Recent Flowsheet Documentation  Taken 1/20/2025 0616 by Fabiana Lares RN  Safety Promotion/Fall Prevention:   safety round/check completed   activity supervised  Taken 1/20/2025 0445 by Fabiana Lares RN  Safety Promotion/Fall Prevention:   safety round/check completed   activity supervised  Taken 1/20/2025 0215 by Fabiana Lares RN  Safety Promotion/Fall Prevention:   safety round/check completed   activity supervised  Taken 1/20/2025 0105 by Fabiana Lares RN  Safety Promotion/Fall Prevention:   safety round/check completed   activity supervised  Taken 1/19/2025 2248 by Fabiana Lares RN  Safety Promotion/Fall Prevention:   safety round/check completed   activity supervised  Taken 1/19/2025 2130 by Fabiana Lares, RN  Safety Promotion/Fall Prevention:   safety round/check completed   activity supervised  Intervention: Prevent Skin  Injury  Description: Perform a screening for skin injury risk, such as pressure or moisture-associated skin damage on admission and at regular intervals throughout hospital stay.  Keep all areas of skin (especially folds) clean and dry.  Maintain adequate skin hydration.  Relieve and redistribute pressure and protect bony prominences and skin at risk for injury; implement measures based on patient-specific risk factors.  Match turning and repositioning schedule to clinical condition.  Encourage weight shift frequently; assist with reposition if unable to complete independently.  Float heels off bed; avoid pressure on the Achilles tendon.  Keep skin free from extended contact with medical devices.  Optimize nutrition and hydration.  Encourage functional activity and mobility, as early as tolerated.  Use aids (e.g., slide boards, mechanical lift) during transfer.  Recent Flowsheet Documentation  Taken 1/20/2025 0105 by Fabiana Lares RN  Body Position: position changed independently  Taken 1/19/2025 2130 by Fabiana Lares RN  Body Position: position changed independently  Goal: Optimal Comfort and Wellbeing  Outcome: Progressing  Intervention: Provide Person-Centered Care  Description: Use a family-focused approach to care; encourage support system presence and participation.  Develop trust and rapport by proactively providing information, encouraging questions, addressing concerns and offering reassurance.  Acknowledge emotional response to hospitalization.  Recognize and utilize personal coping strategies and strengths; develop goals via shared decision-making.  Honor spiritual and cultural preferences.  Recent Flowsheet Documentation  Taken 1/20/2025 0105 by Fabiana Lares, RN  Trust Relationship/Rapport:   care explained   choices provided  Goal: Readiness for Transition of Care  Outcome: Progressing  Intervention: Mutually Develop Transition Plan  Description: Identify available resources for support  (e.g., family, friends, community).  Identify and address barriers to ongoing treatment and home management (e.g., environmental, financial).  Provide opportunities to practice self-management skills.  Assess and monitor emotional readiness for transition.  Establish or reconnect linkage with outpatient providers or community-based services.  Recent Flowsheet Documentation  Taken 1/19/2025 2143 by Fabiana Lares RN  Transportation Anticipated: family or friend will provide  Patient/Family Anticipated Services at Transition: none  Patient/Family Anticipates Transition to: home with family  Taken 1/19/2025 2142 by Fabiana Lares RN  Equipment Currently Used at Home: none  Goal: Plan of Care Review  Outcome: Progressing  Goal: Patient-Specific Goal (Individualized)  Outcome: Progressing  Goal: Absence of Hospital-Acquired Illness or Injury  Outcome: Progressing  Intervention: Identify and Manage Fall Risk  Description: Perform standard risk assessment on admission using a validated tool or comprehensive approach appropriate to the patient; reassess fall risk frequently, with change in status or transfer to another level of care.  Communicate risk to interprofessional healthcare team; ensure fall risk visible cue.  Determine need for increased observation, equipment and environmental modification, as well as use of supportive, nonskid footwear.  Adjust safety measures to individual needs and identified risk factors.  Reinforce the importance of active participation with fall risk prevention, safety, and physical activity with the patient and family.  Perform regular intentional rounding to assess need for position change, pain assessment and personal needs, including assistance with toileting.  Recent Flowsheet Documentation  Taken 1/20/2025 0616 by Fabiana Lares, RN  Safety Promotion/Fall Prevention:   safety round/check completed   activity supervised  Taken 1/20/2025 0445 by Fabiana Lares, JAKCY  Safety  Promotion/Fall Prevention:   safety round/check completed   activity supervised  Taken 1/20/2025 0215 by Fabiana Lares RN  Safety Promotion/Fall Prevention:   safety round/check completed   activity supervised  Taken 1/20/2025 0105 by Fabiana Lares RN  Safety Promotion/Fall Prevention:   safety round/check completed   activity supervised  Taken 1/19/2025 2248 by Fabiana Lares RN  Safety Promotion/Fall Prevention:   safety round/check completed   activity supervised  Taken 1/19/2025 2130 by Fabiana Lares RN  Safety Promotion/Fall Prevention:   safety round/check completed   activity supervised  Intervention: Prevent Skin Injury  Description: Perform a screening for skin injury risk, such as pressure or moisture-associated skin damage on admission and at regular intervals throughout hospital stay.  Keep all areas of skin (especially folds) clean and dry.  Maintain adequate skin hydration.  Relieve and redistribute pressure and protect bony prominences and skin at risk for injury; implement measures based on patient-specific risk factors.  Match turning and repositioning schedule to clinical condition.  Encourage weight shift frequently; assist with reposition if unable to complete independently.  Float heels off bed; avoid pressure on the Achilles tendon.  Keep skin free from extended contact with medical devices.  Optimize nutrition and hydration.  Encourage functional activity and mobility, as early as tolerated.  Use aids (e.g., slide boards, mechanical lift) during transfer.  Recent Flowsheet Documentation  Taken 1/20/2025 0105 by Fabiana Lares RN  Body Position: position changed independently  Taken 1/19/2025 2130 by Fabiana Lares RN  Body Position: position changed independently  Goal: Optimal Comfort and Wellbeing  Outcome: Progressing  Intervention: Provide Person-Centered Care  Description: Use a family-focused approach to care; encourage support system presence and  participation.  Develop trust and rapport by proactively providing information, encouraging questions, addressing concerns and offering reassurance.  Acknowledge emotional response to hospitalization.  Recognize and utilize personal coping strategies and strengths; develop goals via shared decision-making.  Honor spiritual and cultural preferences.  Recent Flowsheet Documentation  Taken 1/20/2025 0105 by Fabiana Lares, RN  Trust Relationship/Rapport:   care explained   choices provided  Goal: Readiness for Transition of Care  Outcome: Progressing  Intervention: Mutually Develop Transition Plan  Description: Identify available resources for support (e.g., family, friends, community).  Identify and address barriers to ongoing treatment and home management (e.g., environmental, financial).  Provide opportunities to practice self-management skills.  Assess and monitor emotional readiness for transition.  Establish or reconnect linkage with outpatient providers or community-based services.  Recent Flowsheet Documentation  Taken 1/19/2025 2143 by Fabiana Lares, RN  Transportation Anticipated: family or friend will provide  Patient/Family Anticipated Services at Transition: none  Patient/Family Anticipates Transition to: home with family  Taken 1/19/2025 2142 by Fabiana Lares, RN  Equipment Currently Used at Home: none   Goal Outcome Evaluation:                                             [facility]

## 2025-04-25 NOTE — ASU PREOP CHECKLIST - BMI (KG/M2)
47.6 /pt dx with allergic Asthma last used quick release inhaler  3 month ago, no intubation or hospitalization secondary to Asthma,

## 2025-06-12 NOTE — ED PROVIDER NOTE - CONSTITUTIONAL NEGATIVE STATEMENT, MLM
Patient Specific Counseling (Will Not Stick From Patient To Patient): -\\Lee Ann pt that these are an inflammatory response to trauma\\nRecommends injecting anti-inflammatory Detail Level: Detailed no fever and no chills.

## 2025-06-18 NOTE — PATIENT PROFILE ADULT - PACKS PER DAY

## 2025-08-22 NOTE — PROVIDER CONTACT NOTE (CRITICAL VALUE NOTIFICATION) - TEST AND RESULT REPORTED:
Continued Stay SW/CM Assessment/Plan of Care Note         Active Substitute Decision Maker (SDM)    There are no active Substitute Decision Maker (SDM) on file.           Progress note:  [facility] received call from [facility] admissions, reporting awaiting for  to appear in court to request guardianship.  Chandan to f/u to make [facility] aware of when  is planning to do so.  RAMÓN/FLAQUITA to f/u.  RK     Current Patient Status: Inpatient    See RAMÓN/FLAQUITA flowsheets for other objective data.         Potassium 2.9